# Patient Record
Sex: MALE | Race: WHITE | NOT HISPANIC OR LATINO | URBAN - METROPOLITAN AREA
[De-identification: names, ages, dates, MRNs, and addresses within clinical notes are randomized per-mention and may not be internally consistent; named-entity substitution may affect disease eponyms.]

---

## 2016-02-12 RX ORDER — DOCUSATE SODIUM 100 MG
1 CAPSULE ORAL
Qty: 0 | Refills: 0 | COMMUNITY
Start: 2016-02-12

## 2017-01-12 ENCOUNTER — INPATIENT (INPATIENT)
Facility: HOSPITAL | Age: 62
LOS: 6 days | Discharge: NURSING, REHAB OR LONG-TERM CARE | DRG: 493 | End: 2017-01-19
Attending: ORTHOPAEDIC SURGERY | Admitting: ORTHOPAEDIC SURGERY
Payer: COMMERCIAL

## 2017-01-12 ENCOUNTER — RESULT REVIEW (OUTPATIENT)
Age: 62
End: 2017-01-12

## 2017-01-12 VITALS
TEMPERATURE: 100 F | OXYGEN SATURATION: 100 % | SYSTOLIC BLOOD PRESSURE: 131 MMHG | RESPIRATION RATE: 18 BRPM | HEART RATE: 96 BPM | DIASTOLIC BLOOD PRESSURE: 76 MMHG

## 2017-01-12 DIAGNOSIS — Z98.89 OTHER SPECIFIED POSTPROCEDURAL STATES: Chronic | ICD-10-CM

## 2017-01-12 DIAGNOSIS — Z96.649 PRESENCE OF UNSPECIFIED ARTIFICIAL HIP JOINT: Chronic | ICD-10-CM

## 2017-01-12 DIAGNOSIS — M25.571 PAIN IN RIGHT ANKLE AND JOINTS OF RIGHT FOOT: ICD-10-CM

## 2017-01-12 LAB
ANION GAP SERPL CALC-SCNC: 11 MMOL/L — SIGNIFICANT CHANGE UP (ref 5–17)
APPEARANCE UR: CLEAR — SIGNIFICANT CHANGE UP
APTT BLD: 32.2 SEC — SIGNIFICANT CHANGE UP (ref 27.5–37.4)
BACTERIA # UR AUTO: NEGATIVE /HPF — SIGNIFICANT CHANGE UP
BILIRUB UR-MCNC: NEGATIVE — SIGNIFICANT CHANGE UP
BLD GP AB SCN SERPL QL: NEGATIVE — SIGNIFICANT CHANGE UP
BUN SERPL-MCNC: 16 MG/DL — SIGNIFICANT CHANGE UP (ref 7–23)
CALCIUM SERPL-MCNC: 9.5 MG/DL — SIGNIFICANT CHANGE UP (ref 8.4–10.5)
CHLORIDE SERPL-SCNC: 100 MMOL/L — SIGNIFICANT CHANGE UP (ref 96–108)
CO2 SERPL-SCNC: 27 MMOL/L — SIGNIFICANT CHANGE UP (ref 22–31)
COLOR SPEC: YELLOW — SIGNIFICANT CHANGE UP
CREAT SERPL-MCNC: 0.89 MG/DL — SIGNIFICANT CHANGE UP (ref 0.5–1.3)
DIFF PNL FLD: NEGATIVE — SIGNIFICANT CHANGE UP
GLUCOSE SERPL-MCNC: 103 MG/DL — HIGH (ref 70–99)
GLUCOSE UR QL: NEGATIVE — SIGNIFICANT CHANGE UP
HCT VFR BLD CALC: 35.9 % — LOW (ref 39–50)
HGB BLD-MCNC: 12.5 G/DL — LOW (ref 13–17)
INR BLD: 1.28 RATIO — HIGH (ref 0.88–1.16)
KETONES UR-MCNC: NEGATIVE — SIGNIFICANT CHANGE UP
LEUKOCYTE ESTERASE UR-ACNC: NEGATIVE — SIGNIFICANT CHANGE UP
MCHC RBC-ENTMCNC: 33.8 PG — SIGNIFICANT CHANGE UP (ref 27–34)
MCHC RBC-ENTMCNC: 34.8 GM/DL — SIGNIFICANT CHANGE UP (ref 32–36)
MCV RBC AUTO: 97.1 FL — SIGNIFICANT CHANGE UP (ref 80–100)
NITRITE UR-MCNC: NEGATIVE — SIGNIFICANT CHANGE UP
PH UR: 5.5 — SIGNIFICANT CHANGE UP (ref 4.8–8)
PLATELET # BLD AUTO: 201 K/UL — SIGNIFICANT CHANGE UP (ref 150–400)
POTASSIUM SERPL-MCNC: 4 MMOL/L — SIGNIFICANT CHANGE UP (ref 3.5–5.3)
POTASSIUM SERPL-SCNC: 4 MMOL/L — SIGNIFICANT CHANGE UP (ref 3.5–5.3)
PROT UR-MCNC: NEGATIVE — SIGNIFICANT CHANGE UP
PROTHROM AB SERPL-ACNC: 14 SEC — HIGH (ref 10–13.1)
RBC # BLD: 3.7 M/UL — LOW (ref 4.2–5.8)
RBC # FLD: 12.3 % — SIGNIFICANT CHANGE UP (ref 10.3–14.5)
RBC CASTS # UR COMP ASSIST: SIGNIFICANT CHANGE UP /HPF (ref 0–2)
RH IG SCN BLD-IMP: POSITIVE — SIGNIFICANT CHANGE UP
SODIUM SERPL-SCNC: 138 MMOL/L — SIGNIFICANT CHANGE UP (ref 135–145)
SP GR SPEC: 1.02 — SIGNIFICANT CHANGE UP (ref 1.01–1.02)
UROBILINOGEN FLD QL: NEGATIVE — SIGNIFICANT CHANGE UP
WBC # BLD: 11.8 K/UL — HIGH (ref 3.8–10.5)
WBC # FLD AUTO: 11.8 K/UL — HIGH (ref 3.8–10.5)
WBC UR QL: SIGNIFICANT CHANGE UP /HPF (ref 0–5)

## 2017-01-12 PROCEDURE — 73610 X-RAY EXAM OF ANKLE: CPT | Mod: 26,RT

## 2017-01-12 PROCEDURE — 73502 X-RAY EXAM HIP UNI 2-3 VIEWS: CPT | Mod: 26,RT

## 2017-01-12 PROCEDURE — 73700 CT LOWER EXTREMITY W/O DYE: CPT | Mod: 26,RT

## 2017-01-12 PROCEDURE — 76377 3D RENDER W/INTRP POSTPROCES: CPT | Mod: 26

## 2017-01-12 PROCEDURE — 73590 X-RAY EXAM OF LOWER LEG: CPT | Mod: 26,RT

## 2017-01-12 PROCEDURE — 71010: CPT | Mod: 26

## 2017-01-12 PROCEDURE — 93010 ELECTROCARDIOGRAM REPORT: CPT | Mod: 59

## 2017-01-12 PROCEDURE — 99285 EMERGENCY DEPT VISIT HI MDM: CPT | Mod: 25

## 2017-01-12 RX ORDER — SODIUM CHLORIDE 9 MG/ML
1000 INJECTION, SOLUTION INTRAVENOUS
Qty: 0 | Refills: 0 | Status: DISCONTINUED | OUTPATIENT
Start: 2017-01-12 | End: 2017-01-13

## 2017-01-12 RX ORDER — OXYCODONE HYDROCHLORIDE 5 MG/1
10 TABLET ORAL ONCE
Qty: 0 | Refills: 0 | Status: DISCONTINUED | OUTPATIENT
Start: 2017-01-12 | End: 2017-01-12

## 2017-01-12 RX ORDER — MORPHINE SULFATE 50 MG/1
4 CAPSULE, EXTENDED RELEASE ORAL EVERY 4 HOURS
Qty: 0 | Refills: 0 | Status: DISCONTINUED | OUTPATIENT
Start: 2017-01-12 | End: 2017-01-13

## 2017-01-12 RX ORDER — GEMFIBROZIL 600 MG
600 TABLET ORAL
Qty: 0 | Refills: 0 | Status: DISCONTINUED | OUTPATIENT
Start: 2017-01-12 | End: 2017-01-13

## 2017-01-12 RX ORDER — METOPROLOL TARTRATE 50 MG
25 TABLET ORAL
Qty: 0 | Refills: 0 | Status: DISCONTINUED | OUTPATIENT
Start: 2017-01-12 | End: 2017-01-13

## 2017-01-12 RX ORDER — ACETAMINOPHEN 500 MG
650 TABLET ORAL EVERY 6 HOURS
Qty: 0 | Refills: 0 | Status: DISCONTINUED | OUTPATIENT
Start: 2017-01-12 | End: 2017-01-13

## 2017-01-12 RX ORDER — HEPARIN SODIUM 5000 [USP'U]/ML
5000 INJECTION INTRAVENOUS; SUBCUTANEOUS EVERY 8 HOURS
Qty: 0 | Refills: 0 | Status: COMPLETED | OUTPATIENT
Start: 2017-01-12 | End: 2017-01-12

## 2017-01-12 RX ORDER — PANTOPRAZOLE SODIUM 20 MG/1
40 TABLET, DELAYED RELEASE ORAL
Qty: 0 | Refills: 0 | Status: DISCONTINUED | OUTPATIENT
Start: 2017-01-12 | End: 2017-01-13

## 2017-01-12 RX ORDER — OXYCODONE HYDROCHLORIDE 5 MG/1
5 TABLET ORAL ONCE
Qty: 0 | Refills: 0 | Status: DISCONTINUED | OUTPATIENT
Start: 2017-01-12 | End: 2017-01-12

## 2017-01-12 RX ORDER — ASPIRIN/CALCIUM CARB/MAGNESIUM 324 MG
81 TABLET ORAL DAILY
Qty: 0 | Refills: 0 | Status: DISCONTINUED | OUTPATIENT
Start: 2017-01-12 | End: 2017-01-13

## 2017-01-12 RX ORDER — HYDROMORPHONE HYDROCHLORIDE 2 MG/ML
1 INJECTION INTRAMUSCULAR; INTRAVENOUS; SUBCUTANEOUS ONCE
Qty: 0 | Refills: 0 | Status: DISCONTINUED | OUTPATIENT
Start: 2017-01-12 | End: 2017-01-12

## 2017-01-12 RX ORDER — ATORVASTATIN CALCIUM 80 MG/1
20 TABLET, FILM COATED ORAL AT BEDTIME
Qty: 0 | Refills: 0 | Status: DISCONTINUED | OUTPATIENT
Start: 2017-01-12 | End: 2017-01-13

## 2017-01-12 RX ORDER — FENTANYL CITRATE 50 UG/ML
2 INJECTION INTRAVENOUS
Qty: 0 | Refills: 0 | Status: DISCONTINUED | OUTPATIENT
Start: 2017-01-12 | End: 2017-01-13

## 2017-01-12 RX ORDER — IBUPROFEN 200 MG
600 TABLET ORAL ONCE
Qty: 0 | Refills: 0 | Status: COMPLETED | OUTPATIENT
Start: 2017-01-12 | End: 2017-01-12

## 2017-01-12 RX ORDER — HYDROMORPHONE HYDROCHLORIDE 2 MG/ML
1 INJECTION INTRAMUSCULAR; INTRAVENOUS; SUBCUTANEOUS EVERY 6 HOURS
Qty: 0 | Refills: 0 | Status: DISCONTINUED | OUTPATIENT
Start: 2017-01-12 | End: 2017-01-13

## 2017-01-12 RX ORDER — DIPHENHYDRAMINE HCL 50 MG
25 CAPSULE ORAL AT BEDTIME
Qty: 0 | Refills: 0 | Status: DISCONTINUED | OUTPATIENT
Start: 2017-01-12 | End: 2017-01-13

## 2017-01-12 RX ORDER — HYDROMORPHONE HYDROCHLORIDE 2 MG/ML
8 INJECTION INTRAMUSCULAR; INTRAVENOUS; SUBCUTANEOUS EVERY 4 HOURS
Qty: 0 | Refills: 0 | Status: DISCONTINUED | OUTPATIENT
Start: 2017-01-12 | End: 2017-01-13

## 2017-01-12 RX ORDER — MORPHINE SULFATE 50 MG/1
8 CAPSULE, EXTENDED RELEASE ORAL EVERY 4 HOURS
Qty: 0 | Refills: 0 | Status: DISCONTINUED | OUTPATIENT
Start: 2017-01-12 | End: 2017-01-13

## 2017-01-12 RX ORDER — CARISOPRODOL 250 MG
350 TABLET ORAL THREE TIMES A DAY
Qty: 0 | Refills: 0 | Status: DISCONTINUED | OUTPATIENT
Start: 2017-01-12 | End: 2017-01-13

## 2017-01-12 RX ADMIN — HEPARIN SODIUM 5000 UNIT(S): 5000 INJECTION INTRAVENOUS; SUBCUTANEOUS at 22:00

## 2017-01-12 RX ADMIN — Medication 350 MILLIGRAM(S): at 22:00

## 2017-01-12 RX ADMIN — ATORVASTATIN CALCIUM 20 MILLIGRAM(S): 80 TABLET, FILM COATED ORAL at 22:00

## 2017-01-12 RX ADMIN — HYDROMORPHONE HYDROCHLORIDE 1 MILLIGRAM(S): 2 INJECTION INTRAMUSCULAR; INTRAVENOUS; SUBCUTANEOUS at 11:14

## 2017-01-12 RX ADMIN — OXYCODONE HYDROCHLORIDE 10 MILLIGRAM(S): 5 TABLET ORAL at 08:52

## 2017-01-12 RX ADMIN — Medication 25 MILLIGRAM(S): at 19:02

## 2017-01-12 RX ADMIN — Medication 600 MILLIGRAM(S): at 22:00

## 2017-01-12 RX ADMIN — Medication 600 MILLIGRAM(S): at 07:51

## 2017-01-12 RX ADMIN — HYDROMORPHONE HYDROCHLORIDE 8 MILLIGRAM(S): 2 INJECTION INTRAMUSCULAR; INTRAVENOUS; SUBCUTANEOUS at 02:58

## 2017-01-12 RX ADMIN — Medication 600 MILLIGRAM(S): at 08:52

## 2017-01-12 RX ADMIN — HYDROMORPHONE HYDROCHLORIDE 8 MILLIGRAM(S): 2 INJECTION INTRAMUSCULAR; INTRAVENOUS; SUBCUTANEOUS at 22:00

## 2017-01-12 RX ADMIN — MORPHINE SULFATE 4 MILLIGRAM(S): 50 CAPSULE, EXTENDED RELEASE ORAL at 23:57

## 2017-01-12 RX ADMIN — OXYCODONE HYDROCHLORIDE 10 MILLIGRAM(S): 5 TABLET ORAL at 07:51

## 2017-01-12 RX ADMIN — HYDROMORPHONE HYDROCHLORIDE 1 MILLIGRAM(S): 2 INJECTION INTRAMUSCULAR; INTRAVENOUS; SUBCUTANEOUS at 15:18

## 2017-01-12 RX ADMIN — MORPHINE SULFATE 4 MILLIGRAM(S): 50 CAPSULE, EXTENDED RELEASE ORAL at 23:01

## 2017-01-12 RX ADMIN — HYDROMORPHONE HYDROCHLORIDE 8 MILLIGRAM(S): 2 INJECTION INTRAMUSCULAR; INTRAVENOUS; SUBCUTANEOUS at 22:56

## 2017-01-12 RX ADMIN — HYDROMORPHONE HYDROCHLORIDE 1 MILLIGRAM(S): 2 INJECTION INTRAMUSCULAR; INTRAVENOUS; SUBCUTANEOUS at 10:44

## 2017-01-12 RX ADMIN — HYDROMORPHONE HYDROCHLORIDE 8 MILLIGRAM(S): 2 INJECTION INTRAMUSCULAR; INTRAVENOUS; SUBCUTANEOUS at 19:02

## 2017-01-12 NOTE — ED PROVIDER NOTE - PMH
Back pain  Chronic back pain  Cerebral aneurysm rupture  1997 clipped, no residual  Cholesterol serum elevated    Femur fracture, right  2/16, surgical revision of right hip  GERD (gastroesophageal reflux disease)    Hypertension    Lumbar herniated disc    Myocardial infarction  MI 2005, stent RCA  Narcotic dependence  recently hospitalized 5/28/16 for withdrawal (had no fentanyl patch X 3 days)  Osteoarthritis    Spinal stenosis

## 2017-01-12 NOTE — ED PROVIDER NOTE - PROGRESS NOTE DETAILS
xray- rt ankle trimall fx -Slowey DO ATTD: Dr. Natalia frgaa fx. ortho consulted. admitted to hospital for further care. okay to D/C with outpatient Follow up has plenty of dilaudid at home, does not need refill. -Caleb DO unable to ambulate with crutches, will admit for rehab palcement -Caleb DO

## 2017-01-12 NOTE — ED PROVIDER NOTE - OBJECTIVE STATEMENT
62yo M with chronic back pain/LE neuropathy w/slip and eversion ankle injury 2 days ago, mild swelling/ecchymosis yesterday, able to walk with limp, since last night sever pain rt medial ankle, worsening swelling/bruising today unable to bare weight, took dilaudid 8mg last night w/o relief. has fentanyl patch, medical marijuana, dilaudid for chronic pain and neuropathy from back/LE. no fever/chills. no h/o DM. no lacerations/wounds

## 2017-01-12 NOTE — ED PROVIDER NOTE - PHYSICAL EXAMINATION
Gen: NAD, AOx3  Head: NCAT  Lung: no respiratory distress  CV: Normal perfusion  MSK: Rt LE: +swelling rt lateral ankle with ecchymosis, +ttp b/l distal malleoli, no ttp proximal tib/fib, no proximal 5th metatarsal ttp, no foot pain, limited ROM due to pain, Lt LE: no injury  Neuro: decreased sensation b/l LE  Skin: No rash   Psych: normal affect

## 2017-01-12 NOTE — ED PROVIDER NOTE - CARE PLAN
Principal Discharge DX:	Acute right ankle pain Principal Discharge DX:	Acute right ankle pain  Instructions for follow-up, activity and diet:	1. Return to ED for worsening, progressive or any other concerning symptoms   2. Follow up with your primary care doctor in 2-3days   3. Follow up with Dr. Stoddard's group for surgical repair in 1 week [phone]  4. Rest, apply ice over covered skin for no more than 15 minutes at a time, keep affected extremity elevated, use compressive dressing or splint as provided and instructed. Non weight bearing in the right lower extremity Principal Discharge DX:	Acute right ankle pain  Instructions for follow-up, activity and diet:	1. Return to ED for worsening, progressive or any other concerning symptoms   2. Follow up with your primary care doctor in 2-3days   3. Follow up with Dr. Stoddard's group for surgical repair in 1 week [phone]  4. Rest, apply ice over covered skin for no more than 15 minutes at a time, keep affected extremity elevated, use compressive dressing or splint as provided and instructed. Non weight bearing in the right lower extremity Principal Discharge DX:	Acute right ankle pain  Instructions for follow-up, activity and diet:	1. Return to ED for worsening, progressive or any other concerning symptoms   2. Follow up with your primary care doctor in 2-3days   3. Follow up with Dr. Stoddard's group for surgical repair in 1 week [phone]  4. Rest, apply ice over covered skin for no more than 15 minutes at a time, keep affected extremity elevated, use compressive dressing or splint as provided and instructed. Non weight bearing in the right lower extremity Principal Discharge DX:	Acute right ankle pain  Instructions for follow-up, activity and diet:	1. Return to ED for worsening, progressive or any other concerning symptoms   2. Follow up with your primary care doctor in 2-3days   3. Follow up with Dr. Stoddard's group for surgical repair in 1 week [phone]  4. Rest, apply ice over covered skin for no more than 15 minutes at a time, keep affected extremity elevated, use compressive dressing or splint as provided and instructed. Non weight bearing in the right lower extremity Principal Discharge DX:	Acute right ankle pain  Instructions for follow-up, activity and diet:	1. Return to ED for worsening, progressive or any other concerning symptoms   2. Follow up with your primary care doctor in 2-3days   3. Follow up with Dr. Stoddard's group for surgical repair in 1 week [phone]  4. Rest, apply ice over covered skin for no more than 15 minutes at a time, keep affected extremity elevated, use compressive dressing or splint as provided and instructed. Non weight bearing in the right lower extremity Principal Discharge DX:	Acute right ankle pain  Instructions for follow-up, activity and diet:	1. Return to ED for worsening, progressive or any other concerning symptoms   2. Follow up with your primary care doctor in 2-3days   3. Follow up with Dr. Stoddard's group for surgical repair in 1 week [phone]  4. Rest, apply ice over covered skin for no more than 15 minutes at a time, keep affected extremity elevated, use compressive dressing or splint as provided and instructed. Non weight bearing in the right lower extremity Principal Discharge DX:	Acute right ankle pain  Instructions for follow-up, activity and diet:	1. Return to ED for worsening, progressive or any other concerning symptoms   2. Follow up with your primary care doctor in 2-3days   3. Follow up with Dr. Stoddard's group for surgical repair in 1 week [phone]  4. Rest, apply ice over covered skin for no more than 15 minutes at a time, keep affected extremity elevated, use compressive dressing or splint as provided and instructed. Non weight bearing in the right lower extremity Principal Discharge DX:	Acute right ankle pain  Instructions for follow-up, activity and diet:	1. Return to ED for worsening, progressive or any other concerning symptoms   2. Follow up with your primary care doctor in 2-3days   3. Follow up with Dr. Stoddard's group for surgical repair in 1 week [phone]  4. Rest, apply ice over covered skin for no more than 15 minutes at a time, keep affected extremity elevated, use compressive dressing or splint as provided and instructed. Non weight bearing in the right lower extremity

## 2017-01-12 NOTE — ED PROVIDER NOTE - MEDICAL DECISION MAKING DETAILS
r/o ankle fx, motrin, will likely have issues controlling pain due to chronic dependence- will give 10oxy, splint/crutches, outpatient Follow up

## 2017-01-12 NOTE — ED PROVIDER NOTE - PLAN OF CARE
1. Return to ED for worsening, progressive or any other concerning symptoms   2. Follow up with your primary care doctor in 2-3days   3. Follow up with Dr. Stoddard's group for surgical repair in 1 week [phone]  4. Rest, apply ice over covered skin for no more than 15 minutes at a time, keep affected extremity elevated, use compressive dressing or splint as provided and instructed. Non weight bearing in the right lower extremity

## 2017-01-12 NOTE — ED PROVIDER NOTE - ATTENDING CONTRIBUTION TO CARE
I performed a face to face evaluation of this patient and performed a history and physical examination on the patient.  I agree with the resident's history, physical examination, and plan of the patient. patient complaining of right ankle pain after fall a couple days ago, pain becoming worse. ankle red, swollen, tender. also complaining of right hip pain.  xrays to r/o fx.  analgesia prn.

## 2017-01-12 NOTE — ED PROVIDER NOTE - PSH
Cardiac chest pain  Patient has a stent 2005  Cerebral aneurysm  I997 with clips  Chronic pain    Chronic pain  Patient has an Intrathecal pump for chronic pain  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  H/O arthroscopy of shoulder  right X 2, left X 1  History of hip replacement  8/15, revision 2/16 after falling and fracturing right femur  History of right inguinal hernia repair  X2  History of throat surgery  surgical exicision cyst 1986  Hx of appendectomy  6/1969  Hx of laminectomy  lumbar-2002  Insertion of pain pump  Dilaudid-2009, non functioning  S/P left knee arthroscopy    S/P lumbar fusion  L1-S1:2002  S/P lumbar spinal fusion revision  2002  Stented coronary artery  2005:was on Plavix for 6 months until 1/2006

## 2017-01-12 NOTE — H&P ADULT. - HISTORY OF PRESENT ILLNESS
61M s/p mechanical fall at home 2 days ago with R ankle pain. Walked on it yesterday, but noticed increasing pain overnight. Denies head trauma. Denies other injury

## 2017-01-12 NOTE — ED ADULT NURSE NOTE - OBJECTIVE STATEMENT
pt 62 y/o male A&Ox3 BIBA with c/o throbbing pain to the right medial side of the ankle rating 6-7/10. pt reports mechanical fall on the way to the bathroom 2 days ago where he slipped and fell onto the carpet. pt reports rolling ankle and falling directly onto the ankle. pt reports being able to walk yesterday but noticed increased ecchymosis to lateral side of right ankle. right ankle and foot are visibly swollen and ecchymosis is noted to lateral side of ankle. pt reports the most pain to the medial side of ankle.  pt states that he woke up this morning unable to walk or bear weight onto his right ankle. pt has history of neuropathy but reports mild diminished sensation to right foot. + pedal pulses present b/l. pt denies LOC, SOB, CP. pt took his chronic pain medications this morning with no relief. pt also has 2 fentanyl patches to the right leg. pt resting with wife at bedside. VSS.

## 2017-01-12 NOTE — ED ADULT NURSE REASSESSMENT NOTE - NS ED NURSE REASSESS COMMENT FT1
Pt resting, in no acute distress. fracture to tibia/ankle seen on xray as per md Ellis. IV placed, meds given as per md order and labs sent. awaiting orthopedists. Will continue to monitor.
pt resting comfortably in no acute distress. awaiting bed assignment. will continue to monitor.
MD merino.

## 2017-01-13 LAB
ANION GAP SERPL CALC-SCNC: 14 MMOL/L — SIGNIFICANT CHANGE UP (ref 5–17)
APTT BLD: 31 SEC — SIGNIFICANT CHANGE UP (ref 27.5–37.4)
BASOPHILS # BLD AUTO: 0.03 K/UL — SIGNIFICANT CHANGE UP (ref 0–0.2)
BASOPHILS NFR BLD AUTO: 0.5 % — SIGNIFICANT CHANGE UP (ref 0–2)
BLD GP AB SCN SERPL QL: NEGATIVE — SIGNIFICANT CHANGE UP
BUN SERPL-MCNC: 14 MG/DL — SIGNIFICANT CHANGE UP (ref 7–23)
CALCIUM SERPL-MCNC: 9.2 MG/DL — SIGNIFICANT CHANGE UP (ref 8.4–10.5)
CHLORIDE SERPL-SCNC: 101 MMOL/L — SIGNIFICANT CHANGE UP (ref 96–108)
CO2 SERPL-SCNC: 25 MMOL/L — SIGNIFICANT CHANGE UP (ref 22–31)
CREAT SERPL-MCNC: 0.82 MG/DL — SIGNIFICANT CHANGE UP (ref 0.5–1.3)
EOSINOPHIL # BLD AUTO: 0.4 K/UL — SIGNIFICANT CHANGE UP (ref 0–0.5)
EOSINOPHIL NFR BLD AUTO: 6.3 % — HIGH (ref 0–6)
GLUCOSE SERPL-MCNC: 118 MG/DL — HIGH (ref 70–99)
HCT VFR BLD CALC: 34.4 % — LOW (ref 39–50)
HGB BLD-MCNC: 11.2 G/DL — LOW (ref 13–17)
IMM GRANULOCYTES NFR BLD AUTO: 0.3 % — SIGNIFICANT CHANGE UP (ref 0–1.5)
INR BLD: 1.16 RATIO — SIGNIFICANT CHANGE UP (ref 0.88–1.16)
LYMPHOCYTES # BLD AUTO: 1.69 K/UL — SIGNIFICANT CHANGE UP (ref 1–3.3)
LYMPHOCYTES # BLD AUTO: 26.6 % — SIGNIFICANT CHANGE UP (ref 13–44)
MCHC RBC-ENTMCNC: 31.4 PG — SIGNIFICANT CHANGE UP (ref 27–34)
MCHC RBC-ENTMCNC: 32.6 GM/DL — SIGNIFICANT CHANGE UP (ref 32–36)
MCV RBC AUTO: 96.4 FL — SIGNIFICANT CHANGE UP (ref 80–100)
MONOCYTES # BLD AUTO: 0.71 K/UL — SIGNIFICANT CHANGE UP (ref 0–0.9)
MONOCYTES NFR BLD AUTO: 11.2 % — SIGNIFICANT CHANGE UP (ref 2–14)
NEUTROPHILS # BLD AUTO: 3.5 K/UL — SIGNIFICANT CHANGE UP (ref 1.8–7.4)
NEUTROPHILS NFR BLD AUTO: 55.1 % — SIGNIFICANT CHANGE UP (ref 43–77)
PLATELET # BLD AUTO: 192 K/UL — SIGNIFICANT CHANGE UP (ref 150–400)
POTASSIUM SERPL-MCNC: 4.3 MMOL/L — SIGNIFICANT CHANGE UP (ref 3.5–5.3)
POTASSIUM SERPL-SCNC: 4.3 MMOL/L — SIGNIFICANT CHANGE UP (ref 3.5–5.3)
PROTHROM AB SERPL-ACNC: 13.1 SEC — SIGNIFICANT CHANGE UP (ref 10–13.1)
RBC # BLD: 3.57 M/UL — LOW (ref 4.2–5.8)
RBC # FLD: 12.9 % — SIGNIFICANT CHANGE UP (ref 10.3–14.5)
RH IG SCN BLD-IMP: POSITIVE — SIGNIFICANT CHANGE UP
SODIUM SERPL-SCNC: 140 MMOL/L — SIGNIFICANT CHANGE UP (ref 135–145)
WBC # BLD: 6.35 K/UL — SIGNIFICANT CHANGE UP (ref 3.8–10.5)
WBC # FLD AUTO: 6.35 K/UL — SIGNIFICANT CHANGE UP (ref 3.8–10.5)

## 2017-01-13 PROCEDURE — 88311 DECALCIFY TISSUE: CPT | Mod: 26

## 2017-01-13 PROCEDURE — 88304 TISSUE EXAM BY PATHOLOGIST: CPT | Mod: 26

## 2017-01-13 PROCEDURE — 73610 X-RAY EXAM OF ANKLE: CPT | Mod: 26,RT

## 2017-01-13 RX ORDER — HYDROMORPHONE HYDROCHLORIDE 2 MG/ML
4 INJECTION INTRAMUSCULAR; INTRAVENOUS; SUBCUTANEOUS EVERY 4 HOURS
Qty: 0 | Refills: 0 | Status: DISCONTINUED | OUTPATIENT
Start: 2017-01-13 | End: 2017-01-14

## 2017-01-13 RX ORDER — ACETAMINOPHEN 500 MG
650 TABLET ORAL EVERY 6 HOURS
Qty: 0 | Refills: 0 | Status: DISCONTINUED | OUTPATIENT
Start: 2017-01-13 | End: 2017-01-19

## 2017-01-13 RX ORDER — CEFAZOLIN SODIUM 1 G
2000 VIAL (EA) INJECTION EVERY 8 HOURS
Qty: 0 | Refills: 0 | Status: COMPLETED | OUTPATIENT
Start: 2017-01-13 | End: 2017-01-14

## 2017-01-13 RX ORDER — FENTANYL CITRATE 50 UG/ML
2 INJECTION INTRAVENOUS
Qty: 0 | Refills: 0 | Status: DISCONTINUED | OUTPATIENT
Start: 2017-01-13 | End: 2017-01-19

## 2017-01-13 RX ORDER — TIZANIDINE 4 MG/1
6 TABLET ORAL THREE TIMES A DAY
Qty: 0 | Refills: 0 | Status: DISCONTINUED | OUTPATIENT
Start: 2017-01-13 | End: 2017-01-19

## 2017-01-13 RX ORDER — FERROUS SULFATE 325(65) MG
325 TABLET ORAL
Qty: 0 | Refills: 0 | Status: DISCONTINUED | OUTPATIENT
Start: 2017-01-13 | End: 2017-01-19

## 2017-01-13 RX ORDER — METOPROLOL TARTRATE 50 MG
25 TABLET ORAL
Qty: 0 | Refills: 0 | Status: DISCONTINUED | OUTPATIENT
Start: 2017-01-13 | End: 2017-01-19

## 2017-01-13 RX ORDER — HYDROMORPHONE HYDROCHLORIDE 2 MG/ML
2 INJECTION INTRAMUSCULAR; INTRAVENOUS; SUBCUTANEOUS EVERY 4 HOURS
Qty: 0 | Refills: 0 | Status: DISCONTINUED | OUTPATIENT
Start: 2017-01-13 | End: 2017-01-14

## 2017-01-13 RX ORDER — DIPHENHYDRAMINE HCL 50 MG
25 CAPSULE ORAL AT BEDTIME
Qty: 0 | Refills: 0 | Status: DISCONTINUED | OUTPATIENT
Start: 2017-01-13 | End: 2017-01-19

## 2017-01-13 RX ORDER — SODIUM CHLORIDE 9 MG/ML
1000 INJECTION, SOLUTION INTRAVENOUS
Qty: 0 | Refills: 0 | Status: DISCONTINUED | OUTPATIENT
Start: 2017-01-13 | End: 2017-01-18

## 2017-01-13 RX ORDER — MAGNESIUM HYDROXIDE 400 MG/1
30 TABLET, CHEWABLE ORAL DAILY
Qty: 0 | Refills: 0 | Status: DISCONTINUED | OUTPATIENT
Start: 2017-01-13 | End: 2017-01-18

## 2017-01-13 RX ORDER — DOCUSATE SODIUM 100 MG
100 CAPSULE ORAL THREE TIMES A DAY
Qty: 0 | Refills: 0 | Status: DISCONTINUED | OUTPATIENT
Start: 2017-01-13 | End: 2017-01-19

## 2017-01-13 RX ORDER — FOLIC ACID 0.8 MG
1 TABLET ORAL DAILY
Qty: 0 | Refills: 0 | Status: DISCONTINUED | OUTPATIENT
Start: 2017-01-13 | End: 2017-01-19

## 2017-01-13 RX ORDER — ASPIRIN/CALCIUM CARB/MAGNESIUM 324 MG
324 TABLET ORAL ONCE
Qty: 0 | Refills: 0 | Status: COMPLETED | OUTPATIENT
Start: 2017-01-13 | End: 2017-01-13

## 2017-01-13 RX ORDER — HYDROMORPHONE HYDROCHLORIDE 2 MG/ML
1 INJECTION INTRAMUSCULAR; INTRAVENOUS; SUBCUTANEOUS
Qty: 0 | Refills: 0 | Status: DISCONTINUED | OUTPATIENT
Start: 2017-01-13 | End: 2017-01-14

## 2017-01-13 RX ORDER — HYDROMORPHONE HYDROCHLORIDE 2 MG/ML
0.5 INJECTION INTRAMUSCULAR; INTRAVENOUS; SUBCUTANEOUS
Qty: 0 | Refills: 0 | Status: DISCONTINUED | OUTPATIENT
Start: 2017-01-13 | End: 2017-01-13

## 2017-01-13 RX ORDER — GEMFIBROZIL 600 MG
600 TABLET ORAL
Qty: 0 | Refills: 0 | Status: DISCONTINUED | OUTPATIENT
Start: 2017-01-13 | End: 2017-01-19

## 2017-01-13 RX ORDER — PANTOPRAZOLE SODIUM 20 MG/1
40 TABLET, DELAYED RELEASE ORAL
Qty: 0 | Refills: 0 | Status: DISCONTINUED | OUTPATIENT
Start: 2017-01-13 | End: 2017-01-19

## 2017-01-13 RX ORDER — ASCORBIC ACID 60 MG
500 TABLET,CHEWABLE ORAL
Qty: 0 | Refills: 0 | Status: DISCONTINUED | OUTPATIENT
Start: 2017-01-13 | End: 2017-01-19

## 2017-01-13 RX ORDER — ASPIRIN/CALCIUM CARB/MAGNESIUM 324 MG
324 TABLET ORAL DAILY
Qty: 0 | Refills: 0 | Status: DISCONTINUED | OUTPATIENT
Start: 2017-01-13 | End: 2017-01-13

## 2017-01-13 RX ORDER — ATORVASTATIN CALCIUM 80 MG/1
20 TABLET, FILM COATED ORAL AT BEDTIME
Qty: 0 | Refills: 0 | Status: DISCONTINUED | OUTPATIENT
Start: 2017-01-13 | End: 2017-01-19

## 2017-01-13 RX ORDER — ONDANSETRON 8 MG/1
4 TABLET, FILM COATED ORAL EVERY 6 HOURS
Qty: 0 | Refills: 0 | Status: DISCONTINUED | OUTPATIENT
Start: 2017-01-13 | End: 2017-01-19

## 2017-01-13 RX ORDER — ASPIRIN/CALCIUM CARB/MAGNESIUM 324 MG
325 TABLET ORAL DAILY
Qty: 0 | Refills: 0 | Status: DISCONTINUED | OUTPATIENT
Start: 2017-01-14 | End: 2017-01-19

## 2017-01-13 RX ADMIN — ATORVASTATIN CALCIUM 20 MILLIGRAM(S): 80 TABLET, FILM COATED ORAL at 22:00

## 2017-01-13 RX ADMIN — Medication 25 MILLIGRAM(S): at 05:07

## 2017-01-13 RX ADMIN — HYDROMORPHONE HYDROCHLORIDE 8 MILLIGRAM(S): 2 INJECTION INTRAMUSCULAR; INTRAVENOUS; SUBCUTANEOUS at 01:49

## 2017-01-13 RX ADMIN — Medication 25 MILLIGRAM(S): at 16:57

## 2017-01-13 RX ADMIN — Medication 100 MILLIGRAM(S): at 22:01

## 2017-01-13 RX ADMIN — FENTANYL CITRATE 2 PATCH: 50 INJECTION INTRAVENOUS at 15:55

## 2017-01-13 RX ADMIN — Medication 500 MILLIGRAM(S): at 17:28

## 2017-01-13 RX ADMIN — Medication 600 MILLIGRAM(S): at 05:08

## 2017-01-13 RX ADMIN — MORPHINE SULFATE 4 MILLIGRAM(S): 50 CAPSULE, EXTENDED RELEASE ORAL at 03:19

## 2017-01-13 RX ADMIN — MORPHINE SULFATE 8 MILLIGRAM(S): 50 CAPSULE, EXTENDED RELEASE ORAL at 07:57

## 2017-01-13 RX ADMIN — HYDROMORPHONE HYDROCHLORIDE 4 MILLIGRAM(S): 2 INJECTION INTRAMUSCULAR; INTRAVENOUS; SUBCUTANEOUS at 22:30

## 2017-01-13 RX ADMIN — Medication 324 MILLIGRAM(S): at 10:20

## 2017-01-13 RX ADMIN — HYDROMORPHONE HYDROCHLORIDE 8 MILLIGRAM(S): 2 INJECTION INTRAMUSCULAR; INTRAVENOUS; SUBCUTANEOUS at 05:07

## 2017-01-13 RX ADMIN — PANTOPRAZOLE SODIUM 40 MILLIGRAM(S): 20 TABLET, DELAYED RELEASE ORAL at 05:08

## 2017-01-13 RX ADMIN — TIZANIDINE 6 MILLIGRAM(S): 4 TABLET ORAL at 23:10

## 2017-01-13 RX ADMIN — MORPHINE SULFATE 8 MILLIGRAM(S): 50 CAPSULE, EXTENDED RELEASE ORAL at 08:12

## 2017-01-13 RX ADMIN — HYDROMORPHONE HYDROCHLORIDE 8 MILLIGRAM(S): 2 INJECTION INTRAMUSCULAR; INTRAVENOUS; SUBCUTANEOUS at 06:01

## 2017-01-13 RX ADMIN — SODIUM CHLORIDE 75 MILLILITER(S): 9 INJECTION, SOLUTION INTRAVENOUS at 15:38

## 2017-01-13 RX ADMIN — Medication 100 MILLIGRAM(S): at 22:03

## 2017-01-13 RX ADMIN — MORPHINE SULFATE 4 MILLIGRAM(S): 50 CAPSULE, EXTENDED RELEASE ORAL at 04:00

## 2017-01-13 RX ADMIN — Medication 325 MILLIGRAM(S): at 16:56

## 2017-01-13 RX ADMIN — HYDROMORPHONE HYDROCHLORIDE 4 MILLIGRAM(S): 2 INJECTION INTRAMUSCULAR; INTRAVENOUS; SUBCUTANEOUS at 22:01

## 2017-01-14 ENCOUNTER — TRANSCRIPTION ENCOUNTER (OUTPATIENT)
Age: 62
End: 2017-01-14

## 2017-01-14 LAB
ANION GAP SERPL CALC-SCNC: 12 MMOL/L — SIGNIFICANT CHANGE UP (ref 5–17)
BASOPHILS # BLD AUTO: 0 K/UL — SIGNIFICANT CHANGE UP (ref 0–0.2)
BASOPHILS NFR BLD AUTO: 0 % — SIGNIFICANT CHANGE UP (ref 0–2)
BUN SERPL-MCNC: 9 MG/DL — SIGNIFICANT CHANGE UP (ref 7–23)
CALCIUM SERPL-MCNC: 9.1 MG/DL — SIGNIFICANT CHANGE UP (ref 8.4–10.5)
CHLORIDE SERPL-SCNC: 106 MMOL/L — SIGNIFICANT CHANGE UP (ref 96–108)
CO2 SERPL-SCNC: 26 MMOL/L — SIGNIFICANT CHANGE UP (ref 22–31)
CREAT SERPL-MCNC: 0.7 MG/DL — SIGNIFICANT CHANGE UP (ref 0.5–1.3)
EOSINOPHIL # BLD AUTO: 0.01 K/UL — SIGNIFICANT CHANGE UP (ref 0–0.5)
EOSINOPHIL NFR BLD AUTO: 0.1 % — SIGNIFICANT CHANGE UP (ref 0–6)
GLUCOSE SERPL-MCNC: 191 MG/DL — HIGH (ref 70–99)
HCT VFR BLD CALC: 33.4 % — LOW (ref 39–50)
HGB BLD-MCNC: 10.8 G/DL — LOW (ref 13–17)
IMM GRANULOCYTES NFR BLD AUTO: 0.1 % — SIGNIFICANT CHANGE UP (ref 0–1.5)
LYMPHOCYTES # BLD AUTO: 0.76 K/UL — LOW (ref 1–3.3)
LYMPHOCYTES # BLD AUTO: 10.9 % — LOW (ref 13–44)
MCHC RBC-ENTMCNC: 31.5 PG — SIGNIFICANT CHANGE UP (ref 27–34)
MCHC RBC-ENTMCNC: 32.3 GM/DL — SIGNIFICANT CHANGE UP (ref 32–36)
MCV RBC AUTO: 97.4 FL — SIGNIFICANT CHANGE UP (ref 80–100)
MONOCYTES # BLD AUTO: 0.74 K/UL — SIGNIFICANT CHANGE UP (ref 0–0.9)
MONOCYTES NFR BLD AUTO: 10.6 % — SIGNIFICANT CHANGE UP (ref 2–14)
NEUTROPHILS # BLD AUTO: 5.46 K/UL — SIGNIFICANT CHANGE UP (ref 1.8–7.4)
NEUTROPHILS NFR BLD AUTO: 78.3 % — HIGH (ref 43–77)
PLATELET # BLD AUTO: 211 K/UL — SIGNIFICANT CHANGE UP (ref 150–400)
POTASSIUM SERPL-MCNC: 5.1 MMOL/L — SIGNIFICANT CHANGE UP (ref 3.5–5.3)
POTASSIUM SERPL-SCNC: 5.1 MMOL/L — SIGNIFICANT CHANGE UP (ref 3.5–5.3)
RBC # BLD: 3.43 M/UL — LOW (ref 4.2–5.8)
RBC # FLD: 13.1 % — SIGNIFICANT CHANGE UP (ref 10.3–14.5)
SODIUM SERPL-SCNC: 144 MMOL/L — SIGNIFICANT CHANGE UP (ref 135–145)
WBC # BLD: 6.98 K/UL — SIGNIFICANT CHANGE UP (ref 3.8–10.5)
WBC # FLD AUTO: 6.98 K/UL — SIGNIFICANT CHANGE UP (ref 3.8–10.5)

## 2017-01-14 RX ORDER — GABAPENTIN 400 MG/1
600 CAPSULE ORAL THREE TIMES A DAY
Qty: 0 | Refills: 0 | Status: DISCONTINUED | OUTPATIENT
Start: 2017-01-14 | End: 2017-01-19

## 2017-01-14 RX ORDER — ASPIRIN/CALCIUM CARB/MAGNESIUM 324 MG
1 TABLET ORAL
Qty: 0 | Refills: 0 | COMMUNITY
Start: 2017-01-14

## 2017-01-14 RX ORDER — HYDROMORPHONE HYDROCHLORIDE 2 MG/ML
4 INJECTION INTRAMUSCULAR; INTRAVENOUS; SUBCUTANEOUS EVERY 4 HOURS
Qty: 0 | Refills: 0 | Status: DISCONTINUED | OUTPATIENT
Start: 2017-01-14 | End: 2017-01-15

## 2017-01-14 RX ORDER — HYDROMORPHONE HYDROCHLORIDE 2 MG/ML
2 INJECTION INTRAMUSCULAR; INTRAVENOUS; SUBCUTANEOUS EVERY 6 HOURS
Qty: 0 | Refills: 0 | Status: DISCONTINUED | OUTPATIENT
Start: 2017-01-14 | End: 2017-01-15

## 2017-01-14 RX ORDER — HYDROMORPHONE HYDROCHLORIDE 2 MG/ML
1.5 INJECTION INTRAMUSCULAR; INTRAVENOUS; SUBCUTANEOUS
Qty: 0 | Refills: 0 | Status: DISCONTINUED | OUTPATIENT
Start: 2017-01-14 | End: 2017-01-19

## 2017-01-14 RX ORDER — HYDROMORPHONE HYDROCHLORIDE 2 MG/ML
1 INJECTION INTRAMUSCULAR; INTRAVENOUS; SUBCUTANEOUS
Qty: 0 | Refills: 0 | COMMUNITY
Start: 2017-01-14

## 2017-01-14 RX ORDER — DIAZEPAM 5 MG
5 TABLET ORAL EVERY 8 HOURS
Qty: 0 | Refills: 0 | Status: DISCONTINUED | OUTPATIENT
Start: 2017-01-14 | End: 2017-01-19

## 2017-01-14 RX ORDER — ACETAMINOPHEN 500 MG
1000 TABLET ORAL ONCE
Qty: 0 | Refills: 0 | Status: COMPLETED | OUTPATIENT
Start: 2017-01-14 | End: 2017-01-14

## 2017-01-14 RX ORDER — HYDROMORPHONE HYDROCHLORIDE 2 MG/ML
1 INJECTION INTRAMUSCULAR; INTRAVENOUS; SUBCUTANEOUS
Qty: 0 | Refills: 0 | COMMUNITY

## 2017-01-14 RX ORDER — ACETAMINOPHEN 500 MG
2 TABLET ORAL
Qty: 0 | Refills: 0 | COMMUNITY
Start: 2017-01-14

## 2017-01-14 RX ADMIN — Medication 325 MILLIGRAM(S): at 17:25

## 2017-01-14 RX ADMIN — HYDROMORPHONE HYDROCHLORIDE 4 MILLIGRAM(S): 2 INJECTION INTRAMUSCULAR; INTRAVENOUS; SUBCUTANEOUS at 18:30

## 2017-01-14 RX ADMIN — Medication 500 MILLIGRAM(S): at 06:06

## 2017-01-14 RX ADMIN — Medication 100 MILLIGRAM(S): at 14:54

## 2017-01-14 RX ADMIN — Medication 100 MILLIGRAM(S): at 06:05

## 2017-01-14 RX ADMIN — HYDROMORPHONE HYDROCHLORIDE 4 MILLIGRAM(S): 2 INJECTION INTRAMUSCULAR; INTRAVENOUS; SUBCUTANEOUS at 22:45

## 2017-01-14 RX ADMIN — Medication 1000 MILLIGRAM(S): at 23:45

## 2017-01-14 RX ADMIN — Medication 100 MILLIGRAM(S): at 06:08

## 2017-01-14 RX ADMIN — Medication 25 MILLIGRAM(S): at 17:30

## 2017-01-14 RX ADMIN — GABAPENTIN 600 MILLIGRAM(S): 400 CAPSULE ORAL at 17:29

## 2017-01-14 RX ADMIN — HYDROMORPHONE HYDROCHLORIDE 1 MILLIGRAM(S): 2 INJECTION INTRAMUSCULAR; INTRAVENOUS; SUBCUTANEOUS at 14:55

## 2017-01-14 RX ADMIN — HYDROMORPHONE HYDROCHLORIDE 4 MILLIGRAM(S): 2 INJECTION INTRAMUSCULAR; INTRAVENOUS; SUBCUTANEOUS at 22:15

## 2017-01-14 RX ADMIN — HYDROMORPHONE HYDROCHLORIDE 1 MILLIGRAM(S): 2 INJECTION INTRAMUSCULAR; INTRAVENOUS; SUBCUTANEOUS at 15:30

## 2017-01-14 RX ADMIN — Medication 325 MILLIGRAM(S): at 10:25

## 2017-01-14 RX ADMIN — ATORVASTATIN CALCIUM 20 MILLIGRAM(S): 80 TABLET, FILM COATED ORAL at 22:15

## 2017-01-14 RX ADMIN — Medication 100 MILLIGRAM(S): at 22:15

## 2017-01-14 RX ADMIN — Medication 1 TABLET(S): at 12:15

## 2017-01-14 RX ADMIN — TIZANIDINE 6 MILLIGRAM(S): 4 TABLET ORAL at 10:26

## 2017-01-14 RX ADMIN — Medication 25 MILLIGRAM(S): at 06:06

## 2017-01-14 RX ADMIN — Medication 325 MILLIGRAM(S): at 12:14

## 2017-01-14 RX ADMIN — HYDROMORPHONE HYDROCHLORIDE 4 MILLIGRAM(S): 2 INJECTION INTRAMUSCULAR; INTRAVENOUS; SUBCUTANEOUS at 06:36

## 2017-01-14 RX ADMIN — HYDROMORPHONE HYDROCHLORIDE 4 MILLIGRAM(S): 2 INJECTION INTRAMUSCULAR; INTRAVENOUS; SUBCUTANEOUS at 06:06

## 2017-01-14 RX ADMIN — HYDROMORPHONE HYDROCHLORIDE 4 MILLIGRAM(S): 2 INJECTION INTRAMUSCULAR; INTRAVENOUS; SUBCUTANEOUS at 12:14

## 2017-01-14 RX ADMIN — HYDROMORPHONE HYDROCHLORIDE 4 MILLIGRAM(S): 2 INJECTION INTRAMUSCULAR; INTRAVENOUS; SUBCUTANEOUS at 13:00

## 2017-01-14 RX ADMIN — GABAPENTIN 600 MILLIGRAM(S): 400 CAPSULE ORAL at 22:15

## 2017-01-14 RX ADMIN — Medication 600 MILLIGRAM(S): at 06:06

## 2017-01-14 RX ADMIN — Medication 500 MILLIGRAM(S): at 17:26

## 2017-01-14 RX ADMIN — Medication 400 MILLIGRAM(S): at 23:06

## 2017-01-14 RX ADMIN — PANTOPRAZOLE SODIUM 40 MILLIGRAM(S): 20 TABLET, DELAYED RELEASE ORAL at 06:06

## 2017-01-14 RX ADMIN — HYDROMORPHONE HYDROCHLORIDE 4 MILLIGRAM(S): 2 INJECTION INTRAMUSCULAR; INTRAVENOUS; SUBCUTANEOUS at 17:25

## 2017-01-14 RX ADMIN — Medication 1 MILLIGRAM(S): at 12:15

## 2017-01-14 RX ADMIN — HYDROMORPHONE HYDROCHLORIDE 1 MILLIGRAM(S): 2 INJECTION INTRAMUSCULAR; INTRAVENOUS; SUBCUTANEOUS at 20:07

## 2017-01-14 RX ADMIN — Medication 325 MILLIGRAM(S): at 12:16

## 2017-01-14 RX ADMIN — HYDROMORPHONE HYDROCHLORIDE 1 MILLIGRAM(S): 2 INJECTION INTRAMUSCULAR; INTRAVENOUS; SUBCUTANEOUS at 19:47

## 2017-01-14 RX ADMIN — Medication 600 MILLIGRAM(S): at 17:26

## 2017-01-14 NOTE — PHYSICAL THERAPY INITIAL EVALUATION ADULT - PERTINENT HX OF CURRENT PROBLEM, REHAB EVAL
61M s/p mechanical fall at home 2 days ago with R ankle pain. Walked on it yesterday, but noticed increasing pain overnight

## 2017-01-14 NOTE — DISCHARGE NOTE ADULT - CARE PLAN
Principal Discharge DX:	Ankle fracture, bimalleolar, closed, right, initial encounter  Goal:	pain free ambulation  Instructions for follow-up, activity and diet:	DIET: resume regular diet regimen   DVT PROPHYLAXIS: ecotrin 325mg once a day x 6 weeks  WEIGHT-BEARING STATUS: non-weight bearing right leg; out of bed to chair ONLY, pivot on left leg to chair, NO ambulation until cleared by surgeon  BATHING: keep dressing clean and dry   DRESSING CHANGES: do not change dressing Principal Discharge DX:	Ankle fracture, bimalleolar, closed, right, initial encounter  Goal:	Fracture fixation  Instructions for follow-up, activity and diet:	DIET: resume regular diet regimen   DVT PROPHYLAXIS: ecotrin 325mg once a day x 6 weeks  WEIGHT-BEARING STATUS: non-weight bearing right leg; out of bed to chair ONLY, pivot on left leg to chair, NO ambulation until cleared by surgeon  BATHING: keep dressing clean and dry   DRESSING CHANGES: do not change dressing

## 2017-01-14 NOTE — DISCHARGE NOTE ADULT - MEDICATION SUMMARY - MEDICATIONS TO CHANGE
I will SWITCH the dose or number of times a day I take the medications listed below when I get home from the hospital:    aspirin 81 mg oral tablet  -- 1 tab(s) by mouth once a day    Dilaudid 8 mg oral tablet  -- 1 tab(s) by mouth every 4 hours MDD:4  -- Caution federal law prohibits the transfer of this drug to any person other  than the person for whom it was prescribed.  May cause drowsiness.  Alcohol may intensify this effect.  Use care when operating dangerous machinery.  This prescription cannot be refilled.  Using more of this medication than prescribed may cause serious breathing problems. I will SWITCH the dose or number of times a day I take the medications listed below when I get home from the hospital:  None

## 2017-01-14 NOTE — DISCHARGE NOTE ADULT - MEDICATION SUMMARY - MEDICATIONS TO TAKE
I will START or STAY ON the medications listed below when I get home from the hospital:    fentaNYL 100 mcg/hr transdermal film, extended release  -- 2 patch by transdermal patch every 72 hours  -- Indication: For pain mgt    acetaminophen 325 mg oral tablet  -- 2 tab(s) by mouth every 6 hours, As needed, For Temp greater than 38 C (100.4 F)  -- Indication: For temps    HYDROmorphone 2 mg oral tablet  -- 1 tab(s) by mouth every 4 hours, As needed, Mild Pain (1 - 3)  -- Indication: For pain mgt    HYDROmorphone 4 mg oral tablet  -- 1 tab(s) by mouth every 4 hours, As needed, Moderate Pain (4 - 6)  -- Indication: For pain mgt    aspirin 325 mg oral delayed release tablet  -- 1 tab(s) by mouth once a day x 6 weeks then resume home aspirin regimen  -- Indication: For DVT prophylaxis    gabapentin 600 mg oral tablet  -- 1 tab(s) by mouth 3 times a day  -- Indication: For pain mgt    Lipitor 20 mg oral tablet  -- 1 tab(s) by mouth once a day (at bedtime)  -- Indication: For HLD    Lopid 600 mg oral tablet  -- 1 tab(s) by mouth 2 times a day  -- Indication: For HLD    metoprolol tartrate 25 mg oral tablet  -- 1 tab(s) by mouth 2 times a day  -- Indication: For HTN    lidocaine 5% topical film  -- Apply on skin to affected area once a day, 12 hours on and 12 hours off  -- Indication: For pain mgt    Voltaren 1% topical gel  -- Apply on skin to affected area 4 times a day  -- Indication: For pain mgt    docusate sodium 100 mg oral capsule  -- 1 cap(s) by mouth 2 times a day  -- Indication: For LAXATIVE    tiZANidine 6 mg oral capsule  -- 1 to 2 tab(s) by mouth , As Needed  -- Indication: For muscle relaxant    carisoprodol 350 mg oral tablet  -- 1 tab(s) by mouth 3 times a day, As Needed  -- Indication: For muscle relaxant    pantoprazole 40 mg oral delayed release tablet  -- 1 tab(s) by mouth once a day  -- Indication: For GI    Multiple Vitamins oral tablet  -- 1 tab(s) by mouth once a day  -- Indication: For supplement

## 2017-01-14 NOTE — DISCHARGE NOTE ADULT - PATIENT PORTAL LINK FT
“You can access the [facility] Patient Portal, offered by [facility], by registering with the following website: [email or web address]”

## 2017-01-14 NOTE — PHYSICAL THERAPY INITIAL EVALUATION ADULT - RANGE OF MOTION EXAMINATION, REHAB EVAL
bilateral upper extremity ROM was WFL (within functional limits)/Left LE ROM was WFL (within functional limits)

## 2017-01-14 NOTE — DISCHARGE NOTE ADULT - CARE PROVIDER_API CALL
Nicho Bradford (MD), Orthopaedic Surgery  [address] [place] [id number]  Phone: [phone]  Fax: [phone]

## 2017-01-14 NOTE — DISCHARGE NOTE ADULT - HOSPITAL COURSE
Chief Complaint/Reason for Admission	R ankle fracture    History of Present Illness:  HPI	  61M s/p mechanical fall at home 2 days ago with R ankle pain. Walked on it yesterday, but noticed increasing pain overnight. Denies head trauma. Denies other injury    Allergies/Medications:   Allergies:        Allergies:  	No Known Allergies:     Home Medications:   * Patient Currently Takes Medications as of 25-Jun-2016 22:33 documented in Prescription Writer  · 	Dilaudid 8 mg oral tablet: 1 tab(s) orally every 4 hours MDD:4  · 	carisoprodol 350 mg oral tablet: 1 tab(s) orally 3 times a day, As Needed  · 	fentaNYL 100 mcg/hr transdermal film, extended release: 2 patch transdermal every 72 hours  · 	docusate sodium 100 mg oral capsule: 1 cap(s) orally 3 times a day  · 	Multiple Vitamins oral tablet: 1 tab(s) orally once a day  · 	metoprolol tartrate 25 mg oral tablet: 1 tab(s) orally 2 times a day  · 	aspirin 81 mg oral tablet: 1 tab(s) orally once a day  · 	lidocaine 5% topical film: Apply topically to affected area once a day, 12 hours on and 12 hours off  · 	Aleve 220 mg oral capsule: 1 cap(s) orally every 12 hours, As Needed  	Instructed to stop 6/15/16  · 	Lipitor 20 mg oral tablet: 1 tab(s) orally once a day (at bedtime)  · 	Lopid 600 mg oral tablet: 1 tab(s) orally 2 times a day  · 	pantoprazole 40 mg oral delayed release tablet: 1 tab(s) orally once a day    . .    PMH/PSH/FH/SH:   Past Medical History:  Back pain  Chronic back pain  Cerebral aneurysm rupture  1997 clipped, no residual  Cholesterol serum elevated    Femur fracture, right  2/16, surgical revision of right hip  GERD (gastroesophageal reflux disease)    Hypertension    Lumbar herniated disc    Myocardial infarction  MI 2005, stent RCA  Narcotic dependence  recently hospitalized 5/28/16 for withdrawal (had no fentanyl patch X 3 days)  Osteoarthritis    Spinal stenosis.    Past Surgical History:  Cardiac chest pain  Patient has a stent 2005  Cerebral aneurysm  I997 with clips  Chronic pain    Chronic pain  Patient has an Intrathecal pump for chronic pain  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  H/O arthroscopy of shoulder  right X 2, left X 1  History of hip replacement  8/15, revision 2/16 after falling and fracturing right femur  History of right inguinal hernia repair  X2  History of throat surgery  surgical exicision cyst 1986  Hx of appendectomy  6/1969  Hx of laminectomy  lumbar-2002  Insertion of pain pump  Dilaudid-2009, non functioning  S/P left knee arthroscopy    S/P lumbar fusion  L1-S1:2002  S/P lumbar spinal fusion revision  2002  Stented coronary artery  2005:was on Plavix for 6 months until 1/2006.    Hospital Course:  1/12: Admitted to [facility];   1/13: underwent right ankle open reduction internal fixation; tolerated procedure well   1/14: evaluated by physical therapy/occupational therapy who recommended:   Pt stable for discharge on:  Discharge H/H: Chief Complaint/Reason for Admission	R ankle fracture    History of Present Illness:  HPI	  61M s/p mechanical fall at home 2 days ago with R ankle pain. Walked on it yesterday, but noticed increasing pain overnight. Denies head trauma. Denies other injury    Allergies/Medications:   Allergies:        Allergies:  	No Known Allergies:     Home Medications:   * Patient Currently Takes Medications as of 25-Jun-2016 22:33 documented in Prescription Writer  · 	Dilaudid 8 mg oral tablet: 1 tab(s) orally every 4 hours MDD:4  · 	carisoprodol 350 mg oral tablet: 1 tab(s) orally 3 times a day, As Needed  · 	fentaNYL 100 mcg/hr transdermal film, extended release: 2 patch transdermal every 72 hours  · 	docusate sodium 100 mg oral capsule: 1 cap(s) orally 3 times a day  · 	Multiple Vitamins oral tablet: 1 tab(s) orally once a day  · 	metoprolol tartrate 25 mg oral tablet: 1 tab(s) orally 2 times a day  · 	aspirin 81 mg oral tablet: 1 tab(s) orally once a day  · 	lidocaine 5% topical film: Apply topically to affected area once a day, 12 hours on and 12 hours off  · 	Aleve 220 mg oral capsule: 1 cap(s) orally every 12 hours, As Needed  	Instructed to stop 6/15/16  · 	Lipitor 20 mg oral tablet: 1 tab(s) orally once a day (at bedtime)  · 	Lopid 600 mg oral tablet: 1 tab(s) orally 2 times a day  · 	pantoprazole 40 mg oral delayed release tablet: 1 tab(s) orally once a day    . .    PMH/PSH/FH/SH:   Past Medical History:  Back pain  Chronic back pain  Cerebral aneurysm rupture  1997 clipped, no residual  Cholesterol serum elevated    Femur fracture, right  2/16, surgical revision of right hip  GERD (gastroesophageal reflux disease)    Hypertension    Lumbar herniated disc    Myocardial infarction  MI 2005, stent RCA  Narcotic dependence  recently hospitalized 5/28/16 for withdrawal (had no fentanyl patch X 3 days)  Osteoarthritis    Spinal stenosis.    Past Surgical History:  Cardiac chest pain  Patient has a stent 2005  Cerebral aneurysm  I997 with clips  Chronic pain    Chronic pain  Patient has an Intrathecal pump for chronic pain  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 mos.-14 yo  H/O arthroscopy of shoulder  right X 2, left X 1  History of hip replacement  8/15, revision 2/16 after falling and fracturing right femur  History of right inguinal hernia repair  X2  History of throat surgery  surgical exicision cyst 1986  Hx of appendectomy  6/1969  Hx of laminectomy  lumbar-2002  Insertion of pain pump  Dilaudid-2009, non functioning  S/P left knee arthroscopy    S/P lumbar fusion  L1-S1:2002  S/P lumbar spinal fusion revision  2002  Stented coronary artery  2005:was on Plavix for 6 months until 1/2006.    Hospital Course:  1/12: Admitted to [facility];   1/13: underwent right ankle open reduction internal fixation; tolerated procedure well   1/14: evaluated by physical therapy/occupational therapy who recommended: subacute rehab.  PT is only OOBTC pivot on left leg and NO ambulation  Pt stable for discharge on: 1/17/17  Discharge H/H:   12.1/37.0

## 2017-01-14 NOTE — DISCHARGE NOTE ADULT - NS AS ACTIVITY OBS
WEIGHT-BEARING STATUS: non-weight bearing right leg; out of bed to chair ONLY, pivot on left leg to chair, NO ambulation until cleared by surgeon/Walking-Indoors allowed/No Heavy lifting/straining/Walking-Outdoors allowed/Stairs allowed WEIGHT-BEARING STATUS: non-weight bearing right leg; out of bed to chair ONLY, pivot on left leg to chair, NO ambulation until cleared by surgeon/No Heavy lifting/straining/Do not make important decisions

## 2017-01-14 NOTE — DISCHARGE NOTE ADULT - PLAN OF CARE
pain free ambulation DIET: resume regular diet regimen   DVT PROPHYLAXIS: ecotrin 325mg once a day x 6 weeks  WEIGHT-BEARING STATUS: non-weight bearing right leg; out of bed to chair ONLY, pivot on left leg to chair, NO ambulation until cleared by surgeon  BATHING: keep dressing clean and dry   DRESSING CHANGES: do not change dressing Fracture fixation

## 2017-01-14 NOTE — DISCHARGE NOTE ADULT - ADDITIONAL INSTRUCTIONS
- Follow up with Dr. Bradford in 10-14 days after surgery/3-4 weeks after surgery; call office for appointment upon discharge from rehab  - - Please have staples/sutures removed by physician 10-14 days after surgery if applicable  - - Please follow up with PMD upon discharge from hospital; call office for appointment Must f/u with Dr Bradford with 10 - 12 days in his office for dressing change and to have sutures removed   Keep dressing clean and dry  - - Please follow up with PMD upon discharge from hospital; call office for appointment Call Dr. Bradford' s office for a wound ck 14 post surgery.

## 2017-01-15 LAB
ANION GAP SERPL CALC-SCNC: 15 MMOL/L — SIGNIFICANT CHANGE UP (ref 5–17)
BASOPHILS # BLD AUTO: 0.02 K/UL — SIGNIFICANT CHANGE UP (ref 0–0.2)
BASOPHILS NFR BLD AUTO: 0.2 % — SIGNIFICANT CHANGE UP (ref 0–2)
BUN SERPL-MCNC: 10 MG/DL — SIGNIFICANT CHANGE UP (ref 7–23)
CALCIUM SERPL-MCNC: 9.3 MG/DL — SIGNIFICANT CHANGE UP (ref 8.4–10.5)
CHLORIDE SERPL-SCNC: 104 MMOL/L — SIGNIFICANT CHANGE UP (ref 96–108)
CO2 SERPL-SCNC: 27 MMOL/L — SIGNIFICANT CHANGE UP (ref 22–31)
CREAT SERPL-MCNC: 0.69 MG/DL — SIGNIFICANT CHANGE UP (ref 0.5–1.3)
EOSINOPHIL # BLD AUTO: 0.28 K/UL — SIGNIFICANT CHANGE UP (ref 0–0.5)
EOSINOPHIL NFR BLD AUTO: 3.3 % — SIGNIFICANT CHANGE UP (ref 0–6)
GLUCOSE SERPL-MCNC: 91 MG/DL — SIGNIFICANT CHANGE UP (ref 70–99)
HCT VFR BLD CALC: 37 % — LOW (ref 39–50)
HGB BLD-MCNC: 12.1 G/DL — LOW (ref 13–17)
IMM GRANULOCYTES NFR BLD AUTO: 0.2 % — SIGNIFICANT CHANGE UP (ref 0–1.5)
LYMPHOCYTES # BLD AUTO: 2.1 K/UL — SIGNIFICANT CHANGE UP (ref 1–3.3)
LYMPHOCYTES # BLD AUTO: 25.1 % — SIGNIFICANT CHANGE UP (ref 13–44)
MCHC RBC-ENTMCNC: 31.7 PG — SIGNIFICANT CHANGE UP (ref 27–34)
MCHC RBC-ENTMCNC: 32.7 GM/DL — SIGNIFICANT CHANGE UP (ref 32–36)
MCV RBC AUTO: 96.9 FL — SIGNIFICANT CHANGE UP (ref 80–100)
MONOCYTES # BLD AUTO: 0.72 K/UL — SIGNIFICANT CHANGE UP (ref 0–0.9)
MONOCYTES NFR BLD AUTO: 8.6 % — SIGNIFICANT CHANGE UP (ref 2–14)
NEUTROPHILS # BLD AUTO: 5.24 K/UL — SIGNIFICANT CHANGE UP (ref 1.8–7.4)
NEUTROPHILS NFR BLD AUTO: 62.6 % — SIGNIFICANT CHANGE UP (ref 43–77)
PLATELET # BLD AUTO: 249 K/UL — SIGNIFICANT CHANGE UP (ref 150–400)
POTASSIUM SERPL-MCNC: 4.7 MMOL/L — SIGNIFICANT CHANGE UP (ref 3.5–5.3)
POTASSIUM SERPL-SCNC: 4.7 MMOL/L — SIGNIFICANT CHANGE UP (ref 3.5–5.3)
RBC # BLD: 3.82 M/UL — LOW (ref 4.2–5.8)
RBC # FLD: 13.1 % — SIGNIFICANT CHANGE UP (ref 10.3–14.5)
SODIUM SERPL-SCNC: 146 MMOL/L — HIGH (ref 135–145)
WBC # BLD: 8.38 K/UL — SIGNIFICANT CHANGE UP (ref 3.8–10.5)
WBC # FLD AUTO: 8.38 K/UL — SIGNIFICANT CHANGE UP (ref 3.8–10.5)

## 2017-01-15 RX ORDER — ACETAMINOPHEN 500 MG
1000 TABLET ORAL ONCE
Qty: 0 | Refills: 0 | Status: COMPLETED | OUTPATIENT
Start: 2017-01-15 | End: 2017-12-14

## 2017-01-15 RX ORDER — ACETAMINOPHEN 500 MG
1000 TABLET ORAL ONCE
Qty: 0 | Refills: 0 | Status: COMPLETED | OUTPATIENT
Start: 2017-01-15 | End: 2017-01-15

## 2017-01-15 RX ORDER — HYDROMORPHONE HYDROCHLORIDE 2 MG/ML
4 INJECTION INTRAMUSCULAR; INTRAVENOUS; SUBCUTANEOUS
Qty: 0 | Refills: 0 | Status: DISCONTINUED | OUTPATIENT
Start: 2017-01-15 | End: 2017-01-19

## 2017-01-15 RX ORDER — HYDROMORPHONE HYDROCHLORIDE 2 MG/ML
2 INJECTION INTRAMUSCULAR; INTRAVENOUS; SUBCUTANEOUS
Qty: 0 | Refills: 0 | Status: DISCONTINUED | OUTPATIENT
Start: 2017-01-15 | End: 2017-01-19

## 2017-01-15 RX ORDER — ACETAMINOPHEN 500 MG
1000 TABLET ORAL ONCE
Qty: 0 | Refills: 0 | Status: COMPLETED | OUTPATIENT
Start: 2017-01-15 | End: 2017-01-16

## 2017-01-15 RX ADMIN — Medication 325 MILLIGRAM(S): at 18:46

## 2017-01-15 RX ADMIN — HYDROMORPHONE HYDROCHLORIDE 4 MILLIGRAM(S): 2 INJECTION INTRAMUSCULAR; INTRAVENOUS; SUBCUTANEOUS at 22:51

## 2017-01-15 RX ADMIN — Medication 400 MILLIGRAM(S): at 13:26

## 2017-01-15 RX ADMIN — ATORVASTATIN CALCIUM 20 MILLIGRAM(S): 80 TABLET, FILM COATED ORAL at 21:22

## 2017-01-15 RX ADMIN — HYDROMORPHONE HYDROCHLORIDE 4 MILLIGRAM(S): 2 INJECTION INTRAMUSCULAR; INTRAVENOUS; SUBCUTANEOUS at 22:21

## 2017-01-15 RX ADMIN — Medication 500 MILLIGRAM(S): at 05:17

## 2017-01-15 RX ADMIN — GABAPENTIN 600 MILLIGRAM(S): 400 CAPSULE ORAL at 21:22

## 2017-01-15 RX ADMIN — GABAPENTIN 600 MILLIGRAM(S): 400 CAPSULE ORAL at 13:26

## 2017-01-15 RX ADMIN — HYDROMORPHONE HYDROCHLORIDE 4 MILLIGRAM(S): 2 INJECTION INTRAMUSCULAR; INTRAVENOUS; SUBCUTANEOUS at 02:30

## 2017-01-15 RX ADMIN — Medication 500 MILLIGRAM(S): at 18:46

## 2017-01-15 RX ADMIN — HYDROMORPHONE HYDROCHLORIDE 4 MILLIGRAM(S): 2 INJECTION INTRAMUSCULAR; INTRAVENOUS; SUBCUTANEOUS at 18:56

## 2017-01-15 RX ADMIN — Medication 1 MILLIGRAM(S): at 12:43

## 2017-01-15 RX ADMIN — HYDROMORPHONE HYDROCHLORIDE 1.5 MILLIGRAM(S): 2 INJECTION INTRAMUSCULAR; INTRAVENOUS; SUBCUTANEOUS at 23:57

## 2017-01-15 RX ADMIN — Medication 1000 MILLIGRAM(S): at 14:30

## 2017-01-15 RX ADMIN — HYDROMORPHONE HYDROCHLORIDE 1.5 MILLIGRAM(S): 2 INJECTION INTRAMUSCULAR; INTRAVENOUS; SUBCUTANEOUS at 05:30

## 2017-01-15 RX ADMIN — HYDROMORPHONE HYDROCHLORIDE 4 MILLIGRAM(S): 2 INJECTION INTRAMUSCULAR; INTRAVENOUS; SUBCUTANEOUS at 19:33

## 2017-01-15 RX ADMIN — Medication 25 MILLIGRAM(S): at 06:21

## 2017-01-15 RX ADMIN — PANTOPRAZOLE SODIUM 40 MILLIGRAM(S): 20 TABLET, DELAYED RELEASE ORAL at 06:21

## 2017-01-15 RX ADMIN — Medication 600 MILLIGRAM(S): at 06:21

## 2017-01-15 RX ADMIN — GABAPENTIN 600 MILLIGRAM(S): 400 CAPSULE ORAL at 05:17

## 2017-01-15 RX ADMIN — Medication 100 MILLIGRAM(S): at 05:17

## 2017-01-15 RX ADMIN — Medication 325 MILLIGRAM(S): at 09:21

## 2017-01-15 RX ADMIN — Medication 5 MILLIGRAM(S): at 14:40

## 2017-01-15 RX ADMIN — Medication 600 MILLIGRAM(S): at 18:45

## 2017-01-15 RX ADMIN — Medication 100 MILLIGRAM(S): at 13:26

## 2017-01-15 RX ADMIN — Medication 325 MILLIGRAM(S): at 12:44

## 2017-01-15 RX ADMIN — Medication 5 MILLIGRAM(S): at 00:05

## 2017-01-15 RX ADMIN — Medication 1 TABLET(S): at 12:44

## 2017-01-15 RX ADMIN — Medication 25 MILLIGRAM(S): at 18:45

## 2017-01-15 RX ADMIN — HYDROMORPHONE HYDROCHLORIDE 4 MILLIGRAM(S): 2 INJECTION INTRAMUSCULAR; INTRAVENOUS; SUBCUTANEOUS at 14:10

## 2017-01-15 RX ADMIN — HYDROMORPHONE HYDROCHLORIDE 1.5 MILLIGRAM(S): 2 INJECTION INTRAMUSCULAR; INTRAVENOUS; SUBCUTANEOUS at 05:15

## 2017-01-15 RX ADMIN — HYDROMORPHONE HYDROCHLORIDE 4 MILLIGRAM(S): 2 INJECTION INTRAMUSCULAR; INTRAVENOUS; SUBCUTANEOUS at 15:00

## 2017-01-15 RX ADMIN — HYDROMORPHONE HYDROCHLORIDE 4 MILLIGRAM(S): 2 INJECTION INTRAMUSCULAR; INTRAVENOUS; SUBCUTANEOUS at 03:00

## 2017-01-15 NOTE — OCCUPATIONAL THERAPY INITIAL EVALUATION ADULT - PERTINENT HX OF CURRENT PROBLEM, REHAB EVAL
60 y/o M s/p mechanical fall at home, + R ankle pain. Increased pain with ambulation. CT ankle: acute trimalleolar fracture of the R ankle, mild widening of the medial gutter of the ankle mortise. Xray R ankle: fracture subluxation of the R ankle with oblique fracture through the R distal fibula and transverse fracture through the R tibial malleolus with intra-articular extension. Xray R pelvis: (-), new well-defined and corticated ossific density adjacent to the R greater trochanter. Cxray (-)

## 2017-01-15 NOTE — OCCUPATIONAL THERAPY INITIAL EVALUATION ADULT - ANTICIPATED DISCHARGE DISPOSITION, OT EVAL
home with home OT to improve ADL/IADL performance, if pivot only order is lifted and pt is able to hop, pt may benefit from subacute rehab

## 2017-01-16 LAB
ANION GAP SERPL CALC-SCNC: 10 MMOL/L — SIGNIFICANT CHANGE UP (ref 5–17)
BUN SERPL-MCNC: 12 MG/DL — SIGNIFICANT CHANGE UP (ref 7–23)
CALCIUM SERPL-MCNC: 9.7 MG/DL — SIGNIFICANT CHANGE UP (ref 8.4–10.5)
CHLORIDE SERPL-SCNC: 105 MMOL/L — SIGNIFICANT CHANGE UP (ref 96–108)
CO2 SERPL-SCNC: 28 MMOL/L — SIGNIFICANT CHANGE UP (ref 22–31)
CREAT SERPL-MCNC: 0.79 MG/DL — SIGNIFICANT CHANGE UP (ref 0.5–1.3)
GLUCOSE SERPL-MCNC: 100 MG/DL — HIGH (ref 70–99)
POTASSIUM SERPL-MCNC: 5.8 MMOL/L — HIGH (ref 3.5–5.3)
POTASSIUM SERPL-SCNC: 5.8 MMOL/L — HIGH (ref 3.5–5.3)
SODIUM SERPL-SCNC: 143 MMOL/L — SIGNIFICANT CHANGE UP (ref 135–145)

## 2017-01-16 RX ORDER — ACETAMINOPHEN 500 MG
1000 TABLET ORAL ONCE
Qty: 0 | Refills: 0 | Status: COMPLETED | OUTPATIENT
Start: 2017-01-16 | End: 2017-01-16

## 2017-01-16 RX ADMIN — HYDROMORPHONE HYDROCHLORIDE 1.5 MILLIGRAM(S): 2 INJECTION INTRAMUSCULAR; INTRAVENOUS; SUBCUTANEOUS at 00:15

## 2017-01-16 RX ADMIN — Medication 1 MILLIGRAM(S): at 11:31

## 2017-01-16 RX ADMIN — GABAPENTIN 600 MILLIGRAM(S): 400 CAPSULE ORAL at 13:11

## 2017-01-16 RX ADMIN — HYDROMORPHONE HYDROCHLORIDE 4 MILLIGRAM(S): 2 INJECTION INTRAMUSCULAR; INTRAVENOUS; SUBCUTANEOUS at 06:29

## 2017-01-16 RX ADMIN — Medication 1 TABLET(S): at 11:31

## 2017-01-16 RX ADMIN — Medication 25 MILLIGRAM(S): at 06:28

## 2017-01-16 RX ADMIN — Medication 500 MILLIGRAM(S): at 17:14

## 2017-01-16 RX ADMIN — Medication 500 MILLIGRAM(S): at 06:26

## 2017-01-16 RX ADMIN — PANTOPRAZOLE SODIUM 40 MILLIGRAM(S): 20 TABLET, DELAYED RELEASE ORAL at 06:28

## 2017-01-16 RX ADMIN — HYDROMORPHONE HYDROCHLORIDE 4 MILLIGRAM(S): 2 INJECTION INTRAMUSCULAR; INTRAVENOUS; SUBCUTANEOUS at 13:59

## 2017-01-16 RX ADMIN — Medication 1000 MILLIGRAM(S): at 01:33

## 2017-01-16 RX ADMIN — HYDROMORPHONE HYDROCHLORIDE 4 MILLIGRAM(S): 2 INJECTION INTRAMUSCULAR; INTRAVENOUS; SUBCUTANEOUS at 18:21

## 2017-01-16 RX ADMIN — Medication 325 MILLIGRAM(S): at 17:14

## 2017-01-16 RX ADMIN — HYDROMORPHONE HYDROCHLORIDE 4 MILLIGRAM(S): 2 INJECTION INTRAMUSCULAR; INTRAVENOUS; SUBCUTANEOUS at 17:54

## 2017-01-16 RX ADMIN — Medication 325 MILLIGRAM(S): at 11:30

## 2017-01-16 RX ADMIN — Medication 600 MILLIGRAM(S): at 17:14

## 2017-01-16 RX ADMIN — GABAPENTIN 600 MILLIGRAM(S): 400 CAPSULE ORAL at 21:43

## 2017-01-16 RX ADMIN — Medication 5 MILLIGRAM(S): at 17:56

## 2017-01-16 RX ADMIN — ATORVASTATIN CALCIUM 20 MILLIGRAM(S): 80 TABLET, FILM COATED ORAL at 21:43

## 2017-01-16 RX ADMIN — Medication 400 MILLIGRAM(S): at 01:28

## 2017-01-16 RX ADMIN — Medication 1000 MILLIGRAM(S): at 22:54

## 2017-01-16 RX ADMIN — Medication 325 MILLIGRAM(S): at 11:31

## 2017-01-16 RX ADMIN — HYDROMORPHONE HYDROCHLORIDE 4 MILLIGRAM(S): 2 INJECTION INTRAMUSCULAR; INTRAVENOUS; SUBCUTANEOUS at 07:00

## 2017-01-16 RX ADMIN — FENTANYL CITRATE 2 PATCH: 50 INJECTION INTRAVENOUS at 15:30

## 2017-01-16 RX ADMIN — HYDROMORPHONE HYDROCHLORIDE 4 MILLIGRAM(S): 2 INJECTION INTRAMUSCULAR; INTRAVENOUS; SUBCUTANEOUS at 10:53

## 2017-01-16 RX ADMIN — Medication 325 MILLIGRAM(S): at 09:32

## 2017-01-16 RX ADMIN — HYDROMORPHONE HYDROCHLORIDE 4 MILLIGRAM(S): 2 INJECTION INTRAMUSCULAR; INTRAVENOUS; SUBCUTANEOUS at 09:31

## 2017-01-16 RX ADMIN — FENTANYL CITRATE 2 PATCH: 50 INJECTION INTRAVENOUS at 15:33

## 2017-01-16 RX ADMIN — Medication 400 MILLIGRAM(S): at 22:24

## 2017-01-16 RX ADMIN — GABAPENTIN 600 MILLIGRAM(S): 400 CAPSULE ORAL at 06:26

## 2017-01-16 RX ADMIN — Medication 25 MILLIGRAM(S): at 17:14

## 2017-01-16 RX ADMIN — HYDROMORPHONE HYDROCHLORIDE 4 MILLIGRAM(S): 2 INJECTION INTRAMUSCULAR; INTRAVENOUS; SUBCUTANEOUS at 12:52

## 2017-01-16 RX ADMIN — Medication 600 MILLIGRAM(S): at 06:28

## 2017-01-16 RX ADMIN — Medication 5 MILLIGRAM(S): at 01:27

## 2017-01-16 RX ADMIN — Medication 100 MILLIGRAM(S): at 21:42

## 2017-01-17 LAB
ANION GAP SERPL CALC-SCNC: 13 MMOL/L — SIGNIFICANT CHANGE UP (ref 5–17)
BUN SERPL-MCNC: 15 MG/DL — SIGNIFICANT CHANGE UP (ref 7–23)
CALCIUM SERPL-MCNC: 9.6 MG/DL — SIGNIFICANT CHANGE UP (ref 8.4–10.5)
CHLORIDE SERPL-SCNC: 100 MMOL/L — SIGNIFICANT CHANGE UP (ref 96–108)
CO2 SERPL-SCNC: 30 MMOL/L — SIGNIFICANT CHANGE UP (ref 22–31)
CREAT SERPL-MCNC: 0.83 MG/DL — SIGNIFICANT CHANGE UP (ref 0.5–1.3)
GLUCOSE SERPL-MCNC: 115 MG/DL — HIGH (ref 70–99)
POTASSIUM SERPL-MCNC: 5.7 MMOL/L — HIGH (ref 3.5–5.3)
POTASSIUM SERPL-SCNC: 5.7 MMOL/L — HIGH (ref 3.5–5.3)
SODIUM SERPL-SCNC: 143 MMOL/L — SIGNIFICANT CHANGE UP (ref 135–145)

## 2017-01-17 RX ORDER — SODIUM POLYSTYRENE SULFONATE 4.1 MEQ/G
15 POWDER, FOR SUSPENSION ORAL ONCE
Qty: 0 | Refills: 0 | Status: COMPLETED | OUTPATIENT
Start: 2017-01-17 | End: 2017-01-17

## 2017-01-17 RX ADMIN — Medication 600 MILLIGRAM(S): at 16:29

## 2017-01-17 RX ADMIN — Medication 325 MILLIGRAM(S): at 07:55

## 2017-01-17 RX ADMIN — Medication 325 MILLIGRAM(S): at 16:48

## 2017-01-17 RX ADMIN — Medication 325 MILLIGRAM(S): at 12:10

## 2017-01-17 RX ADMIN — Medication 600 MILLIGRAM(S): at 06:44

## 2017-01-17 RX ADMIN — Medication 1 MILLIGRAM(S): at 12:11

## 2017-01-17 RX ADMIN — HYDROMORPHONE HYDROCHLORIDE 4 MILLIGRAM(S): 2 INJECTION INTRAMUSCULAR; INTRAVENOUS; SUBCUTANEOUS at 22:31

## 2017-01-17 RX ADMIN — GABAPENTIN 600 MILLIGRAM(S): 400 CAPSULE ORAL at 22:01

## 2017-01-17 RX ADMIN — Medication 500 MILLIGRAM(S): at 05:47

## 2017-01-17 RX ADMIN — ATORVASTATIN CALCIUM 20 MILLIGRAM(S): 80 TABLET, FILM COATED ORAL at 22:01

## 2017-01-17 RX ADMIN — Medication 5 MILLIGRAM(S): at 18:37

## 2017-01-17 RX ADMIN — PANTOPRAZOLE SODIUM 40 MILLIGRAM(S): 20 TABLET, DELAYED RELEASE ORAL at 06:44

## 2017-01-17 RX ADMIN — HYDROMORPHONE HYDROCHLORIDE 4 MILLIGRAM(S): 2 INJECTION INTRAMUSCULAR; INTRAVENOUS; SUBCUTANEOUS at 19:11

## 2017-01-17 RX ADMIN — Medication 325 MILLIGRAM(S): at 12:11

## 2017-01-17 RX ADMIN — HYDROMORPHONE HYDROCHLORIDE 4 MILLIGRAM(S): 2 INJECTION INTRAMUSCULAR; INTRAVENOUS; SUBCUTANEOUS at 02:46

## 2017-01-17 RX ADMIN — Medication 100 MILLIGRAM(S): at 22:01

## 2017-01-17 RX ADMIN — HYDROMORPHONE HYDROCHLORIDE 4 MILLIGRAM(S): 2 INJECTION INTRAMUSCULAR; INTRAVENOUS; SUBCUTANEOUS at 11:27

## 2017-01-17 RX ADMIN — HYDROMORPHONE HYDROCHLORIDE 4 MILLIGRAM(S): 2 INJECTION INTRAMUSCULAR; INTRAVENOUS; SUBCUTANEOUS at 11:57

## 2017-01-17 RX ADMIN — GABAPENTIN 600 MILLIGRAM(S): 400 CAPSULE ORAL at 13:50

## 2017-01-17 RX ADMIN — HYDROMORPHONE HYDROCHLORIDE 4 MILLIGRAM(S): 2 INJECTION INTRAMUSCULAR; INTRAVENOUS; SUBCUTANEOUS at 18:41

## 2017-01-17 RX ADMIN — Medication 25 MILLIGRAM(S): at 05:47

## 2017-01-17 RX ADMIN — Medication 100 MILLIGRAM(S): at 05:47

## 2017-01-17 RX ADMIN — HYDROMORPHONE HYDROCHLORIDE 4 MILLIGRAM(S): 2 INJECTION INTRAMUSCULAR; INTRAVENOUS; SUBCUTANEOUS at 03:16

## 2017-01-17 RX ADMIN — Medication 25 MILLIGRAM(S): at 17:46

## 2017-01-17 RX ADMIN — Medication 500 MILLIGRAM(S): at 17:46

## 2017-01-17 RX ADMIN — SODIUM POLYSTYRENE SULFONATE 15 GRAM(S): 4.1 POWDER, FOR SUSPENSION ORAL at 11:28

## 2017-01-17 RX ADMIN — Medication 1 TABLET(S): at 12:10

## 2017-01-17 RX ADMIN — HYDROMORPHONE HYDROCHLORIDE 4 MILLIGRAM(S): 2 INJECTION INTRAMUSCULAR; INTRAVENOUS; SUBCUTANEOUS at 22:01

## 2017-01-17 RX ADMIN — GABAPENTIN 600 MILLIGRAM(S): 400 CAPSULE ORAL at 05:47

## 2017-01-18 LAB
ANION GAP SERPL CALC-SCNC: 13 MMOL/L — SIGNIFICANT CHANGE UP (ref 5–17)
ANION GAP SERPL CALC-SCNC: 9 MMOL/L — SIGNIFICANT CHANGE UP (ref 5–17)
BUN SERPL-MCNC: 14 MG/DL — SIGNIFICANT CHANGE UP (ref 7–23)
BUN SERPL-MCNC: 14 MG/DL — SIGNIFICANT CHANGE UP (ref 7–23)
CALCIUM SERPL-MCNC: 10.1 MG/DL — SIGNIFICANT CHANGE UP (ref 8.4–10.5)
CALCIUM SERPL-MCNC: 9.9 MG/DL — SIGNIFICANT CHANGE UP (ref 8.4–10.5)
CHLORIDE SERPL-SCNC: 101 MMOL/L — SIGNIFICANT CHANGE UP (ref 96–108)
CHLORIDE SERPL-SCNC: 101 MMOL/L — SIGNIFICANT CHANGE UP (ref 96–108)
CO2 SERPL-SCNC: 32 MMOL/L — HIGH (ref 22–31)
CO2 SERPL-SCNC: 34 MMOL/L — HIGH (ref 22–31)
CREAT SERPL-MCNC: 0.82 MG/DL — SIGNIFICANT CHANGE UP (ref 0.5–1.3)
CREAT SERPL-MCNC: 0.85 MG/DL — SIGNIFICANT CHANGE UP (ref 0.5–1.3)
GLUCOSE SERPL-MCNC: 116 MG/DL — HIGH (ref 70–99)
GLUCOSE SERPL-MCNC: 119 MG/DL — HIGH (ref 70–99)
OSMOLALITY UR: 757 MOS/KG — SIGNIFICANT CHANGE UP (ref 50–1200)
POTASSIUM SERPL-MCNC: 4.8 MMOL/L — SIGNIFICANT CHANGE UP (ref 3.5–5.3)
POTASSIUM SERPL-MCNC: 5.8 MMOL/L — HIGH (ref 3.5–5.3)
POTASSIUM SERPL-SCNC: 4.8 MMOL/L — SIGNIFICANT CHANGE UP (ref 3.5–5.3)
POTASSIUM SERPL-SCNC: 5.8 MMOL/L — HIGH (ref 3.5–5.3)
POTASSIUM UR-SCNC: 88 MMOL/L — SIGNIFICANT CHANGE UP
SODIUM SERPL-SCNC: 144 MMOL/L — SIGNIFICANT CHANGE UP (ref 135–145)
SODIUM SERPL-SCNC: 146 MMOL/L — HIGH (ref 135–145)
SODIUM UR-SCNC: 127 MMOL/L — SIGNIFICANT CHANGE UP

## 2017-01-18 RX ORDER — SODIUM CHLORIDE 9 MG/ML
1000 INJECTION INTRAMUSCULAR; INTRAVENOUS; SUBCUTANEOUS
Qty: 0 | Refills: 0 | Status: DISCONTINUED | OUTPATIENT
Start: 2017-01-18 | End: 2017-01-18

## 2017-01-18 RX ORDER — SODIUM POLYSTYRENE SULFONATE 4.1 MEQ/G
30 POWDER, FOR SUSPENSION ORAL ONCE
Qty: 0 | Refills: 0 | Status: COMPLETED | OUTPATIENT
Start: 2017-01-18 | End: 2017-01-18

## 2017-01-18 RX ORDER — SODIUM CHLORIDE 9 MG/ML
1000 INJECTION, SOLUTION INTRAVENOUS
Qty: 0 | Refills: 0 | Status: DISCONTINUED | OUTPATIENT
Start: 2017-01-18 | End: 2017-01-19

## 2017-01-18 RX ADMIN — HYDROMORPHONE HYDROCHLORIDE 4 MILLIGRAM(S): 2 INJECTION INTRAMUSCULAR; INTRAVENOUS; SUBCUTANEOUS at 18:04

## 2017-01-18 RX ADMIN — HYDROMORPHONE HYDROCHLORIDE 4 MILLIGRAM(S): 2 INJECTION INTRAMUSCULAR; INTRAVENOUS; SUBCUTANEOUS at 14:00

## 2017-01-18 RX ADMIN — Medication 325 MILLIGRAM(S): at 17:32

## 2017-01-18 RX ADMIN — ONDANSETRON 4 MILLIGRAM(S): 8 TABLET, FILM COATED ORAL at 09:28

## 2017-01-18 RX ADMIN — HYDROMORPHONE HYDROCHLORIDE 4 MILLIGRAM(S): 2 INJECTION INTRAMUSCULAR; INTRAVENOUS; SUBCUTANEOUS at 06:46

## 2017-01-18 RX ADMIN — GABAPENTIN 600 MILLIGRAM(S): 400 CAPSULE ORAL at 06:45

## 2017-01-18 RX ADMIN — HYDROMORPHONE HYDROCHLORIDE 4 MILLIGRAM(S): 2 INJECTION INTRAMUSCULAR; INTRAVENOUS; SUBCUTANEOUS at 07:15

## 2017-01-18 RX ADMIN — Medication 1 TABLET(S): at 11:26

## 2017-01-18 RX ADMIN — Medication 500 MILLIGRAM(S): at 06:46

## 2017-01-18 RX ADMIN — Medication 600 MILLIGRAM(S): at 17:32

## 2017-01-18 RX ADMIN — Medication 325 MILLIGRAM(S): at 11:26

## 2017-01-18 RX ADMIN — Medication 325 MILLIGRAM(S): at 07:44

## 2017-01-18 RX ADMIN — Medication 500 MILLIGRAM(S): at 17:32

## 2017-01-18 RX ADMIN — Medication 25 MILLIGRAM(S): at 06:45

## 2017-01-18 RX ADMIN — Medication 1 MILLIGRAM(S): at 11:26

## 2017-01-18 RX ADMIN — GABAPENTIN 600 MILLIGRAM(S): 400 CAPSULE ORAL at 13:26

## 2017-01-18 RX ADMIN — Medication 325 MILLIGRAM(S): at 11:27

## 2017-01-18 RX ADMIN — PANTOPRAZOLE SODIUM 40 MILLIGRAM(S): 20 TABLET, DELAYED RELEASE ORAL at 07:44

## 2017-01-18 RX ADMIN — SODIUM POLYSTYRENE SULFONATE 30 GRAM(S): 4.1 POWDER, FOR SUSPENSION ORAL at 07:44

## 2017-01-18 RX ADMIN — Medication 100 MILLIGRAM(S): at 13:26

## 2017-01-18 RX ADMIN — Medication 600 MILLIGRAM(S): at 06:46

## 2017-01-18 RX ADMIN — Medication 100 MILLIGRAM(S): at 06:45

## 2017-01-18 RX ADMIN — HYDROMORPHONE HYDROCHLORIDE 4 MILLIGRAM(S): 2 INJECTION INTRAMUSCULAR; INTRAVENOUS; SUBCUTANEOUS at 17:32

## 2017-01-18 RX ADMIN — SODIUM CHLORIDE 85 MILLILITER(S): 9 INJECTION, SOLUTION INTRAVENOUS at 18:03

## 2017-01-18 RX ADMIN — GABAPENTIN 600 MILLIGRAM(S): 400 CAPSULE ORAL at 21:50

## 2017-01-18 RX ADMIN — Medication 100 MILLIGRAM(S): at 21:50

## 2017-01-18 RX ADMIN — HYDROMORPHONE HYDROCHLORIDE 4 MILLIGRAM(S): 2 INJECTION INTRAMUSCULAR; INTRAVENOUS; SUBCUTANEOUS at 13:28

## 2017-01-18 RX ADMIN — SODIUM CHLORIDE 85 MILLILITER(S): 9 INJECTION, SOLUTION INTRAVENOUS at 21:50

## 2017-01-18 RX ADMIN — Medication 25 MILLIGRAM(S): at 19:42

## 2017-01-18 RX ADMIN — ATORVASTATIN CALCIUM 20 MILLIGRAM(S): 80 TABLET, FILM COATED ORAL at 21:50

## 2017-01-19 VITALS
DIASTOLIC BLOOD PRESSURE: 80 MMHG | HEART RATE: 72 BPM | SYSTOLIC BLOOD PRESSURE: 128 MMHG | OXYGEN SATURATION: 98 % | RESPIRATION RATE: 18 BRPM | TEMPERATURE: 98 F

## 2017-01-19 LAB
ANION GAP SERPL CALC-SCNC: 9 MMOL/L — SIGNIFICANT CHANGE UP (ref 5–17)
BUN SERPL-MCNC: 12 MG/DL — SIGNIFICANT CHANGE UP (ref 7–23)
CALCIUM SERPL-MCNC: 9.2 MG/DL — SIGNIFICANT CHANGE UP (ref 8.4–10.5)
CHLORIDE SERPL-SCNC: 101 MMOL/L — SIGNIFICANT CHANGE UP (ref 96–108)
CO2 SERPL-SCNC: 33 MMOL/L — HIGH (ref 22–31)
CREAT SERPL-MCNC: 0.79 MG/DL — SIGNIFICANT CHANGE UP (ref 0.5–1.3)
GLUCOSE SERPL-MCNC: 119 MG/DL — HIGH (ref 70–99)
POTASSIUM SERPL-MCNC: 4.9 MMOL/L — SIGNIFICANT CHANGE UP (ref 3.5–5.3)
POTASSIUM SERPL-SCNC: 4.9 MMOL/L — SIGNIFICANT CHANGE UP (ref 3.5–5.3)
SODIUM SERPL-SCNC: 143 MMOL/L — SIGNIFICANT CHANGE UP (ref 135–145)

## 2017-01-19 PROCEDURE — 97530 THERAPEUTIC ACTIVITIES: CPT

## 2017-01-19 PROCEDURE — 86901 BLOOD TYPING SEROLOGIC RH(D): CPT

## 2017-01-19 PROCEDURE — 99285 EMERGENCY DEPT VISIT HI MDM: CPT | Mod: 25

## 2017-01-19 PROCEDURE — 83935 ASSAY OF URINE OSMOLALITY: CPT

## 2017-01-19 PROCEDURE — 81001 URINALYSIS AUTO W/SCOPE: CPT

## 2017-01-19 PROCEDURE — 96374 THER/PROPH/DIAG INJ IV PUSH: CPT

## 2017-01-19 PROCEDURE — 93005 ELECTROCARDIOGRAM TRACING: CPT

## 2017-01-19 PROCEDURE — 73590 X-RAY EXAM OF LOWER LEG: CPT

## 2017-01-19 PROCEDURE — 76377 3D RENDER W/INTRP POSTPROCES: CPT

## 2017-01-19 PROCEDURE — 88304 TISSUE EXAM BY PATHOLOGIST: CPT

## 2017-01-19 PROCEDURE — 97110 THERAPEUTIC EXERCISES: CPT

## 2017-01-19 PROCEDURE — 85730 THROMBOPLASTIN TIME PARTIAL: CPT

## 2017-01-19 PROCEDURE — 85610 PROTHROMBIN TIME: CPT

## 2017-01-19 PROCEDURE — 88311 DECALCIFY TISSUE: CPT

## 2017-01-19 PROCEDURE — 71045 X-RAY EXAM CHEST 1 VIEW: CPT

## 2017-01-19 PROCEDURE — 84133 ASSAY OF URINE POTASSIUM: CPT

## 2017-01-19 PROCEDURE — 97162 PT EVAL MOD COMPLEX 30 MIN: CPT

## 2017-01-19 PROCEDURE — 73700 CT LOWER EXTREMITY W/O DYE: CPT

## 2017-01-19 PROCEDURE — 73610 X-RAY EXAM OF ANKLE: CPT

## 2017-01-19 PROCEDURE — 73502 X-RAY EXAM HIP UNI 2-3 VIEWS: CPT

## 2017-01-19 PROCEDURE — C1713: CPT

## 2017-01-19 PROCEDURE — 85027 COMPLETE CBC AUTOMATED: CPT

## 2017-01-19 PROCEDURE — 84300 ASSAY OF URINE SODIUM: CPT

## 2017-01-19 PROCEDURE — C1769: CPT

## 2017-01-19 PROCEDURE — 96376 TX/PRO/DX INJ SAME DRUG ADON: CPT

## 2017-01-19 PROCEDURE — 86900 BLOOD TYPING SEROLOGIC ABO: CPT

## 2017-01-19 PROCEDURE — 80048 BASIC METABOLIC PNL TOTAL CA: CPT

## 2017-01-19 PROCEDURE — 76000 FLUOROSCOPY <1 HR PHYS/QHP: CPT

## 2017-01-19 PROCEDURE — 97166 OT EVAL MOD COMPLEX 45 MIN: CPT

## 2017-01-19 PROCEDURE — 86850 RBC ANTIBODY SCREEN: CPT

## 2017-01-19 PROCEDURE — 73600 X-RAY EXAM OF ANKLE: CPT

## 2017-01-19 RX ADMIN — Medication 500 MILLIGRAM(S): at 06:02

## 2017-01-19 RX ADMIN — Medication 25 MILLIGRAM(S): at 06:02

## 2017-01-19 RX ADMIN — FENTANYL CITRATE 2 PATCH: 50 INJECTION INTRAVENOUS at 15:48

## 2017-01-19 RX ADMIN — HYDROMORPHONE HYDROCHLORIDE 4 MILLIGRAM(S): 2 INJECTION INTRAMUSCULAR; INTRAVENOUS; SUBCUTANEOUS at 06:40

## 2017-01-19 RX ADMIN — GABAPENTIN 600 MILLIGRAM(S): 400 CAPSULE ORAL at 06:01

## 2017-01-19 RX ADMIN — ONDANSETRON 4 MILLIGRAM(S): 8 TABLET, FILM COATED ORAL at 12:26

## 2017-01-19 RX ADMIN — Medication 1 TABLET(S): at 12:23

## 2017-01-19 RX ADMIN — Medication 600 MILLIGRAM(S): at 06:02

## 2017-01-19 RX ADMIN — FENTANYL CITRATE 2 PATCH: 50 INJECTION INTRAVENOUS at 15:00

## 2017-01-19 RX ADMIN — Medication 600 MILLIGRAM(S): at 15:48

## 2017-01-19 RX ADMIN — Medication 100 MILLIGRAM(S): at 13:18

## 2017-01-19 RX ADMIN — Medication 100 MILLIGRAM(S): at 06:01

## 2017-01-19 RX ADMIN — Medication 1 MILLIGRAM(S): at 12:23

## 2017-01-19 RX ADMIN — Medication 325 MILLIGRAM(S): at 12:23

## 2017-01-19 RX ADMIN — HYDROMORPHONE HYDROCHLORIDE 4 MILLIGRAM(S): 2 INJECTION INTRAMUSCULAR; INTRAVENOUS; SUBCUTANEOUS at 13:17

## 2017-01-19 RX ADMIN — PANTOPRAZOLE SODIUM 40 MILLIGRAM(S): 20 TABLET, DELAYED RELEASE ORAL at 06:02

## 2017-01-19 RX ADMIN — HYDROMORPHONE HYDROCHLORIDE 4 MILLIGRAM(S): 2 INJECTION INTRAMUSCULAR; INTRAVENOUS; SUBCUTANEOUS at 06:09

## 2017-01-19 RX ADMIN — GABAPENTIN 600 MILLIGRAM(S): 400 CAPSULE ORAL at 13:17

## 2017-01-19 RX ADMIN — HYDROMORPHONE HYDROCHLORIDE 4 MILLIGRAM(S): 2 INJECTION INTRAMUSCULAR; INTRAVENOUS; SUBCUTANEOUS at 14:00

## 2017-01-20 LAB — SURGICAL PATHOLOGY STUDY: SIGNIFICANT CHANGE UP

## 2017-04-19 ENCOUNTER — APPOINTMENT (OUTPATIENT)
Dept: ORTHOPEDIC SURGERY | Facility: CLINIC | Age: 62
End: 2017-04-19

## 2017-04-19 VITALS — WEIGHT: 131 LBS | BODY MASS INDEX: 19.85 KG/M2 | HEIGHT: 68 IN

## 2017-04-19 DIAGNOSIS — Z86.79 PERSONAL HISTORY OF OTHER DISEASES OF THE CIRCULATORY SYSTEM: ICD-10-CM

## 2017-05-05 ENCOUNTER — OUTPATIENT (OUTPATIENT)
Dept: OUTPATIENT SERVICES | Facility: HOSPITAL | Age: 62
LOS: 1 days | End: 2017-05-05
Payer: COMMERCIAL

## 2017-05-05 VITALS
HEIGHT: 68 IN | OXYGEN SATURATION: 98 % | TEMPERATURE: 98 F | DIASTOLIC BLOOD PRESSURE: 80 MMHG | HEART RATE: 60 BPM | RESPIRATION RATE: 18 BRPM | WEIGHT: 125 LBS | SYSTOLIC BLOOD PRESSURE: 170 MMHG

## 2017-05-05 DIAGNOSIS — M46.1 SACROILIITIS, NOT ELSEWHERE CLASSIFIED: ICD-10-CM

## 2017-05-05 DIAGNOSIS — M46.98 UNSPECIFIED INFLAMMATORY SPONDYLOPATHY, SACRAL AND SACROCOCCYGEAL REGION: ICD-10-CM

## 2017-05-05 DIAGNOSIS — Z98.89 OTHER SPECIFIED POSTPROCEDURAL STATES: Chronic | ICD-10-CM

## 2017-05-05 DIAGNOSIS — F11.20 OPIOID DEPENDENCE, UNCOMPLICATED: ICD-10-CM

## 2017-05-05 DIAGNOSIS — Z95.5 PRESENCE OF CORONARY ANGIOPLASTY IMPLANT AND GRAFT: ICD-10-CM

## 2017-05-05 DIAGNOSIS — I10 ESSENTIAL (PRIMARY) HYPERTENSION: ICD-10-CM

## 2017-05-05 DIAGNOSIS — Z96.649 PRESENCE OF UNSPECIFIED ARTIFICIAL HIP JOINT: Chronic | ICD-10-CM

## 2017-05-05 DIAGNOSIS — Z01.818 ENCOUNTER FOR OTHER PREPROCEDURAL EXAMINATION: ICD-10-CM

## 2017-05-05 LAB
ANION GAP SERPL CALC-SCNC: 13 MMOL/L — SIGNIFICANT CHANGE UP (ref 5–17)
BLD GP AB SCN SERPL QL: NEGATIVE — SIGNIFICANT CHANGE UP
BUN SERPL-MCNC: 18 MG/DL — SIGNIFICANT CHANGE UP (ref 7–23)
CALCIUM SERPL-MCNC: 10.7 MG/DL — HIGH (ref 8.4–10.5)
CHLORIDE SERPL-SCNC: 101 MMOL/L — SIGNIFICANT CHANGE UP (ref 96–108)
CO2 SERPL-SCNC: 29 MMOL/L — SIGNIFICANT CHANGE UP (ref 22–31)
CREAT SERPL-MCNC: 0.96 MG/DL — SIGNIFICANT CHANGE UP (ref 0.5–1.3)
GLUCOSE SERPL-MCNC: 99 MG/DL — SIGNIFICANT CHANGE UP (ref 70–99)
HCT VFR BLD CALC: 44.2 % — SIGNIFICANT CHANGE UP (ref 39–50)
HGB BLD-MCNC: 15.3 G/DL — SIGNIFICANT CHANGE UP (ref 13–17)
MCHC RBC-ENTMCNC: 31.9 PG — SIGNIFICANT CHANGE UP (ref 27–34)
MCHC RBC-ENTMCNC: 34.6 GM/DL — SIGNIFICANT CHANGE UP (ref 32–36)
MCV RBC AUTO: 92.3 FL — SIGNIFICANT CHANGE UP (ref 80–100)
PLATELET # BLD AUTO: 253 K/UL — SIGNIFICANT CHANGE UP (ref 150–400)
POTASSIUM SERPL-MCNC: 5.3 MMOL/L — SIGNIFICANT CHANGE UP (ref 3.5–5.3)
POTASSIUM SERPL-SCNC: 5.3 MMOL/L — SIGNIFICANT CHANGE UP (ref 3.5–5.3)
RBC # BLD: 4.79 M/UL — SIGNIFICANT CHANGE UP (ref 4.2–5.8)
RBC # FLD: 14.2 % — SIGNIFICANT CHANGE UP (ref 10.3–14.5)
RH IG SCN BLD-IMP: POSITIVE — SIGNIFICANT CHANGE UP
SODIUM SERPL-SCNC: 143 MMOL/L — SIGNIFICANT CHANGE UP (ref 135–145)
WBC # BLD: 8.18 K/UL — SIGNIFICANT CHANGE UP (ref 3.8–10.5)
WBC # FLD AUTO: 8.18 K/UL — SIGNIFICANT CHANGE UP (ref 3.8–10.5)

## 2017-05-05 PROCEDURE — 86900 BLOOD TYPING SEROLOGIC ABO: CPT

## 2017-05-05 PROCEDURE — 80048 BASIC METABOLIC PNL TOTAL CA: CPT

## 2017-05-05 PROCEDURE — 85027 COMPLETE CBC AUTOMATED: CPT

## 2017-05-05 PROCEDURE — 86901 BLOOD TYPING SEROLOGIC RH(D): CPT

## 2017-05-05 PROCEDURE — 86850 RBC ANTIBODY SCREEN: CPT

## 2017-05-05 RX ORDER — GABAPENTIN 400 MG/1
1 CAPSULE ORAL
Qty: 0 | Refills: 0 | COMMUNITY

## 2017-05-05 RX ORDER — CEFAZOLIN SODIUM 1 G
2000 VIAL (EA) INJECTION ONCE
Qty: 0 | Refills: 0 | Status: DISCONTINUED | OUTPATIENT
Start: 2017-05-08 | End: 2017-05-12

## 2017-05-05 NOTE — H&P PST ADULT - PROBLEM SELECTOR PLAN 3
pt currently on hydromorphone for pain   Medical marijuana   pt was discussed about pain management, pt stated I am coming down my medication dosage   advised to remove pain patches before coming for the surgery pt currently on hydromorphone for pain     pt was discussed about pain management, pt stated I am coming down my medication dosage   advised to remove pain patches before coming for the surgery

## 2017-05-05 NOTE — H&P PST ADULT - PROBLEM SELECTOR PLAN 4
pt will stop aspirin 7 days prior to the surgery as per cardiologist last dose was 51/2017  cardiac eval and EKG attached

## 2017-05-05 NOTE — H&P PST ADULT - ACTIVITY
pt walk with walker, lives 2 story hose able to take 1 flight of stairs without any SOB pt walk with walker, lives 2 story house able to take 1 flight of stairs without any SOB

## 2017-05-05 NOTE — H&P PST ADULT - LYMPHATIC
supraclavicular R/supraclavicular L/anterior cervical L/posterior cervical L/anterior cervical R/posterior cervical R

## 2017-05-05 NOTE — H&P PST ADULT - NSANTHOSAYNRD_GEN_A_CORE
No. SREEKANTH screening performed.  STOP BANG Legend: 0-2 = LOW Risk; 3-4 = INTERMEDIATE Risk; 5-8 = HIGH Risk

## 2017-05-05 NOTE — H&P PST ADULT - PRO PAIN LIFE ADAPT
inability to sleep/inability to enjoy life/decreased activity level/inability or reluctance to perform ADLs/inability to concentrate

## 2017-05-05 NOTE — H&P PST ADULT - MUSCULOSKELETAL
details… detailed exam decreased ROM due to pain/diminished strength/no calf tenderness/decreased ROM

## 2017-05-05 NOTE — H&P PST ADULT - PMH
Back pain  Chronic back pain  Cerebral aneurysm rupture  1997 clipped, no residual  Femur fracture, right  2/16, surgical revision of right hip  GERD (gastroesophageal reflux disease)    Hypertension    Lumbar herniated disc    Myocardial infarction  KAEL x1 MI 2005, stent RCA  Narcotic dependence  recently hospitalized 5/28/16 for withdrawal ,pt currently on Dialudid 8 mg every 4-6 hours /day  Osteoarthritis    Sacroiliitis, not elsewhere classified  Unspecified Inflammatory spondylopathy,Sacral and sacrococcygeal region  Spinal stenosis

## 2017-05-05 NOTE — H&P PST ADULT - PSH
Cerebral aneurysm  I997 with clips  Chronic pain  Patient has an Intrathecal pump for chronic pain  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  H/O arthroscopy of shoulder  right X 2, left X 1  History of hip replacement  8/15, revision 2/16 after falling and fracturing right femur  History of right inguinal hernia repair  X2  History of throat surgery  surgical exicision cyst 1986  Hx of appendectomy  6/1969  Hx of laminectomy  lumbar-2002  S/P left knee arthroscopy    S/P lumbar fusion  L1-S1:2002  Stented coronary artery  KAEL x1 to RCA Cerebral aneurysm  I997 with clips  Chronic pain  Patient has an Intrathecal pump s/p removal in 2016  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  H/O arthroscopy of shoulder  right X 2, left X 1  History of hip replacement  8/15, revision 2/16 after falling and fracturing right femur  History of right inguinal hernia repair  x2  History of throat surgery  surgical exicision cyst 1986  Hx of appendectomy  6/1969  Hx of laminectomy  lumbar-2002  S/P left knee arthroscopy    S/P lumbar fusion  L1-S1:2002  Stented coronary artery  KAEL x 1 to RCA

## 2017-05-05 NOTE — H&P PST ADULT - HISTORY OF PRESENT ILLNESS
61 year old male with PMH of HTN, HLD, MI  s/p PCI with 1 KAEL in 2005, Chronic back pain (pain management), Medical Marijuana use, multiple back surgeries.  Pt reports that he has been experiencing  right sided lower back pain buttock and  right lower extremity with weakness & paresthesias ,s/p SI joint injections which gave him slight improvement. Now coming in PST  for scheduled Sacroiliac Joint fusion on 5/8/2017.

## 2017-05-08 ENCOUNTER — TRANSCRIPTION ENCOUNTER (OUTPATIENT)
Age: 62
End: 2017-05-08

## 2017-05-08 ENCOUNTER — INPATIENT (INPATIENT)
Facility: HOSPITAL | Age: 62
LOS: 3 days | Discharge: NURSING, REHAB OR LONG-TERM CARE | DRG: 460 | End: 2017-05-12
Attending: ORTHOPAEDIC SURGERY | Admitting: ORTHOPAEDIC SURGERY
Payer: COMMERCIAL

## 2017-05-08 ENCOUNTER — APPOINTMENT (OUTPATIENT)
Dept: ORTHOPEDIC SURGERY | Facility: HOSPITAL | Age: 62
End: 2017-05-08

## 2017-05-08 VITALS
DIASTOLIC BLOOD PRESSURE: 100 MMHG | HEART RATE: 69 BPM | OXYGEN SATURATION: 99 % | HEIGHT: 68 IN | RESPIRATION RATE: 20 BRPM | TEMPERATURE: 99 F | WEIGHT: 132.06 LBS | SYSTOLIC BLOOD PRESSURE: 179 MMHG

## 2017-05-08 DIAGNOSIS — M46.98 UNSPECIFIED INFLAMMATORY SPONDYLOPATHY, SACRAL AND SACROCOCCYGEAL REGION: ICD-10-CM

## 2017-05-08 DIAGNOSIS — Z96.649 PRESENCE OF UNSPECIFIED ARTIFICIAL HIP JOINT: Chronic | ICD-10-CM

## 2017-05-08 DIAGNOSIS — Z98.89 OTHER SPECIFIED POSTPROCEDURAL STATES: Chronic | ICD-10-CM

## 2017-05-08 DIAGNOSIS — M46.1 SACROILIITIS, NOT ELSEWHERE CLASSIFIED: ICD-10-CM

## 2017-05-08 LAB
ANION GAP SERPL CALC-SCNC: 13 MMOL/L — SIGNIFICANT CHANGE UP (ref 5–17)
BUN SERPL-MCNC: 17 MG/DL — SIGNIFICANT CHANGE UP (ref 7–23)
CALCIUM SERPL-MCNC: 8.3 MG/DL — LOW (ref 8.4–10.5)
CHLORIDE SERPL-SCNC: 107 MMOL/L — SIGNIFICANT CHANGE UP (ref 96–108)
CO2 SERPL-SCNC: 26 MMOL/L — SIGNIFICANT CHANGE UP (ref 22–31)
CREAT SERPL-MCNC: 0.91 MG/DL — SIGNIFICANT CHANGE UP (ref 0.5–1.3)
GLUCOSE SERPL-MCNC: 101 MG/DL — HIGH (ref 70–99)
HCT VFR BLD CALC: 35.4 % — LOW (ref 39–50)
HGB BLD-MCNC: 12.6 G/DL — LOW (ref 13–17)
MCHC RBC-ENTMCNC: 33.4 PG — SIGNIFICANT CHANGE UP (ref 27–34)
MCHC RBC-ENTMCNC: 35.6 GM/DL — SIGNIFICANT CHANGE UP (ref 32–36)
MCV RBC AUTO: 93.7 FL — SIGNIFICANT CHANGE UP (ref 80–100)
PLATELET # BLD AUTO: 164 K/UL — SIGNIFICANT CHANGE UP (ref 150–400)
POTASSIUM SERPL-MCNC: 4.4 MMOL/L — SIGNIFICANT CHANGE UP (ref 3.5–5.3)
POTASSIUM SERPL-SCNC: 4.4 MMOL/L — SIGNIFICANT CHANGE UP (ref 3.5–5.3)
RBC # BLD: 3.78 M/UL — LOW (ref 4.2–5.8)
RBC # FLD: 12.7 % — SIGNIFICANT CHANGE UP (ref 10.3–14.5)
SODIUM SERPL-SCNC: 146 MMOL/L — HIGH (ref 135–145)
WBC # BLD: 8.4 K/UL — SIGNIFICANT CHANGE UP (ref 3.8–10.5)
WBC # FLD AUTO: 8.4 K/UL — SIGNIFICANT CHANGE UP (ref 3.8–10.5)

## 2017-05-08 PROCEDURE — 27279 ARTHRD SI JT PLMT TARTCLR DV: CPT | Mod: RT

## 2017-05-08 RX ORDER — GEMFIBROZIL 600 MG
600 TABLET ORAL
Qty: 0 | Refills: 0 | Status: DISCONTINUED | OUTPATIENT
Start: 2017-05-08 | End: 2017-05-12

## 2017-05-08 RX ORDER — ACETAMINOPHEN 500 MG
650 TABLET ORAL EVERY 6 HOURS
Qty: 0 | Refills: 0 | Status: DISCONTINUED | OUTPATIENT
Start: 2017-05-08 | End: 2017-05-12

## 2017-05-08 RX ORDER — PANTOPRAZOLE SODIUM 20 MG/1
40 TABLET, DELAYED RELEASE ORAL
Qty: 0 | Refills: 0 | Status: DISCONTINUED | OUTPATIENT
Start: 2017-05-08 | End: 2017-05-12

## 2017-05-08 RX ORDER — CEFAZOLIN SODIUM 1 G
2000 VIAL (EA) INJECTION EVERY 8 HOURS
Qty: 0 | Refills: 0 | Status: COMPLETED | OUTPATIENT
Start: 2017-05-08 | End: 2017-05-09

## 2017-05-08 RX ORDER — MAGNESIUM HYDROXIDE 400 MG/1
30 TABLET, CHEWABLE ORAL DAILY
Qty: 0 | Refills: 0 | Status: DISCONTINUED | OUTPATIENT
Start: 2017-05-08 | End: 2017-05-12

## 2017-05-08 RX ORDER — SODIUM CHLORIDE 9 MG/ML
1000 INJECTION INTRAMUSCULAR; INTRAVENOUS; SUBCUTANEOUS
Qty: 0 | Refills: 0 | Status: DISCONTINUED | OUTPATIENT
Start: 2017-05-08 | End: 2017-05-12

## 2017-05-08 RX ORDER — HYDROMORPHONE HYDROCHLORIDE 2 MG/ML
30 INJECTION INTRAMUSCULAR; INTRAVENOUS; SUBCUTANEOUS
Qty: 0 | Refills: 0 | Status: DISCONTINUED | OUTPATIENT
Start: 2017-05-08 | End: 2017-05-08

## 2017-05-08 RX ORDER — DOCUSATE SODIUM 100 MG
100 CAPSULE ORAL
Qty: 0 | Refills: 0 | Status: DISCONTINUED | OUTPATIENT
Start: 2017-05-08 | End: 2017-05-12

## 2017-05-08 RX ORDER — METOPROLOL TARTRATE 50 MG
25 TABLET ORAL
Qty: 0 | Refills: 0 | Status: DISCONTINUED | OUTPATIENT
Start: 2017-05-08 | End: 2017-05-12

## 2017-05-08 RX ORDER — ONDANSETRON 8 MG/1
4 TABLET, FILM COATED ORAL EVERY 6 HOURS
Qty: 0 | Refills: 0 | Status: DISCONTINUED | OUTPATIENT
Start: 2017-05-08 | End: 2017-05-12

## 2017-05-08 RX ORDER — HYDROMORPHONE HYDROCHLORIDE 2 MG/ML
0.5 INJECTION INTRAMUSCULAR; INTRAVENOUS; SUBCUTANEOUS
Qty: 0 | Refills: 0 | Status: DISCONTINUED | OUTPATIENT
Start: 2017-05-08 | End: 2017-05-08

## 2017-05-08 RX ORDER — FENTANYL CITRATE 50 UG/ML
2 INJECTION INTRAVENOUS
Qty: 0 | Refills: 0 | Status: DISCONTINUED | OUTPATIENT
Start: 2017-05-08 | End: 2017-05-12

## 2017-05-08 RX ORDER — CYCLOBENZAPRINE HYDROCHLORIDE 10 MG/1
10 TABLET, FILM COATED ORAL THREE TIMES A DAY
Qty: 0 | Refills: 0 | Status: DISCONTINUED | OUTPATIENT
Start: 2017-05-08 | End: 2017-05-12

## 2017-05-08 RX ORDER — INFLUENZA VIRUS VACCINE 15; 15; 15; 15 UG/.5ML; UG/.5ML; UG/.5ML; UG/.5ML
0.5 SUSPENSION INTRAMUSCULAR ONCE
Qty: 0 | Refills: 0 | Status: COMPLETED | OUTPATIENT
Start: 2017-05-08 | End: 2017-05-08

## 2017-05-08 RX ORDER — HYDROMORPHONE HYDROCHLORIDE 2 MG/ML
30 INJECTION INTRAMUSCULAR; INTRAVENOUS; SUBCUTANEOUS
Qty: 0 | Refills: 0 | Status: DISCONTINUED | OUTPATIENT
Start: 2017-05-08 | End: 2017-05-10

## 2017-05-08 RX ORDER — SODIUM CHLORIDE 9 MG/ML
3 INJECTION INTRAMUSCULAR; INTRAVENOUS; SUBCUTANEOUS EVERY 8 HOURS
Qty: 0 | Refills: 0 | Status: DISCONTINUED | OUTPATIENT
Start: 2017-05-08 | End: 2017-05-08

## 2017-05-08 RX ORDER — NALOXONE HYDROCHLORIDE 4 MG/.1ML
0.1 SPRAY NASAL
Qty: 0 | Refills: 0 | Status: DISCONTINUED | OUTPATIENT
Start: 2017-05-08 | End: 2017-05-12

## 2017-05-08 RX ORDER — HYDROMORPHONE HYDROCHLORIDE 2 MG/ML
8 INJECTION INTRAMUSCULAR; INTRAVENOUS; SUBCUTANEOUS EVERY 6 HOURS
Qty: 0 | Refills: 0 | Status: DISCONTINUED | OUTPATIENT
Start: 2017-05-08 | End: 2017-05-10

## 2017-05-08 RX ORDER — ASPIRIN/CALCIUM CARB/MAGNESIUM 324 MG
325 TABLET ORAL
Qty: 0 | Refills: 0 | Status: DISCONTINUED | OUTPATIENT
Start: 2017-05-08 | End: 2017-05-12

## 2017-05-08 RX ORDER — ATORVASTATIN CALCIUM 80 MG/1
20 TABLET, FILM COATED ORAL AT BEDTIME
Qty: 0 | Refills: 0 | Status: DISCONTINUED | OUTPATIENT
Start: 2017-05-08 | End: 2017-05-12

## 2017-05-08 RX ADMIN — FENTANYL CITRATE 2 PATCH: 50 INJECTION INTRAVENOUS at 13:36

## 2017-05-08 RX ADMIN — HYDROMORPHONE HYDROCHLORIDE 8 MILLIGRAM(S): 2 INJECTION INTRAMUSCULAR; INTRAVENOUS; SUBCUTANEOUS at 22:00

## 2017-05-08 RX ADMIN — HYDROMORPHONE HYDROCHLORIDE 8 MILLIGRAM(S): 2 INJECTION INTRAMUSCULAR; INTRAVENOUS; SUBCUTANEOUS at 15:00

## 2017-05-08 RX ADMIN — HYDROMORPHONE HYDROCHLORIDE 30 MILLILITER(S): 2 INJECTION INTRAMUSCULAR; INTRAVENOUS; SUBCUTANEOUS at 18:05

## 2017-05-08 RX ADMIN — CYCLOBENZAPRINE HYDROCHLORIDE 10 MILLIGRAM(S): 10 TABLET, FILM COATED ORAL at 20:18

## 2017-05-08 RX ADMIN — HYDROMORPHONE HYDROCHLORIDE 8 MILLIGRAM(S): 2 INJECTION INTRAMUSCULAR; INTRAVENOUS; SUBCUTANEOUS at 21:26

## 2017-05-08 RX ADMIN — HYDROMORPHONE HYDROCHLORIDE 30 MILLILITER(S): 2 INJECTION INTRAMUSCULAR; INTRAVENOUS; SUBCUTANEOUS at 10:30

## 2017-05-08 RX ADMIN — HYDROMORPHONE HYDROCHLORIDE 30 MILLILITER(S): 2 INJECTION INTRAMUSCULAR; INTRAVENOUS; SUBCUTANEOUS at 13:15

## 2017-05-08 RX ADMIN — HYDROMORPHONE HYDROCHLORIDE 30 MILLILITER(S): 2 INJECTION INTRAMUSCULAR; INTRAVENOUS; SUBCUTANEOUS at 19:43

## 2017-05-08 RX ADMIN — Medication 100 MILLIGRAM(S): at 18:12

## 2017-05-08 RX ADMIN — Medication 600 MILLIGRAM(S): at 20:24

## 2017-05-08 RX ADMIN — Medication 325 MILLIGRAM(S): at 18:11

## 2017-05-08 RX ADMIN — Medication 25 MILLIGRAM(S): at 18:11

## 2017-05-08 RX ADMIN — Medication 100 MILLIGRAM(S): at 16:15

## 2017-05-08 RX ADMIN — HYDROMORPHONE HYDROCHLORIDE 30 MILLILITER(S): 2 INJECTION INTRAMUSCULAR; INTRAVENOUS; SUBCUTANEOUS at 17:03

## 2017-05-08 RX ADMIN — ATORVASTATIN CALCIUM 20 MILLIGRAM(S): 80 TABLET, FILM COATED ORAL at 21:26

## 2017-05-08 RX ADMIN — HYDROMORPHONE HYDROCHLORIDE 8 MILLIGRAM(S): 2 INJECTION INTRAMUSCULAR; INTRAVENOUS; SUBCUTANEOUS at 14:33

## 2017-05-08 RX ADMIN — SODIUM CHLORIDE 3 MILLILITER(S): 9 INJECTION INTRAMUSCULAR; INTRAVENOUS; SUBCUTANEOUS at 05:53

## 2017-05-08 RX ADMIN — SODIUM CHLORIDE 75 MILLILITER(S): 9 INJECTION INTRAMUSCULAR; INTRAVENOUS; SUBCUTANEOUS at 12:00

## 2017-05-08 NOTE — PHYSICAL THERAPY INITIAL EVALUATION ADULT - ADDITIONAL COMMENTS
Pt lives in private house 2+8 steps to enter rail (L) initial 2 steps, rail (R) 8 steps; Additional 14 steps inside house with rail (R). Pt lives with wife, wife is in good shape and available to assist as needed per pt. Pt had broken ankle in past year and was NWB using RW and was in rehab for approx 28 days.

## 2017-05-08 NOTE — PHYSICAL THERAPY INITIAL EVALUATION ADULT - GENERAL OBSERVATIONS, REHAB EVAL
Pt received supine in bed, A&Ox4, +IV, tolerated 40 min PT eval well, without adverse effects to incr. activity.

## 2017-05-08 NOTE — PATIENT PROFILE ADULT. - PRO PAIN LIFE ADAPT
decreased activity level/inability or reluctance to perform ADLs/inability to enjoy life/inability to sleep/inability to concentrate

## 2017-05-08 NOTE — PATIENT PROFILE ADULT. - PSH
Cerebral aneurysm  I997 with clips  Chronic pain  Patient has an Intrathecal pump s/p removal in 2016  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 mos.-14 yo  H/O arthroscopy of shoulder  right X 2, left X 1  History of hip replacement  8/15, revision 2/16 after falling and fracturing right femur  History of right inguinal hernia repair  x2  History of throat surgery  surgical exicision cyst 1986  Hx of appendectomy  6/1969  Hx of laminectomy  lumbar-2002  S/P left knee arthroscopy    S/P lumbar fusion  L1-S1:2002  Stented coronary artery  KAEL x 1 to RCA

## 2017-05-08 NOTE — PHYSICAL THERAPY INITIAL EVALUATION ADULT - PERTINENT HX OF CURRENT PROBLEM, REHAB EVAL
61 year old male w/ PMH of HTN, HLD, MI  s/p PCI with 1 KAEL in 2005, Chronic back pain (pain management), Medical Marijuana use, multiple back surgeries. Pt reports right sided lower back, buttock LE pain w/ weakness & paresthesias, s/p SI joint injections which gave him slight improvement. Pt presents for scheduled Sacroiliac Joint fusion and underwent as scheduled on 5/8/2017.

## 2017-05-09 LAB
ANION GAP SERPL CALC-SCNC: 13 MMOL/L — SIGNIFICANT CHANGE UP (ref 5–17)
ANION GAP SERPL CALC-SCNC: 7 MMOL/L — SIGNIFICANT CHANGE UP (ref 5–17)
BUN SERPL-MCNC: 10 MG/DL — SIGNIFICANT CHANGE UP (ref 7–23)
BUN SERPL-MCNC: 11 MG/DL — SIGNIFICANT CHANGE UP (ref 7–23)
CALCIUM SERPL-MCNC: 8.5 MG/DL — SIGNIFICANT CHANGE UP (ref 8.4–10.5)
CALCIUM SERPL-MCNC: 9 MG/DL — SIGNIFICANT CHANGE UP (ref 8.4–10.5)
CHLORIDE SERPL-SCNC: 105 MMOL/L — SIGNIFICANT CHANGE UP (ref 96–108)
CHLORIDE SERPL-SCNC: 106 MMOL/L — SIGNIFICANT CHANGE UP (ref 96–108)
CO2 SERPL-SCNC: 25 MMOL/L — SIGNIFICANT CHANGE UP (ref 22–31)
CO2 SERPL-SCNC: 28 MMOL/L — SIGNIFICANT CHANGE UP (ref 22–31)
CREAT SERPL-MCNC: 0.72 MG/DL — SIGNIFICANT CHANGE UP (ref 0.5–1.3)
CREAT SERPL-MCNC: 0.83 MG/DL — SIGNIFICANT CHANGE UP (ref 0.5–1.3)
GLUCOSE SERPL-MCNC: 110 MG/DL — HIGH (ref 70–99)
GLUCOSE SERPL-MCNC: 94 MG/DL — SIGNIFICANT CHANGE UP (ref 70–99)
HCT VFR BLD CALC: 28.7 % — LOW (ref 39–50)
HCT VFR BLD CALC: 29.5 % — LOW (ref 39–50)
HGB BLD-MCNC: 10.3 G/DL — LOW (ref 13–17)
HGB BLD-MCNC: 9.9 G/DL — LOW (ref 13–17)
MCHC RBC-ENTMCNC: 31.1 PG — SIGNIFICANT CHANGE UP (ref 27–34)
MCHC RBC-ENTMCNC: 33.6 GM/DL — SIGNIFICANT CHANGE UP (ref 32–36)
MCHC RBC-ENTMCNC: 33.8 PG — SIGNIFICANT CHANGE UP (ref 27–34)
MCHC RBC-ENTMCNC: 36 GM/DL — SIGNIFICANT CHANGE UP (ref 32–36)
MCV RBC AUTO: 92.8 FL — SIGNIFICANT CHANGE UP (ref 80–100)
MCV RBC AUTO: 93.8 FL — SIGNIFICANT CHANGE UP (ref 80–100)
PLATELET # BLD AUTO: 147 K/UL — LOW (ref 150–400)
PLATELET # BLD AUTO: 168 K/UL — SIGNIFICANT CHANGE UP (ref 150–400)
POTASSIUM SERPL-MCNC: 4.4 MMOL/L — SIGNIFICANT CHANGE UP (ref 3.5–5.3)
POTASSIUM SERPL-MCNC: 5.5 MMOL/L — HIGH (ref 3.5–5.3)
POTASSIUM SERPL-SCNC: 4.4 MMOL/L — SIGNIFICANT CHANGE UP (ref 3.5–5.3)
POTASSIUM SERPL-SCNC: 5.5 MMOL/L — HIGH (ref 3.5–5.3)
RBC # BLD: 3.06 M/UL — LOW (ref 4.2–5.8)
RBC # BLD: 3.18 M/UL — LOW (ref 4.2–5.8)
RBC # FLD: 12.5 % — SIGNIFICANT CHANGE UP (ref 10.3–14.5)
RBC # FLD: 14 % — SIGNIFICANT CHANGE UP (ref 10.3–14.5)
SODIUM SERPL-SCNC: 141 MMOL/L — SIGNIFICANT CHANGE UP (ref 135–145)
SODIUM SERPL-SCNC: 143 MMOL/L — SIGNIFICANT CHANGE UP (ref 135–145)
WBC # BLD: 7.26 K/UL — SIGNIFICANT CHANGE UP (ref 3.8–10.5)
WBC # BLD: 8 K/UL — SIGNIFICANT CHANGE UP (ref 3.8–10.5)
WBC # FLD AUTO: 7.26 K/UL — SIGNIFICANT CHANGE UP (ref 3.8–10.5)
WBC # FLD AUTO: 8 K/UL — SIGNIFICANT CHANGE UP (ref 3.8–10.5)

## 2017-05-09 RX ORDER — DIPHENHYDRAMINE HCL 50 MG
50 CAPSULE ORAL AT BEDTIME
Qty: 0 | Refills: 0 | Status: DISCONTINUED | OUTPATIENT
Start: 2017-05-09 | End: 2017-05-12

## 2017-05-09 RX ORDER — POLYETHYLENE GLYCOL 3350 17 G/17G
17 POWDER, FOR SOLUTION ORAL AT BEDTIME
Qty: 0 | Refills: 0 | Status: DISCONTINUED | OUTPATIENT
Start: 2017-05-09 | End: 2017-05-12

## 2017-05-09 RX ADMIN — HYDROMORPHONE HYDROCHLORIDE 8 MILLIGRAM(S): 2 INJECTION INTRAMUSCULAR; INTRAVENOUS; SUBCUTANEOUS at 09:39

## 2017-05-09 RX ADMIN — Medication 100 MILLIGRAM(S): at 18:10

## 2017-05-09 RX ADMIN — HYDROMORPHONE HYDROCHLORIDE 8 MILLIGRAM(S): 2 INJECTION INTRAMUSCULAR; INTRAVENOUS; SUBCUTANEOUS at 22:30

## 2017-05-09 RX ADMIN — PANTOPRAZOLE SODIUM 40 MILLIGRAM(S): 20 TABLET, DELAYED RELEASE ORAL at 06:33

## 2017-05-09 RX ADMIN — HYDROMORPHONE HYDROCHLORIDE 30 MILLILITER(S): 2 INJECTION INTRAMUSCULAR; INTRAVENOUS; SUBCUTANEOUS at 10:04

## 2017-05-09 RX ADMIN — HYDROMORPHONE HYDROCHLORIDE 30 MILLILITER(S): 2 INJECTION INTRAMUSCULAR; INTRAVENOUS; SUBCUTANEOUS at 18:01

## 2017-05-09 RX ADMIN — HYDROMORPHONE HYDROCHLORIDE 30 MILLILITER(S): 2 INJECTION INTRAMUSCULAR; INTRAVENOUS; SUBCUTANEOUS at 19:31

## 2017-05-09 RX ADMIN — HYDROMORPHONE HYDROCHLORIDE 30 MILLILITER(S): 2 INJECTION INTRAMUSCULAR; INTRAVENOUS; SUBCUTANEOUS at 07:55

## 2017-05-09 RX ADMIN — HYDROMORPHONE HYDROCHLORIDE 30 MILLILITER(S): 2 INJECTION INTRAMUSCULAR; INTRAVENOUS; SUBCUTANEOUS at 06:22

## 2017-05-09 RX ADMIN — CYCLOBENZAPRINE HYDROCHLORIDE 10 MILLIGRAM(S): 10 TABLET, FILM COATED ORAL at 04:05

## 2017-05-09 RX ADMIN — Medication 1 TABLET(S): at 12:45

## 2017-05-09 RX ADMIN — HYDROMORPHONE HYDROCHLORIDE 8 MILLIGRAM(S): 2 INJECTION INTRAMUSCULAR; INTRAVENOUS; SUBCUTANEOUS at 15:20

## 2017-05-09 RX ADMIN — Medication 600 MILLIGRAM(S): at 18:07

## 2017-05-09 RX ADMIN — Medication 100 MILLIGRAM(S): at 06:33

## 2017-05-09 RX ADMIN — HYDROMORPHONE HYDROCHLORIDE 8 MILLIGRAM(S): 2 INJECTION INTRAMUSCULAR; INTRAVENOUS; SUBCUTANEOUS at 03:37

## 2017-05-09 RX ADMIN — Medication 325 MILLIGRAM(S): at 18:10

## 2017-05-09 RX ADMIN — Medication 25 MILLIGRAM(S): at 06:33

## 2017-05-09 RX ADMIN — HYDROMORPHONE HYDROCHLORIDE 30 MILLILITER(S): 2 INJECTION INTRAMUSCULAR; INTRAVENOUS; SUBCUTANEOUS at 14:25

## 2017-05-09 RX ADMIN — HYDROMORPHONE HYDROCHLORIDE 8 MILLIGRAM(S): 2 INJECTION INTRAMUSCULAR; INTRAVENOUS; SUBCUTANEOUS at 15:50

## 2017-05-09 RX ADMIN — HYDROMORPHONE HYDROCHLORIDE 8 MILLIGRAM(S): 2 INJECTION INTRAMUSCULAR; INTRAVENOUS; SUBCUTANEOUS at 22:04

## 2017-05-09 RX ADMIN — Medication 25 MILLIGRAM(S): at 18:09

## 2017-05-09 RX ADMIN — Medication 325 MILLIGRAM(S): at 06:35

## 2017-05-09 RX ADMIN — Medication 100 MILLIGRAM(S): at 00:01

## 2017-05-09 RX ADMIN — CYCLOBENZAPRINE HYDROCHLORIDE 10 MILLIGRAM(S): 10 TABLET, FILM COATED ORAL at 12:45

## 2017-05-09 RX ADMIN — HYDROMORPHONE HYDROCHLORIDE 8 MILLIGRAM(S): 2 INJECTION INTRAMUSCULAR; INTRAVENOUS; SUBCUTANEOUS at 09:09

## 2017-05-09 RX ADMIN — HYDROMORPHONE HYDROCHLORIDE 30 MILLILITER(S): 2 INJECTION INTRAMUSCULAR; INTRAVENOUS; SUBCUTANEOUS at 22:09

## 2017-05-09 RX ADMIN — ATORVASTATIN CALCIUM 20 MILLIGRAM(S): 80 TABLET, FILM COATED ORAL at 22:06

## 2017-05-09 RX ADMIN — Medication 50 MILLIGRAM(S): at 01:58

## 2017-05-09 RX ADMIN — Medication 600 MILLIGRAM(S): at 06:33

## 2017-05-09 RX ADMIN — SODIUM CHLORIDE 75 MILLILITER(S): 9 INJECTION INTRAMUSCULAR; INTRAVENOUS; SUBCUTANEOUS at 00:03

## 2017-05-09 RX ADMIN — HYDROMORPHONE HYDROCHLORIDE 30 MILLILITER(S): 2 INJECTION INTRAMUSCULAR; INTRAVENOUS; SUBCUTANEOUS at 20:11

## 2017-05-09 RX ADMIN — HYDROMORPHONE HYDROCHLORIDE 8 MILLIGRAM(S): 2 INJECTION INTRAMUSCULAR; INTRAVENOUS; SUBCUTANEOUS at 03:07

## 2017-05-10 LAB
ANION GAP SERPL CALC-SCNC: 12 MMOL/L — SIGNIFICANT CHANGE UP (ref 5–17)
BLD GP AB SCN SERPL QL: NEGATIVE — SIGNIFICANT CHANGE UP
BUN SERPL-MCNC: 13 MG/DL — SIGNIFICANT CHANGE UP (ref 7–23)
CALCIUM SERPL-MCNC: 8.4 MG/DL — SIGNIFICANT CHANGE UP (ref 8.4–10.5)
CHLORIDE SERPL-SCNC: 105 MMOL/L — SIGNIFICANT CHANGE UP (ref 96–108)
CO2 SERPL-SCNC: 24 MMOL/L — SIGNIFICANT CHANGE UP (ref 22–31)
CREAT SERPL-MCNC: 0.77 MG/DL — SIGNIFICANT CHANGE UP (ref 0.5–1.3)
GLUCOSE SERPL-MCNC: 126 MG/DL — HIGH (ref 70–99)
HCT VFR BLD CALC: 24.1 % — LOW (ref 39–50)
HGB BLD-MCNC: 8 G/DL — LOW (ref 13–17)
MCHC RBC-ENTMCNC: 30.7 PG — SIGNIFICANT CHANGE UP (ref 27–34)
MCHC RBC-ENTMCNC: 33.2 GM/DL — SIGNIFICANT CHANGE UP (ref 32–36)
MCV RBC AUTO: 92.3 FL — SIGNIFICANT CHANGE UP (ref 80–100)
PLATELET # BLD AUTO: 142 K/UL — LOW (ref 150–400)
POTASSIUM SERPL-MCNC: 4.6 MMOL/L — SIGNIFICANT CHANGE UP (ref 3.5–5.3)
POTASSIUM SERPL-SCNC: 4.6 MMOL/L — SIGNIFICANT CHANGE UP (ref 3.5–5.3)
RBC # BLD: 2.61 M/UL — LOW (ref 4.2–5.8)
RBC # FLD: 14.1 % — SIGNIFICANT CHANGE UP (ref 10.3–14.5)
RH IG SCN BLD-IMP: POSITIVE — SIGNIFICANT CHANGE UP
SODIUM SERPL-SCNC: 141 MMOL/L — SIGNIFICANT CHANGE UP (ref 135–145)
WBC # BLD: 6.73 K/UL — SIGNIFICANT CHANGE UP (ref 3.8–10.5)
WBC # FLD AUTO: 6.73 K/UL — SIGNIFICANT CHANGE UP (ref 3.8–10.5)

## 2017-05-10 RX ORDER — METOCLOPRAMIDE HCL 10 MG
10 TABLET ORAL ONCE
Qty: 0 | Refills: 0 | Status: DISCONTINUED | OUTPATIENT
Start: 2017-05-10 | End: 2017-05-10

## 2017-05-10 RX ORDER — HYDROMORPHONE HYDROCHLORIDE 2 MG/ML
10 INJECTION INTRAMUSCULAR; INTRAVENOUS; SUBCUTANEOUS EVERY 4 HOURS
Qty: 0 | Refills: 0 | Status: DISCONTINUED | OUTPATIENT
Start: 2017-05-10 | End: 2017-05-12

## 2017-05-10 RX ORDER — TAMSULOSIN HYDROCHLORIDE 0.4 MG/1
0.4 CAPSULE ORAL AT BEDTIME
Qty: 0 | Refills: 0 | Status: DISCONTINUED | OUTPATIENT
Start: 2017-05-10 | End: 2017-05-12

## 2017-05-10 RX ORDER — METOCLOPRAMIDE HCL 10 MG
10 TABLET ORAL ONCE
Qty: 0 | Refills: 0 | Status: COMPLETED | OUTPATIENT
Start: 2017-05-10 | End: 2017-05-10

## 2017-05-10 RX ORDER — HYDROMORPHONE HYDROCHLORIDE 2 MG/ML
8 INJECTION INTRAMUSCULAR; INTRAVENOUS; SUBCUTANEOUS EVERY 4 HOURS
Qty: 0 | Refills: 0 | Status: DISCONTINUED | OUTPATIENT
Start: 2017-05-10 | End: 2017-05-12

## 2017-05-10 RX ADMIN — Medication 1 TABLET(S): at 12:06

## 2017-05-10 RX ADMIN — Medication 600 MILLIGRAM(S): at 06:12

## 2017-05-10 RX ADMIN — Medication 325 MILLIGRAM(S): at 06:12

## 2017-05-10 RX ADMIN — Medication 100 MILLIGRAM(S): at 18:03

## 2017-05-10 RX ADMIN — Medication 10 MILLIGRAM(S): at 08:35

## 2017-05-10 RX ADMIN — HYDROMORPHONE HYDROCHLORIDE 8 MILLIGRAM(S): 2 INJECTION INTRAMUSCULAR; INTRAVENOUS; SUBCUTANEOUS at 12:06

## 2017-05-10 RX ADMIN — Medication 25 MILLIGRAM(S): at 18:03

## 2017-05-10 RX ADMIN — HYDROMORPHONE HYDROCHLORIDE 30 MILLILITER(S): 2 INJECTION INTRAMUSCULAR; INTRAVENOUS; SUBCUTANEOUS at 02:15

## 2017-05-10 RX ADMIN — Medication 600 MILLIGRAM(S): at 18:03

## 2017-05-10 RX ADMIN — TAMSULOSIN HYDROCHLORIDE 0.4 MILLIGRAM(S): 0.4 CAPSULE ORAL at 21:25

## 2017-05-10 RX ADMIN — HYDROMORPHONE HYDROCHLORIDE 10 MILLIGRAM(S): 2 INJECTION INTRAMUSCULAR; INTRAVENOUS; SUBCUTANEOUS at 19:42

## 2017-05-10 RX ADMIN — PANTOPRAZOLE SODIUM 40 MILLIGRAM(S): 20 TABLET, DELAYED RELEASE ORAL at 06:12

## 2017-05-10 RX ADMIN — Medication 100 MILLIGRAM(S): at 06:12

## 2017-05-10 RX ADMIN — HYDROMORPHONE HYDROCHLORIDE 10 MILLIGRAM(S): 2 INJECTION INTRAMUSCULAR; INTRAVENOUS; SUBCUTANEOUS at 20:38

## 2017-05-10 RX ADMIN — ONDANSETRON 4 MILLIGRAM(S): 8 TABLET, FILM COATED ORAL at 06:34

## 2017-05-10 RX ADMIN — HYDROMORPHONE HYDROCHLORIDE 30 MILLILITER(S): 2 INJECTION INTRAMUSCULAR; INTRAVENOUS; SUBCUTANEOUS at 06:30

## 2017-05-10 RX ADMIN — HYDROMORPHONE HYDROCHLORIDE 30 MILLILITER(S): 2 INJECTION INTRAMUSCULAR; INTRAVENOUS; SUBCUTANEOUS at 07:49

## 2017-05-10 RX ADMIN — HYDROMORPHONE HYDROCHLORIDE 8 MILLIGRAM(S): 2 INJECTION INTRAMUSCULAR; INTRAVENOUS; SUBCUTANEOUS at 12:35

## 2017-05-10 RX ADMIN — ATORVASTATIN CALCIUM 20 MILLIGRAM(S): 80 TABLET, FILM COATED ORAL at 21:25

## 2017-05-10 RX ADMIN — Medication 325 MILLIGRAM(S): at 18:02

## 2017-05-10 RX ADMIN — POLYETHYLENE GLYCOL 3350 17 GRAM(S): 17 POWDER, FOR SOLUTION ORAL at 21:25

## 2017-05-11 ENCOUNTER — TRANSCRIPTION ENCOUNTER (OUTPATIENT)
Age: 62
End: 2017-05-11

## 2017-05-11 LAB
HCT VFR BLD CALC: 27.2 % — LOW (ref 39–50)
HCT VFR BLD CALC: 31.8 % — LOW (ref 39–50)
HGB BLD-MCNC: 10.4 G/DL — LOW (ref 13–17)
HGB BLD-MCNC: 9.7 G/DL — LOW (ref 13–17)
MCHC RBC-ENTMCNC: 31.2 PG — SIGNIFICANT CHANGE UP (ref 27–34)
MCHC RBC-ENTMCNC: 32.7 GM/DL — SIGNIFICANT CHANGE UP (ref 32–36)
MCHC RBC-ENTMCNC: 33.3 PG — SIGNIFICANT CHANGE UP (ref 27–34)
MCHC RBC-ENTMCNC: 35.6 GM/DL — SIGNIFICANT CHANGE UP (ref 32–36)
MCV RBC AUTO: 93.4 FL — SIGNIFICANT CHANGE UP (ref 80–100)
MCV RBC AUTO: 95.6 FL — SIGNIFICANT CHANGE UP (ref 80–100)
PLATELET # BLD AUTO: 131 K/UL — LOW (ref 150–400)
PLATELET # BLD AUTO: 158 K/UL — SIGNIFICANT CHANGE UP (ref 150–400)
RBC # BLD: 2.91 M/UL — LOW (ref 4.2–5.8)
RBC # BLD: 3.32 M/UL — LOW (ref 4.2–5.8)
RBC # FLD: 12.9 % — SIGNIFICANT CHANGE UP (ref 10.3–14.5)
RBC # FLD: 13.1 % — SIGNIFICANT CHANGE UP (ref 10.3–14.5)
WBC # BLD: 5.6 K/UL — SIGNIFICANT CHANGE UP (ref 3.8–10.5)
WBC # BLD: 6.5 K/UL — SIGNIFICANT CHANGE UP (ref 3.8–10.5)
WBC # FLD AUTO: 5.6 K/UL — SIGNIFICANT CHANGE UP (ref 3.8–10.5)
WBC # FLD AUTO: 6.5 K/UL — SIGNIFICANT CHANGE UP (ref 3.8–10.5)

## 2017-05-11 RX ORDER — SODIUM CHLORIDE 9 MG/ML
500 INJECTION INTRAMUSCULAR; INTRAVENOUS; SUBCUTANEOUS ONCE
Qty: 0 | Refills: 0 | Status: COMPLETED | OUTPATIENT
Start: 2017-05-11 | End: 2017-05-11

## 2017-05-11 RX ADMIN — HYDROMORPHONE HYDROCHLORIDE 8 MILLIGRAM(S): 2 INJECTION INTRAMUSCULAR; INTRAVENOUS; SUBCUTANEOUS at 22:40

## 2017-05-11 RX ADMIN — HYDROMORPHONE HYDROCHLORIDE 10 MILLIGRAM(S): 2 INJECTION INTRAMUSCULAR; INTRAVENOUS; SUBCUTANEOUS at 06:59

## 2017-05-11 RX ADMIN — HYDROMORPHONE HYDROCHLORIDE 8 MILLIGRAM(S): 2 INJECTION INTRAMUSCULAR; INTRAVENOUS; SUBCUTANEOUS at 21:40

## 2017-05-11 RX ADMIN — Medication 600 MILLIGRAM(S): at 06:20

## 2017-05-11 RX ADMIN — SODIUM CHLORIDE 1000 MILLILITER(S): 9 INJECTION INTRAMUSCULAR; INTRAVENOUS; SUBCUTANEOUS at 21:36

## 2017-05-11 RX ADMIN — HYDROMORPHONE HYDROCHLORIDE 10 MILLIGRAM(S): 2 INJECTION INTRAMUSCULAR; INTRAVENOUS; SUBCUTANEOUS at 16:43

## 2017-05-11 RX ADMIN — Medication 325 MILLIGRAM(S): at 18:27

## 2017-05-11 RX ADMIN — PANTOPRAZOLE SODIUM 40 MILLIGRAM(S): 20 TABLET, DELAYED RELEASE ORAL at 06:20

## 2017-05-11 RX ADMIN — Medication 25 MILLIGRAM(S): at 18:27

## 2017-05-11 RX ADMIN — TAMSULOSIN HYDROCHLORIDE 0.4 MILLIGRAM(S): 0.4 CAPSULE ORAL at 21:39

## 2017-05-11 RX ADMIN — HYDROMORPHONE HYDROCHLORIDE 10 MILLIGRAM(S): 2 INJECTION INTRAMUSCULAR; INTRAVENOUS; SUBCUTANEOUS at 03:50

## 2017-05-11 RX ADMIN — FENTANYL CITRATE 2 PATCH: 50 INJECTION INTRAVENOUS at 13:29

## 2017-05-11 RX ADMIN — ATORVASTATIN CALCIUM 20 MILLIGRAM(S): 80 TABLET, FILM COATED ORAL at 21:39

## 2017-05-11 RX ADMIN — HYDROMORPHONE HYDROCHLORIDE 10 MILLIGRAM(S): 2 INJECTION INTRAMUSCULAR; INTRAVENOUS; SUBCUTANEOUS at 07:29

## 2017-05-11 RX ADMIN — Medication 100 MILLIGRAM(S): at 06:20

## 2017-05-11 RX ADMIN — HYDROMORPHONE HYDROCHLORIDE 10 MILLIGRAM(S): 2 INJECTION INTRAMUSCULAR; INTRAVENOUS; SUBCUTANEOUS at 17:13

## 2017-05-11 RX ADMIN — Medication 25 MILLIGRAM(S): at 06:20

## 2017-05-11 RX ADMIN — HYDROMORPHONE HYDROCHLORIDE 10 MILLIGRAM(S): 2 INJECTION INTRAMUSCULAR; INTRAVENOUS; SUBCUTANEOUS at 11:45

## 2017-05-11 RX ADMIN — HYDROMORPHONE HYDROCHLORIDE 10 MILLIGRAM(S): 2 INJECTION INTRAMUSCULAR; INTRAVENOUS; SUBCUTANEOUS at 12:15

## 2017-05-11 RX ADMIN — Medication 1 TABLET(S): at 11:46

## 2017-05-11 RX ADMIN — Medication 325 MILLIGRAM(S): at 06:20

## 2017-05-11 RX ADMIN — HYDROMORPHONE HYDROCHLORIDE 10 MILLIGRAM(S): 2 INJECTION INTRAMUSCULAR; INTRAVENOUS; SUBCUTANEOUS at 02:54

## 2017-05-11 RX ADMIN — Medication 600 MILLIGRAM(S): at 17:02

## 2017-05-11 RX ADMIN — Medication 100 MILLIGRAM(S): at 18:27

## 2017-05-11 NOTE — DISCHARGE NOTE ADULT - ADDITIONAL INSTRUCTIONS
Keeps surgical incision/dressing clan and dry, have surgical sutures/staples removed and steri-strips applied post op day #14 (5/22/17). Continue toe-touch weight bearing physical therapy on right lower extremity. Follow up with Dr Coyle 3-4 weeks post op.

## 2017-05-11 NOTE — DISCHARGE NOTE ADULT - PLAN OF CARE
surgical intervention should result in improved function continue toe-touch weight bearing on right lower extremity, regular diet, and follow up with Dr Coyle 3-4 weeks post op

## 2017-05-11 NOTE — DISCHARGE NOTE ADULT - MEDICATION SUMMARY - MEDICATIONS TO TAKE
I will START or STAY ON the medications listed below when I get home from the hospital:    fentaNYL 100 mcg/hr transdermal film, extended release  -- 2 patch by transdermal patch every 72 hours  -- Indication: For pain management    acetaminophen 325 mg oral tablet  -- 2 tab(s) by mouth every 6 hours, As needed, For Temp greater than 38 C (100.4 F)  -- Indication: For fever, H/A    HYDROmorphone 8 mg oral tablet  -- 1 tab(s) by mouth every 4 hours, As needed, Moderate Pain (4 - 6)  -- Indication: For moderate pain    HYDROmorphone 2 mg oral tablet  -- 5 tab(s) by mouth every 4 hours, As needed, Severe Pain (7 - 10)  -- Indication: For Severe pain    aspirin 325 mg oral delayed release tablet  -- 1 tab(s) by mouth 2 times a day  -- Indication: For antiplatelet therapy, stay on this dosage for 6 weeks post op    tamsulosin 0.4 mg oral capsule  -- 1 cap(s) by mouth once a day (at bedtime)  -- Indication: For urinary retention    Lipitor 20 mg oral tablet  -- 1 tab(s) by mouth once a day (at bedtime)  -- Indication: For antihyperlipidemic agent    Lopid 600 mg oral tablet  -- 1 tab(s) by mouth 2 times a day  -- Indication: For antihyperlipidemic agent    metoprolol tartrate 25 mg oral tablet  -- 1 tab(s) by mouth 2 times a day  -- Indication: For antihypertensive agent    lidocaine 5% topical film  -- Apply on skin to affected area once a day, 12 hours on and 12 hours off  -- Indication: For topical pain agent    Voltaren 1% topical gel  -- Apply on skin to affected area 4 times a day  -- Indication: For topical pain agent    docusate sodium 100 mg oral capsule  -- 1 cap(s) by mouth 2 times a day  -- Indication: For Stool softener    polyethylene glycol 3350 oral powder for reconstitution  -- 17 gram(s) by mouth once a day (at bedtime)  -- Indication: For laxative    tiZANidine 6 mg oral capsule  -- 1 to 2 tab(s) by mouth , As Needed  -- Indication: For muscle relaxant    pantoprazole 40 mg oral delayed release tablet  -- 1 tab(s) by mouth once a day (before a meal)  -- Indication: For gastrointestinal agent    Multiple Vitamins oral tablet  -- 1 tab(s) by mouth once a day  -- Indication: For Supplement

## 2017-05-11 NOTE — DISCHARGE NOTE ADULT - DISCHARGE TO
[facility] [address] [facility] [id number] [phone]/Rehab

## 2017-05-11 NOTE — DISCHARGE NOTE ADULT - NS AS ACTIVITY OBS
Walking-Outdoors allowed/Do not make important decisions/Walking-Indoors allowed/No Heavy lifting/straining/Do not drive or operate machinery Walking-Indoors allowed/Walking-Outdoors allowed/maintain toe-touch weight bearing on right lower extremity/Do not make important decisions/Do not drive or operate machinery/No Heavy lifting/straining

## 2017-05-11 NOTE — DISCHARGE NOTE ADULT - PATIENT PORTAL LINK FT
“You can access the Follow[facility] Patient Portal, offered by [facility], by registering with the following website: [email or web address]”

## 2017-05-11 NOTE — DISCHARGE NOTE ADULT - CARE PROVIDER_API CALL
Reggie Coyle (MD), Orthopaedic Surgery  [address] [place] [address]  Phone: [phone]  Fax: [phone]

## 2017-05-11 NOTE — DISCHARGE NOTE ADULT - CARE PLAN
Principal Discharge DX:	Sacroiliitis, not elsewhere classified Principal Discharge DX:	Sacroiliitis, not elsewhere classified  Goal:	surgical intervention should result in improved function  Instructions for follow-up, activity and diet:	continue toe-touch weight bearing on right lower extremity, regular diet, and follow up with Dr Coyle 3-4 weeks post op

## 2017-05-11 NOTE — DISCHARGE NOTE ADULT - HOSPITAL COURSE
Chief Complaint/Reason for Admission	"they going to do my sacroiliac fusion for my lower back pain "	    History of Present Illness:  History of Present Illness		  61 year old male with PMH of HTN, HLD, MI  s/p PCI with 1 KAEL in 2005, Chronic back pain (pain management), Medical Marijuana use, multiple back surgeries.  Pt reports that he has been experiencing  right sided lower back pain buttock and  right lower extremity with weakness & paresthesias ,s/p SI joint injections which gave him slight improvement. Now coming in PST  for scheduled Sacroiliac Joint fusion on 5/8/2017.    Allergies/Medications:   Allergies:        Allergies:  	No Known Allergies:     Home Medications:   * Patient Currently Takes Medications as of 05-May-2017 09:58 documented in Prescription Writer  · 	aspirin 81 mg oral tablet: Last Dose Taken:  , 1 tab(s) orally once a day  · 	docusate sodium 100 mg oral capsule: Last Dose Taken:  , 1 cap(s) orally 2 times a day  · 	fentaNYL 100 mcg/hr transdermal film, extended release: Last Dose Taken:  , 2 patch transdermal every 72 hours  · 	Multiple Vitamins oral tablet: Last Dose Taken:  , 1 tab(s) orally once a day  · 	HYDROmorphone 8 mg oral tablet: Last Dose Taken:  , 1 tab(s) orally every 4 to 6 hours, As Needed maximum dose is 48 mg /day  · 	marijuana: Last Dose Taken:  ,   Pillform 2.5 THC and 2.5 CBD  · 	medical Marijuana oil for vapor pen: Last Dose Taken:  ,   214 mg THC  and 35 mg CBD   · 	tiZANidine 6 mg oral capsule: 1 to 2 tab(s) orally , As Needed  · 	Voltaren 1% topical gel: Apply topically to affected area 4 times a day  · 	metoprolol tartrate 25 mg oral tablet: Last Dose Taken:  , 1 tab(s) orally 2 times a day  · 	lidocaine 5% topical film: Last Dose Taken:  , Apply topically to affected area once a day, 12 hours on and 12 hours off  · 	Lipitor 20 mg oral tablet: Last Dose Taken:  , 1 tab(s) orally once a day (at bedtime)  · 	Lopid 600 mg oral tablet: Last Dose Taken:  , 1 tab(s) orally 2 times a day  · 	pantoprazole 40 mg oral delayed release tablet: 1 tab(s) orally once a day    PMH/PSH/FH/SH:   Past Medical History:  Back pain  Chronic back pain  Cerebral aneurysm rupture  1997 clipped, no residual  Femur fracture, right  2/16, surgical revision of right hip  GERD (gastroesophageal reflux disease)    Hypertension    Lumbar herniated disc    Myocardial infarction  KAEL x1 MI 2005, stent RCA  Narcotic dependence  recently hospitalized 5/28/16 for withdrawal ,pt currently on Dialudid 8 mg every 4-6 hours /day  Osteoarthritis    Sacroiliitis, not elsewhere classified  Unspecified Inflammatory spondylopathy,Sacral and sacrococcygeal region  Spinal stenosis.    Past Surgical History:  Cerebral aneurysm  I997 with clips  Chronic pain  Patient has an Intrathecal pump s/p removal in 2016  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  H/O arthroscopy of shoulder  right X 2, left X 1  History of hip replacement  8/15, revision 2/16 after falling and fracturing right femur  History of right inguinal hernia repair  x2  History of throat surgery  surgical exicision cyst 1986  Hx of appendectomy  6/1969  Hx of laminectomy  lumbar-2002  S/P left knee arthroscopy    S/P lumbar fusion  L1-S1:2002  Stented coronary artery  KAEL x 1 to RCA. Chief Complaint/Reason for Admission	"they going to do my sacroiliac fusion for my lower back pain "	    History of Present Illness:  History of Present Illness		  61 year old male with PMH of HTN, HLD, MI  s/p PCI with 1 KAEL in 2005, Chronic back pain (pain management), Medical Marijuana use, multiple back surgeries.  Pt reports that he has been experiencing  right sided lower back pain buttock and  right lower extremity with weakness & paresthesias ,s/p SI joint injections which gave him slight improvement. Now coming in PST  for scheduled Sacroiliac Joint fusion on 5/8/2017.    Allergies/Medications:   Allergies:        Allergies:  	No Known Allergies:     Home Medications:   * Patient Currently Takes Medications as of 05-May-2017 09:58 documented in Prescription Writer  · 	aspirin 81 mg oral tablet: Last Dose Taken:  , 1 tab(s) orally once a day  · 	docusate sodium 100 mg oral capsule: Last Dose Taken:  , 1 cap(s) orally 2 times a day  · 	fentaNYL 100 mcg/hr transdermal film, extended release: Last Dose Taken:  , 2 patch transdermal every 72 hours  · 	Multiple Vitamins oral tablet: Last Dose Taken:  , 1 tab(s) orally once a day  · 	HYDROmorphone 8 mg oral tablet: Last Dose Taken:  , 1 tab(s) orally every 4 to 6 hours, As Needed maximum dose is 48 mg /day  · 	marijuana: Last Dose Taken:  ,   Pillform 2.5 THC and 2.5 CBD  · 	medical Marijuana oil for vapor pen: Last Dose Taken:  ,   214 mg THC  and 35 mg CBD   · 	tiZANidine 6 mg oral capsule: 1 to 2 tab(s) orally , As Needed  · 	Voltaren 1% topical gel: Apply topically to affected area 4 times a day  · 	metoprolol tartrate 25 mg oral tablet: Last Dose Taken:  , 1 tab(s) orally 2 times a day  · 	lidocaine 5% topical film: Last Dose Taken:  , Apply topically to affected area once a day, 12 hours on and 12 hours off  · 	Lipitor 20 mg oral tablet: Last Dose Taken:  , 1 tab(s) orally once a day (at bedtime)  · 	Lopid 600 mg oral tablet: Last Dose Taken:  , 1 tab(s) orally 2 times a day  · 	pantoprazole 40 mg oral delayed release tablet: 1 tab(s) orally once a day    PMH/PSH/FH/SH:   Past Medical History:  Back pain  Chronic back pain  Cerebral aneurysm rupture  1997 clipped, no residual  Femur fracture, right  2/16, surgical revision of right hip  GERD (gastroesophageal reflux disease)    Hypertension    Lumbar herniated disc    Myocardial infarction  KAEL x1 MI 2005, stent RCA  Narcotic dependence  recently hospitalized 5/28/16 for withdrawal ,pt currently on Dialudid 8 mg every 4-6 hours /day  Osteoarthritis    Sacroiliitis, not elsewhere classified  Unspecified Inflammatory spondylopathy,Sacral and sacrococcygeal region  Spinal stenosis.    Past Surgical History:  Cerebral aneurysm  I997 with clips  Chronic pain  Patient has an Intrathecal pump s/p removal in 2016  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  H/O arthroscopy of shoulder  right X 2, left X 1  History of hip replacement  8/15, revision 2/16 after falling and fracturing right femur  History of right inguinal hernia repair  x2  History of throat surgery  surgical exicision cyst 1986  Hx of appendectomy  6/1969  Hx of laminectomy  lumbar-2002  S/P left knee arthroscopy    S/P lumbar fusion  L1-S1:2002  Stented coronary artery  KAEL x 1 to RCA.    Hospital Course:  60 y/o M underwent right sacroiliac fusion on 5/8/17 with Dr. ROSANGELA Coyle. Patient tolerated procedure well. Patient was evaluated postoperatively by physical and occupational therapists for toe-touch weight bearing on right lower extremity, and advised that patient would benefit from admission to rehab facility. Patient should have surgical sutures/staples removed and steri-strips applied post op day #14 (5/22/17). Patient should follow up with Dr. Coyle 3-4 weeks post op

## 2017-05-12 VITALS
DIASTOLIC BLOOD PRESSURE: 65 MMHG | OXYGEN SATURATION: 98 % | SYSTOLIC BLOOD PRESSURE: 111 MMHG | HEART RATE: 72 BPM | TEMPERATURE: 98 F | RESPIRATION RATE: 18 BRPM

## 2017-05-12 LAB
ANION GAP SERPL CALC-SCNC: 14 MMOL/L — SIGNIFICANT CHANGE UP (ref 5–17)
BUN SERPL-MCNC: 10 MG/DL — SIGNIFICANT CHANGE UP (ref 7–23)
CALCIUM SERPL-MCNC: 8.8 MG/DL — SIGNIFICANT CHANGE UP (ref 8.4–10.5)
CHLORIDE SERPL-SCNC: 103 MMOL/L — SIGNIFICANT CHANGE UP (ref 96–108)
CO2 SERPL-SCNC: 25 MMOL/L — SIGNIFICANT CHANGE UP (ref 22–31)
CREAT SERPL-MCNC: 0.89 MG/DL — SIGNIFICANT CHANGE UP (ref 0.5–1.3)
GLUCOSE SERPL-MCNC: 101 MG/DL — HIGH (ref 70–99)
HCT VFR BLD CALC: 31.2 % — LOW (ref 39–50)
HGB BLD-MCNC: 10.3 G/DL — LOW (ref 13–17)
MCHC RBC-ENTMCNC: 30.5 PG — SIGNIFICANT CHANGE UP (ref 27–34)
MCHC RBC-ENTMCNC: 33 GM/DL — SIGNIFICANT CHANGE UP (ref 32–36)
MCV RBC AUTO: 92.3 FL — SIGNIFICANT CHANGE UP (ref 80–100)
PLATELET # BLD AUTO: 193 K/UL — SIGNIFICANT CHANGE UP (ref 150–400)
POTASSIUM SERPL-MCNC: 4.7 MMOL/L — SIGNIFICANT CHANGE UP (ref 3.5–5.3)
POTASSIUM SERPL-SCNC: 4.7 MMOL/L — SIGNIFICANT CHANGE UP (ref 3.5–5.3)
RBC # BLD: 3.38 M/UL — LOW (ref 4.2–5.8)
RBC # FLD: 14.4 % — SIGNIFICANT CHANGE UP (ref 10.3–14.5)
SODIUM SERPL-SCNC: 142 MMOL/L — SIGNIFICANT CHANGE UP (ref 135–145)
WBC # BLD: 6.43 K/UL — SIGNIFICANT CHANGE UP (ref 3.8–10.5)
WBC # FLD AUTO: 6.43 K/UL — SIGNIFICANT CHANGE UP (ref 3.8–10.5)

## 2017-05-12 PROCEDURE — 86900 BLOOD TYPING SEROLOGIC ABO: CPT

## 2017-05-12 PROCEDURE — 86901 BLOOD TYPING SEROLOGIC RH(D): CPT

## 2017-05-12 PROCEDURE — P9016: CPT

## 2017-05-12 PROCEDURE — 97530 THERAPEUTIC ACTIVITIES: CPT

## 2017-05-12 PROCEDURE — 86923 COMPATIBILITY TEST ELECTRIC: CPT

## 2017-05-12 PROCEDURE — C1889: CPT

## 2017-05-12 PROCEDURE — 36430 TRANSFUSION BLD/BLD COMPNT: CPT

## 2017-05-12 PROCEDURE — 86850 RBC ANTIBODY SCREEN: CPT

## 2017-05-12 PROCEDURE — 85027 COMPLETE CBC AUTOMATED: CPT

## 2017-05-12 PROCEDURE — 97110 THERAPEUTIC EXERCISES: CPT

## 2017-05-12 PROCEDURE — 80048 BASIC METABOLIC PNL TOTAL CA: CPT

## 2017-05-12 PROCEDURE — C1776: CPT

## 2017-05-12 PROCEDURE — 76000 FLUOROSCOPY <1 HR PHYS/QHP: CPT

## 2017-05-12 PROCEDURE — 97161 PT EVAL LOW COMPLEX 20 MIN: CPT

## 2017-05-12 PROCEDURE — C1713: CPT

## 2017-05-12 RX ORDER — PANTOPRAZOLE SODIUM 20 MG/1
1 TABLET, DELAYED RELEASE ORAL
Qty: 0 | Refills: 0 | COMMUNITY
Start: 2017-05-12

## 2017-05-12 RX ORDER — HYDROMORPHONE HYDROCHLORIDE 2 MG/ML
5 INJECTION INTRAMUSCULAR; INTRAVENOUS; SUBCUTANEOUS
Qty: 0 | Refills: 0 | COMMUNITY
Start: 2017-05-12

## 2017-05-12 RX ORDER — POLYETHYLENE GLYCOL 3350 17 G/17G
17 POWDER, FOR SOLUTION ORAL
Qty: 0 | Refills: 0 | COMMUNITY
Start: 2017-05-12

## 2017-05-12 RX ORDER — TAMSULOSIN HYDROCHLORIDE 0.4 MG/1
1 CAPSULE ORAL
Qty: 0 | Refills: 0 | COMMUNITY
Start: 2017-05-12

## 2017-05-12 RX ORDER — ACETAMINOPHEN 500 MG
2 TABLET ORAL
Qty: 0 | Refills: 0 | COMMUNITY
Start: 2017-05-12

## 2017-05-12 RX ORDER — HYDROMORPHONE HYDROCHLORIDE 2 MG/ML
1 INJECTION INTRAMUSCULAR; INTRAVENOUS; SUBCUTANEOUS
Qty: 0 | Refills: 0 | COMMUNITY
Start: 2017-05-12

## 2017-05-12 RX ORDER — DOCUSATE SODIUM 100 MG
1 CAPSULE ORAL
Qty: 0 | Refills: 0 | COMMUNITY
Start: 2017-05-12

## 2017-05-12 RX ORDER — HYDROMORPHONE HYDROCHLORIDE 2 MG/ML
2 INJECTION INTRAMUSCULAR; INTRAVENOUS; SUBCUTANEOUS
Qty: 0 | Refills: 0 | COMMUNITY
Start: 2017-05-12

## 2017-05-12 RX ORDER — HYDROMORPHONE HYDROCHLORIDE 2 MG/ML
1 INJECTION INTRAMUSCULAR; INTRAVENOUS; SUBCUTANEOUS
Qty: 0 | Refills: 0 | COMMUNITY

## 2017-05-12 RX ORDER — ASPIRIN/CALCIUM CARB/MAGNESIUM 324 MG
1 TABLET ORAL
Qty: 0 | Refills: 0 | COMMUNITY
Start: 2017-05-12

## 2017-05-12 RX ADMIN — HYDROMORPHONE HYDROCHLORIDE 8 MILLIGRAM(S): 2 INJECTION INTRAMUSCULAR; INTRAVENOUS; SUBCUTANEOUS at 04:57

## 2017-05-12 RX ADMIN — HYDROMORPHONE HYDROCHLORIDE 10 MILLIGRAM(S): 2 INJECTION INTRAMUSCULAR; INTRAVENOUS; SUBCUTANEOUS at 12:22

## 2017-05-12 RX ADMIN — Medication 325 MILLIGRAM(S): at 05:21

## 2017-05-12 RX ADMIN — Medication 25 MILLIGRAM(S): at 05:21

## 2017-05-12 RX ADMIN — PANTOPRAZOLE SODIUM 40 MILLIGRAM(S): 20 TABLET, DELAYED RELEASE ORAL at 06:08

## 2017-05-12 RX ADMIN — HYDROMORPHONE HYDROCHLORIDE 8 MILLIGRAM(S): 2 INJECTION INTRAMUSCULAR; INTRAVENOUS; SUBCUTANEOUS at 03:57

## 2017-05-12 RX ADMIN — Medication 100 MILLIGRAM(S): at 05:21

## 2017-05-12 RX ADMIN — HYDROMORPHONE HYDROCHLORIDE 10 MILLIGRAM(S): 2 INJECTION INTRAMUSCULAR; INTRAVENOUS; SUBCUTANEOUS at 08:45

## 2017-05-12 RX ADMIN — Medication 600 MILLIGRAM(S): at 06:08

## 2017-05-12 RX ADMIN — HYDROMORPHONE HYDROCHLORIDE 10 MILLIGRAM(S): 2 INJECTION INTRAMUSCULAR; INTRAVENOUS; SUBCUTANEOUS at 12:52

## 2017-05-12 RX ADMIN — Medication 1 TABLET(S): at 12:22

## 2017-05-12 RX ADMIN — HYDROMORPHONE HYDROCHLORIDE 10 MILLIGRAM(S): 2 INJECTION INTRAMUSCULAR; INTRAVENOUS; SUBCUTANEOUS at 07:43

## 2017-05-31 ENCOUNTER — APPOINTMENT (OUTPATIENT)
Dept: ORTHOPEDIC SURGERY | Facility: CLINIC | Age: 62
End: 2017-05-31

## 2017-06-05 RX ORDER — GABAPENTIN 600 MG/1
600 TABLET, COATED ORAL
Qty: 90 | Refills: 0 | Status: ACTIVE | COMMUNITY
Start: 2017-04-10

## 2017-06-21 ENCOUNTER — APPOINTMENT (OUTPATIENT)
Dept: ORTHOPEDIC SURGERY | Facility: CLINIC | Age: 62
End: 2017-06-21

## 2017-08-22 ENCOUNTER — APPOINTMENT (OUTPATIENT)
Dept: ORTHOPEDIC SURGERY | Facility: CLINIC | Age: 62
End: 2017-08-22

## 2017-09-27 ENCOUNTER — APPOINTMENT (OUTPATIENT)
Dept: ORTHOPEDIC SURGERY | Facility: CLINIC | Age: 62
End: 2017-09-27
Payer: COMMERCIAL

## 2017-09-27 DIAGNOSIS — M46.1 SACROILIITIS, NOT ELSEWHERE CLASSIFIED: ICD-10-CM

## 2017-09-27 DIAGNOSIS — M46.98 UNSPECIFIED INFLAMMATORY SPONDYLOPATHY, SACRAL AND SACROCOCCYGEAL REGION: ICD-10-CM

## 2017-09-27 PROCEDURE — 99213 OFFICE O/P EST LOW 20 MIN: CPT

## 2017-09-27 PROCEDURE — 72190 X-RAY EXAM OF PELVIS: CPT

## 2017-10-17 ENCOUNTER — APPOINTMENT (OUTPATIENT)
Dept: SPINE | Facility: CLINIC | Age: 62
End: 2017-10-17
Payer: COMMERCIAL

## 2017-10-17 VITALS
HEIGHT: 68 IN | HEART RATE: 77 BPM | BODY MASS INDEX: 20.92 KG/M2 | WEIGHT: 138 LBS | SYSTOLIC BLOOD PRESSURE: 158 MMHG | DIASTOLIC BLOOD PRESSURE: 106 MMHG

## 2017-10-17 DIAGNOSIS — Z80.9 FAMILY HISTORY OF MALIGNANT NEOPLASM, UNSPECIFIED: ICD-10-CM

## 2017-10-17 DIAGNOSIS — Z86.69 PERSONAL HISTORY OF OTHER DISEASES OF THE NERVOUS SYSTEM AND SENSE ORGANS: ICD-10-CM

## 2017-10-17 DIAGNOSIS — Z87.39 PERSONAL HISTORY OF OTHER DISEASES OF THE MUSCULOSKELETAL SYSTEM AND CONNECTIVE TISSUE: ICD-10-CM

## 2017-10-17 DIAGNOSIS — M54.16 RADICULOPATHY, LUMBAR REGION: ICD-10-CM

## 2017-10-17 DIAGNOSIS — Z86.39 PERSONAL HISTORY OF OTHER ENDOCRINE, NUTRITIONAL AND METABOLIC DISEASE: ICD-10-CM

## 2017-10-17 DIAGNOSIS — Z78.9 OTHER SPECIFIED HEALTH STATUS: ICD-10-CM

## 2017-10-17 PROCEDURE — 99214 OFFICE O/P EST MOD 30 MIN: CPT

## 2017-11-01 ENCOUNTER — APPOINTMENT (OUTPATIENT)
Dept: RADIOLOGY | Facility: HOSPITAL | Age: 62
End: 2017-11-01
Payer: COMMERCIAL

## 2017-11-01 ENCOUNTER — OUTPATIENT (OUTPATIENT)
Dept: OUTPATIENT SERVICES | Facility: HOSPITAL | Age: 62
LOS: 1 days | End: 2017-11-01
Payer: COMMERCIAL

## 2017-11-01 DIAGNOSIS — Z96.649 PRESENCE OF UNSPECIFIED ARTIFICIAL HIP JOINT: Chronic | ICD-10-CM

## 2017-11-01 DIAGNOSIS — Z98.89 OTHER SPECIFIED POSTPROCEDURAL STATES: Chronic | ICD-10-CM

## 2017-11-01 DIAGNOSIS — M54.16 RADICULOPATHY, LUMBAR REGION: ICD-10-CM

## 2017-11-01 PROCEDURE — 62305 MYELOGRAPHY LUMBAR INJECTION: CPT

## 2017-11-01 PROCEDURE — 72132 CT LUMBAR SPINE W/DYE: CPT

## 2017-11-01 PROCEDURE — 72129 CT CHEST SPINE W/DYE: CPT | Mod: 26

## 2017-11-01 PROCEDURE — 72129 CT CHEST SPINE W/DYE: CPT

## 2017-11-01 PROCEDURE — 72132 CT LUMBAR SPINE W/DYE: CPT | Mod: 26

## 2017-11-07 ENCOUNTER — APPOINTMENT (OUTPATIENT)
Dept: SPINE | Facility: CLINIC | Age: 62
End: 2017-11-07
Payer: COMMERCIAL

## 2017-11-07 VITALS
DIASTOLIC BLOOD PRESSURE: 88 MMHG | HEIGHT: 68 IN | SYSTOLIC BLOOD PRESSURE: 169 MMHG | HEART RATE: 65 BPM | WEIGHT: 138 LBS | BODY MASS INDEX: 20.92 KG/M2

## 2017-11-07 DIAGNOSIS — M62.838 OTHER MUSCLE SPASM: ICD-10-CM

## 2017-11-07 PROCEDURE — 99214 OFFICE O/P EST MOD 30 MIN: CPT

## 2017-11-14 ENCOUNTER — APPOINTMENT (OUTPATIENT)
Dept: SPINE | Facility: CLINIC | Age: 62
End: 2017-11-14
Payer: COMMERCIAL

## 2017-11-14 VITALS
SYSTOLIC BLOOD PRESSURE: 177 MMHG | BODY MASS INDEX: 20.92 KG/M2 | WEIGHT: 138 LBS | HEART RATE: 60 BPM | DIASTOLIC BLOOD PRESSURE: 93 MMHG | HEIGHT: 68 IN

## 2017-11-14 PROCEDURE — 99215 OFFICE O/P EST HI 40 MIN: CPT

## 2017-11-14 RX ORDER — FLUOCINONIDE 0.5 MG/G
0.05 CREAM TOPICAL
Qty: 60 | Refills: 0 | Status: COMPLETED | COMMUNITY
Start: 2017-10-03

## 2017-11-14 RX ORDER — SELENIUM SULFIDE 25 MG/ML
2.5 LOTION TOPICAL
Qty: 118 | Refills: 0 | Status: DISCONTINUED | COMMUNITY
Start: 2017-10-03

## 2017-11-14 RX ORDER — MOMETASONE FUROATE 1 MG/ML
0.1 SOLUTION TOPICAL
Qty: 60 | Refills: 0 | Status: DISCONTINUED | COMMUNITY
Start: 2017-10-03

## 2017-11-30 ENCOUNTER — OUTPATIENT (OUTPATIENT)
Dept: OUTPATIENT SERVICES | Facility: HOSPITAL | Age: 62
LOS: 1 days | End: 2017-11-30
Payer: COMMERCIAL

## 2017-11-30 VITALS
TEMPERATURE: 99 F | SYSTOLIC BLOOD PRESSURE: 160 MMHG | OXYGEN SATURATION: 97 % | HEART RATE: 60 BPM | RESPIRATION RATE: 16 BRPM | DIASTOLIC BLOOD PRESSURE: 90 MMHG | HEIGHT: 68 IN | WEIGHT: 125.66 LBS

## 2017-11-30 DIAGNOSIS — Z98.89 OTHER SPECIFIED POSTPROCEDURAL STATES: Chronic | ICD-10-CM

## 2017-11-30 DIAGNOSIS — I25.10 ATHEROSCLEROTIC HEART DISEASE OF NATIVE CORONARY ARTERY WITHOUT ANGINA PECTORIS: ICD-10-CM

## 2017-11-30 DIAGNOSIS — M41.9 SCOLIOSIS, UNSPECIFIED: ICD-10-CM

## 2017-11-30 DIAGNOSIS — Z96.649 PRESENCE OF UNSPECIFIED ARTIFICIAL HIP JOINT: Chronic | ICD-10-CM

## 2017-11-30 DIAGNOSIS — M54.16 RADICULOPATHY, LUMBAR REGION: ICD-10-CM

## 2017-11-30 DIAGNOSIS — M43.28 FUSION OF SPINE, SACRAL AND SACROCOCCYGEAL REGION: Chronic | ICD-10-CM

## 2017-11-30 DIAGNOSIS — Z01.818 ENCOUNTER FOR OTHER PREPROCEDURAL EXAMINATION: ICD-10-CM

## 2017-11-30 DIAGNOSIS — F11.20 OPIOID DEPENDENCE, UNCOMPLICATED: ICD-10-CM

## 2017-11-30 LAB
ANION GAP SERPL CALC-SCNC: 11 MMOL/L — SIGNIFICANT CHANGE UP (ref 5–17)
BUN SERPL-MCNC: 12 MG/DL — SIGNIFICANT CHANGE UP (ref 7–23)
CALCIUM SERPL-MCNC: 10.2 MG/DL — SIGNIFICANT CHANGE UP (ref 8.4–10.5)
CHLORIDE SERPL-SCNC: 100 MMOL/L — SIGNIFICANT CHANGE UP (ref 96–108)
CO2 SERPL-SCNC: 31 MMOL/L — SIGNIFICANT CHANGE UP (ref 22–31)
CREAT SERPL-MCNC: 1.05 MG/DL — SIGNIFICANT CHANGE UP (ref 0.5–1.3)
GLUCOSE SERPL-MCNC: 80 MG/DL — SIGNIFICANT CHANGE UP (ref 70–99)
HCT VFR BLD CALC: 40.8 % — SIGNIFICANT CHANGE UP (ref 39–50)
HGB BLD-MCNC: 13.4 G/DL — SIGNIFICANT CHANGE UP (ref 13–17)
MCHC RBC-ENTMCNC: 31 PG — SIGNIFICANT CHANGE UP (ref 27–34)
MCHC RBC-ENTMCNC: 32.8 GM/DL — SIGNIFICANT CHANGE UP (ref 32–36)
MCV RBC AUTO: 94.4 FL — SIGNIFICANT CHANGE UP (ref 80–100)
PLATELET # BLD AUTO: 309 K/UL — SIGNIFICANT CHANGE UP (ref 150–400)
POTASSIUM SERPL-MCNC: 4.8 MMOL/L — SIGNIFICANT CHANGE UP (ref 3.5–5.3)
POTASSIUM SERPL-SCNC: 4.8 MMOL/L — SIGNIFICANT CHANGE UP (ref 3.5–5.3)
RBC # BLD: 4.32 M/UL — SIGNIFICANT CHANGE UP (ref 4.2–5.8)
RBC # FLD: 13.5 % — SIGNIFICANT CHANGE UP (ref 10.3–14.5)
SODIUM SERPL-SCNC: 142 MMOL/L — SIGNIFICANT CHANGE UP (ref 135–145)
WBC # BLD: 8.06 K/UL — SIGNIFICANT CHANGE UP (ref 3.8–10.5)
WBC # FLD AUTO: 8.06 K/UL — SIGNIFICANT CHANGE UP (ref 3.8–10.5)

## 2017-11-30 PROCEDURE — 93010 ELECTROCARDIOGRAM REPORT: CPT

## 2017-11-30 RX ORDER — DICLOFENAC SODIUM 30 MG/G
0 GEL TOPICAL
Qty: 0 | Refills: 0 | COMMUNITY

## 2017-11-30 RX ORDER — CEFAZOLIN SODIUM 1 G
2000 VIAL (EA) INJECTION ONCE
Qty: 0 | Refills: 0 | Status: DISCONTINUED | OUTPATIENT
Start: 2017-12-07 | End: 2017-12-09

## 2017-11-30 NOTE — H&P PST ADULT - HISTORY OF PRESENT ILLNESS
62 year old male with PMH of HTN, HLD, MI  s/p PCI with 1 KAEL in 2005, Chronic back pain (pain management), Medical Marijuana use, multiple back surgeries.  Pt reports that he has been experiencing  right sided lower back pain buttock and  right lower extremity with weakness & paresthesias ,s/p SI joint injections which gave him slight improvement. Now coming in PST  for scheduled Sacroiliac Joint fusion on 5/8/2017. 62 year old male with PMH of HTN, MI /CAD s/p1 KAEL in 2005, Chronic back pain (pain management), Medical Marijuana use, multiple back surgeries s/p sacroiliac Joint fusion on 5/8/2017 with worsening lumbar lower back pain planned for right L3-5 lumbar laminectomy.       ***** Patient already stopped ASA as per Dr. Cohn ( last dose last week).

## 2017-11-30 NOTE — H&P PST ADULT - PSH
Cerebral aneurysm  I997 with clips  Chronic pain  Patient has an Intrathecal pump s/p removal in 2016  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  Fusion of sacral region of spine  5/2017  H/O arthroscopy of shoulder  right X 2, left X 1  History of hip replacement  8/15, revision 2/16 after falling and fracturing right femur  History of right inguinal hernia repair  x2  History of throat surgery  surgical exicision cyst 1986  Hx of appendectomy  6/1969  Hx of laminectomy  lumbar-2002  S/P left knee arthroscopy    S/P lumbar fusion  L1-S1:2002  Stented coronary artery  KAEL x 1 to RCA

## 2017-11-30 NOTE — H&P PST ADULT - PROBLEM SELECTOR PLAN 2
Continue current medication management Patient stated he stopped his ASA last week as per Dr. castaneda and his PCP.  EKG on file

## 2017-11-30 NOTE — H&P PST ADULT - ACTIVITY
pt walk with walker, lives 2 story house able to take 1 flight of stairs without any SOB walk with walker, lives 2 story house able to take 1 flight of stairs without any SOB

## 2017-11-30 NOTE — H&P PST ADULT - LYMPHATIC
supraclavicular L/posterior cervical R/anterior cervical R/supraclavicular R/posterior cervical L/anterior cervical L supraclavicular R/supraclavicular L

## 2017-11-30 NOTE — H&P PST ADULT - PMH
Back pain  Chronic back pain  Cerebral aneurysm rupture  1997 clipped, no residual  Femur fracture, right  2/16, surgical revision of right hip  GERD (gastroesophageal reflux disease)    Hypertension    Lumbar herniated disc    Myocardial infarction  KAEL x1 MI 2005, stent RCA  Narcotic dependence  5/28/16 for withdrawal ,pt currently on Dialudid 8 mg every 4-6 hours /day  Osteoarthritis    Sacroiliitis, not elsewhere classified  Unspecified Inflammatory spondylopathy,Sacral and sacrococcygeal region  Spinal stenosis

## 2017-11-30 NOTE — H&P PST ADULT - LAST ECHOCARDIOGRAM
11/2016 2015 EF 65%, Min MR, Min TR, Boderline Pulm HTN, Normal LV systolic function. ( on sunrise).

## 2017-11-30 NOTE — H&P PST ADULT - PROBLEM SELECTOR PLAN 3
pt currently on hydromorphone for pain     pt was discussed about pain management, pt stated I am coming down my medication dosage   advised to remove pain patches before coming for the surgery on Dilaudid PRN   Instructed patient to hold fentanyl patch the day of procedure.

## 2017-11-30 NOTE — H&P PST ADULT - NEGATIVE CARDIOVASCULAR SYMPTOMS
no chest pain/no palpitations/no dyspnea on exertion/no orthopnea/no paroxysmal nocturnal dyspnea no dyspnea on exertion/no chest pain/no palpitations/no peripheral edema/no paroxysmal nocturnal dyspnea

## 2017-11-30 NOTE — H&P PST ADULT - MALLAMPATI CLASS
13.5 inch/Class II - visualization of the soft palate, fauces, and uvula Class I (easy) - visualization of the soft palate, fauces, uvula, and both anterior and posterior pillars

## 2017-11-30 NOTE — H&P PST ADULT - NEUROLOGICAL DETAILS
responds to verbal commands/responds to pain/alert and oriented x 3/strength decreased alert and oriented x 3/no spontaneous movement

## 2017-11-30 NOTE — H&P PST ADULT - PROBLEM SELECTOR PLAN 1
Sacroiliac Joint fusion on 5/8/2017.  Pre- Op instructions discussed  CBC,BMP,Type and screen sent right L3-5 lumbar laminectomy.   PST labs send  preprocedure surgical scrub instructions given

## 2017-11-30 NOTE — H&P PST ADULT - MUSCULOSKELETAL
details… detailed exam diminished strength/decreased ROM due to pain/no calf tenderness/decreased ROM no calf tenderness/decreased ROM due to pain/diminished strength/no joint warmth/no joint erythema

## 2017-12-07 ENCOUNTER — INPATIENT (INPATIENT)
Facility: HOSPITAL | Age: 62
LOS: 4 days | Discharge: NURSING, REHAB OR LONG-TERM CARE | DRG: 517 | End: 2017-12-12
Attending: NEUROLOGICAL SURGERY | Admitting: NEUROLOGICAL SURGERY
Payer: COMMERCIAL

## 2017-12-07 ENCOUNTER — TRANSCRIPTION ENCOUNTER (OUTPATIENT)
Age: 62
End: 2017-12-07

## 2017-12-07 ENCOUNTER — APPOINTMENT (OUTPATIENT)
Dept: SPINE | Facility: HOSPITAL | Age: 62
End: 2017-12-07

## 2017-12-07 VITALS
HEART RATE: 85 BPM | WEIGHT: 125.66 LBS | SYSTOLIC BLOOD PRESSURE: 175 MMHG | TEMPERATURE: 98 F | OXYGEN SATURATION: 99 % | DIASTOLIC BLOOD PRESSURE: 106 MMHG | RESPIRATION RATE: 16 BRPM | HEIGHT: 68 IN

## 2017-12-07 DIAGNOSIS — Z98.89 OTHER SPECIFIED POSTPROCEDURAL STATES: Chronic | ICD-10-CM

## 2017-12-07 DIAGNOSIS — Z96.649 PRESENCE OF UNSPECIFIED ARTIFICIAL HIP JOINT: Chronic | ICD-10-CM

## 2017-12-07 DIAGNOSIS — Z01.818 ENCOUNTER FOR OTHER PREPROCEDURAL EXAMINATION: ICD-10-CM

## 2017-12-07 DIAGNOSIS — M43.28 FUSION OF SPINE, SACRAL AND SACROCOCCYGEAL REGION: Chronic | ICD-10-CM

## 2017-12-07 DIAGNOSIS — M41.9 SCOLIOSIS, UNSPECIFIED: ICD-10-CM

## 2017-12-07 LAB
ANION GAP SERPL CALC-SCNC: 13 MMOL/L — SIGNIFICANT CHANGE UP (ref 5–17)
BASOPHILS # BLD AUTO: 0.1 K/UL — SIGNIFICANT CHANGE UP (ref 0–0.2)
BASOPHILS NFR BLD AUTO: 0.6 % — SIGNIFICANT CHANGE UP (ref 0–2)
BUN SERPL-MCNC: 13 MG/DL — SIGNIFICANT CHANGE UP (ref 7–23)
CALCIUM SERPL-MCNC: 8.9 MG/DL — SIGNIFICANT CHANGE UP (ref 8.4–10.5)
CHLORIDE SERPL-SCNC: 101 MMOL/L — SIGNIFICANT CHANGE UP (ref 96–108)
CO2 SERPL-SCNC: 28 MMOL/L — SIGNIFICANT CHANGE UP (ref 22–31)
CREAT SERPL-MCNC: 0.99 MG/DL — SIGNIFICANT CHANGE UP (ref 0.5–1.3)
EOSINOPHIL # BLD AUTO: 0 K/UL — SIGNIFICANT CHANGE UP (ref 0–0.5)
EOSINOPHIL NFR BLD AUTO: 0.1 % — SIGNIFICANT CHANGE UP (ref 0–6)
GLUCOSE SERPL-MCNC: 125 MG/DL — HIGH (ref 70–99)
HCT VFR BLD CALC: 35.7 % — LOW (ref 39–50)
HGB BLD-MCNC: 12.6 G/DL — LOW (ref 13–17)
LYMPHOCYTES # BLD AUTO: 0.8 K/UL — LOW (ref 1–3.3)
LYMPHOCYTES # BLD AUTO: 9.5 % — LOW (ref 13–44)
MCHC RBC-ENTMCNC: 34 PG — SIGNIFICANT CHANGE UP (ref 27–34)
MCHC RBC-ENTMCNC: 35.3 GM/DL — SIGNIFICANT CHANGE UP (ref 32–36)
MCV RBC AUTO: 96.5 FL — SIGNIFICANT CHANGE UP (ref 80–100)
MONOCYTES # BLD AUTO: 0.1 K/UL — SIGNIFICANT CHANGE UP (ref 0–0.9)
MONOCYTES NFR BLD AUTO: 1.3 % — LOW (ref 2–14)
NEUTROPHILS # BLD AUTO: 7.6 K/UL — HIGH (ref 1.8–7.4)
NEUTROPHILS NFR BLD AUTO: 88.6 % — HIGH (ref 43–77)
PLATELET # BLD AUTO: 251 K/UL — SIGNIFICANT CHANGE UP (ref 150–400)
POTASSIUM SERPL-MCNC: 3.6 MMOL/L — SIGNIFICANT CHANGE UP (ref 3.5–5.3)
POTASSIUM SERPL-SCNC: 3.6 MMOL/L — SIGNIFICANT CHANGE UP (ref 3.5–5.3)
RBC # BLD: 3.7 M/UL — LOW (ref 4.2–5.8)
RBC # FLD: 11.6 % — SIGNIFICANT CHANGE UP (ref 10.3–14.5)
SODIUM SERPL-SCNC: 142 MMOL/L — SIGNIFICANT CHANGE UP (ref 135–145)
WBC # BLD: 8.6 K/UL — SIGNIFICANT CHANGE UP (ref 3.8–10.5)
WBC # FLD AUTO: 8.6 K/UL — SIGNIFICANT CHANGE UP (ref 3.8–10.5)

## 2017-12-07 PROCEDURE — 76000 FLUOROSCOPY <1 HR PHYS/QHP: CPT

## 2017-12-07 PROCEDURE — 63047 LAM FACETEC & FORAMOT LUMBAR: CPT

## 2017-12-07 PROCEDURE — 85027 COMPLETE CBC AUTOMATED: CPT

## 2017-12-07 PROCEDURE — G0463: CPT

## 2017-12-07 PROCEDURE — 80048 BASIC METABOLIC PNL TOTAL CA: CPT

## 2017-12-07 PROCEDURE — 93005 ELECTROCARDIOGRAM TRACING: CPT

## 2017-12-07 RX ORDER — HYDROMORPHONE HYDROCHLORIDE 2 MG/ML
1 INJECTION INTRAMUSCULAR; INTRAVENOUS; SUBCUTANEOUS
Qty: 0 | Refills: 0 | Status: DISCONTINUED | OUTPATIENT
Start: 2017-12-07 | End: 2017-12-07

## 2017-12-07 RX ORDER — ACETAMINOPHEN 500 MG
650 TABLET ORAL EVERY 6 HOURS
Qty: 0 | Refills: 0 | Status: DISCONTINUED | OUTPATIENT
Start: 2017-12-07 | End: 2017-12-12

## 2017-12-07 RX ORDER — OXYCODONE AND ACETAMINOPHEN 5; 325 MG/1; MG/1
1 TABLET ORAL EVERY 4 HOURS
Qty: 0 | Refills: 0 | Status: DISCONTINUED | OUTPATIENT
Start: 2017-12-07 | End: 2017-12-08

## 2017-12-07 RX ORDER — METOPROLOL TARTRATE 50 MG
25 TABLET ORAL
Qty: 0 | Refills: 0 | Status: DISCONTINUED | OUTPATIENT
Start: 2017-12-07 | End: 2017-12-12

## 2017-12-07 RX ORDER — OXYCODONE AND ACETAMINOPHEN 5; 325 MG/1; MG/1
2 TABLET ORAL EVERY 6 HOURS
Qty: 0 | Refills: 0 | Status: DISCONTINUED | OUTPATIENT
Start: 2017-12-07 | End: 2017-12-08

## 2017-12-07 RX ORDER — NALOXONE HYDROCHLORIDE 4 MG/.1ML
0.1 SPRAY NASAL
Qty: 0 | Refills: 0 | Status: DISCONTINUED | OUTPATIENT
Start: 2017-12-07 | End: 2017-12-12

## 2017-12-07 RX ORDER — CEFAZOLIN SODIUM 1 G
2000 VIAL (EA) INJECTION EVERY 8 HOURS
Qty: 0 | Refills: 0 | Status: COMPLETED | OUTPATIENT
Start: 2017-12-07 | End: 2017-12-08

## 2017-12-07 RX ORDER — ATORVASTATIN CALCIUM 80 MG/1
20 TABLET, FILM COATED ORAL AT BEDTIME
Qty: 0 | Refills: 0 | Status: DISCONTINUED | OUTPATIENT
Start: 2017-12-07 | End: 2017-12-12

## 2017-12-07 RX ORDER — ONDANSETRON 8 MG/1
4 TABLET, FILM COATED ORAL EVERY 6 HOURS
Qty: 0 | Refills: 0 | Status: DISCONTINUED | OUTPATIENT
Start: 2017-12-07 | End: 2017-12-12

## 2017-12-07 RX ORDER — SODIUM CHLORIDE 9 MG/ML
3 INJECTION INTRAMUSCULAR; INTRAVENOUS; SUBCUTANEOUS EVERY 8 HOURS
Qty: 0 | Refills: 0 | Status: DISCONTINUED | OUTPATIENT
Start: 2017-12-07 | End: 2017-12-07

## 2017-12-07 RX ORDER — HYDROMORPHONE HYDROCHLORIDE 2 MG/ML
30 INJECTION INTRAMUSCULAR; INTRAVENOUS; SUBCUTANEOUS
Qty: 0 | Refills: 0 | Status: DISCONTINUED | OUTPATIENT
Start: 2017-12-07 | End: 2017-12-08

## 2017-12-07 RX ORDER — GABAPENTIN 400 MG/1
600 CAPSULE ORAL THREE TIMES A DAY
Qty: 0 | Refills: 0 | Status: DISCONTINUED | OUTPATIENT
Start: 2017-12-07 | End: 2017-12-12

## 2017-12-07 RX ORDER — ONDANSETRON 8 MG/1
4 TABLET, FILM COATED ORAL ONCE
Qty: 0 | Refills: 0 | Status: DISCONTINUED | OUTPATIENT
Start: 2017-12-07 | End: 2017-12-07

## 2017-12-07 RX ORDER — PANTOPRAZOLE SODIUM 20 MG/1
40 TABLET, DELAYED RELEASE ORAL
Qty: 0 | Refills: 0 | Status: DISCONTINUED | OUTPATIENT
Start: 2017-12-07 | End: 2017-12-12

## 2017-12-07 RX ORDER — HYDROMORPHONE HYDROCHLORIDE 2 MG/ML
0.5 INJECTION INTRAMUSCULAR; INTRAVENOUS; SUBCUTANEOUS
Qty: 0 | Refills: 0 | Status: DISCONTINUED | OUTPATIENT
Start: 2017-12-07 | End: 2017-12-08

## 2017-12-07 RX ORDER — SENNA PLUS 8.6 MG/1
2 TABLET ORAL AT BEDTIME
Qty: 0 | Refills: 0 | Status: DISCONTINUED | OUTPATIENT
Start: 2017-12-07 | End: 2017-12-12

## 2017-12-07 RX ORDER — DOCUSATE SODIUM 100 MG
100 CAPSULE ORAL THREE TIMES A DAY
Qty: 0 | Refills: 0 | Status: DISCONTINUED | OUTPATIENT
Start: 2017-12-07 | End: 2017-12-12

## 2017-12-07 RX ORDER — GEMFIBROZIL 600 MG
600 TABLET ORAL
Qty: 0 | Refills: 0 | Status: DISCONTINUED | OUTPATIENT
Start: 2017-12-07 | End: 2017-12-12

## 2017-12-07 RX ORDER — HYDROMORPHONE HYDROCHLORIDE 2 MG/ML
30 INJECTION INTRAMUSCULAR; INTRAVENOUS; SUBCUTANEOUS
Qty: 0 | Refills: 0 | Status: DISCONTINUED | OUTPATIENT
Start: 2017-12-07 | End: 2017-12-07

## 2017-12-07 RX ADMIN — HYDROMORPHONE HYDROCHLORIDE 1 MILLIGRAM(S): 2 INJECTION INTRAMUSCULAR; INTRAVENOUS; SUBCUTANEOUS at 14:11

## 2017-12-07 RX ADMIN — Medication 100 MILLIGRAM(S): at 20:06

## 2017-12-07 RX ADMIN — HYDROMORPHONE HYDROCHLORIDE 1 MILLIGRAM(S): 2 INJECTION INTRAMUSCULAR; INTRAVENOUS; SUBCUTANEOUS at 14:28

## 2017-12-07 RX ADMIN — ATORVASTATIN CALCIUM 20 MILLIGRAM(S): 80 TABLET, FILM COATED ORAL at 23:25

## 2017-12-07 RX ADMIN — HYDROMORPHONE HYDROCHLORIDE 30 MILLILITER(S): 2 INJECTION INTRAMUSCULAR; INTRAVENOUS; SUBCUTANEOUS at 18:39

## 2017-12-07 RX ADMIN — HYDROMORPHONE HYDROCHLORIDE 0.5 MILLIGRAM(S): 2 INJECTION INTRAMUSCULAR; INTRAVENOUS; SUBCUTANEOUS at 21:33

## 2017-12-07 RX ADMIN — HYDROMORPHONE HYDROCHLORIDE 1 MILLIGRAM(S): 2 INJECTION INTRAMUSCULAR; INTRAVENOUS; SUBCUTANEOUS at 14:56

## 2017-12-07 RX ADMIN — SENNA PLUS 2 TABLET(S): 8.6 TABLET ORAL at 23:25

## 2017-12-07 RX ADMIN — Medication 25 MILLIGRAM(S): at 23:25

## 2017-12-07 RX ADMIN — SODIUM CHLORIDE 3 MILLILITER(S): 9 INJECTION INTRAMUSCULAR; INTRAVENOUS; SUBCUTANEOUS at 09:45

## 2017-12-07 RX ADMIN — HYDROMORPHONE HYDROCHLORIDE 30 MILLILITER(S): 2 INJECTION INTRAMUSCULAR; INTRAVENOUS; SUBCUTANEOUS at 15:06

## 2017-12-07 RX ADMIN — GABAPENTIN 600 MILLIGRAM(S): 400 CAPSULE ORAL at 23:25

## 2017-12-07 RX ADMIN — Medication 100 MILLIGRAM(S): at 23:25

## 2017-12-07 NOTE — PROGRESS NOTE ADULT - ASSESSMENT
62 year old male s/p L3-4 foraminotomy  - Okay to transfer to floor when stable and pain controlled  - Pain control  - PT eval  - Neurochecks q4hrs

## 2017-12-08 LAB
ANION GAP SERPL CALC-SCNC: 13 MMOL/L — SIGNIFICANT CHANGE UP (ref 5–17)
BASOPHILS # BLD AUTO: 0.03 K/UL — SIGNIFICANT CHANGE UP (ref 0–0.2)
BASOPHILS NFR BLD AUTO: 0.3 % — SIGNIFICANT CHANGE UP (ref 0–2)
BUN SERPL-MCNC: 12 MG/DL — SIGNIFICANT CHANGE UP (ref 7–23)
CALCIUM SERPL-MCNC: 9.4 MG/DL — SIGNIFICANT CHANGE UP (ref 8.4–10.5)
CHLORIDE SERPL-SCNC: 104 MMOL/L — SIGNIFICANT CHANGE UP (ref 96–108)
CO2 SERPL-SCNC: 28 MMOL/L — SIGNIFICANT CHANGE UP (ref 22–31)
CREAT SERPL-MCNC: 1.1 MG/DL — SIGNIFICANT CHANGE UP (ref 0.5–1.3)
EOSINOPHIL # BLD AUTO: 0.02 K/UL — SIGNIFICANT CHANGE UP (ref 0–0.5)
EOSINOPHIL NFR BLD AUTO: 0.2 % — SIGNIFICANT CHANGE UP (ref 0–6)
GLUCOSE SERPL-MCNC: 126 MG/DL — HIGH (ref 70–99)
HCT VFR BLD CALC: 35.4 % — LOW (ref 39–50)
HGB BLD-MCNC: 12.1 G/DL — LOW (ref 13–17)
IMM GRANULOCYTES NFR BLD AUTO: 0.1 % — SIGNIFICANT CHANGE UP (ref 0–1.5)
LYMPHOCYTES # BLD AUTO: 1.61 K/UL — SIGNIFICANT CHANGE UP (ref 1–3.3)
LYMPHOCYTES # BLD AUTO: 14.6 % — SIGNIFICANT CHANGE UP (ref 13–44)
MCHC RBC-ENTMCNC: 32.4 PG — SIGNIFICANT CHANGE UP (ref 27–34)
MCHC RBC-ENTMCNC: 34.2 GM/DL — SIGNIFICANT CHANGE UP (ref 32–36)
MCV RBC AUTO: 94.7 FL — SIGNIFICANT CHANGE UP (ref 80–100)
MONOCYTES # BLD AUTO: 1.37 K/UL — HIGH (ref 0–0.9)
MONOCYTES NFR BLD AUTO: 12.4 % — SIGNIFICANT CHANGE UP (ref 2–14)
NEUTROPHILS # BLD AUTO: 7.97 K/UL — HIGH (ref 1.8–7.4)
NEUTROPHILS NFR BLD AUTO: 72.4 % — SIGNIFICANT CHANGE UP (ref 43–77)
PLATELET # BLD AUTO: 273 K/UL — SIGNIFICANT CHANGE UP (ref 150–400)
POTASSIUM SERPL-MCNC: 5.1 MMOL/L — SIGNIFICANT CHANGE UP (ref 3.5–5.3)
POTASSIUM SERPL-SCNC: 5.1 MMOL/L — SIGNIFICANT CHANGE UP (ref 3.5–5.3)
RBC # BLD: 3.74 M/UL — LOW (ref 4.2–5.8)
RBC # FLD: 13.3 % — SIGNIFICANT CHANGE UP (ref 10.3–14.5)
SODIUM SERPL-SCNC: 145 MMOL/L — SIGNIFICANT CHANGE UP (ref 135–145)
WBC # BLD: 11.01 K/UL — HIGH (ref 3.8–10.5)
WBC # FLD AUTO: 11.01 K/UL — HIGH (ref 3.8–10.5)

## 2017-12-08 RX ORDER — ENOXAPARIN SODIUM 100 MG/ML
40 INJECTION SUBCUTANEOUS
Qty: 0 | Refills: 0 | Status: DISCONTINUED | OUTPATIENT
Start: 2017-12-08 | End: 2017-12-12

## 2017-12-08 RX ORDER — HYDRALAZINE HCL 50 MG
10 TABLET ORAL ONCE
Qty: 0 | Refills: 0 | Status: COMPLETED | OUTPATIENT
Start: 2017-12-08 | End: 2017-12-08

## 2017-12-08 RX ORDER — HYDROMORPHONE HYDROCHLORIDE 2 MG/ML
6 INJECTION INTRAMUSCULAR; INTRAVENOUS; SUBCUTANEOUS EVERY 6 HOURS
Qty: 0 | Refills: 0 | Status: DISCONTINUED | OUTPATIENT
Start: 2017-12-08 | End: 2017-12-12

## 2017-12-08 RX ORDER — HYDRALAZINE HCL 50 MG
10 TABLET ORAL ONCE
Qty: 0 | Refills: 0 | Status: DISCONTINUED | OUTPATIENT
Start: 2017-12-08 | End: 2017-12-08

## 2017-12-08 RX ORDER — FENTANYL CITRATE 50 UG/ML
2 INJECTION INTRAVENOUS
Qty: 0 | Refills: 0 | Status: DISCONTINUED | OUTPATIENT
Start: 2017-12-08 | End: 2017-12-12

## 2017-12-08 RX ORDER — HYDROMORPHONE HYDROCHLORIDE 2 MG/ML
4 INJECTION INTRAMUSCULAR; INTRAVENOUS; SUBCUTANEOUS EVERY 4 HOURS
Qty: 0 | Refills: 0 | Status: DISCONTINUED | OUTPATIENT
Start: 2017-12-08 | End: 2017-12-12

## 2017-12-08 RX ORDER — DIAZEPAM 5 MG
5 TABLET ORAL ONCE
Qty: 0 | Refills: 0 | Status: DISCONTINUED | OUTPATIENT
Start: 2017-12-08 | End: 2017-12-08

## 2017-12-08 RX ORDER — FENTANYL CITRATE 50 UG/ML
1 INJECTION INTRAVENOUS
Qty: 0 | Refills: 0 | Status: DISCONTINUED | OUTPATIENT
Start: 2017-12-08 | End: 2017-12-08

## 2017-12-08 RX ORDER — CYCLOBENZAPRINE HYDROCHLORIDE 10 MG/1
10 TABLET, FILM COATED ORAL THREE TIMES A DAY
Qty: 0 | Refills: 0 | Status: DISCONTINUED | OUTPATIENT
Start: 2017-12-08 | End: 2017-12-12

## 2017-12-08 RX ORDER — HYDROMORPHONE HYDROCHLORIDE 2 MG/ML
4 INJECTION INTRAMUSCULAR; INTRAVENOUS; SUBCUTANEOUS EVERY 4 HOURS
Qty: 0 | Refills: 0 | Status: DISCONTINUED | OUTPATIENT
Start: 2017-12-08 | End: 2017-12-08

## 2017-12-08 RX ORDER — FAMOTIDINE 10 MG/ML
20 INJECTION INTRAVENOUS DAILY
Qty: 0 | Refills: 0 | Status: DISCONTINUED | OUTPATIENT
Start: 2017-12-08 | End: 2017-12-12

## 2017-12-08 RX ORDER — HYDROMORPHONE HYDROCHLORIDE 2 MG/ML
2 INJECTION INTRAMUSCULAR; INTRAVENOUS; SUBCUTANEOUS EVERY 4 HOURS
Qty: 0 | Refills: 0 | Status: DISCONTINUED | OUTPATIENT
Start: 2017-12-08 | End: 2017-12-08

## 2017-12-08 RX ADMIN — HYDROMORPHONE HYDROCHLORIDE 4 MILLIGRAM(S): 2 INJECTION INTRAMUSCULAR; INTRAVENOUS; SUBCUTANEOUS at 13:00

## 2017-12-08 RX ADMIN — ENOXAPARIN SODIUM 40 MILLIGRAM(S): 100 INJECTION SUBCUTANEOUS at 17:25

## 2017-12-08 RX ADMIN — FAMOTIDINE 20 MILLIGRAM(S): 10 INJECTION INTRAVENOUS at 01:01

## 2017-12-08 RX ADMIN — GABAPENTIN 600 MILLIGRAM(S): 400 CAPSULE ORAL at 21:12

## 2017-12-08 RX ADMIN — Medication 10 MILLIGRAM(S): at 09:01

## 2017-12-08 RX ADMIN — Medication 600 MILLIGRAM(S): at 17:25

## 2017-12-08 RX ADMIN — Medication 100 MILLIGRAM(S): at 05:36

## 2017-12-08 RX ADMIN — HYDROMORPHONE HYDROCHLORIDE 6 MILLIGRAM(S): 2 INJECTION INTRAMUSCULAR; INTRAVENOUS; SUBCUTANEOUS at 09:26

## 2017-12-08 RX ADMIN — GABAPENTIN 600 MILLIGRAM(S): 400 CAPSULE ORAL at 05:36

## 2017-12-08 RX ADMIN — Medication 5 MILLIGRAM(S): at 02:34

## 2017-12-08 RX ADMIN — HYDROMORPHONE HYDROCHLORIDE 4 MILLIGRAM(S): 2 INJECTION INTRAMUSCULAR; INTRAVENOUS; SUBCUTANEOUS at 19:56

## 2017-12-08 RX ADMIN — ATORVASTATIN CALCIUM 20 MILLIGRAM(S): 80 TABLET, FILM COATED ORAL at 21:12

## 2017-12-08 RX ADMIN — Medication 600 MILLIGRAM(S): at 09:01

## 2017-12-08 RX ADMIN — HYDROMORPHONE HYDROCHLORIDE 4 MILLIGRAM(S): 2 INJECTION INTRAMUSCULAR; INTRAVENOUS; SUBCUTANEOUS at 20:26

## 2017-12-08 RX ADMIN — HYDROMORPHONE HYDROCHLORIDE 4 MILLIGRAM(S): 2 INJECTION INTRAMUSCULAR; INTRAVENOUS; SUBCUTANEOUS at 12:07

## 2017-12-08 RX ADMIN — Medication 1 TABLET(S): at 12:07

## 2017-12-08 RX ADMIN — PANTOPRAZOLE SODIUM 40 MILLIGRAM(S): 20 TABLET, DELAYED RELEASE ORAL at 05:30

## 2017-12-08 RX ADMIN — GABAPENTIN 600 MILLIGRAM(S): 400 CAPSULE ORAL at 13:59

## 2017-12-08 RX ADMIN — Medication 100 MILLIGRAM(S): at 21:12

## 2017-12-08 RX ADMIN — SENNA PLUS 2 TABLET(S): 8.6 TABLET ORAL at 21:11

## 2017-12-08 RX ADMIN — CYCLOBENZAPRINE HYDROCHLORIDE 10 MILLIGRAM(S): 10 TABLET, FILM COATED ORAL at 01:39

## 2017-12-08 RX ADMIN — Medication 10 MILLIGRAM(S): at 01:01

## 2017-12-08 RX ADMIN — HYDROMORPHONE HYDROCHLORIDE 30 MILLILITER(S): 2 INJECTION INTRAMUSCULAR; INTRAVENOUS; SUBCUTANEOUS at 03:42

## 2017-12-08 RX ADMIN — Medication 650 MILLIGRAM(S): at 13:59

## 2017-12-08 RX ADMIN — Medication 25 MILLIGRAM(S): at 17:25

## 2017-12-08 RX ADMIN — FENTANYL CITRATE 2 PATCH: 50 INJECTION INTRAVENOUS at 10:24

## 2017-12-08 RX ADMIN — Medication 100 MILLIGRAM(S): at 14:00

## 2017-12-08 RX ADMIN — Medication 100 MILLIGRAM(S): at 05:31

## 2017-12-08 RX ADMIN — Medication 650 MILLIGRAM(S): at 01:39

## 2017-12-08 RX ADMIN — HYDROMORPHONE HYDROCHLORIDE 6 MILLIGRAM(S): 2 INJECTION INTRAMUSCULAR; INTRAVENOUS; SUBCUTANEOUS at 10:15

## 2017-12-08 RX ADMIN — Medication 25 MILLIGRAM(S): at 05:30

## 2017-12-08 RX ADMIN — ONDANSETRON 4 MILLIGRAM(S): 8 TABLET, FILM COATED ORAL at 01:39

## 2017-12-08 RX ADMIN — Medication 650 MILLIGRAM(S): at 14:45

## 2017-12-08 NOTE — PHYSICAL THERAPY INITIAL EVALUATION ADULT - GAIT DEVIATIONS NOTED, PT EVAL
decreased step length RLE ; dragging with fatigue/increased time in double stance/decreased wes/decreased velocity of limb motion/decreased step length/decreased stride length/decreased weight-shifting ability

## 2017-12-08 NOTE — PHYSICAL THERAPY INITIAL EVALUATION ADULT - PRECAUTIONS/LIMITATIONS, REHAB EVAL
CT spine: No thoracolumbar myelographic block.No thoracolumbar cord or cauda equina compression.Stable posterior spinal fusion hardware at the levels of T4-S1 and right SI joint screws. Bilateral laminectomies at T10-T11 and L3-L5.Redemonstration of soft tissue in the posterior intrathecal space at the levels of T11-T12 adjacent to an intrathecal catheter tip, decreased in size since myelogram study of 12/29/14, and likely represents residual granulation tissue./spinal precautions CT spine: No thoracolumbar myelographic block.No thoracolumbar cord or cauda equina compression.Stable posterior spinal fusion hardware at the levels of T4-S1 and right SI joint screws. Bilateral laminectomies at T10-T11 and L3-L5.Redemonstration of soft tissue in the posterior intrathecal space at the levels of T11-T12 adjacent to an intrathecal catheter tip, decreased in size since myelogram study of 12/29/14, and likely represents residual granulation tissue./no known precautions/limitations/spinal precautions

## 2017-12-08 NOTE — PROGRESS NOTE ADULT - ASSESSMENT
63 y/o male with h/o chronic pain, Post-op day # 1 s/p Lumbar laminectomy L3-5  Neurologically stable  Will d/c PCA  Will resume fentanyl patches  Will reintroduce po dilaudid slowly  OOB, Increase activity as tolerated  Pt consult  Will monitor B/P, pain is better controlled now  DVT prophylaxis: SQL

## 2017-12-08 NOTE — PHYSICAL THERAPY INITIAL EVALUATION ADULT - GENERAL OBSERVATIONS, REHAB EVAL
Pt received semisupine in bed, A&Ox4, following all commands, eager to participate, spouse at bedside

## 2017-12-08 NOTE — PHYSICAL THERAPY INITIAL EVALUATION ADULT - ADDITIONAL COMMENTS
Pt resides in private home with spouse, about 24 steps total to negotiate with handrail. PTA independent in mobility and ADL's (except, pt states he requires assist with showering), amb with rolling walker, retired.

## 2017-12-08 NOTE — CHART NOTE - NSCHARTNOTEFT_GEN_A_CORE
Pain Management Attending Addendum    SUBJECTIVE: Patient doing well, pain controlled. will dc IV PCA today and transition to Oral analgesics.    Therapy:    [X] IV PCA         [ ] PRN Analgesics    OBJECTIVE:   [X] Pain appropriately controlled    [ ] Other:    Side Effects:  [X] None	             [ ] Nausea              [ ] Pruritis                	[ ] Other:    ASSESSMENT/PLAN:  Therapy changed to PRN analgesics

## 2017-12-09 LAB
ANION GAP SERPL CALC-SCNC: 15 MMOL/L — SIGNIFICANT CHANGE UP (ref 5–17)
APPEARANCE UR: CLEAR — SIGNIFICANT CHANGE UP
BASOPHILS # BLD AUTO: 0.03 K/UL — SIGNIFICANT CHANGE UP (ref 0–0.2)
BASOPHILS NFR BLD AUTO: 0.3 % — SIGNIFICANT CHANGE UP (ref 0–2)
BILIRUB UR-MCNC: NEGATIVE — SIGNIFICANT CHANGE UP
BUN SERPL-MCNC: 12 MG/DL — SIGNIFICANT CHANGE UP (ref 7–23)
CALCIUM SERPL-MCNC: 9.9 MG/DL — SIGNIFICANT CHANGE UP (ref 8.4–10.5)
CHLORIDE SERPL-SCNC: 104 MMOL/L — SIGNIFICANT CHANGE UP (ref 96–108)
CO2 SERPL-SCNC: 22 MMOL/L — SIGNIFICANT CHANGE UP (ref 22–31)
COLOR SPEC: YELLOW — SIGNIFICANT CHANGE UP
CREAT SERPL-MCNC: 0.96 MG/DL — SIGNIFICANT CHANGE UP (ref 0.5–1.3)
DIFF PNL FLD: NEGATIVE — SIGNIFICANT CHANGE UP
EOSINOPHIL # BLD AUTO: 0.11 K/UL — SIGNIFICANT CHANGE UP (ref 0–0.5)
EOSINOPHIL NFR BLD AUTO: 1.1 — SIGNIFICANT CHANGE UP
GLUCOSE SERPL-MCNC: 116 MG/DL — HIGH (ref 70–99)
GLUCOSE UR QL: NEGATIVE — SIGNIFICANT CHANGE UP
HCT VFR BLD CALC: 36.7 % — LOW (ref 39–50)
HGB BLD-MCNC: 12.4 G/DL — LOW (ref 13–17)
IMM GRANULOCYTES NFR BLD AUTO: 0.4 % — SIGNIFICANT CHANGE UP (ref 0–1.5)
KETONES UR-MCNC: NEGATIVE — SIGNIFICANT CHANGE UP
LEUKOCYTE ESTERASE UR-ACNC: NEGATIVE — SIGNIFICANT CHANGE UP
LYMPHOCYTES # BLD AUTO: 1.93 K/UL — SIGNIFICANT CHANGE UP (ref 1–3.3)
LYMPHOCYTES # BLD AUTO: 18.6 % — SIGNIFICANT CHANGE UP (ref 13–44)
MCHC RBC-ENTMCNC: 31.6 PG — SIGNIFICANT CHANGE UP (ref 27–34)
MCHC RBC-ENTMCNC: 33.8 GM/DL — SIGNIFICANT CHANGE UP (ref 32–36)
MCV RBC AUTO: 93.6 FL — SIGNIFICANT CHANGE UP (ref 80–100)
MONOCYTES # BLD AUTO: 1.31 K/UL — HIGH (ref 0–0.9)
MONOCYTES NFR BLD AUTO: 12.6 % — SIGNIFICANT CHANGE UP (ref 2–14)
NEUTROPHILS # BLD AUTO: 6.96 K/UL — SIGNIFICANT CHANGE UP (ref 1.8–7.4)
NEUTROPHILS NFR BLD AUTO: 67 % — SIGNIFICANT CHANGE UP (ref 43–77)
NITRITE UR-MCNC: NEGATIVE — SIGNIFICANT CHANGE UP
PH UR: 8 — SIGNIFICANT CHANGE UP (ref 5–8)
PLATELET # BLD AUTO: 275 K/UL — SIGNIFICANT CHANGE UP (ref 150–400)
POTASSIUM SERPL-MCNC: 4.1 MMOL/L — SIGNIFICANT CHANGE UP (ref 3.5–5.3)
POTASSIUM SERPL-SCNC: 4.1 MMOL/L — SIGNIFICANT CHANGE UP (ref 3.5–5.3)
PROT UR-MCNC: NEGATIVE — SIGNIFICANT CHANGE UP
RBC # BLD: 3.92 M/UL — LOW (ref 4.2–5.8)
RBC # FLD: 13.1 % — SIGNIFICANT CHANGE UP (ref 10.3–14.5)
SODIUM SERPL-SCNC: 141 MMOL/L — SIGNIFICANT CHANGE UP (ref 135–145)
SP GR SPEC: 1.01 — SIGNIFICANT CHANGE UP (ref 1.01–1.02)
UROBILINOGEN FLD QL: NEGATIVE — SIGNIFICANT CHANGE UP
WBC # BLD: 10.38 K/UL — SIGNIFICANT CHANGE UP (ref 3.8–10.5)
WBC # FLD AUTO: 10.38 K/UL — SIGNIFICANT CHANGE UP (ref 3.8–10.5)

## 2017-12-09 RX ORDER — DIPHENHYDRAMINE HCL 50 MG
50 CAPSULE ORAL AT BEDTIME
Qty: 0 | Refills: 0 | Status: DISCONTINUED | OUTPATIENT
Start: 2017-12-09 | End: 2017-12-10

## 2017-12-09 RX ORDER — PHENAZOPYRIDINE HCL 100 MG
100 TABLET ORAL EVERY 8 HOURS
Qty: 0 | Refills: 0 | Status: COMPLETED | OUTPATIENT
Start: 2017-12-09 | End: 2017-12-12

## 2017-12-09 RX ADMIN — HYDROMORPHONE HYDROCHLORIDE 6 MILLIGRAM(S): 2 INJECTION INTRAMUSCULAR; INTRAVENOUS; SUBCUTANEOUS at 21:44

## 2017-12-09 RX ADMIN — FAMOTIDINE 20 MILLIGRAM(S): 10 INJECTION INTRAVENOUS at 12:12

## 2017-12-09 RX ADMIN — HYDROMORPHONE HYDROCHLORIDE 6 MILLIGRAM(S): 2 INJECTION INTRAMUSCULAR; INTRAVENOUS; SUBCUTANEOUS at 05:33

## 2017-12-09 RX ADMIN — ATORVASTATIN CALCIUM 20 MILLIGRAM(S): 80 TABLET, FILM COATED ORAL at 21:14

## 2017-12-09 RX ADMIN — HYDROMORPHONE HYDROCHLORIDE 6 MILLIGRAM(S): 2 INJECTION INTRAMUSCULAR; INTRAVENOUS; SUBCUTANEOUS at 05:03

## 2017-12-09 RX ADMIN — GABAPENTIN 600 MILLIGRAM(S): 400 CAPSULE ORAL at 21:14

## 2017-12-09 RX ADMIN — Medication 25 MILLIGRAM(S): at 05:03

## 2017-12-09 RX ADMIN — Medication 100 MILLIGRAM(S): at 21:14

## 2017-12-09 RX ADMIN — SENNA PLUS 2 TABLET(S): 8.6 TABLET ORAL at 21:15

## 2017-12-09 RX ADMIN — HYDROMORPHONE HYDROCHLORIDE 6 MILLIGRAM(S): 2 INJECTION INTRAMUSCULAR; INTRAVENOUS; SUBCUTANEOUS at 21:14

## 2017-12-09 RX ADMIN — GABAPENTIN 600 MILLIGRAM(S): 400 CAPSULE ORAL at 05:03

## 2017-12-09 RX ADMIN — Medication 100 MILLIGRAM(S): at 14:28

## 2017-12-09 RX ADMIN — Medication 1 TABLET(S): at 12:12

## 2017-12-09 RX ADMIN — HYDROMORPHONE HYDROCHLORIDE 6 MILLIGRAM(S): 2 INJECTION INTRAMUSCULAR; INTRAVENOUS; SUBCUTANEOUS at 13:16

## 2017-12-09 RX ADMIN — GABAPENTIN 600 MILLIGRAM(S): 400 CAPSULE ORAL at 14:29

## 2017-12-09 RX ADMIN — Medication 100 MILLIGRAM(S): at 05:03

## 2017-12-09 RX ADMIN — Medication 600 MILLIGRAM(S): at 08:55

## 2017-12-09 RX ADMIN — PANTOPRAZOLE SODIUM 40 MILLIGRAM(S): 20 TABLET, DELAYED RELEASE ORAL at 05:03

## 2017-12-09 NOTE — PROGRESS NOTE ADULT - ASSESSMENT
62 year old male with PMH of HTN, MI /CAD s/p1 KAEL in 2005, Chronic back pain (pain management), Medical Marijuana use, multiple back surgeries s/p sacroiliac Joint fusion on 5/8/2017 with worsening lumbar lower back pain planned for right L3-5 lumbar laminectomy.  s/p L3_l5rexploration lumbar lami/ R L3-L4 foraminotomy pod #2    Plan:  Neuro stable  Vitals stable  Pain well controlled. On fentanyl 200mcg. Dilaudid 6mg prn  Dysuria- Send UA stat.   DVT ppx

## 2017-12-10 LAB
ANION GAP SERPL CALC-SCNC: 16 MMOL/L — SIGNIFICANT CHANGE UP (ref 5–17)
BASOPHILS # BLD AUTO: 0.03 K/UL — SIGNIFICANT CHANGE UP (ref 0–0.2)
BASOPHILS NFR BLD AUTO: 0.4 % — SIGNIFICANT CHANGE UP (ref 0–2)
BUN SERPL-MCNC: 13 MG/DL — SIGNIFICANT CHANGE UP (ref 7–23)
CALCIUM SERPL-MCNC: 10.1 MG/DL — SIGNIFICANT CHANGE UP (ref 8.4–10.5)
CHLORIDE SERPL-SCNC: 102 MMOL/L — SIGNIFICANT CHANGE UP (ref 96–108)
CO2 SERPL-SCNC: 24 MMOL/L — SIGNIFICANT CHANGE UP (ref 22–31)
CREAT SERPL-MCNC: 0.98 MG/DL — SIGNIFICANT CHANGE UP (ref 0.5–1.3)
EOSINOPHIL # BLD AUTO: 0.55 K/UL — HIGH (ref 0–0.5)
EOSINOPHIL NFR BLD AUTO: 6.8 — SIGNIFICANT CHANGE UP
GLUCOSE SERPL-MCNC: 94 MG/DL — SIGNIFICANT CHANGE UP (ref 70–99)
HCT VFR BLD CALC: 37.4 % — LOW (ref 39–50)
HGB BLD-MCNC: 12.4 G/DL — LOW (ref 13–17)
IMM GRANULOCYTES NFR BLD AUTO: 0.2 % — SIGNIFICANT CHANGE UP (ref 0–1.5)
LYMPHOCYTES # BLD AUTO: 2.49 K/UL — SIGNIFICANT CHANGE UP (ref 1–3.3)
LYMPHOCYTES # BLD AUTO: 30.7 % — SIGNIFICANT CHANGE UP (ref 13–44)
MCHC RBC-ENTMCNC: 31.4 PG — SIGNIFICANT CHANGE UP (ref 27–34)
MCHC RBC-ENTMCNC: 33.2 GM/DL — SIGNIFICANT CHANGE UP (ref 32–36)
MCV RBC AUTO: 94.7 FL — SIGNIFICANT CHANGE UP (ref 80–100)
MONOCYTES # BLD AUTO: 0.93 K/UL — HIGH (ref 0–0.9)
MONOCYTES NFR BLD AUTO: 11.5 % — SIGNIFICANT CHANGE UP (ref 2–14)
NEUTROPHILS # BLD AUTO: 4.1 K/UL — SIGNIFICANT CHANGE UP (ref 1.8–7.4)
NEUTROPHILS NFR BLD AUTO: 50.4 % — SIGNIFICANT CHANGE UP (ref 43–77)
PLATELET # BLD AUTO: 247 K/UL — SIGNIFICANT CHANGE UP (ref 150–400)
POTASSIUM SERPL-MCNC: 4.4 MMOL/L — SIGNIFICANT CHANGE UP (ref 3.5–5.3)
POTASSIUM SERPL-SCNC: 4.4 MMOL/L — SIGNIFICANT CHANGE UP (ref 3.5–5.3)
RBC # BLD: 3.95 M/UL — LOW (ref 4.2–5.8)
RBC # FLD: 13.2 % — SIGNIFICANT CHANGE UP (ref 10.3–14.5)
SODIUM SERPL-SCNC: 142 MMOL/L — SIGNIFICANT CHANGE UP (ref 135–145)
WBC # BLD: 8.12 K/UL — SIGNIFICANT CHANGE UP (ref 3.8–10.5)
WBC # FLD AUTO: 8.12 K/UL — SIGNIFICANT CHANGE UP (ref 3.8–10.5)

## 2017-12-10 RX ORDER — HYDROXYZINE HCL 10 MG
10 TABLET ORAL AT BEDTIME
Qty: 0 | Refills: 0 | Status: DISCONTINUED | OUTPATIENT
Start: 2017-12-10 | End: 2017-12-12

## 2017-12-10 RX ORDER — POLYETHYLENE GLYCOL 3350 17 G/17G
17 POWDER, FOR SOLUTION ORAL
Qty: 0 | Refills: 0 | Status: DISCONTINUED | OUTPATIENT
Start: 2017-12-10 | End: 2017-12-12

## 2017-12-10 RX ADMIN — ENOXAPARIN SODIUM 40 MILLIGRAM(S): 100 INJECTION SUBCUTANEOUS at 17:18

## 2017-12-10 RX ADMIN — Medication 25 MILLIGRAM(S): at 05:13

## 2017-12-10 RX ADMIN — Medication 100 MILLIGRAM(S): at 05:13

## 2017-12-10 RX ADMIN — GABAPENTIN 600 MILLIGRAM(S): 400 CAPSULE ORAL at 05:13

## 2017-12-10 RX ADMIN — Medication 600 MILLIGRAM(S): at 17:18

## 2017-12-10 RX ADMIN — GABAPENTIN 600 MILLIGRAM(S): 400 CAPSULE ORAL at 14:31

## 2017-12-10 RX ADMIN — Medication 600 MILLIGRAM(S): at 08:37

## 2017-12-10 RX ADMIN — FAMOTIDINE 20 MILLIGRAM(S): 10 INJECTION INTRAVENOUS at 11:12

## 2017-12-10 RX ADMIN — Medication 100 MILLIGRAM(S): at 14:32

## 2017-12-10 RX ADMIN — ENOXAPARIN SODIUM 40 MILLIGRAM(S): 100 INJECTION SUBCUTANEOUS at 06:51

## 2017-12-10 RX ADMIN — HYDROMORPHONE HYDROCHLORIDE 4 MILLIGRAM(S): 2 INJECTION INTRAMUSCULAR; INTRAVENOUS; SUBCUTANEOUS at 14:35

## 2017-12-10 RX ADMIN — ATORVASTATIN CALCIUM 20 MILLIGRAM(S): 80 TABLET, FILM COATED ORAL at 22:13

## 2017-12-10 RX ADMIN — SENNA PLUS 2 TABLET(S): 8.6 TABLET ORAL at 22:13

## 2017-12-10 RX ADMIN — Medication 25 MILLIGRAM(S): at 06:51

## 2017-12-10 RX ADMIN — Medication 100 MILLIGRAM(S): at 14:31

## 2017-12-10 RX ADMIN — Medication 600 MILLIGRAM(S): at 06:50

## 2017-12-10 RX ADMIN — Medication 1 TABLET(S): at 11:12

## 2017-12-10 RX ADMIN — GABAPENTIN 600 MILLIGRAM(S): 400 CAPSULE ORAL at 22:13

## 2017-12-10 RX ADMIN — Medication 100 MILLIGRAM(S): at 22:13

## 2017-12-10 RX ADMIN — HYDROMORPHONE HYDROCHLORIDE 6 MILLIGRAM(S): 2 INJECTION INTRAMUSCULAR; INTRAVENOUS; SUBCUTANEOUS at 11:15

## 2017-12-10 RX ADMIN — HYDROMORPHONE HYDROCHLORIDE 6 MILLIGRAM(S): 2 INJECTION INTRAMUSCULAR; INTRAVENOUS; SUBCUTANEOUS at 05:13

## 2017-12-10 RX ADMIN — HYDROMORPHONE HYDROCHLORIDE 6 MILLIGRAM(S): 2 INJECTION INTRAMUSCULAR; INTRAVENOUS; SUBCUTANEOUS at 05:43

## 2017-12-10 RX ADMIN — PANTOPRAZOLE SODIUM 40 MILLIGRAM(S): 20 TABLET, DELAYED RELEASE ORAL at 05:13

## 2017-12-10 NOTE — PROGRESS NOTE ADULT - ASSESSMENT
62 year old male with PMH of HTN, MI /CAD s/p1 KAEL in 2005, Chronic back pain (pain management), Medical Marijuana use, multiple back surgeries s/p sacroiliac Joint fusion on 5/8/2017 with worsening lumbar lower back pain planned for right L3-5 lumbar laminectomy.  s/p L3- L5 rexploration lumbar lami/ R L3-L4 foraminotomy pod #3    Plan:  Neuro stable  Vitals stable  Pain well controlled. On fentanyl 200mcg. Dilaudid 6mg prn  Dysuria resolved.   Bowel regimen  DVT ppx  Awaiting rehab placement.

## 2017-12-11 RX ORDER — MULTIVIT WITH MIN/MFOLATE/K2 340-15/3 G
300 POWDER (GRAM) ORAL ONCE
Qty: 0 | Refills: 0 | Status: DISCONTINUED | OUTPATIENT
Start: 2017-12-11 | End: 2017-12-12

## 2017-12-11 RX ORDER — INFLUENZA VIRUS VACCINE 15; 15; 15; 15 UG/.5ML; UG/.5ML; UG/.5ML; UG/.5ML
0.5 SUSPENSION INTRAMUSCULAR ONCE
Qty: 0 | Refills: 0 | Status: COMPLETED | OUTPATIENT
Start: 2017-12-11 | End: 2017-12-12

## 2017-12-11 RX ADMIN — Medication 100 MILLIGRAM(S): at 21:52

## 2017-12-11 RX ADMIN — Medication 1 TABLET(S): at 12:16

## 2017-12-11 RX ADMIN — POLYETHYLENE GLYCOL 3350 17 GRAM(S): 17 POWDER, FOR SOLUTION ORAL at 05:25

## 2017-12-11 RX ADMIN — GABAPENTIN 600 MILLIGRAM(S): 400 CAPSULE ORAL at 05:25

## 2017-12-11 RX ADMIN — Medication 10 MILLIGRAM(S): at 21:52

## 2017-12-11 RX ADMIN — Medication 25 MILLIGRAM(S): at 18:00

## 2017-12-11 RX ADMIN — ATORVASTATIN CALCIUM 20 MILLIGRAM(S): 80 TABLET, FILM COATED ORAL at 21:52

## 2017-12-11 RX ADMIN — Medication 600 MILLIGRAM(S): at 08:54

## 2017-12-11 RX ADMIN — Medication 100 MILLIGRAM(S): at 05:25

## 2017-12-11 RX ADMIN — HYDROMORPHONE HYDROCHLORIDE 6 MILLIGRAM(S): 2 INJECTION INTRAMUSCULAR; INTRAVENOUS; SUBCUTANEOUS at 10:09

## 2017-12-11 RX ADMIN — FAMOTIDINE 20 MILLIGRAM(S): 10 INJECTION INTRAVENOUS at 12:16

## 2017-12-11 RX ADMIN — ENOXAPARIN SODIUM 40 MILLIGRAM(S): 100 INJECTION SUBCUTANEOUS at 18:00

## 2017-12-11 RX ADMIN — POLYETHYLENE GLYCOL 3350 17 GRAM(S): 17 POWDER, FOR SOLUTION ORAL at 18:00

## 2017-12-11 RX ADMIN — FENTANYL CITRATE 2 PATCH: 50 INJECTION INTRAVENOUS at 10:32

## 2017-12-11 RX ADMIN — HYDROMORPHONE HYDROCHLORIDE 6 MILLIGRAM(S): 2 INJECTION INTRAMUSCULAR; INTRAVENOUS; SUBCUTANEOUS at 17:26

## 2017-12-11 RX ADMIN — FENTANYL CITRATE 2 PATCH: 50 INJECTION INTRAVENOUS at 10:10

## 2017-12-11 RX ADMIN — PANTOPRAZOLE SODIUM 40 MILLIGRAM(S): 20 TABLET, DELAYED RELEASE ORAL at 05:25

## 2017-12-11 RX ADMIN — Medication 100 MILLIGRAM(S): at 15:04

## 2017-12-11 RX ADMIN — Medication 25 MILLIGRAM(S): at 05:25

## 2017-12-11 RX ADMIN — HYDROMORPHONE HYDROCHLORIDE 6 MILLIGRAM(S): 2 INJECTION INTRAMUSCULAR; INTRAVENOUS; SUBCUTANEOUS at 10:30

## 2017-12-11 RX ADMIN — Medication 600 MILLIGRAM(S): at 16:50

## 2017-12-11 RX ADMIN — GABAPENTIN 600 MILLIGRAM(S): 400 CAPSULE ORAL at 15:05

## 2017-12-11 RX ADMIN — Medication 100 MILLIGRAM(S): at 15:05

## 2017-12-11 RX ADMIN — SENNA PLUS 2 TABLET(S): 8.6 TABLET ORAL at 21:52

## 2017-12-11 RX ADMIN — GABAPENTIN 600 MILLIGRAM(S): 400 CAPSULE ORAL at 21:52

## 2017-12-11 RX ADMIN — HYDROMORPHONE HYDROCHLORIDE 6 MILLIGRAM(S): 2 INJECTION INTRAMUSCULAR; INTRAVENOUS; SUBCUTANEOUS at 16:46

## 2017-12-11 NOTE — PROGRESS NOTE ADULT - ASSESSMENT
62 year old male with PMH of HTN, MI /CAD s/p1 KAEL in 2005, Chronic back pain (pain management), Medical Marijuana use, multiple back surgeries s/p sacroiliac Joint fusion on 5/8/2017 with worsening lumbar lower back pain planned for right L3-5 lumbar laminectomy.       PROCEDURE:  12/7 s/p L3-5 laminectomy re-exploration R L3-4 foraminotomy  POD#4    PLAN:  Neuro:   -pain control- continue fentanyl patch, dilaudid 6 q6 po prn, gabapentin, flexeril  Respiratory: encoura  CV: HTN- continue metoprolol   DVT ppx: venodynes, SQ lovenox  GI:  tolerating regular diet, added Ensure supplement 3x day  -constipation- ordered magnesium citrate  PT/OT: [facility], awaiting rehab        [employer] # [id number] 62 year old male with PMH of HTN, MI /CAD s/p1 KAEL in 2005, Chronic back pain (pain management), Medical Marijuana use, multiple back surgeries s/p sacroiliac Joint fusion on 5/8/2017 with worsening lumbar lower back pain planned for right L3-5 lumbar laminectomy.       PROCEDURE:  12/7 s/p L3-5 laminectomy re-exploration R L3-4 foraminotomy  POD#4    PLAN:  Neuro:   -pain control- continue fentanyl patch, dilaudid 6 q6 po prn, gabapentin, flexeril  Respiratory: encoura  CV: HTN- continue metoprolol   DVT ppx: venodynes, SQ lovenox  GI:  tolerating regular diet, added Ensure supplement 3x day  -constipation- ordered magnesium citrate  SAcral decubitus- stage 2  -pt reports he applies zinc to sacral decub, awaiting wife to check strength of zinc at home  PT/OT: [facility], awaiting rehab    [employer] # [id number]

## 2017-12-12 ENCOUNTER — TRANSCRIPTION ENCOUNTER (OUTPATIENT)
Age: 62
End: 2017-12-12

## 2017-12-12 VITALS
DIASTOLIC BLOOD PRESSURE: 92 MMHG | SYSTOLIC BLOOD PRESSURE: 165 MMHG | RESPIRATION RATE: 18 BRPM | HEART RATE: 56 BPM | OXYGEN SATURATION: 99 % | TEMPERATURE: 98 F

## 2017-12-12 PROCEDURE — C1889: CPT

## 2017-12-12 PROCEDURE — 85027 COMPLETE CBC AUTOMATED: CPT

## 2017-12-12 PROCEDURE — 80048 BASIC METABOLIC PNL TOTAL CA: CPT

## 2017-12-12 PROCEDURE — 81003 URINALYSIS AUTO W/O SCOPE: CPT

## 2017-12-12 PROCEDURE — 90686 IIV4 VACC NO PRSV 0.5 ML IM: CPT

## 2017-12-12 PROCEDURE — 97162 PT EVAL MOD COMPLEX 30 MIN: CPT

## 2017-12-12 PROCEDURE — 97116 GAIT TRAINING THERAPY: CPT

## 2017-12-12 PROCEDURE — 97530 THERAPEUTIC ACTIVITIES: CPT

## 2017-12-12 RX ORDER — ACETAMINOPHEN 500 MG
2 TABLET ORAL
Qty: 0 | Refills: 0 | COMMUNITY
Start: 2017-12-12

## 2017-12-12 RX ORDER — ENOXAPARIN SODIUM 100 MG/ML
40 INJECTION SUBCUTANEOUS
Qty: 0 | Refills: 0 | COMMUNITY
Start: 2017-12-12

## 2017-12-12 RX ORDER — HYDROMORPHONE HYDROCHLORIDE 2 MG/ML
1 INJECTION INTRAMUSCULAR; INTRAVENOUS; SUBCUTANEOUS
Qty: 0 | Refills: 0 | COMMUNITY
Start: 2017-12-12

## 2017-12-12 RX ORDER — FAMOTIDINE 10 MG/ML
1 INJECTION INTRAVENOUS
Qty: 0 | Refills: 0 | COMMUNITY
Start: 2017-12-12

## 2017-12-12 RX ORDER — POLYETHYLENE GLYCOL 3350 17 G/17G
17 POWDER, FOR SOLUTION ORAL
Qty: 0 | Refills: 0 | COMMUNITY
Start: 2017-12-12

## 2017-12-12 RX ORDER — PANTOPRAZOLE SODIUM 20 MG/1
1 TABLET, DELAYED RELEASE ORAL
Qty: 0 | Refills: 0 | DISCHARGE
Start: 2017-12-12

## 2017-12-12 RX ORDER — GEMFIBROZIL 600 MG
1 TABLET ORAL
Qty: 0 | Refills: 0 | DISCHARGE
Start: 2017-12-12

## 2017-12-12 RX ORDER — GABAPENTIN 400 MG/1
2 CAPSULE ORAL
Qty: 0 | Refills: 0 | DISCHARGE
Start: 2017-12-12

## 2017-12-12 RX ORDER — CYCLOBENZAPRINE HYDROCHLORIDE 10 MG/1
1 TABLET, FILM COATED ORAL
Qty: 0 | Refills: 0 | DISCHARGE
Start: 2017-12-12

## 2017-12-12 RX ORDER — TIZANIDINE 4 MG/1
1 TABLET ORAL
Qty: 0 | Refills: 0 | COMMUNITY

## 2017-12-12 RX ORDER — INFLUENZA VIRUS VACCINE 15; 15; 15; 15 UG/.5ML; UG/.5ML; UG/.5ML; UG/.5ML
0 SUSPENSION INTRAMUSCULAR
Qty: 0 | Refills: 0 | COMMUNITY
Start: 2017-12-12

## 2017-12-12 RX ORDER — ASPIRIN/CALCIUM CARB/MAGNESIUM 324 MG
1 TABLET ORAL
Qty: 0 | Refills: 0 | COMMUNITY

## 2017-12-12 RX ORDER — DOCUSATE SODIUM 100 MG
1 CAPSULE ORAL
Qty: 0 | Refills: 0 | DISCHARGE
Start: 2017-12-12

## 2017-12-12 RX ORDER — GABAPENTIN 400 MG/1
1 CAPSULE ORAL
Qty: 0 | Refills: 0 | COMMUNITY

## 2017-12-12 RX ORDER — ATORVASTATIN CALCIUM 80 MG/1
1 TABLET, FILM COATED ORAL
Qty: 0 | Refills: 0 | DISCHARGE
Start: 2017-12-12

## 2017-12-12 RX ORDER — HYDROMORPHONE HYDROCHLORIDE 2 MG/ML
3 INJECTION INTRAMUSCULAR; INTRAVENOUS; SUBCUTANEOUS
Qty: 0 | Refills: 0 | COMMUNITY
Start: 2017-12-12

## 2017-12-12 RX ORDER — FENTANYL CITRATE 50 UG/ML
2 INJECTION INTRAVENOUS
Qty: 0 | Refills: 0 | DISCHARGE
Start: 2017-12-12

## 2017-12-12 RX ORDER — SENNA PLUS 8.6 MG/1
2 TABLET ORAL
Qty: 0 | Refills: 0 | DISCHARGE
Start: 2017-12-12

## 2017-12-12 RX ORDER — METOPROLOL TARTRATE 50 MG
1 TABLET ORAL
Qty: 0 | Refills: 0 | DISCHARGE
Start: 2017-12-12

## 2017-12-12 RX ORDER — HYDROMORPHONE HYDROCHLORIDE 2 MG/ML
2 INJECTION INTRAMUSCULAR; INTRAVENOUS; SUBCUTANEOUS
Qty: 0 | Refills: 0 | DISCHARGE
Start: 2017-12-12

## 2017-12-12 RX ADMIN — HYDROMORPHONE HYDROCHLORIDE 6 MILLIGRAM(S): 2 INJECTION INTRAMUSCULAR; INTRAVENOUS; SUBCUTANEOUS at 11:53

## 2017-12-12 RX ADMIN — GABAPENTIN 600 MILLIGRAM(S): 400 CAPSULE ORAL at 13:19

## 2017-12-12 RX ADMIN — Medication 25 MILLIGRAM(S): at 05:27

## 2017-12-12 RX ADMIN — PANTOPRAZOLE SODIUM 40 MILLIGRAM(S): 20 TABLET, DELAYED RELEASE ORAL at 05:21

## 2017-12-12 RX ADMIN — Medication 100 MILLIGRAM(S): at 13:19

## 2017-12-12 RX ADMIN — FAMOTIDINE 20 MILLIGRAM(S): 10 INJECTION INTRAVENOUS at 11:22

## 2017-12-12 RX ADMIN — Medication 1 DROP(S): at 13:53

## 2017-12-12 RX ADMIN — HYDROMORPHONE HYDROCHLORIDE 6 MILLIGRAM(S): 2 INJECTION INTRAMUSCULAR; INTRAVENOUS; SUBCUTANEOUS at 05:23

## 2017-12-12 RX ADMIN — HYDROMORPHONE HYDROCHLORIDE 6 MILLIGRAM(S): 2 INJECTION INTRAMUSCULAR; INTRAVENOUS; SUBCUTANEOUS at 11:23

## 2017-12-12 RX ADMIN — Medication 1 TABLET(S): at 11:23

## 2017-12-12 RX ADMIN — GABAPENTIN 600 MILLIGRAM(S): 400 CAPSULE ORAL at 05:20

## 2017-12-12 RX ADMIN — Medication 100 MILLIGRAM(S): at 05:20

## 2017-12-12 RX ADMIN — Medication 600 MILLIGRAM(S): at 08:47

## 2017-12-12 RX ADMIN — INFLUENZA VIRUS VACCINE 0.5 MILLILITER(S): 15; 15; 15; 15 SUSPENSION INTRAMUSCULAR at 13:19

## 2017-12-12 NOTE — DISCHARGE NOTE ADULT - HOSPITAL COURSE
62 year old male with Past medical history of hypertension, myocardial infarction CAD s/p1 KAEL in 2005, Chronic back pain (pain management), Medical Marijuana use, multiple back surgeries s/p sacroiliac Joint fusion on 5/8/2017 with worsening lumbar lower back pain planned for right L3-5 lumbar laminectomy. Patient admitted 12/7/17.  Patient had a L3-5 lumbar lamiectomy and reexploration with right l3-4 foraminotomy.   Patient did well after surgery and symptoms went away.  Patient was found to have stage 2 sacral decub on admission.  Patient has Cavilon placed on it and wound saw it.  Patient complained of dysuria and ua was negative.  He was treated with 3 days of pyridium.  Patient was seen by physical therapy and recommended for Subacute rehab.  On the day of discharge patient is neurologically and medically stable for discharge.

## 2017-12-12 NOTE — DISCHARGE NOTE ADULT - MEDICATION SUMMARY - MEDICATIONS TO STOP TAKING
I will STOP taking the medications listed below when I get home from the hospital:    tiZANidine 6 mg oral capsule  -- 1 to 2 tab(s) by mouth , As Needed    pantoprazole 40 mg oral delayed release tablet  -- 1 tab(s) by mouth once a day (before a meal)    Aspir 81 oral delayed release tablet  -- 1 tab(s) by mouth once a day    medical marijuana

## 2017-12-12 NOTE — PROGRESS NOTE ADULT - ASSESSMENT
HPI: 63 yo male with history of prior spinal fusion with narcotic dependance from pain was admitted for elective L3-L5 laminectomy and Right L3-L4 Foraminotomy for increased LBP.    PROCEDURE: L3-L5 Lumabr Lami , Right L3-L4 Foraminotomy   POD# 5    PLAN:  Neuro:   -Continue current pain management regiment as patient is currently well controlled.  -Continue OOB/PT/OT  -Awaiting authorization for [facility]NICA    Respiratory:   -No active issues  -Continue IS    CV:  -No significant cardiac history.  A few spikes in SBP likely related to pain- patient generally runs with low SBP.  -Continue Gemfibrozil for HLD    Endocrine:   -No active issues    Heme/Onc:               -DVT ppx with SQL, SCD'S and frequent ambulation     Renal:   -IVL  -Voiding spontaneously  -Continue Pyridium for bladder spasms/pain    ID:   -Afebrile    GI:   -Continue regular diet  -+BM yesterday      Discharge planning:   Awaiting authorization for [facility]NICA

## 2017-12-12 NOTE — PROGRESS NOTE ADULT - SUBJECTIVE AND OBJECTIVE BOX
Post-op day # 1 s/p L3-5 lumbar laminectomy    Overnight event: B/p was elevated overnight secondary to pain  Right thigh pain much improved    Vital Signs Last 24 Hrs  T(C): 36.7 (08 Dec 2017 12:05), Max: 37.2 (07 Dec 2017 21:36)  T(F): 98.1 (08 Dec 2017 12:05), Max: 98.9 (07 Dec 2017 21:36)  HR: 84 (08 Dec 2017 12:05) (62 - 89)  BP: 172/84 (08 Dec 2017 12:05) (141/78 - 180/84)  BP(mean): 110 (07 Dec 2017 19:30) (102 - 122)  RR: 18 (08 Dec 2017 12:05) (15 - 18)  SpO2: 100% (08 Dec 2017 12:05) (95% - 100%)                          12.1   11.01 )-----------( 273      ( 08 Dec 2017 07:28 )             35.4    12-08    145  |  104  |  12  ----------------------------<  126<H>  5.1   |  28  |  1.10    Ca    9.4      08 Dec 2017 07:33       Stroke Core Measures      DRAIN OUTPUT:     NEUROIMAGING:     PHYSICAL EXAM:    General: No Acute Distress     Neurological: Awake, alert oriented x 3, Moving all extremities, Muscle Strength normal in all four extremities, No Drift, Sensation to Light Touch Intact    Pulmonary: Clear to Auscultation, No Rales, No Rhonchi, No Wheezes     Cardiovascular: S1, S2, Regular Rate and Rhythm     Gastrointestinal: Soft, Nontender, Nondistended     Extremities: No calf tenderness     Incision: dressing with minimal bloody stain, left in place    MEDICATIONS:   Antibiotics:  ceFAZolin   IVPB 2000 milliGRAM(s) IV Intermittent once    Neuro:  acetaminophen   Tablet 650 milliGRAM(s) Oral every 6 hours PRN For Temp greater than 38 C (100.4 F)  acetaminophen   Tablet. 650 milliGRAM(s) Oral every 6 hours PRN Mild Pain (1 - 3)  cyclobenzaprine 10 milliGRAM(s) Oral three times a day PRN Muscle Spasm  fentaNYL   Patch 100 MICROgram(s)/Hr 2 Patch Transdermal every 72 hours  gabapentin 600 milliGRAM(s) Oral three times a day  HYDROmorphone   Tablet 4 milliGRAM(s) Oral every 4 hours PRN Moderate Pain (4 - 6)  HYDROmorphone   Tablet 6 milliGRAM(s) Oral every 6 hours PRN Severe Pain (7 - 10)  ondansetron Injectable 4 milliGRAM(s) IV Push every 6 hours PRN Nausea    Anticoagulation:    Cardiology:  metoprolol     tartrate 25 milliGRAM(s) Oral two times a day    Endo:   atorvastatin 20 milliGRAM(s) Oral at bedtime  gemfibrozil 600 milliGRAM(s) Oral two times a day before meals    Pulm:    GI/:  docusate sodium 100 milliGRAM(s) Oral three times a day  famotidine    Tablet 20 milliGRAM(s) Oral daily  pantoprazole    Tablet 40 milliGRAM(s) Oral before breakfast  senna 2 Tablet(s) Oral at bedtime    Other:  multivitamin 1 Tablet(s) Oral daily  naloxone Injectable 0.1 milliGRAM(s) IV Push every 3 minutes PRN For ANY of the following changes in patient status:  A. RR LESS THAN 10 breaths per minute, B. Oxygen saturation LESS THAN 90%, C. Sedation score of 6
Day 1 of Anesthesia Pain Management Service    SUBJECTIVE: Patient is doing well with IV PCA    Pain Scale Score:	[X] Refer to charted pain scores    THERAPY:    [ ] IV PCA Morphine		[ ] 5 mg/mL	[ ] 1 mg/mL  [X] IV PCA Hydromorphone	[ ] 5 mg/mL	[X] 1 mg/mL  [ ] IV PCA Fentanyl		[ ] 50 micrograms/mL    Demand dose: 0.1 mg     Lockout: 6 minutes   Continuous Rate: 0 mg/hr  4 Hour Limit: 4 mg    MEDICATIONS  (STANDING):  atorvastatin 20 milliGRAM(s) Oral at bedtime  ceFAZolin   IVPB 2000 milliGRAM(s) IV Intermittent once  docusate sodium 100 milliGRAM(s) Oral three times a day  famotidine    Tablet 20 milliGRAM(s) Oral daily  fentaNYL   Patch 100 MICROgram(s)/Hr 2 Patch Transdermal every 72 hours  gabapentin 600 milliGRAM(s) Oral three times a day  gemfibrozil 600 milliGRAM(s) Oral two times a day before meals  metoprolol     tartrate 25 milliGRAM(s) Oral two times a day  multivitamin 1 Tablet(s) Oral daily  pantoprazole    Tablet 40 milliGRAM(s) Oral before breakfast  senna 2 Tablet(s) Oral at bedtime    MEDICATIONS  (PRN):  acetaminophen   Tablet 650 milliGRAM(s) Oral every 6 hours PRN For Temp greater than 38 C (100.4 F)  acetaminophen   Tablet. 650 milliGRAM(s) Oral every 6 hours PRN Mild Pain (1 - 3)  cyclobenzaprine 10 milliGRAM(s) Oral three times a day PRN Muscle Spasm  HYDROmorphone   Tablet 4 milliGRAM(s) Oral every 4 hours PRN Moderate Pain (4 - 6)  HYDROmorphone   Tablet 6 milliGRAM(s) Oral every 6 hours PRN Severe Pain (7 - 10)  naloxone Injectable 0.1 milliGRAM(s) IV Push every 3 minutes PRN For ANY of the following changes in patient status:  A. RR LESS THAN 10 breaths per minute, B. Oxygen saturation LESS THAN 90%, C. Sedation score of 6  ondansetron Injectable 4 milliGRAM(s) IV Push every 6 hours PRN Nausea      OBJECTIVE:    Sedation Score:	[ X] Alert	[ ] Drowsy 	[ ] Arousable	[ ] Asleep	[ ] Unresponsive    Side Effects:	[X ] None	[ ] Nausea	[ ] Vomiting	[ ] Pruritus  		[ ] Other:    Vital Signs Last 24 Hrs  T(C): 36.7 (08 Dec 2017 08:11), Max: 37.2 (07 Dec 2017 21:36)  T(F): 98 (08 Dec 2017 08:11), Max: 98.9 (07 Dec 2017 21:36)  HR: 89 (08 Dec 2017 10:25) (62 - 89)  BP: 156/76 (08 Dec 2017 10:25) (141/78 - 180/84)  BP(mean): 110 (07 Dec 2017 19:30) (102 - 122)  RR: 16 (08 Dec 2017 08:11) (15 - 18)  SpO2: 98% (08 Dec 2017 08:11) (95% - 100%)    ASSESSMENT/ PLAN    Therapy to  be:               [ ] Continued   [X ] Discontinued   [X ] Changed to PRN Analgesics    Documentation and Verification of current medications:   [X] Done	[ ] Not done, not eligible    Comments: PCA D\C'd by primary service
HPI: Patient with history of multiple spinal surgeries presents with continued LBP and right left numbness.    OVERNIGHT EVENTS:  No acute events overnight.  Awaiting rehab.    Vital Signs Last 24 Hrs  T(C): 36.7 (12 Dec 2017 07:49), Max: 37.1 (11 Dec 2017 16:02)  T(F): 98 (12 Dec 2017 07:49), Max: 98.7 (11 Dec 2017 16:02)  HR: 56 (12 Dec 2017 07:49) (53 - 85)  BP: 98/71 (12 Dec 2017 07:49) (95/64 - 179/96)  BP(mean): --  RR: 18 (12 Dec 2017 07:49) (18 - 18)  SpO2: 97% (12 Dec 2017 07:49) (96% - 100%)    I&O's Detail    11 Dec 2017 07:01  -  12 Dec 2017 07:00  --------------------------------------------------------  IN:    Oral Fluid: 1480 mL  Total IN: 1480 mL    OUT:  Total OUT: 0 mL    Total NET: 1480 mL      12 Dec 2017 07:01  -  12 Dec 2017 09:12  --------------------------------------------------------  IN:  Total IN: 0 mL    OUT:    Voided: 200 mL  Total OUT: 200 mL    Total NET: -200 mL        I&O's Summary    11 Dec 2017 07:01  -  12 Dec 2017 07:00  --------------------------------------------------------  IN: 1480 mL / OUT: 0 mL / NET: 1480 mL    12 Dec 2017 07:01  -  12 Dec 2017 09:12  --------------------------------------------------------  IN: 0 mL / OUT: 200 mL / NET: -200 mL        PHYSICAL EXAM:  Neurological:  Awake and Alert, Oriented X3.  FC briskly, speech fluent.  SAAVEDRA with good strength.  Right leg pain subjectively improved.    Still with decreased sensation to right leg, although endorses improvement.    Cardiovascular: +s1, s2  Respiratory: clear to auscultation b/l  Gastrointestinal: soft, non-distended, non-tender  Incision/Wound: Incision clean and dry, sutures in place.  No signs of infection.    DIET:  Regular with ensure supplements    Allergies:  No Known Allergies    Intolerances      MEDICATIONS:  Antibiotics:    Neuro:  acetaminophen   Tablet 650 milliGRAM(s) Oral every 6 hours PRN  acetaminophen   Tablet. 650 milliGRAM(s) Oral every 6 hours PRN  cyclobenzaprine 10 milliGRAM(s) Oral three times a day PRN  fentaNYL   Patch 100 MICROgram(s)/Hr 2 Patch Transdermal every 72 hours  gabapentin 600 milliGRAM(s) Oral three times a day  HYDROmorphone   Tablet 4 milliGRAM(s) Oral every 4 hours PRN  HYDROmorphone   Tablet 6 milliGRAM(s) Oral every 6 hours PRN  hydrOXYzine hydrochloride 10 milliGRAM(s) Oral at bedtime PRN  ondansetron Injectable 4 milliGRAM(s) IV Push every 6 hours PRN    Anticoagulation:  enoxaparin Injectable 40 milliGRAM(s) SubCutaneous <User Schedule>    OTHER:  atorvastatin 20 milliGRAM(s) Oral at bedtime  docusate sodium 100 milliGRAM(s) Oral three times a day  famotidine    Tablet 20 milliGRAM(s) Oral daily  gemfibrozil 600 milliGRAM(s) Oral two times a day before meals  influenza   Vaccine 0.5 milliLiter(s) IntraMuscular once  magnesium citrate Solution 300 milliLiter(s) Oral once  metoprolol     tartrate 25 milliGRAM(s) Oral two times a day  naloxone Injectable 0.1 milliGRAM(s) IV Push every 3 minutes PRN  pantoprazole    Tablet 40 milliGRAM(s) Oral before breakfast  phenazopyridine 100 milliGRAM(s) Oral every 8 hours  polyethylene glycol 3350 17 Gram(s) Oral two times a day  senna 2 Tablet(s) Oral at bedtime    IVF:  multivitamin 1 Tablet(s) Oral daily
SUBJECTIVE:   Burning on urination. Right leg pain resolved. minimal incisional pain  OVERNIGHT EVENTS: none    Vital Signs Last 24 Hrs  T(C): 36.8 (09 Dec 2017 08:23), Max: 37.1 (09 Dec 2017 04:23)  T(F): 98.2 (09 Dec 2017 08:23), Max: 98.8 (09 Dec 2017 04:23)  HR: 67 (09 Dec 2017 08:23) (67 - 84)  BP: 157/77 (09 Dec 2017 08:23) (155/81 - 172/84)  RR: 18 (09 Dec 2017 08:23) (18 - 18)  SpO2: 97% (09 Dec 2017 08:23) (97% - 100%)    PHYSICAL EXAM:    Constitutional: No Acute Distress     Neurological: AOx3, Following Commands, Moving all Extremities with good strength. Sensation intact  Incision- lumbar sutures C/D/I      LABS:                        12.4   10.38 )-----------( 275      ( 09 Dec 2017 07:34 )             36.7    12-09    141  |  104  |  12  ----------------------------<  116<H>  4.1   |  22  |  0.96    Ca    9.9      09 Dec 2017 07:34          IMAGING:         MEDICATIONS:    acetaminophen   Tablet 650 milliGRAM(s) Oral every 6 hours PRN For Temp greater than 38 C (100.4 F)  acetaminophen   Tablet. 650 milliGRAM(s) Oral every 6 hours PRN Mild Pain (1 - 3)  cyclobenzaprine 10 milliGRAM(s) Oral three times a day PRN Muscle Spasm  diphenhydrAMINE   Capsule 50 milliGRAM(s) Oral at bedtime PRN Insomnia  fentaNYL   Patch 100 MICROgram(s)/Hr 2 Patch Transdermal every 72 hours  gabapentin 600 milliGRAM(s) Oral three times a day  HYDROmorphone   Tablet 4 milliGRAM(s) Oral every 4 hours PRN Moderate Pain (4 - 6)  HYDROmorphone   Tablet 6 milliGRAM(s) Oral every 6 hours PRN Severe Pain (7 - 10)  ondansetron Injectable 4 milliGRAM(s) IV Push every 6 hours PRN Nausea  metoprolol     tartrate 25 milliGRAM(s) Oral two times a day  docusate sodium 100 milliGRAM(s) Oral three times a day  famotidine    Tablet 20 milliGRAM(s) Oral daily  pantoprazole    Tablet 40 milliGRAM(s) Oral before breakfast  senna 2 Tablet(s) Oral at bedtime  atorvastatin 20 milliGRAM(s) Oral at bedtime  enoxaparin Injectable 40 milliGRAM(s) SubCutaneous <User Schedule>  gemfibrozil 600 milliGRAM(s) Oral two times a day before meals  multivitamin 1 Tablet(s) Oral daily      DIET:
SUBJECTIVE:   No complaints. Dysuria resolved.   OVERNIGHT EVENTS: none    Vital Signs Last 24 Hrs  T(C): 36.7 (10 Dec 2017 08:07), Max: 37 (09 Dec 2017 15:58)  T(F): 98.1 (10 Dec 2017 08:07), Max: 98.6 (09 Dec 2017 15:58)  HR: 69 (10 Dec 2017 08:07) (68 - 87)  BP: 168/91 (10 Dec 2017 08:07) (147/78 - 177/93)  RR: 18 (10 Dec 2017 08:07) (18 - 18)  SpO2: 98% (10 Dec 2017 08:07) (96% - 99%)    PHYSICAL EXAM:    Constitutional: No Acute Distress     Neurological: AOx3, Following Commands, Moving all Extremities with good strength. Sensation intact  Incision- lumbar sutures C/D/I    Lungs CTA    CV S1S2+     Abdomen soft nontender + BS    LABS:                        12.4   8.12  )-----------( 247      ( 10 Dec 2017 08:40 )             37.4    12-10    142  |  102  |  13  ----------------------------<  94  4.4   |  24  |  0.98    Ca    10.1      10 Dec 2017 08:45    UA neg leukocyte esterase, neg nitrite      IMAGING:         MEDICATIONS:    acetaminophen   Tablet. 650 milliGRAM(s) Oral every 6 hours PRN Mild Pain (1 - 3)  cyclobenzaprine 10 milliGRAM(s) Oral three times a day PRN Muscle Spasm  fentaNYL   Patch 100 MICROgram(s)/Hr 2 Patch Transdermal every 72 hours  gabapentin 600 milliGRAM(s) Oral three times a day  HYDROmorphone   Tablet 4 milliGRAM(s) Oral every 4 hours PRN Moderate Pain (4 - 6)  HYDROmorphone   Tablet 6 milliGRAM(s) Oral every 6 hours PRN Severe Pain (7 - 10)  hydrOXYzine hydrochloride 10 milliGRAM(s) Oral at bedtime PRN Insomnia  metoprolol     tartrate 25 milliGRAM(s) Oral two times a day  docusate sodium 100 milliGRAM(s) Oral three times a day  famotidine    Tablet 20 milliGRAM(s) Oral daily  pantoprazole    Tablet 40 milliGRAM(s) Oral before breakfast  phenazopyridine 100 milliGRAM(s) Oral every 8 hours  polyethylene glycol 3350 17 Gram(s) Oral two times a day  senna 2 Tablet(s) Oral at bedtime  atorvastatin 20 milliGRAM(s) Oral at bedtime  enoxaparin Injectable 40 milliGRAM(s) SubCutaneous <User Schedule>  gemfibrozil 600 milliGRAM(s) Oral two times a day before meals  multivitamin 1 Tablet(s) Oral daily    DIET:
SUBJECTIVE: Pt seen and examined, reports no BM in 5 days    OVERNIGHT EVENTS: none    Vital Signs Last 24 Hrs  T(C): 36.5 (11 Dec 2017 05:06), Max: 36.7 (10 Dec 2017 20:01)  T(F): 97.7 (11 Dec 2017 05:06), Max: 98 (10 Dec 2017 20:01)  HR: 65 (11 Dec 2017 05:06) (60 - 78)  BP: 99/63 (11 Dec 2017 05:06) (77/56 - 101/67)  BP(mean): --  RR: 18 (11 Dec 2017 05:06) (18 - 18)  SpO2: 97% (11 Dec 2017 05:06) (96% - 98%)    PHYSICAL EXAM:    General: No Acute Distress     Neurological: Awake, alert oriented to person, place and time, Following Commands, Speech Fluent, Moving all extremities, Muscle Strength normal in all four extremities, No Drift, Sensation to Light Touch Intact    Pulmonary: Clear to Auscultation, No Rales, No Rhonchi, No Wheezes     Cardiovascular: S1, S2, Regular Rate and Rhythm     Gastrointestinal: Soft, Nontender, Nondistended     Extremities: No calf tenderness     Incision: sutures c/d/i    LABS:                        12.4   8.12  )-----------( 247      ( 10 Dec 2017 08:40 )             37.4    12-10    142  |  102  |  13  ----------------------------<  94  4.4   |  24  |  0.98    Ca    10.1      10 Dec 2017 08:45          12-10 @ 07:01  -  12-11 @ 07:00  --------------------------------------------------------  IN: 240 mL / OUT: 200 mL / NET: 40 mL      DRAINS: none    MEDICATIONS:  Antibiotics:    Neuro:  acetaminophen   Tablet 650 milliGRAM(s) Oral every 6 hours PRN For Temp greater than 38 C (100.4 F)  acetaminophen   Tablet. 650 milliGRAM(s) Oral every 6 hours PRN Mild Pain (1 - 3)  cyclobenzaprine 10 milliGRAM(s) Oral three times a day PRN Muscle Spasm  fentaNYL   Patch 100 MICROgram(s)/Hr 2 Patch Transdermal every 72 hours  gabapentin 600 milliGRAM(s) Oral three times a day  HYDROmorphone   Tablet 4 milliGRAM(s) Oral every 4 hours PRN Moderate Pain (4 - 6)  HYDROmorphone   Tablet 6 milliGRAM(s) Oral every 6 hours PRN Severe Pain (7 - 10)  hydrOXYzine hydrochloride 10 milliGRAM(s) Oral at bedtime PRN Insomnia  ondansetron Injectable 4 milliGRAM(s) IV Push every 6 hours PRN Nausea    Cardiac:  metoprolol     tartrate 25 milliGRAM(s) Oral two times a day    Pulm:    GI/:  docusate sodium 100 milliGRAM(s) Oral three times a day  famotidine    Tablet 20 milliGRAM(s) Oral daily  magnesium citrate Solution 300 milliLiter(s) Oral once  pantoprazole    Tablet 40 milliGRAM(s) Oral before breakfast  phenazopyridine 100 milliGRAM(s) Oral every 8 hours  polyethylene glycol 3350 17 Gram(s) Oral two times a day  senna 2 Tablet(s) Oral at bedtime    Other:   atorvastatin 20 milliGRAM(s) Oral at bedtime  enoxaparin Injectable 40 milliGRAM(s) SubCutaneous <User Schedule>  gemfibrozil 600 milliGRAM(s) Oral two times a day before meals  influenza   Vaccine 0.5 milliLiter(s) IntraMuscular once  multivitamin 1 Tablet(s) Oral daily  naloxone Injectable 0.1 milliGRAM(s) IV Push every 3 minutes PRN For ANY of the following changes in patient status:  A. RR LESS THAN 10 breaths per minute, B. Oxygen saturation LESS THAN 90%, C. Sedation score of 6    DIET: [x] Regular [] CCD [] Renal [] Puree [] Dysphagia [] Tube Feeds:     IMAGING:
Visit Summary: Patient seen and evaluated at bedside.        Exam:  T(C): 36.2 (12-07-17 @ 13:15), Max: 36.8 (12-07-17 @ 09:39)  HR: 74 (12-07-17 @ 16:00) (62 - 85)  BP: 155/79 (12-07-17 @ 16:00) (149/67 - 175/106)  RR: 16 (12-07-17 @ 16:00) (15 - 18)  SpO2: 100% (12-07-17 @ 16:00) (99% - 100%)  Wt(kg): --    PHYSICAL EXAM:    Constitutional: No Acute Distress     Neurological: AOx3, Following Commands    Motor exam:          Upper extremity                         Delt     Bicep     Tricep    HG                                                 R         5/5        5/5        5/5       5/5                                               L          5/5        5/5        5/5       5/5          Lower extremity                        HF         KF        KE       DF         PF                                                  R        5/5        5/5        5/5       5/5         5/5                                               L         5/5        5/5       5/5       5/5          5/5                                                 Sensation: [x] intact to light touch  [] decreased:     No clonus                            12.6   8.6   )-----------( 251      ( 07 Dec 2017 15:42 )             35.7     12-07    142  |  101  |  13  ----------------------------<  125<H>  3.6   |  28  |  0.99    Ca    8.9      07 Dec 2017 15:42

## 2017-12-12 NOTE — DISCHARGE NOTE ADULT - PATIENT PORTAL LINK FT
“You can access the [facility] Patient Portal, offered by [facility], by registering with the following website: [email or web address]”

## 2017-12-12 NOTE — DISCHARGE NOTE ADULT - MEDICATION SUMMARY - MEDICATIONS TO TAKE
I will START or STAY ON the medications listed below when I get home from the hospital:    acetaminophen 325 mg oral tablet  -- 2 tab(s) by mouth every 6 hours, As needed, For Temp greater than 38 C (100.4 F)  -- Indication: For prn fever    acetaminophen 325 mg oral tablet  -- 2 tab(s) by mouth every 6 hours, As needed, Mild Pain (1 - 3)  -- Indication: For prn pain    fentaNYL 100 mcg/hr transdermal film, extended release  -- 2 patch by transdermal patch every 72 hours  -- Indication: For pain control    HYDROmorphone 4 mg oral tablet  -- 1 tab(s) by mouth every 4 hours, As needed, Moderate Pain (4 - 6)  -- Indication: For pain control    HYDROmorphone 2 mg oral tablet  -- 3 tab(s) by mouth every 6 hours, As needed, Severe Pain (7 - 10)  -- Indication: For pain control    enoxaparin  -- 40 milligram(s) subcutaneous once a day (at bedtime)  -- Indication: For dvt prophylaxis    gabapentin 300 mg oral capsule  -- 2 cap(s) by mouth 3 times a day  -- Indication: For neuropathy     atorvastatin 20 mg oral tablet  -- 1 tab(s) by mouth once a day (at bedtime)  -- Indication: For hyperlipidemia    gemfibrozil 600 mg oral tablet  -- 1 tab(s) by mouth 2 times a day (before meals)  -- Indication: For hyperlipidemia    metoprolol tartrate 25 mg oral tablet  -- 1 tab(s) by mouth 2 times a day  -- Indication: For history of mi    famotidine 20 mg oral tablet  -- 1 tab(s) by mouth once a day  -- Indication: For gi prophylaxis    influenza virus vaccine, inactivated  -- Indication: For flu season     docusate sodium 100 mg oral capsule  -- 1 cap(s) by mouth 3 times a day  -- Indication: For Stool softner    polyethylene glycol 3350 oral powder for reconstitution  -- 17 gram(s) by mouth 2 times a day  -- Indication: For constipation    senna oral tablet  -- 2 tab(s) by mouth once a day (at bedtime)  -- Indication: For Stool softner    cyclobenzaprine 10 mg oral tablet  -- 1 tab(s) by mouth 3 times a day, As needed, Muscle Spasm  -- Indication: For muscle spasm    ocular lubricant ophthalmic solution  -- 1 drop(s) to each affected eye every 4 hours, As needed, Dry Eyes  -- Indication: For dry eyes    pantoprazole 40 mg oral delayed release tablet  -- 1 tab(s) by mouth once a day (before a meal)  -- Indication: For gi prophylaxis     Multiple Vitamins oral tablet  -- 1 tab(s) by mouth once a day  -- Indication: For undefinied

## 2017-12-12 NOTE — DISCHARGE NOTE ADULT - MEDICATION SUMMARY - MEDICATIONS TO CHANGE
I will SWITCH the dose or number of times a day I take the medications listed below when I get home from the hospital:    HYDROmorphone 8 mg oral tablet  -- 2 tab(s) by mouth every 4 hours, As Needed - 6)

## 2017-12-12 NOTE — DISCHARGE NOTE ADULT - ADDITIONAL INSTRUCTIONS
Follow up with Dr. Cohn in 1 week after discharge from the hospital.  call office for appointment [phone].  aspirin to be restarted when Dr. Cohn clears   follow up with primary care physician  follow up with cardiologist   let attending know if any drainage from incision, nausea vomiting, pain not controlled by medication

## 2017-12-12 NOTE — DISCHARGE NOTE ADULT - PLAN OF CARE
l3-5 lumbar lamiectomy and re exploration with right l3 to 4 foraminotomy follow up with Dr. Cohn in 1 week.  call office for appointment [phone] continue protonix continue lopressor follow up with primary care physician

## 2017-12-12 NOTE — DISCHARGE NOTE ADULT - CARE PLAN
Principal Discharge DX:	Back pain  Goal:	l3-5 lumbar lamiectomy and re exploration with right l3 to 4 foraminotomy  Instructions for follow-up, activity and diet:	follow up with Dr. Cohn in 1 week.  call office for appointment [phone]  Secondary Diagnosis:	GERD (gastroesophageal reflux disease)  Goal:	continue protonix  Secondary Diagnosis:	Hypertension  Goal:	continue lopressor  Secondary Diagnosis:	Osteoarthritis  Goal:	follow up with primary care physician

## 2017-12-12 NOTE — DISCHARGE NOTE ADULT - CARE PROVIDER_API CALL
Shannen Cohn (DO), Neurological Surgery  [address]  [place] [address]  Phone: [phone]  Fax: [phone]

## 2017-12-12 NOTE — DISCHARGE NOTE ADULT - NS AS DC STROKE ED MATERIALS
Call 911 for Stroke/Stroke Education Booklet/Stroke Warning Signs and Symptoms/Risk Factors for Stroke/Prescribed Medications/Need for Followup After Discharge

## 2017-12-26 ENCOUNTER — APPOINTMENT (OUTPATIENT)
Dept: SPINE | Facility: CLINIC | Age: 62
End: 2017-12-26

## 2017-12-26 ENCOUNTER — APPOINTMENT (OUTPATIENT)
Dept: SPINE | Facility: CLINIC | Age: 62
End: 2017-12-26
Payer: COMMERCIAL

## 2017-12-26 VITALS
HEIGHT: 68 IN | DIASTOLIC BLOOD PRESSURE: 87 MMHG | SYSTOLIC BLOOD PRESSURE: 148 MMHG | HEART RATE: 61 BPM | WEIGHT: 138 LBS | BODY MASS INDEX: 20.92 KG/M2

## 2017-12-26 PROCEDURE — 99024 POSTOP FOLLOW-UP VISIT: CPT

## 2017-12-26 RX ORDER — LIDOCAINE 5% 700 MG/1
5 PATCH TOPICAL
Qty: 30 | Refills: 0 | Status: COMPLETED | COMMUNITY
Start: 2017-06-02 | End: 2017-12-26

## 2017-12-27 ENCOUNTER — APPOINTMENT (OUTPATIENT)
Dept: ORTHOPEDIC SURGERY | Facility: CLINIC | Age: 62
End: 2017-12-27

## 2018-01-12 ENCOUNTER — APPOINTMENT (OUTPATIENT)
Dept: PLASTIC SURGERY | Facility: CLINIC | Age: 63
End: 2018-01-12
Payer: MEDICARE

## 2018-01-12 ENCOUNTER — LABORATORY RESULT (OUTPATIENT)
Age: 63
End: 2018-01-12

## 2018-01-12 VITALS
WEIGHT: 138 LBS | DIASTOLIC BLOOD PRESSURE: 100 MMHG | TEMPERATURE: 98.3 F | HEART RATE: 73 BPM | SYSTOLIC BLOOD PRESSURE: 186 MMHG | HEIGHT: 68 IN | BODY MASS INDEX: 20.92 KG/M2

## 2018-01-12 DIAGNOSIS — Z78.9 OTHER SPECIFIED HEALTH STATUS: ICD-10-CM

## 2018-01-12 PROCEDURE — 99203 OFFICE O/P NEW LOW 30 MIN: CPT

## 2018-01-12 RX ORDER — SULFAMETHOXAZOLE AND TRIMETHOPRIM 800; 160 MG/1; MG/1
800-160 TABLET ORAL
Qty: 14 | Refills: 0 | Status: ACTIVE | COMMUNITY
Start: 2018-01-12 | End: 1900-01-01

## 2018-01-16 ENCOUNTER — APPOINTMENT (OUTPATIENT)
Dept: PLASTIC SURGERY | Facility: HOSPITAL | Age: 63
End: 2018-01-16

## 2018-01-16 ENCOUNTER — TRANSCRIPTION ENCOUNTER (OUTPATIENT)
Age: 63
End: 2018-01-16

## 2018-01-16 ENCOUNTER — RESULT REVIEW (OUTPATIENT)
Age: 63
End: 2018-01-16

## 2018-01-16 ENCOUNTER — INPATIENT (INPATIENT)
Facility: HOSPITAL | Age: 63
LOS: 0 days | Discharge: HOME | DRG: 502 | End: 2018-01-17
Attending: PLASTIC SURGERY | Admitting: PLASTIC SURGERY
Payer: COMMERCIAL

## 2018-01-16 VITALS
HEIGHT: 68 IN | HEART RATE: 82 BPM | TEMPERATURE: 99 F | SYSTOLIC BLOOD PRESSURE: 166 MMHG | WEIGHT: 125.66 LBS | RESPIRATION RATE: 18 BRPM | OXYGEN SATURATION: 95 % | DIASTOLIC BLOOD PRESSURE: 92 MMHG

## 2018-01-16 DIAGNOSIS — S21.209A UNSPECIFIED OPEN WOUND OF UNSPECIFIED BACK WALL OF THORAX WITHOUT PENETRATION INTO THORACIC CAVITY, INITIAL ENCOUNTER: ICD-10-CM

## 2018-01-16 DIAGNOSIS — Z98.89 OTHER SPECIFIED POSTPROCEDURAL STATES: Chronic | ICD-10-CM

## 2018-01-16 DIAGNOSIS — M43.28 FUSION OF SPINE, SACRAL AND SACROCOCCYGEAL REGION: Chronic | ICD-10-CM

## 2018-01-16 DIAGNOSIS — Z96.649 PRESENCE OF UNSPECIFIED ARTIFICIAL HIP JOINT: Chronic | ICD-10-CM

## 2018-01-16 LAB
BLD GP AB SCN SERPL QL: NEGATIVE — SIGNIFICANT CHANGE UP
RH IG SCN BLD-IMP: POSITIVE — SIGNIFICANT CHANGE UP

## 2018-01-16 PROCEDURE — 88305 TISSUE EXAM BY PATHOLOGIST: CPT | Mod: 26

## 2018-01-16 PROCEDURE — 15734 MUSCLE-SKIN GRAFT TRUNK: CPT

## 2018-01-16 PROCEDURE — 11042 DBRDMT SUBQ TIS 1ST 20SQCM/<: CPT | Mod: 59

## 2018-01-16 PROCEDURE — 11045 DBRDMT SUBQ TISS EACH ADDL: CPT | Mod: 59

## 2018-01-16 RX ORDER — ACETAMINOPHEN 500 MG
1000 TABLET ORAL ONCE
Qty: 0 | Refills: 0 | Status: COMPLETED | OUTPATIENT
Start: 2018-01-17 | End: 2018-01-17

## 2018-01-16 RX ORDER — ATORVASTATIN CALCIUM 80 MG/1
20 TABLET, FILM COATED ORAL AT BEDTIME
Qty: 0 | Refills: 0 | Status: DISCONTINUED | OUTPATIENT
Start: 2018-01-16 | End: 2018-01-17

## 2018-01-16 RX ORDER — FENTANYL CITRATE 50 UG/ML
2 INJECTION INTRAVENOUS
Qty: 0 | Refills: 0 | Status: DISCONTINUED | OUTPATIENT
Start: 2018-01-16 | End: 2018-01-17

## 2018-01-16 RX ORDER — DOCUSATE SODIUM 100 MG
100 CAPSULE ORAL THREE TIMES A DAY
Qty: 0 | Refills: 0 | Status: DISCONTINUED | OUTPATIENT
Start: 2018-01-16 | End: 2018-01-17

## 2018-01-16 RX ORDER — SODIUM CHLORIDE 9 MG/ML
3 INJECTION INTRAMUSCULAR; INTRAVENOUS; SUBCUTANEOUS EVERY 8 HOURS
Qty: 0 | Refills: 0 | Status: DISCONTINUED | OUTPATIENT
Start: 2018-01-16 | End: 2018-01-16

## 2018-01-16 RX ORDER — SODIUM CHLORIDE 9 MG/ML
1000 INJECTION, SOLUTION INTRAVENOUS
Qty: 0 | Refills: 0 | Status: DISCONTINUED | OUTPATIENT
Start: 2018-01-16 | End: 2018-01-16

## 2018-01-16 RX ORDER — HYDROMORPHONE HYDROCHLORIDE 2 MG/ML
8 INJECTION INTRAMUSCULAR; INTRAVENOUS; SUBCUTANEOUS EVERY 4 HOURS
Qty: 0 | Refills: 0 | Status: DISCONTINUED | OUTPATIENT
Start: 2018-01-16 | End: 2018-01-17

## 2018-01-16 RX ORDER — HYDROMORPHONE HYDROCHLORIDE 2 MG/ML
1 INJECTION INTRAMUSCULAR; INTRAVENOUS; SUBCUTANEOUS
Qty: 0 | Refills: 0 | Status: DISCONTINUED | OUTPATIENT
Start: 2018-01-16 | End: 2018-01-17

## 2018-01-16 RX ORDER — CEFAZOLIN SODIUM 1 G
2000 VIAL (EA) INJECTION ONCE
Qty: 0 | Refills: 0 | Status: DISCONTINUED | OUTPATIENT
Start: 2018-01-16 | End: 2018-01-16

## 2018-01-16 RX ORDER — METOPROLOL TARTRATE 50 MG
25 TABLET ORAL
Qty: 0 | Refills: 0 | Status: DISCONTINUED | OUTPATIENT
Start: 2018-01-16 | End: 2018-01-17

## 2018-01-16 RX ORDER — HYDROMORPHONE HYDROCHLORIDE 2 MG/ML
4 INJECTION INTRAMUSCULAR; INTRAVENOUS; SUBCUTANEOUS EVERY 4 HOURS
Qty: 0 | Refills: 0 | Status: DISCONTINUED | OUTPATIENT
Start: 2018-01-16 | End: 2018-01-17

## 2018-01-16 RX ORDER — SENNA PLUS 8.6 MG/1
2 TABLET ORAL AT BEDTIME
Qty: 0 | Refills: 0 | Status: DISCONTINUED | OUTPATIENT
Start: 2018-01-16 | End: 2018-01-17

## 2018-01-16 RX ORDER — GEMFIBROZIL 600 MG
600 TABLET ORAL
Qty: 0 | Refills: 0 | Status: DISCONTINUED | OUTPATIENT
Start: 2018-01-16 | End: 2018-01-17

## 2018-01-16 RX ORDER — ENOXAPARIN SODIUM 100 MG/ML
40 INJECTION SUBCUTANEOUS DAILY
Qty: 0 | Refills: 0 | Status: DISCONTINUED | OUTPATIENT
Start: 2018-01-16 | End: 2018-01-17

## 2018-01-16 RX ORDER — HYDROMORPHONE HYDROCHLORIDE 2 MG/ML
8 INJECTION INTRAMUSCULAR; INTRAVENOUS; SUBCUTANEOUS EVERY 4 HOURS
Qty: 0 | Refills: 0 | Status: DISCONTINUED | OUTPATIENT
Start: 2018-01-16 | End: 2018-01-16

## 2018-01-16 RX ORDER — ONDANSETRON 8 MG/1
4 TABLET, FILM COATED ORAL ONCE
Qty: 0 | Refills: 0 | Status: DISCONTINUED | OUTPATIENT
Start: 2018-01-16 | End: 2018-01-17

## 2018-01-16 RX ORDER — CYCLOBENZAPRINE HYDROCHLORIDE 10 MG/1
10 TABLET, FILM COATED ORAL THREE TIMES A DAY
Qty: 0 | Refills: 0 | Status: DISCONTINUED | OUTPATIENT
Start: 2018-01-16 | End: 2018-01-17

## 2018-01-16 RX ORDER — HYDROMORPHONE HYDROCHLORIDE 2 MG/ML
16 INJECTION INTRAMUSCULAR; INTRAVENOUS; SUBCUTANEOUS EVERY 4 HOURS
Qty: 0 | Refills: 0 | Status: DISCONTINUED | OUTPATIENT
Start: 2018-01-16 | End: 2018-01-16

## 2018-01-16 RX ORDER — ACETAMINOPHEN 500 MG
1000 TABLET ORAL ONCE
Qty: 0 | Refills: 0 | Status: COMPLETED | OUTPATIENT
Start: 2018-01-16 | End: 2018-01-16

## 2018-01-16 RX ORDER — GABAPENTIN 400 MG/1
600 CAPSULE ORAL EVERY 8 HOURS
Qty: 0 | Refills: 0 | Status: DISCONTINUED | OUTPATIENT
Start: 2018-01-16 | End: 2018-01-17

## 2018-01-16 RX ORDER — HYDROMORPHONE HYDROCHLORIDE 2 MG/ML
2 INJECTION INTRAMUSCULAR; INTRAVENOUS; SUBCUTANEOUS
Qty: 0 | Refills: 0 | Status: DISCONTINUED | OUTPATIENT
Start: 2018-01-16 | End: 2018-01-17

## 2018-01-16 RX ORDER — PANTOPRAZOLE SODIUM 20 MG/1
40 TABLET, DELAYED RELEASE ORAL
Qty: 0 | Refills: 0 | Status: DISCONTINUED | OUTPATIENT
Start: 2018-01-16 | End: 2018-01-17

## 2018-01-16 RX ADMIN — Medication 400 MILLIGRAM(S): at 19:31

## 2018-01-16 RX ADMIN — HYDROMORPHONE HYDROCHLORIDE 8 MILLIGRAM(S): 2 INJECTION INTRAMUSCULAR; INTRAVENOUS; SUBCUTANEOUS at 20:35

## 2018-01-16 RX ADMIN — HYDROMORPHONE HYDROCHLORIDE 2 MILLIGRAM(S): 2 INJECTION INTRAMUSCULAR; INTRAVENOUS; SUBCUTANEOUS at 19:47

## 2018-01-16 RX ADMIN — HYDROMORPHONE HYDROCHLORIDE 2 MILLIGRAM(S): 2 INJECTION INTRAMUSCULAR; INTRAVENOUS; SUBCUTANEOUS at 19:40

## 2018-01-16 RX ADMIN — CYCLOBENZAPRINE HYDROCHLORIDE 10 MILLIGRAM(S): 10 TABLET, FILM COATED ORAL at 19:36

## 2018-01-16 RX ADMIN — HYDROMORPHONE HYDROCHLORIDE 8 MILLIGRAM(S): 2 INJECTION INTRAMUSCULAR; INTRAVENOUS; SUBCUTANEOUS at 20:08

## 2018-01-16 RX ADMIN — ATORVASTATIN CALCIUM 20 MILLIGRAM(S): 80 TABLET, FILM COATED ORAL at 21:21

## 2018-01-16 RX ADMIN — GABAPENTIN 600 MILLIGRAM(S): 400 CAPSULE ORAL at 21:22

## 2018-01-16 RX ADMIN — HYDROMORPHONE HYDROCHLORIDE 2 MILLIGRAM(S): 2 INJECTION INTRAMUSCULAR; INTRAVENOUS; SUBCUTANEOUS at 19:22

## 2018-01-16 RX ADMIN — SENNA PLUS 2 TABLET(S): 8.6 TABLET ORAL at 21:21

## 2018-01-16 RX ADMIN — HYDROMORPHONE HYDROCHLORIDE 2 MILLIGRAM(S): 2 INJECTION INTRAMUSCULAR; INTRAVENOUS; SUBCUTANEOUS at 20:07

## 2018-01-16 RX ADMIN — Medication 100 MILLIGRAM(S): at 21:21

## 2018-01-16 RX ADMIN — Medication 1000 MILLIGRAM(S): at 19:49

## 2018-01-16 NOTE — DISCHARGE NOTE ADULT - ADDITIONAL INSTRUCTIONS
Please follow up with Dr. Rushing within x1 week after discharge from the hospital. You may call [phone] to schedule an appointment.

## 2018-01-16 NOTE — DISCHARGE NOTE ADULT - PATIENT PORTAL LINK FT
“You can access the FollowHealth Patient Portal, offered by [facility], by registering with the following website: [email or web address]”

## 2018-01-16 NOTE — DISCHARGE NOTE ADULT - MEDICATION SUMMARY - MEDICATIONS TO TAKE
I will START or STAY ON the medications listed below when I get home from the hospital:    fentaNYL 100 mcg/hr transdermal film, extended release  -- 2 patch by transdermal patch every 72 hours  -- Indication: For Pain    HYDROmorphone 4 mg oral tablet  -- 2 tab(s) by mouth every 4 hours, As Needed - 6)  -- Indication: For Pain    gabapentin 300 mg oral capsule  -- 2 cap(s) by mouth 3 times a day  -- Indication: For Pain    atorvastatin 20 mg oral tablet  -- 1 tab(s) by mouth once a day (at bedtime)  -- Indication: For Hyperlipidemia    gemfibrozil 600 mg oral tablet  -- 1 tab(s) by mouth 2 times a day (before meals)  -- Indication: For Hyperlipidemia    metoprolol tartrate 25 mg oral tablet  -- 1 tab(s) by mouth 2 times a day  -- Indication: For Hypertension    docusate sodium 100 mg oral capsule  -- 1 cap(s) by mouth 3 times a day  -- Indication: For GI function    senna oral tablet  -- 2 tab(s) by mouth once a day (at bedtime)  -- Indication: For GI function    clindamycin 300 mg oral capsule  -- 1 cap(s) by mouth every 4 hours  -- Finish all this medication unless otherwise directed by prescriber.  Medication should be taken with plenty of water.    -- Indication: For Wound    cyclobenzaprine 10 mg oral tablet  -- 1 tab(s) by mouth 3 times a day, As needed, Muscle Spasm  -- Indication: For Muscle relaxant    pantoprazole 40 mg oral delayed release tablet  -- 1 tab(s) by mouth once a day (before a meal)  -- Indication: For GERD    Multiple Vitamins oral tablet  -- 1 tab(s) by mouth once a day  -- Indication: For Supplement

## 2018-01-16 NOTE — PACU DISCHARGE NOTE - COMMENTS
PACU discharge criteria has been met. Patient to go to floor where care is resumed as per primary team

## 2018-01-16 NOTE — DISCHARGE NOTE ADULT - PLAN OF CARE
Postoperative Recovery Resume normal diet. Avoid straining, exercise, or heavy lifting.    Take medications as instructed by prescriptions.    Please sponge bathe only until your first follow-up appointment.    Keep dressings clean, dry, and intact. Do not remove them until you're seen by Dr. Rushing.    Follow-up with primary care doctor as well.     Call 911 and return to the ED for chest pain, shortness of breath, significant increase in pain, or significant change in color of surgical sites.

## 2018-01-16 NOTE — BRIEF OPERATIVE NOTE - OPERATION/FINDINGS
1cm lumbar cyst of midline; an elliptical excision was performed revealing no tracking or purulence; permanent sutures were identified at the base of the wound; copious irrigation with pulse lavage (w/ baci); complex primary closure w/o tension.

## 2018-01-16 NOTE — DISCHARGE NOTE ADULT - CARE PLAN
Principal Discharge DX:	Back wound, unspecified laterality, initial encounter  Goal:	Postoperative Recovery  Assessment and plan of treatment:	Resume normal diet. Avoid straining, exercise, or heavy lifting.    Take medications as instructed by prescriptions.    Please sponge bathe only until your first follow-up appointment.    Keep dressings clean, dry, and intact. Do not remove them until you're seen by Dr. Rushing.    Follow-up with primary care doctor as well.     Call 911 and return to the ED for chest pain, shortness of breath, significant increase in pain, or significant change in color of surgical sites.

## 2018-01-16 NOTE — PATIENT PROFILE ADULT. - PSH
Cerebral aneurysm  I997 with clips  Chronic pain  Patient has an Intrathecal pump s/p removal in 2016  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 mos.-14 yo  Fusion of sacral region of spine  5/2017  H/O arthroscopy of shoulder  right X 2, left X 1  History of hip replacement  8/15, revision 2/16 after falling and fracturing right femur  History of right inguinal hernia repair  x2  History of throat surgery  surgical exicision cyst 1986  Hx of appendectomy  6/1969  Hx of laminectomy  lumbar-2002  S/P left knee arthroscopy    S/P lumbar fusion  L1-S1:2002  Stented coronary artery  KAEL x 1 to RCA

## 2018-01-16 NOTE — DISCHARGE NOTE ADULT - HOSPITAL COURSE
62M was admitted to [facility] on 1/16/17. The patient had a lumbar wound and required a washout and debridement to be performed in the OR. Post-operatively the patient was sent to the PACU, the patient was hemodynamically stable and sent to a surgical floor. The patient was pain was controlled by IV pain medications transitioned to po narcotics. The patient was advanced to regular diet and tolerated it well. The patient was hemodynamically stable and placed on home medications. The patient was told to follow up with Dr. Rushing in 1 week and had no other issues.

## 2018-01-17 VITALS
RESPIRATION RATE: 18 BRPM | HEART RATE: 58 BPM | TEMPERATURE: 98 F | OXYGEN SATURATION: 98 % | DIASTOLIC BLOOD PRESSURE: 73 MMHG | SYSTOLIC BLOOD PRESSURE: 138 MMHG

## 2018-01-17 PROCEDURE — 87102 FUNGUS ISOLATION CULTURE: CPT

## 2018-01-17 PROCEDURE — 86901 BLOOD TYPING SEROLOGIC RH(D): CPT

## 2018-01-17 PROCEDURE — 86900 BLOOD TYPING SEROLOGIC ABO: CPT

## 2018-01-17 PROCEDURE — 87186 SC STD MICRODIL/AGAR DIL: CPT

## 2018-01-17 PROCEDURE — 88305 TISSUE EXAM BY PATHOLOGIST: CPT

## 2018-01-17 PROCEDURE — 86850 RBC ANTIBODY SCREEN: CPT

## 2018-01-17 PROCEDURE — 87070 CULTURE OTHR SPECIMN AEROBIC: CPT

## 2018-01-17 RX ORDER — TAMSULOSIN HYDROCHLORIDE 0.4 MG/1
0.4 CAPSULE ORAL ONCE
Qty: 0 | Refills: 0 | Status: COMPLETED | OUTPATIENT
Start: 2018-01-17 | End: 2018-01-17

## 2018-01-17 RX ADMIN — Medication 100 MILLIGRAM(S): at 10:45

## 2018-01-17 RX ADMIN — Medication 100 MILLIGRAM(S): at 00:46

## 2018-01-17 RX ADMIN — Medication 25 MILLIGRAM(S): at 06:39

## 2018-01-17 RX ADMIN — GABAPENTIN 600 MILLIGRAM(S): 400 CAPSULE ORAL at 06:39

## 2018-01-17 RX ADMIN — ENOXAPARIN SODIUM 40 MILLIGRAM(S): 100 INJECTION SUBCUTANEOUS at 11:42

## 2018-01-17 RX ADMIN — Medication 1000 MILLIGRAM(S): at 04:30

## 2018-01-17 RX ADMIN — PANTOPRAZOLE SODIUM 40 MILLIGRAM(S): 20 TABLET, DELAYED RELEASE ORAL at 06:39

## 2018-01-17 RX ADMIN — HYDROMORPHONE HYDROCHLORIDE 8 MILLIGRAM(S): 2 INJECTION INTRAMUSCULAR; INTRAVENOUS; SUBCUTANEOUS at 13:30

## 2018-01-17 RX ADMIN — Medication 400 MILLIGRAM(S): at 04:00

## 2018-01-17 RX ADMIN — TAMSULOSIN HYDROCHLORIDE 0.4 MILLIGRAM(S): 0.4 CAPSULE ORAL at 06:58

## 2018-01-17 RX ADMIN — Medication 100 MILLIGRAM(S): at 06:39

## 2018-01-17 RX ADMIN — Medication 600 MILLIGRAM(S): at 06:39

## 2018-01-17 RX ADMIN — HYDROMORPHONE HYDROCHLORIDE 8 MILLIGRAM(S): 2 INJECTION INTRAMUSCULAR; INTRAVENOUS; SUBCUTANEOUS at 07:15

## 2018-01-17 RX ADMIN — HYDROMORPHONE HYDROCHLORIDE 8 MILLIGRAM(S): 2 INJECTION INTRAMUSCULAR; INTRAVENOUS; SUBCUTANEOUS at 00:30

## 2018-01-17 RX ADMIN — HYDROMORPHONE HYDROCHLORIDE 8 MILLIGRAM(S): 2 INJECTION INTRAMUSCULAR; INTRAVENOUS; SUBCUTANEOUS at 01:00

## 2018-01-17 RX ADMIN — Medication 1 TABLET(S): at 11:42

## 2018-01-17 RX ADMIN — HYDROMORPHONE HYDROCHLORIDE 8 MILLIGRAM(S): 2 INJECTION INTRAMUSCULAR; INTRAVENOUS; SUBCUTANEOUS at 13:00

## 2018-01-17 RX ADMIN — HYDROMORPHONE HYDROCHLORIDE 8 MILLIGRAM(S): 2 INJECTION INTRAMUSCULAR; INTRAVENOUS; SUBCUTANEOUS at 06:45

## 2018-01-19 LAB
-  AMPICILLIN/SULBACTAM: SIGNIFICANT CHANGE UP
-  CEFAZOLIN: SIGNIFICANT CHANGE UP
-  CIPROFLOXACIN: SIGNIFICANT CHANGE UP
-  CLINDAMYCIN: SIGNIFICANT CHANGE UP
-  DAPTOMYCIN: SIGNIFICANT CHANGE UP
-  ERYTHROMYCIN: SIGNIFICANT CHANGE UP
-  GENTAMICIN: SIGNIFICANT CHANGE UP
-  LEVOFLOXACIN: SIGNIFICANT CHANGE UP
-  LINEZOLID: SIGNIFICANT CHANGE UP
-  MOXIFLOXACIN(AEROBIC): SIGNIFICANT CHANGE UP
-  OXACILLIN: SIGNIFICANT CHANGE UP
-  PENICILLIN: SIGNIFICANT CHANGE UP
-  RIFAMPIN: SIGNIFICANT CHANGE UP
-  TETRACYCLINE: SIGNIFICANT CHANGE UP
-  TRIMETHOPRIM/SULFAMETHOXAZOLE: SIGNIFICANT CHANGE UP
-  VANCOMYCIN: SIGNIFICANT CHANGE UP
METHOD TYPE: SIGNIFICANT CHANGE UP

## 2018-01-22 ENCOUNTER — RESULT REVIEW (OUTPATIENT)
Age: 63
End: 2018-01-22

## 2018-01-22 LAB
CULTURE RESULTS: SIGNIFICANT CHANGE UP
ORGANISM # SPEC MICROSCOPIC CNT: SIGNIFICANT CHANGE UP
ORGANISM # SPEC MICROSCOPIC CNT: SIGNIFICANT CHANGE UP
SPECIMEN SOURCE: SIGNIFICANT CHANGE UP

## 2018-01-26 ENCOUNTER — APPOINTMENT (OUTPATIENT)
Dept: PLASTIC SURGERY | Facility: CLINIC | Age: 63
End: 2018-01-26
Payer: MEDICARE

## 2018-01-26 PROCEDURE — 99024 POSTOP FOLLOW-UP VISIT: CPT

## 2018-01-29 ENCOUNTER — TRANSCRIPTION ENCOUNTER (OUTPATIENT)
Age: 63
End: 2018-01-29

## 2018-01-30 ENCOUNTER — APPOINTMENT (OUTPATIENT)
Dept: INFECTIOUS DISEASE | Facility: CLINIC | Age: 63
End: 2018-01-30
Payer: MEDICARE

## 2018-01-30 VITALS
HEIGHT: 68 IN | HEART RATE: 58 BPM | TEMPERATURE: 98.2 F | BODY MASS INDEX: 18.64 KG/M2 | OXYGEN SATURATION: 100 % | DIASTOLIC BLOOD PRESSURE: 81 MMHG | SYSTOLIC BLOOD PRESSURE: 135 MMHG | WEIGHT: 123 LBS

## 2018-01-30 DIAGNOSIS — Z96.641 PRESENCE OF RIGHT ARTIFICIAL HIP JOINT: ICD-10-CM

## 2018-01-30 DIAGNOSIS — T14.8XXA OTHER INJURY OF UNSPECIFIED BODY REGION, INITIAL ENCOUNTER: ICD-10-CM

## 2018-01-30 DIAGNOSIS — T81.4XXA INFECTION FOLLOWING A PROCEDURE, INITIAL ENCOUNTER: ICD-10-CM

## 2018-01-30 DIAGNOSIS — L08.9 OTHER INJURY OF UNSPECIFIED BODY REGION, INITIAL ENCOUNTER: ICD-10-CM

## 2018-01-30 PROCEDURE — 99203 OFFICE O/P NEW LOW 30 MIN: CPT

## 2018-02-02 ENCOUNTER — APPOINTMENT (OUTPATIENT)
Dept: PLASTIC SURGERY | Facility: CLINIC | Age: 63
End: 2018-02-02
Payer: MEDICARE

## 2018-02-02 DIAGNOSIS — S21.209A UNSPECIFIED OPEN WOUND OF UNSPECIFIED BACK WALL OF THORAX W/OUT PENETRATION INTO THORACIC CAVITY, INITIAL ENCOUNTER: ICD-10-CM

## 2018-02-02 PROCEDURE — 99024 POSTOP FOLLOW-UP VISIT: CPT

## 2018-02-05 PROBLEM — S21.209A OPEN BACK WOUND: Status: ACTIVE | Noted: 2018-01-12

## 2018-02-14 LAB
CULTURE RESULTS: SIGNIFICANT CHANGE UP
SPECIMEN SOURCE: SIGNIFICANT CHANGE UP

## 2018-03-01 PROBLEM — Z96.641 STATUS POST RIGHT HIP REPLACEMENT: Status: RESOLVED | Noted: 2018-03-01 | Resolved: 2018-03-01

## 2018-03-01 PROBLEM — T14.8XXA WOUND INFECTION: Noted: 2018-03-01

## 2018-03-01 PROBLEM — T81.4XXA POSTOPERATIVE WOUND INFECTION, INITIAL ENCOUNTER: Status: ACTIVE | Noted: 2018-03-01

## 2019-06-09 NOTE — H&P PST ADULT - PAIN, FACTORS THAT RELIEVE, PROFILE
Const: Awake, alert and oriented. In no acute distress. Well appearing.  HEENT: NC/AT. Moist mucous membranes.  Eyes: No scleral icterus. EOMI.  Neck:. Soft and supple. Full ROM without pain.  Cardiac: +S1/S2. No murmurs. Peripheral pulses 2+ and symmetric. No LE edema.  Resp: Speaking in full sentences. No evidence of respiratory distress. No wheezes, rales or rhonchi.  Abd: Soft, RUQ tenderness on palpation, non-distended. Normal bowel sounds in all 4 quadrants. No guarding or rebound.  Back: Spine midline and non-tender. No CVAT.  Skin: No rashes, abrasions or lacerations.  Lymph: No cervical lymphadenopathy.  Neuro: Awake, alert & oriented x 3. Moves all extremities symmetrically.
medications/relaxation/rest

## 2019-12-30 ENCOUNTER — EMERGENCY (EMERGENCY)
Facility: HOSPITAL | Age: 64
LOS: 1 days | Discharge: HOME | End: 2019-12-30
Attending: EMERGENCY MEDICINE
Payer: COMMERCIAL

## 2019-12-30 VITALS
OXYGEN SATURATION: 98 % | RESPIRATION RATE: 16 BRPM | WEIGHT: 147.93 LBS | SYSTOLIC BLOOD PRESSURE: 156 MMHG | DIASTOLIC BLOOD PRESSURE: 108 MMHG | TEMPERATURE: 98 F | HEART RATE: 110 BPM

## 2019-12-30 VITALS
TEMPERATURE: 98 F | OXYGEN SATURATION: 99 % | SYSTOLIC BLOOD PRESSURE: 168 MMHG | RESPIRATION RATE: 17 BRPM | HEART RATE: 88 BPM | DIASTOLIC BLOOD PRESSURE: 96 MMHG

## 2019-12-30 DIAGNOSIS — Z98.89 OTHER SPECIFIED POSTPROCEDURAL STATES: Chronic | ICD-10-CM

## 2019-12-30 DIAGNOSIS — Z96.649 PRESENCE OF UNSPECIFIED ARTIFICIAL HIP JOINT: Chronic | ICD-10-CM

## 2019-12-30 DIAGNOSIS — M43.28 FUSION OF SPINE, SACRAL AND SACROCOCCYGEAL REGION: Chronic | ICD-10-CM

## 2019-12-30 PROBLEM — M46.1 SACROILIITIS, NOT ELSEWHERE CLASSIFIED: Chronic | Status: ACTIVE | Noted: 2017-05-05

## 2019-12-30 LAB
APPEARANCE UR: CLEAR — SIGNIFICANT CHANGE UP
BACTERIA # UR AUTO: NEGATIVE — SIGNIFICANT CHANGE UP
BILIRUB UR-MCNC: NEGATIVE — SIGNIFICANT CHANGE UP
COLOR SPEC: YELLOW — SIGNIFICANT CHANGE UP
DIFF PNL FLD: NEGATIVE — SIGNIFICANT CHANGE UP
EPI CELLS # UR: 1 /HPF — SIGNIFICANT CHANGE UP
GLUCOSE UR QL: NEGATIVE — SIGNIFICANT CHANGE UP
HYALINE CASTS # UR AUTO: 1 /LPF — SIGNIFICANT CHANGE UP (ref 0–2)
KETONES UR-MCNC: ABNORMAL
LEUKOCYTE ESTERASE UR-ACNC: NEGATIVE — SIGNIFICANT CHANGE UP
NITRITE UR-MCNC: NEGATIVE — SIGNIFICANT CHANGE UP
PH UR: 6.5 — SIGNIFICANT CHANGE UP (ref 5–8)
PROT UR-MCNC: ABNORMAL
RBC CASTS # UR COMP ASSIST: 5 /HPF — HIGH (ref 0–4)
SP GR SPEC: 1.02 — SIGNIFICANT CHANGE UP (ref 1.01–1.02)
UROBILINOGEN FLD QL: NEGATIVE — SIGNIFICANT CHANGE UP
WBC UR QL: 3 /HPF — SIGNIFICANT CHANGE UP (ref 0–5)

## 2019-12-30 PROCEDURE — 87086 URINE CULTURE/COLONY COUNT: CPT

## 2019-12-30 PROCEDURE — 99283 EMERGENCY DEPT VISIT LOW MDM: CPT

## 2019-12-30 PROCEDURE — 99284 EMERGENCY DEPT VISIT MOD MDM: CPT

## 2019-12-30 PROCEDURE — 81001 URINALYSIS AUTO W/SCOPE: CPT

## 2019-12-30 RX ORDER — IBUPROFEN 200 MG
400 TABLET ORAL ONCE
Refills: 0 | Status: COMPLETED | OUTPATIENT
Start: 2019-12-30 | End: 2019-12-30

## 2019-12-30 RX ORDER — METOPROLOL TARTRATE 50 MG
25 TABLET ORAL ONCE
Refills: 0 | Status: COMPLETED | OUTPATIENT
Start: 2019-12-30 | End: 2019-12-30

## 2019-12-30 RX ORDER — OXYCODONE HYDROCHLORIDE 5 MG/1
30 TABLET ORAL ONCE
Refills: 0 | Status: DISCONTINUED | OUTPATIENT
Start: 2019-12-30 | End: 2019-12-30

## 2019-12-30 RX ORDER — ACETAMINOPHEN 500 MG
650 TABLET ORAL ONCE
Refills: 0 | Status: COMPLETED | OUTPATIENT
Start: 2019-12-30 | End: 2019-12-30

## 2019-12-30 RX ORDER — OXYCODONE HYDROCHLORIDE 5 MG/1
1 TABLET ORAL
Qty: 12 | Refills: 0
Start: 2019-12-30 | End: 2020-01-01

## 2019-12-30 RX ADMIN — Medication 650 MILLIGRAM(S): at 16:46

## 2019-12-30 RX ADMIN — OXYCODONE HYDROCHLORIDE 30 MILLIGRAM(S): 5 TABLET ORAL at 16:55

## 2019-12-30 RX ADMIN — OXYCODONE HYDROCHLORIDE 30 MILLIGRAM(S): 5 TABLET ORAL at 16:25

## 2019-12-30 RX ADMIN — Medication 650 MILLIGRAM(S): at 17:15

## 2019-12-30 RX ADMIN — Medication 0.1 MILLIGRAM(S): at 16:47

## 2019-12-30 RX ADMIN — Medication 25 MILLIGRAM(S): at 16:45

## 2019-12-30 RX ADMIN — Medication 400 MILLIGRAM(S): at 17:15

## 2019-12-30 RX ADMIN — Medication 400 MILLIGRAM(S): at 16:47

## 2019-12-30 NOTE — ED ADULT NURSE NOTE - NSIMPLEMENTINTERV_GEN_ALL_ED
Implemented All Fall Risk Interventions:  [place] to call system. Call bell, personal items and telephone within reach. Instruct patient to call for assistance. Room bathroom lighting operational. Non-slip footwear when patient is off stretcher. Physically safe environment: no spills, clutter or unnecessary equipment. Stretcher in lowest position, wheels locked, appropriate side rails in place. Provide visual cue, wrist band, yellow gown, etc. Monitor gait and stability. Monitor for mental status changes and reorient to person, place, and time. Review medications for side effects contributing to fall risk. Reinforce activity limits and safety measures with patient and family.

## 2019-12-30 NOTE — ED PROVIDER NOTE - OBJECTIVE STATEMENT
63 y/o male hx spinal fusion T4 to S1 presents to the ED for back pain and withdrawl. states he ran out of Dilaudid because it was written q4h and he required it q3h. states he also ran out of the oxycodone 30 mg as he was taking this q4h rather and q6h. does state he slipped on a wet leaf a few weeks ago and his right foot slid forward and he has had worsening pain since then. no fall to the ground. states he was unable to reach his pain management doctor and is scheduled to see them on jan 7. last dose of any pain meds was dec 28. today started feeling very on edge and anxious. no saddle anesthesia or bowel bladder issues

## 2019-12-30 NOTE — ED PROVIDER NOTE - NSFOLLOWUPINSTRUCTIONS_ED_ALL_ED_FT
Please follow up with your primary physician in 24-48 hr.  Seek immediate medical care for any new/worsening signs or symptoms such as fevers, urinating or having a bowel movement by accident, rashes.   Please take 400 mg of ibuprofen (aka Motrin, Advil) and/or 650mg acetaminophen (aka Tylenol) every 6 hours, as needed, for mild-moderate pain IN ADDITION TO your oxycodone.  Do NOT exceed 3500mg acetaminophen in 24 hours.  Please do not take these medications if you do not have pain or if you have any history of bleeding disorders, kidney or liver disease.   Do not use ibuprofen if you are on blood thinners (anti-coagulation).    Follow upo with Dr. Mckenzie for further prescriptions. Please follow up with your primary physician in 24-48 hr.  Seek immediate medical care for any new/worsening signs or symptoms such as fevers, urinating or having a bowel movement by accident, rashes.   Please take 400 mg of ibuprofen (aka Motrin, Advil) and/or 650mg acetaminophen (aka Tylenol) every 6 hours, as needed, for mild-moderate pain IN ADDITION TO your oxycodone.  Do NOT exceed 3500mg acetaminophen in 24 hours.  Please do not take these medications if you do not have pain or if you have any history of bleeding disorders, kidney or liver disease.   Do not use ibuprofen if you are on blood thinners (anti-coagulation).    Follow up with Dr. Mckenzie for further prescriptions.

## 2019-12-30 NOTE — ED PROVIDER NOTE - PROGRESS NOTE DETAILS
istop reference [id number] oxy 30 written 12/9 for TID, dialudid 8 mg 210 tabs for 30 days written on 11/8/2019 soledad Mckenzie. - Kelly Sheriff PA-C spoke with patients pain management physician, states she did stop writing him for dilaudid, switched to oxycodone 30 mg. states she has had conversations with patient regarding taking medicaitons as prescribed. states she will not write patient for any additional narcotics until scheduled appointment. physician also notes she will be out of the office until next monday - Kelly Sheriff PA-C

## 2019-12-30 NOTE — ED PROVIDER NOTE - ATTENDING CONTRIBUTION TO CARE
65yo male pmh spinal surgeries on chronic opioids, HTN, HLD p/w "withdrawal," c/o whole body pain, feeling generally unwell. Ran out of opioids 2d ago. As per ISTOP # [id number], pt dispensed dilaudid monthly until last month when it was changed to oxycodone 30mg q6h, 30d supply dispensed 12/11. [employer] Sheriff discussed with pts pain management specialist Dr Mckenzie who states pt is taking more than he is supposed to be taking. Pt denies taking any other pain medications. Pt denies falls, fevers, n/v/d, abdominal pain, dysuria, hematuria, IVDU, saddle anesthesia or urinary or fecal incontinence. Discussed multimodal pain control at length, pt agreed with plan for oxycodone 30mg with 3 day prescription, ibuprofen, tylenol. Will give clonidine x1 in ED and home metoprolol as pt did not take dose this morning and is hypertensive. Not tachycardic, no piloerection, no vomiting. Ambulating with walker as his baseline.

## 2019-12-30 NOTE — ED PROVIDER NOTE - RAPID ASSESSMENT
65 yo male with PMHx of brain aneurysm that was clipped in 1996, hernia, MI in 2005, s/p appendectomy, HTN, narcotic dependence, osteoarthritis, spinal stenosis, presents with c/o feeling anxious, chills, nausea, and patient states he cannot sleep/stand/ eat, that all began yesterday mid afternoon. Patient states he is dependent on narcotics and he regularly takes Dilaudid 8 mg Q3 hours for his chronic back pain. Patient states his last dose of Dilaudid was on 12/28. Patient states his Dilaudid is prescribed by Dr. Jimena Mckenzie (pain management) and patient states he did call provider however Dr. Mckenzie has not returned his call. Patient states he has withdrawn once before and his symptoms were resolved after being given Dilaudid x2 in the ED. Patient denies taking any anticoagulants. Of note, patient's PMD is Dr. Marie 65 yo male with PMHx of brain aneurysm that was clipped in 1996, hernia, MI in 2005, s/p appendectomy, HTN, narcotic dependence, osteoarthritis, spinal stenosis, presents with c/o feeling anxious, chills, nausea, and patient states he cannot sleep/stand/ eat, that all began yesterday mid afternoon. Patient states he is dependent on narcotics and he regularly takes Dilaudid 8 mg Q3 hours for his chronic back pain. Patient states his last dose of Dilaudid was on 12/28. Patient states his Dilaudid is prescribed by Dr. Jimena Mckenzie (pain management) and patient states he did call provider however Dr. Mckenzie has not returned his call. Patient states he has withdrawn once before and his symptoms were resolved after being given Dilaudid x2 in the ED. Patient denies taking any anticoagulants. Denies cp, sob, vomiting, abd pain, diarrhea, speech/visual changes.  PMD is Dr. Marie

## 2019-12-30 NOTE — ED ADULT NURSE NOTE - OBJECTIVE STATEMENT
64y male arrived to ED complaining of chronic back pain. Patient PMHx back surgery (melyssa from S1-T4), chronic R leg pain, HTN, HLD. Patient takes Dilaudid 8mg q 3 hours and ran out on 12/28/19. Patient reports that he slipped about 3 weeks ago on a leaf (no fall) causing worsened back pain that patient tried to treat with more frequent dose of medication. Patient given Oxycodone q 6 to replace the Dilaudid but reports pain is not resolving and ran out of oxycodone as well. Patient hypertensive in ED, VS otherwise stable, wife at the bedside.

## 2019-12-30 NOTE — ED ADULT NURSE REASSESSMENT NOTE - NS ED NURSE REASSESS COMMENT FT1
Pt given oxycodone at 16:25, motrin and tylenol at 16:47. Pt was reassessed for pain level at 16:55 and 17:15 respectively, states no change in pain level 8/10.

## 2019-12-30 NOTE — ED PROVIDER NOTE - PATIENT PORTAL LINK FT
You can access the FollowMyHealth Patient Portal offered by [facility] by registering at the following website: [email or web address]. By joining [email or web address]’s FollowMyHealth portal, you will also be able to view your health information using other applications (apps) compatible with our system.

## 2019-12-30 NOTE — ED PROVIDER NOTE - PMH
Back pain  Chronic back pain  Cerebral aneurysm rupture  1997 clipped, no residual  Femur fracture, right  2/16, surgical revision of right hip  GERD (gastroesophageal reflux disease)    Hypertension    Lumbar herniated disc    Myocardial infarction  AKEL x1 MI 2005, stent RCA  Narcotic dependence  5/28/16 for withdrawal ,pt currently on Dialudid 8 mg every 4-6 hours /day  Osteoarthritis    Sacroiliitis, not elsewhere classified  Unspecified Inflammatory spondylopathy,Sacral and sacrococcygeal region  Spinal stenosis Back pain  Chronic back pain  Cerebral aneurysm rupture  1997 clipped, no residual  Femur fracture, right  2/16, surgical revision of right hip  GERD (gastroesophageal reflux disease)    Hypertension    Lumbar herniated disc    Myocardial infarction  KAEL x1 MI 2005, stent RCA  Narcotic dependence  5/28/16 for withdrawal ,pt currently on Dialudid 8 mg every 4-6 hours /day  Osteoarthritis    Sacroiliitis, not elsewhere classified  Unspecified Inflammatory spondylopathy,Sacral and sacrococcygeal region  Spinal stenosis

## 2019-12-30 NOTE — ED ADULT TRIAGE NOTE - CHIEF COMPLAINT QUOTE
dizzy, lightheaded, pain to right leg. Opiod dependent (chronic back pain) and has not taken pain medications since 12/28/19.

## 2019-12-31 LAB
CULTURE RESULTS: SIGNIFICANT CHANGE UP
SPECIMEN SOURCE: SIGNIFICANT CHANGE UP

## 2020-05-01 ENCOUNTER — TRANSCRIPTION ENCOUNTER (OUTPATIENT)
Age: 65
End: 2020-05-01

## 2020-11-12 ENCOUNTER — APPOINTMENT (OUTPATIENT)
Dept: SPINE | Facility: CLINIC | Age: 65
End: 2020-11-12

## 2020-12-31 ENCOUNTER — TRANSCRIPTION ENCOUNTER (OUTPATIENT)
Age: 65
End: 2020-12-31

## 2021-01-05 ENCOUNTER — APPOINTMENT (OUTPATIENT)
Dept: SPINE | Facility: CLINIC | Age: 66
End: 2021-01-05
Payer: COMMERCIAL

## 2021-01-05 VITALS
BODY MASS INDEX: 20.92 KG/M2 | TEMPERATURE: 97.8 F | DIASTOLIC BLOOD PRESSURE: 72 MMHG | SYSTOLIC BLOOD PRESSURE: 108 MMHG | WEIGHT: 138 LBS | OXYGEN SATURATION: 93 % | HEART RATE: 100 BPM | HEIGHT: 68 IN

## 2021-01-05 PROCEDURE — 99212 OFFICE O/P EST SF 10 MIN: CPT

## 2021-01-05 RX ORDER — ALCLOMETASONE DIPROPIONATE 0.5 MG/G
0.05 CREAM TOPICAL
Qty: 45 | Refills: 0 | Status: DISCONTINUED | COMMUNITY
Start: 2017-10-03 | End: 2021-01-05

## 2021-01-05 RX ORDER — FENTANYL 100 UG/H
100 PATCH, EXTENDED RELEASE TRANSDERMAL
Qty: 20 | Refills: 0 | Status: DISCONTINUED | COMMUNITY
Start: 2017-06-16 | End: 2021-01-05

## 2021-01-05 RX ORDER — SULFAMETHOXAZOLE AND TRIMETHOPRIM 800; 160 MG/1; MG/1
800-160 TABLET ORAL TWICE DAILY
Qty: 28 | Refills: 0 | Status: DISCONTINUED | COMMUNITY
Start: 2018-01-22 | End: 2021-01-05

## 2021-01-05 RX ORDER — HYDROMORPHONE HYDROCHLORIDE 8 MG/1
8 TABLET ORAL
Qty: 180 | Refills: 0 | Status: DISCONTINUED | COMMUNITY
Start: 2017-06-16 | End: 2021-01-05

## 2021-01-05 RX ORDER — DICLOFENAC SODIUM 10 MG/G
1 GEL TOPICAL
Qty: 300 | Refills: 0 | Status: DISCONTINUED | COMMUNITY
Start: 2017-06-16 | End: 2021-01-05

## 2021-01-05 RX ORDER — TAMSULOSIN HYDROCHLORIDE 0.4 MG/1
0.4 CAPSULE ORAL
Qty: 7 | Refills: 0 | Status: DISCONTINUED | COMMUNITY
Start: 2017-06-02 | End: 2021-01-05

## 2021-01-05 RX ORDER — TIZANIDINE HYDROCHLORIDE 6 MG/1
6 CAPSULE ORAL
Qty: 240 | Refills: 0 | Status: DISCONTINUED | COMMUNITY
Start: 2017-03-03 | End: 2021-01-05

## 2021-01-07 NOTE — REASON FOR VISIT
[Follow-Up: _____] : a [unfilled] follow-up visit [FreeTextEntry1] : Today he is here for evaluation\par \par New onset pain in lowerback  that impact his ability to ambulate

## 2021-01-07 NOTE — ASSESSMENT
[FreeTextEntry1] : 65 year old man S/P Prior Surgery\par He reports that his overall functional status remains unchanged but his pain syndrome has increased over the past several months.

## 2021-01-07 NOTE — PHYSICAL EXAM
[General Appearance - Alert] : alert [Oriented To Time, Place, And Person] : oriented to person, place, and time [] : no respiratory distress [Abdomen Soft] : soft [FreeTextEntry1] : Ambulate with walker

## 2021-06-25 ENCOUNTER — EMERGENCY (EMERGENCY)
Facility: HOSPITAL | Age: 66
LOS: 1 days | Discharge: HOME | End: 2021-06-25
Attending: STUDENT IN AN ORGANIZED HEALTH CARE EDUCATION/TRAINING PROGRAM
Payer: MEDICARE

## 2021-06-25 VITALS
DIASTOLIC BLOOD PRESSURE: 76 MMHG | WEIGHT: 179.9 LBS | HEART RATE: 65 BPM | TEMPERATURE: 98 F | SYSTOLIC BLOOD PRESSURE: 176 MMHG | RESPIRATION RATE: 16 BRPM | HEIGHT: 68 IN | OXYGEN SATURATION: 100 %

## 2021-06-25 VITALS — DIASTOLIC BLOOD PRESSURE: 85 MMHG | SYSTOLIC BLOOD PRESSURE: 160 MMHG | HEART RATE: 60 BPM

## 2021-06-25 DIAGNOSIS — Z98.89 OTHER SPECIFIED POSTPROCEDURAL STATES: Chronic | ICD-10-CM

## 2021-06-25 DIAGNOSIS — Z96.649 PRESENCE OF UNSPECIFIED ARTIFICIAL HIP JOINT: Chronic | ICD-10-CM

## 2021-06-25 DIAGNOSIS — M43.28 FUSION OF SPINE, SACRAL AND SACROCOCCYGEAL REGION: Chronic | ICD-10-CM

## 2021-06-25 PROCEDURE — 93005 ELECTROCARDIOGRAM TRACING: CPT

## 2021-06-25 PROCEDURE — 93010 ELECTROCARDIOGRAM REPORT: CPT

## 2021-06-25 PROCEDURE — 99283 EMERGENCY DEPT VISIT LOW MDM: CPT

## 2021-06-25 PROCEDURE — 99284 EMERGENCY DEPT VISIT MOD MDM: CPT

## 2021-06-25 NOTE — ED PROVIDER NOTE - PROGRESS NOTE DETAILS
hx confirmed w/ wife, reports R leg weakness, wife has walker which pt uses at baseline, no new neuro deficit today, will follow up with PCP tomorrow

## 2021-06-25 NOTE — ED PROVIDER NOTE - ATTENDING CONTRIBUTION TO CARE
65 M w/ hx of CAD s/p stent, HTN, HLD, presents to the ER w/ 203/114, 197/111, 214/114, [facility]" BP improved while in the ER. Pt here w/ no complaints no features to suggest hypertensive emergency. Dispo pending results. 65 M w/ hx of CAD s/p stent, HTN, HLD, presents to the ER [facility]/ 203[facility]114, 197[facility]111, 214[facility]114[facility]" BP improved while in the ER. Pt here w/ no complaints no features to suggest hypertensive emergency. old R lower leg weakness and parethesias per pt and wife hx of multiple back surgeries. Dispo pending results.

## 2021-06-25 NOTE — ED PROVIDER NOTE - PATIENT PORTAL LINK FT
You can access the FollowMyHealth Patient Portal offered by [facility] by registering at the following website: [email or web address]. By joining [employer]’s FollowMyHealth portal, you will also be able to view your health information using other applications (apps) compatible with our system.

## 2021-06-25 NOTE — ED PROVIDER NOTE - CLINICAL SUMMARY MEDICAL DECISION MAKING FREE TEXT BOX
64yo M w/ history of CAD s/p stent placement, HTN, HLD and chronic back pain coming in for asymptomatic hypertension. PE notable for dilated pupil secondary to dilation at ophtho clinic.  Plan: ekg 64yo M w/ history of CAD s/p stent placement, HTN, HLD and chronic back pain coming in for asymptomatic hypertension. PE notable for dilated pupil secondary to dilation at ophtho clinic. Patient has outpatient follow up with PMD tomorrow morning.   Plan: ekg

## 2021-06-25 NOTE — ED PROVIDER NOTE - OBJECTIVE STATEMENT
64yo M w/ history of CAD s/p stent placement, HTN, HLD and chronic back pain coming in from preop eval for glaucoma surgery and was found to hypertensive in the 200s. At the time, patient denied any headaches, changes in vision, n/v, chest pain, SOB, weakness or paresthesias. En route w/ EMS, patient's BP was slowly decreasing. Currently 167/97. Took home dose of metoprolol 25 in the morning. Currently not complaining of any headaches, changes in vision, extremity weakness, chest pain or SOB.

## 2021-06-25 NOTE — ED PROVIDER NOTE - NSFOLLOWUPINSTRUCTIONS_ED_ALL_ED_FT
- Please follow up with your PMD within the next 48 hours.  - Take all home medications as prescribed.     SEEK IMMEDIATE MEDICAL CARE IF YOU HAVE ANY OF THE FOLLOWING SYMPTOMS: severe headache, confusion, chest pain, abdominal pain, vomiting, or shortness of breath.

## 2021-06-25 NOTE — ED PROVIDER NOTE - PHYSICAL EXAMINATION
GENERAL: well appearing in no acute distress, non-toxic appearing  HEAD: normocephalic, atraumatic  HENT: airway intact, neck supple  EYES: normal conjunctiva,  PERRLA, EOMI; left eye 6mm dilated  CARDIAC: regular rate and rhythm, normal S1S2, no appreciable murmurs, 2+ pulses in UE/LE b/l  PULM: normal breath sounds, clear to ascultation bilaterally, no rales, rhonchi, wheezing  GI: abdomen nondistended, soft, nontender, no guarding, rebound tenderness  NEURO: no focal motor or sensory deficits, CN2-12 intact, normal speech, normal gait, AAOx3  MSK: no peripheral edema, no calf tenderness b/l  SKIN: well-perfused, extremities warm, no visible rashes

## 2022-02-21 NOTE — PRE-OP CHECKLIST -  HIBICLENS SHOWER 2 DATE
Show Applicator Variable?: Yes Render Note In Bullet Format When Appropriate: No Medical Necessity Clause: This procedure was medically necessary because the lesions that were treated were: Medical Necessity Information: It is in your best interest to select a reason for this procedure from the list below. All of these items fulfill various CMS LCD requirements except the new and changing color options. Post-Care Instructions: I reviewed with the patient in detail post-care instructions. Patient is to wear sunprotection, and avoid picking at any of the treated lesions. Pt may apply Vaseline to crusted or scabbing areas. Detail Level: Detailed Consent: The patient's consent was obtained including but not limited to risks of crusting, scabbing, blistering, scarring, darker or lighter pigmentary change, recurrence, incomplete removal and infection. Duration Of Freeze Thaw-Cycle (Seconds): 5 Spray Paint Text: The liquid nitrogen was applied to the skin utilizing a spray paint frosting technique. Number Of Freeze-Thaw Cycles: 2 freeze-thaw cycles 06-May-2017

## 2022-03-16 NOTE — PATIENT PROFILE ADULT. - PRO PAIN EXPRESSION
Per physician orders, patient was given home sleep testing device and instructed on how to apply the device before going to bed tonight. I sized the device and showed the patient using a mirror how the device fits and what it should look like so they can use a mirror when putting it on themselves at home. We reviewed the instruction booklet and the number to call if they have any questions at night. Patient understood and was instructed to return the device the next day back to the sleep lab.   verbalization

## 2022-03-28 ENCOUNTER — INPATIENT (INPATIENT)
Facility: HOSPITAL | Age: 67
LOS: 6 days | Discharge: NURSING, REHAB OR LONG-TERM CARE | DRG: 481 | End: 2022-04-04
Attending: ORTHOPAEDIC SURGERY | Admitting: ORTHOPAEDIC SURGERY
Payer: MEDICARE

## 2022-03-28 VITALS
HEIGHT: 68 IN | SYSTOLIC BLOOD PRESSURE: 174 MMHG | RESPIRATION RATE: 17 BRPM | OXYGEN SATURATION: 98 % | DIASTOLIC BLOOD PRESSURE: 76 MMHG | WEIGHT: 210.1 LBS | HEART RATE: 73 BPM | TEMPERATURE: 98 F

## 2022-03-28 DIAGNOSIS — Z96.649 PRESENCE OF UNSPECIFIED ARTIFICIAL HIP JOINT: Chronic | ICD-10-CM

## 2022-03-28 DIAGNOSIS — M43.28 FUSION OF SPINE, SACRAL AND SACROCOCCYGEAL REGION: Chronic | ICD-10-CM

## 2022-03-28 DIAGNOSIS — S72.409A UNSPECIFIED FRACTURE OF LOWER END OF UNSPECIFIED FEMUR, INITIAL ENCOUNTER FOR CLOSED FRACTURE: ICD-10-CM

## 2022-03-28 DIAGNOSIS — I25.10 ATHEROSCLEROTIC HEART DISEASE OF NATIVE CORONARY ARTERY WITHOUT ANGINA PECTORIS: ICD-10-CM

## 2022-03-28 DIAGNOSIS — S72.001A FRACTURE OF UNSPECIFIED PART OF NECK OF RIGHT FEMUR, INITIAL ENCOUNTER FOR CLOSED FRACTURE: ICD-10-CM

## 2022-03-28 DIAGNOSIS — Z98.89 OTHER SPECIFIED POSTPROCEDURAL STATES: Chronic | ICD-10-CM

## 2022-03-28 LAB
ALBUMIN SERPL ELPH-MCNC: 4.3 G/DL — SIGNIFICANT CHANGE UP (ref 3.3–5)
ALP SERPL-CCNC: 127 U/L — HIGH (ref 40–120)
ALT FLD-CCNC: 19 U/L — SIGNIFICANT CHANGE UP (ref 10–45)
ANION GAP SERPL CALC-SCNC: 13 MMOL/L — SIGNIFICANT CHANGE UP (ref 5–17)
APTT BLD: 31.6 SEC — SIGNIFICANT CHANGE UP (ref 27.5–35.5)
AST SERPL-CCNC: 15 U/L — SIGNIFICANT CHANGE UP (ref 10–40)
BASOPHILS # BLD AUTO: 0.04 K/UL — SIGNIFICANT CHANGE UP (ref 0–0.2)
BASOPHILS NFR BLD AUTO: 0.4 % — SIGNIFICANT CHANGE UP (ref 0–2)
BILIRUB SERPL-MCNC: 0.6 MG/DL — SIGNIFICANT CHANGE UP (ref 0.2–1.2)
BLD GP AB SCN SERPL QL: NEGATIVE — SIGNIFICANT CHANGE UP
BUN SERPL-MCNC: 16 MG/DL — SIGNIFICANT CHANGE UP (ref 7–23)
CALCIUM SERPL-MCNC: 9.8 MG/DL — SIGNIFICANT CHANGE UP (ref 8.4–10.5)
CHLORIDE SERPL-SCNC: 107 MMOL/L — SIGNIFICANT CHANGE UP (ref 96–108)
CK SERPL-CCNC: 125 U/L — SIGNIFICANT CHANGE UP (ref 30–200)
CO2 SERPL-SCNC: 23 MMOL/L — SIGNIFICANT CHANGE UP (ref 22–31)
CREAT SERPL-MCNC: 1.15 MG/DL — SIGNIFICANT CHANGE UP (ref 0.5–1.3)
EGFR: 70 ML/MIN/1.73M2 — SIGNIFICANT CHANGE UP
EOSINOPHIL # BLD AUTO: 0 K/UL — SIGNIFICANT CHANGE UP (ref 0–0.5)
EOSINOPHIL NFR BLD AUTO: 0 % — SIGNIFICANT CHANGE UP (ref 0–6)
GLUCOSE SERPL-MCNC: 100 MG/DL — HIGH (ref 70–99)
HCT VFR BLD CALC: 48.8 % — SIGNIFICANT CHANGE UP (ref 39–50)
HGB BLD-MCNC: 16.3 G/DL — SIGNIFICANT CHANGE UP (ref 13–17)
IMM GRANULOCYTES NFR BLD AUTO: 0.2 % — SIGNIFICANT CHANGE UP (ref 0–1.5)
INR BLD: 1.03 RATIO — SIGNIFICANT CHANGE UP (ref 0.88–1.16)
LACTATE SERPL-SCNC: 2 MMOL/L — SIGNIFICANT CHANGE UP (ref 0.7–2)
LYMPHOCYTES # BLD AUTO: 1.49 K/UL — SIGNIFICANT CHANGE UP (ref 1–3.3)
LYMPHOCYTES # BLD AUTO: 16.1 % — SIGNIFICANT CHANGE UP (ref 13–44)
MCHC RBC-ENTMCNC: 31 PG — SIGNIFICANT CHANGE UP (ref 27–34)
MCHC RBC-ENTMCNC: 33.4 GM/DL — SIGNIFICANT CHANGE UP (ref 32–36)
MCV RBC AUTO: 92.8 FL — SIGNIFICANT CHANGE UP (ref 80–100)
MONOCYTES # BLD AUTO: 0.73 K/UL — SIGNIFICANT CHANGE UP (ref 0–0.9)
MONOCYTES NFR BLD AUTO: 7.9 % — SIGNIFICANT CHANGE UP (ref 2–14)
NEUTROPHILS # BLD AUTO: 6.97 K/UL — SIGNIFICANT CHANGE UP (ref 1.8–7.4)
NEUTROPHILS NFR BLD AUTO: 75.4 % — SIGNIFICANT CHANGE UP (ref 43–77)
NRBC # BLD: 0 /100 WBCS — SIGNIFICANT CHANGE UP (ref 0–0)
PLATELET # BLD AUTO: 197 K/UL — SIGNIFICANT CHANGE UP (ref 150–400)
POTASSIUM SERPL-MCNC: 4 MMOL/L — SIGNIFICANT CHANGE UP (ref 3.5–5.3)
POTASSIUM SERPL-SCNC: 4 MMOL/L — SIGNIFICANT CHANGE UP (ref 3.5–5.3)
PROT SERPL-MCNC: 7 G/DL — SIGNIFICANT CHANGE UP (ref 6–8.3)
PROTHROM AB SERPL-ACNC: 11.9 SEC — SIGNIFICANT CHANGE UP (ref 10.5–13.4)
RBC # BLD: 5.26 M/UL — SIGNIFICANT CHANGE UP (ref 4.2–5.8)
RBC # FLD: 14 % — SIGNIFICANT CHANGE UP (ref 10.3–14.5)
RH IG SCN BLD-IMP: POSITIVE — SIGNIFICANT CHANGE UP
SODIUM SERPL-SCNC: 143 MMOL/L — SIGNIFICANT CHANGE UP (ref 135–145)
WBC # BLD: 9.25 K/UL — SIGNIFICANT CHANGE UP (ref 3.8–10.5)
WBC # FLD AUTO: 9.25 K/UL — SIGNIFICANT CHANGE UP (ref 3.8–10.5)

## 2022-03-28 PROCEDURE — 73552 X-RAY EXAM OF FEMUR 2/>: CPT | Mod: 26,RT

## 2022-03-28 PROCEDURE — 73501 X-RAY EXAM HIP UNI 1 VIEW: CPT | Mod: 26,RT

## 2022-03-28 PROCEDURE — 71045 X-RAY EXAM CHEST 1 VIEW: CPT | Mod: 26

## 2022-03-28 PROCEDURE — 99285 EMERGENCY DEPT VISIT HI MDM: CPT

## 2022-03-28 RX ORDER — HYDROMORPHONE HYDROCHLORIDE 2 MG/ML
0.5 INJECTION INTRAMUSCULAR; INTRAVENOUS; SUBCUTANEOUS EVERY 6 HOURS
Refills: 0 | Status: DISCONTINUED | OUTPATIENT
Start: 2022-03-28 | End: 2022-03-29

## 2022-03-28 RX ORDER — OXYCODONE HYDROCHLORIDE 5 MG/1
5 TABLET ORAL EVERY 4 HOURS
Refills: 0 | Status: DISCONTINUED | OUTPATIENT
Start: 2022-03-28 | End: 2022-03-29

## 2022-03-28 RX ORDER — MORPHINE SULFATE 50 MG/1
2 CAPSULE, EXTENDED RELEASE ORAL ONCE
Refills: 0 | Status: DISCONTINUED | OUTPATIENT
Start: 2022-03-28 | End: 2022-03-28

## 2022-03-28 RX ORDER — ASPIRIN/CALCIUM CARB/MAGNESIUM 324 MG
81 TABLET ORAL DAILY
Refills: 0 | Status: DISCONTINUED | OUTPATIENT
Start: 2022-03-28 | End: 2022-03-30

## 2022-03-28 RX ORDER — METOPROLOL TARTRATE 50 MG
25 TABLET ORAL
Refills: 0 | Status: DISCONTINUED | OUTPATIENT
Start: 2022-03-28 | End: 2022-03-30

## 2022-03-28 RX ORDER — ACETAMINOPHEN 500 MG
975 TABLET ORAL EVERY 8 HOURS
Refills: 0 | Status: DISCONTINUED | OUTPATIENT
Start: 2022-03-28 | End: 2022-03-30

## 2022-03-28 RX ORDER — MAGNESIUM HYDROXIDE 400 MG/1
30 TABLET, CHEWABLE ORAL DAILY
Refills: 0 | Status: DISCONTINUED | OUTPATIENT
Start: 2022-03-28 | End: 2022-03-30

## 2022-03-28 RX ORDER — OXYCODONE HYDROCHLORIDE 5 MG/1
2.5 TABLET ORAL EVERY 4 HOURS
Refills: 0 | Status: DISCONTINUED | OUTPATIENT
Start: 2022-03-28 | End: 2022-03-29

## 2022-03-28 RX ORDER — HYDROMORPHONE HYDROCHLORIDE 2 MG/ML
1 INJECTION INTRAMUSCULAR; INTRAVENOUS; SUBCUTANEOUS ONCE
Refills: 0 | Status: DISCONTINUED | OUTPATIENT
Start: 2022-03-28 | End: 2022-03-28

## 2022-03-28 RX ORDER — BACLOFEN 100 %
20 POWDER (GRAM) MISCELLANEOUS ONCE
Refills: 0 | Status: DISCONTINUED | OUTPATIENT
Start: 2022-03-28 | End: 2022-03-30

## 2022-03-28 RX ORDER — SODIUM CHLORIDE 9 MG/ML
1000 INJECTION INTRAMUSCULAR; INTRAVENOUS; SUBCUTANEOUS
Refills: 0 | Status: DISCONTINUED | OUTPATIENT
Start: 2022-03-28 | End: 2022-03-29

## 2022-03-28 RX ORDER — ENOXAPARIN SODIUM 100 MG/ML
40 INJECTION SUBCUTANEOUS ONCE
Refills: 0 | Status: DISCONTINUED | OUTPATIENT
Start: 2022-03-28 | End: 2022-03-29

## 2022-03-28 RX ORDER — SENNA PLUS 8.6 MG/1
2 TABLET ORAL AT BEDTIME
Refills: 0 | Status: DISCONTINUED | OUTPATIENT
Start: 2022-03-28 | End: 2022-03-30

## 2022-03-28 RX ORDER — MORPHINE SULFATE 50 MG/1
4 CAPSULE, EXTENDED RELEASE ORAL ONCE
Refills: 0 | Status: DISCONTINUED | OUTPATIENT
Start: 2022-03-28 | End: 2022-03-28

## 2022-03-28 RX ADMIN — Medication 81 MILLIGRAM(S): at 18:27

## 2022-03-28 RX ADMIN — HYDROMORPHONE HYDROCHLORIDE 0.5 MILLIGRAM(S): 2 INJECTION INTRAMUSCULAR; INTRAVENOUS; SUBCUTANEOUS at 21:38

## 2022-03-28 RX ADMIN — Medication 25 MILLIGRAM(S): at 19:33

## 2022-03-28 RX ADMIN — MORPHINE SULFATE 2 MILLIGRAM(S): 50 CAPSULE, EXTENDED RELEASE ORAL at 18:26

## 2022-03-28 RX ADMIN — SODIUM CHLORIDE 125 MILLILITER(S): 9 INJECTION INTRAMUSCULAR; INTRAVENOUS; SUBCUTANEOUS at 19:34

## 2022-03-28 RX ADMIN — SENNA PLUS 2 TABLET(S): 8.6 TABLET ORAL at 23:46

## 2022-03-28 RX ADMIN — HYDROMORPHONE HYDROCHLORIDE 1 MILLIGRAM(S): 2 INJECTION INTRAMUSCULAR; INTRAVENOUS; SUBCUTANEOUS at 16:57

## 2022-03-28 RX ADMIN — Medication 975 MILLIGRAM(S): at 23:46

## 2022-03-28 RX ADMIN — MORPHINE SULFATE 4 MILLIGRAM(S): 50 CAPSULE, EXTENDED RELEASE ORAL at 15:54

## 2022-03-28 NOTE — ED PROVIDER NOTE - CARE PLAN
Principal Discharge DX:	Fracture, femur, distal   1 Principal Discharge DX:	Fracture, femur, distal  Goal:	right

## 2022-03-28 NOTE — ED PROVIDER NOTE - PHYSICAL EXAMINATION
GENERAL: non-toxic appearing, alert, in NAD  HEENT: atraumatic, normocephalic, Vision grossly intact, no conjunctivitis or discharge, hearing grossly intact,  no nasal discharge, epistaxis   CARDIAC: RRR, normal S1S2,  no appreciable murmurs, no cyanosis, cap refill < 2 seconds  PULM: normal work of breathing, oxygen saturation on RA wnl, CTAB, no crackles, rales, rhonchi, or wheezing  GI: abdomen nondistended, soft, nontender, no guarding or rebound tenderness, no palpable masses  NEURO: awake and alert, follows commands, normal speech, PERRLA, EOMI, no focal motor or sensory deficits  MSK: Gross deformity of medial aspect of distal thigh, ROM limited at hip and knee 2/2 pain, pulses 2+ distal, cap refil <3s, no joint swelling or erythema, ranging all extremities with no appreciable loss of ROM  EXT: no peripheral edema, calf tenderness, redness or swelling  SKIN: warm, dry, and intact, no rashes  PSYCH: appropriate mood and affect

## 2022-03-28 NOTE — CONSULT NOTE ADULT - ASSESSMENT
A/P  67yo Male c/o RLE pain s/p mechanical fall while getting up from the toilet. Patient denies hitting his head or dizziness/CP/Palps/ LOC. Patient denies numbness or tingling in the RLE. Patient denies any other injuries. ptn's wife states he had an Echo less than a year ago and "everything was OK" . ptn denies CP at baseline at rest or w exertion, denies CRESPO/PND  work up c/w Right distal femur displaced Fx, awaiting repair in OR 3/29. Medically cleared for surgery, cont outptn BB: Lopressor 25 mg bid, cont ASA. Card consulted. DVT ppx w sc lovenox. GI ppx w po PPI. pain control
65yo Male c/o RLE pain s/p mechanical fall while getting up from the toilet. Patient denies hitting his head or dizziness/CP/Palps/ LOC. Patient denies numbness or tingling in the RLE. Patient denies any other injuries. ptn's wife states he had an EKG less than a year ago and "everything was OK" . ptn denies CP at baseline at rest or w exertion, denies CRESPO/PND  NO chest pain or shortness of breath   His last cardiac workup was in 2007.

## 2022-03-28 NOTE — H&P ADULT - NSICDXPASTSURGICALHX_GEN_ALL_CORE_FT
PAST SURGICAL HISTORY:  Cerebral aneurysm I997 with clips    Chronic pain Patient has an Intrathecal pump s/p removal in 2016    Cleft palate repair multiple:age 2 mos.-14 yo    Fusion of sacral region of spine 5/2017    H/O arthroscopy of shoulder right X 2, left X 1    History of hip replacement 8/15, revision 2/16 after falling and fracturing right femur    History of right inguinal hernia repair x2    History of throat surgery surgical exicision cyst 1986    Hx of appendectomy 6/1969    Hx of laminectomy lumbar-2002    S/P left knee arthroscopy     S/P lumbar fusion L1-S1:2002    Stented coronary artery KAEL x 1 to RCA

## 2022-03-28 NOTE — ED ADULT NURSE NOTE - CCCP TRG CHIEF CMPLNT
diltiazem (CARTIA XT) 120 MG 24 hr capsule 90 capsule 3 3/29/2016       Sig - Route: Take 1 capsule by mouth daily.      Last OV 3/2/2016 - f/u 1 year. NO FUTURE APPTS.  Refilled x 1 with appt reminder.   fall

## 2022-03-28 NOTE — ED PROVIDER NOTE - NSICDXPASTMEDICALHX_GEN_ALL_CORE_FT
PAST MEDICAL HISTORY:  Back pain Chronic back pain    Cerebral aneurysm rupture 1997 clipped, no residual    Femur fracture, right 2/16, surgical revision of right hip    GERD (gastroesophageal reflux disease)     Hypertension     Lumbar herniated disc     Myocardial infarction KAEL x1 MI 2005, stent RCA    Narcotic dependence 5/28/16 for withdrawal ,pt currently on Dialudid 8 mg every 4-6 hours /day    Osteoarthritis     Sacroiliitis, not elsewhere classified Unspecified Inflammatory spondylopathy,Sacral and sacrococcygeal region    Spinal stenosis

## 2022-03-28 NOTE — ED PROVIDER NOTE - OBJECTIVE STATEMENT
67yo M h/o HTN/HLD, GERD, cerebral aneurym rupture, CAD (KAEL x1 MI 2005, stent RCA), Multiple orthopedic surgery including R hip replacement BIBEMS in R leg traction after mechanical slip and fall at home with R leg pain. No head strike or LOC, no other injuries reported. Pain severe in R Leg with no associated numbness. Noted gross deformity of R Thigh

## 2022-03-28 NOTE — ED PROVIDER NOTE - NSICDXPASTSURGICALHX_GEN_ALL_CORE_FT
PAST SURGICAL HISTORY:  Cerebral aneurysm I997 with clips    Chronic pain Patient has an Intrathecal pump s/p removal in 2016    Cleft palate repair multiple:age 2 mos.-12 yo    Fusion of sacral region of spine 5/2017    H/O arthroscopy of shoulder right X 2, left X 1    History of hip replacement 8/15, revision 2/16 after falling and fracturing right femur    History of right inguinal hernia repair x2    History of throat surgery surgical exicision cyst 1986    Hx of appendectomy 6/1969    Hx of laminectomy lumbar-2002    S/P left knee arthroscopy     S/P lumbar fusion L1-S1:2002    Stented coronary artery KAEL x 1 to RCA

## 2022-03-28 NOTE — ED ADULT TRIAGE NOTE - DATE OF FIRST COVID-19 BOOSTER
Physical Therapy  Visit Type: treatment  Precautions:  Medical precautions:  fall risk; standard precautions.  Pt admitted for B RADHA DAA  Lines:     Basic Lines: hemovacB.  Lower Extremity:    Left:  weight bearing: as tolerated.  hip - direct anterior precautions      Right:  weight bearing: as tolerated.  hip - direct anterior precautions  Safety Measures: bed alarm, chair alarm and bed rails      SUBJECTIVE                                                                                                            Patient agreed to participate in therapy this date.  \" I'm leaving.\"    Prior Living Situation  Information Provided By:: pt  Type of Home: House  Home Layout: Two level  # Steps to Enter: 0  # Steps in the Home: 5  Number of Rails: 1  Lives With: Alone  Bathroom Shower/Tub: Walk-in shower  Bathroom Toilet: Standard  Home Equipment: Cane(rollator)    Prior Communication/Cognition  Prior Cognition: Intact    Prior Function  Prior ADL: Independent  Ambulation in the Home: Independent  Ambulation in the Community: Independent  Steps into the Home: Independent  Steps within the Home: Independent  Car Transfers: Independent  Vocational: Retired RN    Patient / Family Goal: return to previous functional status, maximize function and return home      OBJECTIVE                                                                                                              Level of consciousness: alert    Oriented to person, place, time and situation     Affect/Behavior: alert, calm, cooperative and pleasant  Functional Communication/Cognition    Overall status:  Within functional limits    Transfers:    Assistive devices: 2-wheeled walker, 1 person and gait belt    Sit to stand: modified independent    Stand to sit: modified independent  Training completed:    Tasks: sit to stand and stand to sit    Education details: patient safety  Gait/Ambulation:     Assistance: modified independent   Assistive device: 2-wheeled  walker, 1 person and gait belt    Distance (ft): 150; 150    Pattern: step to and antalgic  Stair Mobility:    Number of steps: 5;     Assistance: modified independent    Rails: right rail only    Pattern: step to    Devices used: cane  Training completed:    Tasks: stair training    Education details: patient safety            Interventions                                                                                                       Supine    Lower Extremity: Bilateral: heel slides, quad sets, gluteus sets, ankle pumps, knee extension with bolster under heel and hip abduction/adduction, AROM and AAROM, 10 reps, 1 sets  Seated    Lower Extremity: Bilateral: knee flexion and knee extensions, AROM, 10 reps,   Modalities: CP applied B hips at end of session  Training provided: bed mobility training, body mechanics, gait training, HEP training, safety training, transfer training and use of adaptive equipment    Skilled input: Verbal instruction/cues, tactile instruction/cues and posture correction  Verbal Consent: Writer verbally educated and received verbal consent for hand placement, positioning of patient, and techniques to be performed today from patient for clothing adjustments for techniques, hand placement and palpation for techniques and therapist position for techniques as described above and how they are pertinent to the patient's plan of care.        ASSESSMENT                                                                                                                Impairments: range of motion, strength, pain, activity tolerance and endurance  Functional Limitations: all functional mobility   Pt needed min assist with bed mobility and transfers to EOB. She stood mod ind and ambulated 150' x 2 with RW mod ind. She climbed up/down 5 stairs with rail and cane mod ind.  WE reviewed THR exs in supine and sitting. Pt owns a cane, but needs a RW for home use. Anticipate d/c to home with family assist  and [facility].  Discharge Recommendations   Recommendation for Discharge: PT IL: 24 hour nonskilled assist and nodaily skilled therapy.        PT/OT Mobility Equipment for Discharge: needs  RW; has cane  PT/OT ADL Equipment for Discharge: shower grab bars; shower chair prn        Skilled therapy is required to address these limitations in attempt to maximize the patient's independence.  Progress: progressing toward goals    Pain at end of session:  4/10    End of Session:   Location: in chair  Safety measures: alarm system in place/re-engaged, equipment intact, lines intact and call light within reach  Handoff to: nurse            PLAN                                                                                                                            Suggestions for next session as indicated: PT Frequency: Twice a day, 7 days/week  Frequency Comments: seen  10/2ALEXIS WBAT P1-4     Interventions: bed mobility, gait training, ROM, strengthening, functional transfer training, stairs retraining and HEP train/position  Agreement to plan and goals: patient agrees with goals and treatment plan        Goals Reviewed: 10/8/2020  GOALS:  Long Term Goals: (to be met by time of discharge from hospital)  Sit to supine: Patient will complete sit to supine independent.  Status: progressing/ongoing  Supine to sit: Patient will complete supine to sit independent.  Status: progressing/ongoing  Sit to stand: Patient will complete sit to stand transfer with 2-wheeled walker, modified independent.   Status: progressing/ongoing  Stand to sit: Patient will complete stand to sit transfer with 2-wheeled walker, modified independent.   Status: progressing/ongoing  Ambulation (even): Patient will ambulate on even surface for 150 feet with 2-wheeled walker, modified independent.   Status: progressing/ongoing  3-4 steps: Patient will ambulate 3-4 steps with single point cane using one rail, contact guard or touching/steadying.   Status:  progressing/ongoing      Documented in the chart in the following areas: Assessment.           28-Nov-2021

## 2022-03-28 NOTE — ED PROVIDER NOTE - ATTENDING CONTRIBUTION TO CARE
r hip pain after fall, hx of hip replacement / ellen-prosthetic fx  r hip shortened / rotated  nv intact  xr / Symptomatic treatment. pre-op

## 2022-03-28 NOTE — ED PROVIDER NOTE - CLINICAL SUMMARY MEDICAL DECISION MAKING FREE TEXT BOX
65yo M with fall and injury of RLE, gross deformity of R hip and thigh, no other injury well appearing in mild distress 2/2 pain. Neurovascularly intact, HD stable. Suspect hip fracture/dislocation vs femur fracture, will get imaging, preoperative labs, provide analgesia and seek ortho consult.

## 2022-03-28 NOTE — ED ADULT NURSE NOTE - OBJECTIVE STATEMENT
Pt is an ambulatory 66 yr old male a/o X 3 BIBA after mechanical fall injuring his right leg and hip.  No visible deformity, PT came in with splinted traction from [facility] that Dr. Regulo snell like to remain in place.  No head trauma or LOC.  PERRL wnl, moves all other extremities equally.  Denies chest pain or sob.  Patient may have felt dizzy prior to fall from sitting to standing.  Abdomen NT ND.  Denies urinary symptoms.  peripheral pulses +2bl no edema

## 2022-03-28 NOTE — CONSULT NOTE ADULT - SUBJECTIVE AND OBJECTIVE BOX
CC: s/p fall, has a RLE fx, awaiting surgery, needs medical clearance  HPI: 65yo Male c/o RLE pain s/p mechanical fall while getting up from the toilet. Patient denies hitting his head or dizziness/CP/Palps/ LOC. Patient denies numbness or tingling in the RLE. Patient denies any other injuries. ptn's wife states he had an Echo less than a year ago and "everything was OK" . ptn denies CP at baseline at rest or w exertion, denies CRESPO/PND    PMH:  CAD, s/p MI, h/o PTCA/PCI  HTN  Obesity  HLD  Chronic Back pain  Spinal stenosis  Lumbar herniated disc  Narcotic dependence  Osteoarthritis  GERD (gastroesophageal reflux disease)  Cerebral aneurysm rupture  Femur fracture, right, s/p PABLO  Sacroiliitis    PSH:  Cerebral aneurysm  S/P lumbar fusion  History of right inguinal hernia repair  Hx of appendectomy  Hx of laminectomy  S/P lumbar spinal fusion revision  Insertion of pain pump  Cleft palate repair  CAD, s/p PTCA/PCI   History of hip replacement  S/P left knee arthroscopy  H/O arthroscopy of shoulder  History of throat surgery  Fusion of sacral region of spine  Allergies: NKDA  Home meds: baclofen 20 mg tid, Toprol XL 50 mg daily, Lyrica 150 mg daily, ASA 81 mg daily    MEDICATIONS  (STANDING):  acetaminophen     Tablet .. 975 milliGRAM(s) Oral every 8 hours  aspirin enteric coated 81 milliGRAM(s) Oral daily  baclofen 20 milliGRAM(s) Oral Once  enoxaparin Injectable 40 milliGRAM(s) SubCutaneous once  metoprolol tartrate 25 milliGRAM(s) Oral two times a day  pregabalin 150 milliGRAM(s) Oral Once  senna 2 Tablet(s) Oral at bedtime  sodium chloride 0.9%. 1000 milliLiter(s) (125 mL/Hr) IV Continuous <Continuous>    MEDICATIONS  (PRN):  HYDROmorphone  Injectable 0.5 milliGRAM(s) IV Push every 6 hours PRN Severe Pain (7 - 10)  magnesium hydroxide Suspension 30 milliLiter(s) Oral daily PRN Constipation  oxyCODONE    IR 2.5 milliGRAM(s) Oral every 4 hours PRN Moderate Pain (4 - 6)  oxyCODONE    IR 5 milliGRAM(s) Oral every 4 hours PRN Severe Pain (7 - 10)      Vital Signs Last 24 Hrs  T(F): 98 (03-28-22 @ 19:38), Max: 98.4 (03-28-22 @ 17:54)  HR: 98 (03-28-22 @ 19:38) (73 - 98)  BP: 138/73 (03-28-22 @ 19:38) (136/74 - 174/76)  RR: 17 (03-28-22 @ 19:38) (17 - 18)  SpO2: 98% (03-28-22 @ 19:38) (98% - 98%)    PHYSICAL EXAM:  GENERAL: Laying in bed, alert and oriented, NAD,   HEAD:  Atraumatic, Normocephalic  EYES: EOMI, PERRLA, conjunctiva and sclera clear  NECK: Supple, No JVD  CHEST/LUNG: Clear to auscultation bilaterally; No wheeze  HEART: Regular rate and rhythm; No murmurs, rubs, or gallops  ABDOMEN: Soft, Nontender, Nondistended; Bowel sounds present  EXTREMITIES:  2+ Peripheral Pulses, No clubbing, cyanosis, or edema. RLE partially flexed in position 2/2   Right distal ellen-prosthetic femur Fracture  PSYCH: AAOx3  NEUROLOGY: non-focal  SKIN: No rashes or lesions    LABS: reviewed  Imaging: reviewed: Imaging: XRAYs:  Right distal ellen-prosthetic femur Fx  EKG: NSR, Q in III, F, PVCs

## 2022-03-28 NOTE — ED ADULT NURSE NOTE - NSIMPLEMENTINTERV_GEN_ALL_ED
Implemented All Universal Safety Interventions:  [place] to call system. Call bell, personal items and telephone within reach. Instruct patient to call for assistance. Room bathroom lighting operational. Non-slip footwear when patient is off stretcher. Physically safe environment: no spills, clutter or unnecessary equipment. Stretcher in lowest position, wheels locked, appropriate side rails in place.

## 2022-03-28 NOTE — H&P ADULT - HISTORY OF PRESENT ILLNESS
66yMale c/o R femur pain s/p mechanical fall while getting up from the toilet. Patient denies head hit or LOC. Patient denies numbness or tingling in the RLLE. Patient denies any other injuries.    PMH:  Myocardial infarction    Blood pressure elevated    Cholesterol serum elevated    Back pain    Spinal stenosis    Lumbar herniated disc    Obese    Hypertension    Narcotic dependence    Osteoarthritis    GERD (gastroesophageal reflux disease)    Cerebral aneurysm rupture    Femur fracture, right    Sacroiliitis, not elsewhere classified      PSH:  Cardiac chest pain    Chronic pain    Chronic pain    Chronic pain    Chronic pain    Cerebral aneurysm    S/P lumbar fusion    History of right inguinal hernia repair    Hx of appendectomy    Hx of laminectomy    S/P lumbar spinal fusion revision    Insertion of pain pump    Cleft palate repair    Stented coronary artery    History of hip replacement    S/P left knee arthroscopy    H/O arthroscopy of shoulder    History of throat surgery    Fusion of sacral region of spine      AH:    Meds: See med rec    T(C): 36.9 (03-28-22 @ 17:54)  HR: 74 (03-28-22 @ 17:54)  BP: 136/74 (03-28-22 @ 17:54)  RR: 17 (03-28-22 @ 17:54)  SpO2: 98% (03-28-22 @ 17:54)  Wt(kg): --                        16.3   9.25  )-----------( 197      ( 28 Mar 2022 16:21 )             48.8     03-28    143  |  107  |  16  ----------------------------<  100<H>  4.0   |  23  |  1.15    Ca    9.8      28 Mar 2022 16:21    TPro  7.0  /  Alb  4.3  /  TBili  0.6  /  DBili  x   /  AST  15  /  ALT  19  /  AlkPhos  127<H>  03-28    PT/INR - ( 28 Mar 2022 16:21 )   PT: 11.9 sec;   INR: 1.03 ratio         PTT - ( 28 Mar 2022 16:21 )  PTT:31.6 sec      PE  Gen: Laying in bed, alert and oriented, NAD  Resp: Unlabored breathing  RLE: Skin intact, no ecchymosis,   SILT DP/SP/ [place]/Saph/Post Tib  +EHL/FHL/TA/Gastroc,   Knee/ankle painless ROM,   hip ROM limited 2/2 pain,   DP+,   soft compartments, no calf ttp,     Secondary:  No TTP over bony landmarks, SILT BL, ROM intact BL, distal pulses palpable.    Imaging:  XR demonstrating with Right distal ellen-prosthetic Fx      66yMale with Right Vanc C periprosthetic Fx    - Pain control  - IS  - Continue home medications  - Regular Diet, NPOpMN/IVF  - CBC/BMP/Coags/UA/T+S x2  - EKG/CXR  - Medical clearance prior to planned procedure  - Plan for OR w/ Dr. Stoddard 3/29

## 2022-03-28 NOTE — CONSULT NOTE ADULT - SUBJECTIVE AND OBJECTIVE BOX
CHIEF COMPLAINT:    HISTORY OF PRESENT ILLNESS:  s/p fall, has a RLE fx, awaiting surgery, needs cardiac clearance  HPI: 67yo Male c/o RLE pain s/p mechanical fall while getting up from the toilet. Patient denies hitting his head or dizziness/CP/Palps/ LOC. Patient denies numbness or tingling in the RLE. Patient denies any other injuries. ptn's wife states he had an Echo less than a year ago and "everything was OK" . ptn denies CP at baseline at rest or w exertion, denies CRESPO/PND        PAST MEDICAL & SURGICAL HISTORY:  Myocardial infarction  KAEL x1 MI 2005, stent RCA    Back pain  Chronic back pain    Spinal stenosis    Lumbar herniated disc    Hypertension    Narcotic dependence  5/28/16 for withdrawal ,pt currently on Dialudid 8 mg every 4-6 hours /day    Osteoarthritis    GERD (gastroesophageal reflux disease)    Cerebral aneurysm rupture  1997 clipped, no residual    Femur fracture, right  2/16, surgical revision of right hip    Sacroiliitis, not elsewhere classified  Unspecified Inflammatory spondylopathy,Sacral and sacrococcygeal region    Chronic pain  Patient has an Intrathecal pump s/p removal in 2016    Cerebral aneurysm  I997 with clips    S/P lumbar fusion  L1-S1:2002    History of right inguinal hernia repair  x2    Hx of appendectomy  6/1969    Hx of laminectomy  lumbar-2002    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 mos.-14 yo    Stented coronary artery  KAEL x 1 to RCA    History of hip replacement  8/15, revision 2/16 after falling and fracturing right femur    S/P left knee arthroscopy    H/O arthroscopy of shoulder  right X 2, left X 1    History of throat surgery  surgical exicision cyst 1986    Fusion of sacral region of spine  5/2017            MEDICATIONS:  aspirin enteric coated 81 milliGRAM(s) Oral daily  enoxaparin Injectable 40 milliGRAM(s) SubCutaneous once  metoprolol tartrate 25 milliGRAM(s) Oral two times a day        acetaminophen     Tablet .. 975 milliGRAM(s) Oral every 8 hours  baclofen 20 milliGRAM(s) Oral Once  HYDROmorphone  Injectable 0.5 milliGRAM(s) IV Push every 6 hours PRN  oxyCODONE    IR 2.5 milliGRAM(s) Oral every 4 hours PRN  oxyCODONE    IR 5 milliGRAM(s) Oral every 4 hours PRN  pregabalin 150 milliGRAM(s) Oral Once    magnesium hydroxide Suspension 30 milliLiter(s) Oral daily PRN  senna 2 Tablet(s) Oral at bedtime      sodium chloride 0.9%. 1000 milliLiter(s) IV Continuous <Continuous>      FAMILY HISTORY:      SOCIAL HISTORY:    [ ] Non-smoker  [ ] Smoker  [ ] Alcohol    Allergies    No Known Allergies    Intolerances    	    REVIEW OF SYSTEMS:  CONSTITUTIONAL: No fever, weight loss, or fatigue  EYES: No eye pain, visual disturbances, or discharge  ENMT:  No difficulty hearing, tinnitus, vertigo; No sinus or throat pain  NECK: No pain or stiffness  RESPIRATORY: No cough, wheezing, chills or hemoptysis; No Shortness of Breath  CARDIOVASCULAR: No chest pain, palpitations, passing out, dizziness, or leg swelling  GASTROINTESTINAL: No abdominal or epigastric pain. No nausea, vomiting, or hematemesis; No diarrhea or constipation. No melena or hematochezia.  GENITOURINARY: No dysuria, frequency, hematuria, or incontinence  NEUROLOGICAL: No headaches, memory loss, loss of strength, numbness, or tremors  SKIN: No itching, burning, rashes, or lesions   LYMPH Nodes: No enlarged glands  ENDOCRINE: No heat or cold intolerance; No hair loss  MUSCULOSKELETAL: No joint pain or swelling; No muscle, back, or extremity pain  PSYCHIATRIC: No depression, anxiety, mood swings, or difficulty sleeping  HEME/LYMPH: No easy bruising, or bleeding gums  ALLERY AND IMMUNOLOGIC: No hives or eczema	    [ ] All others negative	  [ ] Unable to obtain    PHYSICAL EXAM:  T(C): 36.7 (03-28-22 @ 19:38), Max: 36.9 (03-28-22 @ 17:54)  HR: 98 (03-28-22 @ 19:38) (73 - 98)  BP: 138/73 (03-28-22 @ 19:38) (136/74 - 174/76)  RR: 17 (03-28-22 @ 19:38) (17 - 18)  SpO2: 98% (03-28-22 @ 19:38) (98% - 98%)  Wt(kg): --  I&O's Summary      Appearance: Normal	  HEENT:   Normal oral mucosa, PERRL, EOMI	  Lymphatic: No lymphadenopathy  Cardiovascular: Normal S1 S2, No JVD, No murmurs, No edema  Respiratory: Lungs clear to auscultation	  Psychiatry: A & O x 3, Mood & affect appropriate  Gastrointestinal:  Soft, Non-tender, + BS	  Skin: No rashes, No ecchymoses, No cyanosis	  Neurologic: Non-focal  Extremities: Normal range of motion, No clubbing, cyanosis or edema  Vascular: Peripheral pulses palpable 2+ bilaterally    TELEMETRY: 	    ECG:  	NSR inferior infarct PVCs non specific stt changes   RADIOLOGY:        < from: Xray Femur 2 Views, Right (03.28.22 @ 16:40) >    ******PRELIMINARY REPORT******      ******PRELIMINARY REPORT******       ACC: [id number] EXAM:  XR FEMUR 2 VIEWS RT                        ACC: [id number] EXAM:  XR HIP WITH PELV 1V RT                          PROCEDURE DATE:  03/28/2022    ******PRELIMINARY REPORT******      ******PRELIMINARY REPORT******           INTERPRETATION:  CLINICAL INDICATION: Fall with right-sided pain.    TECHNIQUE: Frontal radiograph of the pelvis with 2 views of the right hip.    COMPARISON: X-ray pelvis and right hip1/12/2017.    FINDINGS:    There is no evidence of acute fracture or dislocation. Chronic appearing   deformity of the inferior right pubic ramus. Status post right total hip   arthroplasty without evidence of periprosthetic lucency to suggest   hardware loosening or fracture. Generalized osteopenia. Left hip joint   space is  preserved. Stool limits evaluation of the sacrum and coccyx.   Partially imaged lumbar spinal fusion hardware. Short segment of tube   projects over the left hemipelvis.    IMPRESSION:    No evidence of acute fracture or dislocation.        ******PRELIMINARY REPORT******      ******PRELIMINARY REPORT******       < end of copied text >  < from: Xray Chest 1 View- PORTABLE-Urgent (03.28.22 @ 16:38) >    ******PRELIMINARY REPORT******      ******PRELIMINARY REPORT******       ACC: [id number] EXAM:  XR CHEST PORTABLE URGENT 1V                          PROCEDURE DATE:  03/28/2022    ******PRELIMINARY REPORT******      ******PRELIMINARY REPORT******           INTERPRETATION:  CLINICAL INDICATION: Trauma, fall with chest pain.    EXAM: Frontal radiographs of the chest.    COMPARISON: Chest radiograph from 1/12/2017.    FINDINGS:  The lungs are clear.  There is no pleural effusion or pneumothorax.  The heart size is normal.  The visualized osseous structures demonstrate no acute pathology.  Status post thoracolumbar spinal fusion.    IMPRESSION:  Clear lungs.        ******PRELIMINARY REPORT******      ******PRELIMINARY REPORT******       JUAN LR MD; Resident Radiology    < end of copied text >    OTHER: 	  	  LABS:	 	    CARDIAC MARKERS:                                  16.3   9.25  )-----------( 197      ( 28 Mar 2022 16:21 )             48.8     03-28    143  |  107  |  16  ----------------------------<  100<H>  4.0   |  23  |  1.15    Ca    9.8      28 Mar 2022 16:21    TPro  7.0  /  Alb  4.3  /  TBili  0.6  /  DBili  x   /  AST  15  /  ALT  19  /  AlkPhos  127<H>  03-28    proBNP:   Lipid Profile:   HgA1c:   TSH:            CHIEF COMPLAINT:    HISTORY OF PRESENT ILLNESS:  s/p fall, has a RLE fx, awaiting surgery, needs cardiac clearance  HPI: 67yo Male c/o RLE pain s/p mechanical fall while getting up from the toilet. Patient denies hitting his head or dizziness/CP/Palps/ LOC. Patient denies numbness or tingling in the RLE. Patient denies any other injuries. ptn's wife states he had an EKG less than a year ago and "everything was OK" . ptn denies CP at baseline at rest or w exertion, denies CRESPO/PND  NO chest pain or shortness of breath   His last cardiac workup was in 2007.       PAST MEDICAL & SURGICAL HISTORY:  Myocardial infarction  KAEL x1 MI 2005, stent RCA    Back pain  Chronic back pain    Spinal stenosis    Lumbar herniated disc    Hypertension    Narcotic dependence  5/28/16 for withdrawal ,pt currently on Dialudid 8 mg every 4-6 hours /day    Osteoarthritis    GERD (gastroesophageal reflux disease)    Cerebral aneurysm rupture  1997 clipped, no residual    Femur fracture, right  2/16, surgical revision of right hip    Sacroiliitis, not elsewhere classified  Unspecified Inflammatory spondylopathy,Sacral and sacrococcygeal region    Chronic pain  Patient has an Intrathecal pump s/p removal in 2016    Cerebral aneurysm  I997 with clips    S/P lumbar fusion  L1-S1:2002    History of right inguinal hernia repair  x2    Hx of appendectomy  6/1969    Hx of laminectomy  lumbar-2002    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    Stented coronary artery  KAEL x 1 to RCA    History of hip replacement  8/15, revision 2/16 after falling and fracturing right femur    S/P left knee arthroscopy    H/O arthroscopy of shoulder  right X 2, left X 1    History of throat surgery  surgical exicision cyst 1986    Fusion of sacral region of spine  5/2017            MEDICATIONS:  aspirin enteric coated 81 milliGRAM(s) Oral daily  enoxaparin Injectable 40 milliGRAM(s) SubCutaneous once  metoprolol tartrate 25 milliGRAM(s) Oral two times a day        acetaminophen     Tablet .. 975 milliGRAM(s) Oral every 8 hours  baclofen 20 milliGRAM(s) Oral Once  HYDROmorphone  Injectable 0.5 milliGRAM(s) IV Push every 6 hours PRN  oxyCODONE    IR 2.5 milliGRAM(s) Oral every 4 hours PRN  oxyCODONE    IR 5 milliGRAM(s) Oral every 4 hours PRN  pregabalin 150 milliGRAM(s) Oral Once    magnesium hydroxide Suspension 30 milliLiter(s) Oral daily PRN  senna 2 Tablet(s) Oral at bedtime      sodium chloride 0.9%. 1000 milliLiter(s) IV Continuous <Continuous>      FAMILY HISTORY:      SOCIAL HISTORY:    [ ] Non-smoker  [ ] Smoker  [ ] Alcohol    Allergies    No Known Allergies    Intolerances    	    REVIEW OF SYSTEMS:  CONSTITUTIONAL: No fever, weight loss, + fatigue  EYES: No eye pain, visual disturbances, or discharge  ENMT:  No difficulty hearing, tinnitus, vertigo; No sinus or throat pain  NECK: No pain or stiffness  RESPIRATORY: No cough, wheezing, chills or hemoptysis; No Shortness of Breath  CARDIOVASCULAR: No chest pain, palpitations, passing out, dizziness, or leg swelling  GASTROINTESTINAL: No abdominal or epigastric pain. No nausea, vomiting, or hematemesis; No diarrhea or constipation. No melena or hematochezia.  GENITOURINARY: No dysuria, frequency, hematuria, or incontinence  NEUROLOGICAL: No headaches, memory loss, loss of strength, numbness, or tremors  SKIN: No itching, burning, rashes, or lesions   LYMPH Nodes: No enlarged glands  ENDOCRINE: No heat or cold intolerance; No hair loss  MUSCULOSKELETAL: + joint pain or swelling; No muscle, back, or extremity pain  PSYCHIATRIC: No depression, anxiety, mood swings, or difficulty sleeping  HEME/LYMPH: No easy bruising, or bleeding gums  ALLERY AND IMMUNOLOGIC: No hives or eczema	    [ ] All others negative	  [ ] Unable to obtain    PHYSICAL EXAM:  T(C): 36.7 (03-28-22 @ 19:38), Max: 36.9 (03-28-22 @ 17:54)  HR: 98 (03-28-22 @ 19:38) (73 - 98)  BP: 138/73 (03-28-22 @ 19:38) (136/74 - 174/76)  RR: 17 (03-28-22 @ 19:38) (17 - 18)  SpO2: 98% (03-28-22 @ 19:38) (98% - 98%)  Wt(kg): --  I&O's Summary      Appearance: Normal	  HEENT:   Normal oral mucosa, PERRL, EOMI	  Lymphatic: No lymphadenopathy  Cardiovascular: Normal S1 S2, No JVD, No murmurs, No edema  Respiratory: Lungs clear to auscultation	  Psychiatry: A & O x 3, Mood & affect appropriate  Gastrointestinal:  Soft, Non-tender, + BS	  Skin: No rashes, No ecchymoses, No cyanosis	  Neurologic: Non-focal  Extremities: Normal range of motion, No clubbing, cyanosis or edema  Vascular: Peripheral pulses palpable 2+ bilaterally    TELEMETRY: 	    ECG:  	NSR inferior infarct PVCs non specific stt changes   RADIOLOGY:        < from: Xray Femur 2 Views, Right (03.28.22 @ 16:40) >    ******PRELIMINARY REPORT******      ******PRELIMINARY REPORT******       ACC: [id number] EXAM:  XR FEMUR 2 VIEWS RT                        ACC: [id number] EXAM:  XR HIP WITH PELV 1V RT                          PROCEDURE DATE:  03/28/2022    ******PRELIMINARY REPORT******      ******PRELIMINARY REPORT******           INTERPRETATION:  CLINICAL INDICATION: Fall with right-sided pain.    TECHNIQUE: Frontal radiograph of the pelvis with 2 views of the right hip.    COMPARISON: X-ray pelvis and right hip1/12/2017.    FINDINGS:    There is no evidence of acute fracture or dislocation. Chronic appearing   deformity of the inferior right pubic ramus. Status post right total hip   arthroplasty without evidence of periprosthetic lucency to suggest   hardware loosening or fracture. Generalized osteopenia. Left hip joint   space is  preserved. Stool limits evaluation of the sacrum and coccyx.   Partially imaged lumbar spinal fusion hardware. Short segment of tube   projects over the left hemipelvis.    IMPRESSION:    No evidence of acute fracture or dislocation.        ******PRELIMINARY REPORT******      ******PRELIMINARY REPORT******       < end of copied text >  < from: Xray Chest 1 View- PORTABLE-Urgent (03.28.22 @ 16:38) >    ******PRELIMINARY REPORT******      ******PRELIMINARY REPORT******       ACC: [id number] EXAM:  XR CHEST PORTABLE URGENT 1V                          PROCEDURE DATE:  03/28/2022    ******PRELIMINARY REPORT******      ******PRELIMINARY REPORT******           INTERPRETATION:  CLINICAL INDICATION: Trauma, fall with chest pain.    EXAM: Frontal radiographs of the chest.    COMPARISON: Chest radiograph from 1/12/2017.    FINDINGS:  The lungs are clear.  There is no pleural effusion or pneumothorax.  The heart size is normal.  The visualized osseous structures demonstrate no acute pathology.  Status post thoracolumbar spinal fusion.    IMPRESSION:  Clear lungs.        ******PRELIMINARY REPORT******      ******PRELIMINARY REPORT******       JUAN LR MD; Resident Radiology    < end of copied text >    OTHER: 	  	  LABS:	 	    CARDIAC MARKERS:                                  16.3   9.25  )-----------( 197      ( 28 Mar 2022 16:21 )             48.8     03-28    143  |  107  |  16  ----------------------------<  100<H>  4.0   |  23  |  1.15    Ca    9.8      28 Mar 2022 16:21    TPro  7.0  /  Alb  4.3  /  TBili  0.6  /  DBili  x   /  AST  15  /  ALT  19  /  AlkPhos  127<H>  03-28    proBNP:   Lipid Profile:   HgA1c:   TSH:

## 2022-03-29 ENCOUNTER — TRANSCRIPTION ENCOUNTER (OUTPATIENT)
Age: 67
End: 2022-03-29

## 2022-03-29 LAB
ANION GAP SERPL CALC-SCNC: 11 MMOL/L — SIGNIFICANT CHANGE UP (ref 5–17)
APTT BLD: 33 SEC — SIGNIFICANT CHANGE UP (ref 27.5–35.5)
BUN SERPL-MCNC: 18 MG/DL — SIGNIFICANT CHANGE UP (ref 7–23)
CALCIUM SERPL-MCNC: 9.2 MG/DL — SIGNIFICANT CHANGE UP (ref 8.4–10.5)
CHLORIDE SERPL-SCNC: 108 MMOL/L — SIGNIFICANT CHANGE UP (ref 96–108)
CO2 SERPL-SCNC: 24 MMOL/L — SIGNIFICANT CHANGE UP (ref 22–31)
CREAT SERPL-MCNC: 1.36 MG/DL — HIGH (ref 0.5–1.3)
EGFR: 57 ML/MIN/1.73M2 — LOW
GLUCOSE SERPL-MCNC: 115 MG/DL — HIGH (ref 70–99)
HCT VFR BLD CALC: 44.8 % — SIGNIFICANT CHANGE UP (ref 39–50)
HCV AB S/CO SERPL IA: 0.09 S/CO — SIGNIFICANT CHANGE UP (ref 0–0.99)
HCV AB SERPL-IMP: SIGNIFICANT CHANGE UP
HGB BLD-MCNC: 14.8 G/DL — SIGNIFICANT CHANGE UP (ref 13–17)
INR BLD: 1.13 RATIO — SIGNIFICANT CHANGE UP (ref 0.88–1.16)
MCHC RBC-ENTMCNC: 30.9 PG — SIGNIFICANT CHANGE UP (ref 27–34)
MCHC RBC-ENTMCNC: 33 GM/DL — SIGNIFICANT CHANGE UP (ref 32–36)
MCV RBC AUTO: 93.5 FL — SIGNIFICANT CHANGE UP (ref 80–100)
NRBC # BLD: 0 /100 WBCS — SIGNIFICANT CHANGE UP (ref 0–0)
PLATELET # BLD AUTO: 213 K/UL — SIGNIFICANT CHANGE UP (ref 150–400)
POTASSIUM SERPL-MCNC: 4.5 MMOL/L — SIGNIFICANT CHANGE UP (ref 3.5–5.3)
POTASSIUM SERPL-SCNC: 4.5 MMOL/L — SIGNIFICANT CHANGE UP (ref 3.5–5.3)
PROTHROM AB SERPL-ACNC: 13 SEC — SIGNIFICANT CHANGE UP (ref 10.5–13.4)
RBC # BLD: 4.79 M/UL — SIGNIFICANT CHANGE UP (ref 4.2–5.8)
RBC # FLD: 14.4 % — SIGNIFICANT CHANGE UP (ref 10.3–14.5)
SODIUM SERPL-SCNC: 143 MMOL/L — SIGNIFICANT CHANGE UP (ref 135–145)
WBC # BLD: 9.81 K/UL — SIGNIFICANT CHANGE UP (ref 3.8–10.5)
WBC # FLD AUTO: 9.81 K/UL — SIGNIFICANT CHANGE UP (ref 3.8–10.5)

## 2022-03-29 RX ORDER — HEPARIN SODIUM 5000 [USP'U]/ML
5000 INJECTION INTRAVENOUS; SUBCUTANEOUS ONCE
Refills: 0 | Status: COMPLETED | OUTPATIENT
Start: 2022-03-29 | End: 2022-03-29

## 2022-03-29 RX ORDER — HYDROMORPHONE HYDROCHLORIDE 2 MG/ML
0.5 INJECTION INTRAMUSCULAR; INTRAVENOUS; SUBCUTANEOUS EVERY 6 HOURS
Refills: 0 | Status: DISCONTINUED | OUTPATIENT
Start: 2022-03-29 | End: 2022-03-30

## 2022-03-29 RX ORDER — OXYCODONE HYDROCHLORIDE 5 MG/1
10 TABLET ORAL EVERY 4 HOURS
Refills: 0 | Status: DISCONTINUED | OUTPATIENT
Start: 2022-03-29 | End: 2022-03-30

## 2022-03-29 RX ORDER — OXYCODONE HYDROCHLORIDE 5 MG/1
5 TABLET ORAL EVERY 4 HOURS
Refills: 0 | Status: DISCONTINUED | OUTPATIENT
Start: 2022-03-29 | End: 2022-03-30

## 2022-03-29 RX ADMIN — HEPARIN SODIUM 5000 UNIT(S): 5000 INJECTION INTRAVENOUS; SUBCUTANEOUS at 22:34

## 2022-03-29 RX ADMIN — Medication 975 MILLIGRAM(S): at 22:32

## 2022-03-29 RX ADMIN — Medication 975 MILLIGRAM(S): at 14:15

## 2022-03-29 RX ADMIN — Medication 25 MILLIGRAM(S): at 06:11

## 2022-03-29 RX ADMIN — Medication 975 MILLIGRAM(S): at 13:40

## 2022-03-29 RX ADMIN — Medication 975 MILLIGRAM(S): at 23:32

## 2022-03-29 RX ADMIN — Medication 25 MILLIGRAM(S): at 17:50

## 2022-03-29 RX ADMIN — OXYCODONE HYDROCHLORIDE 5 MILLIGRAM(S): 5 TABLET ORAL at 00:57

## 2022-03-29 RX ADMIN — Medication 975 MILLIGRAM(S): at 07:11

## 2022-03-29 RX ADMIN — OXYCODONE HYDROCHLORIDE 5 MILLIGRAM(S): 5 TABLET ORAL at 06:17

## 2022-03-29 RX ADMIN — OXYCODONE HYDROCHLORIDE 5 MILLIGRAM(S): 5 TABLET ORAL at 07:17

## 2022-03-29 RX ADMIN — Medication 81 MILLIGRAM(S): at 11:48

## 2022-03-29 RX ADMIN — HYDROMORPHONE HYDROCHLORIDE 0.5 MILLIGRAM(S): 2 INJECTION INTRAMUSCULAR; INTRAVENOUS; SUBCUTANEOUS at 20:19

## 2022-03-29 RX ADMIN — OXYCODONE HYDROCHLORIDE 5 MILLIGRAM(S): 5 TABLET ORAL at 00:27

## 2022-03-29 RX ADMIN — SENNA PLUS 2 TABLET(S): 8.6 TABLET ORAL at 22:32

## 2022-03-29 RX ADMIN — HYDROMORPHONE HYDROCHLORIDE 0.5 MILLIGRAM(S): 2 INJECTION INTRAMUSCULAR; INTRAVENOUS; SUBCUTANEOUS at 20:49

## 2022-03-29 RX ADMIN — Medication 975 MILLIGRAM(S): at 06:11

## 2022-03-29 NOTE — ED ADULT NURSE REASSESSMENT NOTE - NS ED NURSE REASSESS COMMENT FT1
Patient transferred to ED holding orange in stable conditions endorsed to TREE Woodward
Received patient in bed A&Ox3 with c/o pain to right leg s/p fall, swelling noted to right leg otherwise NAD will continue to monitor pending bed assignment.

## 2022-03-29 NOTE — PATIENT PROFILE ADULT - FALL HARM RISK - HARM RISK INTERVENTIONS

## 2022-03-30 LAB
ANION GAP SERPL CALC-SCNC: 12 MMOL/L — SIGNIFICANT CHANGE UP (ref 5–17)
ANION GAP SERPL CALC-SCNC: 12 MMOL/L — SIGNIFICANT CHANGE UP (ref 5–17)
APTT BLD: 29.3 SEC — SIGNIFICANT CHANGE UP (ref 27.5–35.5)
BUN SERPL-MCNC: 19 MG/DL — SIGNIFICANT CHANGE UP (ref 7–23)
BUN SERPL-MCNC: 20 MG/DL — SIGNIFICANT CHANGE UP (ref 7–23)
CALCIUM SERPL-MCNC: 8.1 MG/DL — LOW (ref 8.4–10.5)
CALCIUM SERPL-MCNC: 9.1 MG/DL — SIGNIFICANT CHANGE UP (ref 8.4–10.5)
CHLORIDE SERPL-SCNC: 106 MMOL/L — SIGNIFICANT CHANGE UP (ref 96–108)
CHLORIDE SERPL-SCNC: 108 MMOL/L — SIGNIFICANT CHANGE UP (ref 96–108)
CO2 SERPL-SCNC: 21 MMOL/L — LOW (ref 22–31)
CO2 SERPL-SCNC: 22 MMOL/L — SIGNIFICANT CHANGE UP (ref 22–31)
CREAT SERPL-MCNC: 1.22 MG/DL — SIGNIFICANT CHANGE UP (ref 0.5–1.3)
CREAT SERPL-MCNC: 1.34 MG/DL — HIGH (ref 0.5–1.3)
EGFR: 58 ML/MIN/1.73M2 — LOW
EGFR: 65 ML/MIN/1.73M2 — SIGNIFICANT CHANGE UP
GLUCOSE SERPL-MCNC: 101 MG/DL — HIGH (ref 70–99)
GLUCOSE SERPL-MCNC: 130 MG/DL — HIGH (ref 70–99)
HCT VFR BLD CALC: 39.9 % — SIGNIFICANT CHANGE UP (ref 39–50)
HCT VFR BLD CALC: 41.6 % — SIGNIFICANT CHANGE UP (ref 39–50)
HGB BLD-MCNC: 13 G/DL — SIGNIFICANT CHANGE UP (ref 13–17)
HGB BLD-MCNC: 14 G/DL — SIGNIFICANT CHANGE UP (ref 13–17)
INR BLD: 1.13 RATIO — SIGNIFICANT CHANGE UP (ref 0.88–1.16)
MCHC RBC-ENTMCNC: 31.1 PG — SIGNIFICANT CHANGE UP (ref 27–34)
MCHC RBC-ENTMCNC: 31.2 PG — SIGNIFICANT CHANGE UP (ref 27–34)
MCHC RBC-ENTMCNC: 32.6 GM/DL — SIGNIFICANT CHANGE UP (ref 32–36)
MCHC RBC-ENTMCNC: 33.7 GM/DL — SIGNIFICANT CHANGE UP (ref 32–36)
MCV RBC AUTO: 92.4 FL — SIGNIFICANT CHANGE UP (ref 80–100)
MCV RBC AUTO: 95.7 FL — SIGNIFICANT CHANGE UP (ref 80–100)
NRBC # BLD: 0 /100 WBCS — SIGNIFICANT CHANGE UP (ref 0–0)
NRBC # BLD: 0 /100 WBCS — SIGNIFICANT CHANGE UP (ref 0–0)
PLATELET # BLD AUTO: 202 K/UL — SIGNIFICANT CHANGE UP (ref 150–400)
PLATELET # BLD AUTO: 211 K/UL — SIGNIFICANT CHANGE UP (ref 150–400)
POTASSIUM SERPL-MCNC: 4 MMOL/L — SIGNIFICANT CHANGE UP (ref 3.5–5.3)
POTASSIUM SERPL-MCNC: 4.6 MMOL/L — SIGNIFICANT CHANGE UP (ref 3.5–5.3)
POTASSIUM SERPL-SCNC: 4 MMOL/L — SIGNIFICANT CHANGE UP (ref 3.5–5.3)
POTASSIUM SERPL-SCNC: 4.6 MMOL/L — SIGNIFICANT CHANGE UP (ref 3.5–5.3)
PROTHROM AB SERPL-ACNC: 13.1 SEC — SIGNIFICANT CHANGE UP (ref 10.5–13.4)
RBC # BLD: 4.17 M/UL — LOW (ref 4.2–5.8)
RBC # BLD: 4.5 M/UL — SIGNIFICANT CHANGE UP (ref 4.2–5.8)
RBC # FLD: 14.2 % — SIGNIFICANT CHANGE UP (ref 10.3–14.5)
RBC # FLD: 14.3 % — SIGNIFICANT CHANGE UP (ref 10.3–14.5)
SODIUM SERPL-SCNC: 139 MMOL/L — SIGNIFICANT CHANGE UP (ref 135–145)
SODIUM SERPL-SCNC: 142 MMOL/L — SIGNIFICANT CHANGE UP (ref 135–145)
WBC # BLD: 10.77 K/UL — HIGH (ref 3.8–10.5)
WBC # BLD: 14.75 K/UL — HIGH (ref 3.8–10.5)
WBC # FLD AUTO: 10.77 K/UL — HIGH (ref 3.8–10.5)
WBC # FLD AUTO: 14.75 K/UL — HIGH (ref 3.8–10.5)

## 2022-03-30 PROCEDURE — 73552 X-RAY EXAM OF FEMUR 2/>: CPT | Mod: 26,RT

## 2022-03-30 PROCEDURE — 72170 X-RAY EXAM OF PELVIS: CPT | Mod: 26

## 2022-03-30 DEVICE — IMPLANTABLE DEVICE: Type: IMPLANTABLE DEVICE | Site: RIGHT | Status: FUNCTIONAL

## 2022-03-30 DEVICE — K-WIRE STRYKER 2MM X 150MM: Type: IMPLANTABLE DEVICE | Site: RIGHT | Status: FUNCTIONAL

## 2022-03-30 DEVICE — SCREW NON LOKG 4.5X36MM: Type: IMPLANTABLE DEVICE | Site: RIGHT | Status: FUNCTIONAL

## 2022-03-30 DEVICE — SCREW CORT 4.5X34MM: Type: IMPLANTABLE DEVICE | Site: RIGHT | Status: FUNCTIONAL

## 2022-03-30 DEVICE — SCREW CORT 4.5X40MM: Type: IMPLANTABLE DEVICE | Site: RIGHT | Status: FUNCTIONAL

## 2022-03-30 DEVICE — SCREW CORT 4.5X38MM: Type: IMPLANTABLE DEVICE | Site: RIGHT | Status: FUNCTIONAL

## 2022-03-30 DEVICE — SCREW LOKG 5X80MM: Type: IMPLANTABLE DEVICE | Site: RIGHT | Status: FUNCTIONAL

## 2022-03-30 DEVICE — SCREW LOKG 5X75MM: Type: IMPLANTABLE DEVICE | Site: RIGHT | Status: FUNCTIONAL

## 2022-03-30 DEVICE — SCREW CORT 4.5X32MM: Type: IMPLANTABLE DEVICE | Site: RIGHT | Status: FUNCTIONAL

## 2022-03-30 RX ORDER — FOLIC ACID 0.8 MG
1 TABLET ORAL DAILY
Refills: 0 | Status: DISCONTINUED | OUTPATIENT
Start: 2022-03-30 | End: 2022-04-04

## 2022-03-30 RX ORDER — SODIUM CHLORIDE 9 MG/ML
1000 INJECTION, SOLUTION INTRAVENOUS
Refills: 0 | Status: DISCONTINUED | OUTPATIENT
Start: 2022-03-30 | End: 2022-03-30

## 2022-03-30 RX ORDER — KETOROLAC TROMETHAMINE 30 MG/ML
15 SYRINGE (ML) INJECTION ONCE
Refills: 0 | Status: DISCONTINUED | OUTPATIENT
Start: 2022-03-30 | End: 2022-03-30

## 2022-03-30 RX ORDER — HYDROMORPHONE HYDROCHLORIDE 2 MG/ML
0.5 INJECTION INTRAMUSCULAR; INTRAVENOUS; SUBCUTANEOUS
Refills: 0 | Status: DISCONTINUED | OUTPATIENT
Start: 2022-03-30 | End: 2022-03-30

## 2022-03-30 RX ORDER — SODIUM CHLORIDE 9 MG/ML
1000 INJECTION, SOLUTION INTRAVENOUS
Refills: 0 | Status: DISCONTINUED | OUTPATIENT
Start: 2022-03-30 | End: 2022-04-04

## 2022-03-30 RX ORDER — CEFAZOLIN SODIUM 1 G
2000 VIAL (EA) INJECTION EVERY 8 HOURS
Refills: 0 | Status: COMPLETED | OUTPATIENT
Start: 2022-03-30 | End: 2022-03-31

## 2022-03-30 RX ORDER — HYDROMORPHONE HYDROCHLORIDE 2 MG/ML
0.5 INJECTION INTRAMUSCULAR; INTRAVENOUS; SUBCUTANEOUS ONCE
Refills: 0 | Status: DISCONTINUED | OUTPATIENT
Start: 2022-03-30 | End: 2022-03-30

## 2022-03-30 RX ORDER — ONDANSETRON 8 MG/1
4 TABLET, FILM COATED ORAL ONCE
Refills: 0 | Status: DISCONTINUED | OUTPATIENT
Start: 2022-03-30 | End: 2022-03-30

## 2022-03-30 RX ORDER — METOPROLOL TARTRATE 50 MG
25 TABLET ORAL
Refills: 0 | Status: DISCONTINUED | OUTPATIENT
Start: 2022-03-30 | End: 2022-04-04

## 2022-03-30 RX ORDER — PANTOPRAZOLE SODIUM 20 MG/1
40 TABLET, DELAYED RELEASE ORAL
Refills: 0 | Status: DISCONTINUED | OUTPATIENT
Start: 2022-03-30 | End: 2022-04-04

## 2022-03-30 RX ORDER — ASCORBIC ACID 60 MG
500 TABLET,CHEWABLE ORAL
Refills: 0 | Status: DISCONTINUED | OUTPATIENT
Start: 2022-03-30 | End: 2022-04-04

## 2022-03-30 RX ORDER — MAGNESIUM HYDROXIDE 400 MG/1
30 TABLET, CHEWABLE ORAL DAILY
Refills: 0 | Status: DISCONTINUED | OUTPATIENT
Start: 2022-03-30 | End: 2022-04-04

## 2022-03-30 RX ORDER — FERROUS SULFATE 325(65) MG
325 TABLET ORAL DAILY
Refills: 0 | Status: DISCONTINUED | OUTPATIENT
Start: 2022-03-30 | End: 2022-04-04

## 2022-03-30 RX ORDER — ACETAMINOPHEN 500 MG
975 TABLET ORAL EVERY 6 HOURS
Refills: 0 | Status: DISCONTINUED | OUTPATIENT
Start: 2022-03-30 | End: 2022-04-04

## 2022-03-30 RX ORDER — SENNA PLUS 8.6 MG/1
2 TABLET ORAL AT BEDTIME
Refills: 0 | Status: DISCONTINUED | OUTPATIENT
Start: 2022-03-30 | End: 2022-04-04

## 2022-03-30 RX ORDER — OXYCODONE HYDROCHLORIDE 5 MG/1
5 TABLET ORAL EVERY 4 HOURS
Refills: 0 | Status: DISCONTINUED | OUTPATIENT
Start: 2022-03-30 | End: 2022-03-31

## 2022-03-30 RX ORDER — RIVAROXABAN 15 MG-20MG
10 KIT ORAL DAILY
Refills: 0 | Status: DISCONTINUED | OUTPATIENT
Start: 2022-03-31 | End: 2022-04-04

## 2022-03-30 RX ORDER — ATORVASTATIN CALCIUM 80 MG/1
20 TABLET, FILM COATED ORAL AT BEDTIME
Refills: 0 | Status: DISCONTINUED | OUTPATIENT
Start: 2022-03-30 | End: 2022-04-04

## 2022-03-30 RX ORDER — GEMFIBROZIL 600 MG
600 TABLET ORAL
Refills: 0 | Status: DISCONTINUED | OUTPATIENT
Start: 2022-03-30 | End: 2022-04-04

## 2022-03-30 RX ORDER — OXYCODONE HYDROCHLORIDE 5 MG/1
10 TABLET ORAL EVERY 4 HOURS
Refills: 0 | Status: DISCONTINUED | OUTPATIENT
Start: 2022-03-30 | End: 2022-03-31

## 2022-03-30 RX ORDER — ASPIRIN/CALCIUM CARB/MAGNESIUM 324 MG
81 TABLET ORAL DAILY
Refills: 0 | Status: DISCONTINUED | OUTPATIENT
Start: 2022-03-30 | End: 2022-04-04

## 2022-03-30 RX ADMIN — OXYCODONE HYDROCHLORIDE 10 MILLIGRAM(S): 5 TABLET ORAL at 15:30

## 2022-03-30 RX ADMIN — Medication 1 MILLIGRAM(S): at 12:38

## 2022-03-30 RX ADMIN — Medication 1 TABLET(S): at 12:38

## 2022-03-30 RX ADMIN — Medication 975 MILLIGRAM(S): at 18:15

## 2022-03-30 RX ADMIN — OXYCODONE HYDROCHLORIDE 10 MILLIGRAM(S): 5 TABLET ORAL at 02:11

## 2022-03-30 RX ADMIN — OXYCODONE HYDROCHLORIDE 10 MILLIGRAM(S): 5 TABLET ORAL at 06:30

## 2022-03-30 RX ADMIN — SODIUM CHLORIDE 75 MILLILITER(S): 9 INJECTION, SOLUTION INTRAVENOUS at 12:38

## 2022-03-30 RX ADMIN — OXYCODONE HYDROCHLORIDE 10 MILLIGRAM(S): 5 TABLET ORAL at 05:51

## 2022-03-30 RX ADMIN — Medication 15 MILLIGRAM(S): at 12:15

## 2022-03-30 RX ADMIN — Medication 100 MILLIGRAM(S): at 18:35

## 2022-03-30 RX ADMIN — OXYCODONE HYDROCHLORIDE 10 MILLIGRAM(S): 5 TABLET ORAL at 23:51

## 2022-03-30 RX ADMIN — Medication 975 MILLIGRAM(S): at 17:48

## 2022-03-30 RX ADMIN — Medication 500 MILLIGRAM(S): at 17:45

## 2022-03-30 RX ADMIN — Medication 975 MILLIGRAM(S): at 23:00

## 2022-03-30 RX ADMIN — SODIUM CHLORIDE 75 MILLILITER(S): 9 INJECTION, SOLUTION INTRAVENOUS at 02:10

## 2022-03-30 RX ADMIN — Medication 25 MILLIGRAM(S): at 17:42

## 2022-03-30 RX ADMIN — Medication 600 MILLIGRAM(S): at 18:34

## 2022-03-30 RX ADMIN — ATORVASTATIN CALCIUM 20 MILLIGRAM(S): 80 TABLET, FILM COATED ORAL at 21:23

## 2022-03-30 RX ADMIN — OXYCODONE HYDROCHLORIDE 10 MILLIGRAM(S): 5 TABLET ORAL at 03:11

## 2022-03-30 RX ADMIN — SENNA PLUS 2 TABLET(S): 8.6 TABLET ORAL at 21:24

## 2022-03-30 RX ADMIN — OXYCODONE HYDROCHLORIDE 10 MILLIGRAM(S): 5 TABLET ORAL at 18:34

## 2022-03-30 RX ADMIN — Medication 81 MILLIGRAM(S): at 12:38

## 2022-03-30 RX ADMIN — OXYCODONE HYDROCHLORIDE 10 MILLIGRAM(S): 5 TABLET ORAL at 14:57

## 2022-03-30 RX ADMIN — OXYCODONE HYDROCHLORIDE 10 MILLIGRAM(S): 5 TABLET ORAL at 22:51

## 2022-03-30 RX ADMIN — HYDROMORPHONE HYDROCHLORIDE 0.5 MILLIGRAM(S): 2 INJECTION INTRAMUSCULAR; INTRAVENOUS; SUBCUTANEOUS at 17:18

## 2022-03-30 RX ADMIN — HYDROMORPHONE HYDROCHLORIDE 0.5 MILLIGRAM(S): 2 INJECTION INTRAMUSCULAR; INTRAVENOUS; SUBCUTANEOUS at 16:28

## 2022-03-30 RX ADMIN — Medication 25 MILLIGRAM(S): at 05:51

## 2022-03-30 RX ADMIN — Medication 975 MILLIGRAM(S): at 23:30

## 2022-03-30 RX ADMIN — Medication 15 MILLIGRAM(S): at 11:55

## 2022-03-30 NOTE — BRIEF OPERATIVE NOTE - NSICDXBRIEFPREOP_GEN_ALL_CORE_FT
PRE-OP DIAGNOSIS:  Periprosthetic fracture of shaft of femur 30-Mar-2022 11:03:29 Right Min, Ramandeep

## 2022-03-30 NOTE — PHYSICAL THERAPY INITIAL EVALUATION ADULT - ADDITIONAL COMMENTS
PTA pt states he was ambulating independently with rollator, performed ADLs independently. Pt owns rollator, shower bench, 1 grab bar, and commode over toilet.

## 2022-03-30 NOTE — CHART NOTE - NSCHARTNOTEFT_GEN_A_CORE
Patient seen on floor resting with complaint of RLE pain.  No Chest Pain, SOB, N/V.    T(C): 36.8 (03-30-22 @ 16:45), Max: 36.8 (03-30-22 @ 16:45)  HR: 73 (03-30-22 @ 16:45) (59 - 74)  BP: 144/77 (03-30-22 @ 16:45) (103/58 - 165/85)  RR: 18 (03-30-22 @ 16:45) (14 - 18)  SpO2: 100% (03-30-22 @ 16:45) (94% - 100%)  Wt(kg): --    Exam:  Alert and Oriented, No Acute Distress  Laterality: RLE wrapped in ace, C/D/I  Calves soft, non-tender bilaterally  +PF/DF/EHL/FHL  SILT  + DP pulse    Xray:----                          13.0   14.75 )-----------( 202      ( 30 Mar 2022 11:36 )             39.9    03-30    139  |  106  |  19  ----------------------------<  130<H>  4.6   |  21<L>  |  1.22    Ca    8.1<L>      30 Mar 2022 11:36        A/P: 66yMale S/p R ORIF periprosthetic femur fracture . VSS. NAD  -PT/OT-NWB RLE, OOB in AM  -IS encouraged  -DVT PPx with xarelto/A81  -Followup AM labs  -Pain Control    Kalee Martin PA-C  Team Pager #[phone]

## 2022-03-30 NOTE — BRIEF OPERATIVE NOTE - NSICDXBRIEFPOSTOP_GEN_ALL_CORE_FT
POST-OP DIAGNOSIS:  Periprosthetic fracture of shaft of femur 30-Mar-2022 11:03:37 Right Min, Rmaandeep

## 2022-03-30 NOTE — PHYSICAL THERAPY INITIAL EVALUATION ADULT - RANGE OF MOTION EXAMINATION, REHAB EVAL
except RLE limited 2/2 pain from surgery, LLE hip & knee also limited/bilateral upper extremity ROM was WFL (within functional limits)/bilateral lower extremity ROM was WFL (within functional limits)

## 2022-03-30 NOTE — PHYSICAL THERAPY INITIAL EVALUATION ADULT - MANUAL MUSCLE TESTING RESULTS, REHAB EVAL
UE and LE all grossly assessed to be at least 3/5, RLE not assessed 2/2 pain, LLE 2+/5 at hip and knee, ankle 3/5/grossly assessed due to

## 2022-03-30 NOTE — PHYSICAL THERAPY INITIAL EVALUATION ADULT - PERTINENT HX OF CURRENT PROBLEM, REHAB EVAL
67yo male PMH: CAD, s/p MI, h/o PTCA/PCI, HTN, Obesity, HLD, Chronic Back pain, Spinal stenosis, Lumbar herniated disc, Narcotic dependence,Osteoarthritis, GERD (gastroesophageal reflux disease), Cerebral aneurysm rupture, Femur fracture right s/p GORGE, Sacroiliitis now presents c/o RLE pain s/p mechanical fall while getting up from the toilet. Patient denies hitting his head or dizziness/CP/Palps/ LOC. Found to have right distal ellen-prosthetic Fx now s/p R femur ORIF on 3/30

## 2022-03-30 NOTE — BRIEF OPERATIVE NOTE - NSICDXBRIEFPROCEDURE_GEN_ALL_CORE_FT
PROCEDURES:  ORIF, fracture, femur, periprosthetic 30-Mar-2022 11:03:09 Right femur Min, Ramandeep

## 2022-03-31 LAB
ANION GAP SERPL CALC-SCNC: 13 MMOL/L — SIGNIFICANT CHANGE UP (ref 5–17)
BUN SERPL-MCNC: 24 MG/DL — HIGH (ref 7–23)
CALCIUM SERPL-MCNC: 8.5 MG/DL — SIGNIFICANT CHANGE UP (ref 8.4–10.5)
CHLORIDE SERPL-SCNC: 105 MMOL/L — SIGNIFICANT CHANGE UP (ref 96–108)
CO2 SERPL-SCNC: 21 MMOL/L — LOW (ref 22–31)
CREAT SERPL-MCNC: 1.46 MG/DL — HIGH (ref 0.5–1.3)
EGFR: 52 ML/MIN/1.73M2 — LOW
GLUCOSE SERPL-MCNC: 131 MG/DL — HIGH (ref 70–99)
HCT VFR BLD CALC: 32.5 % — LOW (ref 39–50)
HGB BLD-MCNC: 10.7 G/DL — LOW (ref 13–17)
MCHC RBC-ENTMCNC: 31.3 PG — SIGNIFICANT CHANGE UP (ref 27–34)
MCHC RBC-ENTMCNC: 32.9 GM/DL — SIGNIFICANT CHANGE UP (ref 32–36)
MCV RBC AUTO: 95 FL — SIGNIFICANT CHANGE UP (ref 80–100)
NRBC # BLD: 0 /100 WBCS — SIGNIFICANT CHANGE UP (ref 0–0)
PLATELET # BLD AUTO: 203 K/UL — SIGNIFICANT CHANGE UP (ref 150–400)
POTASSIUM SERPL-MCNC: 4.9 MMOL/L — SIGNIFICANT CHANGE UP (ref 3.5–5.3)
POTASSIUM SERPL-SCNC: 4.9 MMOL/L — SIGNIFICANT CHANGE UP (ref 3.5–5.3)
RBC # BLD: 3.42 M/UL — LOW (ref 4.2–5.8)
RBC # FLD: 14.6 % — HIGH (ref 10.3–14.5)
SODIUM SERPL-SCNC: 139 MMOL/L — SIGNIFICANT CHANGE UP (ref 135–145)
WBC # BLD: 10.61 K/UL — HIGH (ref 3.8–10.5)
WBC # FLD AUTO: 10.61 K/UL — HIGH (ref 3.8–10.5)

## 2022-03-31 RX ORDER — MORPHINE SULFATE 50 MG/1
15 CAPSULE, EXTENDED RELEASE ORAL EVERY 8 HOURS
Refills: 0 | Status: DISCONTINUED | OUTPATIENT
Start: 2022-03-31 | End: 2022-03-31

## 2022-03-31 RX ORDER — MORPHINE SULFATE 50 MG/1
15 CAPSULE, EXTENDED RELEASE ORAL EVERY 12 HOURS
Refills: 0 | Status: DISCONTINUED | OUTPATIENT
Start: 2022-03-31 | End: 2022-04-04

## 2022-03-31 RX ORDER — BACLOFEN 100 %
20 POWDER (GRAM) MISCELLANEOUS
Refills: 0 | Status: DISCONTINUED | OUTPATIENT
Start: 2022-03-31 | End: 2022-04-04

## 2022-03-31 RX ORDER — OXYCODONE HYDROCHLORIDE 5 MG/1
10 TABLET ORAL
Refills: 0 | Status: DISCONTINUED | OUTPATIENT
Start: 2022-03-31 | End: 2022-04-04

## 2022-03-31 RX ORDER — OXYCODONE HYDROCHLORIDE 5 MG/1
5 TABLET ORAL
Refills: 0 | Status: DISCONTINUED | OUTPATIENT
Start: 2022-03-31 | End: 2022-04-04

## 2022-03-31 RX ADMIN — Medication 100 MILLIGRAM(S): at 01:24

## 2022-03-31 RX ADMIN — Medication 81 MILLIGRAM(S): at 11:48

## 2022-03-31 RX ADMIN — Medication 25 MILLIGRAM(S): at 17:50

## 2022-03-31 RX ADMIN — OXYCODONE HYDROCHLORIDE 10 MILLIGRAM(S): 5 TABLET ORAL at 21:54

## 2022-03-31 RX ADMIN — Medication 20 MILLIGRAM(S): at 15:00

## 2022-03-31 RX ADMIN — Medication 975 MILLIGRAM(S): at 12:05

## 2022-03-31 RX ADMIN — Medication 500 MILLIGRAM(S): at 05:16

## 2022-03-31 RX ADMIN — OXYCODONE HYDROCHLORIDE 10 MILLIGRAM(S): 5 TABLET ORAL at 09:00

## 2022-03-31 RX ADMIN — Medication 600 MILLIGRAM(S): at 17:51

## 2022-03-31 RX ADMIN — OXYCODONE HYDROCHLORIDE 10 MILLIGRAM(S): 5 TABLET ORAL at 08:19

## 2022-03-31 RX ADMIN — Medication 500 MILLIGRAM(S): at 17:50

## 2022-03-31 RX ADMIN — OXYCODONE HYDROCHLORIDE 10 MILLIGRAM(S): 5 TABLET ORAL at 15:31

## 2022-03-31 RX ADMIN — Medication 975 MILLIGRAM(S): at 05:46

## 2022-03-31 RX ADMIN — OXYCODONE HYDROCHLORIDE 10 MILLIGRAM(S): 5 TABLET ORAL at 04:15

## 2022-03-31 RX ADMIN — Medication 1 TABLET(S): at 11:47

## 2022-03-31 RX ADMIN — MORPHINE SULFATE 15 MILLIGRAM(S): 50 CAPSULE, EXTENDED RELEASE ORAL at 17:51

## 2022-03-31 RX ADMIN — PANTOPRAZOLE SODIUM 40 MILLIGRAM(S): 20 TABLET, DELAYED RELEASE ORAL at 05:16

## 2022-03-31 RX ADMIN — MORPHINE SULFATE 15 MILLIGRAM(S): 50 CAPSULE, EXTENDED RELEASE ORAL at 19:25

## 2022-03-31 RX ADMIN — Medication 325 MILLIGRAM(S): at 11:48

## 2022-03-31 RX ADMIN — OXYCODONE HYDROCHLORIDE 10 MILLIGRAM(S): 5 TABLET ORAL at 12:18

## 2022-03-31 RX ADMIN — OXYCODONE HYDROCHLORIDE 10 MILLIGRAM(S): 5 TABLET ORAL at 14:57

## 2022-03-31 RX ADMIN — Medication 975 MILLIGRAM(S): at 05:16

## 2022-03-31 RX ADMIN — OXYCODONE HYDROCHLORIDE 10 MILLIGRAM(S): 5 TABLET ORAL at 20:54

## 2022-03-31 RX ADMIN — Medication 600 MILLIGRAM(S): at 05:15

## 2022-03-31 RX ADMIN — Medication 20 MILLIGRAM(S): at 02:45

## 2022-03-31 RX ADMIN — Medication 25 MILLIGRAM(S): at 05:17

## 2022-03-31 RX ADMIN — SENNA PLUS 2 TABLET(S): 8.6 TABLET ORAL at 21:00

## 2022-03-31 RX ADMIN — ATORVASTATIN CALCIUM 20 MILLIGRAM(S): 80 TABLET, FILM COATED ORAL at 21:02

## 2022-03-31 RX ADMIN — Medication 1 MILLIGRAM(S): at 11:47

## 2022-03-31 RX ADMIN — OXYCODONE HYDROCHLORIDE 10 MILLIGRAM(S): 5 TABLET ORAL at 05:15

## 2022-03-31 RX ADMIN — RIVAROXABAN 10 MILLIGRAM(S): KIT at 11:48

## 2022-03-31 RX ADMIN — Medication 975 MILLIGRAM(S): at 23:30

## 2022-03-31 RX ADMIN — Medication 975 MILLIGRAM(S): at 11:47

## 2022-03-31 RX ADMIN — OXYCODONE HYDROCHLORIDE 10 MILLIGRAM(S): 5 TABLET ORAL at 11:47

## 2022-03-31 RX ADMIN — Medication 975 MILLIGRAM(S): at 17:50

## 2022-03-31 NOTE — OCCUPATIONAL THERAPY INITIAL EVALUATION ADULT - STRENGTHENING, PT EVAL
GOAL: Pt will improve gross strength by half a muscle grade in order to improve ADL skills in 4 weeks.

## 2022-03-31 NOTE — OCCUPATIONAL THERAPY INITIAL EVALUATION ADULT - LIVES WITH, PROFILE
Pt lives In pvt home w/ wife 0 RAFAEL, has tub shower with tub bench, commode, rollator. States he required assist from his wife w/ ADLs PTA.

## 2022-03-31 NOTE — OCCUPATIONAL THERAPY INITIAL EVALUATION ADULT - BALANCE TRAINING, PT EVAL
GOAL: Pt will improve dynamic standing balance to good- in order to improve functional mobility in 4 weeks.

## 2022-03-31 NOTE — OCCUPATIONAL THERAPY INITIAL EVALUATION ADULT - PRECAUTIONS/LIMITATIONS, REHAB EVAL
cont: PROCEDURES: ORIF, fracture, femur, periprosthetic 30-Mar-2022 11:03:09 Right femur Min, Ramandeep./fall precautions

## 2022-03-31 NOTE — OCCUPATIONAL THERAPY INITIAL EVALUATION ADULT - PERTINENT HX OF CURRENT PROBLEM, REHAB EVAL
65yo male PMH: CAD, s/p MI, h/o PTCA/PCI, HTN, Obesity, HLD, Chronic Back pain, Spinal stenosis, Lumbar herniated disc, Narcotic dependence,Osteoarthritis, GERD (gastroesophageal reflux disease), Cerebral aneurysm rupture, Femur fracture right s/p GORGE, Sacroiliitis now presents c/o RLE pain s/p mechanical fall while getting up from the toilet. Patient denies hitting his head or dizziness/CP/Palps/ LOC. Found to have right distal ellen-prosthetic Fx now s/p R femur ORIF on 3/30

## 2022-04-01 LAB
ANION GAP SERPL CALC-SCNC: 12 MMOL/L — SIGNIFICANT CHANGE UP (ref 5–17)
BUN SERPL-MCNC: 22 MG/DL — SIGNIFICANT CHANGE UP (ref 7–23)
CALCIUM SERPL-MCNC: 9 MG/DL — SIGNIFICANT CHANGE UP (ref 8.4–10.5)
CHLORIDE SERPL-SCNC: 108 MMOL/L — SIGNIFICANT CHANGE UP (ref 96–108)
CO2 SERPL-SCNC: 22 MMOL/L — SIGNIFICANT CHANGE UP (ref 22–31)
CREAT SERPL-MCNC: 1.28 MG/DL — SIGNIFICANT CHANGE UP (ref 0.5–1.3)
EGFR: 61 ML/MIN/1.73M2 — SIGNIFICANT CHANGE UP
GLUCOSE SERPL-MCNC: 125 MG/DL — HIGH (ref 70–99)
HCT VFR BLD CALC: 33.4 % — LOW (ref 39–50)
HGB BLD-MCNC: 11 G/DL — LOW (ref 13–17)
MCHC RBC-ENTMCNC: 31.7 PG — SIGNIFICANT CHANGE UP (ref 27–34)
MCHC RBC-ENTMCNC: 32.9 GM/DL — SIGNIFICANT CHANGE UP (ref 32–36)
MCV RBC AUTO: 96.3 FL — SIGNIFICANT CHANGE UP (ref 80–100)
NRBC # BLD: 0 /100 WBCS — SIGNIFICANT CHANGE UP (ref 0–0)
PLATELET # BLD AUTO: 254 K/UL — SIGNIFICANT CHANGE UP (ref 150–400)
POTASSIUM SERPL-MCNC: 5 MMOL/L — SIGNIFICANT CHANGE UP (ref 3.5–5.3)
POTASSIUM SERPL-SCNC: 5 MMOL/L — SIGNIFICANT CHANGE UP (ref 3.5–5.3)
RBC # BLD: 3.47 M/UL — LOW (ref 4.2–5.8)
RBC # FLD: 14.7 % — HIGH (ref 10.3–14.5)
SARS-COV-2 RNA SPEC QL NAA+PROBE: SIGNIFICANT CHANGE UP
SODIUM SERPL-SCNC: 142 MMOL/L — SIGNIFICANT CHANGE UP (ref 135–145)
WBC # BLD: 12.39 K/UL — HIGH (ref 3.8–10.5)
WBC # FLD AUTO: 12.39 K/UL — HIGH (ref 3.8–10.5)

## 2022-04-01 RX ADMIN — OXYCODONE HYDROCHLORIDE 10 MILLIGRAM(S): 5 TABLET ORAL at 07:47

## 2022-04-01 RX ADMIN — Medication 1 MILLIGRAM(S): at 12:11

## 2022-04-01 RX ADMIN — Medication 600 MILLIGRAM(S): at 05:13

## 2022-04-01 RX ADMIN — MORPHINE SULFATE 15 MILLIGRAM(S): 50 CAPSULE, EXTENDED RELEASE ORAL at 05:45

## 2022-04-01 RX ADMIN — Medication 20 MILLIGRAM(S): at 01:56

## 2022-04-01 RX ADMIN — RIVAROXABAN 10 MILLIGRAM(S): KIT at 12:10

## 2022-04-01 RX ADMIN — Medication 81 MILLIGRAM(S): at 12:11

## 2022-04-01 RX ADMIN — Medication 600 MILLIGRAM(S): at 17:41

## 2022-04-01 RX ADMIN — OXYCODONE HYDROCHLORIDE 10 MILLIGRAM(S): 5 TABLET ORAL at 14:30

## 2022-04-01 RX ADMIN — PANTOPRAZOLE SODIUM 40 MILLIGRAM(S): 20 TABLET, DELAYED RELEASE ORAL at 05:13

## 2022-04-01 RX ADMIN — Medication 500 MILLIGRAM(S): at 05:13

## 2022-04-01 RX ADMIN — MORPHINE SULFATE 15 MILLIGRAM(S): 50 CAPSULE, EXTENDED RELEASE ORAL at 18:16

## 2022-04-01 RX ADMIN — Medication 975 MILLIGRAM(S): at 00:00

## 2022-04-01 RX ADMIN — Medication 25 MILLIGRAM(S): at 17:42

## 2022-04-01 RX ADMIN — Medication 325 MILLIGRAM(S): at 12:11

## 2022-04-01 RX ADMIN — Medication 500 MILLIGRAM(S): at 17:42

## 2022-04-01 RX ADMIN — OXYCODONE HYDROCHLORIDE 10 MILLIGRAM(S): 5 TABLET ORAL at 12:10

## 2022-04-01 RX ADMIN — ATORVASTATIN CALCIUM 20 MILLIGRAM(S): 80 TABLET, FILM COATED ORAL at 21:23

## 2022-04-01 RX ADMIN — MORPHINE SULFATE 15 MILLIGRAM(S): 50 CAPSULE, EXTENDED RELEASE ORAL at 17:42

## 2022-04-01 RX ADMIN — Medication 975 MILLIGRAM(S): at 18:10

## 2022-04-01 RX ADMIN — Medication 1 TABLET(S): at 12:11

## 2022-04-01 RX ADMIN — Medication 975 MILLIGRAM(S): at 12:10

## 2022-04-01 RX ADMIN — OXYCODONE HYDROCHLORIDE 10 MILLIGRAM(S): 5 TABLET ORAL at 14:58

## 2022-04-01 RX ADMIN — Medication 975 MILLIGRAM(S): at 17:41

## 2022-04-01 RX ADMIN — Medication 975 MILLIGRAM(S): at 05:43

## 2022-04-01 RX ADMIN — OXYCODONE HYDROCHLORIDE 10 MILLIGRAM(S): 5 TABLET ORAL at 15:43

## 2022-04-01 RX ADMIN — Medication 975 MILLIGRAM(S): at 12:40

## 2022-04-01 RX ADMIN — MORPHINE SULFATE 15 MILLIGRAM(S): 50 CAPSULE, EXTENDED RELEASE ORAL at 05:15

## 2022-04-01 RX ADMIN — Medication 975 MILLIGRAM(S): at 05:13

## 2022-04-01 RX ADMIN — OXYCODONE HYDROCHLORIDE 10 MILLIGRAM(S): 5 TABLET ORAL at 21:23

## 2022-04-01 RX ADMIN — OXYCODONE HYDROCHLORIDE 10 MILLIGRAM(S): 5 TABLET ORAL at 12:35

## 2022-04-01 RX ADMIN — Medication 20 MILLIGRAM(S): at 14:59

## 2022-04-01 RX ADMIN — OXYCODONE HYDROCHLORIDE 10 MILLIGRAM(S): 5 TABLET ORAL at 21:53

## 2022-04-01 RX ADMIN — Medication 25 MILLIGRAM(S): at 05:14

## 2022-04-02 LAB
ANION GAP SERPL CALC-SCNC: 14 MMOL/L — SIGNIFICANT CHANGE UP (ref 5–17)
BUN SERPL-MCNC: 21 MG/DL — SIGNIFICANT CHANGE UP (ref 7–23)
CALCIUM SERPL-MCNC: 8.9 MG/DL — SIGNIFICANT CHANGE UP (ref 8.4–10.5)
CHLORIDE SERPL-SCNC: 106 MMOL/L — SIGNIFICANT CHANGE UP (ref 96–108)
CO2 SERPL-SCNC: 21 MMOL/L — LOW (ref 22–31)
CREAT SERPL-MCNC: 1.2 MG/DL — SIGNIFICANT CHANGE UP (ref 0.5–1.3)
EGFR: 66 ML/MIN/1.73M2 — SIGNIFICANT CHANGE UP
GLUCOSE SERPL-MCNC: 112 MG/DL — HIGH (ref 70–99)
HCT VFR BLD CALC: 32.8 % — LOW (ref 39–50)
HGB BLD-MCNC: 10.5 G/DL — LOW (ref 13–17)
MCHC RBC-ENTMCNC: 31.6 PG — SIGNIFICANT CHANGE UP (ref 27–34)
MCHC RBC-ENTMCNC: 32 GM/DL — SIGNIFICANT CHANGE UP (ref 32–36)
MCV RBC AUTO: 98.8 FL — SIGNIFICANT CHANGE UP (ref 80–100)
NRBC # BLD: 0 /100 WBCS — SIGNIFICANT CHANGE UP (ref 0–0)
PLATELET # BLD AUTO: 289 K/UL — SIGNIFICANT CHANGE UP (ref 150–400)
POTASSIUM SERPL-MCNC: 4.5 MMOL/L — SIGNIFICANT CHANGE UP (ref 3.5–5.3)
POTASSIUM SERPL-SCNC: 4.5 MMOL/L — SIGNIFICANT CHANGE UP (ref 3.5–5.3)
RBC # BLD: 3.32 M/UL — LOW (ref 4.2–5.8)
RBC # FLD: 14.8 % — HIGH (ref 10.3–14.5)
SODIUM SERPL-SCNC: 141 MMOL/L — SIGNIFICANT CHANGE UP (ref 135–145)
WBC # BLD: 11.97 K/UL — HIGH (ref 3.8–10.5)
WBC # FLD AUTO: 11.97 K/UL — HIGH (ref 3.8–10.5)

## 2022-04-02 RX ADMIN — ATORVASTATIN CALCIUM 20 MILLIGRAM(S): 80 TABLET, FILM COATED ORAL at 22:00

## 2022-04-02 RX ADMIN — SENNA PLUS 2 TABLET(S): 8.6 TABLET ORAL at 22:00

## 2022-04-02 RX ADMIN — Medication 600 MILLIGRAM(S): at 18:01

## 2022-04-02 RX ADMIN — Medication 500 MILLIGRAM(S): at 18:00

## 2022-04-02 RX ADMIN — Medication 975 MILLIGRAM(S): at 12:15

## 2022-04-02 RX ADMIN — MORPHINE SULFATE 15 MILLIGRAM(S): 50 CAPSULE, EXTENDED RELEASE ORAL at 06:10

## 2022-04-02 RX ADMIN — Medication 1 TABLET(S): at 11:34

## 2022-04-02 RX ADMIN — Medication 975 MILLIGRAM(S): at 18:08

## 2022-04-02 RX ADMIN — OXYCODONE HYDROCHLORIDE 5 MILLIGRAM(S): 5 TABLET ORAL at 22:30

## 2022-04-02 RX ADMIN — Medication 325 MILLIGRAM(S): at 11:33

## 2022-04-02 RX ADMIN — Medication 25 MILLIGRAM(S): at 18:01

## 2022-04-02 RX ADMIN — OXYCODONE HYDROCHLORIDE 10 MILLIGRAM(S): 5 TABLET ORAL at 12:15

## 2022-04-02 RX ADMIN — MORPHINE SULFATE 15 MILLIGRAM(S): 50 CAPSULE, EXTENDED RELEASE ORAL at 06:08

## 2022-04-02 RX ADMIN — Medication 975 MILLIGRAM(S): at 01:09

## 2022-04-02 RX ADMIN — MORPHINE SULFATE 15 MILLIGRAM(S): 50 CAPSULE, EXTENDED RELEASE ORAL at 18:00

## 2022-04-02 RX ADMIN — OXYCODONE HYDROCHLORIDE 5 MILLIGRAM(S): 5 TABLET ORAL at 21:59

## 2022-04-02 RX ADMIN — Medication 600 MILLIGRAM(S): at 06:08

## 2022-04-02 RX ADMIN — Medication 975 MILLIGRAM(S): at 06:10

## 2022-04-02 RX ADMIN — OXYCODONE HYDROCHLORIDE 10 MILLIGRAM(S): 5 TABLET ORAL at 11:32

## 2022-04-02 RX ADMIN — Medication 975 MILLIGRAM(S): at 18:45

## 2022-04-02 RX ADMIN — RIVAROXABAN 10 MILLIGRAM(S): KIT at 11:42

## 2022-04-02 RX ADMIN — Medication 81 MILLIGRAM(S): at 11:33

## 2022-04-02 RX ADMIN — Medication 25 MILLIGRAM(S): at 06:08

## 2022-04-02 RX ADMIN — OXYCODONE HYDROCHLORIDE 10 MILLIGRAM(S): 5 TABLET ORAL at 00:09

## 2022-04-02 RX ADMIN — PANTOPRAZOLE SODIUM 40 MILLIGRAM(S): 20 TABLET, DELAYED RELEASE ORAL at 06:08

## 2022-04-02 RX ADMIN — Medication 975 MILLIGRAM(S): at 06:08

## 2022-04-02 RX ADMIN — Medication 20 MILLIGRAM(S): at 16:33

## 2022-04-02 RX ADMIN — Medication 975 MILLIGRAM(S): at 00:09

## 2022-04-02 RX ADMIN — Medication 500 MILLIGRAM(S): at 06:08

## 2022-04-02 RX ADMIN — Medication 975 MILLIGRAM(S): at 23:08

## 2022-04-02 RX ADMIN — OXYCODONE HYDROCHLORIDE 10 MILLIGRAM(S): 5 TABLET ORAL at 01:09

## 2022-04-02 RX ADMIN — Medication 1 MILLIGRAM(S): at 11:33

## 2022-04-02 RX ADMIN — Medication 975 MILLIGRAM(S): at 11:32

## 2022-04-02 RX ADMIN — Medication 975 MILLIGRAM(S): at 23:25

## 2022-04-03 RX ORDER — LISINOPRIL 2.5 MG/1
5 TABLET ORAL DAILY
Refills: 0 | Status: DISCONTINUED | OUTPATIENT
Start: 2022-04-03 | End: 2022-04-04

## 2022-04-03 RX ADMIN — Medication 20 MILLIGRAM(S): at 13:39

## 2022-04-03 RX ADMIN — Medication 20 MILLIGRAM(S): at 02:38

## 2022-04-03 RX ADMIN — PANTOPRAZOLE SODIUM 40 MILLIGRAM(S): 20 TABLET, DELAYED RELEASE ORAL at 05:18

## 2022-04-03 RX ADMIN — Medication 325 MILLIGRAM(S): at 11:42

## 2022-04-03 RX ADMIN — Medication 500 MILLIGRAM(S): at 05:19

## 2022-04-03 RX ADMIN — Medication 975 MILLIGRAM(S): at 17:39

## 2022-04-03 RX ADMIN — OXYCODONE HYDROCHLORIDE 10 MILLIGRAM(S): 5 TABLET ORAL at 15:45

## 2022-04-03 RX ADMIN — Medication 25 MILLIGRAM(S): at 17:40

## 2022-04-03 RX ADMIN — OXYCODONE HYDROCHLORIDE 10 MILLIGRAM(S): 5 TABLET ORAL at 21:39

## 2022-04-03 RX ADMIN — OXYCODONE HYDROCHLORIDE 10 MILLIGRAM(S): 5 TABLET ORAL at 09:32

## 2022-04-03 RX ADMIN — Medication 975 MILLIGRAM(S): at 11:41

## 2022-04-03 RX ADMIN — OXYCODONE HYDROCHLORIDE 10 MILLIGRAM(S): 5 TABLET ORAL at 10:30

## 2022-04-03 RX ADMIN — OXYCODONE HYDROCHLORIDE 10 MILLIGRAM(S): 5 TABLET ORAL at 21:09

## 2022-04-03 RX ADMIN — ATORVASTATIN CALCIUM 20 MILLIGRAM(S): 80 TABLET, FILM COATED ORAL at 21:08

## 2022-04-03 RX ADMIN — MORPHINE SULFATE 15 MILLIGRAM(S): 50 CAPSULE, EXTENDED RELEASE ORAL at 17:38

## 2022-04-03 RX ADMIN — MORPHINE SULFATE 15 MILLIGRAM(S): 50 CAPSULE, EXTENDED RELEASE ORAL at 18:27

## 2022-04-03 RX ADMIN — Medication 975 MILLIGRAM(S): at 18:28

## 2022-04-03 RX ADMIN — OXYCODONE HYDROCHLORIDE 10 MILLIGRAM(S): 5 TABLET ORAL at 15:00

## 2022-04-03 RX ADMIN — Medication 975 MILLIGRAM(S): at 05:18

## 2022-04-03 RX ADMIN — Medication 25 MILLIGRAM(S): at 05:18

## 2022-04-03 RX ADMIN — MORPHINE SULFATE 15 MILLIGRAM(S): 50 CAPSULE, EXTENDED RELEASE ORAL at 05:18

## 2022-04-03 RX ADMIN — Medication 975 MILLIGRAM(S): at 12:30

## 2022-04-03 RX ADMIN — Medication 600 MILLIGRAM(S): at 17:39

## 2022-04-03 RX ADMIN — Medication 500 MILLIGRAM(S): at 17:44

## 2022-04-03 RX ADMIN — Medication 81 MILLIGRAM(S): at 11:42

## 2022-04-03 RX ADMIN — Medication 975 MILLIGRAM(S): at 06:18

## 2022-04-03 RX ADMIN — SENNA PLUS 2 TABLET(S): 8.6 TABLET ORAL at 21:09

## 2022-04-03 RX ADMIN — LISINOPRIL 5 MILLIGRAM(S): 2.5 TABLET ORAL at 16:38

## 2022-04-03 RX ADMIN — Medication 1 MILLIGRAM(S): at 11:42

## 2022-04-03 RX ADMIN — MORPHINE SULFATE 15 MILLIGRAM(S): 50 CAPSULE, EXTENDED RELEASE ORAL at 06:18

## 2022-04-03 RX ADMIN — Medication 600 MILLIGRAM(S): at 05:18

## 2022-04-03 RX ADMIN — Medication 1 TABLET(S): at 11:44

## 2022-04-03 RX ADMIN — RIVAROXABAN 10 MILLIGRAM(S): KIT at 11:43

## 2022-04-04 ENCOUNTER — TRANSCRIPTION ENCOUNTER (OUTPATIENT)
Age: 67
End: 2022-04-04

## 2022-04-04 VITALS — SYSTOLIC BLOOD PRESSURE: 109 MMHG | DIASTOLIC BLOOD PRESSURE: 69 MMHG | HEART RATE: 86 BPM

## 2022-04-04 DIAGNOSIS — M97.8XXA PERIPROSTHETIC FRACTURE AROUND OTHER INTERNAL PROSTHETIC JOINT, INITIAL ENCOUNTER: ICD-10-CM

## 2022-04-04 LAB — SARS-COV-2 RNA SPEC QL NAA+PROBE: SIGNIFICANT CHANGE UP

## 2022-04-04 PROCEDURE — C9399: CPT

## 2022-04-04 PROCEDURE — 85730 THROMBOPLASTIN TIME PARTIAL: CPT

## 2022-04-04 PROCEDURE — U0003: CPT

## 2022-04-04 PROCEDURE — 97530 THERAPEUTIC ACTIVITIES: CPT

## 2022-04-04 PROCEDURE — 86803 HEPATITIS C AB TEST: CPT

## 2022-04-04 PROCEDURE — 85027 COMPLETE CBC AUTOMATED: CPT

## 2022-04-04 PROCEDURE — 93005 ELECTROCARDIOGRAM TRACING: CPT

## 2022-04-04 PROCEDURE — 83605 ASSAY OF LACTIC ACID: CPT

## 2022-04-04 PROCEDURE — 97110 THERAPEUTIC EXERCISES: CPT

## 2022-04-04 PROCEDURE — 80053 COMPREHEN METABOLIC PANEL: CPT

## 2022-04-04 PROCEDURE — U0005: CPT

## 2022-04-04 PROCEDURE — 80048 BASIC METABOLIC PNL TOTAL CA: CPT

## 2022-04-04 PROCEDURE — 99285 EMERGENCY DEPT VISIT HI MDM: CPT | Mod: 25

## 2022-04-04 PROCEDURE — 97162 PT EVAL MOD COMPLEX 30 MIN: CPT

## 2022-04-04 PROCEDURE — 96374 THER/PROPH/DIAG INJ IV PUSH: CPT

## 2022-04-04 PROCEDURE — 97165 OT EVAL LOW COMPLEX 30 MIN: CPT

## 2022-04-04 PROCEDURE — 96375 TX/PRO/DX INJ NEW DRUG ADDON: CPT

## 2022-04-04 PROCEDURE — 86900 BLOOD TYPING SEROLOGIC ABO: CPT

## 2022-04-04 PROCEDURE — 73501 X-RAY EXAM HIP UNI 1 VIEW: CPT

## 2022-04-04 PROCEDURE — 85025 COMPLETE CBC W/AUTO DIFF WBC: CPT

## 2022-04-04 PROCEDURE — 86901 BLOOD TYPING SEROLOGIC RH(D): CPT

## 2022-04-04 PROCEDURE — 82550 ASSAY OF CK (CPK): CPT

## 2022-04-04 PROCEDURE — 86850 RBC ANTIBODY SCREEN: CPT

## 2022-04-04 PROCEDURE — 36415 COLL VENOUS BLD VENIPUNCTURE: CPT

## 2022-04-04 PROCEDURE — 76000 FLUOROSCOPY <1 HR PHYS/QHP: CPT

## 2022-04-04 PROCEDURE — C1769: CPT

## 2022-04-04 PROCEDURE — 72170 X-RAY EXAM OF PELVIS: CPT

## 2022-04-04 PROCEDURE — 73552 X-RAY EXAM OF FEMUR 2/>: CPT

## 2022-04-04 PROCEDURE — 97116 GAIT TRAINING THERAPY: CPT

## 2022-04-04 PROCEDURE — C1713: CPT

## 2022-04-04 PROCEDURE — 87635 SARS-COV-2 COVID-19 AMP PRB: CPT

## 2022-04-04 PROCEDURE — 97112 NEUROMUSCULAR REEDUCATION: CPT

## 2022-04-04 PROCEDURE — 85610 PROTHROMBIN TIME: CPT

## 2022-04-04 PROCEDURE — 71045 X-RAY EXAM CHEST 1 VIEW: CPT

## 2022-04-04 RX ORDER — OXYCODONE HYDROCHLORIDE 5 MG/1
1 TABLET ORAL
Qty: 0 | Refills: 0 | DISCHARGE
Start: 2022-04-04

## 2022-04-04 RX ORDER — ACETAMINOPHEN 500 MG
3 TABLET ORAL
Qty: 0 | Refills: 0 | DISCHARGE
Start: 2022-04-04

## 2022-04-04 RX ORDER — RIVAROXABAN 15 MG-20MG
1 KIT ORAL
Qty: 0 | Refills: 0 | DISCHARGE
Start: 2022-04-04

## 2022-04-04 RX ORDER — ASPIRIN/CALCIUM CARB/MAGNESIUM 324 MG
1 TABLET ORAL
Qty: 0 | Refills: 0 | DISCHARGE

## 2022-04-04 RX ORDER — ONDANSETRON 8 MG/1
4 TABLET, FILM COATED ORAL ONCE
Refills: 0 | Status: COMPLETED | OUTPATIENT
Start: 2022-04-04 | End: 2022-04-04

## 2022-04-04 RX ORDER — METOPROLOL TARTRATE 50 MG
1 TABLET ORAL
Qty: 0 | Refills: 0 | DISCHARGE

## 2022-04-04 RX ORDER — ASPIRIN/CALCIUM CARB/MAGNESIUM 324 MG
1 TABLET ORAL
Qty: 0 | Refills: 0 | DISCHARGE
Start: 2022-04-04

## 2022-04-04 RX ORDER — MORPHINE SULFATE 50 MG/1
1 CAPSULE, EXTENDED RELEASE ORAL
Qty: 0 | Refills: 0 | DISCHARGE
Start: 2022-04-04

## 2022-04-04 RX ADMIN — Medication 600 MILLIGRAM(S): at 17:27

## 2022-04-04 RX ADMIN — Medication 975 MILLIGRAM(S): at 18:11

## 2022-04-04 RX ADMIN — MORPHINE SULFATE 15 MILLIGRAM(S): 50 CAPSULE, EXTENDED RELEASE ORAL at 06:18

## 2022-04-04 RX ADMIN — Medication 1 MILLIGRAM(S): at 12:30

## 2022-04-04 RX ADMIN — OXYCODONE HYDROCHLORIDE 10 MILLIGRAM(S): 5 TABLET ORAL at 06:08

## 2022-04-04 RX ADMIN — MORPHINE SULFATE 15 MILLIGRAM(S): 50 CAPSULE, EXTENDED RELEASE ORAL at 05:18

## 2022-04-04 RX ADMIN — Medication 25 MILLIGRAM(S): at 05:17

## 2022-04-04 RX ADMIN — PANTOPRAZOLE SODIUM 40 MILLIGRAM(S): 20 TABLET, DELAYED RELEASE ORAL at 05:17

## 2022-04-04 RX ADMIN — Medication 600 MILLIGRAM(S): at 05:18

## 2022-04-04 RX ADMIN — Medication 325 MILLIGRAM(S): at 12:30

## 2022-04-04 RX ADMIN — Medication 975 MILLIGRAM(S): at 13:25

## 2022-04-04 RX ADMIN — Medication 975 MILLIGRAM(S): at 19:10

## 2022-04-04 RX ADMIN — Medication 20 MILLIGRAM(S): at 02:55

## 2022-04-04 RX ADMIN — Medication 975 MILLIGRAM(S): at 00:54

## 2022-04-04 RX ADMIN — Medication 20 MILLIGRAM(S): at 14:41

## 2022-04-04 RX ADMIN — Medication 975 MILLIGRAM(S): at 05:17

## 2022-04-04 RX ADMIN — MORPHINE SULFATE 15 MILLIGRAM(S): 50 CAPSULE, EXTENDED RELEASE ORAL at 17:27

## 2022-04-04 RX ADMIN — Medication 500 MILLIGRAM(S): at 05:18

## 2022-04-04 RX ADMIN — OXYCODONE HYDROCHLORIDE 10 MILLIGRAM(S): 5 TABLET ORAL at 05:09

## 2022-04-04 RX ADMIN — Medication 81 MILLIGRAM(S): at 12:31

## 2022-04-04 RX ADMIN — Medication 975 MILLIGRAM(S): at 00:23

## 2022-04-04 RX ADMIN — Medication 500 MILLIGRAM(S): at 17:26

## 2022-04-04 RX ADMIN — Medication 975 MILLIGRAM(S): at 12:28

## 2022-04-04 RX ADMIN — OXYCODONE HYDROCHLORIDE 10 MILLIGRAM(S): 5 TABLET ORAL at 09:12

## 2022-04-04 RX ADMIN — RIVAROXABAN 10 MILLIGRAM(S): KIT at 12:30

## 2022-04-04 RX ADMIN — OXYCODONE HYDROCHLORIDE 10 MILLIGRAM(S): 5 TABLET ORAL at 10:10

## 2022-04-04 RX ADMIN — LISINOPRIL 5 MILLIGRAM(S): 2.5 TABLET ORAL at 05:17

## 2022-04-04 RX ADMIN — Medication 1 TABLET(S): at 12:29

## 2022-04-04 RX ADMIN — MORPHINE SULFATE 15 MILLIGRAM(S): 50 CAPSULE, EXTENDED RELEASE ORAL at 19:10

## 2022-04-04 RX ADMIN — Medication 975 MILLIGRAM(S): at 06:24

## 2022-04-04 RX ADMIN — ONDANSETRON 4 MILLIGRAM(S): 8 TABLET, FILM COATED ORAL at 12:28

## 2022-04-04 NOTE — DISCHARGE NOTE PROVIDER - CARE PROVIDER_API CALL
Eric Stoddard)  Orthopaedic Surgery  [address] [place] [address], [address]  [place] [address]  Phone: [phone]  Fax: [phone]  Follow Up Time:

## 2022-04-04 NOTE — DISCHARGE NOTE PROVIDER - NSDCFUADDAPPT_GEN_ALL_CORE_FT
Please call Dr. Stoddard' s office within next few days to schedule a follow up appointment about 14 days after surgery.

## 2022-04-04 NOTE — PROGRESS NOTE ADULT - PROBLEM SELECTOR PROBLEM 1
Fracture of neck of right femur
Fracture of neck of right femur
Periprosthetic hip fracture
Fracture of neck of right femur

## 2022-04-04 NOTE — PROGRESS NOTE ADULT - REASON FOR ADMISSION
R periprosthetic femur Fx

## 2022-04-04 NOTE — DISCHARGE NOTE PROVIDER - HOSPITAL COURSE
History of Present Illness:   66yMale c/o R femur pain s/p mechanical fall while getting up from the toilet. Patient denies head hit or LOC. Patient denies numbness or tingling in the RLLE. Patient denies any other injuries.    PMH:  Myocardial infarction  Blood pressure elevated  Cholesterol serum elevated  Back pain  Spinal stenosis  Lumbar herniated disc  Obese  Hypertension  Narcotic dependence  Osteoarthritis  GERD (gastroesophageal reflux disease)  Cerebral aneurysm rupture  Femur fracture, right  Sacroiliitis, not elsewhere classified      PSH:  Cerebral aneurysm  S/P lumbar fusion  History of right inguinal hernia repair  Hx of appendectomy  Hx of laminectomy  S/P lumbar spinal fusion revision  Insertion of pain pump  Cleft palate repair  Stented coronary artery  History of hip replacement  S/P left knee arthroscopy  H/O arthroscopy of shoulder  History of throat surgery  Fusion of sacral region of spine     Patient presents to the hospital after a mechanical fall with right lower extremity pain, x-ray reveals right periprosthetic fracture.  Patient was admitted and medically cleared for surgery.  Patient was taken to surgery and underwent a right femur ORIF,   -tolerated procedure without complications.  Patient was evaluated by Physical therapy whom recommended rehab for discharge plan.  Patient is stable for discharge when bed is available.

## 2022-04-04 NOTE — PROGRESS NOTE ADULT - ASSESSMENT
A/P: 66y Male s/p R femur ORIF for Vanc C periprosthetic fx     PT/OT- NWB RLE  DVT PPx with xarelto/A81  Followup AM labs  Pain Control  Dispo planning    Kalee Martin PA-C  Team Pager: #[phone]
67yo Male c/o RLE pain s/p mechanical fall while getting up from the toilet. Patient denies hitting his head or dizziness/CP/Palps/ LOC. Patient denies numbness or tingling in the RLE. Patient denies any other injuries. ptn's wife states he had an EKG less than a year ago and "everything was OK" . ptn denies CP at baseline at rest or w exertion, denies CRESPO/PND  NO chest pain or shortness of breath   His last cardiac workup was in 2007.   
A/P  65yo Male c/o RLE pain s/p mechanical fall while getting up from the toilet. Patient denies hitting his head or dizziness/CP/Palps/ LOC. Patient denies numbness or tingling in the RLE. Patient denies any other injuries. ptn's wife states he had an Echo less than a year ago and "everything was OK" . ptn denies CP at baseline at rest or w exertion, denies CRESPO/PND  work up c/w Right distal femur displaced Fx, awaiting repair in OR 3/29. Medically cleared for surgery, cont outptn BB: Lopressor 25 mg bid, cont ASA. Card consult reviewed  POD2 R distal femur ORIF  post op orders as per ortho  DVT ppx w sc lovenox.   GI ppx w po PPI.   pain control  dispo to [facility]
A/P  67yo Male c/o RLE pain s/p mechanical fall while getting up from the toilet. Patient denies hitting his head or dizziness/CP/Palps/ LOC. Patient denies numbness or tingling in the RLE. Patient denies any other injuries. ptn's wife states he had an Echo less than a year ago and "everything was OK" . ptn denies CP at baseline at rest or w exertion, denies CRESPO/PND  work up c/w Right distal femur displaced Fx, awaiting repair in OR 3/29. Medically cleared for surgery, cont outptn BB: Lopressor 25 mg bid, cont ASA. Card consult reviewed  POD1 R distal femur ORIF  post op orderes as per ortho  DVT ppx w sc lovenox.   GI ppx w po PPI.   pain control
A/P  67yo Male c/o RLE pain s/p mechanical fall while getting up from the toilet. Patient denies hitting his head or dizziness/CP/Palps/ LOC. Patient denies numbness or tingling in the RLE. Patient denies any other injuries. ptn's wife states he had an Echo less than a year ago and "everything was OK" . ptn denies CP at baseline at rest or w exertion, denies CRESPO/PND  work up c/w Right distal femur displaced Fx, awaiting repair in OR 3/29. Medically cleared for surgery, cont outptn BB: Lopressor 25 mg bid, cont ASA. Card consult reviewed  POD4 posr ORIF 2/2 R distal femur periprosthetic Fx  post op orders as per ortho  BP has improved , cont Lopressor 25 bid and  Lisinopril 5 mg daily  DVT ppx w sc lovenox.   GI ppx w po PPI.   pain control  dispo to [facility]
A/P  67yo Male c/o RLE pain s/p mechanical fall while getting up from the toilet. Patient denies hitting his head or dizziness/CP/Palps/ LOC. Patient denies numbness or tingling in the RLE. Patient denies any other injuries. ptn's wife states he had an Echo less than a year ago and "everything was OK" . ptn denies CP at baseline at rest or w exertion, denies CRESPO/PND  work up c/w Right distal femur displaced Fx, awaiting repair in OR 3/29. Medically cleared for surgery, cont outptn BB: Lopressor 25 mg bid, cont ASA. Card consult reviewed  s/p OR today: R distal femur ORIF  DVT ppx w sc lovenox.   GI ppx w po PPI.   pain control
A/P: 66y Male s/p R femur ORIF for Vanc C periprosthetic fx     PT/OT- NWB RLE  DVT PPx with xarelto/A81  Followup AM labs  Pain Control  Dispo planning    Kalee Martin PA-C  Team Pager: #[phone]
65yo Male c/o RLE pain s/p mechanical fall while getting up from the toilet. Patient denies hitting his head or dizziness/CP/Palps/ LOC. Patient denies numbness or tingling in the RLE. Patient denies any other injuries. ptn's wife states he had an EKG less than a year ago and "everything was OK" . ptn denies CP at baseline at rest or w exertion, denies CRESPO/PND  NO chest pain or shortness of breath   His last cardiac workup was in 2007.   
A/P  65yo Male c/o RLE pain s/p mechanical fall while getting up from the toilet. Patient denies hitting his head or dizziness/CP/Palps/ LOC. Patient denies numbness or tingling in the RLE. Patient denies any other injuries. ptn's wife states he had an Echo less than a year ago and "everything was OK" . ptn denies CP at baseline at rest or w exertion, denies CRESPO/PND  work up c/w Right distal femur displaced Fx, awaiting repair in OR 3/29. Medically cleared for surgery, cont outptn BB: Lopressor 25 mg bid, cont ASA. Card consult reviewed  POD4 posr ORIF 2/2 R distal femur periprosthetic Fx  post op orders as per ortho  BP is elevated , cont Lopressor, will add Lisinopril 5 mg daily  DVT ppx w sc lovenox.   GI ppx w po PPI.   pain control  dispo to [facility]
A/P  67yo Male c/o RLE pain s/p mechanical fall while getting up from the toilet. Patient denies hitting his head or dizziness/CP/Palps/ LOC. Patient denies numbness or tingling in the RLE. Patient denies any other injuries. ptn's wife states he had an Echo less than a year ago and "everything was OK" . ptn denies CP at baseline at rest or w exertion, denies CRESPO/PND  work up c/w Right distal femur displaced Fx, awaiting repair in OR 3/29. Medically cleared for surgery, cont outptn BB: Lopressor 25 mg bid, cont ASA. Card consult reviewed  POD3 posr ORIF 2/2 R distal femur periprosthetic Fx  post op orders as per ortho  DVT ppx w sc lovenox.   GI ppx w po PPI.   pain control  dispo to [facility]
A/P  67yo Male c/o RLE pain s/p mechanical fall while getting up from the toilet. Patient denies hitting his head or dizziness/CP/Palps/ LOC. Patient denies numbness or tingling in the RLE. Patient denies any other injuries. ptn's wife states he had an Echo less than a year ago and "everything was OK" . ptn denies CP at baseline at rest or w exertion, denies CRESPO/PND  work up c/w Right distal femur displaced Fx, awaiting repair in OR 3/29. Medically cleared for surgery, cont outptn BB: Lopressor 25 mg bid, cont ASA. Card consult reviewed  awaiting OR today  DVT ppx w sc lovenox.   GI ppx w po PPI.   pain control
67yo Male c/o RLE pain s/p mechanical fall while getting up from the toilet. Patient denies hitting his head or dizziness/CP/Palps/ LOC. Patient denies numbness or tingling in the RLE. Patient denies any other injuries. ptn's wife states he had an EKG less than a year ago and "everything was OK" . ptn denies CP at baseline at rest or w exertion, denies CRESPO/PND  NO chest pain or shortness of breath   His last cardiac workup was in 2007.   
A/p: 66yMale s/p R [place] C periprosthetic fx s/p ORIF VSS. NAD.    
65yo Male c/o RLE pain s/p mechanical fall while getting up from the toilet. Patient denies hitting his head or dizziness/CP/Palps/ LOC. Patient denies numbness or tingling in the RLE. Patient denies any other injuries. ptn's wife states he had an EKG less than a year ago and "everything was OK" . ptn denies CP at baseline at rest or w exertion, denies CRESPO/PND  NO chest pain or shortness of breath   His last cardiac workup was in 2007.   

## 2022-04-04 NOTE — DISCHARGE NOTE PROVIDER - NSDCMRMEDTOKEN_GEN_ALL_CORE_FT
acetaminophen 325 mg oral tablet: 3 tab(s) orally every 6 hours  aspirin 81 mg oral delayed release tablet: 1 tab(s) orally once a day  atorvastatin 20 mg oral tablet: 1 tab(s) orally once a day (at bedtime)  baclofen 20 mg oral tablet: 1 tab(s) orally 2 times a day  cyclobenzaprine 10 mg oral tablet: 1 tab(s) orally 3 times a day, As needed, Muscle Spasm  docusate sodium 100 mg oral capsule: 1 cap(s) orally 3 times a day  gabapentin 300 mg oral capsule: 2 cap(s) orally 3 times a day  gemfibrozil 600 mg oral tablet: 1 tab(s) orally 2 times a day (before meals)  Lyrica 150 mg oral capsule: 1 cap(s) orally once a day  metoprolol tartrate 25 mg oral tablet: 1 tab(s) orally 2 times a day  morphine 15 mg/8 to 12 hr oral tablet, extended release: 1 tab(s) orally every 12 hours  Multiple Vitamins oral tablet: 1 tab(s) orally once a day  oxyCODONE 10 mg oral tablet: 1 tab(s) orally every 3 hours, As needed, Severe Pain (7 - 10)  oxyCODONE 5 mg oral tablet: 1 tab(s) orally every 3 hours, As needed, Moderate Pain (4 - 6)  pantoprazole 40 mg oral delayed release tablet: 1 tab(s) orally once a day (before a meal)  rivaroxaban 10 mg oral tablet: 1 tab(s) orally once a day  senna oral tablet: 2 tab(s) orally once a day (at bedtime)  Slow Fe (as elemental iron) 45 mg oral tablet, extended release: 1 tab(s) orally once a day

## 2022-04-04 NOTE — PROGRESS NOTE ADULT - PROVIDER SPECIALTY LIST ADULT
Cardiology
Orthopedics
Internal Medicine
Orthopedics
Internal Medicine
Cardiology

## 2022-04-04 NOTE — PROGRESS NOTE ADULT - TIME BILLING
Advanced care planning was discussed with patient and family.  Advanced care planning forms were reviewed and discussed as appropriate.  Differential diagnosis and plan of care discussed with patient after the evaluation.   Pain assessed and judicious use of narcotics when appropriate was discussed.  Importance of Fall prevention discussed.  Counseling on Smoking and Alcohol cessation was offered when appropriate.  Counseling on Diet, exercise, and medication compliance was done.

## 2022-04-04 NOTE — DISCHARGE NOTE PROVIDER - NSDCCPTREATMENT_GEN_ALL_CORE_FT
PRINCIPAL PROCEDURE  Procedure: ORIF, fracture, femur, periprosthetic  Findings and Treatment: Right femur

## 2022-04-04 NOTE — DISCHARGE NOTE PROVIDER - NSDCCPCAREPLAN_GEN_ALL_CORE_FT
PRINCIPAL DISCHARGE DIAGNOSIS  Diagnosis: Periprosthetic hip fracture  Assessment and Plan of Treatment:

## 2022-04-04 NOTE — PROGRESS NOTE ADULT - SUBJECTIVE AND OBJECTIVE BOX
Orthopedics    Pt seen and examined at the bedside. Pain is well controlled at this time, no concerns at this time.     Vital Signs Last 24 Hrs  T(C): 36.7 (01 Apr 2022 04:00), Max: 36.8 (31 Mar 2022 20:50)  T(F): 98.1 (01 Apr 2022 04:00), Max: 98.2 (31 Mar 2022 20:50)  HR: 87 (01 Apr 2022 04:00) (69 - 87)  BP: 157/68 (01 Apr 2022 04:00) (100/62 - 157/68)  BP(mean): --  RR: 18 (01 Apr 2022 04:00) (18 - 18)  SpO2: 97% (01 Apr 2022 04:00) (91% - 97%)    Exam:  GEN: NAD, awake and alert.  RLE  Dressing clean and dry  +EHL FHL TA GS  SILT deep per/sup per/saph/sural  +DP  Calf soft and nontender, compartments soft and compressible.      A/P:  66y M s/p R femur ORIF for Vac C periprosthetic fx POD #2  -Pain control prn  - DVT ppx, Xar, aspirin 81, SCDs  - NWB RLE  - PT/OOB as tolerated   - FU am labs  - Med mgmt, continue home meds.  - Dispo; likely rehab
Patient is a 66y old  Male who presents with a chief complaint of R periprosthetic femur Fx (02 Apr 2022 07:12)      SUBJECTIVE / OVERNIGHT EVENTS: POD3    MEDICATIONS  (STANDING):  acetaminophen     Tablet .. 975 milliGRAM(s) Oral every 6 hours  ascorbic acid 500 milliGRAM(s) Oral two times a day  aspirin enteric coated 81 milliGRAM(s) Oral daily  atorvastatin 20 milliGRAM(s) Oral at bedtime  baclofen 20 milliGRAM(s) Oral two times a day  ferrous    sulfate 325 milliGRAM(s) Oral daily  folic acid 1 milliGRAM(s) Oral daily  gemfibrozil 600 milliGRAM(s) Oral two times a day  lactated ringers. 1000 milliLiter(s) (75 mL/Hr) IV Continuous <Continuous>  metoprolol tartrate 25 milliGRAM(s) Oral two times a day  morphine ER Tablet 15 milliGRAM(s) Oral every 12 hours  multivitamin 1 Tablet(s) Oral daily  pantoprazole    Tablet 40 milliGRAM(s) Oral before breakfast  rivaroxaban 10 milliGRAM(s) Oral daily  senna 2 Tablet(s) Oral at bedtime    MEDICATIONS  (PRN):  bisacodyl Suppository 10 milliGRAM(s) Rectal daily PRN If no bowel movement  magnesium hydroxide Suspension 30 milliLiter(s) Oral daily PRN Constipation  magnesium hydroxide Suspension 30 milliLiter(s) Oral daily PRN Constipation  oxyCODONE    IR 5 milliGRAM(s) Oral every 3 hours PRN Moderate Pain (4 - 6)  oxyCODONE    IR 10 milliGRAM(s) Oral every 3 hours PRN Severe Pain (7 - 10)      Vital Signs Last 24 Hrs  T(F): 98.2 (04-02-22 @ 12:10), Max: 98.6 (04-01-22 @ 21:00)  HR: 77 (04-02-22 @ 12:10) (66 - 80)  BP: 165/89 (04-02-22 @ 12:10) (154/73 - 168/78)  RR: 18 (04-02-22 @ 12:10) (18 - 18)  SpO2: 95% (04-02-22 @ 12:10) (95% - 100%)  Telemetry:   CAPILLARY BLOOD GLUCOSE        I&O's Summary    01 Apr 2022 07:01  -  02 Apr 2022 07:00  --------------------------------------------------------  IN: 1240 mL / OUT: 0 mL / NET: 1240 mL    02 Apr 2022 07:01  -  02 Apr 2022 17:37  --------------------------------------------------------  IN: 600 mL / OUT: 0 mL / NET: 600 mL        PHYSICAL EXAM:  GENERAL: NAD, well-developed  HEAD:  Atraumatic, Normocephalic  EYES: EOMI, PERRLA, conjunctiva and sclera clear  NECK: Supple, No JVD  CHEST/LUNG: Clear to auscultation bilaterally; No wheeze  HEART: Regular rate and rhythm; No murmurs, rubs, or gallops  ABDOMEN: Soft, Nontender, Nondistended; Bowel sounds present  EXTREMITIES:  2+ Peripheral Pulses, No clubbing, cyanosis, or edema  PSYCH: AAOx3  NEUROLOGY: non-focal  SKIN: No rashes or lesions    LABS:                        10.5   11.97 )-----------( 289      ( 02 Apr 2022 12:24 )             32.8     04-02    141  |  106  |  21  ----------------------------<  112<H>  4.5   |  21<L>  |  1.20    Ca    8.9      02 Apr 2022 12:25                RADIOLOGY & ADDITIONAL TESTS:    Imaging Personally Reviewed:    Consultant(s) Notes Reviewed:      Care Discussed with Consultants/Other Providers:  
Patient is a 66y old  Male who presents with a chief complaint of R periprosthetic femur Fx (03 Apr 2022 10:44)      SUBJECTIVE / OVERNIGHT EVENTS: no new events    MEDICATIONS  (STANDING):  acetaminophen     Tablet .. 975 milliGRAM(s) Oral every 6 hours  ascorbic acid 500 milliGRAM(s) Oral two times a day  aspirin enteric coated 81 milliGRAM(s) Oral daily  atorvastatin 20 milliGRAM(s) Oral at bedtime  baclofen 20 milliGRAM(s) Oral two times a day  ferrous    sulfate 325 milliGRAM(s) Oral daily  folic acid 1 milliGRAM(s) Oral daily  gemfibrozil 600 milliGRAM(s) Oral two times a day  lactated ringers. 1000 milliLiter(s) (75 mL/Hr) IV Continuous <Continuous>  metoprolol tartrate 25 milliGRAM(s) Oral two times a day  morphine ER Tablet 15 milliGRAM(s) Oral every 12 hours  multivitamin 1 Tablet(s) Oral daily  pantoprazole    Tablet 40 milliGRAM(s) Oral before breakfast  rivaroxaban 10 milliGRAM(s) Oral daily  senna 2 Tablet(s) Oral at bedtime    MEDICATIONS  (PRN):  bisacodyl Suppository 10 milliGRAM(s) Rectal daily PRN If no bowel movement  magnesium hydroxide Suspension 30 milliLiter(s) Oral daily PRN Constipation  magnesium hydroxide Suspension 30 milliLiter(s) Oral daily PRN Constipation  oxyCODONE    IR 5 milliGRAM(s) Oral every 3 hours PRN Moderate Pain (4 - 6)  oxyCODONE    IR 10 milliGRAM(s) Oral every 3 hours PRN Severe Pain (7 - 10)      Vital Signs Last 24 Hrs  T(F): 98.1 (04-03-22 @ 09:56), Max: 98.6 (04-02-22 @ 16:34)  HR: 71 (04-03-22 @ 09:56) (68 - 80)  BP: 164/94 (04-03-22 @ 09:56) (143/74 - 174/91)  RR: 18 (04-03-22 @ 09:56) (18 - 18)  SpO2: 97% (04-03-22 @ 09:56) (96% - 97%)  Telemetry:   CAPILLARY BLOOD GLUCOSE        I&O's Summary    02 Apr 2022 07:01  -  03 Apr 2022 07:00  --------------------------------------------------------  IN: 1000 mL / OUT: 0 mL / NET: 1000 mL    03 Apr 2022 07:01  -  03 Apr 2022 14:20  --------------------------------------------------------  IN: 120 mL / OUT: 0 mL / NET: 120 mL        PHYSICAL EXAM:  GENERAL: NAD, well-developed  HEAD:  Atraumatic, Normocephalic  EYES: EOMI, PERRLA, conjunctiva and sclera clear  NECK: Supple, No JVD  CHEST/LUNG: Clear to auscultation bilaterally; No wheeze  HEART: Regular rate and rhythm; No murmurs, rubs, or gallops  ABDOMEN: Soft, Nontender, Nondistended; Bowel sounds present  EXTREMITIES:  2+ Peripheral Pulses, No clubbing, cyanosis, or edema  PSYCH: AAOx3  NEUROLOGY: non-focal  SKIN: No rashes or lesions    LABS:                        10.5   11.97 )-----------( 289      ( 02 Apr 2022 12:24 )             32.8     04-02    141  |  106  |  21  ----------------------------<  112<H>  4.5   |  21<L>  |  1.20    Ca    8.9      02 Apr 2022 12:25                RADIOLOGY & ADDITIONAL TESTS:    Imaging Personally Reviewed:    Consultant(s) Notes Reviewed:      Care Discussed with Consultants/Other Providers:  
Patient is a 66y old  Male who presents with a chief complaint of R periprosthetic femur Fx (29 Mar 2022 10:54)      SUBJECTIVE / OVERNIGHT EVENTS:    MEDICATIONS  (STANDING):  acetaminophen     Tablet .. 975 milliGRAM(s) Oral every 8 hours  aspirin enteric coated 81 milliGRAM(s) Oral daily  baclofen 20 milliGRAM(s) Oral Once  metoprolol tartrate 25 milliGRAM(s) Oral two times a day  pregabalin 150 milliGRAM(s) Oral Once  senna 2 Tablet(s) Oral at bedtime  sodium chloride 0.9%. 1000 milliLiter(s) (125 mL/Hr) IV Continuous <Continuous>    MEDICATIONS  (PRN):  HYDROmorphone  Injectable 0.5 milliGRAM(s) IV Push every 6 hours PRN Breakthrough  magnesium hydroxide Suspension 30 milliLiter(s) Oral daily PRN Constipation  oxyCODONE    IR 2.5 milliGRAM(s) Oral every 4 hours PRN Moderate Pain (4 - 6)  oxyCODONE    IR 5 milliGRAM(s) Oral every 4 hours PRN Severe Pain (7 - 10)      Vital Signs Last 24 Hrs  T(F): 98.1 (03-29-22 @ 17:36), Max: 98.2 (03-28-22 @ 23:00)  HR: 64 (03-29-22 @ 17:36) (64 - 72)  BP: 161/91 (03-29-22 @ 17:36) (118/64 - 168/75)  RR: 18 (03-29-22 @ 17:36) (16 - 18)  SpO2: 97% (03-29-22 @ 17:36) (96% - 98%)  Telemetry:   CAPILLARY BLOOD GLUCOSE        I&O's Summary    29 Mar 2022 07:01  -  29 Mar 2022 20:08  --------------------------------------------------------  IN: 120 mL / OUT: 750 mL / NET: -630 mL        PHYSICAL EXAM:  GENERAL: NAD, well-developed  HEAD:  Atraumatic, Normocephalic  EYES: EOMI, PERRLA, conjunctiva and sclera clear  NECK: Supple, No JVD  CHEST/LUNG: Clear to auscultation bilaterally; No wheeze  HEART: Regular rate and rhythm; No murmurs, rubs, or gallops  ABDOMEN: Soft, Nontender, Nondistended; Bowel sounds present  EXTREMITIES:  2+ Peripheral Pulses, No clubbing, cyanosis, or edema  PSYCH: AAOx3  NEUROLOGY: non-focal  SKIN: No rashes or lesions    LABS:                        14.8   9.81  )-----------( 213      ( 29 Mar 2022 04:48 )             44.8     03-29    143  |  108  |  18  ----------------------------<  115<H>  4.5   |  24  |  1.36<H>    Ca    9.2      29 Mar 2022 04:48    TPro  7.0  /  Alb  4.3  /  TBili  0.6  /  DBili  x   /  AST  15  /  ALT  19  /  AlkPhos  127<H>  03-28    PT/INR - ( 29 Mar 2022 04:48 )   PT: 13.0 sec;   INR: 1.13 ratio         PTT - ( 29 Mar 2022 04:48 )  PTT:33.0 sec  CARDIAC MARKERS ( 28 Mar 2022 16:21 )  x     / x     / 125 U/L / x     / x              RADIOLOGY & ADDITIONAL TESTS:    Imaging Personally Reviewed:    Consultant(s) Notes Reviewed:      Care Discussed with Consultants/Other Providers:  
Subjective: Patient seen and examined. No new events except as noted.     REVIEW OF SYSTEMS:    CONSTITUTIONAL: + weakness, fevers or chills  EYES/ENT: No visual changes;  No vertigo or throat pain   NECK: No pain or stiffness  RESPIRATORY: No cough, wheezing, hemoptysis; No shortness of breath  CARDIOVASCULAR: No chest pain or palpitations  GASTROINTESTINAL: No abdominal or epigastric pain. No nausea, vomiting, or hematemesis; No diarrhea or constipation. No melena or hematochezia.  GENITOURINARY: No dysuria, frequency or hematuria  NEUROLOGICAL: No numbness or weakness  SKIN: No itching, burning, rashes, or lesions   All other review of systems is negative unless indicated above.    MEDICATIONS:  MEDICATIONS  (STANDING):  acetaminophen     Tablet .. 975 milliGRAM(s) Oral every 6 hours  ascorbic acid 500 milliGRAM(s) Oral two times a day  aspirin enteric coated 81 milliGRAM(s) Oral daily  atorvastatin 20 milliGRAM(s) Oral at bedtime  baclofen 20 milliGRAM(s) Oral two times a day  ferrous    sulfate 325 milliGRAM(s) Oral daily  folic acid 1 milliGRAM(s) Oral daily  gemfibrozil 600 milliGRAM(s) Oral two times a day  lactated ringers. 1000 milliLiter(s) (75 mL/Hr) IV Continuous <Continuous>  metoprolol tartrate 25 milliGRAM(s) Oral two times a day  morphine ER Tablet 15 milliGRAM(s) Oral every 12 hours  multivitamin 1 Tablet(s) Oral daily  pantoprazole    Tablet 40 milliGRAM(s) Oral before breakfast  rivaroxaban 10 milliGRAM(s) Oral daily  senna 2 Tablet(s) Oral at bedtime    PHYSICAL EXAM:  T(C): 36.8 (04-03-22 @ 04:57), Max: 37 (04-02-22 @ 16:34)  HR: 80 (04-03-22 @ 04:57) (68 - 80)  BP: 174/91 (04-03-22 @ 04:57) (143/74 - 174/91)  RR: 18 (04-03-22 @ 04:57) (18 - 18)  SpO2: 96% (04-03-22 @ 04:57) (95% - 97%)  Wt(kg): --  I&O's Summary    02 Apr 2022 07:01  -  03 Apr 2022 07:00  --------------------------------------------------------  IN: 1000 mL / OUT: 0 mL / NET: 1000 mL    Appearance: NAD	  HEENT: Normal oral mucosa, PERRL, EOMI	  Lymphatic: No lymphadenopathy , no edema  Cardiovascular: Normal S1 S2, No JVD, No murmurs , Peripheral pulses palpable 2+ bilaterally  Respiratory: Lungs clear to auscultation, normal effort 	  Gastrointestinal:  Soft, Non-tender, + BS	  Skin: No rashes, No ecchymoses, No cyanosis, warm to touch  Musculoskeletal: Normal range of motion, normal strength  Psychiatry:  Mood & affect appropriate  Ext: R leg ace wrapped    LABS:    CARDIAC MARKERS:                       10.5   11.97 )-----------( 289      ( 02 Apr 2022 12:24 )             32.8     04-02    141  |  106  |  21  ----------------------------<  112<H>  4.5   |  21<L>  |  1.20    Ca    8.9      02 Apr 2022 12:25      proBNP:   Lipid Profile:   HgA1c:   TSH:     TELEMETRY: 	    ECG:  	  RADIOLOGY:   DIAGNOSTIC TESTING:  [ ] Echocardiogram:  [ ]  Catheterization:  [ ] Stress Test:    OTHER: 	  
Subjective: Patient seen and examined. No new events except as noted.     REVIEW OF SYSTEMS:    CONSTITUTIONAL:+ weakness, fevers or chills  EYES/ENT: No visual changes;  No vertigo or throat pain   NECK: No pain or stiffness  RESPIRATORY: No cough, wheezing, hemoptysis; No shortness of breath  CARDIOVASCULAR: No chest pain or palpitations  GASTROINTESTINAL: No abdominal or epigastric pain. No nausea, vomiting, or hematemesis; No diarrhea or constipation. No melena or hematochezia.  GENITOURINARY: No dysuria, frequency or hematuria  NEUROLOGICAL: No numbness or weakness  SKIN: No itching, burning, rashes, or lesions   All other review of systems is negative unless indicated above.    MEDICATIONS:  MEDICATIONS  (STANDING):  acetaminophen     Tablet .. 975 milliGRAM(s) Oral every 8 hours  aspirin enteric coated 81 milliGRAM(s) Oral daily  baclofen 20 milliGRAM(s) Oral Once  metoprolol tartrate 25 milliGRAM(s) Oral two times a day  pregabalin 150 milliGRAM(s) Oral Once  senna 2 Tablet(s) Oral at bedtime  sodium chloride 0.9%. 1000 milliLiter(s) (125 mL/Hr) IV Continuous <Continuous>      PHYSICAL EXAM:  T(C): 36.8 (03-29-22 @ 04:27), Max: 36.9 (03-28-22 @ 17:54)  HR: 72 (03-29-22 @ 04:27) (67 - 98)  BP: 168/75 (03-29-22 @ 04:27) (118/64 - 174/76)  RR: 18 (03-29-22 @ 04:27) (16 - 18)  SpO2: 97% (03-29-22 @ 04:27) (96% - 98%)  Wt(kg): --  I&O's Summary    29 Mar 2022 07:01  -  29 Mar 2022 10:54  --------------------------------------------------------  IN: 0 mL / OUT: 750 mL / NET: -750 mL      Height (cm): 172.7 (03-28 @ 15:05)  Weight (kg): 95.3 (03-28 @ 15:05)  BMI (kg/m2): 32 (03-28 @ 15:05)  BSA (m2): 2.09 (03-28 @ 15:05)    Appearance: Normal	  HEENT:   Normal oral mucosa, PERRL, EOMI	  Lymphatic: No lymphadenopathy  Cardiovascular: Normal S1 S2, No JVD, No murmurs, No edema  Respiratory: Decreased bs   Psychiatry: A & O x 3, Mood & affect appropriate  Gastrointestinal:  Soft, Non-tender, + BS	  Skin: No rashes, No ecchymoses, No cyanosis	  Neurologic: Non-focal  RLE: Skin intact, no ecchymosis,   SILT DP/SP/ [place]/Saph/Post Tib  +EHL/FHL/TA/Gastroc,   Knee/ankle painless ROM,   hip ROM limited 2/2 pain,   DP+,   soft compartments, no calf ttp,   Vascular: Peripheral pulses palpable 2+ bilaterally          LABS:    CARDIAC MARKERS:  CARDIAC MARKERS ( 28 Mar 2022 16:21 )  x     / x     / 125 U/L / x     / x                                    14.8   9.81  )-----------( 213      ( 29 Mar 2022 04:48 )             44.8     03-29    143  |  108  |  18  ----------------------------<  115<H>  4.5   |  24  |  1.36<H>    Ca    9.2      29 Mar 2022 04:48    TPro  7.0  /  Alb  4.3  /  TBili  0.6  /  DBili  x   /  AST  15  /  ALT  19  /  AlkPhos  127<H>  03-28    proBNP:   Lipid Profile:   HgA1c:   TSH:             TELEMETRY: 	    ECG:  	  RADIOLOGY:   DIAGNOSTIC TESTING:  [ ] Echocardiogram:  [ ]  Catheterization:  [ ] Stress Test:    OTHER: 	          
Orthopedics    Pt seen and examined at the bedside. Pain is well controlled at this time, no concerns at this time.     Vital Signs Last 24 Hrs  T(C): 36.6 (03-31-22 @ 05:20), Max: 37 (03-30-22 @ 20:00)  T(F): 97.9 (03-31-22 @ 05:20), Max: 98.6 (03-30-22 @ 20:00)  HR: 81 (03-31-22 @ 05:20) (60 - 81)  BP: 145/77 (03-31-22 @ 05:20) (103/58 - 154/88)  BP(mean): 80 (03-30-22 @ 13:00) (70 - 103)  RR: 18 (03-31-22 @ 05:20) (14 - 18)  SpO2: 95% (03-31-22 @ 05:20) (93% - 100%)                        13.0   14.75 )-----------( 202      ( 30 Mar 2022 11:36 )             39.9     30 Mar 2022 11:36    139    |  106    |  19     ----------------------------<  130    4.6     |  21     |  1.22     Ca    8.1        30 Mar 2022 11:36      PT/INR - ( 30 Mar 2022 05:02 )   PT: 13.1 sec;   INR: 1.13 ratio         PTT - ( 30 Mar 2022 05:02 )  PTT:29.3 sec    Exam:  GEN: NAD, awake and alert.  RLE  Dressing clean and dry  +EHL FHL TA GS  SILT L2-S1  +DP  Calf soft and nontender, compartments soft and compressible.      A/P:  66y M s/p R femur ORIF for Vac C periprosthetic fx POD #1  -Pain control prn  - DVT ppx  - NWB RLE  - PT/OOB as tolerated   - FU am labs  - Med mgmt, continue home meds.  - FU PT recs
Subjective: Patient seen and examined. No new events except as noted.     REVIEW OF SYSTEMS:    CONSTITUTIONAL:+ weakness, fevers or chills  EYES/ENT: No visual changes;  No vertigo or throat pain   NECK: No pain or stiffness  RESPIRATORY: No cough, wheezing, hemoptysis; No shortness of breath  CARDIOVASCULAR: No chest pain or palpitations  GASTROINTESTINAL: No abdominal or epigastric pain. No nausea, vomiting, or hematemesis; No diarrhea or constipation. No melena or hematochezia.  GENITOURINARY: No dysuria, frequency or hematuria  NEUROLOGICAL: No numbness or weakness  SKIN: No itching, burning, rashes, or lesions   All other review of systems is negative unless indicated above.    MEDICATIONS:  MEDICATIONS  (STANDING):  acetaminophen     Tablet .. 975 milliGRAM(s) Oral every 6 hours  ascorbic acid 500 milliGRAM(s) Oral two times a day  aspirin enteric coated 81 milliGRAM(s) Oral daily  atorvastatin 20 milliGRAM(s) Oral at bedtime  baclofen 20 milliGRAM(s) Oral two times a day  ferrous    sulfate 325 milliGRAM(s) Oral daily  folic acid 1 milliGRAM(s) Oral daily  gemfibrozil 600 milliGRAM(s) Oral two times a day  lactated ringers. 1000 milliLiter(s) (75 mL/Hr) IV Continuous <Continuous>  metoprolol tartrate 25 milliGRAM(s) Oral two times a day  morphine ER Tablet 15 milliGRAM(s) Oral every 12 hours  multivitamin 1 Tablet(s) Oral daily  pantoprazole    Tablet 40 milliGRAM(s) Oral before breakfast  rivaroxaban 10 milliGRAM(s) Oral daily  senna 2 Tablet(s) Oral at bedtime      PHYSICAL EXAM:  T(C): 36.8 (04-01-22 @ 08:45), Max: 36.8 (03-31-22 @ 20:50)  HR: 69 (04-01-22 @ 08:45) (69 - 87)  BP: 145/67 (04-01-22 @ 08:45) (115/69 - 157/68)  RR: 18 (04-01-22 @ 08:45) (18 - 18)  SpO2: 96% (04-01-22 @ 08:45) (92% - 97%)  Wt(kg): --  I&O's Summary    31 Mar 2022 07:01  -  01 Apr 2022 07:00  --------------------------------------------------------  IN: 1317 mL / OUT: 150 mL / NET: 1167 mL        Appearance: NAD	  HEENT: Normal oral mucosa, PERRL, EOMI	  Lymphatic: No lymphadenopathy , no edema  Cardiovascular: Normal S1 S2, No JVD, No murmurs , Peripheral pulses palpable 2+ bilaterally  Respiratory: Lungs clear to auscultation, normal effort 	  Gastrointestinal:  Soft, Non-tender, + BS	  Skin: No rashes, No ecchymoses, No cyanosis, warm to touch  Musculoskeletal: Normal range of motion, normal strength  Psychiatry:  Mood & affect appropriate  Ext: R left ace wrapped    LABS:    CARDIAC MARKERS:                                10.7   10.61 )-----------( 203      ( 31 Mar 2022 09:56 )             32.5     03-31    139  |  105  |  24<H>  ----------------------------<  131<H>  4.9   |  21<L>  |  1.46<H>    Ca    8.5      31 Mar 2022 09:56      proBNP:   Lipid Profile:   HgA1c:   TSH:             TELEMETRY: 	    ECG:  	  RADIOLOGY:   DIAGNOSTIC TESTING:  [ ] Echocardiogram:  [ ]  Catheterization:  [ ] Stress Test:    OTHER: 	          
Orthopedics     Pt seen and examined at the bedside. Pain is well controlled at this time, no concerns at this time.     Vital Signs Last 24 Hrs  T(C): 36.5 (03-30-22 @ 04:13), Max: 36.7 (03-29-22 @ 12:18)  T(F): 97.7 (03-30-22 @ 04:13), Max: 98.1 (03-29-22 @ 17:36)  HR: 62 (03-30-22 @ 06:00) (59 - 71)  BP: 154/88 (03-30-22 @ 04:13) (151/86 - 165/85)  BP(mean): --  RR: 18 (03-30-22 @ 04:13) (18 - 18)  SpO2: 97% (03-30-22 @ 04:13) (97% - 98%)                        14.0   10.77 )-----------( 211      ( 30 Mar 2022 05:02 )             41.6     30 Mar 2022 05:02    142    |  108    |  20     ----------------------------<  101    4.0     |  22     |  1.34     Ca    9.1        30 Mar 2022 05:02    TPro  7.0    /  Alb  4.3    /  TBili  0.6    /  DBili  x      /  AST  15     /  ALT  19     /  AlkPhos  127    28 Mar 2022 16:21    PT/INR - ( 30 Mar 2022 05:02 )   PT: 13.1 sec;   INR: 1.13 ratio         PTT - ( 30 Mar 2022 05:02 )  PTT:29.3 sec    Exam:  GEN: NAD, awake and alert.  RLE  Skin intact, thigh swollen  +EHL FHL TA GS  SILT L2-S1  +DP  Calf soft and nontender, compartments soft and compressible.    A/P:  66y M w/ R vanc C femoral shaft fx    Plan:    -Plan for surgical intervention 3/30 for ORIF Femoral shaft.  -Preop labs/imaging: CBC/BM/P/PT/PTT/INR/T&S/Covid/CXR/EKG.  -NPO except meds/IVFs while NPO.  -Bedrest  -Hold chem DVT ppx  -Pain control prn  -Medical management, continue home meds.  -Case discussed with attending, will advise if plan changes.  
Patient is a 66y old  Male who presents with a chief complaint of R periprosthetic femur Fx (04 Apr 2022 09:38)      SUBJECTIVE / OVERNIGHT EVENTS: pain controlled, awaiting transfer to [facility]    MEDICATIONS  (STANDING):  acetaminophen     Tablet .. 975 milliGRAM(s) Oral every 6 hours  ascorbic acid 500 milliGRAM(s) Oral two times a day  aspirin enteric coated 81 milliGRAM(s) Oral daily  atorvastatin 20 milliGRAM(s) Oral at bedtime  baclofen 20 milliGRAM(s) Oral two times a day  ferrous    sulfate 325 milliGRAM(s) Oral daily  folic acid 1 milliGRAM(s) Oral daily  gemfibrozil 600 milliGRAM(s) Oral two times a day  lactated ringers. 1000 milliLiter(s) (75 mL/Hr) IV Continuous <Continuous>  lisinopril 5 milliGRAM(s) Oral daily  metoprolol tartrate 25 milliGRAM(s) Oral two times a day  morphine ER Tablet 15 milliGRAM(s) Oral every 12 hours  multivitamin 1 Tablet(s) Oral daily  pantoprazole    Tablet 40 milliGRAM(s) Oral before breakfast  rivaroxaban 10 milliGRAM(s) Oral daily  senna 2 Tablet(s) Oral at bedtime    MEDICATIONS  (PRN):  bisacodyl Suppository 10 milliGRAM(s) Rectal daily PRN If no bowel movement  magnesium hydroxide Suspension 30 milliLiter(s) Oral daily PRN Constipation  magnesium hydroxide Suspension 30 milliLiter(s) Oral daily PRN Constipation  oxyCODONE    IR 10 milliGRAM(s) Oral every 3 hours PRN Severe Pain (7 - 10)  oxyCODONE    IR 5 milliGRAM(s) Oral every 3 hours PRN Moderate Pain (4 - 6)      Vital Signs Last 24 Hrs  T(F): 98.7 (04-04-22 @ 12:18), Max: 98.7 (04-04-22 @ 12:18)  HR: 79 (04-04-22 @ 12:18) (64 - 82)  BP: 129/68 (04-04-22 @ 12:18) (102/64 - 163/78)  RR: 18 (04-04-22 @ 12:18) (18 - 18)  SpO2: 98% (04-04-22 @ 12:18) (95% - 99%)  Telemetry:   CAPILLARY BLOOD GLUCOSE        I&O's Summary    03 Apr 2022 07:01  -  04 Apr 2022 07:00  --------------------------------------------------------  IN: 640 mL / OUT: 0 mL / NET: 640 mL    04 Apr 2022 07:01  -  04 Apr 2022 16:18  --------------------------------------------------------  IN: 360 mL / OUT: 0 mL / NET: 360 mL        PHYSICAL EXAM:  GENERAL: NAD, well-developed  HEAD:  Atraumatic, Normocephalic  EYES: EOMI, PERRLA, conjunctiva and sclera clear  NECK: Supple, No JVD  CHEST/LUNG: Clear to auscultation bilaterally; No wheeze  HEART: Regular rate and rhythm; No murmurs, rubs, or gallops  ABDOMEN: Soft, Nontender, Nondistended; Bowel sounds present  EXTREMITIES:  2+ Peripheral Pulses, No clubbing, cyanosis, or edema  PSYCH: AAOx3  NEUROLOGY: non-focal  SKIN: No rashes or lesions    LABS:                    RADIOLOGY & ADDITIONAL TESTS:    Imaging Personally Reviewed:    Consultant(s) Notes Reviewed:      Care Discussed with Consultants/Other Providers:  
Patient is a 66y old  Male who presents with a chief complaint of R periprosthetic femur Fx (30 Mar 2022 14:34)      SUBJECTIVE / OVERNIGHT EVENTS: s/p ORIF RIght distal femur Fx    MEDICATIONS  (STANDING):  acetaminophen     Tablet .. 975 milliGRAM(s) Oral every 6 hours  ascorbic acid 500 milliGRAM(s) Oral two times a day  aspirin enteric coated 81 milliGRAM(s) Oral daily  atorvastatin 20 milliGRAM(s) Oral at bedtime  ceFAZolin   IVPB 2000 milliGRAM(s) IV Intermittent every 8 hours  ferrous    sulfate 325 milliGRAM(s) Oral daily  folic acid 1 milliGRAM(s) Oral daily  gemfibrozil 600 milliGRAM(s) Oral two times a day  HYDROmorphone  Injectable 0.5 milliGRAM(s) IV Push once  lactated ringers. 1000 milliLiter(s) (75 mL/Hr) IV Continuous <Continuous>  metoprolol tartrate 25 milliGRAM(s) Oral two times a day  multivitamin 1 Tablet(s) Oral daily  pantoprazole    Tablet 40 milliGRAM(s) Oral before breakfast  senna 2 Tablet(s) Oral at bedtime    MEDICATIONS  (PRN):  magnesium hydroxide Suspension 30 milliLiter(s) Oral daily PRN Constipation  magnesium hydroxide Suspension 30 milliLiter(s) Oral daily PRN Constipation  oxyCODONE    IR 10 milliGRAM(s) Oral every 4 hours PRN Severe Pain (7 - 10)  oxyCODONE    IR 5 milliGRAM(s) Oral every 4 hours PRN Moderate Pain (4 - 6)      Vital Signs Last 24 Hrs  T(F): 98 (03-30-22 @ 14:46), Max: 98.1 (03-29-22 @ 17:36)  HR: 70 (03-30-22 @ 15:40) (59 - 74)  BP: 138/77 (03-30-22 @ 15:40) (103/58 - 165/85)  RR: 18 (03-30-22 @ 14:46) (14 - 18)  SpO2: 95% (03-30-22 @ 15:40) (94% - 99%)  Telemetry:   CAPILLARY BLOOD GLUCOSE        I&O's Summary    29 Mar 2022 07:01  -  30 Mar 2022 07:00  --------------------------------------------------------  IN: 720 mL / OUT: 750 mL / NET: -30 mL        PHYSICAL EXAM:  GENERAL: NAD, well-developed  HEAD:  Atraumatic, Normocephalic  EYES: EOMI, PERRLA, conjunctiva and sclera clear  NECK: Supple, No JVD  CHEST/LUNG: Clear to auscultation bilaterally; No wheeze  HEART: Regular rate and rhythm; No murmurs, rubs, or gallops  ABDOMEN: Soft, Nontender, Nondistended; Bowel sounds present  EXTREMITIES:  2+ Peripheral Pulses, No clubbing, cyanosis, or edema  PSYCH: AAOx3  NEUROLOGY: non-focal  SKIN: No rashes or lesions    LABS:                        13.0   14.75 )-----------( 202      ( 30 Mar 2022 11:36 )             39.9     03-30    139  |  106  |  19  ----------------------------<  130<H>  4.6   |  21<L>  |  1.22    Ca    8.1<L>      30 Mar 2022 11:36    TPro  7.0  /  Alb  4.3  /  TBili  0.6  /  DBili  x   /  AST  15  /  ALT  19  /  AlkPhos  127<H>  03-28    PT/INR - ( 30 Mar 2022 05:02 )   PT: 13.1 sec;   INR: 1.13 ratio         PTT - ( 30 Mar 2022 05:02 )  PTT:29.3 sec  CARDIAC MARKERS ( 28 Mar 2022 16:21 )  x     / x     / 125 U/L / x     / x              RADIOLOGY & ADDITIONAL TESTS:    Imaging Personally Reviewed:    Consultant(s) Notes Reviewed:      Care Discussed with Consultants/Other Providers:  
Patient seen and evaluated this AM.  Pain relatively under control.  Has been NPO since MN.  Ready for OR this AM.    ICU Vital Signs Last 24 Hrs  T(C): 36.8 (29 Mar 2022 04:27), Max: 36.9 (28 Mar 2022 17:54)  T(F): 98.2 (29 Mar 2022 04:27), Max: 98.4 (28 Mar 2022 17:54)  HR: 72 (29 Mar 2022 04:27) (67 - 98)  BP: 168/75 (29 Mar 2022 04:27) (118/64 - 174/76)  BP(mean): --  ABP: --  ABP(mean): --  RR: 18 (29 Mar 2022 04:27) (16 - 18)  SpO2: 97% (29 Mar 2022 04:27) (96% - 98%)      PE  Gen: Laying in bed, alert and oriented, NAD  Resp: Unlabored breathing  RLE: Skin intact, no ecchymosis,   SILT DP/SP/ Dawood/Saph/Post Tib  +EHL/FHL/TA/Gastroc,   Knee/ankle painless ROM,   hip ROM limited 2/2 pain,   DP+,   soft compartments, no calf ttp,       66yMale with Right Vanc C periprosthetic Fx    - Pain control  - IS  - Continue home medications  - Regular Diet, NPOpMN/IVF  - CBC/BMP/Coags/UA/T+S x2  - EKG/CXR  - Medical clearance prior to planned procedure  - Plan for OR w/ Dr. Stoddard 3/29  
Patient is a 66y old  Male who presents with a chief complaint of R periprosthetic femur Fx (01 Apr 2022 10:28)      SUBJECTIVE / OVERNIGHT EVENTS: POD2    MEDICATIONS  (STANDING):  acetaminophen     Tablet .. 975 milliGRAM(s) Oral every 6 hours  ascorbic acid 500 milliGRAM(s) Oral two times a day  aspirin enteric coated 81 milliGRAM(s) Oral daily  atorvastatin 20 milliGRAM(s) Oral at bedtime  baclofen 20 milliGRAM(s) Oral two times a day  ferrous    sulfate 325 milliGRAM(s) Oral daily  folic acid 1 milliGRAM(s) Oral daily  gemfibrozil 600 milliGRAM(s) Oral two times a day  lactated ringers. 1000 milliLiter(s) (75 mL/Hr) IV Continuous <Continuous>  metoprolol tartrate 25 milliGRAM(s) Oral two times a day  morphine ER Tablet 15 milliGRAM(s) Oral every 12 hours  multivitamin 1 Tablet(s) Oral daily  pantoprazole    Tablet 40 milliGRAM(s) Oral before breakfast  rivaroxaban 10 milliGRAM(s) Oral daily  senna 2 Tablet(s) Oral at bedtime    MEDICATIONS  (PRN):  bisacodyl Suppository 10 milliGRAM(s) Rectal daily PRN If no bowel movement  magnesium hydroxide Suspension 30 milliLiter(s) Oral daily PRN Constipation  magnesium hydroxide Suspension 30 milliLiter(s) Oral daily PRN Constipation  oxyCODONE    IR 5 milliGRAM(s) Oral every 3 hours PRN Moderate Pain (4 - 6)  oxyCODONE    IR 10 milliGRAM(s) Oral every 3 hours PRN Severe Pain (7 - 10)      Vital Signs Last 24 Hrs  T(F): 98.1 (04-01-22 @ 12:00), Max: 98.2 (03-31-22 @ 20:50)  HR: 70 (04-01-22 @ 12:00) (69 - 87)  BP: 134/77 (04-01-22 @ 12:00) (115/69 - 160/81)  RR: 18 (04-01-22 @ 12:00) (18 - 18)  SpO2: 97% (04-01-22 @ 12:00) (91% - 97%)  Telemetry:   CAPILLARY BLOOD GLUCOSE        I&O's Summary    31 Mar 2022 07:01  -  01 Apr 2022 07:00  --------------------------------------------------------  IN: 1317 mL / OUT: 150 mL / NET: 1167 mL        PHYSICAL EXAM:  GENERAL: NAD, well-developed  HEAD:  Atraumatic, Normocephalic  EYES: EOMI, PERRLA, conjunctiva and sclera clear  NECK: Supple, No JVD  CHEST/LUNG: Clear to auscultation bilaterally; No wheeze  HEART: Regular rate and rhythm; No murmurs, rubs, or gallops  ABDOMEN: Soft, Nontender, Nondistended; Bowel sounds present  EXTREMITIES:  2+ Peripheral Pulses, No clubbing, cyanosis, or edema  PSYCH: AAOx3  NEUROLOGY: non-focal  SKIN: No rashes or lesions    LABS:                        11.0   12.39 )-----------( 254      ( 01 Apr 2022 11:29 )             33.4     04-01    142  |  108  |  22  ----------------------------<  125<H>  5.0   |  22  |  1.28    Ca    9.0      01 Apr 2022 11:29                RADIOLOGY & ADDITIONAL TESTS:    Imaging Personally Reviewed:    Consultant(s) Notes Reviewed:      Care Discussed with Consultants/Other Providers:  
Patient is a 66y old  Male who presents with a chief complaint of R periprosthetic femur Fx (31 Mar 2022 12:06)      SUBJECTIVE / OVERNIGHT EVENTS: POD1    MEDICATIONS  (STANDING):  acetaminophen     Tablet .. 975 milliGRAM(s) Oral every 6 hours  ascorbic acid 500 milliGRAM(s) Oral two times a day  aspirin enteric coated 81 milliGRAM(s) Oral daily  atorvastatin 20 milliGRAM(s) Oral at bedtime  baclofen 20 milliGRAM(s) Oral two times a day  ferrous    sulfate 325 milliGRAM(s) Oral daily  folic acid 1 milliGRAM(s) Oral daily  gemfibrozil 600 milliGRAM(s) Oral two times a day  lactated ringers. 1000 milliLiter(s) (75 mL/Hr) IV Continuous <Continuous>  metoprolol tartrate 25 milliGRAM(s) Oral two times a day  morphine ER Tablet 15 milliGRAM(s) Oral every 12 hours  multivitamin 1 Tablet(s) Oral daily  pantoprazole    Tablet 40 milliGRAM(s) Oral before breakfast  rivaroxaban 10 milliGRAM(s) Oral daily  senna 2 Tablet(s) Oral at bedtime    MEDICATIONS  (PRN):  magnesium hydroxide Suspension 30 milliLiter(s) Oral daily PRN Constipation  magnesium hydroxide Suspension 30 milliLiter(s) Oral daily PRN Constipation  oxyCODONE    IR 5 milliGRAM(s) Oral every 3 hours PRN Moderate Pain (4 - 6)  oxyCODONE    IR 10 milliGRAM(s) Oral every 3 hours PRN Severe Pain (7 - 10)      Vital Signs Last 24 Hrs  T(F): 97.6 (03-31-22 @ 17:08), Max: 98.6 (03-30-22 @ 20:00)  HR: 76 (03-31-22 @ 17:08) (69 - 83)  BP: 115/69 (03-31-22 @ 17:08) (100/62 - 145/77)  RR: 18 (03-31-22 @ 17:08) (18 - 18)  SpO2: 96% (03-31-22 @ 17:08) (91% - 96%)  Telemetry:   CAPILLARY BLOOD GLUCOSE        I&O's Summary    30 Mar 2022 07:01  -  31 Mar 2022 07:00  --------------------------------------------------------  IN: 625 mL / OUT: 0 mL / NET: 625 mL    31 Mar 2022 07:01  -  31 Mar 2022 17:40  --------------------------------------------------------  IN: 360 mL / OUT: 150 mL / NET: 210 mL        PHYSICAL EXAM:  GENERAL: NAD, well-developed  HEAD:  Atraumatic, Normocephalic  EYES: EOMI, PERRLA, conjunctiva and sclera clear  NECK: Supple, No JVD  CHEST/LUNG: Clear to auscultation bilaterally; No wheeze  HEART: Regular rate and rhythm; No murmurs, rubs, or gallops  ABDOMEN: Soft, Nontender, Nondistended; Bowel sounds present  EXTREMITIES:  2+ Peripheral Pulses, No clubbing, cyanosis, or edema  PSYCH: AAOx3  NEUROLOGY: non-focal  SKIN: No rashes or lesions    LABS:                        10.7   10.61 )-----------( 203      ( 31 Mar 2022 09:56 )             32.5     03-31    139  |  105  |  24<H>  ----------------------------<  131<H>  4.9   |  21<L>  |  1.46<H>    Ca    8.5      31 Mar 2022 09:56      PT/INR - ( 30 Mar 2022 05:02 )   PT: 13.1 sec;   INR: 1.13 ratio         PTT - ( 30 Mar 2022 05:02 )  PTT:29.3 sec          RADIOLOGY & ADDITIONAL TESTS:    Imaging Personally Reviewed:    Consultant(s) Notes Reviewed:      Care Discussed with Consultants/Other Providers:  
Post op Day [3]    Patient resting without complaints.  No chest pain, SOB, N/V.    T(C): 36.7 (04-02-22 @ 05:05), Max: 37 (04-01-22 @ 21:00)  HR: 80 (04-02-22 @ 05:05) (69 - 80)  BP: 166/82 (04-02-22 @ 05:05) (126/72 - 166/82)  RR: 18 (04-02-22 @ 05:05) (18 - 18)  SpO2: 100% (04-02-22 @ 05:05) (91% - 100%)  Wt(kg): --    Exam:  Alert and Oriented, No Acute Distress  Lower Ext: RLE aquacel in place with moderate serosanguinous drainage  Calves Soft, Non-tender bilaterally  +PF/DF/EHL/FHL  +DP Pulse  SILT                            11.0   12.39 )-----------( 254      ( 01 Apr 2022 11:29 )             33.4    04-01    142  |  108  |  22  ----------------------------<  125<H>  5.0   |  22  |  1.28    Ca    9.0      01 Apr 2022 11:29    
Post op Day [4]    Patient resting without complaints.  No chest pain, SOB, N/V.    T(C): 36.8 (04-03-22 @ 04:57), Max: 37 (04-02-22 @ 16:34)  HR: 80 (04-03-22 @ 04:57) (66 - 80)  BP: 174/91 (04-03-22 @ 04:57) (143/74 - 174/91)  RR: 18 (04-03-22 @ 04:57) (18 - 18)  SpO2: 96% (04-03-22 @ 04:57) (95% - 97%)  Wt(kg): --    Exam:  Alert and Oriented, No Acute Distress  Lower Ext: RLE aquacel in place with moderate serosanguinous drainage  Calves Soft, Non-tender bilaterally  +PF/DF/EHL/FHL  +DP Pulse  SILT                            10.5   11.97 )-----------( 289      ( 02 Apr 2022 12:24 )             32.8    04-02    141  |  106  |  21  ----------------------------<  112<H>  4.5   |  21<L>  |  1.20    Ca    8.9      02 Apr 2022 12:25      
Post op Day [5 ]    Patient resting without complaints.  No chest pain, SOB, N/V.    T(C): 36.7 (04-04-22 @ 04:42), Max: 37.1 (04-03-22 @ 13:34)  HR: 78 (04-04-22 @ 04:42) (67 - 82)  BP: 163/78 (04-04-22 @ 04:42) (134/79 - 164/94)  RR: 18 (04-04-22 @ 04:42) (18 - 18)  SpO2: 99% (04-04-22 @ 04:42) (97% - 99%)  Wt(kg): --    Exam:  Alert and Oriented, No Acute Distress  R Hip aquacel mild serosang drainage, ace c/d/  soft/compressible compartments  Calves Soft, Non-tender bilaterally  +PF/DF/EHL/FHL  SILT  +DP Pulse                        10.5   11.97 )-----------( 289      ( 02 Apr 2022 12:24 )             32.8    04-02    141  |  106  |  21  ----------------------------<  112<H>  4.5   |  21<L>  |  1.20    Ca    8.9      02 Apr 2022 12:25        
Subjective: Patient seen and examined. No new events except as noted.   S/p ORIF right distal femur.     REVIEW OF SYSTEMS:    CONSTITUTIONAL: + weakness, fevers or chills  EYES/ENT: No visual changes;  No vertigo or throat pain   NECK: No pain or stiffness  RESPIRATORY: No cough, wheezing, hemoptysis; No shortness of breath  CARDIOVASCULAR: No chest pain or palpitations  GASTROINTESTINAL: No abdominal or epigastric pain. No nausea, vomiting, or hematemesis; No diarrhea or constipation. No melena or hematochezia.  GENITOURINARY: No dysuria, frequency or hematuria  NEUROLOGICAL: No numbness or weakness  SKIN: No itching, burning, rashes, or lesions   All other review of systems is negative unless indicated above.    MEDICATIONS:  MEDICATIONS  (STANDING):  acetaminophen     Tablet .. 975 milliGRAM(s) Oral every 6 hours  ascorbic acid 500 milliGRAM(s) Oral two times a day  aspirin enteric coated 81 milliGRAM(s) Oral daily  atorvastatin 20 milliGRAM(s) Oral at bedtime  baclofen 20 milliGRAM(s) Oral two times a day  ferrous    sulfate 325 milliGRAM(s) Oral daily  folic acid 1 milliGRAM(s) Oral daily  gemfibrozil 600 milliGRAM(s) Oral two times a day  lactated ringers. 1000 milliLiter(s) (75 mL/Hr) IV Continuous <Continuous>  metoprolol tartrate 25 milliGRAM(s) Oral two times a day  morphine ER Tablet 15 milliGRAM(s) Oral every 12 hours  multivitamin 1 Tablet(s) Oral daily  pantoprazole    Tablet 40 milliGRAM(s) Oral before breakfast  rivaroxaban 10 milliGRAM(s) Oral daily  senna 2 Tablet(s) Oral at bedtime    PHYSICAL EXAM:  T(C): 36.3 (03-31-22 @ 08:30), Max: 37 (03-30-22 @ 20:00)  HR: 83 (03-31-22 @ 11:33) (60 - 83)  BP: 125/72 (03-31-22 @ 11:33) (100/62 - 145/77)  RR: 18 (03-31-22 @ 11:24) (14 - 18)  SpO2: 93% (03-31-22 @ 11:33) (91% - 100%)  Wt(kg): --  I&O's Summary    30 Mar 2022 07:01  -  31 Mar 2022 07:00  --------------------------------------------------------  IN: 625 mL / OUT: 0 mL / NET: 625 mL    31 Mar 2022 07:01  -  31 Mar 2022 12:06  --------------------------------------------------------  IN: 240 mL / OUT: 0 mL / NET: 240 mL    Appearance: NAD	  HEENT: Normal oral mucosa, PERRL, EOMI	  Lymphatic: No lymphadenopathy , no edema  Cardiovascular: Normal S1 S2, No JVD, No murmurs , Peripheral pulses palpable 2+ bilaterally  Respiratory: Lungs clear to auscultation, normal effort 	  Gastrointestinal:  Soft, Non-tender, + BS	  Skin: No rashes, No ecchymoses, No cyanosis, warm to touch  Musculoskeletal: Normal range of motion, normal strength  Psychiatry:  Mood & affect appropriate  Ext: R left ace wrapped    LABS:    CARDIAC MARKERS:  CARDIAC MARKERS ( 28 Mar 2022 16:21 )  x     / x     / 125 U/L / x     / x                            10.7   10.61 )-----------( 203      ( 31 Mar 2022 09:56 )             32.5     03-31    139  |  105  |  24<H>  ----------------------------<  131<H>  4.9   |  21<L>  |  1.46<H>    Ca    8.5      31 Mar 2022 09:56    proBNP:   Lipid Profile:   HgA1c:   TSH:     TELEMETRY: 	    ECG:  	  RADIOLOGY:   DIAGNOSTIC TESTING:  [ ] Echocardiogram:  [ ]  Catheterization:  [ ] Stress Test:    OTHER: 	
Subjective: Patient seen and examined. No new events except as noted.     REVIEW OF SYSTEMS:    CONSTITUTIONAL: + weakness, fevers or chills  EYES/ENT: No visual changes;  No vertigo or throat pain   NECK: No pain or stiffness  RESPIRATORY: No cough, wheezing, hemoptysis; No shortness of breath  CARDIOVASCULAR: No chest pain or palpitations  GASTROINTESTINAL: No abdominal or epigastric pain. No nausea, vomiting, or hematemesis; No diarrhea or constipation. No melena or hematochezia.  GENITOURINARY: No dysuria, frequency or hematuria  NEUROLOGICAL: No numbness or weakness  SKIN: No itching, burning, rashes, or lesions   All other review of systems is negative unless indicated above.    MEDICATIONS:  MEDICATIONS  (STANDING):  acetaminophen     Tablet .. 975 milliGRAM(s) Oral every 6 hours  ascorbic acid 500 milliGRAM(s) Oral two times a day  aspirin enteric coated 81 milliGRAM(s) Oral daily  atorvastatin 20 milliGRAM(s) Oral at bedtime  baclofen 20 milliGRAM(s) Oral two times a day  ferrous    sulfate 325 milliGRAM(s) Oral daily  folic acid 1 milliGRAM(s) Oral daily  gemfibrozil 600 milliGRAM(s) Oral two times a day  lactated ringers. 1000 milliLiter(s) (75 mL/Hr) IV Continuous <Continuous>  metoprolol tartrate 25 milliGRAM(s) Oral two times a day  morphine ER Tablet 15 milliGRAM(s) Oral every 12 hours  multivitamin 1 Tablet(s) Oral daily  pantoprazole    Tablet 40 milliGRAM(s) Oral before breakfast  rivaroxaban 10 milliGRAM(s) Oral daily  senna 2 Tablet(s) Oral at bedtime    PHYSICAL EXAM:  T(C): 36.7 (04-02-22 @ 05:05), Max: 37 (04-01-22 @ 21:00)  HR: 80 (04-02-22 @ 05:05) (69 - 80)  BP: 166/82 (04-02-22 @ 05:05) (126/72 - 166/82)  RR: 18 (04-02-22 @ 05:05) (18 - 18)  SpO2: 100% (04-02-22 @ 05:05) (91% - 100%)  Wt(kg): --  I&O's Summary    31 Mar 2022 07:01  -  01 Apr 2022 07:00  --------------------------------------------------------  IN: 1317 mL / OUT: 150 mL / NET: 1167 mL    01 Apr 2022 07:01  -  02 Apr 2022 05:12  --------------------------------------------------------  IN: 1240 mL / OUT: 0 mL / NET: 1240 mL    Appearance: NAD	  HEENT: Normal oral mucosa, PERRL, EOMI	  Lymphatic: No lymphadenopathy , no edema  Cardiovascular: Normal S1 S2, No JVD, No murmurs , Peripheral pulses palpable 2+ bilaterally  Respiratory: Lungs clear to auscultation, normal effort 	  Gastrointestinal:  Soft, Non-tender, + BS	  Skin: No rashes, No ecchymoses, No cyanosis, warm to touch  Musculoskeletal: Normal range of motion, normal strength  Psychiatry:  Mood & affect appropriate  Ext: R leg ace wrapped    LABS:    CARDIAC MARKERS:                        11.0   12.39 )-----------( 254      ( 01 Apr 2022 11:29 )             33.4     04-01    142  |  108  |  22  ----------------------------<  125<H>  5.0   |  22  |  1.28    Ca    9.0      01 Apr 2022 11:29      proBNP:   Lipid Profile:   HgA1c:   TSH:     TELEMETRY: 	    ECG:  	  RADIOLOGY:   DIAGNOSTIC TESTING:  [ ] Echocardiogram:  [ ]  Catheterization:  [ ] Stress Test:    OTHER: 	
Subjective: Patient seen and examined. No new events except as noted.   OR today.    REVIEW OF SYSTEMS:    CONSTITUTIONAL: + weakness, fevers or chills  EYES/ENT: No visual changes;  No vertigo or throat pain   NECK: No pain or stiffness  RESPIRATORY: No cough, wheezing, hemoptysis; No shortness of breath  CARDIOVASCULAR: No chest pain or palpitations  GASTROINTESTINAL: No abdominal or epigastric pain. No nausea, vomiting, or hematemesis; No diarrhea or constipation. No melena or hematochezia.  GENITOURINARY: No dysuria, frequency or hematuria  NEUROLOGICAL: No numbness or weakness  SKIN: No itching, burning, rashes, or lesions   All other review of systems is negative unless indicated above.    MEDICATIONS:  MEDICATIONS  (STANDING):  acetaminophen     Tablet .. 975 milliGRAM(s) Oral every 6 hours  ascorbic acid 500 milliGRAM(s) Oral two times a day  aspirin enteric coated 81 milliGRAM(s) Oral daily  atorvastatin 20 milliGRAM(s) Oral at bedtime  ceFAZolin   IVPB 2000 milliGRAM(s) IV Intermittent every 8 hours  ferrous    sulfate 325 milliGRAM(s) Oral daily  folic acid 1 milliGRAM(s) Oral daily  gemfibrozil 600 milliGRAM(s) Oral two times a day  lactated ringers. 1000 milliLiter(s) (75 mL/Hr) IV Continuous <Continuous>  metoprolol tartrate 25 milliGRAM(s) Oral two times a day  multivitamin 1 Tablet(s) Oral daily  pantoprazole    Tablet 40 milliGRAM(s) Oral before breakfast  senna 2 Tablet(s) Oral at bedtime    PHYSICAL EXAM:  T(C): 36.6 (03-30-22 @ 13:42), Max: 36.7 (03-29-22 @ 17:36)  HR: 69 (03-30-22 @ 13:42) (59 - 74)  BP: 110/71 (03-30-22 @ 13:42) (103/58 - 165/85)  RR: 17 (03-30-22 @ 13:42) (14 - 18)  SpO2: 96% (03-30-22 @ 13:42) (94% - 97%)  Wt(kg): --  I&O's Summary    29 Mar 2022 07:01  -  30 Mar 2022 07:00  --------------------------------------------------------  IN: 720 mL / OUT: 750 mL / NET: -30 mL    Height (cm): 172.7 (03-30 @ 07:13)  Weight (kg): 95.3 (03-30 @ 07:13)  BMI (kg/m2): 32 (03-30 @ 07:13)  BSA (m2): 2.09 (03-30 @ 07:13)    Appearance: NAD	  HEENT: Normal oral mucosa, PERRL, EOMI	  Lymphatic: No lymphadenopathy  Cardiovascular: Normal S1 S2, No JVD, No murmurs, No edema  Respiratory: Decreased BS  Psychiatry: A & O x 3, Mood & affect appropriate  Gastrointestinal:  Soft, Non-tender, + BS	  Skin: No rashes, No ecchymoses, No cyanosis	  Neurologic: Non-focal  RLE: Skin intact, no ecchymosis,   SILT DP/SP/ Dawood/Saph/Post Tib  +EHL/FHL/TA/Gastroc,   Knee/ankle painless ROM,   hip ROM limited 2/2 pain,  DP+, soft compartments, no calf ttp,   Vascular: Peripheral pulses palpable 2+ bilaterally    LABS:    CARDIAC MARKERS:  CARDIAC MARKERS ( 28 Mar 2022 16:21 )  x     / x     / 125 U/L / x     / x                            13.0   14.75 )-----------( 202      ( 30 Mar 2022 11:36 )             39.9     03-30    139  |  106  |  19  ----------------------------<  130<H>  4.6   |  21<L>  |  1.22    Ca    8.1<L>      30 Mar 2022 11:36    TPro  7.0  /  Alb  4.3  /  TBili  0.6  /  DBili  x   /  AST  15  /  ALT  19  /  AlkPhos  127<H>  03-28    proBNP:   Lipid Profile:   HgA1c:   TSH:     TELEMETRY: 	    ECG:  	  RADIOLOGY:   DIAGNOSTIC TESTING:  [ ] Echocardiogram:  [ ]  Catheterization:  [ ] Stress Test:    OTHER: 	
Subjective: Patient seen and examined. No new events except as noted.   Feels good, awaiting discharge to [facility].    REVIEW OF SYSTEMS:    CONSTITUTIONAL: + weakness, fevers or chills  EYES/ENT: No visual changes;  No vertigo or throat pain   NECK: No pain or stiffness  RESPIRATORY: No cough, wheezing, hemoptysis; No shortness of breath  CARDIOVASCULAR: No chest pain or palpitations  GASTROINTESTINAL: No abdominal or epigastric pain. No nausea, vomiting, or hematemesis; No diarrhea or constipation. No melena or hematochezia.  GENITOURINARY: No dysuria, frequency or hematuria  NEUROLOGICAL: No numbness or weakness  SKIN: No itching, burning, rashes, or lesions   All other review of systems is negative unless indicated above.    MEDICATIONS:  MEDICATIONS  (STANDING):  acetaminophen     Tablet .. 975 milliGRAM(s) Oral every 6 hours  ascorbic acid 500 milliGRAM(s) Oral two times a day  aspirin enteric coated 81 milliGRAM(s) Oral daily  atorvastatin 20 milliGRAM(s) Oral at bedtime  baclofen 20 milliGRAM(s) Oral two times a day  ferrous    sulfate 325 milliGRAM(s) Oral daily  folic acid 1 milliGRAM(s) Oral daily  gemfibrozil 600 milliGRAM(s) Oral two times a day  lactated ringers. 1000 milliLiter(s) (75 mL/Hr) IV Continuous <Continuous>  lisinopril 5 milliGRAM(s) Oral daily  metoprolol tartrate 25 milliGRAM(s) Oral two times a day  morphine ER Tablet 15 milliGRAM(s) Oral every 12 hours  multivitamin 1 Tablet(s) Oral daily  pantoprazole    Tablet 40 milliGRAM(s) Oral before breakfast  rivaroxaban 10 milliGRAM(s) Oral daily  senna 2 Tablet(s) Oral at bedtime    PHYSICAL EXAM:  T(C): 36.6 (04-04-22 @ 08:30), Max: 37.1 (04-03-22 @ 13:34)  HR: 69 (04-04-22 @ 09:09) (64 - 82)  BP: 102/64 (04-04-22 @ 09:09) (102/64 - 164/94)  RR: 18 (04-04-22 @ 08:30) (18 - 18)  SpO2: 95% (04-04-22 @ 08:30) (95% - 99%)  Wt(kg): --  I&O's Summary    03 Apr 2022 07:01  -  04 Apr 2022 07:00  --------------------------------------------------------  IN: 640 mL / OUT: 0 mL / NET: 640 mL    04 Apr 2022 07:01  -  04 Apr 2022 09:39  --------------------------------------------------------  IN: 360 mL / OUT: 0 mL / NET: 360 mL    Appearance: NAD	  HEENT: Normal oral mucosa, PERRL, EOMI	  Lymphatic: No lymphadenopathy , no edema  Cardiovascular: Normal S1 S2, No JVD, No murmurs , Peripheral pulses palpable 2+ bilaterally  Respiratory: Lungs clear to auscultation, normal effort 	  Gastrointestinal:  Soft, Non-tender, + BS	  Skin: No rashes, No ecchymoses, No cyanosis, warm to touch  Musculoskeletal: Normal range of motion, normal strength  Psychiatry:  Mood & affect appropriate  Ext: R leg ace wrapped    LABS:    CARDIAC MARKERS:                        10.5   11.97 )-----------( 289      ( 02 Apr 2022 12:24 )             32.8     04-02    141  |  106  |  21  ----------------------------<  112<H>  4.5   |  21<L>  |  1.20    Ca    8.9      02 Apr 2022 12:25    proBNP:   Lipid Profile:   HgA1c:   TSH:     TELEMETRY: 	    ECG:  	  RADIOLOGY:   DIAGNOSTIC TESTING:  [ ] Echocardiogram:  [ ]  Catheterization:  [ ] Stress Test:    OTHER:

## 2022-04-04 NOTE — DISCHARGE NOTE NURSING/CASE MANAGEMENT/SOCIAL WORK - NSDCPEFALRISK_GEN_ALL_CORE
For information on Fall & Injury Prevention, visit: [email or web address] OR  [email or web address] OR  https://www.cdc.gov/steadi/patient.html

## 2022-04-04 NOTE — PROGRESS NOTE ADULT - PROBLEM SELECTOR PLAN 2
s/p remote RCA PCI in 2005   remains asymptomatic   ASA
s/p remote RCA PCI in 2005   remains asymptomatic   ASA.
s/p remote RCA PCI in 2005   remains asymptomatic   ASA.
s/p remote RCA PCI in 2005   remains asymptomatic   ASA

## 2022-04-04 NOTE — DISCHARGE NOTE NURSING/CASE MANAGEMENT/SOCIAL WORK - NSDCVIVACCINE_GEN_ALL_CORE_FT
influenza, injectable, quadrivalent, preservative free; 12-Dec-2017 13:19; Rachel Marti (RN); [employer]; [id number]; IntraMuscular; Deltoid Left.; 0.5 milliLiter(s); VIS (VIS Published: 07-Aug-2015, VIS Presented: 12-Dec-2017);

## 2022-04-04 NOTE — DISCHARGE NOTE PROVIDER - NSDCFUADDINST_GEN_ALL_CORE_FT
Dressing will be changed during office visit. Out of bed, ambulate, non-weight bearing right lower extremity- Physical therapy to assist with exercise and help increase endurance. Please contact Doctors office regarding arrangements for out patient Physical therapy.

## 2022-04-04 NOTE — PROGRESS NOTE ADULT - PROBLEM SELECTOR PLAN 1
3/30 s/p ORIF right femur distal fx  pain control   PT/OOB  DVT ppx - Xarelto  ortho following
3/30 s/p ORIF right femur distal fx  pain control   PT/OOB  DVT ppx - Xarelto  ortho following
PT/OT-NWB RLE   IS  DVT PPx  Pain Control  Continue Current Tx.  Discharge planning MÓNICA Gibbons PA-C  Team Pager: [phone]
3/30 s/p ORIF right femur distal fx  pain control   PT/OOB  DVT ppx - Xarelto  ortho following
Plan for OR today  METS > 4 and no anginal symptoms.  Acceptable cardiac risk to proceed
3/30 s/p ORIF right femur distal fx  pain control   PT/OOB  DVT ppx - Xarelto  ortho following
Plan for OR today  METS > 4 and no anginal symptoms.  Acceptable cardiac risk to proceed
3/30 s/p ORIF right femur distal fx  pain control   PT/OOB  DVT ppx - Xarelto  ortho following

## 2022-04-04 NOTE — DISCHARGE NOTE NURSING/CASE MANAGEMENT/SOCIAL WORK - PATIENT PORTAL LINK FT
You can access the FollowMyHealth Patient Portal offered by [facility] by registering at the following website: [email or web address]. By joining [facility]’s FollowMyHealth portal, you will also be able to view your health information using other applications (apps) compatible with our system.

## 2022-04-17 NOTE — CHART NOTE - NSCHARTNOTESELECT_GEN_ALL_CORE
Pain Management/Event Note
As per pt, lives with family in a private house, stairs with 6 steps & bilateral HR, reports to be previously independent in all mobility with occasional assistance of his straight cane, denies any recent fall.

## 2022-04-22 ENCOUNTER — INPATIENT (INPATIENT)
Facility: HOSPITAL | Age: 67
LOS: 6 days | Discharge: NURSING, REHAB OR LONG-TERM CARE | DRG: 56 | End: 2022-04-29
Attending: PSYCHIATRY & NEUROLOGY | Admitting: PSYCHIATRY & NEUROLOGY
Payer: MEDICARE

## 2022-04-22 VITALS
HEIGHT: 68 IN | SYSTOLIC BLOOD PRESSURE: 141 MMHG | TEMPERATURE: 97 F | WEIGHT: 229.94 LBS | HEART RATE: 69 BPM | OXYGEN SATURATION: 96 % | RESPIRATION RATE: 18 BRPM | DIASTOLIC BLOOD PRESSURE: 83 MMHG

## 2022-04-22 DIAGNOSIS — Z96.649 PRESENCE OF UNSPECIFIED ARTIFICIAL HIP JOINT: Chronic | ICD-10-CM

## 2022-04-22 DIAGNOSIS — Z98.89 OTHER SPECIFIED POSTPROCEDURAL STATES: Chronic | ICD-10-CM

## 2022-04-22 DIAGNOSIS — M43.28 FUSION OF SPINE, SACRAL AND SACROCOCCYGEAL REGION: Chronic | ICD-10-CM

## 2022-04-22 DIAGNOSIS — R56.9 UNSPECIFIED CONVULSIONS: ICD-10-CM

## 2022-04-22 LAB
ALBUMIN SERPL ELPH-MCNC: 3.2 G/DL — LOW (ref 3.3–5)
ALP SERPL-CCNC: 187 U/L — HIGH (ref 40–120)
ALT FLD-CCNC: 18 U/L — SIGNIFICANT CHANGE UP (ref 10–45)
AMPHET UR-MCNC: NEGATIVE — SIGNIFICANT CHANGE UP
ANION GAP SERPL CALC-SCNC: 14 MMOL/L — SIGNIFICANT CHANGE UP (ref 5–17)
APPEARANCE UR: CLEAR — SIGNIFICANT CHANGE UP
APTT BLD: 36.2 SEC — HIGH (ref 27.5–35.5)
AST SERPL-CCNC: 30 U/L — SIGNIFICANT CHANGE UP (ref 10–40)
BARBITURATES UR SCN-MCNC: NEGATIVE — SIGNIFICANT CHANGE UP
BASOPHILS # BLD AUTO: 0.05 K/UL — SIGNIFICANT CHANGE UP (ref 0–0.2)
BASOPHILS NFR BLD AUTO: 0.5 % — SIGNIFICANT CHANGE UP (ref 0–2)
BENZODIAZ UR-MCNC: NEGATIVE — SIGNIFICANT CHANGE UP
BILIRUB SERPL-MCNC: 0.6 MG/DL — SIGNIFICANT CHANGE UP (ref 0.2–1.2)
BILIRUB UR-MCNC: NEGATIVE — SIGNIFICANT CHANGE UP
BUN SERPL-MCNC: 11 MG/DL — SIGNIFICANT CHANGE UP (ref 7–23)
CALCIUM SERPL-MCNC: 9.6 MG/DL — SIGNIFICANT CHANGE UP (ref 8.4–10.5)
CHLORIDE SERPL-SCNC: 108 MMOL/L — SIGNIFICANT CHANGE UP (ref 96–108)
CO2 SERPL-SCNC: 21 MMOL/L — LOW (ref 22–31)
COCAINE METAB.OTHER UR-MCNC: NEGATIVE — SIGNIFICANT CHANGE UP
COLOR SPEC: SIGNIFICANT CHANGE UP
CREAT SERPL-MCNC: 0.81 MG/DL — SIGNIFICANT CHANGE UP (ref 0.5–1.3)
DIFF PNL FLD: NEGATIVE — SIGNIFICANT CHANGE UP
EGFR: 97 ML/MIN/1.73M2 — SIGNIFICANT CHANGE UP
EOSINOPHIL # BLD AUTO: 0 K/UL — SIGNIFICANT CHANGE UP (ref 0–0.5)
EOSINOPHIL NFR BLD AUTO: 0 % — SIGNIFICANT CHANGE UP (ref 0–6)
GLUCOSE SERPL-MCNC: 124 MG/DL — HIGH (ref 70–99)
GLUCOSE UR QL: NEGATIVE — SIGNIFICANT CHANGE UP
HCT VFR BLD CALC: 33.6 % — LOW (ref 39–50)
HGB BLD-MCNC: 10.6 G/DL — LOW (ref 13–17)
IMM GRANULOCYTES NFR BLD AUTO: 0.5 % — SIGNIFICANT CHANGE UP (ref 0–1.5)
INR BLD: 1.48 RATIO — HIGH (ref 0.88–1.16)
KETONES UR-MCNC: NEGATIVE — SIGNIFICANT CHANGE UP
LEUKOCYTE ESTERASE UR-ACNC: NEGATIVE — SIGNIFICANT CHANGE UP
LYMPHOCYTES # BLD AUTO: 1.72 K/UL — SIGNIFICANT CHANGE UP (ref 1–3.3)
LYMPHOCYTES # BLD AUTO: 15.5 % — SIGNIFICANT CHANGE UP (ref 13–44)
MCHC RBC-ENTMCNC: 30 PG — SIGNIFICANT CHANGE UP (ref 27–34)
MCHC RBC-ENTMCNC: 31.5 GM/DL — LOW (ref 32–36)
MCV RBC AUTO: 95.2 FL — SIGNIFICANT CHANGE UP (ref 80–100)
METHADONE UR-MCNC: NEGATIVE — SIGNIFICANT CHANGE UP
MONOCYTES # BLD AUTO: 0.66 K/UL — SIGNIFICANT CHANGE UP (ref 0–0.9)
MONOCYTES NFR BLD AUTO: 5.9 % — SIGNIFICANT CHANGE UP (ref 2–14)
NEUTROPHILS # BLD AUTO: 8.62 K/UL — HIGH (ref 1.8–7.4)
NEUTROPHILS NFR BLD AUTO: 77.6 % — HIGH (ref 43–77)
NITRITE UR-MCNC: NEGATIVE — SIGNIFICANT CHANGE UP
NRBC # BLD: 0 /100 WBCS — SIGNIFICANT CHANGE UP (ref 0–0)
OPIATES UR-MCNC: POSITIVE
OXYCODONE UR-MCNC: NEGATIVE — SIGNIFICANT CHANGE UP
PCP SPEC-MCNC: SIGNIFICANT CHANGE UP
PCP UR-MCNC: NEGATIVE — SIGNIFICANT CHANGE UP
PH UR: 7 — SIGNIFICANT CHANGE UP (ref 5–8)
PLATELET # BLD AUTO: 622 K/UL — HIGH (ref 150–400)
POTASSIUM SERPL-MCNC: 5 MMOL/L — SIGNIFICANT CHANGE UP (ref 3.5–5.3)
POTASSIUM SERPL-SCNC: 5 MMOL/L — SIGNIFICANT CHANGE UP (ref 3.5–5.3)
PROLACTIN SERPL-MCNC: 11.5 NG/ML — SIGNIFICANT CHANGE UP (ref 4.1–18.4)
PROT SERPL-MCNC: 7.5 G/DL — SIGNIFICANT CHANGE UP (ref 6–8.3)
PROT UR-MCNC: NEGATIVE — SIGNIFICANT CHANGE UP
PROTHROM AB SERPL-ACNC: 17.1 SEC — HIGH (ref 10.5–13.4)
RBC # BLD: 3.53 M/UL — LOW (ref 4.2–5.8)
RBC # FLD: 14.6 % — HIGH (ref 10.3–14.5)
SARS-COV-2 RNA SPEC QL NAA+PROBE: SIGNIFICANT CHANGE UP
SODIUM SERPL-SCNC: 143 MMOL/L — SIGNIFICANT CHANGE UP (ref 135–145)
SP GR SPEC: 1.01 — SIGNIFICANT CHANGE UP (ref 1.01–1.02)
THC UR QL: NEGATIVE — SIGNIFICANT CHANGE UP
TROPONIN T, HIGH SENSITIVITY RESULT: 39 NG/L — SIGNIFICANT CHANGE UP (ref 0–51)
UROBILINOGEN FLD QL: NEGATIVE — SIGNIFICANT CHANGE UP
WBC # BLD: 11.11 K/UL — HIGH (ref 3.8–10.5)
WBC # FLD AUTO: 11.11 K/UL — HIGH (ref 3.8–10.5)

## 2022-04-22 PROCEDURE — 93010 ELECTROCARDIOGRAM REPORT: CPT

## 2022-04-22 PROCEDURE — 70496 CT ANGIOGRAPHY HEAD: CPT | Mod: 26,MA

## 2022-04-22 PROCEDURE — 70498 CT ANGIOGRAPHY NECK: CPT | Mod: 26,MA

## 2022-04-22 PROCEDURE — 99291 CRITICAL CARE FIRST HOUR: CPT | Mod: 25

## 2022-04-22 PROCEDURE — 0042T: CPT

## 2022-04-22 RX ORDER — ACETAMINOPHEN 500 MG
975 TABLET ORAL EVERY 8 HOURS
Refills: 0 | Status: DISCONTINUED | OUTPATIENT
Start: 2022-04-22 | End: 2022-04-29

## 2022-04-22 RX ORDER — ATORVASTATIN CALCIUM 80 MG/1
20 TABLET, FILM COATED ORAL AT BEDTIME
Refills: 0 | Status: DISCONTINUED | OUTPATIENT
Start: 2022-04-22 | End: 2022-04-29

## 2022-04-22 RX ORDER — FERROUS SULFATE 325(65) MG
1 TABLET ORAL
Qty: 0 | Refills: 0 | DISCHARGE

## 2022-04-22 RX ORDER — FERROUS SULFATE 325(65) MG
325 TABLET ORAL DAILY
Refills: 0 | Status: DISCONTINUED | OUTPATIENT
Start: 2022-04-22 | End: 2022-04-29

## 2022-04-22 RX ORDER — GABAPENTIN 400 MG/1
600 CAPSULE ORAL THREE TIMES A DAY
Refills: 0 | Status: DISCONTINUED | OUTPATIENT
Start: 2022-04-22 | End: 2022-04-23

## 2022-04-22 RX ORDER — RIVAROXABAN 15 MG-20MG
10 KIT ORAL DAILY
Refills: 0 | Status: DISCONTINUED | OUTPATIENT
Start: 2022-04-22 | End: 2022-04-29

## 2022-04-22 RX ORDER — GEMFIBROZIL 600 MG
600 TABLET ORAL
Refills: 0 | Status: DISCONTINUED | OUTPATIENT
Start: 2022-04-22 | End: 2022-04-29

## 2022-04-22 RX ORDER — NYSTATIN CREAM 100000 [USP'U]/G
1 CREAM TOPICAL
Refills: 0 | Status: DISCONTINUED | OUTPATIENT
Start: 2022-04-22 | End: 2022-04-29

## 2022-04-22 RX ORDER — BACLOFEN 100 %
20 POWDER (GRAM) MISCELLANEOUS
Refills: 0 | Status: DISCONTINUED | OUTPATIENT
Start: 2022-04-22 | End: 2022-04-29

## 2022-04-22 RX ORDER — SENNA PLUS 8.6 MG/1
2 TABLET ORAL AT BEDTIME
Refills: 0 | Status: DISCONTINUED | OUTPATIENT
Start: 2022-04-22 | End: 2022-04-29

## 2022-04-22 RX ORDER — ASPIRIN/CALCIUM CARB/MAGNESIUM 324 MG
81 TABLET ORAL DAILY
Refills: 0 | Status: DISCONTINUED | OUTPATIENT
Start: 2022-04-22 | End: 2022-04-29

## 2022-04-22 RX ORDER — MORPHINE SULFATE 50 MG/1
15 CAPSULE, EXTENDED RELEASE ORAL EVERY 8 HOURS
Refills: 0 | Status: DISCONTINUED | OUTPATIENT
Start: 2022-04-22 | End: 2022-04-22

## 2022-04-22 RX ORDER — TAMSULOSIN HYDROCHLORIDE 0.4 MG/1
0.8 CAPSULE ORAL AT BEDTIME
Refills: 0 | Status: DISCONTINUED | OUTPATIENT
Start: 2022-04-22 | End: 2022-04-29

## 2022-04-22 RX ORDER — MORPHINE SULFATE 50 MG/1
15 CAPSULE, EXTENDED RELEASE ORAL EVERY 8 HOURS
Refills: 0 | Status: COMPLETED | OUTPATIENT
Start: 2022-04-22 | End: 2022-04-29

## 2022-04-22 RX ORDER — METOPROLOL TARTRATE 50 MG
25 TABLET ORAL
Refills: 0 | Status: DISCONTINUED | OUTPATIENT
Start: 2022-04-22 | End: 2022-04-27

## 2022-04-22 RX ORDER — LACOSAMIDE 50 MG/1
100 TABLET ORAL
Refills: 0 | Status: DISCONTINUED | OUTPATIENT
Start: 2022-04-22 | End: 2022-04-24

## 2022-04-22 RX ORDER — LACOSAMIDE 50 MG/1
200 TABLET ORAL ONCE
Refills: 0 | Status: DISCONTINUED | OUTPATIENT
Start: 2022-04-22 | End: 2022-04-22

## 2022-04-22 RX ORDER — RIVAROXABAN 15 MG-20MG
1 KIT ORAL
Qty: 0 | Refills: 0 | DISCHARGE

## 2022-04-22 RX ADMIN — LACOSAMIDE 200 MILLIGRAM(S): 50 TABLET ORAL at 12:59

## 2022-04-22 RX ADMIN — Medication 25 MILLIGRAM(S): at 17:29

## 2022-04-22 RX ADMIN — Medication 20 MILLIGRAM(S): at 17:30

## 2022-04-22 RX ADMIN — MORPHINE SULFATE 15 MILLIGRAM(S): 50 CAPSULE, EXTENDED RELEASE ORAL at 15:15

## 2022-04-22 RX ADMIN — MORPHINE SULFATE 15 MILLIGRAM(S): 50 CAPSULE, EXTENDED RELEASE ORAL at 14:45

## 2022-04-22 RX ADMIN — LACOSAMIDE 100 MILLIGRAM(S): 50 TABLET ORAL at 17:30

## 2022-04-22 RX ADMIN — Medication 600 MILLIGRAM(S): at 17:30

## 2022-04-22 RX ADMIN — GABAPENTIN 600 MILLIGRAM(S): 400 CAPSULE ORAL at 14:45

## 2022-04-22 NOTE — ED PROVIDER NOTE - CLINICAL SUMMARY MEDICAL DECISION MAKING FREE TEXT BOX
66M BIBEMS for right sided weakness and dysarthria at 8:30AM.  No FNDs here.  Code stroke called in triage.  Unknown LKN.  Will eval for acute intracranial process vs infection vs metabolic derangements.  Will obtain labs, CT, UA, EKG, reassess, and dispo pending results.  Neuro at bedside. 66M BIBEMS for right sided weakness and dysarthria at 8:30AM.  No FNDs here.  Code stroke called in triage.  Unknown LKN.  Will eval for acute intracranial process vs infection vs metabolic derangements.  Will obtain labs, CT, UA, EKG, reassess, and dispo pending results.  Neuro at bedside.    12:00 Patient to be admitted to EMU for 24 hr eeg as per Neuro suggestion.  Patient stable and WBC is stable compared to prior, u/a wnl, unlikely infectious etiology.  Patient stable in ED and feels improved.

## 2022-04-22 NOTE — H&P ADULT - ATTENDING COMMENTS
Patient admitted for L HP and AMS - stroke ruled out, now thought to have had seizure, possibly related to remote h/o Acomm aneurysm bleed and repair with resulting encephalomalacia in R Fr region. On exam today, oriented to year and month, but markedly perseverative and appeared to have negative myoclonus bilaterally. Started on lacosamide 100 q12 for presumptive seizure etiology.   Plan - monitor for return to baseline MS and resolution of negative myoclonus and perseveration.

## 2022-04-22 NOTE — ED PROVIDER NOTE - ATTENDING CONTRIBUTION TO CARE
I, Geovanny Martinez, performed a history and physical exam of the patient and discussed their management with the resident and /or advanced care provider. I reviewed the resident and /or ACP's note and agree with the documented findings and plan of care. I was present and available for all procedures.  Patient as stroke code upon arrival due to EMS history. Patient without focal deficit upon arrival but with general weakness. Neuro team evaluated promptly and will f/u. CT without hemorrhage or signs suggestive of acute CVA. Will evaluate labs, u/a, and reassess. Patient stable in ED.

## 2022-04-22 NOTE — H&P ADULT - ASSESSMENT
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  Pt was seen at 8am by RN to be moving all extremities equally, alert, no slurred speech when she was cleaning his RLE surgical site cleaning. At 8:30am, pt was noted to have slurred speech, lethargic and couldn't LUE at all. No shaking movements noted. Pt reports that he doesn't remember exactly what happened this morning, but does remember people calling his name. He reports a spontaneous head bleed in 1997 and with subsequent clipping. NIHSS: 7, preMRS: 3. No tpa due to no rapidly improving symptoms, hx of spontaneous ICH; no MT. CTH w/ R frontal encephalomalacia w/ prior R frontal temproal craniotomy and Acomm aneurysm clip. CTA neg, no VST. CTP w/ findings correlating to R frontal encephalomalacia. Neuro exam w/ equal strength in b/l UE, occasional b/l UE neg myoclonus.    Impression: L hemiplegia with lethargy and confusion, rapidly improving over 1-2 hrs, concerning for first-time seizure w/ post ictal Keny's paralysis (given prior R frontal encephalomalacia) vs. TIA/minor stroke    Plan:  [] NJ interval 178 (wnl), give vimpat 200IV STAT now, maintenance vimpat 100mg BID  [] Pt unable to get MRI due to aneurysm clip  [] c/w home xarelto 10mg daily and aspirin 81mg daily  [] c/w home atorvastatin 20mg PO daily   [] stroke risk factor modification and counseling   [] check HA1c, lipid panel, Utox  [] NPO until bedside dysphagia screen  [] 24 hr vEEG  [] Seizure, fall and aspiration precautions.  Avoid sleep deprivation.  [] If any seizure activity noted, please note: time, if upper/lower or focal onset symptoms (e.g. head turn, eye deviation, upper/lower tonic/clonic arm initially), vital sign derangements, airway protection, urinary or bowel incontinence, duration of seizure, tongue bite, and/or post-ictal time to return of baseline.    [] Evaluate for provoking factors including UA neg, Blood Cx x2, CMP, Mg, Utox, CBC w/ diff, EtOH level, CXR, COVID-19 test  [x] Check , lactate 1.5  [] management of abdominal pain per primary team, abdominal xray 4/21/22 w/ evidence of diffuse ileus  [] Patient can follow up with outpatient neurology at [address] [place] [address] 1-2 weeks after discharge. Please instruct the patient to call [phone] to schedule this appointment.     Other:  [] c/w home pain medications PRN and constipation medications  [] DVT ppx: on home xarelto  [] Diet: DASH    Case discussed with stroke fellow Dr. Rita Harris, under supervision of attending Dr. Richard Libman  Case discussed with epilepsy fellow Dr. Dario Flores  Case to be seen and discussed with Dr. Quinn

## 2022-04-22 NOTE — H&P ADULT - NSHPLABSRESULTS_GEN_ALL_CORE
LABS:                           10.6   11.11 )-----------( 622      ( 2022 10:07 )             33.6           143  |  108  |  11  ----------------------------<  124<H>  5.0   |  21<L>  |  0.81    Ca    9.6      2022 10:07    TPro  7.5  /  Alb  3.2<L>  /  TBili  0.6  /  DBili  x   /  AST  30  /  ALT  18  /  AlkPhos  187<H>         LIVER FUNCTIONS - ( 2022 10:07 )  Alb: 3.2 g/dL / Pro: 7.5 g/dL / ALK PHOS: 187 U/L / ALT: 18 U/L / AST: 30 U/L / GGT: x                Urinalysis Basic - ( 2022 10:54 )    Color: Light Yellow / Appearance: Clear / S.014 / pH: x  Gluc: x / Ketone: Negative  / Bili: Negative / Urobili: Negative   Blood: x / Protein: Negative / Nitrite: Negative   Leuk Esterase: Negative / RBC: x / WBC x   Sq Epi: x / Non Sq Epi: x / Bacteria: x        PT/INR - ( 2022 10:07 )   PT: 17.1 sec;   INR: 1.48 ratio         PTT - ( 2022 10:07 )  PTT:36.2 sec    Lactate Trend      CARDIAC MARKERS ( 2022 10:07 )  x     / x     / 106 U/L / x     / x          CAPILLARY BLOOD GLUCOSE  128 (2022 10:44)      POCT Blood Glucose.: 128 mg/dL (2022 09:57)      RADIOLOGY & ADDITIONAL TESTS:  < from: CT Brain Stroke Protocol (22 @ 10:19) >    IMPRESSION: Remote right frontal temporal craniotomy and anterior   communicatingartery aneurysm clipping. Right frontal encephalomalacia   and gliosis likely related to prior aneurysm rupture or postoperative   changes.    Thin demonstrates delayed mean transit time and core infarct in the   region of the right frontal encephalomalacia.    CTA of the head and neck demonstrates no significant stenosis or   occlusion. Anterior communicating artery aneurysm clipping.    < end of copied text >

## 2022-04-22 NOTE — CONSULT NOTE ADULT - SUBJECTIVE AND OBJECTIVE BOX
HPI:  Hx obtained from Pt was seen at 8am, TREE Woods to be moving all extremities equally when surgical site cleaning, alert, no slurred. Per TREE Wolf, at 8:30am, slurred speech, couldn't LUE at all, lethargic. Pt had R prosthetic femor ORIF 3/30/21. Last dose of xarelto at 5pm. On xaretlo for DVT. At baseline, pt AAOx3,     (Stroke only)  LKN:  NIHSS:   preMRS:   Pt is not a candidate for tpa due to [outside tpa window / mild, non-disabling deficit]  Pt is not a candidate for mechanical thrombectomy due to no large vessel occlusion on CTA    REVIEW OF SYSTEMS  General:	  Skin/Breast:	  Ophthalmologic:  ENMT:	  Respiratory and Thorax:	  Cardiovascular:	  Gastrointestinal:	  Genitourinary:	  Musculoskeletal:	  Neurological:	  Psychiatric:	  Hematology/Lymphatics:	  Endocrine:	  Allergic/Immunologic:	    A 10-system ROS was performed and is negative except for those items noted above and/or in the HPI.    PAST MEDICAL & SURGICAL HISTORY:  Myocardial infarction  KAEL x1 MI 2005, stent RCA    Back pain  Chronic back pain    Spinal stenosis    Lumbar herniated disc    Hypertension    Narcotic dependence  5/28/16 for withdrawal ,pt currently on Dialudid 8 mg every 4-6 hours /day    Osteoarthritis    GERD (gastroesophageal reflux disease)    Cerebral aneurysm rupture  1997 clipped, no residual    Femur fracture, right  2/16, surgical revision of right hip    Sacroiliitis, not elsewhere classified  Unspecified Inflammatory spondylopathy,Sacral and sacrococcygeal region    Chronic pain  Patient has an Intrathecal pump s/p removal in 2016    Cerebral aneurysm  I997 with clips    S/P lumbar fusion  L1-S1:2002    History of right inguinal hernia repair  x2    Hx of appendectomy  6/1969    Hx of laminectomy  lumbar-2002    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    Stented coronary artery  KAEL x 1 to RCA    History of hip replacement  8/15, revision 2/16 after falling and fracturing right femur    S/P left knee arthroscopy    H/O arthroscopy of shoulder  right X 2, left X 1    History of throat surgery  surgical exicision cyst 1986    Fusion of sacral region of spine  5/2017      FAMILY HISTORY:    SOCIAL HISTORY:   T/E/D:   Occupation:   Lives with:     MEDICATIONS (HOME):  Home Medications:  acetaminophen 325 mg oral tablet: 3 tab(s) orally every 6 hours (04 Apr 2022 07:06)  aspirin 81 mg oral delayed release tablet: 1 tab(s) orally once a day (04 Apr 2022 07:06)  atorvastatin 20 mg oral tablet: 1 tab(s) orally once a day (at bedtime) (16 Jan 2018 15:12)  baclofen 20 mg oral tablet: 1 tab(s) orally 2 times a day (28 Mar 2022 18:15)  cyclobenzaprine 10 mg oral tablet: 1 tab(s) orally 3 times a day, As needed, Muscle Spasm (16 Jan 2018 15:12)  docusate sodium 100 mg oral capsule: 1 cap(s) orally 3 times a day (16 Jan 2018 15:12)  gabapentin 300 mg oral capsule: 2 cap(s) orally 3 times a day (16 Jan 2018 15:12)  gemfibrozil 600 mg oral tablet: 1 tab(s) orally 2 times a day (before meals) (16 Jan 2018 15:12)  Lyrica 150 mg oral capsule: 1 cap(s) orally once a day (28 Mar 2022 18:15)  metoprolol tartrate 25 mg oral tablet: 1 tab(s) orally 2 times a day (16 Jan 2018 15:12)  morphine 15 mg/8 to 12 hr oral tablet, extended release: 1 tab(s) orally every 12 hours (04 Apr 2022 07:06)  Multiple Vitamins oral tablet: 1 tab(s) orally once a day (16 Jan 2018 15:12)  oxyCODONE 10 mg oral tablet: 1 tab(s) orally every 3 hours, As needed, Severe Pain (7 - 10) (04 Apr 2022 07:06)  oxyCODONE 5 mg oral tablet: 1 tab(s) orally every 3 hours, As needed, Moderate Pain (4 - 6) (04 Apr 2022 07:06)  pantoprazole 40 mg oral delayed release tablet: 1 tab(s) orally once a day (before a meal) (16 Jan 2018 15:12)  rivaroxaban 10 mg oral tablet: 1 tab(s) orally once a day (04 Apr 2022 07:06)  senna oral tablet: 2 tab(s) orally once a day (at bedtime) (16 Jan 2018 15:12)  Slow Fe (as elemental iron) 45 mg oral tablet, extended release: 1 tab(s) orally once a day (04 Apr 2022 07:06)    MEDICATIONS  (STANDING):    MEDICATIONS  (PRN):    ALLERGIES/INTOLERANCES:  Allergies  No Known Allergies    Intolerances    VITALS & EXAMINATION:  Vital Signs Last 24 Hrs  T(C): 36.1 (22 Apr 2022 09:51), Max: 36.1 (22 Apr 2022 09:51)  T(F): 97 (22 Apr 2022 09:51), Max: 97 (22 Apr 2022 09:51)  HR: 69 (22 Apr 2022 09:51) (69 - 69)  BP: 141/83 (22 Apr 2022 09:51) (141/83 - 141/83)  BP(mean): --  RR: 18 (22 Apr 2022 09:51) (18 - 18)  SpO2: 96% (22 Apr 2022 09:51) (96% - 96%)  General:  Constitutional: Obese Male, appears stated age, in no apparent distress including pain  Head: Normocephalic & atraumatic.  ENT: Patent ear canals, intact TM, mucus membranes moist & pink, neck supple, no lymphadenopathy.   Respiratory: Patent airway. All lung fields are clear to auscultation bilaterally.  Extremities: No cyanosis, clubbing, or edema.  Skin: No rashes, bruising, or discoloration.    Cardiovascular (>2): RRR no murmurs. Carotid pulsations symmetric, no bruits. Normal capillary beds refill, 1-2 seconds or less.     Neurological (>12):  MS: Awake, alert, oriented to person, place, situation, time. Normal affect. Follows all commands.    Language: Speech is clear, fluent with good repetition & comprehension (able to name objects___)    CNs: PERRLA (R = 3mm, L = 3mm). VFF. EOMI no nystagmus, no diplopia. V1-3 intact to LT/pinprick, well developed masseter muscles b/l. No facial asymmetry b/l, full eye closure strength b/l. Hearing grossly normal (rubbing fingers) b/l. Symmetric palate elevation in midline. Gag reflex deferred. Head turning & shoulder shrug intact b/l. Tongue midline, normal movements, no atrophy.    Fundoscopic: pale w/ sharp discs margins No vascular changes.      Motor: Normal muscle bulk & tone. No noticeable tremor or seizure. No pronator drift.              Deltoid	Biceps	Triceps	Wrist	Finger ABd	   R	5	5	5	5	5		5 	  L	5	5	5	5	5		5    	H-Flex	H-Ext	H-ABd	H-ADd	K-Flex	K-Ext	D-Flex	P-Flex  R	5	5	5	5	5	5	5	5 	   L	5	5	5	5	5	5	5	5	     Sensation: Intact to LT/PP/Temp/Vibration/Position b/l throughout.     Cortical: Extinction on DSS (neglect): none    Reflexes:              Biceps(C5)       BR(C6)     Triceps(C7)               Patellar(L4)    Achilles(S1)    Plantar Resp  R	2	          2	             2		        2		    2		Down   L	2	          2	             2		        2		    2		Down     Coordination: intact rapid-alt movements. No dysmetria to FTN/HTS    Gait: Normal Romberg. No postural instability. Normal stance and tandem gait.     LABORATORY:  CBC   Chem       LFTs   Coagulopathy   Lipid Panel   A1c   Cardiac enzymes     U/A   CSF  Immunological  Other    STUDIES & IMAGING:  Studies (EKG, EEG, EMG, etc):     Radiology (XR, CT, MR, U/S, TTE/LILIANE): HPI:  66M RH w/ CAD s/p MI and stent, HTN, HLD, s/p 7 lumbar spinal surgeries, spontaneous R Acomm aneurysm rupture s/p R frontal craniotomy and clipping (1997), s/p R prosthetic femor ORIF 3/30/21, possible DVT on xarelto presents as a stroke code for LUE weakness. Hx obtained from [facility]. Pt was seen at 8am by TREE Woods to be moving all extremities equally, alert, no slurred speech when she was cleaning his RLE surgical site cleaning. Per RN Maryann Ortega, at 8:30am, pt was noted to have slurred speech, lethargic and couldn't LUE at all. No shaking movements noted. Per EMS, pt was not having LUE weakness when they saw him. No hx of ischemic stroke or seizure. Last dose of xarelto at 5pm. Pt reports that he doesn't remember exactly what happened this morning, but does remember people calling his name. He reports a spontaneous head bleed in 1997 and with subsequent clipping. At baseline, pt AAOx3, ambulates with walker, needs help with ADLs including getting into the shower but feed self.     (Stroke only)  LKN: 4/22/22 at 8am  NIHSS: 7  preMRS: 3  Pt is not a candidate for tpa due to no rapidly improving symptoms, hx of spontaneous ICH  Pt is not a candidate for mechanical thrombectomy due to no large vessel occlusion on CTA    REVIEW OF SYSTEMS    A 10-system ROS was performed and is negative except for those items noted above and/or in the HPI.    PAST MEDICAL & SURGICAL HISTORY:  Myocardial infarction  KAEL x1 MI 2005, stent RCA    Back pain  Chronic back pain    Spinal stenosis    Lumbar herniated disc    Hypertension    Narcotic dependence  5/28/16 for withdrawal ,pt currently on Dialudid 8 mg every 4-6 hours /day    Osteoarthritis    GERD (gastroesophageal reflux disease)    Cerebral aneurysm rupture  1997 clipped, no residual    Femur fracture, right  2/16, surgical revision of right hip    Sacroiliitis, not elsewhere classified  Unspecified Inflammatory spondylopathy,Sacral and sacrococcygeal region    Chronic pain  Patient has an Intrathecal pump s/p removal in 2016    Cerebral aneurysm  I997 with clips    S/P lumbar fusion  L1-S1:2002    History of right inguinal hernia repair  x2    Hx of appendectomy  6/1969    Hx of laminectomy  lumbar-2002    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    Stented coronary artery  KAEL x 1 to RCA    History of hip replacement  8/15, revision 2/16 after falling and fracturing right femur    S/P left knee arthroscopy    H/O arthroscopy of shoulder  right X 2, left X 1    History of throat surgery  surgical exicision cyst 1986    Fusion of sacral region of spine  5/2017      FAMILY HISTORY:    SOCIAL HISTORY:   T/E/D:   Occupation:   Lives with:     MEDICATIONS (HOME):  Home Medications:  acetaminophen 325 mg oral tablet: 3 tab(s) orally every 6 hours (04 Apr 2022 07:06)  aspirin 81 mg oral delayed release tablet: 1 tab(s) orally once a day (04 Apr 2022 07:06)  atorvastatin 20 mg oral tablet: 1 tab(s) orally once a day (at bedtime) (16 Jan 2018 15:12)  baclofen 20 mg oral tablet: 1 tab(s) orally 2 times a day (28 Mar 2022 18:15)  cyclobenzaprine 10 mg oral tablet: 1 tab(s) orally 3 times a day, As needed, Muscle Spasm (16 Jan 2018 15:12)  docusate sodium 100 mg oral capsule: 1 cap(s) orally 3 times a day (16 Jan 2018 15:12)  gabapentin 300 mg oral capsule: 2 cap(s) orally 3 times a day (16 Jan 2018 15:12)  gemfibrozil 600 mg oral tablet: 1 tab(s) orally 2 times a day (before meals) (16 Jan 2018 15:12)  Lyrica 150 mg oral capsule: 1 cap(s) orally once a day (28 Mar 2022 18:15)  metoprolol tartrate 25 mg oral tablet: 1 tab(s) orally 2 times a day (16 Jan 2018 15:12)  morphine 15 mg/8 to 12 hr oral tablet, extended release: 1 tab(s) orally every 12 hours (04 Apr 2022 07:06)  Multiple Vitamins oral tablet: 1 tab(s) orally once a day (16 Jan 2018 15:12)  oxyCODONE 10 mg oral tablet: 1 tab(s) orally every 3 hours, As needed, Severe Pain (7 - 10) (04 Apr 2022 07:06)  oxyCODONE 5 mg oral tablet: 1 tab(s) orally every 3 hours, As needed, Moderate Pain (4 - 6) (04 Apr 2022 07:06)  pantoprazole 40 mg oral delayed release tablet: 1 tab(s) orally once a day (before a meal) (16 Jan 2018 15:12)  rivaroxaban 10 mg oral tablet: 1 tab(s) orally once a day (04 Apr 2022 07:06)  senna oral tablet: 2 tab(s) orally once a day (at bedtime) (16 Jan 2018 15:12)  Slow Fe (as elemental iron) 45 mg oral tablet, extended release: 1 tab(s) orally once a day (04 Apr 2022 07:06)    MEDICATIONS  (STANDING):    MEDICATIONS  (PRN):    ALLERGIES/INTOLERANCES:  Allergies  No Known Allergies    Intolerances    VITALS & EXAMINATION:  Vital Signs Last 24 Hrs  T(C): 36.1 (22 Apr 2022 09:51), Max: 36.1 (22 Apr 2022 09:51)  T(F): 97 (22 Apr 2022 09:51), Max: 97 (22 Apr 2022 09:51)  HR: 69 (22 Apr 2022 09:51) (69 - 69)  BP: 141/83 (22 Apr 2022 09:51) (141/83 - 141/83)  BP(mean): --  RR: 18 (22 Apr 2022 09:51) (18 - 18)  SpO2: 96% (22 Apr 2022 09:51) (96% - 96%)    General:  Constitutional: Obese Male, appears stated age, in no apparent distress including pain  Head: R frontal craniotomy site  Respiratory: No increased work of breathing  Extremities: RLE scar  Skin: No rashes, bruising, or discoloration.    Neurological (>12):  MS: Awake, alert, needs occasionally prompting to keep eyes open, oriented to person, place, situation, time. Normal affect. Follows all commands.    Language: Speech initially mildly dysarthric but improved on subsequent exam, some perseveration, fluent with good repetition & comprehension (able to name objects thumb, mask)    CNs: PERRLA (R = 3mm, L = 3mm). VFF to blink to threat, unable to test counting fingers. EOMI w/ fatigable R beating nystagmus on R gaze and L beating nystagmus on L gaze. V1-3 intact to LT, well developed masseter muscles b/l. No facial asymmetry b/l, full eye closure strength b/l. Hearing grossly normal (rubbing fingers) b/l.  Gag reflex deferred. Head turning & shoulder shrug intact b/l. Tongue midline, normal movements, no atrophy.    Motor: Normal muscle bulk. Occasional negative myoclonus in b/l UE.               Deltoid	Biceps	Triceps	Wrist	Finger ABd	   R	5	5	5	5			5 	  L	5	5	5	5			5    	H-Flex	K-Flex	K-Ext	D-Flex	P-Flex  R	4+				5	 Limited due to recent R femur surgery  L	5	5	5		5	     Sensation: Intact to LT b/l throughout.     Cortical: Initially extinction to DSS on intial exam but not on subsequent exam.    Reflexes: RLE sustained ankle clonus, no suprapatellars              Biceps(C5)       BR(C6)     Triceps(C7)               Patellar(L4)    Achilles(S1)    Plantar Resp  R	1	          1	             		        0		    0		withdraw  L	1	          1	             		        0		    0		withdraw    Coordination: No dysmetria to FTN    Gait: deferred    LABORATORY:    STUDIES & IMAGING:  Studies (EKG, EEG, EMG, etc):     Radiology (XR, CT, MR, U/S, TTE/LILIANE):    < from: CT Brain Stroke Protocol (04.22.22 @ 10:19) >  INTERPRETATION:  Clinical Indication: Code stroke, left arm weakness and   lethargy previous subarachnoid hemorrhage and aneurysm clipping 1997    5mm axial sections of the brain were obtained from base to vertex,   without the intravenous administration of contrast material. Coronal and   sagittal computer generated reconstructed views are available.    No prior brain imaging is available for comparison.    There has been a right frontal temporal craniotomy. There is an aneurysm   clip in the suprasellar cistern consistent with an anterior indicating   artery aneurysm. There is encephalomalacia and gliosis in the right   frontal lobe and in the region of the January the corpus callosum and the   cingulate gyrus which is likely due to either prior surgery or related to   hemorrhage related to prior ruptured aneurysm. Mild atrophy is identified.    There is mild ex vacuo dilatation of the frontal horns of lateral   ventricles. Mild atrophy is noted. There has been previous bilateral lens   replacement surgery.        CT perfusion:    After the intravenous administration of 50 cc of Omnipaque 300 serial   thin sections were obtained through the brain for the purposes of   evaluating CT perfusion. Raw data was sent to the rapid ischemia view   software for postprocessing.      A small area of delayed mean transit time and core infarct is identified   in the region of the old infarct. The volume of tissue and CBF less than   30% is larger than the delayed mean transit time volume.    CBF<30% volume: 14 ml  Tmax> 6.0s volume: 10 ml  Mismatch volume: -4 ml  Mismatch ratio: 0.7    CTA head and neck:      After the intravenous power injection of 70 cc of Omnipaque 300 using a   bolus gabbie timing run  serial thin sections were obtained through the   neck from the thoracic inlet through the intracranial circulation   centered at the [id number] on a  multislice CT scanner reformatted   with coronal and sagittal 2 D-MIP projections, including 3 D   reconstructions using a separate 3D HD [employer] software workstation. A total   of  120 cc of Omnipaque were intravenously injected.  30 cc were   discarded.      There is calcification at the aortic arch. The origins of thecarotid and   vertebral arteries are normal. Vertebral arteries are codominant. The   carotid bifurcations demonstrate calcification without stenosis.    The distal vertebral arteries are well identified as are the   posterior-inferior cerebellar arteries bilaterally. The region of the   vertebral basilar junction is normal. The basilar artery is normal. The   posterior cerebral and superior cerebellar arteries are normal.    Evaluation of the carotid arteries demonstrate normal appearance to the  distal cervical, petrous cavernous and supraclinoid internal carotid   arteries. The A1 segment of the left anterior cerebral artery is   hypoplastic. There is an anterior communicating artery aneurysm clip   identified. Right anterior cerebral arteries appear normal. There is no   evidence of residual or recurrent aneurysm. The middle cerebral arteries   are normal.    The normal intracranial venous circulation is identified. The right   transverse sinus is dominant. The superior sagittal sinus, internal   cerebral veins, vein of Frantz, straight sinus, transverse sinuses,   sigmoid sinuses and internal jugular veins are normal. Cortical veins are   normal.    IMPRESSION: Remote right frontal temporal craniotomy and anterior   communicatingartery aneurysm clipping. Right frontal encephalomalacia   and gliosis likely related to prior aneurysm rupture or postoperative   changes.    Thin demonstrates delayed mean transit time and core infarct in the   region of the right frontal encephalomalacia.    CTA of the head and neck demonstrates no significant stenosis or   occlusion. Anterior communicating artery aneurysm clipping.    < end of copied text >

## 2022-04-22 NOTE — H&P ADULT - HISTORY OF PRESENT ILLNESS
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 Hx obtained from [facility]. Pt was seen at 8am by TREE Woods to be moving all extremities equally, alert, no slurred speech when she was cleaning his RLE surgical site cleaning. Per RN Maryann Ortega, at 8:30am, pt was noted to have slurred speech, lethargic and couldn't LUE at all. No shaking movements noted. Per EMS, pt was not having LUE weakness when they saw him. No hx of ischemic stroke or seizure. Last dose of xarelto at 5pm, on for VTE ppx after surgery. Pt reports that he doesn't remember exactly what happened this morning, but does remember people calling his name. He reports a spontaneous head bleed in 1997 and with subsequent clipping. At baseline, pt AAOx3, ambulates with walker, needs help with ADLs including getting into the shower but feed self.     (Stroke only)  LKN: 4/22/22 at 8am  NIHSS: 7  preMRS: 3  Pt is not a candidate for tpa due to no rapidly improving symptoms, hx of spontaneous ICH  Pt is not a candidate for mechanical thrombectomy due to no large vessel occlusion on CTA

## 2022-04-22 NOTE — ED ADULT NURSE NOTE - NSIMPLEMENTINTERV_GEN_ALL_ED
Implemented All Fall with Harm Risk Interventions:  [place] to call system. Call bell, personal items and telephone within reach. Instruct patient to call for assistance. Room bathroom lighting operational. Non-slip footwear when patient is off stretcher. Physically safe environment: no spills, clutter or unnecessary equipment. Stretcher in lowest position, wheels locked, appropriate side rails in place. Provide visual cue, wrist band, yellow gown, etc. Monitor gait and stability. Monitor for mental status changes and reorient to person, place, and time. Review medications for side effects contributing to fall risk. Reinforce activity limits and safety measures with patient and family. Provide visual clues: red socks.

## 2022-04-22 NOTE — ED PROVIDER NOTE - PHYSICAL EXAMINATION
GENERAL: Patient awake alert NAD.  HEENT: NC/AT, dry mucous membranes, PERRL, EOMI, no nystagmus noted.  LUNGS: CTAB, no wheezes or crackles.  CARDIAC: RRR, no m/r/g.  ABDOMEN: Soft, NT, ND, No rebound, guarding.  EXT: No edema. No calf tenderness.  MSK: No spinal tenderness, no pain with movement, no deformities.  NEURO: A&Ox2. Moving all extremities. Motor strength 4/5 of RLE likely due to pain from recent surgery, 5/5 in all other extremities. Sensation intact. No dysarthria noted. Slight delay in processing (takes a few seconds before answering questions).  SKIN: Warm and dry. No rash.

## 2022-04-22 NOTE — CONSULT NOTE ADULT - SUBJECTIVE AND OBJECTIVE BOX
66M  w PMH  CAD s/p MI/PCI , HTN, anemia,CKD, HLD, s/p 7 lumbar spinal surgeries, spontaneous R Acomm aneurysm rupture s/p R frontal craniotomy and clipping (), s/p R ellen- prosthetic femur Fx, with ORIF 3/30/21, DCed to [facility] on on xarelto for DVT ppx, presents as a stroke code for LUE weakness.   Pt was seen at 8am by RN at [facility]  moving all extremities equally, alert, no slurred speech when she was cleaning his RLE surgical site.   at 8:30am, pt was noted to have slurred speech, lethargic and couldn't move LUE at all.   No shaking movements noted. no LOC  Per EMS, pt was not having LUE weakness when they saw him.   No hx of ischemic stroke or seizure.   Last dose of xarelto at 5pm on ,   Pt reports that he doesn't remember exactly what happened this morning, but does remember people calling his name. He reports a spontaneous head bleed in  and with subsequent clipping.   At baseline, pt AAOx3, ambulates with walker, needs help with ADLs including getting into the shower but feed self.   Ptn admitted to neuro service for possible Sz, r/o TIA    ROS: abd pain , no cp, no palps, no SOB, no HA, no fevers, no chills    MEDICATIONS  (STANDING):  aspirin enteric coated 81 milliGRAM(s) Oral daily  atorvastatin 20 milliGRAM(s) Oral at bedtime  baclofen 20 milliGRAM(s) Oral two times a day  bisacodyl Suppository 10 milliGRAM(s) Rectal daily  ferrous    sulfate 325 milliGRAM(s) Oral daily  gabapentin 600 milliGRAM(s) Oral three times a day  gemfibrozil 600 milliGRAM(s) Oral two times a day  lacosamide 100 milliGRAM(s) Oral two times a day  metoprolol tartrate 25 milliGRAM(s) Oral two times a day  morphine ER Tablet 15 milliGRAM(s) Oral every 8 hours  multivitamin 1 Tablet(s) Oral daily  nystatin Powder 1 Application(s) Topical two times a day  pregabalin 150 milliGRAM(s) Oral daily  rivaroxaban 10 milliGRAM(s) Oral daily  senna 2 Tablet(s) Oral at bedtime  tamsulosin 0.8 milliGRAM(s) Oral at bedtime    MEDICATIONS  (PRN):  acetaminophen     Tablet .. 975 milliGRAM(s) Oral every 8 hours PRN Mild Pain (1 - 3), Moderate Pain (4 - 6)      P. E.  T(F): 97.8 (22 @ 13:40), Max: 98.1 (22 @ 11:30)  HR: 65 (22 @ 13:40) (64 - 69)  BP: 144/84 (22 @ 13:40) (141/79 - 167/83)  RR: 20 (22 @ 13:40) (17 - 20)  SpO2: 97% (22 @ 13:40) (96% - 100%)      GENERAL: NAD, well-developed  HEAD:  Atraumatic, Normocephalic  EYES: EOMI, PERRLA, conjunctiva and sclera clear  NECK: Supple, No JVD  CHEST/LUNG: Clear to auscultation bilaterally; No wheeze  HEART: Regular rate and rhythm; No murmurs, rubs, or gallops  ABDOMEN: Soft, Nontender, Nondistended; Bowel sounds present  EXTREMITIES:  2+ Peripheral Pulses, No clubbing, cyanosis, or edema  PSYCH: AAOx3  NEUROLOGY: non-focal  SKIN: No rashes or lesions    LABS:                        10.6   11.11 )-----------( 622      ( 2022 10:07 )             33.6         143  |  108  |  11  ----------------------------<  124<H>  5.0   |  21<L>  |  0.81    Ca    9.6      2022 10:07    TPro  7.5  /  Alb  3.2<L>  /  TBili  0.6  /  DBili  x   /  AST  30  /  ALT  18  /  AlkPhos  187<H>      PT/INR - ( 2022 10:07 )   PT: 17.1 sec;   INR: 1.48 ratio         PTT - ( 2022 10:07 )  PTT:36.2 sec  CARDIAC MARKERS ( 2022 10:07 )  x     / x     / 106 U/L / x     / x          Urinalysis Basic - ( 2022 10:54 )    Color: Light Yellow / Appearance: Clear / S.014 / pH: x  Gluc: x / Ketone: Negative  / Bili: Negative / Urobili: Negative   Blood: x / Protein: Negative / Nitrite: Negative   Leuk Esterase: Negative / RBC: x / WBC x   Sq Epi: x / Non Sq Epi: x / Bacteria: x    Imaging: CTA H/N, CT head reviewed  EKG: reviewed

## 2022-04-22 NOTE — ED PROVIDER NOTE - PROGRESS NOTE DETAILS
Russ PGY3 - Neuro recs appreciated, recommends starting Vimpat 200mg.  No abd tenderness on my exam.  Infectious w/u negative thus far.  Will admit to EMU for 24-hr VEEG.

## 2022-04-22 NOTE — CONSULT NOTE ADULT - ASSESSMENT
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   Pt was seen at 8am by RN at [facility]  moving all extremities equally, alert, no slurred speech when she was cleaning his RLE surgical site.   at 8:30am, pt was noted to have slurred speech, lethargic and couldn't move LUE at all.   No shaking movements noted.   L hemiplegia with lethargy and confusion, rapidly improving over 1-2 hrs, concerning for first-time seizure w/ post ictal Keny's paralysis (given prior R frontal encephalomalacia) vs. TIA/minor stroke  neuro exam is nonfocal.   ptn admitted to Neuro for possible acute new onset Sz, R/O TIA  ptn placed on Vimpat, unable to get MRI 2/2 aneurismal clipping  c/w dvt ppx w po Xarelto  cont ASA, statin  as per neuro: Seizure, fall and aspiration precautions.  avoid sleep deprivation,  dysphagia screen,  EEG   xrays on abd xray on 4/21/22 w ileus, get GI consult: Dr. Lazo. presently abd is soft, pain free. place on PPI

## 2022-04-22 NOTE — ED ADULT NURSE REASSESSMENT NOTE - NS ED NURSE REASSESS COMMENT FT1
Patient straight catheterized for sterile urine sample. Aseptic technique maintained. Two RNs at bedside during procedure. 500 mL of clear yellow urine drained.

## 2022-04-22 NOTE — ED ADULT NURSE NOTE - OBJECTIVE STATEMENT
66y male presents to the ED brought in by EMS from [facility]. On arrival EMS states Pt has slurred speech and unable to move his left arm as of 0830 today, Pts last seen normal was 0800. Code stroke was initiated at 0954. Pt is A&Ox2. On inspection Pt has scar on lateral side of the right thigh, from the knee to the hip from a right femur surgery 3 weeks ago. Pts scar on inspection is clean, dry, and intact. Patient safety maintained, bed is in lowest position, wheels locked, and side rails raised. Patient oriented to call bell, and call bell is within reach. 66y male presents to the ED brought in by EMS from [facility]. On arrival EMS states Pt has slurred speech and unable to move his left arm as of 0830 today, as per EMS pts last seen normal was 0800. Code stroke was initiated at 0954. Pt is A&Ox2. On inspection Pt has healing incision on lateral side of the right thigh, from the knee to the hip from a right femur surgery 3 weeks ago. Pts incision on inspection is clean, dry, and intact- no drainage noted. Patient safety maintained, bed is in lowest position, wheels locked, and side rails raised. Patient oriented to call bell, and call bell is within reach. 66y male presents to the ED brought in by EMS from [facility]. On arrival EMS states Pt has slurred speech and unable to move his left arm as of 0830 today, as per EMS pts last seen normal was 0800. Code stroke was initiated at 0954. Pt is A&Ox2, intermittent confusion with a delay following commands. On inspection Pt has healing incision on lateral side of the right thigh, from the knee to the hip from a right femur surgery 3 weeks ago. Pts incision on inspection is clean, dry, and intact- no drainage noted. Patient safety maintained, bed is in lowest position, wheels locked, and side rails raised. Patient oriented to call bell, and call bell is within reach.

## 2022-04-22 NOTE — PATIENT PROFILE ADULT - FALL HARM RISK - HARM RISK INTERVENTIONS

## 2022-04-22 NOTE — H&P ADULT - NSHPPHYSICALEXAM_GEN_ALL_CORE
VITALS & EXAMINATION:  T(C): 36.1 (22 Apr 2022 09:51), Max: 36.1 (22 Apr 2022 09:51)  T(F): 97 (22 Apr 2022 09:51), Max: 97 (22 Apr 2022 09:51)  HR: 69 (22 Apr 2022 09:51) (69 - 69)  BP: 141/83 (22 Apr 2022 09:51) (141/83 - 141/83)  RR: 18 (22 Apr 2022 09:51) (18 - 18)  SpO2: 96% (22 Apr 2022 09:51) (96% - 96%)    General:  Constitutional: Obese Male, appears stated age, in no apparent distress including pain  Head: R frontal craniotomy site  Respiratory: No increased work of breathing  Extremities: RLE scar  Skin: No rashes, bruising, or discoloration.    Neurological (>12):  MS: Awake, alert, needs occasionally prompting to keep eyes open, oriented to person, place, situation, time. Normal affect. Follows all commands.    Language: Speech initially mildly dysarthric but improved on subsequent exam, some perseveration, fluent with good repetition & comprehension (able to name objects thumb, mask)    CNs: PERRLA (R = 3mm, L = 3mm). VFF to blink to threat, unable to test counting fingers. EOMI w/ fatigable R beating nystagmus on R gaze and L beating nystagmus on L gaze. V1-3 intact to LT, well developed masseter muscles b/l. No facial asymmetry b/l, full eye closure strength b/l. Hearing grossly normal (rubbing fingers) b/l.  Gag reflex deferred. Head turning & shoulder shrug intact b/l. Tongue midline, normal movements, no atrophy.    Motor: Normal muscle bulk. Occasional negative myoclonus in b/l UE.               Deltoid	Biceps	Triceps	Wrist	Finger ABd	   R	5	5	5	5			5 	  L	5	5	5	5			5    	H-Flex	K-Flex	K-Ext	D-Flex	P-Flex  R	4+				5	 Limited due to recent R femur surgery  L	5	5	5		5	     Sensation: Intact to LT b/l throughout.   Cortical: Initially extinction to DSS on initial exam but not on subsequent exam.  Reflexes: RLE sustained ankle clonus, no suprapatellars              Biceps(C5)       BR(C6)     Triceps(C7)               Patellar(L4)    Achilles(S1)    Plantar Resp  R	1	          1	             		        0		    0		withdraw  L	1	          1	             		        0		    0		withdraw    Coordination: No dysmetria to FTN  Gait: deferred

## 2022-04-22 NOTE — CONSULT NOTE ADULT - ASSESSMENT
66M RH w/ CAD s/p MI and stent, HTN, HLD, s/p 7 lumbar spinal surgeries, spontaneous R Acomm aneurysm rupture s/p R frontal craniotomy and clipping (1997), s/p R prosthetic femor ORIF 3/30/21, possible DVT on xarelto presents as a stroke code for LUE weakness.  Pt was seen at 8am by RN to be moving all extremities equally, alert, no slurred speech when she was cleaning his RLE surgical site cleaning. At 8:30am, pt was noted to have slurred speech, lethargic and couldn't LUE at all. No shaking movements noted. Pt reports that he doesn't remember exactly what happened this morning, but does remember people calling his name. He reports a spontaneous head bleed in 1997 and with subsequent clipping. NIHSS: 7, preMRS: 3. No tpa due to no rapidly improving symptoms, hx of spontaneous ICH; no MT. CTH w/ R frontal encephalomalacia w/ prior R frontal temproal craniotomy and Acomm aneurysm clip. CTA neg, no VST. CTP w/ findings correlating to R frontal encephalomalacia.    Impression: L hemiplegia with lethargy and confusion, rapidly improving over 1-2 hrs, concerning for first-time seizure w/ post ictal Keny's paralysis (given prior R frontal encephalomalacia) vs. TIA/minor stroke    Recommendations:  [] Pt unable to get MRI due to aneurysm clip  [] c/w home xarelto 10mg daily  [] c/w home atorvastatin 20mg PO daily   [] stroke risk factor modification and counseling   [] check HA1c, lipid panel, Utox  [] NPO until bedside dysphagia screen  [] 24 hr vEEG  [] Seizure, fall and aspiration precautions.  Avoid sleep deprivation.  [] If any seizure activity noted, please note: time, if upper/lower or focal onset symptoms (e.g. head turn, eye deviation, upper/lower tonic/clonic arm initially), vital sign derangements, airway protection, urinary or bowel incontinence, duration of seizure, tongue bite, and/or post-ictal time to return of baseline.    [] Evaluate for provoking factors including UA, Urine Cx, Blood Cx x2, CMP, Mg, Utox, CBC w/ diff, EtOH level, CXR, COVID-19 test  [] Check prolactin, CK, lactate 1.5  [] management of abdominal pain per primary team, abdominal xray 4/21/22 w/ evidence of diffuse ileus  [] Patient can follow up with outpatient neurology at [address] [place] [address] 1-2 weeks after discharge. Please instruct the patient to call [phone] to schedule this appointment.     Case discussed with stroke fellow Dr. Rita Harris, under supervision of attending Dr. Richard Libman  Case to be seen and discussed with Dr. 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  Pt was seen at 8am by RN to be moving all extremities equally, alert, no slurred speech when she was cleaning his RLE surgical site cleaning. At 8:30am, pt was noted to have slurred speech, lethargic and couldn't LUE at all. No shaking movements noted. Pt reports that he doesn't remember exactly what happened this morning, but does remember people calling his name. He reports a spontaneous head bleed in 1997 and with subsequent clipping. NIHSS: 7, preMRS: 3. No tpa due to no rapidly improving symptoms, hx of spontaneous ICH; no MT. CTH w/ R frontal encephalomalacia w/ prior R frontal temproal craniotomy and Acomm aneurysm clip. CTA neg, no VST. CTP w/ findings correlating to R frontal encephalomalacia. Neuro exam w/ equal strength in b/l UE, occasional b/l UE neg myoclonus.    Impression: L hemiplegia with lethargy and confusion, rapidly improving over 1-2 hrs, concerning for first-time seizure w/ post ictal Keny's paralysis (given prior R frontal encephalomalacia) vs. TIA/minor stroke    Recommendations:  [] Pt unable to get MRI due to aneurysm clip  [] c/w home xarelto 10mg daily  [] c/w home atorvastatin 20mg PO daily   [] stroke risk factor modification and counseling   [] check HA1c, lipid panel, Utox  [] NPO until bedside dysphagia screen  [] 24 hr vEEG  [] Seizure, fall and aspiration precautions.  Avoid sleep deprivation.  [] If any seizure activity noted, please note: time, if upper/lower or focal onset symptoms (e.g. head turn, eye deviation, upper/lower tonic/clonic arm initially), vital sign derangements, airway protection, urinary or bowel incontinence, duration of seizure, tongue bite, and/or post-ictal time to return of baseline.    [] Evaluate for provoking factors including UA, Urine Cx, Blood Cx x2, CMP, Mg, Utox, CBC w/ diff, EtOH level, CXR, COVID-19 test  [] Check prolactin, CK, lactate 1.5  [] management of abdominal pain per primary team, abdominal xray 4/21/22 w/ evidence of diffuse ileus  [] Patient can follow up with outpatient neurology at [address] [place] [address] 1-2 weeks after discharge. Please instruct the patient to call [phone] to schedule this appointment.     Case discussed with stroke fellow Dr. Rita Harris, under supervision of attending Dr. Richard Libman  Case to be seen and discussed with Dr. 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  Pt was seen at 8am by RN to be moving all extremities equally, alert, no slurred speech when she was cleaning his RLE surgical site cleaning. At 8:30am, pt was noted to have slurred speech, lethargic and couldn't LUE at all. No shaking movements noted. Pt reports that he doesn't remember exactly what happened this morning, but does remember people calling his name. He reports a spontaneous head bleed in 1997 and with subsequent clipping. NIHSS: 7, preMRS: 3. No tpa due to no rapidly improving symptoms, hx of spontaneous ICH; no MT. CTH w/ R frontal encephalomalacia w/ prior R frontal temproal craniotomy and Acomm aneurysm clip. CTA neg, no VST. CTP w/ findings correlating to R frontal encephalomalacia. Neuro exam w/ equal strength in b/l UE, occasional b/l UE neg myoclonus.    Impression: L hemiplegia with lethargy and confusion, rapidly improving over 1-2 hrs, concerning for first-time seizure w/ post ictal Keny's paralysis (given prior R frontal encephalomalacia) vs. TIA/minor stroke    Recommendations:  [] check EKG   [] If OH interval wnl, give vimpat 200IV STAT now, maintenance vimpat 100mg BID  [] Pt unable to get MRI due to aneurysm clip  [] c/w home xarelto 10mg daily  [] c/w home atorvastatin 20mg PO daily   [] stroke risk factor modification and counseling   [] check HA1c, lipid panel, Utox  [] NPO until bedside dysphagia screen  [] 24 hr vEEG  [] Seizure, fall and aspiration precautions.  Avoid sleep deprivation.  [] If any seizure activity noted, please note: time, if upper/lower or focal onset symptoms (e.g. head turn, eye deviation, upper/lower tonic/clonic arm initially), vital sign derangements, airway protection, urinary or bowel incontinence, duration of seizure, tongue bite, and/or post-ictal time to return of baseline.    [] Evaluate for provoking factors including UA neg, Blood Cx x2, CMP, Mg, Utox, CBC w/ diff, EtOH level, CXR, COVID-19 test  [x] Check , lactate 1.5  [] management of abdominal pain per primary team, abdominal xray 4/21/22 w/ evidence of diffuse ileus  [] Patient can follow up with outpatient neurology at [address] [place] [address] 1-2 weeks after discharge. Please instruct the patient to call [phone] to schedule this appointment.     Case discussed with stroke fellow Dr. Rita Harris, under supervision of attending Dr. Richard Libman  Case discussed with epilepsy fellow Dr. Dario Flores  Case to be seen and discussed with Dr. Sánchez

## 2022-04-22 NOTE — ED PROVIDER NOTE - HIV OFFER
Encounter addended by: Renetta Graham OT on: 5/30/2018 11:20 AM<BR>     Actions taken: Sign clinical note Previously Declined (within the last year)

## 2022-04-22 NOTE — ED PROVIDER NOTE - OBJECTIVE STATEMENT
66M w/ PMHx of right femur fx, cerebral aneurysm, lumbar spinal fusion, CAD s/p stents, HTN, OA, GERD BIBEMS from NH for left sided weakness and AMS at 8:30am.  When EMS arrived, pt. did not endorse any weakness and was talking normally.  Pt states he feels fine.  Denies any f/c, sick contacts, CP, SOB, abd pain, urinary sxs, rectal bleeding.

## 2022-04-23 LAB
A1C WITH ESTIMATED AVERAGE GLUCOSE RESULT: 5.3 % — SIGNIFICANT CHANGE UP (ref 4–5.6)
ALBUMIN SERPL ELPH-MCNC: 3.6 G/DL — SIGNIFICANT CHANGE UP (ref 3.3–5)
ALP SERPL-CCNC: 190 U/L — HIGH (ref 40–120)
ALT FLD-CCNC: 16 U/L — SIGNIFICANT CHANGE UP (ref 10–45)
AMMONIA BLD-MCNC: 30 UMOL/L — SIGNIFICANT CHANGE UP (ref 11–55)
ANION GAP SERPL CALC-SCNC: 16 MMOL/L — SIGNIFICANT CHANGE UP (ref 5–17)
AST SERPL-CCNC: 17 U/L — SIGNIFICANT CHANGE UP (ref 10–40)
BASOPHILS # BLD AUTO: 0.07 K/UL — SIGNIFICANT CHANGE UP (ref 0–0.2)
BASOPHILS NFR BLD AUTO: 0.8 % — SIGNIFICANT CHANGE UP (ref 0–2)
BILIRUB SERPL-MCNC: 0.5 MG/DL — SIGNIFICANT CHANGE UP (ref 0.2–1.2)
BUN SERPL-MCNC: 13 MG/DL — SIGNIFICANT CHANGE UP (ref 7–23)
CALCIUM SERPL-MCNC: 9.6 MG/DL — SIGNIFICANT CHANGE UP (ref 8.4–10.5)
CHLORIDE SERPL-SCNC: 106 MMOL/L — SIGNIFICANT CHANGE UP (ref 96–108)
CHOLEST SERPL-MCNC: 138 MG/DL — SIGNIFICANT CHANGE UP
CO2 SERPL-SCNC: 21 MMOL/L — LOW (ref 22–31)
CREAT SERPL-MCNC: 0.97 MG/DL — SIGNIFICANT CHANGE UP (ref 0.5–1.3)
EGFR: 86 ML/MIN/1.73M2 — SIGNIFICANT CHANGE UP
EOSINOPHIL # BLD AUTO: 0 K/UL — SIGNIFICANT CHANGE UP (ref 0–0.5)
EOSINOPHIL NFR BLD AUTO: 0 % — SIGNIFICANT CHANGE UP (ref 0–6)
ESTIMATED AVERAGE GLUCOSE: 105 MG/DL — SIGNIFICANT CHANGE UP (ref 68–114)
GLUCOSE SERPL-MCNC: 105 MG/DL — HIGH (ref 70–99)
HCT VFR BLD CALC: 36.2 % — LOW (ref 39–50)
HDLC SERPL-MCNC: 34 MG/DL — LOW
HGB BLD-MCNC: 11 G/DL — LOW (ref 13–17)
IMM GRANULOCYTES NFR BLD AUTO: 0.3 % — SIGNIFICANT CHANGE UP (ref 0–1.5)
LIPID PNL WITH DIRECT LDL SERPL: 87 MG/DL — SIGNIFICANT CHANGE UP
LYMPHOCYTES # BLD AUTO: 1.98 K/UL — SIGNIFICANT CHANGE UP (ref 1–3.3)
LYMPHOCYTES # BLD AUTO: 21.7 % — SIGNIFICANT CHANGE UP (ref 13–44)
MAGNESIUM SERPL-MCNC: 2.3 MG/DL — SIGNIFICANT CHANGE UP (ref 1.6–2.6)
MCHC RBC-ENTMCNC: 29.8 PG — SIGNIFICANT CHANGE UP (ref 27–34)
MCHC RBC-ENTMCNC: 30.4 GM/DL — LOW (ref 32–36)
MCV RBC AUTO: 98.1 FL — SIGNIFICANT CHANGE UP (ref 80–100)
MONOCYTES # BLD AUTO: 0.67 K/UL — SIGNIFICANT CHANGE UP (ref 0–0.9)
MONOCYTES NFR BLD AUTO: 7.4 % — SIGNIFICANT CHANGE UP (ref 2–14)
NEUTROPHILS # BLD AUTO: 6.36 K/UL — SIGNIFICANT CHANGE UP (ref 1.8–7.4)
NEUTROPHILS NFR BLD AUTO: 69.8 % — SIGNIFICANT CHANGE UP (ref 43–77)
NON HDL CHOLESTEROL: 105 MG/DL — SIGNIFICANT CHANGE UP
NRBC # BLD: 0 /100 WBCS — SIGNIFICANT CHANGE UP (ref 0–0)
PHOSPHATE SERPL-MCNC: 3.1 MG/DL — SIGNIFICANT CHANGE UP (ref 2.5–4.5)
PLATELET # BLD AUTO: 601 K/UL — HIGH (ref 150–400)
POTASSIUM SERPL-MCNC: 4.6 MMOL/L — SIGNIFICANT CHANGE UP (ref 3.5–5.3)
POTASSIUM SERPL-SCNC: 4.6 MMOL/L — SIGNIFICANT CHANGE UP (ref 3.5–5.3)
PROT SERPL-MCNC: 7.5 G/DL — SIGNIFICANT CHANGE UP (ref 6–8.3)
RBC # BLD: 3.69 M/UL — LOW (ref 4.2–5.8)
RBC # FLD: 14.5 % — SIGNIFICANT CHANGE UP (ref 10.3–14.5)
SODIUM SERPL-SCNC: 143 MMOL/L — SIGNIFICANT CHANGE UP (ref 135–145)
TRIGL SERPL-MCNC: 91 MG/DL — SIGNIFICANT CHANGE UP
WBC # BLD: 9.11 K/UL — SIGNIFICANT CHANGE UP (ref 3.8–10.5)
WBC # FLD AUTO: 9.11 K/UL — SIGNIFICANT CHANGE UP (ref 3.8–10.5)

## 2022-04-23 PROCEDURE — 95720 EEG PHY/QHP EA INCR W/VEEG: CPT

## 2022-04-23 PROCEDURE — 99233 SBSQ HOSP IP/OBS HIGH 50: CPT | Mod: GC

## 2022-04-23 RX ORDER — LACOSAMIDE 50 MG/1
200 TABLET ORAL ONCE
Refills: 0 | Status: DISCONTINUED | OUTPATIENT
Start: 2022-04-23 | End: 2022-04-29

## 2022-04-23 RX ORDER — GABAPENTIN 400 MG/1
300 CAPSULE ORAL
Refills: 0 | Status: DISCONTINUED | OUTPATIENT
Start: 2022-04-24 | End: 2022-04-25

## 2022-04-23 RX ADMIN — Medication 975 MILLIGRAM(S): at 21:41

## 2022-04-23 RX ADMIN — RIVAROXABAN 10 MILLIGRAM(S): KIT at 11:56

## 2022-04-23 RX ADMIN — LACOSAMIDE 100 MILLIGRAM(S): 50 TABLET ORAL at 05:21

## 2022-04-23 RX ADMIN — Medication 25 MILLIGRAM(S): at 17:57

## 2022-04-23 RX ADMIN — Medication 1 TABLET(S): at 11:55

## 2022-04-23 RX ADMIN — Medication 600 MILLIGRAM(S): at 17:58

## 2022-04-23 RX ADMIN — Medication 600 MILLIGRAM(S): at 05:21

## 2022-04-23 RX ADMIN — SENNA PLUS 2 TABLET(S): 8.6 TABLET ORAL at 21:42

## 2022-04-23 RX ADMIN — NYSTATIN CREAM 1 APPLICATION(S): 100000 CREAM TOPICAL at 05:21

## 2022-04-23 RX ADMIN — MORPHINE SULFATE 15 MILLIGRAM(S): 50 CAPSULE, EXTENDED RELEASE ORAL at 00:14

## 2022-04-23 RX ADMIN — Medication 10 MILLIGRAM(S): at 12:20

## 2022-04-23 RX ADMIN — MORPHINE SULFATE 15 MILLIGRAM(S): 50 CAPSULE, EXTENDED RELEASE ORAL at 06:00

## 2022-04-23 RX ADMIN — Medication 150 MILLIGRAM(S): at 12:01

## 2022-04-23 RX ADMIN — Medication 325 MILLIGRAM(S): at 11:55

## 2022-04-23 RX ADMIN — LACOSAMIDE 100 MILLIGRAM(S): 50 TABLET ORAL at 18:04

## 2022-04-23 RX ADMIN — Medication 20 MILLIGRAM(S): at 05:21

## 2022-04-23 RX ADMIN — TAMSULOSIN HYDROCHLORIDE 0.8 MILLIGRAM(S): 0.4 CAPSULE ORAL at 21:42

## 2022-04-23 RX ADMIN — GABAPENTIN 600 MILLIGRAM(S): 400 CAPSULE ORAL at 05:21

## 2022-04-23 RX ADMIN — Medication 81 MILLIGRAM(S): at 11:55

## 2022-04-23 RX ADMIN — NYSTATIN CREAM 1 APPLICATION(S): 100000 CREAM TOPICAL at 18:04

## 2022-04-23 RX ADMIN — ATORVASTATIN CALCIUM 20 MILLIGRAM(S): 80 TABLET, FILM COATED ORAL at 21:42

## 2022-04-23 RX ADMIN — Medication 975 MILLIGRAM(S): at 22:18

## 2022-04-23 RX ADMIN — Medication 20 MILLIGRAM(S): at 17:58

## 2022-04-23 RX ADMIN — Medication 25 MILLIGRAM(S): at 05:21

## 2022-04-23 RX ADMIN — MORPHINE SULFATE 15 MILLIGRAM(S): 50 CAPSULE, EXTENDED RELEASE ORAL at 05:21

## 2022-04-23 NOTE — CONSULT NOTE ADULT - SUBJECTIVE AND OBJECTIVE BOX
04-23-22 @ 10:08    Patient is a 66y old  Male who presents with a chief complaint of seizure (2022 14:21)      HPI:  66M RH w/ CAD s/p MI and stent, HTN, HLD, s/p 7 lumbar spinal surgeries, spontaneous R Acomm aneurysm rupture s/p R frontal craniotomy and clipping (), s/p R prosthetic femor ORIF 3/30/21, on xarelto presents as a stroke code for LUE weakness. Hx obtained from [facility] [place]. Pt was seen at 8am by [place] to be moving all extremities equally, alert, no slurred speech when she was cleaning his RLE surgical site cleaning. Per RN Maryann Ortega, at 8:30am, pt was noted to have slurred speech, lethargic and couldn't LUE at all. No shaking movements noted. Per EMS, pt was not having LUE weakness when they saw him. No hx of ischemic stroke or seizure. Last dose of xarelto at 5pm, on for VTE ppx after surgery. Pt reports that he doesn't remember exactly what happened this morning, but does remember people calling his name. He reports a spontaneous head bleed in  and with subsequent clipping. At baseline, pt AAOx3, ambulates with walker, needs help with ADLs including getting into the shower but feed self.     called his wife: she is not aware of him having sleep apnea:  he had trouble sleeping because of his back pain: he was sleeping while the wife w talking to him yesterday and they suspected he might be having  seizures:   he snores and he breaths though is mouth more: he gained lot of weight in last one year:  there are no witnessed apneas:   recently he had surgery on           ?FOLLOWING PRESENT  [ x] Hx of PE/DVT, [x ] Hx COPD, [ x] Hx of Asthma, [ y] Hx of Hospitalization, [x ]  Hx of BiPAP/CPAP use, [x ] Hx of SREEKANTH    Allergies    No Known Allergies    Intolerances        PAST MEDICAL & SURGICAL HISTORY:  Myocardial infarction  KAEL x1 MI , stent RCA    Back pain  Chronic back pain    Spinal stenosis    Lumbar herniated disc    Hypertension    Narcotic dependence  15 for withdrawal ,pt currently on Dialudid 8 mg every 4-6 hours /day    Osteoarthritis    GERD (gastroesophageal reflux disease)    Cerebral aneurysm rupture   clipped, no residual    Femur fracture, right  , surgical revision of right hip    Sacroiliitis, not elsewhere classified  Unspecified Inflammatory spondylopathy,Sacral and sacrococcygeal region    Chronic pain  Patient has an Intrathecal pump s/p removal in     Cerebral aneurysm  [id number] with clips    S/P lumbar fusion  L1-S1:    History of right inguinal hernia repair  x2    Hx of appendectomy  1969    Hx of laminectomy  lumbar-    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 mos.-14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    Stented coronary artery  KAEL x 1 to RCA    History of hip replacement  8/15, revision  after falling and fracturing right femur    S/P left knee arthroscopy    H/O arthroscopy of shoulder  right X 2, left X 1    History of throat surgery  surgical exicision cyst 1986    Fusion of sacral region of spine  2017        FAMILY HISTORY:      Social History: [ quit : smoked for many years: 40 yeas: ] TOBACCO                  [  x] ETOH                                 [  x] IVDA/DRUGS    REVIEW OF SYSTEMS      General:	x    Skin/Breast:x  	  Ophthalmologic:x  	  ENMT:	x    Respiratory and Thorax: no sob, no cough, no phlegm    	  Cardiovascular:	x    Gastrointestinal:	x    Genitourinary:	x    Musculoskeletal:	x    Neurological:	stops talking intermittently     Psychiatric:	x    Hematology/Lymphatics:	x    Endocrine:x	    Allergic/Immunologic:	x    MEDICATIONS  (STANDING):  aspirin enteric coated 81 milliGRAM(s) Oral daily  atorvastatin 20 milliGRAM(s) Oral at bedtime  baclofen 20 milliGRAM(s) Oral two times a day  bisacodyl Suppository 10 milliGRAM(s) Rectal daily  ferrous    sulfate 325 milliGRAM(s) Oral daily  gabapentin 600 milliGRAM(s) Oral three times a day  gemfibrozil 600 milliGRAM(s) Oral two times a day  lacosamide 100 milliGRAM(s) Oral two times a day  metoprolol tartrate 25 milliGRAM(s) Oral two times a day  morphine ER Tablet 15 milliGRAM(s) Oral every 8 hours  multivitamin 1 Tablet(s) Oral daily  nystatin Powder 1 Application(s) Topical two times a day  pregabalin 150 milliGRAM(s) Oral daily  rivaroxaban 10 milliGRAM(s) Oral daily  senna 2 Tablet(s) Oral at bedtime  tamsulosin 0.8 milliGRAM(s) Oral at bedtime    MEDICATIONS  (PRN):  acetaminophen     Tablet .. 975 milliGRAM(s) Oral every 8 hours PRN Mild Pain (1 - 3), Moderate Pain (4 - 6)  lacosamide Injectable 200 milliGRAM(s) IV Push once PRN Seizure  LORazepam   Injectable 1 milliGRAM(s) IV Push once PRN Seizure       Vital Signs Last 24 Hrs  T(C): 36.3 (2022 05:48), Max: 36.8 (2022 21:36)  T(F): 97.3 (2022 05:48), Max: 98.2 (2022 21:36)  HR: 63 (2022 05:48) (63 - 77)  BP: 135/76 (2022 05:48) (125/72 - 167/83)  BP(mean): 101 (2022 13:40) (88 - 104)  RR: 18 (2022 05:48) (17 - 20)  SpO2: 95% (2022 05:48) (95% - 100%)Orthostatic VS          I&O's Summary      Physical Exam:   GENERAL: NAD, well-groomed, well-developed  HEENT: [place]/   Atraumatic, Normocephalic  ENMT: No tonsillar erythema, exudates, or enlargement; Moist mucous membranes, Good dentition, No lesions  NECK: Supple, No JVD, Normal thyroid  CHEST/LUNG: Clear to auscultation bilaterally  CVS: Regular rate and rhythm; No murmurs, rubs, or gallops  GI: : Soft, Nontender, Nondistended; Bowel sounds present  NERVOUS SYSTEM:  Alert & awake : but stops talking intermittently for few seconds:   EXTREMITIES:  mild  edema  LYMPH: No lymphadenopathy noted  SKIN: No rashes or lesions  ENDOCRINOLOGY: No Thyromegaly  PSYCH: Appropriate    Labs:  0.9<46<4>>28<<7.375>>0.9<<3><<4><<5<<289>>COVID-19 PCR: NotDetec (2022 10:11)  COVID-19 PCR: NotDetec (2022 19:36)  COVID-19 PCR: NotDetec (2022 12:39)  COVID-19 PCR: NotDetec (29 Mar 2022 02:03)    CARDIAC MARKERS ( 2022 10:07 )  x     / x     / 106 U/L / x     / x                                11.0   9.11  )-----------( 601      ( 2022 09:29 )             36.2                         10.6   11.11 )-----------( 622      ( 2022 10:07 )             33.6         143  |  108  |  11  ----------------------------<  124<H>  5.0   |  21<L>  |  0.81    Ca    9.6      2022 10:07    TPro  7.5  /  Alb  3.2<L>  /  TBili  0.6  /  DBili  x   /  AST  30  /  ALT  18  /  AlkPhos  187<H>      CAPILLARY BLOOD GLUCOSE  128 (2022 10:44)        LIVER FUNCTIONS - ( 2022 10:07 )  Alb: 3.2 g/dL / Pro: 7.5 g/dL / ALK PHOS: 187 U/L / ALT: 18 U/L / AST: 30 U/L / GGT: x           PT/INR - ( 2022 10:07 )   PT: 17.1 sec;   INR: 1.48 ratio         PTT - ( 2022 10:07 )  PTT:36.2 sec  Urinalysis Basic - ( 2022 10:54 )    Color: Light Yellow / Appearance: Clear / S.014 / pH: x  Gluc: x / Ketone: Negative  / Bili: Negative / Urobili: Negative   Blood: x / Protein: Negative / Nitrite: Negative   Leuk Esterase: Negative / RBC: x / WBC x   Sq Epi: x / Non Sq Epi: x / Bacteria: x      D DImer      Studies  Chest X-RAY  CT SCAN Chest   CT Abdomen  Venous Dopplers: LE:   Others        radrad< from: CT Angio Neck w/ IV Cont (22 @ 10:30) >  The normal intracranial venous circulation is identified. The right   transverse sinus is dominant. The superior sagittal sinus, internal   cerebral veins, vein of Frantz, straight sinus, transverse sinuses,   sigmoid sinuses and internal jugular veins are normal. Cortical veins are   normal.    IMPRESSION: Remote right frontal temporal craniotomy and anterior   communicatingartery aneurysm clipping. Right frontal encephalomalacia   and gliosis likely related to prior aneurysm rupture or postoperative   changes.    Thin demonstrates delayed mean transit time and core infarct in the   region of the right frontal encephalomalacia.    CTA of the head and neck demonstrates no significant stenosis or   occlusion. Anterior communicating artery aneurysm clipping.    --- End of Report ---      < end of copied text >  < from: CT Angio Neck w/ IV Cont (22 @ 10:30) >  The normal intracranial venous circulation is identified. The right   transverse sinus is dominant. The superior sagittal sinus, internal   cerebral veins, vein of Frantz, straight sinus, transverse sinuses,   sigmoid sinuses and internal jugular veins are normal. Cortical veins are   normal.    IMPRESSION: Remote right frontal temporal craniotomy and anterior   communicatingartery aneurysm clipping. Right frontal encephalomalacia   and gliosis likely related to prior aneurysm rupture or postoperative   changes.    Thin demonstrates delayed mean transit time and core infarct in the   region of the right frontal encephalomalacia.    CTA of the head and neck demonstrates no significant stenosis or   occlusion. Anterior communicating artery aneurysm clipping.    --- End of Report ---      < end of copied text >  < from: Xray Chest 1 View- PORTABLE-Urgent (22 @ 16:38) >    ACC: [id number] EXAM:  XR CHEST PORTABLE URGENT 1V                          PROCEDURE DATE:  2022          INTERPRETATION:  CLINICAL INDICATION: Trauma, fall with chest pain.    EXAM: Frontal radiographs of the chest.    COMPARISON: Chest radiograph from 2017.    FINDINGS:  The lungs are clear.  There is no pleural effusion or pneumothorax.  The heart size is normal.  The visualized osseous structures demonstrate no acute pathology.  Status post thoracolumbar spinal fusion.    IMPRESSION:  Clear lungs.    --- End of Report ---          JUAN LR MD; Resident Radiology  This document has been electronically signed.  NICOLE AMOS MD; Attending Radiologist  This document has been electronically signed. Mar 28 2022  9:15PM    < end of copied text >

## 2022-04-23 NOTE — EEG REPORT - NS EEG TEXT BOX
Study Information:  EEG Recording Technique: The patient underwent continuous Video-EEG monitoring, using Telemetry System hardware on the XLTek Digital System. EEG and video data were stored on a computer hard drive with important events saved in digital archive files. The material was reviewed by a physician (electroencephalographer / epileptologist) on a daily basis. Logan and seizure detection algorithms were utilized and reviewed. An EEG Technician attended to the patient, and was available throughout daytime work hours.  The epilepsy center neurologist was available in person or on call 24-hours per day.  EEG Placement and Labeling of Electrodes: The EEG was performed utilizing 20 channel referential EEG connections (coronal over temporal over parasagittal montage) using all standard 10-20 electrode placements with EKG, with additional electrodes placed in the inferior temporal region using the modified 10-10 montage electrode placements for elective admissions, or if deemed necessary. Recording was at a sampling rate of 256 samples per second per channel. Time synchronized digital video recording was done simultaneously with EEG recording. A low light infrared camera was used for low light recording.   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 Hx obtained from [facility]. Pt was seen at 8am by [place] to be moving all extremities equally, alert, no slurred speech when she was cleaning his RLE surgical site cleaning. Per RN Maryann Ortega, at 8:30am, pt was noted to have slurred speech, lethargic and couldn't LUE at all. No shaking movements noted. Per EMS, pt was not having LUE weakness when they saw him. No hx of ischemic stroke or seizure. Last dose of xarelto at 5pm, on for VTE ppx after surgery. Pt reports that he doesn't remember exactly what happened this morning, but does remember people calling his name. He reports a spontaneous head bleed in 1997 and with subsequent clipping. At baseline, pt AAOx3, ambulates with walker, needs help with ADLs including getting into the shower but feed self.  Home Antiepileptic Medication and Device  none  Interpretation:  Day 1 – 	Start: 4/23/2022  12:43  (leads pulled off at 03:40, reconnect 06:58) 	End: 04/23/2022  08:00 	Duration: 06 hr  31 min  Daily EEG Visual Analysis  FINDINGS:  The background was continuous, spontaneously variable and reactive. During wakefulness, a clear posterior dominant rhythm is not seen.  Low amplitude frontal beta was noted in wakefulness.  Background Slowing: Diffuse theta slowing.  Focal Slowing:  There is intermittent R temporal delta.  Sleep Background: Drowsiness and sleep were not recorded  Other Non-Epileptiform Findings: Diffuse excess beta activity.  Activation Procedures:  Hyperventilation was not performed.   Photic stimulation was not performed.  Interictal Epileptiform Activity:  None were present.  Events: No events or seizures recorded.  Artifacts: Intermittent myogenic and movement artifacts were noted.  ECG: The heart rate on single channel ECG was predominantly between 60-70 BPM.  AEDs:  lacosamide 100 q12  EEG Summary:  Abnormal EEG in the awake, drowsy and asleep states. Intermittent polymorphic delta, focal, right temporal region  Impression/Clinical Correlate:  This is an abnormal EEG record. There is mild diffuse cerebral dysfunction that is focally worse in the right temporal region. No epileptiform pattern or seizures were seen.     Faisal Batres MD, PhD Director, Epilepsy Division, [facility]  ------------------------------------ EEG Reading Room: [phone] On Call Service After Hours: [phone]

## 2022-04-23 NOTE — PROGRESS NOTE ADULT - ASSESSMENT
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   Pt was seen at 8am by RN at [facility]  moving all extremities equally, alert, no slurred speech when she was cleaning his RLE surgical site.   at 8:30am, pt was noted to have slurred speech, lethargic and couldn't move LUE at all.   No shaking movements noted.   L hemiplegia with lethargy and confusion, rapidly improving over 1-2 hrs, concerning for first-time seizure w/ post ictal Keny's paralysis (given prior R frontal encephalomalacia) vs. TIA/minor stroke  neuro exam is nonfocal.   4/22: ptn admitted to Neuro for possible acute new onset Sz, R/O TIA  ptn placed on Vimpat x 1, unable to get MRI 2/2 aneurismal clipping  c/w dvt ppx w po Xarelto  cont ASA, statin  as per neuro: Seizure, fall and aspiration precautions.  avoid sleep deprivation,  dysphagia screen,  EEG   4/23: ptn was observed by me yesterday to have apnea while sleeping. seen by pulm, will need outptn sleep study. would monitor puse ox overnight. today ptn has myoclonus , jerky movements and interruption of speech. he is awake, alert, recognized me. could this be from HD Gabapentin? will drop the dose from 600 tid to 300 bid. spoke w neurology. they agree. EEG is negative

## 2022-04-23 NOTE — PROGRESS NOTE ADULT - SUBJECTIVE AND OBJECTIVE BOX
Patient is a 66y old  Male who presents with a chief complaint of seizure (2022 10:08)      SUBJECTIVE / OVERNIGHT EVENTS: ptn has myoclonus , jerky movements and interruption of speech. he is awake, alert, recognized me. could this be from HD Gabapentin? will drop the dose from 600 tid to 300 bid. spoke w neurology. they agree. eeg is negative    MEDICATIONS  (STANDING):  aspirin enteric coated 81 milliGRAM(s) Oral daily  atorvastatin 20 milliGRAM(s) Oral at bedtime  baclofen 20 milliGRAM(s) Oral two times a day  bisacodyl Suppository 10 milliGRAM(s) Rectal daily  ferrous    sulfate 325 milliGRAM(s) Oral daily  gemfibrozil 600 milliGRAM(s) Oral two times a day  lacosamide 100 milliGRAM(s) Oral two times a day  metoprolol tartrate 25 milliGRAM(s) Oral two times a day  morphine ER Tablet 15 milliGRAM(s) Oral every 8 hours  multivitamin 1 Tablet(s) Oral daily  nystatin Powder 1 Application(s) Topical two times a day  pregabalin 150 milliGRAM(s) Oral daily  rivaroxaban 10 milliGRAM(s) Oral daily  senna 2 Tablet(s) Oral at bedtime  tamsulosin 0.8 milliGRAM(s) Oral at bedtime    MEDICATIONS  (PRN):  acetaminophen     Tablet .. 975 milliGRAM(s) Oral every 8 hours PRN Mild Pain (1 - 3), Moderate Pain (4 - 6)  lacosamide Injectable 200 milliGRAM(s) IV Push once PRN Seizure  LORazepam   Injectable 1 milliGRAM(s) IV Push once PRN Seizure      Vital Signs Last 24 Hrs  T(F): 97.5 (22 @ 14:00), Max: 98.2 (22 @ 21:36)  HR: 75 (22 @ 14:00) (63 - 75)  BP: 131/75 (22 @ 14:00) (110/67 - 153/74)  RR: 18 (22 @ 14:00) (18 - 18)  SpO2: 97% (22 @ 14:00) (95% - 100%)  Telemetry:   CAPILLARY BLOOD GLUCOSE        I&O's Summary    2022 07:01  -  2022 15:44  --------------------------------------------------------  IN: 120 mL / OUT: 1 mL / NET: 119 mL        PHYSICAL EXAM:  GENERAL: NAD, well-developed  HEAD:  Atraumatic, Normocephalic  EYES: EOMI, PERRLA, conjunctiva and sclera clear  NECK: Supple, No JVD  CHEST/LUNG: Clear to auscultation bilaterally; No wheeze  HEART: Regular rate and rhythm; No murmurs, rubs, or gallops  ABDOMEN: Soft, Nontender, Nondistended; Bowel sounds present  EXTREMITIES:  2+ Peripheral Pulses, No clubbing, cyanosis, or edema  PSYCH: AAOx3  NEUROLOGY: non-focal  SKIN: No rashes or lesions    LABS:                        11.0   9.11  )-----------( 601      ( 2022 09:29 )             36.2     04-23    143  |  106  |  13  ----------------------------<  105<H>  4.6   |  21<L>  |  0.97    Ca    9.6      2022 09:29  Phos  3.1       Mg     2.3         TPro  7.5  /  Alb  3.6  /  TBili  0.5  /  DBili  x   /  AST  17  /  ALT  16  /  AlkPhos  190<H>  23    PT/INR - ( 2022 10:07 )   PT: 17.1 sec;   INR: 1.48 ratio         PTT - ( 2022 10:07 )  PTT:36.2 sec  CARDIAC MARKERS ( 2022 10:07 )  x     / x     / 106 U/L / x     / x          Urinalysis Basic - ( 2022 10:54 )    Color: Light Yellow / Appearance: Clear / S.014 / pH: x  Gluc: x / Ketone: Negative  / Bili: Negative / Urobili: Negative   Blood: x / Protein: Negative / Nitrite: Negative   Leuk Esterase: Negative / RBC: x / WBC x   Sq Epi: x / Non Sq Epi: x / Bacteria: x        RADIOLOGY & ADDITIONAL TESTS:    Imaging Personally Reviewed:    Consultant(s) Notes Reviewed:      Care Discussed with Consultants/Other Providers:

## 2022-04-23 NOTE — CONSULT NOTE ADULT - ASSESSMENT
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  Pt was seen at 8am by RN to be moving all extremities equally, alert, no slurred speech when she was cleaning his RLE surgical site cleaning. At 8:30am, pt was noted to have slurred speech, lethargic and couldn't LUE at all. No shaking movements noted. Pt reports that he doesn't remember exactly what happened this morning, but does remember people calling his name. He reports a spontaneous head bleed in 1997 and with subsequent clipping. NIHSS: 7, preMRS: 3. No tpa due to no rapidly improving symptoms, hx of spontaneous ICH; no MT. CTH w/ R frontal encephalomalacia w/ prior R frontal temproal craniotomy and Acomm aneurysm clip. CTA neg, no VST. CTP w/ findings correlating to R frontal encephalomalacia. Neuro exam w/ equal strength in b/l UE, occasional b/l UE neg myoclonus.    Impression: L hemiplegia with lethargy and confusion, rapidly improving over 1-2 hrs, concerning for first-time seizure w/ post ictal Keny's paralysis (given prior R frontal encephalomalacia) vs. TIA/minor stroke    Plan:  1: suspected seizures: getting cont eeg: r/p stroke per neurology   2: he is suspected to have sleep apnea: he has gained weight significant amount i last one year and recently had leg surgery : per his wife he sores t home  but has not had any witnessed apneas: he would need psg as an outpt: also can monitor his o2 sao2 while sleeping:     meredith franco

## 2022-04-24 ENCOUNTER — TRANSCRIPTION ENCOUNTER (OUTPATIENT)
Age: 67
End: 2022-04-24

## 2022-04-24 DIAGNOSIS — I10 ESSENTIAL (PRIMARY) HYPERTENSION: ICD-10-CM

## 2022-04-24 DIAGNOSIS — E78.5 HYPERLIPIDEMIA, UNSPECIFIED: ICD-10-CM

## 2022-04-24 DIAGNOSIS — I25.10 ATHEROSCLEROTIC HEART DISEASE OF NATIVE CORONARY ARTERY WITHOUT ANGINA PECTORIS: ICD-10-CM

## 2022-04-24 DIAGNOSIS — R56.9 UNSPECIFIED CONVULSIONS: ICD-10-CM

## 2022-04-24 LAB
ALBUMIN SERPL ELPH-MCNC: 3.5 G/DL — SIGNIFICANT CHANGE UP (ref 3.3–5)
ALP SERPL-CCNC: 180 U/L — HIGH (ref 40–120)
ALT FLD-CCNC: 14 U/L — SIGNIFICANT CHANGE UP (ref 10–45)
ANION GAP SERPL CALC-SCNC: 12 MMOL/L — SIGNIFICANT CHANGE UP (ref 5–17)
AST SERPL-CCNC: 13 U/L — SIGNIFICANT CHANGE UP (ref 10–40)
BILIRUB SERPL-MCNC: 0.4 MG/DL — SIGNIFICANT CHANGE UP (ref 0.2–1.2)
BUN SERPL-MCNC: 17 MG/DL — SIGNIFICANT CHANGE UP (ref 7–23)
CALCIUM SERPL-MCNC: 9.3 MG/DL — SIGNIFICANT CHANGE UP (ref 8.4–10.5)
CHLORIDE SERPL-SCNC: 107 MMOL/L — SIGNIFICANT CHANGE UP (ref 96–108)
CO2 SERPL-SCNC: 23 MMOL/L — SIGNIFICANT CHANGE UP (ref 22–31)
CREAT SERPL-MCNC: 1.08 MG/DL — SIGNIFICANT CHANGE UP (ref 0.5–1.3)
CULTURE RESULTS: NO GROWTH — SIGNIFICANT CHANGE UP
EGFR: 75 ML/MIN/1.73M2 — SIGNIFICANT CHANGE UP
GLUCOSE SERPL-MCNC: 115 MG/DL — HIGH (ref 70–99)
HCT VFR BLD CALC: 33 % — LOW (ref 39–50)
HGB BLD-MCNC: 10.2 G/DL — LOW (ref 13–17)
MAGNESIUM SERPL-MCNC: 2.2 MG/DL — SIGNIFICANT CHANGE UP (ref 1.6–2.6)
MCHC RBC-ENTMCNC: 29.2 PG — SIGNIFICANT CHANGE UP (ref 27–34)
MCHC RBC-ENTMCNC: 30.9 GM/DL — LOW (ref 32–36)
MCV RBC AUTO: 94.6 FL — SIGNIFICANT CHANGE UP (ref 80–100)
NRBC # BLD: 0 /100 WBCS — SIGNIFICANT CHANGE UP (ref 0–0)
PHOSPHATE SERPL-MCNC: 2.4 MG/DL — LOW (ref 2.5–4.5)
PLATELET # BLD AUTO: 567 K/UL — HIGH (ref 150–400)
POTASSIUM SERPL-MCNC: 4.1 MMOL/L — SIGNIFICANT CHANGE UP (ref 3.5–5.3)
POTASSIUM SERPL-SCNC: 4.1 MMOL/L — SIGNIFICANT CHANGE UP (ref 3.5–5.3)
PROT SERPL-MCNC: 6.8 G/DL — SIGNIFICANT CHANGE UP (ref 6–8.3)
RBC # BLD: 3.49 M/UL — LOW (ref 4.2–5.8)
RBC # FLD: 14.8 % — HIGH (ref 10.3–14.5)
SODIUM SERPL-SCNC: 142 MMOL/L — SIGNIFICANT CHANGE UP (ref 135–145)
SPECIMEN SOURCE: SIGNIFICANT CHANGE UP
WBC # BLD: 8.61 K/UL — SIGNIFICANT CHANGE UP (ref 3.8–10.5)
WBC # FLD AUTO: 8.61 K/UL — SIGNIFICANT CHANGE UP (ref 3.8–10.5)

## 2022-04-24 PROCEDURE — 99233 SBSQ HOSP IP/OBS HIGH 50: CPT | Mod: GC

## 2022-04-24 PROCEDURE — 95720 EEG PHY/QHP EA INCR W/VEEG: CPT

## 2022-04-24 PROCEDURE — 93010 ELECTROCARDIOGRAM REPORT: CPT

## 2022-04-24 RX ORDER — LACOSAMIDE 50 MG/1
150 TABLET ORAL
Refills: 0 | Status: DISCONTINUED | OUTPATIENT
Start: 2022-04-24 | End: 2022-04-29

## 2022-04-24 RX ORDER — KETOROLAC TROMETHAMINE 30 MG/ML
15 SYRINGE (ML) INJECTION EVERY 4 HOURS
Refills: 0 | Status: DISCONTINUED | OUTPATIENT
Start: 2022-04-24 | End: 2022-04-26

## 2022-04-24 RX ADMIN — Medication 600 MILLIGRAM(S): at 05:11

## 2022-04-24 RX ADMIN — TAMSULOSIN HYDROCHLORIDE 0.8 MILLIGRAM(S): 0.4 CAPSULE ORAL at 21:18

## 2022-04-24 RX ADMIN — Medication 1 TABLET(S): at 12:06

## 2022-04-24 RX ADMIN — Medication 150 MILLIGRAM(S): at 12:06

## 2022-04-24 RX ADMIN — NYSTATIN CREAM 1 APPLICATION(S): 100000 CREAM TOPICAL at 17:30

## 2022-04-24 RX ADMIN — Medication 25 MILLIGRAM(S): at 05:11

## 2022-04-24 RX ADMIN — Medication 25 MILLIGRAM(S): at 17:23

## 2022-04-24 RX ADMIN — GABAPENTIN 300 MILLIGRAM(S): 400 CAPSULE ORAL at 05:11

## 2022-04-24 RX ADMIN — Medication 15 MILLIGRAM(S): at 17:49

## 2022-04-24 RX ADMIN — Medication 600 MILLIGRAM(S): at 17:22

## 2022-04-24 RX ADMIN — GABAPENTIN 300 MILLIGRAM(S): 400 CAPSULE ORAL at 17:22

## 2022-04-24 RX ADMIN — Medication 975 MILLIGRAM(S): at 15:50

## 2022-04-24 RX ADMIN — Medication 20 MILLIGRAM(S): at 17:22

## 2022-04-24 RX ADMIN — Medication 81 MILLIGRAM(S): at 12:06

## 2022-04-24 RX ADMIN — LACOSAMIDE 100 MILLIGRAM(S): 50 TABLET ORAL at 05:10

## 2022-04-24 RX ADMIN — Medication 15 MILLIGRAM(S): at 18:11

## 2022-04-24 RX ADMIN — Medication 20 MILLIGRAM(S): at 05:11

## 2022-04-24 RX ADMIN — Medication 325 MILLIGRAM(S): at 12:06

## 2022-04-24 RX ADMIN — RIVAROXABAN 10 MILLIGRAM(S): KIT at 12:06

## 2022-04-24 RX ADMIN — ATORVASTATIN CALCIUM 20 MILLIGRAM(S): 80 TABLET, FILM COATED ORAL at 21:18

## 2022-04-24 RX ADMIN — NYSTATIN CREAM 1 APPLICATION(S): 100000 CREAM TOPICAL at 05:11

## 2022-04-24 RX ADMIN — LACOSAMIDE 150 MILLIGRAM(S): 50 TABLET ORAL at 17:23

## 2022-04-24 RX ADMIN — Medication 975 MILLIGRAM(S): at 15:18

## 2022-04-24 NOTE — PROGRESS NOTE ADULT - ASSESSMENT
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   Pt was seen at 8am by RN at [facility]  moving all extremities equally, alert, no slurred speech when she was cleaning his RLE surgical site.   at 8:30am, pt was noted to have slurred speech, lethargic and couldn't move LUE at all.   No shaking movements noted.   L hemiplegia with lethargy and confusion, rapidly improving over 1-2 hrs, concerning for first-time seizure w/ post ictal Keny's paralysis (given prior R frontal encephalomalacia) vs. TIA/minor stroke  neuro exam is nonfocal.   4/22: ptn admitted to Neuro for possible acute new onset Sz, R/O TIA  ptn placed on Vimpat x 1, unable to get MRI 2/2 aneurismal clipping  c/w dvt ppx w po Xarelto  cont ASA, statin  as per neuro: Seizure, fall and aspiration precautions.  avoid sleep deprivation,  dysphagia screen,  EEG   4/23: ptn was observed by me yesterday to have apnea while sleeping. seen by pulm, will need outptn sleep study. would monitor puse ox overnight. today ptn has myoclonus , jerky movements and interruption of speech. he is awake, alert, recognized me. could this be from HD Gabapentin? will drop the dose from 600 tid to 300 bid. spoke w neurology. they agree. EEG is negative  4/24: myoclonus and metabolic encephalopathy resolved post lowering the dose of Gabapentin. pain free. asking for a regular diet. dc planning as per neuro team

## 2022-04-24 NOTE — PROGRESS NOTE ADULT - SUBJECTIVE AND OBJECTIVE BOX
Patient is a 66y old  Male who presents with a chief complaint of seizure (24 Apr 2022 13:47)      SUBJECTIVE / OVERNIGHT EVENTS: myoclonus and metabolic encephalopathy resolved post lowering the dose of Gabapentin. pain free. asking for a regular diet. dc planning as per neuro team    MEDICATIONS  (STANDING):  aspirin enteric coated 81 milliGRAM(s) Oral daily  atorvastatin 20 milliGRAM(s) Oral at bedtime  baclofen 20 milliGRAM(s) Oral two times a day  bisacodyl Suppository 10 milliGRAM(s) Rectal daily  ferrous    sulfate 325 milliGRAM(s) Oral daily  gabapentin 300 milliGRAM(s) Oral two times a day  gemfibrozil 600 milliGRAM(s) Oral two times a day  lacosamide 150 milliGRAM(s) Oral two times a day  metoprolol tartrate 25 milliGRAM(s) Oral two times a day  morphine ER Tablet 15 milliGRAM(s) Oral every 8 hours  multivitamin 1 Tablet(s) Oral daily  nystatin Powder 1 Application(s) Topical two times a day  pregabalin 150 milliGRAM(s) Oral daily  rivaroxaban 10 milliGRAM(s) Oral daily  senna 2 Tablet(s) Oral at bedtime  tamsulosin 0.8 milliGRAM(s) Oral at bedtime    MEDICATIONS  (PRN):  acetaminophen     Tablet .. 975 milliGRAM(s) Oral every 8 hours PRN Mild Pain (1 - 3), Moderate Pain (4 - 6)  lacosamide Injectable 200 milliGRAM(s) IV Push once PRN Seizure  LORazepam   Injectable 1 milliGRAM(s) IV Push once PRN Seizure      Vital Signs Last 24 Hrs  T(F): 98 (04-24-22 @ 14:17), Max: 98.3 (04-24-22 @ 10:31)  HR: 76 (04-24-22 @ 14:17) (61 - 78)  BP: 132/82 (04-24-22 @ 14:17) (122/71 - 162/94)  RR: 20 (04-24-22 @ 14:17) (18 - 20)  SpO2: 98% (04-24-22 @ 14:17) (94% - 98%)  Telemetry:   CAPILLARY BLOOD GLUCOSE        I&O's Summary    23 Apr 2022 07:01  -  24 Apr 2022 07:00  --------------------------------------------------------  IN: 120 mL / OUT: 1 mL / NET: 119 mL        PHYSICAL EXAM:  GENERAL: NAD, well-developed  HEAD:  Atraumatic, Normocephalic  EYES: EOMI, PERRLA, conjunctiva and sclera clear  NECK: Supple, No JVD  CHEST/LUNG: Clear to auscultation bilaterally; No wheeze  HEART: Regular rate and rhythm; No murmurs, rubs, or gallops  ABDOMEN: Soft, Nontender, Nondistended; Bowel sounds present  EXTREMITIES:  2+ Peripheral Pulses, No clubbing, cyanosis, or edema  PSYCH: AAOx3  NEUROLOGY: non-focal  SKIN: No rashes or lesions    LABS:                        10.2   8.61  )-----------( 567      ( 24 Apr 2022 07:09 )             33.0     04-24    142  |  107  |  17  ----------------------------<  115<H>  4.1   |  23  |  1.08    Ca    9.3      24 Apr 2022 07:04  Phos  2.4     04-24  Mg     2.2     04-24    TPro  6.8  /  Alb  3.5  /  TBili  0.4  /  DBili  x   /  AST  13  /  ALT  14  /  AlkPhos  180<H>  04-24              RADIOLOGY & ADDITIONAL TESTS:    Imaging Personally Reviewed:    Consultant(s) Notes Reviewed:      Care Discussed with Consultants/Other Providers:

## 2022-04-24 NOTE — PROGRESS NOTE ADULT - ASSESSMENT
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  Pt was seen at 8am by RN to be moving all extremities equally, alert, no slurred speech when she was cleaning his RLE surgical site cleaning. At 8:30am, pt was noted to have slurred speech, lethargic and couldn't LUE at all. No shaking movements noted. Pt reports that he doesn't remember exactly what happened this morning, but does remember people calling his name. He reports a spontaneous head bleed in 1997 and with subsequent clipping. NIHSS: 7, preMRS: 3. No tpa due to no rapidly improving symptoms, hx of spontaneous ICH; no MT. CTH w/ R frontal encephalomalacia w/ prior R frontal temporal craniotomy and Acomm aneurysm clip. CTA neg, no VST. CTP w/ findings correlating to R frontal encephalomalacia. Neuro exam w/ equal strength in b/l UE, occasional b/l UE neg myoclonus.    Impression: L hemiplegia with lethargy and confusion, rapidly improving over 1-2 hrs, concerning for first-time seizure w/ post ictal Keny's paralysis (given prior R frontal encephalomalacia)   Plan:  Continue with VEEG   Increase Vimpat 150mg BID   baseline EKG ordered  c/w home xarelto 10mg daily and aspirin 81mg daily  c/w home atorvastatin 20mg PO daily   Continue Gabapentin 300mg BID   Seizure, fall and aspiration precautions.  Avoid sleep deprivation.  If any seizure activity noted, please note: time, if upper/lower or focal onset symptoms (e.g. head turn, eye deviation, upper/lower tonic/clonic arm initially), vital sign derangements, airway protection, urinary or bowel incontinence, duration of seizure, tongue bite, and/or post-ictal time to return of baseline.    [] Evaluate for provoking factors including UA neg, Blood Cx x2, CMP, Mg, Utox, CBC w/ diff, EtOH level, CXR, COVID-19 test  [x] Check , lactate 1.5  management of abdominal pain per primary team, abdominal xray 4/21/22 w/ evidence of diffuse ileus  Appreciate Internal Medicine recommendations     Patient can follow up with outpatient neurology at [address] [place] [address] 1-2 weeks after discharge. Please instruct the patient to call [phone] to schedule this appointment.     Other:  [x] c/w home pain medications PRN and constipation medications  [x] DVT ppx: on home xarelto    Case was discussed with Epilepsy Attending, Dr. Batres.   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  Pt was seen at 8am by RN to be moving all extremities equally, alert, no slurred speech when she was cleaning his RLE surgical site cleaning. At 8:30am, pt was noted to have slurred speech, lethargic and couldn't LUE at all. No shaking movements noted. Pt reports that he doesn't remember exactly what happened this morning, but does remember people calling his name. He reports a spontaneous head bleed in 1997 and with subsequent clipping. NIHSS: 7, preMRS: 3. No tpa due to no rapidly improving symptoms, hx of spontaneous ICH; no MT. CTH w/ R frontal encephalomalacia w/ prior R frontal temporal craniotomy and Acomm aneurysm clip. CTA neg, no VST. CTP w/ findings correlating to R frontal encephalomalacia. Neuro exam w/ equal strength in b/l UE, occasional b/l UE neg myoclonus.    Impression: L hemiplegia with lethargy and confusion, rapidly improving over 1-2 hrs, concerning for first-time seizure w/ post ictal Keny's paralysis (given prior R frontal encephalomalacia)   Plan:  Continue with VEEG   Increase Vimpat 150mg BID   baseline EKG ordered  c/w home xarelto 10mg daily and aspirin 81mg daily  c/w home atorvastatin 20mg PO daily   Continue Gabapentin 300mg BID   Seizure, fall and aspiration precautions.  Avoid sleep deprivation.  If any seizure activity noted, please note: time, if upper/lower or focal onset symptoms (e.g. head turn, eye deviation, upper/lower tonic/clonic arm initially), vital sign derangements, airway protection, urinary or bowel incontinence, duration of seizure, tongue bite, and/or post-ictal time to return of baseline.    [] Evaluate for provoking factors including UA neg, Blood Cx x2, CMP, Mg, Utox, CBC w/ diff, EtOH level, CXR, COVID-19 test  [x] Check , lactate 1.5  management of abdominal pain per primary team, abdominal xray 4/21/22 w/ evidence of diffuse ileus  Appreciate Internal Medicine recommendations   Appreciate Pulm recommendations.     Patient can follow up with outpatient neurology at [address] [place] [address] 1-2 weeks after discharge. Please instruct the patient to call [phone] to schedule this appointment.     Other:  [x] c/w home pain medications PRN and constipation medications  [x] DVT ppx: on home xarelto    Case was discussed with Epilepsy Attending, Dr. Batres.   [x] Diet: DASH

## 2022-04-24 NOTE — PROGRESS NOTE ADULT - SUBJECTIVE AND OBJECTIVE BOX
Date of Service: 04-24-22 @ 11:02    Patient is a 66y old  Male who presents with a chief complaint of seizure (23 Apr 2022 15:43)      Any change in ROS: hE LOOKS MUCH O9RE ALERT AND AWKE:       MEDICATIONS  (STANDING):  aspirin enteric coated 81 milliGRAM(s) Oral daily  atorvastatin 20 milliGRAM(s) Oral at bedtime  baclofen 20 milliGRAM(s) Oral two times a day  bisacodyl Suppository 10 milliGRAM(s) Rectal daily  ferrous    sulfate 325 milliGRAM(s) Oral daily  gabapentin 300 milliGRAM(s) Oral two times a day  gemfibrozil 600 milliGRAM(s) Oral two times a day  lacosamide 100 milliGRAM(s) Oral two times a day  metoprolol tartrate 25 milliGRAM(s) Oral two times a day  morphine ER Tablet 15 milliGRAM(s) Oral every 8 hours  multivitamin 1 Tablet(s) Oral daily  nystatin Powder 1 Application(s) Topical two times a day  pregabalin 150 milliGRAM(s) Oral daily  rivaroxaban 10 milliGRAM(s) Oral daily  senna 2 Tablet(s) Oral at bedtime  tamsulosin 0.8 milliGRAM(s) Oral at bedtime    MEDICATIONS  (PRN):  acetaminophen     Tablet .. 975 milliGRAM(s) Oral every 8 hours PRN Mild Pain (1 - 3), Moderate Pain (4 - 6)  lacosamide Injectable 200 milliGRAM(s) IV Push once PRN Seizure  LORazepam   Injectable 1 milliGRAM(s) IV Push once PRN Seizure    Vital Signs Last 24 Hrs  T(C): 36.8 (24 Apr 2022 10:31), Max: 36.8 (24 Apr 2022 05:57)  T(F): 98.3 (24 Apr 2022 10:31), Max: 98.3 (24 Apr 2022 10:31)  HR: 62 (24 Apr 2022 10:31) (61 - 78)  BP: 126/71 (24 Apr 2022 10:31) (122/71 - 162/94)  BP(mean): --  RR: 18 (24 Apr 2022 10:31) (18 - 18)  SpO2: 96% (24 Apr 2022 10:31) (94% - 97%)    I&O's Summary    23 Apr 2022 07:01  -  24 Apr 2022 07:00  --------------------------------------------------------  IN: 120 mL / OUT: 1 mL / NET: 119 mL          Physical Exam:   GENERAL: NAD, well-groomed, well-developed  HEENT: [place]/   Atraumatic, Normocephalic  ENMT: No tonsillar erythema, exudates, or enlargement; Moist mucous membranes, Good dentition, No lesions  NECK: Supple, No JVD, Normal thyroid  CHEST/LUNG: Clear to auscultaion  CVS: Regular rate and rhythm; No murmurs, rubs, or gallops  GI: : Soft, Nontender, Nondistended; Bowel sounds present  NERVOUS SYSTEM:  Alert & Oriented X3  EXTREMITIES:  mild  edema  LYMPH: No lymphadenopathy noted  SKIN: No rashes or lesions  ENDOCRINOLOGY: No Thyromegaly  PSYCH: Appropriate    Labs:  27                            10.2   8.61  )-----------( 567      ( 24 Apr 2022 07:09 )             33.0                         11.0   9.11  )-----------( 601      ( 23 Apr 2022 09:29 )             36.2                         10.6   11.11 )-----------( 622      ( 22 Apr 2022 10:07 )             33.6     04-24    142  |  107  |  17  ----------------------------<  115<H>  4.1   |  23  |  1.08  04-23    143  |  106  |  13  ----------------------------<  105<H>  4.6   |  21<L>  |  0.97  04-22    143  |  108  |  11  ----------------------------<  124<H>  5.0   |  21<L>  |  0.81    Ca    9.3      24 Apr 2022 07:04  Ca    9.6      23 Apr 2022 09:29  Phos  2.4     04-24  Phos  3.1     04-23  Mg     2.2     04-24  Mg     2.3     04-23    TPro  6.8  /  Alb  3.5  /  TBili  0.4  /  DBili  x   /  AST  13  /  ALT  14  /  AlkPhos  180<H>  04-24  TPro  7.5  /  Alb  3.6  /  TBili  0.5  /  DBili  x   /  AST  17  /  ALT  16  /  AlkPhos  190<H>  04-23  TPro  7.5  /  Alb  3.2<L>  /  TBili  0.6  /  DBili  x   /  AST  30  /  ALT  18  /  AlkPhos  187<H>  04-22    CAPILLARY BLOOD GLUCOSE          LIVER FUNCTIONS - ( 24 Apr 2022 07:04 )  Alb: 3.5 g/dL / Pro: 6.8 g/dL / ALK PHOS: 180 U/L / ALT: 14 U/L / AST: 13 U/L / GGT: x                     RECENT CULTURES:  04-22 @ 16:37 Catheterized Catheterized         r< from: Xray Chest 1 View- PORTABLE-Urgent (03.28.22 @ 16:38) >    INTERPRETATION:  CLINICAL INDICATION: Trauma, fall with chest pain.    EXAM: Frontal radiographs of the chest.    COMPARISON: Chest radiograph from 1/12/2017.    FINDINGS:  The lungs are clear.  There is no pleural effusion or pneumothorax.  The heart size is normal.  The visualized osseous structures demonstrate no acute pathology.  Status post thoracolumbar spinal fusion.    IMPRESSION:  Clear lungs.    --- End of Report ---          JUAN LR MD; Resident Radiology  This document has been electronically signed.  NICOLE AMOS MD; Attending Radiologist  This document has been electronically signed. Mar 28 2022  9:15PM    < end of copied text >         No growth          RESPIRATORY CULTURES:          Studies  Chest X-RAY  CT SCAN Chest   Venous Dopplers: LE:   CT Abdomen  Others

## 2022-04-24 NOTE — DISCHARGE NOTE PROVIDER - PROVIDER TOKENS
PROVIDER:[TOKEN:[[id number]],FOLLOWUP:[2 weeks]],PROVIDER:[TOKEN:[[id number]],FOLLOWUP:[2 weeks]]

## 2022-04-24 NOTE — DISCHARGE NOTE PROVIDER - HOSPITAL COURSE
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 Hx obtained from [facility]. Pt was seen at 8am by TREE Woods to be moving all extremities equally, alert, no slurred speech when she was cleaning his RLE surgical site cleaning. Per RN Maryann Ortega, at 8:30am, pt was noted to have slurred speech, lethargic and couldn't LUE at all. No shaking movements noted. Per EMS, pt was not having LUE weakness when they saw him. No hx of ischemic stroke or seizure. Last dose of xarelto at 5pm, on for VTE ppx after surgery. Pt reports that he doesn't remember exactly what happened this morning, but does remember people calling his name. He reports a spontaneous head bleed in 1997 and with subsequent clipping. At baseline, pt AAOx3, ambulates with walker, needs help with ADLs including getting into the shower but feed self.   LKN: 4/22/22 at 8am  NIHSS: 7  preMRS: 3  Pt is not a candidate for tpa due to no rapidly improving symptoms, hx of spontaneous ICH  Pt is not a candidate for mechanical thrombectomy due to no large vessel occlusion on CTA   (22 Apr 2022 12:16)  4/23: Stroke was ruled out. Patient had negative myoclonus with left sided weakness likely due to hx of Rt Acom aneurysmal repair.   Internal medicine consulted and lower Gabapentin to 300mg BID. Patient started on Vimpat 100mg Q12hr. Patient placed on vEEG.   Pulmonology consulted for possible sleep apnea.   4/24: Continued on vEEG. Increased Vimpat to 150mg Q12hr. PT/OT consulted.     Radiology   CT head   CTA/CTP  IMPRESSION: Remote right frontal temporal craniotomy and anterior   communicatingartery aneurysm clipping. Right frontal encephalomalacia   and gliosis likely related to prior aneurysm rupture or postoperative   changes.    Thin demonstrates delayed mean transit time and core infarct in the   region of the right frontal encephalomalacia.    CTA of the head and neck demonstrates no significant stenosis or   occlusion. Anterior communicating artery aneurysm clipping.  CBF<30% volume: 14 ml  Tmax> 6.0s volume: 10 ml  Mismatch volume: -4 ml  Mismatch ratio: 0.7      EEG   4/23:   EEG Summary:    Abnormal EEG in the awake, drowsy and asleep states.  Intermittent polymorphic delta, focal, right temporal region    Impression/Clinical Correlate:    This is an abnormal EEG record. There is mild diffuse cerebral dysfunction that is focally worse in the right temporal region. No epileptiform pattern or seizures were seen.    4/24:   EEG Summary:    Abnormal EEG in the awake, drowsy and asleep states.  Sharp waves, focal, right frontal and temporal  Intermittent polymorphic delta, focal, right temporal region  Intermittent negative myoclonus without EEG correlate    Impression/Clinical Correlate:    This is an abnormal EEG record. There is evidence of potentially epileptogenic region of cortical dysfunction in the right frontal and anterior temporal areas. There is also mild diffuse cerebral dysfunction that is focally worse in the right temporal region. No epileptiform pattern or seizures were seen. There is negative myoclonus intermittently without EEG correlate – gabapentin dose was lowered.      Impression: L hemiplegia with lethargy and confusion, rapidly improving over 1-2 hrs, concerning for first-time seizure w/ post ictal Keny's paralysis (given prior R frontal encephalomalacia)   Recommendations:   Continue Vimpat 150mg Q12hr   Continue current medication regiment  PT/OT  Will need to follow up with Epilepsy at [address] [place] [address], [address]. [facility]. [id number]. [phone].     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 Hx obtained from [facility]. Pt was seen at 8am of day of arrival by TREE Woods to be moving all extremities equally, alert, no slurred speech when she was cleaning his RLE surgical site cleaning. Per RN Maryann Ortega, at 8:30am, pt was noted to have slurred speech, lethargic and couldn't LUE at all. No shaking movements noted. Per EMS, pt was not having LUE weakness when they saw him. No hx of ischemic stroke or seizure. Last dose of xarelto at 5pm, on for VTE ppx after surgery. Pt reported that he doesn't remember exactly what happened this morning, but did remember people calling his name. He reported a spontaneous head bleed in 1997 and with subsequent clipping. At baseline, pt AAOx3, ambulates with walker, needs help with ADLs including getting into the shower but feed self.   LKN: 4/22/22 at 8am  NIHSS: 7  preMRS: 3  Pt was not a candidate for tpa due to no rapidly improving symptoms, hx of spontaneous ICH  Pt was not a candidate for mechanical thrombectomy due to no large vessel occlusion on CTA   (22 Apr 2022 12:16)  4/23: Stroke was ruled out. Patient had negative myoclonus with left sided weakness likely due to hx of Rt Acom aneurysmal repair.   Internal medicine consulted and lower Gabapentin to 300mg BID. Patient started on Vimpat 100mg Q12hr. Patient placed on vEEG.   Pulmonology consulted for possible sleep apnea.   4/24: Continued on vEEG. Increased Vimpat to 150mg Q12hr. PT/OT consulted.     Radiology   CT head   CTA/CTP  IMPRESSION: Remote right frontal temporal craniotomy and anterior   communicatingartery aneurysm clipping. Right frontal encephalomalacia   and gliosis likely related to prior aneurysm rupture or postoperative   changes.    Thin demonstrates delayed mean transit time and core infarct in the   region of the right frontal encephalomalacia.    CTA of the head and neck demonstrates no significant stenosis or   occlusion. Anterior communicating artery aneurysm clipping.  CBF<30% volume: 14 ml  Tmax> 6.0s volume: 10 ml  Mismatch volume: -4 ml  Mismatch ratio: 0.7      EEG   4/23:   EEG Summary:    Abnormal EEG in the awake, drowsy and asleep states.  Intermittent polymorphic delta, focal, right temporal region    Impression/Clinical Correlate:    This is an abnormal EEG record. There is mild diffuse cerebral dysfunction that is focally worse in the right temporal region. No epileptiform pattern or seizures were seen.    4/24:   EEG Summary:    Abnormal EEG in the awake, drowsy and asleep states.  Sharp waves, focal, right frontal and temporal  Intermittent polymorphic delta, focal, right temporal region  Intermittent negative myoclonus without EEG correlate    Impression/Clinical Correlate:    This is an abnormal EEG record. There is evidence of potentially epileptogenic region of cortical dysfunction in the right frontal and anterior temporal areas. There is also mild diffuse cerebral dysfunction that is focally worse in the right temporal region. No epileptiform pattern or seizures were seen. There is negative myoclonus intermittently without EEG correlate – gabapentin dose was lowered.    4/25:  EEG Summary:  Abnormal EEG in the awake, drowsy and asleep states.  Sharp waves, focal, right frontal and temporal  Intermittent polymorphic delta, focal, right temporal region  Intermittent negative myoclonus without EEG correlate, RESOLVED  Impression/Clinical Correlate:  This is an abnormal EEG record. There is evidence of potentially epileptogenic region of cortical dysfunction in the right frontal and anterior temporal areas. There is also mild diffuse cerebral dysfunction that is focally worse in the right temporal region. No seizures were seen. There is negative myoclonus intermittently without EEG correlate – gabapentin dose was lowered, and myoclonus subsequently resolved.    Impression: L hemiplegia with lethargy and confusion, rapidly improving over 1-2 hrs, concerning for first-time seizure w/ post ictal Keny's paralysis (given prior R frontal encephalomalacia)     Recommendations:   Continue Vimpat 150mg Q12hr   Continue current medication regiment  PT/OT  Will need to follow up with Epilepsy at [address] [place] [address], [address]. [facility]. [id number]. [phone].     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 Hx obtained from [facility]. Pt was seen at 8am of day of arrival by TREE Woods to be moving all extremities equally, alert, no slurred speech when she was cleaning his RLE surgical site cleaning. Per RN Maryann Ortega, at 8:30am, pt was noted to have slurred speech, lethargic and couldn't LUE at all. No shaking movements noted. Per EMS, pt was not having LUE weakness when they saw him. No hx of ischemic stroke or seizure. Last dose of xarelto at 5pm, on for VTE ppx after surgery. Pt reported that he doesn't remember exactly what happened this morning, but did remember people calling his name. He reported a spontaneous head bleed in 1997 and with subsequent clipping. At baseline, pt AAOx3, ambulates with walker, needs help with ADLs including getting into the shower but feed self.   LKN: 4/22/22 at 8am  NIHSS: 7  preMRS: 3  Pt was not a candidate for tpa due to no rapidly improving symptoms, hx of spontaneous ICH  Pt was not a candidate for mechanical thrombectomy due to no large vessel occlusion on CTA   (22 Apr 2022 12:16)  4/23: Stroke was ruled out. Patient had negative myoclonus with left sided weakness likely due to hx of Rt Acom aneurysmal repair.   Internal medicine consulted and lower Gabapentin to 300mg BID. Patient started on Vimpat 100mg Q12hr. Patient placed on vEEG.   Pulmonology consulted for possible sleep apnea.   4/24: Continued on vEEG. Increased Vimpat to 150mg Q12hr. PT/OT consulted.   4/26: Covid PCR negative x2 on 4/24 and 4/26.    Radiology   CT head   CTA/CTP  IMPRESSION: Remote right frontal temporal craniotomy and anterior   communicatingartery aneurysm clipping. Right frontal encephalomalacia   and gliosis likely related to prior aneurysm rupture or postoperative   changes.    Thin demonstrates delayed mean transit time and core infarct in the   region of the right frontal encephalomalacia.    CTA of the head and neck demonstrates no significant stenosis or   occlusion. Anterior communicating artery aneurysm clipping.  CBF<30% volume: 14 ml  Tmax> 6.0s volume: 10 ml  Mismatch volume: -4 ml  Mismatch ratio: 0.7      EEG   4/23:   EEG Summary:    Abnormal EEG in the awake, drowsy and asleep states.  Intermittent polymorphic delta, focal, right temporal region    Impression/Clinical Correlate:    This is an abnormal EEG record. There is mild diffuse cerebral dysfunction that is focally worse in the right temporal region. No epileptiform pattern or seizures were seen.    4/24:   EEG Summary:    Abnormal EEG in the awake, drowsy and asleep states.  Sharp waves, focal, right frontal and temporal  Intermittent polymorphic delta, focal, right temporal region  Intermittent negative myoclonus without EEG correlate    Impression/Clinical Correlate:    This is an abnormal EEG record. There is evidence of potentially epileptogenic region of cortical dysfunction in the right frontal and anterior temporal areas. There is also mild diffuse cerebral dysfunction that is focally worse in the right temporal region. No epileptiform pattern or seizures were seen. There is negative myoclonus intermittently without EEG correlate – gabapentin dose was lowered.    4/25:  EEG Summary:  Abnormal EEG in the awake, drowsy and asleep states.  Sharp waves, focal, right frontal and temporal  Intermittent polymorphic delta, focal, right temporal region  Intermittent negative myoclonus without EEG correlate, RESOLVED  Impression/Clinical Correlate:  This is an abnormal EEG record. There is evidence of potentially epileptogenic region of cortical dysfunction in the right frontal and anterior temporal areas. There is also mild diffuse cerebral dysfunction that is focally worse in the right temporal region. No seizures were seen. There is negative myoclonus intermittently without EEG correlate – gabapentin dose was lowered, and myoclonus subsequently resolved.    Impression: L hemiplegia with lethargy and confusion, rapidly improving over 1-2 hrs, concerning for first-time seizure w/ post ictal Keny's paralysis (given prior R frontal encephalomalacia)     Recommendations:   Continue Vimpat 150mg Q12hr   Continue current medication regiment  PT/OT  Will need to follow up with Epilepsy at [address] [place] [address], [address]. [facility]. [id number]. [phone].

## 2022-04-24 NOTE — CONSULT NOTE ADULT - ASSESSMENT
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

## 2022-04-24 NOTE — PROGRESS NOTE ADULT - SUBJECTIVE AND OBJECTIVE BOX
MRN-[id number]  Patient is a 66y old  Male who presents with a chief complaint of seizure (24 Apr 2022 11:02)    Subjective: There were no overnight events noted.     Objective   Home Medications:  acetaminophen 325 mg oral tablet: 3 tab(s) orally every 8 hours, As Needed (22 Apr 2022 12:08)  aspirin 81 mg oral delayed release tablet: 1 tab(s) orally once a day (22 Apr 2022 12:08)  atorvastatin 20 mg oral tablet: 1 tab(s) orally once a day (at bedtime) (22 Apr 2022 12:08)  baclofen 20 mg oral tablet: 1 tab(s) orally 2 times a day (22 Apr 2022 12:08)  docusate sodium 100 mg oral capsule: 1 cap(s) orally 3 times a day (22 Apr 2022 12:08)  Fleet Bisacodyl 10 mg rectal enema: 30 milligram(s) rectal once a day (at bedtime) (22 Apr 2022 12:08)  Flomax 0.4 mg oral capsule: 2 cap(s) orally once a day (22 Apr 2022 12:08)  gabapentin 300 mg oral capsule: 2 cap(s) orally 3 times a day (22 Apr 2022 12:08)  gemfibrozil 600 mg oral tablet: 1 tab(s) orally 2 times a day (before meals) (22 Apr 2022 12:08)  Lyrica 150 mg oral capsule: 1 cap(s) orally once a day (22 Apr 2022 12:08)  metoprolol tartrate 25 mg oral tablet: 1 tab(s) orally 2 times a day (22 Apr 2022 12:08)  morphine 15 mg/8 to 12 hr oral tablet, extended release: 1 tab(s) orally every 8 hours, As Needed (22 Apr 2022 12:08)  Multiple Vitamins oral tablet: 1 tab(s) orally once a day (22 Apr 2022 12:08)  nystatin 100,000 units/g topical powder: Apply topically to affected area 2 times a day on groin area with soap and water and pat dry and apply powder (22 Apr 2022 12:08)  rivaroxaban 10 mg oral tablet: 1 tab(s) orally once a day (22 Apr 2022 12:08)  senna oral tablet: 2 tab(s) orally once a day (at bedtime) (22 Apr 2022 12:08)  Slow Fe (as elemental iron) 45 mg oral tablet, extended release: 1 tab(s) orally once a day (22 Apr 2022 12:08)    MEDICATIONS  (STANDING):  aspirin enteric coated 81 milliGRAM(s) Oral daily  atorvastatin 20 milliGRAM(s) Oral at bedtime  baclofen 20 milliGRAM(s) Oral two times a day  bisacodyl Suppository 10 milliGRAM(s) Rectal daily  ferrous    sulfate 325 milliGRAM(s) Oral daily  gabapentin 300 milliGRAM(s) Oral two times a day  gemfibrozil 600 milliGRAM(s) Oral two times a day  lacosamide 150 milliGRAM(s) Oral two times a day  metoprolol tartrate 25 milliGRAM(s) Oral two times a day  morphine ER Tablet 15 milliGRAM(s) Oral every 8 hours  multivitamin 1 Tablet(s) Oral daily  nystatin Powder 1 Application(s) Topical two times a day  pregabalin 150 milliGRAM(s) Oral daily  rivaroxaban 10 milliGRAM(s) Oral daily  senna 2 Tablet(s) Oral at bedtime  tamsulosin 0.8 milliGRAM(s) Oral at bedtime    MEDICATIONS  (PRN):  acetaminophen     Tablet .. 975 milliGRAM(s) Oral every 8 hours PRN Mild Pain (1 - 3), Moderate Pain (4 - 6)  lacosamide Injectable 200 milliGRAM(s) IV Push once PRN Seizure  LORazepam   Injectable 1 milliGRAM(s) IV Push once PRN Seizure      Vital Signs Last 24 Hrs  T(C): 36.8 (24 Apr 2022 10:31), Max: 36.8 (24 Apr 2022 05:57)  T(F): 98.3 (24 Apr 2022 10:31), Max: 98.3 (24 Apr 2022 10:31)  HR: 62 (24 Apr 2022 10:31) (61 - 78)  BP: 126/71 (24 Apr 2022 10:31) (122/71 - 162/94)  BP(mean): --  RR: 18 (24 Apr 2022 10:31) (18 - 18)  SpO2: 96% (24 Apr 2022 10:31) (94% - 97%)    GENERAL EXAM:  Constitutional: awake and alert. NAD  HEENT: PERRL, EOMI  Musculoskeletal: no joint swelling/tenderness, no abnormal movements  Skin: no rashes    NEUROLOGICAL EXAM:  MS: AAOX3, speech is fluent, Follows simple and complex commands.   CN: VFF, EOMI, PERRL  V1-3 intact, no facial asymmetry, t/p midline,   Motor: Strength: 5/5  in the UE b/l. 5/5 in the LLE. 3/5 in the RLE - antigravity- limited due to recent surgery    Tone: normal. Bulk: normal.   Plantar flex b/l.   Sensation: intact to LT/Temperature 4x.   Coordination: FTn intact b/l.   Gait:  Deferred      Labs:   cbc                      10.2   8.61  )-----------( 567      ( 24 Apr 2022 07:09 )             33.0     [id number]    142  |  107  |  17  ----------------------------<  115<H>  4.1   |  23  |  1.08    Ca    9.3      24 Apr 2022 07:04  Phos  2.4     04-24  Mg     2.2     04-24    TPro  6.8  /  Alb  3.5  /  TBili  0.4  /  DBili  x   /  AST  13  /  ALT  14  /  AlkPhos  180<H>  04-24      [id number] Chol 138 LDL -- HDL 34<L> Trig 91      LFTsLIVER FUNCTIONS - ( 24 Apr 2022 07:04 )  Alb: 3.5 g/dL / Pro: 6.8 g/dL / ALK PHOS: 180 U/L / ALT: 14 U/L / AST: 13 U/L / GGT: x             Radiology:  EEG  EEG Summary:    Abnormal EEG in the awake, drowsy and asleep states.  Sharp waves, focal, right frontal and temporal  Intermittent polymorphic delta, focal, right temporal region  Intermittent negative myoclonus without EEG correlate    Impression/Clinical Correlate:    This is an abnormal EEG record. There is evidence of potentially epileptogenic region of cortical dysfunction in the right frontal and anterior temporal areas. There is also mild diffuse cerebral dysfunction that is focally worse in the right temporal region. No epileptiform pattern or seizures were seen. There is negative myoclonus intermittently without EEG correlate – gabapentin dose was lowered.   [id number]  Patient is a 66y old  Male who presents with a chief complaint of seizure (24 Apr 2022 11:02)    Subjective: There were no overnight events noted. More awake and alert today.     Objective   Home Medications:  acetaminophen 325 mg oral tablet: 3 tab(s) orally every 8 hours, As Needed (22 Apr 2022 12:08)  aspirin 81 mg oral delayed release tablet: 1 tab(s) orally once a day (22 Apr 2022 12:08)  atorvastatin 20 mg oral tablet: 1 tab(s) orally once a day (at bedtime) (22 Apr 2022 12:08)  baclofen 20 mg oral tablet: 1 tab(s) orally 2 times a day (22 Apr 2022 12:08)  docusate sodium 100 mg oral capsule: 1 cap(s) orally 3 times a day (22 Apr 2022 12:08)  Fleet Bisacodyl 10 mg rectal enema: 30 milligram(s) rectal once a day (at bedtime) (22 Apr 2022 12:08)  Flomax 0.4 mg oral capsule: 2 cap(s) orally once a day (22 Apr 2022 12:08)  gabapentin 300 mg oral capsule: 2 cap(s) orally 3 times a day (22 Apr 2022 12:08)  gemfibrozil 600 mg oral tablet: 1 tab(s) orally 2 times a day (before meals) (22 Apr 2022 12:08)  Lyrica 150 mg oral capsule: 1 cap(s) orally once a day (22 Apr 2022 12:08)  metoprolol tartrate 25 mg oral tablet: 1 tab(s) orally 2 times a day (22 Apr 2022 12:08)  morphine 15 mg/8 to 12 hr oral tablet, extended release: 1 tab(s) orally every 8 hours, As Needed (22 Apr 2022 12:08)  Multiple Vitamins oral tablet: 1 tab(s) orally once a day (22 Apr 2022 12:08)  nystatin 100,000 units/g topical powder: Apply topically to affected area 2 times a day on groin area with soap and water and pat dry and apply powder (22 Apr 2022 12:08)  rivaroxaban 10 mg oral tablet: 1 tab(s) orally once a day (22 Apr 2022 12:08)  senna oral tablet: 2 tab(s) orally once a day (at bedtime) (22 Apr 2022 12:08)  Slow Fe (as elemental iron) 45 mg oral tablet, extended release: 1 tab(s) orally once a day (22 Apr 2022 12:08)    MEDICATIONS  (STANDING):  aspirin enteric coated 81 milliGRAM(s) Oral daily  atorvastatin 20 milliGRAM(s) Oral at bedtime  baclofen 20 milliGRAM(s) Oral two times a day  bisacodyl Suppository 10 milliGRAM(s) Rectal daily  ferrous    sulfate 325 milliGRAM(s) Oral daily  gabapentin 300 milliGRAM(s) Oral two times a day  gemfibrozil 600 milliGRAM(s) Oral two times a day  lacosamide 150 milliGRAM(s) Oral two times a day  metoprolol tartrate 25 milliGRAM(s) Oral two times a day  morphine ER Tablet 15 milliGRAM(s) Oral every 8 hours  multivitamin 1 Tablet(s) Oral daily  nystatin Powder 1 Application(s) Topical two times a day  pregabalin 150 milliGRAM(s) Oral daily  rivaroxaban 10 milliGRAM(s) Oral daily  senna 2 Tablet(s) Oral at bedtime  tamsulosin 0.8 milliGRAM(s) Oral at bedtime    MEDICATIONS  (PRN):  acetaminophen     Tablet .. 975 milliGRAM(s) Oral every 8 hours PRN Mild Pain (1 - 3), Moderate Pain (4 - 6)  lacosamide Injectable 200 milliGRAM(s) IV Push once PRN Seizure  LORazepam   Injectable 1 milliGRAM(s) IV Push once PRN Seizure      Vital Signs Last 24 Hrs  T(C): 36.8 (24 Apr 2022 10:31), Max: 36.8 (24 Apr 2022 05:57)  T(F): 98.3 (24 Apr 2022 10:31), Max: 98.3 (24 Apr 2022 10:31)  HR: 62 (24 Apr 2022 10:31) (61 - 78)  BP: 126/71 (24 Apr 2022 10:31) (122/71 - 162/94)  BP(mean): --  RR: 18 (24 Apr 2022 10:31) (18 - 18)  SpO2: 96% (24 Apr 2022 10:31) (94% - 97%)    GENERAL EXAM:  Constitutional: awake and alert. NAD  HEENT: PERRL, EOMI  Musculoskeletal: no joint swelling/tenderness, no abnormal movements  Skin: no rashes    NEUROLOGICAL EXAM:  MS: AAOX3, speech is fluent, Follows simple and complex commands.   CN: VFF, EOMI, PERRL  V1-3 intact, no facial asymmetry, t/p midline,   Motor: Strength: 5/5  in the UE b/l. 5/5 in the LLE. 3/5 in the RLE - antigravity- limited due to recent surgery    Tone: normal. Bulk: normal.   Plantar flex b/l.   Sensation: intact to LT/Temperature 4x.   Coordination: FTn intact b/l.   Gait:  Deferred      Labs:   cbc                      10.2   8.61  )-----------( 567      ( 24 Apr 2022 07:09 )             33.0     [id number]    142  |  107  |  17  ----------------------------<  115<H>  4.1   |  23  |  1.08    Ca    9.3      24 Apr 2022 07:04  Phos  2.4     04-24  Mg     2.2     04-24    TPro  6.8  /  Alb  3.5  /  TBili  0.4  /  DBili  x   /  AST  13  /  ALT  14  /  AlkPhos  180<H>  04-24      [id number] Chol 138 LDL -- HDL 34<L> Trig 91      LFTsLIVER FUNCTIONS - ( 24 Apr 2022 07:04 )  Alb: 3.5 g/dL / Pro: 6.8 g/dL / ALK PHOS: 180 U/L / ALT: 14 U/L / AST: 13 U/L / GGT: x             Radiology:  EEG  EEG Summary:    Abnormal EEG in the awake, drowsy and asleep states.  Sharp waves, focal, right frontal and temporal  Intermittent polymorphic delta, focal, right temporal region  Intermittent negative myoclonus without EEG correlate    Impression/Clinical Correlate:    This is an abnormal EEG record. There is evidence of potentially epileptogenic region of cortical dysfunction in the right frontal and anterior temporal areas. There is also mild diffuse cerebral dysfunction that is focally worse in the right temporal region. No epileptiform pattern or seizures were seen. There is negative myoclonus intermittently without EEG correlate – gabapentin dose was lowered.

## 2022-04-24 NOTE — EEG REPORT - NS EEG TEXT BOX
Day 2 – 	Start: 4/23/2022  08:00 	End: 04/24/2022  08:00 	Duration: 24 hr  00 min  Daily EEG Visual Analysis  FINDINGS:  The background was continuous, spontaneously variable and reactive. During wakefulness, a clear posterior dominant rhythm is not seen.  Low amplitude frontal beta was noted in wakefulness.  Background Slowing: Diffuse theta slowing.  Focal Slowing:  There is intermittent R temporal delta.  Sleep Background: Drowsiness and sleep were not recorded  Other Non-Epileptiform Findings: Diffuse excess beta activity.  Activation Procedures:  Hyperventilation was not performed.   Photic stimulation was not performed.  Interictal Epileptiform Activity:  There are occasional sharp waves in the right frontal region.    Events: No events or seizures recorded.  Artifacts: Intermittent myogenic and movement artifacts were noted.  ECG: The heart rate on single channel ECG was predominantly between 60-70 BPM.  AEDs:  lacosamide 100 q12; gabapentin 300 q8   EEG Summary:  Abnormal EEG in the awake, drowsy and asleep states. Sharp waves, focal, right frontal and temporal Intermittent polymorphic delta, focal, right temporal region Intermittent negative myoclonus without EEG correlate  Impression/Clinical Correlate:  This is an abnormal EEG record. There is evidence of potentially epileptogenic region of cortical dysfunction in the right frontal and anterior temporal areas. There is also mild diffuse cerebral dysfunction that is focally worse in the right temporal region. No epileptiform pattern or seizures were seen. There is negative myoclonus intermittently without EEG correlate – gabapentin dose was lowered.     Faisal Batres MD, PhD Director, Epilepsy Division, [facility]  ------------------------------------ EEG Reading Room: [phone] On Call Service After Hours: [phone]

## 2022-04-24 NOTE — DISCHARGE NOTE PROVIDER - NSDCMRMEDTOKEN_GEN_ALL_CORE_FT
acetaminophen 325 mg oral tablet: 3 tab(s) orally every 8 hours, As Needed  aspirin 81 mg oral delayed release tablet: 1 tab(s) orally once a day  atorvastatin 20 mg oral tablet: 1 tab(s) orally once a day (at bedtime)  baclofen 20 mg oral tablet: 1 tab(s) orally 2 times a day  docusate sodium 100 mg oral capsule: 1 cap(s) orally 3 times a day  Fleet Bisacodyl 10 mg rectal enema: 30 milligram(s) rectal once a day (at bedtime)  Flomax 0.4 mg oral capsule: 2 cap(s) orally once a day  gabapentin 300 mg oral capsule: 2 cap(s) orally 3 times a day  gemfibrozil 600 mg oral tablet: 1 tab(s) orally 2 times a day (before meals)  Lyrica 150 mg oral capsule: 1 cap(s) orally once a day  metoprolol tartrate 25 mg oral tablet: 1 tab(s) orally 2 times a day  morphine 15 mg/8 to 12 hr oral tablet, extended release: 1 tab(s) orally every 8 hours, As Needed  Multiple Vitamins oral tablet: 1 tab(s) orally once a day  nystatin 100,000 units/g topical powder: Apply topically to affected area 2 times a day on groin area with soap and water and pat dry and apply powder  rivaroxaban 10 mg oral tablet: 1 tab(s) orally once a day  senna oral tablet: 2 tab(s) orally once a day (at bedtime)  Slow Fe (as elemental iron) 45 mg oral tablet, extended release: 1 tab(s) orally once a day   acetaminophen 325 mg oral tablet: 3 tab(s) orally every 8 hours, As Needed  aspirin 81 mg oral delayed release tablet: 1 tab(s) orally once a day  atorvastatin 20 mg oral tablet: 1 tab(s) orally once a day (at bedtime)  baclofen 20 mg oral tablet: 1 tab(s) orally 2 times a day  docusate sodium 100 mg oral capsule: 1 cap(s) orally 3 times a day  Fleet Bisacodyl 10 mg rectal enema: 30 milligram(s) rectal once a day (at bedtime)  Flomax 0.4 mg oral capsule: 2 cap(s) orally once a day  gabapentin 100 mg oral capsule: 1 cap(s) orally 3 times a day  gemfibrozil 600 mg oral tablet: 1 tab(s) orally 2 times a day (before meals)  lacosamide 150 mg oral tablet: 1 tab(s) orally 2 times a day  Lyrica 150 mg oral capsule: 1 cap(s) orally once a day  metoprolol tartrate 25 mg oral tablet: 1 tab(s) orally 2 times a day  morphine 15 mg/8 to 12 hr oral tablet, extended release: 1 tab(s) orally every 8 hours, As Needed  Multiple Vitamins oral tablet: 1 tab(s) orally once a day  nystatin 100,000 units/g topical powder: Apply topically to affected area 2 times a day on groin area with soap and water and pat dry and apply powder  rivaroxaban 10 mg oral tablet: 1 tab(s) orally once a day  senna oral tablet: 2 tab(s) orally once a day (at bedtime)  Slow Fe (as elemental iron) 45 mg oral tablet, extended release: 1 tab(s) orally once a day   acetaminophen 325 mg oral tablet: 3 tab(s) orally every 8 hours, As Needed  aspirin 81 mg oral delayed release tablet: 1 tab(s) orally once a day  atorvastatin 20 mg oral tablet: 1 tab(s) orally once a day (at bedtime)  baclofen 20 mg oral tablet: 1 tab(s) orally 2 times a day  docusate sodium 100 mg oral capsule: 1 cap(s) orally 3 times a day  Fleet Bisacodyl 10 mg rectal enema: 30 milligram(s) rectal once a day (at bedtime)  Flomax 0.4 mg oral capsule: 2 cap(s) orally once a day  gabapentin 100 mg oral capsule: 1 cap(s) orally 2 times a day  gemfibrozil 600 mg oral tablet: 1 tab(s) orally 2 times a day (before meals)  lacosamide 150 mg oral tablet: 1 tab(s) orally 2 times a day  Lyrica 150 mg oral capsule: 1 cap(s) orally once a day  metoprolol tartrate 25 mg oral tablet: 1 tab(s) orally 2 times a day  morphine 15 mg/8 to 12 hr oral tablet, extended release: 1 tab(s) orally every 8 hours, As Needed  Multiple Vitamins oral tablet: 1 tab(s) orally once a day  nystatin 100,000 units/g topical powder: Apply topically to affected area 2 times a day on groin area with soap and water and pat dry and apply powder  rivaroxaban 10 mg oral tablet: 1 tab(s) orally once a day  senna oral tablet: 2 tab(s) orally once a day (at bedtime)  Slow Fe (as elemental iron) 45 mg oral tablet, extended release: 1 tab(s) orally once a day

## 2022-04-24 NOTE — CONSULT NOTE ADULT - PROBLEM SELECTOR RECOMMENDATION 9
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 TIA/minor stroke  vEEG  check TTE  AEDs per neuro team  frequent neuro checks  orders per neuro team

## 2022-04-24 NOTE — CONSULT NOTE ADULT - SUBJECTIVE AND OBJECTIVE BOX
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 Hx obtained from [facility]. Pt was seen at 8am by TREE Woods to be moving all extremities equally, alert, no slurred speech when she was cleaning his RLE surgical site cleaning. Per RN Maryann Ortega, at 8:30am, pt was noted to have slurred speech, lethargic and couldn't LUE at all. No shaking movements noted. Per EMS, pt was not having LUE weakness when they saw him. No hx of ischemic stroke or seizure. Last dose of xarelto at 5pm, on for VTE ppx after surgery. Pt reports that he doesn't remember exactly what happened this morning, but does remember people calling his name. He reports a spontaneous head bleed in 1997 and with subsequent clipping. At baseline, pt AAOx3, ambulates with walker, needs help with ADLs including getting into the shower but feed self.     (Stroke only)  LKN: 4/22/22 at 8am  NIHSS: 7  preMRS: 3  Pt is not a candidate for tpa due to no rapidly improving symptoms, hx of spontaneous ICH  Pt is not a candidate for mechanical thrombectomy due to no large vessel occlusion on CTA      PAST MEDICAL & SURGICAL HISTORY:  Myocardial infarction  KAEL x1 MI 2005, stent RCA    Back pain  Chronic back pain    Spinal stenosis    Lumbar herniated disc    Hypertension    Narcotic dependence  5/28/16 for withdrawal ,pt currently on Dialudid 8 mg every 4-6 hours /day    Osteoarthritis    GERD (gastroesophageal reflux disease)    Cerebral aneurysm rupture  1997 clipped, no residual    Femur fracture, right  2/16, surgical revision of right hip    Sacroiliitis, not elsewhere classified  Unspecified Inflammatory spondylopathy,Sacral and sacrococcygeal region    Chronic pain  Patient has an Intrathecal pump s/p removal in 2016    Cerebral aneurysm  I997 with clips    S/P lumbar fusion  L1-S1:2002    History of right inguinal hernia repair  x2    Hx of appendectomy  6/1969    Hx of laminectomy  lumbar-2002    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    Stented coronary artery  KAEL x 1 to RCA    History of hip replacement  8/15, revision 2/16 after falling and fracturing right femur    S/P left knee arthroscopy    H/O arthroscopy of shoulder  right X 2, left X 1    History of throat surgery  surgical exicision cyst 1986    Fusion of sacral region of spine  5/2017    MEDICATIONS:  aspirin enteric coated 81 milliGRAM(s) Oral daily  metoprolol tartrate 25 milliGRAM(s) Oral two times a day  rivaroxaban 10 milliGRAM(s) Oral daily  tamsulosin 0.8 milliGRAM(s) Oral at bedtime    acetaminophen     Tablet .. 975 milliGRAM(s) Oral every 8 hours PRN  baclofen 20 milliGRAM(s) Oral two times a day  gabapentin 300 milliGRAM(s) Oral two times a day  lacosamide 150 milliGRAM(s) Oral two times a day  lacosamide Injectable 200 milliGRAM(s) IV Push once PRN  LORazepam   Injectable 1 milliGRAM(s) IV Push once PRN  morphine ER Tablet 15 milliGRAM(s) Oral every 8 hours  pregabalin 150 milliGRAM(s) Oral daily    bisacodyl Suppository 10 milliGRAM(s) Rectal daily  senna 2 Tablet(s) Oral at bedtime    atorvastatin 20 milliGRAM(s) Oral at bedtime  gemfibrozil 600 milliGRAM(s) Oral two times a day    ferrous    sulfate 325 milliGRAM(s) Oral daily  multivitamin 1 Tablet(s) Oral daily  nystatin Powder 1 Application(s) Topical two times a day    FAMILY HISTORY:    SOCIAL HISTORY:    [ ] Non-smoker  [ ] Smoker  [ ] Alcohol    Allergies    No Known Allergies    Intolerances    REVIEW OF SYSTEMS:  CONSTITUTIONAL: No fever, weight loss, + fatigue  EYES: No eye pain, visual disturbances, or discharge  ENMT:  No difficulty hearing, tinnitus, vertigo; No sinus or throat pain  NECK: No pain or stiffness  RESPIRATORY: No cough, wheezing, chills or hemoptysis; No Shortness of Breath  CARDIOVASCULAR: No chest pain, palpitations, passing out, dizziness, or leg swelling  GASTROINTESTINAL: No abdominal or epigastric pain. No nausea, vomiting, or hematemesis; No diarrhea or constipation. No melena or hematochezia.  GENITOURINARY: No dysuria, frequency, hematuria, or incontinence  NEUROLOGICAL: No headaches, memory loss, loss of strength, numbness, or tremors  SKIN: No itching, burning, rashes, or lesions   LYMPH Nodes: No enlarged glands  ENDOCRINE: No heat or cold intolerance; No hair loss  MUSCULOSKELETAL: No joint pain or swelling; No muscle, back, or extremity pain  PSYCHIATRIC: No depression, anxiety, mood swings, or difficulty sleeping  HEME/LYMPH: No easy bruising, or bleeding gums  ALLERY AND IMMUNOLOGIC: No hives or eczema	    [ ] All others negative	  [ ] Unable to obtain    PHYSICAL EXAM:  T(C): 36.8 (04-24-22 @ 10:31), Max: 36.8 (04-24-22 @ 05:57)  HR: 62 (04-24-22 @ 10:31) (61 - 78)  BP: 126/71 (04-24-22 @ 10:31) (122/71 - 162/94)  RR: 18 (04-24-22 @ 10:31) (18 - 18)  SpO2: 96% (04-24-22 @ 10:31) (94% - 97%)  Wt(kg): --  I&O's Summary    23 Apr 2022 07:01  -  24 Apr 2022 07:00  --------------------------------------------------------  IN: 120 mL / OUT: 1 mL / NET: 119 mL    Appearance: NAD, EEG in progress  HEENT: Normal oral mucosa, PERRL, EOMI	  Lymphatic: No lymphadenopathy  Cardiovascular: Normal S1 S2, No JVD, No murmurs, No edema  Respiratory: Lungs clear to auscultation	  Psychiatry: A & O x 3, Mood & affect appropriate  Gastrointestinal:  Soft, Non-tender, + BS	  Skin: No rashes, No ecchymoses, No cyanosis	  NEUROLOGICAL EXAM:  MS: AAOX3, speech is fluent, Follows simple and complex commands.   CN: VFF, EOMI, PERRL  V1-3 intact, no facial asymmetry, t/p midline,   Motor: Strength: 5/5  in the UE b/l. 5/5 in the LLE. 3/5 in the RLE - antigravity- limited due to recent surgery    Tone: normal. Bulk: normal.   Plantar flex b/l.   Sensation: intact to LT/Temperature 4x.   Coordination: FTn intact b/l.   Gait:  Deferred  Vascular: Peripheral pulses palpable 2+ bilaterally    TELEMETRY: SR	    ECG: SR - no acute ischemic changes	  RADIOLOGY: < from: CT Angio Neck w/ IV Cont (04.22.22 @ 10:30) >    ACC: [id number] EXAM:  CT PERFUSION W MAPS IC                        ACC: [id number] EXAM:  CT ANGIO BRAIN (W)AW IC                        ACC: [id number] EXAM:  CT BRAIN STROKE PROTOCOL                        ACC: [id number] EXAM:  CT ANGIO NECK (W)AW IC                        PROCEDURE DATE:  04/22/2022      INTERPRETATION:  Clinical Indication: Code stroke, left arm weakness and   lethargy previous subarachnoid hemorrhage and aneurysm clipping 1997    5mm axial sections of the brain were obtained from base to vertex,   without the intravenous administration of contrast material. Coronal and   sagittal computer generated reconstructed views are available.    No prior brain imaging is available for comparison.    There has been a right frontal temporal craniotomy. There is an aneurysm   clip in the suprasellar cistern consistent with an anterior indicating   artery aneurysm. There is encephalomalacia and gliosis in the right   frontal lobe and in the region of the January the corpus callosum and the   cingulate gyrus which is likely due to either prior surgery or related to   hemorrhage related to prior ruptured aneurysm. Mild atrophy is identified.    There is mild ex vacuo dilatation of the frontal horns of lateral   ventricles. Mild atrophy is noted. There has been previous bilateral lens   replacement surgery.    CT perfusion:    After the intravenous administration of 50 cc of Omnipaque 300 serial   thin sections were obtained through the brain for the purposes of   evaluating CT perfusion. Raw data was sent to the rapid ischemia view   software for postprocessing.      A small area of delayed mean transit time and core infarct is identified   in the region of the old infarct. The volume of tissue and CBF less than   30% is larger than the delayed mean transit time volume.    CBF<30% volume: 14 ml  Tmax> 6.0s volume: 10 ml  Mismatch volume: -4 ml  Mismatch ratio: 0.7    CTA head and neck:    After the intravenous power injection of 70 cc of Omnipaque 300 using a   bolus gabbie timing run  serial thin sections were obtained through the   neck from the thoracic inlet through the intracranial circulation   centered at the [id number] on a  multislice CT scanner reformatted   with coronal and sagittal 2 D-MIP projections, including 3 D   reconstructions using a separate 3D Vit[employer] software workstation. A total   of  120 cc of Omnipaque were intravenously injected.  30 cc were   discarded.      There is calcification at the aortic arch. The origins of thecarotid and   vertebral arteries are normal. Vertebral arteries are codominant. The   carotid bifurcations demonstrate calcification without stenosis.    The distal vertebral arteries are well identified as are the   posterior-inferior cerebellar arteries bilaterally. The region of the   vertebral basilar junction is normal. The basilar artery is normal. The   posterior cerebral and superior cerebellar arteries are normal.    Evaluation of the carotid arteries demonstrate normal appearance to the  distal cervical, petrous cavernous and supraclinoid internal carotid   arteries. The A1 segment of the left anterior cerebral artery is   hypoplastic. There is an anterior communicating artery aneurysm clip   identified. Right anterior cerebral arteries appear normal. There is no   evidence of residual or recurrent aneurysm. The middle cerebral arteries   are normal.    The normal intracranial venous circulation is identified. The right   transverse sinus is dominant. The superior sagittal sinus, internal   cerebral veins, vein of Frantz, straight sinus, transverse sinuses,   sigmoid sinuses and internal jugular veins are normal. Cortical veins are   normal.    IMPRESSION: Remote right frontal temporal craniotomy and anterior   communicatingartery aneurysm clipping. Right frontal encephalomalacia   and gliosis likely related to prior aneurysm rupture or postoperative   changes.    Thin demonstrates delayed mean transit time and core infarct in the   region of the right frontal encephalomalacia.    CTA of the head and neck demonstrates no significant stenosis or   occlusion. Anterior communicating artery aneurysm clipping.    --- End of Report ---    < end of copied text >  < from: CT Angio Head w/ IV Cont (04.22.22 @ 10:30) >    ACC: [id number] EXAM:  CT PERFUSION W MAPS IC                        ACC: [id number] EXAM:  CT ANGIO BRAIN (W)AW IC                        ACC: [id number] EXAM:  CT BRAIN STROKE PROTOCOL                        ACC: [id number] EXAM:  CT ANGIO NECK (W)AW IC                        PROCEDURE DATE:  04/22/2022      INTERPRETATION:  Clinical Indication: Code stroke, left arm weakness and   lethargy previous subarachnoid hemorrhage and aneurysm clipping 1997    5mm axial sections of the brain were obtained from base to vertex,   without the intravenous administration of contrast material. Coronal and   sagittal computer generated reconstructed views are available.    No prior brain imaging is available for comparison.    There has been a right frontal temporal craniotomy. There is an aneurysm   clip in the suprasellar cistern consistent with an anterior indicating   artery aneurysm. There is encephalomalacia and gliosis in the right   frontal lobe and in the region of the January the corpus callosum and the   cingulate gyrus which is likely due to either prior surgery or related to   hemorrhage related to prior ruptured aneurysm. Mild atrophy is identified.    There is mild ex vacuo dilatation of the frontal horns of lateral   ventricles. Mild atrophy is noted. There has been previous bilateral lens   replacement surgery.    CT perfusion:    After the intravenous administration of 50 cc of Omnipaque 300 serial   thin sections were obtained through the brain for the purposes of   evaluating CT perfusion. Raw data was sent to the rapid ischemia view   software for postprocessing.      A small area of delayed mean transit time and core infarct is identified   in the region of the old infarct. The volume of tissue and CBF less than   30% is larger than the delayed mean transit time volume.    CBF<30% volume: 14 ml  Tmax> 6.0s volume: 10 ml  Mismatch volume: -4 ml  Mismatch ratio: 0.7    CTA head and neck:      After the intravenous power injection of 70 cc of Omnipaque 300 using a   bolus gabbie timing run  serial thin sections were obtained through the   neck from the thoracic inlet through the intracranial circulation   centered at the [id number] on a  multislice CT scanner reformatted   with coronal and sagittal 2 D-MIP projections, including 3 D   reconstructions using a separate 3D Vitrea software workstation. A total   of  120 cc of Omnipaque were intravenously injected.  30 cc were   discarded.      There is calcification at the aortic arch. The origins of thecarotid and   vertebral arteries are normal. Vertebral arteries are codominant. The   carotid bifurcations demonstrate calcification without stenosis.    The distal vertebral arteries are well identified as are the   posterior-inferior cerebellar arteries bilaterally. The region of the   vertebral basilar junction is normal. The basilar artery is normal. The   posterior cerebral and superior cerebellar arteries are normal.    Evaluation of the carotid arteries demonstrate normal appearance to the  distal cervical, petrous cavernous and supraclinoid internal carotid   arteries. The A1 segment of the left anterior cerebral artery is   hypoplastic. There is an anterior communicating artery aneurysm clip   identified. Right anterior cerebral arteries appear normal. There is no   evidence of residual or recurrent aneurysm. The middle cerebral arteries   are normal.    The normal intracranial venous circulation is identified. The right   transverse sinus is dominant. The superior sagittal sinus, internal   cerebral veins, vein of Frantz, straight sinus, transverse sinuses,   sigmoid sinuses and internal jugular veins are normal. Cortical veins are   normal.    IMPRESSION: Remote right frontal temporal craniotomy and anterior   communicatingartery aneurysm clipping. Right frontal encephalomalacia   and gliosis likely related to prior aneurysm rupture or postoperative   changes.    Thin demonstrates delayed mean transit time and core infarct in the   region of the right frontal encephalomalacia.    CTA of the head and neck demonstrates no significant stenosis or   occlusion. Anterior communicating artery aneurysm clipping.    --- End of Report ---    < end of copied text >    OTHER: 	  	  LABS:	 	    CARDIAC MARKERS: Troponin T, High Sensitivity Result: 39                        10.2   8.61  )-----------( 567      ( 24 Apr 2022 07:09 )             33.0     04-24    142  |  107  |  17  ----------------------------<  115<H>  4.1   |  23  |  1.08    Ca    9.3      24 Apr 2022 07:04  Phos  2.4     04-24  Mg     2.2     04-24    TPro  6.8  /  Alb  3.5  /  TBili  0.4  /  DBili  x   /  AST  13  /  ALT  14  /  AlkPhos  180<H>  04-24    proBNP:   Lipid Profile: Lipid Profile in AM (04.23.22 @ 15:09)   Cholesterol, Serum: 138 mg/dL   Triglycerides, Serum: 91 mg/dL   HDL Cholesterol, Serum: 34 mg/dL   Non HDL Cholesterol: 105 LDL Cholesterol Calculated: 87 mg/dL   HgA1c:   TSH:

## 2022-04-25 LAB — SARS-COV-2 RNA SPEC QL NAA+PROBE: SIGNIFICANT CHANGE UP

## 2022-04-25 PROCEDURE — 99233 SBSQ HOSP IP/OBS HIGH 50: CPT

## 2022-04-25 PROCEDURE — 95718 EEG PHYS/QHP 2-12 HR W/VEEG: CPT

## 2022-04-25 RX ORDER — GABAPENTIN 400 MG/1
100 CAPSULE ORAL THREE TIMES A DAY
Refills: 0 | Status: DISCONTINUED | OUTPATIENT
Start: 2022-04-25 | End: 2022-04-26

## 2022-04-25 RX ORDER — GABAPENTIN 400 MG/1
150 CAPSULE ORAL
Refills: 0 | Status: DISCONTINUED | OUTPATIENT
Start: 2022-04-25 | End: 2022-04-25

## 2022-04-25 RX ADMIN — Medication 20 MILLIGRAM(S): at 17:37

## 2022-04-25 RX ADMIN — GABAPENTIN 100 MILLIGRAM(S): 400 CAPSULE ORAL at 15:12

## 2022-04-25 RX ADMIN — Medication 20 MILLIGRAM(S): at 05:12

## 2022-04-25 RX ADMIN — Medication 25 MILLIGRAM(S): at 17:36

## 2022-04-25 RX ADMIN — Medication 1 TABLET(S): at 12:48

## 2022-04-25 RX ADMIN — LACOSAMIDE 150 MILLIGRAM(S): 50 TABLET ORAL at 05:12

## 2022-04-25 RX ADMIN — Medication 150 MILLIGRAM(S): at 12:47

## 2022-04-25 RX ADMIN — Medication 975 MILLIGRAM(S): at 21:42

## 2022-04-25 RX ADMIN — ATORVASTATIN CALCIUM 20 MILLIGRAM(S): 80 TABLET, FILM COATED ORAL at 21:12

## 2022-04-25 RX ADMIN — Medication 325 MILLIGRAM(S): at 12:48

## 2022-04-25 RX ADMIN — NYSTATIN CREAM 1 APPLICATION(S): 100000 CREAM TOPICAL at 05:55

## 2022-04-25 RX ADMIN — Medication 975 MILLIGRAM(S): at 21:12

## 2022-04-25 RX ADMIN — Medication 975 MILLIGRAM(S): at 09:09

## 2022-04-25 RX ADMIN — MORPHINE SULFATE 15 MILLIGRAM(S): 50 CAPSULE, EXTENDED RELEASE ORAL at 01:05

## 2022-04-25 RX ADMIN — NYSTATIN CREAM 1 APPLICATION(S): 100000 CREAM TOPICAL at 17:37

## 2022-04-25 RX ADMIN — RIVAROXABAN 10 MILLIGRAM(S): KIT at 12:48

## 2022-04-25 RX ADMIN — Medication 25 MILLIGRAM(S): at 05:13

## 2022-04-25 RX ADMIN — MORPHINE SULFATE 15 MILLIGRAM(S): 50 CAPSULE, EXTENDED RELEASE ORAL at 01:40

## 2022-04-25 RX ADMIN — Medication 600 MILLIGRAM(S): at 17:36

## 2022-04-25 RX ADMIN — Medication 81 MILLIGRAM(S): at 12:48

## 2022-04-25 RX ADMIN — Medication 600 MILLIGRAM(S): at 05:12

## 2022-04-25 RX ADMIN — LACOSAMIDE 150 MILLIGRAM(S): 50 TABLET ORAL at 17:36

## 2022-04-25 RX ADMIN — Medication 975 MILLIGRAM(S): at 08:39

## 2022-04-25 RX ADMIN — TAMSULOSIN HYDROCHLORIDE 0.8 MILLIGRAM(S): 0.4 CAPSULE ORAL at 21:12

## 2022-04-25 RX ADMIN — GABAPENTIN 300 MILLIGRAM(S): 400 CAPSULE ORAL at 05:12

## 2022-04-25 RX ADMIN — GABAPENTIN 100 MILLIGRAM(S): 400 CAPSULE ORAL at 21:15

## 2022-04-25 NOTE — PHYSICAL THERAPY INITIAL EVALUATION ADULT - PRECAUTIONS/LIMITATIONS, REHAB EVAL
CTH w/ R frontal encephalomalacia w/ prior R frontal temproal craniotomy and Acomm aneurysm clip. CTA neg, no VST. CTP w/ findings correlating to R frontal encephalomalacia. Neuro exam w/ equal strength in b/l UE, occasional b/l UE neg myoclonus. Impression: L hemiplegia with lethargy and confusion, rapidly improving over 1-2 hrs, concerning for first-time seizure w/ post ictal Keny's paralysis (given prior R frontal encephalomalacia) vs. TIA/minor stroke CTH w/ R frontal encephalomalacia w/ prior R frontal temproal craniotomy and Acomm aneurysm clip. CTA neg, no VST. CTP w/ findings correlating to R frontal encephalomalacia. Neuro exam w/ equal strength in b/l UE, occasional b/l UE neg myoclonus. Impression: L hemiplegia with lethargy and confusion, rapidly improving over 1-2 hrs, concerning for first-time seizure w/ post ictal Keny's paralysis (given prior R frontal encephalomalacia) vs. TIA/minor stroke/fall precautions

## 2022-04-25 NOTE — OCCUPATIONAL THERAPY INITIAL EVALUATION ADULT - PERTINENT HX OF CURRENT PROBLEM, REHAB EVAL
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 Hx obtained from [facility].

## 2022-04-25 NOTE — OCCUPATIONAL THERAPY INITIAL EVALUATION ADULT - RANGE OF MOTION EXAMINATION, LOWER EXTREMITY
R hip flexion AAROM WFL 2* hip fx, R knee flexion AROM WFL, L ankle plantar/dorsiflexion AROM WFL/Left LE Active ROM was WFL (within functional limits)

## 2022-04-25 NOTE — EEG REPORT - NS EEG TEXT BOX
Day 3 – 	Start: 4/24/2022  08:00  	End: 04/25/2022  08:00  	Duration: 24 hr  00 min    Daily EEG Visual Analysis    FINDINGS:  The background was continuous, spontaneously variable and reactive. During wakefulness, a clear posterior dominant rhythm is not seen.  Low amplitude frontal beta was noted in wakefulness.    Background Slowing:  Diffuse theta slowing.    Focal Slowing:   There is intermittent right temporal delta.    Sleep Background:  Drowsiness and sleep were not recorded    Other Non-Epileptiform Findings:  Diffuse excess beta activity.    Activation Procedures:   Hyperventilation was not performed.    Photic stimulation was not performed.    Interictal Epileptiform Activity:   There are occasional sharp waves in the right frontal region.    Events:  No events or seizures recorded.    Artifacts:  Intermittent myogenic and movement artifacts were noted.    ECG:  The heart rate on single channel ECG was predominantly between 60-70 BPM.    AEDs:  lacosamide 100 q12; gabapentin 300 q8      EEG Summary:    Abnormal EEG in the awake, drowsy and asleep states.  Sharp waves, focal, right frontal and temporal  Intermittent polymorphic delta, focal, right temporal region  Intermittent negative myoclonus without EEG correlate, resolved    Impression/Clinical Correlate:    This is an abnormal EEG record. There is evidence of potentially epileptogenic region of cortical dysfunction in the right frontal and anterior temporal areas. There is also mild diffuse cerebral dysfunction that is focally worse in the right temporal region. No seizures were seen. There is negative myoclonus intermittently without EEG correlate – gabapentin dose was lowered, and myoclonus subsequently resolved.    Preliminary Fellow Report, final report pending attending review    Dario Flores MD  Epilepsy Fellow    Reading Room: [phone]  On Call Service After Hours: [phone]    Day 3 – 	Start: 4/24/2022  08:00  	End: 04/25/2022  08:00  	Duration: 24 hr  00 min    Daily EEG Visual Analysis    FINDINGS:  The background was continuous, spontaneously variable and reactive. During wakefulness, a clear posterior dominant rhythm is not seen.  Low amplitude frontal beta was noted in wakefulness.    Background Slowing:  Diffuse theta slowing.    Focal Slowing:   There is intermittent right temporal delta.    Sleep Background:  Drowsiness and sleep were not recorded    Other Non-Epileptiform Findings:  Diffuse excess beta activity.    Activation Procedures:   Hyperventilation was not performed.    Photic stimulation was not performed.    Interictal Epileptiform Activity:   There are occasional sharp waves in the right frontal region.    Events:  No events or seizures recorded.    Artifacts:  Intermittent myogenic and movement artifacts were noted.    ECG:  The heart rate on single channel ECG was predominantly between 60-70 BPM.    AEDs:  lacosamide 100 q12; gabapentin 300 q8      EEG Summary:    Abnormal EEG in the awake, drowsy and asleep states.  Sharp waves, focal, right frontal and temporal  Intermittent polymorphic delta, focal, right temporal region  Intermittent negative myoclonus without EEG correlate, RESOLVED    Impression/Clinical Correlate:    This is an abnormal EEG record. There is evidence of potentially epileptogenic region of cortical dysfunction in the right frontal and anterior temporal areas. There is also mild diffuse cerebral dysfunction that is focally worse in the right temporal region. No seizures were seen. There is negative myoclonus intermittently without EEG correlate – gabapentin dose was lowered, and myoclonus subsequently resolved.      Dario Flores MD  Epilepsy Fellow    Faisal Batres MD PhD  Director, Epilepsy Division, [facility] EEG Reading Room Ph#: [phone]  Epilepsy Answering Service after 5PM and before 8:30AM: Ph#: [phone]    Day 3 – 	Start: 4/24/2022  08:00  	End: 04/25/2022  13:58  	Duration: 29 hr  58min    Daily EEG Visual Analysis    FINDINGS:  The background was continuous, spontaneously variable and reactive. During wakefulness, a clear posterior dominant rhythm is not seen.  Low amplitude frontal beta was noted in wakefulness.    Background Slowing:  Diffuse theta slowing.    Focal Slowing:   There is intermittent right temporal delta.    Sleep Background:  Drowsiness and sleep were not recorded    Other Non-Epileptiform Findings:  Diffuse excess beta activity.    Activation Procedures:   Hyperventilation was not performed.    Photic stimulation was not performed.    Interictal Epileptiform Activity:   There are occasional sharp waves in the right frontal region.    Events:  No events or seizures recorded.    Artifacts:  Intermittent myogenic and movement artifacts were noted.    ECG:  The heart rate on single channel ECG was predominantly between 60-70 BPM.    AEDs:  lacosamide 100 q12; gabapentin 300 q8      EEG Summary:    Abnormal EEG in the awake, drowsy and asleep states.  Sharp waves, focal, right frontal and temporal  Intermittent polymorphic delta, focal, right temporal region  Intermittent negative myoclonus without EEG correlate, RESOLVED    Impression/Clinical Correlate:    This is an abnormal EEG record. There is evidence of potentially epileptogenic region of cortical dysfunction in the right frontal and anterior temporal areas. There is also mild diffuse cerebral dysfunction that is focally worse in the right temporal region. No seizures were seen. There is negative myoclonus intermittently without EEG correlate – gabapentin dose was lowered, and myoclonus subsequently resolved.      Dario Flores MD  Epilepsy Fellow    Faisal Batres MD PhD  Director, Epilepsy Division, [facility] EEG Reading Room Ph#: [phone]  Epilepsy Answering Service after 5PM and before 8:30AM: Ph#: [phone]

## 2022-04-25 NOTE — OCCUPATIONAL THERAPY INITIAL EVALUATION ADULT - ADDITIONAL COMMENTS
Pt was seen at 8am by TREE Woods to be moving all extremities equally, alert, no slurred speech when she was cleaning his RLE surgical site cleaning. Per RN Maryann Ortega, at 8:30am, pt was noted to have slurred speech, lethargic and couldn't LUE at all. No shaking movements noted. Per EMS, pt was not having LUE weakness when they saw him. No hx of ischemic stroke or seizure. Last dose of xarelto at 5pm, on for VTE ppx after surgery. He reports a spontaneous head bleed in 1997 and with subsequent clipping.  Head CT:Remote right frontal temporal craniotomy and anterior   communicating artery aneurysm clipping. Right frontal encephalomalacia and gliosis likely related to prior aneurysm rupture or postoperative changes.Thin demonstrates delayed mean transit time and core infarct in the region of the right frontal encephalomalacia.

## 2022-04-25 NOTE — PHYSICAL THERAPY INITIAL EVALUATION ADULT - ACTIVE RANGE OF MOTION EXAMINATION, REHAB EVAL
Patient ROM is WFL for all extremities except R LE. Ankle ROM WNL, Knee PROM WFL, Hip ROM no AROM in supine/deficits as listed below

## 2022-04-25 NOTE — PROGRESS NOTE ADULT - SUBJECTIVE AND OBJECTIVE BOX
Subjective: Patient seen and examined. No new events except as noted.   No complaints, remains on vEEG.      REVIEW OF SYSTEMS:    CONSTITUTIONAL: + weakness, fevers or chills  EYES/ENT: No visual changes;  No vertigo or throat pain   NECK: No pain or stiffness  RESPIRATORY: No cough, wheezing, hemoptysis; No shortness of breath  CARDIOVASCULAR: No chest pain or palpitations  GASTROINTESTINAL: No abdominal or epigastric pain. No nausea, vomiting, or hematemesis; No diarrhea or constipation. No melena or hematochezia.  GENITOURINARY: No dysuria, frequency or hematuria  NEUROLOGICAL: No numbness or weakness  SKIN: No itching, burning, rashes, or lesions   All other review of systems is negative unless indicated above.    MEDICATIONS:  MEDICATIONS  (STANDING):  aspirin enteric coated 81 milliGRAM(s) Oral daily  atorvastatin 20 milliGRAM(s) Oral at bedtime  baclofen 20 milliGRAM(s) Oral two times a day  bisacodyl Suppository 10 milliGRAM(s) Rectal daily  ferrous    sulfate 325 milliGRAM(s) Oral daily  gabapentin 300 milliGRAM(s) Oral two times a day  gemfibrozil 600 milliGRAM(s) Oral two times a day  lacosamide 150 milliGRAM(s) Oral two times a day  metoprolol tartrate 25 milliGRAM(s) Oral two times a day  morphine ER Tablet 15 milliGRAM(s) Oral every 8 hours  multivitamin 1 Tablet(s) Oral daily  nystatin Powder 1 Application(s) Topical two times a day  pregabalin 150 milliGRAM(s) Oral daily  rivaroxaban 10 milliGRAM(s) Oral daily  senna 2 Tablet(s) Oral at bedtime  tamsulosin 0.8 milliGRAM(s) Oral at bedtime    PHYSICAL EXAM:  T(C): 36.4 (04-25-22 @ 05:07), Max: 36.8 (04-24-22 @ 10:31)  HR: 62 (04-25-22 @ 10:01) (53 - 76)  BP: 114/67 (04-25-22 @ 10:01) (114/67 - 151/86)  RR: 18 (04-25-22 @ 05:07) (18 - 20)  SpO2: 97% (04-25-22 @ 10:01) (95% - 98%)  Wt(kg): --  I&O's Summary    Appearance: NAD  HEENT: Normal oral mucosa, PERRL, EOMI	  Lymphatic: No lymphadenopathy , no edema  Cardiovascular: Normal S1 S2, No JVD, No murmurs , Peripheral pulses palpable 2+ bilaterally  Respiratory: Lungs clear to auscultation, normal effort 	  Gastrointestinal:  Soft, Non-tender, + BS	  Skin: No rashes, No ecchymoses, No cyanosis, warm to touch  NEUROLOGICAL EXAM:  MS: AAOX3, speech is fluent, Follows simple and complex commands.   CN: VFF, EOMI, PERRL  V1-3 intact, no facial asymmetry, t/p midline,   Motor: Strength: 5/5  in the UE b/l. 5/5 in the LLE. 3/5 in the RLE - antigravity- limited due to recent surgery    Tone: normal. Bulk: normal.   Plantar flex b/l.   Sensation: intact to LT/Temperature 4x.   Coordination: FTn intact b/l.   Gait:  Deferred  Ext: No edema    LABS:    CARDIAC MARKERS:                        10.2   8.61  )-----------( 567      ( 24 Apr 2022 07:09 )             33.0     04-24    142  |  107  |  17  ----------------------------<  115<H>  4.1   |  23  |  1.08    Ca    9.3      24 Apr 2022 07:04  Phos  2.4     04-24  Mg     2.2     04-24    TPro  6.8  /  Alb  3.5  /  TBili  0.4  /  DBili  x   /  AST  13  /  ALT  14  /  AlkPhos  180<H>  04-24    proBNP:   Lipid Profile:   HgA1c:   TSH:     TELEMETRY: 	    ECG:  	  RADIOLOGY:   DIAGNOSTIC TESTING:  [ ] Echocardiogram:  [ ]  Catheterization:  [ ] Stress Test:    OTHER:

## 2022-04-25 NOTE — PHYSICAL THERAPY INITIAL EVALUATION ADULT - LEVEL OF INDEPENDENCE, REHAB EVAL
Anesthesia Evaluation     . Pt has had prior anesthetic. Type: General    History of anesthetic complications   - PONV        ROS/MED HX    ENT/Pulmonary:  - neg pulmonary ROS     Neurologic:  - neg neurologic ROS     Cardiovascular:     (+) ----. : . CHF . . :. dysrhythmias a-fib, valvular problems/murmurs s/p VSD :.       METS/Exercise Tolerance:     Hematologic:     (+) Anemia, -      Musculoskeletal:  - neg musculoskeletal ROS       GI/Hepatic:  - neg GI/hepatic ROS   (+) hepatitis type Other,       Renal/Genitourinary:         Endo:  - neg endo ROS   (+) Chronic steroid usage for Date most recently used: chemo,.      Psychiatric:  - neg psychiatric ROS       Infectious Disease:  - neg infectious disease ROS       Malignancy:      - no malignancy   Other:                     Physical Exam  Normal systems: pulmonary and dental    Airway   Mallampati: II  TM distance: >3 FB  Neck ROM: full    Dental     Cardiovascular   Rhythm and rate: irregular and abnormal      Pulmonary                     Anesthesia Plan      History & Physical Review  History and physical reviewed and following examination; no interval change.    ASA Status:  3 .    NPO Status:  > 8 hours    Plan for General with Other induction. Maintenance will be TIVA.    PONV prophylaxis:  Ondansetron (or other 5HT-3)       Postoperative Care      Consents           minimum assist (75% patients effort)

## 2022-04-25 NOTE — PROGRESS NOTE ADULT - SUBJECTIVE AND OBJECTIVE BOX
Patient is a 66y old  Male who presents with a chief complaint of seizure (24 Apr 2022 11:02)    Subjective:      MEDICATIONS  (STANDING):  aspirin enteric coated 81 milliGRAM(s) Oral daily  atorvastatin 20 milliGRAM(s) Oral at bedtime  baclofen 20 milliGRAM(s) Oral two times a day  bisacodyl Suppository 10 milliGRAM(s) Rectal daily  ferrous    sulfate 325 milliGRAM(s) Oral daily  gabapentin 300 milliGRAM(s) Oral two times a day  gemfibrozil 600 milliGRAM(s) Oral two times a day  lacosamide 150 milliGRAM(s) Oral two times a day  metoprolol tartrate 25 milliGRAM(s) Oral two times a day  morphine ER Tablet 15 milliGRAM(s) Oral every 8 hours  multivitamin 1 Tablet(s) Oral daily  nystatin Powder 1 Application(s) Topical two times a day  pregabalin 150 milliGRAM(s) Oral daily  rivaroxaban 10 milliGRAM(s) Oral daily  senna 2 Tablet(s) Oral at bedtime  tamsulosin 0.8 milliGRAM(s) Oral at bedtime    MEDICATIONS  (PRN):  acetaminophen     Tablet .. 975 milliGRAM(s) Oral every 8 hours PRN Mild Pain (1 - 3), Moderate Pain (4 - 6)  ketorolac   Injectable 15 milliGRAM(s) IV Push every 4 hours PRN Moderate Pain (4 - 6)  lacosamide Injectable 200 milliGRAM(s) IV Push once PRN Seizure  LORazepam   Injectable 1 milliGRAM(s) IV Push once PRN Seizure    Vital Signs Last 24 Hrs  T(C): 36.4 (25 Apr 2022 05:07), Max: 36.8 (24 Apr 2022 10:31)  T(F): 97.6 (25 Apr 2022 05:07), Max: 98.3 (24 Apr 2022 10:31)  HR: 53 (25 Apr 2022 05:07) (53 - 76)  BP: 142/80 (25 Apr 2022 05:07) (126/71 - 151/86)  RR: 18 (25 Apr 2022 05:07) (18 - 20)  SpO2: 96% (25 Apr 2022 05:07) (95% - 98%)    GENERAL EXAM:  Constitutional: awake and alert. NAD  HEENT: PERRL, EOMI  Musculoskeletal: no joint swelling/tenderness, no abnormal movements  Skin: no rashes    NEUROLOGICAL EXAM:  MS: AAOX3, speech is fluent, Follows simple and complex commands.   CN: VFF, EOMI, PERRL  V1-3 intact, no facial asymmetry, t/p midline,   Motor: Strength: 5/5  in the UE b/l. 5/5 in the LLE. 3/5 in the RLE - antigravity- limited due to recent surgery    Tone: normal. Bulk: normal.   Plantar flex b/l.   Sensation: intact to LT/Temperature 4x.   Coordination: FTn intact b/l.   Gait:  Deferred      Labs:                         10.2   8.61  )-----------( 567      ( 24 Apr 2022 07:09 )             33.0     04-24    142  |  107  |  17  ----------------------------<  115<H>  4.1   |  23  |  1.08    Ca    9.3      24 Apr 2022 07:04  Phos  2.4     04-24  Mg     2.2     04-24  TPro  6.8  /  Alb  3.5  /  TBili  0.4  /  DBili  x   /  AST  13  /  ALT  14  /  AlkPhos  180<H>  04-24      Radiology:  EEG report 4/24/22:  EEG Summary:  Abnormal EEG in the awake, drowsy and asleep states.  Sharp waves, focal, right frontal and temporal  Intermittent polymorphic delta, focal, right temporal region  Intermittent negative myoclonus without EEG correlate  Impression/Clinical Correlate:  This is an abnormal EEG record. There is evidence of potentially epileptogenic region of cortical dysfunction in the right frontal and anterior temporal areas. There is also mild diffuse cerebral dysfunction that is focally worse in the right temporal region. No epileptiform pattern or seizures were seen. There is negative myoclonus intermittently without EEG correlate – gabapentin dose was lowered. Patient is a 66y old  Male who presents with a chief complaint of seizure (24 Apr 2022 11:02)    Subjective:  No complaints. No overnight events    MEDICATIONS  (STANDING):  aspirin enteric coated 81 milliGRAM(s) Oral daily  atorvastatin 20 milliGRAM(s) Oral at bedtime  baclofen 20 milliGRAM(s) Oral two times a day  bisacodyl Suppository 10 milliGRAM(s) Rectal daily  ferrous    sulfate 325 milliGRAM(s) Oral daily  gabapentin 300 milliGRAM(s) Oral two times a day  gemfibrozil 600 milliGRAM(s) Oral two times a day  lacosamide 150 milliGRAM(s) Oral two times a day  metoprolol tartrate 25 milliGRAM(s) Oral two times a day  morphine ER Tablet 15 milliGRAM(s) Oral every 8 hours  multivitamin 1 Tablet(s) Oral daily  nystatin Powder 1 Application(s) Topical two times a day  pregabalin 150 milliGRAM(s) Oral daily  rivaroxaban 10 milliGRAM(s) Oral daily  senna 2 Tablet(s) Oral at bedtime  tamsulosin 0.8 milliGRAM(s) Oral at bedtime    MEDICATIONS  (PRN):  acetaminophen     Tablet .. 975 milliGRAM(s) Oral every 8 hours PRN Mild Pain (1 - 3), Moderate Pain (4 - 6)  ketorolac   Injectable 15 milliGRAM(s) IV Push every 4 hours PRN Moderate Pain (4 - 6)  lacosamide Injectable 200 milliGRAM(s) IV Push once PRN Seizure  LORazepam   Injectable 1 milliGRAM(s) IV Push once PRN Seizure    Vital Signs Last 24 Hrs  T(C): 36.4 (25 Apr 2022 05:07), Max: 36.8 (24 Apr 2022 10:31)  T(F): 97.6 (25 Apr 2022 05:07), Max: 98.3 (24 Apr 2022 10:31)  HR: 53 (25 Apr 2022 05:07) (53 - 76)  BP: 142/80 (25 Apr 2022 05:07) (126/71 - 151/86)  RR: 18 (25 Apr 2022 05:07) (18 - 20)  SpO2: 96% (25 Apr 2022 05:07) (95% - 98%)    GENERAL EXAM:  Constitutional: awake and alert. NAD  HEENT: PERRL, EOMI  Musculoskeletal: no joint swelling/tenderness, no abnormal movements  Skin: no rashes    NEUROLOGICAL EXAM:  MS: AAOX3, speech is fluent, Follows simple and complex commands.   CN: VFF, EOMI, PERRL  V1-3 intact, no facial asymmetry, t/p midline,   Motor: Strength: 5/5  in the UE b/l. 5/5 in the LLE. 3/5 in the RLE - antigravity- limited due to recent surgery    Tone: normal. Bulk: normal.   Plantar flex b/l.   Sensation: intact to LT/Temperature 4x.   Coordination: FTn intact b/l.   Gait:  Deferred      Labs:                         10.2   8.61  )-----------( 567      ( 24 Apr 2022 07:09 )             33.0     04-24    142  |  107  |  17  ----------------------------<  115<H>  4.1   |  23  |  1.08    Ca    9.3      24 Apr 2022 07:04  Phos  2.4     04-24  Mg     2.2     04-24  TPro  6.8  /  Alb  3.5  /  TBili  0.4  /  DBili  x   /  AST  13  /  ALT  14  /  AlkPhos  180<H>  04-24      Radiology:  EEG report 4/24/22:  EEG Summary:  Abnormal EEG in the awake, drowsy and asleep states.  Sharp waves, focal, right frontal and temporal  Intermittent polymorphic delta, focal, right temporal region  Intermittent negative myoclonus without EEG correlate  Impression/Clinical Correlate:  This is an abnormal EEG record. There is evidence of potentially epileptogenic region of cortical dysfunction in the right frontal and anterior temporal areas. There is also mild diffuse cerebral dysfunction that is focally worse in the right temporal region. No epileptiform pattern or seizures were seen. There is negative myoclonus intermittently without EEG correlate – gabapentin dose was lowered.

## 2022-04-25 NOTE — OCCUPATIONAL THERAPY INITIAL EVALUATION ADULT - DIAGNOSIS, OT EVAL
Pt displayed decreased strength in RLE, decreased ROM in RLE, impaired balance and impaired cognition impairing ability to perform functional mobility and ADLs.

## 2022-04-25 NOTE — PHYSICAL THERAPY INITIAL EVALUATION ADULT - ADDITIONAL COMMENTS
Pt was residing at [facility] ([place]  ([facility])prior to admission 2/2 to R femur fx and ORIF (non-weight bearing) on 3/30/22. Pt to return to [facility].

## 2022-04-25 NOTE — PROGRESS NOTE ADULT - ASSESSMENT
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  Pt was seen at 8am by RN to be moving all extremities equally, alert, no slurred speech when she was cleaning his RLE surgical site cleaning. At 8:30am, pt was noted to have slurred speech, lethargic and couldn't LUE at all. No shaking movements noted. Pt reports that he doesn't remember exactly what happened this morning, but does remember people calling his name. He reports a spontaneous head bleed in 1997 and with subsequent clipping. NIHSS: 7, preMRS: 3. No tpa due to no rapidly improving symptoms, hx of spontaneous ICH; no MT. CTH w/ R frontal encephalomalacia w/ prior R frontal temporal craniotomy and Acomm aneurysm clip. CTA neg, no VST. CTP w/ findings correlating to R frontal encephalomalacia. Neuro exam w/ equal strength in b/l UE, occasional b/l UE neg myoclonus.    Impression: L hemiplegia with lethargy and confusion, rapidly improving over 1-2 hrs, concerning for first-time seizure w/ post ictal Keny's paralysis (given prior R frontal encephalomalacia)     Plan:  Exposed to COVID on 4/24- not detected on COVID PCR 4/24  [] Repeat COVID PCR today 4/25  [] PT/OT eval for dispo planning  Continue with VEEG   Continue Vimpat 150mg BID   baseline EKG ordered  c/w home xarelto 10mg daily and aspirin 81mg daily  c/w home atorvastatin 20mg PO daily   Continue Gabapentin 300mg BID   Seizure, fall and aspiration precautions.  Avoid sleep deprivation.  If any seizure activity noted, please note: time, if upper/lower or focal onset symptoms (e.g. head turn, eye deviation, upper/lower tonic/clonic arm initially), vital sign derangements, airway protection, urinary or bowel incontinence, duration of seizure, tongue bite, and/or post-ictal time to return of baseline.    [] Evaluate for provoking factors including UA neg, Blood Cx x2, CMP, Mg, Utox, CBC w/ diff, EtOH level, CXR, COVID-19 test  [x] Check , lactate 1.5  management of abdominal pain per primary team, abdominal xray 4/21/22 w/ evidence of diffuse ileus  Appreciate Internal Medicine recommendations   Appreciate Pulm recommendations.     Patient can follow up with outpatient neurology at [address] [place] [address] 1-2 weeks after discharge. Please instruct the patient to call [phone] to schedule this appointment.     CORE MEASURES:        AED levels [] Sent [] Pending [] Resulted     LFTs [] normal [] elevated      Plan and education provided to [] patient []family at bedside [] awaiting for family     Seizure Semiology  [] Tonic clonic  [] Clonic  [] Tonic  [] Unresponsive  [] Focal with impared awareness  [] Focal without impaed awareness    Obtain screening lower extremity venous ultrasound in patients who meet 1 or more of the following criteria as patient is high risk for DVT/PE on admission:   [] History of DVT/PE  []Hypercoagulable states (Factor V Leiden, Cancer, OCP, etc. 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  Pt was seen at 8am by RN to be moving all extremities equally, alert, no slurred speech when she was cleaning his RLE surgical site cleaning. At 8:30am, pt was noted to have slurred speech, lethargic and couldn't LUE at all. No shaking movements noted. Pt reports that he doesn't remember exactly what happened this morning, but does remember people calling his name. He reports a spontaneous head bleed in 1997 and with subsequent clipping. NIHSS: 7, preMRS: 3. No tpa due to no rapidly improving symptoms, hx of spontaneous ICH; no MT. CTH w/ R frontal encephalomalacia w/ prior R frontal temporal craniotomy and Acomm aneurysm clip. CTA neg, no VST. CTP w/ findings correlating to R frontal encephalomalacia. Neuro exam w/ equal strength in b/l UE, occasional b/l UE neg myoclonus.    Impression: L hemiplegia with lethargy and confusion, rapidly improving over 1-2 hrs, concerning for first-time seizure w/ post ictal Keny's paralysis (given prior R frontal encephalomalacia)     Plan:  Exposed to COVID on 4/24- not detected on COVID PCR 4/24  [] Decrease Gabapentin to 150mg PO BID  [] Repeat COVID PCR today 4/25  [] PT/OT eval for dispo planning  Continue with VEEG   Continue Vimpat 150mg BID   baseline EKG ordered  c/w home xarelto 10mg daily and aspirin 81mg daily  c/w home atorvastatin 20mg PO daily   Continue Gabapentin 300mg BID   Seizure, fall and aspiration precautions.  Avoid sleep deprivation.  If any seizure activity noted, please note: time, if upper/lower or focal onset symptoms (e.g. head turn, eye deviation, upper/lower tonic/clonic arm initially), vital sign derangements, airway protection, urinary or bowel incontinence, duration of seizure, tongue bite, and/or post-ictal time to return of baseline.    [] Evaluate for provoking factors including UA neg, Blood Cx x2, CMP, Mg, Utox, CBC w/ diff, EtOH level, CXR, COVID-19 test  [x] Check , lactate 1.5  management of abdominal pain per primary team, abdominal xray 4/21/22 w/ evidence of diffuse ileus  Appreciate Internal Medicine recommendations   Appreciate Pulm recommendations.     Patient can follow up with outpatient neurology at [address] [place] [address] 1-2 weeks after discharge. Please instruct the patient to call [phone] to schedule this appointment.     CORE MEASURES:        AED levels [] Sent [] Pending [] Resulted     LFTs [] normal [] elevated      Plan and education provided to [] patient []family at bedside [] awaiting for family     Seizure Semiology  [] Tonic clonic  [] Clonic  [] Tonic  [] Unresponsive  [] Focal with impared awareness  [] Focal without impaed awareness    Obtain screening lower extremity venous ultrasound in patients who meet 1 or more of the following criteria as patient is high risk for DVT/PE on admission:   [] History of DVT/PE  []Hypercoagulable states (Factor V Leiden, Cancer, OCP, etc. 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  Pt was seen at 8am by RN to be moving all extremities equally, alert, no slurred speech when she was cleaning his RLE surgical site cleaning. At 8:30am, pt was noted to have slurred speech, lethargic and couldn't LUE at all. No shaking movements noted. Pt reports that he doesn't remember exactly what happened this morning, but does remember people calling his name. He reports a spontaneous head bleed in 1997 and with subsequent clipping. NIHSS: 7, preMRS: 3. No tpa due to no rapidly improving symptoms, hx of spontaneous ICH; no MT. CTH w/ R frontal encephalomalacia w/ prior R frontal temporal craniotomy and Acomm aneurysm clip. CTA neg, no VST. CTP w/ findings correlating to R frontal encephalomalacia. Neuro exam w/ equal strength in b/l UE, occasional b/l UE neg myoclonus.    Impression: L hemiplegia with lethargy and confusion, rapidly improving over 1-2 hrs, concerning for first-time seizure w/ post ictal Keny's paralysis (given prior R frontal encephalomalacia)     Plan:  Exposed to COVID on 4/24- not detected on COVID PCR 4/24  [] Decrease Gabapentin to 100mg PO TID  [] Repeat COVID PCR today 4/25  [] PT/OT eval for dispo planning  Continue with VEEG   Continue Vimpat 150mg BID   baseline EKG ordered  c/w home xarelto 10mg daily and aspirin 81mg daily  c/w home atorvastatin 20mg PO daily   Continue Gabapentin 300mg BID   Seizure, fall and aspiration precautions.  Avoid sleep deprivation.  If any seizure activity noted, please note: time, if upper/lower or focal onset symptoms (e.g. head turn, eye deviation, upper/lower tonic/clonic arm initially), vital sign derangements, airway protection, urinary or bowel incontinence, duration of seizure, tongue bite, and/or post-ictal time to return of baseline.    [] Evaluate for provoking factors including UA neg, Blood Cx x2, CMP, Mg, Utox, CBC w/ diff, EtOH level, CXR, COVID-19 test  [x] Check , lactate 1.5  management of abdominal pain per primary team, abdominal xray 4/21/22 w/ evidence of diffuse ileus  Appreciate Internal Medicine recommendations   Appreciate Pulm recommendations.     Patient can follow up with outpatient neurology at [address] [place] [address] 1-2 weeks after discharge. Please instruct the patient to call [phone] to schedule this appointment.     CORE MEASURES:        AED levels [] Sent [] Pending [] Resulted     LFTs [] normal [] elevated      Plan and education provided to [] patient []family at bedside [] awaiting for family     Seizure Semiology  [] Tonic clonic  [] Clonic  [] Tonic  [] Unresponsive  [] Focal with impared awareness  [] Focal without impaed awareness    Obtain screening lower extremity venous ultrasound in patients who meet 1 or more of the following criteria as patient is high risk for DVT/PE on admission:   [] History of DVT/PE  []Hypercoagulable states (Factor V Leiden, Cancer, OCP, etc. )  []Prolonged immobility (hemiplegia/hemiparesis/post operative or any other extended immobilization)  [] Transferred from outside facility (Rehab or Long term care)

## 2022-04-25 NOTE — OCCUPATIONAL THERAPY INITIAL EVALUATION ADULT - SOCIAL CONCERNS
None Prior to readmission Pt was in rehab for R humeral fx/None Prior to readmission Pt was in rehab for R femur FX/None

## 2022-04-25 NOTE — PROGRESS NOTE ADULT - ASSESSMENT
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   Pt was seen at 8am by RN at [facility]  moving all extremities equally, alert, no slurred speech when she was cleaning his RLE surgical site.   at 8:30am, pt was noted to have slurred speech, lethargic and couldn't move LUE at all.   No shaking movements noted.   L hemiplegia with lethargy and confusion, rapidly improving over 1-2 hrs, concerning for first-time seizure w/ post ictal Keny's paralysis (given prior R frontal encephalomalacia) vs. TIA/minor stroke  neuro exam is nonfocal.   4/22: ptn admitted to Neuro for possible acute new onset Sz, R/O TIA  ptn placed on Vimpat x 1, unable to get MRI 2/2 aneurismal clipping  c/w dvt ppx w po Xarelto  cont ASA, statin  as per neuro: Seizure, fall and aspiration precautions.  avoid sleep deprivation,  dysphagia screen,  EEG   4/23: ptn was observed by me yesterday to have apnea while sleeping. seen by pulm, will need outptn sleep study. would monitor puse ox overnight. today ptn has myoclonus , jerky movements and interruption of speech. he is awake, alert, recognized me. could this be from HD Gabapentin? will drop the dose from 600 tid to 300 bid. spoke w neurology. they agree. EEG is negative  4/24: myoclonus and metabolic encephalopathy resolved post lowering the dose of Gabapentin. pain free. asking for a regular diet. dc planning as per neuro team

## 2022-04-25 NOTE — PROGRESS NOTE ADULT - SUBJECTIVE AND OBJECTIVE BOX
Patient is a 66y old  Male who presents with a chief complaint of seizure (24 Apr 2022 18:07)      SUBJECTIVE / OVERNIGHT EVENTS:    MEDICATIONS  (STANDING):  aspirin enteric coated 81 milliGRAM(s) Oral daily  atorvastatin 20 milliGRAM(s) Oral at bedtime  baclofen 20 milliGRAM(s) Oral two times a day  bisacodyl Suppository 10 milliGRAM(s) Rectal daily  ferrous    sulfate 325 milliGRAM(s) Oral daily  gabapentin 300 milliGRAM(s) Oral two times a day  gemfibrozil 600 milliGRAM(s) Oral two times a day  lacosamide 150 milliGRAM(s) Oral two times a day  metoprolol tartrate 25 milliGRAM(s) Oral two times a day  morphine ER Tablet 15 milliGRAM(s) Oral every 8 hours  multivitamin 1 Tablet(s) Oral daily  nystatin Powder 1 Application(s) Topical two times a day  pregabalin 150 milliGRAM(s) Oral daily  rivaroxaban 10 milliGRAM(s) Oral daily  senna 2 Tablet(s) Oral at bedtime  tamsulosin 0.8 milliGRAM(s) Oral at bedtime    MEDICATIONS  (PRN):  acetaminophen     Tablet .. 975 milliGRAM(s) Oral every 8 hours PRN Mild Pain (1 - 3), Moderate Pain (4 - 6)  ketorolac   Injectable 15 milliGRAM(s) IV Push every 4 hours PRN Moderate Pain (4 - 6)  lacosamide Injectable 200 milliGRAM(s) IV Push once PRN Seizure  LORazepam   Injectable 1 milliGRAM(s) IV Push once PRN Seizure      Vital Signs Last 24 Hrs  T(F): 98 (04-25-22 @ 01:07), Max: 98.3 (04-24-22 @ 10:31)  HR: 71 (04-25-22 @ 01:07) (62 - 78)  BP: 135/82 (04-25-22 @ 01:07) (126/71 - 162/94)  RR: 18 (04-25-22 @ 01:07) (18 - 20)  SpO2: 97% (04-25-22 @ 01:07) (94% - 98%)  Telemetry:   CAPILLARY BLOOD GLUCOSE        I&O's Summary    23 Apr 2022 07:01  -  24 Apr 2022 07:00  --------------------------------------------------------  IN: 120 mL / OUT: 1 mL / NET: 119 mL        PHYSICAL EXAM:  GENERAL: NAD, well-developed  HEAD:  Atraumatic, Normocephalic  EYES: EOMI, PERRLA, conjunctiva and sclera clear  NECK: Supple, No JVD  CHEST/LUNG: Clear to auscultation bilaterally; No wheeze  HEART: Regular rate and rhythm; No murmurs, rubs, or gallops  ABDOMEN: Soft, Nontender, Nondistended; Bowel sounds present  EXTREMITIES:  2+ Peripheral Pulses, No clubbing, cyanosis, or edema  PSYCH: AAOx3  NEUROLOGY: non-focal  SKIN: No rashes or lesions    LABS:                        10.2   8.61  )-----------( 567      ( 24 Apr 2022 07:09 )             33.0     04-24    142  |  107  |  17  ----------------------------<  115<H>  4.1   |  23  |  1.08    Ca    9.3      24 Apr 2022 07:04  Phos  2.4     04-24  Mg     2.2     04-24    TPro  6.8  /  Alb  3.5  /  TBili  0.4  /  DBili  x   /  AST  13  /  ALT  14  /  AlkPhos  180<H>  04-24              RADIOLOGY & ADDITIONAL TESTS:    Imaging Personally Reviewed:    Consultant(s) Notes Reviewed:      Care Discussed with Consultants/Other Providers:

## 2022-04-25 NOTE — PHYSICAL THERAPY INITIAL EVALUATION ADULT - PERTINENT HX OF CURRENT PROBLEM, REHAB EVAL
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 NIHSS: 7, preMRS: 3. No tpa due to no rapidly improving symptoms, hx of spontaneous ICH; no MT.

## 2022-04-26 LAB
ANION GAP SERPL CALC-SCNC: 14 MMOL/L — SIGNIFICANT CHANGE UP (ref 5–17)
ANION GAP SERPL CALC-SCNC: 15 MMOL/L — SIGNIFICANT CHANGE UP (ref 5–17)
BUN SERPL-MCNC: 20 MG/DL — SIGNIFICANT CHANGE UP (ref 7–23)
BUN SERPL-MCNC: 20 MG/DL — SIGNIFICANT CHANGE UP (ref 7–23)
CALCIUM SERPL-MCNC: 9.4 MG/DL — SIGNIFICANT CHANGE UP (ref 8.4–10.5)
CALCIUM SERPL-MCNC: 9.6 MG/DL — SIGNIFICANT CHANGE UP (ref 8.4–10.5)
CHLORIDE SERPL-SCNC: 107 MMOL/L — SIGNIFICANT CHANGE UP (ref 96–108)
CHLORIDE SERPL-SCNC: 110 MMOL/L — HIGH (ref 96–108)
CO2 SERPL-SCNC: 19 MMOL/L — LOW (ref 22–31)
CO2 SERPL-SCNC: 22 MMOL/L — SIGNIFICANT CHANGE UP (ref 22–31)
CREAT SERPL-MCNC: 1.17 MG/DL — SIGNIFICANT CHANGE UP (ref 0.5–1.3)
CREAT SERPL-MCNC: 1.35 MG/DL — HIGH (ref 0.5–1.3)
EGFR: 58 ML/MIN/1.73M2 — LOW
EGFR: 68 ML/MIN/1.73M2 — SIGNIFICANT CHANGE UP
GLUCOSE SERPL-MCNC: 100 MG/DL — HIGH (ref 70–99)
GLUCOSE SERPL-MCNC: 138 MG/DL — HIGH (ref 70–99)
HCT VFR BLD CALC: 39.4 % — SIGNIFICANT CHANGE UP (ref 39–50)
HGB BLD-MCNC: 11.9 G/DL — LOW (ref 13–17)
MAGNESIUM SERPL-MCNC: 2.3 MG/DL — SIGNIFICANT CHANGE UP (ref 1.6–2.6)
MCHC RBC-ENTMCNC: 29.2 PG — SIGNIFICANT CHANGE UP (ref 27–34)
MCHC RBC-ENTMCNC: 30.2 GM/DL — LOW (ref 32–36)
MCV RBC AUTO: 96.8 FL — SIGNIFICANT CHANGE UP (ref 80–100)
NRBC # BLD: 0 /100 WBCS — SIGNIFICANT CHANGE UP (ref 0–0)
PHOSPHATE SERPL-MCNC: 3.7 MG/DL — SIGNIFICANT CHANGE UP (ref 2.5–4.5)
PLATELET # BLD AUTO: 498 K/UL — HIGH (ref 150–400)
POTASSIUM SERPL-MCNC: 5.2 MMOL/L — SIGNIFICANT CHANGE UP (ref 3.5–5.3)
POTASSIUM SERPL-MCNC: 6 MMOL/L — HIGH (ref 3.5–5.3)
POTASSIUM SERPL-SCNC: 5.2 MMOL/L — SIGNIFICANT CHANGE UP (ref 3.5–5.3)
POTASSIUM SERPL-SCNC: 6 MMOL/L — HIGH (ref 3.5–5.3)
RBC # BLD: 4.07 M/UL — LOW (ref 4.2–5.8)
RBC # FLD: 15.1 % — HIGH (ref 10.3–14.5)
SARS-COV-2 RNA SPEC QL NAA+PROBE: SIGNIFICANT CHANGE UP
SODIUM SERPL-SCNC: 143 MMOL/L — SIGNIFICANT CHANGE UP (ref 135–145)
SODIUM SERPL-SCNC: 144 MMOL/L — SIGNIFICANT CHANGE UP (ref 135–145)
WBC # BLD: 10.02 K/UL — SIGNIFICANT CHANGE UP (ref 3.8–10.5)
WBC # FLD AUTO: 10.02 K/UL — SIGNIFICANT CHANGE UP (ref 3.8–10.5)

## 2022-04-26 PROCEDURE — 99233 SBSQ HOSP IP/OBS HIGH 50: CPT

## 2022-04-26 RX ORDER — GABAPENTIN 400 MG/1
1 CAPSULE ORAL
Qty: 0 | Refills: 0 | DISCHARGE
Start: 2022-04-26

## 2022-04-26 RX ORDER — SODIUM CHLORIDE 9 MG/ML
1000 INJECTION INTRAMUSCULAR; INTRAVENOUS; SUBCUTANEOUS
Refills: 0 | Status: DISCONTINUED | OUTPATIENT
Start: 2022-04-26 | End: 2022-04-28

## 2022-04-26 RX ORDER — LACOSAMIDE 50 MG/1
1 TABLET ORAL
Qty: 0 | Refills: 0 | DISCHARGE
Start: 2022-04-26

## 2022-04-26 RX ORDER — GABAPENTIN 400 MG/1
100 CAPSULE ORAL
Refills: 0 | Status: DISCONTINUED | OUTPATIENT
Start: 2022-04-26 | End: 2022-04-29

## 2022-04-26 RX ADMIN — MORPHINE SULFATE 15 MILLIGRAM(S): 50 CAPSULE, EXTENDED RELEASE ORAL at 14:03

## 2022-04-26 RX ADMIN — LACOSAMIDE 150 MILLIGRAM(S): 50 TABLET ORAL at 17:25

## 2022-04-26 RX ADMIN — Medication 25 MILLIGRAM(S): at 17:30

## 2022-04-26 RX ADMIN — RIVAROXABAN 10 MILLIGRAM(S): KIT at 14:00

## 2022-04-26 RX ADMIN — Medication 81 MILLIGRAM(S): at 13:59

## 2022-04-26 RX ADMIN — Medication 600 MILLIGRAM(S): at 05:12

## 2022-04-26 RX ADMIN — Medication 325 MILLIGRAM(S): at 14:00

## 2022-04-26 RX ADMIN — Medication 20 MILLIGRAM(S): at 17:25

## 2022-04-26 RX ADMIN — Medication 975 MILLIGRAM(S): at 11:00

## 2022-04-26 RX ADMIN — LACOSAMIDE 150 MILLIGRAM(S): 50 TABLET ORAL at 05:12

## 2022-04-26 RX ADMIN — ATORVASTATIN CALCIUM 20 MILLIGRAM(S): 80 TABLET, FILM COATED ORAL at 21:07

## 2022-04-26 RX ADMIN — Medication 600 MILLIGRAM(S): at 17:26

## 2022-04-26 RX ADMIN — MORPHINE SULFATE 15 MILLIGRAM(S): 50 CAPSULE, EXTENDED RELEASE ORAL at 21:37

## 2022-04-26 RX ADMIN — TAMSULOSIN HYDROCHLORIDE 0.8 MILLIGRAM(S): 0.4 CAPSULE ORAL at 21:07

## 2022-04-26 RX ADMIN — MORPHINE SULFATE 15 MILLIGRAM(S): 50 CAPSULE, EXTENDED RELEASE ORAL at 21:07

## 2022-04-26 RX ADMIN — Medication 25 MILLIGRAM(S): at 05:12

## 2022-04-26 RX ADMIN — GABAPENTIN 100 MILLIGRAM(S): 400 CAPSULE ORAL at 05:12

## 2022-04-26 RX ADMIN — Medication 150 MILLIGRAM(S): at 13:59

## 2022-04-26 RX ADMIN — Medication 20 MILLIGRAM(S): at 05:12

## 2022-04-26 RX ADMIN — GABAPENTIN 100 MILLIGRAM(S): 400 CAPSULE ORAL at 17:30

## 2022-04-26 RX ADMIN — MORPHINE SULFATE 15 MILLIGRAM(S): 50 CAPSULE, EXTENDED RELEASE ORAL at 14:30

## 2022-04-26 RX ADMIN — NYSTATIN CREAM 1 APPLICATION(S): 100000 CREAM TOPICAL at 05:26

## 2022-04-26 RX ADMIN — NYSTATIN CREAM 1 APPLICATION(S): 100000 CREAM TOPICAL at 17:26

## 2022-04-26 RX ADMIN — Medication 1 TABLET(S): at 14:01

## 2022-04-26 RX ADMIN — Medication 975 MILLIGRAM(S): at 10:31

## 2022-04-26 NOTE — PROGRESS NOTE ADULT - SUBJECTIVE AND OBJECTIVE BOX
Patient is a 66y old  Male who presents with a chief complaint of seizure (24 Apr 2022 11:02)    Subjective:  No complaints. No overnight events    MEDICATIONS  (STANDING):  aspirin enteric coated 81 milliGRAM(s) Oral daily  atorvastatin 20 milliGRAM(s) Oral at bedtime  baclofen 20 milliGRAM(s) Oral two times a day  bisacodyl Suppository 10 milliGRAM(s) Rectal daily  ferrous    sulfate 325 milliGRAM(s) Oral daily  gabapentin 300 milliGRAM(s) Oral two times a day  gemfibrozil 600 milliGRAM(s) Oral two times a day  lacosamide 150 milliGRAM(s) Oral two times a day  metoprolol tartrate 25 milliGRAM(s) Oral two times a day  morphine ER Tablet 15 milliGRAM(s) Oral every 8 hours  multivitamin 1 Tablet(s) Oral daily  nystatin Powder 1 Application(s) Topical two times a day  pregabalin 150 milliGRAM(s) Oral daily  rivaroxaban 10 milliGRAM(s) Oral daily  senna 2 Tablet(s) Oral at bedtime  tamsulosin 0.8 milliGRAM(s) Oral at bedtime    MEDICATIONS  (PRN):  acetaminophen     Tablet .. 975 milliGRAM(s) Oral every 8 hours PRN Mild Pain (1 - 3), Moderate Pain (4 - 6)  ketorolac   Injectable 15 milliGRAM(s) IV Push every 4 hours PRN Moderate Pain (4 - 6)  lacosamide Injectable 200 milliGRAM(s) IV Push once PRN Seizure  LORazepam   Injectable 1 milliGRAM(s) IV Push once PRN Seizure    Vital Signs Last 24 Hrs  T(C): 36.4 (26 Apr 2022 05:55), Max: 36.6 (25 Apr 2022 14:15)  T(F): 97.5 (26 Apr 2022 05:55), Max: 97.9 (25 Apr 2022 14:15)  HR: 61 (26 Apr 2022 05:55) (53 - 64)  BP: 147/80 (26 Apr 2022 07:18) (99/62 - 177/72)  BP(mean): --  RR: 18 (26 Apr 2022 05:55) (18 - 18)  SpO2: 98% (26 Apr 2022 05:55) (93% - 98%)    GENERAL EXAM:  Constitutional: awake and alert. NAD  HEENT: PERRL, EOMI    NEUROLOGICAL EXAM:  MS: AAOX3, speech is fluent, Follows simple and complex commands.   CN:  EOMI, PERRL  V1-3 intact, no facial asymmetry, t/p midline,   Motor: Strength: 5/5  in the UE b/l. 5/5 in the LLE. 3/5 in the RLE - antigravity- limited due to recent surgery    Tone: normal. Bulk: normal.  Sensation: intact to LT 4x.   Coordination: FTn intact b/l.   Gait:  Deferred      Labs:                                               11.9   10.02 )-----------( 498      ( 26 Apr 2022 08:12 )             39.4       04-26    144  |  110<H>  |  20  ----------------------------<  100<H>  6.0<H>   |  19<L>  |  1.17    Ca    9.6      26 Apr 2022 08:12  Phos  3.7     04-26  Mg     2.3     04-26          Radiology:  EEG report 4/25/22  EEG Summary:  Abnormal EEG in the awake, drowsy and asleep states.  Sharp waves, focal, right frontal and temporal  Intermittent polymorphic delta, focal, right temporal region  Intermittent negative myoclonus without EEG correlate, RESOLVED  Impression/Clinical Correlate:  This is an abnormal EEG record. There is evidence of potentially epileptogenic region of cortical dysfunction in the right frontal and anterior temporal areas. There is also mild diffuse cerebral dysfunction that is focally worse in the right temporal region. No seizures were seen. There is negative myoclonus intermittently without EEG correlate – gabapentin dose was lowered, and myoclonus subsequently resolved.    EEG report 4/24/22:  EEG Summary:  Abnormal EEG in the awake, drowsy and asleep states.  Sharp waves, focal, right frontal and temporal  Intermittent polymorphic delta, focal, right temporal region  Intermittent negative myoclonus without EEG correlate  Impression/Clinical Correlate:  This is an abnormal EEG record. There is evidence of potentially epileptogenic region of cortical dysfunction in the right frontal and anterior temporal areas. There is also mild diffuse cerebral dysfunction that is focally worse in the right temporal region. No epileptiform pattern or seizures were seen. There is negative myoclonus intermittently without EEG correlate – gabapentin dose was lowered.

## 2022-04-26 NOTE — PROGRESS NOTE ADULT - SUBJECTIVE AND OBJECTIVE BOX
Patient is a 66y old  Male who presents with a chief complaint of seizure (26 Apr 2022 12:21)      SUBJECTIVE / OVERNIGHT EVENTS: ptn is isolated now bc was exposed to covid 19, has no symptoms    MEDICATIONS  (STANDING):  aspirin enteric coated 81 milliGRAM(s) Oral daily  atorvastatin 20 milliGRAM(s) Oral at bedtime  baclofen 20 milliGRAM(s) Oral two times a day  ferrous    sulfate 325 milliGRAM(s) Oral daily  gabapentin 100 milliGRAM(s) Oral two times a day  gemfibrozil 600 milliGRAM(s) Oral two times a day  lacosamide 150 milliGRAM(s) Oral two times a day  metoprolol tartrate 25 milliGRAM(s) Oral two times a day  morphine ER Tablet 15 milliGRAM(s) Oral every 8 hours  multivitamin 1 Tablet(s) Oral daily  nystatin Powder 1 Application(s) Topical two times a day  pregabalin 150 milliGRAM(s) Oral daily  rivaroxaban 10 milliGRAM(s) Oral daily  senna 2 Tablet(s) Oral at bedtime  sodium chloride 0.9%. 1000 milliLiter(s) (50 mL/Hr) IV Continuous <Continuous>  tamsulosin 0.8 milliGRAM(s) Oral at bedtime    MEDICATIONS  (PRN):  acetaminophen     Tablet .. 975 milliGRAM(s) Oral every 8 hours PRN Mild Pain (1 - 3), Moderate Pain (4 - 6)  lacosamide Injectable 200 milliGRAM(s) IV Push once PRN Seizure  LORazepam   Injectable 1 milliGRAM(s) IV Push once PRN Seizure      Vital Signs Last 24 Hrs  T(F): 97.9 (04-26-22 @ 17:33), Max: 98.3 (04-26-22 @ 13:54)  HR: 59 (04-26-22 @ 17:33) (53 - 64)  BP: 159/80 (04-26-22 @ 17:33) (120/76 - 177/72)  RR: 18 (04-26-22 @ 17:33) (18 - 18)  SpO2: 94% (04-26-22 @ 17:33) (94% - 98%)  Telemetry:   CAPILLARY BLOOD GLUCOSE        I&O's Summary    26 Apr 2022 07:01  -  26 Apr 2022 18:23  --------------------------------------------------------  IN: 830 mL / OUT: 0 mL / NET: 830 mL        PHYSICAL EXAM:  GENERAL: NAD, well-developed  HEAD:  Atraumatic, Normocephalic  EYES: EOMI, PERRLA, conjunctiva and sclera clear  NECK: Supple, No JVD  CHEST/LUNG: Clear to auscultation bilaterally; No wheeze  HEART: Regular rate and rhythm; No murmurs, rubs, or gallops  ABDOMEN: Soft, Nontender, Nondistended; Bowel sounds present  EXTREMITIES:  2+ Peripheral Pulses, No clubbing, cyanosis, or edema  PSYCH: AAOx3  NEUROLOGY: non-focal  SKIN: No rashes or lesions    LABS:                        11.9   10.02 )-----------( 498      ( 26 Apr 2022 08:12 )             39.4     04-26    143  |  107  |  20  ----------------------------<  138<H>  5.2   |  22  |  1.35<H>    Ca    9.4      26 Apr 2022 14:05  Phos  3.7     04-26  Mg     2.3     04-26                RADIOLOGY & ADDITIONAL TESTS:    Imaging Personally Reviewed:    Consultant(s) Notes Reviewed:      Care Discussed with Consultants/Other Providers:

## 2022-04-26 NOTE — PROGRESS NOTE ADULT - ASSESSMENT
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   Pt was seen at 8am by RN at [facility]  moving all extremities equally, alert, no slurred speech when she was cleaning his RLE surgical site.   at 8:30am, pt was noted to have slurred speech, lethargic and couldn't move LUE at all.   No shaking movements noted.   L hemiplegia with lethargy and confusion, rapidly improving over 1-2 hrs, concerning for first-time seizure w/ post ictal Keny's paralysis (given prior R frontal encephalomalacia) vs. TIA/minor stroke  neuro exam is nonfocal.   4/22: ptn admitted to Neuro for possible acute new onset Sz, R/O TIA  ptn placed on Vimpat x 1, unable to get MRI 2/2 aneurismal clipping  c/w dvt ppx w po Xarelto  cont ASA, statin  as per neuro: Seizure, fall and aspiration precautions.  avoid sleep deprivation,  dysphagia screen,  EEG   4/23: ptn was observed by me yesterday to have apnea while sleeping. seen by pulm, will need outptn sleep study. would monitor puse ox overnight. today ptn has myoclonus , jerky movements and interruption of speech. he is awake, alert, recognized me. could this be from HD Gabapentin? will drop the dose from 600 tid to 300 bid. spoke w neurology. they agree. EEG is negative  4/24-25: myoclonus and metabolic encephalopathy resolved post lowering the dose of Gabapentin. pain free. asking for a regular diet. dc planning as per neuro team  4/26: ptn is isolated, was exposed to covid 19, has no symptoms

## 2022-04-26 NOTE — PROGRESS NOTE ADULT - ASSESSMENT
66M RH w/ CAD s/p MI and stent, HTN, HLD, s/p 7 lumbar spinal surgeries, spontaneous R Acomm aneurysm rupture s/p R frontal craniotomy and clipping (), s/p R prosthetic femor ORIF 3/30/21,  on xarelto presents as a stroke code for LUE weakness.  Pt was seen at 8am by RN to be moving all extremities equally, alert, no slurred speech when she was cleaning his RLE surgical site cleaning. At 8:30am, pt was noted to have slurred speech, lethargic and couldn't LUE at all. No shaking movements noted. Pt reports that he doesn't remember exactly what happened this morning, but does remember people calling his name. He reports a spontaneous head bleed in  and with subsequent clipping. NIHSS: 7, preMRS: 3. No tpa due to no rapidly improving symptoms, hx of spontaneous ICH; no MT. CTH w/ R frontal encephalomalacia w/ prior R frontal temproal craniotomy and Acomm aneurysm clip. CTA neg, no VST. CTP w/ findings correlating to R frontal encephalomalacia. Neuro exam w/ equal strength in b/l UE, occasional b/l UE neg myoclonus.    Impression: L hemiplegia with lethargy and confusion, rapidly improving over 1-2 hrs, concerning for first-time seizure w/ post ictal Keny's paralysis (given prior R frontal encephalomalacia) vs. TIA/minor stroke    Plan:  1: suspected seizures: getting cont eeg: r/p stroke per neurology   2: he is suspected to have sleep apnea: he has gained weight significant amount i last one year and recently had leg surgery : per his wife he sores t home  but has not had any witnessed apneas: he would need psg as an outpt: also can monitor his o2 sao2 while sleepin/24:    1: suspected seizures: getting cont eeg: r/p stroke per neurology   2: he is suspected to have sleep apnea: he has gained weight significant amount i last one year and recently had leg surgery : per his wife he sores t home  but has not had any witnessed apneas: he would need psg as an outpt: no desaturation reported or documented: for now pt is alert and awake:     dw pt and son     :    1: suspected seizures: no sz:pt is alert and awake: anti sx meds per primary team   2: he is suspected to have sleep apnea: dw pt: outpt follow up for PSG    meredith pt and son

## 2022-04-26 NOTE — PROGRESS NOTE ADULT - SUBJECTIVE AND OBJECTIVE BOX
Subjective: Patient seen and examined. No new events except as noted.     REVIEW OF SYSTEMS:    CONSTITUTIONAL: + weakness, fevers or chills  EYES/ENT: No visual changes;  No vertigo or throat pain   NECK: No pain or stiffness  RESPIRATORY: No cough, wheezing, hemoptysis; No shortness of breath  CARDIOVASCULAR: No chest pain or palpitations  GASTROINTESTINAL: No abdominal or epigastric pain. No nausea, vomiting, or hematemesis; No diarrhea or constipation. No melena or hematochezia.  GENITOURINARY: No dysuria, frequency or hematuria  NEUROLOGICAL: No numbness or weakness  SKIN: No itching, burning, rashes, or lesions   All other review of systems is negative unless indicated above.    MEDICATIONS:  MEDICATIONS  (STANDING):  aspirin enteric coated 81 milliGRAM(s) Oral daily  atorvastatin 20 milliGRAM(s) Oral at bedtime  baclofen 20 milliGRAM(s) Oral two times a day  ferrous    sulfate 325 milliGRAM(s) Oral daily  gabapentin 100 milliGRAM(s) Oral two times a day  gemfibrozil 600 milliGRAM(s) Oral two times a day  lacosamide 150 milliGRAM(s) Oral two times a day  metoprolol tartrate 25 milliGRAM(s) Oral two times a day  morphine ER Tablet 15 milliGRAM(s) Oral every 8 hours  multivitamin 1 Tablet(s) Oral daily  nystatin Powder 1 Application(s) Topical two times a day  pregabalin 150 milliGRAM(s) Oral daily  rivaroxaban 10 milliGRAM(s) Oral daily  senna 2 Tablet(s) Oral at bedtime  tamsulosin 0.8 milliGRAM(s) Oral at bedtime    PHYSICAL EXAM:  T(C): 36.3 (04-26-22 @ 09:39), Max: 36.6 (04-25-22 @ 14:15)  HR: 60 (04-26-22 @ 09:39) (53 - 64)  BP: 131/74 (04-26-22 @ 09:39) (99/62 - 177/72)  RR: 18 (04-26-22 @ 09:39) (18 - 18)  SpO2: 98% (04-26-22 @ 09:39) (93% - 98%)  Wt(kg): --  I&O's Summary    Appearance: Normal	  HEENT:   Normal oral mucosa, PERRL, EOMI	  Lymphatic: No lymphadenopathy , no edema  Cardiovascular: Normal S1 S2, No JVD, No murmurs , Peripheral pulses palpable 2+ bilaterally  Respiratory: Lungs clear to auscultation, normal effort 	  Gastrointestinal:  Soft, Non-tender, + BS	  Skin: No rashes, No ecchymoses, No cyanosis, warm to touch  NEUROLOGICAL EXAM:  MS: AAOX3, speech is fluent, Follows simple and complex commands.   CN:  EOMI, PERRL  V1-3 intact, no facial asymmetry, t/p midline,   Motor: Strength: 5/5  in the UE b/l. 5/5 in the LLE. 3/5 in the RLE - antigravity- limited due to recent surgery    Tone: normal. Bulk: normal.  Sensation: intact to LT 4x.   Coordination: FTn intact b/l.   Gait:  Deferred    Ext: No edema    LABS:    CARDIAC MARKERS:                       11.9   10.02 )-----------( 498      ( 26 Apr 2022 08:12 )             39.4     04-26    144  |  110<H>  |  20  ----------------------------<  100<H>  6.0<H>   |  19<L>  |  1.17    Ca    9.6      26 Apr 2022 08:12  Phos  3.7     04-26  Mg     2.3     04-26    proBNP:   Lipid Profile:   HgA1c:   TSH:     TELEMETRY: SR 	    ECG:  	  RADIOLOGY:   DIAGNOSTIC TESTING:  [ ] Echocardiogram:  [ ]  Catheterization:  [ ] Stress Test:    OTHER:

## 2022-04-26 NOTE — PROGRESS NOTE ADULT - ASSESSMENT
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  Pt was seen at 8am by RN to be moving all extremities equally, alert, no slurred speech when she was cleaning his RLE surgical site cleaning. At 8:30am, pt was noted to have slurred speech, lethargic and couldn't LUE at all. No shaking movements noted. Pt reports that he doesn't remember exactly what happened this morning, but does remember people calling his name. He reports a spontaneous head bleed in 1997 and with subsequent clipping. NIHSS: 7, preMRS: 3. No tpa due to no rapidly improving symptoms, hx of spontaneous ICH; no MT. CTH w/ R frontal encephalomalacia w/ prior R frontal temporal craniotomy and Acomm aneurysm clip. CTA neg, no VST. CTP w/ findings correlating to R frontal encephalomalacia. Neuro exam w/ equal strength in b/l UE, occasional b/l UE neg myoclonus.    Impression: L hemiplegia with lethargy and confusion, rapidly improving over 1-2 hrs, concerning for first-time seizure w/ post ictal Keny's paralysis (given prior R frontal encephalomalacia)     Plan:  Exposed to COVID on 4/24- not detected on COVID PCR 4/24  [] C/w Gabapentin to 100mg PO TID  [] Repeat COVID PCR today 4/26  [] PT/OT-okay to return to [facility]  []dc vEEG   []Continue Vimpat 150mg BID   []c/w home xarelto 10mg daily and aspirin 81mg daily  []c/w home atorvastatin 20mg PO daily   []Continue Gabapentin 300mg BID   []Seizure, fall and aspiration precautions.  Avoid sleep deprivation.  []If any seizure activity noted, please note: time, if upper/lower or focal onset symptoms (e.g. head turn, eye deviation, upper/lower tonic/clonic arm initially), vital sign derangements, airway protection, urinary or bowel incontinence, duration of seizure, tongue bite, and/or post-ictal time to return of baseline.    [x] Check , lactate 1.5  []Appreciate Internal Medicine recommendations   []Appreciate Pulm recommendations.   []Dispo: okay to discharge to [facility] today if Covid PCR neg    Patient can follow up with outpatient neurology at [address] [place] [address] 1-2 weeks after discharge. Please instruct the patient to call [phone] to schedule this appointment.     CORE MEASURES:        AED levels [] Sent [] Pending [] Resulted     LFTs [] normal [] elevated      Plan and education provided to [] patient []family at bedside [] awaiting for family     Seizure Semiology  [] Tonic clonic  [] Clonic  [] Tonic  [] Unresponsive  [] Focal with impared awareness  [] Focal without impaed awareness    Obtain screening lower extremity venous ultrasound in patients who meet 1 or more of the following criteria as patient is high risk for DVT/PE on admission:   [] History of DVT/PE  []Hypercoagulable states (Factor V Leiden, Cancer, OCP, etc. 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  Pt was seen at 8am by RN to be moving all extremities equally, alert, no slurred speech when she was cleaning his RLE surgical site cleaning. At 8:30am, pt was noted to have slurred speech, lethargic and couldn't LUE at all. No shaking movements noted. Pt reports that he doesn't remember exactly what happened this morning, but does remember people calling his name. He reports a spontaneous head bleed in 1997 and with subsequent clipping. NIHSS: 7, preMRS: 3. No tpa due to no rapidly improving symptoms, hx of spontaneous ICH; no MT. CTH w/ R frontal encephalomalacia w/ prior R frontal temporal craniotomy and Acomm aneurysm clip. CTA neg, no VST. CTP w/ findings correlating to R frontal encephalomalacia. Neuro exam w/ equal strength in b/l UE, occasional b/l UE neg myoclonus.    Impression: L hemiplegia with lethargy and confusion, rapidly improving over 1-2 hrs, concerning for first-time seizure w/ post ictal Keny's paralysis (given prior R frontal encephalomalacia)     Plan:  [] Covid PCR today 4/26  [] C/w Gabapentin 100mg PO BID  [] Repeat COVID PCR today 4/26  [] PT/OT-okay to return to [facility]  []dc vEEG   []Continue Vimpat 150mg BID   []c/w home xarelto 10mg daily and aspirin 81mg daily  []c/w home atorvastatin 20mg PO daily   []Continue Gabapentin 300mg BID   []Seizure, fall and aspiration precautions.  Avoid sleep deprivation.  []If any seizure activity noted, please note: time, if upper/lower or focal onset symptoms (e.g. head turn, eye deviation, upper/lower tonic/clonic arm initially), vital sign derangements, airway protection, urinary or bowel incontinence, duration of seizure, tongue bite, and/or post-ictal time to return of baseline.    [x] Check , lactate 1.5  []Appreciate Internal Medicine recommendations   []Appreciate Pulm recommendations.   []Dispo: okay to discharge to [facility] today if Covid PCR neg    Patient can follow up with outpatient neurology at [address] [place] [address] 1-2 weeks after discharge. Please instruct the patient to call [phone] to schedule this appointment.     CORE MEASURES:        AED levels [] Sent [] Pending [] Resulted     LFTs [] normal [] elevated      Plan and education provided to [] patient []family at bedside [] awaiting for family     Seizure Semiology  [] Tonic clonic  [] Clonic  [] Tonic  [] Unresponsive  [] Focal with impared awareness  [] Focal without impaed awareness    Obtain screening lower extremity venous ultrasound in patients who meet 1 or more of the following criteria as patient is high risk for DVT/PE on admission:   [] History of DVT/PE  []Hypercoagulable states (Factor V Leiden, Cancer, OCP, etc. )  []Prolonged immobility (hemiplegia/hemiparesis/post operative or any other extended immobilization)  [] Transferred from outside facility (Rehab or Long term care)

## 2022-04-26 NOTE — PROGRESS NOTE ADULT - SUBJECTIVE AND OBJECTIVE BOX
Date of Service: 04-26-22 @ 12:21    Patient is a 66y old  Male who presents with a chief complaint of seizure (26 Apr 2022 10:33)      Any change in ROS: Ptis isolation:  alert and awke:  fully oreinted and responsive to questions:     MEDICATIONS  (STANDING):  aspirin enteric coated 81 milliGRAM(s) Oral daily  atorvastatin 20 milliGRAM(s) Oral at bedtime  baclofen 20 milliGRAM(s) Oral two times a day  ferrous    sulfate 325 milliGRAM(s) Oral daily  gabapentin 100 milliGRAM(s) Oral two times a day  gemfibrozil 600 milliGRAM(s) Oral two times a day  lacosamide 150 milliGRAM(s) Oral two times a day  metoprolol tartrate 25 milliGRAM(s) Oral two times a day  morphine ER Tablet 15 milliGRAM(s) Oral every 8 hours  multivitamin 1 Tablet(s) Oral daily  nystatin Powder 1 Application(s) Topical two times a day  pregabalin 150 milliGRAM(s) Oral daily  rivaroxaban 10 milliGRAM(s) Oral daily  senna 2 Tablet(s) Oral at bedtime  tamsulosin 0.8 milliGRAM(s) Oral at bedtime    MEDICATIONS  (PRN):  acetaminophen     Tablet .. 975 milliGRAM(s) Oral every 8 hours PRN Mild Pain (1 - 3), Moderate Pain (4 - 6)  ketorolac   Injectable 15 milliGRAM(s) IV Push every 4 hours PRN Moderate Pain (4 - 6)  lacosamide Injectable 200 milliGRAM(s) IV Push once PRN Seizure  LORazepam   Injectable 1 milliGRAM(s) IV Push once PRN Seizure    Vital Signs Last 24 Hrs  T(C): 36.3 (26 Apr 2022 09:39), Max: 36.6 (25 Apr 2022 14:15)  T(F): 97.3 (26 Apr 2022 09:39), Max: 97.9 (25 Apr 2022 14:15)  HR: 60 (26 Apr 2022 09:39) (53 - 64)  BP: 131/74 (26 Apr 2022 09:39) (119/72 - 177/72)  BP(mean): --  RR: 18 (26 Apr 2022 09:39) (18 - 18)  SpO2: 98% (26 Apr 2022 09:39) (93% - 98%)    I&O's Summary        Physical Exam:   GENERAL: NAD, well-groomed, well-developed  HEENT: [place]/   Atraumatic, Normocephalic  ENMT: No tonsillar erythema, exudates, or enlargement; Moist mucous membranes, Good dentition, No lesions  NECK: Supple, No JVD, Normal thyroid  CHEST/LUNG: Clear to auscultaion  CVS: Regular rate and rhythm; No murmurs, rubs, or gallops  GI: : Soft, Nontender, Nondistended; Bowel sounds present  NERVOUS SYSTEM:  Alert & Oriented X3  EXTREMITIES:  -edema  LYMPH: No lymphadenopathy noted  SKIN: No rashes or lesions  ENDOCRINOLOGY: No Thyromegaly  PSYCH: Appropriate    Labs:  27                            11.9   10.02 )-----------( 498      ( 26 Apr 2022 08:12 )             39.4                         10.2   8.61  )-----------( 567      ( 24 Apr 2022 07:09 )             33.0                         11.0   9.11  )-----------( 601      ( 23 Apr 2022 09:29 )             36.2     04-26    144  |  110<H>  |  20  ----------------------------<  100<H>  6.0<H>   |  19<L>  |  1.17  04-24    142  |  107  |  17  ----------------------------<  115<H>  4.1   |  23  |  1.08  04-23    143  |  106  |  13  ----------------------------<  105<H>  4.6   |  21<L>  |  0.97    Ca    9.6      26 Apr 2022 08:12  Phos  3.7     04-26  Mg     2.3     04-26    TPro  6.8  /  Alb  3.5  /  TBili  0.4  /  DBili  x   /  AST  13  /  ALT  14  /  AlkPhos  180<H>  04-24  TPro  7.5  /  Alb  3.6  /  TBili  0.5  /  DBili  x   /  AST  17  /  ALT  16  /  AlkPhos  190<H>  04-23    CAPILLARY BLOOD GLUCOSE          rad< from: CT Angio Neck w/ IV Cont (04.22.22 @ 10:30) >      There is calcification at the aortic arch. The origins of thecarotid and   vertebral arteries are normal. Vertebral arteries are codominant. The   carotid bifurcations demonstrate calcification without stenosis.    The distal vertebral arteries are well identified as are the   posterior-inferior cerebellar arteries bilaterally. The region of the   vertebral basilar junction is normal. The basilar artery is normal. The   posterior cerebral and superior cerebellar arteries are normal.    Evaluation of the carotid arteries demonstrate normal appearance to the  distal cervical, petrous cavernous and supraclinoid internal carotid   arteries. The A1 segment of the left anterior cerebral artery is   hypoplastic. There is an anterior communicating artery aneurysm clip   identified. Right anterior cerebral arteries appear normal. There is no   evidence of residual or recurrent aneurysm. The middle cerebral arteries   are normal.    The normal intracranial venous circulation is identified. The right   transverse sinus is dominant. The superior sagittal sinus, internal   cerebral veins, vein of Frantz, straight sinus, transverse sinuses,   sigmoid sinuses and internal jugular veins are normal. Cortical veins are   normal.    IMPRESSION: Remote right frontal temporal craniotomy and anterior   communicatingartery aneurysm clipping. Right frontal encephalomalacia   and gliosis likely related to prior aneurysm rupture or postoperative   changes.    Thin demonstrates delayed mean transit time and core infarct in the   region of the right frontal encephalomalacia.    CTA of the head and neck demonstrates no significant stenosis or   occlusion. Anterior communicating artery aneurysm clipping.    --- End of Report ---            MONICA SOSA MD; Attending Radiologist  This document has been electronicallysigned. Apr 22 2022 10:31AM    < end of copied text >  < from: CT Angio Head w/ IV Cont (04.22.22 @ 10:30) >      There is calcification at the aortic arch. The origins of thecarotid and   vertebral arteries are normal. Vertebral arteries are codominant. The   carotid bifurcations demonstrate calcification without stenosis.    The distal vertebral arteries are well identified as are the   posterior-inferior cerebellar arteries bilaterally. The region of the   vertebral basilar junction is normal. The basilar artery is normal. The   posterior cerebral and superior cerebellar arteries are normal.    Evaluation of the carotid arteries demonstrate normal appearance to the  distal cervical, petrous cavernous and supraclinoid internal carotid   arteries. The A1 segment of the left anterior cerebral artery is   hypoplastic. There is an anterior communicating artery aneurysm clip   identified. Right anterior cerebral arteries appear normal. There is no   evidence of residual or recurrent aneurysm. The middle cerebral arteries   are normal.    The normal intracranial venous circulation is identified. The right   transverse sinus is dominant. The superior sagittal sinus, internal   cerebral veins, vein of Frantz, straight sinus, transverse sinuses,   sigmoid sinuses and internal jugular veins are normal. Cortical veins are   normal.    IMPRESSION: Remote right frontal temporal craniotomy and anterior   communicatingartery aneurysm clipping. Right frontal encephalomalacia   and gliosis likely related to prior aneurysm rupture or postoperative   changes.    Thin demonstrates delayed mean transit time and core infarct in the   region of the right frontal encephalomalacia.    CTA of the head and neck demonstrates no significant stenosis or   occlusion. Anterior communicating artery aneurysm clipping.    --- End of Report ---            MONICA SOSA MD; Attending Radiologist  This document has been electronicallysigned. Apr 22 2022 10:31AM    < end of copied text >              RECENT CULTURES:  04-22 @ 16:37 Catheterized Catheterized                No growth          RESPIRATORY CULTURES:          Studies  Chest X-RAY  CT SCAN Chest   Venous Dopplers: LE:   CT Abdomen  Others

## 2022-04-27 LAB
ANION GAP SERPL CALC-SCNC: 12 MMOL/L — SIGNIFICANT CHANGE UP (ref 5–17)
BUN SERPL-MCNC: 21 MG/DL — SIGNIFICANT CHANGE UP (ref 7–23)
CALCIUM SERPL-MCNC: 9 MG/DL — SIGNIFICANT CHANGE UP (ref 8.4–10.5)
CHLORIDE SERPL-SCNC: 105 MMOL/L — SIGNIFICANT CHANGE UP (ref 96–108)
CO2 SERPL-SCNC: 22 MMOL/L — SIGNIFICANT CHANGE UP (ref 22–31)
CREAT SERPL-MCNC: 1.28 MG/DL — SIGNIFICANT CHANGE UP (ref 0.5–1.3)
EGFR: 61 ML/MIN/1.73M2 — SIGNIFICANT CHANGE UP
GLUCOSE SERPL-MCNC: 117 MG/DL — HIGH (ref 70–99)
HCT VFR BLD CALC: 38.3 % — LOW (ref 39–50)
HGB BLD-MCNC: 11.6 G/DL — LOW (ref 13–17)
MAGNESIUM SERPL-MCNC: 2.1 MG/DL — SIGNIFICANT CHANGE UP (ref 1.6–2.6)
MCHC RBC-ENTMCNC: 29.1 PG — SIGNIFICANT CHANGE UP (ref 27–34)
MCHC RBC-ENTMCNC: 30.3 GM/DL — LOW (ref 32–36)
MCV RBC AUTO: 96.2 FL — SIGNIFICANT CHANGE UP (ref 80–100)
NRBC # BLD: 0 /100 WBCS — SIGNIFICANT CHANGE UP (ref 0–0)
PHOSPHATE SERPL-MCNC: 2.8 MG/DL — SIGNIFICANT CHANGE UP (ref 2.5–4.5)
PLATELET # BLD AUTO: 442 K/UL — HIGH (ref 150–400)
POTASSIUM SERPL-MCNC: 4.1 MMOL/L — SIGNIFICANT CHANGE UP (ref 3.5–5.3)
POTASSIUM SERPL-SCNC: 4.1 MMOL/L — SIGNIFICANT CHANGE UP (ref 3.5–5.3)
RBC # BLD: 3.98 M/UL — LOW (ref 4.2–5.8)
RBC # FLD: 15.4 % — HIGH (ref 10.3–14.5)
SODIUM SERPL-SCNC: 139 MMOL/L — SIGNIFICANT CHANGE UP (ref 135–145)
WBC # BLD: 9.33 K/UL — SIGNIFICANT CHANGE UP (ref 3.8–10.5)
WBC # FLD AUTO: 9.33 K/UL — SIGNIFICANT CHANGE UP (ref 3.8–10.5)

## 2022-04-27 PROCEDURE — 99233 SBSQ HOSP IP/OBS HIGH 50: CPT

## 2022-04-27 RX ORDER — OXYCODONE HYDROCHLORIDE 5 MG/1
10 TABLET ORAL ONCE
Refills: 0 | Status: DISCONTINUED | OUTPATIENT
Start: 2022-04-27 | End: 2022-04-27

## 2022-04-27 RX ORDER — METOPROLOL TARTRATE 50 MG
25 TABLET ORAL
Refills: 0 | Status: DISCONTINUED | OUTPATIENT
Start: 2022-04-27 | End: 2022-04-29

## 2022-04-27 RX ADMIN — RIVAROXABAN 10 MILLIGRAM(S): KIT at 11:53

## 2022-04-27 RX ADMIN — Medication 81 MILLIGRAM(S): at 11:53

## 2022-04-27 RX ADMIN — NYSTATIN CREAM 1 APPLICATION(S): 100000 CREAM TOPICAL at 17:30

## 2022-04-27 RX ADMIN — TAMSULOSIN HYDROCHLORIDE 0.8 MILLIGRAM(S): 0.4 CAPSULE ORAL at 21:33

## 2022-04-27 RX ADMIN — Medication 20 MILLIGRAM(S): at 17:28

## 2022-04-27 RX ADMIN — Medication 600 MILLIGRAM(S): at 05:07

## 2022-04-27 RX ADMIN — GABAPENTIN 100 MILLIGRAM(S): 400 CAPSULE ORAL at 05:06

## 2022-04-27 RX ADMIN — Medication 25 MILLIGRAM(S): at 05:07

## 2022-04-27 RX ADMIN — Medication 20 MILLIGRAM(S): at 05:07

## 2022-04-27 RX ADMIN — Medication 975 MILLIGRAM(S): at 16:30

## 2022-04-27 RX ADMIN — OXYCODONE HYDROCHLORIDE 10 MILLIGRAM(S): 5 TABLET ORAL at 08:47

## 2022-04-27 RX ADMIN — Medication 975 MILLIGRAM(S): at 15:54

## 2022-04-27 RX ADMIN — LACOSAMIDE 150 MILLIGRAM(S): 50 TABLET ORAL at 05:06

## 2022-04-27 RX ADMIN — MORPHINE SULFATE 15 MILLIGRAM(S): 50 CAPSULE, EXTENDED RELEASE ORAL at 05:36

## 2022-04-27 RX ADMIN — LACOSAMIDE 150 MILLIGRAM(S): 50 TABLET ORAL at 17:29

## 2022-04-27 RX ADMIN — ATORVASTATIN CALCIUM 20 MILLIGRAM(S): 80 TABLET, FILM COATED ORAL at 21:33

## 2022-04-27 RX ADMIN — SENNA PLUS 2 TABLET(S): 8.6 TABLET ORAL at 23:40

## 2022-04-27 RX ADMIN — MORPHINE SULFATE 15 MILLIGRAM(S): 50 CAPSULE, EXTENDED RELEASE ORAL at 13:40

## 2022-04-27 RX ADMIN — Medication 150 MILLIGRAM(S): at 11:54

## 2022-04-27 RX ADMIN — MORPHINE SULFATE 15 MILLIGRAM(S): 50 CAPSULE, EXTENDED RELEASE ORAL at 05:06

## 2022-04-27 RX ADMIN — OXYCODONE HYDROCHLORIDE 10 MILLIGRAM(S): 5 TABLET ORAL at 09:20

## 2022-04-27 RX ADMIN — NYSTATIN CREAM 1 APPLICATION(S): 100000 CREAM TOPICAL at 05:26

## 2022-04-27 RX ADMIN — Medication 325 MILLIGRAM(S): at 11:53

## 2022-04-27 RX ADMIN — GABAPENTIN 100 MILLIGRAM(S): 400 CAPSULE ORAL at 17:28

## 2022-04-27 RX ADMIN — SODIUM CHLORIDE 50 MILLILITER(S): 9 INJECTION INTRAMUSCULAR; INTRAVENOUS; SUBCUTANEOUS at 00:50

## 2022-04-27 RX ADMIN — Medication 600 MILLIGRAM(S): at 17:28

## 2022-04-27 RX ADMIN — MORPHINE SULFATE 15 MILLIGRAM(S): 50 CAPSULE, EXTENDED RELEASE ORAL at 13:05

## 2022-04-27 RX ADMIN — Medication 1 TABLET(S): at 11:53

## 2022-04-27 NOTE — PROGRESS NOTE ADULT - ASSESSMENT
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  Pt was seen at 8am by RN to be moving all extremities equally, alert, no slurred speech when she was cleaning his RLE surgical site cleaning. At 8:30am, pt was noted to have slurred speech, lethargic and couldn't LUE at all. No shaking movements noted. Pt reports that he doesn't remember exactly what happened this morning, but does remember people calling his name. He reports a spontaneous head bleed in 1997 and with subsequent clipping. NIHSS: 7, preMRS: 3. No tpa due to no rapidly improving symptoms, hx of spontaneous ICH; no MT. CTH w/ R frontal encephalomalacia w/ prior R frontal temporal craniotomy and Acomm aneurysm clip. CTA neg, no VST. CTP w/ findings correlating to R frontal encephalomalacia. Neuro exam w/ equal strength in b/l UE, occasional b/l UE neg myoclonus.    Impression: L hemiplegia with lethargy and confusion, rapidly improving over 1-2 hrs, concerning for first-time seizure w/ post ictal Keny's paralysis (given prior R frontal encephalomalacia)     Plan:  [x] C/w Gabapentin 100mg PO BID  [] PT/OT-okay to return to [facility] facility  [x]dc vEEG   [x]Continue Vimpat 150mg BID   []c/w home xarelto 10mg daily and aspirin 81mg daily  []c/w home atorvastatin 20mg PO daily   []Seizure, fall and aspiration precautions.  Avoid sleep deprivation.  []If any seizure activity noted, please note: time, if upper/lower or focal onset symptoms (e.g. head turn, eye deviation, upper/lower tonic/clonic arm initially), vital sign derangements, airway protection, urinary or bowel incontinence, duration of seizure, tongue bite, and/or post-ictal time to return of baseline.    [x] Check , lactate 1.5  []Appreciate Internal Medicine recommendations   []Appreciate Pulm recommendations.   []Dispo: okay to discharge to [facility] today if Covid PCR neg    Patient can follow up with outpatient neurology at [address] [place] [address] 1-2 weeks after discharge. Please instruct the patient to call [phone] to schedule this appointment.     CORE MEASURES:        AED levels [] Sent [] Pending [] Resulted     LFTs [] normal [] elevated      Plan and education provided to [] patient []family at bedside [] awaiting for family     Seizure Semiology  [] Tonic clonic  [] Clonic  [] Tonic  [] Unresponsive  [] Focal with impaired awareness  [] Focal without impaired awareness    Obtain screening lower extremity venous ultrasound in patients who meet 1 or more of the following criteria as patient is high risk for DVT/PE on admission:   [] History of DVT/PE  []Hypercoagulable states (Factor V Leiden, Cancer, OCP, etc. )  []Prolonged immobility (hemiplegia/hemiparesis/post operative or any other extended immobilization)  [] Transferred from outside facility (Rehab or Long term care)

## 2022-04-27 NOTE — PROGRESS NOTE ADULT - SUBJECTIVE AND OBJECTIVE BOX
Patient is a 66y old  Male who presents with a chief complaint of seizure (24 Apr 2022 11:02)    Subjective:  No complaints. No overnight events    MEDICATIONS  (STANDING):  aspirin enteric coated 81 milliGRAM(s) Oral daily  atorvastatin 20 milliGRAM(s) Oral at bedtime  baclofen 20 milliGRAM(s) Oral two times a day  ferrous    sulfate 325 milliGRAM(s) Oral daily  gabapentin 100 milliGRAM(s) Oral two times a day  gemfibrozil 600 milliGRAM(s) Oral two times a day  lacosamide 150 milliGRAM(s) Oral two times a day  metoprolol tartrate 25 milliGRAM(s) Oral two times a day  morphine ER Tablet 15 milliGRAM(s) Oral every 8 hours  multivitamin 1 Tablet(s) Oral daily  nystatin Powder 1 Application(s) Topical two times a day  pregabalin 150 milliGRAM(s) Oral daily  rivaroxaban 10 milliGRAM(s) Oral daily  senna 2 Tablet(s) Oral at bedtime  sodium chloride 0.9%. 1000 milliLiter(s) (50 mL/Hr) IV Continuous <Continuous>  tamsulosin 0.8 milliGRAM(s) Oral at bedtime    MEDICATIONS  (PRN):  acetaminophen     Tablet .. 975 milliGRAM(s) Oral every 8 hours PRN Mild Pain (1 - 3), Moderate Pain (4 - 6)  lacosamide Injectable 200 milliGRAM(s) IV Push once PRN Seizure  LORazepam   Injectable 1 milliGRAM(s) IV Push once PRN Seizure    Vital Signs Last 24 Hrs  T(C): 37 (27 Apr 2022 09:58), Max: 37 (27 Apr 2022 09:58)  T(F): 98.6 (27 Apr 2022 09:58), Max: 98.6 (27 Apr 2022 09:58)  HR: 56 (27 Apr 2022 09:58) (55 - 61)  BP: 100/59 (27 Apr 2022 09:58) (100/59 - 159/80)  RR: 18 (27 Apr 2022 09:58) (18 - 18)  SpO2: 98% (27 Apr 2022 09:58) (94% - 98%)    GENERAL EXAM:  Constitutional: awake and alert. NAD  HEENT: PERRL, EOMI    NEUROLOGICAL EXAM:  MS: AAOX3, speech is fluent, Follows simple and complex commands.   CN:  EOMI, PERRL  V1-3 intact, no facial asymmetry, t/p midline,   Motor: Strength: 5/5  in the UE b/l. 5/5 in the LLE. 3/5 in the RLE - antigravity- limited due to recent surgery    Tone: normal. Bulk: normal.  Sensation: intact to LT 4x.   Coordination: FTn intact b/l.   Gait:  Deferred      Labs:                                  11.6   9.33  )-----------( 442      ( 27 Apr 2022 06:47 )             38.3     04-27    139  |  105  |  21  ----------------------------<  117<H>  4.1   |  22  |  1.28    Ca    9.0      27 Apr 2022 06:44  Phos  2.8     04-27  Mg     2.1     04-27    Radiology:  EEG report 4/25/22:  Abnormal EEG in the awake, drowsy and asleep states.  Sharp waves, focal, right frontal and temporal  Intermittent polymorphic delta, focal, right temporal region  Intermittent negative myoclonus without EEG correlate, RESOLVED  Impression/Clinical Correlate:  This is an abnormal EEG record. There is evidence of potentially epileptogenic region of cortical dysfunction in the right frontal and anterior temporal areas. There is also mild diffuse cerebral dysfunction that is focally worse in the right temporal region. No seizures were seen. There is negative myoclonus intermittently without EEG correlate – gabapentin dose was lowered, and myoclonus subsequently resolved.    EEG report 4/24/22:  Abnormal EEG in the awake, drowsy and asleep states.  Sharp waves, focal, right frontal and temporal  Intermittent polymorphic delta, focal, right temporal region  Intermittent negative myoclonus without EEG correlate  Impression/Clinical Correlate:  This is an abnormal EEG record. There is evidence of potentially epileptogenic region of cortical dysfunction in the right frontal and anterior temporal areas. There is also mild diffuse cerebral dysfunction that is focally worse in the right temporal region. No epileptiform pattern or seizures were seen. There is negative myoclonus intermittently without EEG correlate – gabapentin dose was lowered.

## 2022-04-27 NOTE — PROGRESS NOTE ADULT - SUBJECTIVE AND OBJECTIVE BOX
Patient is a 66y old  Male who presents with a chief complaint of seizure (27 Apr 2022 10:00)      SUBJECTIVE / OVERNIGHT EVENTS: no new events, awaiting dc to [facility] once covid from today is neg.     MEDICATIONS  (STANDING):  aspirin enteric coated 81 milliGRAM(s) Oral daily  atorvastatin 20 milliGRAM(s) Oral at bedtime  baclofen 20 milliGRAM(s) Oral two times a day  ferrous    sulfate 325 milliGRAM(s) Oral daily  gabapentin 100 milliGRAM(s) Oral two times a day  gemfibrozil 600 milliGRAM(s) Oral two times a day  lacosamide 150 milliGRAM(s) Oral two times a day  metoprolol tartrate 25 milliGRAM(s) Oral two times a day  morphine ER Tablet 15 milliGRAM(s) Oral every 8 hours  multivitamin 1 Tablet(s) Oral daily  nystatin Powder 1 Application(s) Topical two times a day  pregabalin 150 milliGRAM(s) Oral daily  rivaroxaban 10 milliGRAM(s) Oral daily  senna 2 Tablet(s) Oral at bedtime  sodium chloride 0.9%. 1000 milliLiter(s) (50 mL/Hr) IV Continuous <Continuous>  tamsulosin 0.8 milliGRAM(s) Oral at bedtime    MEDICATIONS  (PRN):  acetaminophen     Tablet .. 975 milliGRAM(s) Oral every 8 hours PRN Mild Pain (1 - 3), Moderate Pain (4 - 6)  lacosamide Injectable 200 milliGRAM(s) IV Push once PRN Seizure  LORazepam   Injectable 1 milliGRAM(s) IV Push once PRN Seizure      Vital Signs Last 24 Hrs  T(F): 98.6 (04-27-22 @ 13:05), Max: 98.6 (04-27-22 @ 09:58)  HR: 60 (04-27-22 @ 13:05) (55 - 61)  BP: 112/71 (04-27-22 @ 13:05) (100/59 - 159/80)  RR: 18 (04-27-22 @ 13:05) (18 - 18)  SpO2: 98% (04-27-22 @ 13:05) (94% - 98%)  Telemetry:   CAPILLARY BLOOD GLUCOSE        I&O's Summary    26 Apr 2022 07:01  -  27 Apr 2022 07:00  --------------------------------------------------------  IN: 1230 mL / OUT: 0 mL / NET: 1230 mL    27 Apr 2022 07:01  -  27 Apr 2022 13:26  --------------------------------------------------------  IN: 1260 mL / OUT: 3 mL / NET: 1257 mL        PHYSICAL EXAM:  GENERAL: NAD, well-developed  HEAD:  Atraumatic, Normocephalic  EYES: EOMI, PERRLA, conjunctiva and sclera clear  NECK: Supple, No JVD  CHEST/LUNG: Clear to auscultation bilaterally; No wheeze  HEART: Regular rate and rhythm; No murmurs, rubs, or gallops  ABDOMEN: Soft, Nontender, Nondistended; Bowel sounds present  EXTREMITIES:  2+ Peripheral Pulses, No clubbing, cyanosis, or edema  PSYCH: AAOx3  NEUROLOGY: non-focal  SKIN: No rashes or lesions    LABS:                        11.6   9.33  )-----------( 442      ( 27 Apr 2022 06:47 )             38.3     04-27    139  |  105  |  21  ----------------------------<  117<H>  4.1   |  22  |  1.28    Ca    9.0      27 Apr 2022 06:44  Phos  2.8     04-27  Mg     2.1     04-27                RADIOLOGY & ADDITIONAL TESTS:    Imaging Personally Reviewed:    Consultant(s) Notes Reviewed:      Care Discussed with Consultants/Other Providers:

## 2022-04-27 NOTE — PROGRESS NOTE ADULT - SUBJECTIVE AND OBJECTIVE BOX
Subjective: Patient seen and examined. No new events except as noted.     REVIEW OF SYSTEMS:    CONSTITUTIONAL: + weakness, fevers or chills  EYES/ENT: No visual changes;  No vertigo or throat pain   NECK: No pain or stiffness  RESPIRATORY: No cough, wheezing, hemoptysis; No shortness of breath  CARDIOVASCULAR: No chest pain or palpitations  GASTROINTESTINAL: No abdominal or epigastric pain. No nausea, vomiting, or hematemesis; No diarrhea or constipation. No melena or hematochezia.  GENITOURINARY: No dysuria, frequency or hematuria  NEUROLOGICAL: No numbness or weakness  SKIN: No itching, burning, rashes, or lesions   All other review of systems is negative unless indicated above.    MEDICATIONS:  MEDICATIONS  (STANDING):  aspirin enteric coated 81 milliGRAM(s) Oral daily  atorvastatin 20 milliGRAM(s) Oral at bedtime  baclofen 20 milliGRAM(s) Oral two times a day  ferrous    sulfate 325 milliGRAM(s) Oral daily  gabapentin 100 milliGRAM(s) Oral two times a day  gemfibrozil 600 milliGRAM(s) Oral two times a day  lacosamide 150 milliGRAM(s) Oral two times a day  metoprolol tartrate 25 milliGRAM(s) Oral two times a day  morphine ER Tablet 15 milliGRAM(s) Oral every 8 hours  multivitamin 1 Tablet(s) Oral daily  nystatin Powder 1 Application(s) Topical two times a day  pregabalin 150 milliGRAM(s) Oral daily  rivaroxaban 10 milliGRAM(s) Oral daily  senna 2 Tablet(s) Oral at bedtime  sodium chloride 0.9%. 1000 milliLiter(s) (50 mL/Hr) IV Continuous <Continuous>  tamsulosin 0.8 milliGRAM(s) Oral at bedtime    PHYSICAL EXAM:  T(C): 36.6 (04-27-22 @ 05:02), Max: 36.8 (04-26-22 @ 13:54)  HR: 61 (04-27-22 @ 05:07) (55 - 61)  BP: 121/68 (04-27-22 @ 05:07) (120/70 - 159/80)  RR: 18 (04-27-22 @ 05:02) (18 - 18)  SpO2: 95% (04-27-22 @ 05:02) (94% - 98%)  Wt(kg): --  I&O's Summary    26 Apr 2022 07:01  -  27 Apr 2022 07:00  --------------------------------------------------------  IN: 1230 mL / OUT: 0 mL / NET: 1230 mL    Appearance: NAD  HEENT: Normal oral mucosa, PERRL, EOMI	  Lymphatic: No lymphadenopathy , no edema  Cardiovascular: Normal S1 S2, No JVD, No murmurs , Peripheral pulses palpable 2+ bilaterally  Respiratory: Lungs clear to auscultation, normal effort 	  Gastrointestinal:  Soft, Non-tender, + BS	  Skin: No rashes, No ecchymoses, No cyanosis, warm to touch  NEUROLOGICAL EXAM:  MS: AAOX3, speech is fluent, Follows simple and complex commands.   CN:  EOMI, PERRL  V1-3 intact, no facial asymmetry, t/p midline,   Motor: Strength: 5/5  in the UE b/l. 5/5 in the LLE. 3/5 in the RLE - antigravity- limited due to recent surgery    Tone: normal. Bulk: normal.  Sensation: intact to LT 4x.   Coordination: FTn intact b/l.   Gait:  Deferred    LABS:    CARDIAC MARKERS:                       11.6   9.33  )-----------( 442      ( 27 Apr 2022 06:47 )             38.3     04-27    139  |  105  |  21  ----------------------------<  117<H>  4.1   |  22  |  1.28    Ca    9.0      27 Apr 2022 06:44  Phos  2.8     04-27  Mg     2.1     04-27    proBNP:   Lipid Profile:   HgA1c:   TSH:     TELEMETRY: 	    ECG:  	  RADIOLOGY:   DIAGNOSTIC TESTING:  [ ] Echocardiogram:  [ ]  Catheterization:  [ ] Stress Test:    OTHER:

## 2022-04-27 NOTE — PROGRESS NOTE ADULT - ASSESSMENT
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   Pt was seen at 8am by RN at [facility]  moving all extremities equally, alert, no slurred speech when she was cleaning his RLE surgical site.   at 8:30am, pt was noted to have slurred speech, lethargic and couldn't move LUE at all.   No shaking movements noted.   L hemiplegia with lethargy and confusion, rapidly improving over 1-2 hrs, concerning for first-time seizure w/ post ictal Keny's paralysis (given prior R frontal encephalomalacia) vs. TIA/minor stroke  neuro exam is nonfocal.   4/22: ptn admitted to Neuro for possible acute new onset Sz, R/O TIA  ptn placed on Vimpat x 1, unable to get MRI 2/2 aneurismal clipping  c/w dvt ppx w po Xarelto  cont ASA, statin  as per neuro: Seizure, fall and aspiration precautions.  avoid sleep deprivation,  dysphagia screen,  EEG   4/23: ptn was observed by me yesterday to have apnea while sleeping. seen by pulm, will need outptn sleep study. would monitor puse ox overnight. today ptn has myoclonus , jerky movements and interruption of speech. he is awake, alert, recognized me. could this be from HD Gabapentin? will drop the dose from 600 tid to 300 bid. spoke w neurology. they agree. EEG is negative  4/24-25: myoclonus and metabolic encephalopathy resolved post lowering the dose of Gabapentin. pain free. asking for a regular diet. dc planning as per neuro team  4/26: ptn is isolated, was exposed to covid 19, has no symptoms  4/27: no new events, awaiting dc to [facility] once covid from today is neg.

## 2022-04-28 PROCEDURE — 99233 SBSQ HOSP IP/OBS HIGH 50: CPT

## 2022-04-28 PROCEDURE — 71045 X-RAY EXAM CHEST 1 VIEW: CPT | Mod: 26

## 2022-04-28 RX ADMIN — ATORVASTATIN CALCIUM 20 MILLIGRAM(S): 80 TABLET, FILM COATED ORAL at 21:23

## 2022-04-28 RX ADMIN — GABAPENTIN 100 MILLIGRAM(S): 400 CAPSULE ORAL at 05:06

## 2022-04-28 RX ADMIN — NYSTATIN CREAM 1 APPLICATION(S): 100000 CREAM TOPICAL at 17:12

## 2022-04-28 RX ADMIN — MORPHINE SULFATE 15 MILLIGRAM(S): 50 CAPSULE, EXTENDED RELEASE ORAL at 22:27

## 2022-04-28 RX ADMIN — SENNA PLUS 2 TABLET(S): 8.6 TABLET ORAL at 21:23

## 2022-04-28 RX ADMIN — MORPHINE SULFATE 15 MILLIGRAM(S): 50 CAPSULE, EXTENDED RELEASE ORAL at 11:49

## 2022-04-28 RX ADMIN — NYSTATIN CREAM 1 APPLICATION(S): 100000 CREAM TOPICAL at 05:06

## 2022-04-28 RX ADMIN — MORPHINE SULFATE 15 MILLIGRAM(S): 50 CAPSULE, EXTENDED RELEASE ORAL at 21:23

## 2022-04-28 RX ADMIN — TAMSULOSIN HYDROCHLORIDE 0.8 MILLIGRAM(S): 0.4 CAPSULE ORAL at 21:23

## 2022-04-28 RX ADMIN — RIVAROXABAN 10 MILLIGRAM(S): KIT at 11:49

## 2022-04-28 RX ADMIN — Medication 25 MILLIGRAM(S): at 17:12

## 2022-04-28 RX ADMIN — MORPHINE SULFATE 15 MILLIGRAM(S): 50 CAPSULE, EXTENDED RELEASE ORAL at 12:20

## 2022-04-28 RX ADMIN — Medication 20 MILLIGRAM(S): at 17:11

## 2022-04-28 RX ADMIN — Medication 25 MILLIGRAM(S): at 05:06

## 2022-04-28 RX ADMIN — GABAPENTIN 100 MILLIGRAM(S): 400 CAPSULE ORAL at 17:12

## 2022-04-28 RX ADMIN — Medication 600 MILLIGRAM(S): at 17:12

## 2022-04-28 RX ADMIN — Medication 325 MILLIGRAM(S): at 11:50

## 2022-04-28 RX ADMIN — LACOSAMIDE 150 MILLIGRAM(S): 50 TABLET ORAL at 05:17

## 2022-04-28 RX ADMIN — Medication 600 MILLIGRAM(S): at 05:06

## 2022-04-28 RX ADMIN — Medication 150 MILLIGRAM(S): at 12:47

## 2022-04-28 RX ADMIN — Medication 81 MILLIGRAM(S): at 11:50

## 2022-04-28 RX ADMIN — LACOSAMIDE 150 MILLIGRAM(S): 50 TABLET ORAL at 17:12

## 2022-04-28 RX ADMIN — Medication 20 MILLIGRAM(S): at 05:06

## 2022-04-28 RX ADMIN — Medication 1 TABLET(S): at 11:50

## 2022-04-28 NOTE — PROGRESS NOTE ADULT - SUBJECTIVE AND OBJECTIVE BOX
Patient is a 66y old  Male who presents with a chief complaint of seizure (28 Apr 2022 09:24)      SUBJECTIVE / OVERNIGHT EVENTS: ptn remains covid neg post exposure, awaiting DC planning to [facility]    MEDICATIONS  (STANDING):  aspirin enteric coated 81 milliGRAM(s) Oral daily  atorvastatin 20 milliGRAM(s) Oral at bedtime  baclofen 20 milliGRAM(s) Oral two times a day  ferrous    sulfate 325 milliGRAM(s) Oral daily  gabapentin 100 milliGRAM(s) Oral two times a day  gemfibrozil 600 milliGRAM(s) Oral two times a day  lacosamide 150 milliGRAM(s) Oral two times a day  metoprolol tartrate 25 milliGRAM(s) Oral two times a day  morphine ER Tablet 15 milliGRAM(s) Oral every 8 hours  multivitamin 1 Tablet(s) Oral daily  nystatin Powder 1 Application(s) Topical two times a day  pregabalin 150 milliGRAM(s) Oral daily  rivaroxaban 10 milliGRAM(s) Oral daily  senna 2 Tablet(s) Oral at bedtime  tamsulosin 0.8 milliGRAM(s) Oral at bedtime    MEDICATIONS  (PRN):  acetaminophen     Tablet .. 975 milliGRAM(s) Oral every 8 hours PRN Mild Pain (1 - 3), Moderate Pain (4 - 6)  lacosamide Injectable 200 milliGRAM(s) IV Push once PRN Seizure  LORazepam   Injectable 1 milliGRAM(s) IV Push once PRN Seizure      Vital Signs Last 24 Hrs  T(F): 98.4 (04-28-22 @ 13:44), Max: 98.6 (04-28-22 @ 09:51)  HR: 74 (04-28-22 @ 13:44) (56 - 74)  BP: 136/74 (04-28-22 @ 13:44) (96/57 - 157/82)  RR: 18 (04-28-22 @ 13:44) (18 - 18)  SpO2: 100% (04-28-22 @ 13:44) (92% - 100%)  Telemetry:   CAPILLARY BLOOD GLUCOSE        I&O's Summary    27 Apr 2022 07:01  -  28 Apr 2022 07:00  --------------------------------------------------------  IN: 1260 mL / OUT: 3 mL / NET: 1257 mL    28 Apr 2022 07:01  -  28 Apr 2022 16:43  --------------------------------------------------------  IN: 630 mL / OUT: 1 mL / NET: 629 mL        PHYSICAL EXAM:  GENERAL: NAD, well-developed  HEAD:  Atraumatic, Normocephalic  EYES: EOMI, PERRLA, conjunctiva and sclera clear  NECK: Supple, No JVD  CHEST/LUNG: Clear to auscultation bilaterally; No wheeze  HEART: Regular rate and rhythm; No murmurs, rubs, or gallops  ABDOMEN: Soft, Nontender, Nondistended; Bowel sounds present  EXTREMITIES:  2+ Peripheral Pulses, No clubbing, cyanosis, or edema  PSYCH: AAOx3  NEUROLOGY: non-focal  SKIN: No rashes or lesions    LABS:                        11.6   9.33  )-----------( 442      ( 27 Apr 2022 06:47 )             38.3     04-27    139  |  105  |  21  ----------------------------<  117<H>  4.1   |  22  |  1.28    Ca    9.0      27 Apr 2022 06:44  Phos  2.8     04-27  Mg     2.1     04-27                RADIOLOGY & ADDITIONAL TESTS:    Imaging Personally Reviewed:    Consultant(s) Notes Reviewed:      Care Discussed with Consultants/Other Providers:

## 2022-04-28 NOTE — PROGRESS NOTE ADULT - ASSESSMENT
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   Pt was seen at 8am by RN at [facility]  moving all extremities equally, alert, no slurred speech when she was cleaning his RLE surgical site.   at 8:30am, pt was noted to have slurred speech, lethargic and couldn't move LUE at all.   No shaking movements noted.   L hemiplegia with lethargy and confusion, rapidly improving over 1-2 hrs, concerning for first-time seizure w/ post ictal Keny's paralysis (given prior R frontal encephalomalacia) vs. TIA/minor stroke  neuro exam is nonfocal.   4/22: ptn admitted to Neuro for possible acute new onset Sz, R/O TIA  ptn placed on Vimpat x 1, unable to get MRI 2/2 aneurismal clipping  c/w dvt ppx w po Xarelto  cont ASA, statin  as per neuro: Seizure, fall and aspiration precautions.  avoid sleep deprivation,  dysphagia screen,  EEG   4/23: ptn was observed by me yesterday to have apnea while sleeping. seen by pulm, will need outptn sleep study. would monitor puse ox overnight. today ptn has myoclonus , jerky movements and interruption of speech. he is awake, alert, recognized me. could this be from HD Gabapentin? will drop the dose from 600 tid to 300 bid. spoke w neurology. they agree. EEG is negative  4/24-25: myoclonus and metabolic encephalopathy resolved post lowering the dose of Gabapentin. pain free. asking for a regular diet. dc planning as per neuro team  4/26: ptn is isolated, was exposed to covid 19, has no symptoms  4/27-28: no new events, awaiting dc to [facility] once covid from today is neg.

## 2022-04-28 NOTE — PROGRESS NOTE ADULT - SUBJECTIVE AND OBJECTIVE BOX
Subjective: Patient seen and examined. No new events except as noted.     REVIEW OF SYSTEMS:    CONSTITUTIONAL: + weakness, fevers or chills  EYES/ENT: No visual changes;  No vertigo or throat pain   NECK: No pain or stiffness  RESPIRATORY: No cough, wheezing, hemoptysis; No shortness of breath  CARDIOVASCULAR: No chest pain or palpitations  GASTROINTESTINAL: No abdominal or epigastric pain. No nausea, vomiting, or hematemesis; No diarrhea or constipation. No melena or hematochezia.  GENITOURINARY: No dysuria, frequency or hematuria  NEUROLOGICAL: No numbness or weakness  SKIN: No itching, burning, rashes, or lesions   All other review of systems is negative unless indicated above.    MEDICATIONS:  MEDICATIONS  (STANDING):  aspirin enteric coated 81 milliGRAM(s) Oral daily  atorvastatin 20 milliGRAM(s) Oral at bedtime  baclofen 20 milliGRAM(s) Oral two times a day  ferrous    sulfate 325 milliGRAM(s) Oral daily  gabapentin 100 milliGRAM(s) Oral two times a day  gemfibrozil 600 milliGRAM(s) Oral two times a day  lacosamide 150 milliGRAM(s) Oral two times a day  metoprolol tartrate 25 milliGRAM(s) Oral two times a day  morphine ER Tablet 15 milliGRAM(s) Oral every 8 hours  multivitamin 1 Tablet(s) Oral daily  nystatin Powder 1 Application(s) Topical two times a day  pregabalin 150 milliGRAM(s) Oral daily  rivaroxaban 10 milliGRAM(s) Oral daily  senna 2 Tablet(s) Oral at bedtime  tamsulosin 0.8 milliGRAM(s) Oral at bedtime    PHYSICAL EXAM:  T(C): 36.7 (04-28-22 @ 05:11), Max: 37 (04-27-22 @ 09:58)  HR: 72 (04-28-22 @ 05:11) (56 - 72)  BP: 140/69 (04-28-22 @ 05:11) (96/57 - 157/82)  RR: 18 (04-28-22 @ 05:11) (18 - 18)  SpO2: 100% (04-28-22 @ 05:11) (92% - 100%)  Wt(kg): --  I&O's Summary    27 Apr 2022 07:01  -  28 Apr 2022 07:00  --------------------------------------------------------  IN: 1260 mL / OUT: 3 mL / NET: 1257 mL    Appearance: NAD	  HEENT: Normal oral mucosa, PERRL, EOMI	  Lymphatic: No lymphadenopathy , no edema  Cardiovascular: Normal S1 S2, No JVD, No murmurs , Peripheral pulses palpable 2+ bilaterally  Respiratory: Lungs clear to auscultation, normal effort 	  Gastrointestinal:  Soft, Non-tender, + BS	  Skin: No rashes, No ecchymoses, No cyanosis, warm to touch  NEUROLOGICAL EXAM:  MS: AAOX3, speech is fluent, Follows simple and complex commands.   CN:  EOMI, PERRL  V1-3 intact, no facial asymmetry, t/p midline,   Motor: Strength: 5/5  in the UE b/l. 5/5 in the LLE. 3/5 in the RLE - antigravity- limited due to recent surgery    Tone: normal. Bulk: normal.  Sensation: intact to LT 4x.   Coordination: FTn intact b/l.   Gait:  Deferred  Ext: No edema    LABS:    CARDIAC MARKERS:                        11.6   9.33  )-----------( 442      ( 27 Apr 2022 06:47 )             38.3     04-27    139  |  105  |  21  ----------------------------<  117<H>  4.1   |  22  |  1.28    Ca    9.0      27 Apr 2022 06:44  Phos  2.8     04-27  Mg     2.1     04-27    proBNP:   Lipid Profile:   HgA1c:   TSH:     TELEMETRY: SR 60-70s	    ECG:  	  RADIOLOGY:   DIAGNOSTIC TESTING:  [ ] Echocardiogram:  [ ]  Catheterization:  [ ] Stress Test:    OTHER:

## 2022-04-28 NOTE — PROGRESS NOTE ADULT - SUBJECTIVE AND OBJECTIVE BOX
Patient is a 66y old  Male who presents with a chief complaint of seizure    Subjective:  No complaints. No overnight events    MEDICATIONS  (STANDING):  aspirin enteric coated 81 milliGRAM(s) Oral daily  atorvastatin 20 milliGRAM(s) Oral at bedtime  baclofen 20 milliGRAM(s) Oral two times a day  ferrous    sulfate 325 milliGRAM(s) Oral daily  gabapentin 100 milliGRAM(s) Oral two times a day  gemfibrozil 600 milliGRAM(s) Oral two times a day  lacosamide 150 milliGRAM(s) Oral two times a day  metoprolol tartrate 25 milliGRAM(s) Oral two times a day  morphine ER Tablet 15 milliGRAM(s) Oral every 8 hours  multivitamin 1 Tablet(s) Oral daily  nystatin Powder 1 Application(s) Topical two times a day  pregabalin 150 milliGRAM(s) Oral daily  rivaroxaban 10 milliGRAM(s) Oral daily  senna 2 Tablet(s) Oral at bedtime  sodium chloride 0.9%. 1000 milliLiter(s) (50 mL/Hr) IV Continuous <Continuous>  tamsulosin 0.8 milliGRAM(s) Oral at bedtime    MEDICATIONS  (PRN):  acetaminophen     Tablet .. 975 milliGRAM(s) Oral every 8 hours PRN Mild Pain (1 - 3), Moderate Pain (4 - 6)  lacosamide Injectable 200 milliGRAM(s) IV Push once PRN Seizure  LORazepam   Injectable 1 milliGRAM(s) IV Push once PRN Seizure    Vital Signs Last 24 Hrs  T(C): 36.7 (28 Apr 2022 05:11), Max: 37 (27 Apr 2022 09:58)  T(F): 98 (28 Apr 2022 05:11), Max: 98.6 (27 Apr 2022 09:58)  HR: 72 (28 Apr 2022 05:11) (56 - 72)  BP: 140/69 (28 Apr 2022 05:11) (96/57 - 157/82)  BP(mean): --  RR: 18 (28 Apr 2022 05:11) (18 - 18)  SpO2: 100% (28 Apr 2022 05:11) (92% - 100%)    GENERAL EXAM:  Constitutional: awake and alert. NAD  HEENT: PERRL, EOMI    NEUROLOGICAL EXAM:  MS: AAOX3, speech is fluent, Follows simple and complex commands.   CN:  EOMI, PERRL  V1-3 intact, no facial asymmetry, t/p midline,   Motor: Strength: 5/5  in the UE b/l. 5/5 in the LLE. 3/5 in the RLE - antigravity- limited due to recent surgery    Tone: normal. Bulk: normal.  Sensation: intact to LT 4x.   Coordination: FTn intact b/l.   Gait:  Deferred      Labs:                                                        11.6   9.33  )-----------( 442      ( 27 Apr 2022 06:47 )             38.3       04-27    139  |  105  |  21  ----------------------------<  117<H>  4.1   |  22  |  1.28    Ca    9.0      27 Apr 2022 06:44  Phos  2.8     04-27  Mg     2.1     04-27    Radiology:  EEG report 4/25/22:  Abnormal EEG in the awake, drowsy and asleep states.  Sharp waves, focal, right frontal and temporal  Intermittent polymorphic delta, focal, right temporal region  Intermittent negative myoclonus without EEG correlate, RESOLVED  Impression/Clinical Correlate:  This is an abnormal EEG record. There is evidence of potentially epileptogenic region of cortical dysfunction in the right frontal and anterior temporal areas. There is also mild diffuse cerebral dysfunction that is focally worse in the right temporal region. No seizures were seen. There is negative myoclonus intermittently without EEG correlate – gabapentin dose was lowered, and myoclonus subsequently resolved.    EEG report 4/24/22:  Abnormal EEG in the awake, drowsy and asleep states.  Sharp waves, focal, right frontal and temporal  Intermittent polymorphic delta, focal, right temporal region  Intermittent negative myoclonus without EEG correlate  Impression/Clinical Correlate:  This is an abnormal EEG record. There is evidence of potentially epileptogenic region of cortical dysfunction in the right frontal and anterior temporal areas. There is also mild diffuse cerebral dysfunction that is focally worse in the right temporal region. No epileptiform pattern or seizures were seen. There is negative myoclonus intermittently without EEG correlate – gabapentin dose was lowered.

## 2022-04-28 NOTE — PROGRESS NOTE ADULT - ASSESSMENT
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  Pt was seen at 8am by RN to be moving all extremities equally, alert, no slurred speech when she was cleaning his RLE surgical site cleaning. At 8:30am, pt was noted to have slurred speech, lethargic and couldn't LUE at all. No shaking movements noted. Pt reports that he doesn't remember exactly what happened this morning, but does remember people calling his name. He reports a spontaneous head bleed in 1997 and with subsequent clipping. NIHSS: 7, preMRS: 3. No tpa due to no rapidly improving symptoms, hx of spontaneous ICH; no MT. CTH w/ R frontal encephalomalacia w/ prior R frontal temporal craniotomy and Acomm aneurysm clip. CTA neg, no VST. CTP w/ findings correlating to R frontal encephalomalacia. Neuro exam w/ equal strength in b/l UE, occasional b/l UE neg myoclonus.    Impression: L hemiplegia with lethargy and confusion, rapidly improving over 1-2 hrs, concerning for first-time seizure w/ post ictal Keny's paralysis (given prior R frontal encephalomalacia)     Plan:  [x] C/w Gabapentin 100mg PO BID  [] PT/OT-okay to return to [facility] facility  [x]dc vEEG   [x]Continue Vimpat 150mg BID   []c/w home xarelto 10mg daily and aspirin 81mg daily  []c/w home atorvastatin 20mg PO daily   []Seizure, fall and aspiration precautions.  Avoid sleep deprivation.  []If any seizure activity noted, please note: time, if upper/lower or focal onset symptoms (e.g. head turn, eye deviation, upper/lower tonic/clonic arm initially), vital sign derangements, airway protection, urinary or bowel incontinence, duration of seizure, tongue bite, and/or post-ictal time to return of baseline.    [x] Check , lactate 1.5  []Appreciate Internal Medicine recommendations   []Appreciate Pulm recommendations.   []Dispo: okay to discharge to [facility] today if Covid PCR neg    Patient can follow up with outpatient neurology at [address] [place] [address] 1-2 weeks after discharge. Please instruct the patient to call [phone] to schedule this appointment.     CORE MEASURES:        AED levels [] Sent [] Pending [] Resulted     LFTs [] normal [] elevated      Plan and education provided to [] patient []family at bedside [] awaiting for family     Seizure Semiology  [] Tonic clonic  [] Clonic  [] Tonic  [] Unresponsive  [] Focal with impaired awareness  [] Focal without impaired awareness    Obtain screening lower extremity venous ultrasound in patients who meet 1 or more of the following criteria as patient is high risk for DVT/PE on admission:   [] History of DVT/PE  []Hypercoagulable states (Factor V Leiden, Cancer, OCP, etc. )  []Prolonged immobility (hemiplegia/hemiparesis/post operative or any other extended immobilization)  [] Transferred from outside facility (Rehab or Long term care)    Case discussed w/ neuro attending Dr. Quinn

## 2022-04-29 ENCOUNTER — TRANSCRIPTION ENCOUNTER (OUTPATIENT)
Age: 67
End: 2022-04-29

## 2022-04-29 VITALS
SYSTOLIC BLOOD PRESSURE: 145 MMHG | TEMPERATURE: 99 F | RESPIRATION RATE: 18 BRPM | DIASTOLIC BLOOD PRESSURE: 80 MMHG | OXYGEN SATURATION: 99 % | HEART RATE: 56 BPM

## 2022-04-29 LAB
HCT VFR BLD CALC: 39.8 % — SIGNIFICANT CHANGE UP (ref 39–50)
HGB BLD-MCNC: 12.1 G/DL — LOW (ref 13–17)
MAGNESIUM SERPL-MCNC: 2.2 MG/DL — SIGNIFICANT CHANGE UP (ref 1.6–2.6)
MCHC RBC-ENTMCNC: 29.7 PG — SIGNIFICANT CHANGE UP (ref 27–34)
MCHC RBC-ENTMCNC: 30.4 GM/DL — LOW (ref 32–36)
MCV RBC AUTO: 97.5 FL — SIGNIFICANT CHANGE UP (ref 80–100)
NRBC # BLD: 0 /100 WBCS — SIGNIFICANT CHANGE UP (ref 0–0)
PHOSPHATE SERPL-MCNC: 3 MG/DL — SIGNIFICANT CHANGE UP (ref 2.5–4.5)
PLATELET # BLD AUTO: 366 K/UL — SIGNIFICANT CHANGE UP (ref 150–400)
RBC # BLD: 4.08 M/UL — LOW (ref 4.2–5.8)
RBC # FLD: 15.8 % — HIGH (ref 10.3–14.5)
WBC # BLD: 8.61 K/UL — SIGNIFICANT CHANGE UP (ref 3.8–10.5)
WBC # FLD AUTO: 8.61 K/UL — SIGNIFICANT CHANGE UP (ref 3.8–10.5)

## 2022-04-29 PROCEDURE — 80048 BASIC METABOLIC PNL TOTAL CA: CPT

## 2022-04-29 PROCEDURE — 84484 ASSAY OF TROPONIN QUANT: CPT

## 2022-04-29 PROCEDURE — 85027 COMPLETE CBC AUTOMATED: CPT

## 2022-04-29 PROCEDURE — 83605 ASSAY OF LACTIC ACID: CPT

## 2022-04-29 PROCEDURE — 82330 ASSAY OF CALCIUM: CPT

## 2022-04-29 PROCEDURE — 84295 ASSAY OF SERUM SODIUM: CPT

## 2022-04-29 PROCEDURE — 84132 ASSAY OF SERUM POTASSIUM: CPT

## 2022-04-29 PROCEDURE — 83036 HEMOGLOBIN GLYCOSYLATED A1C: CPT

## 2022-04-29 PROCEDURE — 82962 GLUCOSE BLOOD TEST: CPT

## 2022-04-29 PROCEDURE — 0042T: CPT

## 2022-04-29 PROCEDURE — 99238 HOSP IP/OBS DSCHRG MGMT 30/<: CPT

## 2022-04-29 PROCEDURE — 82803 BLOOD GASES ANY COMBINATION: CPT

## 2022-04-29 PROCEDURE — C9254: CPT

## 2022-04-29 PROCEDURE — 85610 PROTHROMBIN TIME: CPT

## 2022-04-29 PROCEDURE — 97166 OT EVAL MOD COMPLEX 45 MIN: CPT

## 2022-04-29 PROCEDURE — 95700 EEG CONT REC W/VID EEG TECH: CPT

## 2022-04-29 PROCEDURE — 80053 COMPREHEN METABOLIC PANEL: CPT

## 2022-04-29 PROCEDURE — 83735 ASSAY OF MAGNESIUM: CPT

## 2022-04-29 PROCEDURE — 71045 X-RAY EXAM CHEST 1 VIEW: CPT

## 2022-04-29 PROCEDURE — 82550 ASSAY OF CK (CPK): CPT

## 2022-04-29 PROCEDURE — 84100 ASSAY OF PHOSPHORUS: CPT

## 2022-04-29 PROCEDURE — 85014 HEMATOCRIT: CPT

## 2022-04-29 PROCEDURE — 95715 VEEG EA 12-26HR INTMT MNTR: CPT

## 2022-04-29 PROCEDURE — 99291 CRITICAL CARE FIRST HOUR: CPT | Mod: 25

## 2022-04-29 PROCEDURE — 80307 DRUG TEST PRSMV CHEM ANLYZR: CPT

## 2022-04-29 PROCEDURE — 82947 ASSAY GLUCOSE BLOOD QUANT: CPT

## 2022-04-29 PROCEDURE — U0003: CPT

## 2022-04-29 PROCEDURE — 84146 ASSAY OF PROLACTIN: CPT

## 2022-04-29 PROCEDURE — 36415 COLL VENOUS BLD VENIPUNCTURE: CPT

## 2022-04-29 PROCEDURE — 82565 ASSAY OF CREATININE: CPT

## 2022-04-29 PROCEDURE — 85018 HEMOGLOBIN: CPT

## 2022-04-29 PROCEDURE — 95712 VEEG 2-12 HR INTMT MNTR: CPT

## 2022-04-29 PROCEDURE — 82435 ASSAY OF BLOOD CHLORIDE: CPT

## 2022-04-29 PROCEDURE — 87086 URINE CULTURE/COLONY COUNT: CPT

## 2022-04-29 PROCEDURE — 70496 CT ANGIOGRAPHY HEAD: CPT | Mod: MA

## 2022-04-29 PROCEDURE — 85025 COMPLETE CBC W/AUTO DIFF WBC: CPT

## 2022-04-29 PROCEDURE — 70450 CT HEAD/BRAIN W/O DYE: CPT | Mod: MA

## 2022-04-29 PROCEDURE — 85730 THROMBOPLASTIN TIME PARTIAL: CPT

## 2022-04-29 PROCEDURE — 93005 ELECTROCARDIOGRAM TRACING: CPT

## 2022-04-29 PROCEDURE — 81003 URINALYSIS AUTO W/O SCOPE: CPT

## 2022-04-29 PROCEDURE — 82140 ASSAY OF AMMONIA: CPT

## 2022-04-29 PROCEDURE — 97530 THERAPEUTIC ACTIVITIES: CPT

## 2022-04-29 PROCEDURE — 70498 CT ANGIOGRAPHY NECK: CPT | Mod: MA

## 2022-04-29 PROCEDURE — U0005: CPT

## 2022-04-29 PROCEDURE — 97163 PT EVAL HIGH COMPLEX 45 MIN: CPT

## 2022-04-29 PROCEDURE — 80061 LIPID PANEL: CPT

## 2022-04-29 PROCEDURE — 87635 SARS-COV-2 COVID-19 AMP PRB: CPT

## 2022-04-29 RX ORDER — LACOSAMIDE 50 MG/1
1 TABLET ORAL
Qty: 60 | Refills: 0
Start: 2022-04-29 | End: 2022-05-28

## 2022-04-29 RX ORDER — METOPROLOL TARTRATE 50 MG
1 TABLET ORAL
Qty: 0 | Refills: 0 | DISCHARGE
Start: 2022-04-29

## 2022-04-29 RX ADMIN — MORPHINE SULFATE 15 MILLIGRAM(S): 50 CAPSULE, EXTENDED RELEASE ORAL at 06:20

## 2022-04-29 RX ADMIN — Medication 25 MILLIGRAM(S): at 05:24

## 2022-04-29 RX ADMIN — MORPHINE SULFATE 15 MILLIGRAM(S): 50 CAPSULE, EXTENDED RELEASE ORAL at 13:38

## 2022-04-29 RX ADMIN — RIVAROXABAN 10 MILLIGRAM(S): KIT at 12:12

## 2022-04-29 RX ADMIN — Medication 81 MILLIGRAM(S): at 12:11

## 2022-04-29 RX ADMIN — NYSTATIN CREAM 1 APPLICATION(S): 100000 CREAM TOPICAL at 05:55

## 2022-04-29 RX ADMIN — Medication 600 MILLIGRAM(S): at 05:24

## 2022-04-29 RX ADMIN — GABAPENTIN 100 MILLIGRAM(S): 400 CAPSULE ORAL at 05:24

## 2022-04-29 RX ADMIN — Medication 20 MILLIGRAM(S): at 05:24

## 2022-04-29 RX ADMIN — LACOSAMIDE 150 MILLIGRAM(S): 50 TABLET ORAL at 05:24

## 2022-04-29 RX ADMIN — Medication 1 TABLET(S): at 12:11

## 2022-04-29 RX ADMIN — Medication 150 MILLIGRAM(S): at 12:12

## 2022-04-29 RX ADMIN — Medication 325 MILLIGRAM(S): at 12:11

## 2022-04-29 RX ADMIN — MORPHINE SULFATE 15 MILLIGRAM(S): 50 CAPSULE, EXTENDED RELEASE ORAL at 05:24

## 2022-04-29 NOTE — PROGRESS NOTE ADULT - SUBJECTIVE AND OBJECTIVE BOX
Patient is a 66y old  Male who presents with a chief complaint of seizure    Subjective:  No complaints. No overnight events    MEDICATIONS  (STANDING):  aspirin enteric coated 81 milliGRAM(s) Oral daily  atorvastatin 20 milliGRAM(s) Oral at bedtime  baclofen 20 milliGRAM(s) Oral two times a day  ferrous    sulfate 325 milliGRAM(s) Oral daily  gabapentin 100 milliGRAM(s) Oral two times a day  gemfibrozil 600 milliGRAM(s) Oral two times a day  lacosamide 150 milliGRAM(s) Oral two times a day  metoprolol tartrate 25 milliGRAM(s) Oral two times a day  morphine ER Tablet 15 milliGRAM(s) Oral every 8 hours  multivitamin 1 Tablet(s) Oral daily  nystatin Powder 1 Application(s) Topical two times a day  pregabalin 150 milliGRAM(s) Oral daily  rivaroxaban 10 milliGRAM(s) Oral daily  senna 2 Tablet(s) Oral at bedtime  sodium chloride 0.9%. 1000 milliLiter(s) (50 mL/Hr) IV Continuous <Continuous>  tamsulosin 0.8 milliGRAM(s) Oral at bedtime    MEDICATIONS  (PRN):  acetaminophen     Tablet .. 975 milliGRAM(s) Oral every 8 hours PRN Mild Pain (1 - 3), Moderate Pain (4 - 6)  lacosamide Injectable 200 milliGRAM(s) IV Push once PRN Seizure  LORazepam   Injectable 1 milliGRAM(s) IV Push once PRN Seizure    Vital Signs Last 24 Hrs  T(C): 36.7 (29 Apr 2022 06:02), Max: 37 (28 Apr 2022 09:51)  T(F): 98.1 (29 Apr 2022 06:02), Max: 98.6 (28 Apr 2022 09:51)  HR: 56 (29 Apr 2022 06:02) (56 - 83)  BP: 153/70 (29 Apr 2022 06:02) (116/71 - 153/70)  BP(mean): --  RR: 18 (29 Apr 2022 06:02) (18 - 18)  SpO2: 99% (29 Apr 2022 06:02) (93% - 100%)    GENERAL EXAM:  Constitutional: awake and alert. NAD  HEENT: PERRL, EOMI    NEUROLOGICAL EXAM:  MS: AAOX3, speech is fluent, Follows simple and complex commands.   CN:  EOMI, PERRL  V1-3 intact, no facial asymmetry, t/p midline,   Motor: Strength: 5/5  in the UE b/l. 5/5 in the LLE. 3/5 in the RLE - antigravity- limited due to recent surgery    Tone: normal. Bulk: normal.  Sensation: intact to LT 4x.   Coordination: FTn intact b/l.   Gait:  Deferred      Labs:                                                                              12.1   8.61  )-----------( 366      ( 29 Apr 2022 07:42 )             39.8             Radiology:  EEG report 4/25/22:  Abnormal EEG in the awake, drowsy and asleep states.  Sharp waves, focal, right frontal and temporal  Intermittent polymorphic delta, focal, right temporal region  Intermittent negative myoclonus without EEG correlate, RESOLVED  Impression/Clinical Correlate:  This is an abnormal EEG record. There is evidence of potentially epileptogenic region of cortical dysfunction in the right frontal and anterior temporal areas. There is also mild diffuse cerebral dysfunction that is focally worse in the right temporal region. No seizures were seen. There is negative myoclonus intermittently without EEG correlate – gabapentin dose was lowered, and myoclonus subsequently resolved.    EEG report 4/24/22:  Abnormal EEG in the awake, drowsy and asleep states.  Sharp waves, focal, right frontal and temporal  Intermittent polymorphic delta, focal, right temporal region  Intermittent negative myoclonus without EEG correlate  Impression/Clinical Correlate:  This is an abnormal EEG record. There is evidence of potentially epileptogenic region of cortical dysfunction in the right frontal and anterior temporal areas. There is also mild diffuse cerebral dysfunction that is focally worse in the right temporal region. No epileptiform pattern or seizures were seen. There is negative myoclonus intermittently without EEG correlate – gabapentin dose was lowered.

## 2022-04-29 NOTE — PROGRESS NOTE ADULT - PROBLEM SELECTOR PLAN 2
stable  continue with meds  continue to monitor

## 2022-04-29 NOTE — PROGRESS NOTE ADULT - PROBLEM SELECTOR PLAN 3
s/p MI and stent  ASA/Lipitor

## 2022-04-29 NOTE — PROGRESS NOTE ADULT - PROBLEM SELECTOR PLAN 1
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 TIA/minor stroke  d/c'd vEEG - no seizures  AEDs per neuro team  frequent neuro checks  orders per neuro team
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 TIA/minor stroke  d/c'd vEEG - no seizures  AEDs per neuro team  frequent neuro checks  orders per neuro team
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 TIA/minor stroke  vEEG  check TTE  AEDs per neuro team  frequent neuro checks  orders per neuro team
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 TIA/minor stroke  d/c'd vEEG - no seizures  AEDs per neuro team  frequent neuro checks  orders per neuro team
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 TIA/minor stroke  d/c'd vEEG - no seizures  AEDs per neuro team  frequent neuro checks  orders per neuro team

## 2022-04-29 NOTE — PROGRESS NOTE ADULT - PROVIDER SPECIALTY LIST ADULT
Internal Medicine
Neurology
Internal Medicine
Internal Medicine
Neurology
Pulmonology
Internal Medicine
Cardiology
Internal Medicine
Neurology
Neurology
Pulmonology
Cardiology

## 2022-04-29 NOTE — PROGRESS NOTE ADULT - ASSESSMENT
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  Pt was seen at 8am by RN to be moving all extremities equally, alert, no slurred speech when she was cleaning his RLE surgical site cleaning. At 8:30am, pt was noted to have slurred speech, lethargic and couldn't LUE at all. No shaking movements noted. Pt reports that he doesn't remember exactly what happened this morning, but does remember people calling his name. He reports a spontaneous head bleed in 1997 and with subsequent clipping. NIHSS: 7, preMRS: 3. No tpa due to no rapidly improving symptoms, hx of spontaneous ICH; no MT. CTH w/ R frontal encephalomalacia w/ prior R frontal temporal craniotomy and Acomm aneurysm clip. CTA neg, no VST. CTP w/ findings correlating to R frontal encephalomalacia. Neuro exam w/ equal strength in b/l UE, occasional b/l UE neg myoclonus.    Impression: L hemiplegia with lethargy and confusion, rapidly improving over 1-2 hrs, concerning for first-time seizure w/ post ictal Keny's paralysis (given prior R frontal encephalomalacia)     Plan:  [x] C/w Gabapentin 100mg PO BID  [] PT/OT-okay to return to [facility] facility  [x]dc vEEG   [x]Continue Vimpat 150mg BID   []c/w home xarelto 10mg daily and aspirin 81mg daily  []c/w home atorvastatin 20mg PO daily   []Seizure, fall and aspiration precautions.  Avoid sleep deprivation.  []If any seizure activity noted, please note: time, if upper/lower or focal onset symptoms (e.g. head turn, eye deviation, upper/lower tonic/clonic arm initially), vital sign derangements, airway protection, urinary or bowel incontinence, duration of seizure, tongue bite, and/or post-ictal time to return of baseline.    [x] Check , lactate 1.5  []Appreciate Internal Medicine recommendations   []Appreciate Pulm recommendations.   []Dispo: okay to discharge to [facility] today if Covid PCR neg    Patient can follow up with outpatient neurology at [address] [place] [address] 1-2 weeks after discharge. Please instruct the patient to call [phone] to schedule this appointment.     CORE MEASURES:        AED levels [] Sent [] Pending [] Resulted     LFTs [] normal [] elevated      Plan and education provided to [] patient []family at bedside [] awaiting for family     Seizure Semiology  [] Tonic clonic  [] Clonic  [] Tonic  [] Unresponsive  [] Focal with impaired awareness  [] Focal without impaired awareness    Obtain screening lower extremity venous ultrasound in patients who meet 1 or more of the following criteria as patient is high risk for DVT/PE on admission:   [] History of DVT/PE  []Hypercoagulable states (Factor V Leiden, Cancer, OCP, etc. )  []Prolonged immobility (hemiplegia/hemiparesis/post operative or any other extended immobilization)  [] Transferred from outside facility (Rehab or Long term care)    Case discussed w/ neuro attending Dr. Quinn

## 2022-04-29 NOTE — PROGRESS NOTE ADULT - SUBJECTIVE AND OBJECTIVE BOX
Subjective: Patient seen and examined. No new events except as noted.     REVIEW OF SYSTEMS:    CONSTITUTIONAL:+weakness, fevers or chills  EYES/ENT: No visual changes;  No vertigo or throat pain   NECK: No pain or stiffness  RESPIRATORY: No cough, wheezing, hemoptysis; No shortness of breath  CARDIOVASCULAR: No chest pain or palpitations  GASTROINTESTINAL: No abdominal or epigastric pain. No nausea, vomiting, or hematemesis; No diarrhea or constipation. No melena or hematochezia.  GENITOURINARY: No dysuria, frequency or hematuria  NEUROLOGICAL: No numbness or weakness  SKIN: No itching, burning, rashes, or lesions   All other review of systems is negative unless indicated above.    MEDICATIONS:  MEDICATIONS  (STANDING):  aspirin enteric coated 81 milliGRAM(s) Oral daily  atorvastatin 20 milliGRAM(s) Oral at bedtime  baclofen 20 milliGRAM(s) Oral two times a day  ferrous    sulfate 325 milliGRAM(s) Oral daily  gabapentin 100 milliGRAM(s) Oral two times a day  gemfibrozil 600 milliGRAM(s) Oral two times a day  lacosamide 150 milliGRAM(s) Oral two times a day  metoprolol tartrate 25 milliGRAM(s) Oral two times a day  morphine ER Tablet 15 milliGRAM(s) Oral every 8 hours  multivitamin 1 Tablet(s) Oral daily  nystatin Powder 1 Application(s) Topical two times a day  pregabalin 150 milliGRAM(s) Oral daily  rivaroxaban 10 milliGRAM(s) Oral daily  senna 2 Tablet(s) Oral at bedtime  tamsulosin 0.8 milliGRAM(s) Oral at bedtime      PHYSICAL EXAM:  T(C): 36.7 (04-29-22 @ 06:02), Max: 36.9 (04-28-22 @ 13:44)  HR: 56 (04-29-22 @ 06:02) (56 - 83)  BP: 153/70 (04-29-22 @ 06:02) (117/69 - 153/70)  RR: 18 (04-29-22 @ 06:02) (18 - 18)  SpO2: 99% (04-29-22 @ 06:02) (99% - 100%)  Wt(kg): --  I&O's Summary    28 Apr 2022 07:01  -  29 Apr 2022 07:00  --------------------------------------------------------  IN: 630 mL / OUT: 1 mL / NET: 629 mL            Appearance: NAD	  HEENT: Normal oral mucosa, PERRL, EOMI	  Lymphatic: No lymphadenopathy , no edema  Cardiovascular: Normal S1 S2, No JVD, No murmurs , Peripheral pulses palpable 2+ bilaterally  Respiratory: Lungs clear to auscultation, normal effort 	  Gastrointestinal:  Soft, Non-tender, + BS	  Skin: No rashes, No ecchymoses, No cyanosis, warm to touch  NEUROLOGICAL EXAM:  MS: AAOX3, speech is fluent, Follows simple and complex commands.   CN:  EOMI, PERRL  V1-3 intact, no facial asymmetry, t/p midline,   Motor: Strength: 5/5  in the UE b/l. 5/5 in the LLE. 3/5 in the RLE - antigravity- limited due to recent surgery    Tone: normal. Bulk: normal.  Sensation: intact to LT 4x.   Coordination: FTn intact b/l.   Gait:  Deferred  Ext: No edema          LABS:    CARDIAC MARKERS:                                12.1   8.61  )-----------( 366      ( 29 Apr 2022 07:42 )             39.8       Phos  3.0     04-29  Mg     2.2     04-29      proBNP:   Lipid Profile:   HgA1c:   TSH:             TELEMETRY: 	    ECG:  	  RADIOLOGY:   DIAGNOSTIC TESTING:  [ ] Echocardiogram:  [ ]  Catheterization:  [ ] Stress Test:    OTHER:

## 2022-04-29 NOTE — PROGRESS NOTE ADULT - SUBJECTIVE AND OBJECTIVE BOX
Patient is a 66y old  Male who presents with a chief complaint of seizure (29 Apr 2022 10:11)      SUBJECTIVE / OVERNIGHT EVENTS:    MEDICATIONS  (STANDING):  aspirin enteric coated 81 milliGRAM(s) Oral daily  atorvastatin 20 milliGRAM(s) Oral at bedtime  baclofen 20 milliGRAM(s) Oral two times a day  ferrous    sulfate 325 milliGRAM(s) Oral daily  gabapentin 100 milliGRAM(s) Oral two times a day  gemfibrozil 600 milliGRAM(s) Oral two times a day  lacosamide 150 milliGRAM(s) Oral two times a day  metoprolol tartrate 25 milliGRAM(s) Oral two times a day  morphine ER Tablet 15 milliGRAM(s) Oral every 8 hours  multivitamin 1 Tablet(s) Oral daily  nystatin Powder 1 Application(s) Topical two times a day  pregabalin 150 milliGRAM(s) Oral daily  rivaroxaban 10 milliGRAM(s) Oral daily  senna 2 Tablet(s) Oral at bedtime  tamsulosin 0.8 milliGRAM(s) Oral at bedtime    MEDICATIONS  (PRN):  acetaminophen     Tablet .. 975 milliGRAM(s) Oral every 8 hours PRN Mild Pain (1 - 3), Moderate Pain (4 - 6)  lacosamide Injectable 200 milliGRAM(s) IV Push once PRN Seizure  LORazepam   Injectable 1 milliGRAM(s) IV Push once PRN Seizure      Vital Signs Last 24 Hrs  T(F): 98.7 (04-29-22 @ 14:00), Max: 98.9 (04-29-22 @ 09:21)  HR: 56 (04-29-22 @ 14:00) (56 - 83)  BP: 145/80 (04-29-22 @ 14:00) (108/64 - 153/70)  RR: 18 (04-29-22 @ 14:00) (18 - 18)  SpO2: 99% (04-29-22 @ 14:00) (99% - 100%)  Telemetry:   CAPILLARY BLOOD GLUCOSE        I&O's Summary    28 Apr 2022 07:01  -  29 Apr 2022 07:00  --------------------------------------------------------  IN: 630 mL / OUT: 1 mL / NET: 629 mL    29 Apr 2022 07:01  -  29 Apr 2022 15:56  --------------------------------------------------------  IN: 400 mL / OUT: 0 mL / NET: 400 mL        PHYSICAL EXAM:  GENERAL: NAD, well-developed  HEAD:  Atraumatic, Normocephalic  EYES: EOMI, PERRLA, conjunctiva and sclera clear  NECK: Supple, No JVD  CHEST/LUNG: Clear to auscultation bilaterally; No wheeze  HEART: Regular rate and rhythm; No murmurs, rubs, or gallops  ABDOMEN: Soft, Nontender, Nondistended; Bowel sounds present  EXTREMITIES:  2+ Peripheral Pulses, No clubbing, cyanosis, or edema  PSYCH: AAOx3  NEUROLOGY: non-focal  SKIN: No rashes or lesions    LABS:                        12.1   8.61  )-----------( 366      ( 29 Apr 2022 07:42 )             39.8       Phos  3.0     04-29  Mg     2.2     04-29                RADIOLOGY & ADDITIONAL TESTS:    Imaging Personally Reviewed:    Consultant(s) Notes Reviewed:      Care Discussed with Consultants/Other Providers:

## 2022-04-29 NOTE — PROGRESS NOTE ADULT - ASSESSMENT
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   Pt was seen at 8am by RN at [facility]  moving all extremities equally, alert, no slurred speech when she was cleaning his RLE surgical site.   at 8:30am, pt was noted to have slurred speech, lethargic and couldn't move LUE at all.   No shaking movements noted.   L hemiplegia with lethargy and confusion, rapidly improving over 1-2 hrs, concerning for first-time seizure w/ post ictal Keny's paralysis (given prior R frontal encephalomalacia) vs. TIA/minor stroke  neuro exam is nonfocal.   4/22: ptn admitted to Neuro for possible acute new onset Sz, R/O TIA  ptn placed on Vimpat x 1, unable to get MRI 2/2 aneurismal clipping  c/w dvt ppx w po Xarelto  cont ASA, statin  as per neuro: Seizure, fall and aspiration precautions.  avoid sleep deprivation,  dysphagia screen,  EEG   4/23: ptn was observed by me yesterday to have apnea while sleeping. seen by pulm, will need outptn sleep study. would monitor puse ox overnight. today ptn has myoclonus , jerky movements and interruption of speech. he is awake, alert, recognized me. could this be from HD Gabapentin? will drop the dose from 600 tid to 300 bid. spoke w neurology. they agree. EEG is negative  4/24-25: myoclonus and metabolic encephalopathy resolved post lowering the dose of Gabapentin. pain free. asking for a regular diet. dc planning as per neuro team  4/26: ptn is isolated, was exposed to covid 19, has no symptoms  4/27-29: no new events, awaiting dc to [facility] once covid from today is neg. cxr on 4/28 is neg

## 2022-04-29 NOTE — PROGRESS NOTE ADULT - REASON FOR ADMISSION
seizure

## 2022-04-29 NOTE — PROGRESS NOTE ADULT - PROBLEM SELECTOR PLAN 4
on Lipitor
REVIEW OF SYSTEMS/HISTORY OF PRESENT ILLNESS/VITAL SIGNS( Pullset)/PAST MEDICAL/SURGICAL/SOCIAL HISTORY/DISPOSITION/PHYSICAL EXAM/HIV

## 2022-04-29 NOTE — PROGRESS NOTE ADULT - ATTENDING COMMENTS
clear for dc
plan as above
Patient admitted for L HP and AMS - stroke ruled out, now thought to have had seizure, possibly related to remote h/o Acomm aneurysm bleed and repair with resulting encephalomalacia in R Fr region. On exam today, oriented to year and month, but yesterday appeared markedly perseverative and appeared to have negative myoclonus bilaterally. Started on lacosamide 100 q12 for presumptive seizure etiology.   Today looks entirely improved. Myoclonus absent, no perseveration. Conversing appropriately with son at bedside. Myoclonus appears to have stopped after reduction of gabapentin. Since patient presumably had been stable on gabapentin in past, onset of myoclonus may have occurred as a result of combination with lacosamide started this admission.  Need to assess physical therapy needs for dispo  May be ready for DC Mon or Tues.
stable, continue meds the same  clear for dc home

## 2022-05-06 ENCOUNTER — INPATIENT (INPATIENT)
Facility: HOSPITAL | Age: 67
LOS: 6 days | Discharge: NURSING, REHAB OR LONG-TERM CARE | DRG: 871 | End: 2022-05-13
Attending: INTERNAL MEDICINE | Admitting: STUDENT IN AN ORGANIZED HEALTH CARE EDUCATION/TRAINING PROGRAM
Payer: MEDICARE

## 2022-05-06 VITALS — HEIGHT: 68 IN

## 2022-05-06 DIAGNOSIS — Z98.89 OTHER SPECIFIED POSTPROCEDURAL STATES: Chronic | ICD-10-CM

## 2022-05-06 DIAGNOSIS — R41.82 ALTERED MENTAL STATUS, UNSPECIFIED: ICD-10-CM

## 2022-05-06 DIAGNOSIS — M43.28 FUSION OF SPINE, SACRAL AND SACROCOCCYGEAL REGION: Chronic | ICD-10-CM

## 2022-05-06 DIAGNOSIS — Z96.649 PRESENCE OF UNSPECIFIED ARTIFICIAL HIP JOINT: Chronic | ICD-10-CM

## 2022-05-06 LAB
ALBUMIN SERPL ELPH-MCNC: 3.5 G/DL — SIGNIFICANT CHANGE UP (ref 3.3–5)
ALBUMIN SERPL ELPH-MCNC: 4 G/DL — SIGNIFICANT CHANGE UP (ref 3.3–5)
ALP SERPL-CCNC: 177 U/L — HIGH (ref 40–120)
ALP SERPL-CCNC: 187 U/L — HIGH (ref 40–120)
ALT FLD-CCNC: 15 U/L — SIGNIFICANT CHANGE UP (ref 10–45)
ALT FLD-CCNC: 15 U/L — SIGNIFICANT CHANGE UP (ref 10–45)
ANION GAP SERPL CALC-SCNC: 15 MMOL/L — SIGNIFICANT CHANGE UP (ref 5–17)
ANION GAP SERPL CALC-SCNC: 17 MMOL/L — SIGNIFICANT CHANGE UP (ref 5–17)
APPEARANCE UR: CLEAR — SIGNIFICANT CHANGE UP
APTT BLD: 39.1 SEC — HIGH (ref 27.5–35.5)
AST SERPL-CCNC: 18 U/L — SIGNIFICANT CHANGE UP (ref 10–40)
AST SERPL-CCNC: 29 U/L — SIGNIFICANT CHANGE UP (ref 10–40)
B-OH-BUTYR SERPL-SCNC: 0.1 MMOL/L — SIGNIFICANT CHANGE UP
BACTERIA # UR AUTO: NEGATIVE — SIGNIFICANT CHANGE UP
BASE EXCESS BLDV CALC-SCNC: -0.6 MMOL/L — SIGNIFICANT CHANGE UP (ref -2–2)
BASE EXCESS BLDV CALC-SCNC: -1.6 MMOL/L — SIGNIFICANT CHANGE UP (ref -2–2)
BASE EXCESS BLDV CALC-SCNC: -7 MMOL/L — LOW (ref -2–2)
BASOPHILS # BLD AUTO: 0.04 K/UL — SIGNIFICANT CHANGE UP (ref 0–0.2)
BASOPHILS # BLD AUTO: 0.05 K/UL — SIGNIFICANT CHANGE UP (ref 0–0.2)
BASOPHILS NFR BLD AUTO: 0.3 % — SIGNIFICANT CHANGE UP (ref 0–2)
BASOPHILS NFR BLD AUTO: 0.4 % — SIGNIFICANT CHANGE UP (ref 0–2)
BILIRUB SERPL-MCNC: 0.4 MG/DL — SIGNIFICANT CHANGE UP (ref 0.2–1.2)
BILIRUB SERPL-MCNC: 0.4 MG/DL — SIGNIFICANT CHANGE UP (ref 0.2–1.2)
BILIRUB UR-MCNC: NEGATIVE — SIGNIFICANT CHANGE UP
BUN SERPL-MCNC: 12 MG/DL — SIGNIFICANT CHANGE UP (ref 7–23)
BUN SERPL-MCNC: 12 MG/DL — SIGNIFICANT CHANGE UP (ref 7–23)
CA-I SERPL-SCNC: 1.02 MMOL/L — LOW (ref 1.15–1.33)
CA-I SERPL-SCNC: 1.19 MMOL/L — SIGNIFICANT CHANGE UP (ref 1.15–1.33)
CA-I SERPL-SCNC: 1.24 MMOL/L — SIGNIFICANT CHANGE UP (ref 1.15–1.33)
CALCIUM SERPL-MCNC: 10 MG/DL — SIGNIFICANT CHANGE UP (ref 8.4–10.5)
CALCIUM SERPL-MCNC: 9.7 MG/DL — SIGNIFICANT CHANGE UP (ref 8.4–10.5)
CHLORIDE BLDV-SCNC: 106 MMOL/L — SIGNIFICANT CHANGE UP (ref 96–108)
CHLORIDE BLDV-SCNC: 107 MMOL/L — SIGNIFICANT CHANGE UP (ref 96–108)
CHLORIDE BLDV-SCNC: 86 MMOL/L — LOW (ref 96–108)
CHLORIDE SERPL-SCNC: 106 MMOL/L — SIGNIFICANT CHANGE UP (ref 96–108)
CHLORIDE SERPL-SCNC: 109 MMOL/L — HIGH (ref 96–108)
CK SERPL-CCNC: 59 U/L — SIGNIFICANT CHANGE UP (ref 30–200)
CO2 BLDV-SCNC: 21 MMOL/L — LOW (ref 22–26)
CO2 BLDV-SCNC: 27 MMOL/L — HIGH (ref 22–26)
CO2 BLDV-SCNC: 27 MMOL/L — HIGH (ref 22–26)
CO2 SERPL-SCNC: 19 MMOL/L — LOW (ref 22–31)
CO2 SERPL-SCNC: 22 MMOL/L — SIGNIFICANT CHANGE UP (ref 22–31)
COLOR SPEC: SIGNIFICANT CHANGE UP
CREAT SERPL-MCNC: 0.92 MG/DL — SIGNIFICANT CHANGE UP (ref 0.5–1.3)
CREAT SERPL-MCNC: 0.94 MG/DL — SIGNIFICANT CHANGE UP (ref 0.5–1.3)
DIFF PNL FLD: NEGATIVE — SIGNIFICANT CHANGE UP
EGFR: 89 ML/MIN/1.73M2 — SIGNIFICANT CHANGE UP
EGFR: 91 ML/MIN/1.73M2 — SIGNIFICANT CHANGE UP
EOSINOPHIL # BLD AUTO: 0.1 K/UL — SIGNIFICANT CHANGE UP (ref 0–0.5)
EOSINOPHIL # BLD AUTO: 0.13 K/UL — SIGNIFICANT CHANGE UP (ref 0–0.5)
EOSINOPHIL NFR BLD AUTO: 0.8 % — SIGNIFICANT CHANGE UP (ref 0–6)
EOSINOPHIL NFR BLD AUTO: 1.1 % — SIGNIFICANT CHANGE UP (ref 0–6)
EPI CELLS # UR: 0 /HPF — SIGNIFICANT CHANGE UP
ETHANOL SERPL-MCNC: SIGNIFICANT CHANGE UP MG/DL (ref 0–10)
FLUAV AG NPH QL: SIGNIFICANT CHANGE UP
FLUBV AG NPH QL: SIGNIFICANT CHANGE UP
GAS PNL BLDA: SIGNIFICANT CHANGE UP
GAS PNL BLDV: 114 MMOL/L — CRITICAL LOW (ref 136–145)
GAS PNL BLDV: 139 MMOL/L — SIGNIFICANT CHANGE UP (ref 136–145)
GAS PNL BLDV: 140 MMOL/L — SIGNIFICANT CHANGE UP (ref 136–145)
GAS PNL BLDV: SIGNIFICANT CHANGE UP
GLUCOSE BLDV-MCNC: 134 MG/DL — HIGH (ref 70–99)
GLUCOSE BLDV-MCNC: 148 MG/DL — HIGH (ref 70–99)
GLUCOSE BLDV-MCNC: >660 MG/DL — CRITICAL HIGH (ref 70–99)
GLUCOSE SERPL-MCNC: 121 MG/DL — HIGH (ref 70–99)
GLUCOSE SERPL-MCNC: 134 MG/DL — HIGH (ref 70–99)
GLUCOSE UR QL: NEGATIVE — SIGNIFICANT CHANGE UP
HCO3 BLDV-SCNC: 19 MMOL/L — LOW (ref 22–29)
HCO3 BLDV-SCNC: 25 MMOL/L — SIGNIFICANT CHANGE UP (ref 22–29)
HCO3 BLDV-SCNC: 26 MMOL/L — SIGNIFICANT CHANGE UP (ref 22–29)
HCT VFR BLD CALC: 39.5 % — SIGNIFICANT CHANGE UP (ref 39–50)
HCT VFR BLD CALC: 42.5 % — SIGNIFICANT CHANGE UP (ref 39–50)
HCT VFR BLDA CALC: 27 % — LOW (ref 39–51)
HCT VFR BLDA CALC: 40 % — SIGNIFICANT CHANGE UP (ref 39–51)
HCT VFR BLDA CALC: 41 % — SIGNIFICANT CHANGE UP (ref 39–51)
HGB BLD CALC-MCNC: 13.2 G/DL — SIGNIFICANT CHANGE UP (ref 12.6–17.4)
HGB BLD CALC-MCNC: 13.6 G/DL — SIGNIFICANT CHANGE UP (ref 12.6–17.4)
HGB BLD CALC-MCNC: 9 G/DL — LOW (ref 12.6–17.4)
HGB BLD-MCNC: 11.9 G/DL — LOW (ref 13–17)
HGB BLD-MCNC: 13.2 G/DL — SIGNIFICANT CHANGE UP (ref 13–17)
IMM GRANULOCYTES NFR BLD AUTO: 0.7 % — SIGNIFICANT CHANGE UP (ref 0–1.5)
IMM GRANULOCYTES NFR BLD AUTO: 0.8 % — SIGNIFICANT CHANGE UP (ref 0–1.5)
INR BLD: 1.57 RATIO — HIGH (ref 0.88–1.16)
KETONES UR-MCNC: NEGATIVE — SIGNIFICANT CHANGE UP
LACTATE BLDV-MCNC: 1.2 MMOL/L — SIGNIFICANT CHANGE UP (ref 0.7–2)
LACTATE BLDV-MCNC: 1.9 MMOL/L — SIGNIFICANT CHANGE UP (ref 0.7–2)
LACTATE BLDV-MCNC: 2.6 MMOL/L — HIGH (ref 0.7–2)
LEUKOCYTE ESTERASE UR-ACNC: NEGATIVE — SIGNIFICANT CHANGE UP
LIDOCAIN IGE QN: 56 U/L — SIGNIFICANT CHANGE UP (ref 7–60)
LYMPHOCYTES # BLD AUTO: 1.84 K/UL — SIGNIFICANT CHANGE UP (ref 1–3.3)
LYMPHOCYTES # BLD AUTO: 1.95 K/UL — SIGNIFICANT CHANGE UP (ref 1–3.3)
LYMPHOCYTES # BLD AUTO: 14 % — SIGNIFICANT CHANGE UP (ref 13–44)
LYMPHOCYTES # BLD AUTO: 16 % — SIGNIFICANT CHANGE UP (ref 13–44)
MAGNESIUM SERPL-MCNC: 1.9 MG/DL — SIGNIFICANT CHANGE UP (ref 1.6–2.6)
MCHC RBC-ENTMCNC: 29 PG — SIGNIFICANT CHANGE UP (ref 27–34)
MCHC RBC-ENTMCNC: 29.5 PG — SIGNIFICANT CHANGE UP (ref 27–34)
MCHC RBC-ENTMCNC: 30.1 GM/DL — LOW (ref 32–36)
MCHC RBC-ENTMCNC: 31.1 GM/DL — LOW (ref 32–36)
MCV RBC AUTO: 94.9 FL — SIGNIFICANT CHANGE UP (ref 80–100)
MCV RBC AUTO: 96.1 FL — SIGNIFICANT CHANGE UP (ref 80–100)
MONOCYTES # BLD AUTO: 0.68 K/UL — SIGNIFICANT CHANGE UP (ref 0–0.9)
MONOCYTES # BLD AUTO: 0.84 K/UL — SIGNIFICANT CHANGE UP (ref 0–0.9)
MONOCYTES NFR BLD AUTO: 5.2 % — SIGNIFICANT CHANGE UP (ref 2–14)
MONOCYTES NFR BLD AUTO: 6.9 % — SIGNIFICANT CHANGE UP (ref 2–14)
NEUTROPHILS # BLD AUTO: 10.41 K/UL — HIGH (ref 1.8–7.4)
NEUTROPHILS # BLD AUTO: 9.13 K/UL — HIGH (ref 1.8–7.4)
NEUTROPHILS NFR BLD AUTO: 74.9 % — SIGNIFICANT CHANGE UP (ref 43–77)
NEUTROPHILS NFR BLD AUTO: 78.9 % — HIGH (ref 43–77)
NITRITE UR-MCNC: NEGATIVE — SIGNIFICANT CHANGE UP
NRBC # BLD: 0 /100 WBCS — SIGNIFICANT CHANGE UP (ref 0–0)
NRBC # BLD: 0 /100 WBCS — SIGNIFICANT CHANGE UP (ref 0–0)
PCO2 BLDV: 41 MMHG — LOW (ref 42–55)
PCO2 BLDV: 46 MMHG — SIGNIFICANT CHANGE UP (ref 42–55)
PCO2 BLDV: 52 MMHG — SIGNIFICANT CHANGE UP (ref 42–55)
PH BLDV: 7.28 — LOW (ref 7.32–7.43)
PH BLDV: 7.3 — LOW (ref 7.32–7.43)
PH BLDV: 7.35 — SIGNIFICANT CHANGE UP (ref 7.32–7.43)
PH UR: 6 — SIGNIFICANT CHANGE UP (ref 5–8)
PHOSPHATE SERPL-MCNC: 3.7 MG/DL — SIGNIFICANT CHANGE UP (ref 2.5–4.5)
PLATELET # BLD AUTO: 351 K/UL — SIGNIFICANT CHANGE UP (ref 150–400)
PLATELET # BLD AUTO: 390 K/UL — SIGNIFICANT CHANGE UP (ref 150–400)
PO2 BLDV: 25 MMHG — SIGNIFICANT CHANGE UP (ref 25–45)
PO2 BLDV: 27 MMHG — SIGNIFICANT CHANGE UP (ref 25–45)
PO2 BLDV: 31 MMHG — SIGNIFICANT CHANGE UP (ref 25–45)
POTASSIUM BLDV-SCNC: 3 MMOL/L — LOW (ref 3.5–5.1)
POTASSIUM BLDV-SCNC: 4.1 MMOL/L — SIGNIFICANT CHANGE UP (ref 3.5–5.1)
POTASSIUM BLDV-SCNC: 4.7 MMOL/L — SIGNIFICANT CHANGE UP (ref 3.5–5.1)
POTASSIUM SERPL-MCNC: 4.1 MMOL/L — SIGNIFICANT CHANGE UP (ref 3.5–5.3)
POTASSIUM SERPL-MCNC: 4.4 MMOL/L — SIGNIFICANT CHANGE UP (ref 3.5–5.3)
POTASSIUM SERPL-SCNC: 4.1 MMOL/L — SIGNIFICANT CHANGE UP (ref 3.5–5.3)
POTASSIUM SERPL-SCNC: 4.4 MMOL/L — SIGNIFICANT CHANGE UP (ref 3.5–5.3)
PROLACTIN SERPL-MCNC: 13.9 NG/ML — SIGNIFICANT CHANGE UP (ref 4.1–18.4)
PROT SERPL-MCNC: 7 G/DL — SIGNIFICANT CHANGE UP (ref 6–8.3)
PROT SERPL-MCNC: 7.8 G/DL — SIGNIFICANT CHANGE UP (ref 6–8.3)
PROT UR-MCNC: ABNORMAL
PROTHROM AB SERPL-ACNC: 18.3 SEC — HIGH (ref 10.5–13.4)
RBC # BLD: 4.11 M/UL — LOW (ref 4.2–5.8)
RBC # BLD: 4.48 M/UL — SIGNIFICANT CHANGE UP (ref 4.2–5.8)
RBC # FLD: 15.6 % — HIGH (ref 10.3–14.5)
RBC # FLD: 15.6 % — HIGH (ref 10.3–14.5)
RBC CASTS # UR COMP ASSIST: 0 /HPF — SIGNIFICANT CHANGE UP (ref 0–4)
RSV RNA NPH QL NAA+NON-PROBE: SIGNIFICANT CHANGE UP
SAO2 % BLDV: 31.8 % — LOW (ref 67–88)
SAO2 % BLDV: 38.6 % — LOW (ref 67–88)
SAO2 % BLDV: 50.4 % — LOW (ref 67–88)
SARS-COV-2 RNA SPEC QL NAA+PROBE: SIGNIFICANT CHANGE UP
SODIUM SERPL-SCNC: 143 MMOL/L — SIGNIFICANT CHANGE UP (ref 135–145)
SODIUM SERPL-SCNC: 145 MMOL/L — SIGNIFICANT CHANGE UP (ref 135–145)
SP GR SPEC: 1.01 — LOW (ref 1.01–1.02)
UROBILINOGEN FLD QL: NEGATIVE — SIGNIFICANT CHANGE UP
WBC # BLD: 12.19 K/UL — HIGH (ref 3.8–10.5)
WBC # BLD: 13.17 K/UL — HIGH (ref 3.8–10.5)
WBC # FLD AUTO: 12.19 K/UL — HIGH (ref 3.8–10.5)
WBC # FLD AUTO: 13.17 K/UL — HIGH (ref 3.8–10.5)
WBC UR QL: 1 /HPF — SIGNIFICANT CHANGE UP (ref 0–5)

## 2022-05-06 PROCEDURE — 99222 1ST HOSP IP/OBS MODERATE 55: CPT

## 2022-05-06 PROCEDURE — 71045 X-RAY EXAM CHEST 1 VIEW: CPT | Mod: 26

## 2022-05-06 PROCEDURE — 99291 CRITICAL CARE FIRST HOUR: CPT

## 2022-05-06 PROCEDURE — 71045 X-RAY EXAM CHEST 1 VIEW: CPT | Mod: 26,77

## 2022-05-06 PROCEDURE — 95720 EEG PHY/QHP EA INCR W/VEEG: CPT

## 2022-05-06 PROCEDURE — 31500 INSERT EMERGENCY AIRWAY: CPT

## 2022-05-06 PROCEDURE — 93010 ELECTROCARDIOGRAM REPORT: CPT | Mod: 59

## 2022-05-06 PROCEDURE — 70450 CT HEAD/BRAIN W/O DYE: CPT | Mod: 26,MA

## 2022-05-06 PROCEDURE — 99285 EMERGENCY DEPT VISIT HI MDM: CPT | Mod: 25

## 2022-05-06 PROCEDURE — 43753 TX GASTRO INTUB W/ASP: CPT | Mod: GC

## 2022-05-06 RX ORDER — NOREPINEPHRINE BITARTRATE/D5W 8 MG/250ML
0.05 PLASTIC BAG, INJECTION (ML) INTRAVENOUS
Qty: 8 | Refills: 0 | Status: DISCONTINUED | OUTPATIENT
Start: 2022-05-06 | End: 2022-05-07

## 2022-05-06 RX ORDER — LACOSAMIDE 50 MG/1
150 TABLET ORAL EVERY 12 HOURS
Refills: 0 | Status: DISCONTINUED | OUTPATIENT
Start: 2022-05-06 | End: 2022-05-12

## 2022-05-06 RX ORDER — PROPOFOL 10 MG/ML
40 INJECTION, EMULSION INTRAVENOUS
Qty: 1000 | Refills: 0 | Status: DISCONTINUED | OUTPATIENT
Start: 2022-05-06 | End: 2022-05-08

## 2022-05-06 RX ORDER — LEVETIRACETAM 250 MG/1
1000 TABLET, FILM COATED ORAL ONCE
Refills: 0 | Status: COMPLETED | OUTPATIENT
Start: 2022-05-06 | End: 2022-05-06

## 2022-05-06 RX ORDER — ENOXAPARIN SODIUM 100 MG/ML
40 INJECTION SUBCUTANEOUS EVERY 24 HOURS
Refills: 0 | Status: DISCONTINUED | OUTPATIENT
Start: 2022-05-06 | End: 2022-05-12

## 2022-05-06 RX ORDER — CHLORHEXIDINE GLUCONATE 213 G/1000ML
1 SOLUTION TOPICAL
Refills: 0 | Status: DISCONTINUED | OUTPATIENT
Start: 2022-05-06 | End: 2022-05-13

## 2022-05-06 RX ORDER — LEVETIRACETAM 250 MG/1
1000 TABLET, FILM COATED ORAL ONCE
Refills: 0 | Status: DISCONTINUED | OUTPATIENT
Start: 2022-05-06 | End: 2022-05-06

## 2022-05-06 RX ORDER — ROCURONIUM BROMIDE 10 MG/ML
50 VIAL (ML) INTRAVENOUS ONCE
Refills: 0 | Status: COMPLETED | OUTPATIENT
Start: 2022-05-06 | End: 2022-05-06

## 2022-05-06 RX ORDER — LEVETIRACETAM 250 MG/1
500 TABLET, FILM COATED ORAL EVERY 12 HOURS
Refills: 0 | Status: DISCONTINUED | OUTPATIENT
Start: 2022-05-06 | End: 2022-05-08

## 2022-05-06 RX ORDER — ETOMIDATE 2 MG/ML
20 INJECTION INTRAVENOUS ONCE
Refills: 0 | Status: COMPLETED | OUTPATIENT
Start: 2022-05-06 | End: 2022-05-06

## 2022-05-06 RX ORDER — ROCURONIUM BROMIDE 10 MG/ML
100 VIAL (ML) INTRAVENOUS ONCE
Refills: 0 | Status: COMPLETED | OUTPATIENT
Start: 2022-05-06 | End: 2022-05-06

## 2022-05-06 RX ORDER — CHLORHEXIDINE GLUCONATE 213 G/1000ML
15 SOLUTION TOPICAL EVERY 12 HOURS
Refills: 0 | Status: DISCONTINUED | OUTPATIENT
Start: 2022-05-06 | End: 2022-05-08

## 2022-05-06 RX ADMIN — ENOXAPARIN SODIUM 40 MILLIGRAM(S): 100 INJECTION SUBCUTANEOUS at 14:35

## 2022-05-06 RX ADMIN — Medication 100 MILLIGRAM(S): at 06:49

## 2022-05-06 RX ADMIN — LACOSAMIDE 130 MILLIGRAM(S): 50 TABLET ORAL at 17:52

## 2022-05-06 RX ADMIN — LEVETIRACETAM 400 MILLIGRAM(S): 250 TABLET, FILM COATED ORAL at 06:24

## 2022-05-06 RX ADMIN — PROPOFOL 27.4 MICROGRAM(S)/KG/MIN: 10 INJECTION, EMULSION INTRAVENOUS at 11:23

## 2022-05-06 RX ADMIN — CHLORHEXIDINE GLUCONATE 15 MILLILITER(S): 213 SOLUTION TOPICAL at 17:55

## 2022-05-06 RX ADMIN — Medication 50 MILLIGRAM(S): at 06:51

## 2022-05-06 RX ADMIN — CHLORHEXIDINE GLUCONATE 1 APPLICATION(S): 213 SOLUTION TOPICAL at 11:24

## 2022-05-06 RX ADMIN — Medication 1 MILLIGRAM(S): at 06:15

## 2022-05-06 RX ADMIN — LEVETIRACETAM 400 MILLIGRAM(S): 250 TABLET, FILM COATED ORAL at 17:55

## 2022-05-06 RX ADMIN — LACOSAMIDE 130 MILLIGRAM(S): 50 TABLET ORAL at 11:21

## 2022-05-06 RX ADMIN — ETOMIDATE 20 MILLIGRAM(S): 2 INJECTION INTRAVENOUS at 06:48

## 2022-05-06 RX ADMIN — Medication 10.7 MICROGRAM(S)/KG/MIN: at 11:23

## 2022-05-06 RX ADMIN — PROPOFOL 27.4 MICROGRAM(S)/KG/MIN: 10 INJECTION, EMULSION INTRAVENOUS at 14:38

## 2022-05-06 RX ADMIN — PROPOFOL 27.4 MICROGRAM(S)/KG/MIN: 10 INJECTION, EMULSION INTRAVENOUS at 07:18

## 2022-05-06 NOTE — CONSULT NOTE ADULT - TIME BILLING
I saw and evaluated patient and I agree with residents note  found with altered mental status on the ground at [facility]  trauma activation called for altered mental status  upon my evaluation was skeptical that altered mental status was related to trauma  completed primary and secondary survey then facilitated CT of the head  I had CT read by radiology - no traumatic process identified  patient then brought back to E.D.  I conferred with E.D. attending and agreed that patient should be managed as non traumatic

## 2022-05-06 NOTE — H&P ADULT - ATTENDING COMMENTS
Patient examined and care reviewed in detail on bedside rounds  Critically ill and unstable on vent/pressors with seizures For EEG monitoring/neural consult/possible extubation attempt if indicated  Frequent bedside visits with therapy change today.   I have personally provided 35+ minutes of critical care time concurrently with the resident/fellow; this excludes time spent on separate procedures.  Pt had goal directed echo within 24 hours of MICU admission  Goals of care discussion had with family within 24 hours of MICU admission  Patient on DVT prophylaxis within 24 hours of MICU admission

## 2022-05-06 NOTE — CONSULT NOTE ADULT - SUBJECTIVE AND OBJECTIVE BOX
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 Patient was last seen in bed at 3a, then found supine on the floor at 4a bed check, presumed to have fallen. Patient was unresponsive, then became combative when staff tried putting him on a noreen lift. Patient was again unresponsive when EMS arrive, started with Kussmaul respirations, then transitioned to Debby-Jeol respirations. Patient had decompensating mental status enroute to hospital, called as trauma alert.    No fevers/chills, nausea/vomiting, chest pain/shortness of breath, or dizziness/lightheadedness.    PRIMARY SURVEY:   A - airway intact  B - bilateral breath sounds and good chest rise  C - initial BP  BP: -- *** , HR HR: -- *** , palpable pulses in all extremities  D - GCS 15 on arrival. E 4, V 5, M 6.   Exposure obtained    SECONDARY SURVEY:  General: NAD  HEENT: Normocephalic, atraumatic, EOMI, PEERLA  Neck: Soft, midline trachea  Chest: No chest wall tenderness  Cardiac: S1, S2, RRR  Respiratory: Bilateral breath sounds clear and equal   Abdomen: Soft, non-distended, non-tender; no rebound or guarding; no palpable masses   Groin: Normal appearing  Extremities: Palpable radial & DP pulses bilaterally. Motor and sensory grossly intact in all 4 extremities.  Back: No TTP; no palpable runoff/stepoff/deformity    ROS: 10-system review all negative except as noted in HPI.      PAST MEDICAL & SURGICAL HISTORY:  Myocardial infarction  KAEL x1 MI 2005, stent RCA    Back pain  Chronic back pain    Spinal stenosis    Lumbar herniated disc    Hypertension    Narcotic dependence  5/28/16 for withdrawal ,pt currently on Dialudid 8 mg every 4-6 hours /day    Osteoarthritis    GERD (gastroesophageal reflux disease)    Cerebral aneurysm rupture  1997 clipped, no residual    Femur fracture, right  2/16, surgical revision of right hip    Sacroiliitis, not elsewhere classified  Unspecified Inflammatory spondylopathy,Sacral and sacrococcygeal region    Chronic pain  Patient has an Intrathecal pump s/p removal in 2016    Cerebral aneurysm  I997 with clips    S/P lumbar fusion  L1-S1:2002    History of right inguinal hernia repair  x2    Hx of appendectomy  6/1969    Hx of laminectomy  lumbar-2002    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    Stented coronary artery  KAEL x 1 to RCA    History of hip replacement  8/15, revision 2/16 after falling and fracturing right femur    S/P left knee arthroscopy    H/O arthroscopy of shoulder  right X 2, left X 1    History of throat surgery  surgical exicision cyst 1986    Fusion of sacral region of spine  5/2017      FAMILY HISTORY:  : Non-contributory, as reviewed with the patient and family.    SOCIAL HISTORY:  ***    ALLERGIES: Allergy Status Unknown      HOME MEDICATIONS:  Home Medications:  acetaminophen 325 mg oral tablet: 3 tab(s) orally every 8 hours, As Needed (22 Apr 2022 12:08)  aspirin 81 mg oral delayed release tablet: 1 tab(s) orally once a day (22 Apr 2022 12:08)  atorvastatin 20 mg oral tablet: 1 tab(s) orally once a day (at bedtime) (22 Apr 2022 12:08)  baclofen 20 mg oral tablet: 1 tab(s) orally 2 times a day (22 Apr 2022 12:08)  docusate sodium 100 mg oral capsule: 1 cap(s) orally 3 times a day (22 Apr 2022 12:08)  Fleet Bisacodyl 10 mg rectal enema: 30 milligram(s) rectal once a day (at bedtime) (22 Apr 2022 12:08)  Flomax 0.4 mg oral capsule: 2 cap(s) orally once a day (22 Apr 2022 12:08)  gabapentin 100 mg oral capsule: 1 cap(s) orally 2 times a day (26 Apr 2022 12:09)  gemfibrozil 600 mg oral tablet: 1 tab(s) orally 2 times a day (before meals) (22 Apr 2022 12:08)  metoprolol tartrate 25 mg oral tablet: 1 tab(s) orally 2 times a day (29 Apr 2022 11:08)  morphine 15 mg/8 to 12 hr oral tablet, extended release: 1 tab(s) orally every 8 hours, As Needed (22 Apr 2022 12:08)  Multiple Vitamins oral tablet: 1 tab(s) orally once a day (22 Apr 2022 12:08)  nystatin 100,000 units/g topical powder: Apply topically to affected area 2 times a day on groin area with soap and water and pat dry and apply powder (22 Apr 2022 12:08)  pregabalin 150 mg oral capsule: 1 cap(s) orally once a day (29 Apr 2022 11:08)  rivaroxaban 10 mg oral tablet: 1 tab(s) orally once a day (22 Apr 2022 12:08)  senna oral tablet: 2 tab(s) orally once a day (at bedtime) (22 Apr 2022 12:08)  Slow Fe (as elemental iron) 45 mg oral tablet, extended release: 1 tab(s) orally once a day (22 Apr 2022 12:08)      CURRENT MEDICATIONS  MEDICATIONS:  ---NEURO-  ---CV-  ---PULM-  ---GI/FEN-  ----  ---ID-   ---ENDO-  ---HEME-  ---OTHER-        --------------------------------------------------------------------------------------------    VITALS:   T(C): --  HR: --  BP: --  RR: --  SpO2: --  CAPILLARY BLOOD GLUCOSE      POCT Blood Glucose.: 124 mg/dL (06 May 2022 05:49)    CAPILLARY BLOOD GLUCOSE      POCT Blood Glucose.: 124 mg/dL (06 May 2022 05:49)      Height (cm): 172.7 (05-06 @ 05:46)    PHYSICAL EXAM:  General: NAD  HEENT: Normocephalic, atraumatic, EOMI, PEERLA.  Neck: Soft, midline trachea.  Chest: No chest wall tenderness.   Cardiac: S1, S2, RRR  Respiratory: Bilateral breath sounds clear and equal  Abdomen: Soft, non-distended, non-tender; no rebound or guarding  Groin: Normal appearing  Ext: Palpable radial & DP pulses bilaterally   Back: No TTP; no palpable runoff/stepoff/deformity    --------------------------------------------------------------------------------------------    LABS                --------------------------------------------------------------------------------------------    MICROBIOLOGY      --------------------------------------------------------------------------------------------    IMAGING  --------------------------------------------------------------------------------------------    ASSESSMENT:    PLAN:

## 2022-05-06 NOTE — ED ADULT NURSE NOTE - ED STAT RN HANDOFF DETAILS
Report endorsed to oncfareed Sparrow RN. Safety checks completed this shift. Safety rounds completed hourly.  IV sites checked Q2+remains WDL. Medications administered as ordered with no signs/symptoms of adverse reactions. Fall & skin precautions in place. Any issues endorsed to oncoming RN for follow up.

## 2022-05-06 NOTE — H&P ADULT - ASSESSMENT
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  Per ED providers, pt had been found down on the floor of his room for unknown duration.  Alternately somnolent and alert on arrival.  Breathing was c/f Kaussmal vs. Cheynes busch.  Went to CT.  Back in ED room, pt would have periods of apnea w/ hypoxia and bradycardia.  Right arm shaking also observed.  Left arm never moved.  Intubated in the ED for airway protection under suspicion of status epilepticus.  Accepted to MICU s/p intubation.  ==============PLAN=============    #NEURO  #c/f seizure   -keppra 500 q12h   -c/w vimpat 150 q12h   -24h continuous EEG ongoing   -dc/lower prop if seizure activity absent   -appreciate neuro recs    #unwitnessed fall   - CTH wnl; pending CT c-spine after EEG   - cervical collar in place    #CARDIOVASCULAR  Hemodynamics - hypotensive    - levo 0.03mcg/min    - likely propofol side effect    - sepsis w/u cultures to r/o septic shock; unlikely as pt initially hypertensive    - LR @ 75cc/h    Rhythm    - intermittent bradycardia    - ICH/cushing reflex r/o on CT          #RESPIRATORY  #hypoxic episodes - concern for seizure activity    - intubated for aw protection  #irregular respirations    - jerel-busch v kussmaul    - beta hydroxybutyrate    #RENAL  - no issues     #GI  - AW protection; NGT in place  - hold feeds for possible extubation    #ENDO  - no issues    #HEME/ONC  - A/C: DVT ppx lovenox; ASA after extubation  - hold xarelto    #ID  - leukocytosis - possibly post-seizure  - afebrile; hold abx pending bcx/ucx      #ETHICS  - GOC:   ==============ASSESSMENT=============  RH with CAD s/p MI and stent, HTN, HLD, s/p 7 lumbar spinal surgeries. S/P R frontal craniotomy and clipping of R acomm aneurysm (1997). 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 On arrival, pt mental status was fluctuant between alert and somnolent. Breathing pattern concerning for possible jerel-busch v kussmaul pattern respirations. CT head performed in ED, followed by episodes of bradycardia with hypoxia and apnea. ED team also noted R arm shaking with complete absence of L UE movement. Due to suspicion for status epilepticus, pt was intubated for AW protection. Transferred to MICU for definitive management. Boarding in SICU.   ==============PLAN=============    #NEURO  #c/f seizure   -keppra 500 q12h   -c/w vimpat 150 q12h   -24h continuous EEG ongoing   -dc/lower prop if seizure activity absent   -appreciate neuro recs    #unwitnessed fall   - CTH wnl; pending CT c-spine after EEG   - cervical collar in place    #CARDIOVASCULAR  Hemodynamics - hypotensive    - levo 0.03mcg/min    - likely propofol side effect    - sepsis w/u cultures to r/o septic shock; unlikely as pt initially hypertensive    - LR @ 75cc/h    Rhythm    - intermittent bradycardia    - ICH/cushing reflex r/o on CT    - ecg without heart block      #RESPIRATORY  #hypoxic episodes - concern for seizure activity    - intubated for aw protection  #irregular respirations    - jerel-busch v kussmaul    - beta hydroxybutyrate wnl    #RENAL  - no issues     #GI  - AW protection; NGT in place  - hold feeds for possible extubation    #ENDO  - no issues    #HEME/ONC  - A/C: DVT ppx lovenox; ASA after extubation  - hold xarelto    #ID  - leukocytosis - possibly post-seizure; CTM  - afebrile; hold abx pending bcx/ucx   ==============ASSESSMENT=============  RH with CAD s/p MI and stent, HTN, HLD, s/p 7 lumbar spinal surgeries. S/P R frontal craniotomy and clipping of R acomm aneurysm (1997). 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 On arrival, pt mental status was fluctuant between alert and somnolent. Breathing pattern concerning for possible jerel-busch v kussmaul pattern respirations. CT head performed in ED, followed by episodes of bradycardia with hypoxia and apnea. ED team also noted R arm shaking with complete absence of L UE movement. Due to suspicion for status epilepticus, pt was intubated for AW protection. Transferred to MICU for definitive management. Boarding in SICU.   ==============PLAN=============    #NEURO  #c/f seizure   -keppra 500 q12h   -c/w vimpat 150 q12h   -24h continuous EEG ongoing   -dc/lower prop if seizure activity absent   -appreciate neuro recs    #unwitnessed fall   - CTH wnl; pending CT c-spine after EEG   - cervical collar in place   - CK wnl     #CARDIOVASCULAR  Hemodynamics - hypotensive    - levo 0.03mcg/min    - likely propofol side effect    - sepsis w/u cultures to r/o septic shock; unlikely as pt initially hypertensive    - LR @ 75cc/h    Rhythm    - intermittent bradycardia    - ICH/cushing reflex r/o on CT    - ecg without heart block      #RESPIRATORY  #hypoxic episodes - concern for seizure activity    - intubated for aw protection  #irregular respirations    - jerel-busch v kussmaul    - beta hydroxybutyrate wnl    #RENAL  - no issues     #GI  - AW protection; NGT in place  - hold feeds for possible extubation    #ENDO  - no issues    #HEME/ONC  - A/C: DVT ppx lovenox; ASA after extubation  - hold xarelto    #ID  - leukocytosis - possibly post-seizure; CTM  - afebrile; hold abx pending bcx/ucx

## 2022-05-06 NOTE — ED PROCEDURE NOTE - PROCEDURE ADDITIONAL DETAILS
Patient poorly sedated on first attempt, started having gagging motions. Patient given additional 50mg Rocuronium and crash intubation performed by Dr. Beard, tube secured at 21cm lip line w/ good colorimetry and connected to vent.

## 2022-05-06 NOTE — ED PROVIDER NOTE - ATTENDING CONTRIBUTION TO CARE
Primary  Airway intact  Breath sounds equal bilaterally  Normotensive    Secondary  GCS 10 (E2, V4, M4), pupils +3 and reactive, Spontaneous nose scratching with right hand and RLE movement, LLE movement with stimulation, no LUE movement     r/o ICH  r/o Seizure

## 2022-05-06 NOTE — ED PROVIDER NOTE - PHYSICAL EXAMINATION
Primary Survey  A - airway intact  B - bilateral breath sounds and good chest rise  C - initially BP: 150 systolic, palpable pulses in all extremities  D - GCS 13 (3-4-6) on arrival  Exposure obtained    Secondary survey  Gen: NAD  HEENT: NC, C-spine no ttp, c-collar in place, pupils 4mm equal & reactive  CV: pulse regularly present  Pulm: CTA B/L  Chest: intact without ttp  Abd: Soft, ND, NT, no rebound, no guarding  Groin: Normal appearing  Ext: Palp radial b/l, palp DP b/l  Back: no TTP, no palpable runoff, stepoff, or deformity

## 2022-05-06 NOTE — CONSULT NOTE ADULT - ASSESSMENT
66 y.o. 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 Neurology consulted for status epilepticus. Neuro exam limited due to sedation. CTH showing no acute findings, chronic post surgical changes    Impression: 1) Unwitnessed fall and decreased level of consciousness of unclear etiology. Concerned for status epilepticus in the setting of known R fronto temporal epileptogenic focus, vital sign derangement, and report of RUE shaking, could have initially been focal to generalized with impaired awareness. R/o infectious, toxic metabolic 2) L hemiplegia - has known jennifer's paralysis and post-ictal confusion due to R frontal encephalomalacia     Recommendations:  [] F/u CT cervical spine w/o contrast  [] vEEG 24 hours  [] C/w Vimpat 150 mg q12  [] Start Keppra 500 mg q12  [] Seizure precaution  [] Wean down sedation as tolerated in coordination with respiratory care per MICU team  [] Neurocheck and vital per MICU protocol    Case discussed with epilepsy attending Dr. Quinn  Case to be seen with general neurology attending

## 2022-05-06 NOTE — CONSULT NOTE ADULT - ASSESSMENT
Impression:    Sacral/bilateral Buttocks deep tissue injury present on admission  Incontinence of bowel and bladder  Incontinence Dermatitis    Recommend:  1.) topical therapy: sacral/buttock injury - cleanse with incontinence cleanser, pat dry, apply Triad ointment BID and PRN for incontinent episodes  2.) Incontinence Management - incontinence cleanser, pads, pericare BID  3.) Maintain on an alternating air with low air loss surface  4.) Turn and reposition Q 2 hours  5.) Nutrition optimization  6.) Offload heels/feet with complete cair air fluidized boots; ensure that the soles of the feet are not resting on the foot board of the bed.    Care as per medicine. Will not actively follow but will remain available. Please recall for new issues or deterioration.  Upon discharge [facility] [address] [place] [address] [phone]  Thank you for this consult  Sarah Garibay, NP-C, CWOCN [id number] Impression:    Sacral/bilateral Buttocks deep tissue injury present on admission  Right heel deep tissue injury present on admission  Left heel deep tissue injury present on admission  Incontinence of bowel and bladder  Incontinence Dermatitis    Recommend:  1.) topical therapy: sacral/buttock injury - cleanse with incontinence cleanser, pat dry, apply Triad ointment BID and PRN for incontinent episodes  Right and Left heels - keep clean and dry, apply cavilon daily, offload  2.) Incontinence Management - incontinence cleanser, pads, pericare BID  3.) Maintain on an alternating air with low air loss surface  4.) Turn and reposition Q 2 hours  5.) Nutrition optimization  6.) Offload heels/feet with complete cair air fluidized boots; ensure that the soles of the feet are not resting on the foot board of the bed.    Care as per medicine. Will not actively follow but will remain available. Please recall for new issues or deterioration.  Upon discharge f/u as outpatient at [facility] [address] [place] [address] [phone]  Thank you for this consult  Sarah Garibay, OTILIA-C, CWOCN [id number]

## 2022-05-06 NOTE — PATIENT PROFILE ADULT - FALL HARM RISK - FACTORS
Impaired gait/IV and/or equipment tethered to patient/Medication side effects/Other medical problems

## 2022-05-06 NOTE — ED PROVIDER NOTE - PROGRESS NOTE DETAILS
Pt noted to suddenly have shallow respirations, bradycardia and flexor posturing of RUE. Episosde lasted 5 seconds. When examiner Pt noted to suddenly have shallow respirations, bradycardia and flexor posturing of RUE. Episode lasted 5 seconds. When examiner tells patient he is in the hospital about 1 minute later, patient gives a thumbs up with right hand and says "yes". Ativan 1 mg and Keppra 1000 mg ordered. GERRY. ED sign out, admitted to MICU, intubated for airway protection/expected clinical course, status epilepticus -- Siddharth Arguelles MD Jack oPtts, PGY3: Patient w/ period of apnea, possibly additional seizure w/ post-ictal period. Now becoming more hypoxic. Will intubate for airway protection given mental status has not been improving.

## 2022-05-06 NOTE — CONSULT NOTE ADULT - ASSESSMENT
66M PMH CAD s/p MI and stent, HTN, HLD, s/p 7 lumbar spinal surgeries, spontaneous R Acomm aneurysm rupture s/p R frontal craniotomy and clipping (1997), s/p R prosthetic femor ORIF 3/30/21, on Xarelto, presents to ED for AMS. Patient was last seen in bed at 3a, then found supine on the floor at 4a bed check, presumed to have fallen. Patient was unresponsive, then became combative when staff tried putting him on a noreen lift. Patient was again unresponsive when EMS arrive, started with Kussmaul respirations, then transitioned to Debby-Joel respirations. Patient had decompensating mental status enroute to hospital, called as trauma alert.    PLAN:  - No evidence of acute traumatic injuries.  - No further trauma surgery interventions.  - care per primary team  - Please recontact trauma surgery with further questions    RAND Jacobson, PGY-2   [facility]   Acute Care Surgery   [phone]

## 2022-05-06 NOTE — PATIENT PROFILE ADULT - FALL HARM RISK - HARM RISK INTERVENTIONS
Assistance with ambulation/Assistance OOB with selected safe patient handling equipment/Communicate Risk of Fall with Harm to all staff/Discuss with provider need for PT consult/Monitor gait and stability/Provide patient with walking aids - walker, cane, crutches/Reinforce activity limits and safety measures with patient and family/Review medications for side effects contributing to fall risk/Sit up slowly, dangle for a short time, stand at bedside before walking/Tailored Fall Risk Interventions/Toileting schedule using arm’s reach rule for commode and bathroom/Visual Cue: Yellow wristband and red socks/Bed in lowest position, wheels locked, appropriate side rails in place/Call bell, personal items and telephone in reach/Instruct patient to call for assistance before getting out of bed or chair/Non-slip footwear when patient is out of bed/[place] to call system/Physically safe environment - no spills, clutter or unnecessary equipment/Purposeful Proactive Rounding/Room/bathroom lighting operational, light cord in reach

## 2022-05-06 NOTE — ED ADULT NURSE REASSESSMENT NOTE - NS ED NURSE REASSESS COMMENT FT1
Pt decreased down to 70% RA on O2 sat while sleeping in bed, exhibiting kussmauls breathing while on 2L NC for O2 support. Pt bradying down to 40s HR, and becoming less arousable. Dr. Voss made aware. Made decision to intubate patient for airway protection. (Airway documented on flow sheet.) Pt decreased down to 70% RA on O2 sat while sleeping in bed, exhibiting kussmauls breathing while on 2L NC for O2 support. Pt bradying down to 40s HR, and becoming less arousable/responsive. Dr. Voss made aware. Made decision to intubate patient for airway protection. (Airway documented on flow sheet.) Pt decreased down to 70% RA on O2 sat while sleeping in bed, exhibiting kussmauls breathing while on 2L NC for O2 support. Pt bradying down to 40s HR, and becoming less arousable/responsive upon vigorous physical stimulation. Dr. Voss made aware. Made decision to intubate patient for airway protection. (Airway documented on flow sheet.)

## 2022-05-06 NOTE — H&P ADULT - HISTORY OF PRESENT ILLNESS
... presented from Nursing Home s/p unwitnessed fall.  Per ED providers, pt had been found down on the floor of his room for unknown duration.  Alternately somnolent and alert on arrival.  Breathing was c/f Kaussmal vs. Cheynes busch.  Went to CT.  Back in ED room, pt would have periods of apnea w/ hypoxia and bradycardia.  Right arm shaking also observed.  Left arm never moved.  Intubated in the ED for airway protection under suspicion of status epilepticus.  Accepted to MICU s/p intubation. RH w/ CAD s/p MI and stent, HTN, HLD, s/p 7 lumbar spinal surgeries, spontaneous R Acomm aneurysm rupture s/p R frontal craniotomy and clipping (1997), s/p R prosthetic femor ORIF 3/30/21, on xarelto presented from Nursing Home s/p unwitnessed fall.  Per ED providers, pt had been found down on the floor of his room for unknown duration.  Alternately somnolent and alert on arrival.  Breathing was c/f Kaussmal vs. Cheynes busch.  Went to CT.  Back in ED room, pt would have periods of apnea w/ hypoxia and bradycardia.  Right arm shaking also observed.  Left arm never moved.  Intubated in the ED for airway protection under suspicion of status epilepticus.  Accepted to MICU s/p intubation. RH with CAD s/p MI and stent, HTN, HLD, s/p 7 lumbar spinal surgeries. S/P R frontal craniotomy and clipping of R acomm aneurysm (1997). 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 On arrival, pt mental status was fluctuant between alert and somnolent. Breathing pattern concerning for possible jerel-busch v kussmaul pattern respirations. CT head performed in ED, followed by episodes of bradycardia with hypoxia and apnea. ED team also noted R arm shaking with complete absence of L UE movement. Due to suspicion for status epilepticus, pt was intubated for AW protection. Transferred to MICU for definitive management. Boarding in SICU.

## 2022-05-06 NOTE — CONSULT NOTE ADULT - SUBJECTIVE AND OBJECTIVE BOX
Wound Surgery Consult Note:    HPI:  ... presented from Nursing Home s/p unwitnessed fall.  Per ED providers, pt had been found down on the floor of his room for unknown duration.  Alternately somnolent and alert on arrival.  Breathing was c/f [place] vs. [place] busch.  Went to CT.  Back in ED room, pt would have periods of apnea w/ hypoxia and bradycardia.  Right arm shaking also observed.  Left arm never moved.  Intubated in the ED for airway protection under suspicion of status epilepticus.  Accepted to MICU s/p intubation. (06 May 2022 07:07)    Request for wound care consult for multiple pressure injuries present on admission received from nursing. Mr. Alanis was encountered on an alternating air with low air loss surface. He is incontinent of stool and urine. His extreme immobility, inactivity, incontinence of urine and stool as well as poor nutritional status all contribute to his high risk for pressure injury development and hinder healing. Identification of the initial signs of deep tissue damage at the level of the skin within 72 hours of admission make this an injury that occurred prior to admission.    PAST MEDICAL & SURGICAL HISTORY:  Myocardial infarction, KAEL x1 MI 2005, stent RCA  Back pain  Chronic back pain  Spinal stenosis  Lumbar herniated disc  Hypertension  Narcotic dependence, 5/28/16 for withdrawal ,pt currently on Dialudid 8 mg every 4-6 hours /day  Osteoarthritis  GERD (gastroesophageal reflux disease)  Cerebral aneurysm rupture, 1997 clipped, no residual  Femur fracture, right  2/16, surgical revision of right hip, Sacroiliitis, not elsewhere classified, Unspecified Inflammatory spondylopathy,Sacral and sacrococcygeal region  Chronic pain, Patient has an Intrathecal pump s/p removal in 2016  Cerebral aneurysm, I997 with clips  S/P lumbar fusion, L1-S1:2002  History of right inguinal hernia repair, x2  Hx of appendectomy, 6/1969  Hx of laminectomy, lumbar-2002  Cleft palate repair, multiple:age 2 mos.-14 yo  Stented coronary artery, KAEL x 1 to RCA  History of hip replacement, 8/15, revision 2/16 after falling and fracturing right femur  S/P left knee arthroscopy  H/O arthroscopy of shoulder, right X 2, left X 1  History of throat surgery, surgical exicision cyst 1986  Fusion of sacral region of spine, 5/2017  	  MEDICATIONS  (STANDING):  chlorhexidine 0.12% Liquid 15 milliLiter(s) Oral Mucosa every 12 hours  chlorhexidine 4% Liquid 1 Application(s) Topical <User Schedule>  lacosamide IVPB 150 milliGRAM(s) IV Intermittent every 12 hours  levETIRAcetam  IVPB 500 milliGRAM(s) IV Intermittent every 12 hours  norepinephrine Infusion 0.05 MICROgram(s)/kG/Min (10.7 mL/Hr) IV Continuous <Continuous>  propofol Infusion 40 MICROgram(s)/kG/Min (27.4 mL/Hr) IV Continuous <Continuous>    MEDICATIONS  (PRN):    Allergies    Allergy Status Unknown    Intolerances    Vital Signs Last 24 Hrs  T(C): 36.2 (06 May 2022 09:00), Max: 36.4 (06 May 2022 06:21)  T(F): 97.2 (06 May 2022 09:00), Max: 97.5 (06 May 2022 06:21)  HR: 87 (06 May 2022 13:15) (52 - 117)  BP: 128/67 (06 May 2022 13:15) (53/33 - 206/116)  BP(mean): 92 (06 May 2022 13:15) (49 - 115)  RR: 20 (06 May 2022 13:15) (11 - 23)  SpO2: 100% (06 May 2022 13:15) (96% - 100%)    Physical Exam:  General: Alert, obese  Respiratory:   Gastrointestinal: soft NT/ND  Neurology:   Musculoskeletal: no contractures  Vascular: BLE edema equal  Skin:  Sacral/bilateral buttocks with central area of superficially denuded skin L 3cm X W 1cm xD 0.1cm with pink wound bed, no necrotic tissue, and scant serosanguinous drainage  No odor, erythema, increased warmth, tenderness, induration, fluctuance    LABS:  05-06    143  |  106  |  12  ----------------------------<  134<H>  4.1   |  22  |  0.94    Ca    10.0      06 May 2022 06:02    TPro  7.8  /  Alb  4.0  /  TBili  0.4  /  DBili  x   /  AST  18  /  ALT  15  /  AlkPhos  187<H>  05-06                          13.2   13.17 )-----------( 390      ( 06 May 2022 06:02 )             42.5     PT/INR - ( 06 May 2022 06:02 )   PT: 18.3 sec;   INR: 1.57 ratio         PTT - ( 06 May 2022 06:02 )  PTT:39.1 sec       Wound Surgery Consult Note:    HPI:  ... presented from Nursing Home s/p unwitnessed fall.  Per ED providers, pt had been found down on the floor of his room for unknown duration.  Alternately somnolent and alert on arrival.  Breathing was c/f [place] vs. [place] busch.  Went to CT.  Back in ED room, pt would have periods of apnea w/ hypoxia and bradycardia.  Right arm shaking also observed.  Left arm never moved.  Intubated in the ED for airway protection under suspicion of status epilepticus.  Accepted to MICU s/p intubation. (06 May 2022 07:07)    Request for wound care consult for multiple pressure injuries present on admission received from nursing. Mr. Alanis was encountered on an alternating air with low air loss surface. He is incontinent of stool and urine. His extreme immobility, inactivity, incontinence of urine and stool as well as poor nutritional status all contribute to his high risk for pressure injury development and hinder healing. Identification of the initial signs of deep tissue damage at the level of the skin within 72 hours of admission make this an injury that occurred prior to admission. In the setting of the fall and being found down for an unknown duration, it is likely that his wounds will continue to evolve.    PAST MEDICAL & SURGICAL HISTORY:  Myocardial infarction, KAEL x1 MI 2005, stent RCA  Back pain  Chronic back pain  Spinal stenosis  Lumbar herniated disc  Hypertension  Narcotic dependence, 5/28/16 for withdrawal ,pt currently on Dialudid 8 mg every 4-6 hours /day  Osteoarthritis  GERD (gastroesophageal reflux disease)  Cerebral aneurysm rupture, 1997 clipped, no residual  Femur fracture, right  2/16, surgical revision of right hip, Sacroiliitis, not elsewhere classified, Unspecified Inflammatory spondylopathy,Sacral and sacrococcygeal region  Chronic pain, Patient has an Intrathecal pump s/p removal in 2016  Cerebral aneurysm, I997 with clips  S/P lumbar fusion, L1-S1:2002  History of right inguinal hernia repair, x2  Hx of appendectomy, 6/1969  Hx of laminectomy, lumbar-2002  Cleft palate repair, multiple:age 2 mos.-12 yo  Stented coronary artery, KAEL x 1 to RCA  History of hip replacement, 8/15, revision 2/16 after falling and fracturing right femur  S/P left knee arthroscopy  H/O arthroscopy of shoulder, right X 2, left X 1  History of throat surgery, surgical exicision cyst 1986  Fusion of sacral region of spine, 5/2017  	  MEDICATIONS  (STANDING):  chlorhexidine 0.12% Liquid 15 milliLiter(s) Oral Mucosa every 12 hours  chlorhexidine 4% Liquid 1 Application(s) Topical <User Schedule>  lacosamide IVPB 150 milliGRAM(s) IV Intermittent every 12 hours  levETIRAcetam  IVPB 500 milliGRAM(s) IV Intermittent every 12 hours  norepinephrine Infusion 0.05 MICROgram(s)/kG/Min (10.7 mL/Hr) IV Continuous <Continuous>  propofol Infusion 40 MICROgram(s)/kG/Min (27.4 mL/Hr) IV Continuous <Continuous>    MEDICATIONS  (PRN):    Allergies    Allergy Status Unknown    Intolerances    Vital Signs Last 24 Hrs  T(C): 36.2 (06 May 2022 09:00), Max: 36.4 (06 May 2022 06:21)  T(F): 97.2 (06 May 2022 09:00), Max: 97.5 (06 May 2022 06:21)  HR: 87 (06 May 2022 13:15) (52 - 117)  BP: 128/67 (06 May 2022 13:15) (53/33 - 206/116)  BP(mean): 92 (06 May 2022 13:15) (49 - 115)  RR: 20 (06 May 2022 13:15) (11 - 23)  SpO2: 100% (06 May 2022 13:15) (96% - 100%)    Physical Exam:  General: Alert, obese  Respiratory: intubated, vented  Gastrointestinal: soft NT/ND  Neurology: not alert, sedated  Musculoskeletal: no contractures  Vascular: BLE edema equal  Skin:  Sacral/bilateral buttocks with deep maroon discolored intact skin L 5cm X W 3cm xD none, no drainage  Right heel with deep maroon discolored intact skin L 3cm X W 3cm x 0 none, no drainage  Left heel with deep maroon discolored intact skin L 3cm X W 3cm xD none, no drainage  No odor, erythema, increased warmth, tenderness, induration, fluctuance    LABS:  05-06    143  |  106  |  12  ----------------------------<  134<H>  4.1   |  22  |  0.94    Ca    10.0      06 May 2022 06:02    TPro  7.8  /  Alb  4.0  /  TBili  0.4  /  DBili  x   /  AST  18  /  ALT  15  /  AlkPhos  187<H>  05-06                          13.2   13.17 )-----------( 390      ( 06 May 2022 06:02 )             42.5     PT/INR - ( 06 May 2022 06:02 )   PT: 18.3 sec;   INR: 1.57 ratio         PTT - ( 06 May 2022 06:02 )  PTT:39.1 sec

## 2022-05-06 NOTE — PATIENT PROFILE ADULT - NSPROPOAURINARYCATHETER_GEN_A_NUR
Toshia Burnham Patient Age: 49 year old  MESSAGE:   Received call from patient regarding CT results. Requesting a call back.      WEIGHT AND HEIGHT:   Wt Readings from Last 1 Encounters:   11/01/19 60.8 kg (134 lb)     Ht Readings from Last 1 Encounters:   11/01/19 5' 5\" (1.651 m)     BMI Readings from Last 1 Encounters:   11/01/19 22.30 kg/m²       ALLERGIES:  Scopolamine and Amoxicillin-pot clavulanate  Current Outpatient Medications   Medication   • predniSONE 5 MG (48) Tablet Therapy Pack   • HYDROcodone-acetaminophen (NORCO) 5-325 MG per tablet   • fluoxetine (PROZAC) 20 MG tablet   • TURMERIC PO   • MAGNESIUM PO     No current facility-administered medications for this visit.      PHARMACY to use:           Pharmacy preference(s) on file:   [id number] [address] AT [facility] [address]  [address] [place] [id number]  Phone: [phone] Fax: [phone]      CALL BACK INFO: Ok to leave response (including medical information) with family member or on answering machine  ROUTING: Patient's physician/staff        PCP: Cathy Marie MD         INS: Payor: [employer] / Plan: [employer] / Product Type: [place] Risk   PATIENT ADDRESS:  [id number] [address]     no

## 2022-05-06 NOTE — ED PROVIDER NOTE - CLINICAL SUMMARY MEDICAL DECISION MAKING FREE TEXT BOX
Jack Potts, PGY3: 66 year old male p/w AMS after possible fall on Xarelto. C/f ICH, seizure, DKA, metabolic vs infectious process, other. Called as level 2 trauma on arrival. Plan for labs, CTs, XRs, ekg, UA, likely admit.

## 2022-05-06 NOTE — H&P ADULT - NSHPPHYSICALEXAM_GEN_ALL_CORE
ICU Vital Signs Last 24 Hrs  T(C): 36.4 (06 May 2022 06:21), Max: 36.4 (06 May 2022 06:21)  T(F): 97.5 (06 May 2022 06:21), Max: 97.5 (06 May 2022 06:21)  HR: 108 (06 May 2022 07:08) (54 - 108)  BP: 136/97 (06 May 2022 05:50) (136/97 - 136/97)  BP(mean): --  ABP: --  ABP(mean): --  RR: 19 (06 May 2022 05:50) (19 - 19)  SpO2: 100% (06 May 2022 07:08) (98% - 100%)    GENERAL: NAD, lying in bed comfortably  HEAD:  Atraumatic, Normocephalic  EYES: EOMI, PERRLA, conjunctiva and sclera clear  ENT: Moist mucous membranes  NECK: Supple, No JVD  CHEST/LUNG: Clear to auscultation bilaterally; No rales, rhonchi, wheezing, or rubs. Unlabored respirations  HEART: Regular rate and rhythm; No murmurs, rubs, or gallops  ABDOMEN: Bowel sounds present; Soft, Nontender, Nondistended. No hepatomegaly  EXTREMITIES:  2+ Peripheral Pulses, brisk capillary refill. No clubbing, cyanosis, or edema  NERVOUS SYSTEM:  Alert & Oriented X3, speech clear. No deficits   MSK: FROM all 4 extremities, full and equal strength  PSYCH: normal behavior, affect, speech  SKIN: No rashes or lesions ICU Vital Signs Last 24 Hrs  T(C): 36.4 (06 May 2022 06:21), Max: 36.4 (06 May 2022 06:21)  T(F): 97.5 (06 May 2022 06:21), Max: 97.5 (06 May 2022 06:21)  HR: 108 (06 May 2022 07:08) (54 - 108)  BP: 136/97 (06 May 2022 05:50) (136/97 - 136/97)  BP(mean): --  ABP: --  ABP(mean): --  RR: 19 (06 May 2022 05:50) (19 - 19)  SpO2: 100% (06 May 2022 07:08) (98% - 100%)    GENERAL: intubated, restless  HEAD:  Atraumatic, Normocephalic  EYES: EOMI, PERRLA, conjunctiva and sclera clear  ENT: intubated  NECK: Supple, No tacheal deviation  CHEST/LUNG: Clear to auscultation bilaterally; No rales, rhonchi, wheezing, or rubs. Unlabored respirations  HEART: Regular rate and rhythm; No murmurs, rubs, or gallops  ABDOMEN: Bowel sounds present; Soft, Nontender, Nondistended. No hepatomegaly  EXTREMITIES:  2+ Peripheral Pulses, brisk capillary refill. No clubbing, cyanosis, or edema  NERVOUS SYSTEM:  sedated. moving L UE and B LE no R UE movement  MSK: FROM 3/4 extremities, full and equal strength  PSYCH: restless  SKIN: No rashes or lesions

## 2022-05-06 NOTE — ED PROVIDER NOTE - OBJECTIVE STATEMENT
66 year old male PMH CAD s/p MI and stent, HTN, HLD, s/p 7 lumbar spinal surgeries, spontaneous R Acomm aneurysm rupture s/p R frontal craniotomy and clipping (1997), s/p R prosthetic femor ORIF 3/30/21, on Xarelto, presents to ED for AMS. Patient was last seen in bed at 3a, then found supine on the floor at 4a bed check, presumed to have fallen. Patient was unresponsive, then became combative when staff tried putting him on a noreen lift. Patient was again unresponsive when EMS arrive, started with Kussmaul respirations, then transitioned to Debby-Joel respirations. Patient had decompensating mental status enroute to hospital, called as trauma alert.

## 2022-05-06 NOTE — CHART NOTE - NSCHARTNOTEFT_GEN_A_CORE
[facility]    ** PRELIMINARY EEG reviewed until  13:43    - Diffuse background slowing.  - No seizures recorded so far.      Final report to be produced after completion of the study tomorrow morning.    -----------------------------  Aditya Shaver MD, SOMMER  Epilepsy Fellow    --------------------------------  EEG Reading Room: [phone]  (weekdays)  On Call Service After Hours: [phone]

## 2022-05-06 NOTE — CONSULT NOTE ADULT - SUBJECTIVE AND OBJECTIVE BOX
HPI:  66 y.o. RH M with PMH of CAD s/p MI and stent, HTN, HLD, s/p 7 lumbar spinal surgeries, spontaneous R Acomm aneurysm rupture s/p R frontal craniotomy and clipping (1997), s/p R prosthetic femor ORIF 3/30/21, on xarelto presented to the ED after an unwitnessed fall from then nursing home facility ([facility]). Neurology consulted for status epilepticus. In the ED, initially GCS 10-13 but became apneic and bradycardic, suspicious for Kaussman vs Che[place] busch breathing. Also report of witnessed RUE shaking with less movement on L. In the setting of vital sign changes and need for airway protection, there was concern for status. Received 1g Keppra x1 and 1mg Ativan x1. Currently admitted to MICU for further respiratory care. Pt under sedation with propofol. Breathing RR20 and  with setting RR 20 and .     Of note, has recent EMU admission on 4/22-26 for seizure management. At that time, EEG on 4/25 showed R frontotemporal epileptogenic focus. Intermittent polymorphic delta, focal, R temporal region. Intermittent neg myoclonus w/o EEG correlate. Discharged on Vimpat 150 mg q12.     REVIEW OF SYSTEMS Unable to assess    PAST MEDICAL & SURGICAL HISTORY:  Myocardial infarction  KAEL x1 MI 2005, stent RCA    Back pain  Chronic back pain    Spinal stenosis    Lumbar herniated disc    Hypertension    Narcotic dependence  5/28/16 for withdrawal ,pt currently on Dialudid 8 mg every 4-6 hours /day    Osteoarthritis    GERD (gastroesophageal reflux disease)    Cerebral aneurysm rupture  1997 clipped, no residual    Femur fracture, right  2/16, surgical revision of right hip    Sacroiliitis, not elsewhere classified  Unspecified Inflammatory spondylopathy,Sacral and sacrococcygeal region    Chronic pain  Patient has an Intrathecal pump s/p removal in 2016    Cerebral aneurysm  I997 with clips    S/P lumbar fusion  L1-S1:2002    History of right inguinal hernia repair  x2    Hx of appendectomy  6/1969    Hx of laminectomy  lumbar-2002    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 mos.-14 yo    Stented coronary artery  KAEL x 1 to RCA    History of hip replacement  8/15, revision 2/16 after falling and fracturing right femur    S/P left knee arthroscopy    H/O arthroscopy of shoulder  right X 2, left X 1    History of throat surgery  surgical exicision cyst 1986    Fusion of sacral region of spine  5/2017      FAMILY HISTORY:    SOCIAL HISTORY: as noted in HPI    MEDICATIONS (HOME):  Home Medications:  acetaminophen 325 mg oral tablet: 3 tab(s) orally every 8 hours, As Needed (22 Apr 2022 12:08)  aspirin 81 mg oral delayed release tablet: 1 tab(s) orally once a day (22 Apr 2022 12:08)  atorvastatin 20 mg oral tablet: 1 tab(s) orally once a day (at bedtime) (22 Apr 2022 12:08)  baclofen 20 mg oral tablet: 1 tab(s) orally 2 times a day (22 Apr 2022 12:08)  docusate sodium 100 mg oral capsule: 1 cap(s) orally 3 times a day (22 Apr 2022 12:08)  Fleet Bisacodyl 10 mg rectal enema: 30 milligram(s) rectal once a day (at bedtime) (22 Apr 2022 12:08)  Flomax 0.4 mg oral capsule: 2 cap(s) orally once a day (22 Apr 2022 12:08)  gabapentin 100 mg oral capsule: 1 cap(s) orally 2 times a day (26 Apr 2022 12:09)  gemfibrozil 600 mg oral tablet: 1 tab(s) orally 2 times a day (before meals) (22 Apr 2022 12:08)  metoprolol tartrate 25 mg oral tablet: 1 tab(s) orally 2 times a day (29 Apr 2022 11:08)  morphine 15 mg/8 to 12 hr oral tablet, extended release: 1 tab(s) orally every 8 hours, As Needed (22 Apr 2022 12:08)  Multiple Vitamins oral tablet: 1 tab(s) orally once a day (22 Apr 2022 12:08)  nystatin 100,000 units/g topical powder: Apply topically to affected area 2 times a day on groin area with soap and water and pat dry and apply powder (22 Apr 2022 12:08)  pregabalin 150 mg oral capsule: 1 cap(s) orally once a day (29 Apr 2022 11:08)  rivaroxaban 10 mg oral tablet: 1 tab(s) orally once a day (22 Apr 2022 12:08)  senna oral tablet: 2 tab(s) orally once a day (at bedtime) (22 Apr 2022 12:08)  Slow Fe (as elemental iron) 45 mg oral tablet, extended release: 1 tab(s) orally once a day (22 Apr 2022 12:08)    MEDICATIONS  (STANDING):  chlorhexidine 4% Liquid 1 Application(s) Topical <User Schedule>  lacosamide IVPB 150 milliGRAM(s) IV Intermittent every 12 hours  propofol Infusion 40 MICROgram(s)/kG/Min (27.4 mL/Hr) IV Continuous <Continuous>    MEDICATIONS  (PRN):    ALLERGIES/INTOLERANCES:  Allergies  Allergy Status Unknown    Intolerances    VITALS & EXAMINATION:  Vital Signs Last 24 Hrs  T(C): 36.4 (06 May 2022 06:21), Max: 36.4 (06 May 2022 06:21)  T(F): 97.5 (06 May 2022 06:21), Max: 97.5 (06 May 2022 06:21)  HR: 100 (06 May 2022 07:38) (52 - 108)  BP: 181/75 (06 May 2022 07:38) (53/33 - 206/116)  BP(mean): --  RR: 12 (06 May 2022 07:20) (12 - 19)  SpO2: 100% (06 May 2022 07:20) (96% - 100%)    General:  Constitutional: Obese Male, appears stated age, in no apparent distress including pain  Neck: Cervical neck collar  Respiratory: Intubated    Neurological (>12): EXAM DONE UNDER SEDATION WITH PROPOFOL  MS: Eyes closed. Does not open upon noxious stimuli. Does not follow commands    Language: Mute    CNs: PERRL (R = 2mm, L = 2mm). No BTT. Well developed masseter muscles b/l. No facial asymmetry b/l. Cough/gag reflex not intact. Corneal absent.     Motor: Normal muscle bulk & tone. No noticeable tremor or seizure. All extremities fall to bed. Does not withdraw to noxious stimuli       Sensation: Does not grimace to noxious stimuli     Reflexes: Mute throughout      LABORATORY:  CBC                       13.2   13.17 )-----------( 390      ( 06 May 2022 06:02 )             42.5     Chem 05-06    143  |  106  |  12  ----------------------------<  134<H>  4.1   |  22  |  0.94    Ca    10.0      06 May 2022 06:02    TPro  7.8  /  Alb  4.0  /  TBili  0.4  /  DBili  x   /  AST  18  /  ALT  15  /  AlkPhos  187<H>  05-06    LFTs LIVER FUNCTIONS - ( 06 May 2022 06:02 )  Alb: 4.0 g/dL / Pro: 7.8 g/dL / ALK PHOS: 187 U/L / ALT: 15 U/L / AST: 18 U/L / GGT: x           Coagulopathy   Lipid Panel 04-23 Chol 138 LDL -- HDL 34<L> Trig 91  A1c   Cardiac enzymes CARDIAC MARKERS ( 06 May 2022 06:02 )  x     / x     / 59 U/L / x     / x          U/A   CSF  Immunological  Other    STUDIES & IMAGING:  Studies (EKG, EEG, EMG, etc):     EEG Summary:    Abnormal EEG in the awake, drowsy and asleep states.  Sharp waves, focal, right frontal and temporal  Intermittent polymorphic delta, focal, right temporal region  Intermittent negative myoclonus without EEG correlate, RESOLVED    Impression/Clinical Correlate:    This is an abnormal EEG record. There is evidence of potentially epileptogenic region of cortical dysfunction in the right frontal and anterior temporal areas. There is also mild diffuse cerebral dysfunction that is focally worse in the right temporal region. No seizures were seen. There is negative myoclonus intermittently without EEG correlate – gabapentin dose was lowered, and myoclonus subsequently resolved.      Radiology (XR, CT, MR, U/S, TTE/LILIANE):  < from: CT Head No Cont (05.06.22 @ 06:11) >    IMPRESSION:    No evidence of acute intracranial hemorrhage, midline shift or CT   evidence of acute territorial infarct.    Postsurgical changes as described.    If the patient's symptoms persist, consider short interval follow-up head   CT or brain MRI if there are no MRI contraindications.    --- End of Report ---        < end of copied text >   HPI:  66 y.o. RH M with PMH of CAD s/p MI and stent, HTN, HLD, s/p 7 lumbar spinal surgeries, spontaneous R Acomm aneurysm rupture s/p R frontal craniotomy and clipping (1997), s/p R prosthetic femor ORIF 3/30/21, on xarelto presented to the ED after an unwitnessed fall from then nursing home facility ([facility]). Neurology consulted for status epilepticus. In the ED, initially GCS 10-13 but became apneic and bradycardic, suspicious for Kaussman vs [place] busch breathing. Also report of witnessed RUE shaking with less movement on L. In the setting of vital sign changes and need for airway protection, there was concern for status. Received 1g Keppra x1 and 1mg Ativan x1. Currently admitted to MICU for further respiratory care. Pt under sedation with propofol. Breathing RR20 and  with setting RR 20 and .     Of note, has recent EMU admission on 4/22-26 for seizure management. At that time, EEG on 4/25 showed R frontotemporal epileptogenic focus. Intermittent polymorphic delta, focal, R temporal region. Intermittent neg myoclonus w/o EEG correlate. Discharged on Vimpat 150 mg q12.     REVIEW OF SYSTEMS Unable to assess    PAST MEDICAL & SURGICAL HISTORY:  Myocardial infarction  KAEL x1 MI 2005, stent RCA    Back pain  Chronic back pain    Spinal stenosis    Lumbar herniated disc    Hypertension    Narcotic dependence  5/28/16 for withdrawal ,pt currently on Dialudid 8 mg every 4-6 hours /day    Osteoarthritis    GERD (gastroesophageal reflux disease)    Cerebral aneurysm rupture  1997 clipped, no residual    Femur fracture, right  2/16, surgical revision of right hip    Sacroiliitis, not elsewhere classified  Unspecified Inflammatory spondylopathy,Sacral and sacrococcygeal region    Chronic pain  Patient has an Intrathecal pump s/p removal in 2016    Cerebral aneurysm  I997 with clips    S/P lumbar fusion  L1-S1:2002    History of right inguinal hernia repair  x2    Hx of appendectomy  6/1969    Hx of laminectomy  lumbar-2002    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 mos.-14 yo    Stented coronary artery  KAEL x 1 to RCA    History of hip replacement  8/15, revision 2/16 after falling and fracturing right femur    S/P left knee arthroscopy    H/O arthroscopy of shoulder  right X 2, left X 1    History of throat surgery  surgical exicision cyst 1986    Fusion of sacral region of spine  5/2017      FAMILY HISTORY:    SOCIAL HISTORY: as noted in HPI    MEDICATIONS (HOME):  Home Medications:  acetaminophen 325 mg oral tablet: 3 tab(s) orally every 8 hours, As Needed (22 Apr 2022 12:08)  aspirin 81 mg oral delayed release tablet: 1 tab(s) orally once a day (22 Apr 2022 12:08)  atorvastatin 20 mg oral tablet: 1 tab(s) orally once a day (at bedtime) (22 Apr 2022 12:08)  baclofen 20 mg oral tablet: 1 tab(s) orally 2 times a day (22 Apr 2022 12:08)  docusate sodium 100 mg oral capsule: 1 cap(s) orally 3 times a day (22 Apr 2022 12:08)  Fleet Bisacodyl 10 mg rectal enema: 30 milligram(s) rectal once a day (at bedtime) (22 Apr 2022 12:08)  Flomax 0.4 mg oral capsule: 2 cap(s) orally once a day (22 Apr 2022 12:08)  gabapentin 100 mg oral capsule: 1 cap(s) orally 2 times a day (26 Apr 2022 12:09)  gemfibrozil 600 mg oral tablet: 1 tab(s) orally 2 times a day (before meals) (22 Apr 2022 12:08)  metoprolol tartrate 25 mg oral tablet: 1 tab(s) orally 2 times a day (29 Apr 2022 11:08)  morphine 15 mg/8 to 12 hr oral tablet, extended release: 1 tab(s) orally every 8 hours, As Needed (22 Apr 2022 12:08)  Multiple Vitamins oral tablet: 1 tab(s) orally once a day (22 Apr 2022 12:08)  nystatin 100,000 units/g topical powder: Apply topically to affected area 2 times a day on groin area with soap and water and pat dry and apply powder (22 Apr 2022 12:08)  pregabalin 150 mg oral capsule: 1 cap(s) orally once a day (29 Apr 2022 11:08)  rivaroxaban 10 mg oral tablet: 1 tab(s) orally once a day (22 Apr 2022 12:08)  senna oral tablet: 2 tab(s) orally once a day (at bedtime) (22 Apr 2022 12:08)  Slow Fe (as elemental iron) 45 mg oral tablet, extended release: 1 tab(s) orally once a day (22 Apr 2022 12:08)    MEDICATIONS  (STANDING):  chlorhexidine 4% Liquid 1 Application(s) Topical <User Schedule>  lacosamide IVPB 150 milliGRAM(s) IV Intermittent every 12 hours  propofol Infusion 40 MICROgram(s)/kG/Min (27.4 mL/Hr) IV Continuous <Continuous>    MEDICATIONS  (PRN):    ALLERGIES/INTOLERANCES:  Allergies  Allergy Status Unknown    Intolerances    VITALS & EXAMINATION:  Vital Signs Last 24 Hrs  T(C): 36.4 (06 May 2022 06:21), Max: 36.4 (06 May 2022 06:21)  T(F): 97.5 (06 May 2022 06:21), Max: 97.5 (06 May 2022 06:21)  HR: 100 (06 May 2022 07:38) (52 - 108)  BP: 181/75 (06 May 2022 07:38) (53/33 - 206/116)  BP(mean): --  RR: 12 (06 May 2022 07:20) (12 - 19)  SpO2: 100% (06 May 2022 07:20) (96% - 100%)    General:  Constitutional: Obese Male, appears stated age, in no apparent distress including pain  Neck: Cervical neck collar  Respiratory: Intubated    Neurological (>12): EXAM DONE UNDER SEDATION WITH PROPOFOL  MS: Eyes closed. Does not open upon noxious stimuli. Does not follow commands    Language: Mute    CNs: PERRL (R = 2mm, L = 2mm). No BTT. Well developed masseter muscles b/l. No facial asymmetry b/l. Cough/gag reflex not intact. Corneal absent. Oculocephalic absent    Motor: Normal muscle bulk & tone. No noticeable tremor or seizure. All extremities fall to bed. Does not withdraw to noxious stimuli    Sensation: Does not grimace to noxious stimuli     Reflexes: Mute throughout      LABORATORY:  CBC                       13.2   13.17 )-----------( 390      ( 06 May 2022 06:02 )             42.5     Chem 05-06    143  |  106  |  12  ----------------------------<  134<H>  4.1   |  22  |  0.94    Ca    10.0      06 May 2022 06:02    TPro  7.8  /  Alb  4.0  /  TBili  0.4  /  DBili  x   /  AST  18  /  ALT  15  /  AlkPhos  187<H>  05-06    LFTs LIVER FUNCTIONS - ( 06 May 2022 06:02 )  Alb: 4.0 g/dL / Pro: 7.8 g/dL / ALK PHOS: 187 U/L / ALT: 15 U/L / AST: 18 U/L / GGT: x           Coagulopathy   Lipid Panel 04-23 Chol 138 LDL -- HDL 34<L> Trig 91  A1c   Cardiac enzymes CARDIAC MARKERS ( 06 May 2022 06:02 )  x     / x     / 59 U/L / x     / x          U/A   CSF  Immunological  Other    STUDIES & IMAGING:  Studies (EKG, EEG, EMG, etc):     EEG Summary:    Abnormal EEG in the awake, drowsy and asleep states.  Sharp waves, focal, right frontal and temporal  Intermittent polymorphic delta, focal, right temporal region  Intermittent negative myoclonus without EEG correlate, RESOLVED    Impression/Clinical Correlate:    This is an abnormal EEG record. There is evidence of potentially epileptogenic region of cortical dysfunction in the right frontal and anterior temporal areas. There is also mild diffuse cerebral dysfunction that is focally worse in the right temporal region. No seizures were seen. There is negative myoclonus intermittently without EEG correlate – gabapentin dose was lowered, and myoclonus subsequently resolved.      Radiology (XR, CT, MR, U/S, TTE/LILIANE):  < from: CT Head No Cont (05.06.22 @ 06:11) >    IMPRESSION:    No evidence of acute intracranial hemorrhage, midline shift or CT   evidence of acute territorial infarct.    Postsurgical changes as described.    If the patient's symptoms persist, consider short interval follow-up head   CT or brain MRI if there are no MRI contraindications.    --- End of Report ---        < end of copied text >   HPI:  66 y.o. RH M with PMH of CAD s/p MI and stent, HTN, HLD, s/p 7 lumbar spinal surgeries, spontaneous R Acomm aneurysm rupture s/p R frontal craniotomy and clipping (1997), s/p R prosthetic femor ORIF 3/30/21, on xarelto presented to the ED after an unwitnessed fall from then nursing home facility ([facility]). Neurology consulted for status epilepticus. In the ED, initially GCS 10-13 but became apneic and bradycardic, suspicious for Kaussman vs [place] busch breathing. Also report of witnessed RUE shaking with less movement on L. In the setting of vital sign changes and need for airway protection, there was concern for status. Received 1g Keppra x1 and 1mg Ativan x1. Currently admitted to MICU for further respiratory care. Pt under sedation with propofol. Breathing RR20 and  with setting RR 20 and .     Of note, has recent EMU admission on 4/22-26 for seizure management. At that time, EEG on 4/25 showed R frontotemporal epileptogenic focus. Intermittent polymorphic delta, focal, R temporal region. Intermittent neg myoclonus w/o EEG correlate. Discharged on Vimpat 150 mg q12.     REVIEW OF SYSTEMS Unable to assess due to clinical condition    PAST MEDICAL & SURGICAL HISTORY:  Myocardial infarction  KAEL x1 MI 2005, stent RCA    Back pain  Chronic back pain    Spinal stenosis    Lumbar herniated disc    Hypertension    Narcotic dependence  5/28/16 for withdrawal ,pt currently on Dialudid 8 mg every 4-6 hours /day    Osteoarthritis    GERD (gastroesophageal reflux disease)    Cerebral aneurysm rupture  1997 clipped, no residual    Femur fracture, right  2/16, surgical revision of right hip    Sacroiliitis, not elsewhere classified  Unspecified Inflammatory spondylopathy,Sacral and sacrococcygeal region    Chronic pain  Patient has an Intrathecal pump s/p removal in 2016    Cerebral aneurysm  I997 with clips    S/P lumbar fusion  L1-S1:2002    History of right inguinal hernia repair  x2    Hx of appendectomy  6/1969    Hx of laminectomy  lumbar-2002    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 mos.-14 yo    Stented coronary artery  KAEL x 1 to RCA    History of hip replacement  8/15, revision 2/16 after falling and fracturing right femur    S/P left knee arthroscopy    H/O arthroscopy of shoulder  right X 2, left X 1    History of throat surgery  surgical exicision cyst 1986    Fusion of sacral region of spine  5/2017      FAMILY HISTORY: Non-contributory    SOCIAL HISTORY: as noted in HPI. No current alcohol, tobacco, or illicit drug use    MEDICATIONS (HOME):  Home Medications:  acetaminophen 325 mg oral tablet: 3 tab(s) orally every 8 hours, As Needed (22 Apr 2022 12:08)  aspirin 81 mg oral delayed release tablet: 1 tab(s) orally once a day (22 Apr 2022 12:08)  atorvastatin 20 mg oral tablet: 1 tab(s) orally once a day (at bedtime) (22 Apr 2022 12:08)  baclofen 20 mg oral tablet: 1 tab(s) orally 2 times a day (22 Apr 2022 12:08)  docusate sodium 100 mg oral capsule: 1 cap(s) orally 3 times a day (22 Apr 2022 12:08)  Fleet Bisacodyl 10 mg rectal enema: 30 milligram(s) rectal once a day (at bedtime) (22 Apr 2022 12:08)  Flomax 0.4 mg oral capsule: 2 cap(s) orally once a day (22 Apr 2022 12:08)  gabapentin 100 mg oral capsule: 1 cap(s) orally 2 times a day (26 Apr 2022 12:09)  gemfibrozil 600 mg oral tablet: 1 tab(s) orally 2 times a day (before meals) (22 Apr 2022 12:08)  metoprolol tartrate 25 mg oral tablet: 1 tab(s) orally 2 times a day (29 Apr 2022 11:08)  morphine 15 mg/8 to 12 hr oral tablet, extended release: 1 tab(s) orally every 8 hours, As Needed (22 Apr 2022 12:08)  Multiple Vitamins oral tablet: 1 tab(s) orally once a day (22 Apr 2022 12:08)  nystatin 100,000 units/g topical powder: Apply topically to affected area 2 times a day on groin area with soap and water and pat dry and apply powder (22 Apr 2022 12:08)  pregabalin 150 mg oral capsule: 1 cap(s) orally once a day (29 Apr 2022 11:08)  rivaroxaban 10 mg oral tablet: 1 tab(s) orally once a day (22 Apr 2022 12:08)  senna oral tablet: 2 tab(s) orally once a day (at bedtime) (22 Apr 2022 12:08)  Slow Fe (as elemental iron) 45 mg oral tablet, extended release: 1 tab(s) orally once a day (22 Apr 2022 12:08)    MEDICATIONS  (STANDING):  chlorhexidine 4% Liquid 1 Application(s) Topical <User Schedule>  lacosamide IVPB 150 milliGRAM(s) IV Intermittent every 12 hours  propofol Infusion 40 MICROgram(s)/kG/Min (27.4 mL/Hr) IV Continuous <Continuous>    MEDICATIONS  (PRN):    ALLERGIES/INTOLERANCES:  Allergies  Allergy Status Unknown    Intolerances    VITALS & EXAMINATION:  Vital Signs Last 24 Hrs  T(C): 36.4 (06 May 2022 06:21), Max: 36.4 (06 May 2022 06:21)  T(F): 97.5 (06 May 2022 06:21), Max: 97.5 (06 May 2022 06:21)  HR: 100 (06 May 2022 07:38) (52 - 108)  BP: 181/75 (06 May 2022 07:38) (53/33 - 206/116)  BP(mean): --  RR: 12 (06 May 2022 07:20) (12 - 19)  SpO2: 100% (06 May 2022 07:20) (96% - 100%)    General:  Constitutional: Obese Male, appears stated age, in no apparent distress including pain  Neck: Cervical neck collar  Respiratory: Intubated    Neurological (>12): EXAM DONE UNDER SEDATION WITH PROPOFOL  MS: Eyes closed. Does not open upon noxious stimuli. Does not follow commands    Language: Mute    CNs: PERRL (R = 2mm, L = 2mm). No BTT. Well developed masseter muscles b/l. No facial asymmetry b/l. Cough/gag reflex not intact. Corneal absent. Oculocephalic absent    Motor: Normal muscle bulk & tone. No noticeable tremor or seizure. All extremities fall to bed. Does not withdraw to noxious stimuli    Sensation: Does not grimace to noxious stimuli     Reflexes: Mute throughout      LABORATORY:  CBC                       13.2   13.17 )-----------( 390      ( 06 May 2022 06:02 )             42.5     Chem 05-06    143  |  106  |  12  ----------------------------<  134<H>  4.1   |  22  |  0.94    Ca    10.0      06 May 2022 06:02    TPro  7.8  /  Alb  4.0  /  TBili  0.4  /  DBili  x   /  AST  18  /  ALT  15  /  AlkPhos  187<H>  05-06    LFTs LIVER FUNCTIONS - ( 06 May 2022 06:02 )  Alb: 4.0 g/dL / Pro: 7.8 g/dL / ALK PHOS: 187 U/L / ALT: 15 U/L / AST: 18 U/L / GGT: x           Coagulopathy   Lipid Panel 04-23 Chol 138 LDL -- HDL 34<L> Trig 91  A1c   Cardiac enzymes CARDIAC MARKERS ( 06 May 2022 06:02 )  x     / x     / 59 U/L / x     / x          U/A   CSF  Immunological  Other    STUDIES & IMAGING:  Studies (EKG, EEG, EMG, etc):     EEG Summary:    Abnormal EEG in the awake, drowsy and asleep states.  Sharp waves, focal, right frontal and temporal  Intermittent polymorphic delta, focal, right temporal region  Intermittent negative myoclonus without EEG correlate, RESOLVED    Impression/Clinical Correlate:    This is an abnormal EEG record. There is evidence of potentially epileptogenic region of cortical dysfunction in the right frontal and anterior temporal areas. There is also mild diffuse cerebral dysfunction that is focally worse in the right temporal region. No seizures were seen. There is negative myoclonus intermittently without EEG correlate – gabapentin dose was lowered, and myoclonus subsequently resolved.      Radiology (XR, CT, MR, U/S, TTE/LILIANE):  < from: CT Head No Cont (05.06.22 @ 06:11) >    IMPRESSION:    No evidence of acute intracranial hemorrhage, midline shift or CT   evidence of acute territorial infarct.    Postsurgical changes as described.    If the patient's symptoms persist, consider short interval follow-up head   CT or brain MRI if there are no MRI contraindications.    --- End of Report ---        < end of copied text >

## 2022-05-06 NOTE — CONSULT NOTE ADULT - CRITICAL CARE ATTENDING COMMENT
HPI as per resident note, personally verified by me. Patient with seizures and later RUE jerking movements. Has known epilepsy from prior R acomm ruptured aneurysm and encephalomalacia. He has had BUE negative myoclonus during recent EMU admission without EEG correlate. Now intubated and sedated for presumed status epilepticus and without current clinical seizures.    GTT's:  Norepinephrine 0.03 mcg/kg/min  Propofol 20 mcg/kg/min    Gen - Intubated, sedated  CV - Peripheral pulses palpable. No edema  Eyes - Fundoscopy not well visualized    Neurologic exam  MS - Sedated, intubated, comatose, no speech output, does not follow commands. Due to clinical condition unable to assess (REBEKAH) orientation, rep/naming, attn/conc, recent and remote memory, fund of knowledge  CN - B/l surgical and non reactive pupils, (+) EOM's with minimal excursions by OCR, (-) face sens/str by corneals b/l, (+) spontaneous respirations (24 bpm), (-) cough. REBEKAH VF, hearing, tongue/palate, trap/SCM  Motor - Normal bulk. Dec tone all except for possible inc extensor tone in RLE versus fixation from hardware. No spontaneous movement. There is occasional decorticate movements of BLE's with sternal rub. No grimace or withdraw to strong noxious stimuli in any extremity  Sens - As per motor above  DTR's - 1+ R BR, 2+ L BR, 0+ rest, and triple flexion b/l plantar response  Coord - REBEKAH  Gait and station - REBEKAH    A/P:  Epilepsy, not intractable, with presumed prior status epilepticus  Encephalopathy  Prior acomm aneurysm rupture with resultant R frontal encephalomalacia  HTN  CAD/MI    - Patient intubated and sedated for presumed status epilepticus. No current seizures at this time and he is on a small amount of sedation  - vEEG to evaluate for seizure activity. If no seizures or periodic pattern then no neurologic contraindications to stopping sedation and attempting extubation if he is otherwise medically stable  - AED's with Vimpat 150mg IV/PO [id number] and Keppra at 500mg IV/PO [id number]  - Continue to address above medical problems, as you are doing  - Will continue to follow patient with you

## 2022-05-07 LAB
-  STAPHYLOCOCCUS EPIDERMIDIS, METHICILLIN RESISTANT: SIGNIFICANT CHANGE UP
ALBUMIN SERPL ELPH-MCNC: 3.1 G/DL — LOW (ref 3.3–5)
ALBUMIN SERPL ELPH-MCNC: 3.5 G/DL — SIGNIFICANT CHANGE UP (ref 3.3–5)
ALP SERPL-CCNC: 158 U/L — HIGH (ref 40–120)
ALP SERPL-CCNC: 160 U/L — HIGH (ref 40–120)
ALT FLD-CCNC: 13 U/L — SIGNIFICANT CHANGE UP (ref 10–45)
ALT FLD-CCNC: 13 U/L — SIGNIFICANT CHANGE UP (ref 10–45)
ANION GAP SERPL CALC-SCNC: 14 MMOL/L — SIGNIFICANT CHANGE UP (ref 5–17)
ANION GAP SERPL CALC-SCNC: 17 MMOL/L — SIGNIFICANT CHANGE UP (ref 5–17)
APPEARANCE UR: CLEAR — SIGNIFICANT CHANGE UP
AST SERPL-CCNC: 18 U/L — SIGNIFICANT CHANGE UP (ref 10–40)
AST SERPL-CCNC: 22 U/L — SIGNIFICANT CHANGE UP (ref 10–40)
BACTERIA # UR AUTO: NEGATIVE — SIGNIFICANT CHANGE UP
BASOPHILS # BLD AUTO: 0.04 K/UL — SIGNIFICANT CHANGE UP (ref 0–0.2)
BASOPHILS NFR BLD AUTO: 0.4 % — SIGNIFICANT CHANGE UP (ref 0–2)
BILIRUB SERPL-MCNC: 0.4 MG/DL — SIGNIFICANT CHANGE UP (ref 0.2–1.2)
BILIRUB SERPL-MCNC: 0.5 MG/DL — SIGNIFICANT CHANGE UP (ref 0.2–1.2)
BILIRUB UR-MCNC: NEGATIVE — SIGNIFICANT CHANGE UP
BUN SERPL-MCNC: 12 MG/DL — SIGNIFICANT CHANGE UP (ref 7–23)
BUN SERPL-MCNC: 12 MG/DL — SIGNIFICANT CHANGE UP (ref 7–23)
CALCIUM SERPL-MCNC: 9.3 MG/DL — SIGNIFICANT CHANGE UP (ref 8.4–10.5)
CALCIUM SERPL-MCNC: 9.3 MG/DL — SIGNIFICANT CHANGE UP (ref 8.4–10.5)
CHLORIDE SERPL-SCNC: 107 MMOL/L — SIGNIFICANT CHANGE UP (ref 96–108)
CHLORIDE SERPL-SCNC: 109 MMOL/L — HIGH (ref 96–108)
CO2 SERPL-SCNC: 18 MMOL/L — LOW (ref 22–31)
CO2 SERPL-SCNC: 23 MMOL/L — SIGNIFICANT CHANGE UP (ref 22–31)
COLOR SPEC: YELLOW — SIGNIFICANT CHANGE UP
CREAT SERPL-MCNC: 0.86 MG/DL — SIGNIFICANT CHANGE UP (ref 0.5–1.3)
CREAT SERPL-MCNC: 0.95 MG/DL — SIGNIFICANT CHANGE UP (ref 0.5–1.3)
DIFF PNL FLD: NEGATIVE — SIGNIFICANT CHANGE UP
EGFR: 88 ML/MIN/1.73M2 — SIGNIFICANT CHANGE UP
EGFR: 95 ML/MIN/1.73M2 — SIGNIFICANT CHANGE UP
EOSINOPHIL # BLD AUTO: 0.34 K/UL — SIGNIFICANT CHANGE UP (ref 0–0.5)
EOSINOPHIL NFR BLD AUTO: 3.1 % — SIGNIFICANT CHANGE UP (ref 0–6)
EPI CELLS # UR: 2 /HPF — SIGNIFICANT CHANGE UP
GAS PNL BLDA: SIGNIFICANT CHANGE UP
GAS PNL BLDA: SIGNIFICANT CHANGE UP
GLUCOSE SERPL-MCNC: 114 MG/DL — HIGH (ref 70–99)
GLUCOSE SERPL-MCNC: 123 MG/DL — HIGH (ref 70–99)
GLUCOSE UR QL: NEGATIVE — SIGNIFICANT CHANGE UP
GRAM STN FLD: SIGNIFICANT CHANGE UP
HCT VFR BLD CALC: 37.6 % — LOW (ref 39–50)
HGB BLD-MCNC: 11.5 G/DL — LOW (ref 13–17)
HYALINE CASTS # UR AUTO: 1 /LPF — SIGNIFICANT CHANGE UP (ref 0–2)
IMM GRANULOCYTES NFR BLD AUTO: 0.8 % — SIGNIFICANT CHANGE UP (ref 0–1.5)
KETONES UR-MCNC: NEGATIVE — SIGNIFICANT CHANGE UP
LEUKOCYTE ESTERASE UR-ACNC: ABNORMAL
LYMPHOCYTES # BLD AUTO: 1.92 K/UL — SIGNIFICANT CHANGE UP (ref 1–3.3)
LYMPHOCYTES # BLD AUTO: 17.7 % — SIGNIFICANT CHANGE UP (ref 13–44)
MAGNESIUM SERPL-MCNC: 1.7 MG/DL — SIGNIFICANT CHANGE UP (ref 1.6–2.6)
MAGNESIUM SERPL-MCNC: 1.9 MG/DL — SIGNIFICANT CHANGE UP (ref 1.6–2.6)
MCHC RBC-ENTMCNC: 28.8 PG — SIGNIFICANT CHANGE UP (ref 27–34)
MCHC RBC-ENTMCNC: 30.6 GM/DL — LOW (ref 32–36)
MCV RBC AUTO: 94.2 FL — SIGNIFICANT CHANGE UP (ref 80–100)
METHOD TYPE: SIGNIFICANT CHANGE UP
MONOCYTES # BLD AUTO: 0.9 K/UL — SIGNIFICANT CHANGE UP (ref 0–0.9)
MONOCYTES NFR BLD AUTO: 8.3 % — SIGNIFICANT CHANGE UP (ref 2–14)
NEUTROPHILS # BLD AUTO: 7.58 K/UL — HIGH (ref 1.8–7.4)
NEUTROPHILS NFR BLD AUTO: 69.7 % — SIGNIFICANT CHANGE UP (ref 43–77)
NITRITE UR-MCNC: NEGATIVE — SIGNIFICANT CHANGE UP
NRBC # BLD: 0 /100 WBCS — SIGNIFICANT CHANGE UP (ref 0–0)
PH UR: 6 — SIGNIFICANT CHANGE UP (ref 5–8)
PHOSPHATE SERPL-MCNC: 2.2 MG/DL — LOW (ref 2.5–4.5)
PHOSPHATE SERPL-MCNC: 3.3 MG/DL — SIGNIFICANT CHANGE UP (ref 2.5–4.5)
PLATELET # BLD AUTO: 333 K/UL — SIGNIFICANT CHANGE UP (ref 150–400)
POTASSIUM SERPL-MCNC: 4.1 MMOL/L — SIGNIFICANT CHANGE UP (ref 3.5–5.3)
POTASSIUM SERPL-MCNC: 4.1 MMOL/L — SIGNIFICANT CHANGE UP (ref 3.5–5.3)
POTASSIUM SERPL-SCNC: 4.1 MMOL/L — SIGNIFICANT CHANGE UP (ref 3.5–5.3)
POTASSIUM SERPL-SCNC: 4.1 MMOL/L — SIGNIFICANT CHANGE UP (ref 3.5–5.3)
PROCALCITONIN SERPL-MCNC: 0.19 NG/ML — HIGH (ref 0.02–0.1)
PROT SERPL-MCNC: 6.4 G/DL — SIGNIFICANT CHANGE UP (ref 6–8.3)
PROT SERPL-MCNC: 6.5 G/DL — SIGNIFICANT CHANGE UP (ref 6–8.3)
PROT UR-MCNC: ABNORMAL
RBC # BLD: 3.99 M/UL — LOW (ref 4.2–5.8)
RBC # FLD: 15.5 % — HIGH (ref 10.3–14.5)
RBC CASTS # UR COMP ASSIST: 1 /HPF — SIGNIFICANT CHANGE UP (ref 0–4)
SODIUM SERPL-SCNC: 144 MMOL/L — SIGNIFICANT CHANGE UP (ref 135–145)
SODIUM SERPL-SCNC: 144 MMOL/L — SIGNIFICANT CHANGE UP (ref 135–145)
SP GR SPEC: 1.03 — HIGH (ref 1.01–1.02)
SPECIMEN SOURCE: SIGNIFICANT CHANGE UP
UROBILINOGEN FLD QL: NEGATIVE — SIGNIFICANT CHANGE UP
WBC # BLD: 10.87 K/UL — HIGH (ref 3.8–10.5)
WBC # FLD AUTO: 10.87 K/UL — HIGH (ref 3.8–10.5)
WBC UR QL: 9 /HPF — HIGH (ref 0–5)

## 2022-05-07 PROCEDURE — 99291 CRITICAL CARE FIRST HOUR: CPT | Mod: GC

## 2022-05-07 PROCEDURE — 95720 EEG PHY/QHP EA INCR W/VEEG: CPT

## 2022-05-07 RX ORDER — VANCOMYCIN HCL 1 G
1750 VIAL (EA) INTRAVENOUS EVERY 12 HOURS
Refills: 0 | Status: DISCONTINUED | OUTPATIENT
Start: 2022-05-07 | End: 2022-05-09

## 2022-05-07 RX ORDER — PIPERACILLIN AND TAZOBACTAM 4; .5 G/20ML; G/20ML
3.38 INJECTION, POWDER, LYOPHILIZED, FOR SOLUTION INTRAVENOUS EVERY 8 HOURS
Refills: 0 | Status: DISCONTINUED | OUTPATIENT
Start: 2022-05-07 | End: 2022-05-09

## 2022-05-07 RX ORDER — SODIUM CHLORIDE 9 MG/ML
1000 INJECTION, SOLUTION INTRAVENOUS
Refills: 0 | Status: DISCONTINUED | OUTPATIENT
Start: 2022-05-07 | End: 2022-05-09

## 2022-05-07 RX ORDER — IPRATROPIUM/ALBUTEROL SULFATE 18-103MCG
3 AEROSOL WITH ADAPTER (GRAM) INHALATION EVERY 6 HOURS
Refills: 0 | Status: DISCONTINUED | OUTPATIENT
Start: 2022-05-07 | End: 2022-05-11

## 2022-05-07 RX ORDER — DEXMEDETOMIDINE HYDROCHLORIDE IN 0.9% SODIUM CHLORIDE 4 UG/ML
0.3 INJECTION INTRAVENOUS
Qty: 200 | Refills: 0 | Status: DISCONTINUED | OUTPATIENT
Start: 2022-05-07 | End: 2022-05-08

## 2022-05-07 RX ORDER — PIPERACILLIN AND TAZOBACTAM 4; .5 G/20ML; G/20ML
3.38 INJECTION, POWDER, LYOPHILIZED, FOR SOLUTION INTRAVENOUS ONCE
Refills: 0 | Status: COMPLETED | OUTPATIENT
Start: 2022-05-07 | End: 2022-05-07

## 2022-05-07 RX ADMIN — Medication 3 MILLILITER(S): at 17:07

## 2022-05-07 RX ADMIN — PIPERACILLIN AND TAZOBACTAM 25 GRAM(S): 4; .5 INJECTION, POWDER, LYOPHILIZED, FOR SOLUTION INTRAVENOUS at 23:20

## 2022-05-07 RX ADMIN — SODIUM CHLORIDE 50 MILLILITER(S): 9 INJECTION, SOLUTION INTRAVENOUS at 05:21

## 2022-05-07 RX ADMIN — PROPOFOL 27.4 MICROGRAM(S)/KG/MIN: 10 INJECTION, EMULSION INTRAVENOUS at 05:21

## 2022-05-07 RX ADMIN — LEVETIRACETAM 400 MILLIGRAM(S): 250 TABLET, FILM COATED ORAL at 05:21

## 2022-05-07 RX ADMIN — CHLORHEXIDINE GLUCONATE 1 APPLICATION(S): 213 SOLUTION TOPICAL at 05:22

## 2022-05-07 RX ADMIN — PROPOFOL 27.4 MICROGRAM(S)/KG/MIN: 10 INJECTION, EMULSION INTRAVENOUS at 07:28

## 2022-05-07 RX ADMIN — LEVETIRACETAM 400 MILLIGRAM(S): 250 TABLET, FILM COATED ORAL at 17:02

## 2022-05-07 RX ADMIN — PIPERACILLIN AND TAZOBACTAM 200 GRAM(S): 4; .5 INJECTION, POWDER, LYOPHILIZED, FOR SOLUTION INTRAVENOUS at 19:40

## 2022-05-07 RX ADMIN — CHLORHEXIDINE GLUCONATE 15 MILLILITER(S): 213 SOLUTION TOPICAL at 17:01

## 2022-05-07 RX ADMIN — DEXMEDETOMIDINE HYDROCHLORIDE IN 0.9% SODIUM CHLORIDE 8.55 MICROGRAM(S)/KG/HR: 4 INJECTION INTRAVENOUS at 09:52

## 2022-05-07 RX ADMIN — LACOSAMIDE 130 MILLIGRAM(S): 50 TABLET ORAL at 17:02

## 2022-05-07 RX ADMIN — ENOXAPARIN SODIUM 40 MILLIGRAM(S): 100 INJECTION SUBCUTANEOUS at 13:03

## 2022-05-07 RX ADMIN — SODIUM CHLORIDE 50 MILLILITER(S): 9 INJECTION, SOLUTION INTRAVENOUS at 07:27

## 2022-05-07 RX ADMIN — CHLORHEXIDINE GLUCONATE 15 MILLILITER(S): 213 SOLUTION TOPICAL at 05:20

## 2022-05-07 RX ADMIN — LACOSAMIDE 130 MILLIGRAM(S): 50 TABLET ORAL at 06:04

## 2022-05-07 NOTE — EEG REPORT - NS EEG TEXT BOX
REPORT OF CONTINUOUS VIDEO EEG   [facility]: [address], [place] [address], Ph#: [phone] [facility]: [address], [place] [address], Ph#: [phone] Office: [address] [place] [address] Ph#: [phone]  Patient Name: Dilan Alanis   Age: 66 year, : 1955 MRN #: -, Combs: -8 ICU BED #7 Referring Physician: - EEG #: [id number]  Study Date: 2022   Start Time: 11:01:12 AM    End Date:  22        End Time: 08:00:04 AM    Study Duration: ~21H  Study Information:  EEG Recording Technique: The patient underwent continuous Video-EEG monitoring, using Telemetry System hardware on the XLTek Digital System. EEG and video data were stored on a computer hard drive with important events saved in digital archive files. The material was reviewed by a physician (electroencephalographer / epileptologist) on a daily basis. Logan and seizure detection algorithms were utilized and reviewed. An EEG Technician attended to the patient, and was available throughout daytime work hours.  The epilepsy center neurologist was available in person or on call 24-hours per day.  EEG Placement and Labeling of Electrodes: The EEG was performed utilizing 20 channel referential EEG connections (coronal over temporal over parasagittal montage) using all standard 10-20 electrode placements with EKG, with additional electrodes placed in the inferior temporal region using the modified 10-10 montage electrode placements for elective admissions, or if deemed necessary. Recording was at a sampling rate of 256 samples per second per channel. Time synchronized digital video recording was done simultaneously with EEG recording. A low light infrared camera was used for low light recording.   History:  VEEG performed at [facility] : PT in semi coma / sedated No HV due to covid protcol No photic performed 65 Y/O Male PMH of lumbar herniated disc, spinal stenosis, back pain, myocardial infarction, HPT, GERD, sacroilitis, femur fracture on the right side, cerebral aneurysm rupture, osteoarthritis, narcotic dependence Presented for seizure evaluation   Medication VIMPAT IVPB KEPPRA IVPB LEVOPHED DIPRIVAN  Interpretation:  [[[Abbreviation Key:  PDR=alpha rhythm/posterior dominant rhythm. A-P=anterior posterior gradient.  Amplitude: ‘very low’:<20; ‘low’:20-50; ‘medium’:; ‘high’:>200uV.  Persistence for periodic/rhythmic patterns (% of epoch) ‘rare’:<1%; ‘occasional’:1-10%; ‘frequent’:10-50%; ‘abundant’:50-90%; ‘continuous’:>90%.  Persistence for sporadic discharges: ‘rare’:<1/hr; ‘occasional’:1/min-1/hr; ‘frequent’:>1/min; ‘abundant’:>1/10 sec.  GRDA=generalized rhythmic delta activity; FIRDA=frontal intermittent GRDA; LRDA=lateralized rhythmic delta activity; TIRDA=temporal intermittent rhythmic delta activity;  LPD=PLED=lateralized periodic discharges; GPD=generalized periodic discharges; BiPDs=BiPLEDs=bilateral independent periodic epileptiform discharges; SIRPID=stimulus induced rhythmic, periodic, or ictal appearing discharges; BIRDs=brief potentially ictal rhythmic discharges >4 Hz, lasting .5-10s; PFA (paroxysmal bursts >13 Hz or =8 Hz).  Modifiers: +F=with fast component; +S=with spike component; +R=with rhythmic component.  S-B=burst suppression pattern.  Max=maximal. N1-drowsy; N2-stage II sleep; N3-slow wave sleep. SSS/BETS=small sharp spikes/benign epileptiform transients of sleep. HV=hyperventilation; PS=photic stimulation]]]  Daily EEG Visual Analysis  FINDINGS: The background was continuous, spontaneously variable and reactive. During wakefulness, the posterior dominant rhythm was not recorded.  Background Slowing: Generalized slowing: diffuse theta/delta slowing Focal Slowing: continuous polymorphic theta/delta slowing over the right frontotemporal region  Sleep Background: Drowsiness was characterized by fragmentation, attenuation, and slowing of the background activity.   Stage II sleep transients were not recorded.  Other Non-Epileptiform Findings: Breach effect over the right frontotemporal region characterized by higher amplitudes.  Interictal Epileptiform Activity:  Frequent sharp wave discharges over the right frontotemporal region (F4/F8), at times occurring as lateralized periodic discharges occurring up to 0.5 hz  Events: Clinical events: None recorded. Seizures: None recorded.  Activation Procedures:  Hyperventilation was not performed.   Photic stimulation was not performed.  Artifacts: Intermittent myogenic and movement artifacts were noted.  ECG: The heart rate on single channel ECG was predominantly between  BPM.  EEG Summary / Classification:  Abnormal EEG in the sedated patient. - Frequent sharp wave discharges over the right frontotemporal region (F4/F8), at times occurring as lateralized periodic discharges occurring up to 0.5 hz -Continuous polymorphic theta/delta slowing over the right frontotemporal region -Moderate generalized slowing - Breach effect over the right frontotemporal region  EEG Impression / Clinical Correlate:  Increased risk for seizures from the right frontotemporal region. Structural abnormality in the right frontotemporal region Moderate multifocal/diffuse nonspecific cerebral dysfunction. 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 No seizure seen.  Preliminary Fellow Report, final report pending attending review  Dario Flores MD Epilepsy Fellow  Reading Room: [phone] On Call Service After Hours: [phone]    REPORT OF CONTINUOUS VIDEO EEG   [facility]: [address], [place] [address], Ph#: [phone] [facility]: [id number], [place] [address], Ph#: [phone] Office: [address] [place] [address] Ph#: [phone]  Patient Name: Dilan Alanis   Age: 66 year, : 1955 MRN #: -, Combs: -8 ICU BED #7 Referring Physician: - EEG #: [id number]  Study Date: 2022   Start Time: 11:01:12 AM    End Date:  22        End Time: 08:00:04 AM    Study Duration: ~21H  Study Information:  EEG Recording Technique: The patient underwent continuous Video-EEG monitoring, using Telemetry System hardware on the XLTek Digital System. EEG and video data were stored on a computer hard drive with important events saved in digital archive files. The material was reviewed by a physician (electroencephalographer / epileptologist) on a daily basis. Logan and seizure detection algorithms were utilized and reviewed. An EEG Technician attended to the patient, and was available throughout daytime work hours.  The epilepsy center neurologist was available in person or on call 24-hours per day.  EEG Placement and Labeling of Electrodes: The EEG was performed utilizing 20 channel referential EEG connections (coronal over temporal over parasagittal montage) using all standard 10-20 electrode placements with EKG, with additional electrodes placed in the inferior temporal region using the modified 10-10 montage electrode placements for elective admissions, or if deemed necessary. Recording was at a sampling rate of 256 samples per second per channel. Time synchronized digital video recording was done simultaneously with EEG recording. A low light infrared camera was used for low light recording.   History:  VEEG performed at [facility] : PT in semi coma / sedated No HV due to covid protcol No photic performed 65 Y/O Male PMH of lumbar herniated disc, spinal stenosis, back pain, myocardial infarction, HPT, GERD, sacroilitis, femur fracture on the right side, cerebral aneurysm rupture, osteoarthritis, narcotic dependence Presented for seizure evaluation   Medication VIMPAT IVPB KEPPRA IVPB LEVOPHED DIPRIVAN  Interpretation:  [[[Abbreviation Key:  PDR=alpha rhythm/posterior dominant rhythm. A-P=anterior posterior gradient.  Amplitude: ‘very low’:<20; ‘low’:20-50; ‘medium’:; ‘high’:>200uV.  Persistence for periodic/rhythmic patterns (% of epoch) ‘rare’:<1%; ‘occasional’:1-10%; ‘frequent’:10-50%; ‘abundant’:50-90%; ‘continuous’:>90%.  Persistence for sporadic discharges: ‘rare’:<1/hr; ‘occasional’:1/min-1/hr; ‘frequent’:>1/min; ‘abundant’:>1/10 sec.  GRDA=generalized rhythmic delta activity; FIRDA=frontal intermittent GRDA; LRDA=lateralized rhythmic delta activity; TIRDA=temporal intermittent rhythmic delta activity;  LPD=PLED=lateralized periodic discharges; GPD=generalized periodic discharges; BiPDs=BiPLEDs=bilateral independent periodic epileptiform discharges; SIRPID=stimulus induced rhythmic, periodic, or ictal appearing discharges; BIRDs=brief potentially ictal rhythmic discharges >4 Hz, lasting .5-10s; PFA (paroxysmal bursts >13 Hz or =8 Hz).  Modifiers: +F=with fast component; +S=with spike component; +R=with rhythmic component.  S-B=burst suppression pattern.  Max=maximal. N1-drowsy; N2-stage II sleep; N3-slow wave sleep. SSS/BETS=small sharp spikes/benign epileptiform transients of sleep. HV=hyperventilation; PS=photic stimulation]]]  Daily EEG Visual Analysis  FINDINGS: The background was continuous, spontaneously variable and reactive. During wakefulness, the posterior dominant rhythm was not recorded.  Background Slowing: Generalized slowing: diffuse theta/delta slowing Focal Slowing: none were present  Sleep Background: Drowsiness was characterized by fragmentation, attenuation, and slowing of the background activity.   Stage II sleep transients were not recorded.  Other Non-Epileptiform Findings: Breach effect over the right frontotemporal region characterized by higher amplitudes.  Interictal Epileptiform Activity:  Occasional to frequent sharp wave discharges over the right frontotemporal region (F4/F8), rarely occurred as brief runs of static <0.5 lateralized periodic discharge with rhythmic activity (LPD+R).  Events: Clinical events: None recorded. Seizures: None recorded.  Activation Procedures:  Hyperventilation was not performed.   Photic stimulation was not performed.  Artifacts: Intermittent myogenic and movement artifacts were noted.  ECG: The heart rate on single channel ECG was predominantly between  BPM.  EEG Summary / Classification:  Abnormal EEG in the sedated patient. -Occasional to frequent sharp wave discharges over the right frontotemporal region (F4/F8), rarely occurred as brief runs of static <0.5 lateralized periodic discharge with rhythmic activity (LPD+R). -Moderate generalized slowing -Breach effect over the right frontotemporal region  EEG Impression / Clinical Correlate:  Increased risk for seizures from the right frontotemporal region. Moderate multifocal/diffuse nonspecific cerebral dysfunction. 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 No seizure seen.   Dario Flores MD Epilepsy Fellow  Reading Room: [phone] On Call Service After Hours: [phone]

## 2022-05-07 NOTE — PROGRESS NOTE ADULT - ATTENDING COMMENTS
Patient seen and examined in 8ICU. Patient critically ill requiring ICU level care. Patient is a 66M with extensive history including prior R. ACOMM aneurysm rupture and crani (1997) with seizure disorder who presents after an unwitnessed fall and possible seizure. He was intubated and admitted to MICU for further monitoring and care.    1. Neuro - followup CTH. Followup EEG. Continue current AEDs. If no seizures will attempt to wean sedatives. Will start Precedex and lower Propofol  - Neuro followup  2. Trauma - unwitnessed fall. Continue C-collar for now. Await CT c-spine. Trauma followup as needed  3. Acute Hypoxemic Respiratory Failure - likely in setting of encephalopathy. Wean as able with goals for PSV trial. Bronchodilators. Maintain O2 sat > 90%.   - outpatient followup for [facility]. Seen by Dr. Eaton on prior visit.  4. Oropharyngeal dysphagia - will start feeds if unable to extubate today.   5. Cardiovascular - continue vasopressors to maintain MAP > 65. Shock likely due to vasoplegia.   6. Infectious Disease - monitor off antibiotics for now.   7. DVT proph - Lovenox

## 2022-05-07 NOTE — PROGRESS NOTE ADULT - ASSESSMENT
==============ASSESSMENT=============  RH with CAD s/p MI and stent, HTN, HLD, s/p 7 lumbar spinal surgeries. S/P R frontal craniotomy and clipping of R acomm aneurysm (1997). 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 On arrival, pt mental status was fluctuant between alert and somnolent. Breathing pattern concerning for possible jerel-busch v kussmaul pattern respirations. CT head performed in ED, followed by episodes of bradycardia with hypoxia and apnea. ED team also noted R arm shaking with complete absence of L UE movement. Due to suspicion for status epilepticus, pt was intubated for AW protection. Transferred to MICU for definitive management. Boarding in SICU.   ==============PLAN=============    #NEURO  #c/f seizure   -keppra 500 q12h   -c/w vimpat 150 q12h   -24h continuous EEG ongoing   -dc/lower prop if seizure activity absent   -appreciate neuro recs    #unwitnessed fall   - CTH wnl; pending CT c-spine after EEG   - cervical collar in place   - CK wnl     #CARDIOVASCULAR  Hemodynamics - hypotensive    - levo 0.03mcg/min    - likely propofol side effect    - sepsis w/u cultures to r/o septic shock; unlikely as pt initially hypertensive    - LR @ 75cc/h    Rhythm    - intermittent bradycardia    - ICH/cushing reflex r/o on CT    - ecg without heart block      #RESPIRATORY  #hypoxic episodes - concern for seizure activity    - intubated for aw protection  #irregular respirations    - jerel-busch v kussmaul    - beta hydroxybutyrate wnl    #RENAL  - no issues     #GI  - AW protection; NGT in place  - hold feeds for possible extubation    #ENDO  - no issues    #HEME/ONC  - A/C: DVT ppx lovenox; ASA after extubation  - hold xarelto    #ID  - leukocytosis - possibly post-seizure; CTM  - afebrile; hold abx pending bcx/ucx   ==============ASSESSMENT=============  RH with CAD s/p MI and stent, HTN, HLD, s/p 7 lumbar spinal surgeries. S/P R frontal craniotomy and clipping of R acomm aneurysm (1997). 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 On arrival, pt mental status was fluctuant between alert and somnolent. Breathing pattern concerning for possible jerel-busch v kussmaul pattern respirations. CT head performed in ED, followed by episodes of bradycardia with hypoxia and apnea. ED team also noted R arm shaking with complete absence of L UE movement. Due to suspicion for status epilepticus, pt was intubated for AW protection. Transferred to MICU for further management  ==============PLAN=============    #NEURO  #c/f seizure   -keppra 500 q12h   -c/w vimpat 150 q12h   -24h continuous EEG ongoing, no seizures noted   -change propofol to precedex to keep pt comfortable and breathing with vent   -appreciate neuro recs    #unwitnessed fall   - CTH wnl; pending CT c-spine after EEG, likely 5/8   - cervical collar in place   - CK wnl     #CARDIOVASCULAR  Hemodynamics - hypotensive    - has been off levo since 5/6 at 7:15pm, was likely initially needed because of prop side effect    - sepsis w/u cultures to r/o septic shock; see ID    Rhythm    - intermittent bradycardia-naun 2/2 propofol; now resolved    - ICH/cushing reflex r/o on CT    - ecg without heart block      #RESPIRATORY  #hypoxic episodes - concern for seizure activity    - intubated for aw protection on 5/6, on FiO2 30% currently     - resolved  #Secretions   - will hold off extubation d/t thick secretions that were noted today   - standing q6h duonebs   - re-evaluate in AM, C-pap trial around 4am with possible extubation if well-tolerated in AM   #irregular respirations- resolved    - jerel-busch v kussmaul    - beta hydroxybutyrate wnl    #RENAL  - no issues     #GI  - AW protection; NGT in place  - not starting feeds d/t possible extubation    #ENDO  - no issues    #HEME/ONC  - A/C: DVT ppx lovenox; ASA after extubation  - hold half dose xarelto    #ID  # Sepsis   - leukocytosis on admission however afebrile so thought to be reactive 2/2 seizure  - UA negative  -bcx from 5/6 showed 1 bottle growing GPC in clusters; given hx of spinal hardware and new fever of 100.4 on 5/7 will repeat blood cultures and empirically start vanc (5/7- ) and zosyn (5/7- ), sputum cx ordered

## 2022-05-07 NOTE — PROGRESS NOTE ADULT - SUBJECTIVE AND OBJECTIVE BOX
INTERVAL HPI/OVERNIGHT EVENTS:    SUBJECTIVE: Patient seen and examined at bedside.     OBJECTIVE:    VITAL SIGNS:  ICU Vital Signs Last 24 Hrs  T(C): 36.4 (07 May 2022 03:00), Max: 36.5 (06 May 2022 11:00)  T(F): 97.6 (07 May 2022 03:00), Max: 97.7 (06 May 2022 11:00)  HR: 83 (07 May 2022 07:00) (64 - 117)  BP: 116/86 (07 May 2022 07:00) (69/39 - 206/116)  BP(mean): 97 (07 May 2022 07:00) (49 - 120)  ABP: --  ABP(mean): --  RR: 26 (07 May 2022 07:00) (11 - 34)  SpO2: 100% (07 May 2022 07:00) (96% - 100%)    Mode: AC/ CMV (Assist Control/ Continuous Mandatory Ventilation), RR (machine): 20, TV (machine): 450, FiO2: 50, PEEP: 5, ITime: 0.9, MAP: 9, PIP: 20    05- @ 07:01  -   @ 07:00  --------------------------------------------------------  IN: 936.2 mL / OUT: 800 mL / NET: 136.2 mL      CAPILLARY BLOOD GLUCOSE      POCT Blood Glucose.: 124 mg/dL (06 May 2022 05:49)      PHYSICAL EXAM:    General: NAD  HEENT: NC/AT; PERRL, clear conjunctiva  Neck: supple  Respiratory: CTA b/l  Cardiovascular: +S1/S2; RRR  Abdomen: soft, NT/ND; +BS x4  Extremities: WWP, 2+ peripheral pulses b/l; no LE edema  Skin: normal color and turgor; no rash  Neurological:    MEDICATIONS:  MEDICATIONS  (STANDING):  chlorhexidine 0.12% Liquid 15 milliLiter(s) Oral Mucosa every 12 hours  chlorhexidine 4% Liquid 1 Application(s) Topical <User Schedule>  dextrose 5% + lactated ringers. 1000 milliLiter(s) (50 mL/Hr) IV Continuous <Continuous>  enoxaparin Injectable 40 milliGRAM(s) SubCutaneous every 24 hours  lacosamide IVPB 150 milliGRAM(s) IV Intermittent every 12 hours  levETIRAcetam  IVPB 500 milliGRAM(s) IV Intermittent every 12 hours  norepinephrine Infusion 0.05 MICROgram(s)/kG/Min (10.7 mL/Hr) IV Continuous <Continuous>  propofol Infusion 40 MICROgram(s)/kG/Min (27.4 mL/Hr) IV Continuous <Continuous>    MEDICATIONS  (PRN):      ALLERGIES:  Allergies    Allergy Status Unknown    Intolerances        LABS:                        11.5   10.87 )-----------( 333      ( 07 May 2022 02:38 )             37.6     Hemoglobin: 11.5 g/dL ( @ 02:38)  Hemoglobin: 11.9 g/dL ( @ 19:23)  Hemoglobin: 13.2 g/dL ( @ 06:02)    CBC Full  -  ( 07 May 2022 02:38 )  WBC Count : 10.87 K/uL  RBC Count : 3.99 M/uL  Hemoglobin : 11.5 g/dL  Hematocrit : 37.6 %  Platelet Count - Automated : 333 K/uL  Mean Cell Volume : 94.2 fl  Mean Cell Hemoglobin : 28.8 pg  Mean Cell Hemoglobin Concentration : 30.6 gm/dL  Auto Neutrophil # : 7.58 K/uL  Auto Lymphocyte # : 1.92 K/uL  Auto Monocyte # : 0.90 K/uL  Auto Eosinophil # : 0.34 K/uL  Auto Basophil # : 0.04 K/uL  Auto Neutrophil % : 69.7 %  Auto Lymphocyte % : 17.7 %  Auto Monocyte % : 8.3 %  Auto Eosinophil % : 3.1 %  Auto Basophil % : 0.4 %        144  |  109<H>  |  12  ----------------------------<  123<H>  4.1   |  18<L>  |  0.86    Ca    9.3      07 May 2022 02:38  Phos  3.3     05-07  Mg     1.9         TPro  6.4  /  Alb  3.1<L>  /  TBili  0.4  /  DBili  x   /  AST  22  /  ALT  13  /  AlkPhos  158<H>  0507    Creatinine Trend: 0.86<--, 0.92<--, 0.94<--, 1.28<--, 1.35<--, 1.17<--  LIVER FUNCTIONS - ( 07 May 2022 02:38 )  Alb: 3.1 g/dL / Pro: 6.4 g/dL / ALK PHOS: 158 U/L / ALT: 13 U/L / AST: 22 U/L / GGT: x           PT/INR - ( 06 May 2022 06:02 )   PT: 18.3 sec;   INR: 1.57 ratio         PTT - ( 06 May 2022 06:02 )  PTT:39.1 sec    hs Troponin:    ABG - ( 07 May 2022 02:33 )  pH, Arterial: 7.45  pH, Blood: x     /  pCO2: 36    /  pO2: 182   / HCO3: 25    / Base Excess: 1.2   /  SaO2: 99.6                02:33 - ABG - pH: 7.45  |  pCO2: 36    |  pO2: 182   | Lactate:       | BE: 1.2    18:47 - ABG - pH: 7.40  |  pCO2: 37    |  pO2: 177   | Lactate:       | BE: -1.6   19:11 - VBG - pH: 7.30  | pCO2: 52    | pO2: 25    | Lactate: 2.6    19:08 - VBG - pH: 7.28  | pCO2: 41    | pO2: 31    | Lactate: 1.2        Urinalysis Basic - ( 06 May 2022 18:10 )    Color: Light Yellow / Appearance: Clear / S.006 / pH: x  Gluc: x / Ketone: Negative  / Bili: Negative / Urobili: Negative   Blood: x / Protein: Trace / Nitrite: Negative   Leuk Esterase: Negative / RBC: 0 /hpf / WBC 1 /HPF   Sq Epi: x / Non Sq Epi: 0 /hpf / Bacteria: Negative      CSF:                      EKG:   MICROBIOLOGY:    IMAGING:      Labs, imaging, EKG personally reviewed    RADIOLOGY & ADDITIONAL TESTS: Reviewed.     INTERVAL HPI/OVERNIGHT EVENTS: No events overnight    SUBJECTIVE: Patient seen and examined at bedside. Intubated, sedated, off pressors. Thick secretions noted in suction     OBJECTIVE:    VITAL SIGNS:  ICU Vital Signs Last 24 Hrs  T(C): 36.4 (07 May 2022 03:00), Max: 36.5 (06 May 2022 11:00)  T(F): 97.6 (07 May 2022 03:00), Max: 97.7 (06 May 2022 11:00)  HR: 83 (07 May 2022 07:00) (64 - 117)  BP: 116/86 (07 May 2022 07:00) (69/39 - 206/116)  BP(mean): 97 (07 May 2022 07:00) (49 - 120)  ABP: --  ABP(mean): --  RR: 26 (07 May 2022 07:00) (11 - 34)  SpO2: 100% (07 May 2022 07:00) (96% - 100%)    Mode: AC/ CMV (Assist Control/ Continuous Mandatory Ventilation), RR (machine): 20, TV (machine): 450, FiO2: 50, PEEP: 5, ITime: 0.9, MAP: 9, PIP: 20    05-06 @ 07:01  -  -07 @ 07:00  --------------------------------------------------------  IN: 936.2 mL / OUT: 800 mL / NET: 136.2 mL      CAPILLARY BLOOD GLUCOSE      POCT Blood Glucose.: 124 mg/dL (06 May 2022 05:49)      PHYSICAL EXAM:    General: NAD, sedated, resting comfortably  HEENT: NC/AT; PERRL, clear conjunctiva, c-collar in place   Neck: supple  Respiratory: CTA b/l  Cardiovascular: +S1/S2; RRR  Abdomen: soft, NT/ND; +BS x4  Extremities: WWP, 2+ peripheral pulses b/l; no LE edema  Skin: normal color and turgor; no rash  Neurological: unable to evaluate    MEDICATIONS:  MEDICATIONS  (STANDING):  chlorhexidine 0.12% Liquid 15 milliLiter(s) Oral Mucosa every 12 hours  chlorhexidine 4% Liquid 1 Application(s) Topical <User Schedule>  dextrose 5% + lactated ringers. 1000 milliLiter(s) (50 mL/Hr) IV Continuous <Continuous>  enoxaparin Injectable 40 milliGRAM(s) SubCutaneous every 24 hours  lacosamide IVPB 150 milliGRAM(s) IV Intermittent every 12 hours  levETIRAcetam  IVPB 500 milliGRAM(s) IV Intermittent every 12 hours  norepinephrine Infusion 0.05 MICROgram(s)/kG/Min (10.7 mL/Hr) IV Continuous <Continuous>  propofol Infusion 40 MICROgram(s)/kG/Min (27.4 mL/Hr) IV Continuous <Continuous>    MEDICATIONS  (PRN):      ALLERGIES:  Allergies    Allergy Status Unknown    Intolerances        LABS:                        11.5   10.87 )-----------( 333      ( 07 May 2022 02:38 )             37.6     Hemoglobin: 11.5 g/dL ( @ 02:38)  Hemoglobin: 11.9 g/dL ( @ 19:23)  Hemoglobin: 13.2 g/dL ( @ 06:02)    CBC Full  -  ( 07 May 2022 02:38 )  WBC Count : 10.87 K/uL  RBC Count : 3.99 M/uL  Hemoglobin : 11.5 g/dL  Hematocrit : 37.6 %  Platelet Count - Automated : 333 K/uL  Mean Cell Volume : 94.2 fl  Mean Cell Hemoglobin : 28.8 pg  Mean Cell Hemoglobin Concentration : 30.6 gm/dL  Auto Neutrophil # : 7.58 K/uL  Auto Lymphocyte # : 1.92 K/uL  Auto Monocyte # : 0.90 K/uL  Auto Eosinophil # : 0.34 K/uL  Auto Basophil # : 0.04 K/uL  Auto Neutrophil % : 69.7 %  Auto Lymphocyte % : 17.7 %  Auto Monocyte % : 8.3 %  Auto Eosinophil % : 3.1 %  Auto Basophil % : 0.4 %        144  |  109<H>  |  12  ----------------------------<  123<H>  4.1   |  18<L>  |  0.86    Ca    9.3      07 May 2022 02:38  Phos  3.3     05-07  Mg     1.9     05-07    TPro  6.4  /  Alb  3.1<L>  /  TBili  0.4  /  DBili  x   /  AST  22  /  ALT  13  /  AlkPhos  158<H>  05-07    Creatinine Trend: 0.86<--, 0.92<--, 0.94<--, 1.28<--, 1.35<--, 1.17<--  LIVER FUNCTIONS - ( 07 May 2022 02:38 )  Alb: 3.1 g/dL / Pro: 6.4 g/dL / ALK PHOS: 158 U/L / ALT: 13 U/L / AST: 22 U/L / GGT: x           PT/INR - ( 06 May 2022 06:02 )   PT: 18.3 sec;   INR: 1.57 ratio         PTT - ( 06 May 2022 06:02 )  PTT:39.1 sec    hs Troponin:    ABG - ( 07 May 2022 02:33 )  pH, Arterial: 7.45  pH, Blood: x     /  pCO2: 36    /  pO2: 182   / HCO3: 25    / Base Excess: 1.2   /  SaO2: 99.6                02:33 - ABG - pH: 7.45  |  pCO2: 36    |  pO2: 182   | Lactate:       | BE: 1.2    18:47 - ABG - pH: 7.40  |  pCO2: 37    |  pO2: 177   | Lactate:       | BE: -1.6   19:11 - VBG - pH: 7.30  | pCO2: 52    | pO2: 25    | Lactate: 2.6    19:08 - VBG - pH: 7.28  | pCO2: 41    | pO2: 31    | Lactate: 1.2        Urinalysis Basic - ( 06 May 2022 18:10 )    Color: Light Yellow / Appearance: Clear / S.006 / pH: x  Gluc: x / Ketone: Negative  / Bili: Negative / Urobili: Negative   Blood: x / Protein: Trace / Nitrite: Negative   Leuk Esterase: Negative / RBC: 0 /hpf / WBC 1 /HPF   Sq Epi: x / Non Sq Epi: 0 /hpf / Bacteria: Negative      CSF:                      EKG:   MICROBIOLOGY:    IMAGING:      Labs, imaging, EKG personally reviewed    RADIOLOGY & ADDITIONAL TESTS: Reviewed.

## 2022-05-08 LAB
ALBUMIN SERPL ELPH-MCNC: 3 G/DL — LOW (ref 3.3–5)
ALBUMIN SERPL ELPH-MCNC: 3.2 G/DL — LOW (ref 3.3–5)
ALP SERPL-CCNC: 138 U/L — HIGH (ref 40–120)
ALP SERPL-CCNC: 139 U/L — HIGH (ref 40–120)
ALT FLD-CCNC: 12 U/L — SIGNIFICANT CHANGE UP (ref 10–45)
ALT FLD-CCNC: 15 U/L — SIGNIFICANT CHANGE UP (ref 10–45)
ANION GAP SERPL CALC-SCNC: 15 MMOL/L — SIGNIFICANT CHANGE UP (ref 5–17)
ANION GAP SERPL CALC-SCNC: 15 MMOL/L — SIGNIFICANT CHANGE UP (ref 5–17)
APTT BLD: 30.9 SEC — SIGNIFICANT CHANGE UP (ref 27.5–35.5)
AST SERPL-CCNC: 17 U/L — SIGNIFICANT CHANGE UP (ref 10–40)
AST SERPL-CCNC: 17 U/L — SIGNIFICANT CHANGE UP (ref 10–40)
BASOPHILS # BLD AUTO: 0.05 K/UL — SIGNIFICANT CHANGE UP (ref 0–0.2)
BASOPHILS NFR BLD AUTO: 0.4 % — SIGNIFICANT CHANGE UP (ref 0–2)
BILIRUB SERPL-MCNC: 0.6 MG/DL — SIGNIFICANT CHANGE UP (ref 0.2–1.2)
BILIRUB SERPL-MCNC: 0.6 MG/DL — SIGNIFICANT CHANGE UP (ref 0.2–1.2)
BUN SERPL-MCNC: 10 MG/DL — SIGNIFICANT CHANGE UP (ref 7–23)
BUN SERPL-MCNC: 12 MG/DL — SIGNIFICANT CHANGE UP (ref 7–23)
CALCIUM SERPL-MCNC: 8.7 MG/DL — SIGNIFICANT CHANGE UP (ref 8.4–10.5)
CALCIUM SERPL-MCNC: 9 MG/DL — SIGNIFICANT CHANGE UP (ref 8.4–10.5)
CHLORIDE SERPL-SCNC: 106 MMOL/L — SIGNIFICANT CHANGE UP (ref 96–108)
CHLORIDE SERPL-SCNC: 107 MMOL/L — SIGNIFICANT CHANGE UP (ref 96–108)
CO2 SERPL-SCNC: 19 MMOL/L — LOW (ref 22–31)
CO2 SERPL-SCNC: 21 MMOL/L — LOW (ref 22–31)
CREAT SERPL-MCNC: 0.99 MG/DL — SIGNIFICANT CHANGE UP (ref 0.5–1.3)
CREAT SERPL-MCNC: 1.02 MG/DL — SIGNIFICANT CHANGE UP (ref 0.5–1.3)
CULTURE RESULTS: SIGNIFICANT CHANGE UP
EGFR: 81 ML/MIN/1.73M2 — SIGNIFICANT CHANGE UP
EGFR: 83 ML/MIN/1.73M2 — SIGNIFICANT CHANGE UP
EOSINOPHIL # BLD AUTO: 0.03 K/UL — SIGNIFICANT CHANGE UP (ref 0–0.5)
EOSINOPHIL NFR BLD AUTO: 0.2 % — SIGNIFICANT CHANGE UP (ref 0–6)
GAS PNL BLDA: SIGNIFICANT CHANGE UP
GLUCOSE SERPL-MCNC: 104 MG/DL — HIGH (ref 70–99)
GLUCOSE SERPL-MCNC: 162 MG/DL — HIGH (ref 70–99)
GRAM STN FLD: SIGNIFICANT CHANGE UP
HCT VFR BLD CALC: 33.1 % — LOW (ref 39–50)
HGB BLD-MCNC: 10.3 G/DL — LOW (ref 13–17)
IMM GRANULOCYTES NFR BLD AUTO: 0.6 % — SIGNIFICANT CHANGE UP (ref 0–1.5)
INR BLD: 1.26 RATIO — HIGH (ref 0.88–1.16)
LYMPHOCYTES # BLD AUTO: 17.4 % — SIGNIFICANT CHANGE UP (ref 13–44)
LYMPHOCYTES # BLD AUTO: 2.16 K/UL — SIGNIFICANT CHANGE UP (ref 1–3.3)
MAGNESIUM SERPL-MCNC: 1.6 MG/DL — SIGNIFICANT CHANGE UP (ref 1.6–2.6)
MAGNESIUM SERPL-MCNC: 1.7 MG/DL — SIGNIFICANT CHANGE UP (ref 1.6–2.6)
MCHC RBC-ENTMCNC: 28.8 PG — SIGNIFICANT CHANGE UP (ref 27–34)
MCHC RBC-ENTMCNC: 31.1 GM/DL — LOW (ref 32–36)
MCV RBC AUTO: 92.5 FL — SIGNIFICANT CHANGE UP (ref 80–100)
MONOCYTES # BLD AUTO: 0.82 K/UL — SIGNIFICANT CHANGE UP (ref 0–0.9)
MONOCYTES NFR BLD AUTO: 6.6 % — SIGNIFICANT CHANGE UP (ref 2–14)
NEUTROPHILS # BLD AUTO: 9.28 K/UL — HIGH (ref 1.8–7.4)
NEUTROPHILS NFR BLD AUTO: 74.8 % — SIGNIFICANT CHANGE UP (ref 43–77)
NRBC # BLD: 0 /100 WBCS — SIGNIFICANT CHANGE UP (ref 0–0)
ORGANISM # SPEC MICROSCOPIC CNT: SIGNIFICANT CHANGE UP
ORGANISM # SPEC MICROSCOPIC CNT: SIGNIFICANT CHANGE UP
PHOSPHATE SERPL-MCNC: 2.4 MG/DL — LOW (ref 2.5–4.5)
PHOSPHATE SERPL-MCNC: 2.5 MG/DL — SIGNIFICANT CHANGE UP (ref 2.5–4.5)
PLATELET # BLD AUTO: 288 K/UL — SIGNIFICANT CHANGE UP (ref 150–400)
POTASSIUM SERPL-MCNC: 3.3 MMOL/L — LOW (ref 3.5–5.3)
POTASSIUM SERPL-MCNC: 3.5 MMOL/L — SIGNIFICANT CHANGE UP (ref 3.5–5.3)
POTASSIUM SERPL-SCNC: 3.3 MMOL/L — LOW (ref 3.5–5.3)
POTASSIUM SERPL-SCNC: 3.5 MMOL/L — SIGNIFICANT CHANGE UP (ref 3.5–5.3)
PROT SERPL-MCNC: 6.1 G/DL — SIGNIFICANT CHANGE UP (ref 6–8.3)
PROT SERPL-MCNC: 6.7 G/DL — SIGNIFICANT CHANGE UP (ref 6–8.3)
PROTHROM AB SERPL-ACNC: 14.6 SEC — HIGH (ref 10.5–13.4)
RBC # BLD: 3.58 M/UL — LOW (ref 4.2–5.8)
RBC # FLD: 15.9 % — HIGH (ref 10.3–14.5)
SODIUM SERPL-SCNC: 140 MMOL/L — SIGNIFICANT CHANGE UP (ref 135–145)
SODIUM SERPL-SCNC: 143 MMOL/L — SIGNIFICANT CHANGE UP (ref 135–145)
SPECIMEN SOURCE: SIGNIFICANT CHANGE UP
SPECIMEN SOURCE: SIGNIFICANT CHANGE UP
WBC # BLD: 12.42 K/UL — HIGH (ref 3.8–10.5)
WBC # FLD AUTO: 12.42 K/UL — HIGH (ref 3.8–10.5)

## 2022-05-08 PROCEDURE — 95718 EEG PHYS/QHP 2-12 HR W/VEEG: CPT

## 2022-05-08 PROCEDURE — 72125 CT NECK SPINE W/O DYE: CPT | Mod: 26

## 2022-05-08 PROCEDURE — 99291 CRITICAL CARE FIRST HOUR: CPT | Mod: GC

## 2022-05-08 PROCEDURE — 93306 TTE W/DOPPLER COMPLETE: CPT | Mod: 26

## 2022-05-08 RX ORDER — LEVETIRACETAM 250 MG/1
1500 TABLET, FILM COATED ORAL ONCE
Refills: 0 | Status: COMPLETED | OUTPATIENT
Start: 2022-05-08 | End: 2022-05-08

## 2022-05-08 RX ORDER — OXYCODONE HYDROCHLORIDE 5 MG/1
2.5 TABLET ORAL ONCE
Refills: 0 | Status: DISCONTINUED | OUTPATIENT
Start: 2022-05-08 | End: 2022-05-08

## 2022-05-08 RX ORDER — BACLOFEN 100 %
5 POWDER (GRAM) MISCELLANEOUS EVERY 8 HOURS
Refills: 0 | Status: DISCONTINUED | OUTPATIENT
Start: 2022-05-08 | End: 2022-05-11

## 2022-05-08 RX ORDER — ACETAMINOPHEN 500 MG
650 TABLET ORAL EVERY 6 HOURS
Refills: 0 | Status: DISCONTINUED | OUTPATIENT
Start: 2022-05-08 | End: 2022-05-09

## 2022-05-08 RX ORDER — POTASSIUM CHLORIDE 20 MEQ
40 PACKET (EA) ORAL ONCE
Refills: 0 | Status: COMPLETED | OUTPATIENT
Start: 2022-05-08 | End: 2022-05-08

## 2022-05-08 RX ORDER — METOPROLOL TARTRATE 50 MG
25 TABLET ORAL
Refills: 0 | Status: DISCONTINUED | OUTPATIENT
Start: 2022-05-08 | End: 2022-05-13

## 2022-05-08 RX ORDER — LEVETIRACETAM 250 MG/1
1000 TABLET, FILM COATED ORAL EVERY 12 HOURS
Refills: 0 | Status: DISCONTINUED | OUTPATIENT
Start: 2022-05-08 | End: 2022-05-12

## 2022-05-08 RX ORDER — BACLOFEN 100 %
5 POWDER (GRAM) MISCELLANEOUS EVERY 8 HOURS
Refills: 0 | Status: COMPLETED | OUTPATIENT
Start: 2022-05-08 | End: 2022-05-08

## 2022-05-08 RX ORDER — ASCORBIC ACID 60 MG
500 TABLET,CHEWABLE ORAL DAILY
Refills: 0 | Status: DISCONTINUED | OUTPATIENT
Start: 2022-05-08 | End: 2022-05-13

## 2022-05-08 RX ADMIN — Medication 25 MILLIGRAM(S): at 21:03

## 2022-05-08 RX ADMIN — Medication 650 MILLIGRAM(S): at 08:07

## 2022-05-08 RX ADMIN — Medication 650 MILLIGRAM(S): at 08:37

## 2022-05-08 RX ADMIN — Medication 3 MILLILITER(S): at 06:45

## 2022-05-08 RX ADMIN — LEVETIRACETAM 400 MILLIGRAM(S): 250 TABLET, FILM COATED ORAL at 05:17

## 2022-05-08 RX ADMIN — Medication 5 MILLIGRAM(S): at 18:04

## 2022-05-08 RX ADMIN — DEXMEDETOMIDINE HYDROCHLORIDE IN 0.9% SODIUM CHLORIDE 8.55 MICROGRAM(S)/KG/HR: 4 INJECTION INTRAVENOUS at 07:49

## 2022-05-08 RX ADMIN — OXYCODONE HYDROCHLORIDE 2.5 MILLIGRAM(S): 5 TABLET ORAL at 18:21

## 2022-05-08 RX ADMIN — LACOSAMIDE 130 MILLIGRAM(S): 50 TABLET ORAL at 05:17

## 2022-05-08 RX ADMIN — LEVETIRACETAM 400 MILLIGRAM(S): 250 TABLET, FILM COATED ORAL at 17:18

## 2022-05-08 RX ADMIN — LEVETIRACETAM 400 MILLIGRAM(S): 250 TABLET, FILM COATED ORAL at 16:53

## 2022-05-08 RX ADMIN — LACOSAMIDE 130 MILLIGRAM(S): 50 TABLET ORAL at 17:19

## 2022-05-08 RX ADMIN — Medication 3 MILLILITER(S): at 23:33

## 2022-05-08 RX ADMIN — Medication 3 MILLILITER(S): at 11:15

## 2022-05-08 RX ADMIN — PIPERACILLIN AND TAZOBACTAM 25 GRAM(S): 4; .5 INJECTION, POWDER, LYOPHILIZED, FOR SOLUTION INTRAVENOUS at 07:49

## 2022-05-08 RX ADMIN — Medication 62.5 MILLIMOLE(S): at 16:11

## 2022-05-08 RX ADMIN — OXYCODONE HYDROCHLORIDE 2.5 MILLIGRAM(S): 5 TABLET ORAL at 17:51

## 2022-05-08 RX ADMIN — Medication 250 MILLIGRAM(S): at 05:17

## 2022-05-08 RX ADMIN — PIPERACILLIN AND TAZOBACTAM 25 GRAM(S): 4; .5 INJECTION, POWDER, LYOPHILIZED, FOR SOLUTION INTRAVENOUS at 23:05

## 2022-05-08 RX ADMIN — Medication 250 MILLIGRAM(S): at 17:19

## 2022-05-08 RX ADMIN — CHLORHEXIDINE GLUCONATE 15 MILLILITER(S): 213 SOLUTION TOPICAL at 05:17

## 2022-05-08 RX ADMIN — PIPERACILLIN AND TAZOBACTAM 25 GRAM(S): 4; .5 INJECTION, POWDER, LYOPHILIZED, FOR SOLUTION INTRAVENOUS at 15:44

## 2022-05-08 RX ADMIN — Medication 3 MILLILITER(S): at 17:02

## 2022-05-08 RX ADMIN — ENOXAPARIN SODIUM 40 MILLIGRAM(S): 100 INJECTION SUBCUTANEOUS at 14:10

## 2022-05-08 RX ADMIN — Medication 5 MILLIGRAM(S): at 23:06

## 2022-05-08 RX ADMIN — Medication 3 MILLILITER(S): at 01:09

## 2022-05-08 RX ADMIN — Medication 40 MILLIEQUIVALENT(S): at 07:49

## 2022-05-08 RX ADMIN — DEXMEDETOMIDINE HYDROCHLORIDE IN 0.9% SODIUM CHLORIDE 8.55 MICROGRAM(S)/KG/HR: 4 INJECTION INTRAVENOUS at 05:17

## 2022-05-08 RX ADMIN — CHLORHEXIDINE GLUCONATE 1 APPLICATION(S): 213 SOLUTION TOPICAL at 04:42

## 2022-05-08 NOTE — DIETITIAN INITIAL EVALUATION ADULT - ENERGY INTAKE
No diet order x 3 days, s/p extubation 05/08. Plan for swallow evaluation prior to diet advancement.     IVF: D5 + LR @ 50mL/hr x 6 hr.     <50% EER No diet order x 3 days, s/p extubation 05/08. Plan for swallow evaluation to assess feasibility of diet advancement.     IVF: D5 + LR @ 50mL/hr x 6 hr.

## 2022-05-08 NOTE — DIETITIAN INITIAL EVALUATION ADULT - PERTINENT MEDS FT
MEDICATIONS  (STANDING):  albuterol/ipratropium for Nebulization 3 milliLiter(s) Nebulizer every 6 hours  chlorhexidine 4% Liquid 1 Application(s) Topical <User Schedule>  dexMEDEtomidine Infusion 0.3 MICROgram(s)/kG/Hr (8.55 mL/Hr) IV Continuous <Continuous>  dextrose 5% + lactated ringers. 1000 milliLiter(s) (50 mL/Hr) IV Continuous <Continuous>  enoxaparin Injectable 40 milliGRAM(s) SubCutaneous every 24 hours  lacosamide IVPB 150 milliGRAM(s) IV Intermittent every 12 hours  levETIRAcetam  IVPB 500 milliGRAM(s) IV Intermittent every 12 hours  piperacillin/tazobactam IVPB.. 3.375 Gram(s) IV Intermittent every 8 hours  propofol Infusion 40 MICROgram(s)/kG/Min (27.4 mL/Hr) IV Continuous <Continuous>  vancomycin  IVPB 1750 milliGRAM(s) IV Intermittent every 12 hours    MEDICATIONS  (PRN):  acetaminophen    Suspension .. 650 milliGRAM(s) Oral every 6 hours PRN Temp greater or equal to 38C (100.4F), Mild Pain (1 - 3)

## 2022-05-08 NOTE — DIETITIAN INITIAL EVALUATION ADULT - NSFNSNUTRCHEWSWALLOWFT_GEN_A_CORE
Wife denies Pt with Hx of difficulty chewing/swallowing. Reports he consumed regular texture foods/thin liquids at [facility]. Plan for swallow evaluation for appropriate texture/consistency recommendations per team.

## 2022-05-08 NOTE — EEG REPORT - NS EEG TEXT BOX
REPORT OF CONTINUOUS VIDEO EEG   [facility]: [address], [place] [address], Ph#: [phone] [facility]: [id number] [place], [place] [address], [place] [address], Ph#: [phone] Office: [address] [place] [address] Ph#: [phone]  Patient Name: Dilan Alanis   Age: 66 year, : 1955 MRN #: -, Combs: -8 ICU BED #7 Referring Physician: - EEG #: [id number]  Study Date: 2022   Start Time: 0800    End Date:  22        End Time: 08:00:04 AM    Study Duration: ~24H  Study Information:  EEG Recording Technique: The patient underwent continuous Video-EEG monitoring, using Telemetry System hardware on the XLTek Digital System. EEG and video data were stored on a computer hard drive with important events saved in digital archive files. The material was reviewed by a physician (electroencephalographer / epileptologist) on a daily basis. Logan and seizure detection algorithms were utilized and reviewed. An EEG Technician attended to the patient, and was available throughout daytime work hours.  The epilepsy center neurologist was available in person or on call 24-hours per day.  EEG Placement and Labeling of Electrodes: The EEG was performed utilizing 20 channel referential EEG connections (coronal over temporal over parasagittal montage) using all standard 10-20 electrode placements with EKG, with additional electrodes placed in the inferior temporal region using the modified 10-10 montage electrode placements for elective admissions, or if deemed necessary. Recording was at a sampling rate of 256 samples per second per channel. Time synchronized digital video recording was done simultaneously with EEG recording. A low light infrared camera was used for low light recording.   History:  VEEG performed at Bedside [facility] : PT in semi coma / sedated No HV due to covid protcol No photic performed 67 Y/O Male PMH of lumbar herniated disc, spinal stenosis, back pain, myocardial infarction, HPT, GERD, sacroilitis, femur fracture on the right side, cerebral aneurysm rupture, osteoarthritis, narcotic dependence Presented for seizure evaluation   Medication VIMPAT IVPB KEPPRA IVPB LEVOPHED DIPRIVAN  Interpretation:  [[[Abbreviation Key:  PDR=alpha rhythm/posterior dominant rhythm. A-P=anterior posterior gradient.  Amplitude: ‘very low’:<20; ‘low’:20-50; ‘medium’:; ‘high’:>200uV.  Persistence for periodic/rhythmic patterns (% of epoch) ‘rare’:<1%; ‘occasional’:1-10%; ‘frequent’:10-50%; ‘abundant’:50-90%; ‘continuous’:>90%.  Persistence for sporadic discharges: ‘rare’:<1/hr; ‘occasional’:1/min-1/hr; ‘frequent’:>1/min; ‘abundant’:>1/10 sec.  GRDA=generalized rhythmic delta activity; FIRDA=frontal intermittent GRDA; LRDA=lateralized rhythmic delta activity; TIRDA=temporal intermittent rhythmic delta activity;  LPD=PLED=lateralized periodic discharges; GPD=generalized periodic discharges; BiPDs=BiPLEDs=bilateral independent periodic epileptiform discharges; SIRPID=stimulus induced rhythmic, periodic, or ictal appearing discharges; BIRDs=brief potentially ictal rhythmic discharges >4 Hz, lasting .5-10s; PFA (paroxysmal bursts >13 Hz or =8 Hz).  Modifiers: +F=with fast component; +S=with spike component; +R=with rhythmic component.  S-B=burst suppression pattern.  Max=maximal. N1-drowsy; N2-stage II sleep; N3-slow wave sleep. SSS/BETS=small sharp spikes/benign epileptiform transients of sleep. HV=hyperventilation; PS=photic stimulation]]]  Daily EEG Visual Analysis  FINDINGS: The background was continuous, spontaneously variable and reactive. During wakefulness, the posterior dominant rhythm was not recorded.  Background Slowing: Generalized slowing: diffuse theta/delta slowing Focal Slowing: none were present  Sleep Background: Drowsiness was characterized by fragmentation, attenuation, and slowing of the background activity.   Stage II sleep transients were not recorded.  Other Non-Epileptiform Findings: Breach effect over the right frontotemporal region characterized by higher amplitudes.  Interictal Epileptiform Activity:  Abundant sharp wave discharges over the right frontotemporal region (F4/F8), frequently occurring as brief to long runs of fluctuating 0.5-1.5 hz lateralized periodic discharge with rhythmic activity (LPD+R). There was increased power seen in the CSA over the right hemisphere, which may be due to breach effect. Runs of LPDs were more frequent after 00:00.  Events: Clinical events: None recorded. Seizures: None recorded.  Activation Procedures:  Hyperventilation was not performed.   Photic stimulation was not performed.  Artifacts: Intermittent myogenic and movement artifacts were noted.  ECG: The heart rate on single channel ECG was predominantly between  BPM.  EEG Summary / Classification:  Abnormal EEG in the encephalopathic patient. -Abundant sharp wave discharges over the right frontotemporal region (F4/F8), frequently occurring as brief to long runs of fluctuating 0.5-1.5 hz lateralized periodic discharge with rhythmic activity (LPD+R). Runs of LPDs were more frequent after 00:00. -Moderate generalized slowing -Breach effect over the right frontotemporal region  EEG Impression / Clinical Correlate:  High risk for seizures from the right frontotemporal region. Moderate multifocal/diffuse nonspecific cerebral dysfunction. 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 No seizure seen.  Findings d/w MICU team  Preliminary Fellow Report, final report pending attending review  Dario Flores MD Epilepsy Fellow  Reading Room: [phone] On Call Service After Hours: [phone]    REPORT OF CONTINUOUS VIDEO EEG   [facility]: [address], [place] [address], Ph#: [phone] [facility]: [phone] [place], [place] [address], [place] [address], Ph#: [phone] Office: [address] [place] [address] Ph#: [phone]  Patient Name: Dilan Alanis   Age: 66 year, : 1955 MRN #: -, Combs: -8 ICU BED #7 Referring Physician: - EEG #: [id number]  Study Date: 2022   Start Time: 0800    End Date:  22        End Time: 08:00:04 AM    Study Duration: ~24H  Study Information:  EEG Recording Technique: The patient underwent continuous Video-EEG monitoring, using Telemetry System hardware on the XLTek Digital System. EEG and video data were stored on a computer hard drive with important events saved in digital archive files. The material was reviewed by a physician (electroencephalographer / epileptologist) on a daily basis. Logan and seizure detection algorithms were utilized and reviewed. An EEG Technician attended to the patient, and was available throughout daytime work hours.  The epilepsy center neurologist was available in person or on call 24-hours per day.  EEG Placement and Labeling of Electrodes: The EEG was performed utilizing 20 channel referential EEG connections (coronal over temporal over parasagittal montage) using all standard 10-20 electrode placements with EKG, with additional electrodes placed in the inferior temporal region using the modified 10-10 montage electrode placements for elective admissions, or if deemed necessary. Recording was at a sampling rate of 256 samples per second per channel. Time synchronized digital video recording was done simultaneously with EEG recording. A low light infrared camera was used for low light recording.   History:  VEEG performed at [facility]side [facility] : PT in semi coma / sedated No HV due to covid protcol No photic performed 67 Y/O Male PMH of lumbar herniated disc, spinal stenosis, back pain, myocardial infarction, HPT, GERD, sacroilitis, femur fracture on the right side, cerebral aneurysm rupture, osteoarthritis, narcotic dependence Presented for seizure evaluation   Medication VIMPAT IVPB KEPPRA IVPB LEVOPHED DIPRIVAN  Interpretation:  [[[Abbreviation Key:  PDR=alpha rhythm/posterior dominant rhythm. A-P=anterior posterior gradient.  Amplitude: ‘very low’:<20; ‘low’:20-50; ‘medium’:; ‘high’:>200uV.  Persistence for periodic/rhythmic patterns (% of epoch) ‘rare’:<1%; ‘occasional’:1-10%; ‘frequent’:10-50%; ‘abundant’:50-90%; ‘continuous’:>90%.  Persistence for sporadic discharges: ‘rare’:<1/hr; ‘occasional’:1/min-1/hr; ‘frequent’:>1/min; ‘abundant’:>1/10 sec.  GRDA=generalized rhythmic delta activity; FIRDA=frontal intermittent GRDA; LRDA=lateralized rhythmic delta activity; TIRDA=temporal intermittent rhythmic delta activity;  LPD=PLED=lateralized periodic discharges; GPD=generalized periodic discharges; BiPDs=BiPLEDs=bilateral independent periodic epileptiform discharges; SIRPID=stimulus induced rhythmic, periodic, or ictal appearing discharges; BIRDs=brief potentially ictal rhythmic discharges >4 Hz, lasting .5-10s; PFA (paroxysmal bursts >13 Hz or =8 Hz).  Modifiers: +F=with fast component; +S=with spike component; +R=with rhythmic component.  S-B=burst suppression pattern.  Max=maximal. N1-drowsy; N2-stage II sleep; N3-slow wave sleep. SSS/BETS=small sharp spikes/benign epileptiform transients of sleep. HV=hyperventilation; PS=photic stimulation]]]  Daily EEG Visual Analysis  FINDINGS: The background was continuous, spontaneously variable and reactive. During wakefulness, the posterior dominant rhythm was not recorded.  Background Slowing: Generalized slowing: diffuse theta/delta slowing Focal Slowing: none were present  Sleep Background: Drowsiness was characterized by fragmentation, attenuation, and slowing of the background activity.   Stage II sleep transients were not recorded.  Other Non-Epileptiform Findings: Breach effect over the right frontotemporal region characterized by higher amplitudes.  Interictal Epileptiform Activity:  Abundant sharp wave discharges over the right frontotemporal region (F4/F8), frequently occurring as brief to long runs of fluctuating 0.5-2 hz lateralized periodic discharge with rhythmic activity (LPD+R). There was increased power seen in the CSA over the right hemisphere, which may be due to breach effect. Runs of LPDs were more frequent after 00:00.  Events: Clinical events: None recorded. Seizures: None recorded.  Activation Procedures:  Hyperventilation was not performed.   Photic stimulation was not performed.  Artifacts: Intermittent myogenic and movement artifacts were noted.  ECG: The heart rate on single channel ECG was predominantly between  BPM.  EEG Summary / Classification:  Abnormal EEG in the encephalopathic patient. -Abundant sharp wave discharges over the right frontotemporal region (F4/F8), frequently occurring as brief to long runs of fluctuating 0.5-2 hz lateralized periodic discharge with rhythmic activity (LPD+R). Runs of LPDs were more frequent after 00:00. -Moderate generalized slowing -Breach effect over the right frontotemporal region  EEG Impression / Clinical Correlate:  High risk for seizures from the right frontotemporal region. Moderate multifocal/diffuse nonspecific cerebral dysfunction. 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 No seizure seen.  Findings d/w MICU team   Dario Flores MD Epilepsy Fellow  Reading Room: [phone] On Call Service After Hours: [phone]

## 2022-05-08 NOTE — AIRWAY REMOVAL NOTE  ADULT & PEDS - ARTIFICAL AIRWAY REMOVAL COMMENTS
Written order for extubation verified. The patient was identified by full name and birth date compared to the identification band. Present during the procedure was TREE Vu.

## 2022-05-08 NOTE — DIETITIAN INITIAL EVALUATION ADULT - ADD RECOMMEND
2) If PO diet indicated recommend: DASH/TLC. Defer texture/consistency needs to team.   3) Add vitamin c and multivitamin to promote wound healing (unless contraindicated)   4) Consider DASH/TLC diet education prior to discharge.   5) Monitor nutritional intake/tolerance, weights, labs, hydration status, skin integrity, BM, GI symptoms.  2) Consider swallow evaluation for appropriate texture/consistency recommendations.   3) If PO diet indicated recommend: DASH/TLC. Defer texture/consistency needs to team.   4) Add vitamin c and multivitamin to promote wound healing (unless contraindicated)   5) Consider DASH/TLC diet education prior to discharge as appropriate.   6) Monitor nutritional intake/tolerance, weights, labs, hydration status, skin integrity, BM, GI symptoms.

## 2022-05-08 NOTE — DIETITIAN INITIAL EVALUATION ADULT - ETIOLOGY
Increased physiological demand for nutrients  decreased ability to consume adequate protein-energy secondary to inadequate diet order

## 2022-05-08 NOTE — PROGRESS NOTE ADULT - ATTENDING COMMENTS
Patient seen and examined in 8ICU. Patient critically ill requiring ICU level care. Patient is a 66M with extensive history including prior R. ACOMM aneurysm rupture and crani (1997) with seizure disorder who presents after an unwitnessed fall and possible seizure. He was intubated and admitted to MICU for further monitoring and care.    1. Neuro - CTH negative. Followup EEG thus far negative. Continue current AEDs. Patient purposeful and following commands  - Neuro followup and D/C EEG today  2. Trauma - unwitnessed fall. Continue C-collar for now. Await CT c-spine scheduled for this afternoon. Trauma followup as needed  3. Acute Hypoxemic Respiratory Failure - likely in setting of encephalopathy. Continue PSV with goal for extubation. Bronchodilators. Maintain O2 sat > 90%.   - outpatient followup for SREEKANTH. Seen by Dr. Eaton on prior visit.  4. Oropharyngeal dysphagia - swallow evaluation after extubation  5. Cardiovascular - shock state resolved and maintaining BP without vasopressors.  - 2D echo noted - diastolic dysfunction, EF 75%  6. Infectious Disease - continue antibiotics which were started in setting of fevers and hardware and followup repeat cultures  - BCx with MRSE  7. DVT proph - Lovenox Patient seen and examined in 8ICU. Patient critically ill requiring ICU level care. Patient is a 66M with extensive history including prior R. ACOMM aneurysm rupture and crani (1997) with seizure disorder who presents after an unwitnessed fall and possible seizure. He was intubated and admitted to MICU for further monitoring and care.    1. Neuro - CTH negative. Followup EEG thus far negative. Continue current AEDs. Patient purposeful and following commands  - Neuro followup and D/C EEG today  2. Trauma - unwitnessed fall. Continue C-collar for now. Await CT c-spine scheduled for this afternoon. Trauma followup as needed  3. Acute Hypoxemic Respiratory Failure - likely in setting of encephalopathy. Continue PSV with goal for extubation. Bronchodilators. Maintain O2 sat > 90%.   - outpatient followup for SREEKANTH. Seen by Dr. Eaton on prior visit.  4. Oropharyngeal dysphagia - swallow evaluation after extubation  5. Cardiovascular - shock state resolved and maintaining BP without vasopressors.  - 2D echo noted - diastolic dysfunction, EF 75%  6. Infectious Disease - continue antibiotics which were started in setting of fevers and hardware and followup repeat cultures  - BCx with MRSE  - D/C serrano - TOV  7. DVT proph - Lovenox

## 2022-05-08 NOTE — DIETITIAN INITIAL EVALUATION ADULT - ORAL INTAKE PTA/DIET HISTORY
Pt alert at time of RD visit, able to participate in nutrition interview (yes/no questions). Wife/Son at bedside provided full nutrition Hx. Pt with good appetite/PO intake PTA.

## 2022-05-08 NOTE — PROGRESS NOTE ADULT - ASSESSMENT
==============ASSESSMENT=============  RH with CAD s/p MI and stent, HTN, HLD, s/p 7 lumbar spinal surgeries. S/P R frontal craniotomy and clipping of R acomm aneurysm (1997). 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 On arrival, pt mental status was fluctuant between alert and somnolent. Breathing pattern concerning for possible jerel-busch v kussmaul pattern respirations. CT head performed in ED, followed by episodes of bradycardia with hypoxia and apnea. ED team also noted R arm shaking with complete absence of L UE movement. Due to suspicion for status epilepticus, pt was intubated for AW protection. Transferred to MICU for further management  ==============PLAN=============    #NEURO  #c/f seizure   -keppra 500 q12h   -c/w vimpat 150 q12h   -24h continuous EEG ongoing, no seizures noted   -change propofol to precedex to keep pt comfortable and breathing with vent   -appreciate neuro recs    #unwitnessed fall   - CTH wnl; pending CT c-spine after EEG, likely 5/8   - cervical collar in place   - CK wnl     #CARDIOVASCULAR  Hemodynamics - hypotensive    - has been off levo since 5/6 at 7:15pm, was likely initially needed because of prop side effect    - sepsis w/u cultures to r/o septic shock; see ID    Rhythm    - intermittent bradycardia-naun 2/2 propofol; now resolved    - ICH/cushing reflex r/o on CT    - ecg without heart block      #RESPIRATORY  #hypoxic episodes - concern for seizure activity    - intubated for aw protection on 5/6, on FiO2 30% currently     - resolved  #Secretions   - will hold off extubation d/t thick secretions that were noted today   - standing q6h duonebs   - re-evaluate in AM, C-pap trial around 4am with possible extubation if well-tolerated in AM   #irregular respirations- resolved    - jerel-busch v kussmaul    - beta hydroxybutyrate wnl    #RENAL  - no issues     #GI  - AW protection; NGT in place  - not starting feeds d/t possible extubation    #ENDO  - no issues    #HEME/ONC  - A/C: DVT ppx lovenox; ASA after extubation  - hold half dose xarelto    #ID  # Sepsis   - leukocytosis on admission however afebrile so thought to be reactive 2/2 seizure  - UA negative  -bcx from 5/6 showed 1 bottle growing GPC in clusters; given hx of spinal hardware and new fever of 100.4 on 5/7 will repeat blood cultures and empirically start vanc (5/7- ) and zosyn (5/7- ), sputum cx ordered   ==============ASSESSMENT=============  RH with CAD s/p MI and stent, HTN, HLD, s/p 7 lumbar spinal surgeries. S/P R frontal craniotomy and clipping of R acomm aneurysm (1997). 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 On arrival, pt mental status was fluctuant between alert and somnolent. Breathing pattern concerning for possible jerel-busch v kussmaul pattern respirations. CT head performed in ED, followed by episodes of bradycardia with hypoxia and apnea. ED team also noted R arm shaking with complete absence of L UE movement. Due to suspicion for status epilepticus, pt was intubated for AW protection. Transferred to MICU for further management  ==============PLAN=============    #NEURO  #c/f seizure   -keppra 500 q12h   -c/w vimpat 150 q12h   -ongoing activity concerning for possible seizure discussed with EEG fellow 5/8 would keep EEG on   -appreciate neurology recommendations     #unwitnessed fall   - CTH wnl; pending CT c-spine after EEG, likely 5/8   - cervical collar in place   - CK wnl     #CARDIOVASCULAR  Hemodynamics - hypotensive    - has been off levo since 5/6 at 7:15pm, was likely initially needed because of prop side effect    - sepsis w/u cultures to r/o septic shock; see ID    Rhythm    - intermittent bradycardia-nanu 2/2 propofol; now resolved    - ICH/cushing reflex r/o on CT    - ecg without heart block      #RESPIRATORY  #hypoxic episodes - concern for seizure activity    - intubated for aw protection on 5/6, on FiO2 30% currently     - resolved    #Secretions   - will hold off extubation d/t thick secretions that were noted today   - standing q6h duonebs   - tolerated CPAP trial and extubated 5/8 AM     #irregular respirations- resolved    - jerel-busch v kussmaul    - beta hydroxybutyrate wnl    #RENAL  - no issues     #GI  - AW protection; NGT in place  - not starting feeds d/t possible extubation    #ENDO  - no issues    #HEME/ONC  - A/C: DVT ppx lovenox; ASA after extubation  - hold half dose xarelto    #ID  # Sepsis   - leukocytosis on admission however afebrile so thought to be reactive 2/2 seizure  - UA negative  -bcx from 5/6 showed 1 bottle growing GPC in clusters; given hx of spinal hardware and new fever of 100.4 on 5/7 will repeat blood cultures and empirically start vanc (5/7- ) and zosyn (5/7- ), sputum cx ordered

## 2022-05-08 NOTE — DIETITIAN INITIAL EVALUATION ADULT - OTHER INFO
NKFA/intolerances reported.     Current admission weight of 251.3 pounds (dosing 05/06). Wife reports 50 pounds weight gain x 1 year in setting of medication regimen alterations. RD to continue to monitor weight trends as available/able.

## 2022-05-08 NOTE — CHART NOTE - NSCHARTNOTEFT_GEN_A_CORE
66 y.o. RH M with PMH of CAD s/p MI and stent, HTN, HLD, s/p 7 lumbar spinal surgeries, spontaneous R Acomm aneurysm rupture s/p R frontal craniotomy and clipping (1997), s/p R prosthetic femor ORIF 3/30/21, on xarelto presented to the ED after an unwitnessed fall from then nursing home facility ([facility]). Neurology consulted for status epilepticus.    Continuous EEG 5/8/22  Interictal Epileptiform Activity:   Abundant sharp wave discharges over the right frontotemporal region (F4/F8), frequently occurring as brief to long runs of fluctuating 0.5-2 hz lateralized periodic discharge with rhythmic activity (LPD+R). There was increased power seen in the CSA over the right hemisphere, which may be due to breach effect. Runs of LPDs were more frequent after 00:00.    Events:  Clinical events: None recorded.  Seizures: None recorded.  Artifacts:  Intermittent myogenic and movement artifacts were noted.  ECG:  The heart rate on single channel ECG was predominantly between  BPM.    EEG Summary / Classification:    Abnormal EEG in the encephalopathic patient.  -Abundant sharp wave discharges over the right frontotemporal region (F4/F8), frequently occurring as brief to long runs of fluctuating 0.5-2 hz lateralized periodic discharge with rhythmic activity (LPD+R). Runs of LPDs were more frequent after 00:00.  -Moderate generalized slowing  -Breach effect over the right frontotemporal region    EEG Impression / Clinical Correlate:    High risk for seizures from the right frontotemporal region.  Moderate multifocal/diffuse nonspecific cerebral dysfunction.  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  No seizure seen.    Findings d/w MICU team    Recommendation:  Load Keppra 1500mg now, and increase standing dose to 1000mg twice daily.

## 2022-05-08 NOTE — DIETITIAN INITIAL EVALUATION ADULT - ENTERAL
1) If EN indicated would recommend: Jevity 1.2 started @ 10mL/hr increased 10mL q3h to goal rate. Goal rate = 70mL/hr x 24 hr to provide: 1680mL total volume, 2016 sunshine (29 sunshine/kg), 93 Gm protein (1.3 sunshine/kg), 1356 mL free H2O (Based on IBW 69.9 kg). Defer free water flushes to team.

## 2022-05-08 NOTE — DIETITIAN INITIAL EVALUATION ADULT - REASON FOR ADMISSION
Per chart "RH with CAD s/p MI and stent, HTN, HLD, s/p 7 lumbar spinal surgeries. S/P R frontal craniotomy and clipping of R acomm aneurysm (1997). 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 On arrival, pt mental status was fluctuant between alert and somnolent. Breathing pattern concerning for possible jerel-busch v kussmaul pattern respirations. CT head performed in ED, followed by episodes of bradycardia with hypoxia and apnea. ED team also noted R arm shaking with complete absence of L UE movement. Due to suspicion for status epilepticus, pt was intubated for AW protection. Transferred to MICU for further management"

## 2022-05-08 NOTE — DIETITIAN INITIAL EVALUATION ADULT - PERTINENT LABORATORY DATA
05-08    140  |  106  |  12  ----------------------------<  162<H>  3.3<L>   |  19<L>  |  0.99    Ca    8.7      08 May 2022 02:10  Phos  2.5     05-08  Mg     1.6     05-08    TPro  6.1  /  Alb  3.0<L>  /  TBili  0.6  /  DBili  x   /  AST  17  /  ALT  12  /  AlkPhos  138<H>  05-08  A1C with Estimated Average Glucose Result: 5.3 % (04-23-22 @ 15:09)

## 2022-05-08 NOTE — DIETITIAN INITIAL EVALUATION ADULT - REASON INDICATOR FOR ASSESSMENT
Consult ordered for: pressure injury > stage 2  Source: EMR, Pt interview with family at bedside (Wife, Son - Pt s/p extubation able to answer yes/no questions), communication with team

## 2022-05-08 NOTE — DIETITIAN INITIAL EVALUATION ADULT - NSFNSPHYEXAMSKINFT_GEN_A_CORE
Skin as per nursing flowsheets: pressure injury #1 (sacrum/phuc. buttocks - DTI), pressure injury #2 (right heel, DTI), pressure injury #3 (Left heel, DTI)

## 2022-05-08 NOTE — PROGRESS NOTE ADULT - SUBJECTIVE AND OBJECTIVE BOX
Alexi Zayas MD  Internal Medicine  Pager #[phone]    CHIEF COMPLAINT:Patient is a 66y old  Male who presents with a chief complaint of status epilepticus/AMS (07 May 2022 07:24)        Interval Events:    REVIEW OF SYSTEMS:      OBJECTIVE:  ICU Vital Signs Last 24 Hrs  T(C): 37 (08 May 2022 03:00), Max: 38 (07 May 2022 19:00)  T(F): 98.6 (08 May 2022 03:00), Max: 100.4 (07 May 2022 19:00)  HR: 83 (08 May 2022 07:00) (64 - 105)  BP: 146/67 (08 May 2022 07:00) (85/60 - 167/91)  BP(mean): 97 (08 May 2022 07:00) (65 - 119)  ABP: --  ABP(mean): --  RR: 20 (08 May 2022 07:00) (13 - 32)  SpO2: 100% (08 May 2022 07:00) (97% - 100%)    Mode: AC/ CMV (Assist Control/ Continuous Mandatory Ventilation), RR (machine): 20, TV (machine): 450, FiO2: 30, PEEP: 5, ITime: 1, MAP: 8, PIP: 18    05-07 @ 07:01  -  -08 @ 07:00  --------------------------------------------------------  IN: 1683.5 mL / OUT: 350 mL / NET: 1333.5 mL      CAPILLARY BLOOD GLUCOSE          PHYSICAL EXAM:      LINES:    HOSPITAL MEDICATIONS:  Standing Meds:  albuterol/ipratropium for Nebulization 3 milliLiter(s) Nebulizer every 6 hours  chlorhexidine 0.12% Liquid 15 milliLiter(s) Oral Mucosa every 12 hours  chlorhexidine 4% Liquid 1 Application(s) Topical <User Schedule>  dexMEDEtomidine Infusion 0.3 MICROgram(s)/kG/Hr IV Continuous <Continuous>  dextrose 5% + lactated ringers. 1000 milliLiter(s) IV Continuous <Continuous>  enoxaparin Injectable 40 milliGRAM(s) SubCutaneous every 24 hours  lacosamide IVPB 150 milliGRAM(s) IV Intermittent every 12 hours  levETIRAcetam  IVPB 500 milliGRAM(s) IV Intermittent every 12 hours  piperacillin/tazobactam IVPB.. 3.375 Gram(s) IV Intermittent every 8 hours  potassium chloride   Powder 40 milliEquivalent(s) Oral once  propofol Infusion 40 MICROgram(s)/kG/Min IV Continuous <Continuous>  vancomycin  IVPB 1750 milliGRAM(s) IV Intermittent every 12 hours      PRN Meds:      LABS:                        10.3   12.42 )-----------( 288      ( 08 May 2022 02:10 )             33.1     Hgb Trend: 10.3<--, 11.5<--, 11.9<--, 13.2<--      140  |  106  |  12  ----------------------------<  162<H>  3.3<L>   |  19<L>  |  0.99    Ca    8.7      08 May 2022 02:10  Phos  2.5       Mg     1.6         TPro  6.1  /  Alb  3.0<L>  /  TBili  0.6  /  DBili  x   /  AST  17  /  ALT  12  /  AlkPhos  138<H>      Creatinine Trend: 0.99<--, 0.95<--, 0.86<--, 0.92<--, 0.94<--, 1.28<--  PT/INR - ( 08 May 2022 02:11 )   PT: 14.6 sec;   INR: 1.26 ratio         PTT - ( 08 May 2022 02:11 )  PTT:30.9 sec  Urinalysis Basic - ( 07 May 2022 14:40 )    Color: Yellow / Appearance: Clear / S.035 / pH: x  Gluc: x / Ketone: Negative  / Bili: Negative / Urobili: Negative   Blood: x / Protein: 30 mg/dL / Nitrite: Negative   Leuk Esterase: Small / RBC: 1 /hpf / WBC 9 /HPF   Sq Epi: x / Non Sq Epi: 2 /hpf / Bacteria: Negative      Arterial Blood Gas:   @ 02:03  7.48/31/97/23/98.4/0.1  ABG lactate: --  Arterial Blood Gas:   @ 15:28  7.51/30/102/24/98.7/1.5  ABG lactate: --  Arterial Blood Gas:   @ 02:33  7.45/36/182/25/99.6/1.2  ABG lactate: --  Arterial Blood Gas:   @ 18:47  7.40/37/177/23/98.9/-1.6  ABG lactate: --    Venous Blood Gas:   @ 19:11  7.30/52/25/26/31.8  VBG Lactate: 2.6  Venous Blood Gas:   @ 19:08  7.28/41/31/19/50.4  VBG Lactate: 1.2      MICROBIOLOGY:     Culture - Sputum (collected 07 May 2022 21:38)  Source: ET Tube ET Tube  Gram Stain (08 May 2022 06:44):    Moderate polymorphonuclear leukocytes per low power field    Rare Squamous epithelial cells per low power field    Moderate Gram positive cocci in pairs seen per oil power field    Culture - Blood (collected 06 May 2022 18:55)  Source: .Blood Blood-Venous  Preliminary Report (07 May 2022 23:01):    No growth to date.    Culture - Blood (collected 06 May 2022 18:40)  Source: .Blood Blood-Peripheral  Gram Stain (07 May 2022 18:47):    Growth in aerobic and anaerobic bottles: Gram Positive Cocci in Clusters  Preliminary Report (07 May 2022 18:47):    Growth in aerobic and anaerobic bottles: Gram Positive Cocci in Clusters    ***Blood Panel PCR results on this specimen are available    approximately 3 hours after the Gram stain result.***    Gram stain, PCR, and/or culture results may not always    correspond due to difference in methodologies.    ************************************************************    This PCR assay was performed by multiplex PCR. This    Assay tests for 66 bacterial and resistance gene targets.    Please refer to the [facility] Labs test directory    at [email or web address] for details.  Organism: Blood Culture PCR (07 May 2022 20:30)  Organism: Blood Culture PCR (07 May 2022 20:30)      RADIOLOGY:  [ ] Reviewed and interpreted by me    EKG:     Alexi Zayas MD  Internal Medicine  Pager #[phone]    CHIEF COMPLAINT:Patient is a 66y old  Male who presents with a chief complaint of status epilepticus/AMS (07 May 2022 07:24)        Interval Events:  CPAP/PSV comfortable.     REVIEW OF SYSTEMS:  Const no fevers chills sweats  CV no chest pain or palp  Pulm no SOB on PSV      OBJECTIVE:  ICU Vital Signs Last 24 Hrs  T(C): 37 (08 May 2022 03:00), Max: 38 (07 May 2022 19:00)  T(F): 98.6 (08 May 2022 03:00), Max: 100.4 (07 May 2022 19:00)  HR: 83 (08 May 2022 07:00) (64 - 105)  BP: 146/67 (08 May 2022 07:00) (85/60 - 167/91)  BP(mean): 97 (08 May 2022 07:00) (65 - 119)  ABP: --  ABP(mean): --  RR: 20 (08 May 2022 07:00) (13 - 32)  SpO2: 100% (08 May 2022 07:00) (97% - 100%)    Mode: AC/ CMV (Assist Control/ Continuous Mandatory Ventilation), RR (machine): 20, TV (machine): 450, FiO2: 30, PEEP: 5, ITime: 1, MAP: 8, PIP: 18    05-07 @ 07:01  -  05-08 @ 07:00  --------------------------------------------------------  IN: 1683.5 mL / OUT: 350 mL / NET: 1333.5 mL      CAPILLARY BLOOD GLUCOSE          PHYSICAL EXAM:  General: NAD, sedated, resting comfortably  HEENT: NC/AT; PERRL, clear conjunctiva, c-collar in place   Neck: supple  Respiratory: CTA b/l  Cardiovascular: +S1/S2; RRR  Abdomen: soft, NT/ND; +BS x4  Extremities: WWP, 2+ peripheral pulses b/l; no LE edema  Skin: normal color and turgor; no rash  Neurological: follows commands       LINES:    HOSPITAL MEDICATIONS:  Standing Meds:  albuterol/ipratropium for Nebulization 3 milliLiter(s) Nebulizer every 6 hours  chlorhexidine 0.12% Liquid 15 milliLiter(s) Oral Mucosa every 12 hours  chlorhexidine 4% Liquid 1 Application(s) Topical <User Schedule>  dexMEDEtomidine Infusion 0.3 MICROgram(s)/kG/Hr IV Continuous <Continuous>  dextrose 5% + lactated ringers. 1000 milliLiter(s) IV Continuous <Continuous>  enoxaparin Injectable 40 milliGRAM(s) SubCutaneous every 24 hours  lacosamide IVPB 150 milliGRAM(s) IV Intermittent every 12 hours  levETIRAcetam  IVPB 500 milliGRAM(s) IV Intermittent every 12 hours  piperacillin/tazobactam IVPB.. 3.375 Gram(s) IV Intermittent every 8 hours  potassium chloride   Powder 40 milliEquivalent(s) Oral once  propofol Infusion 40 MICROgram(s)/kG/Min IV Continuous <Continuous>  vancomycin  IVPB 1750 milliGRAM(s) IV Intermittent every 12 hours      PRN Meds:      LABS:                        10.3   12.42 )-----------( 288      ( 08 May 2022 02:10 )             33.1     Hgb Trend: 10.3<--, 11.5<--, 11.9<--, 13.2<--  05-08    140  |  106  |  12  ----------------------------<  162<H>  3.3<L>   |  19<L>  |  0.99    Ca    8.7      08 May 2022 02:10  Phos  2.5     08  Mg     1.6         TPro  6.1  /  Alb  3.0<L>  /  TBili  0.6  /  DBili  x   /  AST  17  /  ALT  12  /  AlkPhos  138<H>  05-08    Creatinine Trend: 0.99<--, 0.95<--, 0.86<--, 0.92<--, 0.94<--, 1.28<--  PT/INR - ( 08 May 2022 02:11 )   PT: 14.6 sec;   INR: 1.26 ratio         PTT - ( 08 May 2022 02:11 )  PTT:30.9 sec  Urinalysis Basic - ( 07 May 2022 14:40 )    Color: Yellow / Appearance: Clear / S.035 / pH: x  Gluc: x / Ketone: Negative  / Bili: Negative / Urobili: Negative   Blood: x / Protein: 30 mg/dL / Nitrite: Negative   Leuk Esterase: Small / RBC: 1 /hpf / WBC 9 /HPF   Sq Epi: x / Non Sq Epi: 2 /hpf / Bacteria: Negative      Arterial Blood Gas:   @ 02:03  7.48/31/97/23/98.4/0.1  ABG lactate: --  Arterial Blood Gas:   @ 15:28  7.51/30/102/24/98.7/1.5  ABG lactate: --  Arterial Blood Gas:   @ 02:33  7.45/36/182/25/99.6/1.2  ABG lactate: --  Arterial Blood Gas:   @ 18:47  7.40/37/177/23/98.9/-1.6  ABG lactate: --    Venous Blood Gas:   @ 19:11  7.30/52/25/26/31.8  VBG Lactate: 2.6  Venous Blood Gas:   @ 19:08  7.28/41/31/19/50.4  VBG Lactate: 1.2      MICROBIOLOGY:     Culture - Sputum (collected 07 May 2022 21:38)  Source: ET Tube ET Tube  Gram Stain (08 May 2022 06:44):    Moderate polymorphonuclear leukocytes per low power field    Rare Squamous epithelial cells per low power field    Moderate Gram positive cocci in pairs seen per oil power field    Culture - Blood (collected 06 May 2022 18:55)  Source: .Blood Blood-Venous  Preliminary Report (07 May 2022 23:01):    No growth to date.    Culture - Blood (collected 06 May 2022 18:40)  Source: .Blood Blood-Peripheral  Gram Stain (07 May 2022 18:47):    Growth in aerobic and anaerobic bottles: Gram Positive Cocci in Clusters  Preliminary Report (07 May 2022 18:47):    Growth in aerobic and anaerobic bottles: Gram Positive Cocci in Clusters    ***Blood Panel PCR results on this specimen are available    approximately 3 hours after the Gram stain result.***    Gram stain, PCR, and/or culture results may not always    correspond due to difference in methodologies.    ************************************************************    This PCR assay was performed by multiplex PCR. This    Assay tests for 66 bacterial and resistance gene targets.    Please refer to the [facility] Labs test directory    at [email or web address] for details.  Organism: Blood Culture PCR (07 May 2022 20:30)  Organism: Blood Culture PCR (07 May 2022 20:30)      RADIOLOGY:  [ ] Reviewed and interpreted by me    EKG:

## 2022-05-09 LAB
-  AMPICILLIN/SULBACTAM: SIGNIFICANT CHANGE UP
-  CEFAZOLIN: SIGNIFICANT CHANGE UP
-  CLINDAMYCIN: SIGNIFICANT CHANGE UP
-  ERYTHROMYCIN: SIGNIFICANT CHANGE UP
-  GENTAMICIN: SIGNIFICANT CHANGE UP
-  OXACILLIN: SIGNIFICANT CHANGE UP
-  RIFAMPIN: SIGNIFICANT CHANGE UP
-  TETRACYCLINE: SIGNIFICANT CHANGE UP
-  TRIMETHOPRIM/SULFAMETHOXAZOLE: SIGNIFICANT CHANGE UP
-  VANCOMYCIN: SIGNIFICANT CHANGE UP
ALBUMIN SERPL ELPH-MCNC: 3 G/DL — LOW (ref 3.3–5)
ALBUMIN SERPL ELPH-MCNC: 3.3 G/DL — SIGNIFICANT CHANGE UP (ref 3.3–5)
ALP SERPL-CCNC: 129 U/L — HIGH (ref 40–120)
ALP SERPL-CCNC: 131 U/L — HIGH (ref 40–120)
ALT FLD-CCNC: 15 U/L — SIGNIFICANT CHANGE UP (ref 10–45)
ALT FLD-CCNC: 24 U/L — SIGNIFICANT CHANGE UP (ref 10–45)
ANION GAP SERPL CALC-SCNC: 13 MMOL/L — SIGNIFICANT CHANGE UP (ref 5–17)
ANION GAP SERPL CALC-SCNC: 15 MMOL/L — SIGNIFICANT CHANGE UP (ref 5–17)
AST SERPL-CCNC: 20 U/L — SIGNIFICANT CHANGE UP (ref 10–40)
AST SERPL-CCNC: 29 U/L — SIGNIFICANT CHANGE UP (ref 10–40)
BASOPHILS # BLD AUTO: 0.05 K/UL — SIGNIFICANT CHANGE UP (ref 0–0.2)
BASOPHILS NFR BLD AUTO: 0.4 % — SIGNIFICANT CHANGE UP (ref 0–2)
BILIRUB SERPL-MCNC: 0.4 MG/DL — SIGNIFICANT CHANGE UP (ref 0.2–1.2)
BILIRUB SERPL-MCNC: 0.6 MG/DL — SIGNIFICANT CHANGE UP (ref 0.2–1.2)
BUN SERPL-MCNC: 6 MG/DL — LOW (ref 7–23)
BUN SERPL-MCNC: 8 MG/DL — SIGNIFICANT CHANGE UP (ref 7–23)
CALCIUM SERPL-MCNC: 8.5 MG/DL — SIGNIFICANT CHANGE UP (ref 8.4–10.5)
CALCIUM SERPL-MCNC: 8.8 MG/DL — SIGNIFICANT CHANGE UP (ref 8.4–10.5)
CHLORIDE SERPL-SCNC: 105 MMOL/L — SIGNIFICANT CHANGE UP (ref 96–108)
CHLORIDE SERPL-SCNC: 107 MMOL/L — SIGNIFICANT CHANGE UP (ref 96–108)
CO2 SERPL-SCNC: 20 MMOL/L — LOW (ref 22–31)
CO2 SERPL-SCNC: 22 MMOL/L — SIGNIFICANT CHANGE UP (ref 22–31)
CREAT SERPL-MCNC: 1 MG/DL — SIGNIFICANT CHANGE UP (ref 0.5–1.3)
CREAT SERPL-MCNC: 1.16 MG/DL — SIGNIFICANT CHANGE UP (ref 0.5–1.3)
CULTURE RESULTS: SIGNIFICANT CHANGE UP
EGFR: 69 ML/MIN/1.73M2 — SIGNIFICANT CHANGE UP
EGFR: 82 ML/MIN/1.73M2 — SIGNIFICANT CHANGE UP
EOSINOPHIL # BLD AUTO: 0.01 K/UL — SIGNIFICANT CHANGE UP (ref 0–0.5)
EOSINOPHIL NFR BLD AUTO: 0.1 % — SIGNIFICANT CHANGE UP (ref 0–6)
GLUCOSE SERPL-MCNC: 116 MG/DL — HIGH (ref 70–99)
GLUCOSE SERPL-MCNC: 125 MG/DL — HIGH (ref 70–99)
HCT VFR BLD CALC: 33.1 % — LOW (ref 39–50)
HGB BLD-MCNC: 10.5 G/DL — LOW (ref 13–17)
IMM GRANULOCYTES NFR BLD AUTO: 0.8 % — SIGNIFICANT CHANGE UP (ref 0–1.5)
LYMPHOCYTES # BLD AUTO: 1.52 K/UL — SIGNIFICANT CHANGE UP (ref 1–3.3)
LYMPHOCYTES # BLD AUTO: 11 % — LOW (ref 13–44)
MAGNESIUM SERPL-MCNC: 1.6 MG/DL — SIGNIFICANT CHANGE UP (ref 1.6–2.6)
MAGNESIUM SERPL-MCNC: 2.4 MG/DL — SIGNIFICANT CHANGE UP (ref 1.6–2.6)
MCHC RBC-ENTMCNC: 29.2 PG — SIGNIFICANT CHANGE UP (ref 27–34)
MCHC RBC-ENTMCNC: 31.7 GM/DL — LOW (ref 32–36)
MCV RBC AUTO: 92.2 FL — SIGNIFICANT CHANGE UP (ref 80–100)
METHOD TYPE: SIGNIFICANT CHANGE UP
MONOCYTES # BLD AUTO: 1.15 K/UL — HIGH (ref 0–0.9)
MONOCYTES NFR BLD AUTO: 8.4 % — SIGNIFICANT CHANGE UP (ref 2–14)
MRSA PCR RESULT.: SIGNIFICANT CHANGE UP
MRSA PCR RESULT.: SIGNIFICANT CHANGE UP
NEUTROPHILS # BLD AUTO: 10.92 K/UL — HIGH (ref 1.8–7.4)
NEUTROPHILS NFR BLD AUTO: 79.3 % — HIGH (ref 43–77)
NRBC # BLD: 0 /100 WBCS — SIGNIFICANT CHANGE UP (ref 0–0)
ORGANISM # SPEC MICROSCOPIC CNT: SIGNIFICANT CHANGE UP
ORGANISM # SPEC MICROSCOPIC CNT: SIGNIFICANT CHANGE UP
PHOSPHATE SERPL-MCNC: 2.4 MG/DL — LOW (ref 2.5–4.5)
PHOSPHATE SERPL-MCNC: 2.6 MG/DL — SIGNIFICANT CHANGE UP (ref 2.5–4.5)
PLATELET # BLD AUTO: 317 K/UL — SIGNIFICANT CHANGE UP (ref 150–400)
POTASSIUM SERPL-MCNC: 3.1 MMOL/L — LOW (ref 3.5–5.3)
POTASSIUM SERPL-MCNC: 3.5 MMOL/L — SIGNIFICANT CHANGE UP (ref 3.5–5.3)
POTASSIUM SERPL-SCNC: 3.1 MMOL/L — LOW (ref 3.5–5.3)
POTASSIUM SERPL-SCNC: 3.5 MMOL/L — SIGNIFICANT CHANGE UP (ref 3.5–5.3)
PROT SERPL-MCNC: 6.3 G/DL — SIGNIFICANT CHANGE UP (ref 6–8.3)
PROT SERPL-MCNC: 6.6 G/DL — SIGNIFICANT CHANGE UP (ref 6–8.3)
RBC # BLD: 3.59 M/UL — LOW (ref 4.2–5.8)
RBC # FLD: 15.9 % — HIGH (ref 10.3–14.5)
S AUREUS DNA NOSE QL NAA+PROBE: DETECTED
S AUREUS DNA NOSE QL NAA+PROBE: DETECTED
SODIUM SERPL-SCNC: 140 MMOL/L — SIGNIFICANT CHANGE UP (ref 135–145)
SODIUM SERPL-SCNC: 142 MMOL/L — SIGNIFICANT CHANGE UP (ref 135–145)
SPECIMEN SOURCE: SIGNIFICANT CHANGE UP
VANCOMYCIN TROUGH SERPL-MCNC: 28.1 UG/ML — CRITICAL HIGH (ref 10–20)
WBC # BLD: 13.76 K/UL — HIGH (ref 3.8–10.5)
WBC # FLD AUTO: 13.76 K/UL — HIGH (ref 3.8–10.5)

## 2022-05-09 PROCEDURE — 99233 SBSQ HOSP IP/OBS HIGH 50: CPT | Mod: GC

## 2022-05-09 PROCEDURE — 73502 X-RAY EXAM HIP UNI 2-3 VIEWS: CPT | Mod: 26,RT

## 2022-05-09 PROCEDURE — 73552 X-RAY EXAM OF FEMUR 2/>: CPT | Mod: 26,RT

## 2022-05-09 PROCEDURE — 99291 CRITICAL CARE FIRST HOUR: CPT | Mod: GC

## 2022-05-09 RX ORDER — ACETAMINOPHEN 500 MG
650 TABLET ORAL ONCE
Refills: 0 | Status: COMPLETED | OUTPATIENT
Start: 2022-05-09 | End: 2022-05-09

## 2022-05-09 RX ORDER — POTASSIUM CHLORIDE 20 MEQ
40 PACKET (EA) ORAL ONCE
Refills: 0 | Status: COMPLETED | OUTPATIENT
Start: 2022-05-09 | End: 2022-05-09

## 2022-05-09 RX ORDER — POTASSIUM PHOSPHATE, MONOBASIC POTASSIUM PHOSPHATE, DIBASIC 236; 224 MG/ML; MG/ML
15 INJECTION, SOLUTION INTRAVENOUS ONCE
Refills: 0 | Status: COMPLETED | OUTPATIENT
Start: 2022-05-09 | End: 2022-05-09

## 2022-05-09 RX ORDER — POTASSIUM CHLORIDE 20 MEQ
40 PACKET (EA) ORAL
Refills: 0 | Status: COMPLETED | OUTPATIENT
Start: 2022-05-09 | End: 2022-05-09

## 2022-05-09 RX ORDER — ASPIRIN/CALCIUM CARB/MAGNESIUM 324 MG
81 TABLET ORAL DAILY
Refills: 0 | Status: DISCONTINUED | OUTPATIENT
Start: 2022-05-09 | End: 2022-05-13

## 2022-05-09 RX ORDER — MAGNESIUM SULFATE 500 MG/ML
2 VIAL (ML) INJECTION ONCE
Refills: 0 | Status: COMPLETED | OUTPATIENT
Start: 2022-05-09 | End: 2022-05-09

## 2022-05-09 RX ORDER — AMLODIPINE BESYLATE 2.5 MG/1
5 TABLET ORAL DAILY
Refills: 0 | Status: DISCONTINUED | OUTPATIENT
Start: 2022-05-09 | End: 2022-05-10

## 2022-05-09 RX ADMIN — Medication 5 MILLIGRAM(S): at 12:27

## 2022-05-09 RX ADMIN — LEVETIRACETAM 400 MILLIGRAM(S): 250 TABLET, FILM COATED ORAL at 17:21

## 2022-05-09 RX ADMIN — LEVETIRACETAM 400 MILLIGRAM(S): 250 TABLET, FILM COATED ORAL at 05:18

## 2022-05-09 RX ADMIN — Medication 250 MILLIGRAM(S): at 05:19

## 2022-05-09 RX ADMIN — Medication 250 MILLIGRAM(S): at 17:22

## 2022-05-09 RX ADMIN — AMLODIPINE BESYLATE 5 MILLIGRAM(S): 2.5 TABLET ORAL at 14:43

## 2022-05-09 RX ADMIN — LACOSAMIDE 130 MILLIGRAM(S): 50 TABLET ORAL at 17:22

## 2022-05-09 RX ADMIN — Medication 500 MILLIGRAM(S): at 11:56

## 2022-05-09 RX ADMIN — Medication 25 GRAM(S): at 10:28

## 2022-05-09 RX ADMIN — Medication 25 MILLIGRAM(S): at 22:00

## 2022-05-09 RX ADMIN — POTASSIUM PHOSPHATE, MONOBASIC POTASSIUM PHOSPHATE, DIBASIC 62.5 MILLIMOLE(S): 236; 224 INJECTION, SOLUTION INTRAVENOUS at 22:00

## 2022-05-09 RX ADMIN — Medication 3 MILLILITER(S): at 13:07

## 2022-05-09 RX ADMIN — Medication 1 TABLET(S): at 11:57

## 2022-05-09 RX ADMIN — Medication 25 MILLIGRAM(S): at 10:28

## 2022-05-09 RX ADMIN — Medication 40 MILLIEQUIVALENT(S): at 09:45

## 2022-05-09 RX ADMIN — Medication 650 MILLIGRAM(S): at 23:49

## 2022-05-09 RX ADMIN — Medication 40 MILLIEQUIVALENT(S): at 04:24

## 2022-05-09 RX ADMIN — CHLORHEXIDINE GLUCONATE 1 APPLICATION(S): 213 SOLUTION TOPICAL at 05:19

## 2022-05-09 RX ADMIN — Medication 40 MILLIEQUIVALENT(S): at 05:19

## 2022-05-09 RX ADMIN — ENOXAPARIN SODIUM 40 MILLIGRAM(S): 100 INJECTION SUBCUTANEOUS at 11:57

## 2022-05-09 RX ADMIN — LACOSAMIDE 130 MILLIGRAM(S): 50 TABLET ORAL at 05:18

## 2022-05-09 NOTE — PROGRESS NOTE ADULT - ASSESSMENT
66 y.o. 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 Neurology consulted for status epilepticus. Neuro exam limited due to sedation. CTH showing no acute findings, chronic post surgical changes    Impression: 1) Unwitnessed fall and decreased level of consciousness of unclear etiology. Concerned for status epilepticus in the setting of known R fronto temporal epileptogenic focus, vital sign derangement, and report of RUE shaking, could have initially been focal to generalized with impaired awareness. R/o infectious, toxic metabolic 2) L hemiplegia - has known jennifer's paralysis and post-ictal confusion due to R frontal encephalomalacia     Recommendations:  [] dc'd vEEG  [] C/w Vimpat 150 mg q12  [] C/w Keppra 1000 mg q12  [] S/p Keppra 1500mg load x1 5/8  [] Seizure precaution  [] Neurocheck and vital per MICU protocol  [] Will continue to follow    Case discussed and seen with neuro attending Dr. Teixeira   66 y.o. RH M with PMH of CAD s/p MI and stent, HTN, HLD, s/p 7 lumbar spinal surgeries, spontaneous R Acomm aneurysm rupture s/p R frontal craniotomy and clipping (1997), s/p R prosthetic femor ORIF 3/30/21, on xarelto presented to the ED after an unwitnessed fall from then nursing home facility ([facility]). Neurology consulted for status epilepticus. Neuro exam limited due to sedation. CTH showing no acute findings, chronic post surgical changes. EEG showing high risk for seizures in R frontotemporal region.     Impression: 1) Unwitnessed fall and decreased level of consciousness of unclear etiology. Concerned for status epilepticus in the setting of known R fronto temporal epileptogenic focus, vital sign derangement, and report of RUE shaking, could have initially been focal to generalized with impaired awareness. R/o infectious, toxic metabolic 2) L hemiplegia - has known jennifer's paralysis and post-ictal confusion due to R frontal encephalomalacia     Recommendations:  [] dc'd vEEG  [] C/w Vimpat 150 mg q12  [] C/w Keppra 1000 mg q12  [] S/p Keppra 1500mg load x1 5/8  [] Seizure precaution  [] Neurocheck and vital per MICU protocol  [] Will continue to follow    Case discussed and seen with neuro attending Dr. Teixeira

## 2022-05-09 NOTE — PROGRESS NOTE ADULT - ATTENDING COMMENTS
Interval History as per resident note, personally verified by me. Patient without further seizures and off all sedation. He has been extubated and vEEG connected.    MS - AAO x2.5 (knew it was 5/2022 but not rest of date), speech fluent, rep/naming intact, follows commands. Attn/conc/recent and remote memory/fund of knowledge dec  CN - Pupils surgical but reactive b/l, EOMI, VFF, face sens/str/hearing WNL b/l, tongue/palate midline, trap 5/5 b/l. C-collar in place  Motor - Normal bulk/tone. BUE's at least 4/5 and BLE's at least 3+/5 and symmetric  Sens - LT/temp intact all  DTR's - 1+ RUE, 2+ LUE, 1+ KJ b/l, 0+ AJ b/l, and downgoing b/l plantar response  Coord - Grossly appropriate for level of strength  Gait and station - Due to fall risk/safety concerns did not assess    A/P:  Epilepsy, not intractable, with presumed prior status epilepticus  Encephalopathy  Prior acomm aneurysm rupture with resultant R frontal encephalomalacia  HTN  CAD/MI    - Patient previously intubated and sedated for presumed status epilepticus. Now off sedation and extubated, mental status improved. No further electrographic or clinical seizures seen  - vEEG without seizure activity but 0.5-2 Hz R frontotemporal LPD's. STOPPED  - AED's with Vimpat 150mg IV/PO [id number] and Keppra at 1g IV/PO [id number]  - Continue to address above medical problems, as you are doing  - Will continue to follow patient with you

## 2022-05-09 NOTE — CONSULT NOTE ADULT - ASSESSMENT
Assessment/Plan:  66y Male 5.5 weeks s/p ORIF of right femur periprosthetic fracture     -No concern for RLE surgical site infection at this time  -FU XR R hip/femur  -NWB RLE   -Medical management per primary team  -Will discuss with attending and advise if plan changes

## 2022-05-09 NOTE — CONSULT NOTE ADULT - SUBJECTIVE AND OBJECTIVE BOX
66yMale admitted after unwitnessed fall with concern for seizure activity and sepsis workup. Ortho consulted for surgical site evaluation of the RIGHT hip/femur surgical site. Patient is s/p Right femur periprosthetic ORIF 3/30/22 by Dr. Stoddard. Patient denies pain at this time. Patient denies numbness/tingling.     PAST MEDICAL & SURGICAL HISTORY:  Myocardial infarction  KAEL x1 MI , stent RCA    Back pain  Chronic back pain    Spinal stenosis    Lumbar herniated disc    Hypertension    Narcotic dependence  15 for withdrawal ,pt currently on Dialudid 8 mg every 4-6 hours /day    Osteoarthritis    GERD (gastroesophageal reflux disease)    Cerebral aneurysm rupture   clipped, no residual    Femur fracture, right  , surgical revision of right hip    Sacroiliitis, not elsewhere classified  Unspecified Inflammatory spondylopathy,Sacral and sacrococcygeal region    Chronic pain  Patient has an Intrathecal pump s/p removal in     Cerebral aneurysm  I997 with clips    S/P lumbar fusion  L1-S1:    History of right inguinal hernia repair  x2    Hx of appendectomy  1969    Hx of laminectomy  lumbar-    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    Stented coronary artery  KAEL x 1 to RCA    History of hip replacement  8/15, revision  after falling and fracturing right femur    S/P left knee arthroscopy    H/O arthroscopy of shoulder  right X 2, left X 1    History of throat surgery  surgical exicision cyst 1986    Fusion of sacral region of spine  2017      Home Medications:  acetaminophen 325 mg oral tablet: 3 tab(s) orally every 8 hours, As Needed (2022 12:08)  aspirin 81 mg oral delayed release tablet: 1 tab(s) orally once a day (2022 12:08)  atorvastatin 20 mg oral tablet: 1 tab(s) orally once a day (at bedtime) (2022 12:08)  baclofen 20 mg oral tablet: 1 tab(s) orally 2 times a day (2022 12:08)  docusate sodium 100 mg oral capsule: 1 cap(s) orally 3 times a day (2022 12:08)  Fleet Bisacodyl 10 mg rectal enema: 30 milligram(s) rectal once a day (at bedtime) (2022 12:08)  Flomax 0.4 mg oral capsule: 2 cap(s) orally once a day (2022 12:08)  gabapentin 100 mg oral capsule: 1 cap(s) orally 2 times a day (2022 12:09)  gemfibrozil 600 mg oral tablet: 1 tab(s) orally 2 times a day (before meals) (2022 12:08)  metoprolol tartrate 25 mg oral tablet: 1 tab(s) orally 2 times a day (2022 11:08)  morphine 15 mg/8 to 12 hr oral tablet, extended release: 1 tab(s) orally every 8 hours, As Needed (2022 12:08)  Multiple Vitamins oral tablet: 1 tab(s) orally once a day (2022 12:08)  nystatin 100,000 units/g topical powder: Apply topically to affected area 2 times a day on groin area with soap and water and pat dry and apply powder (2022 12:08)  pregabalin 150 mg oral capsule: 1 cap(s) orally once a day (2022 11:08)  rivaroxaban 10 mg oral tablet: 1 tab(s) orally once a day (2022 12:08)  senna oral tablet: 2 tab(s) orally once a day (at bedtime) (2022 12:08)  Slow Fe (as elemental iron) 45 mg oral tablet, extended release: 1 tab(s) orally once a day (2022 12:08)    Allergies    Allergy Status Unknown    Intolerances                              10.5   13.76 )-----------( 317      ( 09 May 2022 02:23 )             33.1     05-    140  |  105  |  8   ----------------------------<  125<H>  3.1<L>   |  20<L>  |  1.16    Ca    8.5      09 May 2022 02:23  Phos  2.6     05-  Mg     1.6         TPro  6.3  /  Alb  3.0<L>  /  TBili  0.6  /  DBili  x   /  AST  20  /  ALT  15  /  AlkPhos  129<H>  05-09    PT/INR - ( 08 May 2022 02:11 )   PT: 14.6 sec;   INR: 1.26 ratio         PTT - ( 08 May 2022 02:11 )  PTT:30.9 sec  Urinalysis Basic - ( 07 May 2022 14:40 )    Color: Yellow / Appearance: Clear / S.035 / pH: x  Gluc: x / Ketone: Negative  / Bili: Negative / Urobili: Negative   Blood: x / Protein: 30 mg/dL / Nitrite: Negative   Leuk Esterase: Small / RBC: 1 /hpf / WBC 9 /HPF   Sq Epi: x / Non Sq Epi: 2 /hpf / Bacteria: Negative        Vital Signs Last 24 Hrs  T(C): 36.7 (09 May 2022 11:00), Max: 37.7 (08 May 2022 23:00)  T(F): 98.1 (09 May 2022 11:00), Max: 99.9 (08 May 2022 23:00)  HR: 76 (09 May 2022 13:11) (76 - 123)  BP: 151/67 (09 May 2022 13:00) (144/65 - 176/78)  BP(mean): 96 (09 May 2022 13:00) (94 - 117)  RR: 34 (09 May 2022 13:00) (14 - 37)  SpO2: 100% (09 May 2022 13:11) (95% - 100%)    PHYSICAL EXAM  Gen: NAD    RIGHT Lower Extremity:  Skin intact, well healed surgical wounds  No erythema   Temperature comparable to contralateral side  No palpable areas of fluctuance or fluid collections  No soft tissue tenderness to palpation  NTTP over the bony prominences of the hip/knee/ankle/foot/toes  No pain with ROM of the hip/knee/ankle/foot/toes  SILT L2-S1   Motor grossly intact throughout Quads/HL/FHL/TA/GSC   + DP pulses  Compartments soft and compressible  Calf nontender    Secondary Exam: Benign, Skin intact, No other areas of bony ttp, SILT throughout, motor grossly intact throughout, no other orthopedic injuries at this time, compartments soft and compressible

## 2022-05-09 NOTE — PROGRESS NOTE ADULT - SUBJECTIVE AND OBJECTIVE BOX
MANUEL PETERSON  Male  MRN-[id number]    Subjective: No acute events overnight. Off EEG. Extubated.       VITAL SIGNS:  T(F): 98.4  HR: 90  BP: 171/79  RR: 29  SpO2: 96%    Gen: No acute distress. Extubated. Off EEG  MSK: SAAVEDRA spontaneously full strength. Normal tone.   MS: Oriented x2, not to time. Fluent. Follows simple commands of gripping hands.    CN: PERRLA. EOMI. Face symmetric. T/u midline.   Motor: Normal tone. Able to  hand b/l 5/5. Able to spontaneously move LE's.   Sensory: Intact to LT throughout   Reflexes: Babinski downgoing bilaterally.   Gait: Deferred    LABS:                          10.5   13.76 )-----------( 317      ( 09 May 2022 02:23 )             33.1     05-09    140  |  105  |  8   ----------------------------<  125<H>  3.1<L>   |  20<L>  |  1.16    Ca    8.5      09 May 2022 02:23  Phos  2.6     05-09  Mg     1.6     05-09    TPro  6.3  /  Alb  3.0<L>  /  TBili  0.6  /  DBili  x   /  AST  20  /  ALT  15  /  AlkPhos  129<H>  05-09    PT/INR - ( 08 May 2022 02:11 )   PT: 14.6 sec;   INR: 1.26 ratio         PTT - ( 08 May 2022 02:11 )  PTT:30.9 sec    RADIOLOGY & ADDITIONAL STUDIES:      CT head w/o: No evidence of acute intracranial hemorrhage, midline shift or CT   evidence of acute territorial infarct.  Postsurgical changes as described.  If the patient's symptoms persist, consider short interval follow-up head   CT or brain MRI if there are no MRI contraindications.         MANUEL PETERSON  Male  MRN-[id number]    Subjective: No acute events overnight. Off EEG. Extubated.       VITAL SIGNS:  T(F): 98.4  HR: 90  BP: 171/79  RR: 29  SpO2: 96%    Gen: No acute distress. Extubated. Off EEG  MS: Oriented x2, not to time. Fluent. Follows simple commands of gripping hands.    CN: PERRLA. EOMI. Face symmetric. T/u midline.   Motor: Normal tone. Able to  hand b/l 5/5. Able to spontaneously move LE's.   Sensory: Intact to LT throughout   Reflexes: Babinski downgoing bilaterally.   Gait: Deferred    LABS:                          10.5   13.76 )-----------( 317      ( 09 May 2022 02:23 )             33.1     05-09    140  |  105  |  8   ----------------------------<  125<H>  3.1<L>   |  20<L>  |  1.16    Ca    8.5      09 May 2022 02:23  Phos  2.6     05-09  Mg     1.6     05-09    TPro  6.3  /  Alb  3.0<L>  /  TBili  0.6  /  DBili  x   /  AST  20  /  ALT  15  /  AlkPhos  129<H>  05-09    PT/INR - ( 08 May 2022 02:11 )   PT: 14.6 sec;   INR: 1.26 ratio         PTT - ( 08 May 2022 02:11 )  PTT:30.9 sec    RADIOLOGY & ADDITIONAL STUDIES:      CT head w/o: No evidence of acute intracranial hemorrhage, midline shift or CT   evidence of acute territorial infarct.  Postsurgical changes as described.  If the patient's symptoms persist, consider short interval follow-up head   CT or brain MRI if there are no MRI contraindications.    vEEG 5/7: Increased risk for seizures from the right frontotemporal region.  Moderate multifocal/diffuse nonspecific cerebral dysfunction.  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  No seizure seen.    vEEG 5/8: High risk for seizures from the right frontotemporal region.  Moderate multifocal/diffuse nonspecific cerebral dysfunction.  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  No seizure seen.    vEEG 5/9: High risk for seizures from the right frontotemporal region.  Moderate multifocal/diffuse nonspecific cerebral dysfunction.  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  No seizure seen.     MANUEL PETERSON  Male  [id number]    Subjective: No acute events overnight. Off EEG. Extubated.    FHx: Non-contributory    ROS: All systems negative except as documented in Subjective/Interval History    Meds:  MEDICATIONS  (STANDING):  albuterol/ipratropium for Nebulization 3 milliLiter(s) Nebulizer every 6 hours  ascorbic acid 500 milliGRAM(s) Oral daily  chlorhexidine 4% Liquid 1 Application(s) Topical <User Schedule>  dextrose 5% + lactated ringers. 1000 milliLiter(s) (50 mL/Hr) IV Continuous <Continuous>  enoxaparin Injectable 40 milliGRAM(s) SubCutaneous every 24 hours  lacosamide IVPB 150 milliGRAM(s) IV Intermittent every 12 hours  levETIRAcetam  IVPB 1000 milliGRAM(s) IV Intermittent every 12 hours  metoprolol tartrate 25 milliGRAM(s) Oral two times a day  multivitamin 1 Tablet(s) Oral daily  vancomycin  IVPB 1750 milliGRAM(s) IV Intermittent every 12 hours    MEDICATIONS  (PRN):  acetaminophen    Suspension .. 650 milliGRAM(s) Oral every 6 hours PRN Temp greater or equal to 38C (100.4F), Mild Pain (1 - 3)  baclofen 5 milliGRAM(s) Oral every 8 hours PRN Muscle Spasm    Allergies:  Allergy Status Unknown      VITAL SIGNS:  T(F): 98.4  HR: 90  BP: 171/79  RR: 29  SpO2: 96%    Gen: No acute distress. Extubated. Off EEG  MS: Oriented x2, not to time. Fluent. Follows simple commands of gripping hands.    CN: PERRLA. EOMI. Face symmetric. T/u midline.   Motor: Normal tone. Able to  hand b/l 5/5. Able to spontaneously move LE's.   Sensory: Intact to LT throughout   Reflexes: Babinski downgoing bilaterally.   Gait: Deferred    LABS:                          10.5   13.76 )-----------( 317      ( 09 May 2022 02:23 )             33.1     05-09    140  |  105  |  8   ----------------------------<  125<H>  3.1<L>   |  20<L>  |  1.16    Ca    8.5      09 May 2022 02:23  Phos  2.6     05-09  Mg     1.6     05-09    TPro  6.3  /  Alb  3.0<L>  /  TBili  0.6  /  DBili  x   /  AST  20  /  ALT  15  /  AlkPhos  129<H>  05-09    PT/INR - ( 08 May 2022 02:11 )   PT: 14.6 sec;   INR: 1.26 ratio         PTT - ( 08 May 2022 02:11 )  PTT:30.9 sec    RADIOLOGY & ADDITIONAL STUDIES:      CT head w/o: No evidence of acute intracranial hemorrhage, midline shift or CT   evidence of acute territorial infarct.  Postsurgical changes as described.  If the patient's symptoms persist, consider short interval follow-up head   CT or brain MRI if there are no MRI contraindications.    vEEG 5/7: Increased risk for seizures from the right frontotemporal region.  Moderate multifocal/diffuse nonspecific cerebral dysfunction.  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  No seizure seen.    vEEG 5/8: High risk for seizures from the right frontotemporal region.  Moderate multifocal/diffuse nonspecific cerebral dysfunction.  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  No seizure seen.    vEEG 5/9: High risk for seizures from the right frontotemporal region.  Moderate multifocal/diffuse nonspecific cerebral dysfunction.  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  No seizure seen.

## 2022-05-09 NOTE — CHART NOTE - NSCHARTNOTEFT_GEN_A_CORE
Received call from primary team due to report of RUE shaking, starting around 20:00PM lasting until about 23:30PM. Patient reportedly remained alert and oriented throughout. The shaking was described as a somewhat high frequency, low amplitude; however it is somewhat difficult to describe, other than it seemed to affect his right shoulder down to his right distal hand; and that it was constant and without break.     I arrived to bedside around 23:40PM, apparently 10 minutes after cessation of RUE movements. Patient states that he felt fine, other than this RUE movements. He had no other neurological complaints, including no weakness or numbness to the affected limb or other parts of his body. On my examination, patient w/ no abnormal movements, including convulsions, postural or resting tremor. Patient answers all questions appropriately. He followed all commands. He was full strength in distal UE and distal LE. Sensation intact to light touch in all limbs. No nystagmus.    Discussed case with RN and primary team.     No change in plans for now.   Continue with lacosamide and levetiracetam as recommended.  Please do not hesitate to call Neurology [facility] #[phone] if symptoms recur.     -  Checo Yepez MD  PGY-2 Neurology  Spectra #[id number] Received call from primary team due to report of RUE shaking, starting around 20:00PM lasting until about 23:30PM. Patient reportedly remained alert and oriented throughout. The shaking was described as a somewhat high frequency, low amplitude; however it is somewhat difficult to describe, other than it seemed to affect his right shoulder down to his right distal hand; and that it was constant and without break.     I arrived to bedside around 23:35PM, apparently 10 minutes after cessation of RUE movements. Patient states that he felt fine, other than this RUE movements. He had no other neurological complaints, including no weakness or numbness to the affected limb or other parts of his body. On my examination, patient w/ no abnormal movements, including convulsions, postural or resting tremor. Patient answers all questions appropriately. He followed all commands. He was full strength in distal UE and distal LE. Sensation intact to light touch in all limbs. No nystagmus.    Discussed case with RN and primary team.     No change in plans for now.   Continue with lacosamide and levetiracetam as recommended.  Please do not hesitate to call Neurology [facility] #[phone] if symptoms recur.     -  Checo Yepez MD  PGY-2 Neurology  Spectra #[phone]

## 2022-05-09 NOTE — PROGRESS NOTE ADULT - ATTENDING COMMENTS
66 M with prior AOMM aneurysm rupture and craniotomy and seizure d/o (1997), recent R ORIF discharged on dvt ppx here with fall and acute hypoxemic respiratory failure due to staph pna requiring intubation, now extubated    - acute hypoxemic respiratory failure due to sepsis from staph pna, improving, wean supplemental O2 as tolerated  - airway clearance as tolerated  - c/w vanco for staph pna, check vanco level today, f/u sensitivities from sputum  - can d/c c spine precautions given no neck pain with flexion and ct scan negative for fx  - able to move both legs without any pain, no neurological deficits in LE, no hip pain, do not suspect active infected joint space, will ask ortho if they would like any imaging given patient states this was a mechanical fall  - AED as per neuro (though patient states he remembers he had a mechanical fall and slipped)    Critically ill patient requiring frequent bedside visits with therapy changes.

## 2022-05-09 NOTE — PROGRESS NOTE ADULT - SUBJECTIVE AND OBJECTIVE BOX
INTERVAL HPI/OVERNIGHT EVENTS:    SUBJECTIVE: Patient seen and examined at bedside.     OBJECTIVE:    VITAL SIGNS:  ICU Vital Signs Last 24 Hrs  T(C): 37.3 (09 May 2022 03:00), Max: 37.7 (08 May 2022 23:00)  T(F): 99.2 (09 May 2022 03:00), Max: 99.9 (08 May 2022 23:00)  HR: 84 (09 May 2022 07:00) (68 - 123)  BP: 164/74 (09 May 2022 07:00) (92/51 - 171/77)  BP(mean): 106 (09 May 2022 07:00) (65 - 111)  ABP: --  ABP(mean): --  RR: 35 (09 May 2022 07:00) (13 - 37)  SpO2: 97% (09 May 2022 07:00) (95% - 100%)    Mode: CPAP with PS, FiO2: 30, PEEP: 5, PS: 5, MAP: 8    05-08 @ 07:01  -  - @ 07:00  --------------------------------------------------------  IN: 2290 mL / OUT: 300 mL / NET: 1990 mL      CAPILLARY BLOOD GLUCOSE          PHYSICAL EXAM:    General: NAD  HEENT: NC/AT; PERRL, clear conjunctiva  Neck: supple  Respiratory: CTA b/l  Cardiovascular: +S1/S2; RRR  Abdomen: soft, NT/ND; +BS x4  Extremities: WWP, 2+ peripheral pulses b/l; no LE edema  Skin: normal color and turgor; no rash  Neurological:    MEDICATIONS:  MEDICATIONS  (STANDING):  albuterol/ipratropium for Nebulization 3 milliLiter(s) Nebulizer every 6 hours  ascorbic acid 500 milliGRAM(s) Oral daily  chlorhexidine 4% Liquid 1 Application(s) Topical <User Schedule>  dextrose 5% + lactated ringers. 1000 milliLiter(s) (50 mL/Hr) IV Continuous <Continuous>  enoxaparin Injectable 40 milliGRAM(s) SubCutaneous every 24 hours  lacosamide IVPB 150 milliGRAM(s) IV Intermittent every 12 hours  levETIRAcetam  IVPB 1000 milliGRAM(s) IV Intermittent every 12 hours  metoprolol tartrate 25 milliGRAM(s) Oral two times a day  multivitamin 1 Tablet(s) Oral daily  potassium chloride   Solution 40 milliEquivalent(s) Oral every 2 hours  vancomycin  IVPB 1750 milliGRAM(s) IV Intermittent every 12 hours    MEDICATIONS  (PRN):  acetaminophen    Suspension .. 650 milliGRAM(s) Oral every 6 hours PRN Temp greater or equal to 38C (100.4F), Mild Pain (1 - 3)  baclofen 5 milliGRAM(s) Oral every 8 hours PRN Muscle Spasm      ALLERGIES:  Allergies    Allergy Status Unknown    Intolerances        LABS:                        10.5   13.76 )-----------( 317      ( 09 May 2022 02:23 )             33.1     Hemoglobin: 10.5 g/dL ( @ 02:23)  Hemoglobin: 10.3 g/dL ( @ 02:10)  Hemoglobin: 11.5 g/dL ( @ 02:38)  Hemoglobin: 11.9 g/dL ( @ 19:23)  Hemoglobin: 13.2 g/dL ( @ 06:02)    CBC Full  -  ( 09 May 2022 02:23 )  WBC Count : 13.76 K/uL  RBC Count : 3.59 M/uL  Hemoglobin : 10.5 g/dL  Hematocrit : 33.1 %  Platelet Count - Automated : 317 K/uL  Mean Cell Volume : 92.2 fl  Mean Cell Hemoglobin : 29.2 pg  Mean Cell Hemoglobin Concentration : 31.7 gm/dL  Auto Neutrophil # : 10.92 K/uL  Auto Lymphocyte # : 1.52 K/uL  Auto Monocyte # : 1.15 K/uL  Auto Eosinophil # : 0.01 K/uL  Auto Basophil # : 0.05 K/uL  Auto Neutrophil % : 79.3 %  Auto Lymphocyte % : 11.0 %  Auto Monocyte % : 8.4 %  Auto Eosinophil % : 0.1 %  Auto Basophil % : 0.4 %        140  |  105  |  8   ----------------------------<  125<H>  3.1<L>   |  20<L>  |  1.16    Ca    8.5      09 May 2022 02:23  Phos  2.6     05-  Mg     1.6     05-    TPro  6.3  /  Alb  3.0<L>  /  TBili  0.6  /  DBili  x   /  AST  20  /  ALT  15  /  AlkPhos  129<H>      Creatinine Trend: 1.16<--, 1.02<--, 0.99<--, 0.95<--, 0.86<--, 0.92<--  LIVER FUNCTIONS - ( 09 May 2022 02:23 )  Alb: 3.0 g/dL / Pro: 6.3 g/dL / ALK PHOS: 129 U/L / ALT: 15 U/L / AST: 20 U/L / GGT: x           PT/INR - ( 08 May 2022 02:11 )   PT: 14.6 sec;   INR: 1.26 ratio         PTT - ( 08 May 2022 02:11 )  PTT:30.9 sec    hs Troponin:    ABG - ( 08 May 2022 02:03 )  pH, Arterial: 7.48  pH, Blood: x     /  pCO2: 31    /  pO2: 97    / HCO3: 23    / Base Excess: 0.1   /  SaO2: 98.4                    Urinalysis Basic - ( 07 May 2022 14:40 )    Color: Yellow / Appearance: Clear / S.035 / pH: x  Gluc: x / Ketone: Negative  / Bili: Negative / Urobili: Negative   Blood: x / Protein: 30 mg/dL / Nitrite: Negative   Leuk Esterase: Small / RBC: 1 /hpf / WBC 9 /HPF   Sq Epi: x / Non Sq Epi: 2 /hpf / Bacteria: Negative      CSF:                      EKG:   MICROBIOLOGY:    Culture - Sputum (collected 07 May 2022 21:38)  Source: ET Tube ET Tube  Gram Stain (08 May 2022 06:44):    Moderate polymorphonuclear leukocytes per low power field    Rare Squamous epithelial cells per low power field    Moderate Gram positive cocci in pairs seen per oil power field  Preliminary Report (08 May 2022 19:27):    Numerous Staphylococcus aureus    Culture - Blood (collected 07 May 2022 20:00)  Source: .Blood Blood-Venous  Preliminary Report (09 May 2022 01:02):    No growth to date.    Culture - Blood (collected 07 May 2022 19:55)  Source: .Blood Blood-Peripheral  Preliminary Report (09 May 2022 01:02):    No growth to date.    Culture - Blood (collected 06 May 2022 18:55)  Source: .Blood Blood-Venous  Preliminary Report (07 May 2022 23:01):    No growth to date.    Culture - Blood (collected 06 May 2022 18:40)  Source: .Blood Blood-Peripheral  Gram Stain (07 May 2022 18:47):    Growth in aerobic and anaerobic bottles: Gram Positive Cocci in Clusters  Final Report (08 May 2022 20:43):    Growth in aerobic and anaerobic bottles: Staphylococcus epidermidis    Coag Negative Staphylococcus    Single set isolate, possible contaminant. Contact    Microbiology if susceptibility testing clinically    indicated.    ***Blood Panel PCR results on this specimen are available    approximately 3 hours after the Gram stain result.***    Gram stain, PCR, and/or culture results may not always    correspond due to difference in methodologies.    ************************************************************    This PCR assay was performed by multiplex PCR. This    Assay tests for 66 bacterial and resistance gene targets.    Please refer to the [facility] test directory    at [email or web address] for details.  Organism: Blood Culture PCR (08 May 2022 20:43)  Organism: Blood Culture PCR (08 May 2022 20:43)      IMAGING:      Labs, imaging, EKG personally reviewed    RADIOLOGY & ADDITIONAL TESTS: Reviewed.     INTERVAL HPI/OVERNIGHT EVENTS: no acute events overnight    SUBJECTIVE: Patient seen and examined at bedside. Extubated, A&Ox3, able to move all extremities, states he still has a cough but otherwise feels well. C-collar still in place.     OBJECTIVE:    VITAL SIGNS:  ICU Vital Signs Last 24 Hrs  T(C): 37.3 (09 May 2022 03:00), Max: 37.7 (08 May 2022 23:00)  T(F): 99.2 (09 May 2022 03:00), Max: 99.9 (08 May 2022 23:00)  HR: 84 (09 May 2022 07:00) (68 - 123)  BP: 164/74 (09 May 2022 07:00) (92/51 - 171/77)  BP(mean): 106 (09 May 2022 07:00) (65 - 111)  ABP: --  ABP(mean): --  RR: 35 (09 May 2022 07:00) (13 - 37)  SpO2: 97% (09 May 2022 07:00) (95% - 100%)    Mode: CPAP with PS, FiO2: 30, PEEP: 5, PS: 5, MAP: 8    05-08 @ 07:01  -  05-09 @ 07:00  --------------------------------------------------------  IN: 2290 mL / OUT: 300 mL / NET: 1990 mL      CAPILLARY BLOOD GLUCOSE          PHYSICAL EXAM:    General: NAD  HEENT: NC/AT; PERRL, clear conjunctiva  Neck: supple  Respiratory: CTA b/l, breathing comfortably  Cardiovascular: +S1/S2; RRR  Abdomen: soft, NT/ND; +BS x4  Extremities: WWP, 2+ peripheral pulses b/l; no LE edema  Skin: normal color and turgor; no rash,   Neurological: nonfocal    MEDICATIONS:  MEDICATIONS  (STANDING):  albuterol/ipratropium for Nebulization 3 milliLiter(s) Nebulizer every 6 hours  ascorbic acid 500 milliGRAM(s) Oral daily  chlorhexidine 4% Liquid 1 Application(s) Topical <User Schedule>  dextrose 5% + lactated ringers. 1000 milliLiter(s) (50 mL/Hr) IV Continuous <Continuous>  enoxaparin Injectable 40 milliGRAM(s) SubCutaneous every 24 hours  lacosamide IVPB 150 milliGRAM(s) IV Intermittent every 12 hours  levETIRAcetam  IVPB 1000 milliGRAM(s) IV Intermittent every 12 hours  metoprolol tartrate 25 milliGRAM(s) Oral two times a day  multivitamin 1 Tablet(s) Oral daily  potassium chloride   Solution 40 milliEquivalent(s) Oral every 2 hours  vancomycin  IVPB 1750 milliGRAM(s) IV Intermittent every 12 hours    MEDICATIONS  (PRN):  acetaminophen    Suspension .. 650 milliGRAM(s) Oral every 6 hours PRN Temp greater or equal to 38C (100.4F), Mild Pain (1 - 3)  baclofen 5 milliGRAM(s) Oral every 8 hours PRN Muscle Spasm      ALLERGIES:  Allergies    Allergy Status Unknown    Intolerances        LABS:                        10.5   13.76 )-----------( 317      ( 09 May 2022 02:23 )             33.1     Hemoglobin: 10.5 g/dL ( @ 02:23)  Hemoglobin: 10.3 g/dL ( @ 02:10)  Hemoglobin: 11.5 g/dL ( @ 02:38)  Hemoglobin: 11.9 g/dL ( @ 19:23)  Hemoglobin: 13.2 g/dL ( @ 06:02)    CBC Full  -  ( 09 May 2022 02:23 )  WBC Count : 13.76 K/uL  RBC Count : 3.59 M/uL  Hemoglobin : 10.5 g/dL  Hematocrit : 33.1 %  Platelet Count - Automated : 317 K/uL  Mean Cell Volume : 92.2 fl  Mean Cell Hemoglobin : 29.2 pg  Mean Cell Hemoglobin Concentration : 31.7 gm/dL  Auto Neutrophil # : 10.92 K/uL  Auto Lymphocyte # : 1.52 K/uL  Auto Monocyte # : 1.15 K/uL  Auto Eosinophil # : 0.01 K/uL  Auto Basophil # : 0.05 K/uL  Auto Neutrophil % : 79.3 %  Auto Lymphocyte % : 11.0 %  Auto Monocyte % : 8.4 %  Auto Eosinophil % : 0.1 %  Auto Basophil % : 0.4 %        140  |  105  |  8   ----------------------------<  125<H>  3.1<L>   |  20<L>  |  1.16    Ca    8.5      09 May 2022 02:23  Phos  2.6     05-  Mg     1.6     05-    TPro  6.3  /  Alb  3.0<L>  /  TBili  0.6  /  DBili  x   /  AST  20  /  ALT  15  /  AlkPhos  129<H>  05-    Creatinine Trend: 1.16<--, 1.02<--, 0.99<--, 0.95<--, 0.86<--, 0.92<--  LIVER FUNCTIONS - ( 09 May 2022 02:23 )  Alb: 3.0 g/dL / Pro: 6.3 g/dL / ALK PHOS: 129 U/L / ALT: 15 U/L / AST: 20 U/L / GGT: x           PT/INR - ( 08 May 2022 02:11 )   PT: 14.6 sec;   INR: 1.26 ratio         PTT - ( 08 May 2022 02:11 )  PTT:30.9 sec    hs Troponin:    ABG - ( 08 May 2022 02:03 )  pH, Arterial: 7.48  pH, Blood: x     /  pCO2: 31    /  pO2: 97    / HCO3: 23    / Base Excess: 0.1   /  SaO2: 98.4                    Urinalysis Basic - ( 07 May 2022 14:40 )    Color: Yellow / Appearance: Clear / S.035 / pH: x  Gluc: x / Ketone: Negative  / Bili: Negative / Urobili: Negative   Blood: x / Protein: 30 mg/dL / Nitrite: Negative   Leuk Esterase: Small / RBC: 1 /hpf / WBC 9 /HPF   Sq Epi: x / Non Sq Epi: 2 /hpf / Bacteria: Negative      CSF:                      EKG:   MICROBIOLOGY:    Culture - Sputum (collected 07 May 2022 21:38)  Source: ET Tube ET Tube  Gram Stain (08 May 2022 06:44):    Moderate polymorphonuclear leukocytes per low power field    Rare Squamous epithelial cells per low power field    Moderate Gram positive cocci in pairs seen per oil power field  Preliminary Report (08 May 2022 19:27):    Numerous Staphylococcus aureus    Culture - Blood (collected 07 May 2022 20:00)  Source: .Blood Blood-Venous  Preliminary Report (09 May 2022 01:02):    No growth to date.    Culture - Blood (collected 07 May 2022 19:55)  Source: .Blood Blood-Peripheral  Preliminary Report (09 May 2022 01:02):    No growth to date.    Culture - Blood (collected 06 May 2022 18:55)  Source: .Blood Blood-Venous  Preliminary Report (07 May 2022 23:01):    No growth to date.    Culture - Blood (collected 06 May 2022 18:40)  Source: .Blood Blood-Peripheral  Gram Stain (07 May 2022 18:47):    Growth in aerobic and anaerobic bottles: Gram Positive Cocci in Clusters  Final Report (08 May 2022 20:43):    Growth in aerobic and anaerobic bottles: Staphylococcus epidermidis    Coag Negative Staphylococcus    Single set isolate, possible contaminant. Contact    Microbiology if susceptibility testing clinically    indicated.    ***Blood Panel PCR results on this specimen are available    approximately 3 hours after the Gram stain result.***    Gram stain, PCR, and/or culture results may not always    correspond due to difference in methodologies.    ************************************************************    This PCR assay was performed by multiplex PCR. This    Assay tests for 66 bacterial and resistance gene targets.    Please refer to the [facility] test directory    at [email or web address] for details.  Organism: Blood Culture PCR (08 May 2022 20:43)  Organism: Blood Culture PCR (08 May 2022 20:43)      IMAGING:      Labs, imaging, EKG personally reviewed    RADIOLOGY & ADDITIONAL TESTS: Reviewed.

## 2022-05-10 DIAGNOSIS — R56.9 UNSPECIFIED CONVULSIONS: ICD-10-CM

## 2022-05-10 DIAGNOSIS — I10 ESSENTIAL (PRIMARY) HYPERTENSION: ICD-10-CM
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
## 2022-05-10 DIAGNOSIS — S72.91XA UNSPECIFIED FRACTURE OF RIGHT FEMUR, INITIAL ENCOUNTER FOR CLOSED FRACTURE: ICD-10-CM

## 2022-05-10 DIAGNOSIS — J18.9 PNEUMONIA, UNSPECIFIED ORGANISM: ICD-10-CM

## 2022-05-10 LAB
ALBUMIN SERPL ELPH-MCNC: 3.1 G/DL — LOW (ref 3.3–5)
ALBUMIN SERPL ELPH-MCNC: 3.6 G/DL — SIGNIFICANT CHANGE UP (ref 3.3–5)
ALP SERPL-CCNC: 132 U/L — HIGH (ref 40–120)
ALP SERPL-CCNC: 146 U/L — HIGH (ref 40–120)
ALT FLD-CCNC: 39 U/L — SIGNIFICANT CHANGE UP (ref 10–45)
ALT FLD-CCNC: 45 U/L — SIGNIFICANT CHANGE UP (ref 10–45)
ANION GAP SERPL CALC-SCNC: 13 MMOL/L — SIGNIFICANT CHANGE UP (ref 5–17)
ANION GAP SERPL CALC-SCNC: 14 MMOL/L — SIGNIFICANT CHANGE UP (ref 5–17)
APPEARANCE UR: ABNORMAL
AST SERPL-CCNC: 45 U/L — HIGH (ref 10–40)
AST SERPL-CCNC: 58 U/L — HIGH (ref 10–40)
BACTERIA # UR AUTO: 0 — SIGNIFICANT CHANGE UP
BASOPHILS # BLD AUTO: 0.06 K/UL — SIGNIFICANT CHANGE UP (ref 0–0.2)
BASOPHILS # BLD AUTO: 0.06 K/UL — SIGNIFICANT CHANGE UP (ref 0–0.2)
BASOPHILS NFR BLD AUTO: 0.4 % — SIGNIFICANT CHANGE UP (ref 0–2)
BASOPHILS NFR BLD AUTO: 0.5 % — SIGNIFICANT CHANGE UP (ref 0–2)
BILIRUB SERPL-MCNC: 0.4 MG/DL — SIGNIFICANT CHANGE UP (ref 0.2–1.2)
BILIRUB SERPL-MCNC: 0.5 MG/DL — SIGNIFICANT CHANGE UP (ref 0.2–1.2)
BILIRUB UR-MCNC: NEGATIVE — SIGNIFICANT CHANGE UP
BUN SERPL-MCNC: 6 MG/DL — LOW (ref 7–23)
BUN SERPL-MCNC: 6 MG/DL — LOW (ref 7–23)
CALCIUM SERPL-MCNC: 8.9 MG/DL — SIGNIFICANT CHANGE UP (ref 8.4–10.5)
CALCIUM SERPL-MCNC: 9 MG/DL — SIGNIFICANT CHANGE UP (ref 8.4–10.5)
CHLORIDE SERPL-SCNC: 106 MMOL/L — SIGNIFICANT CHANGE UP (ref 96–108)
CHLORIDE SERPL-SCNC: 107 MMOL/L — SIGNIFICANT CHANGE UP (ref 96–108)
CO2 SERPL-SCNC: 20 MMOL/L — LOW (ref 22–31)
CO2 SERPL-SCNC: 21 MMOL/L — LOW (ref 22–31)
COD CRY URNS QL: ABNORMAL
COLOR SPEC: YELLOW — SIGNIFICANT CHANGE UP
CREAT SERPL-MCNC: 1.06 MG/DL — SIGNIFICANT CHANGE UP (ref 0.5–1.3)
CREAT SERPL-MCNC: 1.11 MG/DL — SIGNIFICANT CHANGE UP (ref 0.5–1.3)
DIFF PNL FLD: NEGATIVE — SIGNIFICANT CHANGE UP
EGFR: 73 ML/MIN/1.73M2 — SIGNIFICANT CHANGE UP
EGFR: 77 ML/MIN/1.73M2 — SIGNIFICANT CHANGE UP
EOSINOPHIL # BLD AUTO: 0.13 K/UL — SIGNIFICANT CHANGE UP (ref 0–0.5)
EOSINOPHIL # BLD AUTO: 0.17 K/UL — SIGNIFICANT CHANGE UP (ref 0–0.5)
EOSINOPHIL NFR BLD AUTO: 0.8 % — SIGNIFICANT CHANGE UP (ref 0–6)
EOSINOPHIL NFR BLD AUTO: 1.4 % — SIGNIFICANT CHANGE UP (ref 0–6)
EPI CELLS # UR: 4 /HPF — SIGNIFICANT CHANGE UP
GLUCOSE SERPL-MCNC: 107 MG/DL — HIGH (ref 70–99)
GLUCOSE SERPL-MCNC: 108 MG/DL — HIGH (ref 70–99)
GLUCOSE UR QL: NEGATIVE — SIGNIFICANT CHANGE UP
HCT VFR BLD CALC: 35.6 % — LOW (ref 39–50)
HCT VFR BLD CALC: 37.2 % — LOW (ref 39–50)
HGB BLD-MCNC: 11.2 G/DL — LOW (ref 13–17)
HGB BLD-MCNC: 11.6 G/DL — LOW (ref 13–17)
HYALINE CASTS # UR AUTO: 7 /LPF — HIGH (ref 0–2)
IMM GRANULOCYTES NFR BLD AUTO: 0.4 % — SIGNIFICANT CHANGE UP (ref 0–1.5)
IMM GRANULOCYTES NFR BLD AUTO: 0.4 % — SIGNIFICANT CHANGE UP (ref 0–1.5)
KETONES UR-MCNC: NEGATIVE — SIGNIFICANT CHANGE UP
LEGIONELLA AG UR QL: NEGATIVE — SIGNIFICANT CHANGE UP
LEUKOCYTE ESTERASE UR-ACNC: NEGATIVE — SIGNIFICANT CHANGE UP
LYMPHOCYTES # BLD AUTO: 1.48 K/UL — SIGNIFICANT CHANGE UP (ref 1–3.3)
LYMPHOCYTES # BLD AUTO: 1.55 K/UL — SIGNIFICANT CHANGE UP (ref 1–3.3)
LYMPHOCYTES # BLD AUTO: 12.4 % — LOW (ref 13–44)
LYMPHOCYTES # BLD AUTO: 8.8 % — LOW (ref 13–44)
MAGNESIUM SERPL-MCNC: 2.1 MG/DL — SIGNIFICANT CHANGE UP (ref 1.6–2.6)
MAGNESIUM SERPL-MCNC: 2.2 MG/DL — SIGNIFICANT CHANGE UP (ref 1.6–2.6)
MCHC RBC-ENTMCNC: 29.3 PG — SIGNIFICANT CHANGE UP (ref 27–34)
MCHC RBC-ENTMCNC: 29.4 PG — SIGNIFICANT CHANGE UP (ref 27–34)
MCHC RBC-ENTMCNC: 31.2 GM/DL — LOW (ref 32–36)
MCHC RBC-ENTMCNC: 31.5 GM/DL — LOW (ref 32–36)
MCV RBC AUTO: 93.2 FL — SIGNIFICANT CHANGE UP (ref 80–100)
MCV RBC AUTO: 94.4 FL — SIGNIFICANT CHANGE UP (ref 80–100)
MONOCYTES # BLD AUTO: 0.74 K/UL — SIGNIFICANT CHANGE UP (ref 0–0.9)
MONOCYTES # BLD AUTO: 1.06 K/UL — HIGH (ref 0–0.9)
MONOCYTES NFR BLD AUTO: 5.9 % — SIGNIFICANT CHANGE UP (ref 2–14)
MONOCYTES NFR BLD AUTO: 6.3 % — SIGNIFICANT CHANGE UP (ref 2–14)
NEUTROPHILS # BLD AUTO: 14 K/UL — HIGH (ref 1.8–7.4)
NEUTROPHILS # BLD AUTO: 9.96 K/UL — HIGH (ref 1.8–7.4)
NEUTROPHILS NFR BLD AUTO: 79.4 % — HIGH (ref 43–77)
NEUTROPHILS NFR BLD AUTO: 83.3 % — HIGH (ref 43–77)
NITRITE UR-MCNC: NEGATIVE — SIGNIFICANT CHANGE UP
NRBC # BLD: 0 /100 WBCS — SIGNIFICANT CHANGE UP (ref 0–0)
NRBC # BLD: 0 /100 WBCS — SIGNIFICANT CHANGE UP (ref 0–0)
PH UR: 5.5 — SIGNIFICANT CHANGE UP (ref 5–8)
PHOSPHATE SERPL-MCNC: 3 MG/DL — SIGNIFICANT CHANGE UP (ref 2.5–4.5)
PHOSPHATE SERPL-MCNC: 3 MG/DL — SIGNIFICANT CHANGE UP (ref 2.5–4.5)
PLATELET # BLD AUTO: 307 K/UL — SIGNIFICANT CHANGE UP (ref 150–400)
PLATELET # BLD AUTO: 341 K/UL — SIGNIFICANT CHANGE UP (ref 150–400)
POTASSIUM SERPL-MCNC: 4 MMOL/L — SIGNIFICANT CHANGE UP (ref 3.5–5.3)
POTASSIUM SERPL-MCNC: 4.5 MMOL/L — SIGNIFICANT CHANGE UP (ref 3.5–5.3)
POTASSIUM SERPL-SCNC: 4 MMOL/L — SIGNIFICANT CHANGE UP (ref 3.5–5.3)
POTASSIUM SERPL-SCNC: 4.5 MMOL/L — SIGNIFICANT CHANGE UP (ref 3.5–5.3)
PROT SERPL-MCNC: 6.8 G/DL — SIGNIFICANT CHANGE UP (ref 6–8.3)
PROT SERPL-MCNC: 7.1 G/DL — SIGNIFICANT CHANGE UP (ref 6–8.3)
PROT UR-MCNC: ABNORMAL
RBC # BLD: 3.82 M/UL — LOW (ref 4.2–5.8)
RBC # BLD: 3.94 M/UL — LOW (ref 4.2–5.8)
RBC # FLD: 15.5 % — HIGH (ref 10.3–14.5)
RBC # FLD: 15.8 % — HIGH (ref 10.3–14.5)
RBC CASTS # UR COMP ASSIST: 4 /HPF — SIGNIFICANT CHANGE UP (ref 0–4)
SODIUM SERPL-SCNC: 140 MMOL/L — SIGNIFICANT CHANGE UP (ref 135–145)
SODIUM SERPL-SCNC: 141 MMOL/L — SIGNIFICANT CHANGE UP (ref 135–145)
SP GR SPEC: 1.02 — SIGNIFICANT CHANGE UP (ref 1.01–1.02)
UROBILINOGEN FLD QL: NEGATIVE — SIGNIFICANT CHANGE UP
VANCOMYCIN TROUGH SERPL-MCNC: 28.1 UG/ML — CRITICAL HIGH (ref 10–20)
WBC # BLD: 12.53 K/UL — HIGH (ref 3.8–10.5)
WBC # BLD: 16.8 K/UL — HIGH (ref 3.8–10.5)
WBC # FLD AUTO: 12.53 K/UL — HIGH (ref 3.8–10.5)
WBC # FLD AUTO: 16.8 K/UL — HIGH (ref 3.8–10.5)
WBC UR QL: 2 /HPF — SIGNIFICANT CHANGE UP (ref 0–5)

## 2022-05-10 PROCEDURE — 99291 CRITICAL CARE FIRST HOUR: CPT | Mod: GC

## 2022-05-10 PROCEDURE — 99233 SBSQ HOSP IP/OBS HIGH 50: CPT | Mod: GC

## 2022-05-10 RX ORDER — PIPERACILLIN AND TAZOBACTAM 4; .5 G/20ML; G/20ML
3.38 INJECTION, POWDER, LYOPHILIZED, FOR SOLUTION INTRAVENOUS EVERY 8 HOURS
Refills: 0 | Status: DISCONTINUED | OUTPATIENT
Start: 2022-05-10 | End: 2022-05-10

## 2022-05-10 RX ORDER — CEFAZOLIN SODIUM 1 G
VIAL (EA) INJECTION
Refills: 0 | Status: DISCONTINUED | OUTPATIENT
Start: 2022-05-10 | End: 2022-05-10

## 2022-05-10 RX ORDER — ACETAMINOPHEN 500 MG
650 TABLET ORAL ONCE
Refills: 0 | Status: COMPLETED | OUTPATIENT
Start: 2022-05-10 | End: 2022-05-10

## 2022-05-10 RX ORDER — SIMETHICONE 80 MG/1
80 TABLET, CHEWABLE ORAL EVERY 6 HOURS
Refills: 0 | Status: DISCONTINUED | OUTPATIENT
Start: 2022-05-10 | End: 2022-05-13

## 2022-05-10 RX ORDER — CEFAZOLIN SODIUM 1 G
2000 VIAL (EA) INJECTION EVERY 8 HOURS
Refills: 0 | Status: DISCONTINUED | OUTPATIENT
Start: 2022-05-10 | End: 2022-05-12

## 2022-05-10 RX ORDER — LABETALOL HCL 100 MG
10 TABLET ORAL ONCE
Refills: 0 | Status: COMPLETED | OUTPATIENT
Start: 2022-05-10 | End: 2022-05-10

## 2022-05-10 RX ORDER — HYDRALAZINE HCL 50 MG
10 TABLET ORAL ONCE
Refills: 0 | Status: COMPLETED | OUTPATIENT
Start: 2022-05-10 | End: 2022-05-10

## 2022-05-10 RX ORDER — AMLODIPINE BESYLATE 2.5 MG/1
10 TABLET ORAL DAILY
Refills: 0 | Status: DISCONTINUED | OUTPATIENT
Start: 2022-05-10 | End: 2022-05-13

## 2022-05-10 RX ORDER — PIPERACILLIN AND TAZOBACTAM 4; .5 G/20ML; G/20ML
3.38 INJECTION, POWDER, LYOPHILIZED, FOR SOLUTION INTRAVENOUS ONCE
Refills: 0 | Status: COMPLETED | OUTPATIENT
Start: 2022-05-10 | End: 2022-05-10

## 2022-05-10 RX ADMIN — Medication 1 TABLET(S): at 11:29

## 2022-05-10 RX ADMIN — Medication 25 MILLIGRAM(S): at 21:43

## 2022-05-10 RX ADMIN — Medication 3 MILLILITER(S): at 23:48

## 2022-05-10 RX ADMIN — Medication 5 MILLIGRAM(S): at 04:42

## 2022-05-10 RX ADMIN — Medication 500 MILLIGRAM(S): at 11:29

## 2022-05-10 RX ADMIN — Medication 5 MILLIGRAM(S): at 14:14

## 2022-05-10 RX ADMIN — LACOSAMIDE 130 MILLIGRAM(S): 50 TABLET ORAL at 05:13

## 2022-05-10 RX ADMIN — Medication 5 MILLIGRAM(S): at 21:43

## 2022-05-10 RX ADMIN — CHLORHEXIDINE GLUCONATE 1 APPLICATION(S): 213 SOLUTION TOPICAL at 05:14

## 2022-05-10 RX ADMIN — ENOXAPARIN SODIUM 40 MILLIGRAM(S): 100 INJECTION SUBCUTANEOUS at 14:14

## 2022-05-10 RX ADMIN — Medication 81 MILLIGRAM(S): at 11:29

## 2022-05-10 RX ADMIN — LEVETIRACETAM 400 MILLIGRAM(S): 250 TABLET, FILM COATED ORAL at 18:30

## 2022-05-10 RX ADMIN — SIMETHICONE 80 MILLIGRAM(S): 80 TABLET, CHEWABLE ORAL at 02:47

## 2022-05-10 RX ADMIN — Medication 3 MILLILITER(S): at 12:19

## 2022-05-10 RX ADMIN — Medication 100 MILLIGRAM(S): at 23:00

## 2022-05-10 RX ADMIN — AMLODIPINE BESYLATE 10 MILLIGRAM(S): 2.5 TABLET ORAL at 05:39

## 2022-05-10 RX ADMIN — Medication 100 MILLIGRAM(S): at 15:12

## 2022-05-10 RX ADMIN — LACOSAMIDE 130 MILLIGRAM(S): 50 TABLET ORAL at 18:30

## 2022-05-10 RX ADMIN — Medication 10 MILLIGRAM(S): at 03:54

## 2022-05-10 RX ADMIN — Medication 10 MILLIGRAM(S): at 11:29

## 2022-05-10 RX ADMIN — LEVETIRACETAM 400 MILLIGRAM(S): 250 TABLET, FILM COATED ORAL at 05:41

## 2022-05-10 RX ADMIN — PIPERACILLIN AND TAZOBACTAM 200 GRAM(S): 4; .5 INJECTION, POWDER, LYOPHILIZED, FOR SOLUTION INTRAVENOUS at 02:47

## 2022-05-10 RX ADMIN — Medication 25 MILLIGRAM(S): at 09:27

## 2022-05-10 RX ADMIN — PIPERACILLIN AND TAZOBACTAM 25 GRAM(S): 4; .5 INJECTION, POWDER, LYOPHILIZED, FOR SOLUTION INTRAVENOUS at 06:45

## 2022-05-10 NOTE — CONSULT NOTE ADULT - PROBLEM SELECTOR RECOMMENDATION 5
5.5 weeks s/p ORIF of right femur periprosthetic fx  B/L LE dopplers as per ortho  NWB LLE   pain management  ortho following

## 2022-05-10 NOTE — CONSULT NOTE ADULT - ASSESSMENT
65 yo RH male with CAD s/p MI and stent, HTN, HLD, s/p 7 lumbar spinal surgeries. S/P R frontal craniotomy and clipping of R acomm aneurysm (1997). 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

## 2022-05-10 NOTE — PROGRESS NOTE ADULT - SUBJECTIVE AND OBJECTIVE BOX
Ortho Progress Note    S: Patient seen and examined. No acute events overnight. Pain well controlled with current regimen. Denies lightheadedness/dizziness, CP/SOB. Started on diet.      O:  Physical Exam:  Gen: Laying in bed, NAD, alert and oriented.   Resp: Unlabored breathing  Ext: RLE- incision c/d/i          EHL/FHL/TA/Sol intact          + SILT DP/SP/SAMUEL/Sa/T          +DP, extremity WWP    Vital Signs Last 24 Hrs  T(C): 36.9 (10 May 2022 07:00), Max: 38.5 (09 May 2022 23:00)  T(F): 98.4 (10 May 2022 07:00), Max: 101.3 (09 May 2022 23:00)  HR: 75 (10 May 2022 07:00) (70 - 95)  BP: 152/67 (10 May 2022 07:00) (128/61 - 176/78)  BP(mean): 96 (10 May 2022 07:00) (88 - 118)  RR: 26 (10 May 2022 07:00) (9 - 36)  SpO2: 95% (10 May 2022 07:00) (95% - 100%)                          11.2   16.80 )-----------( 341      ( 10 May 2022 00:35 )             35.6                         10.5   13.76 )-----------( 317      ( 09 May 2022 02:23 )             33.1       05-10    140  |  106  |  6<L>  ----------------------------<  108<H>  4.5   |  20<L>  |  1.06

## 2022-05-10 NOTE — CONSULT NOTE ADULT - PROBLEM SELECTOR RECOMMENDATION 4
required levo initially on admission   c/w amlodipine 10mg PO daily   continue to monitor   TTE - EF 75%, minimal MR, mild diastolic dysfx

## 2022-05-10 NOTE — CHART NOTE - NSCHARTNOTEFT_GEN_A_CORE
X-rays reviewed with attending Dr. Eric Stoddard. He is recommending 50% partial weightbearing on the right lower extremity for the next two weeks, until 5/24.    - 50% PWB until 5/24  - PT/OT/OOB  - FU dopplers  - Appreciate care per primary team

## 2022-05-10 NOTE — PROGRESS NOTE ADULT - ASSESSMENT
ASSESSMENT/PLAN:   MANUEL EVELYNJEREMIAS is a 66yMale 5.5 weeks s/p ORIF of right femur periprosthetic fracture, with low concern for surgical site infection     - Pain control  - NWB LLE for now  - XR performed, sent to attending  - Appreciate care per primary team  - Orthopedics recommending doppler's of b/l LE given minimal weightbearing status since surgery  - Ortho to advise on any changes in recs    Please page Orthopedics at [phone] with any questions

## 2022-05-10 NOTE — PROGRESS NOTE ADULT - SUBJECTIVE AND OBJECTIVE BOX
INTERVAL HPI/OVERNIGHT EVENTS:Pt was febrile overnight, recultured, was hypertensive and given labetalol 10 and amlodipine was increased to 10. He also had an episode of RUE twitching, unclear if seizure activity.     SUBJECTIVE: Patient seen and examined at bedside.     OBJECTIVE:    VITAL SIGNS:  ICU Vital Signs Last 24 Hrs  T(C): 36.9 (10 May 2022 07:00), Max: 38.5 (09 May 2022 23:00)  T(F): 98.4 (10 May 2022 07:00), Max: 101.3 (09 May 2022 23:00)  HR: 75 (10 May 2022 07:00) (70 - 95)  BP: 152/67 (10 May 2022 07:00) (128/61 - 176/78)  BP(mean): 96 (10 May 2022 07:00) (88 - 118)  ABP: --  ABP(mean): --  RR: 26 (10 May 2022 07:00) (9 - 36)  SpO2: 95% (10 May 2022 07:00) (95% - 100%)        05-09 @ 07:01  -  -10 @ 07:00  --------------------------------------------------------  IN: 1250 mL / OUT: 1365 mL / NET: -115 mL      CAPILLARY BLOOD GLUCOSE          PHYSICAL EXAM:    General: NAD  HEENT: NC/AT; PERRL, clear conjunctiva  Neck: supple  Respiratory: CTA b/l  Cardiovascular: +S1/S2; RRR  Abdomen: soft, NT/ND; +BS x4  Extremities: WWP, 2+ peripheral pulses b/l; no LE edema  Skin: normal color and turgor; no rash  Neurological:    MEDICATIONS:  MEDICATIONS  (STANDING):  albuterol/ipratropium for Nebulization 3 milliLiter(s) Nebulizer every 6 hours  amLODIPine   Tablet 10 milliGRAM(s) Oral daily  ascorbic acid 500 milliGRAM(s) Oral daily  aspirin  chewable 81 milliGRAM(s) Oral daily  chlorhexidine 4% Liquid 1 Application(s) Topical <User Schedule>  enoxaparin Injectable 40 milliGRAM(s) SubCutaneous every 24 hours  lacosamide IVPB 150 milliGRAM(s) IV Intermittent every 12 hours  levETIRAcetam  IVPB 1000 milliGRAM(s) IV Intermittent every 12 hours  metoprolol tartrate 25 milliGRAM(s) Oral two times a day  multivitamin 1 Tablet(s) Oral daily  piperacillin/tazobactam IVPB.. 3.375 Gram(s) IV Intermittent every 8 hours    MEDICATIONS  (PRN):  baclofen 5 milliGRAM(s) Oral every 8 hours PRN Muscle Spasm  simethicone 80 milliGRAM(s) Chew every 6 hours PRN Indigestion      ALLERGIES:  Allergies    Allergy Status Unknown    Intolerances        LABS:                        11.2   16.80 )-----------( 341      ( 10 May 2022 00:35 )             35.6     Hemoglobin: 11.2 g/dL (05-10 @ 00:35)  Hemoglobin: 10.5 g/dL ( @ 02:23)  Hemoglobin: 10.3 g/dL ( @ 02:10)  Hemoglobin: 11.5 g/dL ( @ 02:38)  Hemoglobin: 11.9 g/dL ( @ 19:23)    CBC Full  -  ( 10 May 2022 00:35 )  WBC Count : 16.80 K/uL  RBC Count : 3.82 M/uL  Hemoglobin : 11.2 g/dL  Hematocrit : 35.6 %  Platelet Count - Automated : 341 K/uL  Mean Cell Volume : 93.2 fl  Mean Cell Hemoglobin : 29.3 pg  Mean Cell Hemoglobin Concentration : 31.5 gm/dL  Auto Neutrophil # : 14.00 K/uL  Auto Lymphocyte # : 1.48 K/uL  Auto Monocyte # : 1.06 K/uL  Auto Eosinophil # : 0.13 K/uL  Auto Basophil # : 0.06 K/uL  Auto Neutrophil % : 83.3 %  Auto Lymphocyte % : 8.8 %  Auto Monocyte % : 6.3 %  Auto Eosinophil % : 0.8 %  Auto Basophil % : 0.4 %    05-10    140  |  106  |  6<L>  ----------------------------<  108<H>  4.5   |  20<L>  |  1.06    Ca    9.0      10 May 2022 00:35  Phos  3.0     05-10  Mg     2.2     05-10    TPro  7.1  /  Alb  3.6  /  TBili  0.4  /  DBili  x   /  AST  58<H>  /  ALT  39  /  AlkPhos  146<H>  05-10    Creatinine Trend: 1.06<--, 1.00<--, 1.16<--, 1.02<--, 0.99<--, 0.95<--  LIVER FUNCTIONS - ( 10 May 2022 00:35 )  Alb: 3.6 g/dL / Pro: 7.1 g/dL / ALK PHOS: 146 U/L / ALT: 39 U/L / AST: 58 U/L / GGT: x               hs Troponin:            Urinalysis Basic - ( 10 May 2022 03:38 )    Color: Yellow / Appearance: Slightly Turbid / S.019 / pH: x  Gluc: x / Ketone: Negative  / Bili: Negative / Urobili: Negative   Blood: x / Protein: 30 mg/dL / Nitrite: Negative   Leuk Esterase: Negative / RBC: 4 /hpf / WBC 2 /HPF   Sq Epi: x / Non Sq Epi: 4 /hpf / Bacteria: 0.0      CSF:                      EKG:   MICROBIOLOGY:    Culture - Sputum (collected 07 May 2022 21:38)  Source: ET Tube ET Tube  Gram Stain (08 May 2022 06:44):    Moderate polymorphonuclear leukocytes per low power field    Rare Squamous epithelial cells per low power field    Moderate Gram positive cocci in pairs seen per oil power field  Final Report (09 May 2022 18:04):    Numerous Staphylococcus aureus    Normal Respiratory Lauren absent  Organism: Staphylococcus aureus (09 May 2022 18:04)  Organism: Staphylococcus aureus (09 May 2022 18:04)    Culture - Blood (collected 07 May 2022 20:00)  Source: .Blood Blood-Venous  Preliminary Report (09 May 2022 01:02):    No growth to date.    Culture - Blood (collected 07 May 2022 19:55)  Source: .Blood Blood-Peripheral  Preliminary Report (09 May 2022 01:02):    No growth to date.      IMAGING:      Labs, imaging, EKG personally reviewed    RADIOLOGY & ADDITIONAL TESTS: Reviewed.     INTERVAL HPI/OVERNIGHT EVENTS:Pt was febrile overnight, recultured, was hypertensive and given labetalol 10 and amlodipine was increased to 10. He also had an episode of RUE twitching, unclear if seizure activity    SUBJECTIVE: Patient seen and examined at bedside. Patient states that he was awake and conscious throughout the twitching episode, states that he was cold and he was shivering. He states that he's still cold. Otherwise states his cough is improved. Requesting advancement of diet    OBJECTIVE:    VITAL SIGNS:  ICU Vital Signs Last 24 Hrs  T(C): 36.9 (10 May 2022 07:00), Max: 38.5 (09 May 2022 23:00)  T(F): 98.4 (10 May 2022 07:00), Max: 101.3 (09 May 2022 23:00)  HR: 75 (10 May 2022 07:00) (70 - 95)  BP: 152/67 (10 May 2022 07:00) (128/61 - 176/78)  BP(mean): 96 (10 May 2022 07:00) (88 - 118)  ABP: --  ABP(mean): --  RR: 26 (10 May 2022 07:00) (9 - 36)  SpO2: 95% (10 May 2022 07:00) (95% - 100%)        05-09 @ 07:01  -  -10 @ 07:00  --------------------------------------------------------  IN: 1250 mL / OUT: 1365 mL / NET: -115 mL      CAPILLARY BLOOD GLUCOSE      PHYSICAL EXAM:    General: NAD, shivering  HEENT: NC/AT; PERRL, clear conjunctiva  Neck: supple  Respiratory: CTA b/l  Cardiovascular: +S1/S2; RRR  Abdomen: soft, NT/ND; +BS x4  Extremities: WWP, 2+ peripheral pulses b/l; no LE edema  Skin: normal color and turgor; no rash  Neurological: nonfocal, 4/5 strength b/l UE    MEDICATIONS:  MEDICATIONS  (STANDING):  albuterol/ipratropium for Nebulization 3 milliLiter(s) Nebulizer every 6 hours  amLODIPine   Tablet 10 milliGRAM(s) Oral daily  ascorbic acid 500 milliGRAM(s) Oral daily  aspirin  chewable 81 milliGRAM(s) Oral daily  chlorhexidine 4% Liquid 1 Application(s) Topical <User Schedule>  enoxaparin Injectable 40 milliGRAM(s) SubCutaneous every 24 hours  lacosamide IVPB 150 milliGRAM(s) IV Intermittent every 12 hours  levETIRAcetam  IVPB 1000 milliGRAM(s) IV Intermittent every 12 hours  metoprolol tartrate 25 milliGRAM(s) Oral two times a day  multivitamin 1 Tablet(s) Oral daily  piperacillin/tazobactam IVPB.. 3.375 Gram(s) IV Intermittent every 8 hours    MEDICATIONS  (PRN):  baclofen 5 milliGRAM(s) Oral every 8 hours PRN Muscle Spasm  simethicone 80 milliGRAM(s) Chew every 6 hours PRN Indigestion      ALLERGIES:  Allergies    Allergy Status Unknown    Intolerances        LABS:                        11.2   16.80 )-----------( 341      ( 10 May 2022 00:35 )             35.6     Hemoglobin: 11.2 g/dL (05-10 @ 00:35)  Hemoglobin: 10.5 g/dL ( @ 02:23)  Hemoglobin: 10.3 g/dL ( @ 02:10)  Hemoglobin: 11.5 g/dL ( @ 02:38)  Hemoglobin: 11.9 g/dL ( @ 19:23)    CBC Full  -  ( 10 May 2022 00:35 )  WBC Count : 16.80 K/uL  RBC Count : 3.82 M/uL  Hemoglobin : 11.2 g/dL  Hematocrit : 35.6 %  Platelet Count - Automated : 341 K/uL  Mean Cell Volume : 93.2 fl  Mean Cell Hemoglobin : 29.3 pg  Mean Cell Hemoglobin Concentration : 31.5 gm/dL  Auto Neutrophil # : 14.00 K/uL  Auto Lymphocyte # : 1.48 K/uL  Auto Monocyte # : 1.06 K/uL  Auto Eosinophil # : 0.13 K/uL  Auto Basophil # : 0.06 K/uL  Auto Neutrophil % : 83.3 %  Auto Lymphocyte % : 8.8 %  Auto Monocyte % : 6.3 %  Auto Eosinophil % : 0.8 %  Auto Basophil % : 0.4 %    05-10    140  |  106  |  6<L>  ----------------------------<  108<H>  4.5   |  20<L>  |  1.06    Ca    9.0      10 May 2022 00:35  Phos  3.0     05-10  Mg     2.2     05-10    TPro  7.1  /  Alb  3.6  /  TBili  0.4  /  DBili  x   /  AST  58<H>  /  ALT  39  /  AlkPhos  146<H>  05-10    Creatinine Trend: 1.06<--, 1.00<--, 1.16<--, 1.02<--, 0.99<--, 0.95<--  LIVER FUNCTIONS - ( 10 May 2022 00:35 )  Alb: 3.6 g/dL / Pro: 7.1 g/dL / ALK PHOS: 146 U/L / ALT: 39 U/L / AST: 58 U/L / GGT: x               hs Troponin:            Urinalysis Basic - ( 10 May 2022 03:38 )    Color: Yellow / Appearance: Slightly Turbid / S.019 / pH: x  Gluc: x / Ketone: Negative  / Bili: Negative / Urobili: Negative   Blood: x / Protein: 30 mg/dL / Nitrite: Negative   Leuk Esterase: Negative / RBC: 4 /hpf / WBC 2 /HPF   Sq Epi: x / Non Sq Epi: 4 /hpf / Bacteria: 0.0      CSF:                      EKG:   MICROBIOLOGY:    Culture - Sputum (collected 07 May 2022 21:38)  Source: ET Tube ET Tube  Gram Stain (08 May 2022 06:44):    Moderate polymorphonuclear leukocytes per low power field    Rare Squamous epithelial cells per low power field    Moderate Gram positive cocci in pairs seen per oil power field  Final Report (09 May 2022 18:04):    Numerous Staphylococcus aureus    Normal Respiratory Lauren absent  Organism: Staphylococcus aureus (09 May 2022 18:04)  Organism: Staphylococcus aureus (09 May 2022 18:04)    Culture - Blood (collected 07 May 2022 20:00)  Source: .Blood Blood-Venous  Preliminary Report (09 May 2022 01:02):    No growth to date.    Culture - Blood (collected 07 May 2022 19:55)  Source: .Blood Blood-Peripheral  Preliminary Report (09 May 2022 01:02):    No growth to date.      IMAGING:      Labs, imaging, EKG personally reviewed    RADIOLOGY & ADDITIONAL TESTS: Reviewed.

## 2022-05-10 NOTE — CONSULT NOTE ADULT - CRITICAL CARE SERVICES
12/29/17        [address] [facility]      Dear Chase Black records indicate that you have outstanding lab work and or testing that was ordered for you and has not yet been completed:          CBC W Differential W Platel
45
60

## 2022-05-10 NOTE — CONSULT NOTE ADULT - SUBJECTIVE AND OBJECTIVE BOX
CHIEF COMPLAINT: Fall    HISTORY OF PRESENT ILLNESS:  65 yo RH male with CAD s/p MI and stent, HTN, HLD, s/p 7 lumbar spinal surgeries. S/P R frontal craniotomy and clipping of R acomm aneurysm (1997). 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 On arrival, pt mental status was fluctuant between alert and somnolent. Breathing pattern concerning for possible jerel-busch v kussmaul pattern respirations. CT head performed in ED, followed by episodes of bradycardia with hypoxia and apnea. ED team also noted R arm shaking with complete absence of L UE movement. Due to suspicion for status epilepticus, pt was intubated for AW protection. Transferred to MICU for definitive management. Boarding in SICU.     PAST MEDICAL & SURGICAL HISTORY:    Myocardial infarction    KAEL x1 MI 2005, stent RCA    Back pain  Chronic back pain    Spinal stenosis    Lumbar herniated disc    Hypertension    Narcotic dependence  5/28/16 for withdrawal ,pt currently on Dialudid 8 mg every 4-6 hours /day    Osteoarthritis    GERD (gastroesophageal reflux disease)    Cerebral aneurysm rupture  1997 clipped, no residual    Femur fracture, right  2/16, surgical revision of right hip    Sacroiliitis, not elsewhere classified  Unspecified Inflammatory spondylopathy,Sacral and sacrococcygeal region    Chronic pain  Patient has an Intrathecal pump s/p removal in 2016    Cerebral aneurysm  I997 with clips    S/P lumbar fusion  L1-S1:2002    History of right inguinal hernia repair  x2    Hx of appendectomy  6/1969    Hx of laminectomy  lumbar-2002    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 mos.-14 yo    Stented coronary artery  KAEL x 1 to RCA    History of hip replacement  8/15, revision 2/16 after falling and fracturing right femur    S/P left knee arthroscopy    H/O arthroscopy of shoulder  right X 2, left X 1    History of throat surgery  surgical exicision cyst 1986    Fusion of sacral region of spine  5/2017    MEDICATIONS:  amLODIPine   Tablet 10 milliGRAM(s) Oral daily  aspirin  chewable 81 milliGRAM(s) Oral daily  enoxaparin Injectable 40 milliGRAM(s) SubCutaneous every 24 hours  metoprolol tartrate 25 milliGRAM(s) Oral two times a day    ceFAZolin   IVPB 2000 milliGRAM(s) IV Intermittent every 8 hours    albuterol/ipratropium for Nebulization 3 milliLiter(s) Nebulizer every 6 hours    baclofen 5 milliGRAM(s) Oral every 8 hours PRN  lacosamide IVPB 150 milliGRAM(s) IV Intermittent every 12 hours  levETIRAcetam  IVPB 1000 milliGRAM(s) IV Intermittent every 12 hours    simethicone 80 milliGRAM(s) Chew every 6 hours PRN      ascorbic acid 500 milliGRAM(s) Oral daily  chlorhexidine 4% Liquid 1 Application(s) Topical <User Schedule>  multivitamin 1 Tablet(s) Oral daily      FAMILY HISTORY:      SOCIAL HISTORY:    [ ] Non-smoker  [ ] Smoker  [ ] Alcohol    Allergies    No Known Allergies    Intolerances    	    REVIEW OF SYSTEMS:  CONSTITUTIONAL: No fever, weight loss, or fatigue  EYES: No eye pain, visual disturbances, or discharge  ENMT:  No difficulty hearing, tinnitus, vertigo; No sinus or throat pain  NECK: No pain or stiffness  RESPIRATORY: No cough, wheezing, chills or hemoptysis; No Shortness of Breath  CARDIOVASCULAR: No chest pain, palpitations, passing out, dizziness, or leg swelling  GASTROINTESTINAL: No abdominal or epigastric pain. No nausea, vomiting, or hematemesis; No diarrhea or constipation. No melena or hematochezia.  GENITOURINARY: No dysuria, frequency, hematuria, or incontinence  NEUROLOGICAL: No headaches, memory loss, loss of strength, numbness, or tremors  SKIN: No itching, burning, rashes, or lesions   LYMPH Nodes: No enlarged glands  ENDOCRINE: No heat or cold intolerance; No hair loss  MUSCULOSKELETAL: No joint pain or swelling; No muscle, back, or extremity pain  PSYCHIATRIC: No depression, anxiety, mood swings, or difficulty sleeping  HEME/LYMPH: No easy bruising, or bleeding gums  ALLERY AND IMMUNOLOGIC: No hives or eczema	    [ ] All others negative	  [ ] Unable to obtain    PHYSICAL EXAM:  T(C): 36.8 (05-10-22 @ 19:00), Max: 38.5 (05-09-22 @ 23:00)  HR: 80 (05-10-22 @ 19:00) (70 - 85)  BP: 145/63 (05-10-22 @ 19:00) (128/61 - 175/72)  RR: 22 (05-10-22 @ 19:00) (9 - 35)  SpO2: 97% (05-10-22 @ 19:00) (93% - 99%)  Wt(kg): --  I&O's Summary    09 May 2022 07:01  -  10 May 2022 07:00  --------------------------------------------------------  IN: 1250 mL / OUT: 1365 mL / NET: -115 mL    10 May 2022 07:01  -  10 May 2022 21:01  --------------------------------------------------------  IN: 700 mL / OUT: 300 mL / NET: 400 mL    Appearance: NAD	  HEENT: dry oral mucosa, PERRL, EOMI	  Lymphatic: No lymphadenopathy  Cardiovascular: Normal S1 S2, No JVD, No murmurs, No edema  Respiratory: Lungs clear to auscultation	  Psychiatry: A & O x 3, Mood & affect appropriate  Gastrointestinal:  Soft, Non-tender, + BS	  Skin: No rashes, No ecchymoses, No cyanosis - healed abrasion on right shin, right femur scar	  Neurologic: Non-focal  Extremities: Normal range of motion, No clubbing, cyanosis or edema  Vascular: Peripheral pulses palpable 2+ bilaterally    TELEMETRY: SR 80s	    ECG:  SR no acute ischemic changes  RADIOLOGY: < from: Xray Hip 2-3 Views, Right (05.09.22 @ 21:12) >  ACC: [id number] EXAM:  XR HIP 2-3V RT                        ACC: [id number] EXAM:  XR FEMUR 2 VIEWS RT                          PROCEDURE DATE:  05/09/2022      INTERPRETATION:  CLINICAL INDICATION: follow-up status post post right   femur periprosthetic fracture repair    EXAM:  AP and lateral right hip and femur from 5/9/2022 at 2112. Compared to   prior study from 3/30/2022.    IMPRESSION:  Stable intact previously seen long stem cementless right total hip   prosthesis with screw fixated acetabular cup and 2 proximal femoral   subtrochanteric cerclage wires. Stable intact more recently applied long   lateral femoral fracture fixation plate with fixation, transcondylar, and   interfragmentary screws.    Unchanged configuration of the repaired slightly comminuted distal   femoral diaphyseal fracture below prosthetic femoral stem tip including   residual minimal medial fracture margin cortical offset otherwise overall   anatomic alignment maintained.    Removed previously seen surgicalskin staples. Cleared previously seen   postsurgical soft tissue changes.    No dislocations or acute appearing fractures.    Remainder of exam unchanged.    --- End of Report ---    < end of copied text >  < from: Xray Femur 2 Views, Right (05.09.22 @ 21:11) >  ACC: [id number] EXAM:  XR HIP 2-3V RT                        ACC: [id number] EXAM:  XR FEMUR 2 VIEWS RT                          PROCEDURE DATE:  05/09/2022      INTERPRETATION:  CLINICAL INDICATION: follow-up status post post right   femur periprosthetic fracture repair    EXAM:  AP and lateral right hip and femur from 5/9/2022 at 2112. Compared to   prior study from 3/30/2022.    IMPRESSION:  Stable intact previously seen long stem cementless right total hip   prosthesis with screw fixated acetabular cup and 2 proximal femoral   subtrochanteric cerclage wires. Stable intact more recently applied long   lateral femoral fracture fixation plate with fixation, transcondylar, and   interfragmentary screws.    Unchanged configuration of the repaired slightly comminuted distal   femoral diaphyseal fracture below prosthetic femoral stem tip including   residual minimal medial fracture margin cortical offset otherwise overall   anatomic alignment maintained.    Removed previously seen surgicalskin staples. Cleared previously seen   postsurgical soft tissue changes.    No dislocations or acute appearing fractures.    Remainder of exam unchanged.    --- End of Report ---    < end of copied text >  < from: CT Cervical Spine No Cont (05.08.22 @ 16:36) >  ACC: [id number] EXAM:  CT CERVICAL SPINE                          PROCEDURE DATE:  05/08/2022      INTERPRETATION:  CLINICAL INDICATION:  Trauma code.    TECHNIQUE : Axial CT scanning of the cervical spine was obtained without   the administration of intravenous contrast using multislice helical   technique. Reformatted coronal and sagittal images were subsequently   obtained and reviewed.    COMPARISON: CTA neck 4/22/2022    FINDINGS:  Redemonstration of posterior spinal fusion with bilateral pedicle screws   and rods extending from T4 to below the field of view.    There is maintenance of usual cervical lordosis.  There is grade 1 retrolisthesis of C4 on C5. Disc osteophyte complexes   are present at C5-6 and C6-7 with mild spinal stenosis. Multilevel   degenerative changes are identified with uncovertebral joint and facet   degenerative changes.  Vertebral body height is preserved throughout the cervical spine. There   is mild multilevel loss of intervertebral disc height throughout the   cervical spine.    Limited evaluation of the central canal within the thoracic spine   secondary to metallic streak artifact.    There are multilevel diffuse disc osteophyte complexes and ligamentum   flavum thickening which contribute to multilevel central canal narrowing.    There is multilevel facet hypertrophy and uncovertebral spurring which   contribute to multilevel neural foraminal narrowing. Notably, C4-C5 and   C5-C6.    The paravertebral soft tissues are unremarkable. The lung apices are   clear.    IMPRESSION:    No acute fracture or traumatic subluxation.  There are multi-level degenerative changes of the cervical spine.    --- End of Report ---    < end of copied text >  < from: CT Head No Cont (05.06.22 @ 06:11) >  ACC: [id number] EXAM:  CT BRAIN                          PROCEDURE DATE:  05/06/2022      INTERPRETATION:  CLINICAL INDICATION: Unwitnessed fall and altered mental   status    TECHNIQUE: Noncontrast CT examination of the head was performed using   contiguous 5 mm CT images.    COMPARISON: CT head 4/22/2022    FINDINGS:    There is no evidence of mass or acute intracranial hemorrhage. Right   frontal lobe encephalomalacia and gliosis. 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 Ventricles and sulci are normal in size and   configuration for the patient's stated age. No midline shift or other   significant mass effect is noted. There is no CT evidence of acute   territorial infarct.    The visualized paranasal sinuses and tympanomastoid spaces are clear.   Orbits and orbital contents are unremarkable.    Right frontal temporal craniotomy.    IMPRESSION:    No evidence of acute intracranial hemorrhage, midline shift or CT   evidence of acute territorial infarct.    Postsurgical changes as described.    If the patient's symptoms persist, consider short interval follow-up head   CT or brain MRI if there are no MRI contraindications.    --- End of Report ---    < end of copied text >      OTHER: 	  	  LABS:	 	    CARDIAC MARKERS:                         11.6   12.53 )-----------( 307      ( 10 May 2022 11:53 )             37.2     05-10    141  |  107  |  6<L>  ----------------------------<  107<H>  4.0   |  21<L>  |  1.11    Ca    8.9      10 May 2022 11:53  Phos  3.0     05-10  Mg     2.1     05-10    TPro  6.8  /  Alb  3.1<L>  /  TBili  0.5  /  DBili  x   /  AST  45<H>  /  ALT  45  /  AlkPhos  132<H>  05-10    proBNP:   Lipid Profile:   HgA1c:   TSH:

## 2022-05-10 NOTE — PROGRESS NOTE ADULT - ATTENDING COMMENTS
Interval History as per resident note, personally verified by me. Patient had shaking of RUE overnight for ~3.5 hours that stopped on its own and with full and intact awareness. Patient said it was because he was "cold". No reports of further seizures and remains off all sedation. He has been cleared from C-collar and vEEG disconnected.    MS - AAO x2.5 (knew it was 5/2022 but not rest of date), speech fluent, rep/naming intact, follows commands. Attn/conc/recent and remote memory/fund of knowledge dec  CN - Pupils surgical but reactive b/l, EOMI, VFF, face sens/str/hearing WNL b/l, tongue/palate midline, trap 5/5 b/l  Motor - Normal bulk/tone. BUE's at least 4/5 and BLE's at least 3+/5 and symmetric  Sens - LT/temp intact all  DTR's - 1+ RUE, 2+ LUE, 1+ KJ b/l, 0+ AJ b/l, and downgoing b/l plantar response  Coord - Grossly appropriate for level of strength  Gait and station - Due to fall risk/safety concerns did not assess    A/P:  Epilepsy, not intractable, with presumed prior status epilepticus (now resolved)  Encephalopathy  Prior acomm aneurysm rupture with resultant R frontal encephalomalacia  HTN  CAD/MI    - Patient previously intubated and sedated for presumed status epilepticus. Now off sedation and extubated, mental status improved. No further electrographic or clinical seizures seen. His episode of RUE shaking is much less likely to represent focal motor status epilepticus due to no secondary generalization and stopping on its own after 3.5 hours, both of which would be highly unlikely if they were in fact epileptic seizures  - vEEG without seizure activity but 0.5-2 Hz R frontotemporal LPD's. STOPPED. If RUE shaking event recurs could then reconnect vEEG to capture and characterize it  - Continue current AED's with Vimpat 150mg IV/PO [id number] and Keppra at 1g IV/PO [id number]  - Continue to address above medical problems, as you are doing  - Will continue to follow patient with you

## 2022-05-10 NOTE — PROGRESS NOTE ADULT - ASSESSMENT
==============ASSESSMENT=============  66M with CAD s/p MI and stent, HTN, HLD, s/p 7 lumbar spinal surgeries. S/P R frontal craniotomy and clipping of R acomm aneurysm (1997). 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 On arrival, pt mental status was fluctuant between alert and somnolent. Breathing pattern concerning for possible jerel-busch v kussmaul pattern respirations. CT head performed in ED, followed by episodes of bradycardia with hypoxia and apnea. ED team also noted R arm shaking with complete absence of L UE movement. Due to suspicion for status epilepticus, pt was intubated for AW protection. Transferred to MICU for further management  ==============PLAN=============    #NEURO  #c/f seizure, high risk but no seizures on EEG   -keppra increased to 1000 q12h   -c/w vimpat 150 q12h   -no seizures seen on EEG  -appreciate neurology recommendations     #unwitnessed fall   - CTH wnl; CT-spine showed no pathology and C-collar was discontinued   - CK wnl   - Pelvic/femur XR pending  - seen by ortho, appreciate recs    #CARDIOVASCULAR  Hemodynamics - hypotensive    - has been off levo since 5/6 at 7:15pm, was likely initially needed because of prop side effect    - sepsis w/u cultures to r/o septic shock; see ID    HTN  -on metop (home dose equivalent)  -started amlodpine 5mg today for better BP control      #RESPIRATORY  - intubated for airway protection on 5/6, extubated 5/8    Secretions   - standing q6h duonebs   - tolerated CPAP trial and extubated 5/8 AM   - likely 2/2 MRSE PNA    #RENAL  - no issues     #GI  - passed dysphagia screen, diet started    #ENDO  - no issues    #HEME/ONC  - DVT ppx lovenox  - hold half dose xarelto, c/w ASA     #ID  # Sepsis  - UA negative, BCx from 5/6 showed MRSE, sputum also grew MRSE  -was on zosyn (5/7-5/8), continuing with vanc. Level due prior to next dose  -MRSA swab pending  -known hardware however low suspicion for hardware infection, will continue to monitor   ==============ASSESSMENT=============  66M with CAD s/p MI and stent, HTN, HLD, s/p 7 lumbar spinal surgeries. S/P R frontal craniotomy and clipping of R acomm aneurysm (1997). 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 On arrival, pt mental status was fluctuant between alert and somnolent. Breathing pattern concerning for possible jerel-busch v kussmaul pattern respirations. CT head performed in ED, followed by episodes of bradycardia with hypoxia and apnea. ED team also noted R arm shaking with complete absence of L UE movement. Due to suspicion for status epilepticus, pt was intubated for AW protection. Transferred to MICU for further management  ==============PLAN=============    #NEURO  #c/f seizure, high risk but no seizures on EEG   -keppra increased to 1000 q12h   -c/w vimpat 150 q12h   -no seizures seen on EEG, now d/c'd  -appreciate neurology recommendations     #unwitnessed fall   - CTH wnl; CT-spine showed no pathology and C-collar was discontinued   - CK wnl   - Pelvic/femur XR performed, no fracture   - seen by ortho, appreciate recs    #CARDIOVASCULAR  Hemodynamics - hypotensive    - has been off levo since 5/6 at 7:15pm, was likely initially needed because of prop side effect    - sepsis w/u cultures to r/o septic shock; see ID    HTN  -on metop (home dose equivalent)  -started amlodpine 5mg today for better BP control      #RESPIRATORY  - intubated for airway protection on 5/6, extubated 5/8    Secretions   - standing q6h duonebs   - tolerated CPAP trial and extubated 5/8 AM   - likely 2/2 MRSE PNA    #RENAL  - no issues     #GI  - passed dysphagia screen, diet started    #ENDO  - no issues    #HEME/ONC  - DVT ppx lovenox  - hold half dose xarelto, c/w ASA     #ID  # Sepsis  - UA negative, BCx from 5/6 showed MRSE, sputum also grew MRSE  -was on zosyn (5/7-5/8), continuing with vanc. Level due prior to next dose  -MRSA swab pending  -known hardware however low suspicion for hardware infection, will continue to monitor   ==============ASSESSMENT=============  66M with CAD s/p MI and stent, HTN, HLD, s/p 7 lumbar spinal surgeries. S/P R frontal craniotomy and clipping of R acomm aneurysm (1997). 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 On arrival, pt mental status was fluctuant between alert and somnolent. Breathing pattern concerning for possible jerel-busch v kussmaul pattern respirations. CT head performed in ED, followed by episodes of bradycardia with hypoxia and apnea. ED team also noted R arm shaking with complete absence of L UE movement. Due to suspicion for status epilepticus, pt was intubated for AW protection. Transferred to MICU for further management  ==============PLAN=============    #NEURO  #c/f seizure, high risk but no seizures on EEG   -keppra increased to 1000 q12h   -c/w vimpat 150 q12h   -no seizures seen on EEG, now d/c'd  -appreciate neurology recommendations   - overnight twitching unlikely to be seizure given patient was alert and noted that he was shivering    #unwitnessed fall   - CTH wnl; CT-spine showed no pathology and C-collar was discontinued   - CK wnl   - Pelvic/femur XR performed, no fracture   - seen by ortho, appreciate recs  - PT ordered w/ weight bearing status chart note noted  -f/u dopplers per ortho    #CARDIOVASCULAR  Hemodynamics - hypotensive    - has been off levo since 5/6 at 7:15pm, was likely initially needed because of prop side effect    - sepsis w/u cultures to r/o septic shock; see ID    HTN  -on metop (home dose equivalent)  -increased amlodipine to 10mg daily  -10 IV hydral x 1 given this AM for BP control, can continue to give 10 hydral IVP for SBP>150      #RESPIRATORY  - intubated for airway protection on 5/6, extubated 5/8    Secretions   - standing q6h duonebs   - tolerated CPAP trial and extubated 5/8 AM   - likely 2/2 staph PNA  -cough improved, no secretions overnight/in AM    #RENAL  - no issues     #GI  - advanced diet  -having BMs    #ENDO  - no issues    #HEME/ONC  - DVT ppx lovenox  - hold half dose xarelto, c/w ASA     #ID  # Sepsis  - UA negative, BCx from 5/6 showed MRSE(likely contaminant), sputum also grew staph aureus (MSSA)  -was on zosyn (5/7-5/8, 5/10) and vanc which was d/c'd on 5/10; started on Ancef 2gq8h for additional 4 days to treat MSSA in sputum  -MRSA swab neg   -known hardware however low suspicion for hardware infection, will continue to monitor

## 2022-05-10 NOTE — CHART NOTE - NSCHARTNOTEFT_GEN_A_CORE
MICU Transfer Note    Transfer from: MICU  Transfer to:  ( x ) Medicine    (  ) Telemetry    (  ) RCU    (  ) Palliative    (  ) Stroke Unit    (  ) _______________  Accepting physician: Dr. Engel      66M with CAD s/p MI and stent, HTN, HLD, s/p 7 lumbar spinal surgeries. S/P R frontal craniotomy and clipping of R acomm aneurysm (1997). 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 CT head was negative. Intubated in ED for concern for seizure given RUE arm movement and transferred to MICU for further management.     MICU COURSE:  Patient was sedated with propofol and required some pressor support with levo which was weaned off on 5/6. Patient was seen by neurology and was recommended to continue with home vimpat and was started on keppra after a load. EEG was placed with no seizure events noted on read. He was gradually weaned off propofol and then extubated on 5/8 with no seizure noted. CT c-spine done which showed no fracture so c-collar was removed, swallow was passed and diet was started. Blood culture showed MRSE in one bottle which was thought to be contaminant, but sputum was growing MSSA which was treated with zosyn and then narrowed to Ancef. Patient was seen by ortho given recent surgery and admission for fall, imaging did not show fracture, PT was consulted w/ weight bearing status noted. Patient was hypertensive and started on amlodipine 10mg, and PRN hydral. At this time, patient is hemodynamically stable for transfer to the floors.         ASSESSMENT & PLAN:   66M with CAD s/p MI and stent, HTN, HLD, s/p 7 lumbar spinal surgeries. S/P R frontal craniotomy and clipping of R acomm aneurysm (1997). S/P R prosthetic femur ORIF 3/30/21 on xarelto presented to the ER for unwitnessed fall in his nursing home with unknown down time w/ concern for seizure, intubated on 5/6 and extubated on 5/8 for airway protection. VEEG showed no seizures, now extubated and being treated for MSSA PNA.       For Follow-Up:  [ ] f/u repeat blood cultures  [ ] gradually add back home meds as needed  [ ] if SBP>150 can give hydral 10 IVP   [ ] f/u dopplers per ortho  [ ] PT eval (ordered)  [ ] complete 4 more days of Ancef for MSSA PNA  [ ] c/w AEDs, titrate per neuro      Vital Signs Last 24 Hrs  T(C): 36.8 (10 May 2022 19:00), Max: 38.5 (09 May 2022 23:00)  T(F): 98.2 (10 May 2022 19:00), Max: 101.3 (09 May 2022 23:00)  HR: 80 (10 May 2022 19:00) (70 - 85)  BP: 145/63 (10 May 2022 19:00) (128/61 - 175/72)  BP(mean): 91 (10 May 2022 19:00) (85 - 117)  RR: 22 (10 May 2022 19:00) (9 - 35)  SpO2: 97% (10 May 2022 19:00) (93% - 99%)  I&O's Summary    09 May 2022 07:01  -  10 May 2022 07:00  --------------------------------------------------------  IN: 1250 mL / OUT: 1365 mL / NET: -115 mL    10 May 2022 07:01  -  10 May 2022 20:07  --------------------------------------------------------  IN: 700 mL / OUT: 300 mL / NET: 400 mL          MEDICATIONS  (STANDING):  albuterol/ipratropium for Nebulization 3 milliLiter(s) Nebulizer every 6 hours  amLODIPine   Tablet 10 milliGRAM(s) Oral daily  ascorbic acid 500 milliGRAM(s) Oral daily  aspirin  chewable 81 milliGRAM(s) Oral daily  ceFAZolin   IVPB 2000 milliGRAM(s) IV Intermittent every 8 hours  chlorhexidine 4% Liquid 1 Application(s) Topical <User Schedule>      #NEURO  #c/f seizure, high risk but no seizures on EEG   -keppra increased to 1000 q12h   -c/w vimpat 150 q12h   -no seizures seen on EEG, now d/c'd  -appreciate neurology recommendations   - overnight twitching unlikely to be seizure given patient was alert and noted that he was shivering    #unwitnessed fall   - CTH wnl; CT-spine showed no pathology and C-collar was discontinued   - CK wnl   - Pelvic/femur XR performed, no fracture   - seen by ortho, appreciate recs  - PT ordered w/ weight bearing status chart note noted  -f/u dopplers per ortho    #CARDIOVASCULAR  Hemodynamics - hypotensive    - has been off levo since 5/6 at 7:15pm, was likely initially needed because of prop side effect    - sepsis w/u cultures to r/o septic shock; see ID    HTN  -on metop (home dose equivalent)  -increased amlodipine to 10mg daily  -10 IV hydral x 1 given this AM for BP control, can continue to give 10 hydral IVP for SBP>150      #RESPIRATORY  - intubated for airway protection on 5/6, extubated 5/8    Secretions   - standing q6h duonebs   - tolerated CPAP trial and extubated 5/8 AM   - likely 2/2 staph PNA  -cough improved, no secretions overnight/in AM    #RENAL  - no issues     #GI  - advanced diet  -having BMs    #ENDO  - no issues    #HEME/ONC  - DVT ppx lovenox  - hold half dose xarelto, c/w ASA     #ID  # Sepsis  - UA negative, BCx from 5/6 showed MRSE(likely contaminant), sputum also grew staph aureus (MSSA)  -was on zosyn (5/7-5/8, 5/10) and vanc which was d/c'd on 5/10; started on Ancef 2gq8h for additional 4 days to treat MSSA in sputum  -MRSA swab neg   -known hardware however low suspicion for hardware infection, will continue to monitor    enoxaparin Injectable 40 milliGRAM(s) SubCutaneous every 24 hours  lacosamide IVPB 150 milliGRAM(s) IV Intermittent every 12 hours  levETIRAcetam  IVPB 1000 milliGRAM(s) IV Intermittent every 12 hours  metoprolol tartrate 25 milliGRAM(s) Oral two times a day  multivitamin 1 Tablet(s) Oral daily    MEDICATIONS  (PRN):  baclofen 5 milliGRAM(s) Oral every 8 hours PRN Muscle Spasm  simethicone 80 milliGRAM(s) Chew every 6 hours PRN Indigestion        LABS                                            11.6                  Neurophils% (auto):   79.4   (05-10 @ 11:53):    12.53)-----------(307          Lymphocytes% (auto):  12.4                                          37.2                   Eosinphils% (auto):   1.4      Manual%: Neutrophils x    ; Lymphocytes x    ; Eosinophils x    ; Bands%: x    ; Blasts x                                    141    |  107    |  6                   Calcium: 8.9   / iCa: x      (05-10 @ 11:53)    ----------------------------<  107       Magnesium: 2.1                              4.0     |  21     |  1.11             Phosphorous: 3.0      TPro  6.8    /  Alb  3.1    /  TBili  0.5    /  DBili  x      /  AST  45     /  ALT  45     /  AlkPhos  132    10 May 2022 11:53

## 2022-05-10 NOTE — PROGRESS NOTE ADULT - ATTENDING COMMENTS
66 M with prior ACOM aneurysm rupture and craniotomy and seizure d/o (1997), recent R ORIF discharged on dvt ppx here with fall and acute hypoxemic respiratory failure due to staph pna requiring intubation, now extubated    - acute hypoxemic respiratory failure due to sepsis from staph pna, improving, wean supplemental O2 as tolerated  - airway clearance as tolerated  - c/w keep zosyn on for sepsis due to staph aureus pneumonia  - can d/c c spine precautions given no neck pain with flexion and ct scan negative for fx  - able to move both legs without any pain, f/u ortho reccs  - AED as per neuro (though patient states he remembers he had a mechanical fall and slipped); do not suspect the hand jerking yesterday night was seizure as patient states he remembers it and he just felt cold  - increase norvasc for essential hypertension

## 2022-05-10 NOTE — CONSULT NOTE ADULT - PROBLEM SELECTOR RECOMMENDATION 9
found down in nursing home    - recent admission for seizure  thought to be in status on arrival in ED    - required intubated and ICU admission  vEEG - no seizures  AEDs as per ICU  neuro checks  orders per ICU team

## 2022-05-10 NOTE — PROGRESS NOTE ADULT - ASSESSMENT
66 y.o. RH M with PMH of CAD s/p MI and stent, HTN, HLD, s/p 7 lumbar spinal surgeries, spontaneous R Acomm aneurysm rupture s/p R frontal craniotomy and clipping (1997), s/p R prosthetic femor ORIF 3/30/21, on xarelto presented to the ED after an unwitnessed fall from then nursing home facility ([facility]). Neurology consulted for status epilepticus. Neuro exam limited due to sedation. CTH showing no acute findings, chronic post surgical changes. EEG showing high risk for seizures in R frontotemporal region. Had episode of RUE shaking overnight, which patient states was due to him being cold. Low suspicion that this was epileptogenic given the length of episode without generalization and retained awareness and no change in VS.    Impression: 1) Unwitnessed fall and decreased level of consciousness of unclear etiology. Concerned for status epilepticus in the setting of known R fronto temporal epileptogenic focus, vital sign derangement, and report of RUE shaking, could have initially been focal to generalized with impaired awareness. R/o infectious, toxic metabolic 2) L hemiplegia - has known jennifer's paralysis and post-ictal confusion due to R frontal encephalomalacia     Recommendations:  [] C/w Vimpat 150 mg q12  [] C/w Keppra 1000 mg q12  [] vEEG without seizure activity but 0.5-2 Hz R frontotemporal LPD's. no off EEG  [] Seizure precaution  [] Neurocheck and vital per MICU protocol  [] Will continue to follow    Case discussed and seen with neuro attending Dr. Teixeira

## 2022-05-10 NOTE — CHART NOTE - NSCHARTNOTEFT_GEN_A_CORE
MAR Accept Note  Transfer to: Medicine   Accepting Attending Physician: Dr. Engel  Assigned Room: 9MON 905D    Patient seen and examined.   Labs and data reviewed.   No findings precluding transfer of service.     HPI/MICU COURSE:   Please refer to MICU transfer note for full details. Briefly, this is a 66M with CAD s/p MI and stent, HTN, HLD, s/p 7 lumbar spinal surgeries. S/P R frontal craniotomy and clipping of R acomm aneurysm (1997). 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 CT head was negative. Intubated in ED for concern for seizure given RUE arm movement and transferred to MICU for further management.     Patient extubated on 5/8 after EEG performed showing no seizure activity. CT c-spine done which showed no fracture so c-collar was removed, swallow was passed and diet was started. Blood culture showed MRSE in one bottle which was thought to be contaminant, but sputum was growing MSSA which was treated with zosyn and then narrowed to Ancef. Patient was seen by ortho given recent surgery and admission for fall, imaging did not show fracture, PT was consulted w/ weight bearing status noted. Patient was hypertensive and started on amlodipine 10mg, and PRN hydral.     At this time, patient is hemodynamically stable for transfer to the floors.     FOR FOLLOW-UP:  [ ] f/u repeat blood cultures  [ ] gradually add back home meds as needed  [ ] if SBP>150 can give hydral 10 IVP   [ ] f/u dopplers per ortho  [ ] PT eval (ordered)  [ ] complete 4 more days of Ancef for MSSA PNA  [ ] c/w AEDs, titrate per neuro    Girish Parker MD  PGY-3, Internal Medicine  MAR Spectra: [id number]

## 2022-05-10 NOTE — PROGRESS NOTE ADULT - SUBJECTIVE AND OBJECTIVE BOX
Interval History:  Patient seen and examined at the bedside. Episode of RUE shaking overnight, see chart note for more detail. Patient today at bedside states he was aware throughout episode and that he was shaking because he was cold. Otherwise no complaints or concerns     ROS: Pertinent positives in HPI, all other ROS were reviewed and are negative.      MEDICATIONS  (STANDING):  albuterol/ipratropium for Nebulization 3 milliLiter(s) Nebulizer every 6 hours  amLODIPine   Tablet 10 milliGRAM(s) Oral daily  ascorbic acid 500 milliGRAM(s) Oral daily  aspirin  chewable 81 milliGRAM(s) Oral daily  chlorhexidine 4% Liquid 1 Application(s) Topical <User Schedule>  enoxaparin Injectable 40 milliGRAM(s) SubCutaneous every 24 hours  lacosamide IVPB 150 milliGRAM(s) IV Intermittent every 12 hours  levETIRAcetam  IVPB 1000 milliGRAM(s) IV Intermittent every 12 hours  metoprolol tartrate 25 milliGRAM(s) Oral two times a day  multivitamin 1 Tablet(s) Oral daily  piperacillin/tazobactam IVPB.. 3.375 Gram(s) IV Intermittent every 8 hours    MEDICATIONS  (PRN):  baclofen 5 milliGRAM(s) Oral every 8 hours PRN Muscle Spasm  simethicone 80 milliGRAM(s) Chew every 6 hours PRN Indigestion    Allergies  Allergy Status Unknown    Intolerances      Vital Signs Last 24 Hrs  T(C): 37.2 (10 May 2022 08:31), Max: 38.5 (09 May 2022 23:00)  T(F): 98.9 (10 May 2022 08:31), Max: 101.3 (09 May 2022 23:00)  HR: 80 (10 May 2022 10:00) (70 - 93)  BP: 150/68 (10 May 2022 10:00) (128/61 - 176/78)  BP(mean): 98 (10 May 2022 10:00) (88 - 118)  RR: 14 (10 May 2022 10:00) (9 - 36)  SpO2: 93% (10 May 2022 10:00) (93% - 100%)    NEUROLOGICAL EXAM:  Gen: No acute distress. Extubated. Off EEG, off C-collar   MS: Oriented x3. Fluent. Follows simple commands of gripping hands.    CN: PERRLA. EOMI. Face symmetric. T/u midline.   Motor: Normal tone. Able to  hand b/l 5/5. Able to spontaneously move LE's.   Sensory: Intact to LT throughout   Reflexes: Babinski downgoing bilaterally.   Gait: Deferred        Labs:   cbc                      11.2   16.80 )-----------( 341      ( 10 May 2022 00:35 )             35.6     [id number]    140  |  106  |  6<L>  ----------------------------<  108<H>  4.5   |  20<L>  |  1.06    Ca    9.0      10 May 2022 00:35  Phos  3.0     05-10  Mg     2.2     05-10    TPro  7.1  /  Alb  3.6  /  TBili  0.4  /  DBili  x   /  AST  58<H>  /  ALT  39  /  AlkPhos  146<H>  05-10    Coags  [id number] Chol 138 LDL -- HDL 34<L> Trig 91  A1C  CardiacMarkers    LFTsLIVER FUNCTIONS - ( 10 May 2022 00:35 )  Alb: 3.6 g/dL / Pro: 7.1 g/dL / ALK PHOS: 146 U/L / ALT: 39 U/L / AST: 58 U/L / GGT: x           UAUrinalysis Basic - ( 10 May 2022 03:38 )    Color: Yellow / Appearance: Slightly Turbid / S.019 / pH: x  Gluc: x / Ketone: Negative  / Bili: Negative / Urobili: Negative   Blood: x / Protein: 30 mg/dL / Nitrite: Negative   Leuk Esterase: Negative / RBC: 4 /hpf / WBC 2 /HPF   Sq Epi: x / Non Sq Epi: 4 /hpf / Bacteria: 0.0     Interval History:  Patient seen and examined at the bedside. Episode of RUE shaking overnight, see chart note for more detail. Patient today at bedside states he was aware throughout episode and that he was shaking because he was cold. Otherwise no complaints or concerns     ROS: Pertinent positives in Interval History, all other ROS were reviewed and are negative.    FHx: Non-contributory    MEDICATIONS  (STANDING):  albuterol/ipratropium for Nebulization 3 milliLiter(s) Nebulizer every 6 hours  amLODIPine   Tablet 10 milliGRAM(s) Oral daily  ascorbic acid 500 milliGRAM(s) Oral daily  aspirin  chewable 81 milliGRAM(s) Oral daily  chlorhexidine 4% Liquid 1 Application(s) Topical <User Schedule>  enoxaparin Injectable 40 milliGRAM(s) SubCutaneous every 24 hours  lacosamide IVPB 150 milliGRAM(s) IV Intermittent every 12 hours  levETIRAcetam  IVPB 1000 milliGRAM(s) IV Intermittent every 12 hours  metoprolol tartrate 25 milliGRAM(s) Oral two times a day  multivitamin 1 Tablet(s) Oral daily  piperacillin/tazobactam IVPB.. 3.375 Gram(s) IV Intermittent every 8 hours    MEDICATIONS  (PRN):  baclofen 5 milliGRAM(s) Oral every 8 hours PRN Muscle Spasm  simethicone 80 milliGRAM(s) Chew every 6 hours PRN Indigestion    Allergies  Allergy Status Unknown    Intolerances      Vital Signs Last 24 Hrs  T(C): 37.2 (10 May 2022 08:31), Max: 38.5 (09 May 2022 23:00)  T(F): 98.9 (10 May 2022 08:31), Max: 101.3 (09 May 2022 23:00)  HR: 80 (10 May 2022 10:00) (70 - 93)  BP: 150/68 (10 May 2022 10:00) (128/61 - 176/78)  BP(mean): 98 (10 May 2022 10:00) (88 - 118)  RR: 14 (10 May 2022 10:00) (9 - 36)  SpO2: 93% (10 May 2022 10:00) (93% - 100%)    NEUROLOGICAL EXAM:  Gen: No acute distress. Extubated. Off EEG, off C-collar   MS: Oriented x3. Fluent. Follows simple commands of gripping hands.    CN: PERRLA. EOMI. Face symmetric. T/u midline.   Motor: Normal tone. Able to  hand b/l 5/5. Able to spontaneously move LE's.   Sensory: Intact to LT throughout   Reflexes: Babinski downgoing bilaterally.   Gait: Deferred        Labs:   cbc                      11.2   16.80 )-----------( 341      ( 10 May 2022 00:35 )             35.6     [id number]    140  |  106  |  6<L>  ----------------------------<  108<H>  4.5   |  20<L>  |  1.06    Ca    9.0      10 May 2022 00:35  Phos  3.0     05-10  Mg     2.2     05-10    TPro  7.1  /  Alb  3.6  /  TBili  0.4  /  DBili  x   /  AST  58<H>  /  ALT  39  /  AlkPhos  146<H>  05-10    Coags  [id number] Chol 138 LDL -- HDL 34<L> Trig 91  A1C  CardiacMarkers    LFTsLIVER FUNCTIONS - ( 10 May 2022 00:35 )  Alb: 3.6 g/dL / Pro: 7.1 g/dL / ALK PHOS: 146 U/L / ALT: 39 U/L / AST: 58 U/L / GGT: x           UAUrinalysis Basic - ( 10 May 2022 03:38 )    Color: Yellow / Appearance: Slightly Turbid / S.019 / pH: x  Gluc: x / Ketone: Negative  / Bili: Negative / Urobili: Negative   Blood: x / Protein: 30 mg/dL / Nitrite: Negative   Leuk Esterase: Negative / RBC: 4 /hpf / WBC 2 /HPF   Sq Epi: x / Non Sq Epi: 4 /hpf / Bacteria: 0.0

## 2022-05-11 LAB
CULTURE RESULTS: SIGNIFICANT CHANGE UP
SPECIMEN SOURCE: SIGNIFICANT CHANGE UP

## 2022-05-11 RX ORDER — BACLOFEN 100 %
20 POWDER (GRAM) MISCELLANEOUS
Refills: 0 | Status: DISCONTINUED | OUTPATIENT
Start: 2022-05-11 | End: 2022-05-12

## 2022-05-11 RX ORDER — NEOMYCIN/POLYMYXIN B/DEXAMETHA 0.1 %
1 SUSPENSION, DROPS(FINAL DOSAGE FORM)(ML) OPHTHALMIC (EYE)
Refills: 0 | Status: DISCONTINUED | OUTPATIENT
Start: 2022-05-11 | End: 2022-05-13

## 2022-05-11 RX ORDER — OXYCODONE HYDROCHLORIDE 5 MG/1
2.5 TABLET ORAL ONCE
Refills: 0 | Status: DISCONTINUED | OUTPATIENT
Start: 2022-05-11 | End: 2022-05-11

## 2022-05-11 RX ORDER — BACLOFEN 100 %
5 POWDER (GRAM) MISCELLANEOUS EVERY 12 HOURS
Refills: 0 | Status: DISCONTINUED | OUTPATIENT
Start: 2022-05-11 | End: 2022-05-13

## 2022-05-11 RX ORDER — ACETAMINOPHEN 500 MG
650 TABLET ORAL EVERY 8 HOURS
Refills: 0 | Status: DISCONTINUED | OUTPATIENT
Start: 2022-05-11 | End: 2022-05-13

## 2022-05-11 RX ADMIN — Medication 1 DROP(S): at 18:03

## 2022-05-11 RX ADMIN — Medication 1 DROP(S): at 12:56

## 2022-05-11 RX ADMIN — OXYCODONE HYDROCHLORIDE 2.5 MILLIGRAM(S): 5 TABLET ORAL at 01:36

## 2022-05-11 RX ADMIN — Medication 650 MILLIGRAM(S): at 09:38

## 2022-05-11 RX ADMIN — Medication 100 MILLIGRAM(S): at 05:43

## 2022-05-11 RX ADMIN — Medication 650 MILLIGRAM(S): at 22:22

## 2022-05-11 RX ADMIN — OXYCODONE HYDROCHLORIDE 2.5 MILLIGRAM(S): 5 TABLET ORAL at 00:36

## 2022-05-11 RX ADMIN — Medication 5 MILLIGRAM(S): at 07:35

## 2022-05-11 RX ADMIN — Medication 25 MILLIGRAM(S): at 21:23

## 2022-05-11 RX ADMIN — Medication 100 MILLIGRAM(S): at 21:23

## 2022-05-11 RX ADMIN — Medication 375 MILLIGRAM(S): at 10:51

## 2022-05-11 RX ADMIN — Medication 20 MILLIGRAM(S): at 09:38

## 2022-05-11 RX ADMIN — LACOSAMIDE 130 MILLIGRAM(S): 50 TABLET ORAL at 07:34

## 2022-05-11 RX ADMIN — ENOXAPARIN SODIUM 40 MILLIGRAM(S): 100 INJECTION SUBCUTANEOUS at 12:59

## 2022-05-11 RX ADMIN — Medication 1 TABLET(S): at 12:58

## 2022-05-11 RX ADMIN — Medication 5 MILLIGRAM(S): at 20:31

## 2022-05-11 RX ADMIN — Medication 500 MILLIGRAM(S): at 12:58

## 2022-05-11 RX ADMIN — AMLODIPINE BESYLATE 10 MILLIGRAM(S): 2.5 TABLET ORAL at 05:43

## 2022-05-11 RX ADMIN — Medication 375 MILLIGRAM(S): at 18:02

## 2022-05-11 RX ADMIN — LEVETIRACETAM 400 MILLIGRAM(S): 250 TABLET, FILM COATED ORAL at 18:04

## 2022-05-11 RX ADMIN — Medication 3 MILLILITER(S): at 05:43

## 2022-05-11 RX ADMIN — Medication 650 MILLIGRAM(S): at 14:16

## 2022-05-11 RX ADMIN — OXYCODONE HYDROCHLORIDE 2.5 MILLIGRAM(S): 5 TABLET ORAL at 23:09

## 2022-05-11 RX ADMIN — Medication 25 MILLIGRAM(S): at 09:38

## 2022-05-11 RX ADMIN — Medication 100 MILLIGRAM(S): at 14:14

## 2022-05-11 RX ADMIN — LEVETIRACETAM 400 MILLIGRAM(S): 250 TABLET, FILM COATED ORAL at 08:00

## 2022-05-11 RX ADMIN — LACOSAMIDE 130 MILLIGRAM(S): 50 TABLET ORAL at 18:01

## 2022-05-11 RX ADMIN — Medication 650 MILLIGRAM(S): at 21:22

## 2022-05-11 RX ADMIN — Medication 81 MILLIGRAM(S): at 12:58

## 2022-05-11 RX ADMIN — OXYCODONE HYDROCHLORIDE 2.5 MILLIGRAM(S): 5 TABLET ORAL at 22:09

## 2022-05-11 NOTE — PHYSICAL THERAPY INITIAL EVALUATION ADULT - PERTINENT HX OF CURRENT PROBLEM, REHAB EVAL
65 yo M with CAD s/p MI and stent, HTN, HLD, s/p 7 lumbar spinal surgeries. S/P R frontal craniotomy and clipping of R acomm aneurysm (1997). 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 On arrival, mental status was fluctuant between alert and somnolent. CT head performed in ED, followed by episodes of bradycardia with hypoxia and apnea. ED team also noted R arm shaking with complete absence of L UE movement.

## 2022-05-11 NOTE — PROGRESS NOTE ADULT - ASSESSMENT
67 yo RH male with CAD s/p MI and stent, HTN, HLD, s/p 7 lumbar spinal surgeries. S/P R frontal craniotomy and clipping of R acomm aneurysm (1997). 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

## 2022-05-11 NOTE — PROGRESS NOTE ADULT - SUBJECTIVE AND OBJECTIVE BOX
Ortho Progress Note    S: Patient seen and examined. No acute events overnight. Pain well controlled with current regimen. Denies lightheadedness/dizziness, CP/SOB. Started on diet.      Vital Signs Last 24 Hrs  T(C): 36.7 (11 May 2022 01:10), Max: 37.2 (10 May 2022 08:31)  T(F): 98 (11 May 2022 01:10), Max: 98.9 (10 May 2022 08:31)  HR: 75 (11 May 2022 01:10) (74 - 87)  BP: 151/74 (11 May 2022 01:10) (128/59 - 175/72)  BP(mean): 109 (10 May 2022 22:30) (85 - 115)  RR: 18 (11 May 2022 01:10) (14 - 31)  SpO2: 95% (11 May 2022 01:10) (93% - 99%)      LABS:                        11.6   12.53 )-----------( 307      ( 10 May 2022 11:53 )             37.2     05-10    141  |  107  |  6<L>  ----------------------------<  107<H>  4.0   |  21<L>  |  1.11    Ca    8.9      10 May 2022 11:53  Phos  3.0     05-10  Mg     2.1     05-10    TPro  6.8  /  Alb  3.1<L>  /  TBili  0.5  /  DBili  x   /  AST  45<H>  /  ALT  45  /  AlkPhos  132<H>  05-10        O:  Physical Exam:  Gen: Laying in bed, NAD, alert and oriented.   Resp: Unlabored breathing  Ext: RLE- incisions (x2: Hip, Femur) c/d/i          EHL/FHL/TA/Sol intact          + SILT DP/SP/SAMUEL/Sa/T          +DP, extremity WWP

## 2022-05-11 NOTE — CONSULT NOTE ADULT - REASON FOR ADMISSION
status epilepticus/AMS
fall
status epilepticus/AMS
status epilepticus/AMS

## 2022-05-11 NOTE — CHART NOTE - NSCHARTNOTESELECT_GEN_ALL_CORE
MAR Accept Note/Event Note
Neurology
Neurology/Event Note
Ortho Weightbearing Status/Event Note
EEG/Epilepsy
MICU Transfer Note/Transfer Note
Neurology

## 2022-05-11 NOTE — CONSULT NOTE ADULT - ASSESSMENT
ASSESSMENT:    66 year old gentleman, former smoker; without history of intrinsic lung disease. 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 He has a known right frontotemporal epileptogenic focus maintained on Vimpat. The patient was admitted on May 6th following a fall in rehab with fluctuating mental status between somnolent and alert. His breathing was concerning for possible Cheyne-Joel respirations versus Kussmaul's pattern breathing. In the ER, the patient had episodes of bradycardia, apnea and hypoxemia with evidence of right arm shaking and absence of movement of the left upper extremity. He was intubated for airway protection due to concerns of status epilepticus and admitted to the ICU. ICU course notable for hypertension, fever and right upper extremity twitching while the patient was conscious (felt not to represent seizure activity). Deep sputum cultures -> Staph aureus (MSSA). EEG without evidence of seizures. CT head and cervical spine without acute pathology following the fall -> cervical collar removed. Orthopedic evaluation -> no evidence of surgical site infection -> WBAT. The patient was extubated on 5/8 and now is transferred to the medical [facility] for further care. He is awake and alert. He has no shortness of breath or hypoxemia on room air. His cough with sputum production following extubation has resolved. He has no chest congestion or wheeze. He has no fevers, chills or sweats. No chest pain/pressure or palpitations. His appetite is good and he is tolerating a regular diet. His back pain is well controlled on baclofen (now off narcotics). His eyes are irritated.     the patient is doing well following intubation for airway protection in the setting of status epilepticus - he has had no further seizures on Vimpat and Keppra - respiratory status is stable on room air -> he has no evidence of bronchospasm, pulmonary edema or pleural effusions - MSSA in sputum culture is likely due to "bronchitis" rather than pneumonia with clear lung fields on CXR - he is without dysphagia and tolerating a regular diet - his recent right femur periprosthetic fracture repair in March 2022 is healing well and without evidence of infection    PLAN/RECOMMENDATIONS:    stable oxygenation on room air  no indication for systemic steroids or pulmonary medications  complete a 5 day course of antibiotics for MSSA bronchitis -> can switch cefazolin to po  cardiac meds: ASA/norvasc/metoprolol  diligent DVT prophylaxis following orthopedic surgery - SQ lovenox 40mg daily  neurology follow-up noted - Keppra/Vimpat  regular diet  physical therapy - WBAT    Thank you for the courtesy of this referral. Plan of care discussed with the patient at bedside. No pulmonary objection to discharge back to rehab on oral antibiotics.    Siddharth Hansen MD, [facility]  [phone]  Pulmonary Medicine     ASSESSMENT:    66 year old gentleman, former smoker; without history of intrinsic lung disease. 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 He has a known right frontotemporal epileptogenic focus maintained on Vimpat. The patient was admitted on May 6th following a fall in rehab with fluctuating mental status between somnolent and alert. His breathing was concerning for possible Cheyne-Joel respirations versus Kussmaul's pattern breathing. In the ER, the patient had episodes of bradycardia, apnea and hypoxemia with evidence of right arm shaking and absence of movement of the left upper extremity. He was intubated for airway protection due to concerns of status epilepticus and admitted to the ICU. ICU course notable for hypertension, fever and right upper extremity twitching while the patient was conscious (felt not to represent seizure activity). Deep sputum cultures -> Staph aureus (MSSA). EEG without evidence of seizures. CT head and cervical spine without acute pathology following the fall -> cervical collar removed. Orthopedic evaluation -> no evidence of surgical site infection -> WBAT. The patient was extubated on 5/8 and now is transferred to the medical [facility] for further care. He is awake and alert. He has no shortness of breath or hypoxemia on room air. His cough with sputum production following extubation has resolved. He has no chest congestion or wheeze. He has no fevers, chills or sweats. No chest pain/pressure or palpitations. His appetite is good and he is tolerating a regular diet. His back pain is well controlled on baclofen (now off narcotics). His eyes are irritated.     the patient is doing well following intubation for airway protection in the setting of status epilepticus - he has had no further seizures on Vimpat and Keppra - respiratory status is stable on room air -> he has no evidence of bronchospasm, pulmonary edema or pleural effusions - MSSA in sputum culture is likely due to "bronchitis" rather than pneumonia with clear lung fields on CXR - he is without dysphagia and tolerating a regular diet - his recent right femur periprosthetic fracture repair in March 2022 is healing well and without evidence of infection    PLAN/RECOMMENDATIONS:    stable oxygenation on room air  no indication for systemic steroids or pulmonary medications  complete a 5 day course of antibiotics for MSSA bronchitis -> can switch cefazolin to po  cardiac meds: ASA/norvasc/metoprolol  diligent DVT prophylaxis following orthopedic surgery - SQ lovenox 40mg daily  neurology follow-up noted - Keppra/Vimpat  analgesics: baclofen 20mg qAM/tylenol 3 times daily/naprosyn 2 times daily  steroid/antibiotics eye drops  regular diet  physical therapy - WBAT    Thank you for the courtesy of this referral. Plan of care discussed with the patient at bedside. No pulmonary objection to discharge back to rehab on oral antibiotics.    Siddharth Hansen MD, [facility]  [phone]  Pulmonary Medicine

## 2022-05-11 NOTE — CHART NOTE - NSCHARTNOTEFT_GEN_A_CORE
Case reviewed amongst neurology service. Okay to keep on Keppra 1000mg BID and Vimpat 150mg BID at discharge, and to titrate with outpatient neurology f/u. Can f/u as outpatient w/ epilepsy specialist: Dr. Yohannes Quinn. Please call [facility] [phone] with questions.

## 2022-05-11 NOTE — PROGRESS NOTE ADULT - ASSESSMENT
ASSESSMENT/PLAN:   MANUEL WILSONSWATIJUANANDERSJEREMIAS is a 66yMale 6 weeks s/p ORIF of right femur periprosthetic fracture, with low concern for surgical site infection     - Pain control  - LLE 50% PWB until 5/24 for now  - XR performed, sent to attending  - Appreciate care per primary team  - Orthopedics recommending doppler's of b/l LE given minimal weightbearing status since surgery  - Orthopaedically stable for discharge.   - Ortho to advise on any changes in recs    Please page Orthopedics at [phone] with any questions

## 2022-05-11 NOTE — CONSULT NOTE ADULT - CONSULT REQUESTED DATE/TIME
06-May-2022 06:03
06-May-2022 07:53
09-May-2022 14:26
11-May-2022 14:30
11-May-2022 06:14
06-May-2022 13:40
10-May-2022 21:00

## 2022-05-11 NOTE — PROGRESS NOTE ADULT - ASSESSMENT
66M with CAD s/p MI and stent, HTN, HLD, s/p 7 lumbar spinal surgeries. S/P R frontal craniotomy and clipping of R acomm aneurysm (1997). 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 On arrival, pt mental status was fluctuant between alert and somnolent. Breathing pattern concerning for possible jerel-busch v kussmaul pattern respirations. CT head performed in ED, followed by episodes of bradycardia with hypoxia and apnea. ED team also noted R arm shaking with complete absence of L UE movement. Due to suspicion for status epilepticus, intubated-extubated. Transferred from MICU.    MSSA PNA:    IV Abx  ID/Pul eval appreciated    Seizure:    Neuro f/up  Cont with Keppra/Vimpat

## 2022-05-11 NOTE — CONSULT NOTE ADULT - ASSESSMENT
67 y/o M PMhx CAD s/p stent, HTN, HLD, s/p multiple lumbar spinal surgeries, R frontal craniotomy, clipping of R acomm aneurysm, s/p R prosthetic femur ORIF 3/30/21 who presented after unwitnessed fall in his nursing home w/ concern for possible status epilepticus s/p intubation , sedation now extubated and transferred to the floor    MSSA PNA, leukocytosis, fever  febrile to 100.4 on 5/7 and 101.3 on 5/9  s/p zosyn 5/8-5/10  currently pt w/o respiratory symptoms  leukocytosis improving  can transition cefazolin to cefadroxil 500mg bid w/ EOT 5/14    blood culture contamination  blood cx 5/6 w/ MRSE in 1 set only  repeat blood cx NGTD  s/p vanc 5/8-5/9  contaminant, no need for further antibiotics    Fortino Fairchild M.D.  [facility], Division of Infectious Diseases  [phone]  After 5pm on weekdays and all day on weekends - please call [phone]

## 2022-05-11 NOTE — CONSULT NOTE ADULT - CONSULT REASON
Cardiac Management
sacral/butt skin injury
acute hypoxic respiratory failure; sepsis syndrome; staph aureus pneumonia
Lvl 2 Trauma - Found down
seizure
R/o surgical site infection
PNA

## 2022-05-11 NOTE — CONSULT NOTE ADULT - SUBJECTIVE AND OBJECTIVE BOX
[facility], Division of Infectious Diseases  ROSANGELA Campos, RUBEN Kaplan G. Gurinder  [phone]  ([phone] - weekdays after 5pm and weekends)    MANUEL PETERSON  66y, Male  [id number]    HPI--  HPI:  67 y/o M PMhx CAD s/p MI and stent, HTN, HLD, s/p 7 lumbar spinal surgeries. S/P R frontal craniotomy and clipping of R acomm aneurysm (). 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 On arrival, pt mental status was fluctuant between alert and somnolent. CT head performed in ED, followed by episodes of bradycardia with hypoxia and apnea. ED team also noted R arm shaking with complete absence of L UE movement. Due to suspicion for status epilepticus, pt was intubated for AW protection. Transferred to MICU. Blood cultures w/ MRSE in 1 set only. Respiratory culture positive for MSSA. Initially on zosyn, transitioned to cefazolin. ID consulted for antibiotic recommendations.    ROS: 10 point review of systems completed, pertinent positives and negatives as per HPI.    Allergies: No Known Allergies    PMH -- Myocardial infarction    Blood pressure elevated    Cholesterol serum elevated    Back pain    Spinal stenosis    Lumbar herniated disc    Obese    Hypertension    Narcotic dependence    Osteoarthritis    GERD (gastroesophageal reflux disease)    Cerebral aneurysm rupture    Femur fracture, right    Sacroiliitis, not elsewhere classified      PSH -- Cardiac chest pain    Chronic pain    Chronic pain    Chronic pain    Chronic pain    Cerebral aneurysm    S/P lumbar fusion    History of right inguinal hernia repair    Hx of appendectomy    Hx of laminectomy    S/P lumbar spinal fusion revision    Insertion of pain pump    Cleft palate repair    Stented coronary artery    History of hip replacement    S/P left knee arthroscopy    H/O arthroscopy of shoulder    History of throat surgery    Fusion of sacral region of spine      FH -- No pertinent family history in first degree relatives      Social History -- denies tobacco, alcohol or illicit drug use  Travel/Environmental/Occupational History:     Physical Exam--  Vital Signs Last 24 Hrs  T(F): 97.8 (11 May 2022 13:11), Max: 98.2 (10 May 2022 19:00)  HR: 81 (11 May 2022 13:11) (75 - 92)  BP: 122/72 (11 May 2022 13:11) (122/72 - 194/82)  RR: 18 (11 May 2022 13:11) (16 - 28)  SpO2: 94% (11 May 2022 13:11) (94% - 99%)  General: no acute distress  HEENT: NC/AT, anicteric  Neck: Not rigid. No LAD  Lungs: Clear bilaterally without rales, wheezing or rhonchi  Heart: S1, S2 normal rate. No murmur, rub or gallop.  Abdomen: Soft. Nondistended. Nontender.   Neuro: no obvious focal deficits   Back: No costovertebral angle tenderness.  Extremities: R thigh surgical incision well healed  Skin: Warm. Dry. Good turgor. No rash.   Psychiatric: Appropriate affect and mood for situation.   Lines:    Laboratory & Imaging Data--  CBC:                       11.6    )-----------( 307      ( 10 May 2022 11:53 )             37.2     WBC Count: 12.53 K/uL (05-10-22 @ 11:53)  WBC Count: 16.80 K/uL (05-10-22 @ 00:35)  WBC Count: 13.76 K/uL (22 @ 02:23)  WBC Count: 12.42 K/uL (22 @ 02:10)  WBC Count: 10.87 K/uL (22 @ 02:38)  WBC Count: 12.19 K/uL (22 @ 19:23)  WBC Count: 13.17 K/uL (22 @ 06:02)    CMP: 05-10    141  |  107  |  6<L>  ----------------------------<  107<H>  4.0   |  21<L>  |  1.11    Ca    8.9      10 May 2022 11:53  Phos  3.0     05-10  Mg     2.1     05-10    TPro  6.8  /  Alb  3.1<L>  /  TBili  0.5  /  DBili  x   /  AST  45<H>  /  ALT  45  /  AlkPhos  132<H>  05-10    LIVER FUNCTIONS - ( 10 May 2022 11:53 )  Alb: 3.1 g/dL / Pro: 6.8 g/dL / ALK PHOS: 132 U/L / ALT: 45 U/L / AST: 45 U/L / GGT: x           Urinalysis Basic - ( 10 May 2022 03:38 )    Color: Yellow / Appearance: Slightly Turbid / S.019 / pH: x  Gluc: x / Ketone: Negative  / Bili: Negative / Urobili: Negative   Blood: x / Protein: 30 mg/dL / Nitrite: Negative   Leuk Esterase: Negative / RBC: 4 /hpf / WBC 2 /HPF   Sq Epi: x / Non Sq Epi: 4 /hpf / Bacteria: 0.0      Microbiology:     Culture - Blood (collected 05-10-22 @ 00:15)  Source: .Blood Blood-Peripheral  Preliminary Report (22 @ 03:01):    No growth to date.    Culture - Blood (collected 05-10-22 @ 00:00)  Source: .Blood Blood-Peripheral  Preliminary Report (22 @ 03:01):    No growth to date.    Culture - Sputum (collected 22 @ 21:38)  Source: ET Tube ET Tube  Gram Stain (22 @ 06:44):    Moderate polymorphonuclear leukocytes per low power field    Rare Squamous epithelial cells per low power field    Moderate Gram positive cocci in pairs seen per oil power field  Final Report (22 @ 18:04):    Numerous Staphylococcus aureus    Normal Respiratory Lauren absent  Organism: Staphylococcus aureus (22 @ 18:04)  Organism: Staphylococcus aureus (22 @ 18:04)      -  Ampicillin/Sulbactam: S <=8/4      -  Cefazolin: S <=4      -  Clindamycin: S <=0.25      -  Erythromycin: S <=0.25      -  Gentamicin: S <=1 Should not be used as monotherapy      -  Oxacillin: S <=0.25      -  Rifampin: S <=1 Should not be used as monotherapy      -  Tetra/Doxy: S <=1      -  Trimethoprim/Sulfamethoxazole: S <=0.5/9.5      -  Vancomycin: S 2      Method Type: ROD    Culture - Blood (collected 22 @ 20:00)  Source: .Blood Blood-Venous  Preliminary Report (22 @ 01:02):    No growth to date.    Culture - Blood (collected 22 @ 19:55)  Source: .Blood Blood-Peripheral  Preliminary Report (22 @ 01:02):    No growth to date.    Culture - Blood (collected 22 @ 18:55)  Source: .Blood Blood-Venous  Preliminary Report (22 @ 23:01):    No growth to date.    Culture - Blood (collected 22 @ 18:40)  Source: .Blood Blood-Peripheral  Gram Stain (22 @ 18:47):    Growth in aerobic and anaerobic bottles: Gram Positive Cocci in Clusters  Final Report (22 @ 20:43):    Growth in aerobic and anaerobic bottles: Staphylococcus epidermidis    Coag Negative Staphylococcus    Single set isolate, possible contaminant. Contact    Microbiology if susceptibility testing clinically    indicated.    ***Blood Panel PCR results on this specimen are available    approximately 3 hours after the Gram stain result.***    Gram stain, PCR, and/or culture results may not always    correspond due to difference in methodologies.    ************************************************************    This PCR assay was performed by multiplex PCR. This    Assay tests for 66 bacterial and resistance gene targets.    Please refer to the [facility] test directory    at [email or web address] for details.  Organism: Blood Culture PCR (22 @ 20:43)  Organism: Blood Culture PCR (22 @ 20:43)      -  Staphylococcus epidermidis, Methicillin resistant: Detec      Method Type: PCR      Radiology--  ***  Active Medications--  acetaminophen     Tablet .. 650 milliGRAM(s) Oral every 8 hours  amLODIPine   Tablet 10 milliGRAM(s) Oral daily  ascorbic acid 500 milliGRAM(s) Oral daily  aspirin  chewable 81 milliGRAM(s) Oral daily  baclofen 20 milliGRAM(s) Oral <User Schedule>  ceFAZolin   IVPB 2000 milliGRAM(s) IV Intermittent every 8 hours  chlorhexidine 4% Liquid 1 Application(s) Topical <User Schedule>  dexamethasone/neomycin/polymyxin Suspension 1 Drop(s) Both EYES two times a day  enoxaparin Injectable 40 milliGRAM(s) SubCutaneous every 24 hours  lacosamide IVPB 150 milliGRAM(s) IV Intermittent every 12 hours  levETIRAcetam  IVPB 1000 milliGRAM(s) IV Intermittent every 12 hours  metoprolol tartrate 25 milliGRAM(s) Oral two times a day  multivitamin 1 Tablet(s) Oral daily  naproxen 375 milliGRAM(s) Oral two times a day  simethicone 80 milliGRAM(s) Chew every 6 hours PRN    Antimicrobials:   ceFAZolin   IVPB 2000 milliGRAM(s) IV Intermittent every 8 hours    Immunologic:

## 2022-05-11 NOTE — PHYSICAL THERAPY INITIAL EVALUATION ADULT - ADDITIONAL COMMENTS
Prior to R femur surgery pt was independent with ADLs and ambulation - been performing sliding board transfers with assistance from bed to chair while NWB at rehab.

## 2022-05-11 NOTE — CONSULT NOTE ADULT - SUBJECTIVE AND OBJECTIVE BOX
[facility] - [facility] - CONSULT NOTE    HPI: 66 year old gentleman, former smoker; without history of intrinsic lung disease. He has a history of HTN, HLD, CAD/PCI with preserved LVEF and no significant valvular heart disease, multiple lumbar spinal surgeries for spinal stenosis, right frontal craniotomy for clipping of an anterior communication aneurysm and ORIF of a right femur periprosthetic fracture in March 2022 on Xarelto. He has a known right frontotemporal epileptogenic focus maintained on Vimpat and Keppra. The patient was admitted on May 6th following a fall in his nursing home with fluctuating mental status between somnolent and alert. His breathing was concerning for possible Cheyne-Joel respirations versus Kussmaul's pattern breathing. In the ER, the patient had episodes of bradycardia, apnea and hypoxemia with evidence of right arm shaking and absence of movement of the left upper extremity. The patient was intubated due to concerns of status epilepticus and admitted to the ICU. Course notable for hypertension, fever, right upper extremity twitching while the patient was conscious (felt not to represent seizure activity). Deep sputum cultures -> Staph aureus (MSSA). EEG without evidence of seizures. CT head and cervical spine without acute pathology following the fall -> cervical collar removed. Orthopedic evaluation -> no evidence of surgical site infection. The patient was extubated on 5/8    PMHX:  Obesity  Hypertension  Hyperlipidemia  Coronary artery disease  Myocardial infarction  Sacroiliitis  Spinal stenosis  Lumbar herniated disc  Chronic back pain  Narcotic dependence  Osteoarthritis  GERD (gastroesophageal reflux disease)  Cerebral aneurysm rupture  Femur fracture, right    PSHX:  Cerebral aneurysm clipping  Multiple spinal surgeries - sacral spine fusion - 2017 - lumbar laminectomy 2002  Inguinal hernia repair - right - x 2  Appendectomy - 1969  Insertion of pain pump  Cleft palate repair - age 1  Stented coronary artery - RCA  Hip replacement - right - 2015 and 2016  Knee arthroscopy - right  Shoulder arthroscopy - right x 2 - left x 1  Throat surgery - cyst resection - 1986      FAMILY HISTORY:      SOCIAL HISTORY:    Pulmonary Medications:   albuterol/ipratropium for Nebulization 3 milliLiter(s) Nebulizer every 6 hours      Antimicrobials:  ceFAZolin   IVPB 2000 milliGRAM(s) IV Intermittent every 8 hours      Cardiology:  amLODIPine   Tablet 10 milliGRAM(s) Oral daily  metoprolol tartrate 25 milliGRAM(s) Oral two times a day      Other:  ascorbic acid 500 milliGRAM(s) Oral daily  aspirin  chewable 81 milliGRAM(s) Oral daily  chlorhexidine 4% Liquid 1 Application(s) Topical <User Schedule>  enoxaparin Injectable 40 milliGRAM(s) SubCutaneous every 24 hours  lacosamide IVPB 150 milliGRAM(s) IV Intermittent every 12 hours  levETIRAcetam  IVPB 1000 milliGRAM(s) IV Intermittent every 12 hours  multivitamin 1 Tablet(s) Oral daily      Prn:  MEDICATIONS  (PRN):  baclofen 5 milliGRAM(s) Oral every 8 hours PRN Muscle Spasm  simethicone 80 milliGRAM(s) Chew every 6 hours PRN Indigestion      Allergies    No Known Allergies    HOME MEDICATIONS: see  H & P    REVIEW OF SYSTEMS:  Constitutional: As per HPI  HEENT: Within normal limits  CV: As per HPI  Resp: As per HPI  GI: Within normal limits   : Within normal limits  Musculoskeletal: Within normal limits  Skin: Within normal limits  Neurological: Within normal limits  Psychiatric: Within normal limits  Endocrine: Within normal limits  Hematologic/Lymphatic: Within normal limits  Allergic/Immunologic: Within normal limits    [x] All other systems negative    OBJECTIVE:    PHYSICAL EXAM:  ICU Vital Signs Last 24 Hrs  T(C): 36.7 (11 May 2022 01:10), Max: 37.2 (10 May 2022 08:31)  T(F): 98 (11 May 2022 01:10), Max: 98.9 (10 May 2022 08:31)  HR: 75 (11 May 2022 01:10) (74 - 87)  BP: 151/74 (11 May 2022 01:10) (128/59 - 175/72)  BP(mean): 109 (10 May 2022 22:30) (85 - 115)  ABP: --  ABP(mean): --  RR: 18 (11 May 2022 01:10) (14 - 31)  SpO2: 95% (11 May 2022 01:10) (93% - 99%)    General: Awake. Alert. Cooperative. No distress. Appears stated age 	  HEENT:   Atraumatic. Normocephalic. Anicteric. Normal oral mucosa. PERRL. EOMI.  Neck: Supple. Trachea midline. Thyroid without enlargement/tenderness/nodules. No carotid bruit. No JVD.	  Cardiovascular: Regular rate and rhythm. S1 S2 normal. No murmurs, rubs or gallops.  Respiratory: Respirations unlabored. Clear to auscultation and percussion bilaterally. No curvature.  Abdomen: Soft. Non-tender. Non-distended. No organomegaly. No masses. Normal bowel sounds.  Extremities: Warm to touch. No clubbing or cyanosis. No pedal edema.  Pulses: 2+ peripheral pulses all extremities.	  Skin: Normal skin color. No rashes or lesions. No ecchymoses. No cyanosis. Warm to touch.  Lymph Nodes: Cervical, supraclavicular and axillary nodes normal  Neurological: Motor and sensory examination equal and normal. A and O x 3  Psychiatry: Appropriate mood and affect.      LABS:                          11.6   12.53 )-----------( 307      ( 10 May 2022 11:53 )             37.2     CBC    WBC  12.53 <==, 16.80 <==, 13.76 <==, 12.42 <==, 10.87 <==, 12.19 <==, 13.17 <==    Hemoglobin  11.6 <<==, 11.2 <<==, 10.5 <<==, 10.3 <<==, 11.5 <<==, 11.9 <<==, 13.2 <<==    Hematocrit  37.2 <==, 35.6 <==, 33.1 <==, 33.1 <==, 37.6 <==, 39.5 <==, 42.5 <==    Platelets  307 <==, 341 <==, 317 <==, 288 <==, 333 <==, 351 <==, 390 <==      141  |  107  |  6<L>  ----------------------------<  107<H>    05-10  4.0   |  21<L>  |  1.11    LYTES    sodium  141 <==, 140 <==, 142 <==, 140 <==, 143 <==, 140 <==, 144 <==    potassium   4.0 <==, 4.5 <==, 3.5 <==, 3.1 <==, 3.5 <==, 3.3 <==, 4.1 <==    chloride  107 <==, 106 <==, 107 <==, 105 <==, 107 <==, 106 <==, 107 <==    carbon dioxide  21 <==, 20 <==, 22 <==, 20 <==, 21 <==, 19 <==, 23 <==    =============================================================================================  RENAL FUNCTION:    Creatinine:   1.11  <<==, 1.06  <<==, 1.00  <<==, 1.16  <<==, 1.02  <<==, 0.99  <<==, 0.95  <<==    BUN:   6 <==, 6 <==, 6 <==, 8 <==, 10 <==, 12 <==, 12 <==    ============================================================================================    calcium   8.9 <==, 9.0 <==, 8.8 <==, 8.5 <==, 9.0 <==, 8.7 <==, 9.3 <==    phos   3.0 <==, 3.0 <==, 2.4 <==, 2.6 <==, 2.4 <==, 2.5 <==, 2.2 <==    mag   2.1 <==, 2.2 <==, 2.4 <==, 1.6 <==, 1.7 <==, 1.6 <==, 1.7 <==    ============================================================================================  LFTs    AST:   45 <== , 58 <== , 29 <== , 20 <== , 17 <== , 17 <== , 18 <==     ALT:  45  <== , 39  <== , 24  <== , 15  <== , 15  <== , 12  <== , 13  <==     AP:  132  <=, 146  <=, 131  <=, 129  <=, 139  <=, 138  <=, 160  <=    Bili:  0.5  <=, 0.4  <=, 0.4  <=, 0.6  <=, 0.6  <=, 0.6  <=, 0.5  <=    PT/INR - ( 08 May 2022 02:11 )   PT: 14.6 sec;   INR: 1.26 ratio      Procalcitonin, Serum: 0.19 ng/mL (05-07 @ 02:38)    < from: TTE with Doppler (w/Cont) (05.08.22 @ 07:28) >    Patient name: MANUEL PETERSON  YOB: 1955   Age: 66 (M)   MR#: [id number]  Study Date: 5/8/2022  Location: [address] [place], [place] [id number]: Jessica Garcia EZE  Study quality: Technically difficult  Referring Physician: Kodi Crawford MD  Blood Pressure: 146/67 mmHg  Height: 173 cm  Weight: 114 kg  BSA: 2.3 m2  ------------------------------------------------------------------------  PROCEDURE: Transthoracic echocardiogram with 2-D, M-Mode  and complete spectral and color flow Doppler. Verbal  consent was obtained for injection of  Ultrasonic Enhancing  Agent following a discussion of risks and benefits.  Following intravenous injection of Ultrasonic Enhancing  Agent, harmonic imaging was performed.  INDICATION: Endocarditis, valve unspecified (I38)  ------------------------------------------------------------------------  Dimensions:    Normal Values:  LA:     3.0    2.0 - 4.0 cm  Ao:     3.2    2.0 - 3.8 cm  SEPTUM:        0.6 - 1.2 cm  PWT:           0.6 - 1.1 cm  LVIDd:     3.0 - 5.6 cm  LVIDs:         1.8 - 4.0 cm  EF (Pacheco Rule): 75 %  ------------------------------------------------------------------------  Observations:  Mitral Valve: Mitral valve not well visualized, probably  normal. Minimal mitral regurgitation.  Aortic Valve/Aorta: Calcified trileaflet aortic valve with  normal opening. No aortic valve regurgitation seen.  Aortic Root: 3.2 cm.  Left Atrium: Normal left atrium.  Left Ventricle:   Endocardial visualization enhanced with  intravenous injection of Ultrasonic Enhancing Agent  (Definity). Normal left ventricular systolic function. No  segmental wall motion abnormalities. Septal motion  consistent with conduction defect. Normal left ventricular  internal dimensions and wall thicknesses.Mild diastolic  dysfunction (Stage I).  Right Heart: Normal right atrium. The right ventricle is  not well visualized; grossly normal right ventricular  systolic function. Tricuspid valve not well visualized,  probably normal. No tricuspid regurgitation. Pulmonic valve  not well visualized, probably normal. No pulmonic  regurgitation.  Pericardium/Pleura: Normal pericardium with no pericardial  effusion.  Hemodynamic: Estimated right atrial pressure is 8 mm Hg.  ------------------------------------------------------------------------  Conclusions:  1. Mitral valve not well visualized, probably normal.  Minimal mitral regurgitation.  2. Calcified trileaflet aortic valve with normal opening.  No aortic valve regurgitation seen.  3.   Endocardial visualization enhanced with intravenous  injection of Ultrasonic Enhancing Agent (Definity). Normal  left ventricular systolic function. No segmental wall  motion abnormalities. Septal motion consistent with  conduction defect.  4. Mild diastolic dysfunction (Stage I).  5. The right ventricle is not well visualized; grossly  normal right ventricular systolic function.  6. No evidence of valvular vegetation is seen.  *** Compared with echocardiogram of 1/2/2015, the focal  outpouching is not seen.  ------------------------------------------------------------------------  Confirmed on  5/8/2022 - 11:02:55 by Mukesh Madison M.D.  ------------------------------------------------------------------------  ---------------------------------------------------------------------------------------------------------------    MICROBIOLOGY:     Flu With COVID-19 By MICHAEL (05.06.22 @ 06:01)   SARS-CoV-2 Result: [facility]  This Respiratory Panel uses polymerase chain reaction (PCR) to detect for   influenza A; influenza B; respiratory syncytial virus; and SARS-CoV-2.   This test was validated by [facility] and is in use under the FDA   Emergency Use Authorization (EUA) for clinical labs CLIA-certified to   perform high complexity testing. Test results should be correlated with   clinical presentation, patient history, and epidemiology.   Influenza A Result: NotDetec   Influenza B Result: NotDetec   Resp Syn Virus Result: NotDetec     Culture - Blood (05.10.22 @ 00:15)   Specimen Source: .Blood Blood-Peripheral   Culture Results:   No growth to date.     Culture - Blood (05.10.22 @ 00:00)   Specimen Source: .Blood Blood-Peripheral   Culture Results:   No growth to date.     Culture - Blood (05.07.22 @ 20:00)   Specimen Source: .Blood Blood-Venous   Culture Results:   No growth to date.    Culture - Blood (05.07.22 @ 19:55)   Specimen Source: .Blood Blood-Peripheral   Culture Results:   No growth to date.     Culture - Blood (05.06.22 @ 18:55)   Specimen Source: .Blood Blood-Venous   Culture Results:   No growth to date.     Culture - Blood (05.06.22 @ 18:40)   - Staphylococcus epidermidis, Methicillin resistant: Detec   Culture - Sputum . (05.07.22 @ 21:38)   Culture Results:   Numerous Staphylococcus aureus   Normal Respiratory Lauren absent   Organism Identification: Staphylococcus aureus   Organism: Staphylococcus aureus   Specimen Source: ET Tube ET Tube     RADIOLOGY:  [x ] Chest radiographs reviewed and interpreted by me    EXAM:  XR CHEST PORTABLE URGENT 1V                          PROCEDURE DATE:  05/06/2022      FINDINGS:    Endotracheal tube terminates above the gera.  Enteric tube side-port is in the stomach.  Clear lungs.  No pneumothorax or large pleural effusion.  Heart size within normal limits.  No acute osseous abnormality. Posterior spinal fusion hardware.    IMPRESSION:    Enteric tube side-port is in the stomach.    STEPHIE ROBERTSON MD; Resident Radiologist  This document has been electronically signed.  ELAINA SCHROEDER MD; Attending InterventionalRadiologist  This document has been electronically signed. May  7 2022 11:06AM  ---------------------------------------------------------------------------------------------------------------  EXAM:  XR CHEST PORTABLE URGENT 1V                          PROCEDURE DATE:  05/06/2022      INTERPRETATION:  INDICATION: Status post intubation    COMPARISON: Chest radiograph 4/28/2022    FINDINGS:  Heart/Vascular: The heart size cannot be accurately assessed on this   projection.  Pulmonary: The lower lung fields are not well visualized. The upper lung   fields are clear. Endotracheal tube tip at mid trachea and above the   gera.  Bones: Thoracic posterior spinal fusion hardware noted.    Impression:  Endotracheal tube tip at mid trachea above the gera.    TAMIE BARRAZA DO; Resident Radiologist  This document has been electronically signed.  ASHLEY KONG MD; Attending Radiologist  This document has been electronically signed. May  6 2022  8:16AM  ---------------------------------------------------------------------------------------------------------------  EXAM:  CT BRAIN                          PROCEDURE DATE:  05/06/2022      FINDINGS:    There is no evidence of mass or acute intracranial hemorrhage. Right   frontal lobe encephalomalacia and gliosis. 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 Ventricles and sulci are normal in size and   configuration for the patient's stated age. No midline shift or other   significant mass effect is noted. There is no CT evidence of acute   territorial infarct.    The visualized paranasal sinuses and tympanomastoid spaces are clear.   Orbits and orbital contents are unremarkable.    Right frontal temporal craniotomy.    IMPRESSION:    No evidence of acute intracranial hemorrhage, midline shift or CT   evidence of acute territorial infarct.    Postsurgical changes as described.    If the patient's symptoms persist, consider short interval follow-up head   CT or brain MRI if there are no MRI contraindications.    TAMIE BARRAZA DO; Resident Radiologist  This document has been electronically signed.  JASS HANNA MD; Attending Radiologist  This document has been electronically signed. 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 Stable intact more recently applied long   lateral femoral fracture fixation plate with fixation, transcondylar, and   interfragmentary screws.    Unchanged configuration of the repaired slightly comminuted distal   femoral diaphyseal fracture below prosthetic femoral stem tip including   residual minimal medial fracture margin cortical offset otherwise overall   anatomic alignment maintained.    Removed previously seen surgicalskin staples. Cleared previously seen   postsurgical soft tissue changes.    No dislocations or acute appearing fractures.    BELA ALARCON MD; Attending Radiologist  This document has been electronically signed. May 10 2022  9:48AM  ---------------------------------------------------------------------------------------------------------------               [facility] - [facility] - CONSULT NOTE    HPI: 66 year old gentleman, former smoker; without history of intrinsic lung disease. 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 He has a known right frontotemporal epileptogenic focus maintained on Vimpat. The patient was admitted on May 6th following a fall in rehab with fluctuating mental status between somnolent and alert. His breathing was concerning for possible Cheyne-Joel respirations versus Kussmaul's pattern breathing. In the ER, the patient had episodes of bradycardia, apnea and hypoxemia with evidence of right arm shaking and absence of movement of the left upper extremity. He was intubated for airway protection due to concerns of status epilepticus and admitted to the ICU. ICU course notable for hypertension, fever and right upper extremity twitching while the patient was conscious (felt not to represent seizure activity). Deep sputum cultures -> Staph aureus (MSSA). EEG without evidence of seizures. CT head and cervical spine without acute pathology following the fall -> cervical collar removed. Orthopedic evaluation -> no evidence of surgical site infection -> WBAT. The patient was extubated on 5/8 and now is transferred to the medical [facility] for further care. He is awake and alert. He has no shortness of breath or hypoxemia on room air. His cough with sputum production following extubation has resolved. He has no chest congestion or wheeze. He has no fevers, chills or sweats. No chest pain/pressure or palpitations. His appetite is good and he is tolerating a regular diet. His back pain is well controlled on baclofen (now off narcotics). His eyes are irritated. Asked to evaluate    PMHX:  Obesity  Hypertension  Hyperlipidemia  Coronary artery disease  Myocardial infarction  Sacroiliitis  Spinal stenosis  Lumbar herniated disc  Chronic back pain  Narcotic dependence  Osteoarthritis  GERD (gastroesophageal reflux disease)  Cerebral aneurysm rupture  Femur fracture, right    PSHX:  Cerebral aneurysm clipping  Multiple spinal surgeries - sacral spine fusion - 2017 - lumbar laminectomy 2002  Inguinal hernia repair - right - x 2  Appendectomy - 1969  Insertion of pain pump  Cleft palate repair - age 1  Stented coronary artery - RCA  Hip replacement - right - 2015 and 2016  Knee arthroscopy - right  Shoulder arthroscopy - right x 2 - left x 1  Throat surgery - cyst resection - 1986    FAMILY HISTORY:  no pertinent past medical history in first degree relatives      SOCIAL HISTORY:   - lives at home with his wife; former smoker; disabled; two children      Pulmonary Medications:   albuterol/ipratropium for Nebulization 3 milliLiter(s) Nebulizer every 6 hours      Antimicrobials:  ceFAZolin   IVPB 2000 milliGRAM(s) IV Intermittent every 8 hours      Cardiology:  amLODIPine   Tablet 10 milliGRAM(s) Oral daily  metoprolol tartrate 25 milliGRAM(s) Oral two times a day      Other:  ascorbic acid 500 milliGRAM(s) Oral daily  aspirin  chewable 81 milliGRAM(s) Oral daily  chlorhexidine 4% Liquid 1 Application(s) Topical <User Schedule>  enoxaparin Injectable 40 milliGRAM(s) SubCutaneous every 24 hours  lacosamide IVPB 150 milliGRAM(s) IV Intermittent every 12 hours  levETIRAcetam  IVPB 1000 milliGRAM(s) IV Intermittent every 12 hours  multivitamin 1 Tablet(s) Oral daily      Prn:  MEDICATIONS  (PRN):  baclofen 5 milliGRAM(s) Oral every 8 hours PRN Muscle Spasm  simethicone 80 milliGRAM(s) Chew every 6 hours PRN Indigestion    Allergies    No Known Allergies    HOME MEDICATIONS: see  H & P    REVIEW OF SYSTEMS:  Constitutional: As per HPI  HEENT: Within normal limits  CV: As per HPI  Resp: As per HPI  GI: Within normal limits   : Within normal limits  Musculoskeletal: multiple orthopedic procedures - chronic back pain  Skin: Within normal limits  Neurological: seizure disorder  Psychiatric: Within normal limits  Endocrine: Within normal limits  Hematologic/Lymphatic: Within normal limits  Allergic/Immunologic: Within normal limits    [x] All other systems negative    OBJECTIVE:    PHYSICAL EXAM:  ICU Vital Signs Last 24 Hrs  T(C): 36.7 (11 May 2022 01:10), Max: 37.2 (10 May 2022 08:31)  T(F): 98 (11 May 2022 01:10), Max: 98.9 (10 May 2022 08:31)  HR: 75 (11 May 2022 01:10) (74 - 87)  BP: 151/74 (11 May 2022 01:10) (128/59 - 175/72)  BP(mean): 109 (10 May 2022 22:30) (85 - 115)  ABP: --  ABP(mean): --  RR: 18 (11 May 2022 01:10) (14 - 31)  SpO2: 95% (11 May 2022 01:10) (93% - 99%) on room air    General: Awake. Alert. Cooperative. No distress. Appears stated age 	  HEENT:  Atraumatic. Normocephalic. Anicteric. Normal oral mucosa. PERRL. EOMI.  Neck: Supple. Trachea midline. Thyroid without enlargement/tenderness/nodules. No carotid bruit. No JVD.	  Cardiovascular: Regular rate and rhythm. S1 S2 normal. No murmurs, rubs or gallops.  Respiratory: Respirations unlabored. Clear to auscultation and percussion bilaterally. No curvature.  Abdomen: Soft. Non-tender. Non-distended. No organomegaly. No masses. Normal bowel sounds.  Extremities: Warm to touch. No clubbing or cyanosis. No pedal edema.  Pulses: 2+ peripheral pulses all extremities.	  Skin: Normal skin color. No rashes or lesions. No ecchymoses. No cyanosis. Warm to touch.  Lymph Nodes: Cervical, supraclavicular and axillary nodes normal  Neurological: Motor and sensory examination equal and normal. A and O x 3  Psychiatry: Appropriate mood and affect.      LABS:                          11.6   12.53 )-----------( 307      ( 10 May 2022 11:53 )             37.2     CBC    WBC  12.53 <==, 16.80 <==, 13.76 <==, 12.42 <==, 10.87 <==, 12.19 <==, 13.17 <==    Hemoglobin  11.6 <<==, 11.2 <<==, 10.5 <<==, 10.3 <<==, 11.5 <<==, 11.9 <<==, 13.2 <<==    Hematocrit  37.2 <==, 35.6 <==, 33.1 <==, 33.1 <==, 37.6 <==, 39.5 <==, 42.5 <==    Platelets  307 <==, 341 <==, 317 <==, 288 <==, 333 <==, 351 <==, 390 <==      141  |  107  |  6<L>  ----------------------------<  107<H>    05-10  4.0   |  21<L>  |  1.11    LYTES    sodium  141 <==, 140 <==, 142 <==, 140 <==, 143 <==, 140 <==, 144 <==    potassium   4.0 <==, 4.5 <==, 3.5 <==, 3.1 <==, 3.5 <==, 3.3 <==, 4.1 <==    chloride  107 <==, 106 <==, 107 <==, 105 <==, 107 <==, 106 <==, 107 <==    carbon dioxide  21 <==, 20 <==, 22 <==, 20 <==, 21 <==, 19 <==, 23 <==    =============================================================================================  RENAL FUNCTION:    Creatinine:   1.11  <<==, 1.06  <<==, 1.00  <<==, 1.16  <<==, 1.02  <<==, 0.99  <<==, 0.95  <<==    BUN:   6 <==, 6 <==, 6 <==, 8 <==, 10 <==, 12 <==, 12 <==    ============================================================================================    calcium   8.9 <==, 9.0 <==, 8.8 <==, 8.5 <==, 9.0 <==, 8.7 <==, 9.3 <==    phos   3.0 <==, 3.0 <==, 2.4 <==, 2.6 <==, 2.4 <==, 2.5 <==, 2.2 <==    mag   2.1 <==, 2.2 <==, 2.4 <==, 1.6 <==, 1.7 <==, 1.6 <==, 1.7 <==    ============================================================================================  LFTs    AST:   45 <== , 58 <== , 29 <== , 20 <== , 17 <== , 17 <== , 18 <==     ALT:  45  <== , 39  <== , 24  <== , 15  <== , 15  <== , 12  <== , 13  <==     AP:  132  <=, 146  <=, 131  <=, 129  <=, 139  <=, 138  <=, 160  <=    Bili:  0.5  <=, 0.4  <=, 0.4  <=, 0.6  <=, 0.6  <=, 0.6  <=, 0.5  <=    PT/INR - ( 08 May 2022 02:11 )   PT: 14.6 sec;   INR: 1.26 ratio      Procalcitonin, Serum: 0.19 ng/mL (05-07 @ 02:38)    < from: TTE with Doppler (w/Cont) (05.08.22 @ 07:28) >    Patient name: MANUEL PETERSON  YOB: 1955   Age: 66 (M)   MR#: [id number]  Study Date: 5/8/2022  Location: [address] [place], [place] [id number]: Jessica Garcia RDCS  Study quality: Technically difficult  Referring Physician: Kodi Crawford MD  Blood Pressure: 146/67 mmHg  Height: 173 cm  Weight: 114 kg  BSA: 2.3 m2  ------------------------------------------------------------------------  PROCEDURE: Transthoracic echocardiogram with 2-D, M-Mode  and complete spectral and color flow Doppler. Verbal  consent was obtained for injection of  Ultrasonic Enhancing  Agent following a discussion of risks and benefits.  Following intravenous injection of Ultrasonic Enhancing  Agent, harmonic imaging was performed.  INDICATION: Endocarditis, valve unspecified (I38)  ------------------------------------------------------------------------  Dimensions:    Normal Values:  LA:     3.0    2.0 - 4.0 cm  Ao:     3.2    2.0 - 3.8 cm  SEPTUM:        0.6 - 1.2 cm  PWT:           0.6 - 1.1 cm  LVIDd:     3.0 - 5.6 cm  LVIDs:         1.8 - 4.0 cm  EF (Pacheco Rule): 75 %  ------------------------------------------------------------------------  Observations:  Mitral Valve: Mitral valve not well visualized, probably  normal. Minimal mitral regurgitation.  Aortic Valve/Aorta: Calcified trileaflet aortic valve with  normal opening. No aortic valve regurgitation seen.  Aortic Root: 3.2 cm.  Left Atrium: Normal left atrium.  Left Ventricle:   Endocardial visualization enhanced with  intravenous injection of Ultrasonic Enhancing Agent  (Definity). Normal left ventricular systolic function. No  segmental wall motion abnormalities. Septal motion  consistent with conduction defect. Normal left ventricular  internal dimensions and wall thicknesses.Mild diastolic  dysfunction (Stage I).  Right Heart: Normal right atrium. The right ventricle is  not well visualized; grossly normal right ventricular  systolic function. Tricuspid valve not well visualized,  probably normal. No tricuspid regurgitation. Pulmonic valve  not well visualized, probably normal. No pulmonic  regurgitation.  Pericardium/Pleura: Normal pericardium with no pericardial  effusion.  Hemodynamic: Estimated right atrial pressure is 8 mm Hg.  ------------------------------------------------------------------------  Conclusions:  1. Mitral valve not well visualized, probably normal.  Minimal mitral regurgitation.  2. Calcified trileaflet aortic valve with normal opening.  No aortic valve regurgitation seen.  3.   Endocardial visualization enhanced with intravenous  injection of Ultrasonic Enhancing Agent (Definity). Normal  left ventricular systolic function. No segmental wall  motion abnormalities. Septal motion consistent with  conduction defect.  4. Mild diastolic dysfunction (Stage I).  5. The right ventricle is not well visualized; grossly  normal right ventricular systolic function.  6. No evidence of valvular vegetation is seen.  *** Compared with echocardiogram of 1/2/2015, the focal  outpouching is not seen.  ------------------------------------------------------------------------  Confirmed on  5/8/2022 - 11:02:55 by Mukesh Madison M.D.  ------------------------------------------------------------------------  ---------------------------------------------------------------------------------------------------------------    MICROBIOLOGY:     Flu With COVID-19 By MICHAEL (05.06.22 @ 06:01)   SARS-CoV-2 Result: [facility]  This Respiratory Panel uses polymerase chain reaction (PCR) to detect for   influenza A; influenza B; respiratory syncytial virus; and SARS-CoV-2.   This test was validated by [facility] and is in use under the FDA   Emergency Use Authorization (EUA) for clinical labs CLIA-certified to   perform high complexity testing. Test results should be correlated with   clinical presentation, patient history, and epidemiology.   Influenza A Result: [facility]   Influenza B Result: [facility]   Resp Syn Virus Result: [facility]     Culture - Blood (05.10.22 @ 00:15)   Specimen Source: .Blood Blood-Peripheral   Culture Results:   No growth to date.     Culture - Blood (05.10.22 @ 00:00)   Specimen Source: .Blood Blood-Peripheral   Culture Results:   No growth to date.     Culture - Blood (05.07.22 @ 20:00)   Specimen Source: .Blood Blood-Venous   Culture Results:   No growth to date.    Culture - Blood (05.07.22 @ 19:55)   Specimen Source: .Blood Blood-Peripheral   Culture Results:   No growth to date.     Culture - Blood (05.06.22 @ 18:55)   Specimen Source: .Blood Blood-Venous   Culture Results:   No growth to date.     Culture - Blood (05.06.22 @ 18:40)   - Staphylococcus epidermidis, Methicillin resistant: Detec   Culture - Sputum . (05.07.22 @ 21:38)   Culture Results:   Numerous Staphylococcus aureus   Normal Respiratory Lauren absent   Organism Identification: Staphylococcus aureus   Organism: Staphylococcus aureus   Specimen Source: ET Tube ET Tube     RADIOLOGY:  [x ] Chest radiographs reviewed and interpreted by me    EXAM:  XR CHEST PORTABLE URGENT 1V                          PROCEDURE DATE:  05/06/2022      FINDINGS:    Endotracheal tube terminates above the gera.  Enteric tube side-port is in the stomach.  Clear lungs.  No pneumothorax or large pleural effusion.  Heart size within normal limits.  No acute osseous abnormality. Posterior spinal fusion hardware.    IMPRESSION:    Enteric tube side-port is in the stomach.    STEPHIE ROBERTSON MD; Resident Radiologist  This document has been electronically signed.  ELAINA SCHROEDER MD; Attending InterventionalRadiologist  This document has been electronically signed. May  7 2022 11:06AM  ---------------------------------------------------------------------------------------------------------------  EXAM:  XR CHEST PORTABLE URGENT 1V                          PROCEDURE DATE:  05/06/2022      INTERPRETATION:  INDICATION: Status post intubation    COMPARISON: Chest radiograph 4/28/2022    FINDINGS:  Heart/Vascular: The heart size cannot be accurately assessed on this   projection.  Pulmonary: The lower lung fields are not well visualized. The upper lung   fields are clear. Endotracheal tube tip at mid trachea and above the   gera.  Bones: Thoracic posterior spinal fusion hardware noted.    Impression:  Endotracheal tube tip at mid trachea above the gera.    TAMIE BARRAZA DO; Resident Radiologist  This document has been electronically signed.  ASHLEY KONG MD; Attending Radiologist  This document has been electronically signed. May  6 2022  8:16AM  ---------------------------------------------------------------------------------------------------------------  EXAM:  CT BRAIN                          PROCEDURE DATE:  05/06/2022      FINDINGS:    There is no evidence of mass or acute intracranial hemorrhage. Right   frontal lobe encephalomalacia and gliosis. 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 Ventricles and sulci are normal in size and   configuration for the patient's stated age. No midline shift or other   significant mass effect is noted. There is no CT evidence of acute   territorial infarct.    The visualized paranasal sinuses and tympanomastoid spaces are clear.   Orbits and orbital contents are unremarkable.    Right frontal temporal craniotomy.    IMPRESSION:    No evidence of acute intracranial hemorrhage, midline shift or CT   evidence of acute territorial infarct.    Postsurgical changes as described.    If the patient's symptoms persist, consider short interval follow-up head   CT or brain MRI if there are no MRI contraindications.    TAMIE BARRAZA DO; Resident Radiologist  This document has been electronically signed.  JASS HANNA MD; Attending Radiologist  This document has been electronically signed. 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 Stable intact more recently applied long   lateral femoral fracture fixation plate with fixation, transcondylar, and   interfragmentary screws.    Unchanged configuration of the repaired slightly comminuted distal   femoral diaphyseal fracture below prosthetic femoral stem tip including   residual minimal medial fracture margin cortical offset otherwise overall   anatomic alignment maintained.    Removed previously seen surgicalskin staples. Cleared previously seen   postsurgical soft tissue changes.    No dislocations or acute appearing fractures.    BELA ALARCON MD; Attending Radiologist  This document has been electronically signed. May 10 2022  9:48AM  ---------------------------------------------------------------------------------------------------------------

## 2022-05-11 NOTE — PROGRESS NOTE ADULT - SUBJECTIVE AND OBJECTIVE BOX
Patient is a 66y old  Male who presents with a chief complaint of status epilepticus/AMS (11 May 2022 14:29)      SUBJECTIVE / OVERNIGHT EVENTS:    Events noted.  CONSTITUTIONAL: No fever,  or fatigue  RESPIRATORY: No cough, wheezing,  No shortness of breath  CARDIOVASCULAR: No chest pain, palpitations  GASTROINTESTINAL: No abdominal or epigastric pain.      MEDICATIONS  (STANDING):  acetaminophen     Tablet .. 650 milliGRAM(s) Oral every 8 hours  amLODIPine   Tablet 10 milliGRAM(s) Oral daily  ascorbic acid 500 milliGRAM(s) Oral daily  aspirin  chewable 81 milliGRAM(s) Oral daily  baclofen 20 milliGRAM(s) Oral <User Schedule>  ceFAZolin   IVPB 2000 milliGRAM(s) IV Intermittent every 8 hours  chlorhexidine 4% Liquid 1 Application(s) Topical <User Schedule>  dexamethasone/neomycin/polymyxin Suspension 1 Drop(s) Both EYES two times a day  enoxaparin Injectable 40 milliGRAM(s) SubCutaneous every 24 hours  lacosamide IVPB 150 milliGRAM(s) IV Intermittent every 12 hours  levETIRAcetam  IVPB 1000 milliGRAM(s) IV Intermittent every 12 hours  metoprolol tartrate 25 milliGRAM(s) Oral two times a day  multivitamin 1 Tablet(s) Oral daily  naproxen 375 milliGRAM(s) Oral two times a day    MEDICATIONS  (PRN):  baclofen 5 milliGRAM(s) Oral every 12 hours PRN Muscle Spasm  simethicone 80 milliGRAM(s) Chew every 6 hours PRN Indigestion        CAPILLARY BLOOD GLUCOSE        I&O's Summary    10 May 2022 07:  -  11 May 2022 07:00  --------------------------------------------------------  IN: 700 mL / OUT: 300 mL / NET: 400 mL    11 May 2022 07:  -  11 May 2022 21:25  --------------------------------------------------------  IN: 680 mL / OUT: 0 mL / NET: 680 mL        T(C): 36.6 (22 @ 21:03), Max: 36.7 (22 @ 01:10)  HR: 75 (22 @ 21:03) (71 - 92)  BP: 160/78 (22 @ 21:03) (122/72 - 194/82)  RR: 18 (22 @ 21:03) (16 - 18)  SpO2: 94% (22 @ 21:03) (94% - 96%)    PHYSICAL EXAM:  GENERAL: NAD  NECK: Supple, No JVD  CHEST/LUNG: Clear to auscultation bilaterally; No wheezing.  HEART: Regular rate and rhythm; No murmurs, rubs, or gallops  ABDOMEN: Soft, Nontender, Nondistended; Bowel sounds present  EXTREMITIES:   No edema  NEUROLOGY: AAO       LABS:                        11.6   12.53 )-----------( 307      ( 10 May 2022 11:53 )             37.2     05-10    141  |  107  |  6<L>  ----------------------------<  107<H>  4.0   |  21<L>  |  1.11    Ca    8.9      10 May 2022 11:53  Phos  3.0     05-10  Mg     2.1     05-10    TPro  6.8  /  Alb  3.1<L>  /  TBili  0.5  /  DBili  x   /  AST  45<H>  /  ALT  45  /  AlkPhos  132<H>  10          Urinalysis Basic - ( 10 May 2022 03:38 )    Color: Yellow / Appearance: Slightly Turbid / S.019 / pH: x  Gluc: x / Ketone: Negative  / Bili: Negative / Urobili: Negative   Blood: x / Protein: 30 mg/dL / Nitrite: Negative   Leuk Esterase: Negative / RBC: 4 /hpf / WBC 2 /HPF   Sq Epi: x / Non Sq Epi: 4 /hpf / Bacteria: 0.0      CAPILLARY BLOOD GLUCOSE            RADIOLOGY & ADDITIONAL TESTS:    Imaging Personally Reviewed:    Consultant(s) Notes Reviewed:      Care Discussed with Consultants/Other Providers:    Jaciel Engel MD, CMD, FACP    [id number] [place] [address]  Office Tel: [phone]  Cell: [phone]

## 2022-05-11 NOTE — PROGRESS NOTE ADULT - SUBJECTIVE AND OBJECTIVE BOX
Subjective: Patient seen and examined. No new events except as noted.   resting comfortably   no cp or sob     REVIEW OF SYSTEMS:    CONSTITUTIONAL: + weakness, fevers or chills  EYES/ENT: No visual changes;  No vertigo or throat pain   NECK: No pain or stiffness  RESPIRATORY: No cough, wheezing, hemoptysis; No shortness of breath  CARDIOVASCULAR: No chest pain or palpitations  GASTROINTESTINAL: No abdominal or epigastric pain. No nausea, vomiting, or hematemesis; No diarrhea or constipation. No melena or hematochezia.  GENITOURINARY: No dysuria, frequency or hematuria  NEUROLOGICAL: No numbness or weakness  SKIN: No itching, burning, rashes, or lesions   All other review of systems is negative unless indicated above.    MEDICATIONS:  MEDICATIONS  (STANDING):  acetaminophen     Tablet .. 650 milliGRAM(s) Oral every 8 hours  amLODIPine   Tablet 10 milliGRAM(s) Oral daily  ascorbic acid 500 milliGRAM(s) Oral daily  aspirin  chewable 81 milliGRAM(s) Oral daily  baclofen 20 milliGRAM(s) Oral <User Schedule>  ceFAZolin   IVPB 2000 milliGRAM(s) IV Intermittent every 8 hours  chlorhexidine 4% Liquid 1 Application(s) Topical <User Schedule>  dexamethasone/neomycin/polymyxin Suspension 1 Drop(s) Both EYES two times a day  enoxaparin Injectable 40 milliGRAM(s) SubCutaneous every 24 hours  lacosamide IVPB 150 milliGRAM(s) IV Intermittent every 12 hours  levETIRAcetam  IVPB 1000 milliGRAM(s) IV Intermittent every 12 hours  metoprolol tartrate 25 milliGRAM(s) Oral two times a day  multivitamin 1 Tablet(s) Oral daily  naproxen 375 milliGRAM(s) Oral two times a day      PHYSICAL EXAM:  T(C): 36.6 (05-11-22 @ 05:25), Max: 36.8 (05-10-22 @ 19:00)  HR: 80 (05-11-22 @ 06:00) (74 - 89)  BP: 164/76 (05-11-22 @ 06:00) (128/59 - 194/82)  RR: 16 (05-11-22 @ 06:00) (14 - 31)  SpO2: 94% (05-11-22 @ 06:00) (93% - 99%)  Wt(kg): --  I&O's Summary    10 May 2022 07:01  -  11 May 2022 07:00  --------------------------------------------------------  IN: 700 mL / OUT: 300 mL / NET: 400 mL    11 May 2022 07:01  -  11 May 2022 08:48  --------------------------------------------------------  IN: 240 mL / OUT: 0 mL / NET: 240 mL          Appearance: NAD	  HEENT:   Normal oral mucosa, PERRL, EOMI	  Lymphatic: No lymphadenopathy , no edema  Cardiovascular: Normal S1 S2, No JVD, No murmurs , Peripheral pulses palpable 2+ bilaterally  Respiratory: Lungs clear to auscultation, normal effort 	  Gastrointestinal:  Soft, Non-tender, + BS	  Skin: No rashes, No ecchymoses, No cyanosis, warm to touch  Musculoskeletal: Normal range of motion, normal strength  Psychiatry:  Mood & affect appropriate  Ext: No edema      LABS:    CARDIAC MARKERS:                                11.6   12.53 )-----------( 307      ( 10 May 2022 11:53 )             37.2     05-10    141  |  107  |  6<L>  ----------------------------<  107<H>  4.0   |  21<L>  |  1.11    Ca    8.9      10 May 2022 11:53  Phos  3.0     05-10  Mg     2.1     05-10    TPro  6.8  /  Alb  3.1<L>  /  TBili  0.5  /  DBili  x   /  AST  45<H>  /  ALT  45  /  AlkPhos  132<H>  05-10    proBNP:   Lipid Profile:   HgA1c:   TSH:     7          TELEMETRY: 	    ECG:  	  RADIOLOGY: < from: CT Cervical Spine No Cont (05.08.22 @ 16:36) >    ACC: [id number] EXAM:  CT CERVICAL SPINE                          PROCEDURE DATE:  05/08/2022          INTERPRETATION:  CLINICAL INDICATION:  Trauma code.    TECHNIQUE : Axial CT scanning of the cervical spine was obtained without   the administration of intravenous contrast using multislice helical   technique. Reformatted coronal and sagittal images were subsequently   obtained and reviewed.    COMPARISON: CTA neck 4/22/2022    FINDINGS:  Redemonstration of posterior spinal fusion with bilateral pedicle screws   and rods extending from T4 to below the field of view.    There is maintenance of usual cervical lordosis.  There is grade 1 retrolisthesis of C4 on C5. Disc osteophyte complexes   are present at C5-6 and C6-7 with mild spinal stenosis. Multilevel   degenerative changes are identified with uncovertebral joint and facet   degenerative changes.  Vertebral body height is preserved throughout the cervical spine. There   is mild multilevel loss of intervertebral disc height throughout the   cervical spine.    Limited evaluation of the central canal within the thoracic spine   secondary to metallic streak artifact.    There are multilevel diffuse disc osteophyte complexes and ligamentum   flavum thickening which contribute to multilevel central canal narrowing.    There is multilevel facet hypertrophy and uncovertebral spurring which   contribute to multilevel neural foraminal narrowing. Notably, C4-C5 and   C5-C6.    The paravertebral soft tissues are unremarkable. The lung apices are   clear.    IMPRESSION:    No acute fracture or traumatic subluxation.  There are multi-level degenerative changes of the cervical spine.    --- End of Report ---          AFRICA CASTILLO MD; Resident Radiologist  This document has been electronically signed.  MONICA SOSA MD; Attending Radiologist  This document has been electronically signed. May  9 2022 11:35AM    < end of copied text >  < from: CT Angio Head w/ IV Cont (04.22.22 @ 10:30) >    ACC: [id number] EXAM:  CT PERFUSION W MAPS IC                        ACC: [id number] EXAM:  CT ANGIO BRAIN (W)AW IC                        ACC: [id number] EXAM:  CT BRAIN STROKE PROTOCOL                        ACC: [id number] EXAM:  CT ANGIO NECK (W)AW IC                        PROCEDURE DATE:  04/22/2022          INTERPRETATION:  Clinical Indication: Code stroke, left arm weakness and   lethargy previous subarachnoid hemorrhage and aneurysm clipping 1997    5mm axial sections of the brain were obtained from base to vertex,   without the intravenous administration of contrast material. Coronal and   sagittal computer generated reconstructed views are available.    No prior brain imaging is available for comparison.    There has been a right frontal temporal craniotomy. There is an aneurysm   clip in the suprasellar cistern consistent with an anterior indicating   artery aneurysm. There is encephalomalacia and gliosis in the right   frontal lobe and in the region of the January the corpus callosum and the   cingulate gyrus which is likely due to either prior surgery or related to   hemorrhage related to prior ruptured aneurysm. Mild atrophy is identified.    There is mild ex vacuo dilatation of the frontal horns of lateral   ventricles. Mild atrophy is noted. There has been previous bilateral lens   replacement surgery.        CT perfusion:    After the intravenous administration of 50 cc of Omnipaque 300 serial   thin sections were obtained through the brain for the purposes of   evaluating CT perfusion. Raw data was sent to the rapid ischemia view   software for postprocessing.      A small area of delayed mean transit time and core infarct is identified   in the region of the old infarct. The volume of tissue and CBF less than   30% is larger than the delayed mean transit time volume.    CBF<30% volume: 14 ml  Tmax> 6.0s volume: 10 ml  Mismatch volume: -4 ml  Mismatch ratio: 0.7    CTA head and neck:      After the intravenous power injection of 70 cc of Omnipaque 300 using a   bolus gabbie timing run  serial thin sections were obtained through the   neck from the thoracic inlet through the intracranial circulation   centered at the [id number] on a  multislice CT scanner reformatted   with coronal and sagittal 2 D-MIP projections, including 3 D   reconstructions using a separate 3D [employer] software workstation. A total   of  120 cc of Omnipaque were intravenously injected.  30 cc were   discarded.      There is calcification at the aortic arch. The origins of thecarotid and   vertebral arteries are normal. Vertebral arteries are codominant. The   carotid bifurcations demonstrate calcification without stenosis.    The distal vertebral arteries are well identified as are the   posterior-inferior cerebellar arteries bilaterally. The region of the   vertebral basilar junction is normal. The basilar artery is normal. The   posterior cerebral and superior cerebellar arteries are normal.    Evaluation of the carotid arteries demonstrate normal appearance to the  distal cervical, petrous cavernous and supraclinoid internal carotid   arteries. The A1 segment of the left anterior cerebral artery is   hypoplastic. There is an anterior communicating artery aneurysm clip   identified. Right anterior cerebral arteries appear normal. There is no   evidence of residual or recurrent aneurysm. The middle cerebral arteries   are normal.    The normal intracranial venous circulation is identified. The right   transverse sinus is dominant. The superior sagittal sinus, internal   cerebral veins, vein of Frantz, straight sinus, transverse sinuses,   sigmoid sinuses and internal jugular veins are normal. Cortical veins are   normal.    IMPRESSION: Remote right frontal temporal craniotomy and anterior   communicatingartery aneurysm clipping. Right frontal encephalomalacia   and gliosis likely related to prior aneurysm rupture or postoperative   changes.    Thin demonstrates delayed mean transit time and core infarct in the   region of the right frontal encephalomalacia.    CTA of the head and neck demonstrates no significant stenosis or   occlusion. Anterior communicating artery aneurysm clipping.    --- End of Report ---            MONICA SOSA MD; Attending Radiologist  This document has been electronicallysigned. Apr 22 2022 10:31AM    < end of copied text >    DIAGNOSTIC TESTING:  [ ] Echocardiogram:  [ ]  Catheterization:  [ ] Stress Test:    OTHER:

## 2022-05-12 LAB
ALBUMIN SERPL ELPH-MCNC: 3.5 G/DL — SIGNIFICANT CHANGE UP (ref 3.3–5)
ALP SERPL-CCNC: 121 U/L — HIGH (ref 40–120)
ALT FLD-CCNC: 33 U/L — SIGNIFICANT CHANGE UP (ref 10–45)
ANION GAP SERPL CALC-SCNC: 14 MMOL/L — SIGNIFICANT CHANGE UP (ref 5–17)
AST SERPL-CCNC: 37 U/L — SIGNIFICANT CHANGE UP (ref 10–40)
BASOPHILS # BLD AUTO: 0.05 K/UL — SIGNIFICANT CHANGE UP (ref 0–0.2)
BASOPHILS NFR BLD AUTO: 0.6 % — SIGNIFICANT CHANGE UP (ref 0–2)
BILIRUB SERPL-MCNC: 0.2 MG/DL — SIGNIFICANT CHANGE UP (ref 0.2–1.2)
BUN SERPL-MCNC: 8 MG/DL — SIGNIFICANT CHANGE UP (ref 7–23)
CALCIUM SERPL-MCNC: 9.3 MG/DL — SIGNIFICANT CHANGE UP (ref 8.4–10.5)
CHLORIDE SERPL-SCNC: 105 MMOL/L — SIGNIFICANT CHANGE UP (ref 96–108)
CO2 SERPL-SCNC: 24 MMOL/L — SIGNIFICANT CHANGE UP (ref 22–31)
CREAT SERPL-MCNC: 1.02 MG/DL — SIGNIFICANT CHANGE UP (ref 0.5–1.3)
EGFR: 81 ML/MIN/1.73M2 — SIGNIFICANT CHANGE UP
EOSINOPHIL # BLD AUTO: 0.26 K/UL — SIGNIFICANT CHANGE UP (ref 0–0.5)
EOSINOPHIL NFR BLD AUTO: 3.3 % — SIGNIFICANT CHANGE UP (ref 0–6)
GLUCOSE BLDC GLUCOMTR-MCNC: 111 MG/DL — HIGH (ref 70–99)
GLUCOSE SERPL-MCNC: 93 MG/DL — SIGNIFICANT CHANGE UP (ref 70–99)
HCT VFR BLD CALC: 36.4 % — LOW (ref 39–50)
HGB BLD-MCNC: 11.5 G/DL — LOW (ref 13–17)
IMM GRANULOCYTES NFR BLD AUTO: 0.6 % — SIGNIFICANT CHANGE UP (ref 0–1.5)
LYMPHOCYTES # BLD AUTO: 2.49 K/UL — SIGNIFICANT CHANGE UP (ref 1–3.3)
LYMPHOCYTES # BLD AUTO: 31.7 % — SIGNIFICANT CHANGE UP (ref 13–44)
MCHC RBC-ENTMCNC: 28.9 PG — SIGNIFICANT CHANGE UP (ref 27–34)
MCHC RBC-ENTMCNC: 31.6 GM/DL — LOW (ref 32–36)
MCV RBC AUTO: 91.5 FL — SIGNIFICANT CHANGE UP (ref 80–100)
MONOCYTES # BLD AUTO: 0.59 K/UL — SIGNIFICANT CHANGE UP (ref 0–0.9)
MONOCYTES NFR BLD AUTO: 7.5 % — SIGNIFICANT CHANGE UP (ref 2–14)
NEUTROPHILS # BLD AUTO: 4.42 K/UL — SIGNIFICANT CHANGE UP (ref 1.8–7.4)
NEUTROPHILS NFR BLD AUTO: 56.3 % — SIGNIFICANT CHANGE UP (ref 43–77)
NRBC # BLD: 0 /100 WBCS — SIGNIFICANT CHANGE UP (ref 0–0)
PLATELET # BLD AUTO: 409 K/UL — HIGH (ref 150–400)
POTASSIUM SERPL-MCNC: 4 MMOL/L — SIGNIFICANT CHANGE UP (ref 3.5–5.3)
POTASSIUM SERPL-SCNC: 4 MMOL/L — SIGNIFICANT CHANGE UP (ref 3.5–5.3)
PROT SERPL-MCNC: 6.7 G/DL — SIGNIFICANT CHANGE UP (ref 6–8.3)
RBC # BLD: 3.98 M/UL — LOW (ref 4.2–5.8)
RBC # FLD: 15.3 % — HIGH (ref 10.3–14.5)
SODIUM SERPL-SCNC: 143 MMOL/L — SIGNIFICANT CHANGE UP (ref 135–145)
WBC # BLD: 7.86 K/UL — SIGNIFICANT CHANGE UP (ref 3.8–10.5)
WBC # FLD AUTO: 7.86 K/UL — SIGNIFICANT CHANGE UP (ref 3.8–10.5)

## 2022-05-12 PROCEDURE — 93970 EXTREMITY STUDY: CPT | Mod: 26

## 2022-05-12 RX ORDER — APIXABAN 2.5 MG/1
2.5 TABLET, FILM COATED ORAL
Refills: 0 | Status: DISCONTINUED | OUTPATIENT
Start: 2022-05-12 | End: 2022-05-13

## 2022-05-12 RX ORDER — BACLOFEN 100 %
20 POWDER (GRAM) MISCELLANEOUS ONCE
Refills: 0 | Status: COMPLETED | OUTPATIENT
Start: 2022-05-12 | End: 2022-05-12

## 2022-05-12 RX ORDER — CEFPODOXIME PROXETIL 100 MG
200 TABLET ORAL
Refills: 0 | Status: DISCONTINUED | OUTPATIENT
Start: 2022-05-12 | End: 2022-05-13

## 2022-05-12 RX ORDER — BACLOFEN 100 %
20 POWDER (GRAM) MISCELLANEOUS DAILY
Refills: 0 | Status: DISCONTINUED | OUTPATIENT
Start: 2022-05-12 | End: 2022-05-13

## 2022-05-12 RX ORDER — LACOSAMIDE 50 MG/1
150 TABLET ORAL
Refills: 0 | Status: DISCONTINUED | OUTPATIENT
Start: 2022-05-12 | End: 2022-05-13

## 2022-05-12 RX ORDER — LEVETIRACETAM 250 MG/1
1000 TABLET, FILM COATED ORAL
Refills: 0 | Status: DISCONTINUED | OUTPATIENT
Start: 2022-05-12 | End: 2022-05-13

## 2022-05-12 RX ORDER — OXYCODONE HYDROCHLORIDE 5 MG/1
2.5 TABLET ORAL ONCE
Refills: 0 | Status: DISCONTINUED | OUTPATIENT
Start: 2022-05-12 | End: 2022-05-12

## 2022-05-12 RX ADMIN — Medication 650 MILLIGRAM(S): at 05:31

## 2022-05-12 RX ADMIN — LEVETIRACETAM 1000 MILLIGRAM(S): 250 TABLET, FILM COATED ORAL at 17:49

## 2022-05-12 RX ADMIN — Medication 1 DROP(S): at 05:26

## 2022-05-12 RX ADMIN — Medication 1 TABLET(S): at 11:58

## 2022-05-12 RX ADMIN — Medication 25 MILLIGRAM(S): at 21:41

## 2022-05-12 RX ADMIN — CHLORHEXIDINE GLUCONATE 1 APPLICATION(S): 213 SOLUTION TOPICAL at 06:15

## 2022-05-12 RX ADMIN — OXYCODONE HYDROCHLORIDE 2.5 MILLIGRAM(S): 5 TABLET ORAL at 22:11

## 2022-05-12 RX ADMIN — Medication 100 MILLIGRAM(S): at 05:31

## 2022-05-12 RX ADMIN — Medication 200 MILLIGRAM(S): at 17:50

## 2022-05-12 RX ADMIN — LACOSAMIDE 150 MILLIGRAM(S): 50 TABLET ORAL at 17:49

## 2022-05-12 RX ADMIN — Medication 375 MILLIGRAM(S): at 06:31

## 2022-05-12 RX ADMIN — Medication 650 MILLIGRAM(S): at 06:31

## 2022-05-12 RX ADMIN — Medication 1 DROP(S): at 17:50

## 2022-05-12 RX ADMIN — Medication 20 MILLIGRAM(S): at 08:23

## 2022-05-12 RX ADMIN — Medication 375 MILLIGRAM(S): at 05:26

## 2022-05-12 RX ADMIN — Medication 25 MILLIGRAM(S): at 09:24

## 2022-05-12 RX ADMIN — OXYCODONE HYDROCHLORIDE 2.5 MILLIGRAM(S): 5 TABLET ORAL at 21:41

## 2022-05-12 RX ADMIN — Medication 81 MILLIGRAM(S): at 11:58

## 2022-05-12 RX ADMIN — Medication 375 MILLIGRAM(S): at 17:49

## 2022-05-12 RX ADMIN — Medication 650 MILLIGRAM(S): at 20:57

## 2022-05-12 RX ADMIN — LEVETIRACETAM 400 MILLIGRAM(S): 250 TABLET, FILM COATED ORAL at 05:27

## 2022-05-12 RX ADMIN — LACOSAMIDE 130 MILLIGRAM(S): 50 TABLET ORAL at 06:31

## 2022-05-12 RX ADMIN — Medication 5 MILLIGRAM(S): at 16:24

## 2022-05-12 RX ADMIN — Medication 500 MILLIGRAM(S): at 11:58

## 2022-05-12 RX ADMIN — Medication 650 MILLIGRAM(S): at 20:27

## 2022-05-12 RX ADMIN — AMLODIPINE BESYLATE 10 MILLIGRAM(S): 2.5 TABLET ORAL at 05:31

## 2022-05-12 RX ADMIN — APIXABAN 2.5 MILLIGRAM(S): 2.5 TABLET, FILM COATED ORAL at 17:49

## 2022-05-12 RX ADMIN — Medication 200 MILLIGRAM(S): at 09:22

## 2022-05-12 NOTE — PROGRESS NOTE ADULT - ASSESSMENT
ASSESSMENT:    66 year old gentleman, former smoker; without history of intrinsic lung disease. 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 He has a known right frontotemporal epileptogenic focus maintained on Vimpat. The patient was admitted on May 6th following a fall in rehab with fluctuating mental status between somnolent and alert. His breathing was concerning for possible Cheyne-Joel respirations versus Kussmaul's pattern breathing. In the ER, the patient had episodes of bradycardia, apnea and hypoxemia with evidence of right arm shaking and absence of movement of the left upper extremity. He was intubated for airway protection due to concerns of status epilepticus and admitted to the ICU. ICU course notable for hypertension, fever and right upper extremity twitching while the patient was conscious (felt not to represent seizure activity). Deep sputum cultures -> Staph aureus (MSSA). EEG without evidence of seizures. CT head and cervical spine without acute pathology following the fall -> cervical collar removed. Orthopedic evaluation -> no evidence of surgical site infection -> WBAT. The patient was extubated on 5/8 and now is transferred to the medical [facility] for further care. He is awake and alert. He has no shortness of breath or hypoxemia on room air. His cough with sputum production following extubation has resolved. He has no chest congestion or wheeze. He has no fevers, chills or sweats. No chest pain/pressure or palpitations. His appetite is good and he is tolerating a regular diet. His back pain is well controlled on baclofen (now off narcotics). His eyes are irritated.     the patient is doing well following intubation for airway protection in the setting of status epilepticus - he has had no further seizures on Vimpat and Keppra - respiratory status is stable on room air -> he has no evidence of bronchospasm, pulmonary edema or pleural effusions - MSSA in sputum culture is likely due to "bronchitis" rather than pneumonia with clear lung fields on CXR - he is without dysphagia and tolerating a regular diet - his recent right femur periprosthetic fracture repair in March 2022 is healing well and without evidence of infection    PLAN/RECOMMENDATIONS:    stable oxygenation on room air  no indication for systemic steroids or pulmonary medications  complete a 5 day course of antibiotics for MSSA bronchitis -> can switch cefazolin to po cefadroxil 500mg 2 times daily until 5/14  cardiac meds: ASA/norvasc/metoprolol  diligent DVT prophylaxis following orthopedic surgery - SQ lovenox 40mg daily  neurology follow-up noted - Keppra/Vimpat  analgesics: baclofen 20mg qAM/tylenol 3 times daily/naprosyn 2 times daily  steroid/antibiotics eye drops x 5 days  regular diet  physical therapy - WBAT    Will follow with you. Plan of care discussed with the patient at bedside and with Dr. Engel. No pulmonary objection to discharge.    Siddharth Hansen MD, [facility]  [phone]  Pulmonary Medicine

## 2022-05-12 NOTE — PROGRESS NOTE ADULT - ASSESSMENT
67 y/o M PMhx CAD s/p stent, HTN, HLD, s/p multiple lumbar spinal surgeries, R frontal craniotomy, clipping of R acomm aneurysm, s/p R prosthetic femur ORIF 3/30/21 who presented after unwitnessed fall in his nursing home w/ concern for possible status epilepticus s/p intubation, sedation now extubated and transferred to the floor    MSSA PNA, leukocytosis, fever  febrile to 100.4 on 5/7 and 101.3 on 5/9  s/p zosyn 5/8-5/10  currently pt w/o respiratory symptoms  leukocytosis resolved  cefadroxil not on formulary  transitioned to cefpodoxime 200mg bid, ok to continue, EOT 5/14    blood culture contamination  blood cx 5/6 w/ MRSE in 1 set only  repeat blood cx NGTD  s/p vanc 5/8-5/9  contaminant, no need for further antibiotics    Fortino Fairchild M.D.  [facility], Division of Infectious Diseases  [phone]  After 5pm on weekdays and all day on weekends - please call [phone]

## 2022-05-12 NOTE — PROGRESS NOTE ADULT - SUBJECTIVE AND OBJECTIVE BOX
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    INTERVAL HISTORY: awake and alert; improved back pain on his usual dose of baclofen and ATC tylenol and naprosyn; stood up with physical therapy yesterday but was advised to bear only 50% of his weight (full weight bearing recommended by orthopedics); no shortness of breath or hypoxemia on room air; no cough, sputum production, hemoptysis, chest congestion or wheeze; no fevers, chills or sweats; no chest pain/pressure or palpitations; improved eye irritation on steroid/antibiotic drops;     REVIEW OF SYSTEMS:  Constitutional: As per interval history  HEENT: conjunctivitis  CV: As per interval history  Resp: As per interval history  GI: Within normal limits   : Within normal limits  Musculoskeletal: multiple orthopedic procedures - chronic back pain  Skin: Within normal limits  Neurological: seizure disorder  Psychiatric: Within normal limits  Endocrine: Within normal limits  Hematologic/Lymphatic: Within normal limits  Allergic/Immunologic: Within normal limits      MEDICATIONS:     Pulmonary "    Anti-microbials:  ceFAZolin   IVPB 2000 milliGRAM(s) IV Intermittent every 8 hours    Cardiovascular:  amLODIPine   Tablet 10 milliGRAM(s) Oral daily  metoprolol tartrate 25 milliGRAM(s) Oral two times a day    Other:  acetaminophen     Tablet .. 650 milliGRAM(s) Oral every 8 hours  ascorbic acid 500 milliGRAM(s) Oral daily  aspirin  chewable 81 milliGRAM(s) Oral daily  baclofen 20 milliGRAM(s) Oral once  baclofen 20 milliGRAM(s) Oral daily  chlorhexidine 4% Liquid 1 Application(s) Topical <User Schedule>  dexamethasone/neomycin/polymyxin Suspension 1 Drop(s) Both EYES two times a day  enoxaparin Injectable 40 milliGRAM(s) SubCutaneous every 24 hours  lacosamide IVPB 150 milliGRAM(s) IV Intermittent every 12 hours  levETIRAcetam  IVPB 1000 milliGRAM(s) IV Intermittent every 12 hours  multivitamin 1 Tablet(s) Oral daily  naproxen 375 milliGRAM(s) Oral two times a day    MEDICATIONS  (PRN):  baclofen 5 milliGRAM(s) Oral every 12 hours PRN Muscle Spasm  simethicone 80 milliGRAM(s) Chew every 6 hours PRN Indigestion      OBJECTIVE:    PHYSICAL EXAM:       ICU Vital Signs Last 24 Hrs  T(C): 36.4 (12 May 2022 05:21), Max: 36.7 (11 May 2022 17:48)  T(F): 97.6 (12 May 2022 05:21), Max: 98 (11 May 2022 17:48)  HR: 64 (12 May 2022 05:21) (64 - 92)  BP: 178/93 (12 May 2022 05:21) (122/72 - 178/93)  BP(mean): --  ABP: --  ABP(mean): --  RR: 18 (12 May 2022 05:21) (18 - 18)  SpO2: 96% (12 May 2022 05:21) (94% - 96%)     General: Awake. Alert. Cooperative. No distress. Appears stated age 	  HEENT:  Atraumatic. Normocephalic. Anicteric. Normal oral mucosa. PERRL. EOMI. Decreased  conjunctival erythema and injection  Neck: Supple. Trachea midline. Thyroid without enlargement/tenderness/nodules. No carotid bruit. No JVD.	  Cardiovascular: Regular rate and rhythm. S1 S2 normal. No murmurs, rubs or gallops.  Respiratory: Respirations unlabored. Clear to auscultation and percussion bilaterally. No curvature.  Abdomen: Soft. Non-tender. Non-distended. No organomegaly. No masses. Normal bowel sounds.  Extremities: Warm to touch. No clubbing or cyanosis. No pedal edema.  Pulses: 2+ peripheral pulses all extremities.	  Skin: Multiple back surgical scars  Lymph Nodes: Cervical, supraclavicular and axillary nodes normal  Neurological: Motor and sensory examination equal and normal. A and O x 3  Psychiatry: Appropriate mood and affect.    LABS:                          11.6   12.53 )-----------( 307      ( 10 May 2022 11:53 )             37.2     CBC    WBC  12.53 <==, 16.80 <==, 13.76 <==, 12.42 <==, 10.87 <==, 12.19 <==, 13.17 <==    Hemoglobin  11.6 <<==, 11.2 <<==, 10.5 <<==, 10.3 <<==, 11.5 <<==, 11.9 <<==, 13.2 <<==    Hematocrit  37.2 <==, 35.6 <==, 33.1 <==, 33.1 <==, 37.6 <==, 39.5 <==, 42.5 <==    Platelets  307 <==, 341 <==, 317 <==, 288 <==, 333 <==, 351 <==, 390 <==      141  |  107  |  6<L>  ----------------------------<  107<H>    05-10  4.0   |  21<L>  |  1.11      LYTES    sodium  141 <==, 140 <==, 142 <==, 140 <==, 143 <==, 140 <==, 144 <==    potassium   4.0 <==, 4.5 <==, 3.5 <==, 3.1 <==, 3.5 <==, 3.3 <==, 4.1 <==    chloride  107 <==, 106 <==, 107 <==, 105 <==, 107 <==, 106 <==, 107 <==    carbon dioxide  21 <==, 20 <==, 22 <==, 20 <==, 21 <==, 19 <==, 23 <==    =============================================================================================  RENAL FUNCTION:    Creatinine:   1.11  <<==, 1.06  <<==, 1.00  <<==, 1.16  <<==, 1.02  <<==, 0.99  <<==, 0.95  <<==    BUN:   6 <==, 6 <==, 6 <==, 8 <==, 10 <==, 12 <==, 12 <==    ============================================================================================    calcium   8.9 <==, 9.0 <==, 8.8 <==, 8.5 <==, 9.0 <==, 8.7 <==, 9.3 <==    phos   3.0 <==, 3.0 <==, 2.4 <==, 2.6 <==, 2.4 <==, 2.5 <==, 2.2 <==    mag   2.1 <==, 2.2 <==, 2.4 <==, 1.6 <==, 1.7 <==, 1.6 <==, 1.7 <==    ============================================================================================  LFTs    AST:   45 <== , 58 <== , 29 <== , 20 <== , 17 <== , 17 <== , 18 <==     ALT:  45  <== , 39  <== , 24  <== , 15  <== , 15  <== , 12  <== , 13  <==     AP:  132  <=, 146  <=, 131  <=, 129  <=, 139  <=, 138  <=, 160  <=    Bili:  0.5  <=, 0.4  <=, 0.4  <=, 0.6  <=, 0.6  <=, 0.6  <=, 0.5  <=    PT/INR - ( 08 May 2022 02:11 )   PT: 14.6 sec;   INR: 1.26 ratio      < from: TTE with Doppler (w/Cont) (05.08.22 @ 07:28) >    Patient name: MANUEL PETERSON  YOB: 1955   Age: 66 (M)   MR#: [id number]  Study Date: 5/8/2022  Location: [address] [place], [place] [id number]: Jessica Garcia [facility]  Study quality: Technically difficult  Referring Physician: Kodi Crawford MD  Blood Pressure: 146/67 mmHg  Height: 173 cm  Weight: 114 kg  BSA: 2.3 m2  ------------------------------------------------------------------------  PROCEDURE: Transthoracic echocardiogram with 2-D, M-Mode  and complete spectral and color flow Doppler. Verbal  consent was obtained for injection of  Ultrasonic Enhancing  Agent following a discussion of risks and benefits.  Following intravenous injection of Ultrasonic Enhancing  Agent, harmonic imaging was performed.  INDICATION: Endocarditis, valve unspecified (I38)  ------------------------------------------------------------------------  Dimensions:    Normal Values:  LA:     3.0    2.0 - 4.0 cm  Ao:     3.2    2.0 - 3.8 cm  SEPTUM:        0.6 - 1.2 cm  PWT:           0.6 - 1.1 cm  LVIDd:     3.0 - 5.6 cm  LVIDs:         1.8 - 4.0 cm  EF (Pacheco Rule): 75 %  ------------------------------------------------------------------------  Observations:  Mitral Valve: Mitral valve not well visualized, probably  normal. Minimal mitral regurgitation.  Aortic Valve/Aorta: Calcified trileaflet aortic valve with  normal opening. No aortic valve regurgitation seen.  Aortic Root: 3.2 cm.  Left Atrium: Normal left atrium.  Left Ventricle:   Endocardial visualization enhanced with  intravenous injection of Ultrasonic Enhancing Agent  (Definity). Normal left ventricular systolic function. No  segmental wall motion abnormalities. Septal motion  consistent with conduction defect. Normal left ventricular  internal dimensions and wall thicknesses.Mild diastolic  dysfunction (Stage I).  Right Heart: Normal right atrium. The right ventricle is  not well visualized; grossly normal right ventricular  systolic function. Tricuspid valve not well visualized,  probably normal. No tricuspid regurgitation. Pulmonic valve  not well visualized, probably normal. No pulmonic  regurgitation.  Pericardium/Pleura: Normal pericardium with no pericardial  effusion.  Hemodynamic: Estimated right atrial pressure is 8 mm Hg.  ------------------------------------------------------------------------  Conclusions:  1. Mitral valve not well visualized, probably normal.  Minimal mitral regurgitation.  2. Calcified trileaflet aortic valve with normal opening.  No aortic valve regurgitation seen.  3.   Endocardial visualization enhanced with intravenous  injection of Ultrasonic Enhancing Agent (Definity). Normal  left ventricular systolic function. No segmental wall  motion abnormalities. Septal motion consistent with  conduction defect.  4. Mild diastolic dysfunction (Stage I).  5. The right ventricle is not well visualized; grossly  normal right ventricular systolic function.  6. No evidence of valvular vegetation is seen.  *** Compared with echocardiogram of 1/2/2015, the focal  outpouching is not seen.  ------------------------------------------------------------------------  Confirmed on  5/8/2022 - 11:02:55 by Mukesh Madison M.D.  ------------------------------------------------------------------------  ---------------------------------------------------------------------------------------------------------------    MICROBIOLOGY:     Flu With COVID-19 By MICHAEL (05.06.22 @ 06:01)   SARS-CoV-2 Result: [facility]  This Respiratory Panel uses polymerase chain reaction (PCR) to detect for   influenza A; influenza B; respiratory syncytial virus; and SARS-CoV-2.   This test was validated by [employer] and is in use under the FDA   Emergency Use Authorization (EUA) for clinical labs CLIA-certified to   perform high complexity testing. Test results should be correlated with   clinical presentation, patient history, and epidemiology.   Influenza A Result: [facility]   Influenza B Result: [facility]   Resp Syn Virus Result: [facility]     Culture - Blood (05.10.22 @ 00:15)   Specimen Source: .Blood Blood-Peripheral   Culture Results:   No growth to date.     Culture - Blood (05.10.22 @ 00:00)   Specimen Source: .Blood Blood-Peripheral   Culture Results:   No growth to date.     Culture - Blood (05.07.22 @ 20:00)   Specimen Source: .Blood Blood-Venous   Culture Results:   No growth to date.    Culture - Blood (05.07.22 @ 19:55)   Specimen Source: .Blood Blood-Peripheral   Culture Results:   No growth to date.     Culture - Blood (05.06.22 @ 18:55)   Specimen Source: .Blood Blood-Venous   Culture Results:   No growth to date.     Culture - Blood (05.06.22 @ 18:40)   - Staphylococcus epidermidis, Methicillin resistant: Detec   Culture - Sputum . (05.07.22 @ 21:38)   Culture Results:   Numerous Staphylococcus aureus   Normal Respiratory Lauren absent   Organism Identification: Staphylococcus aureus   Organism: Staphylococcus aureus   Specimen Source: ET Tube ET Tube     RADIOLOGY:  [x ] Chest radiographs reviewed and interpreted by me    EXAM:  XR CHEST PORTABLE URGENT 1V                          PROCEDURE DATE:  05/06/2022      FINDINGS:    Endotracheal tube terminates above the gera.  Enteric tube side-port is in the stomach.  Clear lungs.  No pneumothorax or large pleural effusion.  Heart size within normal limits.  No acute osseous abnormality. Posterior spinal fusion hardware.    IMPRESSION:    Enteric tube side-port is in the stomach.    STEPHIE ROBERTSON MD; Resident Radiologist  This document has been electronically signed.  ELAINA SCHROEDER MD; Attending InterventionalRadiologist  This document has been electronically signed. May  7 2022 11:06AM  ---------------------------------------------------------------------------------------------------------------  EXAM:  XR CHEST PORTABLE URGENT 1V                          PROCEDURE DATE:  05/06/2022      INTERPRETATION:  INDICATION: Status post intubation    COMPARISON: Chest radiograph 4/28/2022    FINDINGS:  Heart/Vascular: The heart size cannot be accurately assessed on this   projection.  Pulmonary: The lower lung fields are not well visualized. The upper lung   fields are clear. Endotracheal tube tip at mid trachea and above the   gera.  Bones: Thoracic posterior spinal fusion hardware noted.    Impression:  Endotracheal tube tip at mid trachea above the gera.    TAMIE BARRAZA DO; Resident Radiologist  This document has been electronically signed.  ASHLEY KONG MD; Attending Radiologist  This document has been electronically signed. May  6 2022  8:16AM  ---------------------------------------------------------------------------------------------------------------  EXAM:  CT BRAIN                          PROCEDURE DATE:  05/06/2022      FINDINGS:    There is no evidence of mass or acute intracranial hemorrhage. Right   frontal lobe encephalomalacia and gliosis. 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 Ventricles and sulci are normal in size and   configuration for the patient's stated age. No midline shift or other   significant mass effect is noted. There is no CT evidence of acute   territorial infarct.    The visualized paranasal sinuses and tympanomastoid spaces are clear.   Orbits and orbital contents are unremarkable.    Right frontal temporal craniotomy.    IMPRESSION:    No evidence of acute intracranial hemorrhage, midline shift or CT   evidence of acute territorial infarct.    Postsurgical changes as described.    If the patient's symptoms persist, consider short interval follow-up head   CT or brain MRI if there are no MRI contraindications.    TAMIE BARRAZA DO; Resident Radiologist  This document has been electronically signed.  JASS HANNA MD; Attending Radiologist  This document has been electronically signed. 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 Stable intact more recently applied long   lateral femoral fracture fixation plate with fixation, transcondylar, and   interfragmentary screws.    Unchanged configuration of the repaired slightly comminuted distal   femoral diaphyseal fracture below prosthetic femoral stem tip including   residual minimal medial fracture margin cortical offset otherwise overall   anatomic alignment maintained.    Removed previously seen surgicalskin staples. Cleared previously seen   postsurgical soft tissue changes.    No dislocations or acute appearing fractures.    BELA ALARCON MD; Attending Radiologist  This document has been electronically signed. May 10 2022  9:48AM  ---------------------------------------------------------------------------------------------------------------       [facility] - [facility] - PROGRESS NOTE    CHIEF COMPLAINT: acute hypoxic respiratory failure; sepsis syndrome; staph aureus pneumonia; status epilepticus; alteration in mental status; T    INTERVAL HISTORY: awake and alert; improved back pain on his usual dose of baclofen and ATC tylenol and naprosyn; stood up with physical therapy yesterday but was advised to bear only 50% of his weight (full weight bearing recommended by orthopedics); no shortness of breath or hypoxemia on room air; no cough, sputum production, hemoptysis, chest congestion or wheeze; no fevers, chills or sweats; no chest pain/pressure or palpitations; improved eye irritation on steroid/antibiotic drops;     REVIEW OF SYSTEMS:  Constitutional: As per interval history  HEENT: conjunctivitis  CV: As per interval history  Resp: As per interval history  GI: Within normal limits   : Within normal limits  Musculoskeletal: multiple orthopedic procedures - chronic back pain  Skin: Within normal limits  Neurological: seizure disorder  Psychiatric: Within normal limits  Endocrine: Within normal limits  Hematologic/Lymphatic: Within normal limits  Allergic/Immunologic: Within normal limits      MEDICATIONS:     Pulmonary "    Anti-microbials:  ceFAZolin   IVPB 2000 milliGRAM(s) IV Intermittent every 8 hours    Cardiovascular:  amLODIPine   Tablet 10 milliGRAM(s) Oral daily  metoprolol tartrate 25 milliGRAM(s) Oral two times a day    Other:  acetaminophen     Tablet .. 650 milliGRAM(s) Oral every 8 hours  ascorbic acid 500 milliGRAM(s) Oral daily  aspirin  chewable 81 milliGRAM(s) Oral daily  baclofen 20 milliGRAM(s) Oral once  baclofen 20 milliGRAM(s) Oral daily  chlorhexidine 4% Liquid 1 Application(s) Topical <User Schedule>  dexamethasone/neomycin/polymyxin Suspension 1 Drop(s) Both EYES two times a day  enoxaparin Injectable 40 milliGRAM(s) SubCutaneous every 24 hours  lacosamide IVPB 150 milliGRAM(s) IV Intermittent every 12 hours  levETIRAcetam  IVPB 1000 milliGRAM(s) IV Intermittent every 12 hours  multivitamin 1 Tablet(s) Oral daily  naproxen 375 milliGRAM(s) Oral two times a day    MEDICATIONS  (PRN):  baclofen 5 milliGRAM(s) Oral every 12 hours PRN Muscle Spasm  simethicone 80 milliGRAM(s) Chew every 6 hours PRN Indigestion      OBJECTIVE:    PHYSICAL EXAM:       ICU Vital Signs Last 24 Hrs  T(C): 36.4 (12 May 2022 05:21), Max: 36.7 (11 May 2022 17:48)  T(F): 97.6 (12 May 2022 05:21), Max: 98 (11 May 2022 17:48)  HR: 64 (12 May 2022 05:21) (64 - 92)  BP: 178/93 (12 May 2022 05:21) (122/72 - 178/93)  BP(mean): --  ABP: --  ABP(mean): --  RR: 18 (12 May 2022 05:21) (18 - 18)  SpO2: 96% (12 May 2022 05:21) (94% - 96%)     General: Awake. Alert. Cooperative. No distress. Appears stated age 	  HEENT:  Atraumatic. Normocephalic. Anicteric. Normal oral mucosa. PERRL. EOMI. Decreased  conjunctival erythema and injection  Neck: Supple. Trachea midline. Thyroid without enlargement/tenderness/nodules. No carotid bruit. No JVD.	  Cardiovascular: Regular rate and rhythm. S1 S2 normal. No murmurs, rubs or gallops.  Respiratory: Respirations unlabored. Clear to auscultation and percussion bilaterally. No curvature.  Abdomen: Soft. Non-tender. Non-distended. No organomegaly. No masses. Normal bowel sounds.  Extremities: Warm to touch. No clubbing or cyanosis. No pedal edema.  Pulses: 2+ peripheral pulses all extremities.	  Skin: Multiple back surgical scars  Lymph Nodes: Cervical, supraclavicular and axillary nodes normal  Neurological: Motor and sensory examination equal and normal. A and O x 3  Psychiatry: Appropriate mood and affect.    LABS:                          11.6   12.53 )-----------( 307      ( 10 May 2022 11:53 )             37.2     CBC    WBC  12.53 <==, 16.80 <==, 13.76 <==, 12.42 <==, 10.87 <==, 12.19 <==, 13.17 <==    Hemoglobin  11.6 <<==, 11.2 <<==, 10.5 <<==, 10.3 <<==, 11.5 <<==, 11.9 <<==, 13.2 <<==    Hematocrit  37.2 <==, 35.6 <==, 33.1 <==, 33.1 <==, 37.6 <==, 39.5 <==, 42.5 <==    Platelets  307 <==, 341 <==, 317 <==, 288 <==, 333 <==, 351 <==, 390 <==      141  |  107  |  6<L>  ----------------------------<  107<H>    05-10  4.0   |  21<L>  |  1.11      LYTES    sodium  141 <==, 140 <==, 142 <==, 140 <==, 143 <==, 140 <==, 144 <==    potassium   4.0 <==, 4.5 <==, 3.5 <==, 3.1 <==, 3.5 <==, 3.3 <==, 4.1 <==    chloride  107 <==, 106 <==, 107 <==, 105 <==, 107 <==, 106 <==, 107 <==    carbon dioxide  21 <==, 20 <==, 22 <==, 20 <==, 21 <==, 19 <==, 23 <==    =============================================================================================  RENAL FUNCTION:    Creatinine:   1.11  <<==, 1.06  <<==, 1.00  <<==, 1.16  <<==, 1.02  <<==, 0.99  <<==, 0.95  <<==    BUN:   6 <==, 6 <==, 6 <==, 8 <==, 10 <==, 12 <==, 12 <==    ============================================================================================    calcium   8.9 <==, 9.0 <==, 8.8 <==, 8.5 <==, 9.0 <==, 8.7 <==, 9.3 <==    phos   3.0 <==, 3.0 <==, 2.4 <==, 2.6 <==, 2.4 <==, 2.5 <==, 2.2 <==    mag   2.1 <==, 2.2 <==, 2.4 <==, 1.6 <==, 1.7 <==, 1.6 <==, 1.7 <==    ============================================================================================  LFTs    AST:   45 <== , 58 <== , 29 <== , 20 <== , 17 <== , 17 <== , 18 <==     ALT:  45  <== , 39  <== , 24  <== , 15  <== , 15  <== , 12  <== , 13  <==     AP:  132  <=, 146  <=, 131  <=, 129  <=, 139  <=, 138  <=, 160  <=    Bili:  0.5  <=, 0.4  <=, 0.4  <=, 0.6  <=, 0.6  <=, 0.6  <=, 0.5  <=    PT/INR - ( 08 May 2022 02:11 )   PT: 14.6 sec;   INR: 1.26 ratio      < from: TTE with Doppler (w/Cont) (05.08.22 @ 07:28) >    Patient name: MANUEL PETERSON  YOB: 1955   Age: 66 (M)   MR#: [id number]  Study Date: 5/8/2022  Location: [address] [place], [place] [id number]: Jessica Garcia [facility]  Study quality: Technically difficult  Referring Physician: Kodi Crawford MD  Blood Pressure: 146/67 mmHg  Height: 173 cm  Weight: 114 kg  BSA: 2.3 m2  ------------------------------------------------------------------------  PROCEDURE: Transthoracic echocardiogram with 2-D, M-Mode  and complete spectral and color flow Doppler. Verbal  consent was obtained for injection of  Ultrasonic Enhancing  Agent following a discussion of risks and benefits.  Following intravenous injection of Ultrasonic Enhancing  Agent, harmonic imaging was performed.  INDICATION: Endocarditis, valve unspecified (I38)  ------------------------------------------------------------------------  Dimensions:    Normal Values:  LA:     3.0    2.0 - 4.0 cm  Ao:     3.2    2.0 - 3.8 cm  SEPTUM:        0.6 - 1.2 cm  PWT:           0.6 - 1.1 cm  LVIDd:     3.0 - 5.6 cm  LVIDs:         1.8 - 4.0 cm  EF (Pacheco Rule): 75 %  ------------------------------------------------------------------------  Observations:  Mitral Valve: Mitral valve not well visualized, probably  normal. Minimal mitral regurgitation.  Aortic Valve/Aorta: Calcified trileaflet aortic valve with  normal opening. No aortic valve regurgitation seen.  Aortic Root: 3.2 cm.  Left Atrium: Normal left atrium.  Left Ventricle:   Endocardial visualization enhanced with  intravenous injection of Ultrasonic Enhancing Agent  (Definity). Normal left ventricular systolic function. No  segmental wall motion abnormalities. Septal motion  consistent with conduction defect. Normal left ventricular  internal dimensions and wall thicknesses.Mild diastolic  dysfunction (Stage I).  Right Heart: Normal right atrium. The right ventricle is  not well visualized; grossly normal right ventricular  systolic function. Tricuspid valve not well visualized,  probably normal. No tricuspid regurgitation. Pulmonic valve  not well visualized, probably normal. No pulmonic  regurgitation.  Pericardium/Pleura: Normal pericardium with no pericardial  effusion.  Hemodynamic: Estimated right atrial pressure is 8 mm Hg.  ------------------------------------------------------------------------  Conclusions:  1. Mitral valve not well visualized, probably normal.  Minimal mitral regurgitation.  2. Calcified trileaflet aortic valve with normal opening.  No aortic valve regurgitation seen.  3.   Endocardial visualization enhanced with intravenous  injection of Ultrasonic Enhancing Agent (Definity). Normal  left ventricular systolic function. No segmental wall  motion abnormalities. Septal motion consistent with  conduction defect.  4. Mild diastolic dysfunction (Stage I).  5. The right ventricle is not well visualized; grossly  normal right ventricular systolic function.  6. No evidence of valvular vegetation is seen.  *** Compared with echocardiogram of 1/2/2015, the focal  outpouching is not seen.  ------------------------------------------------------------------------  Confirmed on  5/8/2022 - 11:02:55 by Mukesh Madison M.D.  ------------------------------------------------------------------------  ---------------------------------------------------------------------------------------------------------------    MICROBIOLOGY:     Flu With COVID-19 By MICHAEL (05.06.22 @ 06:01)   SARS-CoV-2 Result: [facility]  This Respiratory Panel uses polymerase chain reaction (PCR) to detect for   influenza A; influenza B; respiratory syncytial virus; and SARS-CoV-2.   This test was validated by [employer] and is in use under the FDA   Emergency Use Authorization (EUA) for clinical labs CLIA-certified to   perform high complexity testing. Test results should be correlated with   clinical presentation, patient history, and epidemiology.   Influenza A Result: [facility]   Influenza B Result: [facility]   Resp Syn Virus [facility]: [facility]     Culture - Blood (05.10.22 @ 00:15)   Specimen Source: .Blood Blood-Peripheral   Culture Results:   No growth to date.     Culture - Blood (05.10.22 @ 00:00)   Specimen Source: .Blood Blood-Peripheral   Culture Results:   No growth to date.     Culture - Blood (05.07.22 @ 20:00)   Specimen Source: .Blood Blood-Venous   Culture Results:   No growth to date.    Culture - Blood (05.07.22 @ 19:55)   Specimen Source: .Blood Blood-Peripheral   Culture Results:   No growth to date.     Culture - Blood (05.06.22 @ 18:55)   Specimen Source: .Blood Blood-Venous   Culture Results:   No growth to date.     Culture - Blood (05.06.22 @ 18:40)   - Staphylococcus epidermidis, Methicillin resistant: Detec   Culture - Sputum . (05.07.22 @ 21:38)   Culture Results:   Numerous Staphylococcus aureus   Normal Respiratory Lauren absent   Organism Identification: Staphylococcus aureus   Organism: Staphylococcus aureus   Specimen Source: ET Tube ET Tube     RADIOLOGY:  [x ] Chest radiographs reviewed and interpreted by me    EXAM:  DUPLEX SCAN EXT VEINS LOWER BI                          PROCEDURE DATE:  05/12/2022      FINDINGS:    RIGHT:  Normal compressibility of the RIGHT common femoral, femoral and popliteal   veins.  Doppler examination shows normal spontaneous and phasic flow.  No RIGHT calf vein thrombosis is detected.    LEFT:  Normal compressibility of the LEFT common femoral and popliteal veins.  Doppler examination shows normal spontaneous and phasic flow.  There is partially occlusive thrombuswithin the mid and distal segments   of the left femoral vein. Partial compression is appreciated.    No LEFT calf vein thrombosis is detected.    IMPRESSION:    Partially occlusive deep venous thrombosis above the left knee within the   left femoralvein. Results were given to KAUR Brooke with read   back.    No evidence of deep venous thrombosis in the right lower extremity.    STEVEN SHELL MD; Attending Radiologist  This document has been electronically signed. May 12 2022  2:37PM  ---------------------------------------------------------------------------------------------------------------  EXAM:  XR CHEST PORTABLE URGENT 1V                          PROCEDURE DATE:  05/06/2022      FINDINGS:    Endotracheal tube terminates above the gera.  Enteric tube side-port is in the stomach.  Clear lungs.  No pneumothorax or large pleural effusion.  Heart size within normal limits.  No acute osseous abnormality. Posterior spinal fusion hardware.    IMPRESSION:    Enteric tube side-port is in the stomach.    STEPHIE ROBERTSON MD; Resident Radiologist  This document has been electronically signed.  ELAINA SCHROEDER MD; Attending InterventionalRadiologist  This document has been electronically signed. May  7 2022 11:06AM  ---------------------------------------------------------------------------------------------------------------  EXAM:  XR CHEST PORTABLE URGENT 1V                          PROCEDURE DATE:  05/06/2022      INTERPRETATION:  INDICATION: Status post intubation    COMPARISON: Chest radiograph 4/28/2022    FINDINGS:  Heart/Vascular: The heart size cannot be accurately assessed on this   projection.  Pulmonary: The lower lung fields are not well visualized. The upper lung   fields are clear. Endotracheal tube tip at mid trachea and above the   gera.  Bones: Thoracic posterior spinal fusion hardware noted.    Impression:  Endotracheal tube tip at mid trachea above the gera.    TAMIE BARRAZA DO; Resident Radiologist  This document has been electronically signed.  ASHLEY KONG MD; Attending Radiologist  This document has been electronically signed. May  6 2022  8:16AM  ---------------------------------------------------------------------------------------------------------------  EXAM:  CT BRAIN                          PROCEDURE DATE:  05/06/2022      FINDINGS:    There is no evidence of mass or acute intracranial hemorrhage. Right   frontal lobe encephalomalacia and gliosis. 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 Ventricles and sulci are normal in size and   configuration for the patient's stated age. No midline shift or other   significant mass effect is noted. There is no CT evidence of acute   territorial infarct.    The visualized paranasal sinuses and tympanomastoid spaces are clear.   Orbits and orbital contents are unremarkable.    Right frontal temporal craniotomy.    IMPRESSION:    No evidence of acute intracranial hemorrhage, midline shift or CT   evidence of acute territorial infarct.    Postsurgical changes as described.    If the patient's symptoms persist, consider short interval follow-up head   CT or brain MRI if there are no MRI contraindications.    TAMIE BARRAZA DO; Resident Radiologist  This document has been electronically signed.  JASS HANNA MD; Attending Radiologist  This document has been electronically signed. 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 Stable intact more recently applied long   lateral femoral fracture fixation plate with fixation, transcondylar, and   interfragmentary screws.    Unchanged configuration of the repaired slightly comminuted distal   femoral diaphyseal fracture below prosthetic femoral stem tip including   residual minimal medial fracture margin cortical offset otherwise overall   anatomic alignment maintained.    Removed previously seen surgicalskin staples. Cleared previously seen   postsurgical soft tissue changes.    No dislocations or acute appearing fractures.    BELA ALARCON MD; Attending Radiologist  This document has been electronically signed. May 10 2022  9:48AM  ---------------------------------------------------------------------------------------------------------------

## 2022-05-12 NOTE — PROGRESS NOTE ADULT - SUBJECTIVE AND OBJECTIVE BOX
Patient is a 66y old  Male who presents with a chief complaint of status epilepticus/AMS (12 May 2022 11:13)      SUBJECTIVE / OVERNIGHT EVENTS:    Events noted. Lt leg DVT  CONSTITUTIONAL: No fever,  or fatigue  RESPIRATORY: No cough, wheezing,  No shortness of breath  CARDIOVASCULAR: No chest pain, palpitations, dizziness, or leg swelling  GASTROINTESTINAL: No abdominal or epigastric pain. No nausea, vomiting.  NEUROLOGICAL: No headache    MEDICATIONS  (STANDING):  acetaminophen     Tablet .. 650 milliGRAM(s) Oral every 8 hours  amLODIPine   Tablet 10 milliGRAM(s) Oral daily  ascorbic acid 500 milliGRAM(s) Oral daily  aspirin  chewable 81 milliGRAM(s) Oral daily  baclofen 20 milliGRAM(s) Oral daily  cefpodoxime 200 milliGRAM(s) Oral two times a day  chlorhexidine 4% Liquid 1 Application(s) Topical <User Schedule>  dexamethasone/neomycin/polymyxin Suspension 1 Drop(s) Both EYES two times a day  lacosamide IVPB 150 milliGRAM(s) IV Intermittent every 12 hours  levETIRAcetam  IVPB 1000 milliGRAM(s) IV Intermittent every 12 hours  metoprolol tartrate 25 milliGRAM(s) Oral two times a day  multivitamin 1 Tablet(s) Oral daily  naproxen 375 milliGRAM(s) Oral two times a day    MEDICATIONS  (PRN):  baclofen 5 milliGRAM(s) Oral every 12 hours PRN Muscle Spasm  simethicone 80 milliGRAM(s) Chew every 6 hours PRN Indigestion        CAPILLARY BLOOD GLUCOSE        I&O's Summary    11 May 2022 07:01  -  12 May 2022 07:00  --------------------------------------------------------  IN: 920 mL / OUT: 1 mL / NET: 919 mL    12 May 2022 07:01  -  12 May 2022 15:03  --------------------------------------------------------  IN: 480 mL / OUT: 0 mL / NET: 480 mL        T(C): 36.6 (05-12-22 @ 13:06), Max: 36.7 (05-11-22 @ 17:48)  HR: 65 (05-12-22 @ 13:06) (64 - 75)  BP: 158/84 (05-12-22 @ 13:06) (145/67 - 178/93)  RR: 18 (05-12-22 @ 13:06) (18 - 18)  SpO2: 95% (05-12-22 @ 13:06) (94% - 96%)    PHYSICAL EXAM:  GENERAL: NAD  NECK: Supple, No JVD  CHEST/LUNG: Clear to auscultation bilaterally; No wheezing.  HEART: Regular rate and rhythm; No murmurs, rubs, or gallops  ABDOMEN: Soft, Nontender, Nondistended; Bowel sounds present  EXTREMITIES:   Pedal edema  NEUROLOGY: AAO X 3      LABS:                        11.5   7.86  )-----------( 409      ( 12 May 2022 07:11 )             36.4     05-12    143  |  105  |  8   ----------------------------<  93  4.0   |  24  |  1.02    Ca    9.3      12 May 2022 07:11    TPro  6.7  /  Alb  3.5  /  TBili  0.2  /  DBili  x   /  AST  37  /  ALT  33  /  AlkPhos  121<H>  05-12            CAPILLARY BLOOD GLUCOSE            RADIOLOGY & ADDITIONAL TESTS:    Imaging Personally Reviewed:    Consultant(s) Notes Reviewed:      Care Discussed with Consultants/Other Providers:    Jaciel Engel MD, CMD, FACP    [address] [place] [address]  Office Tel: [phone]  Cell: [phone]

## 2022-05-12 NOTE — PROGRESS NOTE ADULT - SUBJECTIVE AND OBJECTIVE BOX
[facility], Division of Infectious Diseases  ROSANGELA Campos Y. Patel, S. Shah, G. Casimir  [phone]  ([phone] - weekdays after 5pm and weekends)    Name: MANUEL PETERSON  Age/Gender: 66y Male  MRN: [id number]    Interval History:  Patient seen this morning.   Notes reviewed.   No concerning overnight events.  Afebrile.   states he feels well  denies SOB    Allergies: No Known Allergies      Objective:  Vitals:   T(F): 97.5 (05-12-22 @ 09:03), Max: 98 (05-11-22 @ 17:48)  HR: 74 (05-12-22 @ 09:03) (64 - 81)  BP: 145/67 (05-12-22 @ 09:03) (122/72 - 178/93)  RR: 18 (05-12-22 @ 09:03) (18 - 18)  SpO2: 95% (05-12-22 @ 09:03) (94% - 96%)  Physical Examination:  General: no acute distress  HEENT: anicteric  Cardio: S1, S2, normal rate  Resp: clear to auscultation anteriorly  Abd: soft, ND, NT  Ext: no LE edema  Skin: warm, dry, surgical scar on R leg well healed  Lines:    Laboratory Studies:  CBC:                       11.5   7.86  )-----------( 409      ( 12 May 2022 07:11 )             36.4     WBC Trend:  7.86 05-12-22 @ 07:11  12.53 05-10-22 @ 11:53  16.80 05-10-22 @ 00:35  13.76 05-09-22 @ 02:23  12.42 05-08-22 @ 02:10  10.87 05-07-22 @ 02:38  12.19 05-06-22 @ 19:23  13.17 05-06-22 @ 06:02    CMP: 05-12    143  |  105  |  8   ----------------------------<  93  4.0   |  24  |  1.02    Ca    9.3      12 May 2022 07:11  Phos  3.0     05-10  Mg     2.1     05-10    TPro  6.7  /  Alb  3.5  /  TBili  0.2  /  DBili  x   /  AST  37  /  ALT  33  /  AlkPhos  121<H>  05-12      LIVER FUNCTIONS - ( 12 May 2022 07:11 )  Alb: 3.5 g/dL / Pro: 6.7 g/dL / ALK PHOS: 121 U/L / ALT: 33 U/L / AST: 37 U/L / GGT: x           Vancomycin Level, Trough: 28.1 ug/mL (05-10-22 @ 00:35)  Vancomycin Level, Trough: 28.1 ug/mL (05-09-22 @ 17:42)      Microbiology: reviewed     Culture - Blood (collected 05-10-22 @ 00:15)  Source: .Blood Blood-Peripheral  Preliminary Report (05-11-22 @ 03:01):    No growth to date.    Culture - Blood (collected 05-10-22 @ 00:00)  Source: .Blood Blood-Peripheral  Preliminary Report (05-11-22 @ 03:01):    No growth to date.    Culture - Sputum (collected 05-07-22 @ 21:38)  Source: ET Tube ET Tube  Gram Stain (05-08-22 @ 06:44):    Moderate polymorphonuclear leukocytes per low power field    Rare Squamous epithelial cells per low power field    Moderate Gram positive cocci in pairs seen per oil power field  Final Report (05-09-22 @ 18:04):    Numerous Staphylococcus aureus    Normal Respiratory Lauren absent  Organism: Staphylococcus aureus (05-09-22 @ 18:04)  Organism: Staphylococcus aureus (05-09-22 @ 18:04)      -  Ampicillin/Sulbactam: S <=8/4      -  Cefazolin: S <=4      -  Clindamycin: S <=0.25      -  Erythromycin: S <=0.25      -  Gentamicin: S <=1 Should not be used as monotherapy      -  Oxacillin: S <=0.25      -  Rifampin: S <=1 Should not be used as monotherapy      -  Tetra/Doxy: S <=1      -  Trimethoprim/Sulfamethoxazole: S <=0.5/9.5      -  Vancomycin: S 2      Method Type: ROD    Culture - Blood (collected 05-07-22 @ 20:00)  Source: .Blood Blood-Venous  Preliminary Report (05-09-22 @ 01:02):    No growth to date.    Culture - Blood (collected 05-07-22 @ 19:55)  Source: .Blood Blood-Peripheral  Preliminary Report (05-09-22 @ 01:02):    No growth to date.    Culture - Blood (collected 05-06-22 @ 18:55)  Source: .Blood Blood-Venous  Final Report (05-11-22 @ 23:01):    No Growth Final    Culture - Blood (collected 05-06-22 @ 18:40)  Source: .Blood Blood-Peripheral  Gram Stain (05-07-22 @ 18:47):    Growth in aerobic and anaerobic bottles: Gram Positive Cocci in Clusters  Final Report (05-08-22 @ 20:43):    Growth in aerobic and anaerobic bottles: Staphylococcus epidermidis    Coag Negative Staphylococcus    Single set isolate, possible contaminant. Contact    Microbiology if susceptibility testing clinically    indicated.    ***Blood Panel PCR results on this specimen are available    approximately 3 hours after the Gram stain result.***    Gram stain, PCR, and/or culture results may not always    correspond due to difference in methodologies.    ************************************************************    This PCR assay was performed by multiplex PCR. This    Assay tests for 66 bacterial and resistance gene targets.    Please refer to the [facility] test directory    at [email or web address] for details.  Organism: Blood Culture PCR (05-08-22 @ 20:43)  Organism: Blood Culture PCR (05-08-22 @ 20:43)      -  Staphylococcus epidermidis, Methicillin resistant: Detec      Method Type: PCR      Radiology: reviewed     Medications:  acetaminophen     Tablet .. 650 milliGRAM(s) Oral every 8 hours  amLODIPine   Tablet 10 milliGRAM(s) Oral daily  ascorbic acid 500 milliGRAM(s) Oral daily  aspirin  chewable 81 milliGRAM(s) Oral daily  baclofen 5 milliGRAM(s) Oral every 12 hours PRN  baclofen 20 milliGRAM(s) Oral daily  cefpodoxime 200 milliGRAM(s) Oral two times a day  chlorhexidine 4% Liquid 1 Application(s) Topical <User Schedule>  dexamethasone/neomycin/polymyxin Suspension 1 Drop(s) Both EYES two times a day  enoxaparin Injectable 40 milliGRAM(s) SubCutaneous every 24 hours  lacosamide IVPB 150 milliGRAM(s) IV Intermittent every 12 hours  levETIRAcetam  IVPB 1000 milliGRAM(s) IV Intermittent every 12 hours  metoprolol tartrate 25 milliGRAM(s) Oral two times a day  multivitamin 1 Tablet(s) Oral daily  naproxen 375 milliGRAM(s) Oral two times a day  simethicone 80 milliGRAM(s) Chew every 6 hours PRN    Antimicrobials:  cefpodoxime 200 milliGRAM(s) Oral two times a day

## 2022-05-12 NOTE — PROGRESS NOTE ADULT - SUBJECTIVE AND OBJECTIVE BOX
Subjective: Patient seen and examined. No new events except as noted.     REVIEW OF SYSTEMS:    CONSTITUTIONAL: + weakness, fevers or chills  EYES/ENT: No visual changes;  No vertigo or throat pain   NECK: No pain or stiffness  RESPIRATORY: No cough, wheezing, hemoptysis; No shortness of breath  CARDIOVASCULAR: No chest pain or palpitations  GASTROINTESTINAL: No abdominal or epigastric pain. No nausea, vomiting, or hematemesis; No diarrhea or constipation. No melena or hematochezia.  GENITOURINARY: No dysuria, frequency or hematuria  NEUROLOGICAL: No numbness or weakness  SKIN: No itching, burning, rashes, or lesions   All other review of systems is negative unless indicated above.    MEDICATIONS:  MEDICATIONS  (STANDING):  acetaminophen     Tablet .. 650 milliGRAM(s) Oral every 8 hours  amLODIPine   Tablet 10 milliGRAM(s) Oral daily  ascorbic acid 500 milliGRAM(s) Oral daily  aspirin  chewable 81 milliGRAM(s) Oral daily  baclofen 20 milliGRAM(s) Oral daily  cefpodoxime 200 milliGRAM(s) Oral two times a day  chlorhexidine 4% Liquid 1 Application(s) Topical <User Schedule>  dexamethasone/neomycin/polymyxin Suspension 1 Drop(s) Both EYES two times a day  enoxaparin Injectable 40 milliGRAM(s) SubCutaneous every 24 hours  lacosamide IVPB 150 milliGRAM(s) IV Intermittent every 12 hours  levETIRAcetam  IVPB 1000 milliGRAM(s) IV Intermittent every 12 hours  metoprolol tartrate 25 milliGRAM(s) Oral two times a day  multivitamin 1 Tablet(s) Oral daily  naproxen 375 milliGRAM(s) Oral two times a day    PHYSICAL EXAM:  T(C): 36.4 (05-12-22 @ 09:03), Max: 36.7 (05-11-22 @ 17:48)  HR: 74 (05-12-22 @ 09:03) (64 - 81)  BP: 145/67 (05-12-22 @ 09:03) (122/72 - 178/93)  RR: 18 (05-12-22 @ 09:03) (18 - 18)  SpO2: 95% (05-12-22 @ 09:03) (94% - 96%)  Wt(kg): --  I&O's Summary    11 May 2022 07:01  -  12 May 2022 07:00  --------------------------------------------------------  IN: 920 mL / OUT: 1 mL / NET: 919 mL    12 May 2022 07:01  -  12 May 2022 09:41  --------------------------------------------------------  IN: 240 mL / OUT: 0 mL / NET: 240 mL    Appearance: NAD	  HEENT: Normal oral mucosa, PERRL, EOMI	  Lymphatic: No lymphadenopathy , no edema  Cardiovascular: Normal S1 S2, No JVD, No murmurs , Peripheral pulses palpable 2+ bilaterally  Respiratory: Lungs clear to auscultation, normal effort 	  Gastrointestinal:  Soft, Non-tender, + BS	  Skin: No rashes, No ecchymoses, No cyanosis, warm to touch  Musculoskeletal: Normal range of motion, normal strength  Psychiatry:  Mood & affect appropriate  Ext: No edema    LABS:    CARDIAC MARKERS:                 11.5   7.86  )-----------( 409      ( 12 May 2022 07:11 )             36.4     05-12    143  |  105  |  8   ----------------------------<  93  4.0   |  24  |  1.02    Ca    9.3      12 May 2022 07:11  Phos  3.0     05-10  Mg     2.1     05-10    TPro  6.7  /  Alb  3.5  /  TBili  0.2  /  DBili  x   /  AST  37  /  ALT  33  /  AlkPhos  121<H>  05-12    proBNP:   Lipid Profile:   HgA1c:   TSH:     TELEMETRY:     ECG:  	  RADIOLOGY:   DIAGNOSTIC TESTING:  [ ] Echocardiogram:  [ ]  Catheterization:  [ ] Stress Test:    OTHER:

## 2022-05-12 NOTE — PROGRESS NOTE ADULT - ASSESSMENT
66M with CAD s/p MI and stent, HTN, HLD, s/p 7 lumbar spinal surgeries. S/P R frontal craniotomy and clipping of R acomm aneurysm (1997). 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 On arrival, pt mental status was fluctuant between alert and somnolent. Breathing pattern concerning for possible jerel-busch v kussmaul pattern respirations. CT head performed in ED, followed by episodes of bradycardia with hypoxia and apnea. ED team also noted R arm shaking with complete absence of L UE movement. Due to suspicion for status epilepticus, intubated-extubated. Transferred from MICU.    MSSA PNA:    IV Abx  ID/Pul eval appreciated    Seizure:    Neuro f/up  Cont with Keppra/Vimpat       66M with CAD s/p MI and stent, HTN, HLD, s/p 7 lumbar spinal surgeries. S/P R frontal craniotomy and clipping of R acomm aneurysm (1997). 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 On arrival, pt mental status was fluctuant between alert and somnolent. Breathing pattern concerning for possible jerel-busch v kussmaul pattern respirations. CT head performed in ED, followed by episodes of bradycardia with hypoxia and apnea. ED team also noted R arm shaking with complete absence of L UE movement. Due to suspicion for status epilepticus, intubated-extubated. Transferred from MICU.    MSSA PNA:    PO Abx  ID/Pul eval appreciated    Seizure:    Neuro f/up  Cont with Keppra/Vimpat    Lt leg DVT:    Eliquis

## 2022-05-13 ENCOUNTER — TRANSCRIPTION ENCOUNTER (OUTPATIENT)
Age: 67
End: 2022-05-13

## 2022-05-13 VITALS
TEMPERATURE: 98 F | SYSTOLIC BLOOD PRESSURE: 150 MMHG | HEART RATE: 73 BPM | RESPIRATION RATE: 18 BRPM | DIASTOLIC BLOOD PRESSURE: 89 MMHG | OXYGEN SATURATION: 97 %

## 2022-05-13 LAB
ANION GAP SERPL CALC-SCNC: 15 MMOL/L — SIGNIFICANT CHANGE UP (ref 5–17)
BUN SERPL-MCNC: 11 MG/DL — SIGNIFICANT CHANGE UP (ref 7–23)
CALCIUM SERPL-MCNC: 9.4 MG/DL — SIGNIFICANT CHANGE UP (ref 8.4–10.5)
CHLORIDE SERPL-SCNC: 107 MMOL/L — SIGNIFICANT CHANGE UP (ref 96–108)
CO2 SERPL-SCNC: 22 MMOL/L — SIGNIFICANT CHANGE UP (ref 22–31)
CREAT SERPL-MCNC: 0.97 MG/DL — SIGNIFICANT CHANGE UP (ref 0.5–1.3)
CULTURE RESULTS: SIGNIFICANT CHANGE UP
CULTURE RESULTS: SIGNIFICANT CHANGE UP
EGFR: 86 ML/MIN/1.73M2 — SIGNIFICANT CHANGE UP
GLUCOSE SERPL-MCNC: 102 MG/DL — HIGH (ref 70–99)
HCT VFR BLD CALC: 39.2 % — SIGNIFICANT CHANGE UP (ref 39–50)
HGB BLD-MCNC: 12.5 G/DL — LOW (ref 13–17)
MCHC RBC-ENTMCNC: 28.8 PG — SIGNIFICANT CHANGE UP (ref 27–34)
MCHC RBC-ENTMCNC: 31.9 GM/DL — LOW (ref 32–36)
MCV RBC AUTO: 90.3 FL — SIGNIFICANT CHANGE UP (ref 80–100)
NRBC # BLD: 0 /100 WBCS — SIGNIFICANT CHANGE UP (ref 0–0)
PLATELET # BLD AUTO: 461 K/UL — HIGH (ref 150–400)
POTASSIUM SERPL-MCNC: 3.6 MMOL/L — SIGNIFICANT CHANGE UP (ref 3.5–5.3)
POTASSIUM SERPL-SCNC: 3.6 MMOL/L — SIGNIFICANT CHANGE UP (ref 3.5–5.3)
RBC # BLD: 4.34 M/UL — SIGNIFICANT CHANGE UP (ref 4.2–5.8)
RBC # FLD: 15.2 % — HIGH (ref 10.3–14.5)
SARS-COV-2 RNA SPEC QL NAA+PROBE: SIGNIFICANT CHANGE UP
SODIUM SERPL-SCNC: 144 MMOL/L — SIGNIFICANT CHANGE UP (ref 135–145)
SPECIMEN SOURCE: SIGNIFICANT CHANGE UP
SPECIMEN SOURCE: SIGNIFICANT CHANGE UP
WBC # BLD: 9.62 K/UL — SIGNIFICANT CHANGE UP (ref 3.8–10.5)
WBC # FLD AUTO: 9.62 K/UL — SIGNIFICANT CHANGE UP (ref 3.8–10.5)

## 2022-05-13 PROCEDURE — 84145 PROCALCITONIN (PCT): CPT

## 2022-05-13 PROCEDURE — 95714 VEEG EA 12-26 HR UNMNTR: CPT

## 2022-05-13 PROCEDURE — 85730 THROMBOPLASTIN TIME PARTIAL: CPT

## 2022-05-13 PROCEDURE — 71045 X-RAY EXAM CHEST 1 VIEW: CPT

## 2022-05-13 PROCEDURE — 85610 PROTHROMBIN TIME: CPT

## 2022-05-13 PROCEDURE — 93970 EXTREMITY STUDY: CPT

## 2022-05-13 PROCEDURE — 87150 DNA/RNA AMPLIFIED PROBE: CPT

## 2022-05-13 PROCEDURE — 84295 ASSAY OF SERUM SODIUM: CPT

## 2022-05-13 PROCEDURE — U0003: CPT

## 2022-05-13 PROCEDURE — 73552 X-RAY EXAM OF FEMUR 2/>: CPT

## 2022-05-13 PROCEDURE — 84100 ASSAY OF PHOSPHORUS: CPT

## 2022-05-13 PROCEDURE — C8929: CPT

## 2022-05-13 PROCEDURE — 73502 X-RAY EXAM HIP UNI 2-3 VIEWS: CPT

## 2022-05-13 PROCEDURE — 82550 ASSAY OF CK (CPK): CPT

## 2022-05-13 PROCEDURE — 84132 ASSAY OF SERUM POTASSIUM: CPT

## 2022-05-13 PROCEDURE — 82803 BLOOD GASES ANY COMBINATION: CPT

## 2022-05-13 PROCEDURE — 85018 HEMOGLOBIN: CPT

## 2022-05-13 PROCEDURE — 87641 MR-STAPH DNA AMP PROBE: CPT

## 2022-05-13 PROCEDURE — 72125 CT NECK SPINE W/O DYE: CPT | Mod: MA

## 2022-05-13 PROCEDURE — 87640 STAPH A DNA AMP PROBE: CPT

## 2022-05-13 PROCEDURE — 95711 VEEG 2-12 HR UNMONITORED: CPT

## 2022-05-13 PROCEDURE — 82010 KETONE BODYS QUAN: CPT

## 2022-05-13 PROCEDURE — U0005: CPT

## 2022-05-13 PROCEDURE — 82565 ASSAY OF CREATININE: CPT

## 2022-05-13 PROCEDURE — 97161 PT EVAL LOW COMPLEX 20 MIN: CPT

## 2022-05-13 PROCEDURE — C9254: CPT

## 2022-05-13 PROCEDURE — 83690 ASSAY OF LIPASE: CPT

## 2022-05-13 PROCEDURE — 83605 ASSAY OF LACTIC ACID: CPT

## 2022-05-13 PROCEDURE — 96374 THER/PROPH/DIAG INJ IV PUSH: CPT

## 2022-05-13 PROCEDURE — 87070 CULTURE OTHR SPECIMN AEROBIC: CPT

## 2022-05-13 PROCEDURE — 87186 SC STD MICRODIL/AGAR DIL: CPT

## 2022-05-13 PROCEDURE — 82947 ASSAY GLUCOSE BLOOD QUANT: CPT

## 2022-05-13 PROCEDURE — 85025 COMPLETE CBC W/AUTO DIFF WBC: CPT

## 2022-05-13 PROCEDURE — 80307 DRUG TEST PRSMV CHEM ANLYZR: CPT

## 2022-05-13 PROCEDURE — 87040 BLOOD CULTURE FOR BACTERIA: CPT

## 2022-05-13 PROCEDURE — 93005 ELECTROCARDIOGRAM TRACING: CPT

## 2022-05-13 PROCEDURE — 87449 NOS EACH ORGANISM AG IA: CPT

## 2022-05-13 PROCEDURE — 85027 COMPLETE CBC AUTOMATED: CPT

## 2022-05-13 PROCEDURE — 94003 VENT MGMT INPAT SUBQ DAY: CPT

## 2022-05-13 PROCEDURE — 87077 CULTURE AEROBIC IDENTIFY: CPT

## 2022-05-13 PROCEDURE — 36415 COLL VENOUS BLD VENIPUNCTURE: CPT

## 2022-05-13 PROCEDURE — 99285 EMERGENCY DEPT VISIT HI MDM: CPT

## 2022-05-13 PROCEDURE — 80053 COMPREHEN METABOLIC PANEL: CPT

## 2022-05-13 PROCEDURE — 95700 EEG CONT REC W/VID EEG TECH: CPT

## 2022-05-13 PROCEDURE — 70450 CT HEAD/BRAIN W/O DYE: CPT | Mod: MA

## 2022-05-13 PROCEDURE — 84146 ASSAY OF PROLACTIN: CPT

## 2022-05-13 PROCEDURE — 82435 ASSAY OF BLOOD CHLORIDE: CPT

## 2022-05-13 PROCEDURE — 82330 ASSAY OF CALCIUM: CPT

## 2022-05-13 PROCEDURE — 82962 GLUCOSE BLOOD TEST: CPT

## 2022-05-13 PROCEDURE — 80048 BASIC METABOLIC PNL TOTAL CA: CPT

## 2022-05-13 PROCEDURE — 94002 VENT MGMT INPAT INIT DAY: CPT

## 2022-05-13 PROCEDURE — 81001 URINALYSIS AUTO W/SCOPE: CPT

## 2022-05-13 PROCEDURE — 87637 SARSCOV2&INF A&B&RSV AMP PRB: CPT

## 2022-05-13 PROCEDURE — 83735 ASSAY OF MAGNESIUM: CPT

## 2022-05-13 PROCEDURE — 96375 TX/PRO/DX INJ NEW DRUG ADDON: CPT

## 2022-05-13 PROCEDURE — 85014 HEMATOCRIT: CPT

## 2022-05-13 PROCEDURE — 94640 AIRWAY INHALATION TREATMENT: CPT

## 2022-05-13 PROCEDURE — 80202 ASSAY OF VANCOMYCIN: CPT

## 2022-05-13 RX ORDER — APIXABAN 2.5 MG/1
10 TABLET, FILM COATED ORAL EVERY 12 HOURS
Refills: 0 | Status: DISCONTINUED | OUTPATIENT
Start: 2022-05-13 | End: 2022-05-13

## 2022-05-13 RX ORDER — LEVETIRACETAM 250 MG/1
1 TABLET, FILM COATED ORAL
Qty: 60 | Refills: 0
Start: 2022-05-13 | End: 2022-06-11

## 2022-05-13 RX ORDER — APIXABAN 2.5 MG/1
2 TABLET, FILM COATED ORAL
Qty: 120 | Refills: 0
Start: 2022-05-13 | End: 2022-06-11

## 2022-05-13 RX ORDER — LACOSAMIDE 50 MG/1
1 TABLET ORAL
Qty: 60 | Refills: 0
Start: 2022-05-13 | End: 2022-06-11

## 2022-05-13 RX ORDER — CEFPODOXIME PROXETIL 100 MG
1 TABLET ORAL
Qty: 6 | Refills: 0
Start: 2022-05-13 | End: 2022-05-15

## 2022-05-13 RX ADMIN — Medication 1 DROP(S): at 06:21

## 2022-05-13 RX ADMIN — Medication 81 MILLIGRAM(S): at 11:18

## 2022-05-13 RX ADMIN — Medication 375 MILLIGRAM(S): at 06:21

## 2022-05-13 RX ADMIN — Medication 200 MILLIGRAM(S): at 06:24

## 2022-05-13 RX ADMIN — Medication 5 MILLIGRAM(S): at 06:21

## 2022-05-13 RX ADMIN — Medication 500 MILLIGRAM(S): at 11:18

## 2022-05-13 RX ADMIN — LEVETIRACETAM 1000 MILLIGRAM(S): 250 TABLET, FILM COATED ORAL at 06:21

## 2022-05-13 RX ADMIN — Medication 20 MILLIGRAM(S): at 11:18

## 2022-05-13 RX ADMIN — Medication 650 MILLIGRAM(S): at 06:21

## 2022-05-13 RX ADMIN — Medication 1 TABLET(S): at 11:18

## 2022-05-13 RX ADMIN — AMLODIPINE BESYLATE 10 MILLIGRAM(S): 2.5 TABLET ORAL at 06:21

## 2022-05-13 RX ADMIN — APIXABAN 2.5 MILLIGRAM(S): 2.5 TABLET, FILM COATED ORAL at 06:21

## 2022-05-13 RX ADMIN — Medication 25 MILLIGRAM(S): at 10:10

## 2022-05-13 RX ADMIN — LACOSAMIDE 150 MILLIGRAM(S): 50 TABLET ORAL at 06:24

## 2022-05-13 NOTE — DISCHARGE NOTE PROVIDER - NSDCCAREPROVSEEN_GEN_ALL_CORE_FT
Maren, Jaciel Scott, Karthikeyan Fitzpatrick, Karthikeyan Ocampo, Bart Scott, Nancy Valle, Haritha Luna, Donell Almaguer

## 2022-05-13 NOTE — DISCHARGE NOTE PROVIDER - CARE PROVIDER_API CALL
Jaciel Engel)  Internal Medicine  [facility], [address] [place] [address]  Phone: [phone]  Fax: [phone]  Follow Up Time: 2 weeks

## 2022-05-13 NOTE — PROGRESS NOTE ADULT - PROVIDER SPECIALTY LIST ADULT
MICU
Neurology
Orthopedics
Pulmonology
Internal Medicine
Internal Medicine
MICU
MICU
Pulmonology
Infectious Disease
Neurology
Cardiology
Infectious Disease
MICU
Orthopedics
Cardiology
Cardiology

## 2022-05-13 NOTE — DISCHARGE NOTE PROVIDER - NSDCMRMEDTOKEN_GEN_ALL_CORE_FT
acetaminophen 325 mg oral tablet: 3 tab(s) orally every 8 hours, As Needed  aspirin 81 mg oral delayed release tablet: 1 tab(s) orally once a day  atorvastatin 20 mg oral tablet: 1 tab(s) orally once a day (at bedtime)  baclofen 20 mg oral tablet: 1 tab(s) orally 2 times a day  docusate sodium 100 mg oral capsule: 1 cap(s) orally 3 times a day  Fleet Bisacodyl 10 mg rectal enema: 30 milligram(s) rectal once a day (at bedtime)  Flomax 0.4 mg oral capsule: 2 cap(s) orally once a day  gabapentin 100 mg oral capsule: 1 cap(s) orally 2 times a day  gemfibrozil 600 mg oral tablet: 1 tab(s) orally 2 times a day (before meals)  lacosamide 150 mg oral tablet: 1 tab(s) orally 2 times a day MDD:300 mg  metoprolol tartrate 25 mg oral tablet: 1 tab(s) orally 2 times a day  morphine 15 mg/8 to 12 hr oral tablet, extended release: 1 tab(s) orally every 8 hours, As Needed  Multiple Vitamins oral tablet: 1 tab(s) orally once a day  nystatin 100,000 units/g topical powder: Apply topically to affected area 2 times a day on groin area with soap and water and pat dry and apply powder  pregabalin 150 mg oral capsule: 1 cap(s) orally once a day  rivaroxaban 10 mg oral tablet: 1 tab(s) orally once a day  senna oral tablet: 2 tab(s) orally once a day (at bedtime)  Slow Fe (as elemental iron) 45 mg oral tablet, extended release: 1 tab(s) orally once a day   acetaminophen 325 mg oral tablet: 3 tab(s) orally every 8 hours, As Needed  aspirin 81 mg oral delayed release tablet: 1 tab(s) orally once a day  atorvastatin 20 mg oral tablet: 1 tab(s) orally once a day (at bedtime)  baclofen 20 mg oral tablet: 1 tab(s) orally 2 times a day  cefpodoxime 200 mg oral tablet: 1 tab(s) orally 2 times a day  docusate sodium 100 mg oral capsule: 1 cap(s) orally 3 times a day  Eliquis 5 mg oral tablet: 2 tab(s) orally every 12 hours x 7 days  then 1 tab every 12 hours daily  Fleet Bisacodyl 10 mg rectal enema: 30 milligram(s) rectal once a day (at bedtime)  Flomax 0.4 mg oral capsule: 2 cap(s) orally once a day  gabapentin 100 mg oral capsule: 1 cap(s) orally 2 times a day  gemfibrozil 600 mg oral tablet: 1 tab(s) orally 2 times a day (before meals)  lacosamide 150 mg oral tablet: 1 tab(s) orally 2 times a day MDD:300 mg  levETIRAcetam 1000 mg oral tablet: 1 tab(s) orally 2 times a day  metoprolol tartrate 25 mg oral tablet: 1 tab(s) orally 2 times a day  morphine 15 mg/8 to 12 hr oral tablet, extended release: 1 tab(s) orally every 8 hours, As Needed  Multiple Vitamins oral tablet: 1 tab(s) orally once a day  nystatin 100,000 units/g topical powder: Apply topically to affected area 2 times a day on groin area with soap and water and pat dry and apply powder  pregabalin 150 mg oral capsule: 1 cap(s) orally once a day  rivaroxaban 10 mg oral tablet: 1 tab(s) orally once a day  senna oral tablet: 2 tab(s) orally once a day (at bedtime)  Slow Fe (as elemental iron) 45 mg oral tablet, extended release: 1 tab(s) orally once a day   aspirin 81 mg oral delayed release tablet: 1 tab(s) orally once a day  atorvastatin 20 mg oral tablet: 1 tab(s) orally once a day (at bedtime)  baclofen 20 mg oral tablet: 1 tab(s) orally 2 times a day  cefpodoxime 200 mg oral tablet: 1 tab(s) orally 2 times a day  docusate sodium 100 mg oral capsule: 1 cap(s) orally 3 times a day  Eliquis 5 mg oral tablet: 2 tab(s) orally every 12 hours x 7 days  then 1 tab every 12 hours daily  Fleet Bisacodyl 10 mg rectal enema: 30 milligram(s) rectal once a day (at bedtime)  Flomax 0.4 mg oral capsule: 2 cap(s) orally once a day  gabapentin 100 mg oral capsule: 1 cap(s) orally 2 times a day  gemfibrozil 600 mg oral tablet: 1 tab(s) orally 2 times a day (before meals)  lacosamide 150 mg oral tablet: 1 tab(s) orally 2 times a day MDD:300 mg  levETIRAcetam 1000 mg oral tablet: 1 tab(s) orally 2 times a day  metoprolol tartrate 25 mg oral tablet: 1 tab(s) orally 2 times a day  Multiple Vitamins oral tablet: 1 tab(s) orally once a day  nystatin 100,000 units/g topical powder: Apply topically to affected area 2 times a day on groin area with soap and water and pat dry and apply powder  pregabalin 150 mg oral capsule: 1 cap(s) orally once a day  senna oral tablet: 2 tab(s) orally once a day (at bedtime)  Slow Fe (as elemental iron) 45 mg oral tablet, extended release: 1 tab(s) orally once a day

## 2022-05-13 NOTE — PROGRESS NOTE ADULT - SUBJECTIVE AND OBJECTIVE BOX
Subjective: Patient seen and examined. No new events except as noted.     REVIEW OF SYSTEMS:    CONSTITUTIONAL: No weakness, fevers or chills  EYES/ENT: No visual changes;  No vertigo or throat pain   NECK: No pain or stiffness  RESPIRATORY: No cough, wheezing, hemoptysis; No shortness of breath  CARDIOVASCULAR: No chest pain or palpitations  GASTROINTESTINAL: No abdominal or epigastric pain. No nausea, vomiting, or hematemesis; No diarrhea or constipation. No melena or hematochezia.  GENITOURINARY: No dysuria, frequency or hematuria  NEUROLOGICAL: No numbness or weakness  SKIN: No itching, burning, rashes, or lesions   All other review of systems is negative unless indicated above.    MEDICATIONS:  MEDICATIONS  (STANDING):  acetaminophen     Tablet .. 650 milliGRAM(s) Oral every 8 hours  amLODIPine   Tablet 10 milliGRAM(s) Oral daily  apixaban 10 milliGRAM(s) Oral every 12 hours  ascorbic acid 500 milliGRAM(s) Oral daily  aspirin  chewable 81 milliGRAM(s) Oral daily  baclofen 20 milliGRAM(s) Oral daily  cefpodoxime 200 milliGRAM(s) Oral two times a day  chlorhexidine 4% Liquid 1 Application(s) Topical <User Schedule>  dexamethasone/neomycin/polymyxin Suspension 1 Drop(s) Both EYES two times a day  lacosamide 150 milliGRAM(s) Oral two times a day  levETIRAcetam 1000 milliGRAM(s) Oral two times a day  metoprolol tartrate 25 milliGRAM(s) Oral two times a day  multivitamin 1 Tablet(s) Oral daily  naproxen 375 milliGRAM(s) Oral two times a day      PHYSICAL EXAM:  T(C): 36.4 (05-13-22 @ 04:50), Max: 36.7 (05-12-22 @ 21:28)  HR: 69 (05-13-22 @ 04:50) (64 - 74)  BP: 160/72 (05-13-22 @ 05:58) (145/67 - 173/99)  RR: 18 (05-13-22 @ 04:50) (18 - 18)  SpO2: 95% (05-13-22 @ 04:50) (95% - 97%)  Wt(kg): --  I&O's Summary    12 May 2022 07:01  -  13 May 2022 07:00  --------------------------------------------------------  IN: 1040 mL / OUT: 0 mL / NET: 1040 mL          Appearance: Normal	  HEENT:   Normal oral mucosa, PERRL, EOMI	  Lymphatic: No lymphadenopathy , no edema  Cardiovascular: Normal S1 S2, No JVD, No murmurs , Peripheral pulses palpable 2+ bilaterally  Respiratory: Lungs clear to auscultation, normal effort 	  Gastrointestinal:  Soft, Non-tender, + BS	  Skin: No rashes, No ecchymoses, No cyanosis, warm to touch  Musculoskeletal: Normal range of motion, normal strength  Psychiatry:  Mood & affect appropriate  Ext: No edema      LABS:    CARDIAC MARKERS:                                11.5   7.86  )-----------( 409      ( 12 May 2022 07:11 )             36.4     05-12    143  |  105  |  8   ----------------------------<  93  4.0   |  24  |  1.02    Ca    9.3      12 May 2022 07:11    TPro  6.7  /  Alb  3.5  /  TBili  0.2  /  DBili  x   /  AST  37  /  ALT  33  /  AlkPhos  121<H>  05-12    proBNP:   Lipid Profile:   HgA1c:   TSH:             TELEMETRY: 	    ECG:  	  RADIOLOGY: < from: [facility] Duplex Lower Ext Vein Scan, Krys (05.12.22 @ 14:15) >    ACC: [id number] EXAM:  DUPLEX SCAN EXT VEINS LOWER BI                          PROCEDURE DATE:  05/12/2022          INTERPRETATION:  CLINICAL INFORMATION: Fever. Status post fall with   fracture.    COMPARISON: 2/9/2015    TECHNIQUE: Duplex sonography of the BILATERAL LOWER extremity veins with   color and spectral Doppler, with and without compression.    FINDINGS:    RIGHT:  Normal compressibility of the RIGHT common femoral, femoral and popliteal   veins.  Doppler examination shows normal spontaneous and phasic flow.  No RIGHT calf vein thrombosis is detected.    LEFT:  Normal compressibility of the LEFT common femoral and popliteal veins.  Doppler examination shows normal spontaneous and phasic flow.  There is partially occlusive thrombuswithin the mid and distal segments   of the left femoral vein. Partial compression is appreciated.    No LEFT calf vein thrombosis is detected.    IMPRESSION:    Partially occlusive deep venous thrombosis above the left knee within the   left femoralvein. Results were given to KAUR Brooke with read   back.    No evidence of deep venous thrombosis in the right lower extremity.          --- End of Report ---            STEVEN SHELL MD; Attending Radiologist  This document has been electronically signed. May 12 2022  2:37PM    < end of copied text >    DIAGNOSTIC TESTING:  [ ] Echocardiogram:  [ ]  Catheterization:  [ ] Stress Test:    OTHER:

## 2022-05-13 NOTE — PROGRESS NOTE ADULT - ASSESSMENT
ASSESSMENT:    66 year old gentleman, former smoker; without history of intrinsic lung disease. 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 He has a known right frontotemporal epileptogenic focus maintained on Vimpat. The patient was admitted on May 6th following a fall in rehab with fluctuating mental status between somnolent and alert. His breathing was concerning for possible Cheyne-Joel respirations versus Kussmaul's pattern breathing. In the ER, the patient had episodes of bradycardia, apnea and hypoxemia with evidence of right arm shaking and absence of movement of the left upper extremity. He was intubated for airway protection due to concerns of status epilepticus and admitted to the ICU. ICU course notable for hypertension, fever and right upper extremity twitching while the patient was conscious (felt not to represent seizure activity). Deep sputum cultures -> Staph aureus (MSSA). EEG without evidence of seizures. CT head and cervical spine without acute pathology following the fall -> cervical collar removed. Orthopedic evaluation -> no evidence of surgical site infection -> WBAT. The patient was extubated on 5/8 and now is transferred to the medical [facility] for further care. He is awake and alert. He has no shortness of breath or hypoxemia on room air. His cough with sputum production following extubation has resolved. He has no chest congestion or wheeze. He has no fevers, chills or sweats. No chest pain/pressure or palpitations. His appetite is good and he is tolerating a regular diet. His back pain is well controlled on baclofen (now off narcotics). His eyes are irritated.     the patient is doing well following intubation for airway protection in the setting of status epilepticus - he has had no further seizures on Vimpat and Keppra - respiratory status is stable on room air -> he has no evidence of bronchospasm, pulmonary edema or pleural effusions - MSSA in sputum culture is likely due to "bronchitis" rather than pneumonia with clear lung fields on CXR - he is without dysphagia and tolerating a regular diet - his recent right femur periprosthetic fracture repair in March 2022 is healing well and without evidence of infection    PLAN/RECOMMENDATIONS:    stable oxygenation on room air  Duplex -> partially occlusive deep venous thrombosis above the left knee within the left femoral vein -> would treat with full dose Eliquis (10mg 2 times daily x 7 days then 5mg 2 times daily for 3 months and until fully ambulatory) - follow-up Duplex before stopping A/C  no indication for systemic steroids or pulmonary medications  complete a 5 day course of antibiotics for MSSA bronchitis -> on cefpodoxime 200mg 2 times daily until 5/14  cardiac meds: ASA/norvasc/metoprolol  neurology follow-up noted - Keppra/Vimpat  analgesics: baclofen 20mg qAM/tylenol 3 times daily/naprosyn 2 times daily  steroid/antibiotics eye drops x 5 days  regular diet  physical therapy - WBAT    Will follow with you. Plan of care discussed with the patient at bedside and with Dr. Engel. No pulmonary objection to discharge.    Siddharth Hansen MD, [facility]  [phone]  Pulmonary Medicine     ASSESSMENT:    66 year old gentleman, former smoker; without history of intrinsic lung disease. 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 He has a known right frontotemporal epileptogenic focus maintained on Vimpat. The patient was admitted on May 6th following a fall in rehab with fluctuating mental status between somnolent and alert. His breathing was concerning for possible Cheyne-Joel respirations versus Kussmaul's pattern breathing. In the ER, the patient had episodes of bradycardia, apnea and hypoxemia with evidence of right arm shaking and absence of movement of the left upper extremity. He was intubated for airway protection due to concerns of status epilepticus and admitted to the ICU. ICU course notable for hypertension, fever and right upper extremity twitching while the patient was conscious (felt not to represent seizure activity). Deep sputum cultures -> Staph aureus (MSSA). EEG without evidence of seizures. CT head and cervical spine without acute pathology following the fall -> cervical collar removed. Orthopedic evaluation -> no evidence of surgical site infection -> WBAT. The patient was extubated on 5/8 and now is transferred to the medical [facility] for further care. He is awake and alert. He has no shortness of breath or hypoxemia on room air. His cough with sputum production following extubation has resolved. He has no chest congestion or wheeze. He has no fevers, chills or sweats. No chest pain/pressure or palpitations. His appetite is good and he is tolerating a regular diet. His back pain is well controlled on baclofen (now off narcotics). His eyes are irritated.     the patient is doing well following intubation for airway protection in the setting of status epilepticus - he has had no further seizures on Vimpat and Keppra - respiratory status is stable on room air -> he has no evidence of bronchospasm, pulmonary edema or pleural effusions - MSSA in sputum culture is likely due to "bronchitis" rather than pneumonia with clear lung fields on CXR - he is without dysphagia and tolerating a regular diet - his recent right femur periprosthetic fracture repair in March 2022 is healing well and without evidence of infection    PLAN/RECOMMENDATIONS:    stable oxygenation on room air  Duplex -> partially occlusive deep venous thrombosis above the left knee within the left femoral vein -> would treat with full dose Eliquis (10mg 2 times daily x 7 days then 5mg 2 times daily for 3 months and until fully ambulatory) - follow-up Duplex before stopping A/C  no indication for systemic steroids or pulmonary medications  complete a 5 day course of antibiotics for MSSA bronchitis -> on cefpodoxime 200mg 2 times daily until 5/14  cardiac meds: ASA/norvasc/metoprolol  neurology follow-up noted - Keppra/Vimpat  analgesics: baclofen 20mg qAM/tylenol 3 times daily/naprosyn 2 times daily  steroid/antibiotics eye drops x 5 days  regular diet  physical therapy - WBAT    Will follow with you. Plan of care discussed with the patient at bedside and with Dr. Engel. No pulmonary objection to discharge.    The patient's respiratory status remains stable. I will be covering the service this weekend. Please call [phone] over the weekend with questions or clinical changes.      Siddharth Hansen MD, [facility]  [phone]  Pulmonary Medicine

## 2022-05-13 NOTE — PROGRESS NOTE ADULT - PROBLEM SELECTOR PLAN 1
found down in nursing home    - recent admission for seizure  thought to be in status on arrival in ED    - required intubated and ICU admission  vEEG - no seizures  AEDs as ordered  neuro checks  Put on tele while in hospital
found down in nursing home    - recent admission for seizure  thought to be in status on arrival in ED    - required intubated and ICU admission  vEEG - no seizures  AEDs as ordered  tele while in hospital  neuro checks
found down in nursing home    - recent admission for seizure  thought to be in status on arrival in ED    - required intubated and ICU admission  vEEG - no seizures  AEDs as ordered  tele while in hospital  neuro checks

## 2022-05-13 NOTE — PROGRESS NOTE ADULT - REASON FOR ADMISSION
status epilepticus/AMS

## 2022-05-13 NOTE — PROGRESS NOTE ADULT - SUBJECTIVE AND OBJECTIVE BOX
[facility] - PROGRESS NOTE    CHIEF COMPLAINT: acute hypoxic respiratory failure; sepsis syndrome; staph aureus pneumonia; status epilepticus; alteration in mental status; T    INTERVAL HISTORY: screening lower extremity Duplex -> above knee LLE DVT -> eliquis started; awake and alert; improved back pain on his usual dose of baclofen and ATC tylenol and naprosyn; stood up with physical therapy yesterday; no shortness of breath or hypoxemia on room air; no cough, sputum production, hemoptysis, chest congestion or wheeze; no fevers, chills or sweats; no chest pain/pressure or palpitations; improved eye irritation on steroid/antibiotic drops;     REVIEW OF SYSTEMS:  Constitutional: As per interval history  HEENT: conjunctivitis  CV: As per interval history  Resp: As per interval history  GI: Within normal limits   : Within normal limits  Musculoskeletal: multiple orthopedic procedures - chronic back pain  Skin: Within normal limits  Neurological: seizure disorder  Psychiatric: Within normal limits  Endocrine: Within normal limits  Hematologic/Lymphatic: Within normal limits  Allergic/Immunologic: Within normal limits    MEDICATIONS:     Pulmonary "    Anti-microbials:  cefpodoxime 200 milliGRAM(s) Oral two times a day    Cardiovascular:  amLODIPine   Tablet 10 milliGRAM(s) Oral daily  metoprolol tartrate 25 milliGRAM(s) Oral two times a day    Other:  acetaminophen     Tablet .. 650 milliGRAM(s) Oral every 8 hours  apixaban 10 milliGRAM(s) Oral every 12 hours  ascorbic acid 500 milliGRAM(s) Oral daily  aspirin  chewable 81 milliGRAM(s) Oral daily  baclofen 20 milliGRAM(s) Oral daily  chlorhexidine 4% Liquid 1 Application(s) Topical <User Schedule>  dexamethasone/neomycin/polymyxin Suspension 1 Drop(s) Both EYES two times a day  lacosamide 150 milliGRAM(s) Oral two times a day  levETIRAcetam 1000 milliGRAM(s) Oral two times a day  multivitamin 1 Tablet(s) Oral daily  naproxen 375 milliGRAM(s) Oral two times a day    MEDICATIONS  (PRN):  baclofen 5 milliGRAM(s) Oral every 12 hours PRN Muscle Spasm  simethicone 80 milliGRAM(s) Chew every 6 hours PRN Indigestion    OBJECTIVE:    POCT Blood Glucose.: 111 mg/dL (12 May 2022 22:08)    PHYSICAL EXAM:       ICU Vital Signs Last 24 Hrs  T(C): 36.4 (13 May 2022 04:50), Max: 36.7 (12 May 2022 21:28)  T(F): 97.6 (13 May 2022 04:50), Max: 98.1 (13 May 2022 02:38)  HR: 69 (13 May 2022 04:50) (64 - 74)  BP: 173/99 (13 May 2022 04:50) (145/67 - 173/99)  BP(mean): --  ABP: --  ABP(mean): --  RR: 18 (13 May 2022 04:50) (18 - 18)  SpO2: 95% (13 May 2022 04:50) (95% - 97%) on room air    General: Awake. Alert. Cooperative. No distress. Appears stated age 	  HEENT:  Atraumatic. Normocephalic. Anicteric. Normal oral mucosa. PERRL. EOMI. Decreased  conjunctival erythema and injection  Neck: Supple. Trachea midline. Thyroid without enlargement/tenderness/nodules. No carotid bruit. No JVD.	  Cardiovascular: Regular rate and rhythm. S1 S2 normal. No murmurs, rubs or gallops.  Respiratory: Respirations unlabored. Clear to auscultation and percussion bilaterally. No curvature.  Abdomen: Soft. Non-tender. Non-distended. No organomegaly. No masses. Normal bowel sounds.  Extremities: Warm to touch. No clubbing or cyanosis. No pedal edema.  Pulses: 2+ peripheral pulses all extremities.	  Skin: Multiple back surgical scars  Lymph Nodes: Cervical, supraclavicular and axillary nodes normal  Neurological: Motor and sensory examination equal and normal. A and O x 3  Psychiatry: Appropriate mood and affect.    LABS:                          11.5   7.86  )-----------( 409      ( 12 May 2022 07:11 )             36.4     CBC    WBC  7.86 <==, 12.53 <==, 16.80 <==, 13.76 <==, 12.42 <==, 10.87 <==, 12.19 <==    Hemoglobin  11.5 <<==, 11.6 <<==, 11.2 <<==, 10.5 <<==, 10.3 <<==, 11.5 <<==, 11.9 <<==    Hematocrit  36.4 <==, 37.2 <==, 35.6 <==, 33.1 <==, 33.1 <==, 37.6 <==, 39.5 <==    Platelets  409 <==, 307 <==, 341 <==, 317 <==, 288 <==, 333 <==, 351 <==      143  |  105  |  8   ----------------------------<  93    05-12  4.0   |  24  |  1.02      LYTES    sodium  143 <==, 141 <==, 140 <==, 142 <==, 140 <==, 143 <==, 140 <==    potassium   4.0 <==, 4.0 <==, 4.5 <==, 3.5 <==, 3.1 <==, 3.5 <==, 3.3 <==    chloride  105 <==, 107 <==, 106 <==, 107 <==, 105 <==, 107 <==, 106 <==    carbon dioxide  24 <==, 21 <==, 20 <==, 22 <==, 20 <==, 21 <==, 19 <==    =============================================================================================  RENAL FUNCTION:    Creatinine:   1.02  <<==, 1.11  <<==, 1.06  <<==, 1.00  <<==, 1.16  <<==, 1.02  <<==, 0.99  <<==    BUN:   8 <==, 6 <==, 6 <==, 6 <==, 8 <==, 10 <==, 12 <==    ============================================================================================    calcium   9.3 <==, 8.9 <==, 9.0 <==, 8.8 <==, 8.5 <==, 9.0 <==, 8.7 <==    phos   3.0 <==, 3.0 <==, 2.4 <==, 2.6 <==, 2.4 <==, 2.5 <==, 2.2 <==    mag   2.1 <==, 2.2 <==, 2.4 <==, 1.6 <==, 1.7 <==, 1.6 <==, 1.7 <==    ============================================================================================  LFTs    AST:   37 <== , 45 <== , 58 <== , 29 <== , 20 <== , 17 <== , 17 <==     ALT:  33  <== , 45  <== , 39  <== , 24  <== , 15  <== , 15  <== , 12  <==     AP:  121  <=, 132  <=, 146  <=, 131  <=, 129  <=, 139  <=, 138  <=    Bili:  0.2  <=, 0.5  <=, 0.4  <=, 0.4  <=, 0.6  <=, 0.6  <=, 0.6  <=    PT/INR - ( 08 May 2022 02:11 )   PT: 14.6 sec;   INR: 1.26 ratio      < from: TTE with Doppler (w/Cont) (05.08.22 @ 07:28) >    Patient name: MANUEL PETERSON  YOB: 1955   Age: 66 (M)   MR#: [id number]  Study Date: 5/8/2022  Location: [address] [place], [place] [id number]: Jessica Garcia [facility]  Study quality: Technically difficult  Referring Physician: Kodi Crawford MD  Blood Pressure: 146/67 mmHg  Height: 173 cm  Weight: 114 kg  BSA: 2.3 m2  ------------------------------------------------------------------------  PROCEDURE: Transthoracic echocardiogram with 2-D, M-Mode  and complete spectral and color flow Doppler. Verbal  consent was obtained for injection of  Ultrasonic Enhancing  Agent following a discussion of risks and benefits.  Following intravenous injection of Ultrasonic Enhancing  Agent, harmonic imaging was performed.  INDICATION: Endocarditis, valve unspecified (I38)  ------------------------------------------------------------------------  Dimensions:    Normal Values:  LA:     3.0    2.0 - 4.0 cm  Ao:     3.2    2.0 - 3.8 cm  SEPTUM:        0.6 - 1.2 cm  PWT:           0.6 - 1.1 cm  LVIDd:     3.0 - 5.6 cm  LVIDs:         1.8 - 4.0 cm  EF (Pacheco Rule): 75 %  ------------------------------------------------------------------------  Observations:  Mitral Valve: Mitral valve not well visualized, probably  normal. Minimal mitral regurgitation.  Aortic Valve/Aorta: Calcified trileaflet aortic valve with  normal opening. No aortic valve regurgitation seen.  Aortic Root: 3.2 cm.  Left Atrium: Normal left atrium.  Left Ventricle:   Endocardial visualization enhanced with  intravenous injection of Ultrasonic Enhancing Agent  (Definity). Normal left ventricular systolic function. No  segmental wall motion abnormalities. Septal motion  consistent with conduction defect. Normal left ventricular  internal dimensions and wall thicknesses.Mild diastolic  dysfunction (Stage I).  Right Heart: Normal right atrium. The right ventricle is  not well visualized; grossly normal right ventricular  systolic function. Tricuspid valve not well visualized,  probably normal. No tricuspid regurgitation. Pulmonic valve  not well visualized, probably normal. No pulmonic  regurgitation.  Pericardium/Pleura: Normal pericardium with no pericardial  effusion.  Hemodynamic: Estimated right atrial pressure is 8 mm Hg.  ------------------------------------------------------------------------  Conclusions:  1. Mitral valve not well visualized, probably normal.  Minimal mitral regurgitation.  2. Calcified trileaflet aortic valve with normal opening.  No aortic valve regurgitation seen.  3.   Endocardial visualization enhanced with intravenous  injection of Ultrasonic Enhancing Agent (Definity). Normal  left ventricular systolic function. No segmental wall  motion abnormalities. Septal motion consistent with  conduction defect.  4. Mild diastolic dysfunction (Stage I).  5. The right ventricle is not well visualized; grossly  normal right ventricular systolic function.  6. No evidence of valvular vegetation is seen.  *** Compared with echocardiogram of 1/2/2015, the focal  outpouching is not seen.  ------------------------------------------------------------------------  Confirmed on  5/8/2022 - 11:02:55 by Mukesh Madison M.D.  ------------------------------------------------------------------------  ---------------------------------------------------------------------------------------------------------------    MICROBIOLOGY:     Flu With COVID-19 By MICHAEL (05.06.22 @ 06:01)   SARS-CoV-2 Result: [facility]  This Respiratory Panel uses polymerase chain reaction (PCR) to detect for   influenza A; influenza B; respiratory syncytial virus; and SARS-CoV-2.   This test was validated by [facility] and is in use under the FDA   Emergency Use Authorization (EUA) for clinical labs CLIA-certified to   perform high complexity testing. Test results should be correlated with   clinical presentation, patient history, and epidemiology.   Influenza A Result: [facility]   [facility]   Resp Syn Virus Result: [facility]     Culture - Blood (05.10.22 @ 00:15)   Specimen Source: .Blood Blood-Peripheral   Culture Results:   No growth to date.     Culture - Blood (05.10.22 @ 00:00)   Specimen Source: .Blood Blood-Peripheral   Culture Results:   No growth to date.     Culture - Blood (05.07.22 @ 20:00)   Specimen Source: .Blood Blood-Venous   Culture Results:   No growth to date.    Culture - Blood (05.07.22 @ 19:55)   Specimen Source: .Blood Blood-Peripheral   Culture Results:   No growth to date.     Culture - Blood (05.06.22 @ 18:55)   Specimen Source: .Blood Blood-Venous   Culture Results:   No growth to date.     Culture - Blood (05.06.22 @ 18:40)   - Staphylococcus epidermidis, Methicillin resistant: Detec   Culture - Sputum . (05.07.22 @ 21:38)   Culture Results:   Numerous Staphylococcus aureus   Normal Respiratory Lauren absent   Organism Identification: Staphylococcus aureus   Organism: Staphylococcus aureus   Specimen Source: ET Tube ET Tube     RADIOLOGY:  [x ] Chest radiographs reviewed and interpreted by me    EXAM:  DUPLEX SCAN EXT VEINS LOWER BI                          PROCEDURE DATE:  05/12/2022      FINDINGS:    RIGHT:  Normal compressibility of the RIGHT common femoral, femoral and popliteal   veins.  Doppler examination shows normal spontaneous and phasic flow.  No RIGHT calf vein thrombosis is detected.    LEFT:  Normal compressibility of the LEFT common femoral and popliteal veins.  Doppler examination shows normal spontaneous and phasic flow.  There is partially occlusive thrombuswithin the mid and distal segments   of the left femoral vein. Partial compression is appreciated.    No LEFT calf vein thrombosis is detected.    IMPRESSION:    Partially occlusive deep venous thrombosis above the left knee within the   left femoralvein. Results were given to KAUR Brooke with read   back.    No evidence of deep venous thrombosis in the right lower extremity.    STEVEN SHELL MD; Attending Radiologist  This document has been electronically signed. May 12 2022  2:37PM  ---------------------------------------------------------------------------------------------------------------  EXAM:  XR CHEST PORTABLE URGENT 1V                          PROCEDURE DATE:  05/06/2022      FINDINGS:    Endotracheal tube terminates above the gera.  Enteric tube side-port is in the stomach.  Clear lungs.  No pneumothorax or large pleural effusion.  Heart size within normal limits.  No acute osseous abnormality. Posterior spinal fusion hardware.    IMPRESSION:    Enteric tube side-port is in the stomach.    STEPHIE ROBERTSON MD; Resident Radiologist  This document has been electronically signed.  ELAINA SCHROEDER MD; Attending InterventionalRadiologist  This document has been electronically signed. May  7 2022 11:06AM  ---------------------------------------------------------------------------------------------------------------  EXAM:  XR CHEST PORTABLE URGENT 1V                          PROCEDURE DATE:  05/06/2022      INTERPRETATION:  INDICATION: Status post intubation    COMPARISON: Chest radiograph 4/28/2022    FINDINGS:  Heart/Vascular: The heart size cannot be accurately assessed on this   projection.  Pulmonary: The lower lung fields are not well visualized. The upper lung   fields are clear. Endotracheal tube tip at mid trachea and above the   gera.  Bones: Thoracic posterior spinal fusion hardware noted.    Impression:  Endotracheal tube tip at mid trachea above the gera.    TAMIE BARRAZA DO; Resident Radiologist  This document has been electronically signed.  ASHLEY KONG MD; Attending Radiologist  This document has been electronically signed. May  6 2022  8:16AM  ---------------------------------------------------------------------------------------------------------------  EXAM:  CT BRAIN                          PROCEDURE DATE:  05/06/2022      FINDINGS:    There is no evidence of mass or acute intracranial hemorrhage. Right   frontal lobe encephalomalacia and gliosis. 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 Ventricles and sulci are normal in size and   configuration for the patient's stated age. No midline shift or other   significant mass effect is noted. There is no CT evidence of acute   territorial infarct.    The visualized paranasal sinuses and tympanomastoid spaces are clear.   Orbits and orbital contents are unremarkable.    Right frontal temporal craniotomy.    IMPRESSION:    No evidence of acute intracranial hemorrhage, midline shift or CT   evidence of acute territorial infarct.    Postsurgical changes as described.    If the patient's symptoms persist, consider short interval follow-up head   CT or brain MRI if there are no MRI contraindications.    TAMIE BARRAZA DO; Resident Radiologist  This document has been electronically signed.  JASS HANNA MD; Attending Radiologist  This document has been electronically signed. 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 Stable intact more recently applied long   lateral femoral fracture fixation plate with fixation, transcondylar, and   interfragmentary screws.    Unchanged configuration of the repaired slightly comminuted distal   femoral diaphyseal fracture below prosthetic femoral stem tip including   residual minimal medial fracture margin cortical offset otherwise overall   anatomic alignment maintained.    Removed previously seen surgicalskin staples. Cleared previously seen   postsurgical soft tissue changes.    No dislocations or acute appearing fractures.    BELA ALARCON MD; Attending Radiologist  This document has been electronically signed. May 10 2022  9:48AM  ---------------------------------------------------------------------------------------------------------------

## 2022-05-13 NOTE — PROGRESS NOTE ADULT - ASSESSMENT
65 y/o M PMhx CAD s/p stent, HTN, HLD, s/p multiple lumbar spinal surgeries, R frontal craniotomy, clipping of R acomm aneurysm, s/p R prosthetic femur ORIF 3/30/21 who presented after unwitnessed fall in his nursing home w/ concern for possible status epilepticus s/p intubation, sedation now extubated and transferred to the floor    MSSA PNA, leukocytosis, fever  febrile to 100.4 on 5/7 and 101.3 on 5/9  s/p zosyn 5/8-5/10  currently pt w/o respiratory symptoms  leukocytosis resolved  cefadroxil not on formulary  c/w cefpodoxime 200mg bid w/ last day tomorrow    DVT  US LE- Partially occlusive deep venous thrombosis above the left knee within the left femoral vein.   on eliquis    blood culture contamination  blood cx 5/6 w/ MRSE in 1 set only  repeat blood cx NGTD  s/p vanc 5/8-5/9  contaminant, no need for further antibiotics    Fortino Fairchild M.D.  [facility], Division of Infectious Diseases  [phone]  After 5pm on weekdays and all day on weekends - please call [phone]

## 2022-05-13 NOTE — PROGRESS NOTE ADULT - PROBLEM SELECTOR PLAN 3
sputum + MSSA     - PO Vantin  continue to monitor.
sputum + MSSA     - PO Vantin  continue to monitor.
sputum + MSSA     - was on zosyn and vanc    - now on cefepime  continue to monitor.

## 2022-05-13 NOTE — DISCHARGE NOTE NURSING/CASE MANAGEMENT/SOCIAL WORK - NSDCPEFALRISK_GEN_ALL_CORE
For information on Fall & Injury Prevention, visit: [email or web address] OR  [email or web address] OR  https://www.cdc.gov/steadi/patient.html

## 2022-05-13 NOTE — DISCHARGE NOTE PROVIDER - HOSPITAL COURSE
66M with CAD s/p MI and stent, HTN, HLD, s/p 7 lumbar spinal surgeries. S/P R frontal craniotomy and clipping of R acomm aneurysm (1997). 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 On arrival, pt mental status was fluctuant between alert and somnolent. Breathing pattern concerning for possible jerel-busch v kussmaul pattern respirations. CT head performed in ED, followed by episodes of bradycardia with hypoxia and apnea. ED team also noted R arm shaking with complete absence of L UE movement. Due to suspicion for status epilepticus, intubated-extubated. Transferred from MICU.    MSSA PNA:    PO Abx  ID/Pul eval appreciated    Seizure:    Neuro f/up  Cont with Keppra/Vimpat    Lt leg DVT:    Eliquis

## 2022-05-13 NOTE — DISCHARGE NOTE NURSING/CASE MANAGEMENT/SOCIAL WORK - NSDCPEELIQUISDIET_GEN_ALL_CORE
Patient has been on 1 L nasal cannula since 1400 yesterday with oximetry mostly at 91%.  Still with mild wheeze intermitently and loose cough.  I ambulated with him yesterday, patient was very steady.     01/29/18 0625   Interdisciplinary Plan of Care-Goals (Indications)   Obstructive Ventilatory Defect or Pulmonary Disease without Obvious Obstruction History / Diagnosis;Physical Exam / Hyperinflation / Wheezing (bronchospasm)   RT Assessment of Delivered Medications   Evaluation of Medication Delivery Daily Yes-- Pt /Family has been Instructed in use of Respiratory Medications and Adverse Reactions   SVN Group   #SVN Performed Yes   Given By: Mouthpiece   Incentive Spirometry Group   Breathing Exercises Yes   Incentive Spirometer Volume 1750 mL   Chest Exam   Respiration 18   Pulse 91   Breath Sounds   Pre/Post Intervention (occ'l faint wheeze on only a few breaths)   RUL Breath Sounds Diminished   RML Breath Sounds Diminished   RLL Breath Sounds Diminished   SAURABH Breath Sounds Diminished   LLL Breath Sounds Diminished   Secretions   Cough Congested;Moist;Non Productive   Oxygen   Pulse Oximetry 91 %   O2 (LPM) 1   O2 Daily Delivery Respiratory  Silicone Nasal Cannula      Eat healthy foods you enjoy. Apixaban/Eliquis DOES NOT have a special diet. Limit your alcohol intake.

## 2022-05-13 NOTE — PROGRESS NOTE ADULT - PROBLEM SELECTOR PLAN 4
required levo initially on admission   c/w amlodipine 10mg PO daily   continue to monitor   TTE - EF 75%, minimal MR, mild diastolic dysfx.

## 2022-05-13 NOTE — PROGRESS NOTE ADULT - SUBJECTIVE AND OBJECTIVE BOX
[facility] [address], Division of Infectious Diseases  ROSANGELA Campos Y. Patel, S. Shah, G. Casimir  [phone]  ([phone] - weekdays after 5pm and weekends)    Name: MANUEL PETERSON  Age/Gender: 66y Male  MRN: [id number]    Interval History:  Patient seen this morning.   Notes reviewed.   No concerning overnight events.  Afebrile.   denies SOB    Allergies: No Known Allergies      Objective:  Vitals:   T(F): 97.9 (05-13-22 @ 09:02), Max: 98.1 (05-13-22 @ 02:38)  HR: 74 (05-13-22 @ 09:02) (64 - 74)  BP: 174/83 (05-13-22 @ 09:02) (149/81 - 174/83)  RR: 18 (05-13-22 @ 09:02) (18 - 18)  SpO2: 98% (05-13-22 @ 09:02) (95% - 98%)  Physical Examination:  General: no acute distress  HEENT: anicteric  Cardio: normal rate  Resp: clear to auscultation anteriorly  Abd: soft, ND  Ext: no LE edema  Skin: warm, dry, surgical scar on R leg well healed  Lines:    Laboratory Studies:  CBC:                       12.5   9.62  )-----------( 461      ( 13 May 2022 08:12 )             39.2     WBC Trend:  9.62 05-13-22 @ 08:12  7.86 05-12-22 @ 07:11  12.53 05-10-22 @ 11:53  16.80 05-10-22 @ 00:35  13.76 05-09-22 @ 02:23  12.42 05-08-22 @ 02:10  10.87 05-07-22 @ 02:38  12.19 05-06-22 @ 19:23    CMP: 05-13    144  |  107  |  11  ----------------------------<  102<H>  3.6   |  22  |  0.97    Ca    9.4      13 May 2022 08:12    TPro  6.7  /  Alb  3.5  /  TBili  0.2  /  DBili  x   /  AST  37  /  ALT  33  /  AlkPhos  121<H>  05-12      LIVER FUNCTIONS - ( 12 May 2022 07:11 )  Alb: 3.5 g/dL / Pro: 6.7 g/dL / ALK PHOS: 121 U/L / ALT: 33 U/L / AST: 37 U/L / GGT: x               Microbiology: reviewed     Culture - Blood (collected 05-10-22 @ 00:15)  Source: .Blood Blood-Peripheral  Preliminary Report (05-11-22 @ 03:01):    No growth to date.    Culture - Blood (collected 05-10-22 @ 00:00)  Source: .Blood Blood-Peripheral  Preliminary Report (05-11-22 @ 03:01):    No growth to date.    Culture - Sputum (collected 05-07-22 @ 21:38)  Source: ET Tube ET Tube  Gram Stain (05-08-22 @ 06:44):    Moderate polymorphonuclear leukocytes per low power field    Rare Squamous epithelial cells per low power field    Moderate Gram positive cocci in pairs seen per oil power field  Final Report (05-09-22 @ 18:04):    Numerous Staphylococcus aureus    Normal Respiratory Lauren absent  Organism: Staphylococcus aureus (05-09-22 @ 18:04)  Organism: Staphylococcus aureus (05-09-22 @ 18:04)      -  Ampicillin/Sulbactam: S <=8/4      -  Cefazolin: S <=4      -  Clindamycin: S <=0.25      -  Erythromycin: S <=0.25      -  Gentamicin: S <=1 Should not be used as monotherapy      -  Oxacillin: S <=0.25      -  Rifampin: S <=1 Should not be used as monotherapy      -  Tetra/Doxy: S <=1      -  Trimethoprim/Sulfamethoxazole: S <=0.5/9.5      -  Vancomycin: S 2      Method Type: ROD    Culture - Blood (collected 05-07-22 @ 20:00)  Source: .Blood Blood-Venous  Final Report (05-13-22 @ 01:01):    No Growth Final    Culture - Blood (collected 05-07-22 @ 19:55)  Source: .Blood Blood-Peripheral  Final Report (05-13-22 @ 01:01):    No Growth Final    Culture - Blood (collected 05-06-22 @ 18:55)  Source: .Blood Blood-Venous  Final Report (05-11-22 @ 23:01):    No Growth Final    Culture - Blood (collected 05-06-22 @ 18:40)  Source: .Blood Blood-Peripheral  Gram Stain (05-07-22 @ 18:47):    Growth in aerobic and anaerobic bottles: Gram Positive Cocci in Clusters  Final Report (05-08-22 @ 20:43):    Growth in aerobic and anaerobic bottles: Staphylococcus epidermidis    Coag Negative Staphylococcus    Single set isolate, possible contaminant. Contact    Microbiology if susceptibility testing clinically    indicated.    ***Blood Panel PCR results on this specimen are available    approximately 3 hours after the Gram stain result.***    Gram stain, PCR, and/or culture results may not always    correspond due to difference in methodologies.    ************************************************************    This PCR assay was performed by multiplex PCR. This    Assay tests for 66 bacterial and resistance gene targets.    Please refer to the [facility] test directory    at [email or web address] for details.  Organism: Blood Culture PCR (05-08-22 @ 20:43)  Organism: Blood Culture PCR (05-08-22 @ 20:43)      -  Staphylococcus epidermidis, Methicillin resistant: Detec      Method Type: PCR      Radiology: reviewed     Medications:  acetaminophen     Tablet .. 650 milliGRAM(s) Oral every 8 hours  amLODIPine   Tablet 10 milliGRAM(s) Oral daily  apixaban 10 milliGRAM(s) Oral every 12 hours  ascorbic acid 500 milliGRAM(s) Oral daily  aspirin  chewable 81 milliGRAM(s) Oral daily  baclofen 20 milliGRAM(s) Oral daily  baclofen 5 milliGRAM(s) Oral every 12 hours PRN  cefpodoxime 200 milliGRAM(s) Oral two times a day  chlorhexidine 4% Liquid 1 Application(s) Topical <User Schedule>  dexamethasone/neomycin/polymyxin Suspension 1 Drop(s) Both EYES two times a day  lacosamide 150 milliGRAM(s) Oral two times a day  levETIRAcetam 1000 milliGRAM(s) Oral two times a day  metoprolol tartrate 25 milliGRAM(s) Oral two times a day  multivitamin 1 Tablet(s) Oral daily  naproxen 375 milliGRAM(s) Oral two times a day  simethicone 80 milliGRAM(s) Chew every 6 hours PRN    Antimicrobials:  cefpodoxime 200 milliGRAM(s) Oral two times a day

## 2022-05-13 NOTE — PROGRESS NOTE ADULT - PROBLEM SELECTOR PLAN 5
5.5 weeks s/p ORIF of right femur periprosthetic fx  B/L LE dopplers with DVT   Started Eliquis   Outpt follow up   NWB LLE   pain management  ortho following.
5.5 weeks s/p ORIF of right femur periprosthetic fx  B/L LE dopplers as per ortho  NWB LLE   pain management  ortho following.
5.5 weeks s/p ORIF of right femur periprosthetic fx  B/L LE dopplers as per ortho  NWB LLE   pain management  ortho following.

## 2022-05-13 NOTE — DISCHARGE NOTE PROVIDER - NSDCCPCAREPLAN_GEN_ALL_CORE_FT
PRINCIPAL DISCHARGE DIAGNOSIS  Diagnosis: AMS (altered mental status)  Assessment and Plan of Treatment:

## 2022-05-13 NOTE — DISCHARGE NOTE NURSING/CASE MANAGEMENT/SOCIAL WORK - PATIENT PORTAL LINK FT
You can access the FollowMyHealth Patient Portal offered by [facility] by registering at the following website: [email or web address]. By joining [employer]’s FollowMyHealth portal, you will also be able to view your health information using other applications (apps) compatible with our system.

## 2022-05-15 LAB
CULTURE RESULTS: SIGNIFICANT CHANGE UP
CULTURE RESULTS: SIGNIFICANT CHANGE UP
SPECIMEN SOURCE: SIGNIFICANT CHANGE UP
SPECIMEN SOURCE: SIGNIFICANT CHANGE UP

## 2022-05-17 NOTE — PRE-ANESTHESIA EVALUATION ADULT - BSA (M2)
2.09 Rinvoq Counseling: I discussed with the patient the risks of Rinvoq therapy including but not limited to upper respiratory tract infections, shingles, cold sores, bronchitis, nausea, cough, fever, acne, and headache. Live vaccines should be avoided.  This medication has been linked to serious infections; higher rate of mortality; malignancy and lymphoproliferative disorders; major adverse cardiovascular events; thrombosis; thrombocytopenia, anemia, and neutropenia; lipid elevations; liver enzyme elevations; and gastrointestinal perforations.

## 2022-09-22 ENCOUNTER — INPATIENT (INPATIENT)
Facility: HOSPITAL | Age: 67
LOS: 4 days | Discharge: HOME HEALTH | End: 2022-09-27
Attending: INTERNAL MEDICINE | Admitting: INTERNAL MEDICINE

## 2022-09-22 VITALS
TEMPERATURE: 98 F | OXYGEN SATURATION: 99 % | RESPIRATION RATE: 16 BRPM | SYSTOLIC BLOOD PRESSURE: 122 MMHG | HEART RATE: 85 BPM | DIASTOLIC BLOOD PRESSURE: 70 MMHG

## 2022-09-22 DIAGNOSIS — Z98.89 OTHER SPECIFIED POSTPROCEDURAL STATES: Chronic | ICD-10-CM

## 2022-09-22 DIAGNOSIS — Z96.649 PRESENCE OF UNSPECIFIED ARTIFICIAL HIP JOINT: Chronic | ICD-10-CM

## 2022-09-22 DIAGNOSIS — M43.28 FUSION OF SPINE, SACRAL AND SACROCOCCYGEAL REGION: Chronic | ICD-10-CM

## 2022-09-22 LAB
ALBUMIN SERPL ELPH-MCNC: 4.4 G/DL — SIGNIFICANT CHANGE UP (ref 3.3–5)
ALP SERPL-CCNC: 130 U/L — HIGH (ref 40–120)
ALT FLD-CCNC: 19 U/L — SIGNIFICANT CHANGE UP (ref 4–41)
ANION GAP SERPL CALC-SCNC: 14 MMOL/L — SIGNIFICANT CHANGE UP (ref 7–14)
APPEARANCE UR: ABNORMAL
APTT BLD: 36.1 SEC — SIGNIFICANT CHANGE UP (ref 27–36.3)
AST SERPL-CCNC: 13 U/L — SIGNIFICANT CHANGE UP (ref 4–40)
B PERT DNA SPEC QL NAA+PROBE: SIGNIFICANT CHANGE UP
B PERT+PARAPERT DNA PNL SPEC NAA+PROBE: SIGNIFICANT CHANGE UP
BACTERIA # UR AUTO: ABNORMAL
BASE EXCESS BLDV CALC-SCNC: -2.3 MMOL/L — LOW (ref -2–3)
BASOPHILS # BLD AUTO: 0.06 K/UL — SIGNIFICANT CHANGE UP (ref 0–0.2)
BASOPHILS NFR BLD AUTO: 0.3 % — SIGNIFICANT CHANGE UP (ref 0–2)
BILIRUB SERPL-MCNC: 0.7 MG/DL — SIGNIFICANT CHANGE UP (ref 0.2–1.2)
BILIRUB UR-MCNC: NEGATIVE — SIGNIFICANT CHANGE UP
BLOOD GAS VENOUS COMPREHENSIVE RESULT: SIGNIFICANT CHANGE UP
BORDETELLA PARAPERTUSSIS (RAPRVP): SIGNIFICANT CHANGE UP
BUN SERPL-MCNC: 21 MG/DL — SIGNIFICANT CHANGE UP (ref 7–23)
C PNEUM DNA SPEC QL NAA+PROBE: SIGNIFICANT CHANGE UP
CALCIUM SERPL-MCNC: 9.7 MG/DL — SIGNIFICANT CHANGE UP (ref 8.4–10.5)
CHLORIDE BLDV-SCNC: 106 MMOL/L — SIGNIFICANT CHANGE UP (ref 96–108)
CHLORIDE SERPL-SCNC: 107 MMOL/L — SIGNIFICANT CHANGE UP (ref 98–107)
CO2 BLDV-SCNC: 25.1 MMOL/L — SIGNIFICANT CHANGE UP (ref 22–26)
CO2 SERPL-SCNC: 22 MMOL/L — SIGNIFICANT CHANGE UP (ref 22–31)
COLOR SPEC: YELLOW — SIGNIFICANT CHANGE UP
CREAT SERPL-MCNC: 1.27 MG/DL — SIGNIFICANT CHANGE UP (ref 0.5–1.3)
D DIMER BLD IA.RAPID-MCNC: 416 NG/ML DDU — HIGH
DIFF PNL FLD: ABNORMAL
EGFR: 62 ML/MIN/1.73M2 — SIGNIFICANT CHANGE UP
EOSINOPHIL # BLD AUTO: 0.03 K/UL — SIGNIFICANT CHANGE UP (ref 0–0.5)
EOSINOPHIL NFR BLD AUTO: 0.2 % — SIGNIFICANT CHANGE UP (ref 0–6)
EPI CELLS # UR: 0 /HPF — SIGNIFICANT CHANGE UP (ref 0–5)
FLUAV SUBTYP SPEC NAA+PROBE: SIGNIFICANT CHANGE UP
FLUBV RNA SPEC QL NAA+PROBE: SIGNIFICANT CHANGE UP
GAS PNL BLDV: 137 MMOL/L — SIGNIFICANT CHANGE UP (ref 136–145)
GLUCOSE BLDV-MCNC: 126 MG/DL — HIGH (ref 70–99)
GLUCOSE SERPL-MCNC: 132 MG/DL — HIGH (ref 70–99)
GLUCOSE UR QL: NEGATIVE — SIGNIFICANT CHANGE UP
HADV DNA SPEC QL NAA+PROBE: SIGNIFICANT CHANGE UP
HCO3 BLDV-SCNC: 24 MMOL/L — SIGNIFICANT CHANGE UP (ref 22–29)
HCOV 229E RNA SPEC QL NAA+PROBE: SIGNIFICANT CHANGE UP
HCOV HKU1 RNA SPEC QL NAA+PROBE: SIGNIFICANT CHANGE UP
HCOV NL63 RNA SPEC QL NAA+PROBE: SIGNIFICANT CHANGE UP
HCOV OC43 RNA SPEC QL NAA+PROBE: SIGNIFICANT CHANGE UP
HCT VFR BLD CALC: 51.9 % — HIGH (ref 39–50)
HCT VFR BLDA CALC: 51 % — SIGNIFICANT CHANGE UP (ref 39–51)
HGB BLD CALC-MCNC: 17.1 G/DL — HIGH (ref 13–17)
HGB BLD-MCNC: 17.1 G/DL — HIGH (ref 13–17)
HMPV RNA SPEC QL NAA+PROBE: SIGNIFICANT CHANGE UP
HPIV1 RNA SPEC QL NAA+PROBE: SIGNIFICANT CHANGE UP
HPIV2 RNA SPEC QL NAA+PROBE: SIGNIFICANT CHANGE UP
HPIV3 RNA SPEC QL NAA+PROBE: SIGNIFICANT CHANGE UP
HPIV4 RNA SPEC QL NAA+PROBE: SIGNIFICANT CHANGE UP
HYALINE CASTS # UR AUTO: 1 /LPF — SIGNIFICANT CHANGE UP (ref 0–7)
IANC: 14.17 K/UL — HIGH (ref 1.8–7.4)
IMM GRANULOCYTES NFR BLD AUTO: 0.3 % — SIGNIFICANT CHANGE UP (ref 0–0.9)
INR BLD: 1.16 RATIO — SIGNIFICANT CHANGE UP (ref 0.88–1.16)
KETONES UR-MCNC: NEGATIVE — SIGNIFICANT CHANGE UP
LACTATE BLDV-MCNC: 1.5 MMOL/L — SIGNIFICANT CHANGE UP (ref 0.5–2)
LEUKOCYTE ESTERASE UR-ACNC: ABNORMAL
LYMPHOCYTES # BLD AUTO: 1.96 K/UL — SIGNIFICANT CHANGE UP (ref 1–3.3)
LYMPHOCYTES # BLD AUTO: 10.6 % — LOW (ref 13–44)
M PNEUMO DNA SPEC QL NAA+PROBE: SIGNIFICANT CHANGE UP
MCHC RBC-ENTMCNC: 30.2 PG — SIGNIFICANT CHANGE UP (ref 27–34)
MCHC RBC-ENTMCNC: 32.9 GM/DL — SIGNIFICANT CHANGE UP (ref 32–36)
MCV RBC AUTO: 91.7 FL — SIGNIFICANT CHANGE UP (ref 80–100)
MONOCYTES # BLD AUTO: 2.18 K/UL — HIGH (ref 0–0.9)
MONOCYTES NFR BLD AUTO: 11.8 % — SIGNIFICANT CHANGE UP (ref 2–14)
NEUTROPHILS # BLD AUTO: 14.17 K/UL — HIGH (ref 1.8–7.4)
NEUTROPHILS NFR BLD AUTO: 76.8 % — SIGNIFICANT CHANGE UP (ref 43–77)
NITRITE UR-MCNC: POSITIVE
NRBC # BLD: 0 /100 WBCS — SIGNIFICANT CHANGE UP (ref 0–0)
NRBC # FLD: 0 K/UL — SIGNIFICANT CHANGE UP (ref 0–0)
PCO2 BLDV: 44 MMHG — SIGNIFICANT CHANGE UP (ref 42–55)
PH BLDV: 7.34 — SIGNIFICANT CHANGE UP (ref 7.32–7.43)
PH UR: 6 — SIGNIFICANT CHANGE UP (ref 5–8)
PLATELET # BLD AUTO: 287 K/UL — SIGNIFICANT CHANGE UP (ref 150–400)
PO2 BLDV: 53 MMHG — SIGNIFICANT CHANGE UP
POTASSIUM BLDV-SCNC: 3.9 MMOL/L — SIGNIFICANT CHANGE UP (ref 3.5–5.1)
POTASSIUM SERPL-MCNC: 4.3 MMOL/L — SIGNIFICANT CHANGE UP (ref 3.5–5.3)
POTASSIUM SERPL-SCNC: 4.3 MMOL/L — SIGNIFICANT CHANGE UP (ref 3.5–5.3)
PROCALCITONIN SERPL-MCNC: 0.67 NG/ML — HIGH (ref 0.02–0.1)
PROT SERPL-MCNC: 7.7 G/DL — SIGNIFICANT CHANGE UP (ref 6–8.3)
PROT UR-MCNC: ABNORMAL
PROTHROM AB SERPL-ACNC: 13.5 SEC — HIGH (ref 10.5–13.4)
RAPID RVP RESULT: SIGNIFICANT CHANGE UP
RBC # BLD: 5.66 M/UL — SIGNIFICANT CHANGE UP (ref 4.2–5.8)
RBC # FLD: 15 % — HIGH (ref 10.3–14.5)
RBC CASTS # UR COMP ASSIST: 1 /HPF — SIGNIFICANT CHANGE UP (ref 0–4)
RSV RNA SPEC QL NAA+PROBE: SIGNIFICANT CHANGE UP
RV+EV RNA SPEC QL NAA+PROBE: SIGNIFICANT CHANGE UP
SAO2 % BLDV: 84.2 % — SIGNIFICANT CHANGE UP
SARS-COV-2 RNA SPEC QL NAA+PROBE: SIGNIFICANT CHANGE UP
SODIUM SERPL-SCNC: 143 MMOL/L — SIGNIFICANT CHANGE UP (ref 135–145)
SP GR SPEC: 1.03 — SIGNIFICANT CHANGE UP (ref 1.01–1.05)
TROPONIN T, HIGH SENSITIVITY RESULT: 16 NG/L — SIGNIFICANT CHANGE UP
TROPONIN T, HIGH SENSITIVITY RESULT: 17 NG/L — SIGNIFICANT CHANGE UP
UROBILINOGEN FLD QL: ABNORMAL
WBC # BLD: 18.46 K/UL — HIGH (ref 3.8–10.5)
WBC # FLD AUTO: 18.46 K/UL — HIGH (ref 3.8–10.5)
WBC UR QL: 520 /HPF — HIGH (ref 0–5)

## 2022-09-22 PROCEDURE — 99291 CRITICAL CARE FIRST HOUR: CPT | Mod: 25

## 2022-09-22 PROCEDURE — 93010 ELECTROCARDIOGRAM REPORT: CPT

## 2022-09-22 RX ORDER — VANCOMYCIN HCL 1 G
1000 VIAL (EA) INTRAVENOUS ONCE
Refills: 0 | Status: COMPLETED | OUTPATIENT
Start: 2022-09-22 | End: 2022-09-22

## 2022-09-22 RX ORDER — LEVETIRACETAM 250 MG/1
1000 TABLET, FILM COATED ORAL ONCE
Refills: 0 | Status: COMPLETED | OUTPATIENT
Start: 2022-09-22 | End: 2022-09-22

## 2022-09-22 RX ORDER — PIPERACILLIN AND TAZOBACTAM 4; .5 G/20ML; G/20ML
3.38 INJECTION, POWDER, LYOPHILIZED, FOR SOLUTION INTRAVENOUS ONCE
Refills: 0 | Status: COMPLETED | OUTPATIENT
Start: 2022-09-22 | End: 2022-09-22

## 2022-09-22 RX ORDER — VANCOMYCIN HCL 1 G
1000 VIAL (EA) INTRAVENOUS ONCE
Refills: 0 | Status: DISCONTINUED | OUTPATIENT
Start: 2022-09-22 | End: 2022-09-22

## 2022-09-22 RX ADMIN — LEVETIRACETAM 400 MILLIGRAM(S): 250 TABLET, FILM COATED ORAL at 21:05

## 2022-09-22 RX ADMIN — PIPERACILLIN AND TAZOBACTAM 200 GRAM(S): 4; .5 INJECTION, POWDER, LYOPHILIZED, FOR SOLUTION INTRAVENOUS at 22:43

## 2022-09-22 RX ADMIN — Medication 250 MILLIGRAM(S): at 23:49

## 2022-09-22 NOTE — ED ADULT TRIAGE NOTE - CHIEF COMPLAINT QUOTE
Pt h/o HLD, HTN, seizure, brought in for AMS since 0900 this morning, pt had multiple episodes where he would go in and out of consciousness, 2 hours ago pt started to develop weakness to b/l arms. Pt currently a&ox3.

## 2022-09-22 NOTE — ED PROVIDER NOTE - OBJECTIVE STATEMENT
65 y/o M with PMH brain aneursym s/p repair not compatible w/ MRI, seizure disorder on keppra + vimpat, gabapentin, back surgery x4 and femur fx functionally bed bound p/w episodes of unresponsiveness since 6pm today. Pt was with wife at home when he began starring and became unresponsive. EMS was called and state pt was hypoxic at time of exam in the 80's. Pt mental status improved w/o medications. pt aaox3, states he is not sure what happened he had increased tremor in his arms today. wife states he had mild cough since yesterday. He denies fever chills.   denies fever, chills, chest pain, SOB, abdominal pain, diarrhea, dysuria, +syncope, bleeding, new rash,weakness, numbness, blurred vision  +cough

## 2022-09-22 NOTE — ED PROVIDER NOTE - CLINICAL SUMMARY MEDICAL DECISION MAKING FREE TEXT BOX
65 y/o M with PMH brain aneursym s/p repair not compatible w/ MRI, seizure disorder on keppra + vimpat, gabapentin, back surgery x4 and femur fx functionally bed bound p/w episodes of unresponsiveness since 6pm today. pt w/ unresponsive episode possilble seizure , pt bed bound possibly 2/2 uti vs pna, will obtain ct head, cbc, cmp, trop, proal, ua, keppra, neuro eval, admission. possible pna cxr

## 2022-09-22 NOTE — ED ADULT NURSE NOTE - OBJECTIVE STATEMENT
Patient received to room 20. Patient is a&ox4 bed bound. Patient was brought in BIBEMS stating patient was found hypoxic to the 80's, staring out to space, incontinent. Patient denies chest pain sob n/v. Patient stated he does no remember why he was found on the floor. Patient family at bedside for collateral. Patient also stating he had a leg clots couple months ago and stopped taking Eliquis. Patient has been incontinent for couple months.

## 2022-09-22 NOTE — ED ADULT NURSE NOTE - NSIMPLEMENTINTERV_GEN_ALL_ED
Implemented All Fall Risk Interventions:  [place] to call system. Call bell, personal items and telephone within reach. Instruct patient to call for assistance. Room bathroom lighting operational. Non-slip footwear when patient is off stretcher. Physically safe environment: no spills, clutter or unnecessary equipment. Stretcher in lowest position, wheels locked, appropriate side rails in place. Provide visual cue, wrist band, yellow gown, etc. Monitor gait and stability. Monitor for mental status changes and reorient to person, place, and time. Review medications for side effects contributing to fall risk. Reinforce activity limits and safety measures with patient and family.

## 2022-09-22 NOTE — ED PROVIDER NOTE - PROGRESS NOTE DETAILS
Td NATHAN PGY2: pt found to have uti. other labs and imaging not showing emergent pathology at this time. spoke to hospitalist who will admit to medicine.

## 2022-09-22 NOTE — ED PROVIDER NOTE - ATTENDING CONTRIBUTION TO CARE
65 y/o M with PMH brain aneursym s/p repair not compatible w/ MRI, seizure disorder on keppra + vimpat, gabapentin, back surgery x4 and femur fx functionally bed bound p/w episodes of unresponsiveness since 6pm today. Pt was with wife at home when he began starring and became unresponsive. EMS was called and state pt was hypoxic at time of exam in the 80's. Pt mental status improved w/o medications. pt aaox3, states he is not sure what happened he had increased tremor in his arms today. wife states he had mild cough since yesterday. He denies fever chills.   denies fever, chills, chest pain, SOB, abdominal pain, diarrhea, dysuria, +syncope, bleeding, new rash,weakness, numbness, blurred vision  +cough  ROS  otherwise negative as per HPI  Gen: Awake, Alert, WD, WN, NAD  Head:  NC/AT  Eyes:  PERRL, EOMI, Conjunctiva pink, lids normal, no scleral icterus  ENT: OP clear, no exudates, no erythema, uvula midline, TMs clear bilaterally, moist mucus membranes  Neck: supple, nontender, no meningismus, no JVD, trachea midline  Cardiac/CV:  S1 S2, RRR, no M/G/R  Respiratory/Pulm:  CTAB, good air movement, normal resp effort, no wheezes/stridor/retractions/rales/rhonchi  Gastrointestinal/Abdomen:  Soft, nontender, nondistended, +BS, no rebound/guarding  Back:  no CVAT, no MLT  Ext:  warm, well perfused, moving all extremities spontaneously, no peripheral edema, distal pulses intact  Skin: intact, no rash  Neuro:  AAOx3, sensation intact, motor 5/5 x 2 UE  extremities,  remor in b/l ue , speech clear  mdm as above

## 2022-09-23 DIAGNOSIS — N39.0 URINARY TRACT INFECTION, SITE NOT SPECIFIED: ICD-10-CM

## 2022-09-23 DIAGNOSIS — I67.1 CEREBRAL ANEURYSM, NONRUPTURED: ICD-10-CM

## 2022-09-23 DIAGNOSIS — I25.10 ATHEROSCLEROTIC HEART DISEASE OF NATIVE CORONARY ARTERY WITHOUT ANGINA PECTORIS: ICD-10-CM

## 2022-09-23 DIAGNOSIS — Z87.81 PERSONAL HISTORY OF (HEALED) TRAUMATIC FRACTURE: Chronic | ICD-10-CM

## 2022-09-23 DIAGNOSIS — Z98.890 OTHER SPECIFIED POSTPROCEDURAL STATES: Chronic | ICD-10-CM

## 2022-09-23 DIAGNOSIS — R41.82 ALTERED MENTAL STATUS, UNSPECIFIED: ICD-10-CM

## 2022-09-23 DIAGNOSIS — Z79.899 OTHER LONG TERM (CURRENT) DRUG THERAPY: ICD-10-CM

## 2022-09-23 DIAGNOSIS — R56.9 UNSPECIFIED CONVULSIONS: ICD-10-CM

## 2022-09-23 DIAGNOSIS — S72.009A FRACTURE OF UNSPECIFIED PART OF NECK OF UNSPECIFIED FEMUR, INITIAL ENCOUNTER FOR CLOSED FRACTURE: ICD-10-CM

## 2022-09-23 DIAGNOSIS — S72.001A FRACTURE OF UNSPECIFIED PART OF NECK OF RIGHT FEMUR, INITIAL ENCOUNTER FOR CLOSED FRACTURE: ICD-10-CM

## 2022-09-23 DIAGNOSIS — Z29.9 ENCOUNTER FOR PROPHYLACTIC MEASURES, UNSPECIFIED: ICD-10-CM

## 2022-09-23 LAB
ALBUMIN SERPL ELPH-MCNC: 4.1 G/DL — SIGNIFICANT CHANGE UP (ref 3.3–5)
ALP SERPL-CCNC: 123 U/L — HIGH (ref 40–120)
ALT FLD-CCNC: 13 U/L — SIGNIFICANT CHANGE UP (ref 4–41)
ANION GAP SERPL CALC-SCNC: 14 MMOL/L — SIGNIFICANT CHANGE UP (ref 7–14)
AST SERPL-CCNC: 14 U/L — SIGNIFICANT CHANGE UP (ref 4–40)
BILIRUB SERPL-MCNC: 0.8 MG/DL — SIGNIFICANT CHANGE UP (ref 0.2–1.2)
BUN SERPL-MCNC: 18 MG/DL — SIGNIFICANT CHANGE UP (ref 7–23)
CALCIUM SERPL-MCNC: 9.6 MG/DL — SIGNIFICANT CHANGE UP (ref 8.4–10.5)
CHLORIDE SERPL-SCNC: 106 MMOL/L — SIGNIFICANT CHANGE UP (ref 98–107)
CO2 SERPL-SCNC: 23 MMOL/L — SIGNIFICANT CHANGE UP (ref 22–31)
CREAT SERPL-MCNC: 1.11 MG/DL — SIGNIFICANT CHANGE UP (ref 0.5–1.3)
EGFR: 73 ML/MIN/1.73M2 — SIGNIFICANT CHANGE UP
GLUCOSE BLDC GLUCOMTR-MCNC: 137 MG/DL — HIGH (ref 70–99)
GLUCOSE SERPL-MCNC: 115 MG/DL — HIGH (ref 70–99)
HCT VFR BLD CALC: 53.7 % — HIGH (ref 39–50)
HGB BLD-MCNC: 16.8 G/DL — SIGNIFICANT CHANGE UP (ref 13–17)
MAGNESIUM SERPL-MCNC: 2.1 MG/DL — SIGNIFICANT CHANGE UP (ref 1.6–2.6)
MCHC RBC-ENTMCNC: 29.5 PG — SIGNIFICANT CHANGE UP (ref 27–34)
MCHC RBC-ENTMCNC: 31.3 GM/DL — LOW (ref 32–36)
MCV RBC AUTO: 94.2 FL — SIGNIFICANT CHANGE UP (ref 80–100)
NRBC # BLD: 0 /100 WBCS — SIGNIFICANT CHANGE UP (ref 0–0)
NRBC # FLD: 0 K/UL — SIGNIFICANT CHANGE UP (ref 0–0)
PHOSPHATE SERPL-MCNC: 3.1 MG/DL — SIGNIFICANT CHANGE UP (ref 2.5–4.5)
PLATELET # BLD AUTO: 260 K/UL — SIGNIFICANT CHANGE UP (ref 150–400)
POTASSIUM SERPL-MCNC: 4.7 MMOL/L — SIGNIFICANT CHANGE UP (ref 3.5–5.3)
POTASSIUM SERPL-SCNC: 4.7 MMOL/L — SIGNIFICANT CHANGE UP (ref 3.5–5.3)
PROT SERPL-MCNC: 7.5 G/DL — SIGNIFICANT CHANGE UP (ref 6–8.3)
RBC # BLD: 5.7 M/UL — SIGNIFICANT CHANGE UP (ref 4.2–5.8)
RBC # FLD: 15.3 % — HIGH (ref 10.3–14.5)
SODIUM SERPL-SCNC: 143 MMOL/L — SIGNIFICANT CHANGE UP (ref 135–145)
WBC # BLD: 15.17 K/UL — HIGH (ref 3.8–10.5)
WBC # FLD AUTO: 15.17 K/UL — HIGH (ref 3.8–10.5)

## 2022-09-23 PROCEDURE — 99223 1ST HOSP IP/OBS HIGH 75: CPT | Mod: GC

## 2022-09-23 PROCEDURE — 95816 EEG AWAKE AND DROWSY: CPT | Mod: 26

## 2022-09-23 RX ORDER — LEVETIRACETAM 250 MG/1
1000 TABLET, FILM COATED ORAL
Refills: 0 | Status: DISCONTINUED | OUTPATIENT
Start: 2022-09-23 | End: 2022-09-27

## 2022-09-23 RX ORDER — METOPROLOL TARTRATE 50 MG
50 TABLET ORAL DAILY
Refills: 0 | Status: DISCONTINUED | OUTPATIENT
Start: 2022-09-23 | End: 2022-09-27

## 2022-09-23 RX ORDER — INFLUENZA VIRUS VACCINE 15; 15; 15; 15 UG/.5ML; UG/.5ML; UG/.5ML; UG/.5ML
0.7 SUSPENSION INTRAMUSCULAR ONCE
Refills: 0 | Status: DISCONTINUED | OUTPATIENT
Start: 2022-09-23 | End: 2022-09-27

## 2022-09-23 RX ORDER — CEFTRIAXONE 500 MG/1
1000 INJECTION, POWDER, FOR SOLUTION INTRAMUSCULAR; INTRAVENOUS EVERY 24 HOURS
Refills: 0 | Status: COMPLETED | OUTPATIENT
Start: 2022-09-23 | End: 2022-09-25

## 2022-09-23 RX ORDER — ATORVASTATIN CALCIUM 80 MG/1
20 TABLET, FILM COATED ORAL AT BEDTIME
Refills: 0 | Status: DISCONTINUED | OUTPATIENT
Start: 2022-09-23 | End: 2022-09-27

## 2022-09-23 RX ORDER — SENNA PLUS 8.6 MG/1
2 TABLET ORAL AT BEDTIME
Refills: 0 | Status: DISCONTINUED | OUTPATIENT
Start: 2022-09-23 | End: 2022-09-27

## 2022-09-23 RX ORDER — ASPIRIN/CALCIUM CARB/MAGNESIUM 324 MG
81 TABLET ORAL DAILY
Refills: 0 | Status: DISCONTINUED | OUTPATIENT
Start: 2022-09-23 | End: 2022-09-27

## 2022-09-23 RX ORDER — ENOXAPARIN SODIUM 100 MG/ML
40 INJECTION SUBCUTANEOUS EVERY 24 HOURS
Refills: 0 | Status: DISCONTINUED | OUTPATIENT
Start: 2022-09-23 | End: 2022-09-27

## 2022-09-23 RX ORDER — GABAPENTIN 400 MG/1
300 CAPSULE ORAL
Refills: 0 | Status: DISCONTINUED | OUTPATIENT
Start: 2022-09-23 | End: 2022-09-23

## 2022-09-23 RX ORDER — BACLOFEN 100 %
20 POWDER (GRAM) MISCELLANEOUS EVERY 12 HOURS
Refills: 0 | Status: DISCONTINUED | OUTPATIENT
Start: 2022-09-23 | End: 2022-09-24

## 2022-09-23 RX ORDER — TAMSULOSIN HYDROCHLORIDE 0.4 MG/1
0.8 CAPSULE ORAL AT BEDTIME
Refills: 0 | Status: DISCONTINUED | OUTPATIENT
Start: 2022-09-23 | End: 2022-09-27

## 2022-09-23 RX ORDER — LACOSAMIDE 50 MG/1
150 TABLET ORAL
Refills: 0 | Status: DISCONTINUED | OUTPATIENT
Start: 2022-09-23 | End: 2022-09-27

## 2022-09-23 RX ADMIN — Medication 150 MILLIGRAM(S): at 06:59

## 2022-09-23 RX ADMIN — ATORVASTATIN CALCIUM 20 MILLIGRAM(S): 80 TABLET, FILM COATED ORAL at 22:19

## 2022-09-23 RX ADMIN — ENOXAPARIN SODIUM 40 MILLIGRAM(S): 100 INJECTION SUBCUTANEOUS at 17:39

## 2022-09-23 RX ADMIN — TAMSULOSIN HYDROCHLORIDE 0.8 MILLIGRAM(S): 0.4 CAPSULE ORAL at 22:19

## 2022-09-23 RX ADMIN — CEFTRIAXONE 100 MILLIGRAM(S): 500 INJECTION, POWDER, FOR SOLUTION INTRAMUSCULAR; INTRAVENOUS at 05:12

## 2022-09-23 RX ADMIN — Medication 20 MILLIGRAM(S): at 05:12

## 2022-09-23 RX ADMIN — SENNA PLUS 2 TABLET(S): 8.6 TABLET ORAL at 22:19

## 2022-09-23 RX ADMIN — LEVETIRACETAM 1000 MILLIGRAM(S): 250 TABLET, FILM COATED ORAL at 20:00

## 2022-09-23 RX ADMIN — Medication 150 MILLIGRAM(S): at 17:39

## 2022-09-23 RX ADMIN — LEVETIRACETAM 1000 MILLIGRAM(S): 250 TABLET, FILM COATED ORAL at 05:11

## 2022-09-23 RX ADMIN — LACOSAMIDE 150 MILLIGRAM(S): 50 TABLET ORAL at 17:38

## 2022-09-23 RX ADMIN — Medication 81 MILLIGRAM(S): at 17:38

## 2022-09-23 RX ADMIN — GABAPENTIN 300 MILLIGRAM(S): 400 CAPSULE ORAL at 06:59

## 2022-09-23 RX ADMIN — Medication 50 MILLIGRAM(S): at 05:12

## 2022-09-23 RX ADMIN — Medication 20 MILLIGRAM(S): at 17:39

## 2022-09-23 RX ADMIN — LACOSAMIDE 150 MILLIGRAM(S): 50 TABLET ORAL at 05:11

## 2022-09-23 NOTE — H&P ADULT - NSHPPOAPRESSUREULCER_GEN_ALL_CORE
Last seen 01/21/21  Next appt  Being made    Requested Prescriptions     Pending Prescriptions Disp Refills    loratadine (Claritin) 10 mg tablet 30 Tab 1     Sig: Take 1 Tab by mouth daily.
no

## 2022-09-23 NOTE — H&P ADULT - PROBLEM SELECTOR PLAN 1
-pt has known seizures, recent 5/2022 admission due to concern for status epilepticus however further vEEG during that admission negative for seizures  -pt follows with neuro Dr. Emma Stringer  -possible that seizures triggered by UTI    Plan:  -c/w home gabapentin 300 BID, vimpat 150 BID, keppra 1000 BID, pregabalin 150 qd  -neuro consult in AM  -24 hour EEG  -Neuro checks  -seizure precautions -pt has known seizures, recent 5/2022 admission due to concern for status epilepticus however further vEEG during that admission negative for seizures  -pt follows with neuro Dr. Emma Stringer  -possible that seizures triggered by UTI    Plan:  -c/w home gabapentin 300 BID, vimpat 150 BID, keppra 1000 BID, pregabalin 150 qd, baclofen 20 BID  -neuro consult in AM  -24 hour EEG  -Neuro checks  -seizure precautions

## 2022-09-23 NOTE — H&P ADULT - NSICDXPASTSURGICALHX_GEN_ALL_CORE_FT
PAST SURGICAL HISTORY:  H/O fracture of femur s/p repair    History of back surgery x5    S/P inguinal hernia repair

## 2022-09-23 NOTE — CONSULT NOTE ADULT - SUBJECTIVE AND OBJECTIVE BOX
Neurology Consult    Reason for Consult: Patient is a 66y old  Male who presents with a chief complaint of ?seizures (23 Sep 2022 07:55)      HPI:  67 yo  M with CAD s/p RCA stent, brain aneursym s/p repair () not compatible w/ MRI, seizure disorder on AEDs (keppra and vimpat), back surgery x4 and femur frx (2022) functionally bed bound p/w episodes of staring, unresponsiveness, shaking, and hypoxia. Pt states he was conscious during these episodes and could control body movements except for R limb shaking. However, per chart review wife states pt was apparently staring, unresponsive, and hypoxic to 80s at time of EMS arrival. Pt states he currently feels well but feels more tired and weak than usual. He states he has not had any of these shaking episodes for several years. However per chart review pt was admitted to [facility]22 for R limb shaking and unresponsiveness concerning for status epilepticus requiring intubation. He follows with neurology, Dr. Emma Stringer. States he takes keppra, vimpat, gabapentin and hasn't missed doses but states last doses of medication prior to admission was . Pt denies HA, CP, SOB, nausea/vomiting, abdominal pain, or dysuria.  Has felt inability to completely void. Last BM 2 days ago.  Denies pain.    ED course: HDS, satting 100% on 3L NC. UA with +nitrite, large LE, many bacteria. CT brain with chronic encephalomalacia, s/p aneurysm clipping. CTA chest and duplex LE negative for PE. CXR clear, RVP neg. Given vanc/zosyn and IV keppra on arrival.      (23 Sep 2022 04:18)       PAST MEDICAL & SURGICAL HISTORY:  Brain aneurysm  s/p clipping, not compatible with MRI      Seizures      CAD (coronary artery disease)  s/p MI and RCA stenting      History of back surgery  x5      S/P inguinal hernia repair      H/O fracture of femur  s/p repair          Allergies: Allergies    No Known Allergies    Intolerances        Social History: Denies toxic habits including tobacco, ETOH or illicit drugs.    Family History: FAMILY HISTORY:  No pertinent family history in first degree relatives    . No family history of strokes    Medications: MEDICATIONS  (STANDING):  aspirin  chewable 81 milliGRAM(s) Oral daily  atorvastatin 20 milliGRAM(s) Oral at bedtime  baclofen 20 milliGRAM(s) Oral every 12 hours  cefTRIAXone   IVPB 1000 milliGRAM(s) IV Intermittent every 24 hours  enoxaparin Injectable 40 milliGRAM(s) SubCutaneous every 24 hours  influenza  Vaccine (HIGH DOSE) 0.7 milliLiter(s) IntraMuscular once  lacosamide 150 milliGRAM(s) Oral two times a day  levETIRAcetam 1000 milliGRAM(s) Oral two times a day  metoprolol succinate ER 50 milliGRAM(s) Oral daily  pregabalin 150 milliGRAM(s) Oral two times a day  senna 2 Tablet(s) Oral at bedtime  tamsulosin 0.8 milliGRAM(s) Oral at bedtime    MEDICATIONS  (PRN):      Review of Systems:  CONSTITUTIONAL:  No weight loss, fever, chills, weakness or fatigue.  HEENT:  Eyes:  No visual loss, blurred vision, double vision or yellow sclera. Ears, Nose, Throat:  No hearing loss, sneezing, congestion, runny nose or sore throat.  SKIN:  No rash or itching.  CARDIOVASCULAR:  No chest pain, chest pressure or chest discomfort. No palpitations or edema.  RESPIRATORY:  No shortness of breath, cough or sputum.  GASTROINTESTINAL:  No anorexia, nausea, vomiting or diarrhea. No abdominal pain or blood.  GENITOURINARY:  No burning on urination or incontinence   NEUROLOGICAL:  No headache, dizziness, syncope, paralysis, ataxia, numbness or tingling in the extremities. No change in bowel or bladder control. no limb weakness. no vision changes.   MUSCULOSKELETAL:  No muscle, back pain, joint pain or stiffness.  HEMATOLOGIC:  No anemia, bleeding or bruising.  LYMPHATICS:  No enlarged nodes. No history of splenectomy.  PSYCHIATRIC:  No history of depression or anxiety.  ENDOCRINOLOGIC:  No reports of sweating, cold or heat intolerance. No polyuria or polydipsia.      Vitals:  Vital Signs Last 24 Hrs  T(C): 36.7 (23 Sep 2022 06:44), Max: 37.1 (22 Sep 2022 21:37)  T(F): 98 (23 Sep 2022 06:44), Max: 98.8 (22 Sep 2022 21:37)  HR: 64 (23 Sep 2022 06:44) (64 - 85)  BP: 134/68 (23 Sep 2022 06:44) (122/70 - 147/69)  BP(mean): --  RR: 18 (23 Sep 2022 06:44) (16 - 18)  SpO2: 100% (23 Sep 2022 06:44) (94% - 100%)    Parameters below as of 23 Sep 2022 06:44  Patient On (Oxygen Delivery Method): nasal cannula  O2 Flow (L/min): 2      General Exam:   General Appearance: Appropriately dressed and in no acute distress       Head: Normocephalic, atraumatic and no dysmorphic features  Ear, Nose, and Throat: Moist mucous membranes  CVS: S1S2+  Resp: No SOB, no wheeze or rhonchi  GI: soft NT/ND  Extremities: No edema or cyanosis  Skin: No bruises or rashes     Neurological Exam:  Mental Status: Awake, alert and oriented x 3.  Able to follow simple and complex verbal commands. Able to name and repeat. fluent speech. No obvious aphasia or dysarthria noted.   Cranial Nerves: PERRL, EOMI, VFFC, sensation V1-V3 intact,  no obvious facial asymmetry, equal elevation of palate, scm/trap 5/5, tongue is midline on protrusion. no obvious papilledema on fundoscopic exam. hearing is grossly intact.   Motor: Normal bulk, tone and strength throughout except RLE limited 2/2 fx. Fine finger movements were intact and symmetric. no tremors or drift noted.    Sensation: Intact to light touch and pinprick throughout. no right/left confusion. no extinction to tactile on DSS.    Reflexes: 1+ throughout at biceps, brachioradialis, triceps, patellars and ankles bilaterally and equal. No clonus. R toe and L toe were both downgoing.  Coordination: No dysmetria on FNF   Gait: unable     Data/Labs/Imaging which I personally reviewed.     Labs:     CBC Full  -  ( 23 Sep 2022 05:31 )  WBC Count : 15.17 K/uL  RBC Count : 5.70 M/uL  Hemoglobin : 16.8 g/dL  Hematocrit : 53.7 %  Platelet Count - Automated : 260 K/uL  Mean Cell Volume : 94.2 fL  Mean Cell Hemoglobin : 29.5 pg  Mean Cell Hemoglobin Concentration : 31.3 gm/dL  Auto Neutrophil # : x  Auto Lymphocyte # : x  Auto Monocyte # : x  Auto Eosinophil # : x  Auto Basophil # : x  Auto Neutrophil % : x  Auto Lymphocyte % : x  Auto Monocyte % : x  Auto Eosinophil % : x  Auto Basophil % : x        143  |  106  |  18  ----------------------------<  115<H>  4.7   |  23  |  1.11    Ca    9.6      23 Sep 2022 05:31  Phos  3.1       Mg     2.10         TPro  7.5  /  Alb  4.1  /  TBili  0.8  /  DBili  x   /  AST  14  /  ALT  13  /  AlkPhos  123<H>      LIVER FUNCTIONS - ( 23 Sep 2022 05:31 )  Alb: 4.1 g/dL / Pro: 7.5 g/dL / ALK PHOS: 123 U/L / ALT: 13 U/L / AST: 14 U/L / GGT: x           PT/INR - ( 22 Sep 2022 21:17 )   PT: 13.5 sec;   INR: 1.16 ratio         PTT - ( 22 Sep 2022 21:17 )  PTT:36.1 sec  Urinalysis Basic - ( 22 Sep 2022 21:26 )    Color: Yellow / Appearance: Slightly Turbid / S.027 / pH: x  Gluc: x / Ketone: Negative  / Bili: Negative / Urobili: 3 mg/dL   Blood: x / Protein: 30 mg/dL / Nitrite: Positive   Leuk Esterase: Large / RBC: 1 /HPF /  /HPF   Sq Epi: x / Non Sq Epi: 0 /HPF / Bacteria: Many    < from: CT Head No Cont (22 @ 23:12) >    ACC: [id number] EXAM:  CT BRAIN                          PROCEDURE DATE:  2022          INTERPRETATION:  CLINICAL INFORMATION: Altered mental status    TECHNIQUE : Noncontrast axial CT images were acquired through the head.    Sagittal and coronal reformats were performed.    COMPARISON: No prior imaging available for comparison.    FINDINGS:  The patient is status post right pterional craniotomy.  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  There are   chronic areas of encephalomalacia/gliosis in the right greater than left   frontal lobes.    There is no CT evidence of acute intracranial hemorrhage, subdural   collection, mass effect, midline shift or acute territorial infarct.    The visualized paranasal sinuses and the mastoids are grossly clear.    The patient is status post intraocular lens replacement bilaterally.    There is no calvarial skull base fracture.    IMPRESSION:  No CT evidence of acute intracranial pathology.    The patient is status post right pterional craniotomy and aneurysm   clipping with chronic areas of encephalomalacia/gliosis in the right   greater than left frontal lobes.    --- End of Report ---          TAMIE BARRAZA DO; Resident Radiologist  This document has been electronically signed.  RAOUL RUGGIERO MD; Attending Radiologist  This document has been electronically signed. Sep 23 2022  2:30AM    < end of copied text >

## 2022-09-23 NOTE — H&P ADULT - PROBLEM SELECTOR PLAN 2
-pt with h/o urinary retention, UA with large LE, nit, many bacteria. No prior cx data  -d/p vanc/zosyn in ED  -no hospitalizations in past 3 months needing pseudomonal coverage. Low concern for MRSA, no evidence of bone/joint/soft tissue infection  -WBC 18.46  -pt denies dysuria however given seizures could be related to underlying UTI will treat    Plan:  -IV ceftriaxone empirically  -f/u BCx, UCx  -c/w home flomax  -monitor bladder scans

## 2022-09-23 NOTE — H&P ADULT - NSHPLABSRESULTS_GEN_ALL_CORE
17.1   18.46 )-----------( 287      ( 22 Sep 2022 21:17 )             51.9           143  |  107  |  21  ----------------------------<  132<H>  4.3   |  22  |  1.27    Ca    9.7      22 Sep 2022 21:17    TPro  7.7  /  Alb  4.4  /  TBili  0.7  /  DBili  x   /  AST  13  /  ALT  19  /  AlkPhos  130<H>                Urinalysis Basic - ( 22 Sep 2022 21:26 )    Color: Yellow / Appearance: Slightly Turbid / S.027 / pH: x  Gluc: x / Ketone: Negative  / Bili: Negative / Urobili: 3 mg/dL   Blood: x / Protein: 30 mg/dL / Nitrite: Positive   Leuk Esterase: Large / RBC: 1 /HPF /  /HPF   Sq Epi: x / Non Sq Epi: 0 /HPF / Bacteria: Many        PT/INR - ( 22 Sep 2022 21:17 )   PT: 13.5 sec;   INR: 1.16 ratio         PTT - ( 22 Sep 2022 21:17 )  PTT:36.1 sec    Lactate Trend            CAPILLARY BLOOD GLUCOSE                  Cultures: BCx, UCx pending    Radiology:  < from: CT Head No Cont (22 @ 23:12) >    No CT evidence of acute intracranial pathology.    The patient is status post right pterional craniotomy and aneurysm   clipping with chronic areas of encephalomalacia/gliosis in the right   greater than left frontal lobes.    < end of copied text >    < from: CT Angio Chest PE Protocol w/ IV Cont (22 @ 23:10) >    No pulmonary embolism.  No evidence of pneumonia or pulmonary edema.    Incompletely visualized, there is lateral bulging of the peritoneum and   left colon into the left tenth intercostal space.  A hernia is not   excluded.    < end of copied text >    < from: US Duplex Venous Lower Ext Complete, Bilateral (22 @ 22:59) >    No evidence of deep venous thrombosis in either lower extremity.    < end of copied text >    < from: Xray Chest 1 View- PORTABLE-Urgent (22 @ 21:58) >    Clear lungs.    < end of copied text >        EKG: HR 81, QTc 429, sinus, L axis deviation

## 2022-09-23 NOTE — H&P ADULT - PROBLEM SELECTOR PLAN 7
DVT ppx: lovenox qd, unclear if pt is on eliquis so did not start for now  Diet: DASH  Dispo: pending eval

## 2022-09-23 NOTE — PROGRESS NOTE ADULT - PROBLEM SELECTOR PLAN 5
s/p MI and RCA stent 5y ago  -c/w atorvastatin  -unclear if pt on any antiplatelets, would clarify with wife  -c/w home metoprolol 50 ER qd with hold parameters s/p MI and RCA stent 5y ago, not on aspirin at home  >c/w atorvastatin  >start aspirin 81mg qD  >c/w home metoprolol 50 ER qd with hold parameters

## 2022-09-23 NOTE — H&P ADULT - HISTORY OF PRESENT ILLNESS
Pt is poor historian; attempted to reach wife for collateral, called x2 with no answer.    66M with h/o CAD s/p RCA stent, brain aneursym s/p repair (1997) not compatible w/ MRI, seizure disorder on AEDs, back surgery x4 and femur frx (5/2022) functionally bed bound p/w episodes of staring, unresponsiveness, shaking, and hypoxia. Pt states he was conscious during these episodes and could control body movements except for R limb shaking. However, per chart review wife states pt was apparently staring, unresponsive, and hypoxic to 80s at time of EMS arrival. Pt states he currently feels well but feels more tired and weak than usual. He states he has not had any of these shaking episodes for several years. However per chart review pt was admitted to [facility] 5/2022 for R limb shaking and unresponsiveness concerning for status epilepticus requiring intubation. He follows with neurology, Dr. Emma Stringer. States he takes keppra, vimpat, gabapentin and hasn't missed doses but states last doses of medication prior to admission was 9/21. Pt denies HA, CP, SOB, nausea/vomiting, abdominal pain, or dysuria.  Has felt inability to completely void. Last BM 2 days ago.  Denies pain.    ED course: HDS, satting 100% on 3L NC. UA with +nitrite, large LE, many bacteria. CT brain with chronic encephalomalacia, s/p aneurysm clipping. CTA chest and duplex LE negative for PE. CXR clear, RVP neg. Given vanc/zosyn and IV keppra on arrival.

## 2022-09-23 NOTE — H&P ADULT - NSICDXPASTMEDICALHX_GEN_ALL_CORE_FT
PAST MEDICAL HISTORY:  Brain aneurysm s/p clipping, not compatible with MRI    CAD (coronary artery disease) s/p MI and RCA stenting    Seizures

## 2022-09-23 NOTE — H&P ADULT - PROBLEM SELECTOR PLAN 6
-pt is unsure of medications, says wife has list however wife did not  phone for med rec overnight  -current med list obtained from [facility]ri"[employer]" and last DC summary from [facility] 5/2022  -pt was discharged on eliquis last admission but unclear if he is still on it, PTT nl and not on surescripts.  [ ] Med rec in AM with wife

## 2022-09-23 NOTE — H&P ADULT - NSHPPHYSICALEXAM_GEN_ALL_CORE
Vital Signs Last 24 Hrs  T(C): 37.1 (22 Sep 2022 21:37), Max: 37.1 (22 Sep 2022 21:37)  T(F): 98.8 (22 Sep 2022 21:37), Max: 98.8 (22 Sep 2022 21:37)  HR: 74 (23 Sep 2022 02:43) (74 - 85)  BP: 147/69 (23 Sep 2022 02:43) (122/70 - 147/69)  BP(mean): --  RR: 17 (23 Sep 2022 02:43) (16 - 18)  SpO2: 98% (23 Sep 2022 02:43) (94% - 99%)    Parameters below as of 23 Sep 2022 02:43  Patient On (Oxygen Delivery Method): nasal cannula  O2 Flow (L/min): 3      CONSTITUTIONAL: NAD, well-developed, well-groomed  EYES: PERRLA; conjunctiva and sclera clear  ENMT: Moist oral mucosa, no pharyngeal injection or exudates; normal dentition  NECK: Supple, no palpable masses; no thyromegaly  RESPIRATORY: Normal respiratory effort; lungs are clear to auscultation bilaterally  CARDIOVASCULAR: Regular rate and rhythm, normal S1 and S2, no murmur/rub/gallop; No lower extremity edema; Peripheral pulses are 2+ bilaterally  ABDOMEN: Nontender to palpation, normoactive bowel sounds, no rebound/guarding; No hepatosplenomegaly  MUSCULOSKELETAL:  Normal gait; no clubbing or cyanosis of digits; no joint swelling or tenderness to palpation  PSYCH: A+O to person, place, and time; affect appropriate  NEUROLOGY: CN 2-12 are intact and symmetric; no gross sensory deficits. +unable to fully bury sclera in L eye abduction. L hip flexion ROM limited by fracture. 5/5 strength in all other extremities.  SKIN: No rashes; no palpable lesions

## 2022-09-23 NOTE — H&P ADULT - PROBLEM SELECTOR PLAN 4
-pt functionally bed bound  -pt previously discharged on eliquis 5 BID however not in surescripts and unclear if pt taking it  -no evidence of DVT/PEs currently  -currently holding eliquis, just on lovenox for dvt ppx

## 2022-09-23 NOTE — H&P ADULT - ASSESSMENT
66M with h/o CAD s/p RCA stent, brain aneursym s/p repair (1997) not compatible w/ MRI, seizure disorder on AEDs, back surgery x4 and femur frx (march 2022) functionally bed bound admitted with episodes of staring, unresponsiveness, and hypoxia concerning for seizures. Found to have UTI possibly trigger for seizures.

## 2022-09-23 NOTE — PROGRESS NOTE ADULT - PROBLEM SELECTOR PLAN 1
-pt has known seizures, recent 5/2022 admission due to concern for status epilepticus however further vEEG during that admission negative for seizures  -pt follows with neuro Dr. Emma Stringer  -possible that seizures triggered by UTI    Plan:  -c/w home gabapentin 300 BID, vimpat 150 BID, keppra 1000 BID, pregabalin 150 qd, baclofen 20 BID  -neuro consult in AM  -24 hour EEG  -Neuro checks  -seizure precautions -pt has known seizures, recent 5/2022 admission due to concern for status epilepticus however further vEEG during that admission negative for seizures  -pt follows with neuro Dr. Emma Stringer  -possible that seizures triggered by UTI    Plan:  >will touch base w/ patient's home neurologist Dr. Stringer  >c/w home vimpat 150 BID, keppra 1000 BID, pregabalin 150 qd, baclofen 20 BID  >c/w 24 hour EEG  >c/w neuro checks  >seizure precautions

## 2022-09-23 NOTE — H&P ADULT - NSHPREVIEWOFSYSTEMS_GEN_ALL_CORE
In additional the that documented in the HPI, the additional ROS was obtained:    CONSTITUTIONAL: No fever, no chills  EYES: no change in vision, no blurred vision  HEENT: no trouble swallowing, no trouble speaking, no sore throat  CV: no chest pain, no palpitations  RESP: +cough, no shortness of breath  GI: no abdominal pain, no nausea, no vomiting, no diarrhea, no constipation  : No dysuria, no frequency  NEURO: no headache, no new numbness, +weakness  SKIN: no new rashes  HEME: No easy bruising or bleeding  MSK: no recent trauma

## 2022-09-23 NOTE — H&P ADULT - ATTENDING COMMENTS
Pt with CAD, brain aneurysm s/p clip, seizures p/w episode of unresponsiveness and shaking of arm with hypoxia at home.    On exam: pt in NAD, heart RRR, lungs CTA, abd benign,   Labs reviewed notable for + UA.  CT head reviewed showing prior clipping, and surrounding gliosis  Will treat UTI with ceftriaxone  Neurology consult in am

## 2022-09-23 NOTE — PROGRESS NOTE ADULT - PROBLEM SELECTOR PLAN 6
-pt is unsure of medications, says wife has list however wife did not  phone for med rec overnight  -current med list [facility] and [facility] summary [facility]  -pt was discharged on eliquis last admission but unclear if he is still on it, PTT nl and not on surescripts.  [ ] Med rec in AM with wife DVT ppx: lovenox qd, unclear if pt is on eliquis so did not start for now  Diet: DASH  Dispo: pending eval

## 2022-09-23 NOTE — PROGRESS NOTE ADULT - SUBJECTIVE AND OBJECTIVE BOX
PROGRESS NOTE:       Patient is a 66y old  Male who presents with a chief complaint of ?seizures (23 Sep 2022 04:18)      SUBJECTIVE / OVERNIGHT EVENTS:  No acute events overnight. Patient seen and evaluated at bedside. No fever/chills.  Denies SOB at rest, chest pain, palpitations, abdominal pain, nausea/vomiting    ADDITIONAL REVIEW OF SYSTEMS:    MEDICATIONS  (STANDING):  atorvastatin 20 milliGRAM(s) Oral at bedtime  baclofen 20 milliGRAM(s) Oral every 12 hours  cefTRIAXone   IVPB 1000 milliGRAM(s) IV Intermittent every 24 hours  enoxaparin Injectable 40 milliGRAM(s) SubCutaneous every 24 hours  gabapentin 300 milliGRAM(s) Oral two times a day  influenza  Vaccine (HIGH DOSE) 0.7 milliLiter(s) IntraMuscular once  lacosamide 150 milliGRAM(s) Oral two times a day  levETIRAcetam 1000 milliGRAM(s) Oral two times a day  metoprolol succinate ER 50 milliGRAM(s) Oral daily  pregabalin 150 milliGRAM(s) Oral two times a day  senna 2 Tablet(s) Oral at bedtime  tamsulosin 0.8 milliGRAM(s) Oral at bedtime    MEDICATIONS  (PRN):  LORazepam   Injectable 1 milliGRAM(s) IV Push every 2 hours PRN status epilepticus      CAPILLARY BLOOD GLUCOSE        I&O's Summary      PHYSICAL EXAM:  Vital Signs Last 24 Hrs  T(C): 36.7 (23 Sep 2022 06:44), Max: 37.1 (22 Sep 2022 21:37)  T(F): 98 (23 Sep 2022 06:44), Max: 98.8 (22 Sep 2022 21:37)  HR: 64 (23 Sep 2022 06:44) (64 - 85)  BP: 134/68 (23 Sep 2022 06:44) (122/70 - 147/69)  BP(mean): --  RR: 18 (23 Sep 2022 06:44) (16 - 18)  SpO2: 100% (23 Sep 2022 06:44) (94% - 100%)    Parameters below as of 23 Sep 2022 06:44  Patient On (Oxygen Delivery Method): nasal cannula  O2 Flow (L/min): 2      CONSTITUTIONAL: NAD, well-developed  RESPIRATORY: Normal respiratory effort; lungs are clear to auscultation bilaterally  CARDIOVASCULAR: Regular rate and rhythm, normal S1 and S2, no murmur/rub/gallop; No lower extremity edema; Peripheral pulses are 2+ bilaterally  ABDOMEN: Nontender to palpation, normoactive bowel sounds, no rebound/guarding; No hepatosplenomegaly  MUSCLOSKELETAL: no clubbing or cyanosis of digits; no joint swelling or tenderness to palpation  PSYCH: A+O to person, place, and time; affect appropriate    LABS:                        16.8   15.17 )-----------( 260      ( 23 Sep 2022 05:31 )             53.7         143  |  106  |  18  ----------------------------<  115<H>  4.7   |  23  |  1.11    Ca    9.6      23 Sep 2022 05:31  Phos  3.1       Mg     2.10         TPro  7.5  /  Alb  4.1  /  TBili  0.8  /  DBili  x   /  AST  14  /  ALT  13  /  AlkPhos  123<H>      PT/INR - ( 22 Sep 2022 21:17 )   PT: 13.5 sec;   INR: 1.16 ratio         PTT - ( 22 Sep 2022 21:17 )  PTT:36.1 sec      Urinalysis Basic - ( 22 Sep 2022 21:26 )    Color: Yellow / Appearance: Slightly Turbid / S.027 / pH: x  Gluc: x / Ketone: Negative  / Bili: Negative / Urobili: 3 mg/dL   Blood: x / Protein: 30 mg/dL / Nitrite: Positive   Leuk Esterase: Large / RBC: 1 /HPF /  /HPF   Sq Epi: x / Non Sq Epi: 0 /HPF / Bacteria: Many          RADIOLOGY & ADDITIONAL TESTS:  Results Reviewed:   Imaging Personally Reviewed:  Electrocardiogram Personally Reviewed:    COORDINATION OF CARE:  Care Discussed with Consultants/Other Providers [Y/N]:  Prior or Outpatient Records Reviewed [Y/N]:   PROGRESS NOTE:       Patient is a 66y old  Male who presents with a chief complaint of ?seizures (23 Sep 2022 04:18)      SUBJECTIVE / OVERNIGHT: Admitted overnight to medicine. Patient seen and evaluated at bedside. No fever/chills. Denies SOB, chest pain, palpitations, abdominal pain, N/V/D.     ADDITIONAL REVIEW OF SYSTEMS: As per HPI    MEDICATIONS  (STANDING):  atorvastatin 20 milliGRAM(s) Oral at bedtime  baclofen 20 milliGRAM(s) Oral every 12 hours  cefTRIAXone   IVPB 1000 milliGRAM(s) IV Intermittent every 24 hours  enoxaparin Injectable 40 milliGRAM(s) SubCutaneous every 24 hours  gabapentin 300 milliGRAM(s) Oral two times a day  influenza  Vaccine (HIGH DOSE) 0.7 milliLiter(s) IntraMuscular once  lacosamide 150 milliGRAM(s) Oral two times a day  levETIRAcetam 1000 milliGRAM(s) Oral two times a day  metoprolol succinate ER 50 milliGRAM(s) Oral daily  pregabalin 150 milliGRAM(s) Oral two times a day  senna 2 Tablet(s) Oral at bedtime  tamsulosin 0.8 milliGRAM(s) Oral at bedtime    MEDICATIONS  (PRN):  LORazepam   Injectable 1 milliGRAM(s) IV Push every 2 hours PRN status epilepticus      CAPILLARY BLOOD GLUCOSE        I&O's Summary      PHYSICAL EXAM:  Vital Signs Last 24 Hrs  T(C): 36.7 (23 Sep 2022 06:44), Max: 37.1 (22 Sep 2022 21:37)  T(F): 98 (23 Sep 2022 06:44), Max: 98.8 (22 Sep 2022 21:37)  HR: 64 (23 Sep 2022 06:44) (64 - 85)  BP: 134/68 (23 Sep 2022 06:44) (122/70 - 147/69)  BP(mean): --  RR: 18 (23 Sep 2022 06:44) (16 - 18)  SpO2: 100% (23 Sep 2022 06:44) (94% - 100%)    Parameters below as of 23 Sep 2022 06:44  Patient On (Oxygen Delivery Method): nasal cannula  O2 Flow (L/min): 2      CONSTITUTIONAL: NAD, well-developed  RESPIRATORY: Normal respiratory effort; lungs are clear to auscultation bilaterally  CARDIOVASCULAR: Regular rate and rhythm, normal S1 and S2, no murmur/rub/gallop; No lower extremity edema; Peripheral pulses are 2+ bilaterally  ABDOMEN: Nontender to palpation, normoactive bowel sounds, no rebound/guarding; No hepatosplenomegaly  MUSCLOSKELETAL: no clubbing or cyanosis of digits; no joint swelling or tenderness to palpation  PSYCH: A+O to person, place, and time; affect appropriate    LABS:                        16.8   15.17 )-----------( 260      ( 23 Sep 2022 05:31 )             53.7         143  |  106  |  18  ----------------------------<  115<H>  4.7   |  23  |  1.11    Ca    9.6      23 Sep 2022 05:31  Phos  3.1       Mg     2.10         TPro  7.5  /  Alb  4.1  /  TBili  0.8  /  DBili  x   /  AST  14  /  ALT  13  /  AlkPhos  123<H>      PT/INR - ( 22 Sep 2022 21:17 )   PT: 13.5 sec;   INR: 1.16 ratio         PTT - ( 22 Sep 2022 21:17 )  PTT:36.1 sec      Urinalysis Basic - ( 22 Sep 2022 21:26 )    Color: Yellow / Appearance: Slightly Turbid / S.027 / pH: x  Gluc: x / Ketone: Negative  / Bili: Negative / Urobili: 3 mg/dL   Blood: x / Protein: 30 mg/dL / Nitrite: Positive   Leuk Esterase: Large / RBC: 1 /HPF /  /HPF   Sq Epi: x / Non Sq Epi: 0 /HPF / Bacteria: Many          RADIOLOGY & ADDITIONAL TESTS:  Results Reviewed:   Imaging Personally Reviewed:  Electrocardiogram Personally Reviewed:    COORDINATION OF CARE:  Care Discussed with Consultants/Other Providers [Y/N]:  Prior or Outpatient Records Reviewed [Y/N]:   PROGRESS NOTE:       Patient is a 66y old  Male who presents with a chief complaint of ?seizures (23 Sep 2022 04:18)      SUBJECTIVE / OVERNIGHT: Admitted overnight to medicine. Patient seen and evaluated at bedside. No fever/chills. Denies SOB, chest pain, palpitations, abdominal pain, N/V/D.     ADDITIONAL REVIEW OF SYSTEMS: As per HPI    MEDICATIONS  (STANDING):  atorvastatin 20 milliGRAM(s) Oral at bedtime  baclofen 20 milliGRAM(s) Oral every 12 hours  cefTRIAXone   IVPB 1000 milliGRAM(s) IV Intermittent every 24 hours  enoxaparin Injectable 40 milliGRAM(s) SubCutaneous every 24 hours  gabapentin 300 milliGRAM(s) Oral two times a day  influenza  Vaccine (HIGH DOSE) 0.7 milliLiter(s) IntraMuscular once  lacosamide 150 milliGRAM(s) Oral two times a day  levETIRAcetam 1000 milliGRAM(s) Oral two times a day  metoprolol succinate ER 50 milliGRAM(s) Oral daily  pregabalin 150 milliGRAM(s) Oral two times a day  senna 2 Tablet(s) Oral at bedtime  tamsulosin 0.8 milliGRAM(s) Oral at bedtime    MEDICATIONS  (PRN):  LORazepam   Injectable 1 milliGRAM(s) IV Push every 2 hours PRN status epilepticus      CAPILLARY BLOOD GLUCOSE        I&O's Summary      PHYSICAL EXAM:  Vital Signs Last 24 Hrs  T(C): 36.7 (23 Sep 2022 06:44), Max: 37.1 (22 Sep 2022 21:37)  T(F): 98 (23 Sep 2022 06:44), Max: 98.8 (22 Sep 2022 21:37)  HR: 64 (23 Sep 2022 06:44) (64 - 85)  BP: 134/68 (23 Sep 2022 06:44) (122/70 - 147/69)  BP(mean): --  RR: 18 (23 Sep 2022 06:44) (16 - 18)  SpO2: 100% (23 Sep 2022 06:44) (94% - 100%)    Parameters below as of 23 Sep 2022 06:44  Patient On (Oxygen Delivery Method): nasal cannula  O2 Flow (L/min): 2     GENERAL: NAD  EYES: EOMI, anicteric sclera   HEENT:  Atraumatic, Normocephalic, moist mucous membranes  THYROID: Normal size, no palpable nodules  RESPIRATORY: Clear to auscultation bilaterally; No rales, rhonchi, wheezing  CARDIOVASCULAR: Regular rate and rhythm; No murmurs; no peripheral edema  GI: Soft, nontender, non distended, normal bowel sounds  SKIN: + dry/flaky, maculopapular rash of L forearm (pt w/ hx eczema)  NEURO: AAOx3, bedbound, 2/5 strength w/ flexion of LLL, 5/5 in all other extremities. CN II-XII wnl.       LABS:                        16.8   15.17 )-----------( 260      ( 23 Sep 2022 05:31 )             53.7         143  |  106  |  18  ----------------------------<  115<H>  4.7   |  23  |  1.11    Ca    9.6      23 Sep 2022 05:31  Phos  3.1       Mg     2.10         TPro  7.5  /  Alb  4.1  /  TBili  0.8  /  DBili  x   /  AST  14  /  ALT  13  /  AlkPhos  123<H>      PT/INR - ( 22 Sep 2022 21:17 )   PT: 13.5 sec;   INR: 1.16 ratio         PTT - ( 22 Sep 2022 21:17 )  PTT:36.1 sec      Urinalysis Basic - ( 22 Sep 2022 21:26 )    Color: Yellow / Appearance: Slightly Turbid / S.027 / pH: x  Gluc: x / Ketone: Negative  / Bili: Negative / Urobili: 3 mg/dL   Blood: x / Protein: 30 mg/dL / Nitrite: Positive   Leuk Esterase: Large / RBC: 1 /HPF /  /HPF   Sq Epi: x / Non Sq Epi: 0 /HPF / Bacteria: Many

## 2022-09-23 NOTE — PROGRESS NOTE ADULT - PROBLEM SELECTOR PLAN 2
-pt with h/o urinary retention, UA with large LE, nit, many bacteria. No prior cx data  -d/p vanc/zosyn in ED  -no hospitalizations in past 3 months needing pseudomonal coverage. Low concern for MRSA, no evidence of bone/joint/soft tissue infection  -WBC 18.46  -pt denies dysuria however given seizures could be related to underlying UTI will treat    Plan:  -IV ceftriaxone empirically  -f/u BCx, UCx  -c/w home flomax  -monitor bladder scans -pt with h/o urinary retention, UA with large LE, nit, many bacteria. No prior cx data  -s/p vanc/zosyn in ED  -no hospitalizations in past 3 months needing pseudomonal coverage. Low concern for MRSA, no evidence of bone/joint/soft tissue infection  -WBC 18.46  -pt denies dysuria however given seizures could be related to underlying UTI will treat    Plan:  -IV ceftriaxone empirically  -f/u BCx, UCx  -c/w home flomax  -monitor UOP

## 2022-09-23 NOTE — PATIENT PROFILE ADULT - FALL HARM RISK - HARM RISK INTERVENTIONS

## 2022-09-23 NOTE — PROGRESS NOTE ADULT - PROBLEM SELECTOR PLAN 4
-pt functionally bed bound  -pt previously discharged on eliquis 5 BID however not in surescripts and unclear if pt taking it  -no evidence of DVT/PEs currently  -currently holding eliquis, just on lovenox for dvt ppx -pt functionally bed bound  -pt previously discharged on eliquis 5 BID, however, has not been taking (patient said PCP wanted to dc it)  >currently holding eliquis  >on lovenox for dvt ppx

## 2022-09-23 NOTE — EEG REPORT - NS EEG TEXT BOX
MANUEL PETERSON [id number] 66y (1955)M  Admitting MD: Dr. Doug Liu    Study Date: 09-23-22    --------------------------------------------------------------------------------------------------  History:  CC/ HPI Patient is a 66y old  Male with focal epilepsy, right sided aneurysm repair who presents with a chief complaint of ?seizures right hand shaking w/ preserved awareness (23 Sep 2022 07:55)    aspirin  chewable 81 milliGRAM(s) Oral daily  atorvastatin 20 milliGRAM(s) Oral at bedtime  baclofen 20 milliGRAM(s) Oral every 12 hours  cefTRIAXone   IVPB 1000 milliGRAM(s) IV Intermittent every 24 hours  enoxaparin Injectable 40 milliGRAM(s) SubCutaneous every 24 hours  influenza  Vaccine (HIGH DOSE) 0.7 milliLiter(s) IntraMuscular once  lacosamide 150 milliGRAM(s) Oral two times a day  levETIRAcetam 1000 milliGRAM(s) Oral two times a day  metoprolol succinate ER 50 milliGRAM(s) Oral daily  pregabalin 150 milliGRAM(s) Oral two times a day  senna 2 Tablet(s) Oral at bedtime  tamsulosin 0.8 milliGRAM(s) Oral at bedtime      --------------------------------------------------------------------------------------------------  Study Interpretation:    [[[Abbreviation Key:  PDR=alpha rhythm/posterior dominant rhythm. A-P=anterior posterior gradient.  Amplitude: ‘very low’:<20; ‘low’:20-50; ‘medium’:; ‘high’:>200uV.  Persistence for periodic/rhythmic patterns (% of epoch) ‘rare’:<1%; ‘occasional’:1-10%; ‘frequent’:10-50%; ‘abundant’:50-90%; ‘continuous’:>90%.  Persistence for sporadic discharges: ‘rare’:<1/hr; ‘occasional’:1/min-1/hr; ‘frequent’:>1/min; ‘abundant’:>1/10 sec.  GRDA=generalized rhythmic delta activity; FIRDA=frontal intermittent GRDA; LRDA=lateralized rhythmic delta activity; TIRDA=temporal intermittent rhythmic delta activity;  LPD=PLED=lateralized periodic discharges; GPD=generalized periodic discharges; BiPDs=BiPLEDs=bilateral independent periodic epileptiform discharges; SIRPID=stimulus induced rhythmic, periodic, or ictal appearing discharges; BIRDs=brief potentially ictal rhythmic discharges >4 Hz, lasting .5-10s; PFA (paroxysmal bursts >13 Hz or =8 Hz).  Modifiers: +F=with fast component; +S=with spike component; +R=with rhythmic component.  S-B=burst suppression pattern.  Max=maximal. N1-drowsy; N2-stage II sleep; N3-slow wave sleep. SSS/BETS=small sharp spikes/benign epileptiform transients of sleep. HV=hyperventilation; PS=photic stimulation]]]    FINDINGS:  The background was continuous, spontaneously variable and reactive.  During wakefulness, the posteriorly dominant rhythm consisted of symmetric, well modulated x Hz activity, with an amplitude to 40 uV, that attenuated to eye opening.  Low amplitude central beta was noted in wakefulness.    Background Slowing:  Generalized slowing: diffuse theta polymorphic delta slowing.   Focal slowing: Continuous right frontal-temporal polymorphic delta slowing,   Breach rhythm over the right frontocentral region characterized with higher amplitudes.     Sleep Background:  Drowsiness was characterized by fragmentation, attenuation, and slowing of the background activity.    N2 was characterized by the presence of K-complexes.    Epileptiform Activity:   No interictal epileptiform discharges were present.    Events:  No clinical events were recorded.  No seizures were recorded.    Activation Procedures:   Hyperventilation was not performed.    Photic stimulation was performed and did not elicit any abnormalities.      Artifacts:  Intermittent myogenic and external motion artifacts were noted.    ECG:  The heart rate on single channel ECG at baseline was predominantly near BPM =  60-70  -----------------------------------------------------------------------------------------------------    EEG Classification / Summary:  Normal EEG study, awake / drowsy / asleep      -Continuous right frontal-temporal polymorphic delta slowing,   - Background slowing, generalized,  mild to moderate   - Breach rhythm over the right frontocentral region c  -----------------------------------------------------------------------------------------------------    Clinical Impression:    - Regional structural pathology in the right frontal-central region.   - Mild-moderate diffuse/multifocal cerebral dysfunction.   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  - There were no epileptiform abnormalities recorded.      This is a preliminary read     -------------------------------------------------------------------------------------------------------  [facility] EEG Reading Room Ph#: [phone]  Epilepsy Answering Service after 5PM and before 8:30AM: Ph#: [phone]      Fredrick Russell MD   Epilepsy Fellow, [facility] [place]	   MANUEL PETERSON [id number] (1955)M  Admitting MD: Dr. Doug Liu    Study Date: 09-23-22    --------------------------------------------------------------------------------------------------  History:  CC/ HPI Patient is a 66y old  Male with focal epilepsy, right sided aneurysm repair who presents with a chief complaint of ?seizures right hand shaking w/ preserved awareness (23 Sep 2022 07:55)    aspirin  chewable 81 milliGRAM(s) Oral daily  atorvastatin 20 milliGRAM(s) Oral at bedtime  baclofen 20 milliGRAM(s) Oral every 12 hours  cefTRIAXone   IVPB 1000 milliGRAM(s) IV Intermittent every 24 hours  enoxaparin Injectable 40 milliGRAM(s) SubCutaneous every 24 hours  influenza  Vaccine (HIGH DOSE) 0.7 milliLiter(s) IntraMuscular once  lacosamide 150 milliGRAM(s) Oral two times a day  levETIRAcetam 1000 milliGRAM(s) Oral two times a day  metoprolol succinate ER 50 milliGRAM(s) Oral daily  pregabalin 150 milliGRAM(s) Oral two times a day  senna 2 Tablet(s) Oral at bedtime  tamsulosin 0.8 milliGRAM(s) Oral at bedtime      --------------------------------------------------------------------------------------------------  Study Interpretation:    [[[Abbreviation Key:  PDR=alpha rhythm/posterior dominant rhythm. A-P=anterior posterior gradient.  Amplitude: ‘very low’:<20; ‘low’:20-50; ‘medium’:; ‘high’:>200uV.  Persistence for periodic/rhythmic patterns (% of epoch) ‘rare’:<1%; ‘occasional’:1-10%; ‘frequent’:10-50%; ‘abundant’:50-90%; ‘continuous’:>90%.  Persistence for sporadic discharges: ‘rare’:<1/hr; ‘occasional’:1/min-1/hr; ‘frequent’:>1/min; ‘abundant’:>1/10 sec.  GRDA=generalized rhythmic delta activity; FIRDA=frontal intermittent GRDA; LRDA=lateralized rhythmic delta activity; TIRDA=temporal intermittent rhythmic delta activity;  LPD=PLED=lateralized periodic discharges; GPD=generalized periodic discharges; BiPDs=BiPLEDs=bilateral independent periodic epileptiform discharges; SIRPID=stimulus induced rhythmic, periodic, or ictal appearing discharges; BIRDs=brief potentially ictal rhythmic discharges >4 Hz, lasting .5-10s; PFA (paroxysmal bursts >13 Hz or =8 Hz).  Modifiers: +F=with fast component; +S=with spike component; +R=with rhythmic component.  S-B=burst suppression pattern.  Max=maximal. N1-drowsy; N2-stage II sleep; N3-slow wave sleep. SSS/BETS=small sharp spikes/benign epileptiform transients of sleep. HV=hyperventilation; PS=photic stimulation]]]    FINDINGS:  The background was continuous, spontaneously variable and reactive.  During wakefulness, the posteriorly dominant rhythm consisted of asymmetric, poorly modulated 7 Hz activity, with an amplitude to 30 uV.  Low amplitude central beta was noted in wakefulness.    Background Slowing:  Generalized slowing: diffuse theta polymorphic delta slowing.   Focal slowing: Continuous right frontal-temporal polymorphic delta slowing,   Breach rhythm over the right frontocentral region characterized with higher amplitudes.     Sleep Background:  Drowsiness was characterized by fragmentation, attenuation, and slowing of the background activity.    N2 was characterized by the presence of K-complexes.    Epileptiform Activity:   No interictal epileptiform discharges were present.    Events:  No clinical events were recorded.  No seizures were recorded.    Activation Procedures:   Hyperventilation was not performed.    Photic stimulation was performed and did not elicit any abnormalities.      Artifacts:  Intermittent myogenic and external motion artifacts were noted.    ECG:  The heart rate on single channel ECG at baseline was predominantly near BPM =  60-70  -----------------------------------------------------------------------------------------------------    EEG Classification / Summary:  Normal EEG study, awake / drowsy / asleep      - Continuous polymorphic delta slowing, focal, right frontal-temporal   - Background slowing, generalized,  mild to moderate   - Breach rhythm over the right frontocentral region   -----------------------------------------------------------------------------------------------------    Clinical Impression:    - Regional structural pathology in the right frontal-central region.   - Mild-moderate diffuse/multifocal cerebral dysfunction.   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  - There were no epileptiform abnormalities recorded.      -------------------------------------------------------------------------------------------------------  [facility] EEG Reading Room Ph#: [phone]  Epilepsy Answering Service after 5PM and before 8:30AM: Ph#: [phone]      Fredrick Russell MD   Epilepsy Fellow, [facility]	    Faisal Batres MD PhD  Director, Epilepsy Division, [facility] includes the [facility]

## 2022-09-23 NOTE — CONSULT NOTE ADULT - ASSESSMENT
67 yo  M with CAD s/p RCA stent, brain aneursym s/p repair (1997) not compatible w/ MRI, seizure disorder on AEDs (keppra and vimpat), back surgery x4 and femur frx (5/2022) functionally bed bound p/w episodes of staring, unresponsiveness, shaking, and hypoxia. was unable to control R limb shaking, became hypoxic to 80s.  pt was admitted to [facility] 5/2022 for R limb shaking and unresponsiveness concerning for status epilepticus requiring intubation. He follows with neurology, Dr. Emma Ballard. States he takes keppra, vimpat, gabapentin and hasn't missed doses  UA+ in ED   CTH chronic chagnes s/p R cariniotomy and aneurysm clip. gliosis R>L frontal lobes     Impression: 1) cerebral aneurysm, treated 2) Epilpesy (h/o of status) 3) UTI  - possible seizure in setting of infection which can lower seizure threshold  - treatment of infection/UTI now on CTX  - would continue with keppra 1g BID and vimpat 150mg BID ; wife is concerened that these doses are too high.  I would not lower these dosages at this time   - f/u EEG; prelim no seizures f/u official   - also on baclofen 20 BID and lyrica 150mg BID.  these are high doses, consider tapering down   - seizure precuations  - neuro checks   - on ASA and statin therapy   - telemetry  - PT/OT/SS/SLP, OOBC  - check FS, glucose control <180  - GI/DVT ppx  - Counseling on diet, exercise, and medication adherence was done  - Counseling on smoking cessation and alcohol consumption offered when appropriate.  - Pain assessed and judicious use of narcotics when appropriate was discussed.    - Stroke education given when appropriate.  - Importance of fall prevention discussed.   - Differential diagnosis and plan of care discussed with patient and/or family and primary team  - Thank you for allowing me to participate in the care of this patient. Call with questions.   - spoke with patient and wife at bedside.    outpatient f/u with dr. ballard on discharge  Timothy Noble MD  Vascular Neurology  Office: [phone]

## 2022-09-23 NOTE — H&P ADULT - PROBLEM SELECTOR PLAN 5
s/p MI and RCA stent 5y ago  -c/w atorvastatin  -unclear if pt on any antiplatelets, would clarify with wife s/p MI and RCA stent 5y ago  -c/w atorvastatin  -unclear if pt on any antiplatelets, would clarify with wife  -c/w home metoprolol 50 ER qd with hold parameters

## 2022-09-24 ENCOUNTER — TRANSCRIPTION ENCOUNTER (OUTPATIENT)
Age: 67
End: 2022-09-24

## 2022-09-24 LAB
ALBUMIN SERPL ELPH-MCNC: 3.9 G/DL — SIGNIFICANT CHANGE UP (ref 3.3–5)
ALP SERPL-CCNC: 118 U/L — SIGNIFICANT CHANGE UP (ref 40–120)
ALT FLD-CCNC: 21 U/L — SIGNIFICANT CHANGE UP (ref 4–41)
ANION GAP SERPL CALC-SCNC: 13 MMOL/L — SIGNIFICANT CHANGE UP (ref 7–14)
AST SERPL-CCNC: 19 U/L — SIGNIFICANT CHANGE UP (ref 4–40)
BILIRUB SERPL-MCNC: 0.4 MG/DL — SIGNIFICANT CHANGE UP (ref 0.2–1.2)
BUN SERPL-MCNC: 22 MG/DL — SIGNIFICANT CHANGE UP (ref 7–23)
CALCIUM SERPL-MCNC: 9.3 MG/DL — SIGNIFICANT CHANGE UP (ref 8.4–10.5)
CHLORIDE SERPL-SCNC: 104 MMOL/L — SIGNIFICANT CHANGE UP (ref 98–107)
CO2 SERPL-SCNC: 24 MMOL/L — SIGNIFICANT CHANGE UP (ref 22–31)
CREAT SERPL-MCNC: 1.29 MG/DL — SIGNIFICANT CHANGE UP (ref 0.5–1.3)
EGFR: 61 ML/MIN/1.73M2 — SIGNIFICANT CHANGE UP
GLUCOSE SERPL-MCNC: 132 MG/DL — HIGH (ref 70–99)
HCT VFR BLD CALC: 48.8 % — SIGNIFICANT CHANGE UP (ref 39–50)
HCV AB S/CO SERPL IA: 0.08 S/CO — SIGNIFICANT CHANGE UP (ref 0–0.99)
HCV AB SERPL-IMP: SIGNIFICANT CHANGE UP
HGB BLD-MCNC: 15.4 G/DL — SIGNIFICANT CHANGE UP (ref 13–17)
MAGNESIUM SERPL-MCNC: 2.2 MG/DL — SIGNIFICANT CHANGE UP (ref 1.6–2.6)
MCHC RBC-ENTMCNC: 29.8 PG — SIGNIFICANT CHANGE UP (ref 27–34)
MCHC RBC-ENTMCNC: 31.6 GM/DL — LOW (ref 32–36)
MCV RBC AUTO: 94.6 FL — SIGNIFICANT CHANGE UP (ref 80–100)
NRBC # BLD: 0 /100 WBCS — SIGNIFICANT CHANGE UP (ref 0–0)
NRBC # FLD: 0 K/UL — SIGNIFICANT CHANGE UP (ref 0–0)
PHOSPHATE SERPL-MCNC: 2.1 MG/DL — LOW (ref 2.5–4.5)
PLATELET # BLD AUTO: 205 K/UL — SIGNIFICANT CHANGE UP (ref 150–400)
POTASSIUM SERPL-MCNC: 4.6 MMOL/L — SIGNIFICANT CHANGE UP (ref 3.5–5.3)
POTASSIUM SERPL-SCNC: 4.6 MMOL/L — SIGNIFICANT CHANGE UP (ref 3.5–5.3)
PROT SERPL-MCNC: 7.2 G/DL — SIGNIFICANT CHANGE UP (ref 6–8.3)
RBC # BLD: 5.16 M/UL — SIGNIFICANT CHANGE UP (ref 4.2–5.8)
RBC # FLD: 15.2 % — HIGH (ref 10.3–14.5)
SODIUM SERPL-SCNC: 141 MMOL/L — SIGNIFICANT CHANGE UP (ref 135–145)
WBC # BLD: 8.53 K/UL — SIGNIFICANT CHANGE UP (ref 3.8–10.5)
WBC # FLD AUTO: 8.53 K/UL — SIGNIFICANT CHANGE UP (ref 3.8–10.5)

## 2022-09-24 RX ORDER — ACETAMINOPHEN 500 MG
650 TABLET ORAL EVERY 6 HOURS
Refills: 0 | Status: DISCONTINUED | OUTPATIENT
Start: 2022-09-24 | End: 2022-09-27

## 2022-09-24 RX ORDER — ATORVASTATIN CALCIUM 80 MG/1
1 TABLET, FILM COATED ORAL
Qty: 0 | Refills: 0 | DISCHARGE
Start: 2022-09-24

## 2022-09-24 RX ORDER — SODIUM,POTASSIUM PHOSPHATES 278-250MG
1 POWDER IN PACKET (EA) ORAL EVERY 6 HOURS
Refills: 0 | Status: COMPLETED | OUTPATIENT
Start: 2022-09-24 | End: 2022-09-24

## 2022-09-24 RX ORDER — ASPIRIN/CALCIUM CARB/MAGNESIUM 324 MG
1 TABLET ORAL
Qty: 0 | Refills: 0 | DISCHARGE
Start: 2022-09-24

## 2022-09-24 RX ORDER — ACETAMINOPHEN 500 MG
650 TABLET ORAL ONCE
Refills: 0 | Status: COMPLETED | OUTPATIENT
Start: 2022-09-24 | End: 2022-09-24

## 2022-09-24 RX ORDER — BACLOFEN 100 %
20 POWDER (GRAM) MISCELLANEOUS DAILY
Refills: 0 | Status: DISCONTINUED | OUTPATIENT
Start: 2022-09-24 | End: 2022-09-25

## 2022-09-24 RX ORDER — SIMETHICONE 80 MG/1
80 TABLET, CHEWABLE ORAL ONCE
Refills: 0 | Status: COMPLETED | OUTPATIENT
Start: 2022-09-24 | End: 2022-09-24

## 2022-09-24 RX ORDER — BACLOFEN 100 %
5 POWDER (GRAM) MISCELLANEOUS DAILY
Refills: 0 | Status: DISCONTINUED | OUTPATIENT
Start: 2022-09-24 | End: 2022-09-24

## 2022-09-24 RX ADMIN — SIMETHICONE 80 MILLIGRAM(S): 80 TABLET, CHEWABLE ORAL at 21:58

## 2022-09-24 RX ADMIN — LACOSAMIDE 150 MILLIGRAM(S): 50 TABLET ORAL at 05:55

## 2022-09-24 RX ADMIN — LEVETIRACETAM 1000 MILLIGRAM(S): 250 TABLET, FILM COATED ORAL at 17:40

## 2022-09-24 RX ADMIN — LEVETIRACETAM 1000 MILLIGRAM(S): 250 TABLET, FILM COATED ORAL at 05:55

## 2022-09-24 RX ADMIN — Medication 50 MILLIGRAM(S): at 05:55

## 2022-09-24 RX ADMIN — Medication 150 MILLIGRAM(S): at 05:55

## 2022-09-24 RX ADMIN — Medication 1 PACKET(S): at 17:39

## 2022-09-24 RX ADMIN — LACOSAMIDE 150 MILLIGRAM(S): 50 TABLET ORAL at 17:39

## 2022-09-24 RX ADMIN — Medication 1 PACKET(S): at 12:04

## 2022-09-24 RX ADMIN — Medication 20 MILLIGRAM(S): at 05:55

## 2022-09-24 RX ADMIN — ATORVASTATIN CALCIUM 20 MILLIGRAM(S): 80 TABLET, FILM COATED ORAL at 21:58

## 2022-09-24 RX ADMIN — CEFTRIAXONE 100 MILLIGRAM(S): 500 INJECTION, POWDER, FOR SOLUTION INTRAMUSCULAR; INTRAVENOUS at 05:56

## 2022-09-24 RX ADMIN — Medication 650 MILLIGRAM(S): at 09:47

## 2022-09-24 RX ADMIN — Medication 20 MILLIGRAM(S): at 18:50

## 2022-09-24 RX ADMIN — Medication 81 MILLIGRAM(S): at 12:04

## 2022-09-24 RX ADMIN — TAMSULOSIN HYDROCHLORIDE 0.8 MILLIGRAM(S): 0.4 CAPSULE ORAL at 21:58

## 2022-09-24 RX ADMIN — ENOXAPARIN SODIUM 40 MILLIGRAM(S): 100 INJECTION SUBCUTANEOUS at 17:40

## 2022-09-24 RX ADMIN — SENNA PLUS 2 TABLET(S): 8.6 TABLET ORAL at 21:58

## 2022-09-24 RX ADMIN — Medication 150 MILLIGRAM(S): at 17:39

## 2022-09-24 RX ADMIN — Medication 650 MILLIGRAM(S): at 09:17

## 2022-09-24 NOTE — PROGRESS NOTE ADULT - SUBJECTIVE AND OBJECTIVE BOX
`Neurology Progress Note    S: Patient seen and examined. No new events overnight. patient denied CP, SOB, HA or pain.     Medication:  acetaminophen     Tablet .. 650 milliGRAM(s) Oral every 6 hours PRN  aspirin  chewable 81 milliGRAM(s) Oral daily  atorvastatin 20 milliGRAM(s) Oral at bedtime  baclofen 20 milliGRAM(s) Oral every 12 hours  cefTRIAXone   IVPB 1000 milliGRAM(s) IV Intermittent every 24 hours  enoxaparin Injectable 40 milliGRAM(s) SubCutaneous every 24 hours  influenza  Vaccine (HIGH DOSE) 0.7 milliLiter(s) IntraMuscular once  lacosamide 150 milliGRAM(s) Oral two times a day  levETIRAcetam 1000 milliGRAM(s) Oral two times a day  metoprolol succinate ER 50 milliGRAM(s) Oral daily  potassium phosphate / sodium phosphate Powder (PHOS-NaK) 1 Packet(s) Oral every 6 hours  pregabalin 150 milliGRAM(s) Oral two times a day  senna 2 Tablet(s) Oral at bedtime  tamsulosin 0.8 milliGRAM(s) Oral at bedtime      Vitals:  Vital Signs Last 24 Hrs  T(C): 36.9 (24 Sep 2022 12:03), Max: 37.1 (23 Sep 2022 21:14)  T(F): 98.4 (24 Sep 2022 12:03), Max: 98.7 (23 Sep 2022 21:14)  HR: 60 (24 Sep 2022 12:03) (60 - 79)  BP: 111/72 (24 Sep 2022 12:03) (111/72 - 131/69)  BP(mean): --  RR: 18 (24 Sep 2022 12:03) (16 - 18)  SpO2: 97% (24 Sep 2022 12:03) (96% - 100%)    Parameters below as of 24 Sep 2022 12:03  Patient On (Oxygen Delivery Method): room air        General Exam:   General Appearance: Appropriately dressed and in no acute distress       Head: Normocephalic, atraumatic and no dysmorphic features  Ear, Nose, and Throat: Moist mucous membranes  CVS: S1S2+  Resp: No SOB, no wheeze or rhonchi  Abd: soft NTND  Extremities: No edema, no cyanosis  Skin: No bruises, no rashes     Neurological Exam:     Mental Status: Awake, alert and oriented x 3.  Able to follow simple and complex verbal commands. Able to name and repeat. fluent speech. No obvious aphasia or dysarthria noted.   Cranial Nerves: PERRL, EOMI, VFFC, sensation V1-V3 intact,  no obvious facial asymmetry, equal elevation of palate, scm/trap 5/5, tongue is midline on protrusion. no obvious papilledema on fundoscopic exam. hearing is grossly intact.   Motor: Normal bulk, tone and strength throughout except RLE limited 2/2 fx. Fine finger movements were intact and symmetric. no tremors or drift noted.    Sensation: Intact to light touch and pinprick throughout. no right/left confusion. no extinction to tactile on DSS.    Reflexes: 1+ throughout at biceps, brachioradialis, triceps, patellars and ankles bilaterally and equal. No clonus. R toe and L toe were both downgoing.  Coordination: No dysmetria on FNF   Gait: unable     I personally reviewed the below data/images/labs:      CBC Full  -  ( 24 Sep 2022 06:53 )  WBC Count : 8.53 K/uL  RBC Count : 5.16 M/uL  Hemoglobin : 15.4 g/dL  Hematocrit : 48.8 %  Platelet Count - Automated : 205 K/uL  Mean Cell Volume : 94.6 fL  Mean Cell Hemoglobin : 29.8 pg  Mean Cell Hemoglobin Concentration : 31.6 gm/dL  Auto Neutrophil # : x  Auto Lymphocyte # : x  Auto Monocyte # : x  Auto Eosinophil # : x  Auto Basophil # : x  Auto Neutrophil % : x  Auto Lymphocyte % : x  Auto Monocyte % : x  Auto Eosinophil % : x  Auto Basophil % : x        141  |  104  |  22  ----------------------------<  132<H>  4.6   |  24  |  1.29    Ca    9.3      24 Sep 2022 06:53  Phos  2.1     -  Mg     2.20     -    TPro  7.2  /  Alb  3.9  /  TBili  0.4  /  DBili  x   /  AST  19  /  ALT  21  /  AlkPhos  118      LIVER FUNCTIONS - ( 24 Sep 2022 06:53 )  Alb: 3.9 g/dL / Pro: 7.2 g/dL / ALK PHOS: 118 U/L / ALT: 21 U/L / AST: 19 U/L / GGT: x           PT/INR - ( 22 Sep 2022 21:17 )   PT: 13.5 sec;   INR: 1.16 ratio         PTT - ( 22 Sep 2022 21:17 )  PTT:36.1 sec  Urinalysis Basic - ( 22 Sep 2022 21:26 )    Color: Yellow / Appearance: Slightly Turbid / S.027 / pH: x  Gluc: x / Ketone: Negative  / Bili: Negative / Urobili: 3 mg/dL   Blood: x / Protein: 30 mg/dL / Nitrite: Positive   Leuk Esterase: Large / RBC: 1 /HPF /  /HPF   Sq Epi: x / Non Sq Epi: 0 /HPF / Bacteria: Many        < from: CT Head No Cont (22 @ 23:12) >    ACC: [id number] EXAM:  CT BRAIN                          PROCEDURE DATE:  2022          INTERPRETATION:  CLINICAL INFORMATION: Altered mental status    TECHNIQUE : Noncontrast axial CT images were acquired through the head.    Sagittal and coronal reformats were performed.    COMPARISON: No prior imaging available for comparison.    FINDINGS:  The patient is status post right pterional craniotomy.  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  There are   chronic areas of encephalomalacia/gliosis in the right greater than left   frontal lobes.    There is no CT evidence of acute intracranial hemorrhage, subdural   collection, mass effect, midline shift or acute territorial infarct.    The visualized paranasal sinuses and the mastoids are grossly clear.    The patient is status post intraocular lens replacement bilaterally.    There is no calvarial skull base fracture.    IMPRESSION:  No CT evidence of acute intracranial pathology.    The patient is status post right pterional craniotomy and aneurysm   clipping with chronic areas of encephalomalacia/gliosis in the right   greater than left frontal lobes.    --- End of Report ---          TAMIE BARRAZA DO; Resident Radiologist  This document has been electronically signed.  RAOUL RUGGIERO MD; Attending Radiologist  This document has been electronically signed. Sep 23 2022  2:30AM    < end of copied text >      EEG Classification / Summary:  Normal EEG study, awake / drowsy / asleep      - Continuous polymorphic delta slowing, focal, right frontal-temporal   - Background slowing, generalized,  mild to moderate   - Breach rhythm over the right frontocentral region   -----------------------------------------------------------------------------------------------------    Clinical Impression:    - Regional structural pathology in the right frontal-central region.   - Mild-moderate diffuse/multifocal cerebral dysfunction.   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  - There were no epileptiform abnormalities recorded.

## 2022-09-24 NOTE — DISCHARGE NOTE PROVIDER - NSDCCPCAREPLAN_GEN_ALL_CORE_FT
PRINCIPAL DISCHARGE DIAGNOSIS  Diagnosis: Altered mental status  Assessment and Plan of Treatment: You came to the hospital after experiencing an episode of altered mental status (staring, unresponsive). Given your history of seizures, there was concern that you might possibly be having a seizure. We consulted your neurologist to come evaluate you. He recommended that we perform an EEG to see if there was any seizure activity in your brain and it showed _____.  We also did a CT scan of your head that showed no signs of bleeding or stroke.  We continued you on your home anti-seizure medications. You were cleared by neurology and your primary team for discharge home.   Please continue to take your anti-seizure medications as prescribed and follow-up with your neurologist within 1-2 weeks from  If you develop any further episodes of staring, confusion, or shaking/rigidity of the body, please seek urgent medical attention.         SECONDARY DISCHARGE DIAGNOSES  Diagnosis: Acute UTI  Assessment and Plan of Treatment: When you came to the hospital, you were found to have a UTI. We started you on a medication.     PRINCIPAL DISCHARGE DIAGNOSIS  Diagnosis: Altered mental status  Assessment and Plan of Treatment: You came to the hospital after experiencing an episode of altered mental status (staring, unresponsive). Given your history of seizures, there was concern that you might possibly be having a seizure. We consulted your neurologist to come evaluate you. He recommended that we perform an EEG to see if there was any seizure activity in your brain and it showed no signs of seizure.  We also did a CT scan of your head that showed no signs of bleeding or stroke.  We continued you on your home anti-seizure medications. We reduced your baclofen to 20mg once a day as needed at the request of your neurologist. You were cleared by neurology and your primary team for discharge home.   Please continue to take your anti-seizure medications as prescribed and follow-up with your neurologist within 1-2 weeks from  If you develop any further episodes of staring, confusion, or shaking/rigidity of the body, please seek urgent medical attention.         SECONDARY DISCHARGE DIAGNOSES  Diagnosis: Acute UTI  Assessment and Plan of Treatment: When you came to the hospital, you were found to have a UTI. We believe that this urinary tract infection may have been the cause of your episode of altered mental status. We started you on a broad spectrim antibiotic called ceftriaxone to treat your UTI. You received this medication for ____ days. You were deemed medically stable for discharge home.  Please follow-up with your primary medical doctor within 1 week from  Please continue to take ____ for ____ days for treatment of your urinary tract infection. If you developing any confusion, blood in the urine, or fever, please seek medical attention.     PRINCIPAL DISCHARGE DIAGNOSIS  Diagnosis: Altered mental status  Assessment and Plan of Treatment: You came to the hospital after experiencing an episode of altered mental status (staring, unresponsive). Given your history of seizures, there was concern that you might possibly be having a seizure. We consulted your neurologist to come evaluate you. He recommended that we perform an EEG to see if there was any seizure activity in your brain and it showed no signs of seizure.  We also did a CT scan of your head that showed no signs of bleeding or stroke.  We continued you on your home anti-seizure medications. We reduced your baclofen to 20mg once a day as needed at the request of your neurologist. You were cleared by neurology and your primary team for discharge home.   Please continue to take your anti-seizure medications as prescribed and follow-up with your neurologist within 1-2 weeks from  Your baclofen dose was decreased per the request of your neurologist. Please take your baclofen at its new dose: 20 mg every 12 hours as needed. If you develop any further episodes of staring, confusion, or shaking/rigidity of the body, please seek urgent medical attention.         SECONDARY DISCHARGE DIAGNOSES  Diagnosis: Acute UTI  Assessment and Plan of Treatment: When you came to the hospital, you were found to have a UTI. We believe that this urinary tract infection may have been the cause of your episode of altered mental status. We started you on a broad spectrim antibiotic called ceftriaxone to treat your UTI. You received this medication for 5 days. You were deemed medically stable for discharge home.  Please follow-up with your primary medical doctor within 1 week from  Please continue to take ciprofloxacin 500mg twice a day for 5 more days (9/28-10/2) for treatment of your urinary tract infection. If you developing any confusion, blood in the urine, or fever, please seek medical attention.     PRINCIPAL DISCHARGE DIAGNOSIS  Diagnosis: Altered mental status  Assessment and Plan of Treatment: You came to the hospital after experiencing an episode of altered mental status (staring, unresponsive). Given your history of seizures, there was concern that you might possibly be having a seizure. We consulted your neurologist to come evaluate you. He recommended that we perform an EEG to see if there was any seizure activity in your brain and it showed no signs of seizure.  We also did a CT scan of your head that showed no signs of bleeding or stroke.  We continued you on your home anti-seizure medications. We reduced your baclofen to 20mg once a day as needed at the request of your neurologist. You were cleared by neurology and your primary team for discharge home.   Please continue to take your anti-seizure medications as prescribed and follow-up with your neurologist within 1-2 weeks from  Your baclofen dose was decreased per the request of your neurologist. Please take your baclofen at its new dose: 20 mg once a day as needed. If you develop any further episodes of staring, confusion, or shaking/rigidity of the body, please seek urgent medical attention.         SECONDARY DISCHARGE DIAGNOSES  Diagnosis: Acute UTI  Assessment and Plan of Treatment: When you came to the hospital, you were found to have a UTI. We believe that this urinary tract infection may have been the cause of your episode of altered mental status. We started you on a broad spectrim antibiotic called ceftriaxone to treat your UTI. You received this medication for 5 days. You were deemed medically stable for discharge home.  Please follow-up with your primary medical doctor within 1 week from  Please continue to take ciprofloxacin 500mg twice a day for 5 more days (9/28-10/2) for treatment of your urinary tract infection. If you developing any confusion, blood in the urine, or fever, please seek medical attention.

## 2022-09-24 NOTE — PROGRESS NOTE ADULT - PROBLEM SELECTOR PLAN 4
-pt functionally bed bound  -pt previously discharged on eliquis 5 BID however not in surescripts and unclear if pt taking it  -no evidence of DVT/PEs currently  -currently holding eliquis, just on lovenox for dvt ppx -pt functionally bed bound  -no evidence of DVT/PEs currently  -was on eliquis previously, no current need

## 2022-09-24 NOTE — PROGRESS NOTE ADULT - PROBLEM SELECTOR PLAN 6
-pt is unsure of medications, says wife has list however wife did not  phone for med rec overnight  -current med list obtained from [facility] and last DC summary from [facility] 5/2022  -pt was discharged on eliquis last admission but unclear if he is still on it, PTT nl and not on surescripts.  [ ] Med rec in AM with wife DVT ppx: lovenox qd  Diet: DASH  Dispo: pending eval

## 2022-09-24 NOTE — DISCHARGE NOTE PROVIDER - NSDCCPTREATMENT_GEN_ALL_CORE_FT
PRINCIPAL PROCEDURE  Procedure: CT head wo con  Findings and Treatment: FINDINGS:  The patient is status post right pterional craniotomy.  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  There are   chronic areas of encephalomalacia/gliosis in the right greater than left   frontal lobes.  There is no CT evidence of acute intracranial hemorrhage, subdural   collection, mass effect, midline shift or acute territorial infarct.  The visualized paranasal sinuses and the mastoids are grossly clear.  The patient is status post intraocular lens replacement bilaterally.  There is no calvarial skull base fracture.  IMPRESSION:  No CT evidence of acute intracranial pathology.  The patient is status post right pterional craniotomy and aneurysm   clipping with chronic areas of encephalomalacia/gliosis in the right   greater than left frontal lobes.

## 2022-09-24 NOTE — DISCHARGE NOTE PROVIDER - NSDCFUADDAPPT_GEN_ALL_CORE_FT
Please follow-up with your neurologist within 1-2 weeks from your discharge date and follow-up with your primary medical doctor within 1 week from discharge.

## 2022-09-24 NOTE — PROGRESS NOTE ADULT - PROBLEM SELECTOR PLAN 2
-pt with h/o urinary retention, UA with large LE, nit, many bacteria. No prior cx data  -d/p vanc/zosyn in ED  -no hospitalizations in past 3 months needing pseudomonal coverage. Low concern for MRSA, no evidence of bone/joint/soft tissue infection  -WBC 18.46  -pt denies dysuria however given seizures could be related to underlying UTI will treat    Plan:  -IV ceftriaxone empirically  -f/u BCx, UCx  -c/w home flomax  -monitor bladder scans -pt with h/o urinary retention, UA with large LE, nit, many bacteria. No prior cx data  -d/p vanc/zosyn in ED  -no hospitalizations in past 3 months needing pseudomonal coverage. Low concern for MRSA, no evidence of bone/joint/soft tissue infection  -WBC 18.46  -pt denies dysuria however given seizures could be related to underlying UTI will treat    Plan:  -IV ceftriaxone empirically  -BCx NGTD  -UCx pending  -c/w home flomax  -monitor bladder scans -pt with h/o urinary retention, UA with large LE, nit, many bacteria. No prior cx data  -d/p vanc/zosyn in ED  -no hospitalizations in past 3 months needing pseudomonal coverage. Low concern for MRSA, no evidence of bone/joint/soft tissue infection  -WBC 18.46  -pt denies dysuria however given seizures could be related to underlying UTI will treat    Plan:  -IV ceftriaxone empirically  -BCx NGTD  -UCx pending, will treat for 10 days   -c/w home flomax  -monitor bladder scans -pt with h/o urinary retention, UA with large LE, nit, many bacteria. No prior cx data  -d/p vanc/zosyn in ED  -no hospitalizations in past 3 months needing pseudomonal coverage. Low concern for MRSA, no evidence of bone/joint/soft tissue infection  -WBC 18.46  -pt denies dysuria however given seizures could be related to underlying UTI will treat    Plan:  -IV ceftriaxone empirically  -BCx NGTD  -UCx pending  -will likely dc on abx for total 10 day course   -c/w home flomax  -monitor bladder scans

## 2022-09-24 NOTE — DISCHARGE NOTE PROVIDER - NSDCMRMEDTOKEN_GEN_ALL_CORE_FT
baclofen 20 mg oral tablet: 1 tab(s) orally 4 times a day  Eliquis 5 mg oral tablet: 1 tab(s) orally 2 times a day  Flomax 0.4 mg oral capsule: 2 cap(s) orally once a day  LACOSAMIDE  150 MG TABS: 1 tab(s) orally 2 times a day  LEVETIRACETAM  1000 MG TABS: 1 tab(s) orally 2 times a day  METOPROLOL SUCC ER 50 MG TAB: TAKE 1 TABLET BY MOUTH EVERY DAY  pregabalin 150 mg oral capsule: 1 tab(s) orally once a day  rosuvastatin 10 mg oral tablet: 1 tab(s) orally once a day  Senna 8.6 mg oral tablet: 1 tab(s) orally once a day (at bedtime)   aspirin 81 mg oral tablet, chewable: 1 tab(s) orally once a day  atorvastatin 20 mg oral tablet: 1 tab(s) orally once a day (at bedtime)  baclofen 20 mg oral tablet: 1 tab(s) orally 4 times a day  Flomax 0.4 mg oral capsule: 2 cap(s) orally once a day  LACOSAMIDE  150 MG TABS: 1 tab(s) orally 2 times a day  LEVETIRACETAM  1000 MG TABS: 1 tab(s) orally 2 times a day  METOPROLOL SUCC ER 50 MG TAB: TAKE 1 TABLET BY MOUTH EVERY DAY  pregabalin 150 mg oral capsule: 1 tab(s) orally once a day  Senna 8.6 mg oral tablet: 1 tab(s) orally once a day (at bedtime)   aspirin 81 mg oral tablet, chewable: 1 tab(s) orally once a day  atorvastatin 20 mg oral tablet: 1 tab(s) orally once a day (at bedtime)  Flomax 0.4 mg oral capsule: 2 cap(s) orally once a day  METOPROLOL SUCC ER 50 MG TAB: 1 tab(s) orally once a day  pregabalin 150 mg oral capsule: 1 tab(s) orally once a day  Senna 8.6 mg oral tablet: 1 tab(s) orally once a day (at bedtime)   aspirin 81 mg oral tablet, chewable: 1 tab(s) orally once a day  atorvastatin 20 mg oral tablet: 1 tab(s) orally once a day (at bedtime)  baclofen 20 mg oral tablet: 1 tab(s) orally once a day, As Needed for back pain MDD:20  ciprofloxacin 500 mg oral tablet: 1 tab(s) orally 2 times a day   Flomax 0.4 mg oral capsule: 2 cap(s) orally once a day  lacosamide 150 mg oral tablet: 1 tab(s) orally 2 times a day  levETIRAcetam 1000 mg oral tablet: 1 tab(s) orally 2 times a day  METOPROLOL SUCC ER 50 MG TAB: 1 tab(s) orally once a day  pregabalin 150 mg oral capsule: 1 tab(s) orally once a day  Senna 8.6 mg oral tablet: 1 tab(s) orally once a day (at bedtime)

## 2022-09-24 NOTE — PHYSICAL THERAPY INITIAL EVALUATION ADULT - PERTINENT HX OF CURRENT PROBLEM, REHAB EVAL
66 year old Male with history CAD s/p RCA stent, brain aneurysm s/p repair (1997) not compatible with MRI, seizure disorder on AEDs, back surgery x4 and femur fracture (5/2022) functionally bed bound presenting with episodes of staring, unresponsiveness, shaking, and hypoxia.

## 2022-09-24 NOTE — PROGRESS NOTE ADULT - PROBLEM SELECTOR PLAN 5
s/p MI and RCA stent 5y ago  -c/w atorvastatin  -unclear if pt on any antiplatelets, would clarify with wife  -c/w home metoprolol 50 ER qd with hold parameters s/p MI and RCA stent 5y ago  -c/w atorvastatin  -c/w aspirin   -c/w home metoprolol 50 ER qd with hold parameters

## 2022-09-24 NOTE — PHYSICAL THERAPY INITIAL EVALUATION ADULT - NSPTDISCHREC_GEN_A_CORE
anticipate discharge to home with home PT for caregiver and transfer training to optimize safety by decreasing fall risk. Please follow for ongoing functional mobility assessment

## 2022-09-24 NOTE — DISCHARGE NOTE PROVIDER - HOSPITAL COURSE
66M with h/o CAD s/p RCA stent, brain aneursym s/p repair (1997) not compatible w/ MRI, seizure disorder on AEDs, back surgery x4 and femur frx (5/2022) functionally bed bound p/w episodes of staring, unresponsiveness, shaking, and hypoxia. Pt states he was conscious during these episodes and could control body movements except for R limb shaking. However, per chart review wife states pt was apparently staring, unresponsive, and hypoxic to 80s at time of EMS arrival. Pt states he currently feels well but feels more tired and weak than usual. He states he has not had any of these shaking episodes for several years. However per chart review pt was admitted to [facility] 5/2022 for R limb shaking and unresponsiveness concerning for status epilepticus requiring intubation. He follows with neurology, Dr. Emma Stringer. States he takes keppra, vimpat, gabapentin and hasn't missed doses but states last doses of medication prior to admission was 9/21. Pt denies HA, CP, SOB, nausea/vomiting, abdominal pain, or dysuria.  Has felt inability to completely void. Last BM 2 days ago.  Denies pain.    ED course: HDS, satting 100% on 3L NC. UA with +nitrite, large LE, many bacteria. CT brain with chronic encephalomalacia, s/p aneurysm clipping. CTA chest and duplex LE negative for PE. CXR clear, RVP neg. Given vanc/zosyn and IV keppra on arrival.    66M with h/o CAD s/p RCA stent, brain aneursym s/p repair (1997) not compatible w/ MRI, seizure disorder on AEDs, back surgery x4 and femur frx (5/2022) functionally bed bound p/w episodes of staring, unresponsiveness, shaking, and hypoxia. Pt states he was conscious during these episodes and could control body movements except for R limb shaking. However, per chart review wife states pt was apparently staring, unresponsive, and hypoxic to 80s at time of EMS arrival. Pt states he currently feels well but feels more tired and weak than usual. He states he has not had any of these shaking episodes for several years. However per chart review pt was admitted to [facility] 5/2022 for R limb shaking and unresponsiveness concerning for status epilepticus requiring intubation. He follows with neurology, Dr. Emma Stringer. States he takes keppra, vimpat, gabapentin and hasn't missed doses but states last doses of medication prior to admission was 9/21. Pt denies HA, CP, SOB, nausea/vomiting, abdominal pain, or dysuria.  Has felt inability to completely void. Last BM 2 days ago.  Denies pain.    ED course: HDS, satting 100% on 3L NC. UA with +nitrite, large LE, many bacteria. CT brain with chronic encephalomalacia, s/p aneurysm clipping. CTA chest and duplex LE negative for PE. CXR clear, RVP neg. Given vanc/zosyn and IV keppra on arrival.     Patient admitted to medicine. Vanc/Zosyn dc'ed, switched to ceftriaxone for empiric treatment of UTI. Patient's private neurologist was consulted, who recommended 24-hr EEG to r/o seizures and c/w keppra & vimpat. EEG showed ______. Neurology cleared patient for discharge w/ continued outpatient follow-up. BCx w/ NGTD. UCxPt treated w/ ceftriaxone for ___ days. Remained HDS w/ resolution of leukocytosis. Pt deemed medically stable for discharge home.    66M with h/o CAD s/p RCA stent, brain aneursym s/p repair (1997) not compatible w/ MRI, seizure disorder on AEDs, back surgery x4 and femur frx (5/2022) functionally bed bound p/w episodes of staring, unresponsiveness, shaking, and hypoxia. Pt states he was conscious during these episodes and could control body movements except for R limb shaking. However, per chart review wife states pt was apparently staring, unresponsive, and hypoxic to 80s at time of EMS arrival. Pt states he currently feels well but feels more tired and weak than usual. He states he has not had any of these shaking episodes for several years. However per chart review pt was admitted to [facility] 5/2022 for R limb shaking and unresponsiveness concerning for status epilepticus requiring intubation. He follows with neurology, Dr. Emma Stringer. States he takes keppra, vimpat, gabapentin and hasn't missed doses but states last doses of medication prior to admission was 9/21. Pt denies HA, CP, SOB, nausea/vomiting, abdominal pain, or dysuria.  Has felt inability to completely void. Last BM 2 days ago.  Denies pain.    ED course: HDS, satting 100% on 3L NC. UA with +nitrite, large LE, many bacteria. CT brain with chronic encephalomalacia, s/p aneurysm clipping. CTA chest and duplex LE negative for PE. CXR clear, RVP neg. Given vanc/zosyn and IV keppra on arrival.     Patient admitted to medicine. Vanc/Zosyn dc'ed, switched to ceftriaxone for empiric treatment of UTI. Patient's private neurologist was consulted, who recommended 24-hr EEG to r/o seizures and c/w keppra & vimpat. EEG showed no signs of seizure. Baclofen decreased to 20mg daily PRN. Neurology cleared patient for discharge w/ continued outpatient follow-up. BCx w/ NGTD. UCx showed _____. Pt treated w/ ceftriaxone for ___ days. Remained HDS w/ resolution of leukocytosis. Pt deemed medically stable for discharge home w/ instructions to continue taking ______ for ______ days to complete 10 day course of complicated UTI treatment.    66M with h/o CAD s/p RCA stent, brain aneursym s/p repair (1997) not compatible w/ MRI, seizure disorder on AEDs, back surgery x4 and femur frx (5/2022) functionally bed bound p/w episodes of staring, unresponsiveness, shaking, and hypoxia. Pt states he was conscious during these episodes and could control body movements except for R limb shaking. However, per chart review wife states pt was apparently staring, unresponsive, and hypoxic to 80s at time of EMS arrival. Pt states he currently feels well but feels more tired and weak than usual. He states he has not had any of these shaking episodes for several years. However per chart review pt was admitted to [facility] 5/2022 for R limb shaking and unresponsiveness concerning for status epilepticus requiring intubation. He follows with neurology, Dr. Emma Stringer. States he takes keppra, vimpat, gabapentin and hasn't missed doses but states last doses of medication prior to admission was 9/21. Pt denies HA, CP, SOB, nausea/vomiting, abdominal pain, or dysuria.  Has felt inability to completely void. Last BM 2 days ago.  Denies pain.    ED course: HDS, satting 100% on 3L NC. UA with +nitrite, large LE, many bacteria. CT brain with chronic encephalomalacia, s/p aneurysm clipping. CTA chest and duplex LE negative for PE. CXR clear, RVP neg. Given vanc/zosyn and IV keppra on arrival.     Patient admitted to medicine. Vanc/Zosyn dc'ed, switched to ceftriaxone for empiric treatment of UTI. Patient's private neurologist was consulted, who recommended 24-hr EEG to r/o seizures and c/w keppra & vimpat. EEG showed no signs of seizure. Baclofen decreased to 20mg daily PRN. Neurology cleared patient for discharge w/ continued outpatient follow-up. BCx w/ NGTD. UCx showed E.Coli. Pt treated w/ ceftriaxone for 5 days. Remained HDS w/ resolution of leukocytosis. Pt deemed medically stable for discharge home w/ instructions to continue taking for ciprofloxacin for 5 more days to complete 10 day course of complicated UTI treatment.

## 2022-09-24 NOTE — DISCHARGE NOTE PROVIDER - CARE PROVIDER_API CALL
Emma Tim)  Neurology  [address], [address]  [place] [address]  Phone: [phone]  Fax: [phone]  Established Patient  Follow Up Time:     Alexandra Blanco)  [facility]  [address] [place], [place] [id number]  Phone: [phone]  Fax: [phone]  Established Patient  Follow Up Time:

## 2022-09-24 NOTE — PROGRESS NOTE ADULT - SUBJECTIVE AND OBJECTIVE BOX
PROGRESS NOTE:       Patient is a 66y old  Male who presents with a chief complaint of ?seizures (23 Sep 2022 16:28)      SUBJECTIVE / OVERNIGHT EVENTS:  No acute events overnight. Patient seen and evaluated at bedside. No fever/chills.  Denies SOB at rest, chest pain, palpitations, abdominal pain, nausea/vomiting    ADDITIONAL REVIEW OF SYSTEMS:    MEDICATIONS  (STANDING):  aspirin  chewable 81 milliGRAM(s) Oral daily  atorvastatin 20 milliGRAM(s) Oral at bedtime  baclofen 20 milliGRAM(s) Oral every 12 hours  cefTRIAXone   IVPB 1000 milliGRAM(s) IV Intermittent every 24 hours  enoxaparin Injectable 40 milliGRAM(s) SubCutaneous every 24 hours  influenza  Vaccine (HIGH DOSE) 0.7 milliLiter(s) IntraMuscular once  lacosamide 150 milliGRAM(s) Oral two times a day  levETIRAcetam 1000 milliGRAM(s) Oral two times a day  metoprolol succinate ER 50 milliGRAM(s) Oral daily  pregabalin 150 milliGRAM(s) Oral two times a day  senna 2 Tablet(s) Oral at bedtime  tamsulosin 0.8 milliGRAM(s) Oral at bedtime    MEDICATIONS  (PRN):      CAPILLARY BLOOD GLUCOSE      POCT Blood Glucose.: 137 mg/dL (23 Sep 2022 12:20)    I&O's Summary      PHYSICAL EXAM:  Vital Signs Last 24 Hrs  T(C): 36.6 (24 Sep 2022 05:50), Max: 37.1 (23 Sep 2022 21:14)  T(F): 97.9 (24 Sep 2022 05:50), Max: 98.7 (23 Sep 2022 21:14)  HR: 79 (24 Sep 2022 05:50) (72 - 79)  BP: 128/65 (24 Sep 2022 05:50) (122/66 - 131/69)  BP(mean): --  RR: 16 (24 Sep 2022 05:50) (16 - 18)  SpO2: 96% (24 Sep 2022 05:50) (96% - 100%)    Parameters below as of 24 Sep 2022 05:50  Patient On (Oxygen Delivery Method): room air        CONSTITUTIONAL: NAD, well-developed, well-groomed  EYES: PERRLA; conjunctiva and sclera clear  ENMT: Moist oral mucosa, no pharyngeal injection or exudates; normal dentition  NECK: Supple, no palpable masses; no thyromegaly  RESPIRATORY: Normal respiratory effort; lungs are clear to auscultation bilaterally  CARDIOVASCULAR: Regular rate and rhythm, normal S1 and S2, no murmur/rub/gallop; No lower extremity edema; Peripheral pulses are 2+ bilaterally  ABDOMEN: Nontender to palpation, normoactive bowel sounds, no rebound/guarding; No hepatosplenomegaly  MUSCULOSKELETAL: Normal gait; no clubbing or cyanosis of digits; no joint swelling or tenderness to palpation  PSYCH: A+O to person, place, and time; affect appropriate  NEUROLOGY: CN 2-12 are intact and symmetric; no gross sensory deficits. +unable to fully bury sclera in L eye abduction. L hip flexion ROM limited by fracture. 5/5 strength in all other extremities.  SKIN: No rashes; no palpable lesions    LABS:                        15.4   8.53  )-----------( 205      ( 24 Sep 2022 06:53 )             48.8     09-24    141  |  104  |  22  ----------------------------<  132<H>  4.6   |  24  |  1.29    Ca    9.3      24 Sep 2022 06:53  Phos  2.1     -  Mg     2.20     -24    TPro  7.2  /  Alb  3.9  /  TBili  0.4  /  DBili  x   /  AST  19  /  ALT  21  /  AlkPhos  118  09-24    PT/INR - ( 22 Sep 2022 21:17 )   PT: 13.5 sec;   INR: 1.16 ratio         PTT - ( 22 Sep 2022 21:17 )  PTT:36.1 sec      Urinalysis Basic - ( 22 Sep 2022 21:26 )    Color: Yellow / Appearance: Slightly Turbid / S.027 / pH: x  Gluc: x / Ketone: Negative  / Bili: Negative / Urobili: 3 mg/dL   Blood: x / Protein: 30 mg/dL / Nitrite: Positive   Leuk Esterase: Large / RBC: 1 /HPF /  /HPF   Sq Epi: x / Non Sq Epi: 0 /HPF / Bacteria: Many        Culture - Blood (collected 22 Sep 2022 23:45)  Source: .Blood Blood-Peripheral  Preliminary Report (24 Sep 2022 03:01):    No growth to date.    Culture - Blood (collected 22 Sep 2022 21:30)  Source: .Blood Blood-Peripheral  Preliminary Report (24 Sep 2022 03:01):    No growth to date.        RADIOLOGY & ADDITIONAL TESTS:  Results Reviewed:   Imaging Personally Reviewed:  Electrocardiogram Personally Reviewed:    COORDINATION OF CARE:  Care Discussed with Consultants/Other Providers [Y/N]:  Prior or Outpatient Records Reviewed [Y/N]:   PROGRESS NOTE:       Patient is a 66y old  Male who presents with a chief complaint of ?seizures (23 Sep 2022 16:28)      SUBJECTIVE / OVERNIGHT EVENTS:  No acute events overnight. Patient seen and evaluated at bedside. No fever/chills.  Denies SOB at rest, chest pain, palpitations, abdominal pain, nausea/vomiting. States that he has pain in R-upper thigh which he believes is related to his surgery.     ADDITIONAL REVIEW OF SYSTEMS: As per HPI     MEDICATIONS  (STANDING):  aspirin  chewable 81 milliGRAM(s) Oral daily  atorvastatin 20 milliGRAM(s) Oral at bedtime  baclofen 20 milliGRAM(s) Oral every 12 hours  cefTRIAXone   IVPB 1000 milliGRAM(s) IV Intermittent every 24 hours  enoxaparin Injectable 40 milliGRAM(s) SubCutaneous every 24 hours  influenza  Vaccine (HIGH DOSE) 0.7 milliLiter(s) IntraMuscular once  lacosamide 150 milliGRAM(s) Oral two times a day  levETIRAcetam 1000 milliGRAM(s) Oral two times a day  metoprolol succinate ER 50 milliGRAM(s) Oral daily  pregabalin 150 milliGRAM(s) Oral two times a day  senna 2 Tablet(s) Oral at bedtime  tamsulosin 0.8 milliGRAM(s) Oral at bedtime    MEDICATIONS  (PRN):      CAPILLARY BLOOD GLUCOSE      POCT Blood Glucose.: 137 mg/dL (23 Sep 2022 12:20)    I&O's Summary      PHYSICAL EXAM:  Vital Signs Last 24 Hrs  T(C): 36.6 (24 Sep 2022 05:50), Max: 37.1 (23 Sep 2022 21:14)  T(F): 97.9 (24 Sep 2022 05:50), Max: 98.7 (23 Sep 2022 21:14)  HR: 79 (24 Sep 2022 05:50) (72 - 79)  BP: 128/65 (24 Sep 2022 05:50) (122/66 - 131/69)  BP(mean): --  RR: 16 (24 Sep 2022 05:50) (16 - 18)  SpO2: 96% (24 Sep 2022 05:50) (96% - 100%)    Parameters below as of 24 Sep 2022 05:50  Patient On (Oxygen Delivery Method): room air        CONSTITUTIONAL: NAD, well-developed, well-groomed  EYES: PERRLA; conjunctiva and sclera clear  ENMT: Moist oral mucosa, no pharyngeal injection or exudates; normal dentition  NECK: Supple, no palpable masses; no thyromegaly  RESPIRATORY: Normal respiratory effort; lungs are clear to auscultation bilaterally  CARDIOVASCULAR: Regular rate and rhythm, normal S1 and S2, no murmur/rub/gallop; No lower extremity edema; Peripheral pulses are 2+ bilaterally  ABDOMEN: Nontender to palpation, normoactive bowel sounds, no rebound/guarding; No hepatosplenomegaly  MUSCULOSKELETAL: Normal gait; no clubbing or cyanosis of digits; no joint swelling or tenderness to palpation. No R-upper thigh swelling, TTP, erythema, or lesions.  PSYCH: A+O to person, place, and time; affect appropriate  NEUROLOGY: CN 2-12 are intact and symmetric; no gross sensory deficits. +unable to fully bury sclera in L eye abduction. L hip flexion ROM limited by fracture (2/5). 5/5 strength in all other extremities.  SKIN: No rashes; no palpable lesions    LABS:                        15.4   8.53  )-----------( 205      ( 24 Sep 2022 06:53 )             48.8     09-24    141  |  104  |  22  ----------------------------<  132<H>  4.6   |  24  |  1.29    Ca    9.3      24 Sep 2022 06:53  Phos  2.1     -24  Mg     2.20     -24    TPro  7.2  /  Alb  3.9  /  TBili  0.4  /  DBili  x   /  AST  19  /  ALT  21  /  AlkPhos  118  09-24    PT/INR - ( 22 Sep 2022 21:17 )   PT: 13.5 sec;   INR: 1.16 ratio         PTT - ( 22 Sep 2022 21:17 )  PTT:36.1 sec      Urinalysis Basic - ( 22 Sep 2022 21:26 )    Color: Yellow / Appearance: Slightly Turbid / S.027 / pH: x  Gluc: x / Ketone: Negative  / Bili: Negative / Urobili: 3 mg/dL   Blood: x / Protein: 30 mg/dL / Nitrite: Positive   Leuk Esterase: Large / RBC: 1 /HPF /  /HPF   Sq Epi: x / Non Sq Epi: 0 /HPF / Bacteria: Many        Culture - Blood (collected 22 Sep 2022 23:45)  Source: .Blood Blood-Peripheral  Preliminary Report (24 Sep 2022 03:01):    No growth to date.    Culture - Blood (collected 22 Sep 2022 21:30)  Source: .Blood Blood-Peripheral  Preliminary Report (24 Sep 2022 03:01):    No growth to date.

## 2022-09-24 NOTE — PROGRESS NOTE ADULT - PROBLEM SELECTOR PLAN 1
-pt has known seizures, recent 5/2022 admission due to concern for status epilepticus however further vEEG during that admission negative for seizures  -pt follows with neuro Dr. Emma Stringer  -possible that seizures triggered by UTI    Plan:  -c/w home gabapentin 300 BID, vimpat 150 BID, keppra 1000 BID, pregabalin 150 qd, baclofen 20 BID  -neuro consult in AM  -24 hour EEG  -Neuro checks  -seizure precautions -pt has known seizures, recent 5/2022 admission due to concern for status epilepticus however further vEEG during that admission negative for seizures  -pt follows with neuro Dr. Emma Stringer  -possible that seizures triggered by UTI    Plan:  -c/w home vimpat 150 BID, keppra 1000 BID, pregabalin 150 qd, baclofen 20 BID  -private neurologist consulted, recommending EEG and reducing home pregabalin and baclofen  -EEG w/ no signs of seizure   -Neuro checks  -seizure precautions

## 2022-09-24 NOTE — DISCHARGE NOTE PROVIDER - PROVIDER TOKENS
PROVIDER:[TOKEN:[[id number]],ESTABLISHEDPATIENT:[T]],PROVIDER:[TOKEN:[[id number]],ESTABLISHEDPATIENT:[T]]

## 2022-09-24 NOTE — PROGRESS NOTE ADULT - PROBLEM SELECTOR PLAN 7
DVT ppx: lovenox qd, unclear if pt is on eliquis so did not start for now  Diet: DASH  Dispo: pending eval
DVT ppx: lovenox qd, unclear if pt is on eliquis so did not start for now  Diet: DASH  Dispo: pending eval

## 2022-09-25 LAB
-  AMIKACIN: SIGNIFICANT CHANGE UP
-  AMOXICILLIN/CLAVULANIC ACID: SIGNIFICANT CHANGE UP
-  AMPICILLIN/SULBACTAM: SIGNIFICANT CHANGE UP
-  AMPICILLIN: SIGNIFICANT CHANGE UP
-  AZTREONAM: SIGNIFICANT CHANGE UP
-  CEFAZOLIN: SIGNIFICANT CHANGE UP
-  CEFEPIME: SIGNIFICANT CHANGE UP
-  CEFOXITIN: SIGNIFICANT CHANGE UP
-  CEFTRIAXONE: SIGNIFICANT CHANGE UP
-  CIPROFLOXACIN: SIGNIFICANT CHANGE UP
-  ERTAPENEM: SIGNIFICANT CHANGE UP
-  GENTAMICIN: SIGNIFICANT CHANGE UP
-  IMIPENEM: SIGNIFICANT CHANGE UP
-  LEVOFLOXACIN: SIGNIFICANT CHANGE UP
-  MEROPENEM: SIGNIFICANT CHANGE UP
-  NITROFURANTOIN: SIGNIFICANT CHANGE UP
-  PIPERACILLIN/TAZOBACTAM: SIGNIFICANT CHANGE UP
-  TIGECYCLINE: SIGNIFICANT CHANGE UP
-  TOBRAMYCIN: SIGNIFICANT CHANGE UP
-  TRIMETHOPRIM/SULFAMETHOXAZOLE: SIGNIFICANT CHANGE UP
ANION GAP SERPL CALC-SCNC: 11 MMOL/L — SIGNIFICANT CHANGE UP (ref 7–14)
BASOPHILS # BLD AUTO: 0.08 K/UL — SIGNIFICANT CHANGE UP (ref 0–0.2)
BASOPHILS NFR BLD AUTO: 1.2 % — SIGNIFICANT CHANGE UP (ref 0–2)
BUN SERPL-MCNC: 17 MG/DL — SIGNIFICANT CHANGE UP (ref 7–23)
CALCIUM SERPL-MCNC: 9.1 MG/DL — SIGNIFICANT CHANGE UP (ref 8.4–10.5)
CHLORIDE SERPL-SCNC: 107 MMOL/L — SIGNIFICANT CHANGE UP (ref 98–107)
CO2 SERPL-SCNC: 22 MMOL/L — SIGNIFICANT CHANGE UP (ref 22–31)
CREAT SERPL-MCNC: 1.02 MG/DL — SIGNIFICANT CHANGE UP (ref 0.5–1.3)
CULTURE RESULTS: SIGNIFICANT CHANGE UP
EGFR: 81 ML/MIN/1.73M2 — SIGNIFICANT CHANGE UP
EOSINOPHIL # BLD AUTO: 0.09 K/UL — SIGNIFICANT CHANGE UP (ref 0–0.5)
EOSINOPHIL NFR BLD AUTO: 1.3 % — SIGNIFICANT CHANGE UP (ref 0–6)
GLUCOSE SERPL-MCNC: 99 MG/DL — SIGNIFICANT CHANGE UP (ref 70–99)
HCT VFR BLD CALC: 46.4 % — SIGNIFICANT CHANGE UP (ref 39–50)
HGB BLD-MCNC: 14.9 G/DL — SIGNIFICANT CHANGE UP (ref 13–17)
IANC: 3.78 K/UL — SIGNIFICANT CHANGE UP (ref 1.8–7.4)
IMM GRANULOCYTES NFR BLD AUTO: 0.4 % — SIGNIFICANT CHANGE UP (ref 0–0.9)
LYMPHOCYTES # BLD AUTO: 2.04 K/UL — SIGNIFICANT CHANGE UP (ref 1–3.3)
LYMPHOCYTES # BLD AUTO: 30.4 % — SIGNIFICANT CHANGE UP (ref 13–44)
MAGNESIUM SERPL-MCNC: 2 MG/DL — SIGNIFICANT CHANGE UP (ref 1.6–2.6)
MCHC RBC-ENTMCNC: 29.7 PG — SIGNIFICANT CHANGE UP (ref 27–34)
MCHC RBC-ENTMCNC: 32.1 GM/DL — SIGNIFICANT CHANGE UP (ref 32–36)
MCV RBC AUTO: 92.4 FL — SIGNIFICANT CHANGE UP (ref 80–100)
METHOD TYPE: SIGNIFICANT CHANGE UP
MONOCYTES # BLD AUTO: 0.68 K/UL — SIGNIFICANT CHANGE UP (ref 0–0.9)
MONOCYTES NFR BLD AUTO: 10.1 % — SIGNIFICANT CHANGE UP (ref 2–14)
NEUTROPHILS # BLD AUTO: 3.78 K/UL — SIGNIFICANT CHANGE UP (ref 1.8–7.4)
NEUTROPHILS NFR BLD AUTO: 56.6 % — SIGNIFICANT CHANGE UP (ref 43–77)
NRBC # BLD: 0 /100 WBCS — SIGNIFICANT CHANGE UP (ref 0–0)
NRBC # FLD: 0 K/UL — SIGNIFICANT CHANGE UP (ref 0–0)
ORGANISM # SPEC MICROSCOPIC CNT: SIGNIFICANT CHANGE UP
ORGANISM # SPEC MICROSCOPIC CNT: SIGNIFICANT CHANGE UP
PHOSPHATE SERPL-MCNC: 2.9 MG/DL — SIGNIFICANT CHANGE UP (ref 2.5–4.5)
PLATELET # BLD AUTO: 171 K/UL — SIGNIFICANT CHANGE UP (ref 150–400)
POTASSIUM SERPL-MCNC: 4.2 MMOL/L — SIGNIFICANT CHANGE UP (ref 3.5–5.3)
POTASSIUM SERPL-SCNC: 4.2 MMOL/L — SIGNIFICANT CHANGE UP (ref 3.5–5.3)
RBC # BLD: 5.02 M/UL — SIGNIFICANT CHANGE UP (ref 4.2–5.8)
RBC # FLD: 15.3 % — HIGH (ref 10.3–14.5)
SODIUM SERPL-SCNC: 140 MMOL/L — SIGNIFICANT CHANGE UP (ref 135–145)
SPECIMEN SOURCE: SIGNIFICANT CHANGE UP
WBC # BLD: 6.7 K/UL — SIGNIFICANT CHANGE UP (ref 3.8–10.5)
WBC # FLD AUTO: 6.7 K/UL — SIGNIFICANT CHANGE UP (ref 3.8–10.5)

## 2022-09-25 RX ORDER — CEFTRIAXONE 500 MG/1
1000 INJECTION, POWDER, FOR SOLUTION INTRAMUSCULAR; INTRAVENOUS EVERY 24 HOURS
Refills: 0 | Status: DISCONTINUED | OUTPATIENT
Start: 2022-09-26 | End: 2022-09-27

## 2022-09-25 RX ORDER — BACLOFEN 100 %
20 POWDER (GRAM) MISCELLANEOUS EVERY 12 HOURS
Refills: 0 | Status: DISCONTINUED | OUTPATIENT
Start: 2022-09-25 | End: 2022-09-27

## 2022-09-25 RX ADMIN — Medication 81 MILLIGRAM(S): at 13:37

## 2022-09-25 RX ADMIN — Medication 150 MILLIGRAM(S): at 17:57

## 2022-09-25 RX ADMIN — ATORVASTATIN CALCIUM 20 MILLIGRAM(S): 80 TABLET, FILM COATED ORAL at 21:25

## 2022-09-25 RX ADMIN — LEVETIRACETAM 1000 MILLIGRAM(S): 250 TABLET, FILM COATED ORAL at 17:57

## 2022-09-25 RX ADMIN — Medication 650 MILLIGRAM(S): at 21:24

## 2022-09-25 RX ADMIN — CEFTRIAXONE 100 MILLIGRAM(S): 500 INJECTION, POWDER, FOR SOLUTION INTRAMUSCULAR; INTRAVENOUS at 05:54

## 2022-09-25 RX ADMIN — SENNA PLUS 2 TABLET(S): 8.6 TABLET ORAL at 21:25

## 2022-09-25 RX ADMIN — Medication 650 MILLIGRAM(S): at 21:54

## 2022-09-25 RX ADMIN — Medication 50 MILLIGRAM(S): at 05:55

## 2022-09-25 RX ADMIN — LEVETIRACETAM 1000 MILLIGRAM(S): 250 TABLET, FILM COATED ORAL at 05:55

## 2022-09-25 RX ADMIN — LACOSAMIDE 150 MILLIGRAM(S): 50 TABLET ORAL at 05:55

## 2022-09-25 RX ADMIN — Medication 650 MILLIGRAM(S): at 01:06

## 2022-09-25 RX ADMIN — Medication 650 MILLIGRAM(S): at 01:36

## 2022-09-25 RX ADMIN — Medication 20 MILLIGRAM(S): at 13:37

## 2022-09-25 RX ADMIN — Medication 150 MILLIGRAM(S): at 05:55

## 2022-09-25 RX ADMIN — LACOSAMIDE 150 MILLIGRAM(S): 50 TABLET ORAL at 18:00

## 2022-09-25 RX ADMIN — ENOXAPARIN SODIUM 40 MILLIGRAM(S): 100 INJECTION SUBCUTANEOUS at 17:57

## 2022-09-25 RX ADMIN — TAMSULOSIN HYDROCHLORIDE 0.8 MILLIGRAM(S): 0.4 CAPSULE ORAL at 21:25

## 2022-09-25 NOTE — PROGRESS NOTE ADULT - PROBLEM SELECTOR PLAN 4
-pt functionally bed bound  -no evidence of DVT/PEs currently  -was on eliquis previously, no current need

## 2022-09-25 NOTE — PROGRESS NOTE ADULT - NSPROGADDITIONALINFOA_GEN_ALL_CORE
- Counseling on diet, exercise, and medication adherence was done  - Counseling on smoking cessation and alcohol consumption offered when appropriate.  - Pain assessed and judicious use of narcotics when appropriate was discussed.    - Stroke education given when appropriate.  - Importance of fall prevention discussed.   - Differential diagnosis and plan of care discussed with patient and/or family and primary team  - Thank you for allowing me to participate in the care of this patient. Call with questions.
- Counseling on diet, exercise, and medication adherence was done  - Counseling on smoking cessation and alcohol consumption offered when appropriate.  - Pain assessed and judicious use of narcotics when appropriate was discussed.    - Stroke education given when appropriate.  - Importance of fall prevention discussed.   - Differential diagnosis and plan of care discussed with patient and/or family and primary team  - Thank you for allowing me to participate in the care of this patient. Call with questions.

## 2022-09-25 NOTE — PROGRESS NOTE ADULT - PROBLEM SELECTOR PLAN 5
s/p MI and RCA stent 5y ago  -c/w atorvastatin  -c/w aspirin   -c/w home metoprolol 50 ER qd with hold parameters

## 2022-09-25 NOTE — PROGRESS NOTE ADULT - SUBJECTIVE AND OBJECTIVE BOX
***INCOMPLETE***    Vignesh Painting, PGY-2  Internal Medicine  Pager: [phone], [facility]: [phone]    PROGRESS NOTE:     SUBJECTIVE / OVERNIGHT EVENTS: No acute events overnight. ROS negative for fever, chills, cough, SOB, chest pain, nausea, vomiting, diarrhea, constipation, dysuria.    MEDICATIONS  (STANDING):  aspirin  chewable 81 milliGRAM(s) Oral daily  atorvastatin 20 milliGRAM(s) Oral at bedtime  enoxaparin Injectable 40 milliGRAM(s) SubCutaneous every 24 hours  influenza  Vaccine (HIGH DOSE) 0.7 milliLiter(s) IntraMuscular once  lacosamide 150 milliGRAM(s) Oral two times a day  levETIRAcetam 1000 milliGRAM(s) Oral two times a day  metoprolol succinate ER 50 milliGRAM(s) Oral daily  pregabalin 150 milliGRAM(s) Oral two times a day  senna 2 Tablet(s) Oral at bedtime  tamsulosin 0.8 milliGRAM(s) Oral at bedtime    MEDICATIONS  (PRN):  acetaminophen     Tablet .. 650 milliGRAM(s) Oral every 6 hours PRN Mild Pain (1 - 3), Moderate Pain (4 - 6)  baclofen 20 milliGRAM(s) Oral every 12 hours PRN Musculoskeletal Pain      CAPILLARY BLOOD GLUCOSE        I&O's Summary      PHYSICAL EXAM:  Vital Signs Last 24 Hrs  T(C): 36.6 (25 Sep 2022 05:50), Max: 36.9 (24 Sep 2022 12:03)  T(F): 97.8 (25 Sep 2022 05:50), Max: 98.4 (24 Sep 2022 12:03)  HR: 68 (25 Sep 2022 05:50) (60 - 68)  BP: 116/66 (25 Sep 2022 05:50) (111/72 - 127/72)  BP(mean): --  RR: 18 (25 Sep 2022 05:50) (18 - 18)  SpO2: 95% (25 Sep 2022 05:50) (95% - 98%)    Parameters below as of 25 Sep 2022 05:50  Patient On (Oxygen Delivery Method): room air    CONSTITUTIONAL: NAD, well-developed, well-groomed  EYES: PERRLA; conjunctiva and sclera clear  ENMT: Moist oral mucosa, no pharyngeal injection or exudates; normal dentition  NECK: Supple, no palpable masses; no thyromegaly  RESPIRATORY: Normal respiratory effort; lungs are clear to auscultation bilaterally  CARDIOVASCULAR: Regular rate and rhythm, normal S1 and S2, no murmur/rub/gallop; No lower extremity edema; Peripheral pulses are 2+ bilaterally  ABDOMEN: Nontender to palpation, normoactive bowel sounds, no rebound/guarding; No hepatosplenomegaly  MUSCULOSKELETAL: Normal gait; no clubbing or cyanosis of digits; no joint swelling or tenderness to palpation. No R-upper thigh swelling, TTP, erythema, or lesions.  PSYCH: A+O to person, place, and time; affect appropriate  NEUROLOGY: CN 2-12 are intact and symmetric; no gross sensory deficits. +unable to fully bury sclera in L eye abduction. L hip flexion ROM limited by fracture (2/5). 5/5 strength in all other extremities.  SKIN: No rashes; no palpable lesions    LABS:                        14.9   6.70  )-----------( 171      ( 25 Sep 2022 05:07 )             46.4     09-25    140  |  107  |  17  ----------------------------<  99  4.2   |  22  |  1.02    Ca    9.1      25 Sep 2022 05:07  Phos  2.9     09-25  Mg     2.00     09-25    TPro  7.2  /  Alb  3.9  /  TBili  0.4  /  DBili  x   /  AST  19  /  ALT  21  /  AlkPhos  118  09-24              Culture - Blood (collected 22 Sep 2022 23:45)  Source: .Blood Blood-Peripheral  Preliminary Report (24 Sep 2022 03:01):    No growth to date.    Culture - Blood (collected 22 Sep 2022 21:30)  Source: .Blood Blood-Peripheral  Preliminary Report (24 Sep 2022 03:01):    No growth to date.    Culture - Urine (collected 22 Sep 2022 20:25)  Source: Clean Catch Clean Catch (Midstream)  Preliminary Report (24 Sep 2022 14:47):    >100,000 CFU/ml Escherichia coli         Vignesh Painting, PGY-2  Internal Medicine  Pager: [phone], J: [phone]    PROGRESS NOTE:     SUBJECTIVE / OVERNIGHT EVENTS: No acute events overnight. ROS negative for fever, chills, cough, SOB, chest pain, nausea, vomiting, diarrhea, constipation, dysuria.    MEDICATIONS  (STANDING):  aspirin  chewable 81 milliGRAM(s) Oral daily  atorvastatin 20 milliGRAM(s) Oral at bedtime  enoxaparin Injectable 40 milliGRAM(s) SubCutaneous every 24 hours  influenza  Vaccine (HIGH DOSE) 0.7 milliLiter(s) IntraMuscular once  lacosamide 150 milliGRAM(s) Oral two times a day  levETIRAcetam 1000 milliGRAM(s) Oral two times a day  metoprolol succinate ER 50 milliGRAM(s) Oral daily  pregabalin 150 milliGRAM(s) Oral two times a day  senna 2 Tablet(s) Oral at bedtime  tamsulosin 0.8 milliGRAM(s) Oral at bedtime    MEDICATIONS  (PRN):  acetaminophen     Tablet .. 650 milliGRAM(s) Oral every 6 hours PRN Mild Pain (1 - 3), Moderate Pain (4 - 6)  baclofen 20 milliGRAM(s) Oral every 12 hours PRN Musculoskeletal Pain      CAPILLARY BLOOD GLUCOSE        I&O's Summary      PHYSICAL EXAM:  Vital Signs Last 24 Hrs  T(C): 36.6 (25 Sep 2022 05:50), Max: 36.9 (24 Sep 2022 12:03)  T(F): 97.8 (25 Sep 2022 05:50), Max: 98.4 (24 Sep 2022 12:03)  HR: 68 (25 Sep 2022 05:50) (60 - 68)  BP: 116/66 (25 Sep 2022 05:50) (111/72 - 127/72)  BP(mean): --  RR: 18 (25 Sep 2022 05:50) (18 - 18)  SpO2: 95% (25 Sep 2022 05:50) (95% - 98%)    Parameters below as of 25 Sep 2022 05:50  Patient On (Oxygen Delivery Method): room air    CONSTITUTIONAL: NAD, well-developed, well-groomed  EYES: PERRLA; conjunctiva and sclera clear  ENMT: Moist oral mucosa, no pharyngeal injection or exudates; normal dentition  NECK: Supple, no palpable masses; no thyromegaly  RESPIRATORY: Normal respiratory effort; lungs are clear to auscultation bilaterally  CARDIOVASCULAR: Regular rate and rhythm, normal S1 and S2, no murmur/rub/gallop; No lower extremity edema; Peripheral pulses are 2+ bilaterally  ABDOMEN: Nontender to palpation, normoactive bowel sounds, no rebound/guarding; No hepatosplenomegaly  MUSCULOSKELETAL: Normal gait; no clubbing or cyanosis of digits; no joint swelling or tenderness to palpation. No R-upper thigh swelling, TTP, erythema, or lesions.  PSYCH: A+O to person, place, and time; affect appropriate  NEUROLOGY: CN 2-12 are intact and symmetric; no gross sensory deficits. +unable to fully bury sclera in L eye abduction. L hip flexion ROM limited by fracture (2/5). 5/5 strength in all other extremities.  SKIN: No rashes; no palpable lesions    LABS:                        14.9   6.70  )-----------( 171      ( 25 Sep 2022 05:07 )             46.4     09-25    140  |  107  |  17  ----------------------------<  99  4.2   |  22  |  1.02    Ca    9.1      25 Sep 2022 05:07  Phos  2.9     09-25  Mg     2.00     09-25    TPro  7.2  /  Alb  3.9  /  TBili  0.4  /  DBili  x   /  AST  19  /  ALT  21  /  AlkPhos  118  09-24              Culture - Blood (collected 22 Sep 2022 23:45)  Source: .Blood Blood-Peripheral  Preliminary Report (24 Sep 2022 03:01):    No growth to date.    Culture - Blood (collected 22 Sep 2022 21:30)  Source: .Blood Blood-Peripheral  Preliminary Report (24 Sep 2022 03:01):    No growth to date.    Culture - Urine (collected 22 Sep 2022 20:25)  Source: Clean Catch Clean Catch (Midstream)  Preliminary Report (24 Sep 2022 14:47):    >100,000 CFU/ml Escherichia coli

## 2022-09-25 NOTE — PROGRESS NOTE ADULT - SUBJECTIVE AND OBJECTIVE BOX
Neurology Progress Note    S: Patient seen and examined. No new events overnight. resting in bed     Medication:  acetaminophen     Tablet .. 650 milliGRAM(s) Oral every 6 hours PRN  aspirin  chewable 81 milliGRAM(s) Oral daily  atorvastatin 20 milliGRAM(s) Oral at bedtime  baclofen 20 milliGRAM(s) Oral every 12 hours PRN  cefTRIAXone   IVPB 1000 milliGRAM(s) IV Intermittent every 24 hours  enoxaparin Injectable 40 milliGRAM(s) SubCutaneous every 24 hours  influenza  Vaccine (HIGH DOSE) 0.7 milliLiter(s) IntraMuscular once  lacosamide 150 milliGRAM(s) Oral two times a day  levETIRAcetam 1000 milliGRAM(s) Oral two times a day  metoprolol succinate ER 50 milliGRAM(s) Oral daily  pregabalin 150 milliGRAM(s) Oral two times a day  senna 2 Tablet(s) Oral at bedtime  tamsulosin 0.8 milliGRAM(s) Oral at bedtime      Vitals:  Vital Signs Last 24 Hrs  T(C): 36.6 (25 Sep 2022 05:50), Max: 36.9 (24 Sep 2022 12:03)  T(F): 97.8 (25 Sep 2022 05:50), Max: 98.4 (24 Sep 2022 12:03)  HR: 68 (25 Sep 2022 05:50) (60 - 68)  BP: 116/66 (25 Sep 2022 05:50) (111/72 - 127/72)  BP(mean): --  RR: 18 (25 Sep 2022 05:50) (18 - 18)  SpO2: 95% (25 Sep 2022 05:50) (95% - 98%)    Parameters below as of 25 Sep 2022 05:50  Patient On (Oxygen Delivery Method): room air        General Exam:   General Appearance: Appropriately dressed and in no acute distress       Head: Normocephalic, atraumatic and no dysmorphic features  Ear, Nose, and Throat: Moist mucous membranes  CVS: S1S2+  Resp: No SOB, no wheeze or rhonchi  Abd: soft NTND  Extremities: No edema, no cyanosis  Skin: No bruises, no rashes     Neurological Exam:     Mental Status: Awake, alert and oriented x 3.  Able to follow simple and complex verbal commands. Able to name and repeat. fluent speech. No obvious aphasia or dysarthria noted.   Cranial Nerves: PERRL, EOMI, VFFC, sensation V1-V3 intact,  no obvious facial asymmetry, equal elevation of palate, scm/trap 5/5, tongue is midline on protrusion. no obvious papilledema on fundoscopic exam. hearing is grossly intact.   Motor: Normal bulk, tone and strength throughout except RLE limited 2/2 fx. Fine finger movements were intact and symmetric. no tremors or drift noted.    Sensation: Intact to light touch and pinprick throughout. no right/left confusion. no extinction to tactile on DSS.    Reflexes: 1+ throughout at biceps, brachioradialis, triceps, patellars and ankles bilaterally and equal. No clonus. R toe and L toe were both downgoing.  Coordination: No dysmetria on FNF   Gait: unable       I personally reviewed the below data/images/labs:      CBC Full  -  ( 25 Sep 2022 05:07 )  WBC Count : 6.70 K/uL  RBC Count : 5.02 M/uL  Hemoglobin : 14.9 g/dL  Hematocrit : 46.4 %  Platelet Count - Automated : 171 K/uL  Mean Cell Volume : 92.4 fL  Mean Cell Hemoglobin : 29.7 pg  Mean Cell Hemoglobin Concentration : 32.1 gm/dL  Auto Neutrophil # : 3.78 K/uL  Auto Lymphocyte # : 2.04 K/uL  Auto Monocyte # : 0.68 K/uL  Auto Eosinophil # : 0.09 K/uL  Auto Basophil # : 0.08 K/uL  Auto Neutrophil % : 56.6 %  Auto Lymphocyte % : 30.4 %  Auto Monocyte % : 10.1 %  Auto Eosinophil % : 1.3 %  Auto Basophil % : 1.2 %    09-25    140  |  107  |  17  ----------------------------<  99  4.2   |  22  |  1.02    Ca    9.1      25 Sep 2022 05:07  Phos  2.9     09-25  Mg     2.00     09-25    TPro  7.2  /  Alb  3.9  /  TBili  0.4  /  DBili  x   /  AST  19  /  ALT  21  /  AlkPhos  118  09-24    LIVER FUNCTIONS - ( 24 Sep 2022 06:53 )  Alb: 3.9 g/dL / Pro: 7.2 g/dL / ALK PHOS: 118 U/L / ALT: 21 U/L / AST: 19 U/L / GGT: x             ACC: [id number] EXAM:  CT BRAIN                          PROCEDURE DATE:  09/22/2022          INTERPRETATION:  CLINICAL INFORMATION: Altered mental status    TECHNIQUE : Noncontrast axial CT images were acquired through the head.    Sagittal and coronal reformats were performed.    COMPARISON: No prior imaging available for comparison.    FINDINGS:  The patient is status post right pterional craniotomy.  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  There are   chronic areas of encephalomalacia/gliosis in the right greater than left   frontal lobes.    There is no CT evidence of acute intracranial hemorrhage, subdural   collection, mass effect, midline shift or acute territorial infarct.    The visualized paranasal sinuses and the mastoids are grossly clear.    The patient is status post intraocular lens replacement bilaterally.    There is no calvarial skull base fracture.    IMPRESSION:  No CT evidence of acute intracranial pathology.    The patient is status post right pterional craniotomy and aneurysm   clipping with chronic areas of encephalomalacia/gliosis in the right   greater than left frontal lobes.    --- End of Report ---          TAMIE BARRAZA DO; Resident Radiologist  This document has been electronically signed.  RAOUL RUGGIERO MD; Attending Radiologist  This document has been electronically signed. Sep 23 2022  2:30AM    < end of copied text >      EEG Classification / Summary:  Normal EEG study, awake / drowsy / asleep      - Continuous polymorphic delta slowing, focal, right frontal-temporal   - Background slowing, generalized,  mild to moderate   - Breach rhythm over the right frontocentral region   -----------------------------------------------------------------------------------------------------    Clinical Impression:    - Regional structural pathology in the right frontal-central region.   - Mild-moderate diffuse/multifocal cerebral dysfunction.   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  - There were no epileptiform abnormalities recorded.

## 2022-09-25 NOTE — PROGRESS NOTE ADULT - PROBLEM SELECTOR PLAN 2
-pt with h/o urinary retention, UA with large LE, nit, many bacteria. No prior cx data  -d/p vanc/zosyn in ED  -no hospitalizations in past 3 months needing pseudomonal coverage. Low concern for MRSA, no evidence of bone/joint/soft tissue infection  -WBC 18.46  -pt denies dysuria however given seizures could be related to underlying UTI will treat    Plan:  -IV ceftriaxone empirically  -BCx NGTD  -UCx pending  -will likely dc on abx for total 10 day course   -c/w home flomax  -monitor bladder scans 2/2 E. coli  -pt with h/o urinary retention, UA with large LE, nit, many bacteria. No prior cx data  -d/p vanc/zosyn in ED  -no hospitalizations in past 3 months needing pseudomonal coverage. Low concern for MRSA, no evidence of bone/joint/soft tissue infection  -WBC 18.46  -pt denies dysuria however given seizures could be related to underlying UTI will treat    Plan:  -IV ceftriaxone empirically  -BCx NGTD  -UCx with e. coli  -will likely dc on abx for total 10 day course   -c/w home flomax  -monitor bladder scans

## 2022-09-25 NOTE — PROGRESS NOTE ADULT - PROBLEM SELECTOR PLAN 1
-pt has known seizures, recent 5/2022 admission due to concern for status epilepticus however further vEEG during that admission negative for seizures  -pt follows with neuro Dr. Emma Stringer  -possible that seizures triggered by UTI    Plan:  -c/w home vimpat 150 BID, keppra 1000 BID, pregabalin 150 qd, baclofen 20 BID  -private neurologist consulted, recommending EEG and reducing home pregabalin and baclofen  -EEG w/ no signs of seizure   -Neuro checks  -seizure precautions

## 2022-09-26 LAB
ANION GAP SERPL CALC-SCNC: 15 MMOL/L — HIGH (ref 7–14)
BUN SERPL-MCNC: 14 MG/DL — SIGNIFICANT CHANGE UP (ref 7–23)
CALCIUM SERPL-MCNC: 10.1 MG/DL — SIGNIFICANT CHANGE UP (ref 8.4–10.5)
CHLORIDE SERPL-SCNC: 104 MMOL/L — SIGNIFICANT CHANGE UP (ref 98–107)
CO2 SERPL-SCNC: 24 MMOL/L — SIGNIFICANT CHANGE UP (ref 22–31)
CREAT SERPL-MCNC: 0.95 MG/DL — SIGNIFICANT CHANGE UP (ref 0.5–1.3)
EGFR: 88 ML/MIN/1.73M2 — SIGNIFICANT CHANGE UP
GLUCOSE SERPL-MCNC: 90 MG/DL — SIGNIFICANT CHANGE UP (ref 70–99)
HCT VFR BLD CALC: 49.1 % — SIGNIFICANT CHANGE UP (ref 39–50)
HGB BLD-MCNC: 15.8 G/DL — SIGNIFICANT CHANGE UP (ref 13–17)
MAGNESIUM SERPL-MCNC: 2.2 MG/DL — SIGNIFICANT CHANGE UP (ref 1.6–2.6)
MCHC RBC-ENTMCNC: 30 PG — SIGNIFICANT CHANGE UP (ref 27–34)
MCHC RBC-ENTMCNC: 32.2 GM/DL — SIGNIFICANT CHANGE UP (ref 32–36)
MCV RBC AUTO: 93.3 FL — SIGNIFICANT CHANGE UP (ref 80–100)
NRBC # BLD: 0 /100 WBCS — SIGNIFICANT CHANGE UP (ref 0–0)
NRBC # FLD: 0 K/UL — SIGNIFICANT CHANGE UP (ref 0–0)
PHOSPHATE SERPL-MCNC: 3.4 MG/DL — SIGNIFICANT CHANGE UP (ref 2.5–4.5)
PLATELET # BLD AUTO: 236 K/UL — SIGNIFICANT CHANGE UP (ref 150–400)
POTASSIUM SERPL-MCNC: 4.6 MMOL/L — SIGNIFICANT CHANGE UP (ref 3.5–5.3)
POTASSIUM SERPL-SCNC: 4.6 MMOL/L — SIGNIFICANT CHANGE UP (ref 3.5–5.3)
RBC # BLD: 5.26 M/UL — SIGNIFICANT CHANGE UP (ref 4.2–5.8)
RBC # FLD: 15 % — HIGH (ref 10.3–14.5)
SODIUM SERPL-SCNC: 143 MMOL/L — SIGNIFICANT CHANGE UP (ref 135–145)
WBC # BLD: 9.13 K/UL — SIGNIFICANT CHANGE UP (ref 3.8–10.5)
WBC # FLD AUTO: 9.13 K/UL — SIGNIFICANT CHANGE UP (ref 3.8–10.5)

## 2022-09-26 RX ADMIN — LACOSAMIDE 150 MILLIGRAM(S): 50 TABLET ORAL at 05:38

## 2022-09-26 RX ADMIN — SENNA PLUS 2 TABLET(S): 8.6 TABLET ORAL at 21:23

## 2022-09-26 RX ADMIN — Medication 150 MILLIGRAM(S): at 05:38

## 2022-09-26 RX ADMIN — Medication 20 MILLIGRAM(S): at 03:24

## 2022-09-26 RX ADMIN — LEVETIRACETAM 1000 MILLIGRAM(S): 250 TABLET, FILM COATED ORAL at 05:38

## 2022-09-26 RX ADMIN — LEVETIRACETAM 1000 MILLIGRAM(S): 250 TABLET, FILM COATED ORAL at 18:12

## 2022-09-26 RX ADMIN — ATORVASTATIN CALCIUM 20 MILLIGRAM(S): 80 TABLET, FILM COATED ORAL at 21:23

## 2022-09-26 RX ADMIN — CEFTRIAXONE 100 MILLIGRAM(S): 500 INJECTION, POWDER, FOR SOLUTION INTRAMUSCULAR; INTRAVENOUS at 05:38

## 2022-09-26 RX ADMIN — LACOSAMIDE 150 MILLIGRAM(S): 50 TABLET ORAL at 18:12

## 2022-09-26 RX ADMIN — Medication 20 MILLIGRAM(S): at 15:32

## 2022-09-26 RX ADMIN — Medication 150 MILLIGRAM(S): at 18:12

## 2022-09-26 RX ADMIN — Medication 81 MILLIGRAM(S): at 15:32

## 2022-09-26 RX ADMIN — ENOXAPARIN SODIUM 40 MILLIGRAM(S): 100 INJECTION SUBCUTANEOUS at 18:12

## 2022-09-26 RX ADMIN — TAMSULOSIN HYDROCHLORIDE 0.8 MILLIGRAM(S): 0.4 CAPSULE ORAL at 21:23

## 2022-09-26 NOTE — PROGRESS NOTE ADULT - PROBLEM SELECTOR PLAN 6
DVT ppx: lovenox qd  Diet: DASH  Dispo: pending eval DVT ppx: lovenox qd  Diet: DASH  Dispo: pending eval, will require home PT

## 2022-09-26 NOTE — PROGRESS NOTE ADULT - SUBJECTIVE AND OBJECTIVE BOX
PROGRESS NOTE:       Patient is a 66y old  Male who presents with a chief complaint of ?seizures (25 Sep 2022 10:08)      SUBJECTIVE / OVERNIGHT EVENTS:  No acute events overnight. Patient seen and evaluated at bedside. No fever/chills.  Denies SOB at rest, chest pain, palpitations, abdominal pain, nausea/vomiting    ADDITIONAL REVIEW OF SYSTEMS:    MEDICATIONS  (STANDING):  aspirin  chewable 81 milliGRAM(s) Oral daily  atorvastatin 20 milliGRAM(s) Oral at bedtime  cefTRIAXone   IVPB 1000 milliGRAM(s) IV Intermittent every 24 hours  enoxaparin Injectable 40 milliGRAM(s) SubCutaneous every 24 hours  influenza  Vaccine (HIGH DOSE) 0.7 milliLiter(s) IntraMuscular once  lacosamide 150 milliGRAM(s) Oral two times a day  levETIRAcetam 1000 milliGRAM(s) Oral two times a day  metoprolol succinate ER 50 milliGRAM(s) Oral daily  pregabalin 150 milliGRAM(s) Oral two times a day  senna 2 Tablet(s) Oral at bedtime  tamsulosin 0.8 milliGRAM(s) Oral at bedtime    MEDICATIONS  (PRN):  acetaminophen     Tablet .. 650 milliGRAM(s) Oral every 6 hours PRN Mild Pain (1 - 3), Moderate Pain (4 - 6)  baclofen 20 milliGRAM(s) Oral every 12 hours PRN Musculoskeletal Pain      CAPILLARY BLOOD GLUCOSE        I&O's Summary      PHYSICAL EXAM:  Vital Signs Last 24 Hrs  T(C): 36.3 (26 Sep 2022 05:37), Max: 36.7 (25 Sep 2022 13:15)  T(F): 97.3 (26 Sep 2022 05:37), Max: 98 (25 Sep 2022 13:15)  HR: 56 (26 Sep 2022 05:37) (56 - 71)  BP: 120/63 (26 Sep 2022 05:37) (117/71 - 120/63)  BP(mean): --  RR: 18 (26 Sep 2022 05:37) (17 - 18)  SpO2: 95% (26 Sep 2022 05:37) (95% - 100%)    Parameters below as of 26 Sep 2022 05:37  Patient On (Oxygen Delivery Method): room air        CONSTITUTIONAL: NAD, well-developed  RESPIRATORY: Normal respiratory effort; lungs are clear to auscultation bilaterally  CARDIOVASCULAR: Regular rate and rhythm, normal S1 and S2, no murmur/rub/gallop; No lower extremity edema; Peripheral pulses are 2+ bilaterally  ABDOMEN: Nontender to palpation, normoactive bowel sounds, no rebound/guarding; No hepatosplenomegaly  MUSCLOSKELETAL: no clubbing or cyanosis of digits; no joint swelling or tenderness to palpation  PSYCH: A+O to person, place, and time; affect appropriate    LABS:                        14.9   6.70  )-----------( 171      ( 25 Sep 2022 05:07 )             46.4     09-25    140  |  107  |  17  ----------------------------<  99  4.2   |  22  |  1.02    Ca    9.1      25 Sep 2022 05:07  Phos  2.9     09-25  Mg     2.00     09-25                  RADIOLOGY & ADDITIONAL TESTS:  Results Reviewed:   Imaging Personally Reviewed:  Electrocardiogram Personally Reviewed:    COORDINATION OF CARE:  Care Discussed with Consultants/Other Providers [Y/N]:  Prior or Outpatient Records Reviewed [Y/N]:   PROGRESS NOTE:       Patient is a 66y old  Male who presents with a chief complaint of ?seizures (25 Sep 2022 10:08)    SUBJECTIVE / OVERNIGHT EVENTS:  No acute events overnight. Patient seen and evaluated at bedside. No fever/chills.  Denies SOB at rest, chest pain, palpitations, abdominal pain, nausea/vomiting. Had BM yesterday. States that he would like to go home but would prefer that his wife be home when he is discharged.     ADDITIONAL REVIEW OF SYSTEMS: As per HPI    MEDICATIONS  (STANDING):  aspirin  chewable 81 milliGRAM(s) Oral daily  atorvastatin 20 milliGRAM(s) Oral at bedtime  cefTRIAXone   IVPB 1000 milliGRAM(s) IV Intermittent every 24 hours  enoxaparin Injectable 40 milliGRAM(s) SubCutaneous every 24 hours  influenza  Vaccine (HIGH DOSE) 0.7 milliLiter(s) IntraMuscular once  lacosamide 150 milliGRAM(s) Oral two times a day  levETIRAcetam 1000 milliGRAM(s) Oral two times a day  metoprolol succinate ER 50 milliGRAM(s) Oral daily  pregabalin 150 milliGRAM(s) Oral two times a day  senna 2 Tablet(s) Oral at bedtime  tamsulosin 0.8 milliGRAM(s) Oral at bedtime    MEDICATIONS  (PRN):  acetaminophen     Tablet .. 650 milliGRAM(s) Oral every 6 hours PRN Mild Pain (1 - 3), Moderate Pain (4 - 6)  baclofen 20 milliGRAM(s) Oral every 12 hours PRN Musculoskeletal Pain      CAPILLARY BLOOD GLUCOSE        I&O's Summary      PHYSICAL EXAM:  Vital Signs Last 24 Hrs  T(C): 36.3 (26 Sep 2022 05:37), Max: 36.7 (25 Sep 2022 13:15)  T(F): 97.3 (26 Sep 2022 05:37), Max: 98 (25 Sep 2022 13:15)  HR: 56 (26 Sep 2022 05:37) (56 - 71)  BP: 120/63 (26 Sep 2022 05:37) (117/71 - 120/63)  BP(mean): --  RR: 18 (26 Sep 2022 05:37) (17 - 18)  SpO2: 95% (26 Sep 2022 05:37) (95% - 100%)    Parameters below as of 26 Sep 2022 05:37  Patient On (Oxygen Delivery Method): room air    CONSTITUTIONAL: NAD, well-developed, well-groomed  EYES: PERRLA; conjunctiva and sclera clear  ENMT: Moist oral mucosa, no pharyngeal injection or exudates; normal dentition  NECK: Supple, no palpable masses; no thyromegaly  RESPIRATORY: Normal respiratory effort; lungs are clear to auscultation bilaterally  CARDIOVASCULAR: Regular rate and rhythm, normal S1 and S2, no murmur/rub/gallop; No lower extremity edema; Peripheral pulses are 2+ bilaterally  ABDOMEN: Nontender to palpation, normoactive bowel sounds, no rebound/guarding; No hepatosplenomegaly  MUSCULOSKELETAL: No clubbing or cyanosis of digits; no joint swelling or tenderness to palpation.   PSYCH: A+O to person, place, and time; affect appropriate  NEUROLOGY: CN 2-12 are intact and symmetric; no gross sensory deficits. +unable to fully bury sclera in L eye abduction. L hip flexion ROM limited by fracture (2/5). 5/5 strength in all other extremities.  SKIN: No rashes; no palpable lesions          LABS:                        14.9   6.70  )-----------( 171      ( 25 Sep 2022 05:07 )             46.4     09-25    140  |  107  |  17  ----------------------------<  99  4.2   |  22  |  1.02    Ca    9.1      25 Sep 2022 05:07  Phos  2.9     09-25  Mg     2.00     09-25

## 2022-09-26 NOTE — PROGRESS NOTE ADULT - ATTENDING COMMENTS
Pt care and plan discussed and reviewed with house staff. Plan as outlined above edited by me to reflect our discussion.
Covering for Dr. Urban    Pt seen and examined 9/26/22    Reviewed vital signs, labs, any recent imaging    No significant deviations in his physical examination    66-yo male with unspecified convulsions thought to possibly have been triggered by UTI.  He is currently on ceftriaxone for E coli sourced from urine culture.  Blood cultures remain negative.  Will eventually switch to ciprofloxacin 500 mg PO bid to complete remained of 10 day treatment for this complicated UTI.  Neurology not recommending any further reductions in anti-epileptic dosages @ this time.  Likely d/c planning for home PT in next 48 hrs.    Appreciate PGY-1!
Pt care and plan discussed and reviewed with house staff. Plan as outlined above edited by me to reflect our discussion. Advanced care planning/advanced directives discussed with patient/family. DNR status including forceful chest compressions to attempt to restart the heart, ventilator support/artificial breathing, electric shock, artificial nutrition, health care proxy, Molst form all discussed with pt. Pt wishes to consider.
Pt care and plan discussed and reviewed with house staff. Plan as outlined above edited by me to reflect our discussion. Advanced care planning/advanced directives discussed with patient/family. DNR status including forceful chest compressions to attempt to restart the heart, ventilator support/artificial breathing, electric shock, artificial nutrition, health care proxy, Molst form all discussed with pt. Pt wishes to consider.

## 2022-09-26 NOTE — PROGRESS NOTE ADULT - PROBLEM SELECTOR PLAN 1
-pt has known seizures, recent 5/2022 admission due to concern for status epilepticus however further vEEG during that admission negative for seizures  -pt follows with neuro Dr. Emma Stringer  -possible that seizures triggered by UTI    Plan:  -c/w home vimpat 150 BID, keppra 1000 BID, pregabalin 150 qd, baclofen 20 BID  -private neurologist consulted, recommending EEG and reducing home pregabalin and baclofen  -EEG w/ no signs of seizure   -Neuro checks  -seizure precautions -pt has known seizures, recent 5/2022 admission due to concern for status epilepticus however further vEEG during that admission negative for seizures  -pt follows with neuro Dr. Emma Stringer  -possible that seizures triggered by UTI    Plan:  -c/w home vimpat 150 BID, keppra 1000 BID, pregabalin 150 qd, baclofen 20 qD PRN  -private neurologist following  -EEG w/ no signs of seizure   -Neuro checks  -seizure precautions

## 2022-09-26 NOTE — PROGRESS NOTE ADULT - PROBLEM SELECTOR PLAN 2
2/2 E. coli  -pt with h/o urinary retention, UA with large LE, nit, many bacteria. No prior cx data  -d/p vanc/zosyn in ED  -no hospitalizations in past 3 months needing pseudomonal coverage. Low concern for MRSA, no evidence of bone/joint/soft tissue infection  -WBC 18.46  -pt denies dysuria however given seizures could be related to underlying UTI will treat    Plan:  -IV ceftriaxone empirically  -BCx NGTD  -UCx with e. coli  -will likely dc on abx for total 10 day course   -c/w home flomax  -monitor bladder scans 2/2 E. coli  -pt with h/o urinary retention, UA with large LE, nit, many bacteria. No prior cx data  -d/p vanc/zosyn in ED  -no hospitalizations in past 3 months needing pseudomonal coverage. Low concern for MRSA, no evidence of bone/joint/soft tissue infection  -WBC 18.46  -pt denies dysuria however given seizures could be related to underlying UTI will treat    Plan:  -IV ceftriaxone empirically  -BCx NGTD  -UCx with e. coli, will f/u sensitivities  -will likely dc on abx for total 10 day course   -c/w home flomax  -monitor bladder scans

## 2022-09-27 ENCOUNTER — TRANSCRIPTION ENCOUNTER (OUTPATIENT)
Age: 67
End: 2022-09-27

## 2022-09-27 VITALS
OXYGEN SATURATION: 98 % | SYSTOLIC BLOOD PRESSURE: 124 MMHG | HEART RATE: 58 BPM | TEMPERATURE: 98 F | DIASTOLIC BLOOD PRESSURE: 70 MMHG | RESPIRATION RATE: 17 BRPM

## 2022-09-27 LAB
ANION GAP SERPL CALC-SCNC: 10 MMOL/L — SIGNIFICANT CHANGE UP (ref 7–14)
BUN SERPL-MCNC: 13 MG/DL — SIGNIFICANT CHANGE UP (ref 7–23)
CALCIUM SERPL-MCNC: 9.6 MG/DL — SIGNIFICANT CHANGE UP (ref 8.4–10.5)
CHLORIDE SERPL-SCNC: 107 MMOL/L — SIGNIFICANT CHANGE UP (ref 98–107)
CO2 SERPL-SCNC: 27 MMOL/L — SIGNIFICANT CHANGE UP (ref 22–31)
CREAT SERPL-MCNC: 0.99 MG/DL — SIGNIFICANT CHANGE UP (ref 0.5–1.3)
EGFR: 83 ML/MIN/1.73M2 — SIGNIFICANT CHANGE UP
GLUCOSE SERPL-MCNC: 102 MG/DL — HIGH (ref 70–99)
HCT VFR BLD CALC: 49.6 % — SIGNIFICANT CHANGE UP (ref 39–50)
HGB BLD-MCNC: 15.6 G/DL — SIGNIFICANT CHANGE UP (ref 13–17)
MAGNESIUM SERPL-MCNC: 2 MG/DL — SIGNIFICANT CHANGE UP (ref 1.6–2.6)
MCHC RBC-ENTMCNC: 29.4 PG — SIGNIFICANT CHANGE UP (ref 27–34)
MCHC RBC-ENTMCNC: 31.5 GM/DL — LOW (ref 32–36)
MCV RBC AUTO: 93.4 FL — SIGNIFICANT CHANGE UP (ref 80–100)
NRBC # BLD: 0 /100 WBCS — SIGNIFICANT CHANGE UP (ref 0–0)
NRBC # FLD: 0 K/UL — SIGNIFICANT CHANGE UP (ref 0–0)
PHOSPHATE SERPL-MCNC: 3.1 MG/DL — SIGNIFICANT CHANGE UP (ref 2.5–4.5)
PLATELET # BLD AUTO: 232 K/UL — SIGNIFICANT CHANGE UP (ref 150–400)
POTASSIUM SERPL-MCNC: 5 MMOL/L — SIGNIFICANT CHANGE UP (ref 3.5–5.3)
POTASSIUM SERPL-SCNC: 5 MMOL/L — SIGNIFICANT CHANGE UP (ref 3.5–5.3)
RBC # BLD: 5.31 M/UL — SIGNIFICANT CHANGE UP (ref 4.2–5.8)
RBC # FLD: 14.9 % — HIGH (ref 10.3–14.5)
SODIUM SERPL-SCNC: 144 MMOL/L — SIGNIFICANT CHANGE UP (ref 135–145)
WBC # BLD: 9.24 K/UL — SIGNIFICANT CHANGE UP (ref 3.8–10.5)
WBC # FLD AUTO: 9.24 K/UL — SIGNIFICANT CHANGE UP (ref 3.8–10.5)

## 2022-09-27 RX ORDER — LACOSAMIDE 50 MG/1
1 TABLET ORAL
Qty: 0 | Refills: 0 | DISCHARGE
Start: 2022-09-27

## 2022-09-27 RX ORDER — LEVETIRACETAM 250 MG/1
1 TABLET, FILM COATED ORAL
Qty: 0 | Refills: 0 | DISCHARGE
Start: 2022-09-27

## 2022-09-27 RX ORDER — LEVETIRACETAM 250 MG/1
1 TABLET, FILM COATED ORAL
Qty: 60 | Refills: 0
Start: 2022-09-27 | End: 2022-10-26

## 2022-09-27 RX ORDER — LACOSAMIDE 50 MG/1
1 TABLET ORAL
Qty: 60 | Refills: 0
Start: 2022-09-27 | End: 2022-10-26

## 2022-09-27 RX ORDER — BACLOFEN 100 %
1 POWDER (GRAM) MISCELLANEOUS
Qty: 14 | Refills: 0
Start: 2022-09-27 | End: 2022-10-10

## 2022-09-27 RX ADMIN — LACOSAMIDE 150 MILLIGRAM(S): 50 TABLET ORAL at 06:39

## 2022-09-27 RX ADMIN — Medication 50 MILLIGRAM(S): at 06:31

## 2022-09-27 RX ADMIN — LEVETIRACETAM 1000 MILLIGRAM(S): 250 TABLET, FILM COATED ORAL at 06:36

## 2022-09-27 RX ADMIN — Medication 150 MILLIGRAM(S): at 06:43

## 2022-09-27 RX ADMIN — CEFTRIAXONE 100 MILLIGRAM(S): 500 INJECTION, POWDER, FOR SOLUTION INTRAMUSCULAR; INTRAVENOUS at 06:01

## 2022-09-27 RX ADMIN — Medication 20 MILLIGRAM(S): at 14:13

## 2022-09-27 RX ADMIN — Medication 81 MILLIGRAM(S): at 12:49

## 2022-09-27 NOTE — PROGRESS NOTE ADULT - SUBJECTIVE AND OBJECTIVE BOX
PROGRESS NOTE:     Patient is a 66y old  Male who presents with a chief complaint of ?seizures (26 Sep 2022 07:27)      SUBJECTIVE / OVERNIGHT EVENTS:  No acute events overnight. Patient seen and evaluated at bedside. No fever/chills.  Denies SOB at rest, chest pain, palpitations, abdominal pain, nausea/vomiting    ADDITIONAL REVIEW OF SYSTEMS:    MEDICATIONS  (STANDING):  aspirin  chewable 81 milliGRAM(s) Oral daily  atorvastatin 20 milliGRAM(s) Oral at bedtime  cefTRIAXone   IVPB 1000 milliGRAM(s) IV Intermittent every 24 hours  enoxaparin Injectable 40 milliGRAM(s) SubCutaneous every 24 hours  influenza  Vaccine (HIGH DOSE) 0.7 milliLiter(s) IntraMuscular once  lacosamide 150 milliGRAM(s) Oral two times a day  levETIRAcetam 1000 milliGRAM(s) Oral two times a day  metoprolol succinate ER 50 milliGRAM(s) Oral daily  pregabalin 150 milliGRAM(s) Oral two times a day  senna 2 Tablet(s) Oral at bedtime  tamsulosin 0.8 milliGRAM(s) Oral at bedtime    MEDICATIONS  (PRN):  acetaminophen     Tablet .. 650 milliGRAM(s) Oral every 6 hours PRN Mild Pain (1 - 3), Moderate Pain (4 - 6)  baclofen 20 milliGRAM(s) Oral every 12 hours PRN Musculoskeletal Pain      CAPILLARY BLOOD GLUCOSE        I&O's Summary    26 Sep 2022 07:01  -  27 Sep 2022 07:00  --------------------------------------------------------  IN: 200 mL / OUT: 0 mL / NET: 200 mL        PHYSICAL EXAM:  Vital Signs Last 24 Hrs  T(C): 36.4 (27 Sep 2022 05:10), Max: 36.4 (26 Sep 2022 14:10)  T(F): 97.6 (27 Sep 2022 05:10), Max: 97.6 (27 Sep 2022 05:10)  HR: 56 (27 Sep 2022 05:10) (52 - 58)  BP: 127/71 (27 Sep 2022 05:10) (116/68 - 149/71)  BP(mean): --  RR: 18 (27 Sep 2022 05:10) (18 - 18)  SpO2: 99% (27 Sep 2022 05:10) (96% - 99%)    Parameters below as of 27 Sep 2022 05:10  Patient On (Oxygen Delivery Method): room air        CONSTITUTIONAL: NAD, well-developed  RESPIRATORY: Normal respiratory effort; lungs are clear to auscultation bilaterally  CARDIOVASCULAR: Regular rate and rhythm, normal S1 and S2, no murmur/rub/gallop; No lower extremity edema; Peripheral pulses are 2+ bilaterally  ABDOMEN: Nontender to palpation, normoactive bowel sounds, no rebound/guarding; No hepatosplenomegaly  MUSCLOSKELETAL: no clubbing or cyanosis of digits; no joint swelling or tenderness to palpation  PSYCH: A+O to person, place, and time; affect appropriate    LABS:                        15.6   9.24  )-----------( 232      ( 27 Sep 2022 06:12 )             49.6     09-27    144  |  107  |  13  ----------------------------<  102<H>  5.0   |  27  |  0.99    Ca    9.6      27 Sep 2022 06:12  Phos  3.1     09-27  Mg     2.00     09-27                  RADIOLOGY & ADDITIONAL TESTS:  Results Reviewed:   Imaging Personally Reviewed:  Electrocardiogram Personally Reviewed:    COORDINATION OF CARE:  Care Discussed with Consultants/Other Providers [Y/N]:  Prior or Outpatient Records Reviewed [Y/N]:   PROGRESS NOTE:     Patient is a 66y old  Male who presents with a chief complaint of ?seizures (26 Sep 2022 07:27)      SUBJECTIVE / OVERNIGHT EVENTS:  No acute events overnight. Patient seen and evaluated at bedside. No fever/chills.  Denies SOB at rest, chest pain, palpitations, abdominal pain, nausea/vomiting    ADDITIONAL REVIEW OF SYSTEMS:    MEDICATIONS  (STANDING):  aspirin  chewable 81 milliGRAM(s) Oral daily  atorvastatin 20 milliGRAM(s) Oral at bedtime  cefTRIAXone   IVPB 1000 milliGRAM(s) IV Intermittent every 24 hours  enoxaparin Injectable 40 milliGRAM(s) SubCutaneous every 24 hours  influenza  Vaccine (HIGH DOSE) 0.7 milliLiter(s) IntraMuscular once  lacosamide 150 milliGRAM(s) Oral two times a day  levETIRAcetam 1000 milliGRAM(s) Oral two times a day  metoprolol succinate ER 50 milliGRAM(s) Oral daily  pregabalin 150 milliGRAM(s) Oral two times a day  senna 2 Tablet(s) Oral at bedtime  tamsulosin 0.8 milliGRAM(s) Oral at bedtime    MEDICATIONS  (PRN):  acetaminophen     Tablet .. 650 milliGRAM(s) Oral every 6 hours PRN Mild Pain (1 - 3), Moderate Pain (4 - 6)  baclofen 20 milliGRAM(s) Oral every 12 hours PRN Musculoskeletal Pain      CAPILLARY BLOOD GLUCOSE        I&O's Summary    26 Sep 2022 07:01  -  27 Sep 2022 07:00  --------------------------------------------------------  IN: 200 mL / OUT: 0 mL / NET: 200 mL        PHYSICAL EXAM:  Vital Signs Last 24 Hrs  T(C): 36.4 (27 Sep 2022 05:10), Max: 36.4 (26 Sep 2022 14:10)  T(F): 97.6 (27 Sep 2022 05:10), Max: 97.6 (27 Sep 2022 05:10)  HR: 56 (27 Sep 2022 05:10) (52 - 58)  BP: 127/71 (27 Sep 2022 05:10) (116/68 - 149/71)  BP(mean): --  RR: 18 (27 Sep 2022 05:10) (18 - 18)  SpO2: 99% (27 Sep 2022 05:10) (96% - 99%)    Parameters below as of 27 Sep 2022 05:10  Patient On (Oxygen Delivery Method): room air      CONSTITUTIONAL: NAD, well-developed, well-groomed  EYES: PERRLA; conjunctiva and sclera clear  ENMT: Moist oral mucosa, no pharyngeal injection or exudates; normal dentition  NECK: Supple, no palpable masses; no thyromegaly  RESPIRATORY: Normal respiratory effort; lungs are clear to auscultation bilaterally  CARDIOVASCULAR: Regular rate and rhythm, normal S1 and S2, no murmur/rub/gallop; No lower extremity edema; Peripheral pulses are 2+ bilaterally  ABDOMEN: Nontender to palpation, normoactive bowel sounds, no rebound/guarding; No hepatosplenomegaly  MUSCULOSKELETAL: No clubbing or cyanosis of digits; no joint swelling or tenderness to palpation.   PSYCH: A+O to person, place, and time; affect appropriate  NEUROLOGY: CN 2-12 are intact and symmetric; no gross sensory deficits. +unable to fully bury sclera in L eye abduction. L hip flexion ROM limited by fracture (2/5). 5/5 strength in all other extremities.  SKIN: No rashes; no palpable lesions        LABS:                        15.6   9.24  )-----------( 232      ( 27 Sep 2022 06:12 )             49.6     09-27    144  |  107  |  13  ----------------------------<  102<H>  5.0   |  27  |  0.99    Ca    9.6      27 Sep 2022 06:12  Phos  3.1     09-27  Mg     2.00     09-27                  RADIOLOGY & ADDITIONAL TESTS:  Results Reviewed:   Imaging Personally Reviewed:  Electrocardiogram Personally Reviewed:    COORDINATION OF CARE:  Care Discussed with Consultants/Other Providers [Y/N]:  Prior or Outpatient Records Reviewed [Y/N]:   PROGRESS NOTE:     Patient is a 66y old  Male who presents with a chief complaint of ?seizures (26 Sep 2022 07:27)    SUBJECTIVE / OVERNIGHT EVENTS:  No acute events overnight. Patient seen and evaluated at bedside. No fever/chills.  Denies SOB at rest, chest pain, palpitations, abdominal pain, nausea/vomiting.     ADDITIONAL REVIEW OF SYSTEMS: As per HPI     MEDICATIONS  (STANDING):  aspirin  chewable 81 milliGRAM(s) Oral daily  atorvastatin 20 milliGRAM(s) Oral at bedtime  cefTRIAXone   IVPB 1000 milliGRAM(s) IV Intermittent every 24 hours  enoxaparin Injectable 40 milliGRAM(s) SubCutaneous every 24 hours  influenza  Vaccine (HIGH DOSE) 0.7 milliLiter(s) IntraMuscular once  lacosamide 150 milliGRAM(s) Oral two times a day  levETIRAcetam 1000 milliGRAM(s) Oral two times a day  metoprolol succinate ER 50 milliGRAM(s) Oral daily  pregabalin 150 milliGRAM(s) Oral two times a day  senna 2 Tablet(s) Oral at bedtime  tamsulosin 0.8 milliGRAM(s) Oral at bedtime    MEDICATIONS  (PRN):  acetaminophen     Tablet .. 650 milliGRAM(s) Oral every 6 hours PRN Mild Pain (1 - 3), Moderate Pain (4 - 6)  baclofen 20 milliGRAM(s) Oral every 12 hours PRN Musculoskeletal Pain      CAPILLARY BLOOD GLUCOSE        I&O's Summary    26 Sep 2022 07:01  -  27 Sep 2022 07:00  --------------------------------------------------------  IN: 200 mL / OUT: 0 mL / NET: 200 mL        PHYSICAL EXAM:  Vital Signs Last 24 Hrs  T(C): 36.4 (27 Sep 2022 05:10), Max: 36.4 (26 Sep 2022 14:10)  T(F): 97.6 (27 Sep 2022 05:10), Max: 97.6 (27 Sep 2022 05:10)  HR: 56 (27 Sep 2022 05:10) (52 - 58)  BP: 127/71 (27 Sep 2022 05:10) (116/68 - 149/71)  BP(mean): --  RR: 18 (27 Sep 2022 05:10) (18 - 18)  SpO2: 99% (27 Sep 2022 05:10) (96% - 99%)    Parameters below as of 27 Sep 2022 05:10  Patient On (Oxygen Delivery Method): room air      CONSTITUTIONAL: NAD, well-developed, well-groomed  EYES: PERRLA; conjunctiva and sclera clear  ENMT: Moist oral mucosa, no pharyngeal injection or exudates; normal dentition  NECK: Supple, no palpable masses; no thyromegaly  RESPIRATORY: Normal respiratory effort; lungs are clear to auscultation bilaterally  CARDIOVASCULAR: Regular rate and rhythm, normal S1 and S2, no murmur/rub/gallop; No lower extremity edema; Peripheral pulses are 2+ bilaterally  ABDOMEN: Nontender to palpation, normoactive bowel sounds, no rebound/guarding; No hepatosplenomegaly  MUSCULOSKELETAL: No clubbing or cyanosis of digits; no joint swelling or tenderness to palpation.   PSYCH: A+O to person, place, and time; affect appropriate  NEUROLOGY: CN 2-12 are intact and symmetric; no gross sensory deficits. +unable to fully bury sclera in L eye abduction. L hip flexion ROM limited by fracture (2/5). 5/5 strength in all other extremities.  SKIN: No rashes; no palpable lesions        LABS:                        15.6   9.24  )-----------( 232      ( 27 Sep 2022 06:12 )             49.6     09-27    144  |  107  |  13  ----------------------------<  102<H>  5.0   |  27  |  0.99    Ca    9.6      27 Sep 2022 06:12  Phos  3.1     09-27  Mg     2.00     09-27                  RADIOLOGY & ADDITIONAL TESTS:  Results Reviewed:   Imaging Personally Reviewed:  Electrocardiogram Personally Reviewed:    COORDINATION OF CARE:  Care Discussed with Consultants/Other Providers [Y/N]:  Prior or Outpatient Records Reviewed [Y/N]:

## 2022-09-27 NOTE — PROGRESS NOTE ADULT - PROBLEM SELECTOR PLAN 3
-h/o aneurysm repair 1997 with clips not compatible with MRI

## 2022-09-27 NOTE — PROGRESS NOTE ADULT - PROBLEM SELECTOR PROBLEM 4
Fracture, femur neck

## 2022-09-27 NOTE — DISCHARGE NOTE NURSING/CASE MANAGEMENT/SOCIAL WORK - [email or web address]_GEN_ALL_CORE
For information on Fall & Injury Prevention, visit: [email or web address] OR  [email or web address] OR  https://www.cdc.gov/steadi/patient.html

## 2022-09-27 NOTE — DISCHARGE NOTE NURSING/CASE MANAGEMENT/SOCIAL WORK - PATIENT PORTAL LINK FT
You can access the FollowMyHealth Patient Portal offered by [facility] by registering at the following website: [email or web address]. By joining [employer]’s FollowMyHealth portal, you will also be able to view your health information using other applications (apps) compatible with our system.

## 2022-09-27 NOTE — PROGRESS NOTE ADULT - PROBLEM SELECTOR PLAN 1
-pt has known seizures, recent 5/2022 admission due to concern for status epilepticus however further vEEG during that admission negative for seizures  -pt follows with neuro Dr. Emma Stringer  -possible that seizures triggered by UTI    Plan:  -c/w home vimpat 150 BID, keppra 1000 BID, pregabalin 150 qd, baclofen 20 qD PRN  -private neurologist following  -EEG w/ no signs of seizure   -Neuro checks  -seizure precautions

## 2022-09-27 NOTE — PROGRESS NOTE ADULT - ASSESSMENT
65 yo  M with CAD s/p RCA stent, brain aneursym s/p repair (1997) not compatible w/ MRI, seizure disorder on AEDs (keppra and vimpat), back surgery x4 and femur frx (5/2022) functionally bed bound p/w episodes of staring, unresponsiveness, shaking, and hypoxia. was unable to control R limb shaking, became hypoxic to 80s.  pt was admitted to [facility] 5/2022 for R limb shaking and unresponsiveness concerning for status epilepticus requiring intubation. He follows with neurology, Dr. Emma Ballard. States he takes keppra, vimpat, gabapentin and hasn't missed doses  UA+ in ED   CTH chronic chagnes s/p R cariniotomy and aneurysm clip. gliosis R>L frontal lobes   EEG no epileptiform abnormalities recorded     Impression: 1) cerebral aneurysm, treated 2) Epilpesy (h/o of status) 3) UTI  - possible seizure in setting of infection which can lower seizure threshold  - treatment of infection/UTI now on CTX, abx total 10 days   - would continue with keppra 1g BID and vimpat 150mg BID ; wife is concerened that these doses are too high.  I would not lower these dosages at this time   - also on baclofen 20 BID and lyrica 150mg BID.  these are high doses, consider tapering down   - seizure precuations  - neuro checks   - on ASA and statin therapy   - telemetry  - PT/OT/SS/SLP, OOBC  - check FS, glucose control <180  - GI/DVT ppx  - spoke with patient and wife at bedside.    outpatient f/u with dr. ballard on discharge  Timothy Noble MD  Vascular Neurology  Office: [phone]     
66M with h/o CAD s/p RCA stent, brain aneursym s/p repair (1997) not compatible w/ MRI, seizure disorder on AEDs, back surgery x4 and femur frx (march 2022) functionally bed bound admitted with episodes of staring, unresponsiveness, and hypoxia concerning for seizures. Found to have UTI possibly trigger for seizures.
67 yo  M with CAD s/p RCA stent, brain aneursym s/p repair (1997) not compatible w/ MRI, seizure disorder on AEDs (keppra and vimpat), back surgery x4 and femur frx (5/2022) functionally bed bound p/w episodes of staring, unresponsiveness, shaking, and hypoxia. was unable to control R limb shaking, became hypoxic to 80s.  pt was admitted to [facility] 5/2022 for R limb shaking and unresponsiveness concerning for status epilepticus requiring intubation. He follows with neurology, Dr. Emma Ballard. States he takes keppra, vimpat, gabapentin and hasn't missed doses  UA+ in ED   CTH chronic chagnes s/p R cariniotomy and aneurysm clip. gliosis R>L frontal lobes   EEG no epileptiform abnormalities recorded     Impression: 1) cerebral aneurysm, treated 2) Epilpesy (h/o of status) 3) UTI  - possible seizure in setting of infection which can lower seizure threshold  - treatment of infection/UTI now on CTX  - would continue with keppra 1g BID and vimpat 150mg BID ; wife is concerened that these doses are too high.  I would not lower these dosages at this time   - also on baclofen 20 BID and lyrica 150mg BID.  these are high doses, consider tapering down   - seizure precuations  - neuro checks   - on ASA and statin therapy   - telemetry  - PT/OT/SS/SLP, OOBC  - check FS, glucose control <180  - GI/DVT ppx  - spoke with patient and wife at bedside.    outpatient f/u with dr. ballard on discharge  Timothy Noble MD  Vascular Neurology  Office: [phone]   
66M with h/o CAD s/p RCA stent, brain aneursym s/p repair (1997) not compatible w/ MRI, seizure disorder on AEDs, back surgery x4 and femur frx (march 2022) functionally bed bound admitted with episodes of staring, unresponsiveness, and hypoxia concerning for seizures. Found to have UTI possibly trigger for seizures.

## 2022-09-27 NOTE — PROGRESS NOTE ADULT - PROVIDER SPECIALTY LIST ADULT
Neurology
Neurology
Internal Medicine

## 2022-09-27 NOTE — DISCHARGE NOTE NURSING/CASE MANAGEMENT/SOCIAL WORK - NSDCPNINST_GEN_ALL_CORE
Patient A&Ox3, confuse and forgetful at times, patient discharged to home with home PT this afternoon via ambulance, accompanied by spouse, no distress noted at time of discharged.

## 2022-09-27 NOTE — PROGRESS NOTE ADULT - PROBLEM SELECTOR PLAN 2
2/2 E. coli  -pt with h/o urinary retention, UA with large LE, nit, many bacteria. No prior cx data  -d/p vanc/zosyn in ED  -no hospitalizations in past 3 months needing pseudomonal coverage. Low concern for MRSA, no evidence of bone/joint/soft tissue infection  -WBC 18.46  -pt denies dysuria however given seizures could be related to underlying UTI will treat    Plan:  -IV ceftriaxone empirically  -BCx NGTD  -UCx with e. coli, will f/u sensitivities  -will likely dc on abx for total 10 day course   -c/w home flomax  -monitor bladder scans

## 2022-09-28 RX ORDER — METOPROLOL TARTRATE 50 MG
0 TABLET ORAL
Qty: 0 | Refills: 2 | DISCHARGE

## 2022-09-28 RX ORDER — LACOSAMIDE 50 MG/1
1 TABLET ORAL
Qty: 0 | Refills: 0 | DISCHARGE

## 2022-09-28 RX ORDER — BACLOFEN 100 %
1 POWDER (GRAM) MISCELLANEOUS
Qty: 0 | Refills: 0 | DISCHARGE

## 2022-09-28 RX ORDER — BACLOFEN 100 %
0 POWDER (GRAM) MISCELLANEOUS
Qty: 0 | Refills: 0 | DISCHARGE

## 2022-09-28 RX ORDER — GABAPENTIN 400 MG/1
1 CAPSULE ORAL
Qty: 0 | Refills: 0 | DISCHARGE

## 2022-09-28 RX ORDER — APIXABAN 2.5 MG/1
1 TABLET, FILM COATED ORAL
Qty: 0 | Refills: 0 | DISCHARGE

## 2022-09-28 RX ORDER — ROSUVASTATIN CALCIUM 5 MG/1
1 TABLET ORAL
Qty: 0 | Refills: 0 | DISCHARGE

## 2022-09-28 RX ORDER — ATORVASTATIN CALCIUM 80 MG/1
1 TABLET, FILM COATED ORAL
Qty: 0 | Refills: 0 | DISCHARGE

## 2022-09-28 RX ORDER — CIPROFLOXACIN LACTATE 400MG/40ML
1 VIAL (ML) INTRAVENOUS
Qty: 10 | Refills: 0
Start: 2022-09-28 | End: 2022-10-02

## 2022-09-28 RX ORDER — LACOSAMIDE 50 MG/1
0 TABLET ORAL
Qty: 0 | Refills: 0 | DISCHARGE

## 2022-09-28 RX ORDER — LEVETIRACETAM 250 MG/1
0 TABLET, FILM COATED ORAL
Qty: 0 | Refills: 0 | DISCHARGE

## 2022-09-28 RX ORDER — LEVETIRACETAM 250 MG/1
1 TABLET, FILM COATED ORAL
Qty: 0 | Refills: 0 | DISCHARGE

## 2022-09-28 RX ORDER — GABAPENTIN 400 MG/1
0 CAPSULE ORAL
Qty: 0 | Refills: 0 | DISCHARGE

## 2022-11-08 PROBLEM — I67.1 CEREBRAL ANEURYSM, NONRUPTURED: Chronic | Status: ACTIVE | Noted: 2022-09-23

## 2022-11-08 PROBLEM — I25.10 ATHEROSCLEROTIC HEART DISEASE OF NATIVE CORONARY ARTERY WITHOUT ANGINA PECTORIS: Chronic | Status: ACTIVE | Noted: 2022-09-23

## 2022-11-08 PROBLEM — R56.9 UNSPECIFIED CONVULSIONS: Chronic | Status: ACTIVE | Noted: 2022-09-23

## 2022-11-15 ENCOUNTER — OUTPATIENT (OUTPATIENT)
Dept: OUTPATIENT SERVICES | Facility: HOSPITAL | Age: 67
LOS: 1 days | End: 2022-11-15
Payer: MEDICARE

## 2022-11-15 DIAGNOSIS — Z11.52 ENCOUNTER FOR SCREENING FOR COVID-19: ICD-10-CM

## 2022-11-15 DIAGNOSIS — Z87.81 PERSONAL HISTORY OF (HEALED) TRAUMATIC FRACTURE: Chronic | ICD-10-CM

## 2022-11-15 DIAGNOSIS — Z98.89 OTHER SPECIFIED POSTPROCEDURAL STATES: Chronic | ICD-10-CM

## 2022-11-15 DIAGNOSIS — Z96.649 PRESENCE OF UNSPECIFIED ARTIFICIAL HIP JOINT: Chronic | ICD-10-CM

## 2022-11-15 DIAGNOSIS — M43.28 FUSION OF SPINE, SACRAL AND SACROCOCCYGEAL REGION: Chronic | ICD-10-CM

## 2022-11-15 DIAGNOSIS — Z98.890 OTHER SPECIFIED POSTPROCEDURAL STATES: Chronic | ICD-10-CM

## 2022-11-15 LAB — SARS-COV-2 RNA SPEC QL NAA+PROBE: SIGNIFICANT CHANGE UP

## 2022-11-15 PROCEDURE — U0003: CPT

## 2022-11-15 PROCEDURE — C9803: CPT

## 2022-11-15 PROCEDURE — U0005: CPT

## 2022-11-18 ENCOUNTER — RESULT REVIEW (OUTPATIENT)
Age: 67
End: 2022-11-18

## 2022-11-18 ENCOUNTER — APPOINTMENT (OUTPATIENT)
Dept: RADIOLOGY | Facility: HOSPITAL | Age: 67
End: 2022-11-18

## 2022-11-18 ENCOUNTER — OUTPATIENT (OUTPATIENT)
Dept: OUTPATIENT SERVICES | Facility: HOSPITAL | Age: 67
LOS: 1 days | End: 2022-11-18
Payer: MEDICARE

## 2022-11-18 DIAGNOSIS — M54.15 RADICULOPATHY, THORACOLUMBAR REGION: ICD-10-CM

## 2022-11-18 DIAGNOSIS — Z98.89 OTHER SPECIFIED POSTPROCEDURAL STATES: Chronic | ICD-10-CM

## 2022-11-18 DIAGNOSIS — Z96.649 PRESENCE OF UNSPECIFIED ARTIFICIAL HIP JOINT: Chronic | ICD-10-CM

## 2022-11-18 DIAGNOSIS — Z98.890 OTHER SPECIFIED POSTPROCEDURAL STATES: Chronic | ICD-10-CM

## 2022-11-18 DIAGNOSIS — Z00.00 ENCOUNTER FOR GENERAL ADULT MEDICAL EXAMINATION WITHOUT ABNORMAL FINDINGS: ICD-10-CM

## 2022-11-18 DIAGNOSIS — Z87.81 PERSONAL HISTORY OF (HEALED) TRAUMATIC FRACTURE: Chronic | ICD-10-CM

## 2022-11-18 DIAGNOSIS — M43.28 FUSION OF SPINE, SACRAL AND SACROCOCCYGEAL REGION: Chronic | ICD-10-CM

## 2022-11-18 PROCEDURE — 72132 CT LUMBAR SPINE W/DYE: CPT

## 2022-11-18 PROCEDURE — 72129 CT CHEST SPINE W/DYE: CPT

## 2022-11-18 PROCEDURE — 62305 MYELOGRAPHY LUMBAR INJECTION: CPT

## 2022-11-18 PROCEDURE — 72132 CT LUMBAR SPINE W/DYE: CPT | Mod: 26

## 2022-11-18 PROCEDURE — 72129 CT CHEST SPINE W/DYE: CPT | Mod: 26

## 2022-12-14 ENCOUNTER — RESULT REVIEW (OUTPATIENT)
Age: 67
End: 2022-12-14

## 2022-12-14 ENCOUNTER — OUTPATIENT (OUTPATIENT)
Dept: OUTPATIENT SERVICES | Facility: HOSPITAL | Age: 67
LOS: 1 days | End: 2022-12-14
Payer: MEDICARE

## 2022-12-14 ENCOUNTER — APPOINTMENT (OUTPATIENT)
Dept: CT IMAGING | Facility: IMAGING CENTER | Age: 67
End: 2022-12-14

## 2022-12-14 DIAGNOSIS — Z98.890 OTHER SPECIFIED POSTPROCEDURAL STATES: Chronic | ICD-10-CM

## 2022-12-14 DIAGNOSIS — Z98.89 OTHER SPECIFIED POSTPROCEDURAL STATES: Chronic | ICD-10-CM

## 2022-12-14 DIAGNOSIS — M25.559 PAIN IN UNSPECIFIED HIP: ICD-10-CM

## 2022-12-14 DIAGNOSIS — Z96.649 PRESENCE OF UNSPECIFIED ARTIFICIAL HIP JOINT: Chronic | ICD-10-CM

## 2022-12-14 DIAGNOSIS — Z87.81 PERSONAL HISTORY OF (HEALED) TRAUMATIC FRACTURE: Chronic | ICD-10-CM

## 2022-12-14 DIAGNOSIS — M43.28 FUSION OF SPINE, SACRAL AND SACROCOCCYGEAL REGION: Chronic | ICD-10-CM

## 2022-12-14 DIAGNOSIS — Z00.8 ENCOUNTER FOR OTHER GENERAL EXAMINATION: ICD-10-CM

## 2022-12-14 PROCEDURE — 72192 CT PELVIS W/O DYE: CPT

## 2022-12-14 PROCEDURE — 72192 CT PELVIS W/O DYE: CPT | Mod: 26

## 2023-03-02 NOTE — ED PROVIDER NOTE - CARE PROVIDER_API CALL
Using , recommendations provided and appointment made. Jimena Mckenzie)  Neurology; Pain Medicine  [address] [place] [address]  Phone: [phone]  Fax: [phone]  Follow Up Time:

## 2023-03-24 NOTE — PROGRESS NOTE ADULT - ASSESSMENT
Pt AAOx4, VSS on 2L O2 via NC. Pt received NS bolus this AM for low BP. 1 u PRBC today, tolerated well. MIVF infusuing.  Meng intact, adequate UOP. Refused PT. Renal diet, tolerating well. Pt staus changed to DNR, expected discharge tomorrow to hospice. Call bell within reach. Frequent rounds for safety.  Problem: Adult Inpatient Plan of Care  Goal: Plan of Care Review  Outcome: Ongoing, Progressing  Goal: Patient-Specific Goal (Individualized)  Outcome: Ongoing, Progressing  Goal: Absence of Hospital-Acquired Illness or Injury  Outcome: Ongoing, Progressing  Goal: Optimal Comfort and Wellbeing  Outcome: Ongoing, Progressing  Goal: Readiness for Transition of Care  Outcome: Ongoing, Progressing     Problem: Fall Injury Risk  Goal: Absence of Fall and Fall-Related Injury  Outcome: Ongoing, Progressing     Problem: Fluid and Electrolyte Imbalance (Acute Kidney Injury/Impairment)  Goal: Fluid and Electrolyte Balance  Outcome: Ongoing, Progressing     Problem: Impaired Wound Healing  Goal: Optimal Wound Healing  Outcome: Ongoing, Progressing     Problem: Infection  Goal: Absence of Infection Signs and Symptoms  Outcome: Ongoing, Progressing      ==============ASSESSMENT=============  RH with CAD s/p MI and stent, HTN, HLD, s/p 7 lumbar spinal surgeries. S/P R frontal craniotomy and clipping of R acomm aneurysm (1997). 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 On arrival, pt mental status was fluctuant between alert and somnolent. Breathing pattern concerning for possible jerel-busch v kussmaul pattern respirations. CT head performed in ED, followed by episodes of bradycardia with hypoxia and apnea. ED team also noted R arm shaking with complete absence of L UE movement. Due to suspicion for status epilepticus, pt was intubated for AW protection. Transferred to MICU for further management  ==============PLAN=============    #NEURO  #c/f seizure   -keppra 500 q12h   -c/w vimpat 150 q12h   -ongoing activity concerning for possible seizure discussed with EEG fellow 5/8 would keep EEG on   -appreciate neurology recommendations     #unwitnessed fall   - CTH wnl; pending CT c-spine after EEG, likely 5/8   - cervical collar in place   - CK wnl     #CARDIOVASCULAR  Hemodynamics - hypotensive    - has been off levo since 5/6 at 7:15pm, was likely initially needed because of prop side effect    - sepsis w/u cultures to r/o septic shock; see ID    Rhythm    - intermittent bradycardia-naun 2/2 propofol; now resolved    - ICH/cushing reflex r/o on CT    - ecg without heart block      #RESPIRATORY  #hypoxic episodes - concern for seizure activity    - intubated for aw protection on 5/6, on FiO2 30% currently     - resolved    #Secretions   - will hold off extubation d/t thick secretions that were noted today   - standing q6h duonebs   - tolerated CPAP trial and extubated 5/8 AM     #irregular respirations- resolved    - jerel-busch v kussmaul    - beta hydroxybutyrate wnl    #RENAL  - no issues     #GI  - AW protection; NGT in place  - not starting feeds d/t possible extubation    #ENDO  - no issues    #HEME/ONC  - A/C: DVT ppx lovenox; ASA after extubation  - hold half dose xarelto    #ID  # Sepsis   - leukocytosis on admission however afebrile so thought to be reactive 2/2 seizure  - UA negative  -bcx from 5/6 showed 1 bottle growing GPC in clusters; given hx of spinal hardware and new fever of 100.4 on 5/7 will repeat blood cultures and empirically start vanc (5/7- ) and zosyn (5/7- ), sputum cx ordered   ==============ASSESSMENT=============  66M with CAD s/p MI and stent, HTN, HLD, s/p 7 lumbar spinal surgeries. S/P R frontal craniotomy and clipping of R acomm aneurysm (1997). 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 On arrival, pt mental status was fluctuant between alert and somnolent. Breathing pattern concerning for possible jerel-busch v kussmaul pattern respirations. CT head performed in ED, followed by episodes of bradycardia with hypoxia and apnea. ED team also noted R arm shaking with complete absence of L UE movement. Due to suspicion for status epilepticus, pt was intubated for AW protection. Transferred to MICU for further management  ==============PLAN=============    #NEURO  #c/f seizure, high risk but no seizures on EEG   -keppra increased to 1000 q12h   -c/w vimpat 150 q12h   -no seizures seen on EEG  -appreciate neurology recommendations     #unwitnessed fall   - CTH wnl; CT-spine showed no pathology and C-collar was discontinued   - CK wnl   - Pelvic/femur XR pending  - seen by ortho, appreciate recs    #CARDIOVASCULAR  Hemodynamics - hypotensive    - has been off levo since 5/6 at 7:15pm, was likely initially needed because of prop side effect    - sepsis w/u cultures to r/o septic shock; see ID    HTN  -on metop (home dose equivalent)  -started amlodpine 5mg today for better BP control      #RESPIRATORY  - intubated for airway protection on 5/6, extubated 5/8    Secretions   - standing q6h duonebs   - tolerated CPAP trial and extubated 5/8 AM   - likely 2/2 MRSE PNA    #RENAL  - no issues     #GI  - passed dysphagia screen, diet started    #ENDO  - no issues    #HEME/ONC  - DVT ppx lovenox  - hold half dose xarelto, c/w ASA     #ID  # Sepsis  - UA negative, BCx from 5/6 showed MRSE, sputum also grew MRSE  -was on zosyn (5/7-5/8), continuing with vanc. Level due prior to next dose  -MRSA swab pending  -known hardware however low suspicion for hardware infection, will continue to monitor

## 2023-03-27 NOTE — PRE-OP CHECKLIST - ANTIBIOTIC
Information: Selecting Yes will display possible errors in your note based on the variables you have selected. This validation is only offered as a suggestion for you. PLEASE NOTE THAT THE VALIDATION TEXT WILL BE REMOVED WHEN YOU FINALIZE YOUR NOTE. IF YOU WANT TO FAX A PRELIMINARY NOTE YOU WILL NEED TO TOGGLE THIS TO 'NO' IF YOU DO NOT WANT IT IN YOUR FAXED NOTE. yes

## 2023-04-23 ENCOUNTER — INPATIENT (INPATIENT)
Facility: HOSPITAL | Age: 68
LOS: 3 days | Discharge: HOME | DRG: 871 | End: 2023-04-27
Attending: INTERNAL MEDICINE | Admitting: INTERNAL MEDICINE
Payer: MEDICARE

## 2023-04-23 VITALS
HEIGHT: 68 IN | OXYGEN SATURATION: 94 % | SYSTOLIC BLOOD PRESSURE: 128 MMHG | DIASTOLIC BLOOD PRESSURE: 63 MMHG | HEART RATE: 90 BPM | RESPIRATION RATE: 17 BRPM | WEIGHT: 259.93 LBS | TEMPERATURE: 97 F

## 2023-04-23 DIAGNOSIS — Z98.89 OTHER SPECIFIED POSTPROCEDURAL STATES: Chronic | ICD-10-CM

## 2023-04-23 DIAGNOSIS — Z98.890 OTHER SPECIFIED POSTPROCEDURAL STATES: Chronic | ICD-10-CM

## 2023-04-23 DIAGNOSIS — N39.0 URINARY TRACT INFECTION, SITE NOT SPECIFIED: ICD-10-CM

## 2023-04-23 DIAGNOSIS — Z96.649 PRESENCE OF UNSPECIFIED ARTIFICIAL HIP JOINT: Chronic | ICD-10-CM

## 2023-04-23 DIAGNOSIS — M43.28 FUSION OF SPINE, SACRAL AND SACROCOCCYGEAL REGION: Chronic | ICD-10-CM

## 2023-04-23 DIAGNOSIS — Z87.81 PERSONAL HISTORY OF (HEALED) TRAUMATIC FRACTURE: Chronic | ICD-10-CM

## 2023-04-23 LAB
24R-OH-CALCIDIOL SERPL-MCNC: 63.1 NG/ML — SIGNIFICANT CHANGE UP (ref 30–80)
ALBUMIN SERPL ELPH-MCNC: 4.2 G/DL — SIGNIFICANT CHANGE UP (ref 3.3–5)
ALP SERPL-CCNC: 111 U/L — SIGNIFICANT CHANGE UP (ref 40–120)
ALT FLD-CCNC: 21 U/L — SIGNIFICANT CHANGE UP (ref 10–45)
AMMONIA BLD-MCNC: 47 UMOL/L — SIGNIFICANT CHANGE UP (ref 11–55)
ANION GAP SERPL CALC-SCNC: 15 MMOL/L — SIGNIFICANT CHANGE UP (ref 5–17)
APPEARANCE UR: ABNORMAL
APTT BLD: 32.6 SEC — SIGNIFICANT CHANGE UP (ref 27.5–35.5)
AST SERPL-CCNC: 18 U/L — SIGNIFICANT CHANGE UP (ref 10–40)
BACTERIA # UR AUTO: ABNORMAL
BASE EXCESS BLDV CALC-SCNC: -0.3 MMOL/L — SIGNIFICANT CHANGE UP (ref -2–3)
BASE EXCESS BLDV CALC-SCNC: 0.7 MMOL/L — SIGNIFICANT CHANGE UP (ref -2–3)
BASOPHILS # BLD AUTO: 0.07 K/UL — SIGNIFICANT CHANGE UP (ref 0–0.2)
BASOPHILS NFR BLD AUTO: 0.6 % — SIGNIFICANT CHANGE UP (ref 0–2)
BILIRUB SERPL-MCNC: 0.5 MG/DL — SIGNIFICANT CHANGE UP (ref 0.2–1.2)
BILIRUB UR-MCNC: NEGATIVE — SIGNIFICANT CHANGE UP
BUN SERPL-MCNC: 24 MG/DL — HIGH (ref 7–23)
CA-I SERPL-SCNC: 1.31 MMOL/L — SIGNIFICANT CHANGE UP (ref 1.15–1.33)
CA-I SERPL-SCNC: 1.36 MMOL/L — HIGH (ref 1.15–1.33)
CALCIUM SERPL-MCNC: 10.8 MG/DL — HIGH (ref 8.4–10.5)
CHLORIDE BLDV-SCNC: 103 MMOL/L — SIGNIFICANT CHANGE UP (ref 96–108)
CHLORIDE BLDV-SCNC: 104 MMOL/L — SIGNIFICANT CHANGE UP (ref 96–108)
CHLORIDE SERPL-SCNC: 103 MMOL/L — SIGNIFICANT CHANGE UP (ref 96–108)
CK SERPL-CCNC: 44 U/L — SIGNIFICANT CHANGE UP (ref 30–200)
CO2 BLDV-SCNC: 25 MMOL/L — SIGNIFICANT CHANGE UP (ref 22–26)
CO2 BLDV-SCNC: 27 MMOL/L — HIGH (ref 22–26)
CO2 SERPL-SCNC: 22 MMOL/L — SIGNIFICANT CHANGE UP (ref 22–31)
COLOR SPEC: ABNORMAL
CREAT SERPL-MCNC: 1.3 MG/DL — SIGNIFICANT CHANGE UP (ref 0.5–1.3)
DIFF PNL FLD: ABNORMAL
EGFR: 60 ML/MIN/1.73M2 — SIGNIFICANT CHANGE UP
EOSINOPHIL # BLD AUTO: 0.15 K/UL — SIGNIFICANT CHANGE UP (ref 0–0.5)
EOSINOPHIL NFR BLD AUTO: 1.3 % — SIGNIFICANT CHANGE UP (ref 0–6)
EPI CELLS # UR: 9 — SIGNIFICANT CHANGE UP
GAS PNL BLDV: 132 MMOL/L — LOW (ref 136–145)
GAS PNL BLDV: 135 MMOL/L — LOW (ref 136–145)
GAS PNL BLDV: SIGNIFICANT CHANGE UP
GLUCOSE BLDV-MCNC: 100 MG/DL — HIGH (ref 70–99)
GLUCOSE BLDV-MCNC: 108 MG/DL — HIGH (ref 70–99)
GLUCOSE SERPL-MCNC: 100 MG/DL — HIGH (ref 70–99)
GLUCOSE UR QL: NEGATIVE — SIGNIFICANT CHANGE UP
HCO3 BLDV-SCNC: 24 MMOL/L — SIGNIFICANT CHANGE UP (ref 22–29)
HCO3 BLDV-SCNC: 25 MMOL/L — SIGNIFICANT CHANGE UP (ref 22–29)
HCT VFR BLD CALC: 48.9 % — SIGNIFICANT CHANGE UP (ref 39–50)
HCT VFR BLDA CALC: 51 % — SIGNIFICANT CHANGE UP (ref 39–51)
HCT VFR BLDA CALC: 52 % — HIGH (ref 39–51)
HGB BLD CALC-MCNC: 16.9 G/DL — SIGNIFICANT CHANGE UP (ref 12.6–17.4)
HGB BLD CALC-MCNC: 17.2 G/DL — SIGNIFICANT CHANGE UP (ref 12.6–17.4)
HGB BLD-MCNC: 16.4 G/DL — SIGNIFICANT CHANGE UP (ref 13–17)
HYALINE CASTS # UR AUTO: 0 /LPF — SIGNIFICANT CHANGE UP (ref 0–7)
IMM GRANULOCYTES NFR BLD AUTO: 0.4 % — SIGNIFICANT CHANGE UP (ref 0–0.9)
INR BLD: 1.17 RATIO — HIGH (ref 0.88–1.16)
KETONES UR-MCNC: NEGATIVE — SIGNIFICANT CHANGE UP
LACTATE BLDV-MCNC: 1.3 MMOL/L — SIGNIFICANT CHANGE UP (ref 0.5–2)
LACTATE BLDV-MCNC: 2.1 MMOL/L — HIGH (ref 0.5–2)
LEUKOCYTE ESTERASE UR-ACNC: ABNORMAL
LYMPHOCYTES # BLD AUTO: 2.98 K/UL — SIGNIFICANT CHANGE UP (ref 1–3.3)
LYMPHOCYTES # BLD AUTO: 26.6 % — SIGNIFICANT CHANGE UP (ref 13–44)
MAGNESIUM SERPL-MCNC: 1.8 MG/DL — SIGNIFICANT CHANGE UP (ref 1.6–2.6)
MCHC RBC-ENTMCNC: 30.9 PG — SIGNIFICANT CHANGE UP (ref 27–34)
MCHC RBC-ENTMCNC: 33.5 GM/DL — SIGNIFICANT CHANGE UP (ref 32–36)
MCV RBC AUTO: 92.3 FL — SIGNIFICANT CHANGE UP (ref 80–100)
MONOCYTES # BLD AUTO: 0.82 K/UL — SIGNIFICANT CHANGE UP (ref 0–0.9)
MONOCYTES NFR BLD AUTO: 7.3 % — SIGNIFICANT CHANGE UP (ref 2–14)
NEUTROPHILS # BLD AUTO: 7.15 K/UL — SIGNIFICANT CHANGE UP (ref 1.8–7.4)
NEUTROPHILS NFR BLD AUTO: 63.8 % — SIGNIFICANT CHANGE UP (ref 43–77)
NITRITE UR-MCNC: POSITIVE
NRBC # BLD: 0 /100 WBCS — SIGNIFICANT CHANGE UP (ref 0–0)
NT-PROBNP SERPL-SCNC: 119 PG/ML — SIGNIFICANT CHANGE UP (ref 0–300)
PCO2 BLDV: 38 MMHG — LOW (ref 42–55)
PCO2 BLDV: 40 MMHG — LOW (ref 42–55)
PH BLDV: 7.41 — SIGNIFICANT CHANGE UP (ref 7.32–7.43)
PH BLDV: 7.41 — SIGNIFICANT CHANGE UP (ref 7.32–7.43)
PH UR: 7.5 — SIGNIFICANT CHANGE UP (ref 5–8)
PLATELET # BLD AUTO: 273 K/UL — SIGNIFICANT CHANGE UP (ref 150–400)
PO2 BLDV: 56 MMHG — HIGH (ref 25–45)
PO2 BLDV: 57 MMHG — HIGH (ref 25–45)
POTASSIUM BLDV-SCNC: 4.1 MMOL/L — SIGNIFICANT CHANGE UP (ref 3.5–5.1)
POTASSIUM BLDV-SCNC: 7.3 MMOL/L — CRITICAL HIGH (ref 3.5–5.1)
POTASSIUM SERPL-MCNC: 4.1 MMOL/L — SIGNIFICANT CHANGE UP (ref 3.5–5.3)
POTASSIUM SERPL-MCNC: 4.5 MMOL/L — SIGNIFICANT CHANGE UP (ref 3.5–5.3)
POTASSIUM SERPL-SCNC: 4.1 MMOL/L — SIGNIFICANT CHANGE UP (ref 3.5–5.3)
POTASSIUM SERPL-SCNC: 4.5 MMOL/L — SIGNIFICANT CHANGE UP (ref 3.5–5.3)
PROLACTIN SERPL-MCNC: 12.9 NG/ML — SIGNIFICANT CHANGE UP (ref 4.1–18.4)
PROT SERPL-MCNC: 7.1 G/DL — SIGNIFICANT CHANGE UP (ref 6–8.3)
PROT UR-MCNC: ABNORMAL
PROTHROM AB SERPL-ACNC: 13.5 SEC — HIGH (ref 10.5–13.4)
PTH-INTACT FLD-MCNC: 10 PG/ML — LOW (ref 15–65)
RAPID RVP RESULT: SIGNIFICANT CHANGE UP
RBC # BLD: 5.3 M/UL — SIGNIFICANT CHANGE UP (ref 4.2–5.8)
RBC # FLD: 13.7 % — SIGNIFICANT CHANGE UP (ref 10.3–14.5)
RBC CASTS # UR COMP ASSIST: >50 /HPF — HIGH (ref 0–4)
SAO2 % BLDV: 89.5 % — HIGH (ref 67–88)
SAO2 % BLDV: 90.6 % — HIGH (ref 67–88)
SARS-COV-2 RNA SPEC QL NAA+PROBE: SIGNIFICANT CHANGE UP
SODIUM SERPL-SCNC: 140 MMOL/L — SIGNIFICANT CHANGE UP (ref 135–145)
SP GR SPEC: 1.02 — SIGNIFICANT CHANGE UP (ref 1.01–1.02)
UROBILINOGEN FLD QL: NEGATIVE — SIGNIFICANT CHANGE UP
WBC # BLD: 11.21 K/UL — HIGH (ref 3.8–10.5)
WBC # FLD AUTO: 11.21 K/UL — HIGH (ref 3.8–10.5)
WBC UR QL: >50 /HPF — SIGNIFICANT CHANGE UP (ref 0–5)

## 2023-04-23 PROCEDURE — 74177 CT ABD & PELVIS W/CONTRAST: CPT | Mod: 26

## 2023-04-23 PROCEDURE — 99053 MED SERV 10PM-8AM 24 HR FAC: CPT

## 2023-04-23 PROCEDURE — 71275 CT ANGIOGRAPHY CHEST: CPT | Mod: 26

## 2023-04-23 PROCEDURE — 71045 X-RAY EXAM CHEST 1 VIEW: CPT | Mod: 26

## 2023-04-23 PROCEDURE — 99285 EMERGENCY DEPT VISIT HI MDM: CPT

## 2023-04-23 PROCEDURE — 70450 CT HEAD/BRAIN W/O DYE: CPT | Mod: 26

## 2023-04-23 RX ORDER — SENNA PLUS 8.6 MG/1
2 TABLET ORAL AT BEDTIME
Refills: 0 | Status: DISCONTINUED | OUTPATIENT
Start: 2023-04-23 | End: 2023-04-27

## 2023-04-23 RX ORDER — TAMSULOSIN HYDROCHLORIDE 0.4 MG/1
0.8 CAPSULE ORAL AT BEDTIME
Refills: 0 | Status: DISCONTINUED | OUTPATIENT
Start: 2023-04-23 | End: 2023-04-27

## 2023-04-23 RX ORDER — GABAPENTIN 400 MG/1
100 CAPSULE ORAL
Refills: 0 | Status: DISCONTINUED | OUTPATIENT
Start: 2023-04-23 | End: 2023-04-27

## 2023-04-23 RX ORDER — ATORVASTATIN CALCIUM 80 MG/1
20 TABLET, FILM COATED ORAL AT BEDTIME
Refills: 0 | Status: DISCONTINUED | OUTPATIENT
Start: 2023-04-23 | End: 2023-04-27

## 2023-04-23 RX ORDER — CEFTRIAXONE 500 MG/1
1000 INJECTION, POWDER, FOR SOLUTION INTRAMUSCULAR; INTRAVENOUS ONCE
Refills: 0 | Status: COMPLETED | OUTPATIENT
Start: 2023-04-23 | End: 2023-04-23

## 2023-04-23 RX ORDER — LEVETIRACETAM 250 MG/1
1000 TABLET, FILM COATED ORAL
Refills: 0 | Status: DISCONTINUED | OUTPATIENT
Start: 2023-04-23 | End: 2023-04-26

## 2023-04-23 RX ORDER — ASPIRIN/CALCIUM CARB/MAGNESIUM 324 MG
81 TABLET ORAL DAILY
Refills: 0 | Status: DISCONTINUED | OUTPATIENT
Start: 2023-04-23 | End: 2023-04-27

## 2023-04-23 RX ORDER — ACETAMINOPHEN 500 MG
1000 TABLET ORAL ONCE
Refills: 0 | Status: COMPLETED | OUTPATIENT
Start: 2023-04-23 | End: 2023-04-23

## 2023-04-23 RX ORDER — METOPROLOL TARTRATE 50 MG
25 TABLET ORAL
Refills: 0 | Status: DISCONTINUED | OUTPATIENT
Start: 2023-04-23 | End: 2023-04-27

## 2023-04-23 RX ORDER — IPRATROPIUM/ALBUTEROL SULFATE 18-103MCG
3 AEROSOL WITH ADAPTER (GRAM) INHALATION ONCE
Refills: 0 | Status: COMPLETED | OUTPATIENT
Start: 2023-04-23 | End: 2023-04-23

## 2023-04-23 RX ORDER — LACOSAMIDE 50 MG/1
150 TABLET ORAL
Refills: 0 | Status: DISCONTINUED | OUTPATIENT
Start: 2023-04-23 | End: 2023-04-27

## 2023-04-23 RX ORDER — SODIUM CHLORIDE 9 MG/ML
250 INJECTION INTRAMUSCULAR; INTRAVENOUS; SUBCUTANEOUS ONCE
Refills: 0 | Status: COMPLETED | OUTPATIENT
Start: 2023-04-23 | End: 2023-04-23

## 2023-04-23 RX ORDER — CEFTRIAXONE 500 MG/1
1000 INJECTION, POWDER, FOR SOLUTION INTRAMUSCULAR; INTRAVENOUS EVERY 24 HOURS
Refills: 0 | Status: DISCONTINUED | OUTPATIENT
Start: 2023-04-23 | End: 2023-04-27

## 2023-04-23 RX ORDER — BACLOFEN 100 %
5 POWDER (GRAM) MISCELLANEOUS EVERY 12 HOURS
Refills: 0 | Status: DISCONTINUED | OUTPATIENT
Start: 2023-04-23 | End: 2023-04-27

## 2023-04-23 RX ORDER — SODIUM CHLORIDE 9 MG/ML
1000 INJECTION INTRAMUSCULAR; INTRAVENOUS; SUBCUTANEOUS
Refills: 0 | Status: DISCONTINUED | OUTPATIENT
Start: 2023-04-23 | End: 2023-04-27

## 2023-04-23 RX ORDER — HEPARIN SODIUM 5000 [USP'U]/ML
5000 INJECTION INTRAVENOUS; SUBCUTANEOUS EVERY 8 HOURS
Refills: 0 | Status: DISCONTINUED | OUTPATIENT
Start: 2023-04-23 | End: 2023-04-27

## 2023-04-23 RX ADMIN — Medication 25 MILLIGRAM(S): at 18:07

## 2023-04-23 RX ADMIN — CEFTRIAXONE 100 MILLIGRAM(S): 500 INJECTION, POWDER, FOR SOLUTION INTRAMUSCULAR; INTRAVENOUS at 09:16

## 2023-04-23 RX ADMIN — HEPARIN SODIUM 5000 UNIT(S): 5000 INJECTION INTRAVENOUS; SUBCUTANEOUS at 22:41

## 2023-04-23 RX ADMIN — SODIUM CHLORIDE 250 MILLILITER(S): 9 INJECTION INTRAMUSCULAR; INTRAVENOUS; SUBCUTANEOUS at 09:17

## 2023-04-23 RX ADMIN — Medication 400 MILLIGRAM(S): at 08:15

## 2023-04-23 RX ADMIN — LACOSAMIDE 150 MILLIGRAM(S): 50 TABLET ORAL at 18:16

## 2023-04-23 RX ADMIN — GABAPENTIN 100 MILLIGRAM(S): 400 CAPSULE ORAL at 18:06

## 2023-04-23 RX ADMIN — ATORVASTATIN CALCIUM 20 MILLIGRAM(S): 80 TABLET, FILM COATED ORAL at 22:41

## 2023-04-23 RX ADMIN — TAMSULOSIN HYDROCHLORIDE 0.8 MILLIGRAM(S): 0.4 CAPSULE ORAL at 22:41

## 2023-04-23 RX ADMIN — SENNA PLUS 2 TABLET(S): 8.6 TABLET ORAL at 22:41

## 2023-04-23 RX ADMIN — Medication 3 MILLILITER(S): at 08:15

## 2023-04-23 RX ADMIN — Medication 1000 MILLIGRAM(S): at 18:33

## 2023-04-23 RX ADMIN — Medication 5 MILLIGRAM(S): at 15:39

## 2023-04-23 RX ADMIN — Medication 150 MILLIGRAM(S): at 18:15

## 2023-04-23 RX ADMIN — LEVETIRACETAM 1000 MILLIGRAM(S): 250 TABLET, FILM COATED ORAL at 18:06

## 2023-04-23 RX ADMIN — Medication 81 MILLIGRAM(S): at 18:16

## 2023-04-23 RX ADMIN — SODIUM CHLORIDE 75 MILLILITER(S): 9 INJECTION INTRAMUSCULAR; INTRAVENOUS; SUBCUTANEOUS at 15:13

## 2023-04-23 NOTE — ED PROVIDER NOTE - PROGRESS NOTE DETAILS
Darleen Petit PGY1: UA + for UTI, will give IV abx. Attending Kalee Rajput: ct shows evidence of stones within the kidney. no hydronephrosis. d/w dr jackson who is aware

## 2023-04-23 NOTE — H&P ADULT - HISTORY OF PRESENT ILLNESS
Patient is a 68 y/o Male with PMHx of HTN, HLD, Seizures, CAD w/ 1 stent, chronic back pain s/p multiple spine surgeries presents to the ED for AMS. Patient brought in by EMS after wife called due to patient having hallucinations, talking to people that were not there and trying to eat when food was in his hands.  Per wife who was at bedside, patient typically acts this way when he has a UTI.  Patient states he has been having burning with urination and urinary frequency for the last 5 days.  Patient is urinary incontinent, likely from neurogenic bladder due to chronic back issues and surgeries.  Patient has had multiple UTIs in the past 6 months requiring multiple admissions.  Pt hypoxic to 90% on RA, placed on 3 L NC by EMS. Patient denies fevers, chills, headache, vision changes, chest pain, trouble breathing, abdominal pain, hematuria, blood in stool.  Patient endorses nausea, no vomiting and diarrhea. Patient is a former smoker.  Per wife, patient normally takes baclofen 20 mg 4 times a day but has not taken it in the last week due to inability to refill prescription.

## 2023-04-23 NOTE — ED PROVIDER NOTE - PHYSICAL EXAMINATION
Constitutional: VS reviewed. Alert and orientedx2 (person, place, states it is June 2023), well appearing, no apparent distress  HEENT: Atraumatic, EOMI  CV: RRR  Lungs: Mild increased in work of breathing. On 3 L of O2, satting 97%. No wheezes, rales or rhonchi.   Abdomen: Soft, nondistended, nontender  MSK: No deformities. No CVA tenderness.   Skin: Warm and dry. As visualized no rashes, lesions, bruising or erythema  Neuro: Strength 5/5 in all extremities. Sensation intact. No facial droop or pronator drift. Pt has tremor.   Lymph: No pitting edema in extremities. Constitutional: VS reviewed. Alert and orientedx2 (person, place, states it is June 2023), well appearing, no apparent distress  HEENT: Atraumatic, EOMI  CV: RRR  Lungs: Mild increased in work of breathing. On 3 L of O2, satting 97%. No wheezes, rales or rhonchi.   Abdomen: Soft, nondistended, nontender  MSK: No deformities. No CVA tenderness.   Skin: Warm and dry. As visualized no rashes, lesions, bruising or erythema  Neuro: Strength 5/5 in all extremities. Sensation intact. No facial droop or pronator drift. Pt has tremor.   Lymph: No pitting edema in extremities.  Attending Kalee Rajput: GEn: nad heent; atraumatic, mmm, op pinkl handling secretions, neck supple, chest; nttp, cv: rrr, lungs: slight wheeze otherwise clear abdome: soft nontender nondistended, extg: wwp, neuro: awake and alert

## 2023-04-23 NOTE — ED ADULT NURSE NOTE - NSICDXPASTSURGICALHX_GEN_ALL_CORE_FT
PAST SURGICAL HISTORY:  Cerebral aneurysm I997 with clips    Chronic pain Patient has an Intrathecal pump s/p removal in 2016    Cleft palate repair multiple:age 2 mos.-14 yo    Fusion of sacral region of spine 5/2017    H/O arthroscopy of shoulder right X 2, left X 1    H/O fracture of femur s/p repair    History of back surgery x5    History of hip replacement 8/15, revision 2/16 after falling and fracturing right femur    History of right inguinal hernia repair x2    History of throat surgery surgical exicision cyst 1986    Hx of appendectomy 6/1969    Hx of laminectomy lumbar-2002    S/P inguinal hernia repair     S/P left knee arthroscopy     S/P lumbar fusion L1-S1:2002    Stented coronary artery KAEL x 1 to RCA

## 2023-04-23 NOTE — ED PROVIDER NOTE - CLINICAL SUMMARY MEDICAL DECISION MAKING FREE TEXT BOX
66 y/o M with PMHx of HTN, HLD, CAD w/ 1 stent, chronic back pain s/p multiple spine surgeries presents to the ED for AMS with hallucinations since yesterday per wife. This is typical presentation when patient is UTI.  Patient has history of recurrent UTIs in the last year.  Patient endorses dysuria and urinary frequency for the last 5 days.  Patient nontoxic-appearing.  Patient hypoxic to 90% on room air, on 3 L of oxygen.  Lungs clear to auscultation.  Differential diagnoses include but not limited to UTI, urosepsis, PNA, electrolyte abnormalities, dehydration, baclofen withdrawal.  Sepsis work-up including fevers, lab, CXR.  She will likely require antibiotics and admission for IV ABX. 66 y/o M with PMHx of HTN, HLD, CAD w/ 1 stent, chronic back pain s/p multiple spine surgeries presents to the ED for AMS with hallucinations since yesterday per wife. This is typical presentation when patient is UTI.  Patient has history of recurrent UTIs in the last year.  Patient endorses dysuria and urinary frequency for the last 5 days.  Patient nontoxic-appearing.  Patient hypoxic to 90% on room air, on 3 L of oxygen.  Lungs clear to auscultation.  Differential diagnoses include but not limited to UTI, urosepsis, PNA, electrolyte abnormalities, dehydration, baclofen withdrawal.  Sepsis work-up including fevers, lab, CXR.  She will likely require antibiotics and admission for IV ABX.  Attending Kalee Rajput: 68 yo male with mutliple medical issues presenting with confusion. upon arrival pt awake and following commands, is intermittently confused. nonfocal neurologic exam. less likely acute cva. concern for possible delrium due to infection. pt with h/o frequent uti. pt pan cultured. UA is positive for infection and pt given abx based on prior culture sensitivitieis. no falls or trauma. pt with frequent UTI. ct scan performed ot further evaluate showing evidence of stones within the renal pelvis. less likely obstructive septic stone as no hydronephrosis. addiitonally pt with evidence of hypoxia upon arrival. pt with limited mobility. cta performed to evalaute for PE vs pna  which was negative. d/w medicine will admit for IV ABX.

## 2023-04-23 NOTE — H&P ADULT - ASSESSMENT
Patient is a 68 y/o Male with PMHx of HTN, HLD, Seizures, CAD w/ 1 stent, chronic back pain s/p multiple spine surgeries presents to the ED for AMS. Patient brought in by EMS after wife called due to patient having hallucinations, talking to people that were not there and trying to eat when food was in his hands.  Per wife who was at bedside, patient typically acts this way when he has a UTI.  Patient states he has been having burning with urination and urinary frequency for the last 5 days.  Patient is urinary incontinent, likely from neurogenic bladder due to chronic back issues and surgeries.  Patient has had multiple UTIs in the past 6 months requiring multiple admissions.  Pt hypoxic to 90% on RA, placed on 3 L NC by EMS. Patient denies fevers, chills, headache, vision changes, chest pain, trouble breathing, abdominal pain, hematuria, blood in stool.  Patient endorses nausea, no vomiting and diarrhea. Patient is a former smoker.  Per wife, patient normally takes baclofen 20 mg 4 times a day but has not taken it in the last week due to inability to refill prescription.    # Sepsis   Pan Cx  UTI   rocephin started  IV hydration  Pan CT   ID eval in AM PRN   monitor lactate     # Hx of Seizures  resume home seizure meds  seizure precautions  fall precautionss   muscle pain and spam    # CAD S/P PCI   ASA and statin   BP control     # HTN   Adjust BP meds    # DVT and gI PPX

## 2023-04-23 NOTE — H&P ADULT - NSHPPHYSICALEXAM_GEN_ALL_CORE
Vital Signs Last 24 Hrs  T(C): 36.7 (23 Apr 2023 11:35), Max: 37.3 (23 Apr 2023 07:30)  T(F): 98 (23 Apr 2023 11:35), Max: 99.1 (23 Apr 2023 07:30)  HR: 78 (23 Apr 2023 11:35) (67 - 90)  BP: 129/79 (23 Apr 2023 11:35) (116/94 - 129/79)  BP(mean): --  RR: 15 (23 Apr 2023 11:35) (15 - 18)  SpO2: 95% (23 Apr 2023 11:35) (94% - 97%)    Appearance: Normal	  HEENT:   Normal oral mucosa, PERRL, EOMI	  Lymphatic: No lymphadenopathy , no edema  Cardiovascular: Normal S1 S2, No JVD, No murmurs , Peripheral pulses palpable 2+ bilaterally  Respiratory: Lungs clear to auscultation, normal effort 	  Gastrointestinal:  Soft, Non-tender, + BS	  Skin: No rashes, No ecchymoses, No cyanosis, warm to touch  Musculoskeletal: Normal range of motion, normal strength  Psychiatry:  Mood & affect appropriate  Ext: No edema

## 2023-04-23 NOTE — ED ADULT NURSE NOTE - OBJECTIVE STATEMENT
Pt is a 67y M with PMH of HTN, HLD BIBA from [facility] for ams. EMS reports pt "speaking to someone not there" at home and burning at urination. EMS reports hx of right femur fracture causing chronic R sided weakness and tremors bilaterally. Pt placed on 2 L O2 by EMS. Upon assessment, pt appeared SOB, O2 sat 91%, and A&Ox3 with intermittent forgetfulness. Pt placed on 4L O2, sat 96%. Afebrile, wife at bedside.

## 2023-04-23 NOTE — ED ADULT NURSE NOTE - NSICDXPASTMEDICALHX_GEN_ALL_CORE_FT
PAST MEDICAL HISTORY:  Back pain Chronic back pain    Brain aneurysm s/p clipping, not compatible with MRI    CAD (coronary artery disease) s/p MI and RCA stenting    Cerebral aneurysm rupture 1997 clipped, no residual    Femur fracture, right 2/16, surgical revision of right hip    GERD (gastroesophageal reflux disease)     Hypertension     Lumbar herniated disc     Myocardial infarction KAEL x1 MI 2005, stent RCA    Narcotic dependence 5/28/16 for withdrawal ,pt currently on Dialudid 8 mg every 4-6 hours /day    Osteoarthritis     Sacroiliitis, not elsewhere classified Unspecified Inflammatory spondylopathy,Sacral and sacrococcygeal region    Seizures     Spinal stenosis

## 2023-04-23 NOTE — ED PROVIDER NOTE - ATTENDING CONTRIBUTION TO CARE
Attending MD Kalee Rajput:  I personally have seen and examined this patient.  Resident note reviewed and agree on plan of care and except where noted.  See HPI, PE, and MDM for details.

## 2023-04-23 NOTE — H&P ADULT - NSICDXPASTSURGICALHX_GEN_ALL_CORE_FT
PAST SURGICAL HISTORY:  Cerebral aneurysm I997 with clips    Chronic pain Patient has an Intrathecal pump s/p removal in 2016    Cleft palate repair multiple:age 2 mos.-12 yo    Fusion of sacral region of spine 5/2017    H/O arthroscopy of shoulder right X 2, left X 1    H/O fracture of femur s/p repair    History of back surgery x5    History of hip replacement 8/15, revision 2/16 after falling and fracturing right femur    History of right inguinal hernia repair x2    History of throat surgery surgical exicision cyst 1986    Hx of appendectomy 6/1969    Hx of laminectomy lumbar-2002    S/P inguinal hernia repair     S/P left knee arthroscopy     S/P lumbar fusion L1-S1:2002    Stented coronary artery KAEL x 1 to RCA

## 2023-04-23 NOTE — ED PROVIDER NOTE - OBJECTIVE STATEMENT
66 y/o M with PMHx of HTN, HLD, CAD w/ 1 stent, chronic back pain s/p multiple spine surgeries presents to the ED for AMS. Patient brought in by EMS after wife called due to patient having hallucinations, talking to people that were not there and trying to eat when food was in his hands.  Per wife who was at bedside, patient typically acts this way when he has a UTI.  Patient states he has been having burning with urination and urinary frequency for the last 5 days.  Patient is urinary incontinent, likely from neurogenic bladder due to chronic back issues and surgeries.  Patient has had multiple UTIs in the past 6 months requiring multiple admissions.  Pt hypoxic to 90% on RA, placed on 3 L NC by EMS. Patient denies fevers, chills, headache, vision changes, chest pain, trouble breathing, abdominal pain, hematuria, blood in stool.  Patient endorses nausea, no vomiting and diarrhea. Patient is a former smoker.  Per wife, patient normally takes baclofen 20 mg 4 times a day but has not taken it in the last week due to inability to refill prescription.

## 2023-04-24 DIAGNOSIS — F32.9 MAJOR DEPRESSIVE DISORDER, SINGLE EPISODE, UNSPECIFIED: ICD-10-CM

## 2023-04-24 LAB
A1C WITH ESTIMATED AVERAGE GLUCOSE RESULT: 5.9 % — HIGH (ref 4–5.6)
ALBUMIN SERPL ELPH-MCNC: 4.2 G/DL — SIGNIFICANT CHANGE UP (ref 3.3–5)
ALP SERPL-CCNC: 110 U/L — SIGNIFICANT CHANGE UP (ref 40–120)
ALT FLD-CCNC: 17 U/L — SIGNIFICANT CHANGE UP (ref 10–45)
ANION GAP SERPL CALC-SCNC: 14 MMOL/L — SIGNIFICANT CHANGE UP (ref 5–17)
AST SERPL-CCNC: 16 U/L — SIGNIFICANT CHANGE UP (ref 10–40)
BASOPHILS # BLD AUTO: 0.06 K/UL — SIGNIFICANT CHANGE UP (ref 0–0.2)
BASOPHILS NFR BLD AUTO: 0.5 % — SIGNIFICANT CHANGE UP (ref 0–2)
BILIRUB SERPL-MCNC: 0.4 MG/DL — SIGNIFICANT CHANGE UP (ref 0.2–1.2)
BUN SERPL-MCNC: 23 MG/DL — SIGNIFICANT CHANGE UP (ref 7–23)
CALCIUM SERPL-MCNC: 10.2 MG/DL — SIGNIFICANT CHANGE UP (ref 8.4–10.5)
CHLORIDE SERPL-SCNC: 104 MMOL/L — SIGNIFICANT CHANGE UP (ref 96–108)
CHOLEST SERPL-MCNC: 120 MG/DL — SIGNIFICANT CHANGE UP
CO2 SERPL-SCNC: 21 MMOL/L — LOW (ref 22–31)
CREAT SERPL-MCNC: 1.17 MG/DL — SIGNIFICANT CHANGE UP (ref 0.5–1.3)
EGFR: 68 ML/MIN/1.73M2 — SIGNIFICANT CHANGE UP
EOSINOPHIL # BLD AUTO: 0.25 K/UL — SIGNIFICANT CHANGE UP (ref 0–0.5)
EOSINOPHIL NFR BLD AUTO: 2.1 % — SIGNIFICANT CHANGE UP (ref 0–6)
ESTIMATED AVERAGE GLUCOSE: 123 MG/DL — HIGH (ref 68–114)
GLUCOSE SERPL-MCNC: 87 MG/DL — SIGNIFICANT CHANGE UP (ref 70–99)
HCT VFR BLD CALC: 51.6 % — HIGH (ref 39–50)
HDLC SERPL-MCNC: 44 MG/DL — SIGNIFICANT CHANGE UP
HGB BLD-MCNC: 17.3 G/DL — HIGH (ref 13–17)
IMM GRANULOCYTES NFR BLD AUTO: 0.3 % — SIGNIFICANT CHANGE UP (ref 0–0.9)
LIPID PNL WITH DIRECT LDL SERPL: 48 MG/DL — SIGNIFICANT CHANGE UP
LYMPHOCYTES # BLD AUTO: 3.63 K/UL — HIGH (ref 1–3.3)
LYMPHOCYTES # BLD AUTO: 31 % — SIGNIFICANT CHANGE UP (ref 13–44)
MAGNESIUM SERPL-MCNC: 1.8 MG/DL — SIGNIFICANT CHANGE UP (ref 1.6–2.6)
MCHC RBC-ENTMCNC: 31.2 PG — SIGNIFICANT CHANGE UP (ref 27–34)
MCHC RBC-ENTMCNC: 33.5 GM/DL — SIGNIFICANT CHANGE UP (ref 32–36)
MCV RBC AUTO: 93 FL — SIGNIFICANT CHANGE UP (ref 80–100)
MONOCYTES # BLD AUTO: 0.91 K/UL — HIGH (ref 0–0.9)
MONOCYTES NFR BLD AUTO: 7.8 % — SIGNIFICANT CHANGE UP (ref 2–14)
NEUTROPHILS # BLD AUTO: 6.83 K/UL — SIGNIFICANT CHANGE UP (ref 1.8–7.4)
NEUTROPHILS NFR BLD AUTO: 58.3 % — SIGNIFICANT CHANGE UP (ref 43–77)
NON HDL CHOLESTEROL: 76 MG/DL — SIGNIFICANT CHANGE UP
NRBC # BLD: 0 /100 WBCS — SIGNIFICANT CHANGE UP (ref 0–0)
PLATELET # BLD AUTO: 236 K/UL — SIGNIFICANT CHANGE UP (ref 150–400)
POTASSIUM SERPL-MCNC: 4.3 MMOL/L — SIGNIFICANT CHANGE UP (ref 3.5–5.3)
POTASSIUM SERPL-SCNC: 4.3 MMOL/L — SIGNIFICANT CHANGE UP (ref 3.5–5.3)
PROT SERPL-MCNC: 7.2 G/DL — SIGNIFICANT CHANGE UP (ref 6–8.3)
RBC # BLD: 5.55 M/UL — SIGNIFICANT CHANGE UP (ref 4.2–5.8)
RBC # FLD: 14.1 % — SIGNIFICANT CHANGE UP (ref 10.3–14.5)
SODIUM SERPL-SCNC: 139 MMOL/L — SIGNIFICANT CHANGE UP (ref 135–145)
TRIGL SERPL-MCNC: 142 MG/DL — SIGNIFICANT CHANGE UP
TSH SERPL-MCNC: 2.42 UIU/ML — SIGNIFICANT CHANGE UP (ref 0.27–4.2)
VIT D25+D1,25 OH+D1,25 PNL SERPL-MCNC: 29.8 PG/ML — SIGNIFICANT CHANGE UP (ref 19.9–79.3)
WBC # BLD: 11.72 K/UL — HIGH (ref 3.8–10.5)
WBC # FLD AUTO: 11.72 K/UL — HIGH (ref 3.8–10.5)

## 2023-04-24 PROCEDURE — 99222 1ST HOSP IP/OBS MODERATE 55: CPT

## 2023-04-24 RX ORDER — POLYETHYLENE GLYCOL 3350 17 G/17G
17 POWDER, FOR SOLUTION ORAL DAILY
Refills: 0 | Status: DISCONTINUED | OUTPATIENT
Start: 2023-04-24 | End: 2023-04-27

## 2023-04-24 RX ADMIN — ATORVASTATIN CALCIUM 20 MILLIGRAM(S): 80 TABLET, FILM COATED ORAL at 21:42

## 2023-04-24 RX ADMIN — Medication 25 MILLIGRAM(S): at 17:05

## 2023-04-24 RX ADMIN — LEVETIRACETAM 1000 MILLIGRAM(S): 250 TABLET, FILM COATED ORAL at 05:12

## 2023-04-24 RX ADMIN — GABAPENTIN 100 MILLIGRAM(S): 400 CAPSULE ORAL at 05:11

## 2023-04-24 RX ADMIN — HEPARIN SODIUM 5000 UNIT(S): 5000 INJECTION INTRAVENOUS; SUBCUTANEOUS at 21:43

## 2023-04-24 RX ADMIN — SODIUM CHLORIDE 75 MILLILITER(S): 9 INJECTION INTRAMUSCULAR; INTRAVENOUS; SUBCUTANEOUS at 09:18

## 2023-04-24 RX ADMIN — HEPARIN SODIUM 5000 UNIT(S): 5000 INJECTION INTRAVENOUS; SUBCUTANEOUS at 13:13

## 2023-04-24 RX ADMIN — SENNA PLUS 2 TABLET(S): 8.6 TABLET ORAL at 21:42

## 2023-04-24 RX ADMIN — Medication 150 MILLIGRAM(S): at 11:05

## 2023-04-24 RX ADMIN — LACOSAMIDE 150 MILLIGRAM(S): 50 TABLET ORAL at 05:11

## 2023-04-24 RX ADMIN — GABAPENTIN 100 MILLIGRAM(S): 400 CAPSULE ORAL at 17:05

## 2023-04-24 RX ADMIN — Medication 5 MILLIGRAM(S): at 11:05

## 2023-04-24 RX ADMIN — HEPARIN SODIUM 5000 UNIT(S): 5000 INJECTION INTRAVENOUS; SUBCUTANEOUS at 05:12

## 2023-04-24 RX ADMIN — Medication 25 MILLIGRAM(S): at 05:11

## 2023-04-24 RX ADMIN — LACOSAMIDE 150 MILLIGRAM(S): 50 TABLET ORAL at 17:05

## 2023-04-24 RX ADMIN — Medication 81 MILLIGRAM(S): at 11:05

## 2023-04-24 RX ADMIN — CEFTRIAXONE 100 MILLIGRAM(S): 500 INJECTION, POWDER, FOR SOLUTION INTRAMUSCULAR; INTRAVENOUS at 09:18

## 2023-04-24 RX ADMIN — LEVETIRACETAM 1000 MILLIGRAM(S): 250 TABLET, FILM COATED ORAL at 17:05

## 2023-04-24 RX ADMIN — TAMSULOSIN HYDROCHLORIDE 0.8 MILLIGRAM(S): 0.4 CAPSULE ORAL at 21:41

## 2023-04-24 NOTE — BH CONSULTATION LIAISON ASSESSMENT NOTE - NSBHCHARTREVIEWLAB_PSY_A_CORE FT
17.3   11.72 )-----------( 236      ( 24 Apr 2023 06:53 )             51.6   04-24    139  |  104  |  23  ----------------------------<  87  4.3   |  21<L>  |  1.17    Ca    10.2      24 Apr 2023 06:53  Mg     1.8     04-24    TPro  7.2  /  Alb  4.2  /  TBili  0.4  /  DBili  x   /  AST  16  /  ALT  17  /  AlkPhos  110  04-24

## 2023-04-24 NOTE — BH CONSULTATION LIAISON ASSESSMENT NOTE - NSBHCONSULTRECOMMENDOTHER_PSY_A_CORE FT
1. If family amenable, may use PRN: Seroquel 25mg PO q6h PRN agitation; Haldol 0.5mg IV q6h PRN severe agitation.  Monitor for QTc<500.      2. PRN: Melatonin 3mg PO qHS PRN insomnia.    3. Check: B12, folate, RPR, consider EEG if MS not improving    4. Minimize use of benzos, opioids, anticholinergics, or other deliriogenic agents when possible.  Maintain sleep wake cycle.  Provide frequent reorientation and redirection.  Family member at bedside if possible. Assess for need for glasses and hearing aid (if applicable).    5. Pt does not meet criteria for psychiatric hospitalization.  Recommend outpt psych f/u at [facility] after d/c- [phone].   [facility] Psych will follow.

## 2023-04-24 NOTE — CONSULT NOTE ADULT - ASSESSMENT
66 y/o Male with HTN, HLD, Seizures, CAD w/ 1 stent, chronic back pain with SS and disc herniations (lumbar) s/p multiple spine surgeries and pump s/p lumbar fusion and lami, prior narcotic dependence OA, GERD, aneurysm s/p clipping 1997  presents to the ED for AMS.    CTH R crani and aneurysm clip noted at [facility]. bilateral frontal lobe gliosis and encephalomalacia. no acute findigns   UA+   Impressin:   1) AMS likely toxic metabolic encephaloapthy from infection  2) seizure d/o   3) aneurysm, [facility] s/p clipping with resultant seizure  4) chronic back pain s/p multiples sx's fusion, lami, pain pump     - c/w treatment of infection/UTI, on CTX   - for seizures c/w keppra 1g BID and lacosamide 150mg BID   - chronic back pain, on lyrica 150 daily, also baclofen PRN  - consider pain management if pain worse, seems comfertable   - ASA and statin for CAD s/p stent   - if no improvement iwth treatment of infection would get EEG   - f/u ID  - PT/OT   - check FS, glucose control <180  - GI/DVT ppx  - Counseling on diet, exercise, and medication adherence was done  - Counseling on smoking cessation and alcohol consumption offered when appropriate.  - Pain assessed and judicious use of narcotics when appropriate was discussed.    - Stroke education given when appropriate.  - Importance of fall prevention discussed.   - Differential diagnosis and plan of care discussed with patient and/or family and primary team  - Thank you for allowing me to participate in the care of this patient. Call with questions.   Timothy Noble MD  Vascular Neurology  Office: [phone]  66 y/o Male with HTN, HLD, Seizures, CAD w/ 1 stent, chronic back pain with SS and disc herniations (lumbar) s/p multiple spine surgeries and pump s/p lumbar fusion and lami, prior narcotic dependence OA, GERD, aneurysm s/p clipping 1997  presents to the ED for AMS.    CTH R crani and aneurysm clip noted at [facility]. bilateral frontal lobe gliosis and encephalomalacia. no acute findigns   UA+   Impressin:   1) AMS likely toxic metabolic encephaloapthy from infection  2) seizure d/o   3) aneurysm, [facility] s/p clipping with resultant seizure  4) chronic back pain s/p multiples sx's fusion, lami, pain pump     - c/w treatment of infection/UTI, on CTX   - for seizures c/w keppra 1g BID and lacosamide 150mg BID   - chronic back pain, on lyrica 150 daily, also baclofen PRN  - consider pain management if pain worse, seems comfertable   - ASA and statin for CAD s/p stent   - unable for MRI   - if no improvement iwth treatment of infection would get EEG   - f/u ID  - PT/OT   - check FS, glucose control <180  - GI/DVT ppx  - Counseling on diet, exercise, and medication adherence was done  - Counseling on smoking cessation and alcohol consumption offered when appropriate.  - Pain assessed and judicious use of narcotics when appropriate was discussed.    - Stroke education given when appropriate.  - Importance of fall prevention discussed.   - Differential diagnosis and plan of care discussed with patient and/or family and primary team  - Thank you for allowing me to participate in the care of this patient. Call with questions.   Timothy Noble MD  Vascular Neurology  Office: [phone]

## 2023-04-24 NOTE — BH CONSULTATION LIAISON ASSESSMENT NOTE - HPI (INCLUDE ILLNESS QUALITY, SEVERITY, DURATION, TIMING, CONTEXT, MODIFYING FACTORS, ASSOCIATED SIGNS AND SYMPTOMS)
67M retired, domiciled in private residence with wife, without pphx, PMHx of HTN, HLD, Seizures, CAD w/ 1 stent, chronic back pain s/p multiple spine surgeries presents to the ED for AMS. Patient brought in by EMS after wife called due to patient having hallucinations, talking to people that were not there and trying to eat when food was in his hands. Per wife who was at bedside, patient typically acts this way when he has a UTI.  Patient states he has been having burning with urination and urinary frequency for the last 5 days. Patient is urinary incontinent, likely from neurogenic bladder due to chronic back issues and surgeries. Patient has had multiple UTIs in the past 6 months requiring multiple admissions. Pt hypoxic to 90% on RA, placed on 3 L NC by EMS. Patient denies fevers, chills, headache, vision changes, chest pain, trouble breathing, abdominal pain, hematuria, blood in stool.  Patient endorses nausea, no vomiting and diarrhea. Patient is a former smoker.  Per wife, patient normally takes baclofen 20 mg 4 times a day but has not taken it in the last week due to inability to refill prescription. Psych consulted for psychosis.    At present pt lying comfortably in bed, calm, AOx4. Pt endorses visual hallucinations of seeing people in the room for ~1 week, last seen prior to coming to hospital. States they were just "hanging out" and did not do anything or speak to him. Denies current AH/VH. Pt endorses confusion when he gets UTIs, and speaks about them recurring frequently in the last several months. Pt states he has felt depressed for the last ~1 year regarding his ongoing medical problems. States he does not sleep well, has low energy, and low motivation to do things he normally enjoys. Denies SI/SA/HI, anxiety, felicita, delusions.     Collateral obtained from wife. She states the pt has had visual hallucinations of people in the room, talking on a cell phone that was not present, and eating food that was not there. She states the pt had similar hallucinations last September when the pt had a UTI, which resolved with the infection. She denies any home safety concerns regarding the hallucinations. She endorses the pt's depressive symptoms and would like resources for outpatient psychiatric support upon discharge.  67M retired, domiciled in private residence with wife, without pphx, PMHx of HTN, HLD, Seizures, CAD w/ 1 stent, chronic back pain s/p multiple spine surgeries presents to the ED for AMS. Patient brought in by EMS after wife called due to patient having hallucinations, talking to people that were not there and trying to eat when food was in his hands, psychiatry consulted for psychosis.    At present pt lying comfortably in bed, calm, AOx4. Pt endorses visual hallucinations of seeing people in the room for ~1 week, last seen prior to coming to hospital. States they were just "hanging out" and did not do anything or speak to him. Denies current AH/VH. Pt endorses confusion when he gets UTIs, and speaks about them recurring frequently in the last several months. Pt states he has felt depressed for the last ~1 year regarding his ongoing medical problems. States he does not sleep well, has low energy, and low motivation to do things he normally enjoys. Denies SI/SA/HI, anxiety, felicita, delusions.     Collateral obtained from wife with pt's permission: She states the pt has had visual hallucinations of people in the room, talking on a cell phone that was not present, and eating food that was not there. She states the pt had similar hallucinations last September when the pt had a UTI, which resolved with the infection. She denies any home safety concerns regarding the hallucinations. She endorses the pt's depressive symptoms and would like resources for outpatient psychiatric support upon discharge.

## 2023-04-24 NOTE — CONSULT NOTE ADULT - SUBJECTIVE AND OBJECTIVE BOX
HPI:    67M known to  for h/o recurrent UTIs.  Presented  with hallucinations.  Associated with frequency and dysuria.   consulted regarding bilateral nonobstructing nephrolithiasis.    PAST MEDICAL & SURGICAL HISTORY:  Myocardial infarction  KAEL x1 MI , stent RCA      Back pain  Chronic back pain      Spinal stenosis      Lumbar herniated disc      Hypertension      Narcotic dependence  15 for withdrawal ,pt currently on Dialudid 8 mg every 4-6 hours /day      Osteoarthritis      GERD (gastroesophageal reflux disease)      Cerebral aneurysm rupture   clipped, no residual      Femur fracture, right  , surgical revision of right hip      Sacroiliitis, not elsewhere classified  Unspecified Inflammatory spondylopathy,Sacral and sacrococcygeal region      Brain aneurysm  s/p clipping, not compatible with MRI      Seizures      CAD (coronary artery disease)  s/p MI and RCA stenting      Chronic pain  Patient has an Intrathecal pump s/p removal in       Cerebral aneurysm  I997 with clips      S/P lumbar fusion  L1-S1:      History of right inguinal hernia repair  x2      Hx of appendectomy  1969      Hx of laminectomy  lumbar-      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      Stented coronary artery  KAEL x 1 to RCA      History of hip replacement  8/15, revision  after falling and fracturing right femur      S/P left knee arthroscopy      H/O arthroscopy of shoulder  right X 2, left X 1      History of throat surgery  surgical exicision cyst 1986      Fusion of sacral region of spine  2017      History of back surgery  x5      S/P inguinal hernia repair      H/O fracture of femur  s/p repair          MEDICATIONS  (STANDING):  aspirin enteric coated 81 milliGRAM(s) Oral daily  atorvastatin 20 milliGRAM(s) Oral at bedtime  cefTRIAXone   IVPB 1000 milliGRAM(s) IV Intermittent every 24 hours  gabapentin 100 milliGRAM(s) Oral two times a day  heparin   Injectable 5000 Unit(s) SubCutaneous every 8 hours  lacosamide 150 milliGRAM(s) Oral two times a day  levETIRAcetam 1000 milliGRAM(s) Oral two times a day  metoprolol tartrate 25 milliGRAM(s) Oral two times a day  pregabalin 150 milliGRAM(s) Oral daily  senna 2 Tablet(s) Oral at bedtime  sodium chloride 0.9%. 1000 milliLiter(s) (75 mL/Hr) IV Continuous <Continuous>  tamsulosin 0.8 milliGRAM(s) Oral at bedtime    MEDICATIONS  (PRN):  baclofen 5 milliGRAM(s) Oral every 12 hours PRN Musculoskeletal Pain      Allergies    No Known Allergies    Intolerances        FAMILY HISTORY:  No pertinent family history in first degree relatives      Social History:      Review of Systems:  General: no fever or chills    Vital Signs and Measurements:  Vital Signs Last 24 Hrs  T(C): 36.7 (2023 12:05), Max: 37.2 (2023 08:06)  T(F): 98 (2023 12:05), Max: 98.9 (2023 08:06)  HR: 65 (2023 12:05) (62 - 81)  BP: 145/87 (2023 12:05) (124/62 - 163/83)  BP(mean): 99 (2023 18:15) (99 - 119)  RR: 18 (2023 12:05) (16 - 20)  SpO2: 95% (2023 12:05) (92% - 95%)    Parameters below as of 2023 12:05  Patient On (Oxygen Delivery Method): room air        I&O's Summary    2023 07:01  -  2023 12:58  --------------------------------------------------------  IN: 240 mL / OUT: 0 mL / NET: 240 mL        Physical Examination:  GENERAL APPEARANCE: pleasant, well nourished, in no acute distress.    LABS:                        17.3   11.72 )-----------( 236      ( 2023 06:53 )             51.6       04-24    139  |  104  |  23  ----------------------------<  87  4.3   |  21<L>  |  1.17    Ca    10.2      2023 06:53  Mg     1.8         TPro  7.2  /  Alb  4.2  /  TBili  0.4  /  DBili  x   /  AST  16  /  ALT  17  /  AlkPhos  110  24          Urinalysis Basic - ( 2023 08:08 )    Color: Light Orange / Appearance: Turbid / S.023 / pH: x  Gluc: x / Ketone: Negative  / Bili: Negative / Urobili: Negative   Blood: x / Protein: 300 mg/dL / Nitrite: Positive   Leuk Esterase: Large / RBC: >50 /hpf / WBC >50 /HPF   Sq Epi: x / Non Sq Epi: x / Bacteria: Many            IMAGING (imaging and reports reviewed):    < from: CT Abdomen and Pelvis w/ IV Cont (23 @ 11:21) >  CHEST:  LUNGS AND LARGE AIRWAYS: Patent central airways. Bibasilar linear   atelectasis.  PLEURA: No pleural effusion.  VESSELS: Contrast bolus is satisfactory. No main, right main, left main,   lobar or segmental pulmonary embolism. Limited evaluation of subsegmental   pulmonary arteries. Coronary artery and aortic calcifications.  HEART: Heart size is normal. No pericardial effusion.  MEDIASTINUM AND CESAR: No lymphadenopathy.  CHEST WALL AND LOWER NECK: Within normal limits.    ABDOMEN AND PELVIS:  LIVER: An indeterminant 1.4 cm hypodense focus within the lateral segment   of the left hepatic lobe, unchanged from 2016 and is likely benign.  BILE DUCTS: Normal caliber.  GALLBLADDER: Within normal limits.  SPLEEN: Within normal limits.  PANCREAS: Atrophic.  ADRENALS: Within normal limits.  KIDNEYS/URETERS: No hydronephrosis. Bilateral calculi in the renal   pelvises measuring 1.7 x 0.9 cm on the right and 1.1 x 1.0 cm on the left.    BLADDER: Within normal limits.  REPRODUCTIVE ORGANS: Prostate within normal limits.    BOWEL: No bowel obstruction. Appendix is not visualized. No evidence of   inflammation in the pericecal region. Large amount of stool distends the   rectum.  PERITONEUM: No ascites.  VESSELS: Atherosclerotic changes.  RETROPERITONEUM/LYMPH NODES: No lymphadenopathy.  ABDOMINAL WALL: Lumbar spinal cord stimulator lead at the level of T12-L1.  BONES: Degenerative changes. Status post posterior spinal fusion   extending fromT4 to S1 with connecting screws and rods. Status post   T12-S1 laminectomy Status post right-sided sacroiliac arthrodesis. Status   post right total hip arthroplasty with longstem femoral component and   single acetabular screw.    IMPRESSION:  No pulmonary embolism. No evidence of pneumonia.    No acute intra-abdominal finding.    < end of copied text >      Diagnosis/ProblemList/Plan:    1. UTI  Cont empiric ceftriaxone and f/u cultures.    2. Bilateral kidney stones  No acute  intervention warranted.  Elective outpatient lithtoripsy when acute issues resolved.    Rasheed Morgan MD  [facility]  Division of Urology    [address], [address] [place] [address]  [phone] [address] [facility] [address], [address]  [place] [address]  [phone]                  
DATE OF SERVICE: 04-24-23 @ 14:44    CHIEF COMPLAINT:Patient is a 67y old  Male who presents with a chief complaint of Sepsis (24 Apr 2023 12:57)      HISTORY OF PRESENT ILLNESS:  Patient is a 66 y/o Male with PMHx of HTN, HLD, Seizures, CAD w/ 1 stent, chronic back pain s/p multiple spine surgeries presents to the ED for AMS. Patient brought in by EMS after wife called due to patient having hallucinations, talking to people that were not there and trying to eat when food was in his hands.  Per wife who was at bedside, patient typically acts this way when he has a UTI.  Patient states he has been having burning with urination and urinary frequency for the last 5 days.  Patient is urinary incontinent, likely from neurogenic bladder due to chronic back issues and surgeries.  Patient has had multiple UTIs in the past 6 months requiring multiple admissions.  Pt hypoxic to 90% on RA, placed on 3 L NC by EMS. Patient denies fevers, chills, headache, vision changes, chest pain, trouble breathing, abdominal pain, hematuria, blood in stool.  Patient endorses nausea, no vomiting and diarrhea. Patient is a former smoker.  Per wife, patient normally takes baclofen 20 mg 4 times a day but has not taken it in the last week due to inability to refill prescription. (23 Apr 2023 10:53)      PAST MEDICAL & SURGICAL HISTORY:  Myocardial infarction  KAEL x1 MI 2005, stent RCA      Back pain  Chronic back pain      Spinal stenosis      Lumbar herniated disc      Hypertension      Narcotic dependence  5/28/16 for withdrawal ,pt currently on Dialudid 8 mg every 4-6 hours /day      Osteoarthritis      GERD (gastroesophageal reflux disease)      Cerebral aneurysm rupture  1997 clipped, no residual      Femur fracture, right  2/16, surgical revision of right hip      Sacroiliitis, not elsewhere classified  Unspecified Inflammatory spondylopathy,Sacral and sacrococcygeal region      Brain aneurysm  s/p clipping, not compatible with MRI      Seizures      CAD (coronary artery disease)  s/p MI and RCA stenting      Chronic pain  Patient has an Intrathecal pump s/p removal in 2016      Cerebral aneurysm  I997 with clips      S/P lumbar fusion  L1-S1:2002      History of right inguinal hernia repair  x2      Hx of appendectomy  6/1969      Hx of laminectomy  lumbar-2002      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      Stented coronary artery  KAEL x 1 to RCA      History of hip replacement  8/15, revision 2/16 after falling and fracturing right femur      S/P left knee arthroscopy      H/O arthroscopy of shoulder  right X 2, left X 1      History of throat surgery  surgical exicision cyst 1986      Fusion of sacral region of spine  5/2017      History of back surgery  x5      S/P inguinal hernia repair      H/O fracture of femur  s/p repair              MEDICATIONS:  aspirin enteric coated 81 milliGRAM(s) Oral daily  heparin   Injectable 5000 Unit(s) SubCutaneous every 8 hours  metoprolol tartrate 25 milliGRAM(s) Oral two times a day    cefTRIAXone   IVPB 1000 milliGRAM(s) IV Intermittent every 24 hours      baclofen 5 milliGRAM(s) Oral every 12 hours PRN  gabapentin 100 milliGRAM(s) Oral two times a day  lacosamide 150 milliGRAM(s) Oral two times a day  levETIRAcetam 1000 milliGRAM(s) Oral two times a day  pregabalin 150 milliGRAM(s) Oral daily    polyethylene glycol 3350 17 Gram(s) Oral daily PRN  senna 2 Tablet(s) Oral at bedtime    atorvastatin 20 milliGRAM(s) Oral at bedtime    sodium chloride 0.9%. 1000 milliLiter(s) IV Continuous <Continuous>  tamsulosin 0.8 milliGRAM(s) Oral at bedtime      FAMILY HISTORY:  No pertinent family history in first degree relatives        Non-contributory    SOCIAL HISTORY:    [- ] Tobacco - former remote smoking  [- ] Drugs  [- ] Alcohol    Allergies    No Known Allergies    Intolerances    	    REVIEW OF SYSTEMS:  CONSTITUTIONAL: No fever  EYES: No eye pain, visual disturbances, or discharge  ENMT:  No difficulty hearing, tinnitus  NECK: No pain or stiffness  RESPIRATORY: No cough, wheezing,  CARDIOVASCULAR: No chest pain, palpitations, passing out, dizziness, or leg swelling  GASTROINTESTINAL:  No nausea, vomiting, diarrhea or constipation. No melena.  GENITOURINARY: No dysuria, hematuria  NEUROLOGICAL: No stroke like symptoms  SKIN: No burning or lesions   ENDOCRINE: No heat or cold intolerance  MUSCULOSKELETAL: No joint pain or swelling  PSYCHIATRIC: No  anxiety, mood swings  HEME/LYMPH: No bleeding gums  ALLERGY AND IMMUNOLOGIC: No hives or eczema	    All other ROS negative    PHYSICAL EXAM:  T(C): 36.7 (04-24-23 @ 12:05), Max: 37.2 (04-24-23 @ 08:06)  HR: 65 (04-24-23 @ 12:05) (62 - 81)  BP: 145/87 (04-24-23 @ 12:05) (124/62 - 163/83)  RR: 18 (04-24-23 @ 12:05) (16 - 20)  SpO2: 95% (04-24-23 @ 12:05) (92% - 95%)  Wt(kg): --  I&O's Summary    24 Apr 2023 07:01  -  24 Apr 2023 14:44  --------------------------------------------------------  IN: 480 mL / OUT: 400 mL / NET: 80 mL        Appearance: Normal	  HEENT:   Normal oral mucosa, EOMI	  Cardiovascular:  S1 S2, No JVD,    Respiratory: Lungs clear to auscultation	  Psychiatry: Alert  Gastrointestinal:  Soft, Non-tender, + BS	  Skin: No rashes   Neurologic: Non-focal  Extremities:  No edema  Vascular: Peripheral pulses palpable    	    	  	  CARDIAC MARKERS:  Labs personally reviewed by me                                  17.3   11.72 )-----------( 236      ( 24 Apr 2023 06:53 )             51.6     04-24    139  |  104  |  23  ----------------------------<  87  4.3   |  21<L>  |  1.17    Ca    10.2      24 Apr 2023 06:53  Mg     1.8     04-24    TPro  7.2  /  Alb  4.2  /  TBili  0.4  /  DBili  x   /  AST  16  /  ALT  17  /  AlkPhos  110  04-24          EKG: Personally reviewed by me - SR  Radiology: Personally reviewed by me -     x< from: Xray Chest 1 View AP/PA (04.23.23 @ 09:13) >  Status post posterior spinal fusion.    The heart size is normal.  The lungs are clear.  There is no pneumothorax or pleural effusion.    IMPRESSION:  Clear lungs.    < end of copied text >  < from: TTE with Doppler (w/Cont) (05.08.22 @ 07:28) >  Conclusions:  1. Mitral valve not well visualized, probably normal.  Minimal mitral regurgitation.  2. Calcified trileaflet aortic valve with normal opening.  No aortic valve regurgitation seen.  3.   Endocardial visualization enhanced with intravenous  injection of Ultrasonic Enhancing Agent (Definity). Normal  left ventricular systolic function. No segmental wall  motion abnormalities. Septal motion consistent with  conduction defect.  4. Mild diastolic dysfunction (Stage I).  5. The right ventricle is not well visualized; grossly  normal right ventricular systolic function.  6. No evidence of valvular vegetation is seen.  *** Compared with echocardiogram of 1/2/2015, the focal  outpouching is not seen.    < end of copied text >      Assessment /Plan:     Patient is a 66 y/o Male with PMHx of HTN, HLD, Seizures, CAD w/ 1 stent, chronic back pain s/p multiple spine surgeries presents to the ED for AMS. Patient brought in by EMS after wife called due to patient having hallucinations, talking to people that were not there and trying to eat when food was in his hands found to have UTI. Patient denies fevers, chills, headache, vision changes, chest pain, trouble breathing, abdominal pain, hematuria, blood in stool.  Patient endorses nausea, no vomiting and diarrhea. Patient is a former smoker.  States he does not walk far d/t chronic back pain and LE weakness.     1. Coronary Artery Disease  - ECG with no ischemic characteristics noted  - Poor function capacity   - Denies CP or SOB  - c/w ASA, Atorvastatin and Metoprolol    2. Hypertension  - BP mostly at target  - c/w Metoprolol 25mg PO BID    3. Hyperlipidemia  - LDL 48  - c/w atorvastatin 20mg PO daily and gemfibrizol 600mg PO daily    4. DVT PPx  - c/w SQ heparin    Differential diagnosis and plan of care discussed with patient after the evaluation. Counseling on diet, nutritional counseling, weight management, exercise and medication compliance was done.   Advanced care planning/advanced directives discussed with patient/family. DNR status including forceful chest compressions to attempt to restart the heart, ventilator support/artificial breathing, electric shock, artificial nutrition, health care proxy, Molst form all discussed with pt. Pt wishes to consider. More than fifteen minutes spent on discussing advanced directives.     Cyndee FINN-BC  Adis Bellamy DO [facility]  Cardiovascular Medicine  [address] [place] [address] Dr, [address]  Available for call or text via [employer]  Office [phone]  
Neurology Consult    Reason for Consult: Patient is a 67y old  Male who presents with a chief complaint of Sepsis (2023 10:53)      HPI:  Patient is a 66 y/o Male with PMHx of HTN, HLD, Seizures, CAD w/ 1 stent, chronic back pain s/p multiple spine surgeries presents to the ED for AMS. Patient brought in by EMS after wife called due to patient having hallucinations, talking to people that were not there and trying to eat when food was in his hands.  Per wife who was at bedside, patient typically acts this way when he has a UTI.  Patient states he has been having burning with urination and urinary frequency for the last 5 days.  Patient is urinary incontinent, likely from neurogenic bladder due to chronic back issues and surgeries.  Patient has had multiple UTIs in the past 6 months requiring multiple admissions.  Pt hypoxic to 90% on RA, placed on 3 L NC by EMS. Patient denies fevers, chills, headache, vision changes, chest pain, trouble breathing, abdominal pain, hematuria, blood in stool.  Patient endorses nausea, no vomiting and diarrhea. Patient is a former smoker.  Per wife, patient normally takes baclofen 20 mg 4 times a day but has not taken it in the last week due to inability to refill prescription. (2023 10:53)       PAST MEDICAL & SURGICAL HISTORY:  Myocardial infarction  KAEL x1 MI , stent RCA      Back pain  Chronic back pain      Spinal stenosis      Lumbar herniated disc      Hypertension      Narcotic dependence  15 for withdrawal ,pt currently on Dialudid 8 mg every 4-6 hours /day      Osteoarthritis      GERD (gastroesophageal reflux disease)      Cerebral aneurysm rupture   clipped, no residual      Femur fracture, right  , surgical revision of right hip      Sacroiliitis, not elsewhere classified  Unspecified Inflammatory spondylopathy,Sacral and sacrococcygeal region      Brain aneurysm  s/p clipping, not compatible with MRI      Seizures      CAD (coronary artery disease)  s/p MI and RCA stenting      Chronic pain  Patient has an Intrathecal pump s/p removal in       Cerebral aneurysm  I997 with clips      S/P lumbar fusion  L1-S1:      History of right inguinal hernia repair  x2      Hx of appendectomy  1969      Hx of laminectomy  lumbar-      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      Stented coronary artery  KAEL x 1 to RCA      History of hip replacement  8/15, revision  after falling and fracturing right femur      S/P left knee arthroscopy      H/O arthroscopy of shoulder  right X 2, left X 1      History of throat surgery  surgical exicision cyst 1986      Fusion of sacral region of spine  2017      History of back surgery  x5      S/P inguinal hernia repair      H/O fracture of femur  s/p repair          Allergies: Allergies    No Known Allergies    Intolerances        Social History: Denies toxic habits including tobacco, ETOH or illicit drugs.    Family History: FAMILY HISTORY:  No pertinent family history in first degree relatives    . No family history of strokes    Medications: MEDICATIONS  (STANDING):  aspirin enteric coated 81 milliGRAM(s) Oral daily  atorvastatin 20 milliGRAM(s) Oral at bedtime  cefTRIAXone   IVPB 1000 milliGRAM(s) IV Intermittent every 24 hours  gabapentin 100 milliGRAM(s) Oral two times a day  heparin   Injectable 5000 Unit(s) SubCutaneous every 8 hours  lacosamide 150 milliGRAM(s) Oral two times a day  levETIRAcetam 1000 milliGRAM(s) Oral two times a day  metoprolol tartrate 25 milliGRAM(s) Oral two times a day  pregabalin 150 milliGRAM(s) Oral daily  senna 2 Tablet(s) Oral at bedtime  sodium chloride 0.9%. 1000 milliLiter(s) (75 mL/Hr) IV Continuous <Continuous>  tamsulosin 0.8 milliGRAM(s) Oral at bedtime    MEDICATIONS  (PRN):  baclofen 5 milliGRAM(s) Oral every 12 hours PRN Musculoskeletal Pain      Review of Systems:  CONSTITUTIONAL:  No weight loss, fever, chills, weakness or fatigue.  HEENT:  Eyes:  No visual loss, blurred vision, double vision or yellow sclera. Ears, Nose, Throat:  No hearing loss, sneezing, congestion, runny nose or sore throat.  SKIN:  No rash or itching.  CARDIOVASCULAR:  No chest pain, chest pressure or chest discomfort. No palpitations or edema.  RESPIRATORY:  No shortness of breath, cough or sputum.  GASTROINTESTINAL:  No anorexia, nausea, vomiting or diarrhea. No abdominal pain or blood.  GENITOURINARY:  No burning on urination or incontinence   NEUROLOGICAL:  No headache, dizziness, syncope, paralysis, ataxia, numbness or tingling in the extremities. No change in bowel or bladder control. no limb weakness. no vision changes.   MUSCULOSKELETAL:   back pain   HEMATOLOGIC:  No anemia, bleeding or bruising.  LYMPHATICS:  No enlarged nodes. No history of splenectomy.  PSYCHIATRIC:  No history of depression or anxiety.  ENDOCRINOLOGIC:  No reports of sweating, cold or heat intolerance. No polyuria or polydipsia.      Vitals:  Vital Signs Last 24 Hrs  T(C): 37.2 (2023 08:06), Max: 37.2 (2023 08:06)  T(F): 98.9 (2023 08:06), Max: 98.9 (2023 08:06)  HR: 62 (2023 08:06) (62 - 81)  BP: 150/79 (2023 09:56) (124/62 - 163/83)  BP(mean): 99 (2023 18:15) (99 - 119)  RR: 18 (2023 08:06) (16 - 20)  SpO2: 95% (2023 08:06) (92% - 95%)    Parameters below as of 2023 08:06  Patient On (Oxygen Delivery Method): room air        General Exam:   General Appearance: Appropriately dressed and in no acute distress       Head: Normocephalic, atraumatic and no dysmorphic features  Ear, Nose, and Throat: Moist mucous membranes  CVS: S1S2+  Resp: No SOB, no wheeze or rhonchi  GI: soft NT/ND  Extremities: No edema or cyanosis  Skin: No bruises or rashes     Neurological Exam:  Mental Status: Awake, alert and oriented x 2.  Able to follow simple and complex verbal commands. Able to name and repeat. fluent speech. No obvious aphasia or dysarthria noted.   Cranial Nerves: PERRL, EOMI, VFFC, sensation V1-V3 intact,  no obvious facial asymmetry, equal elevation of palate, scm/trap 5/5, tongue is midline on protrusion. no obvious papilledema on fundoscopic exam. hearing is grossly intact.   Motor: SAAVEDRA uppers > lowers. at least 4/5 throughout   Sensation: Intact to light touch and pinprick throughout. no right/left confusion. no extinction to tactile on DSS.    Reflexes: 1+ throughout at biceps, brachioradialis, triceps, patellars and ankles bilaterally and equal. No clonus. R toe and L toe were both downgoing.  Coordination: No dysmetria on FNF   Gait: deferred     Data/Labs/Imaging which I personally reviewed.     Labs:     CBC Full  -  ( 2023 06:53 )  WBC Count : 11.72 K/uL  RBC Count : 5.55 M/uL  Hemoglobin : 17.3 g/dL  Hematocrit : 51.6 %  Platelet Count - Automated : 236 K/uL  Mean Cell Volume : 93.0 fl  Mean Cell Hemoglobin : 31.2 pg  Mean Cell Hemoglobin Concentration : 33.5 gm/dL  Auto Neutrophil # : 6.83 K/uL  Auto Lymphocyte # : 3.63 K/uL  Auto Monocyte # : 0.91 K/uL  Auto Eosinophil # : 0.25 K/uL  Auto Basophil # : 0.06 K/uL  Auto Neutrophil % : 58.3 %  Auto Lymphocyte % : 31.0 %  Auto Monocyte % : 7.8 %  Auto Eosinophil % : 2.1 %  Auto Basophil % : 0.5 %        139  |  104  |  23  ----------------------------<  87  4.3   |  21<L>  |  1.17    Ca    10.2      2023 06:53  Mg     1.8     -    TPro  7.2  /  Alb  4.2  /  TBili  0.4  /  DBili  x   /  AST  16  /  ALT  17  /  AlkPhos  110  04-24    LIVER FUNCTIONS - ( 2023 06:53 )  Alb: 4.2 g/dL / Pro: 7.2 g/dL / ALK PHOS: 110 U/L / ALT: 17 U/L / AST: 16 U/L / GGT: x           PT/INR - ( 2023 08:14 )   PT: 13.5 sec;   INR: 1.17 ratio         PTT - ( 2023 08:14 )  PTT:32.6 sec  Urinalysis Basic - ( 2023 08:08 )    Color: Light Orange / Appearance: Turbid / S.023 / pH: x  Gluc: x / Ketone: Negative  / Bili: Negative / Urobili: Negative   Blood: x / Protein: 300 mg/dL / Nitrite: Positive   Leuk Esterase: Large / RBC: >50 /hpf / WBC >50 /HPF   Sq Epi: x / Non Sq Epi: x / Bacteria: Many

## 2023-04-24 NOTE — CONSULT NOTE ADULT - NS ATTEND AMEND GEN_ALL_CORE FT
Patient care and plan discussed and reviewed with Advanced Care Provider. Plan as outlined above edited by me to reflect our discussion.
strenuous activities/movement/palpation/activity

## 2023-04-24 NOTE — PHYSICAL THERAPY INITIAL EVALUATION ADULT - PERTINENT HX OF CURRENT PROBLEM, REHAB EVAL
67 y/oM admitted 4/232 presenting to ED for AMS, +sepsis, UTI. Patient brought in by EMS after wife called due to patient having hallucinations, talking to people that were not there and trying to eat when food was in his hands.  Per wife who was at bedside, patient typically acts this way when he has a UTI.  Patient states he has been having burning with urination and urinary frequency for the last 5 days.  Patient is urinary incontinent, likely from neurogenic bladder due to chronic back issues and surgeries.  Patient has had multiple UTIs in the past 6 months requiring multiple admissions.  Pt hypoxic to 90% on RA, placed on 3 L NC by EMS. PMH HTN, HLD, Seizures, CAD w/ 1 stent, chronic back pain s/p multiple spine surgeries. Per wife, patient normally takes baclofen 20 mg 4 times a day but has not taken it in the last week due to inability to refill prescription.

## 2023-04-24 NOTE — PROGRESS NOTE ADULT - ASSESSMENT
Patient is a 66 y/o Male with PMHx of HTN, HLD, Seizures, CAD w/ 1 stent, chronic back pain s/p multiple spine surgeries presents to the ED for AMS. Patient brought in by EMS after wife called due to patient having hallucinations, talking to people that were not there and trying to eat when food was in his hands.  Per wife who was at bedside, patient typically acts this way when he has a UTI.  Patient states he has been having burning with urination and urinary frequency for the last 5 days.  Patient is urinary incontinent, likely from neurogenic bladder due to chronic back issues and surgeries.  Patient has had multiple UTIs in the past 6 months requiring multiple admissions.  Pt hypoxic to 90% on RA, placed on 3 L NC by EMS. Patient denies fevers, chills, headache, vision changes, chest pain, trouble breathing, abdominal pain, hematuria, blood in stool.  Patient endorses nausea, no vomiting and diarrhea. Patient is a former smoker.  Per wife, patient normally takes baclofen 20 mg 4 times a day but has not taken it in the last week due to inability to refill prescription.    # Sepsis   - Pan Cx --> UCx and BCx2 in lab; F/u results   - Likely source is UTI   - C/w Rocephin for now pending sensitivities   - C/w IV hydration  - Lactate down-trending to WNL  - Pan CT as noted -- Neg for PE, neg for PNA or intra-abdominal findings   - ID eval PRN   - Urology eval consulted; F/u recs   - Monitor CBC, temp curve, VS and patient closely     # Hx of Seizures  - Resume home seizure meds  - Fall, Aspiration and Seizure precautions  - Muscle pain and spasm --> C/w gabapentin   - Neuro eval consulted; F/u recs     # Encephalopathy, H/O Aneurysm S/P Clipping   - CT Head as noted, Neg for New changes, shows chronic clipping   - C/w Tx of UTI  - Neuro eval consulted; F/u recs    # CAD S/P PCI   - C/w ASA and statin   - BP control   - Cardio eval consulted; F/u recs     # Nephrolithiasis   - Urology eval consulted; F/u recs     # HTN   - Monitor BP, VS and patient     # Stool burden  - On CT  - C/w Senna and Miralax  - Monitor for BM       # DVT and gI PPX

## 2023-04-24 NOTE — BH CONSULTATION LIAISON ASSESSMENT NOTE - NSBHATTESTCOMMENTATTENDFT_PSY_A_CORE
67M disabled, domiciled with wife, has adult children, with no formal PPHx, no prior SA or psychiatric admissions, no active substance abuse, with PMH significant for HTN, HLD, Seizures, CAD w/ 1 stent, chronic back pain s/p multiple spine surgeries presents to the ED for AMS in s/o UTI, psychiatry consulted for evaluation of hallucinations.  Pt presently AOx3, states he recalls seeing nonthreatening V/H of "people standing there" PTA, states he has had hallucinations before when he had UTIs.  States he is feeling much better today, about 75% back to baseline, but still feels mildly confused.  Denies depression, anxiety, felicita, SI/HI, or paranoia.  Denies substance abuse or prior psychiatric treatments.  Presentation c/w delirium 2/2 GMC.  Agree with trainee's assessment and plan as above. PRN Seroquel/Haldol as above if family in accord, consider EEG if MS not improving, SW for OP psych referral resources.

## 2023-04-24 NOTE — BH CONSULTATION LIAISON ASSESSMENT NOTE - NSBHCHARTREVIEWINVESTIGATE_PSY_A_CORE FT
Ventricular Rate 65 BPM    Atrial Rate 65 BPM    P-R Interval 182 ms    QRS Duration 90 ms    Q-T Interval 396 ms    QTC Calculation(Bazett) 411 ms    P Axis 40 degrees    R Axis -25 degrees    T Axis 72 degrees    Diagnosis Line NORMAL SINUS RHYTHM  POSSIBLE INFERIOR INFARCT (CITED ON OR BEFORE 28-MAR-2022)  ABNORMAL ECG  WHEN COMPARED WITH ECG OF 06-MAY-2022 06:33,  NO SIGNIFICANT CHANGE WAS FOUND  Confirmed by AARON RIOS MD ([id number]) on 4/23/2023 1:08:23 PM

## 2023-04-24 NOTE — PHYSICAL THERAPY INITIAL EVALUATION ADULT - ADDITIONAL COMMENTS
Pt lives with wife in single level private house, ramp to enter. Pt is not ambulatory x2 years, uses wheelchair for mobility. Limited by pain and weakness. Transfers from bed to chair with rolling walker and assist of wife. Has tub bench, wife assists with transfer to that as well. Pt is able to leave home, leaves weekly to visit children. Pt uses weight to maintain UE strength. Pt states at baseline is able to stand using rolling walker.

## 2023-04-24 NOTE — BH CONSULTATION LIAISON ASSESSMENT NOTE - DESCRIPTION
Pt retired, living at home with wife Pt retired, living at home with wife, disabled, has adult children

## 2023-04-24 NOTE — BH CONSULTATION LIAISON ASSESSMENT NOTE - NSBHCHARTREVIEWVS_PSY_A_CORE FT
Vital Signs Last 24 Hrs  T(C): 36.7 (24 Apr 2023 12:05), Max: 37.2 (24 Apr 2023 08:06)  T(F): 98 (24 Apr 2023 12:05), Max: 98.9 (24 Apr 2023 08:06)  HR: 65 (24 Apr 2023 12:05) (62 - 81)  BP: 145/87 (24 Apr 2023 12:05) (124/62 - 163/83)  BP(mean): 99 (23 Apr 2023 18:15) (99 - 119)  RR: 18 (24 Apr 2023 12:05) (16 - 20)  SpO2: 95% (24 Apr 2023 12:05) (92% - 95%)    Parameters below as of 24 Apr 2023 12:05  Patient On (Oxygen Delivery Method): room air     Rituxan Counseling:  I discussed with the patient the risks of Rituxan infusions. Side effects can include infusion reactions, severe drug rashes including mucocutaneous reactions, reactivation of latent hepatitis and other infections and rarely progressive multifocal leukoencephalopathy.  All of the patient's questions and concerns were addressed.

## 2023-04-24 NOTE — BH CONSULTATION LIAISON ASSESSMENT NOTE - RISK ASSESSMENT
Risk factors include presenting symptoms of depressed mood/anhedonia  Protective factors include engagement in treatment, stable residency, stable relationships, family/social support, responsibility to family Risk factors include delirium, chronic/acute medical illness  Protective factors include engagement in treatment, stable residency, stable relationships, family/social support, responsibility to family

## 2023-04-24 NOTE — BH CONSULTATION LIAISON ASSESSMENT NOTE - SUMMARY
67M retired, domiciled in private residence with wife, without pphx, PMHx of HTN, HLD, Seizures, CAD w/ 1 stent, chronic back pain s/p multiple spine surgeries presents to the ED for AMS. Patient brought in by EMS after wife called due to patient having hallucinations, talking to people that were not there and trying to eat when food was in his hands. Per wife who was at bedside, patient typically acts this way when he has a UTI. Patient states he has been having burning with urination and urinary frequency for the last 5 days. Psych consulted for psychosis.    At present pt lying comfortably in bed, calm, AOx4. Pt endorses visual hallucinations of seeing people in the room for ~1 week, last seen prior to coming to hospital. States they were just "hanging out" and did not do anything or speak to him. Denies AH. Pt endorses confusion when he gets UTIs, and speaks about them recurring frequently in the last several months. Pt states he has felt depressed for the last ~1 year regarding his ongoing medical problems. States he does not sleep well, has low energy, and low motivation to do things he normally enjoys. Denies SI/SA/HI, anxiety, felicita, delusions.     Collateral obtained from wife. She states the pt has had visual hallucinations of people in the room, talking on a cell phone that was not present, and eating food that was not there. She states the pt had similar hallucinations last September when the pt had a UTI, which resolved with the infection. She denies any home safety concerns regarding the hallucinations. She endorses the pt's depressive symptoms and would like resources for outpatient psychiatric support upon discharge.  67M retired, domiciled in private residence with wife, without pphx, PMHx of HTN, HLD, Seizures, CAD w/ 1 stent, chronic back pain s/p multiple spine surgeries presents to the ED for AMS. Patient brought in by EMS after wife called due to patient having hallucinations, talking to people that were not there and trying to eat when food was in his hands. Per wife, patient typically acts this way when he has a UTI. Patient states he has been having burning with urination and urinary frequency for the last 5 days. Psych consulted for psychosis.  At present pt lying comfortably in bed, calm, AOx4. Pt endorses visual hallucinations of seeing people in the room for ~1 week, last seen prior to coming to hospital. States they were just "hanging out" and did not do anything or speak to him. Denies AH. Pt endorses confusion when he gets UTIs, and speaks about them recurring frequently in the last several months. Denies SI/SA/HI, anxiety, felicita, delusions. Dx: Delirium 2/2 GMC

## 2023-04-24 NOTE — PROGRESS NOTE ADULT - SUBJECTIVE AND OBJECTIVE BOX
Name of Patient : MANUEL PETERSON  MRN: [id number]  Date of visit: 23 @ 12:21      Subjective: Patient seen and examined. No new events except as noted.   Patient seen earlier this AM. Lying down in bed     REVIEW OF SYSTEMS:    CONSTITUTIONAL: Generalized weakness   EYES/ENT: No visual changes;  No vertigo or throat pain   NECK: No pain or stiffness  RESPIRATORY: No cough, wheezing, hemoptysis; No shortness of breath  CARDIOVASCULAR: No chest pain or palpitations  GASTROINTESTINAL: No abdominal or epigastric pain. No nausea, vomiting, or hematemesis; No diarrhea or constipation. No melena or hematochezia.  GENITOURINARY: No dysuria, frequency or hematuria  NEUROLOGICAL: No numbness or weakness  SKIN: No itching, burning, rashes, or lesions   All other review of systems is negative unless indicated above.    MEDICATIONS:  MEDICATIONS  (STANDING):  aspirin enteric coated 81 milliGRAM(s) Oral daily  atorvastatin 20 milliGRAM(s) Oral at bedtime  cefTRIAXone   IVPB 1000 milliGRAM(s) IV Intermittent every 24 hours  gabapentin 100 milliGRAM(s) Oral two times a day  heparin   Injectable 5000 Unit(s) SubCutaneous every 8 hours  lacosamide 150 milliGRAM(s) Oral two times a day  levETIRAcetam 1000 milliGRAM(s) Oral two times a day  metoprolol tartrate 25 milliGRAM(s) Oral two times a day  pregabalin 150 milliGRAM(s) Oral daily  senna 2 Tablet(s) Oral at bedtime  sodium chloride 0.9%. 1000 milliLiter(s) (75 mL/Hr) IV Continuous <Continuous>  tamsulosin 0.8 milliGRAM(s) Oral at bedtime      PHYSICAL EXAM:  T(C): 36.7 (23 @ 12:05), Max: 37.2 (23 @ 08:06)  HR: 65 (23 @ 12:05) (62 - 81)  BP: 145/87 (23 @ 12:05) (124/62 - 163/83)  RR: 18 (23 @ 12:05) (16 - 20)  SpO2: 95% (23 @ 12:05) (92% - 95%)  Wt(kg): --  I&O's Summary    2023 07:01  -  2023 12:21  --------------------------------------------------------  IN: 240 mL / OUT: 0 mL / NET: 240 mL          Appearance: Awake, Calm, Lying down in bed 	  HEENT: Eyes are open   Lymphatic: No lymphadenopathy   Cardiovascular: Normal S1 S2   Respiratory: normal effort , clear  Gastrointestinal:  Soft, Non-tender to palpitation   Skin: No rashes,  warm to touch  Psychiatry:  Mood & affect appropriate; Calm   Musculoskeletal: No edema        23 @ 07:01  -  23 @ 12:21  --------------------------------------------------------  IN: 240 mL / OUT: 0 mL / NET: 240 mL                            17.3   11.72 )-----------( 236      ( 2023 06:53 )             51.6                   139  |  104  |  23  ----------------------------<  87  4.3   |  21<L>  |  1.17    Ca    10.2      2023 06:53  Mg     1.8         TPro  7.2  /  Alb  4.2  /  TBili  0.4  /  DBili  x   /  AST  16  /  ALT  17  /  AlkPhos  110  24    PT/INR - ( 2023 08:14 )   PT: 13.5 sec;   INR: 1.17 ratio         PTT - ( 2023 08:14 )  PTT:32.6 sec       CARDIAC MARKERS ( 2023 08:14 )  x     / x     / 44 U/L / x     / x                  Urinalysis Basic - ( 2023 08:08 )    Color: Light Orange / Appearance: Turbid / S.023 / pH: x  Gluc: x / Ketone: Negative  / Bili: Negative / Urobili: Negative   Blood: x / Protein: 300 mg/dL / Nitrite: Positive   Leuk Esterase: Large / RBC: >50 /hpf / WBC >50 /HPF   Sq Epi: x / Non Sq Epi: x / Bacteria: Many          < from: CT Abdomen and Pelvis w/ IV Cont (23 @ 11:21) >  IMPRESSION:  No pulmonary embolism. No evidence of pneumonia.    No acute intra-abdominal finding.    < end of copied text >        < from: CT Head No Cont (23 @ 11:21) >  IMPRESSION:  Sequelae of prior anterior communicating region aneurysm clipping.    No new hemorrhage or mass effect.    < end of copied text >       Name of Patient : MANUEL PETERSON  MRN: [id number]  Date of visit: 23 @ 12:21      Subjective: Patient seen and examined. No new events except as noted.   Patient seen earlier this AM. Lying down in bed.     REVIEW OF SYSTEMS:    CONSTITUTIONAL: Generalized weakness   EYES/ENT: No visual changes;  No vertigo or throat pain   NECK: No pain or stiffness  RESPIRATORY: No cough, wheezing, hemoptysis; No shortness of breath  CARDIOVASCULAR: No chest pain or palpitations  GASTROINTESTINAL: No abdominal or epigastric pain. No nausea, vomiting, or hematemesis; No diarrhea or constipation. No melena or hematochezia.  GENITOURINARY: No dysuria, frequency or hematuria  NEUROLOGICAL: No numbness or weakness  SKIN: No itching, burning, rashes, or lesions   All other review of systems is negative unless indicated above.    MEDICATIONS:  MEDICATIONS  (STANDING):  aspirin enteric coated 81 milliGRAM(s) Oral daily  atorvastatin 20 milliGRAM(s) Oral at bedtime  cefTRIAXone   IVPB 1000 milliGRAM(s) IV Intermittent every 24 hours  gabapentin 100 milliGRAM(s) Oral two times a day  heparin   Injectable 5000 Unit(s) SubCutaneous every 8 hours  lacosamide 150 milliGRAM(s) Oral two times a day  levETIRAcetam 1000 milliGRAM(s) Oral two times a day  metoprolol tartrate 25 milliGRAM(s) Oral two times a day  pregabalin 150 milliGRAM(s) Oral daily  senna 2 Tablet(s) Oral at bedtime  sodium chloride 0.9%. 1000 milliLiter(s) (75 mL/Hr) IV Continuous <Continuous>  tamsulosin 0.8 milliGRAM(s) Oral at bedtime      PHYSICAL EXAM:  T(C): 36.7 (23 @ 12:05), Max: 37.2 (23 @ 08:06)  HR: 65 (23 @ 12:05) (62 - 81)  BP: 145/87 (23 @ 12:05) (124/62 - 163/83)  RR: 18 (23 @ 12:05) (16 - 20)  SpO2: 95% (23 @ 12:05) (92% - 95%)  Wt(kg): --  I&O's Summary    2023 07:01  -  2023 12:21  --------------------------------------------------------  IN: 240 mL / OUT: 0 mL / NET: 240 mL          Appearance: Awake, Calm, Lying down in bed 	  HEENT: Eyes are open   Lymphatic: No lymphadenopathy   Cardiovascular: Normal S1 S2   Respiratory: normal effort , clear  Gastrointestinal:  Soft, Non-tender to palpitation   Skin: No rashes,  warm to touch  Psychiatry:  Mood & affect appropriate; Calm   Musculoskeletal: No edema        23 @ 07:01  -  23 @ 12:21  --------------------------------------------------------  IN: 240 mL / OUT: 0 mL / NET: 240 mL                            17.3   11.72 )-----------( 236      ( 2023 06:53 )             51.6                   139  |  104  |  23  ----------------------------<  87  4.3   |  21<L>  |  1.17    Ca    10.2      2023 06:53  Mg     1.8         TPro  7.2  /  Alb  4.2  /  TBili  0.4  /  DBili  x   /  AST  16  /  ALT  17  /  AlkPhos  110  0424    PT/INR - ( 2023 08:14 )   PT: 13.5 sec;   INR: 1.17 ratio         PTT - ( 2023 08:14 )  PTT:32.6 sec       CARDIAC MARKERS ( 2023 08:14 )  x     / x     / 44 U/L / x     / x                  Urinalysis Basic - ( 2023 08:08 )    Color: Light Orange / Appearance: Turbid / S.023 / pH: x  Gluc: x / Ketone: Negative  / Bili: Negative / Urobili: Negative   Blood: x / Protein: 300 mg/dL / Nitrite: Positive   Leuk Esterase: Large / RBC: >50 /hpf / WBC >50 /HPF   Sq Epi: x / Non Sq Epi: x / Bacteria: Many          < from: CT Abdomen and Pelvis w/ IV Cont (23 @ 11:21) >  IMPRESSION:  No pulmonary embolism. No evidence of pneumonia.    No acute intra-abdominal finding.    < end of copied text >        < from: CT Head No Cont (23 @ 11:21) >  IMPRESSION:  Sequelae of prior anterior communicating region aneurysm clipping.    No new hemorrhage or mass effect.    < end of copied text >

## 2023-04-24 NOTE — BH CONSULTATION LIAISON ASSESSMENT NOTE - CURRENT MEDICATION
MEDICATIONS  (STANDING):  aspirin enteric coated 81 milliGRAM(s) Oral daily  atorvastatin 20 milliGRAM(s) Oral at bedtime  cefTRIAXone   IVPB 1000 milliGRAM(s) IV Intermittent every 24 hours  gabapentin 100 milliGRAM(s) Oral two times a day  heparin   Injectable 5000 Unit(s) SubCutaneous every 8 hours  lacosamide 150 milliGRAM(s) Oral two times a day  levETIRAcetam 1000 milliGRAM(s) Oral two times a day  metoprolol tartrate 25 milliGRAM(s) Oral two times a day  pregabalin 150 milliGRAM(s) Oral daily  senna 2 Tablet(s) Oral at bedtime  sodium chloride 0.9%. 1000 milliLiter(s) (75 mL/Hr) IV Continuous <Continuous>  tamsulosin 0.8 milliGRAM(s) Oral at bedtime    MEDICATIONS  (PRN):  baclofen 5 milliGRAM(s) Oral every 12 hours PRN Musculoskeletal Pain

## 2023-04-25 LAB
ANION GAP SERPL CALC-SCNC: 12 MMOL/L — SIGNIFICANT CHANGE UP (ref 5–17)
BUN SERPL-MCNC: 23 MG/DL — SIGNIFICANT CHANGE UP (ref 7–23)
CALCIUM SERPL-MCNC: 9 MG/DL — SIGNIFICANT CHANGE UP (ref 8.4–10.5)
CHLORIDE SERPL-SCNC: 108 MMOL/L — SIGNIFICANT CHANGE UP (ref 96–108)
CO2 SERPL-SCNC: 21 MMOL/L — LOW (ref 22–31)
CREAT SERPL-MCNC: 1.13 MG/DL — SIGNIFICANT CHANGE UP (ref 0.5–1.3)
EGFR: 71 ML/MIN/1.73M2 — SIGNIFICANT CHANGE UP
GLUCOSE SERPL-MCNC: 92 MG/DL — SIGNIFICANT CHANGE UP (ref 70–99)
HCT VFR BLD CALC: 47.2 % — SIGNIFICANT CHANGE UP (ref 39–50)
HGB BLD-MCNC: 15.7 G/DL — SIGNIFICANT CHANGE UP (ref 13–17)
MCHC RBC-ENTMCNC: 31.5 PG — SIGNIFICANT CHANGE UP (ref 27–34)
MCHC RBC-ENTMCNC: 33.3 GM/DL — SIGNIFICANT CHANGE UP (ref 32–36)
MCV RBC AUTO: 94.6 FL — SIGNIFICANT CHANGE UP (ref 80–100)
NRBC # BLD: 0 /100 WBCS — SIGNIFICANT CHANGE UP (ref 0–0)
PLATELET # BLD AUTO: 236 K/UL — SIGNIFICANT CHANGE UP (ref 150–400)
POTASSIUM SERPL-MCNC: 3.9 MMOL/L — SIGNIFICANT CHANGE UP (ref 3.5–5.3)
POTASSIUM SERPL-SCNC: 3.9 MMOL/L — SIGNIFICANT CHANGE UP (ref 3.5–5.3)
RBC # BLD: 4.99 M/UL — SIGNIFICANT CHANGE UP (ref 4.2–5.8)
RBC # FLD: 14.2 % — SIGNIFICANT CHANGE UP (ref 10.3–14.5)
SODIUM SERPL-SCNC: 141 MMOL/L — SIGNIFICANT CHANGE UP (ref 135–145)
WBC # BLD: 11.45 K/UL — HIGH (ref 3.8–10.5)
WBC # FLD AUTO: 11.45 K/UL — HIGH (ref 3.8–10.5)

## 2023-04-25 RX ORDER — DIPHENHYDRAMINE HCL 50 MG
25 CAPSULE ORAL ONCE
Refills: 0 | Status: COMPLETED | OUTPATIENT
Start: 2023-04-25 | End: 2023-04-25

## 2023-04-25 RX ADMIN — Medication 25 MILLIGRAM(S): at 05:26

## 2023-04-25 RX ADMIN — LACOSAMIDE 150 MILLIGRAM(S): 50 TABLET ORAL at 05:26

## 2023-04-25 RX ADMIN — ATORVASTATIN CALCIUM 20 MILLIGRAM(S): 80 TABLET, FILM COATED ORAL at 21:41

## 2023-04-25 RX ADMIN — GABAPENTIN 100 MILLIGRAM(S): 400 CAPSULE ORAL at 05:26

## 2023-04-25 RX ADMIN — Medication 25 MILLIGRAM(S): at 18:42

## 2023-04-25 RX ADMIN — Medication 81 MILLIGRAM(S): at 13:24

## 2023-04-25 RX ADMIN — HEPARIN SODIUM 5000 UNIT(S): 5000 INJECTION INTRAVENOUS; SUBCUTANEOUS at 13:24

## 2023-04-25 RX ADMIN — LACOSAMIDE 150 MILLIGRAM(S): 50 TABLET ORAL at 17:36

## 2023-04-25 RX ADMIN — SENNA PLUS 2 TABLET(S): 8.6 TABLET ORAL at 21:41

## 2023-04-25 RX ADMIN — CEFTRIAXONE 100 MILLIGRAM(S): 500 INJECTION, POWDER, FOR SOLUTION INTRAMUSCULAR; INTRAVENOUS at 09:28

## 2023-04-25 RX ADMIN — GABAPENTIN 100 MILLIGRAM(S): 400 CAPSULE ORAL at 17:36

## 2023-04-25 RX ADMIN — LEVETIRACETAM 1000 MILLIGRAM(S): 250 TABLET, FILM COATED ORAL at 17:36

## 2023-04-25 RX ADMIN — TAMSULOSIN HYDROCHLORIDE 0.8 MILLIGRAM(S): 0.4 CAPSULE ORAL at 21:41

## 2023-04-25 RX ADMIN — Medication 150 MILLIGRAM(S): at 13:23

## 2023-04-25 RX ADMIN — LEVETIRACETAM 1000 MILLIGRAM(S): 250 TABLET, FILM COATED ORAL at 05:26

## 2023-04-25 RX ADMIN — HEPARIN SODIUM 5000 UNIT(S): 5000 INJECTION INTRAVENOUS; SUBCUTANEOUS at 05:26

## 2023-04-25 RX ADMIN — HEPARIN SODIUM 5000 UNIT(S): 5000 INJECTION INTRAVENOUS; SUBCUTANEOUS at 21:41

## 2023-04-25 RX ADMIN — Medication 25 MILLIGRAM(S): at 17:36

## 2023-04-25 RX ADMIN — SODIUM CHLORIDE 75 MILLILITER(S): 9 INJECTION INTRAMUSCULAR; INTRAVENOUS; SUBCUTANEOUS at 21:41

## 2023-04-25 RX ADMIN — Medication 5 MILLIGRAM(S): at 13:24

## 2023-04-25 NOTE — PROGRESS NOTE ADULT - SUBJECTIVE AND OBJECTIVE BOX
DATE OF SERVICE: 04-25-23 @ 14:32    Patient is a 67y old  Male who presents with a chief complaint of Sepsis (25 Apr 2023 12:41)      INTERVAL HISTORY: Feels ok.     REVIEW OF SYSTEMS:  CONSTITUTIONAL: No weakness  EYES/ENT: No visual changes;  No throat pain   NECK: No pain or stiffness  RESPIRATORY: No cough, wheezing; No shortness of breath  CARDIOVASCULAR: No chest pain or palpitations  GASTROINTESTINAL: No abdominal  pain. No nausea, vomiting, or hematemesis  GENITOURINARY: No dysuria, frequency or hematuria  NEUROLOGICAL: No stroke like symptoms  SKIN: No rashes  	  MEDICATIONS:  metoprolol tartrate 25 milliGRAM(s) Oral two times a day        PHYSICAL EXAM:  T(C): 36.4 (04-25-23 @ 09:32), Max: 36.6 (04-24-23 @ 20:43)  HR: 67 (04-25-23 @ 09:32) (61 - 70)  BP: 127/75 (04-25-23 @ 09:32) (118/72 - 129/68)  RR: 18 (04-25-23 @ 09:32) (18 - 18)  SpO2: 96% (04-25-23 @ 09:32) (93% - 97%)  Wt(kg): --  I&O's Summary    24 Apr 2023 07:01  -  25 Apr 2023 07:00  --------------------------------------------------------  IN: 480 mL / OUT: 400 mL / NET: 80 mL    25 Apr 2023 07:01  -  25 Apr 2023 14:32  --------------------------------------------------------  IN: 240 mL / OUT: 0 mL / NET: 240 mL          Appearance: In no distress	  HEENT:    PERRL, EOMI	  Cardiovascular:  S1 S2, No JVD  Respiratory: Lungs clear to auscultation	  Gastrointestinal:  Soft, Non-tender, + BS	  Vascularature:  No edema of LE  Psychiatric: Appropriate affect   Neuro: no acute focal deficits                               15.7   11.45 )-----------( 236      ( 25 Apr 2023 05:57 )             47.2     04-25    141  |  108  |  23  ----------------------------<  92  3.9   |  21<L>  |  1.13    Ca    9.0      25 Apr 2023 05:57  Mg     1.8     04-24    TPro  7.2  /  Alb  4.2  /  TBili  0.4  /  DBili  x   /  AST  16  /  ALT  17  /  AlkPhos  110  04-24        Labs personally reviewed      ASSESSMENT/PLAN: 	        Patient is a 66 y/o Male with PMHx of HTN, HLD, Seizures, CAD w/ 1 stent, chronic back pain s/p multiple spine surgeries presents to the ED for AMS. Patient brought in by EMS after wife called due to patient having hallucinations, talking to people that were not there and trying to eat when food was in his hands found to have UTI. Patient denies fevers, chills, headache, vision changes, chest pain, trouble breathing, abdominal pain, hematuria, blood in stool.  Patient endorses nausea, no vomiting and diarrhea. Patient is a former smoker.  States he does not walk far d/t chronic back pain and LE weakness.     1. Coronary Artery Disease  - ECG with no ischemic characteristics noted  - Poor function capacity   - Denies CP or SOB  - c/w ASA, Atorvastatin and Metoprolol    2. Hypertension  - BP mostly at target  - c/w Metoprolol 25mg PO BID    3. Hyperlipidemia  - LDL 48  - c/w atorvastatin 20mg PO daily and gemfibrizol 600mg PO daily    4. DVT PPx  - c/w SQ heparin      ABBIE Burroughs-NP   Adis Bellamy DO [facility]  Cardiovascular Medicine  [address], [address]  Available through call or text on [employer]  Office: [phone]

## 2023-04-25 NOTE — PROGRESS NOTE ADULT - SUBJECTIVE AND OBJECTIVE BOX
Name of Patient : MANUEL PETERSON  MRN: [id number]  Date of visit: 04-25-23 @ 12:42      Subjective: Patient seen and examined. No new events except as noted.   Patient seen earlier this AM. Lying down in bed.   Offers no new complaints.     REVIEW OF SYSTEMS:    CONSTITUTIONAL: Generalized weakness   EYES/ENT: No visual changes;  No vertigo or throat pain   NECK: No pain or stiffness  RESPIRATORY: No cough, wheezing, hemoptysis; No shortness of breath  CARDIOVASCULAR: No chest pain or palpitations  GASTROINTESTINAL: No abdominal or epigastric pain. No nausea, vomiting, or hematemesis; No diarrhea or constipation. No melena or hematochezia.  GENITOURINARY: No dysuria, frequency or hematuria  NEUROLOGICAL: No numbness or weakness  SKIN: No itching, burning, rashes, or lesions   All other review of systems is negative unless indicated above.    MEDICATIONS:  MEDICATIONS  (STANDING):  aspirin enteric coated 81 milliGRAM(s) Oral daily  atorvastatin 20 milliGRAM(s) Oral at bedtime  cefTRIAXone   IVPB 1000 milliGRAM(s) IV Intermittent every 24 hours  gabapentin 100 milliGRAM(s) Oral two times a day  heparin   Injectable 5000 Unit(s) SubCutaneous every 8 hours  lacosamide 150 milliGRAM(s) Oral two times a day  levETIRAcetam 1000 milliGRAM(s) Oral two times a day  metoprolol tartrate 25 milliGRAM(s) Oral two times a day  pregabalin 150 milliGRAM(s) Oral daily  senna 2 Tablet(s) Oral at bedtime  sodium chloride 0.9%. 1000 milliLiter(s) (75 mL/Hr) IV Continuous <Continuous>  tamsulosin 0.8 milliGRAM(s) Oral at bedtime      PHYSICAL EXAM:  T(C): 36.4 (04-25-23 @ 09:32), Max: 36.6 (04-24-23 @ 20:43)  HR: 67 (04-25-23 @ 09:32) (61 - 70)  BP: 127/75 (04-25-23 @ 09:32) (118/72 - 129/68)  RR: 18 (04-25-23 @ 09:32) (18 - 18)  SpO2: 96% (04-25-23 @ 09:32) (93% - 97%)  Wt(kg): --  I&O's Summary    24 Apr 2023 07:01  -  25 Apr 2023 07:00  --------------------------------------------------------  IN: 480 mL / OUT: 400 mL / NET: 80 mL    25 Apr 2023 07:01  -  25 Apr 2023 12:42  --------------------------------------------------------  IN: 240 mL / OUT: 0 mL / NET: 240 mL        Appearance: Awake, Calm, Lying down in bed 	  HEENT: Eyes are open   Lymphatic: No lymphadenopathy   Cardiovascular: Normal S1 S2   Respiratory: normal effort , clear  Gastrointestinal:  Soft, Non-tender to palpitation   Skin: No rashes,  warm to touch  Psychiatry:  Mood & affect appropriate; Calm   Musculoskeletal: No edema            04-24-23 @ 07:01  -  04-25-23 @ 07:00  --------------------------------------------------------  IN: 480 mL / OUT: 400 mL / NET: 80 mL    04-25-23 @ 07:01 - 04-25-23 @ 12:42  --------------------------------------------------------  IN: 240 mL / OUT: 0 mL / NET: 240 mL                                15.7   11.45 )-----------( 236      ( 25 Apr 2023 05:57 )             47.2               04-25    141  |  108  |  23  ----------------------------<  92  3.9   |  21<L>  |  1.13    Ca    9.0      25 Apr 2023 05:57  Mg     1.8     04-24    TPro  7.2  /  Alb  4.2  /  TBili  0.4  /  DBili  x   /  AST  16  /  ALT  17  /  AlkPhos  110  04-24                         Culture - Urine (04.23.23 @ 08:08)   Specimen Source: Clean Catch Clean Catch (Midstream)  Culture Results:   >100,000 CFU/ml Gram Negative Rods    Culture - Blood (04.23.23 @ 07:50)   Specimen Source: .Blood Blood-Peripheral  Culture Results:   No growth to date.    Culture - Blood (04.23.23 @ 07:35)   Specimen Source: .Blood Blood-Peripheral  Culture Results:   No growth to date.

## 2023-04-25 NOTE — PROGRESS NOTE ADULT - ASSESSMENT
Patient is a 66 y/o Male with PMHx of HTN, HLD, Seizures, CAD w/ 1 stent, chronic back pain s/p multiple spine surgeries presents to the ED for AMS. Patient brought in by EMS after wife called due to patient having hallucinations, talking to people that were not there and trying to eat when food was in his hands.  Per wife who was at bedside, patient typically acts this way when he has a UTI.  Patient states he has been having burning with urination and urinary frequency for the last 5 days.  Patient is urinary incontinent, likely from neurogenic bladder due to chronic back issues and surgeries.  Patient has had multiple UTIs in the past 6 months requiring multiple admissions.  Pt hypoxic to 90% on RA, placed on 3 L NC by EMS. Patient denies fevers, chills, headache, vision changes, chest pain, trouble breathing, abdominal pain, hematuria, blood in stool.  Patient endorses nausea, no vomiting and diarrhea. Patient is a former smoker.  Per wife, patient normally takes baclofen 20 mg 4 times a day but has not taken it in the last week due to inability to refill prescription.    # Sepsis   - Pan Cx --> UCx w/ gm Neg rods; F/u sensitivity   - BCx2 w/ NGTD; F/u final   - Likely source is UTI   - C/w Rocephin for now pending sensitivities   - C/w IV hydration  - Lactate down-trending to WNL  - Pan CT as noted -- Neg for PE, neg for PNA or intra-abdominal findings   - ID eval PRN   - Urology eval consulted; F/u recs   - Monitor CBC, temp curve, VS and patient closely     # Hx of Seizures  - Resume home seizure meds  - Fall, Aspiration and Seizure precautions  - Muscle pain and spasm --> C/w gabapentin   - Neuro eval consulted; F/u recs     # Encephalopathy, H/O Aneurysm S/P Clipping   - CT Head as noted, Neg for New changes, shows chronic clipping   - C/w Tx of UTI  - Check B12, RPR, Folate  - Psych eval appreciated; F/u recs   - Neuro eval consulted; F/u recs    # CAD S/P PCI   - C/w ASA and statin   - BP control   - Cardio eval consulted; F/u recs     # Nephrolithiasis   - Outpatient urology follow up for elective lithotripsy as per Urology   - Urology eval consulted; F/u recs     # HTN   - Monitor BP, VS and patient     # Stool burden  - On CT  - C/w Senna and Miralax  - Monitor for BM       # DVT and gI PPX

## 2023-04-26 ENCOUNTER — TRANSCRIPTION ENCOUNTER (OUTPATIENT)
Age: 68
End: 2023-04-26

## 2023-04-26 LAB
-  AMIKACIN: SIGNIFICANT CHANGE UP
-  AMOXICILLIN/CLAVULANIC ACID: SIGNIFICANT CHANGE UP
-  AMPICILLIN/SULBACTAM: SIGNIFICANT CHANGE UP
-  AMPICILLIN: SIGNIFICANT CHANGE UP
-  AZTREONAM: SIGNIFICANT CHANGE UP
-  CEFAZOLIN: SIGNIFICANT CHANGE UP
-  CEFEPIME: SIGNIFICANT CHANGE UP
-  CEFOXITIN: SIGNIFICANT CHANGE UP
-  CEFTRIAXONE: SIGNIFICANT CHANGE UP
-  CEFUROXIME: SIGNIFICANT CHANGE UP
-  CIPROFLOXACIN: SIGNIFICANT CHANGE UP
-  ERTAPENEM: SIGNIFICANT CHANGE UP
-  GENTAMICIN: SIGNIFICANT CHANGE UP
-  LEVOFLOXACIN: SIGNIFICANT CHANGE UP
-  MEROPENEM: SIGNIFICANT CHANGE UP
-  NITROFURANTOIN: SIGNIFICANT CHANGE UP
-  PIPERACILLIN/TAZOBACTAM: SIGNIFICANT CHANGE UP
-  TOBRAMYCIN: SIGNIFICANT CHANGE UP
-  TRIMETHOPRIM/SULFAMETHOXAZOLE: SIGNIFICANT CHANGE UP
ANION GAP SERPL CALC-SCNC: 13 MMOL/L — SIGNIFICANT CHANGE UP (ref 5–17)
BUN SERPL-MCNC: 14 MG/DL — SIGNIFICANT CHANGE UP (ref 7–23)
CALCIUM SERPL-MCNC: 8.8 MG/DL — SIGNIFICANT CHANGE UP (ref 8.4–10.5)
CHLORIDE SERPL-SCNC: 109 MMOL/L — HIGH (ref 96–108)
CO2 SERPL-SCNC: 23 MMOL/L — SIGNIFICANT CHANGE UP (ref 22–31)
CREAT SERPL-MCNC: 1.1 MG/DL — SIGNIFICANT CHANGE UP (ref 0.5–1.3)
CULTURE RESULTS: SIGNIFICANT CHANGE UP
EGFR: 73 ML/MIN/1.73M2 — SIGNIFICANT CHANGE UP
FOLATE SERPL-MCNC: >20 NG/ML — SIGNIFICANT CHANGE UP
GLUCOSE SERPL-MCNC: 91 MG/DL — SIGNIFICANT CHANGE UP (ref 70–99)
HCT VFR BLD CALC: 46.8 % — SIGNIFICANT CHANGE UP (ref 39–50)
HGB BLD-MCNC: 15.4 G/DL — SIGNIFICANT CHANGE UP (ref 13–17)
MCHC RBC-ENTMCNC: 30.8 PG — SIGNIFICANT CHANGE UP (ref 27–34)
MCHC RBC-ENTMCNC: 32.9 GM/DL — SIGNIFICANT CHANGE UP (ref 32–36)
MCV RBC AUTO: 93.6 FL — SIGNIFICANT CHANGE UP (ref 80–100)
METHOD TYPE: SIGNIFICANT CHANGE UP
NRBC # BLD: 0 /100 WBCS — SIGNIFICANT CHANGE UP (ref 0–0)
ORGANISM # SPEC MICROSCOPIC CNT: SIGNIFICANT CHANGE UP
ORGANISM # SPEC MICROSCOPIC CNT: SIGNIFICANT CHANGE UP
PLATELET # BLD AUTO: 223 K/UL — SIGNIFICANT CHANGE UP (ref 150–400)
POTASSIUM SERPL-MCNC: 4.6 MMOL/L — SIGNIFICANT CHANGE UP (ref 3.5–5.3)
POTASSIUM SERPL-SCNC: 4.6 MMOL/L — SIGNIFICANT CHANGE UP (ref 3.5–5.3)
RBC # BLD: 5 M/UL — SIGNIFICANT CHANGE UP (ref 4.2–5.8)
RBC # FLD: 14.2 % — SIGNIFICANT CHANGE UP (ref 10.3–14.5)
SODIUM SERPL-SCNC: 145 MMOL/L — SIGNIFICANT CHANGE UP (ref 135–145)
SPECIMEN SOURCE: SIGNIFICANT CHANGE UP
T PALLIDUM AB TITR SER: NEGATIVE — SIGNIFICANT CHANGE UP
TSH SERPL-MCNC: 3.65 UIU/ML — SIGNIFICANT CHANGE UP (ref 0.27–4.2)
VIT B12 SERPL-MCNC: 587 PG/ML — SIGNIFICANT CHANGE UP (ref 232–1245)
WBC # BLD: 9.69 K/UL — SIGNIFICANT CHANGE UP (ref 3.8–10.5)
WBC # FLD AUTO: 9.69 K/UL — SIGNIFICANT CHANGE UP (ref 3.8–10.5)

## 2023-04-26 RX ORDER — LACOSAMIDE 50 MG/1
1 TABLET ORAL
Qty: 0 | Refills: 0 | DISCHARGE
Start: 2023-04-26

## 2023-04-26 RX ORDER — BACLOFEN 100 %
1 POWDER (GRAM) MISCELLANEOUS
Qty: 0 | Refills: 0 | DISCHARGE
Start: 2023-04-26

## 2023-04-26 RX ORDER — ATORVASTATIN CALCIUM 80 MG/1
1 TABLET, FILM COATED ORAL
Qty: 0 | Refills: 0 | DISCHARGE
Start: 2023-04-26

## 2023-04-26 RX ORDER — SENNA PLUS 8.6 MG/1
1 TABLET ORAL
Qty: 0 | Refills: 0 | DISCHARGE

## 2023-04-26 RX ORDER — METOPROLOL TARTRATE 50 MG
1 TABLET ORAL
Qty: 0 | Refills: 0 | DISCHARGE
Start: 2023-04-26

## 2023-04-26 RX ORDER — METOPROLOL TARTRATE 50 MG
1 TABLET ORAL
Qty: 0 | Refills: 2 | DISCHARGE

## 2023-04-26 RX ORDER — SENNA PLUS 8.6 MG/1
2 TABLET ORAL
Qty: 0 | Refills: 0 | DISCHARGE
Start: 2023-04-26

## 2023-04-26 RX ORDER — ACETAMINOPHEN 500 MG
1000 TABLET ORAL EVERY 6 HOURS
Refills: 0 | Status: DISCONTINUED | OUTPATIENT
Start: 2023-04-26 | End: 2023-04-27

## 2023-04-26 RX ORDER — OXYCODONE HYDROCHLORIDE 5 MG/1
2.5 TABLET ORAL EVERY 6 HOURS
Refills: 0 | Status: DISCONTINUED | OUTPATIENT
Start: 2023-04-26 | End: 2023-04-27

## 2023-04-26 RX ORDER — CEFPODOXIME PROXETIL 100 MG
1 TABLET ORAL
Qty: 8 | Refills: 0
Start: 2023-04-26 | End: 2023-04-29

## 2023-04-26 RX ORDER — TAMSULOSIN HYDROCHLORIDE 0.4 MG/1
2 CAPSULE ORAL
Qty: 0 | Refills: 0 | DISCHARGE
Start: 2023-04-26

## 2023-04-26 RX ORDER — TAMSULOSIN HYDROCHLORIDE 0.4 MG/1
2 CAPSULE ORAL
Qty: 0 | Refills: 0 | DISCHARGE

## 2023-04-26 RX ORDER — ASPIRIN/CALCIUM CARB/MAGNESIUM 324 MG
1 TABLET ORAL
Qty: 0 | Refills: 0 | DISCHARGE
Start: 2023-04-26

## 2023-04-26 RX ORDER — BACLOFEN 100 %
1 POWDER (GRAM) MISCELLANEOUS
Qty: 0 | Refills: 0 | DISCHARGE

## 2023-04-26 RX ORDER — ACETAMINOPHEN 500 MG
2 TABLET ORAL
Qty: 0 | Refills: 0 | DISCHARGE
Start: 2023-04-26

## 2023-04-26 RX ORDER — GABAPENTIN 400 MG/1
1 CAPSULE ORAL
Qty: 0 | Refills: 0 | DISCHARGE
Start: 2023-04-26

## 2023-04-26 RX ADMIN — SENNA PLUS 2 TABLET(S): 8.6 TABLET ORAL at 22:41

## 2023-04-26 RX ADMIN — SODIUM CHLORIDE 75 MILLILITER(S): 9 INJECTION INTRAMUSCULAR; INTRAVENOUS; SUBCUTANEOUS at 09:22

## 2023-04-26 RX ADMIN — OXYCODONE HYDROCHLORIDE 2.5 MILLIGRAM(S): 5 TABLET ORAL at 23:26

## 2023-04-26 RX ADMIN — GABAPENTIN 100 MILLIGRAM(S): 400 CAPSULE ORAL at 17:15

## 2023-04-26 RX ADMIN — Medication 25 MILLIGRAM(S): at 05:21

## 2023-04-26 RX ADMIN — HEPARIN SODIUM 5000 UNIT(S): 5000 INJECTION INTRAVENOUS; SUBCUTANEOUS at 22:41

## 2023-04-26 RX ADMIN — LACOSAMIDE 150 MILLIGRAM(S): 50 TABLET ORAL at 17:15

## 2023-04-26 RX ADMIN — CEFTRIAXONE 100 MILLIGRAM(S): 500 INJECTION, POWDER, FOR SOLUTION INTRAMUSCULAR; INTRAVENOUS at 09:22

## 2023-04-26 RX ADMIN — GABAPENTIN 100 MILLIGRAM(S): 400 CAPSULE ORAL at 05:20

## 2023-04-26 RX ADMIN — Medication 5 MILLIGRAM(S): at 22:41

## 2023-04-26 RX ADMIN — ATORVASTATIN CALCIUM 20 MILLIGRAM(S): 80 TABLET, FILM COATED ORAL at 22:41

## 2023-04-26 RX ADMIN — HEPARIN SODIUM 5000 UNIT(S): 5000 INJECTION INTRAVENOUS; SUBCUTANEOUS at 05:21

## 2023-04-26 RX ADMIN — HEPARIN SODIUM 5000 UNIT(S): 5000 INJECTION INTRAVENOUS; SUBCUTANEOUS at 12:37

## 2023-04-26 RX ADMIN — Medication 150 MILLIGRAM(S): at 12:37

## 2023-04-26 RX ADMIN — Medication 25 MILLIGRAM(S): at 17:15

## 2023-04-26 RX ADMIN — LACOSAMIDE 150 MILLIGRAM(S): 50 TABLET ORAL at 05:21

## 2023-04-26 RX ADMIN — Medication 81 MILLIGRAM(S): at 12:38

## 2023-04-26 RX ADMIN — OXYCODONE HYDROCHLORIDE 2.5 MILLIGRAM(S): 5 TABLET ORAL at 17:15

## 2023-04-26 RX ADMIN — OXYCODONE HYDROCHLORIDE 2.5 MILLIGRAM(S): 5 TABLET ORAL at 17:23

## 2023-04-26 RX ADMIN — Medication 5 MILLIGRAM(S): at 05:25

## 2023-04-26 RX ADMIN — LEVETIRACETAM 1000 MILLIGRAM(S): 250 TABLET, FILM COATED ORAL at 05:20

## 2023-04-26 RX ADMIN — TAMSULOSIN HYDROCHLORIDE 0.8 MILLIGRAM(S): 0.4 CAPSULE ORAL at 22:41

## 2023-04-26 NOTE — DISCHARGE NOTE PROVIDER - NSDCCPCAREPLAN_GEN_ALL_CORE_FT
PRINCIPAL DISCHARGE DIAGNOSIS  Diagnosis: Acute UTI  Assessment and Plan of Treatment: HOME CARE INSTRUCTIONS  If you were prescribed antibiotics, take them exactly as your caregiver instructs you. Finish the medication even if you feel better after you have only taken some of the medication.  Drink enough water and fluids to keep your urine clear or pale yellow.  Avoid caffeine, tea, and carbonated beverages. They tend to irritate your bladder.  Empty your bladder often. Avoid holding urine for long periods of time.  Empty your bladder before and after sexual intercourse.  After a bowel movement, women should cleanse from front to back. Use each tissue only once.  SEEK MEDICAL CARE IF:  You have back pain.  You develop a fever.  Your symptoms do not begin to resolve within 3 days.  SEEK IMMEDIATE MEDICAL CARE IF:  You have severe back pain or lower abdominal pain.  You develop chills.  You have nausea or vomiting.  You have continued burning or discomfort with urination.

## 2023-04-26 NOTE — DISCHARGE NOTE PROVIDER - NSDCMRMEDTOKEN_GEN_ALL_CORE_FT
acetaminophen 500 mg oral tablet: 2 tab(s) orally every 6 hours As needed Temp greater or equal to 38C (100.4F), Mild Pain (1 - 3)  aspirin 81 mg oral delayed release tablet: 1 tab(s) orally once a day  atorvastatin 20 mg oral tablet: 1 tab(s) orally once a day (at bedtime)  baclofen 5 mg oral tablet: 1 tab(s) orally every 12 hours As needed Musculoskeletal Pain  cefpodoxime 200 mg oral tablet: 1 tab(s) orally 2 times a day  docusate sodium 100 mg oral capsule: 1 cap(s) orally 3 times a day  Eliquis 5 mg oral tablet: 2 tab(s) orally every 12 hours x 7 days  then 1 tab every 12 hours daily  Fleet Bisacodyl 10 mg rectal enema: 30 milligram(s) rectal once a day (at bedtime)  gabapentin 100 mg oral capsule: 1 cap(s) orally 2 times a day  gemfibrozil 600 mg oral tablet: 1 tab(s) orally 2 times a day (before meals)  lacosamide 150 mg oral tablet: 1 tab(s) orally 2 times a day  metoprolol tartrate 25 mg oral tablet: 1 tab(s) orally 2 times a day  Multiple Vitamins oral tablet: 1 tab(s) orally once a day  nystatin 100,000 units/g topical powder: Apply topically to affected area 2 times a day on groin area with soap and water and pat dry and apply powder  pregabalin 150 mg oral capsule: 1 cap(s) orally once a day  senna leaf extract oral tablet: 2 tab(s) orally once a day (at bedtime)  Slow Fe (as elemental iron) 45 mg oral tablet, extended release: 1 tab(s) orally once a day  tamsulosin 0.4 mg oral capsule: 2 cap(s) orally once a day (at bedtime)   acetaminophen 500 mg oral tablet: 2 tab(s) orally every 6 hours As needed Temp greater or equal to 38C (100.4F), Mild Pain (1 - 3)  aspirin 81 mg oral delayed release tablet: 1 tab(s) orally once a day  atorvastatin 20 mg oral tablet: 1 tab(s) orally once a day (at bedtime)  baclofen 5 mg oral tablet: 1 tab(s) orally every 12 hours As needed Musculoskeletal Pain  baclofen 5 mg oral tablet: 1 tab(s) orally every 12 hours as needed for Musculoskeletal Pain  cefpodoxime 200 mg oral tablet: 1 tab(s) orally 2 times a day  docusate sodium 100 mg oral capsule: 1 cap(s) orally 3 times a day  Eliquis 5 mg oral tablet: 2 tab(s) orally every 12 hours x 7 days  then 1 tab every 12 hours daily  Fleet Bisacodyl 10 mg rectal enema: 30 milligram(s) rectal once a day (at bedtime)  gabapentin 100 mg oral capsule: 1 cap(s) orally 2 times a day  gemfibrozil 600 mg oral tablet: 1 tab(s) orally 2 times a day (before meals)  lacosamide 150 mg oral tablet: 1 tab(s) orally 2 times a day  metoprolol tartrate 25 mg oral tablet: 1 tab(s) orally 2 times a day  Multiple Vitamins oral tablet: 1 tab(s) orally once a day  nystatin 100,000 units/g topical powder: Apply topically to affected area 2 times a day on groin area with soap and water and pat dry and apply powder  pregabalin 150 mg oral capsule: 1 cap(s) orally once a day  senna leaf extract oral tablet: 2 tab(s) orally once a day (at bedtime)  Slow Fe (as elemental iron) 45 mg oral tablet, extended release: 1 tab(s) orally once a day  tamsulosin 0.4 mg oral capsule: 2 cap(s) orally once a day (at bedtime)

## 2023-04-26 NOTE — PROGRESS NOTE ADULT - SUBJECTIVE AND OBJECTIVE BOX
Name of Patient : MANUEL PETERSON  MRN: [id number]  Date of visit: 04-26-23 @ 12:24      Subjective: Patient seen and examined. No new events except as noted.   Patient seen earlier this AM. Wife at the bedside.   Reports feeling better.  Sensitivity of UCx pending.      REVIEW OF SYSTEMS:    CONSTITUTIONAL: Generalized weakness   EYES/ENT: No visual changes;  No vertigo or throat pain   NECK: No pain or stiffness  RESPIRATORY: No cough, wheezing, hemoptysis; No shortness of breath  CARDIOVASCULAR: No chest pain or palpitations  GASTROINTESTINAL: No abdominal or epigastric pain. No nausea, vomiting, or hematemesis; No diarrhea or constipation. No melena or hematochezia.  GENITOURINARY: No dysuria, frequency or hematuria  NEUROLOGICAL: No numbness or weakness  SKIN: No itching, burning, rashes, or lesions   All other review of systems is negative unless indicated above.    MEDICATIONS:  MEDICATIONS  (STANDING):  aspirin enteric coated 81 milliGRAM(s) Oral daily  atorvastatin 20 milliGRAM(s) Oral at bedtime  cefTRIAXone   IVPB 1000 milliGRAM(s) IV Intermittent every 24 hours  gabapentin 100 milliGRAM(s) Oral two times a day  heparin   Injectable 5000 Unit(s) SubCutaneous every 8 hours  lacosamide 150 milliGRAM(s) Oral two times a day  levETIRAcetam 1000 milliGRAM(s) Oral two times a day  metoprolol tartrate 25 milliGRAM(s) Oral two times a day  pregabalin 150 milliGRAM(s) Oral daily  senna 2 Tablet(s) Oral at bedtime  sodium chloride 0.9%. 1000 milliLiter(s) (75 mL/Hr) IV Continuous <Continuous>  tamsulosin 0.8 milliGRAM(s) Oral at bedtime      PHYSICAL EXAM:  T(C): 36.4 (04-26-23 @ 12:10), Max: 36.6 (04-26-23 @ 00:04)  HR: 73 (04-26-23 @ 12:10) (64 - 73)  BP: 126/69 (04-26-23 @ 12:10) (117/73 - 151/67)  RR: 18 (04-26-23 @ 12:10) (18 - 18)  SpO2: 96% (04-26-23 @ 12:10) (95% - 96%)  Wt(kg): --  I&O's Summary    25 Apr 2023 07:01  -  26 Apr 2023 07:00  --------------------------------------------------------  IN: 1200 mL / OUT: 0 mL / NET: 1200 mL          Appearance: Normal	  HEENT: Eyes are open   Lymphatic: No lymphadenopathy   Cardiovascular: Normal S1 S2, no JVD  Respiratory: normal effort , clear  Gastrointestinal:  Soft, Non-tender  Skin: No rashes,  warm to touch  Psychiatry:  Mood & affect appropriate  Musculoskeletal: No edema        04-25-23 @ 07:01  -  04-26-23 @ 07:00  --------------------------------------------------------  IN: 1200 mL / OUT: 0 mL / NET: 1200 mL                              15.4   9.69  )-----------( 223      ( 26 Apr 2023 11:14 )             46.8               04-26    145  |  109<H>  |  14  ----------------------------<  91  4.6   |  23  |  1.10    Ca    8.8      26 Apr 2023 11:11        Culture - Urine (04.23.23 @ 08:08)   Specimen Source: Clean Catch Clean Catch (Midstream)  Culture Results: >100,000 CFU/ml Proteus mirabilis    Culture - Blood (04.23.23 @ 07:50)   Specimen Source: .Blood Blood-Peripheral  Culture Results: No growth to date.    Culture - Blood (04.23.23 @ 07:35)   Specimen Source: .Blood Blood-Peripheral  Culture Results: No growth to date.

## 2023-04-26 NOTE — PROGRESS NOTE ADULT - SUBJECTIVE AND OBJECTIVE BOX
DATE OF SERVICE: 04-26-23 @ 14:20    Patient is a 67y old  Male who presents with a chief complaint of Sepsis (26 Apr 2023 12:24)      INTERVAL HISTORY: feels well    REVIEW OF SYSTEMS:  CONSTITUTIONAL: No weakness  EYES/ENT: No visual changes;  No throat pain   NECK: No pain or stiffness  RESPIRATORY: No cough, wheezing; No shortness of breath  CARDIOVASCULAR: No chest pain or palpitations  GASTROINTESTINAL: No abdominal  pain. No nausea, vomiting, or hematemesis  GENITOURINARY: No dysuria, frequency or hematuria  NEUROLOGICAL: No stroke like symptoms  SKIN: No rashes      	  MEDICATIONS:  metoprolol tartrate 25 milliGRAM(s) Oral two times a day        PHYSICAL EXAM:  T(C): 36.4 (04-26-23 @ 12:10), Max: 36.6 (04-26-23 @ 00:04)  HR: 73 (04-26-23 @ 12:10) (64 - 73)  BP: 126/69 (04-26-23 @ 12:10) (117/73 - 151/67)  RR: 18 (04-26-23 @ 12:10) (18 - 18)  SpO2: 96% (04-26-23 @ 12:10) (95% - 96%)  Wt(kg): --  I&O's Summary    25 Apr 2023 07:01  -  26 Apr 2023 07:00  --------------------------------------------------------  IN: 1200 mL / OUT: 0 mL / NET: 1200 mL          Appearance: In no distress	  HEENT:    PERRL, EOMI	  Cardiovascular:  S1 S2, No JVD  Respiratory: Lungs clear to auscultation	  Gastrointestinal:  Soft, Non-tender, + BS	  Vascularature:  No edema of LE  Psychiatric: Appropriate affect   Neuro: no acute focal deficits                               15.4   9.69  )-----------( 223      ( 26 Apr 2023 11:14 )             46.8     04-26    145  |  109<H>  |  14  ----------------------------<  91  4.6   |  23  |  1.10    Ca    8.8      26 Apr 2023 11:11          Labs personally reviewed      ASSESSMENT/PLAN: 	  Patient is a 68 y/o Male with PMHx of HTN, HLD, Seizures, CAD w/ 1 stent, chronic back pain s/p multiple spine surgeries presents to the ED for AMS. Patient brought in by EMS after wife called due to patient having hallucinations, talking to people that were not there and trying to eat when food was in his hands found to have UTI. Patient denies fevers, chills, headache, vision changes, chest pain, trouble breathing, abdominal pain, hematuria, blood in stool.  Patient endorses nausea, no vomiting and diarrhea. Patient is a former smoker.  States he does not walk far d/t chronic back pain and LE weakness.     1. Coronary Artery Disease  - ECG with no ischemic characteristics noted  - Poor function capacity   - Denies CP or SOB  - c/w ASA, Atorvastatin and Metoprolol    2. Hypertension  - BP mostly at target  - c/w Metoprolol 25mg PO BID    3. Hyperlipidemia  - LDL 48  - c/w atorvastatin 20mg PO daily and gemfibrizol 600mg PO daily    4. DVT PPx  - c/w SQ heparin      TEODROA Pa DO [facility]  Cardiovascular Medicine  [address], [address]  Available through call or text on [employer]  Office: [phone]

## 2023-04-26 NOTE — DISCHARGE NOTE NURSING/CASE MANAGEMENT/SOCIAL WORK - NSDCPEFALRISK_GEN_ALL_CORE
For information on Fall & Injury Prevention, visit: [email or web address] OR  [email or web address] OR  https://www.cdc.gov/steadi/patient.html

## 2023-04-26 NOTE — DISCHARGE NOTE PROVIDER - HOSPITAL COURSE
68 y/o M with PMHx of HTN, HLD, CAD w/ 1 stent, chronic back pain s/p multiple spine surgeries presents to the ED for AMS. Patient brought in by EMS after wife called due to patient having hallucinations, talking to people that were not there and trying to eat when food was in his hands.     #AMS/Hallucinations, Psych f/u outpatient     #Sepsis 2/2 UTI  low grade fever - Ceftriaxone empirically; UCx + Proteus, sensitive to Ceftriaxone.  Discharge on Cefpodoxime 200 BID x 4 days to complete ABX course    #Seizure- c/w Keppra/Lacosamide found on home med list, Neuro following; Wife advised that patient stopped taking antiseizure meds 2/2 lethargy and his Neurologist is aware.  She requested that inpatient medication be discontinued.    #Bilateral kidney stones No acute  intervention Elective outpatient lithotripsy.    Case discussed with attending.  Given patient's improved clinical status and current hemodynamic stability, the decision was made for discharge.    This is a summary of the patients hospitalization.  For full hospital course, please see medical record.

## 2023-04-26 NOTE — PROGRESS NOTE ADULT - ASSESSMENT
Patient is a 66 y/o Male with PMHx of HTN, HLD, Seizures, CAD w/ 1 stent, chronic back pain s/p multiple spine surgeries presents to the ED for AMS. Patient brought in by EMS after wife called due to patient having hallucinations, talking to people that were not there and trying to eat when food was in his hands.  Per wife who was at bedside, patient typically acts this way when he has a UTI.  Patient states he has been having burning with urination and urinary frequency for the last 5 days.  Patient is urinary incontinent, likely from neurogenic bladder due to chronic back issues and surgeries.  Patient has had multiple UTIs in the past 6 months requiring multiple admissions.  Pt hypoxic to 90% on RA, placed on 3 L NC by EMS. Patient denies fevers, chills, headache, vision changes, chest pain, trouble breathing, abdominal pain, hematuria, blood in stool.  Patient endorses nausea, no vomiting and diarrhea. Patient is a former smoker.  Per wife, patient normally takes baclofen 20 mg 4 times a day but has not taken it in the last week due to inability to refill prescription.    # Sepsis   - Pan Cx --> UCx w/ Proteus Mirabilis; F/u sensitivity   - BCx2 w/ NGTD; F/u final   - Likely source is UTI   - C/w Rocephin for now pending sensitivities   - S/P IV hydration, PO hydration encouraged   - Lactate down-trending to WNL  - Pan CT as noted -- Neg for PE, neg for PNA or intra-abdominal findings   - ID eval PRN   - Urology eval consulted; F/u recs   - Monitor CBC, temp curve, VS and patient closely     # Hx of Seizures  - Resume home seizure meds  - Fall, Aspiration and Seizure precautions  - Muscle pain and spasm --> C/w gabapentin   - Neuro eval consulted; F/u recs     # Encephalopathy, H/O Aneurysm S/P Clipping   - CT Head as noted, Neg for New changes, shows chronic clipping   - C/w Tx of UTI  - B12, folate -- WNL, RPR -- Neg   - Psych eval appreciated; F/u recs   - Neuro eval consulted; F/u recs    # CAD S/P PCI   - C/w ASA and statin   - BP control   - Cardio eval consulted; F/u recs     # Nephrolithiasis   - Outpatient urology follow up for elective lithotripsy as per Urology- CT results reviewed with Pt and Wife to follow up as an outpatient   - Urology eval consulted; F/u recs     # HTN   - Monitor BP, VS and patient     # Stool burden  - On CT  - C/w Senna and Miralax  - Monitor for BM       # DVT and GI PPX     Discussed with patient and his wife at the bedside.  Patient is a 66 y/o Male with PMHx of HTN, HLD, Seizures, CAD w/ 1 stent, chronic back pain s/p multiple spine surgeries presents to the ED for AMS. Patient brought in by EMS after wife called due to patient having hallucinations, talking to people that were not there and trying to eat when food was in his hands.  Per wife who was at bedside, patient typically acts this way when he has a UTI.  Patient states he has been having burning with urination and urinary frequency for the last 5 days.  Patient is urinary incontinent, likely from neurogenic bladder due to chronic back issues and surgeries.  Patient has had multiple UTIs in the past 6 months requiring multiple admissions.  Pt hypoxic to 90% on RA, placed on 3 L NC by EMS. Patient denies fevers, chills, headache, vision changes, chest pain, trouble breathing, abdominal pain, hematuria, blood in stool.  Patient endorses nausea, no vomiting and diarrhea. Patient is a former smoker.  Per wife, patient normally takes baclofen 20 mg 4 times a day but has not taken it in the last week due to inability to refill prescription.    # Sepsis   - Pan Cx --> UCx w/ Proteus Mirabilis; F/u sensitivity   - BCx2 w/ NGTD; F/u final   - Likely source is UTI   - C/w Rocephin for now pending sensitivities   - S/P IV hydration, PO hydration encouraged   - Lactate down-trending to WNL  - Pan CT as noted -- Neg for PE, neg for PNA or intra-abdominal findings   - ID eval PRN   - Urology eval consulted; F/u recs   - Monitor CBC, temp curve, VS and patient closely     # Hx of Seizures  - Resume home seizure meds  - Fall, Aspiration and Seizure precautions  - Muscle pain and spasm --> C/w gabapentin   - Neuro eval consulted; F/u recs     # Encephalopathy, H/O Aneurysm S/P Clipping   - CT Head as noted, Neg for New changes, shows chronic clipping   - C/w Tx of UTI  - B12, folate -- WNL, RPR -- Neg   - Psych eval appreciated; F/u recs   - Neuro eval consulted; F/u recs    # CAD S/P PCI   - C/w ASA and statin   - BP control   - Cardio eval consulted; F/u recs     # Nephrolithiasis   - Outpatient urology follow up for elective lithotripsy as per Urology- CT results reviewed with Pt and Wife to follow up as an outpatient   - Urology eval consulted; F/u recs     # HTN   - Monitor BP, VS and patient     # Stool burden  - On CT  - C/w Senna and Miralax  - Monitor for BM     #Pre-DM  - A1C of 5.9  - Diet  and lifestyle modifications   - Outpatient PCP follow up     # DVT and GI PPX     Discussed with patient and his wife at the bedside.  Patient is a 66 y/o Male with PMHx of HTN, HLD, Seizures, CAD w/ 1 stent, chronic back pain s/p multiple spine surgeries presents to the ED for AMS. Patient brought in by EMS after wife called due to patient having hallucinations, talking to people that were not there and trying to eat when food was in his hands.  Per wife who was at bedside, patient typically acts this way when he has a UTI.  Patient states he has been having burning with urination and urinary frequency for the last 5 days.  Patient is urinary incontinent, likely from neurogenic bladder due to chronic back issues and surgeries.  Patient has had multiple UTIs in the past 6 months requiring multiple admissions.  Pt hypoxic to 90% on RA, placed on 3 L NC by EMS. Patient denies fevers, chills, headache, vision changes, chest pain, trouble breathing, abdominal pain, hematuria, blood in stool.  Patient endorses nausea, no vomiting and diarrhea. Patient is a former smoker.  Per wife, patient normally takes baclofen 20 mg 4 times a day but has not taken it in the last week due to inability to refill prescription.    # Sepsis   - Pan Cx --> UCx w/ Proteus Mirabilis; F/u sensitivity   - BCx2 w/ NGTD; F/u final   - Likely source is UTI   - C/w Rocephin for now pending sensitivities   - S/P IV hydration, PO hydration encouraged   - Lactate down-trending to WNL  - Pan CT as noted -- Neg for PE, neg for PNA or intra-abdominal findings   - ID eval PRN   - Urology eval consulted; F/u recs   - Monitor CBC, temp curve, VS and patient closely     # Hx of Seizures  - Resume home seizure meds --> per wife 04/26, patient self discontinued his Keppra as an outpatient. Wife reports that she spoke with outpatient neurologist and neurologist is aware of patient being off of Keppra X several months. Wife reports that neurologist states to hold keppra at this time. Keppra DC'd. Discussed with attending. Patient to follow up with neurology as an outpatient. Wife states that she will assist patient with follow up.   - Fall, Aspiration and Seizure precautions  - Muscle pain and spasm --> C/w gabapentin   - Neuro eval consulted; F/u recs     # Encephalopathy, H/O Aneurysm S/P Clipping   - CT Head as noted, Neg for New changes, shows chronic clipping   - C/w Tx of UTI  - B12, folate -- WNL, RPR -- Neg   - Psych eval appreciated; F/u recs   - Neuro eval consulted; F/u recs    # CAD S/P PCI   - C/w ASA and statin   - BP control   - Cardio eval consulted; F/u recs     # Nephrolithiasis   - Outpatient urology follow up for elective lithotripsy as per Urology- CT results reviewed with Pt and Wife to follow up as an outpatient   - Urology eval consulted; F/u recs     # HTN   - Monitor BP, VS and patient     # Stool burden  - On CT  - C/w Senna and Miralax  - Monitor for BM     #Pre-DM  - A1C of 5.9  - Diet  and lifestyle modifications   - Outpatient PCP follow up     # DVT and GI PPX     Discussed with patient and his wife at the bedside. Discussed with attending.

## 2023-04-26 NOTE — DISCHARGE NOTE NURSING/CASE MANAGEMENT/SOCIAL WORK - PATIENT PORTAL LINK FT
You can access the FollowMyHealth Patient Portal offered by [facility] by registering at the following website: [email or web address]. By joining [facility]’s FollowMyHealth portal, you will also be able to view your health information using other applications (apps) compatible with our system.

## 2023-04-26 NOTE — DISCHARGE NOTE PROVIDER - CARE PROVIDER_API CALL
Alexandra Blanco)  Medicine  [address] [place], [place] [id number]  Phone: [phone]  Fax: [phone]  Follow Up Time: 1 week

## 2023-04-27 ENCOUNTER — TRANSCRIPTION ENCOUNTER (OUTPATIENT)
Age: 68
End: 2023-04-27

## 2023-04-27 VITALS
TEMPERATURE: 98 F | HEART RATE: 68 BPM | SYSTOLIC BLOOD PRESSURE: 160 MMHG | RESPIRATION RATE: 18 BRPM | DIASTOLIC BLOOD PRESSURE: 80 MMHG | OXYGEN SATURATION: 95 %

## 2023-04-27 LAB
ANION GAP SERPL CALC-SCNC: 11 MMOL/L — SIGNIFICANT CHANGE UP (ref 5–17)
BUN SERPL-MCNC: 15 MG/DL — SIGNIFICANT CHANGE UP (ref 7–23)
CALCIUM SERPL-MCNC: 8.5 MG/DL — SIGNIFICANT CHANGE UP (ref 8.4–10.5)
CHLORIDE SERPL-SCNC: 109 MMOL/L — HIGH (ref 96–108)
CO2 SERPL-SCNC: 22 MMOL/L — SIGNIFICANT CHANGE UP (ref 22–31)
CREAT SERPL-MCNC: 1.04 MG/DL — SIGNIFICANT CHANGE UP (ref 0.5–1.3)
EGFR: 78 ML/MIN/1.73M2 — SIGNIFICANT CHANGE UP
GLUCOSE SERPL-MCNC: 98 MG/DL — SIGNIFICANT CHANGE UP (ref 70–99)
HCT VFR BLD CALC: 44.2 % — SIGNIFICANT CHANGE UP (ref 39–50)
HGB BLD-MCNC: 14.4 G/DL — SIGNIFICANT CHANGE UP (ref 13–17)
MAGNESIUM SERPL-MCNC: 2 MG/DL — SIGNIFICANT CHANGE UP (ref 1.6–2.6)
MCHC RBC-ENTMCNC: 30.4 PG — SIGNIFICANT CHANGE UP (ref 27–34)
MCHC RBC-ENTMCNC: 32.6 GM/DL — SIGNIFICANT CHANGE UP (ref 32–36)
MCV RBC AUTO: 93.4 FL — SIGNIFICANT CHANGE UP (ref 80–100)
NRBC # BLD: 0 /100 WBCS — SIGNIFICANT CHANGE UP (ref 0–0)
PHOSPHATE SERPL-MCNC: 2.1 MG/DL — LOW (ref 2.5–4.5)
PLATELET # BLD AUTO: 224 K/UL — SIGNIFICANT CHANGE UP (ref 150–400)
POTASSIUM SERPL-MCNC: 3.9 MMOL/L — SIGNIFICANT CHANGE UP (ref 3.5–5.3)
POTASSIUM SERPL-SCNC: 3.9 MMOL/L — SIGNIFICANT CHANGE UP (ref 3.5–5.3)
RBC # BLD: 4.73 M/UL — SIGNIFICANT CHANGE UP (ref 4.2–5.8)
RBC # FLD: 14 % — SIGNIFICANT CHANGE UP (ref 10.3–14.5)
SODIUM SERPL-SCNC: 142 MMOL/L — SIGNIFICANT CHANGE UP (ref 135–145)
WBC # BLD: 10.95 K/UL — HIGH (ref 3.8–10.5)
WBC # FLD AUTO: 10.95 K/UL — HIGH (ref 3.8–10.5)

## 2023-04-27 PROCEDURE — 82803 BLOOD GASES ANY COMBINATION: CPT

## 2023-04-27 PROCEDURE — 97530 THERAPEUTIC ACTIVITIES: CPT

## 2023-04-27 PROCEDURE — 83970 ASSAY OF PARATHORMONE: CPT

## 2023-04-27 PROCEDURE — 83735 ASSAY OF MAGNESIUM: CPT

## 2023-04-27 PROCEDURE — 82962 GLUCOSE BLOOD TEST: CPT

## 2023-04-27 PROCEDURE — 87186 SC STD MICRODIL/AGAR DIL: CPT

## 2023-04-27 PROCEDURE — 0225U NFCT DS DNA&RNA 21 SARSCOV2: CPT

## 2023-04-27 PROCEDURE — 96374 THER/PROPH/DIAG INJ IV PUSH: CPT

## 2023-04-27 PROCEDURE — 80048 BASIC METABOLIC PNL TOTAL CA: CPT

## 2023-04-27 PROCEDURE — 80061 LIPID PANEL: CPT

## 2023-04-27 PROCEDURE — 82565 ASSAY OF CREATININE: CPT

## 2023-04-27 PROCEDURE — 82330 ASSAY OF CALCIUM: CPT

## 2023-04-27 PROCEDURE — 84132 ASSAY OF SERUM POTASSIUM: CPT

## 2023-04-27 PROCEDURE — 83880 ASSAY OF NATRIURETIC PEPTIDE: CPT

## 2023-04-27 PROCEDURE — 87077 CULTURE AEROBIC IDENTIFY: CPT

## 2023-04-27 PROCEDURE — 84443 ASSAY THYROID STIM HORMONE: CPT

## 2023-04-27 PROCEDURE — 85025 COMPLETE CBC W/AUTO DIFF WBC: CPT

## 2023-04-27 PROCEDURE — 74177 CT ABD & PELVIS W/CONTRAST: CPT | Mod: MA

## 2023-04-27 PROCEDURE — 99285 EMERGENCY DEPT VISIT HI MDM: CPT | Mod: 25

## 2023-04-27 PROCEDURE — 84295 ASSAY OF SERUM SODIUM: CPT

## 2023-04-27 PROCEDURE — 82310 ASSAY OF CALCIUM: CPT

## 2023-04-27 PROCEDURE — 71275 CT ANGIOGRAPHY CHEST: CPT | Mod: MA

## 2023-04-27 PROCEDURE — 70450 CT HEAD/BRAIN W/O DYE: CPT | Mod: MA

## 2023-04-27 PROCEDURE — 84100 ASSAY OF PHOSPHORUS: CPT

## 2023-04-27 PROCEDURE — 84146 ASSAY OF PROLACTIN: CPT

## 2023-04-27 PROCEDURE — 82607 VITAMIN B-12: CPT

## 2023-04-27 PROCEDURE — 97162 PT EVAL MOD COMPLEX 30 MIN: CPT

## 2023-04-27 PROCEDURE — 85018 HEMOGLOBIN: CPT

## 2023-04-27 PROCEDURE — 82140 ASSAY OF AMMONIA: CPT

## 2023-04-27 PROCEDURE — 82652 VIT D 1 25-DIHYDROXY: CPT

## 2023-04-27 PROCEDURE — 81001 URINALYSIS AUTO W/SCOPE: CPT

## 2023-04-27 PROCEDURE — 93308 TTE F-UP OR LMTD: CPT

## 2023-04-27 PROCEDURE — 87040 BLOOD CULTURE FOR BACTERIA: CPT

## 2023-04-27 PROCEDURE — 83605 ASSAY OF LACTIC ACID: CPT

## 2023-04-27 PROCEDURE — 85610 PROTHROMBIN TIME: CPT

## 2023-04-27 PROCEDURE — 85027 COMPLETE CBC AUTOMATED: CPT

## 2023-04-27 PROCEDURE — 85730 THROMBOPLASTIN TIME PARTIAL: CPT

## 2023-04-27 PROCEDURE — 83036 HEMOGLOBIN GLYCOSYLATED A1C: CPT

## 2023-04-27 PROCEDURE — 87086 URINE CULTURE/COLONY COUNT: CPT

## 2023-04-27 PROCEDURE — 71045 X-RAY EXAM CHEST 1 VIEW: CPT

## 2023-04-27 PROCEDURE — 85014 HEMATOCRIT: CPT

## 2023-04-27 PROCEDURE — 86780 TREPONEMA PALLIDUM: CPT

## 2023-04-27 PROCEDURE — 82947 ASSAY GLUCOSE BLOOD QUANT: CPT

## 2023-04-27 PROCEDURE — 82306 VITAMIN D 25 HYDROXY: CPT

## 2023-04-27 PROCEDURE — 82435 ASSAY OF BLOOD CHLORIDE: CPT

## 2023-04-27 PROCEDURE — 82746 ASSAY OF FOLIC ACID SERUM: CPT

## 2023-04-27 PROCEDURE — 94640 AIRWAY INHALATION TREATMENT: CPT

## 2023-04-27 PROCEDURE — 96375 TX/PRO/DX INJ NEW DRUG ADDON: CPT

## 2023-04-27 PROCEDURE — 36415 COLL VENOUS BLD VENIPUNCTURE: CPT

## 2023-04-27 PROCEDURE — 80053 COMPREHEN METABOLIC PANEL: CPT

## 2023-04-27 PROCEDURE — 82550 ASSAY OF CK (CPK): CPT

## 2023-04-27 RX ORDER — SODIUM,POTASSIUM PHOSPHATES 278-250MG
1 POWDER IN PACKET (EA) ORAL ONCE
Refills: 0 | Status: COMPLETED | OUTPATIENT
Start: 2023-04-27 | End: 2023-04-27

## 2023-04-27 RX ORDER — BACLOFEN 100 %
1 POWDER (GRAM) MISCELLANEOUS
Qty: 60 | Refills: 0
Start: 2023-04-27 | End: 2023-05-26

## 2023-04-27 RX ADMIN — Medication 150 MILLIGRAM(S): at 11:36

## 2023-04-27 RX ADMIN — Medication 1000 MILLIGRAM(S): at 12:15

## 2023-04-27 RX ADMIN — OXYCODONE HYDROCHLORIDE 2.5 MILLIGRAM(S): 5 TABLET ORAL at 00:20

## 2023-04-27 RX ADMIN — OXYCODONE HYDROCHLORIDE 2.5 MILLIGRAM(S): 5 TABLET ORAL at 07:32

## 2023-04-27 RX ADMIN — Medication 1 TABLET(S): at 11:37

## 2023-04-27 RX ADMIN — Medication 81 MILLIGRAM(S): at 11:37

## 2023-04-27 RX ADMIN — Medication 25 MILLIGRAM(S): at 05:31

## 2023-04-27 RX ADMIN — CEFTRIAXONE 100 MILLIGRAM(S): 500 INJECTION, POWDER, FOR SOLUTION INTRAMUSCULAR; INTRAVENOUS at 09:02

## 2023-04-27 RX ADMIN — Medication 1000 MILLIGRAM(S): at 04:03

## 2023-04-27 RX ADMIN — HEPARIN SODIUM 5000 UNIT(S): 5000 INJECTION INTRAVENOUS; SUBCUTANEOUS at 05:31

## 2023-04-27 RX ADMIN — GABAPENTIN 100 MILLIGRAM(S): 400 CAPSULE ORAL at 05:31

## 2023-04-27 RX ADMIN — Medication 1000 MILLIGRAM(S): at 05:03

## 2023-04-27 RX ADMIN — Medication 1 PACKET(S): at 09:01

## 2023-04-27 RX ADMIN — OXYCODONE HYDROCHLORIDE 2.5 MILLIGRAM(S): 5 TABLET ORAL at 07:51

## 2023-04-27 RX ADMIN — Medication 1000 MILLIGRAM(S): at 10:44

## 2023-04-27 RX ADMIN — LACOSAMIDE 150 MILLIGRAM(S): 50 TABLET ORAL at 05:31

## 2023-04-27 NOTE — PROGRESS NOTE ADULT - SUBJECTIVE AND OBJECTIVE BOX
Neurology   S: seen and examined. dc planning.     HPI:  Patient is a 66 y/o Male with PMHx of HTN, HLD, Seizures, CAD w/ 1 stent, chronic back pain s/p multiple spine surgeries presents to the ED for AMS. Patient brought in by EMS after wife called due to patient having hallucinations, talking to people that were not there and trying to eat when food was in his hands.  Per wife who was at bedside, patient typically acts this way when he has a UTI.  Patient states he has been having burning with urination and urinary frequency for the last 5 days.  Patient is urinary incontinent, likely from neurogenic bladder due to chronic back issues and surgeries.  Patient has had multiple UTIs in the past 6 months requiring multiple admissions.  Pt hypoxic to 90% on RA, placed on 3 L NC by EMS. Patient denies fevers, chills, headache, vision changes, chest pain, trouble breathing, abdominal pain, hematuria, blood in stool.  Patient endorses nausea, no vomiting and diarrhea. Patient is a former smoker.  Per wife, patient normally takes baclofen 20 mg 4 times a day but has not taken it in the last week due to inability to refill prescription. (23 Apr 2023 10:53)       Medications:   MEDICATIONS  (STANDING):  aspirin enteric coated 81 milliGRAM(s) Oral daily  atorvastatin 20 milliGRAM(s) Oral at bedtime  cefTRIAXone   IVPB 1000 milliGRAM(s) IV Intermittent every 24 hours  gabapentin 100 milliGRAM(s) Oral two times a day  heparin   Injectable 5000 Unit(s) SubCutaneous every 8 hours  lacosamide 150 milliGRAM(s) Oral two times a day  metoprolol tartrate 25 milliGRAM(s) Oral two times a day  pregabalin 150 milliGRAM(s) Oral daily  senna 2 Tablet(s) Oral at bedtime  sodium chloride 0.9%. 1000 milliLiter(s) (75 mL/Hr) IV Continuous <Continuous>  tamsulosin 0.8 milliGRAM(s) Oral at bedtime    MEDICATIONS  (PRN):  acetaminophen     Tablet .. 1000 milliGRAM(s) Oral every 6 hours PRN Temp greater or equal to 38C (100.4F), Mild Pain (1 - 3)  baclofen 5 milliGRAM(s) Oral every 12 hours PRN Musculoskeletal Pain  oxyCODONE    IR 2.5 milliGRAM(s) Oral every 6 hours PRN Moderate Pain (4 - 6)  polyethylene glycol 3350 17 Gram(s) Oral daily PRN Constipation       Vitals:    Vital Signs Last 24 Hrs  T(C): 36.9 (04-27-23 @ 12:30), Max: 36.9 (04-27-23 @ 12:30)  T(F): 98.4 (04-27-23 @ 12:30), Max: 98.4 (04-27-23 @ 12:30)  HR: 68 (04-27-23 @ 12:30) (63 - 68)  BP: 160/80 (04-27-23 @ 12:30) (148/78 - 164/67)  BP(mean): --  RR: 18 (04-27-23 @ 12:30) (18 - 18)  SpO2: 95% (04-27-23 @ 12:30) (94% - 95%)        General Exam:   General Appearance: Appropriately dressed and in no acute distress       Head: Normocephalic, atraumatic and no dysmorphic features  Ear, Nose, and Throat: Moist mucous membranes  CVS: S1S2+  Resp: No SOB, no wheeze or rhonchi  GI: soft NT/ND  Extremities: No edema or cyanosis  Skin: No bruises or rashes     Neurological Exam:  Mental Status: Awake, alert and oriented x 2.  Able to follow simple and complex verbal commands. Able to name and repeat. fluent speech. No obvious aphasia or dysarthria noted.   Cranial Nerves: PERRL, EOMI, VFFC, sensation V1-V3 intact,  no obvious facial asymmetry, equal elevation of palate, scm/trap 5/5, tongue is midline on protrusion. no obvious papilledema on fundoscopic exam. hearing is grossly intact.   Motor: SAAVEDRA uppers > lowers. at least 4/5 throughout   Sensation: Intact to light touch and pinprick throughout. no right/left confusion. no extinction to tactile on DSS.    Reflexes: 1+ throughout at biceps, brachioradialis, triceps, patellars and ankles bilaterally and equal. No clonus. R toe and L toe were both downgoing.  Coordination: No dysmetria on FNF   Gait: deferred     Data/Labs/Imaging which I personally reviewed.     Labs:       CBC Full  -  ( 27 Apr 2023 06:35 )  WBC Count : 10.95 K/uL  RBC Count : 4.73 M/uL  Hemoglobin : 14.4 g/dL  Hematocrit : 44.2 %  Platelet Count - Automated : 224 K/uL  Mean Cell Volume : 93.4 fl  Mean Cell Hemoglobin : 30.4 pg  Mean Cell Hemoglobin Concentration : 32.6 gm/dL  Auto Neutrophil # : x  Auto Lymphocyte # : x  Auto Monocyte # : x  Auto Eosinophil # : x  Auto Basophil # : x  Auto Neutrophil % : x  Auto Lymphocyte % : x  Auto Monocyte % : x  Auto Eosinophil % : x  Auto Basophil % : x     04-27    142  |  109<H>  |  15  ----------------------------<  98  3.9   |  22  |  1.04    Ca    8.5      27 Apr 2023 06:35  Phos  2.1     04-27  Mg     2.0     04-27

## 2023-04-27 NOTE — PROGRESS NOTE ADULT - SUBJECTIVE AND OBJECTIVE BOX
DATE OF SERVICE: 04-27-23 @ 15:30    Patient is a 67y old  Male who presents with a chief complaint of Sepsis (27 Apr 2023 13:09)      INTERVAL HISTORY: In no acute distress.    REVIEW OF SYSTEMS:  CONSTITUTIONAL: No weakness  EYES/ENT: No visual changes;  No throat pain   NECK: No pain or stiffness  RESPIRATORY: No cough, wheezing; No shortness of breath  CARDIOVASCULAR: No chest pain or palpitations  GASTROINTESTINAL: No abdominal  pain. No nausea, vomiting, or hematemesis  GENITOURINARY: No dysuria, frequency or hematuria  NEUROLOGICAL: No stroke like symptoms  SKIN: No rashes    	  MEDICATIONS:  metoprolol tartrate 25 milliGRAM(s) Oral two times a day        PHYSICAL EXAM:  T(C): 36.9 (04-27-23 @ 12:30), Max: 36.9 (04-27-23 @ 12:30)  HR: 68 (04-27-23 @ 12:30) (63 - 68)  BP: 160/80 (04-27-23 @ 12:30) (148/78 - 164/67)  RR: 18 (04-27-23 @ 12:30) (18 - 18)  SpO2: 95% (04-27-23 @ 12:30) (94% - 95%)  Wt(kg): --  I&O's Summary    26 Apr 2023 07:01  -  27 Apr 2023 07:00  --------------------------------------------------------  IN: 360 mL / OUT: 0 mL / NET: 360 mL          Appearance: In no distress	  HEENT:    PERRL, EOMI	  Cardiovascular:  S1 S2, No JVD  Respiratory: Lungs clear to auscultation	  Gastrointestinal:  Soft, Non-tender, + BS	  Vascularature:  No edema of LE  Psychiatric: Appropriate affect   Neuro: no acute focal deficits                               14.4   10.95 )-----------( 224      ( 27 Apr 2023 06:35 )             44.2     04-27    142  |  109<H>  |  15  ----------------------------<  98  3.9   |  22  |  1.04    Ca    8.5      27 Apr 2023 06:35  Phos  2.1     04-27  Mg     2.0     04-27          Labs personally reviewed                   ASSESSMENT/PLAN: 	  Patient is a 68 y/o Male with PMHx of HTN, HLD, Seizures, CAD w/ 1 stent, chronic back pain s/p multiple spine surgeries presents to the ED for AMS. Patient brought in by EMS after wife called due to patient having hallucinations, talking to people that were not there and trying to eat when food was in his hands found to have UTI. Patient denies fevers, chills, headache, vision changes, chest pain, trouble breathing, abdominal pain, hematuria, blood in stool.  Patient endorses nausea, no vomiting and diarrhea. Patient is a former smoker.  States he does not walk far d/t chronic back pain and LE weakness.     1. Coronary Artery Disease  - ECG with no ischemic characteristics noted  - Poor function capacity   - Denies CP or SOB  - c/w ASA, Atorvastatin and Metoprolol    2. Hypertension  - BP mostly at target  - c/w Metoprolol 25mg PO BID    3. Hyperlipidemia  - LDL 48  - c/w atorvastatin 20mg PO daily and gemfibrizol 600mg PO daily    4. DVT PPx  - c/w SQ heparin          Cyndee Souza, ABBIE-NP   Adis Bellamy DO Located within [facility]  Cardiovascular Medicine  [address] [place] [address], [address]  Available through call or text on [employer]  Office: [phone]

## 2023-04-27 NOTE — PROGRESS NOTE ADULT - ASSESSMENT
66 y/o Male with HTN, HLD, Seizures, CAD w/ 1 stent, chronic back pain with SS and disc herniations (lumbar) s/p multiple spine surgeries and pump s/p lumbar fusion and lami, prior narcotic dependence OA, GERD, aneurysm s/p clipping 1997  presents to the ED for AMS.    CTH R crani and aneurysm clip noted at [facility]. bilateral frontal lobe gliosis and encephalomalacia. no acute findigns   UA+   Impressin:   1) AMS likely toxic metabolic encephaloapthy from infection  2) seizure d/o   3) aneurysm, [facility] s/p clipping with resultant seizure  4) chronic back pain s/p multiples sx's fusion, lami, pain pump     - c/w treatment of infection/UTI, on CTX   - for seizures c/w keppra 1g BID and lacosamide 150mg BID   - chronic back pain, on lyrica 150 daily, also baclofen PRN  - consider pain management if pain worse, seems comfertable   - ASA and statin for CAD s/p stent   - unable for MRI   - if no improvement iwth treatment of infection would get EEG   - f/u ID  - PT/OT   - check FS, glucose control <180  - GI/DVT ppx   - Thank you for allowing me to participate in the care of this patient. Call with questions.   dcp  Timothy Noble MD  Vascular Neurology  Office: [phone]

## 2023-04-27 NOTE — PROGRESS NOTE ADULT - PROVIDER SPECIALTY LIST ADULT
Cardiology
Cardiology
Internal Medicine
Neurology
Cardiology
Internal Medicine

## 2023-04-27 NOTE — PROGRESS NOTE ADULT - SUBJECTIVE AND OBJECTIVE BOX
Name of Patient : MANUEL PETERSON  MRN: [id number]  Date of visit: 04-27-23 @ 13:10      Subjective: Patient seen and examined. No new events except as noted.   Patient seen earlier this AM. Wife at the bedside   DC planning today pending PT re-eval   Patient denies pain or discomfort.     REVIEW OF SYSTEMS:    CONSTITUTIONAL: No weakness, fevers or chills  EYES/ENT: No visual changes;  No vertigo or throat pain   NECK: No pain or stiffness  RESPIRATORY: No cough, wheezing, hemoptysis; No shortness of breath  CARDIOVASCULAR: No chest pain or palpitations  GASTROINTESTINAL: No abdominal or epigastric pain. No nausea, vomiting, or hematemesis; No diarrhea or constipation. No melena or hematochezia.  GENITOURINARY: No dysuria, frequency or hematuria  NEUROLOGICAL: No numbness or weakness  SKIN: No itching, burning, rashes, or lesions   All other review of systems is negative unless indicated above.    MEDICATIONS:  MEDICATIONS  (STANDING):  aspirin enteric coated 81 milliGRAM(s) Oral daily  atorvastatin 20 milliGRAM(s) Oral at bedtime  cefTRIAXone   IVPB 1000 milliGRAM(s) IV Intermittent every 24 hours  gabapentin 100 milliGRAM(s) Oral two times a day  heparin   Injectable 5000 Unit(s) SubCutaneous every 8 hours  lacosamide 150 milliGRAM(s) Oral two times a day  metoprolol tartrate 25 milliGRAM(s) Oral two times a day  pregabalin 150 milliGRAM(s) Oral daily  senna 2 Tablet(s) Oral at bedtime  sodium chloride 0.9%. 1000 milliLiter(s) (75 mL/Hr) IV Continuous <Continuous>  tamsulosin 0.8 milliGRAM(s) Oral at bedtime      PHYSICAL EXAM:  T(C): 36.6 (04-27-23 @ 04:50), Max: 36.6 (04-26-23 @ 20:52)  HR: 65 (04-27-23 @ 04:50) (63 - 65)  BP: 164/67 (04-27-23 @ 04:50) (148/78 - 164/67)  RR: 18 (04-27-23 @ 04:50) (18 - 18)  SpO2: 94% (04-27-23 @ 04:50) (94% - 94%)  Wt(kg): --  I&O's Summary    26 Apr 2023 07:01  -  27 Apr 2023 07:00  --------------------------------------------------------  IN: 360 mL / OUT: 0 mL / NET: 360 mL          Appearance: Normal	  HEENT: Eyes are open   Lymphatic: No lymphadenopathy   Cardiovascular: Normal S1 S2, no JVD  Respiratory: normal effort , clear  Gastrointestinal:  Soft, Non-tender  Skin: No rashes,  warm to touch  Psychiatry:  Mood & affect appropriate  Musculoskeletal: No edema        04-26-23 @ 07:01  -  04-27-23 @ 07:00  --------------------------------------------------------  IN: 360 mL / OUT: 0 mL / NET: 360 mL                                  14.4   10.95 )-----------( 224      ( 27 Apr 2023 06:35 )             44.2               04-27    142  |  109<H>  |  15  ----------------------------<  98  3.9   |  22  |  1.04    Ca    8.5      27 Apr 2023 06:35  Phos  2.1     04-27  Mg     2.0     04-27                           Culture - Blood (04.23.23 @ 07:50)   Specimen Source: .Blood Blood-Peripheral  Culture Results: No growth to date.    Culture - Blood (04.23.23 @ 07:35)   Specimen Source: .Blood Blood-Peripheral  Culture Results: No growth to date.

## 2023-04-27 NOTE — PROGRESS NOTE ADULT - ASSESSMENT
Patient is a 68 y/o Male with PMHx of HTN, HLD, Seizures, CAD w/ 1 stent, chronic back pain s/p multiple spine surgeries presents to the ED for AMS. Patient brought in by EMS after wife called due to patient having hallucinations, talking to people that were not there and trying to eat when food was in his hands.  Per wife who was at bedside, patient typically acts this way when he has a UTI.  Patient states he has been having burning with urination and urinary frequency for the last 5 days.  Patient is urinary incontinent, likely from neurogenic bladder due to chronic back issues and surgeries.  Patient has had multiple UTIs in the past 6 months requiring multiple admissions.  Pt hypoxic to 90% on RA, placed on 3 L NC by EMS. Patient denies fevers, chills, headache, vision changes, chest pain, trouble breathing, abdominal pain, hematuria, blood in stool.  Patient endorses nausea, no vomiting and diarrhea. Patient is a former smoker.  Per wife, patient normally takes baclofen 20 mg 4 times a day but has not taken it in the last week due to inability to refill prescription.    # Sepsis   - Pan Cx --> UCx w/ Proteus Mirabilis;  DC on Cefpodoxime 200 BID X 3 more days with outpatient urology follow up upon discharge.  - BCx2 w/ NGTD; F/u final   - Likely source is UTI   - C/w Rocephin for now pending sensitivities   - S/P IV hydration, PO hydration encouraged   - Lactate down-trending to WNL  - Pan CT as noted -- Neg for PE, neg for PNA or intra-abdominal findings   - ID eval PRN   - Urology eval consulted; F/u recs   - Monitor CBC, temp curve, VS and patient closely     # Hx of Seizures  - Resume home seizure meds --> per wife 04/26, patient self discontinued his Keppra as an outpatient. Wife reports that she spoke with outpatient neurologist and neurologist is aware of patient being off of Keppra X several months. Wife reports that neurologist states to hold keppra at this time. Keppra DC'd. Discussed with attending. Patient to follow up with neurology as an outpatient. Wife states that she will assist patient with follow up.   - Fall, Aspiration and Seizure precautions  - Muscle pain and spasm --> C/w gabapentin   - Neuro eval consulted; F/u recs     # Encephalopathy, H/O Aneurysm S/P Clipping   - CT Head as noted, Neg for New changes, shows chronic clipping   - C/w Tx of UTI  - B12, folate -- WNL, RPR -- Neg   - Psych eval appreciated; F/u recs   - Neuro eval consulted; F/u recs    # CAD S/P PCI   - C/w ASA and statin   - BP control   - Cardio eval consulted; F/u recs     # Nephrolithiasis   - Outpatient urology follow up for elective lithotripsy as per Urology- CT results reviewed with Pt and Wife to follow up as an outpatient   - Urology eval consulted; F/u recs     # HTN   - Monitor BP, VS and patient     # Stool burden  - On CT  - C/w Senna and Miralax  - Monitor for BM     #Pre-DM  - A1C of 5.9  - Diet  and lifestyle modifications   - Outpatient PCP follow up     # DVT and GI PPX     Discussed with patient and his wife at the bedside. Discussed with attending.   Patient is a 66 y/o Male with PMHx of HTN, HLD, Seizures, CAD w/ 1 stent, chronic back pain s/p multiple spine surgeries presents to the ED for AMS. Patient brought in by EMS after wife called due to patient having hallucinations, talking to people that were not there and trying to eat when food was in his hands.  Per wife who was at bedside, patient typically acts this way when he has a UTI.  Patient states he has been having burning with urination and urinary frequency for the last 5 days.  Patient is urinary incontinent, likely from neurogenic bladder due to chronic back issues and surgeries.  Patient has had multiple UTIs in the past 6 months requiring multiple admissions.  Pt hypoxic to 90% on RA, placed on 3 L NC by EMS. Patient denies fevers, chills, headache, vision changes, chest pain, trouble breathing, abdominal pain, hematuria, blood in stool.  Patient endorses nausea, no vomiting and diarrhea. Patient is a former smoker.  Per wife, patient normally takes baclofen 20 mg 4 times a day but has not taken it in the last week due to inability to refill prescription.    # Sepsis   - Pan Cx --> UCx w/ Proteus Mirabilis;  DC on Cefpodoxime 200 BID X 3 more days with outpatient urology follow up upon discharge.  - BCx2 w/ NGTD; F/u final   - Likely source is UTI   - S/P IV hydration, PO hydration encouraged   - Lactate down-trending to WNL  - Pan CT as noted -- Neg for PE, neg for PNA or intra-abdominal findings   - ID eval PRN   - Urology eval consulted; F/u recs   - Monitor CBC, temp curve, VS and patient closely     # Hx of Seizures  - Resume home seizure meds --> per wife 04/26, patient self discontinued his Keppra as an outpatient. Wife reports that she spoke with outpatient neurologist and neurologist is aware of patient being off of Keppra X several months. Wife reports that neurologist states to hold keppra at this time. Keppra DC'd. Discussed with attending. Patient to follow up with neurology as an outpatient. Wife states that she will assist patient with follow up.   - Fall, Aspiration and Seizure precautions  - Muscle pain and spasm --> C/w gabapentin   - Neuro eval consulted; F/u recs     # Encephalopathy, H/O Aneurysm S/P Clipping   - CT Head as noted, Neg for New changes, shows chronic clipping   - C/w Tx of UTI  - B12, folate -- WNL, RPR -- Neg   - Psych eval appreciated; F/u recs   - Neuro eval consulted; F/u recs    # CAD S/P PCI   - C/w ASA and statin   - BP control   - Cardio eval consulted; F/u recs     # Nephrolithiasis   - Outpatient urology follow up for elective lithotripsy as per Urology- CT results reviewed with Pt and Wife to follow up as an outpatient   - Urology eval consulted; F/u recs     # HTN   - Monitor BP, VS and patient     # Stool burden  - On CT  - C/w Senna and Miralax  - Monitor for BM     #Pre-DM  - A1C of 5.9  - Diet  and lifestyle modifications   - Outpatient PCP follow up     # DVT and GI PPX     Discussed with patient and his wife at the bedside. Discussed with attending.    DC planning for today. PT to follow up prior to DC.

## 2023-04-27 NOTE — PROGRESS NOTE ADULT - NS ATTEND AMEND GEN_ALL_CORE FT
Patient care and plan discussed and reviewed with Advanced Care Provider. Plan as outlined above edited by me to reflect our discussion.
Pt care and plan discussed and reviewed with PA. Plan as outlined above edited by me to reflect our discussion. Advanced care planning/advanced directives discussed with patient/family. DNR status including forceful chest compressions to attempt to restart the heart, ventilator support/artificial breathing, electric shock, artificial nutrition, health care proxy, Molst form all discussed with pt.

## 2023-04-28 RX ORDER — BACLOFEN 100 %
1 POWDER (GRAM) MISCELLANEOUS
Qty: 60 | Refills: 0
Start: 2023-04-28 | End: 2023-05-27

## 2023-04-28 RX ORDER — BACLOFEN 100 %
1 POWDER (GRAM) MISCELLANEOUS
Refills: 0
Start: 2023-04-28

## 2023-05-11 ENCOUNTER — OUTPATIENT (OUTPATIENT)
Dept: OUTPATIENT SERVICES | Facility: HOSPITAL | Age: 68
LOS: 1 days | Discharge: HOME | End: 2023-05-11

## 2023-05-11 DIAGNOSIS — Z98.890 OTHER SPECIFIED POSTPROCEDURAL STATES: Chronic | ICD-10-CM

## 2023-05-11 DIAGNOSIS — Z98.89 OTHER SPECIFIED POSTPROCEDURAL STATES: Chronic | ICD-10-CM

## 2023-05-11 DIAGNOSIS — Z96.649 PRESENCE OF UNSPECIFIED ARTIFICIAL HIP JOINT: Chronic | ICD-10-CM

## 2023-05-11 DIAGNOSIS — M43.28 FUSION OF SPINE, SACRAL AND SACROCOCCYGEAL REGION: Chronic | ICD-10-CM

## 2023-05-11 DIAGNOSIS — Z87.81 PERSONAL HISTORY OF (HEALED) TRAUMATIC FRACTURE: Chronic | ICD-10-CM

## 2023-05-24 DIAGNOSIS — F43.22 ADJUSTMENT DISORDER WITH ANXIETY: ICD-10-CM

## 2023-06-06 ENCOUNTER — INPATIENT (INPATIENT)
Facility: HOSPITAL | Age: 68
LOS: 6 days | Discharge: NURSING, REHAB OR LONG-TERM CARE | DRG: 853 | End: 2023-06-13
Attending: INTERNAL MEDICINE | Admitting: INTERNAL MEDICINE
Payer: MEDICARE

## 2023-06-06 VITALS
HEART RATE: 101 BPM | HEIGHT: 68 IN | RESPIRATION RATE: 22 BRPM | DIASTOLIC BLOOD PRESSURE: 57 MMHG | OXYGEN SATURATION: 95 % | SYSTOLIC BLOOD PRESSURE: 95 MMHG | TEMPERATURE: 98 F

## 2023-06-06 DIAGNOSIS — G40.909 EPILEPSY, UNSPECIFIED, NOT INTRACTABLE, WITHOUT STATUS EPILEPTICUS: ICD-10-CM

## 2023-06-06 DIAGNOSIS — Z98.890 OTHER SPECIFIED POSTPROCEDURAL STATES: Chronic | ICD-10-CM

## 2023-06-06 DIAGNOSIS — N17.9 ACUTE KIDNEY FAILURE, UNSPECIFIED: ICD-10-CM

## 2023-06-06 DIAGNOSIS — Z98.89 OTHER SPECIFIED POSTPROCEDURAL STATES: Chronic | ICD-10-CM

## 2023-06-06 DIAGNOSIS — A41.9 SEPSIS, UNSPECIFIED ORGANISM: ICD-10-CM

## 2023-06-06 DIAGNOSIS — M43.28 FUSION OF SPINE, SACRAL AND SACROCOCCYGEAL REGION: Chronic | ICD-10-CM

## 2023-06-06 DIAGNOSIS — N39.0 URINARY TRACT INFECTION, SITE NOT SPECIFIED: ICD-10-CM

## 2023-06-06 DIAGNOSIS — E78.5 HYPERLIPIDEMIA, UNSPECIFIED: ICD-10-CM

## 2023-06-06 DIAGNOSIS — Z87.81 PERSONAL HISTORY OF (HEALED) TRAUMATIC FRACTURE: Chronic | ICD-10-CM

## 2023-06-06 DIAGNOSIS — Z96.649 PRESENCE OF UNSPECIFIED ARTIFICIAL HIP JOINT: Chronic | ICD-10-CM

## 2023-06-06 DIAGNOSIS — M54.9 DORSALGIA, UNSPECIFIED: ICD-10-CM

## 2023-06-06 DIAGNOSIS — I25.10 ATHEROSCLEROTIC HEART DISEASE OF NATIVE CORONARY ARTERY WITHOUT ANGINA PECTORIS: ICD-10-CM

## 2023-06-06 DIAGNOSIS — Z29.9 ENCOUNTER FOR PROPHYLACTIC MEASURES, UNSPECIFIED: ICD-10-CM

## 2023-06-06 DIAGNOSIS — I10 ESSENTIAL (PRIMARY) HYPERTENSION: ICD-10-CM

## 2023-06-06 LAB
ALBUMIN SERPL ELPH-MCNC: 4 G/DL — SIGNIFICANT CHANGE UP (ref 3.3–5)
ALP SERPL-CCNC: 109 U/L — SIGNIFICANT CHANGE UP (ref 40–120)
ALT FLD-CCNC: 21 U/L — SIGNIFICANT CHANGE UP (ref 10–45)
ANION GAP SERPL CALC-SCNC: 14 MMOL/L — SIGNIFICANT CHANGE UP (ref 5–17)
APPEARANCE UR: ABNORMAL
APTT BLD: 29.2 SEC — SIGNIFICANT CHANGE UP (ref 27.5–35.5)
AST SERPL-CCNC: 13 U/L — SIGNIFICANT CHANGE UP (ref 10–40)
BACTERIA # UR AUTO: ABNORMAL
BASE EXCESS BLDV CALC-SCNC: -2.1 MMOL/L — LOW (ref -2–3)
BASOPHILS # BLD AUTO: 0.18 K/UL — SIGNIFICANT CHANGE UP (ref 0–0.2)
BASOPHILS NFR BLD AUTO: 0.8 % — SIGNIFICANT CHANGE UP (ref 0–2)
BILIRUB SERPL-MCNC: 1 MG/DL — SIGNIFICANT CHANGE UP (ref 0.2–1.2)
BILIRUB UR-MCNC: NEGATIVE — SIGNIFICANT CHANGE UP
BUN SERPL-MCNC: 19 MG/DL — SIGNIFICANT CHANGE UP (ref 7–23)
CA-I SERPL-SCNC: 1.28 MMOL/L — SIGNIFICANT CHANGE UP (ref 1.15–1.33)
CALCIUM SERPL-MCNC: 9.5 MG/DL — SIGNIFICANT CHANGE UP (ref 8.4–10.5)
CHLORIDE BLDV-SCNC: 105 MMOL/L — SIGNIFICANT CHANGE UP (ref 96–108)
CHLORIDE SERPL-SCNC: 106 MMOL/L — SIGNIFICANT CHANGE UP (ref 96–108)
CO2 BLDV-SCNC: 23 MMOL/L — SIGNIFICANT CHANGE UP (ref 22–26)
CO2 SERPL-SCNC: 20 MMOL/L — LOW (ref 22–31)
COLOR SPEC: YELLOW — SIGNIFICANT CHANGE UP
CREAT SERPL-MCNC: 1.66 MG/DL — HIGH (ref 0.5–1.3)
DIFF PNL FLD: ABNORMAL
EGFR: 45 ML/MIN/1.73M2 — LOW
EOSINOPHIL # BLD AUTO: 0 K/UL — SIGNIFICANT CHANGE UP (ref 0–0.5)
EOSINOPHIL NFR BLD AUTO: 0 % — SIGNIFICANT CHANGE UP (ref 0–6)
EPI CELLS # UR: 2 /HPF — SIGNIFICANT CHANGE UP
GAS PNL BLDV: 139 MMOL/L — SIGNIFICANT CHANGE UP (ref 136–145)
GAS PNL BLDV: SIGNIFICANT CHANGE UP
GAS PNL BLDV: SIGNIFICANT CHANGE UP
GLUCOSE BLDV-MCNC: 135 MG/DL — HIGH (ref 70–99)
GLUCOSE SERPL-MCNC: 137 MG/DL — HIGH (ref 70–99)
GLUCOSE UR QL: NEGATIVE — SIGNIFICANT CHANGE UP
HCO3 BLDV-SCNC: 22 MMOL/L — SIGNIFICANT CHANGE UP (ref 22–29)
HCT VFR BLD CALC: 44.9 % — SIGNIFICANT CHANGE UP (ref 39–50)
HCT VFR BLDA CALC: 37 % — LOW (ref 39–51)
HGB BLD CALC-MCNC: 12.3 G/DL — LOW (ref 12.6–17.4)
HGB BLD-MCNC: 15.2 G/DL — SIGNIFICANT CHANGE UP (ref 13–17)
HYALINE CASTS # UR AUTO: 12 /LPF — HIGH (ref 0–2)
INR BLD: 1.3 RATIO — HIGH (ref 0.88–1.16)
KETONES UR-MCNC: NEGATIVE — SIGNIFICANT CHANGE UP
LACTATE BLDV-MCNC: 2.4 MMOL/L — HIGH (ref 0.5–2)
LACTATE SERPL-SCNC: 1.6 MMOL/L — SIGNIFICANT CHANGE UP (ref 0.5–2)
LEUKOCYTE ESTERASE UR-ACNC: ABNORMAL
LYMPHOCYTES # BLD AUTO: 1.68 K/UL — SIGNIFICANT CHANGE UP (ref 1–3.3)
LYMPHOCYTES # BLD AUTO: 7.5 % — LOW (ref 13–44)
MANUAL SMEAR VERIFICATION: SIGNIFICANT CHANGE UP
MCHC RBC-ENTMCNC: 30.8 PG — SIGNIFICANT CHANGE UP (ref 27–34)
MCHC RBC-ENTMCNC: 33.9 GM/DL — SIGNIFICANT CHANGE UP (ref 32–36)
MCV RBC AUTO: 91.1 FL — SIGNIFICANT CHANGE UP (ref 80–100)
MONOCYTES # BLD AUTO: 1.68 K/UL — HIGH (ref 0–0.9)
MONOCYTES NFR BLD AUTO: 7.5 % — SIGNIFICANT CHANGE UP (ref 2–14)
NEUTROPHILS # BLD AUTO: 18.73 K/UL — HIGH (ref 1.8–7.4)
NEUTROPHILS NFR BLD AUTO: 82.8 % — HIGH (ref 43–77)
NEUTS BAND # BLD: 0.7 % — SIGNIFICANT CHANGE UP (ref 0–8)
NITRITE UR-MCNC: NEGATIVE — SIGNIFICANT CHANGE UP
PCO2 BLDV: 36 MMHG — LOW (ref 42–55)
PH BLDV: 7.4 — SIGNIFICANT CHANGE UP (ref 7.32–7.43)
PH UR: 8 — SIGNIFICANT CHANGE UP (ref 5–8)
PLAT MORPH BLD: NORMAL — SIGNIFICANT CHANGE UP
PLATELET # BLD AUTO: 248 K/UL — SIGNIFICANT CHANGE UP (ref 150–400)
PO2 BLDV: 46 MMHG — HIGH (ref 25–45)
POTASSIUM BLDV-SCNC: 3.9 MMOL/L — SIGNIFICANT CHANGE UP (ref 3.5–5.1)
POTASSIUM SERPL-MCNC: 3.9 MMOL/L — SIGNIFICANT CHANGE UP (ref 3.5–5.3)
POTASSIUM SERPL-SCNC: 3.9 MMOL/L — SIGNIFICANT CHANGE UP (ref 3.5–5.3)
PROT SERPL-MCNC: 7.2 G/DL — SIGNIFICANT CHANGE UP (ref 6–8.3)
PROT UR-MCNC: ABNORMAL
PROTHROM AB SERPL-ACNC: 15 SEC — HIGH (ref 10.5–13.4)
RAPID RVP RESULT: SIGNIFICANT CHANGE UP
RBC # BLD: 4.93 M/UL — SIGNIFICANT CHANGE UP (ref 4.2–5.8)
RBC # FLD: 15.3 % — HIGH (ref 10.3–14.5)
RBC BLD AUTO: NORMAL — SIGNIFICANT CHANGE UP
RBC CASTS # UR COMP ASSIST: 98 /HPF — HIGH (ref 0–4)
SAO2 % BLDV: 80.9 % — SIGNIFICANT CHANGE UP (ref 67–88)
SARS-COV-2 RNA SPEC QL NAA+PROBE: SIGNIFICANT CHANGE UP
SODIUM SERPL-SCNC: 140 MMOL/L — SIGNIFICANT CHANGE UP (ref 135–145)
SP GR SPEC: 1.03 — HIGH (ref 1.01–1.02)
UROBILINOGEN FLD QL: NEGATIVE — SIGNIFICANT CHANGE UP
VARIANT LYMPHS # BLD: 0.7 % — SIGNIFICANT CHANGE UP (ref 0–6)
WBC # BLD: 22.43 K/UL — HIGH (ref 3.8–10.5)
WBC # FLD AUTO: 22.43 K/UL — HIGH (ref 3.8–10.5)
WBC UR QL: 298 /HPF — HIGH (ref 0–5)

## 2023-06-06 PROCEDURE — 71045 X-RAY EXAM CHEST 1 VIEW: CPT | Mod: 26

## 2023-06-06 PROCEDURE — 99285 EMERGENCY DEPT VISIT HI MDM: CPT

## 2023-06-06 PROCEDURE — 99223 1ST HOSP IP/OBS HIGH 75: CPT

## 2023-06-06 PROCEDURE — 70450 CT HEAD/BRAIN W/O DYE: CPT | Mod: 26,MA

## 2023-06-06 RX ORDER — BACLOFEN 100 %
20 POWDER (GRAM) MISCELLANEOUS EVERY 6 HOURS
Refills: 0 | Status: DISCONTINUED | OUTPATIENT
Start: 2023-06-06 | End: 2023-06-13

## 2023-06-06 RX ORDER — ROSUVASTATIN CALCIUM 5 MG/1
1 TABLET ORAL
Refills: 0 | DISCHARGE

## 2023-06-06 RX ORDER — LEVETIRACETAM 250 MG/1
750 TABLET, FILM COATED ORAL
Refills: 0 | Status: DISCONTINUED | OUTPATIENT
Start: 2023-06-07 | End: 2023-06-13

## 2023-06-06 RX ORDER — POLYETHYLENE GLYCOL 3350 17 G/17G
17 POWDER, FOR SOLUTION ORAL DAILY
Refills: 0 | Status: DISCONTINUED | OUTPATIENT
Start: 2023-06-06 | End: 2023-06-13

## 2023-06-06 RX ORDER — GABAPENTIN 400 MG/1
200 CAPSULE ORAL AT BEDTIME
Refills: 0 | Status: DISCONTINUED | OUTPATIENT
Start: 2023-06-06 | End: 2023-06-13

## 2023-06-06 RX ORDER — ACETAMINOPHEN 500 MG
1000 TABLET ORAL ONCE
Refills: 0 | Status: COMPLETED | OUTPATIENT
Start: 2023-06-06 | End: 2023-06-06

## 2023-06-06 RX ORDER — LEVETIRACETAM 250 MG/1
750 TABLET, FILM COATED ORAL ONCE
Refills: 0 | Status: COMPLETED | OUTPATIENT
Start: 2023-06-06 | End: 2023-06-07

## 2023-06-06 RX ORDER — ASPIRIN/CALCIUM CARB/MAGNESIUM 324 MG
81 TABLET ORAL DAILY
Refills: 0 | Status: DISCONTINUED | OUTPATIENT
Start: 2023-06-06 | End: 2023-06-13

## 2023-06-06 RX ORDER — SENNA PLUS 8.6 MG/1
2 TABLET ORAL AT BEDTIME
Refills: 0 | Status: DISCONTINUED | OUTPATIENT
Start: 2023-06-06 | End: 2023-06-13

## 2023-06-06 RX ORDER — ACETAMINOPHEN 500 MG
650 TABLET ORAL EVERY 6 HOURS
Refills: 0 | Status: DISCONTINUED | OUTPATIENT
Start: 2023-06-06 | End: 2023-06-13

## 2023-06-06 RX ORDER — ONDANSETRON 8 MG/1
4 TABLET, FILM COATED ORAL EVERY 8 HOURS
Refills: 0 | Status: DISCONTINUED | OUTPATIENT
Start: 2023-06-06 | End: 2023-06-13

## 2023-06-06 RX ORDER — HEPARIN SODIUM 5000 [USP'U]/ML
5000 INJECTION INTRAVENOUS; SUBCUTANEOUS EVERY 8 HOURS
Refills: 0 | Status: DISCONTINUED | OUTPATIENT
Start: 2023-06-06 | End: 2023-06-13

## 2023-06-06 RX ORDER — FERROUS SULFATE 325(65) MG
1 TABLET ORAL
Qty: 0 | Refills: 0 | DISCHARGE

## 2023-06-06 RX ORDER — NYSTATIN CREAM 100000 [USP'U]/G
1 CREAM TOPICAL
Qty: 0 | Refills: 0 | DISCHARGE

## 2023-06-06 RX ORDER — ROSUVASTATIN CALCIUM 5 MG/1
10 TABLET ORAL AT BEDTIME
Refills: 0 | Status: DISCONTINUED | OUTPATIENT
Start: 2023-06-06 | End: 2023-06-13

## 2023-06-06 RX ORDER — BACLOFEN 100 %
20 POWDER (GRAM) MISCELLANEOUS ONCE
Refills: 0 | Status: COMPLETED | OUTPATIENT
Start: 2023-06-06 | End: 2023-06-06

## 2023-06-06 RX ORDER — LANOLIN ALCOHOL/MO/W.PET/CERES
3 CREAM (GRAM) TOPICAL AT BEDTIME
Refills: 0 | Status: DISCONTINUED | OUTPATIENT
Start: 2023-06-06 | End: 2023-06-13

## 2023-06-06 RX ORDER — SODIUM CHLORIDE 9 MG/ML
500 INJECTION INTRAMUSCULAR; INTRAVENOUS; SUBCUTANEOUS ONCE
Refills: 0 | Status: COMPLETED | OUTPATIENT
Start: 2023-06-06 | End: 2023-06-06

## 2023-06-06 RX ORDER — SODIUM CHLORIDE 9 MG/ML
1000 INJECTION INTRAMUSCULAR; INTRAVENOUS; SUBCUTANEOUS ONCE
Refills: 0 | Status: COMPLETED | OUTPATIENT
Start: 2023-06-06 | End: 2023-06-06

## 2023-06-06 RX ORDER — LEVETIRACETAM 250 MG/1
1 TABLET, FILM COATED ORAL
Refills: 0 | DISCHARGE

## 2023-06-06 RX ORDER — CEFTRIAXONE 500 MG/1
1000 INJECTION, POWDER, FOR SOLUTION INTRAMUSCULAR; INTRAVENOUS ONCE
Refills: 0 | Status: COMPLETED | OUTPATIENT
Start: 2023-06-06 | End: 2023-06-06

## 2023-06-06 RX ORDER — METOPROLOL TARTRATE 50 MG
25 TABLET ORAL AT BEDTIME
Refills: 0 | Status: DISCONTINUED | OUTPATIENT
Start: 2023-06-06 | End: 2023-06-13

## 2023-06-06 RX ORDER — CEFTRIAXONE 500 MG/1
1000 INJECTION, POWDER, FOR SOLUTION INTRAMUSCULAR; INTRAVENOUS EVERY 24 HOURS
Refills: 0 | Status: DISCONTINUED | OUTPATIENT
Start: 2023-06-07 | End: 2023-06-08

## 2023-06-06 RX ORDER — TAMSULOSIN HYDROCHLORIDE 0.4 MG/1
0.4 CAPSULE ORAL DAILY
Refills: 0 | Status: DISCONTINUED | OUTPATIENT
Start: 2023-06-06 | End: 2023-06-13

## 2023-06-06 RX ORDER — BACLOFEN 100 %
1 POWDER (GRAM) MISCELLANEOUS
Refills: 0 | DISCHARGE

## 2023-06-06 RX ORDER — ACETAMINOPHEN 500 MG
650 TABLET ORAL ONCE
Refills: 0 | Status: DISCONTINUED | OUTPATIENT
Start: 2023-06-06 | End: 2023-06-06

## 2023-06-06 RX ADMIN — ROSUVASTATIN CALCIUM 10 MILLIGRAM(S): 5 TABLET ORAL at 22:56

## 2023-06-06 RX ADMIN — SODIUM CHLORIDE 500 MILLILITER(S): 9 INJECTION INTRAMUSCULAR; INTRAVENOUS; SUBCUTANEOUS at 18:36

## 2023-06-06 RX ADMIN — Medication 20 MILLIGRAM(S): at 22:04

## 2023-06-06 RX ADMIN — CEFTRIAXONE 100 MILLIGRAM(S): 500 INJECTION, POWDER, FOR SOLUTION INTRAMUSCULAR; INTRAVENOUS at 14:00

## 2023-06-06 RX ADMIN — Medication 400 MILLIGRAM(S): at 13:40

## 2023-06-06 RX ADMIN — Medication 400 MILLIGRAM(S): at 21:51

## 2023-06-06 RX ADMIN — SODIUM CHLORIDE 1000 MILLILITER(S): 9 INJECTION INTRAMUSCULAR; INTRAVENOUS; SUBCUTANEOUS at 13:59

## 2023-06-06 NOTE — ED ADULT TRIAGE NOTE - BP NONINVASIVE SYSTOLIC (MM HG)
Pt arrived to ED with mom and aunt c/o abdominal pain. Pt has not had a bowl movement in 4 days. At triage pt is pushing and is pain.mother states she can see the stool but it wont come out.    95

## 2023-06-06 NOTE — H&P ADULT - NSHPPHYSICALEXAM_GEN_ALL_CORE
T(C): 36.8 (06-06-23 @ 22:17), Max: 39 (06-06-23 @ 13:30)  HR: 108 (06-06-23 @ 22:17) (84 - 108)  BP: 109/71 (06-06-23 @ 22:17) (95/57 - 131/65)  RR: 20 (06-06-23 @ 22:17) (18 - 23)  SpO2: 91% (06-06-23 @ 22:17) (91% - 95%)    Constitutional: no acute distress, laying in bed comfortably  ENMT: atraumatic, normocephalic, no lymphadenopathy  Eyes: pupils equally round and reactive to light, extraocular muscles intact, no conjunctival injection  Cardio: regular rate and rhythm, normal S1/S2, no murmurs, rubs, or gallops  Respiratory: lungs clear to auscultation bilaterally, no rales, rhonchi, or wheezes  GI: soft, nontender, nondistended, BSx4  MSK: extremities atraumatic, no cyanosis or clubbing, no CVA tenderness  Skin: warm, dry, no rashes or lesions  Neuro: no focal deficits, sensation grossly intact  Psych: alert and oriented x3, normal behavior, appropriate mood and affect

## 2023-06-06 NOTE — ED ADULT NURSE NOTE - NSFALLRISKINTERV_ED_ALL_ED

## 2023-06-06 NOTE — ED ADULT NURSE REASSESSMENT NOTE - NS ED NURSE REASSESS COMMENT FT1
pt actively rigoring. as per pt family, pt presents like this when he does not take his baclofen. Inpatient NP Teri contacted for order of baclofen. provider coming to evaluate patient.

## 2023-06-06 NOTE — ED PROVIDER NOTE - PHYSICAL EXAMINATION
GENERAL: Awake, alert, NAD  LUNGS: CTAB, no wheezes or crackles   CARDIAC: RRR, no m/r/g  ABDOMEN: Soft, , non tender, non distended, no rebound, no guarding  BACK: No midline spinal tenderness, no CVA tenderness  EXT: No edema, no calf tenderness, 2+ DP pulses bilaterally, no deformities.  NEURO: A&Ox3. Moving all extremities.  SKIN: Warm and dry. No rash.  PSYCH: Normal affect.

## 2023-06-06 NOTE — H&P ADULT - ASSESSMENT
Patient is a 67y Male with PMH of HTN, HLD, CAD s/p stent (2015), seizure disorder on Keppra, chronic back pain s/p multiple spinal surgeries, kidney stones, and recurrent UTIs, presenting to the ED w/ AMS x1 day, admitted to medicine w/ severe sepsis 2/2 UTI.

## 2023-06-06 NOTE — ED ADULT NURSE NOTE - OBJECTIVE STATEMENT
66 y/o male w/ hx of fracture femur, CAD ,Stent, Seizure and Recurrent UTIs BIBEMS from home  with a 20 goldy IV line in left top of hand with change of mental status and increased difficulty with mobility. As per patients wife at bedside patient was normal last night before they went to bed around 11pm and today she noticed more weakness in his upper extremities, she indicates that he has not been ambulating since April last year been using wheelchair after femur surgery but upper extremities have been fine until today. On assessment  Pt speech difficult to understand, feet have callosus and pealing off dry skin, toe nails are long and cutting into skin underneath, small wound noted underneath left foot toes but no drainage noted. left arm has red rash that as per patients wife has been there for weeks and it has been itchy.

## 2023-06-06 NOTE — ED ADULT NURSE NOTE - NSFALLDEVICES_ED_ALL_ED
Patient called back a she has not heard from the office and is concerned on her results. She is aware that PSR was unable to reach Dr. Aviles nurse at the time of call.   Thank you   Wheelchair

## 2023-06-06 NOTE — ED PROVIDER NOTE - ATTENDING CONTRIBUTION TO CARE
This is a 67-year-old male who is coming in with drowsiness.  He recently had a Proteus UTI which was treated with cefuroxime.  Culture from April reviewed and this was sensitive.  The patient's wife states that since yesterday he has been drowsy his speech has been abnormal and he has been having trouble to move around.  This is the same symptoms that she noted when he had a urinary infection last time.  On exam the patient is a dry mucous membranes and dysarthria.  Nontender abdomen.  No CVA tenderness.  Clear lungs.  No acute distress.  Full range of motion of upper lower extremities.  Patient likely has encephalopathy or delirium related to complicated urinary infection.  We will do a CT scan of the brain in the context of the patient's symptoms and further evaluate.  Plan to admit.  No thrombolytics was administered as the patient is outside the window for thrombolytics.

## 2023-06-06 NOTE — H&P ADULT - PROBLEM SELECTOR PLAN 2
Likely prerenal i/s/o severe sepsis vs postrenal i/s/o kidney stones  - s/p 1.5L IVF in the ED  - f/u renal US  - repeat BMP in the AM  - renally dose meds

## 2023-06-06 NOTE — ED PROVIDER NOTE - OBJECTIVE STATEMENT
67-year-old male chief complaint of altered mental status fever Tmax of 103.  Patient has a history of prior UTIs with limited presentation.  Otherwise hypertension hyperlipidemia coronary artery disease.  Patient was seen recently back in April of this year and treated with ceftriaxone apparently with urine cultures growing positive Proteus.  Patient otherwise is ANO x2 at bedside cognizance weans in and out

## 2023-06-06 NOTE — H&P ADULT - PROBLEM SELECTOR PLAN 1
Fever, tachycardia, increased RR, leukocytosis, and elevated lactate meets criteria for severe sepsis  - UA grossly positive for UTI  - c/w ceftriaxone (prior cx sensitive)  - lactate resolved w/ IVF  - trend WBC and fever curve  - f/u urine and blood cultures    #Kidney stones  - consider struvite stones given hx proteus UTI and elevated urine pH  - need to r/o urinary obstruction and/or pyelo given TONY, hold off on CT given GFR  - renal US ordered, f/u  - urology consult in AM for kidney stones and recurrent UTI (pt follows Dr Wright [facility])

## 2023-06-06 NOTE — H&P ADULT - PROBLEM SELECTOR PLAN 7
- c/w home gabapentin (dose slightly reduced for kidney function)  - c/w home lyrica (dose slightly reduced for kidney function)  - c/w home baclofen 4x/day PRN

## 2023-06-06 NOTE — ED PROVIDER NOTE - CLINICAL SUMMARY MEDICAL DECISION MAKING FREE TEXT BOX
67-year-old male with multiple medical comorbidities chief complaint of altered mental status fever given history and physical clinical concern for urosepsis will assess with labs imaging to be admitted given altered mental status for IV antibiotics

## 2023-06-06 NOTE — H&P ADULT - HISTORY OF PRESENT ILLNESS
Patient is a 67y Male with PMH of HTN, HLD, CAD s/p stent (2015), seizure disorder on Keppra, chronic back pain s/p multiple spinal surgeries, kidney stones, and recurrent UTIs, presenting to the ED w/ AMS x1 day. Patient was admitted in 4/2023 w/ proteus UTI, also finished a course of abx outpatient 2 weeks ago for another UTI. Patient's wife states that yesterday he started to become altered, which is usually what happens when he gets a UTI. His arms started shaking, he was confused, and he was having trouble keeping his eyes open. He also had a fever of 103 this morning. Patient does report dysuria. He denies chest pain, shortness of breath, abdominal pain, nausea, vomiting, changes in bowel habits. His last BM was 2 days ago. He follows with urology Dr. Wright from \[facility]\"" for his kidney stones.    In the ED, Tmax 102, , /65, RR 23, SpO2 95% on RA. Labs significant for WBC 22, Cr 1.66, lactate 2.4->1.6. UA grossly positive for UTI. CXR shows mildly increased interstitial markings compared to prior. CT head negative for acute pathology. S/p keppra, ceftriaxone, baclofen, and 1.5L IVF in the ED

## 2023-06-06 NOTE — CHART NOTE - NSCHARTNOTEFT_GEN_A_CORE
MEDICINE NP    NICHOLASMANUEL  67y Male        HPI:  Called by RN to evaluate patient for  Patient seen and assessed at bedside,   Patient denied headache, dizziness, CP, SOB, abdominal  pain, N/V/D, numbness/tingling, extremity weakness, dysuria.             Vital Signs Last 24 Hrs  T(C): 38.4 (06 Jun 2023 21:10), Max: 39 (06 Jun 2023 13:30)  T(F): 101.1 (06 Jun 2023 21:10), Max: 102.2 (06 Jun 2023 13:30)  HR: 98 (06 Jun 2023 20:58) (84 - 101)  BP: 119/92 (06 Jun 2023 20:58) (95/57 - 131/65)  BP(mean): --  RR: 20 (06 Jun 2023 20:58) (18 - 23)  SpO2: 95% (06 Jun 2023 20:58) (95% - 95%)    Parameters below as of 06 Jun 2023 20:58  Patient On (Oxygen Delivery Method): room air        > Physical Assessment:  General: Awake, No acute distress.   Neurology: A&Ox3, nonfocal  CV: +S1S2, RRR.  No peripheral edema  Respiratory: Even, unlabored.  CTA B/L.    Abdomen:  +BS.  Soft, NT, ND.  No palpable mass  MSK: KERI x4.   Skin: Warm and Dry           Assessment & Plan:  HPI:          -  -  -Will continue to closely monitor patient/vitals  -Primary Team to follow up in AM, attending to follow MEDICINE NP    MANUEL PETERSON  67y Male        HPI:  Called by RN to evaluate patient for rigors, and requesting home Baclofen  Patient seen and assessed at bedside, in ED Gold, with wife present.  +Rigors and feverish to touch.  Reporting chills  Rectal temp obtained T-101.1  Denies chest pain, SOB, abdominal  pain, N/V/D     Vital Signs Last 24 Hrs  T(C): 38.4 (06 Jun 2023 21:10), Max: 39 (06 Jun 2023 13:30)  T(F): 101.1 (06 Jun 2023 21:10), Max: 102.2 (06 Jun 2023 13:30)  HR: 98 (06 Jun 2023 20:58) (84 - 101)  BP: 119/92 (06 Jun 2023 20:58) (95/57 - 131/65)  RR: 20 (06 Jun 2023 20:58) (18 - 23)  SpO2: 95% (06 Jun 2023 20:58) (95% - 95%)    Parameters below as of 06 Jun 2023 20:58  Patient On (Oxygen Delivery Method): room air        > Physical Assessment:  General: Awake, +rigors   Neurology: A&Ox3, nonfocal  CV: +S1S2, RRR.  No peripheral edema  Respiratory: Even, unlabored.  CTA B/L.    Abdomen:  +obese abdomen. +BS.  Soft, NT. No palpable mass  MSK: SAAVEDRA x4.   Skin:  +feverish to touch.          Assessment & Plan:  HPI:  67-year-old male PMH of HTN, HLD, CAD s/p stent (2015), Seizure disorder on Keppra, chronic Back pain s/p multiple Spinal surgeries, Kidney stones, and recurrent UTIs /+Proteus infection in April.  Presents w/ chief complaint of altered mental status x1 day,  fever Tmax of 103.  Admitted with Sepsis severe - UTI s/p CTX in ED, and AMS w/ CTH negative.  Now with Rigors     1. Rigors -Likely i/s/o Fever   -Rectal Temp 101.1.    -Tylenol / Cooling measures and re-cristina  -C/w CTX  -F/u BCX , UCX  -Trend WBC and Fever Curve     2. R/o Urinary Retention  -Bladder SCan q6hr   -Urology c/s in AM given hx of kidney stones - Pending       -Will continue to closely monitor patient/vitals  -D/w Hospitalist ANDREINA Adrian and agrees to plan  -Endorsed to floor provider  -Attending to follow in AM      BEBETO Wlalace-BC  Medicine Department  #[id number]

## 2023-06-07 LAB
ANION GAP SERPL CALC-SCNC: 20 MMOL/L — HIGH (ref 5–17)
BUN SERPL-MCNC: 22 MG/DL — SIGNIFICANT CHANGE UP (ref 7–23)
CALCIUM SERPL-MCNC: 9.3 MG/DL — SIGNIFICANT CHANGE UP (ref 8.4–10.5)
CHLORIDE SERPL-SCNC: 104 MMOL/L — SIGNIFICANT CHANGE UP (ref 96–108)
CO2 SERPL-SCNC: 18 MMOL/L — LOW (ref 22–31)
CREAT SERPL-MCNC: 1.87 MG/DL — HIGH (ref 0.5–1.3)
EGFR: 39 ML/MIN/1.73M2 — LOW
GLUCOSE SERPL-MCNC: 112 MG/DL — HIGH (ref 70–99)
GRAM STN FLD: SIGNIFICANT CHANGE UP
GRAM STN FLD: SIGNIFICANT CHANGE UP
HCT VFR BLD CALC: 46 % — SIGNIFICANT CHANGE UP (ref 39–50)
HGB BLD-MCNC: 15.2 G/DL — SIGNIFICANT CHANGE UP (ref 13–17)
MAGNESIUM SERPL-MCNC: 1.8 MG/DL — SIGNIFICANT CHANGE UP (ref 1.6–2.6)
MCHC RBC-ENTMCNC: 30.8 PG — SIGNIFICANT CHANGE UP (ref 27–34)
MCHC RBC-ENTMCNC: 33 GM/DL — SIGNIFICANT CHANGE UP (ref 32–36)
MCV RBC AUTO: 93.3 FL — SIGNIFICANT CHANGE UP (ref 80–100)
METHOD TYPE: SIGNIFICANT CHANGE UP
NRBC # BLD: 0 /100 WBCS — SIGNIFICANT CHANGE UP (ref 0–0)
PHOSPHATE SERPL-MCNC: 2.7 MG/DL — SIGNIFICANT CHANGE UP (ref 2.5–4.5)
PLATELET # BLD AUTO: 202 K/UL — SIGNIFICANT CHANGE UP (ref 150–400)
POTASSIUM SERPL-MCNC: 4.5 MMOL/L — SIGNIFICANT CHANGE UP (ref 3.5–5.3)
POTASSIUM SERPL-SCNC: 4.5 MMOL/L — SIGNIFICANT CHANGE UP (ref 3.5–5.3)
PROTEUS SP DNA BLD POS QL NAA+NON-PROBE: SIGNIFICANT CHANGE UP
RBC # BLD: 4.93 M/UL — SIGNIFICANT CHANGE UP (ref 4.2–5.8)
RBC # FLD: 15.9 % — HIGH (ref 10.3–14.5)
SODIUM SERPL-SCNC: 142 MMOL/L — SIGNIFICANT CHANGE UP (ref 135–145)
SPECIMEN SOURCE: SIGNIFICANT CHANGE UP
SPECIMEN SOURCE: SIGNIFICANT CHANGE UP
WBC # BLD: 17.11 K/UL — HIGH (ref 3.8–10.5)
WBC # FLD AUTO: 17.11 K/UL — HIGH (ref 3.8–10.5)

## 2023-06-07 PROCEDURE — 76775 US EXAM ABDO BACK WALL LIM: CPT | Mod: 26

## 2023-06-07 PROCEDURE — 99223 1ST HOSP IP/OBS HIGH 75: CPT

## 2023-06-07 RX ORDER — IPRATROPIUM/ALBUTEROL SULFATE 18-103MCG
3 AEROSOL WITH ADAPTER (GRAM) INHALATION ONCE
Refills: 0 | Status: COMPLETED | OUTPATIENT
Start: 2023-06-07 | End: 2023-06-07

## 2023-06-07 RX ORDER — ACETAMINOPHEN 500 MG
1000 TABLET ORAL ONCE
Refills: 0 | Status: COMPLETED | OUTPATIENT
Start: 2023-06-07 | End: 2023-06-07

## 2023-06-07 RX ORDER — SODIUM CHLORIDE 9 MG/ML
1000 INJECTION INTRAMUSCULAR; INTRAVENOUS; SUBCUTANEOUS
Refills: 0 | Status: DISCONTINUED | OUTPATIENT
Start: 2023-06-07 | End: 2023-06-07

## 2023-06-07 RX ORDER — SODIUM CHLORIDE 9 MG/ML
1000 INJECTION, SOLUTION INTRAVENOUS
Refills: 0 | Status: DISCONTINUED | OUTPATIENT
Start: 2023-06-07 | End: 2023-06-09

## 2023-06-07 RX ADMIN — Medication 1000 MILLIGRAM(S): at 07:00

## 2023-06-07 RX ADMIN — SODIUM CHLORIDE 75 MILLILITER(S): 9 INJECTION, SOLUTION INTRAVENOUS at 20:01

## 2023-06-07 RX ADMIN — GABAPENTIN 200 MILLIGRAM(S): 400 CAPSULE ORAL at 21:20

## 2023-06-07 RX ADMIN — Medication 75 MILLIGRAM(S): at 12:54

## 2023-06-07 RX ADMIN — HEPARIN SODIUM 5000 UNIT(S): 5000 INJECTION INTRAVENOUS; SUBCUTANEOUS at 05:34

## 2023-06-07 RX ADMIN — LEVETIRACETAM 400 MILLIGRAM(S): 250 TABLET, FILM COATED ORAL at 01:14

## 2023-06-07 RX ADMIN — Medication 20 MILLIGRAM(S): at 19:18

## 2023-06-07 RX ADMIN — Medication 1 TABLET(S): at 12:55

## 2023-06-07 RX ADMIN — Medication 400 MILLIGRAM(S): at 05:34

## 2023-06-07 RX ADMIN — Medication 20 MILLIGRAM(S): at 04:43

## 2023-06-07 RX ADMIN — HEPARIN SODIUM 5000 UNIT(S): 5000 INJECTION INTRAVENOUS; SUBCUTANEOUS at 21:20

## 2023-06-07 RX ADMIN — TAMSULOSIN HYDROCHLORIDE 0.4 MILLIGRAM(S): 0.4 CAPSULE ORAL at 12:55

## 2023-06-07 RX ADMIN — Medication 20 MILLIGRAM(S): at 10:46

## 2023-06-07 RX ADMIN — ROSUVASTATIN CALCIUM 10 MILLIGRAM(S): 5 TABLET ORAL at 21:20

## 2023-06-07 RX ADMIN — SENNA PLUS 2 TABLET(S): 8.6 TABLET ORAL at 21:20

## 2023-06-07 RX ADMIN — LEVETIRACETAM 750 MILLIGRAM(S): 250 TABLET, FILM COATED ORAL at 17:05

## 2023-06-07 RX ADMIN — POLYETHYLENE GLYCOL 3350 17 GRAM(S): 17 POWDER, FOR SOLUTION ORAL at 12:54

## 2023-06-07 RX ADMIN — HEPARIN SODIUM 5000 UNIT(S): 5000 INJECTION INTRAVENOUS; SUBCUTANEOUS at 14:25

## 2023-06-07 RX ADMIN — Medication 25 MILLIGRAM(S): at 21:20

## 2023-06-07 RX ADMIN — Medication 81 MILLIGRAM(S): at 12:54

## 2023-06-07 RX ADMIN — LEVETIRACETAM 750 MILLIGRAM(S): 250 TABLET, FILM COATED ORAL at 05:34

## 2023-06-07 RX ADMIN — Medication 3 MILLILITER(S): at 01:12

## 2023-06-07 RX ADMIN — CEFTRIAXONE 100 MILLIGRAM(S): 500 INJECTION, POWDER, FOR SOLUTION INTRAMUSCULAR; INTRAVENOUS at 14:24

## 2023-06-07 NOTE — PHYSICAL THERAPY INITIAL EVALUATION ADULT - STRENGTHENING, PT EVAL
Goal: Patient will increase in strength by 1/2 grade to improve in ADLs and transfers in 3 weeks.
moderate impairment

## 2023-06-07 NOTE — PHYSICAL THERAPY INITIAL EVALUATION ADULT - NSPTDISCHREC_GEN_A_CORE
However, IF pt returns home, will require assist with ALL functional mobility (2 person assist at this time) and HomePT services. DME: pt would benefit from hospital bed and patient lift device./[facility]

## 2023-06-07 NOTE — PHYSICAL THERAPY INITIAL EVALUATION ADULT - ADDITIONAL COMMENTS
Per pt and wife at bedside, pt lives in a house with wife (+ramp to enter and no steps inside). Reports pt requires assist with all ADLs and functional mobility. Pt primarily wheelchair bound, however per wife, pt is able to perform bed mobility and stand with moderate assist from wife and then take steps to wheelchair with rolling walker and standby assist. Reports pt has been getting a bed bath recently 2/2 unable to get into shower. Pt owns WC, rolling walker, commode.

## 2023-06-07 NOTE — PROGRESS NOTE ADULT - SUBJECTIVE AND OBJECTIVE BOX
Name of Patient : MANUEL PETERSON  MRN: [id number]  Date of visit: 23 @ 14:51      Subjective: Patient seen and examined. No new events except as noted.   Patient seen earlier this AM. Lying down in bed.   Recurrent fever overnight. Patient admits to rigors and chills overnight. Patient AxO to name, birthday and hospital name.   ID, Neuro, renal consulted.     REVIEW OF SYSTEMS:    CONSTITUTIONAL: Generalized weakness; Febrile with rigors overnight; Chills   EYES/ENT: No visual changes;  No vertigo or throat pain   NECK: No pain or stiffness  RESPIRATORY: No cough, wheezing, hemoptysis; No shortness of breath  CARDIOVASCULAR: No chest pain or palpitations  GASTROINTESTINAL: No abdominal or epigastric pain. No nausea, vomiting, or hematemesis; No diarrhea or constipation. No melena or hematochezia.  GENITOURINARY: No dysuria, frequency or hematuria  NEUROLOGICAL: No numbness or weakness  SKIN: No itching, burning, rashes, or lesions   All other review of systems is negative unless indicated above.    MEDICATIONS:  MEDICATIONS  (STANDING):  aspirin enteric coated 81 milliGRAM(s) Oral daily  cefTRIAXone   IVPB 1000 milliGRAM(s) IV Intermittent every 24 hours  gabapentin 200 milliGRAM(s) Oral at bedtime  heparin   Injectable 5000 Unit(s) SubCutaneous every 8 hours  levETIRAcetam 750 milliGRAM(s) Oral two times a day  metoprolol tartrate 25 milliGRAM(s) Oral at bedtime  multivitamin 1 Tablet(s) Oral daily  polyethylene glycol 3350 17 Gram(s) Oral daily  pregabalin 75 milliGRAM(s) Oral daily  rosuvastatin 10 milliGRAM(s) Oral at bedtime  senna 2 Tablet(s) Oral at bedtime  sodium chloride 0.9%. 1000 milliLiter(s) (60 mL/Hr) IV Continuous <Continuous>  tamsulosin 0.4 milliGRAM(s) Oral daily      PHYSICAL EXAM:  T(C): 36.5 (23 @ 11:05), Max: 39 (23 @ 05:19)  HR: 90 (23 @ 11:05) (84 - 119)  BP: 148/75 (23 @ 11:05) (108/62 - 148/75)  RR: 20 (23 @ 11:05) (18 - 20)  SpO2: 97% (23 @ 11:05) (91% - 97%)  Wt(kg): --  I&O's Summary        Appearance: Awake, AxO X 3 	  HEENT:  Eyes are open   Lymphatic: No lymphadenopathy   Cardiovascular: Normal    Respiratory: normal effort , clear  Gastrointestinal:  Soft, Non-tender  Skin: No rashes,  warm to touch  Psychiatry:  Mood & affect appropriate  Musculoskeletal: No edema                                  15.2   17.11 )-----------( 202      ( 2023 07:17 )             46.0               06-07    142  |  104  |  22  ----------------------------<  112<H>  4.5   |  18<L>  |  1.87<H>    Ca    9.3      2023 07:15  Phos  2.7     06-  Mg     1.8     -    TPro  7.2  /  Alb  4.0  /  TBili  1.0  /  DBili  x   /  AST  13  /  ALT  21  /  AlkPhos  109  06-06    PT/INR - ( 2023 14:10 )   PT: 15.0 sec;   INR: 1.30 ratio         PTT - ( 2023 14:10 )  PTT:29.2 sec                   Urinalysis Basic - ( 2023 14:11 )    Color: Yellow / Appearance: Slightly Turbid / S.026 / pH: x  Gluc: x / Ketone: Negative  / Bili: Negative / Urobili: Negative   Blood: x / Protein: 100 mg/dL / Nitrite: Negative   Leuk Esterase: Large / RBC: 98 /hpf /  /HPF   Sq Epi: x / Non Sq Epi: x / Bacteria: Many      < from: Xray Chest 1 View- PORTABLE-Urgent (23 @ 19:50) >  IMPRESSION:  Increased interstitial lung markings compared to prior x-ray.    < end of copied text >      < from: CT Head No Cont (23 @ 18:26) >  IMPRESSION:    No acute intracranial hemorrhage or mass effect.    Stable examination since 2023.    < end of copied text >

## 2023-06-07 NOTE — CONSULT NOTE ADULT - ATTENDING COMMENTS
67 m with HTN, HLD, CAD s/p stent (2015), seizure disorder on Keppra, chronic back pain s/p multiple spinal surgeries and lumbar spinal stimulator (s/p removal), b/l nephrolithiasis, and recurrent UTIs (recent admission in April 2023 for Proteus UTI), now p/w fever, AMS, dysuria and R CVA tenderness   here febrile to 102.2, WBC: 22  Cr: 1.8  u/a positive    fever, leukocytosis, TONY, AMS, sepsis with R pyelonephritis  AMS due to septic encephalopathy, head CT negative    * previous cx with proteus and E-coli all sensitive to ceftriaxone so will continue  * abd/pelvis CT as pt has nephrolithiasis and now TONY, r/o obstructing stone  * f/u the blood and urine cx  * monitor CBC/diff and renal function    The above assessment and plan was discussed with the primary team    Ange Hayes MD  contact on teams  After 5pm and on weekends call [phone]

## 2023-06-07 NOTE — PHYSICAL THERAPY INITIAL EVALUATION ADULT - PERTINENT HX OF CURRENT PROBLEM, REHAB EVAL
Patient is a 67y Male with PMH of HTN, HLD, CAD s/p stent (2015), seizure disorder on Keppra, chronic back pain s/p multiple spinal surgeries, kidney stones, and recurrent UTIs, presenting to the ED w/ AMS x1 day, admitted to medicine w/ severe sepsis 2/2 UTI.  CT Head: No acute intracranial hemorrhage or mass effect. CXR: Increased interstitial lung markings compared to prior x-ray.

## 2023-06-07 NOTE — CONSULT NOTE ADULT - ASSESSMENT
Patient is a 67y Male with PMH of HTN, HLD, CAD s/p stent (2015), seizure disorder on Keppra, chronic back pain s/p multiple spinal surgeries, kidney stones, and recurrent UTIs, presenting to the ED w/ AMS x1 day. Patient was admitted in 4/2023 w/ proteus UTI, also finished a course of abx outpatient 2 weeks ago for another UTI. Patient's wife states that yesterday he started to become altered, which is usually what happens when he gets a UTI. His arms started shaking, he was confused, and he was having trouble keeping his eyes open. He also had a fever of 103 this morning. Patient does report dysuria. He denies chest pain, shortness of breath, abdominal pain, nausea, vomiting, changes in bowel habits. His last BM was 2 days ago. He follows with urology Dr. Wright from [facility] for his kidney stones. Nephrology consulted for TONY.       TONY   baseline sCr ~ 1.0 - 1.3   TONY etiology ?   Pt was having Fevers   Check Urine Na, Urine Cr  Check Bladder Scan  Check US kidney and bladder  Continue NS @ 60 cc/hr   Monitor BMP, U/o    Proteinuria/ Hematuria:   + LE, + Blood   History of kidney stones follows with Dr. Wright (urology)   Follow up urine c/s     Acidosis  Anion gap  Switch IVFs to 1/2 NS + 75 mEq Sodium Bicarb @ 75 cc/ hr x 24 hrs  Monitor     Discussed with primary team.  Patient is a 67y Male with PMH of HTN, HLD, CAD s/p stent (2015), seizure disorder on Keppra, chronic back pain s/p multiple spinal surgeries, kidney stones, and recurrent UTIs, presenting to the ED w/ AMS x1 day. Patient was admitted in 4/2023 w/ proteus UTI, also finished a course of abx outpatient 2 weeks ago for another UTI. Patient's wife states that yesterday he started to become altered, which is usually what happens when he gets a UTI. His arms started shaking, he was confused, and he was having trouble keeping his eyes open. He also had a fever of 103 this morning. Patient does report dysuria. He denies chest pain, shortness of breath, abdominal pain, nausea, vomiting, changes in bowel habits. His last BM was 2 days ago. He follows with urology Dr. Wright from [facility] for his kidney stones. Nephrology consulted for TONY.       TONY   baseline sCr ~ 1.0 - 1.3   TONY etiology ? likely ATN from sepsis   Pt has been febrile   Check Urine Na, Urine Cr  Check Bladder Scan  Check US kidney and bladder  Continue NS @ 60 cc/hr   Monitor BMP, U/o    Proteinuria/ Hematuria:   + LE, + Blood   History of kidney stones follows with Dr. Wright (urology)   Follow up urine c/s     Acidosis  Anion gap  Switch IVFs to 1/2 NS + 75 mEq Sodium Bicarb @ 75 cc/ hr x 24 hrs  Monitor     Discussed with primary team.  Patient is a 67y Male with PMH of HTN, HLD, CAD s/p stent (2015), seizure disorder on Keppra, chronic back pain s/p multiple spinal surgeries, kidney stones, and recurrent UTIs, presenting to the ED w/ AMS x1 day. Patient was admitted in 4/2023 w/ proteus UTI, also finished a course of abx outpatient 2 weeks ago for another UTI. Patient's wife states that yesterday he started to become altered, which is usually what happens when he gets a UTI. His arms started shaking, he was confused, and he was having trouble keeping his eyes open. He also had a fever of 103 this morning. Patient does report dysuria. He denies chest pain, shortness of breath, abdominal pain, nausea, vomiting, changes in bowel habits. His last BM was 2 days ago. He follows with urology Dr. Wright from [facility] for his kidney stones. Nephrology consulted for TONY.       TONY   baseline sCr ~ 1.0 - 1.3   TONY etiology ? likely ATN from sepsis   Pt has been febrile,   Check Urine Na, Urine Cr  Check Bladder Scan  Check US kidney and bladder  Continue IVF as suggested  Monitor BMP, U/o    Proteinuria/ Hematuria:   + LE, + Blood   History of kidney stones follows with Dr. Wright (urology)   Follow up urine c/s     Acidosis  Anion gap  Switch IVFs to 1/2 NS + 75 mEq Sodium Bicarb @ 75 cc/ hr x 24 hrs  Monitor     Discussed with primary team.

## 2023-06-07 NOTE — CONSULT NOTE ADULT - SUBJECTIVE AND OBJECTIVE BOX
Patient is a 66 yo Male who presents with a chief complaint of UTI     HPI:  66 yo M with a PMH of HTN, HLD, CAD s/p stent (), seizure disorder on Keppra, chronic back pain s/p multiple spinal surgeries and lumbar spinal stimulator, b/l nephrolithiasis, and recurrent UTIs (recent admission in 2023 for Proteus UTI), presenting to the ED with AMS x1 day.    Patient was admitted in 2023 w/ proteus UTI, also finished a course of abx outpatient 2 weeks ago for another UTI. Patient's wife states that yesterday he started to become altered, which is usually what happens when he gets a UTI. His arms started shaking, he was confused, and he was having trouble keeping his eyes open. He also had a fever of 103 this morning. Patient does report dysuria. He denies chest pain, shortness of breath, abdominal pain, nausea, vomiting, changes in bowel habits. His last BM was 2 days ago. He follows with urology Dr. Wright from [facility] for his kidney stones.    In the ED, Tmax 102, , /65, RR 23, SpO2 95% on RA. Labs significant for WBC 22, Cr 1.66, lactate 2.4->1.6. UA grossly positive for UTI. CXR shows mildly increased interstitial markings compared to prior. CT head negative for acute pathology. S/p keppra, ceftriaxone, baclofen, and 1.5L IVF in the ED      REVIEW OF SYSTEMS      prior hospital charts reviewed [V]  primary team notes reviewed [V]  other consultant notes reviewed [V]    PAST MEDICAL & SURGICAL HISTORY:  Myocardial infarction  KAEL x1 MI , stent RCA    Back pain  Chronic back pain    Spinal stenosis    Lumbar herniated disc    Hypertension    Narcotic dependence  15 for withdrawal ,pt currently on Dialudid 8 mg every 4-6 hours /day    Osteoarthritis    GERD (gastroesophageal reflux disease)    Cerebral aneurysm rupture   clipped, no residual    Femur fracture, right  , surgical revision of right hip    Sacroiliitis, not elsewhere classified  Unspecified Inflammatory spondylopathy, Sacral and sacrococcygeal region    Brain aneurysm  s/p clipping, not compatible with MRI    Seizures    CAD (coronary artery disease)  s/p MI and RCA stenting    Chronic pain  Patient has an Intrathecal pump s/p removal in     Cerebral aneurysm  I997 with clips    S/P lumbar fusion  L1-S1:    History of right inguinal hernia repair  x2    Hx of appendectomy  1969    Hx of laminectomy  lumbar-    Cleft palate repair  multiple: age 1 mos.-12 yo    Stented coronary artery  KAEL x 1 to RCA    History of hip replacement  8/15, revision  after falling and fracturing right femur    S/P left knee arthroscopy    H/O arthroscopy of shoulder  right X 2, left X 1    History of throat surgery  surgical excision cyst     Fusion of sacral region of spine  2017    History of back surgery  x5    S/P inguinal hernia repair    H/O fracture of femur  s/p repair    SOCIAL HISTORY:  - Denied smoking/vaping/alcohol/recreational drug use    FAMILY HISTORY:  No pertinent family history in first degree relatives    Allergies  No Known Allergies    ANTIMICROBIALS:  cefTRIAXone   IVPB 1000 every 24 hours    ANTIMICROBIALS (past 90 days):  MEDICATIONS  (STANDING):  cefTRIAXone   IVPB   100 mL/Hr IV Intermittent (23 @ 14:00)    OTHER MEDS:   MEDICATIONS  (STANDING):  acetaminophen     Tablet .. 650 every 6 hours PRN  aspirin enteric coated 81 daily  baclofen 20 every 6 hours PRN  gabapentin 200 at bedtime  heparin   Injectable 5000 every 8 hours  levETIRAcetam 750 two times a day  melatonin 3 at bedtime PRN  metoprolol tartrate 25 at bedtime  ondansetron Injectable 4 every 8 hours PRN  polyethylene glycol 3350 17 daily  pregabalin 75 daily  rosuvastatin 10 at bedtime  senna 2 at bedtime  tamsulosin 0.4 daily    VITALS:  Vital Signs Last 24 Hrs  T(F): 97.7 (23 @ 11:05), Max: 102.2 (23 @ 13:30)    Vital Signs Last 24 Hrs  HR: 90 (23 @ 11:05) (84 - 119)  BP: 148/75 (23 @ 11:05) (108/62 - 148/75)  RR: 20 (23 @ 11:05)  SpO2: 97% (23 @ 11:05) (91% - 97%)  Wt(kg): --    EXAM:      Labs:                        15.2   17.11 )-----------( 202      ( 2023 07:17 )             46.0     06-07    142  |  104  |  22  ----------------------------<  112<H>  4.5   |  18<L>  |  1.87<H>    Ca    9.3      2023 07:15  Phos  2.7       Mg     1.8         TPro  7.2  /  Alb  4.0  /  TBili  1.0  /  DBili  x   /  AST  13  /  ALT  21  /  AlkPhos  109      WBC Trend:  WBC Count: 17.11 (23 @ 07:17)  WBC Count: 22.43 (23 @ 14:10)    Auto Neutrophil #: 18.73 K/uL (23 @ 14:10)  Band Neutrophils %: 0.7 % (23 @ 14:10)  Auto Neutrophil #: 6.83 K/uL (23 @ 06:53)  Auto Neutrophil #: 7.15 K/uL (23 @ 08:14)  Auto Neutrophil #: 3.78 K/uL (22 @ 05:07)    Creatine Trend:  Creatinine, Serum: 1.87 ()  Creatinine, Serum: 1.66 ()    Liver Biochemical Testing Trend:  Alanine Aminotransferase (ALT/SGPT): 21 ()  Alanine Aminotransferase (ALT/SGPT): 17 ()  Alanine Aminotransferase (ALT/SGPT): 21 ()  Alanine Aminotransferase (ALT/SGPT): 21 ()  Alanine Aminotransferase (ALT/SGPT): 13 ()  Aspartate Aminotransferase (AST/SGOT): 13 (23 @ 14:10)  Aspartate Aminotransferase (AST/SGOT): 16 (23 @ 06:53)  Aspartate Aminotransferase (AST/SGOT): 18 (23 @ 08:14)  Aspartate Aminotransferase (AST/SGOT): 19 (22 @ 06:53)  Aspartate Aminotransferase (AST/SGOT): 14 (22 @ 05:31)  Bilirubin Total, Serum: 1.0 ()  Bilirubin Total, Serum: 0.4 ()  Bilirubin Total, Serum: 0.5 ()  Bilirubin Total, Serum: 0.4 ()  Bilirubin Total, Serum: 0.8 ()    Auto Eosinophil %: 0.0 % (23 @ 14:10)    Urinalysis Basic - ( 2023 14:11 )    Color: Yellow / Appearance: Slightly Turbid / S.026 / pH: x  Gluc: x / Ketone: Negative  / Bili: Negative / Urobili: Negative   Blood: x / Protein: 100 mg/dL / Nitrite: Negative   Leuk Esterase: Large / RBC: 98 /hpf /  /HPF   Sq Epi: x / Non Sq Epi: x / Bacteria: Many    MICROBIOLOGY:    Culture - Urine (collected 2023 08:08)  Source: Clean Catch Clean Catch (Midstream)  Final Report:    >100,000 CFU/ml Proteus mirabilis  Organism: Proteus mirabilis  Organism: Proteus mirabilis    Sensitivities:      Method Type: ROD      -  Amikacin: S <=16      -  Amoxicillin/Clavulanic Acid: S <=8/4      -  Ampicillin: S <=8 These ampicillin results predict results for amoxicillin      -  Ampicillin/Sulbactam: S <=4/2 Enterobacter, Klebsiella aerogenes, Citrobacter, and Serratia may develop resistance during prolonged therapy (3-4 days)      -  Aztreonam: S <=4      -  Cefazolin: S 4 For uncomplicated UTI with K. pneumoniae, E. coli, or P. mirablis: ROD <=16 is sensitive and ROD >=32 is resistant. This also predicts results for oral agents cefaclor, cefdinir, cefpodoxime, cefprozil, cefuroxime axetil, cephalexin and locarbef for uncomplicated UTI. Note that some isolates may be susceptible to these agents while testing resistant to cefazolin.      -  Cefepime: S <=2      -  Cefoxitin: S <=8      -  Ceftriaxone: S <=1 Enterobacter, Klebsiella aerogenes, Citrobacter, and Serratia may develop resistance during prolonged therapy      -  Cefuroxime: S <=4      -  Ciprofloxacin: R >2      -  Ertapenem: S <=0.5      -  Gentamicin: S <=2      -  Levofloxacin: R >4      -  Meropenem: S <=1      -  Nitrofurantoin: R >64 Should not be used to treat pyelonephritis      -  Piperacillin/Tazobactam: S <=8      -  Tobramycin: S <=2      -  Trimethoprim/Sulfamethoxazole: S 2/    Culture - Blood (collected 2023 07:50)  Source: .Blood Blood-Peripheral  Final Report:    No Growth Final    Culture - Blood (collected 2023 07:35)  Source: .Blood Blood-Peripheral  Final Report:    No Growth Final    Culture - Blood (collected 22 Sep 2022 23:45)  Source: .Blood Blood-Peripheral  Final Report:    No Growth Final    Culture - Blood (collected 22 Sep 2022 21:30)  Source: .Blood Blood-Peripheral  Final Report:    No Growth Final    Culture - Urine (collected 22 Sep 2022 20:25)  Source: Clean Catch Clean Catch (Midstream)  Final Report:    >100,000 CFU/ml Escherichia coli  Organism: Escherichia coli  Organism: Escherichia coli    Sensitivities:      Method Type: ROD      -  Amikacin: S <=16      -  Amoxicillin/Clavulanic Acid: R >16/8      -  Ampicillin: R >16 These ampicillin results predict results for amoxicillin      -  Ampicillin/Sulbactam: R >16/8 Enterobacter, Klebsiella aerogenes, Citrobacter, and Serratia may develop resistance during prolonged therapy (3-4 days)      -  Aztreonam: S <=4      -  Cefazolin: S 8 For uncomplicated UTI with K. pneumoniae, E. coli, or P. mirablis: ROD <=16 is sensitive and ROD >=32 is resistant. This also predicts results for oral agents cefaclor, cefdinir, cefpodoxime, cefprozil, cefuroxime axetil, cephalexin and locarbef for uncomplicated UTI. Note that some isolates may be susceptible to these agents while testing resistant to cefazolin.      -  Cefepime: S <=2      -  Cefoxitin: S <=8      -  Ceftriaxone: S <=1 Enterobacter, Klebsiella aerogenes, Citrobacter, and Serratia may develop resistance during prolonged therapy      -  Ciprofloxacin: S <=0.25      -  Ertapenem: S <=0.5      -  Gentamicin: S <=2      -  Imipenem: S <=1      -  Levofloxacin: S <=0.5      -  Meropenem: S <=1      -  Nitrofurantoin: S <=32 Should not be used to treat pyelonephritis      -  Piperacillin/Tazobactam: S <=8      -  Tigecycline: S <=2      -  Tobramycin: S <=2      -  Trimethoprim/Sulfamethoxazole: S <=0.5/9.5    Culture - Blood (collected 10 May 2022 00:15)  Source: .Blood Blood-Peripheral  Final Report:    No Growth Final    Culture - Blood (collected 10 May 2022 00:00)  Source: .Blood Blood-Peripheral  Final Report:    No Growth Final    Culture - Sputum (collected 07 May 2022 21:38)  Source: ET Tube ET Tube  Final Report:    Numerous Staphylococcus aureus    Normal Respiratory Lauren absent  Organism: Staphylococcus aureus  Organism: Staphylococcus aureus    Sensitivities:      Method Type: ROD      -  Ampicillin/Sulbactam: S <=8/4      -  Cefazolin: S <=4      -  Clindamycin: S <=0.25      -  Erythromycin: S <=0.25      -  Gentamicin: S <=1 Should not be used as monotherapy      -  Oxacillin: S <=0.25      -  Rifampin: S <=1 Should not be used as monotherapy      -  Tetra/Doxy: S <=1      -  Trimethoprim/Sulfamethoxazole: S <=0.5/9.5      -  Vancomycin: S 2    Culture - Blood (collected 07 May 2022 20:00)  Source: .Blood Blood-Venous  Final Report:    No Growth Final    Rapid RVP Result: NotDetec ( @ 14:08)    Lactate, Blood: 1.6 ( @ 19:50)  Blood Gas Venous - Lactate: 2.4 ( @ 13:34)    A1C with Estimated Average Glucose Result: 5.9 % (23 @ 06:53)    RADIOLOGY:  imaging below personally reviewed    < from: Xray Chest 1 View- PORTABLE-Urgent (23 @ 19:50) >  IMPRESSION:  Increased interstitial lung markings compared to prior x-ray.    --- End of Report ---    < end of copied text > Patient is a 66 yo Male who presents with a chief complaint of UTI     HPI:  66 yo M with a PMH of HTN, HLD, CAD s/p stent (), seizure disorder on Keppra, chronic back pain s/p multiple spinal surgeries and lumbar spinal stimulator (s/p removal), b/l nephrolithiasis, and recurrent UTIs (recent admission in 2023 for Proteus UTI), presenting to the ED with acute metabolic encephalopathy.     Recent admission in 2023 for UTI, urine cx grew Proteus R to FQ and R to Nitrofurantoin (otherwise pan sensitive), treated with course of Ceftriaxone -> Cefpodoxime.     Planned appointment with Urologist at the end of July but now presenting to the ED with dysuria, chills, and encephalopathy.     T max 102, leukocytosis 22 -> 17. Cr 1.6 -> 1.8, baseline at discharge in April was 1. UA with pyuria, bacteriuria. Started on Ceftriaxone 1g IV QD . Blood and urine cx pending in lab. CT head was negative for acute findings. ID consulted for recommendations.      REVIEW OF SYSTEMS  Limited  + Chills   No SOB, no CP  + R sided flank pain    prior hospital charts reviewed [V]  primary team notes reviewed [V]  other consultant notes reviewed [V]    PAST MEDICAL & SURGICAL HISTORY:  Myocardial infarction  KAEL x1 MI , stent RCA    Back pain  Chronic back pain    Spinal stenosis    Lumbar herniated disc    Hypertension    Narcotic dependence  15 for withdrawal ,pt currently on Dialudid 8 mg every 4-6 hours /day    Osteoarthritis    GERD (gastroesophageal reflux disease)    Cerebral aneurysm rupture   clipped, no residual    Femur fracture, right  , surgical revision of right hip    Sacroiliitis, not elsewhere classified  Unspecified Inflammatory spondylopathy, Sacral and sacrococcygeal region    Brain aneurysm  s/p clipping, not compatible with MRI    Seizures    CAD (coronary artery disease)  s/p MI and RCA stenting    Chronic pain  Patient has an Intrathecal pump s/p removal in     Cerebral aneurysm  I997 with clips    S/P lumbar fusion  L1-S1:    History of right inguinal hernia repair  x2    Hx of appendectomy  1969    Hx of laminectomy  lumbar-    Cleft palate repair  multiple: age 2 mos.-14 yo    Stented coronary artery  KAEL x 1 to RCA    History of hip replacement  8/15, revision  after falling and fracturing right femur    S/P left knee arthroscopy    H/O arthroscopy of shoulder  right X 2, left X 1    History of throat surgery  surgical excision cyst 1986    Fusion of sacral region of spine  2017    History of back surgery  x5    S/P inguinal hernia repair    H/O fracture of femur  s/p repair    SOCIAL HISTORY:  - Denied smoking/vaping/alcohol/recreational drug use    FAMILY HISTORY:  No pertinent family history in first degree relatives    Allergies  No Known Allergies    ANTIMICROBIALS:  cefTRIAXone   IVPB 1000 every 24 hours    ANTIMICROBIALS (past 90 days):  MEDICATIONS  (STANDING):  cefTRIAXone   IVPB   100 mL/Hr IV Intermittent (23 @ 14:00)    OTHER MEDS:   MEDICATIONS  (STANDING):  acetaminophen     Tablet .. 650 every 6 hours PRN  aspirin enteric coated 81 daily  baclofen 20 every 6 hours PRN  gabapentin 200 at bedtime  heparin   Injectable 5000 every 8 hours  levETIRAcetam 750 two times a day  melatonin 3 at bedtime PRN  metoprolol tartrate 25 at bedtime  ondansetron Injectable 4 every 8 hours PRN  polyethylene glycol 3350 17 daily  pregabalin 75 daily  rosuvastatin 10 at bedtime  senna 2 at bedtime  tamsulosin 0.4 daily    VITALS:  Vital Signs Last 24 Hrs  T(F): 97.7 (23 @ 11:05), Max: 102.2 (23 @ 13:30)    Vital Signs Last 24 Hrs  HR: 90 (23 @ 11:05) (84 - 119)  BP: 148/75 (23 @ 11:05) (108/62 - 148/75)  RR: 20 (23 @ 11:05)  SpO2: 97% (23 @ 11:05) (91% - 97%)  Wt(kg): --    EXAM:  General: Patient in NAD  HEENT: NCAT, EOMI  CV: S1+S2  Lungs: No respiratory distress, CTAB  Abd: Soft, nontender   : No suprapubic tenderness, + R sided CVA tenderness   Neuro: Calm currently    Ext: No cyanosis  Skin: No rash, no phlebitis     Labs:                        15.2   17.11 )-----------( 202      ( 2023 07:17 )             46.0     06-07    142  |  104  |  22  ----------------------------<  112<H>  4.5   |  18<L>  |  1.87<H>    Ca    9.3      2023 07:15  Phos  2.7       Mg     1.8         TPro  7.2  /  Alb  4.0  /  TBili  1.0  /  DBili  x   /  AST  13  /  ALT  21  /  AlkPhos  109      WBC Trend:  WBC Count: 17.11 (23 @ 07:17)  WBC Count: 22.43 (23 @ 14:10)    Auto Neutrophil #: 18.73 K/uL (23 @ 14:10)  Band Neutrophils %: 0.7 % (23 @ 14:10)  Auto Neutrophil #: 6.83 K/uL (23 @ 06:53)  Auto Neutrophil #: 7.15 K/uL (23 @ 08:14)  Auto Neutrophil #: 3.78 K/uL (22 @ 05:07)    Creatine Trend:  Creatinine, Serum: 1.87 ()  Creatinine, Serum: 1.66 ()    Liver Biochemical Testing Trend:  Alanine Aminotransferase (ALT/SGPT): 21 ()  Alanine Aminotransferase (ALT/SGPT): 17 ()  Alanine Aminotransferase (ALT/SGPT): 21 ()  Alanine Aminotransferase (ALT/SGPT): 21 ()  Alanine Aminotransferase (ALT/SGPT): 13 ()  Aspartate Aminotransferase (AST/SGOT): 13 (23 @ 14:10)  Aspartate Aminotransferase (AST/SGOT): 16 (23 @ 06:53)  Aspartate Aminotransferase (AST/SGOT): 18 (23 @ 08:14)  Aspartate Aminotransferase (AST/SGOT): 19 (22 @ 06:53)  Aspartate Aminotransferase (AST/SGOT): 14 (22 @ 05:31)  Bilirubin Total, Serum: 1.0 ()  Bilirubin Total, Serum: 0.4 ()  Bilirubin Total, Serum: 0.5 ()  Bilirubin Total, Serum: 0.4 ()  Bilirubin Total, Serum: 0.8 ()    Auto Eosinophil %: 0.0 % (23 @ 14:10)    Urinalysis Basic - ( 2023 14:11 )    Color: Yellow / Appearance: Slightly Turbid / S.026 / pH: x  Gluc: x / Ketone: Negative  / Bili: Negative / Urobili: Negative   Blood: x / Protein: 100 mg/dL / Nitrite: Negative   Leuk Esterase: Large / RBC: 98 /hpf /  /HPF   Sq Epi: x / Non Sq Epi: x / Bacteria: Many    MICROBIOLOGY:    Culture - Urine (collected 2023 08:08)  Source: Clean Catch Clean Catch (Midstream)  Final Report:    >100,000 CFU/ml Proteus mirabilis  Organism: Proteus mirabilis  Organism: Proteus mirabilis    Sensitivities:      Method Type: ROD      -  Amikacin: S <=16      -  Amoxicillin/Clavulanic Acid: S <=8/4      -  Ampicillin: S <=8 These ampicillin results predict results for amoxicillin      -  Ampicillin/Sulbactam: S <=4/2 Enterobacter, Klebsiella aerogenes, Citrobacter, and Serratia may develop resistance during prolonged therapy (3-4 days)      -  Aztreonam: S <=4      -  Cefazolin: S 4 For uncomplicated UTI with K. pneumoniae, E. coli, or P. mirablis: ROD <=16 is sensitive and ROD >=32 is resistant. This also predicts results for oral agents cefaclor, cefdinir, cefpodoxime, cefprozil, cefuroxime axetil, cephalexin and locarbef for uncomplicated UTI. Note that some isolates may be susceptible to these agents while testing resistant to cefazolin.      -  Cefepime: S <=2      -  Cefoxitin: S <=8      -  Ceftriaxone: S <=1 Enterobacter, Klebsiella aerogenes, Citrobacter, and Serratia may develop resistance during prolonged therapy      -  Cefuroxime: S <=4      -  Ciprofloxacin: R >2      -  Ertapenem: S <=0.5      -  Gentamicin: S <=2      -  Levofloxacin: R >4      -  Meropenem: S <=1      -  Nitrofurantoin: R >64 Should not be used to treat pyelonephritis      -  Piperacillin/Tazobactam: S <=8      -  Tobramycin: S <=2      -  Trimethoprim/Sulfamethoxazole: S     Culture - Blood (collected 2023 07:50)  Source: .Blood Blood-Peripheral  Final Report:    No Growth Final    Culture - Blood (collected 2023 07:35)  Source: .Blood Blood-Peripheral  Final Report:    No Growth Final    Culture - Blood (collected 22 Sep 2022 23:45)  Source: .Blood Blood-Peripheral  Final Report:    No Growth Final    Culture - Blood (collected 22 Sep 2022 21:30)  Source: .Blood Blood-Peripheral  Final Report:    No Growth Final    Culture - Urine (collected 22 Sep 2022 20:25)  Source: Clean Catch Clean Catch (Midstream)  Final Report:    >100,000 CFU/ml Escherichia coli  Organism: Escherichia coli  Organism: Escherichia coli    Sensitivities:      Method Type: ROD      -  Amikacin: S <=16      -  Amoxicillin/Clavulanic Acid: R >16/8      -  Ampicillin: R >16 These ampicillin results predict results for amoxicillin      -  Ampicillin/Sulbactam: R >16/8 Enterobacter, Klebsiella aerogenes, Citrobacter, and Serratia may develop resistance during prolonged therapy (3-4 days)      -  Aztreonam: S <=4      -  Cefazolin: S 8 For uncomplicated UTI with K. pneumoniae, E. coli, or P. mirablis: ROD <=16 is sensitive and ROD >=32 is resistant. This also predicts results for oral agents cefaclor, cefdinir, cefpodoxime, cefprozil, cefuroxime axetil, cephalexin and locarbef for uncomplicated UTI. Note that some isolates may be susceptible to these agents while testing resistant to cefazolin.      -  Cefepime: S <=2      -  Cefoxitin: S <=8      -  Ceftriaxone: S <=1 Enterobacter, Klebsiella aerogenes, Citrobacter, and Serratia may develop resistance during prolonged therapy      -  Ciprofloxacin: S <=0.25      -  Ertapenem: S <=0.5      -  Gentamicin: S <=2      -  Imipenem: S <=1      -  Levofloxacin: S <=0.5      -  Meropenem: S <=1      -  Nitrofurantoin: S <=32 Should not be used to treat pyelonephritis      -  Piperacillin/Tazobactam: S <=8      -  Tigecycline: S <=2      -  Tobramycin: S <=2      -  Trimethoprim/Sulfamethoxazole: S <=0.5/9.5    Culture - Blood (collected 10 May 2022 00:15)  Source: .Blood Blood-Peripheral  Final Report:    No Growth Final    Culture - Blood (collected 10 May 2022 00:00)  Source: .Blood Blood-Peripheral  Final Report:    No Growth Final    Culture - Sputum (collected 07 May 2022 21:38)  Source: ET Tube ET Tube  Final Report:    Numerous Staphylococcus aureus    Normal Respiratory Lauren absent  Organism: Staphylococcus aureus  Organism: Staphylococcus aureus    Sensitivities:      Method Type: ROD      -  Ampicillin/Sulbactam: S <=8/4      -  Cefazolin: S <=4      -  Clindamycin: S <=0.25      -  Erythromycin: S <=0.25      -  Gentamicin: S <=1 Should not be used as monotherapy      -  Oxacillin: S <=0.25      -  Rifampin: S <=1 Should not be used as monotherapy      -  Tetra/Doxy: S <=1      -  Trimethoprim/Sulfamethoxazole: S <=0.5/9.5      -  Vancomycin: S 2    Culture - Blood (collected 07 May 2022 20:00)  Source: .Blood Blood-Venous  Final Report:    No Growth Final    Rapid RVP Result: NotDetec ( @ 14:08)    Lactate, Blood: 1.6 ( @ 19:50)  Blood Gas Venous - Lactate: 2.4 ( @ 13:34)    A1C with Estimated Average Glucose Result: 5.9 % (23 @ 06:53)    RADIOLOGY:  imaging below personally reviewed    < from: Xray Chest 1 View- PORTABLE-Urgent (23 @ 19:50) >  IMPRESSION:  Increased interstitial lung markings compared to prior x-ray.    --- End of Report ---    < end of copied text > Patient is a 66 yo Male who presents with a chief complaint of UTI     HPI:  66 yo M with a PMH of HTN, HLD, CAD s/p stent (), seizure disorder on Keppra, chronic back pain s/p multiple spinal surgeries and lumbar spinal stimulator (s/p removal), b/l nephrolithiasis, and recurrent UTIs (recent admission in 2023 for Proteus UTI), presenting to the ED with acute metabolic encephalopathy.     Recent admission in 2023 for UTI, urine cx grew Proteus R to FQ and R to Nitrofurantoin (otherwise pan sensitive), treated with course of Ceftriaxone -> Cefpodoxime.     Planned appointment with Urologist at the end of July but now presenting to the ED with dysuria, chills, and encephalopathy.     T max 102, leukocytosis 22 -> 17. Cr 1.6 -> 1.8, baseline at discharge in April was 1. UA with pyuria, bacteriuria. Started on Ceftriaxone 1g IV QD . Blood and urine cx pending in lab. CT head was negative for acute findings. ID consulted for recommendations.      REVIEW OF SYSTEMS  Limited  + Chills   No SOB, no CP  + R sided flank pain    prior hospital charts reviewed [V]  primary team notes reviewed [V]  other consultant notes reviewed [V]    PAST MEDICAL & SURGICAL HISTORY:  Myocardial infarction  KAEL x1 MI , stent RCA    Back pain  Chronic back pain    Spinal stenosis    Lumbar herniated disc    Hypertension    Narcotic dependence  15 for withdrawal ,pt currently on Dialudid 8 mg every 4-6 hours /day    Osteoarthritis    GERD (gastroesophageal reflux disease)    Cerebral aneurysm rupture   clipped, no residual    Femur fracture, right  , surgical revision of right hip    Sacroiliitis, not elsewhere classified  Unspecified Inflammatory spondylopathy, Sacral and sacrococcygeal region    Brain aneurysm  s/p clipping, not compatible with MRI    Seizures    CAD (coronary artery disease)  s/p MI and RCA stenting    Chronic pain  Patient has an Intrathecal pump s/p removal in     Cerebral aneurysm  I997 with clips    S/P lumbar fusion  L1-S1:    History of right inguinal hernia repair  x2    Hx of appendectomy  1969    Hx of laminectomy  lumbar-    Cleft palate repair  multiple: age 2 mos.-12 yo    Stented coronary artery  KAEL x 1 to RCA    History of hip replacement  8/15, revision  after falling and fracturing right femur    S/P left knee arthroscopy    H/O arthroscopy of shoulder  right X 2, left X 1    History of throat surgery  surgical excision cyst 1986    Fusion of sacral region of spine  2017    History of back surgery  x5    S/P inguinal hernia repair    H/O fracture of femur  s/p repair    SOCIAL HISTORY:  , lives with wife, former smoker  no alcohol/recreational drug use  no recent travel    FAMILY HISTORY:  No recent febrile illness in family members    Allergies  No Known Allergies    ANTIMICROBIALS:  cefTRIAXone   IVPB 1000 every 24 hours    ANTIMICROBIALS (past 90 days):  MEDICATIONS  (STANDING):  cefTRIAXone   IVPB   100 mL/Hr IV Intermittent (23 @ 14:00)    OTHER MEDS:   MEDICATIONS  (STANDING):  acetaminophen     Tablet .. 650 every 6 hours PRN  aspirin enteric coated 81 daily  baclofen 20 every 6 hours PRN  gabapentin 200 at bedtime  heparin   Injectable 5000 every 8 hours  levETIRAcetam 750 two times a day  melatonin 3 at bedtime PRN  metoprolol tartrate 25 at bedtime  ondansetron Injectable 4 every 8 hours PRN  polyethylene glycol 3350 17 daily  pregabalin 75 daily  rosuvastatin 10 at bedtime  senna 2 at bedtime  tamsulosin 0.4 daily    VITALS:  Vital Signs Last 24 Hrs  T(F): 97.7 (23 @ 11:05), Max: 102.2 (23 @ 13:30)    Vital Signs Last 24 Hrs  HR: 90 (23 @ 11:05) (84 - 119)  BP: 148/75 (23 @ 11:05) (108/62 - 148/75)  RR: 20 (23 @ 11:05)  SpO2: 97% (23 @ 11:05) (91% - 97%)  Wt(kg): --    EXAM:  General: NAD  Head: atraumatic, normocephalic  Eyes: anicteric  ENT: NCAT, EOMI  CV: S1+S2  Lungs: No respiratory distress, CTAB  Abd: Soft, nontender   : No suprapubic tenderness, + R sided CVA tenderness   Neuro: awake and answered questions   MSK: no joint swelling  Skin: No rash  vascular: no phlebitis   psych: normal affect  Labs:                        15.2   17.11 )-----------( 202      ( 2023 07:17 )             46.0     06-07    142  |  104  |  22  ----------------------------<  112<H>  4.5   |  18<L>  |  1.87<H>    Ca    9.3      2023 07:15  Phos  2.7       Mg     1.8         TPro  7.2  /  Alb  4.0  /  TBili  1.0  /  DBili  x   /  AST  13  /  ALT  21  /  AlkPhos  109      WBC Trend:  WBC Count: 17.11 (23 @ 07:17)  WBC Count: 22.43 (23 @ 14:10)    Auto Neutrophil #: 18.73 K/uL (23 @ 14:10)  Band Neutrophils %: 0.7 % (23 @ 14:10)  Auto Neutrophil #: 6.83 K/uL (23 @ 06:53)  Auto Neutrophil #: 7.15 K/uL (23 @ 08:14)  Auto Neutrophil #: 3.78 K/uL (22 @ 05:07)    Creatine Trend:  Creatinine, Serum: 1.87 ()  Creatinine, Serum: 1.66 ()    Liver Biochemical Testing Trend:  Alanine Aminotransferase (ALT/SGPT): 21 ()  Alanine Aminotransferase (ALT/SGPT): 17 ()  Alanine Aminotransferase (ALT/SGPT): 21 ()  Alanine Aminotransferase (ALT/SGPT): 21 ()  Alanine Aminotransferase (ALT/SGPT): 13 ()  Aspartate Aminotransferase (AST/SGOT): 13 (23 @ 14:10)  Aspartate Aminotransferase (AST/SGOT): 16 (23 @ 06:53)  Aspartate Aminotransferase (AST/SGOT): 18 (23 @ 08:14)  Aspartate Aminotransferase (AST/SGOT): 19 (22 @ 06:53)  Aspartate Aminotransferase (AST/SGOT): 14 (22 @ 05:31)  Bilirubin Total, Serum: 1.0 ()  Bilirubin Total, Serum: 0.4 ()  Bilirubin Total, Serum: 0.5 ()  Bilirubin Total, Serum: 0.4 (-)  Bilirubin Total, Serum: 0.8 (-)    Auto Eosinophil %: 0.0 % (23 @ 14:10)    Urinalysis Basic - ( 2023 14:11 )    Color: Yellow / Appearance: Slightly Turbid / S.026 / pH: x  Gluc: x / Ketone: Negative  / Bili: Negative / Urobili: Negative   Blood: x / Protein: 100 mg/dL / Nitrite: Negative   Leuk Esterase: Large / RBC: 98 /hpf /  /HPF   Sq Epi: x / Non Sq Epi: x / Bacteria: Many    MICROBIOLOGY:    Culture - Urine (collected 2023 08:08)  Source: Clean Catch Clean Catch (Midstream)  Final Report:    >100,000 CFU/ml Proteus mirabilis  Organism: Proteus mirabilis  Organism: Proteus mirabilis    Sensitivities:      Method Type: ROD      -  Amikacin: S <=16      -  Amoxicillin/Clavulanic Acid: S <=8/4      -  Ampicillin: S <=8 These ampicillin results predict results for amoxicillin      -  Ampicillin/Sulbactam: S <=4/2 Enterobacter, Klebsiella aerogenes, Citrobacter, and Serratia may develop resistance during prolonged therapy (3-4 days)      -  Aztreonam: S <=4      -  Cefazolin: S 4 For uncomplicated UTI with K. pneumoniae, E. coli, or P. mirablis: ROD <=16 is sensitive and ROD >=32 is resistant. This also predicts results for oral agents cefaclor, cefdinir, cefpodoxime, cefprozil, cefuroxime axetil, cephalexin and locarbef for uncomplicated UTI. Note that some isolates may be susceptible to these agents while testing resistant to cefazolin.      -  Cefepime: S <=2      -  Cefoxitin: S <=8      -  Ceftriaxone: S <=1 Enterobacter, Klebsiella aerogenes, Citrobacter, and Serratia may develop resistance during prolonged therapy      -  Cefuroxime: S <=4      -  Ciprofloxacin: R >2      -  Ertapenem: S <=0.5      -  Gentamicin: S <=2      -  Levofloxacin: R >4      -  Meropenem: S <=1      -  Nitrofurantoin: R >64 Should not be used to treat pyelonephritis      -  Piperacillin/Tazobactam: S <=8      -  Tobramycin: S <=2      -  Trimethoprim/Sulfamethoxazole: S     Culture - Blood (collected 2023 07:50)  Source: .Blood Blood-Peripheral  Final Report:    No Growth Final    Culture - Blood (collected 2023 07:35)  Source: .Blood Blood-Peripheral  Final Report:    No Growth Final    Culture - Blood (collected 22 Sep 2022 23:45)  Source: .Blood Blood-Peripheral  Final Report:    No Growth Final    Culture - Blood (collected 22 Sep 2022 21:30)  Source: .Blood Blood-Peripheral  Final Report:    No Growth Final    Culture - Urine (collected 22 Sep 2022 20:25)  Source: Clean Catch Clean Catch (Midstream)  Final Report:    >100,000 CFU/ml Escherichia coli  Organism: Escherichia coli  Organism: Escherichia coli    Sensitivities:      Method Type: ROD      -  Amikacin: S <=16      -  Amoxicillin/Clavulanic Acid: R >16/8      -  Ampicillin: R >16 These ampicillin results predict results for amoxicillin      -  Ampicillin/Sulbactam: R >16/8 Enterobacter, Klebsiella aerogenes, Citrobacter, and Serratia may develop resistance during prolonged therapy (3-4 days)      -  Aztreonam: S <=4      -  Cefazolin: S 8 For uncomplicated UTI with K. pneumoniae, E. coli, or P. mirablis: ROD <=16 is sensitive and ROD >=32 is resistant. This also predicts results for oral agents cefaclor, cefdinir, cefpodoxime, cefprozil, cefuroxime axetil, cephalexin and locarbef for uncomplicated UTI. Note that some isolates may be susceptible to these agents while testing resistant to cefazolin.      -  Cefepime: S <=2      -  Cefoxitin: S <=8      -  Ceftriaxone: S <=1 Enterobacter, Klebsiella aerogenes, Citrobacter, and Serratia may develop resistance during prolonged therapy      -  Ciprofloxacin: S <=0.25      -  Ertapenem: S <=0.5      -  Gentamicin: S <=2      -  Imipenem: S <=1      -  Levofloxacin: S <=0.5      -  Meropenem: S <=1      -  Nitrofurantoin: S <=32 Should not be used to treat pyelonephritis      -  Piperacillin/Tazobactam: S <=8      -  Tigecycline: S <=2      -  Tobramycin: S <=2      -  Trimethoprim/Sulfamethoxazole: S <=0.5/9.5    Culture - Blood (collected 10 May 2022 00:15)  Source: .Blood Blood-Peripheral  Final Report:    No Growth Final    Culture - Blood (collected 10 May 2022 00:00)  Source: .Blood Blood-Peripheral  Final Report:    No Growth Final    Culture - Sputum (collected 07 May 2022 21:38)  Source: ET Tube ET Tube  Final Report:    Numerous Staphylococcus aureus    Normal Respiratory Lauren absent  Organism: Staphylococcus aureus  Organism: Staphylococcus aureus    Sensitivities:      Method Type: ROD      -  Ampicillin/Sulbactam: S <=8/4      -  Cefazolin: S <=4      -  Clindamycin: S <=0.25      -  Erythromycin: S <=0.25      -  Gentamicin: S <=1 Should not be used as monotherapy      -  Oxacillin: S <=0.25      -  Rifampin: S <=1 Should not be used as monotherapy      -  Tetra/Doxy: S <=1      -  Trimethoprim/Sulfamethoxazole: S <=0.5/9.5      -  Vancomycin: S 2    Culture - Blood (collected 07 May 2022 20:00)  Source: .Blood Blood-Venous  Final Report:    No Growth Final    Rapid RVP Result: NotDetec ( @ 14:08)    Lactate, Blood: 1.6 ( @ 19:50)  Blood Gas Venous - Lactate: 2.4 ( @ 13:34)    A1C with Estimated Average Glucose Result: 5.9 % (23 @ 06:53)    RADIOLOGY:  imaging below personally reviewed    < from: Xray Chest 1 View- PORTABLE-Urgent (23 @ 19:50) >  IMPRESSION:  Increased interstitial lung markings compared to prior x-ray.    --- End of Report ---    < end of copied text >

## 2023-06-07 NOTE — PHYSICAL THERAPY INITIAL EVALUATION ADULT - ACTIVE RANGE OF MOTION EXAMINATION, REHAB EVAL
BUE/BLE AAROM WFL except BLE ankles in PF and limited DF (LLE>RLE)/bilateral  lower extremity Active ROM was WFL (within functional limits)

## 2023-06-07 NOTE — CONSULT NOTE ADULT - ASSESSMENT
Incomplete Note    Pt to be seen today  68 yo M with a PMH of HTN, HLD, CAD s/p stent (2015), seizure disorder on Keppra, chronic back pain s/p multiple spinal surgeries and lumbar spinal stimulator (s/p removal), b/l nephrolithiasis, and recurrent UTIs (recent admission in April 2023 for Proteus UTI), presenting to the ED with acute metabolic encephalopathy.     Recent admission in April 2023 for UTI  Urine cx grew Proteus R to FQ and R to Nitrofurantoin (otherwise pan sensitive)  Treated with course of Ceftriaxone -> Cefpodoxime  Planned appointment with Urologist at the end of July but now presenting to the ED    Dysuria, chills, flank pain  Fever to 102, Leukocytosis of 22, UA with pyuria and bacteriuria, TONY (Cr 1.6 -> 1.8)   Ceftriaxone 1g IV QD 6/6 -   Blood and urine cx pending   CT head was negative for acute findings    Overall;  Dysuria, fevers, encephalopathy, CVA tenderness   Leukocytosis, UTI, pyelonephritis, sepsis   R/o hydronephrosis given constellation of symptoms/findings above     Suggest;  Continue Rocephin 1g IV QD  Low threshold to escalate antibiotics if pt decompensates clinically  Repeat 2 sets of blood cx given fevers again today   Check CT A/P   Urology evaluation    Coco Dolan MD, PGY-4   ID Fellow  [employer] Teams Preferred  After 5pm/weekends call [phone]

## 2023-06-07 NOTE — CONSULT NOTE ADULT - SUBJECTIVE AND OBJECTIVE BOX
[facility]   MD BERNABE BURTON MD BRIANA PETITO, NP    TEL:  FROM 9 AM to 5 PM ---OFFICE: [phone]  FROM 5 PM - 9 AM PLEASE CALL ANSWERING SERVICE: [phone]     RENAL INITIAL CONSULT NOTE: DATE OF SERVICE 23 @ 16:42    HPI: Patient is a 67y Male with PMH of HTN, HLD, CAD s/p stent (), seizure disorder on Keppra, chronic back pain s/p multiple spinal surgeries, kidney stones, and recurrent UTIs, presenting to the ED w/ AMS x1 day. Patient was admitted in 2023 w/ proteus UTI, also finished a course of abx outpatient 2 weeks ago for another UTI. Patient's wife states that yesterday he started to become altered, which is usually what happens when he gets a UTI. His arms started shaking, he was confused, and he was having trouble keeping his eyes open. He also had a fever of 103 this morning. Patient does report dysuria. He denies chest pain, shortness of breath, abdominal pain, nausea, vomiting, changes in bowel habits. His last BM was 2 days ago. He follows with urology Dr. Wright from [facility] for his kidney stones. Nephrology consulted for TONY.       Allergies:  No Known Allergies      PAST MEDICAL & SURGICAL HISTORY:  Myocardial infarction  KAEL x1 MI , stent RCA      Back pain  Chronic back pain      Spinal stenosis      Lumbar herniated disc      Hypertension      Narcotic dependence  15 for withdrawal ,pt currently on Dialudid 8 mg every 4-6 hours /day      Osteoarthritis      GERD (gastroesophageal reflux disease)      Cerebral aneurysm rupture   clipped, no residual      Femur fracture, right  , surgical revision of right hip      Sacroiliitis, not elsewhere classified  Unspecified Inflammatory spondylopathy,Sacral and sacrococcygeal region      Brain aneurysm  s/p clipping, not compatible with MRI      Seizures      CAD (coronary artery disease)  s/p MI and RCA stenting      Chronic pain  Patient has an Intrathecal pump s/p removal in       Cerebral aneurysm  [id number] with clips      S/P lumbar fusion  L1-S1:      History of right inguinal hernia repair  x2      Hx of appendectomy  1969      Hx of laminectomy  lumbar-      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 mos.-14 yo      Stented coronary artery  KAEL x 1 to RCA      History of hip replacement  8/15, revision  after falling and fracturing right femur      S/P left knee arthroscopy      H/O arthroscopy of shoulder  right X 2, left X 1      History of throat surgery  surgical exicision cyst 1986      Fusion of sacral region of spine  2017      History of back surgery  x5      S/P inguinal hernia repair      H/O fracture of femur  s/p repair          Home Medications Reviewed    Hospital Medications:   MEDICATIONS  (STANDING):  aspirin enteric coated 81 milliGRAM(s) Oral daily  cefTRIAXone   IVPB 1000 milliGRAM(s) IV Intermittent every 24 hours  gabapentin 200 milliGRAM(s) Oral at bedtime  heparin   Injectable 5000 Unit(s) SubCutaneous every 8 hours  levETIRAcetam 750 milliGRAM(s) Oral two times a day  metoprolol tartrate 25 milliGRAM(s) Oral at bedtime  multivitamin 1 Tablet(s) Oral daily  polyethylene glycol 3350 17 Gram(s) Oral daily  pregabalin 75 milliGRAM(s) Oral daily  rosuvastatin 10 milliGRAM(s) Oral at bedtime  senna 2 Tablet(s) Oral at bedtime  sodium chloride 0.9%. 1000 milliLiter(s) (60 mL/Hr) IV Continuous <Continuous>  tamsulosin 0.4 milliGRAM(s) Oral daily      SOCIAL HISTORY:  Denies ETOh, Smoking,     FAMILY HISTORY:  No pertinent family history in first degree relatives        REVIEW OF SYSTEMS:  CONSTITUTIONAL: SEE HPI   EYES/ENT: No visual changes;  No vertigo or throat pain   NECK: No pain or stiffness  RESPIRATORY: No cough, wheezing, hemoptysis; No shortness of breath  CARDIOVASCULAR: No chest pain or palpitations.  GASTROINTESTINAL: No abdominal or epigastric pain. No nausea, vomiting, or hematemesis; No diarrhea or constipation. No melena or hematochezia.  GENITOURINARY: SEE HPI   NEUROLOGICAL: SEE HPI  SKIN: No itching, burning, rashes, or lesions   VASCULAR: No bilateral lower extremity edema.   All other review of systems is negative unless indicated above.    VITALS:  T(F): 99.2 (23 @ 15:35), Max: 102.2 (23 @ 05:19)  HR: 96 (23 @ 15:35)  BP: 134/64 (23 @ 15:35)  RR: 19 (23 @ 15:35)  SpO2: 96% (23 @ 15:35)  Wt(kg): --        PHYSICAL EXAM:  Constitutional: NAD  HEENT: anicteric sclera, oropharynx clear, MMM  Neck: No JVD  Respiratory: CTAB, no wheezes, rales or rhonchi  Cardiovascular: S1, S2, RRR  Gastrointestinal: BS+, obese, soft, NT/ND  Extremities: No cyanosis or clubbing. No peripheral edema  Neurological: A/O x 3, no focal deficits  Psychiatric: Normal mood, normal affect  : No CVA tenderness. No serrano.   Skin: No rashes  Vascular Access: No vascular access.     LABS:      142  |  104  |  22  ----------------------------<  112<H>  4.5   |  18<L>  |  1.87<H>    Ca    9.3      2023 07:15  Phos  2.7       Mg     1.8     -    TPro  7.2  /  Alb  4.0  /  TBili  1.0  /  DBili      /  AST  13  /  ALT  21  /  AlkPhos  109  -    Creatinine Trend: 1.87 <--, 1.66 <--                        15.2   17.11 )-----------( 202      ( 2023 07:17 )             46.0     Urine Studies:  Urinalysis Basic - ( 2023 14:11 )    Color: Yellow / Appearance: Slightly Turbid / S.026 / pH:   Gluc:  / Ketone: Negative  / Bili: Negative / Urobili: Negative   Blood:  / Protein: 100 mg/dL / Nitrite: Negative   Leuk Esterase: Large / RBC: 98 /hpf /  /HPF   Sq Epi:  / Non Sq Epi:  / Bacteria: Many          RADIOLOGY & ADDITIONAL STUDIES:

## 2023-06-07 NOTE — PHYSICAL THERAPY INITIAL EVALUATION ADULT - GENERAL OBSERVATIONS, REHAB EVAL
Pt rec'd semisupine in bed, in NAD, +IV, +NC, wife at bedside. Agreeable to PT. RN cleared pt to be seen.

## 2023-06-07 NOTE — PROVIDER CONTACT NOTE (OTHER) - ACTION/TREATMENT ORDERED:
Provider made aware of patient's O2 and states to apply nasal cannula to patient. no further interventions at this time. will reassess oxygen with nasal cannula. patient care continues. Provider made aware of patient's O2 and states to apply nasal cannula and will order duoneb. no further interventions at this time. will reassess oxygen with nasal cannula. patient care continues.

## 2023-06-08 LAB
ANION GAP SERPL CALC-SCNC: 13 MMOL/L — SIGNIFICANT CHANGE UP (ref 5–17)
BUN SERPL-MCNC: 18 MG/DL — SIGNIFICANT CHANGE UP (ref 7–23)
CALCIUM SERPL-MCNC: 8.7 MG/DL — SIGNIFICANT CHANGE UP (ref 8.4–10.5)
CHLORIDE SERPL-SCNC: 105 MMOL/L — SIGNIFICANT CHANGE UP (ref 96–108)
CO2 SERPL-SCNC: 20 MMOL/L — LOW (ref 22–31)
CREAT ?TM UR-MCNC: 104 MG/DL — SIGNIFICANT CHANGE UP
CREAT SERPL-MCNC: 1.65 MG/DL — HIGH (ref 0.5–1.3)
EGFR: 45 ML/MIN/1.73M2 — LOW
GLUCOSE SERPL-MCNC: 95 MG/DL — SIGNIFICANT CHANGE UP (ref 70–99)
GRAM STN FLD: SIGNIFICANT CHANGE UP
HCT VFR BLD CALC: 35.1 % — LOW (ref 39–50)
HGB BLD-MCNC: 12 G/DL — LOW (ref 13–17)
MCHC RBC-ENTMCNC: 31.4 PG — SIGNIFICANT CHANGE UP (ref 27–34)
MCHC RBC-ENTMCNC: 34.2 GM/DL — SIGNIFICANT CHANGE UP (ref 32–36)
MCV RBC AUTO: 91.9 FL — SIGNIFICANT CHANGE UP (ref 80–100)
NRBC # BLD: 0 /100 WBCS — SIGNIFICANT CHANGE UP (ref 0–0)
PLATELET # BLD AUTO: 148 K/UL — LOW (ref 150–400)
POTASSIUM SERPL-MCNC: 4.2 MMOL/L — SIGNIFICANT CHANGE UP (ref 3.5–5.3)
POTASSIUM SERPL-SCNC: 4.2 MMOL/L — SIGNIFICANT CHANGE UP (ref 3.5–5.3)
RBC # BLD: 3.82 M/UL — LOW (ref 4.2–5.8)
RBC # FLD: 15.8 % — HIGH (ref 10.3–14.5)
SODIUM SERPL-SCNC: 138 MMOL/L — SIGNIFICANT CHANGE UP (ref 135–145)
SODIUM UR-SCNC: 147 MMOL/L — SIGNIFICANT CHANGE UP
WBC # BLD: 13.07 K/UL — HIGH (ref 3.8–10.5)
WBC # FLD AUTO: 13.07 K/UL — HIGH (ref 3.8–10.5)

## 2023-06-08 PROCEDURE — 71250 CT THORAX DX C-: CPT | Mod: 26

## 2023-06-08 PROCEDURE — 74176 CT ABD & PELVIS W/O CONTRAST: CPT | Mod: 26

## 2023-06-08 PROCEDURE — 99232 SBSQ HOSP IP/OBS MODERATE 35: CPT

## 2023-06-08 RX ORDER — CEFTRIAXONE 500 MG/1
2000 INJECTION, POWDER, FOR SOLUTION INTRAMUSCULAR; INTRAVENOUS EVERY 24 HOURS
Refills: 0 | Status: DISCONTINUED | OUTPATIENT
Start: 2023-06-08 | End: 2023-06-13

## 2023-06-08 RX ADMIN — Medication 1 TABLET(S): at 13:38

## 2023-06-08 RX ADMIN — Medication 3 MILLIGRAM(S): at 00:16

## 2023-06-08 RX ADMIN — SENNA PLUS 2 TABLET(S): 8.6 TABLET ORAL at 22:14

## 2023-06-08 RX ADMIN — LEVETIRACETAM 750 MILLIGRAM(S): 250 TABLET, FILM COATED ORAL at 17:36

## 2023-06-08 RX ADMIN — CEFTRIAXONE 100 MILLIGRAM(S): 500 INJECTION, POWDER, FOR SOLUTION INTRAMUSCULAR; INTRAVENOUS at 13:48

## 2023-06-08 RX ADMIN — HEPARIN SODIUM 5000 UNIT(S): 5000 INJECTION INTRAVENOUS; SUBCUTANEOUS at 22:13

## 2023-06-08 RX ADMIN — HEPARIN SODIUM 5000 UNIT(S): 5000 INJECTION INTRAVENOUS; SUBCUTANEOUS at 05:18

## 2023-06-08 RX ADMIN — Medication 650 MILLIGRAM(S): at 19:30

## 2023-06-08 RX ADMIN — Medication 25 MILLIGRAM(S): at 22:17

## 2023-06-08 RX ADMIN — ROSUVASTATIN CALCIUM 10 MILLIGRAM(S): 5 TABLET ORAL at 22:13

## 2023-06-08 RX ADMIN — Medication 650 MILLIGRAM(S): at 18:56

## 2023-06-08 RX ADMIN — TAMSULOSIN HYDROCHLORIDE 0.4 MILLIGRAM(S): 0.4 CAPSULE ORAL at 13:37

## 2023-06-08 RX ADMIN — LEVETIRACETAM 750 MILLIGRAM(S): 250 TABLET, FILM COATED ORAL at 05:18

## 2023-06-08 RX ADMIN — Medication 75 MILLIGRAM(S): at 13:37

## 2023-06-08 RX ADMIN — Medication 81 MILLIGRAM(S): at 13:38

## 2023-06-08 RX ADMIN — Medication 20 MILLIGRAM(S): at 01:46

## 2023-06-08 RX ADMIN — GABAPENTIN 200 MILLIGRAM(S): 400 CAPSULE ORAL at 22:13

## 2023-06-08 RX ADMIN — Medication 20 MILLIGRAM(S): at 22:22

## 2023-06-08 RX ADMIN — Medication 650 MILLIGRAM(S): at 04:19

## 2023-06-08 RX ADMIN — Medication 650 MILLIGRAM(S): at 03:37

## 2023-06-08 RX ADMIN — Medication 20 MILLIGRAM(S): at 16:21

## 2023-06-08 RX ADMIN — SODIUM CHLORIDE 75 MILLILITER(S): 9 INJECTION, SOLUTION INTRAVENOUS at 10:52

## 2023-06-08 RX ADMIN — HEPARIN SODIUM 5000 UNIT(S): 5000 INJECTION INTRAVENOUS; SUBCUTANEOUS at 13:38

## 2023-06-08 RX ADMIN — POLYETHYLENE GLYCOL 3350 17 GRAM(S): 17 POWDER, FOR SOLUTION ORAL at 13:37

## 2023-06-08 NOTE — PROGRESS NOTE ADULT - SUBJECTIVE AND OBJECTIVE BOX
Name of Patient : MANUEL PETERSON  MRN: [id number]  Date of visit: 06-08-23 @ 15:35      Subjective: Patient seen and examined. No new events except as noted.   Patient seen earlier this AM.   Afebrile, bacteremic.   Denies chest pain, palpitations, chills, body aches or rigors.     REVIEW OF SYSTEMS:    CONSTITUTIONAL: Generalized weakness   EYES/ENT: No visual changes;  No vertigo or throat pain   NECK: No pain or stiffness  RESPIRATORY: No cough, wheezing, hemoptysis; No shortness of breath  CARDIOVASCULAR: No chest pain or palpitations  GASTROINTESTINAL: No abdominal or epigastric pain. No nausea, vomiting, or hematemesis; No diarrhea or constipation. No melena or hematochezia.  GENITOURINARY: No dysuria, frequency or hematuria  NEUROLOGICAL: No numbness or weakness  SKIN: No itching, burning, rashes, or lesions   All other review of systems is negative unless indicated above.    MEDICATIONS:  MEDICATIONS  (STANDING):  aspirin enteric coated 81 milliGRAM(s) Oral daily  cefTRIAXone   IVPB 2000 milliGRAM(s) IV Intermittent every 24 hours  gabapentin 200 milliGRAM(s) Oral at bedtime  heparin   Injectable 5000 Unit(s) SubCutaneous every 8 hours  levETIRAcetam 750 milliGRAM(s) Oral two times a day  metoprolol tartrate 25 milliGRAM(s) Oral at bedtime  multivitamin 1 Tablet(s) Oral daily  polyethylene glycol 3350 17 Gram(s) Oral daily  pregabalin 75 milliGRAM(s) Oral daily  rosuvastatin 10 milliGRAM(s) Oral at bedtime  senna 2 Tablet(s) Oral at bedtime  sodium chloride 0.45% 1000 milliLiter(s) (75 mL/Hr) IV Continuous <Continuous>  tamsulosin 0.4 milliGRAM(s) Oral daily      PHYSICAL EXAM:  T(C): 36.8 (06-08-23 @ 08:40), Max: 37.7 (06-07-23 @ 21:52)  HR: 74 (06-08-23 @ 08:40) (74 - 90)  BP: 128/68 (06-08-23 @ 08:40) (114/67 - 128/68)  RR: 17 (06-08-23 @ 08:40) (17 - 18)  SpO2: 97% (06-08-23 @ 08:40) (93% - 97%)  Wt(kg): --  I&O's Summary        Appearance: Normal	  HEENT:  Eyes are open   Lymphatic: No lymphadenopathy   Cardiovascular: Normal S1 S2, no JVD  Respiratory: normal effort , clear  Gastrointestinal:  Soft, Non-tender  Skin: No rashes,  warm to touch  Psychiatry:  Mood & affect appropriate  Musculoskeletal: No edema                              12.0   13.07 )-----------( 148      ( 08 Jun 2023 07:15 )             35.1               06-08    138  |  105  |  18  ----------------------------<  95  4.2   |  20<L>  |  1.65<H>    Ca    8.7      08 Jun 2023 07:10  Phos  2.7     06-07  Mg     1.8     06-07      Culture - Urine (06.06.23 @ 14:11)   Specimen Source: Clean Catch Clean Catch (Midstream)  Culture Results: >100,000 CFU/ml Gram Negative Rods      Culture - Blood (06.06.23 @ 13:39)   - Proteus species: Detec  Gram Stain: Growth in anaerobic bottle: Gram Negative Rods   Growth in aerobic bottle: Gram Negative Rods  Specimen Source: .Blood Blood-Peripheral  Organism: Blood Culture PCR  Culture Results: Growth in anaerobic bottle: Gram Negative Rods   Growth in aerobic bottle: Gram Negative Rods     Culture - Blood (06.06.23 @ 13:31)   Gram Stain: Growth in anaerobic bottle: Gram Negative Rods   Growth in aerobic bottle: Gram Negative Rods  Specimen Source: .Blood Blood-Peripheral  Culture Results:   Growth in anaerobic bottle: Gram Negative Rods   Growth in aerobic bottle: Gram Negative Rods        < from: US Renal (06.07.23 @ 16:38) >  IMPRESSION:    Limited evaluation of the lower pole of the left kidney.    No hydronephrosis.    Nonobstructing right renal calculus.    < end of copied text >      < from: CT Abdomen and Pelvis No Cont (06.08.23 @ 09:59) >  INTERPRETATION:  CLINICAL INFORMATION: Infectious workup for recurrent   fever, question pyelonephritis.    COMPARISON: CT chest, abdomen, and pelvis 4/23/2023    CONTRAST/COMPLICATIONS:  IV Contrast: NONE  Oral Contrast: NONE  Complications: None reported at time of study completion    PROCEDURE:  CT of the Chest, Abdomen and Pelvis was performed.  Sagittal and coronal reformats were performed.    FINDINGS:  Evaluation of the solid organs and vasculature is limited without   intravenous contrast.    CHEST:  LUNGS AND LARGE AIRWAYS: Patent central airways. Bibasilar subsegmental   atelectasis. Calcified granuloma in the left upper lobe.  PLEURA: Trace bilateral pleural effusions.  VESSELS: Atherosclerotic changes of the aorta and coronary arteries.  HEART: Heart size is normal. No pericardial effusion.  MEDIASTINUM AND CESAR: No lymphadenopathy.  CHEST WALL AND LOWER NECK: Within normal limits.    ABDOMEN AND PELVIS:  LIVER: Subcentimeter left hepatic lobe hypodensity too small to   characterize.  BILE DUCTS: Normal caliber.  GALLBLADDER: Within normal limits.  SPLEEN: Unchanged calcified granuloma.  PANCREAS: Atrophic.  ADRENALS: Within normal limits.  KIDNEYS/URETERS: Redemonstrated 1.1 cm calculus in UPJ with new mild   hydronephrosis and perinephric fat stranding. Additional punctate   nonobstructing left renal calculus. Nonobstructing calculi in the right   upper pole and pelvis measuring up to 1.6 cm.    BLADDER: Within normal limits.  REPRODUCTIVE ORGANS: Prostate within normal limits.    BOWEL: No bowel obstruction. Appendix is not visualized. No evidence of   inflammation in the pericecal region. Large amount of stool distends the   rectum.  PERITONEUM: No ascites.  VESSELS: Atherosclerotic changes.  RETROPERITONEUM/LYMPH NODES: No lymphadenopathy.  ABDOMINAL WALL: Retained pain management device lead extending to level   of T12-L1.  BONES: Degenerative changes. Status post spinal fusion extending from T4   to S1. Status post right sacroiliac arthrodesis. Status post right hip   replacement. Chronic right inferior ramus fracture. Sclerotic density in   the left iliac wing, which may be postsurgical in nature, unchanged.    IMPRESSION:  New mild left hydronephrosis and perinephric fat stranding secondary to   an obstructing 1.1 cm calculus in the UPJ. Correlate clinically for   superimposed urinary tract infection. Evaluation for pyelonephritis is   limited without intravenous contrast.    No pneumonia.    --- End of Report ---           LISA CHIN MD; Resident Radiologist  This document has been electronically signed.  KARYN LEGER MD; Attending Radiologist  This document has been electronically signed. Jun 8 2023 11:54AM    < end of copied text >

## 2023-06-08 NOTE — CONSULT NOTE ADULT - SUBJECTIVE AND OBJECTIVE BOX
Patient is a 67y old  Male who presents with a chief complaint of severe sepsis 2/2 UTI (08 Jun 2023 09:09)      HPI:  Patient is a 67y Male with PMH of HTN, HLD, CAD s/p stent (2015), seizure disorder on Keppra, chronic back pain s/p multiple spinal surgeries, kidney stones, and recurrent UTIs, presenting to the ED w/ AMS x1 day. Patient was admitted in 4/2023 w/ proteus UTI, also finished a course of abx outpatient 2 weeks ago for another UTI. Patient's wife states that yesterday he started to become altered, which is usually what happens when he gets a UTI. His arms started shaking, he was confused, and he was having trouble keeping his eyes open. He also had a fever of 103 this morning. Patient does report dysuria. He denies chest pain, shortness of breath, abdominal pain, nausea, vomiting, changes in bowel habits. His last BM was 2 days ago. He follows with urology Dr. Wright from [facility] for his kidney stones.    In the ED, Tmax 102, , /65, RR 23, SpO2 95% on RA. Labs significant for WBC 22, Cr 1.66, lactate 2.4->1.6. UA grossly positive for UTI. CXR shows mildly increased interstitial markings compared to prior. CT head negative for acute pathology. S/p keppra, ceftriaxone, baclofen, and 1.5L IVF in the ED (06 Jun 2023 22:09)      PAST MEDICAL & SURGICAL HISTORY:  Myocardial infarction  KAEL x1 MI 2005, stent RCA      Back pain  Chronic back pain      Spinal stenosis      Lumbar herniated disc      Hypertension      Narcotic dependence  5/28/16 for withdrawal ,pt currently on Dialudid 8 mg every 4-6 hours /day      Osteoarthritis      GERD (gastroesophageal reflux disease)      Cerebral aneurysm rupture  1997 clipped, no residual      Femur fracture, right  2/16, surgical revision of right hip      Sacroiliitis, not elsewhere classified  Unspecified Inflammatory spondylopathy,Sacral and sacrococcygeal region      Brain aneurysm  s/p clipping, not compatible with MRI      Seizures      CAD (coronary artery disease)  s/p MI and RCA stenting      Chronic pain  Patient has an Intrathecal pump s/p removal in 2016      Cerebral aneurysm  I997 with clips      S/P lumbar fusion  L1-S1:2002      History of right inguinal hernia repair  x2      Hx of appendectomy  6/1969      Hx of laminectomy  lumbar-2002      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      Stented coronary artery  KAEL x 1 to RCA      History of hip replacement  8/15, revision 2/16 after falling and fracturing right femur      S/P left knee arthroscopy      H/O arthroscopy of shoulder  right X 2, left X 1      History of throat surgery  surgical exicision cyst 1986      Fusion of sacral region of spine  5/2017      History of back surgery  x5      S/P inguinal hernia repair      H/O fracture of femur  s/p repair          MEDICATIONS  (STANDING):  aspirin enteric coated 81 milliGRAM(s) Oral daily  cefTRIAXone   IVPB 1000 milliGRAM(s) IV Intermittent every 24 hours  gabapentin 200 milliGRAM(s) Oral at bedtime  heparin   Injectable 5000 Unit(s) SubCutaneous every 8 hours  levETIRAcetam 750 milliGRAM(s) Oral two times a day  metoprolol tartrate 25 milliGRAM(s) Oral at bedtime  multivitamin 1 Tablet(s) Oral daily  polyethylene glycol 3350 17 Gram(s) Oral daily  pregabalin 75 milliGRAM(s) Oral daily  rosuvastatin 10 milliGRAM(s) Oral at bedtime  senna 2 Tablet(s) Oral at bedtime  sodium chloride 0.45% 1000 milliLiter(s) (75 mL/Hr) IV Continuous <Continuous>  tamsulosin 0.4 milliGRAM(s) Oral daily    MEDICATIONS  (PRN):  acetaminophen     Tablet .. 650 milliGRAM(s) Oral every 6 hours PRN Temp greater or equal to 38C (100.4F), Mild Pain (1 - 3)  baclofen 20 milliGRAM(s) Oral every 6 hours PRN Muscle Spasm  melatonin 3 milliGRAM(s) Oral at bedtime PRN Insomnia  ondansetron Injectable 4 milliGRAM(s) IV Push every 8 hours PRN Nausea and/or Vomiting      Allergies    No Known Allergies    Intolerances        VITALS:    Vital Signs Last 24 Hrs  T(C): 36.7 (08 Jun 2023 04:40), Max: 37.7 (07 Jun 2023 21:52)  T(F): 98 (08 Jun 2023 04:40), Max: 99.8 (07 Jun 2023 21:52)  HR: 78 (08 Jun 2023 04:40) (78 - 100)  BP: 114/67 (08 Jun 2023 04:40) (114/67 - 150/79)  BP(mean): --  RR: 18 (08 Jun 2023 04:40) (18 - 20)  SpO2: 93% (08 Jun 2023 04:40) (93% - 97%)    Parameters below as of 08 Jun 2023 04:40  Patient On (Oxygen Delivery Method): nasal cannula  O2 Flow (L/min): 2      LABS:                          12.0   13.07 )-----------( 148      ( 08 Jun 2023 07:15 )             35.1       06-08    138  |  105  |  18  ----------------------------<  95  4.2   |  20<L>  |  1.65<H>    Ca    8.7      08 Jun 2023 07:10  Phos  2.7     06-07  Mg     1.8     06-07    TPro  7.2  /  Alb  4.0  /  TBili  1.0  /  DBili  x   /  AST  13  /  ALT  21  /  AlkPhos  109  06-06      CAPILLARY BLOOD GLUCOSE          PT/INR - ( 06 Jun 2023 14:10 )   PT: 15.0 sec;   INR: 1.30 ratio         PTT - ( 06 Jun 2023 14:10 )  PTT:29.2 sec    LOWER EXTREMITY PHYSICAL EXAM:    Vascular: DP/PT 2/4, B/L, CFT <3 seconds B/L, Temperature gradient within normal, B/L.   Neuro: Epicritic sensation intact to the level of digits, B/L.  Musculoskeletal/Ortho: no gross digital deformities or contractures noted  Skin: no open lesions, no interdigital maceration, no erythema, no edema. Nails dystrophic/elongated x10 slightly incurvated. No acute signs of infection. irritation dorsal IPJ hallux bilateral no grossly open lesions    RADIOLOGY & ADDITIONAL STUDIES:

## 2023-06-08 NOTE — PROVIDER CONTACT NOTE (CRITICAL VALUE NOTIFICATION) - ACTION/TREATMENT ORDERED:
ACP Cely Trevino made aware of lab results and stated that will continue antibiotics. no further interventions at this time.
C/w IV abx per eMAR

## 2023-06-08 NOTE — PROVIDER CONTACT NOTE (CRITICAL VALUE NOTIFICATION) - TEST AND RESULT REPORTED:
Blood Cultures / Gram positive cocci in clusters
2 positive blood cultures with growth in both anaerobic bottles with gram (-) rods

## 2023-06-08 NOTE — CHART NOTE - NSCHARTNOTEFT_GEN_A_CORE
Notified by RN, prelim blood cultures anaerobic bottle from 6/8 positive with GNR, Proteus species.  Recent admission in April 2023 for UTI with Urine cx grew Proteus, treated with ceftriaxone.  Currently pt is alert and oriented, hemodynamically stable, afebrile, VSS. No complaints to offer.  -appreciate ID input  -f/u final Bcx result/sensitivities  -c/w Rocephin  -monitor VS closely  -trend wbc    Ed

## 2023-06-08 NOTE — CONSULT NOTE ADULT - SUBJECTIVE AND OBJECTIVE BOX
Neurology Consult    Reason for Consult: Patient is a 67y old  Male who presents with a chief complaint of severe sepsis 2/2 UTI (2023 08:03)      HPI:  Patient is a 67y Male with PMH of HTN, HLD, CAD s/p stent (), seizure disorder on Keppra, chronic back pain s/p multiple spinal surgeries, kidney stones, and recurrent UTIs, presenting to the ED w/ AMS x1 day. Patient was admitted in 2023 w/ proteus UTI, also finished a course of abx outpatient 2 weeks ago for another UTI. Patient's wife states that yesterday he started to become altered, which is usually what happens when he gets a UTI. His arms started shaking, he was confused, and he was having trouble keeping his eyes open. He also had a fever of 103 this morning. Patient does report dysuria. He denies chest pain, shortness of breath, abdominal pain, nausea, vomiting, changes in bowel habits. His last BM was 2 days ago. He follows with urology Dr. Wright from [facility] for his kidney stones.    In the ED, Tmax 102, , /65, RR 23, SpO2 95% on RA. Labs significant for WBC 22, Cr 1.66, lactate 2.4->1.6. UA grossly positive for UTI. CXR shows mildly increased interstitial markings compared to prior. CT head negative for acute pathology. S/p keppra, ceftriaxone, baclofen, and 1.5L IVF in the ED (2023 22:09)       PAST MEDICAL & SURGICAL HISTORY:  Myocardial infarction  KAEL x1 MI , stent RCA      Back pain  Chronic back pain      Spinal stenosis      Lumbar herniated disc      Hypertension      Narcotic dependence  16 for withdrawal ,pt currently on Dialudid 8 mg every 4-6 hours /day      Osteoarthritis      GERD (gastroesophageal reflux disease)      Cerebral aneurysm rupture   clipped, no residual      Femur fracture, right  , surgical revision of right hip      Sacroiliitis, not elsewhere classified  Unspecified Inflammatory spondylopathy,Sacral and sacrococcygeal region      Brain aneurysm  s/p clipping, not compatible with MRI      Seizures      CAD (coronary artery disease)  s/p MI and RCA stenting      Chronic pain  Patient has an Intrathecal pump s/p removal in       Cerebral aneurysm  I997 with clips      S/P lumbar fusion  L1-S1:      History of right inguinal hernia repair  x2      Hx of appendectomy  1969      Hx of laminectomy  lumbar-      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 mos.-14 yo      Stented coronary artery  KAEL x 1 to RCA      History of hip replacement  8/15, revision  after falling and fracturing right femur      S/P left knee arthroscopy      H/O arthroscopy of shoulder  right X 2, left X 1      History of throat surgery  surgical exicision cyst       Fusion of sacral region of spine  2017      History of back surgery  x5      S/P inguinal hernia repair      H/O fracture of femur  s/p repair          Allergies: Allergies    No Known Allergies    Intolerances        Social History: Denies toxic habits including tobacco, ETOH or illicit drugs.    Family History: FAMILY HISTORY:  No pertinent family history in first degree relatives    . No family history of strokes    Medications: MEDICATIONS  (STANDING):  aspirin enteric coated 81 milliGRAM(s) Oral daily  cefTRIAXone   IVPB 1000 milliGRAM(s) IV Intermittent every 24 hours  gabapentin 200 milliGRAM(s) Oral at bedtime  heparin   Injectable 5000 Unit(s) SubCutaneous every 8 hours  levETIRAcetam 750 milliGRAM(s) Oral two times a day  metoprolol tartrate 25 milliGRAM(s) Oral at bedtime  multivitamin 1 Tablet(s) Oral daily  polyethylene glycol 3350 17 Gram(s) Oral daily  pregabalin 75 milliGRAM(s) Oral daily  rosuvastatin 10 milliGRAM(s) Oral at bedtime  senna 2 Tablet(s) Oral at bedtime  sodium chloride 0.45% 1000 milliLiter(s) (75 mL/Hr) IV Continuous <Continuous>  tamsulosin 0.4 milliGRAM(s) Oral daily    MEDICATIONS  (PRN):  acetaminophen     Tablet .. 650 milliGRAM(s) Oral every 6 hours PRN Temp greater or equal to 38C (100.4F), Mild Pain (1 - 3)  baclofen 20 milliGRAM(s) Oral every 6 hours PRN Muscle Spasm  melatonin 3 milliGRAM(s) Oral at bedtime PRN Insomnia  ondansetron Injectable 4 milliGRAM(s) IV Push every 8 hours PRN Nausea and/or Vomiting      Review of Systems:  CONSTITUTIONAL:  No weight loss, fever, chills, weakness or fatigue.  HEENT:  Eyes:  No visual loss, blurred vision, double vision or yellow sclera. Ears, Nose, Throat:  No hearing loss, sneezing, congestion, runny nose or sore throat.  SKIN:  No rash or itching.  CARDIOVASCULAR:  No chest pain, chest pressure or chest discomfort. No palpitations or edema.  RESPIRATORY:  No shortness of breath, cough or sputum.  GASTROINTESTINAL:  No anorexia, nausea, vomiting or diarrhea. No abdominal pain or blood.  GENITOURINARY:  No burning on urination or incontinence   NEUROLOGICAL:  No headache, dizziness, syncope, paralysis, ataxia, numbness or tingling in the extremities. No change in bowel or bladder control. no limb weakness. no vision changes.   MUSCULOSKELETAL: back pian   HEMATOLOGIC:  No anemia, bleeding or bruising.  LYMPHATICS:  No enlarged nodes. No history of splenectomy.  PSYCHIATRIC:  No history of depression or anxiety.  ENDOCRINOLOGIC:  No reports of sweating, cold or heat intolerance. No polyuria or polydipsia.      Vitals:  Vital Signs Last 24 Hrs  T(C): 36.7 (2023 04:40), Max: 37.7 (2023 21:52)  T(F): 98 (2023 04:40), Max: 99.8 (2023 21:52)  HR: 78 (2023 04:40) (78 - 100)  BP: 114/67 (2023 04:40) (114/67 - 150/79)  BP(mean): --  RR: 18 (2023 04:40) (18 - 20)  SpO2: 93% (2023 04:40) (93% - 97%)    Parameters below as of 2023 04:40  Patient On (Oxygen Delivery Method): nasal cannula  O2 Flow (L/min): 2      General Exam:   General Appearance: Appropriately dressed and in no acute distress       Head: Normocephalic, atraumatic and no dysmorphic features  Ear, Nose, and Throat: Moist mucous membranes  CVS: S1S2+  Resp: No SOB, no wheeze or rhonchi  GI: soft NT/ND  Extremities: No edema or cyanosis  Skin: No bruises or rashes     Neurological Exam:  Mental Status: Awake, alert and oriented x 2.  Able to follow simple and complex verbal commands. Able to name and repeat. fluent speech. No obvious aphasia or dysarthria noted.   Cranial Nerves: PERRL, EOMI, VFFC, sensation V1-V3 intact,  no obvious facial asymmetry, equal elevation of palate, scm/trap 5/5, tongue is midline on protrusion. no obvious papilledema on fundoscopic exam. hearing is grossly intact.   Motor:SAAVEDRA uppers 4-5/5; lowers LLE 4+/5 RLE 4-/5   Sensation: Intact to light touch and pinprick throughout. no right/left confusion. no extinction to tactile on DSS.   Reflexes: 1+ throughout at biceps, brachioradialis, triceps, patellars and ankles bilaterally and equal. No clonus. R toe and L toe were both downgoing.  Coordination: No dysmetria on FNF   Gait: uses walker/wheelchair baseline     Data/Labs/Imaging which I personally reviewed.     Labs:     CBC Full  -  ( 2023 07:15 )  WBC Count : 13.07 K/uL  RBC Count : 3.82 M/uL  Hemoglobin : 12.0 g/dL  Hematocrit : 35.1 %  Platelet Count - Automated : 148 K/uL  Mean Cell Volume : 91.9 fl  Mean Cell Hemoglobin : 31.4 pg  Mean Cell Hemoglobin Concentration : 34.2 gm/dL  Auto Neutrophil # : x  Auto Lymphocyte # : x  Auto Monocyte # : x  Auto Eosinophil # : x  Auto Basophil # : x  Auto Neutrophil % : x  Auto Lymphocyte % : x  Auto Monocyte % : x  Auto Eosinophil % : x  Auto Basophil % : x    -    138  |  105  |  18  ----------------------------<  95  4.2   |  20<L>  |  1.65<H>    Ca    8.7      2023 07:10  Phos  2.7     06-  Mg     1.8     -    TPro  7.2  /  Alb  4.0  /  TBili  1.0  /  DBili  x   /  AST  13  /  ALT  21  /  AlkPhos  109  06-06    LIVER FUNCTIONS - ( 2023 14:10 )  Alb: 4.0 g/dL / Pro: 7.2 g/dL / ALK PHOS: 109 U/L / ALT: 21 U/L / AST: 13 U/L / GGT: x           PT/INR - ( 2023 14:10 )   PT: 15.0 sec;   INR: 1.30 ratio         PTT - ( 2023 14:10 )  PTT:29.2 sec  Urinalysis Basic - ( 2023 14:11 )    Color: Yellow / Appearance: Slightly Turbid / S.026 / pH: x  Gluc: x / Ketone: Negative  / Bili: Negative / Urobili: Negative   Blood: x / Protein: 100 mg/dL / Nitrite: Negative   Leuk Esterase: Large / RBC: 98 /hpf /  /HPF   Sq Epi: x / Non Sq Epi: x / Bacteria: Many        < from: CT Head No Cont (23 @ 18:26) >    ACC: [id number] EXAM:  CT BRAIN   ORDERED BY: MANUEL DERAS     PROCEDURE DATE:  2023          INTERPRETATION:  CLINICAL INFORMATION: Altered mental status.    COMPARISON: CT head of 2023.    PROCEDURE:  Noncontrast CT of the Head was performed from the skull base to the   vertex. Coronal and Sagittal reformats were obtained.    FINDINGS:    Status post right frontotemporal craniotomy with right suprasellar   aneurysm clip. Encephalomalacia and gliosis within the anterior medial   frontal lobe and to a lesser degree within the left anteromedial frontal   lobe. No acute intracranial hemorrhage, mass effect, midline shift,   extra-axial collection, or hydrocephalus. Mild patchy hypodensities   within the periventricular and subcortical white matter, although   nonspecific, likely reflect chronic microvascular disease. Cerebral   volume loss contributes to prominence of the ventricles and sulci.    The visualized paranasal sinuses and mastoid air cells are clear. Status   post bilateral ocular lens replacement.    IMPRESSION:    No acute intracranial hemorrhage or mass effect.    Stable examination since 2023.    --- End of Report ---            FABIO EDEN MD; Attending Radiologist  This document has been electronically signed. 2023  6:26PM    < end of copied text >

## 2023-06-08 NOTE — CONSULT NOTE ADULT - ASSESSMENT
67y Male with HTN, HLD, CAD s/p stent (2015), seizure disorder on Keppra, chronic back pain s/p multiple spinal surgeries, kidney stones, and recurrent UTIs, SS, h/o aneurysm rupture 1997 s/p clipping presenting to the ED w/ AMS   Labs significant for WBC 22, Cr 1.66, lactate 2.4->1.6.   UA grossly positive for UTI.   CXR shows mildly increased interstitial markings compared to prior.   CT head negative for acute pathology.s/p R FP crani with R suprasellar aneursym clip. gliosis in frontal lobes.     Impressin:   1) AMS likely toxic metabolic encephalopathy from infection/UTI. better  2) seiuzre d/o, stable on keppra  3) chronic back pain and SS s/p multiple surgeries   4) aneurysm s/p clipping 1997, stable     - c/w treatement of infection/UTI/pyelonephritis, now on CTX  - keppra 750mg BID for seiuzre ppx   - gabapentin 200mg PO QHS along with lyrica 75 daily   - c/w asa and statin therapy    - PT/OT   - ID and  recs apprecaited   - check FS, glucose control <180  - GI/DVT ppx  - Counseling on diet, exercise, and medication adherence was done  - Counseling on smoking cessation and alcohol consumption offered when appropriate.  - Pain assessed and judicious use of narcotics when appropriate was discussed.    - Stroke education given when appropriate.  - Importance of fall prevention discussed.   - Differential diagnosis and plan of care discussed with patient and/or family and primary team  - Thank you for allowing me to participate in the care of this patient. Call with questions.   - sees Dr. Cary Tim outpatient   Timothy Noble MD  Vascular Neurology  Office: [phone]

## 2023-06-08 NOTE — CONSULT NOTE ADULT - ASSESSMENT
Assessment:   Patient is a 66yo M patient being seen by private podiatry attending for the following:  - Onychomycosis / Dystrophic toenails 1-5 of the bilateral feet  - Pain in the right toes 1-5  - Pain in the left toes 1-5    Plan:  The patient was evaluated and medical records were reviewed.  The patient showed verbal understanding with regard to their evaluation as well as the treatment plan.  After thorough examination of patient, aseptic mechanical debridement of toenails x 10 was performed using sterile nail clippers. Alcohol prep was used to sterilize the toenails before and after debridement of nails.   Patient tolerated intervention well without any complications  I discussed the importance of daily foot examinations and proper shoe gear with the patient at length.    Patient may follow up in my office upon discharge (Dr. Waterhouse) -[phone]  Patient demonstrated verbal understanding of all interventions and all questions were answered appropriately.  No further interventions by Podiatry at this time.   Please re-consult as needed.  Thank you for the consultation.

## 2023-06-08 NOTE — PROVIDER CONTACT NOTE (CRITICAL VALUE NOTIFICATION) - ASSESSMENT
Pt remains A+x3-4, VS as charted, denies cp or SOB. No s+s of distress noted
Pt AOx3, tolerating room air. Patient sitting comfortably in bed with wife at bedside.

## 2023-06-08 NOTE — CONSULT NOTE ADULT - SUBJECTIVE AND OBJECTIVE BOX
HPI:  67M known to  for h/o recurrent UTIs.  Presented 22 with acute mental status change x1 day.  Associated with dysuria and chills.  Found to have gram negative bacteremia.   consulted regarding bilateral nonobstructing nephrolithiasis.    PAST MEDICAL & SURGICAL HISTORY:  Myocardial infarction  KAEL x1 MI , stent RCA      Back pain  Chronic back pain      Spinal stenosis      Lumbar herniated disc      Hypertension      Narcotic dependence  16 for withdrawal ,pt currently on Dialudid 8 mg every 4-6 hours /day      Osteoarthritis      GERD (gastroesophageal reflux disease)      Cerebral aneurysm rupture   clipped, no residual      Femur fracture, right  , surgical revision of right hip      Sacroiliitis, not elsewhere classified  Unspecified Inflammatory spondylopathy,Sacral and sacrococcygeal region      Brain aneurysm  s/p clipping, not compatible with MRI      Seizures      CAD (coronary artery disease)  s/p MI and RCA stenting      Chronic pain  Patient has an Intrathecal pump s/p removal in       Cerebral aneurysm  [id number] with clips      S/P lumbar fusion  L1-S1:      History of right inguinal hernia repair  x2      Hx of appendectomy  1969      Hx of laminectomy  lumbar-      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      Stented coronary artery  KAEL x 1 to RCA      History of hip replacement  8/15, revision  after falling and fracturing right femur      S/P left knee arthroscopy      H/O arthroscopy of shoulder  right X 2, left X 1      History of throat surgery  surgical exicision cyst 1986      Fusion of sacral region of spine  2017      History of back surgery  x5      S/P inguinal hernia repair      H/O fracture of femur  s/p repair          MEDICATIONS  (STANDING):  aspirin enteric coated 81 milliGRAM(s) Oral daily  cefTRIAXone   IVPB 1000 milliGRAM(s) IV Intermittent every 24 hours  gabapentin 200 milliGRAM(s) Oral at bedtime  heparin   Injectable 5000 Unit(s) SubCutaneous every 8 hours  levETIRAcetam 750 milliGRAM(s) Oral two times a day  metoprolol tartrate 25 milliGRAM(s) Oral at bedtime  multivitamin 1 Tablet(s) Oral daily  polyethylene glycol 3350 17 Gram(s) Oral daily  pregabalin 75 milliGRAM(s) Oral daily  rosuvastatin 10 milliGRAM(s) Oral at bedtime  senna 2 Tablet(s) Oral at bedtime  sodium chloride 0.45% 1000 milliLiter(s) (75 mL/Hr) IV Continuous <Continuous>  tamsulosin 0.4 milliGRAM(s) Oral daily    MEDICATIONS  (PRN):  acetaminophen     Tablet .. 650 milliGRAM(s) Oral every 6 hours PRN Temp greater or equal to 38C (100.4F), Mild Pain (1 - 3)  baclofen 20 milliGRAM(s) Oral every 6 hours PRN Muscle Spasm  melatonin 3 milliGRAM(s) Oral at bedtime PRN Insomnia  ondansetron Injectable 4 milliGRAM(s) IV Push every 8 hours PRN Nausea and/or Vomiting      Allergies    No Known Allergies    Intolerances        FAMILY HISTORY:  No pertinent family history in first degree relatives      Social History:  Patient lives w/ wife  No history of smoking.  No alcohol use.  No illicit drug use. (2023 22:09)      Review of Systems:  General: + fevers and chills    Vital Signs and Measurements:  Vital Signs Last 24 Hrs  T(C): 36.7 (2023 04:40), Max: 37.7 (2023 21:52)  T(F): 98 (2023 04:40), Max: 99.8 (2023 21:52)  HR: 78 (2023 04:40) (78 - 100)  BP: 114/67 (2023 04:40) (114/67 - 150/79)  BP(mean): --  RR: 18 (2023 04:40) (18 - 20)  SpO2: 93% (2023 04:40) (93% - 97%)    Parameters below as of 2023 04:40  Patient On (Oxygen Delivery Method): nasal cannula  O2 Flow (L/min): 2      I&O's Summary      Physical Examination:  GENERAL APPEARANCE: pleasant, well nourished, in no acute distress.    LABS:                        12.0   13.07 )-----------( 148      ( 2023 07:15 )             35.1       06-08    138  |  105  |  18  ----------------------------<  95  4.2   |  20<L>  |  1.65<H>    Ca    8.7      2023 07:10  Phos  2.7     06-07  Mg     1.8     06-07    TPro  7.2  /  Alb  4.0  /  TBili  1.0  /  DBili  x   /  AST  13  /  ALT  21  /  AlkPhos  109            Urinalysis Basic - ( 2023 14:11 )    Color: Yellow / Appearance: Slightly Turbid / S.026 / pH: x  Gluc: x / Ketone: Negative  / Bili: Negative / Urobili: Negative   Blood: x / Protein: 100 mg/dL / Nitrite: Negative   Leuk Esterase: Large / RBC: 98 /hpf /  /HPF   Sq Epi: x / Non Sq Epi: x / Bacteria: Many          Culture - Urine (collected 2023 14:11)  Source: Clean Catch Clean Catch (Midstream)  Preliminary Report (2023 22:04):    >100,000 CFU/ml Gram Negative Rods    Culture - Blood (collected 2023 13:39)  Source: .Blood Blood-Peripheral  Gram Stain (2023 05:40):    Growth in anaerobic bottle: Gram Negative Rods    Growth in aerobic bottle: Gram Negative Rods  Preliminary Report (2023 05:41):    Growth in anaerobic bottle: Gram Negative Rods    Growth in aerobic bottle: Gram Negative Rods    ***Blood Panel PCR results on this specimen are available    approximately 3 hours after the Gram stain result.***    Gram stain, PCR, and/or culture results may not always    correspond due to difference in methodologies.    ************************************************************    This PCR assay was performed by multiplex PCR. This    Assay tests for 66 bacterial and resistance gene targets.    Please refer to the [facility] Labs test directory    at [email or web address] for details.  Organism: Blood Culture PCR (2023 21:10)  Organism: Blood Culture PCR (2023 21:10)    Culture - Blood (collected 2023 13:31)  Source: .Blood Blood-Peripheral  Gram Stain (2023 01:31):    Growth in anaerobic bottle: Gram Negative Rods    Growth in aerobic bottle: Gram Negative Rods  Preliminary Report (2023 01:31):    Growth in anaerobic bottle: Gram Negative Rods    Growth in aerobic bottle: Gram Negative Rods        IMAGING (imaging and reports reviewed):    < from: US Renal (23 @ 16:38) >  Right kidney: 11.1 cm. No renal mass or hydronephrosis. Nonobstructing   tiny 6 mm calculus.    Left kidney: 10.7 cm. No renal mass, hydronephrosis or calculi. Mid to   lower pole is limited in visualization due to overlying bowel and   patient's inability to position.    Urinary bladder: Underdistended.    IMPRESSION:    Limited evaluation of the lower pole of the left kidney.    No hydronephrosis.    Nonobstructing right renal calculus.    < end of copied text >      < from: CT Abdomen and Pelvis w/ IV Cont (23 @ 11:21) >  CHEST:  LUNGS AND LARGE AIRWAYS: Patent central airways. Bibasilar linear   atelectasis.  PLEURA: No pleural effusion.  VESSELS: Contrast bolus is satisfactory. No main, right main, left main,   lobar or segmental pulmonary embolism. Limited evaluation of subsegmental   pulmonary arteries. Coronary artery and aortic calcifications.  HEART: Heart size is normal. No pericardial effusion.  MEDIASTINUM AND CESAR: No lymphadenopathy.  CHEST WALL AND LOWER NECK: Within normal limits.    ABDOMEN AND PELVIS:  LIVER: An indeterminant 1.4 cm hypodense focus within the lateral segment   of the left hepatic lobe, unchanged from 2016 and is likely benign.  BILE DUCTS: Normal caliber.  GALLBLADDER: Within normal limits.  SPLEEN: Within normal limits.  PANCREAS: Atrophic.  ADRENALS: Within normal limits.  KIDNEYS/URETERS: No hydronephrosis. Bilateral calculi in the renal   pelvises measuring 1.7 x 0.9 cm on the right and 1.1 x 1.0 cm on the left.    BLADDER: Within normal limits.  REPRODUCTIVE ORGANS: Prostate within normal limits.    BOWEL: No bowel obstruction. Appendix is not visualized. No evidence of   inflammation in the pericecal region. Large amount of stool distends the   rectum.  PERITONEUM: No ascites.  VESSELS: Atherosclerotic changes.  RETROPERITONEUM/LYMPH NODES: No lymphadenopathy.  ABDOMINAL WALL: Lumbar spinal cord stimulator lead at the level of T12-L1.  BONES: Degenerative changes. Status post posterior spinal fusion   extending fromT4 to S1 with connecting screws and rods. Status post   T12-S1 laminectomy Status post right-sided sacroiliac arthrodesis. Status   post right total hip arthroplasty with longstem femoral component and   single acetabular screw.    IMPRESSION:  No pulmonary embolism. No evidence of pneumonia.    No acute intra-abdominal finding.    Diagnosis/ProblemList/Plan:    1. Gram negative bacteremia  Cont ceftriaxone as per ID and f/u cultures.    2. TONY  Cr 1.65; prior Cr 1.04 on discharge  No hydro seen on renal u/s; however, patient with known bilateral renal calculi measuring 1.7 cm on the right and 1.1 cm on the left.  Rec repeat noncon CT abd/pelvis for further eval.    3. Bilateral nephrolithiasis  Known 1.7 cm right renal calculus and 1.1 cm left renal calculus.  Rec repeat noncon CT abd/pelvis to evaluate for obstructing ureteral calculus.    Rasheed Morgan MD  [facility]  Division of Urology    [address], [address] [place] [address]  [phone] [address], [address]  [place] [address]  [phone]

## 2023-06-08 NOTE — PROVIDER CONTACT NOTE (CRITICAL VALUE NOTIFICATION) - SITUATION
2 positive blood cultures with growth in both anaerobic bottles with gram (-) rods
Blood Cultures from 6/7 positive - Gram positive cocci in clusters

## 2023-06-08 NOTE — PROGRESS NOTE ADULT - SUBJECTIVE AND OBJECTIVE BOX
Follow Up:  fever, pyelo    Interval History/ROS: pt was febrile yesterday, no more fever today but still with R flank pain and dysuria, blood cx showed proteus and CT with hydro and obstructing stone, no vomiting or cough        Allergies  No Known Allergies        ANTIMICROBIALS:  cefTRIAXone   IVPB 2000 every 24 hours      OTHER MEDS:  acetaminophen     Tablet .. 650 milliGRAM(s) Oral every 6 hours PRN  aspirin enteric coated 81 milliGRAM(s) Oral daily  baclofen 20 milliGRAM(s) Oral every 6 hours PRN  gabapentin 200 milliGRAM(s) Oral at bedtime  heparin   Injectable 5000 Unit(s) SubCutaneous every 8 hours  levETIRAcetam 750 milliGRAM(s) Oral two times a day  melatonin 3 milliGRAM(s) Oral at bedtime PRN  metoprolol tartrate 25 milliGRAM(s) Oral at bedtime  multivitamin 1 Tablet(s) Oral daily  ondansetron Injectable 4 milliGRAM(s) IV Push every 8 hours PRN  polyethylene glycol 3350 17 Gram(s) Oral daily  pregabalin 75 milliGRAM(s) Oral daily  rosuvastatin 10 milliGRAM(s) Oral at bedtime  senna 2 Tablet(s) Oral at bedtime  sodium chloride 0.45% 1000 milliLiter(s) IV Continuous <Continuous>  tamsulosin 0.4 milliGRAM(s) Oral daily      Vital Signs Last 24 Hrs  T(C): 36.8 (2023 08:40), Max: 37.7 (2023 21:52)  T(F): 98.2 (2023 08:40), Max: 99.8 (2023 21:52)  HR: 74 (2023 08:40) (74 - 100)  BP: 128/68 (2023 08:40) (114/67 - 150/79)  BP(mean): --  RR: 17 (2023 08:40) (17 - 19)  SpO2: 97% (2023 08:40) (93% - 97%)    Parameters below as of 2023 08:40  Patient On (Oxygen Delivery Method): room air        Physical Exam:  General:    NAD,  non toxic  Respiratory:    comfortable on RA  abd:     soft,   no tenderness  :   R CVAT  Musculoskeletal:   no joint swelling  vascular: no phlebitis  Skin:    no rash                        12.0   13.07 )-----------( 148      ( 2023 07:15 )             35.1           138  |  105  |  18  ----------------------------<  95  4.2   |  20<L>  |  1.65<H>    Ca    8.7      2023 07:10  Phos  2.7       Mg     1.8         TPro  7.2  /  Alb  4.0  /  TBili  1.0  /  DBili  x   /  AST  13  /  ALT  21  /  AlkPhos  109        Urinalysis Basic - ( 2023 14:11 )    Color: Yellow / Appearance: Slightly Turbid / S.026 / pH: x  Gluc: x / Ketone: Negative  / Bili: Negative / Urobili: Negative   Blood: x / Protein: 100 mg/dL / Nitrite: Negative   Leuk Esterase: Large / RBC: 98 /hpf /  /HPF   Sq Epi: x / Non Sq Epi: x / Bacteria: Many        MICROBIOLOGY:  v  Clean Catch Clean Catch (Midstream)  23   >100,000 CFU/ml Gram Negative Rods  --  --      .Blood Blood-Peripheral  23   Growth in anaerobic bottle: Gram Negative Rods  Growth in aerobic bottle: Gram Negative Rods  ***Blood Panel PCR results on this specimen are available  approximately 3 hours after the Gram stain result.***  Gram stain, PCR, and/or culture results may not always  correspond due to difference in methodologies.  ************************************************************  This PCR assay was performed by multiplex PCR. This  Assay tests for 66 bacterial and resistance gene targets.  Please refer to the [facility] Labs test directory  at [email or web address] for details.  --  Blood Culture PCR      .Blood Blood-Peripheral  23   Growth in anaerobic bottle: Gram Negative Rods  Growth in aerobic bottle: Gram Negative Rods  --    Growth in anaerobic bottle: Gram Negative Rods  Growth in aerobic bottle: Gram Negative Rods          Rapid RVP Result: NotDetec ( @ 14:08)        RADIOLOGY:  Images independently visualized and reviewed personally, findings as below  < from: CT Abdomen and Pelvis No Cont (23 @ 09:59) >  IMPRESSION:  New mild left hydronephrosis and perinephric fat stranding secondary to   an obstructing 1.1 cm calculus in the UPJ. Correlate clinically for   superimposed urinary tract infection. Evaluation for pyelonephritis is   limited without intravenous contrast.    No pneumonia.    < end of copied text >

## 2023-06-08 NOTE — PROVIDER CONTACT NOTE (CRITICAL VALUE NOTIFICATION) - BACKGROUND
66y/o M, PMH HTN, HLD, CAD s/p stent (2015), Seizure disorder on, chronic Back pain s/p multiple Spinal surgeries, Kidney stones, and recurrent UTIs /Proteus infection in April. --P/w chief complaint of AMS fever Tmax of 103.
dx: UTI

## 2023-06-08 NOTE — PROGRESS NOTE ADULT - SUBJECTIVE AND OBJECTIVE BOX
[facility]   MD BERNABE BURTON MD BRIANA PETITO, NP    TEL:  FROM 9 AM to 5 PM ---OFFICE: [phone]  FROM 5 PM - 9 AM PLEASE CALL ANSWERING SERVICE: [phone]     RENAL PROGRESS NOTE: DATE OF SERVICE 06-08-23 @ 13:57    Patient is a 67y old  Male who presents with a chief complaint of severe sepsis 2/2 UTI       Patient seen and examined at bedside. No chest pain/sob    VITALS:  T(F): 98.2 (06-08-23 @ 08:40), Max: 99.8 (06-07-23 @ 21:52)  HR: 74 (06-08-23 @ 08:40)  BP: 128/68 (06-08-23 @ 08:40)  RR: 17 (06-08-23 @ 08:40)  SpO2: 97% (06-08-23 @ 08:40)  Wt(kg): --      PHYSICAL EXAM:  Constitutional: NAD  Neck: No JVD  Respiratory: CTAB, no wheezes, rales or rhonchi  Cardiovascular: S1, S2, RRR  Gastrointestinal: BS+, obese, soft, NT/ND  Extremities: RLE Edema     Hospital Medications:   MEDICATIONS  (STANDING):  aspirin enteric coated 81 milliGRAM(s) Oral daily  cefTRIAXone   IVPB 2000 milliGRAM(s) IV Intermittent every 24 hours  gabapentin 200 milliGRAM(s) Oral at bedtime  heparin   Injectable 5000 Unit(s) SubCutaneous every 8 hours  levETIRAcetam 750 milliGRAM(s) Oral two times a day  metoprolol tartrate 25 milliGRAM(s) Oral at bedtime  multivitamin 1 Tablet(s) Oral daily  polyethylene glycol 3350 17 Gram(s) Oral daily  pregabalin 75 milliGRAM(s) Oral daily  rosuvastatin 10 milliGRAM(s) Oral at bedtime  senna 2 Tablet(s) Oral at bedtime  sodium chloride 0.45% 1000 milliLiter(s) (75 mL/Hr) IV Continuous <Continuous>  tamsulosin 0.4 milliGRAM(s) Oral daily      LABS:  06-08    138  |  105  |  18  ----------------------------<  95  4.2   |  20<L>  |  1.65<H>    Ca    8.7      08 Jun 2023 07:10  Phos  2.7     06-07  Mg     1.8     06-07    TPro  7.2  /  Alb  4.0  /  TBili  1.0  /  DBili      /  AST  13  /  ALT  21  /  AlkPhos  109  06-06    Creatinine Trend: 1.65 <--, 1.87 <--, 1.66 <--                                12.0   13.07 )-----------( 148      ( 08 Jun 2023 07:15 )             35.1     Urine Studies:  Urinalysis - [06-06-23 @ 14:11]      Color Yellow / Appearance Slightly Turbid / SG 1.026 / pH 8.0      Gluc Negative / Ketone Negative  / Bili Negative / Urobili Negative       Blood Large / Protein 100 mg/dL / Leuk Est Large / Nitrite Negative      RBC 98 /  / Hyaline 12 / Gran  / Sq Epi  / Non Sq Epi  / Bacteria Many      PTH -- (Ca --)      [04-23-23 @ 09:02]   10  Vitamin D (25OH) 63.1      [04-23-23 @ 09:02]  TSH 3.65      [04-26-23 @ 06:35]  Lipid: chol 120, , HDL 44, LDL --      [04-24-23 @ 06:53]        RADIOLOGY & ADDITIONAL STUDIES:

## 2023-06-09 ENCOUNTER — TRANSCRIPTION ENCOUNTER (OUTPATIENT)
Age: 68
End: 2023-06-09

## 2023-06-09 LAB
-  AMIKACIN: SIGNIFICANT CHANGE UP
-  AMIKACIN: SIGNIFICANT CHANGE UP
-  AMOXICILLIN/CLAVULANIC ACID: SIGNIFICANT CHANGE UP
-  AMPICILLIN/SULBACTAM: SIGNIFICANT CHANGE UP
-  AMPICILLIN/SULBACTAM: SIGNIFICANT CHANGE UP
-  AMPICILLIN: SIGNIFICANT CHANGE UP
-  AMPICILLIN: SIGNIFICANT CHANGE UP
-  AZTREONAM: SIGNIFICANT CHANGE UP
-  AZTREONAM: SIGNIFICANT CHANGE UP
-  CEFAZOLIN: SIGNIFICANT CHANGE UP
-  CEFAZOLIN: SIGNIFICANT CHANGE UP
-  CEFEPIME: SIGNIFICANT CHANGE UP
-  CEFEPIME: SIGNIFICANT CHANGE UP
-  CEFOXITIN: SIGNIFICANT CHANGE UP
-  CEFOXITIN: SIGNIFICANT CHANGE UP
-  CEFTRIAXONE: SIGNIFICANT CHANGE UP
-  CEFTRIAXONE: SIGNIFICANT CHANGE UP
-  CEFUROXIME: SIGNIFICANT CHANGE UP
-  CIPROFLOXACIN: SIGNIFICANT CHANGE UP
-  CIPROFLOXACIN: SIGNIFICANT CHANGE UP
-  ERTAPENEM: SIGNIFICANT CHANGE UP
-  ERTAPENEM: SIGNIFICANT CHANGE UP
-  GENTAMICIN: SIGNIFICANT CHANGE UP
-  GENTAMICIN: SIGNIFICANT CHANGE UP
-  LEVOFLOXACIN: SIGNIFICANT CHANGE UP
-  LEVOFLOXACIN: SIGNIFICANT CHANGE UP
-  MEROPENEM: SIGNIFICANT CHANGE UP
-  MEROPENEM: SIGNIFICANT CHANGE UP
-  NITROFURANTOIN: SIGNIFICANT CHANGE UP
-  PIPERACILLIN/TAZOBACTAM: SIGNIFICANT CHANGE UP
-  PIPERACILLIN/TAZOBACTAM: SIGNIFICANT CHANGE UP
-  STAPHYLOCOCCUS EPIDERMIDIS, METHICILLIN RESISTANT: SIGNIFICANT CHANGE UP
-  TOBRAMYCIN: SIGNIFICANT CHANGE UP
-  TOBRAMYCIN: SIGNIFICANT CHANGE UP
-  TRIMETHOPRIM/SULFAMETHOXAZOLE: SIGNIFICANT CHANGE UP
-  TRIMETHOPRIM/SULFAMETHOXAZOLE: SIGNIFICANT CHANGE UP
ANION GAP SERPL CALC-SCNC: 13 MMOL/L — SIGNIFICANT CHANGE UP (ref 5–17)
BUN SERPL-MCNC: 16 MG/DL — SIGNIFICANT CHANGE UP (ref 7–23)
CALCIUM SERPL-MCNC: 9 MG/DL — SIGNIFICANT CHANGE UP (ref 8.4–10.5)
CHLORIDE SERPL-SCNC: 106 MMOL/L — SIGNIFICANT CHANGE UP (ref 96–108)
CO2 SERPL-SCNC: 21 MMOL/L — LOW (ref 22–31)
CREAT SERPL-MCNC: 1.48 MG/DL — HIGH (ref 0.5–1.3)
CULTURE RESULTS: SIGNIFICANT CHANGE UP
EGFR: 51 ML/MIN/1.73M2 — LOW
GLUCOSE SERPL-MCNC: 102 MG/DL — HIGH (ref 70–99)
HCT VFR BLD CALC: 37.6 % — LOW (ref 39–50)
HGB BLD-MCNC: 12.7 G/DL — LOW (ref 13–17)
MCHC RBC-ENTMCNC: 30.8 PG — SIGNIFICANT CHANGE UP (ref 27–34)
MCHC RBC-ENTMCNC: 33.8 GM/DL — SIGNIFICANT CHANGE UP (ref 32–36)
MCV RBC AUTO: 91 FL — SIGNIFICANT CHANGE UP (ref 80–100)
METHOD TYPE: SIGNIFICANT CHANGE UP
NRBC # BLD: 0 /100 WBCS — SIGNIFICANT CHANGE UP (ref 0–0)
ORGANISM # SPEC MICROSCOPIC CNT: SIGNIFICANT CHANGE UP
PLATELET # BLD AUTO: 171 K/UL — SIGNIFICANT CHANGE UP (ref 150–400)
POTASSIUM SERPL-MCNC: 4.2 MMOL/L — SIGNIFICANT CHANGE UP (ref 3.5–5.3)
POTASSIUM SERPL-SCNC: 4.2 MMOL/L — SIGNIFICANT CHANGE UP (ref 3.5–5.3)
RBC # BLD: 4.13 M/UL — LOW (ref 4.2–5.8)
RBC # FLD: 15.4 % — HIGH (ref 10.3–14.5)
SODIUM SERPL-SCNC: 140 MMOL/L — SIGNIFICANT CHANGE UP (ref 135–145)
SPECIMEN SOURCE: SIGNIFICANT CHANGE UP
WBC # BLD: 12.62 K/UL — HIGH (ref 3.8–10.5)
WBC # FLD AUTO: 12.62 K/UL — HIGH (ref 3.8–10.5)

## 2023-06-09 PROCEDURE — 99232 SBSQ HOSP IP/OBS MODERATE 35: CPT

## 2023-06-09 PROCEDURE — 93306 TTE W/DOPPLER COMPLETE: CPT | Mod: 26

## 2023-06-09 RX ORDER — SODIUM CHLORIDE 9 MG/ML
1000 INJECTION INTRAMUSCULAR; INTRAVENOUS; SUBCUTANEOUS
Refills: 0 | Status: DISCONTINUED | OUTPATIENT
Start: 2023-06-09 | End: 2023-06-11

## 2023-06-09 RX ADMIN — Medication 1 TABLET(S): at 08:20

## 2023-06-09 RX ADMIN — Medication 100 MILLIGRAM(S): at 03:26

## 2023-06-09 RX ADMIN — GABAPENTIN 200 MILLIGRAM(S): 400 CAPSULE ORAL at 21:49

## 2023-06-09 RX ADMIN — HEPARIN SODIUM 5000 UNIT(S): 5000 INJECTION INTRAVENOUS; SUBCUTANEOUS at 06:10

## 2023-06-09 RX ADMIN — LEVETIRACETAM 750 MILLIGRAM(S): 250 TABLET, FILM COATED ORAL at 06:10

## 2023-06-09 RX ADMIN — HEPARIN SODIUM 5000 UNIT(S): 5000 INJECTION INTRAVENOUS; SUBCUTANEOUS at 14:45

## 2023-06-09 RX ADMIN — Medication 650 MILLIGRAM(S): at 21:48

## 2023-06-09 RX ADMIN — SENNA PLUS 2 TABLET(S): 8.6 TABLET ORAL at 21:49

## 2023-06-09 RX ADMIN — POLYETHYLENE GLYCOL 3350 17 GRAM(S): 17 POWDER, FOR SOLUTION ORAL at 11:06

## 2023-06-09 RX ADMIN — CEFTRIAXONE 100 MILLIGRAM(S): 500 INJECTION, POWDER, FOR SOLUTION INTRAMUSCULAR; INTRAVENOUS at 11:10

## 2023-06-09 RX ADMIN — Medication 650 MILLIGRAM(S): at 23:22

## 2023-06-09 RX ADMIN — TAMSULOSIN HYDROCHLORIDE 0.4 MILLIGRAM(S): 0.4 CAPSULE ORAL at 08:20

## 2023-06-09 RX ADMIN — Medication 75 MILLIGRAM(S): at 08:20

## 2023-06-09 RX ADMIN — Medication 20 MILLIGRAM(S): at 18:12

## 2023-06-09 RX ADMIN — SODIUM CHLORIDE 60 MILLILITER(S): 9 INJECTION INTRAMUSCULAR; INTRAVENOUS; SUBCUTANEOUS at 11:05

## 2023-06-09 RX ADMIN — HEPARIN SODIUM 5000 UNIT(S): 5000 INJECTION INTRAVENOUS; SUBCUTANEOUS at 21:51

## 2023-06-09 RX ADMIN — Medication 81 MILLIGRAM(S): at 08:20

## 2023-06-09 RX ADMIN — LEVETIRACETAM 750 MILLIGRAM(S): 250 TABLET, FILM COATED ORAL at 17:08

## 2023-06-09 RX ADMIN — Medication 20 MILLIGRAM(S): at 11:06

## 2023-06-09 RX ADMIN — ROSUVASTATIN CALCIUM 10 MILLIGRAM(S): 5 TABLET ORAL at 21:50

## 2023-06-09 RX ADMIN — Medication 25 MILLIGRAM(S): at 21:50

## 2023-06-09 RX ADMIN — Medication 20 MILLIGRAM(S): at 04:34

## 2023-06-09 NOTE — PROGRESS NOTE ADULT - SUBJECTIVE AND OBJECTIVE BOX
Follow Up:  fever, pyelo    Interval History/ROS: no more fever but still with R flank pain, no vomiting or cough          Allergies  No Known Allergies        ANTIMICROBIALS:  cefTRIAXone   IVPB 2000 every 24 hours      OTHER MEDS:  acetaminophen     Tablet .. 650 milliGRAM(s) Oral every 6 hours PRN  aspirin enteric coated 81 milliGRAM(s) Oral daily  baclofen 20 milliGRAM(s) Oral every 6 hours PRN  gabapentin 200 milliGRAM(s) Oral at bedtime  heparin   Injectable 5000 Unit(s) SubCutaneous every 8 hours  levETIRAcetam 750 milliGRAM(s) Oral two times a day  melatonin 3 milliGRAM(s) Oral at bedtime PRN  metoprolol tartrate 25 milliGRAM(s) Oral at bedtime  multivitamin 1 Tablet(s) Oral daily  ondansetron Injectable 4 milliGRAM(s) IV Push every 8 hours PRN  polyethylene glycol 3350 17 Gram(s) Oral daily  pregabalin 75 milliGRAM(s) Oral daily  rosuvastatin 10 milliGRAM(s) Oral at bedtime  senna 2 Tablet(s) Oral at bedtime  sodium chloride 0.9%. 1000 milliLiter(s) IV Continuous <Continuous>  tamsulosin 0.4 milliGRAM(s) Oral daily      Vital Signs Last 24 Hrs  T(C): 36.7 (09 Jun 2023 11:24), Max: 36.9 (08 Jun 2023 21:01)  T(F): 98.1 (09 Jun 2023 11:24), Max: 98.5 (08 Jun 2023 21:01)  HR: 76 (09 Jun 2023 11:24) (76 - 88)  BP: 120/73 (09 Jun 2023 11:24) (120/73 - 138/75)  BP(mean): --  RR: 18 (09 Jun 2023 11:24) (18 - 19)  SpO2: 93% (09 Jun 2023 11:24) (91% - 93%)    Parameters below as of 09 Jun 2023 11:24  Patient On (Oxygen Delivery Method): room air        Physical Exam:  General:    NAD,  non toxic  Respiratory:    comfortable on RA  abd:     soft,   no tenderness  :   R CVAT  Musculoskeletal:   no joint swelling  vascular: no phlebitis  Skin:    no rash                          12.7   12.62 )-----------( 171      ( 09 Jun 2023 06:47 )             37.6       06-09    140  |  106  |  16  ----------------------------<  102<H>  4.2   |  21<L>  |  1.48<H>    Ca    9.0      09 Jun 2023 06:47            MICROBIOLOGY:  v  .Blood Blood  06-07-23   No growth to date.  --  --      .Blood Blood  06-07-23   Growth in anaerobic bottle: Gram Positive Cocci in Clusters  ***Blood Panel PCR results on this specimen are available  approximately 3 hours after the Gram stain result.***  Gram stain, PCR, and/or culture results may not always  correspond due todifference in methodologies.  ************************************************************  This PCR assay was performed by multiplex PCR. This  Assay tests for 66 bacterial and resistance gene targets.  Please refer to the [facility] testdirectory  at [email or web address] for details.  --  Blood Culture PCR      Clean Catch Clean Catch (Midstream)  06-06-23   >100,000 CFU/ml Proteus mirabilis  --  --      .Blood Blood-Peripheral  06-06-23   Growth in aerobic and anaerobic bottles: Proteus mirabilis  ***Blood Panel PCR results on this specimen are available  approximately 3 hours after the Gram stain result.***  Gram stain, PCR, and/or culture results may not always  correspond due to difference in methodologies.  ************************************************************  This PCR assay was performed by multiplex PCR. This  Assay tests for 66 bacterial and resistance gene targets.  Please refer to the [facility] test directory  at [email or web address] for details.  --  Blood Culture PCR  Proteus mirabilis      .Blood Blood-Peripheral  06-06-23   Growth in aerobic and anaerobic bottles: Proteus mirabilis  See previous culture [id number]  --    Growth in anaerobic bottle: Gram Negative Rods  Growth in aerobic bottle: Gram Negative Rods          Rapid RVP Result: NotDetec (06-06 @ 14:08)        RADIOLOGY:  Images independently visualized and reviewed personally, findings as below  < from: CT Abdomen and Pelvis No Cont (06.08.23 @ 09:59) >  IMPRESSION:  New mild left hydronephrosis and perinephric fat stranding secondary to   an obstructing 1.1 cm calculus in the UPJ. Correlate clinically for   superimposed urinary tract infection. Evaluation for pyelonephritis is   limited without intravenous contrast.    No pneumonia.      < end of copied text >

## 2023-06-09 NOTE — CONSULT NOTE ADULT - CONSULT REASON
Cardiac disease
UTI, fever
TONY
UTI, bilateral kidney stones
painful nails
AMS  seizure d/o  back pain  aneursym clipped

## 2023-06-09 NOTE — PROGRESS NOTE ADULT - SUBJECTIVE AND OBJECTIVE BOX
The patient is a Patient is a 67y old  Male who presents with a chief complaint of severe sepsis 2/2 UTI (09 Jun 2023 14:42)    Had CT yesterday showing the left sided stone has dropped into the left UPJ with mild hydro  Has been afebrile overnight  Has nausea, but denies vomiting   c/o suprapubic pain, but has bad chronic back pain so is unable to know if he has pain in the flank      VITAL SIGNS  Vital Signs Last 24 Hrs  T(C): 36.7 (09 Jun 2023 11:24), Max: 36.9 (08 Jun 2023 21:01)  T(F): 98.1 (09 Jun 2023 11:24), Max: 98.5 (08 Jun 2023 21:01)  HR: 76 (09 Jun 2023 11:24) (76 - 88)  BP: 120/73 (09 Jun 2023 11:24) (120/73 - 138/75)  BP(mean): --  RR: 18 (09 Jun 2023 11:24) (18 - 19)  SpO2: 93% (09 Jun 2023 11:24) (91% - 93%)    Parameters below as of 09 Jun 2023 11:24  Patient On (Oxygen Delivery Method): room air      I&O's Summary    08 Jun 2023 07:01  -  09 Jun 2023 07:00  --------------------------------------------------------  IN: 915 mL / OUT: 0 mL / NET: 915 mL        PE:  General: in no acute distress  GI: abdomen soft, mild left upper quadrant tenderness, bilateral CVAT L>R      LABS:                        12.7   12.62 )-----------( 171      ( 09 Jun 2023 06:47 )             37.6     06-09    140  |  106  |  16  ----------------------------<  102<H>  4.2   |  21<L>  |  1.48<H>    Ca    9.0      09 Jun 2023 06:47    Creatinine and WBC both downtrending    Urine Culture: --  Proteus miabilis  --  Blood Culture PCR  Proteus mirabilis sensitive to ceftriaxone      Blood Culture:   Radiology:    Prior notes/chart reviewed.

## 2023-06-09 NOTE — CONSULT NOTE ADULT - SUBJECTIVE AND OBJECTIVE BOX
DATE OF SERVICE: 06-09-23 @ 12:43    CHIEF COMPLAINT:Patient is a 67y old  Male who presents with a chief complaint of severe sepsis 2/2 UTI (09 Jun 2023 08:31)      HISTORY OF PRESENT ILLNESS:HPI:  Patient is a 67y Male with PMH of HTN, HLD, CAD s/p stent (2015), seizure disorder on Keppra, chronic back pain s/p multiple spinal surgeries, kidney stones, and recurrent UTIs, presenting to the ED w/ AMS x1 day. Patient was admitted in 4/2023 w/ proteus UTI, also finished a course of abx outpatient 2 weeks ago for another UTI. Patient's wife states that yesterday he started to become altered, which is usually what happens when he gets a UTI. His arms started shaking, he was confused, and he was having trouble keeping his eyes open. He also had a fever of 103 this morning. Patient does report dysuria. He denies chest pain, shortness of breath, abdominal pain, nausea, vomiting, changes in bowel habits. His last BM was 2 days ago. He follows with urology Dr. Wright from [facility] for his kidney stones.    In the ED, Tmax 102, , /65, RR 23, SpO2 95% on RA. Labs significant for WBC 22, Cr 1.66, lactate 2.4->1.6. UA grossly positive for UTI. CXR shows mildly increased interstitial markings compared to prior. CT head negative for acute pathology. S/p keppra, ceftriaxone, baclofen, and 1.5L IVF in the ED (06 Jun 2023 22:09)      PAST MEDICAL & SURGICAL HISTORY:  Myocardial infarction  KAEL x1 MI 2005, stent RCA      Back pain  Chronic back pain      Spinal stenosis      Lumbar herniated disc      Hypertension      Narcotic dependence  5/28/16 for withdrawal ,pt currently on Dialudid 8 mg every 4-6 hours /day      Osteoarthritis      GERD (gastroesophageal reflux disease)      Cerebral aneurysm rupture  1997 clipped, no residual      Femur fracture, right  2/16, surgical revision of right hip      Sacroiliitis, not elsewhere classified  Unspecified Inflammatory spondylopathy,Sacral and sacrococcygeal region      Brain aneurysm  s/p clipping, not compatible with MRI      Seizures      CAD (coronary artery disease)  s/p MI and RCA stenting      Chronic pain  Patient has an Intrathecal pump s/p removal in 2016      Cerebral aneurysm  I997 with clips      S/P lumbar fusion  L1-S1:2002      History of right inguinal hernia repair  x2      Hx of appendectomy  6/1969      Hx of laminectomy  lumbar-2002      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      Stented coronary artery  KAEL x 1 to RCA      History of hip replacement  8/15, revision 2/16 after falling and fracturing right femur      S/P left knee arthroscopy      H/O arthroscopy of shoulder  right X 2, left X 1      History of throat surgery  surgical exicision cyst 1986      Fusion of sacral region of spine  5/2017      History of back surgery  x5      S/P inguinal hernia repair      H/O fracture of femur  s/p repair      MEDICATIONS:  aspirin enteric coated 81 milliGRAM(s) Oral daily  heparin   Injectable 5000 Unit(s) SubCutaneous every 8 hours  metoprolol tartrate 25 milliGRAM(s) Oral at bedtime    cefTRIAXone   IVPB 2000 milliGRAM(s) IV Intermittent every 24 hours      acetaminophen     Tablet .. 650 milliGRAM(s) Oral every 6 hours PRN  baclofen 20 milliGRAM(s) Oral every 6 hours PRN  gabapentin 200 milliGRAM(s) Oral at bedtime  levETIRAcetam 750 milliGRAM(s) Oral two times a day  melatonin 3 milliGRAM(s) Oral at bedtime PRN  ondansetron Injectable 4 milliGRAM(s) IV Push every 8 hours PRN  pregabalin 75 milliGRAM(s) Oral daily    polyethylene glycol 3350 17 Gram(s) Oral daily  senna 2 Tablet(s) Oral at bedtime    rosuvastatin 10 milliGRAM(s) Oral at bedtime    multivitamin 1 Tablet(s) Oral daily  sodium chloride 0.9%. 1000 milliLiter(s) IV Continuous <Continuous>  tamsulosin 0.4 milliGRAM(s) Oral daily      FAMILY HISTORY:  No pertinent family history in first degree relatives      Non-contributory    SOCIAL HISTORY:    [ ] Tobacco-former remote smoking   [ ] Drugs  [ ] Alcohol    Allergies    No Known Allergies    Intolerances    	    REVIEW OF SYSTEMS:  CONSTITUTIONAL: No fever  EYES: No eye pain, visual disturbances, or discharge  ENMT:  No difficulty hearing, tinnitus  NECK: No pain or stiffness  RESPIRATORY: No cough, wheezing,  CARDIOVASCULAR: No chest pain, palpitations, passing out, dizziness, or leg swelling  GASTROINTESTINAL:  No nausea, vomiting, diarrhea or constipation. No melena.  GENITOURINARY: No dysuria, hematuria  NEUROLOGICAL: No stroke like symptoms  SKIN: No burning or lesions   ENDOCRINE: No heat or cold intolerance  MUSCULOSKELETAL: No joint pain or swelling  PSYCHIATRIC: No  anxiety, mood swings  HEME/LYMPH: No bleeding gums  ALLERGY AND IMMUNOLOGIC: No hives or eczema	    All other ROS negative    PHYSICAL EXAM:  T(C): 36.7 (06-09-23 @ 11:24), Max: 36.9 (06-08-23 @ 21:01)  HR: 76 (06-09-23 @ 11:24) (76 - 88)  BP: 120/73 (06-09-23 @ 11:24) (120/73 - 138/75)  RR: 18 (06-09-23 @ 11:24) (18 - 19)  SpO2: 93% (06-09-23 @ 11:24) (91% - 93%)  Wt(kg): --  I&O's Summary    08 Jun 2023 07:01  -  09 Jun 2023 07:00  --------------------------------------------------------  IN: 915 mL / OUT: 0 mL / NET: 915 mL        Appearance: Normal	  HEENT:   Normal oral mucosa, EOMI	  Cardiovascular:  S1 S2, No JVD,    Respiratory: Lungs clear to auscultation	  Psychiatry: Alert  Gastrointestinal:  Soft, Non-tender, + BS	  Skin: No rashes   Neurologic: Non-focal  Extremities:  No edema  Vascular: Peripheral pulses palpable    	    	  	  CARDIAC MARKERS:  Labs personally reviewed by me                                  12.7   12.62 )-----------( 171      ( 09 Jun 2023 06:47 )             37.6     06-09    140  |  106  |  16  ----------------------------<  102<H>  4.2   |  21<L>  |  1.48<H>    Ca    9.0      09 Jun 2023 06:47            EKG: Personally reviewed by me -   EKG 4/23/23:   NORMAL SINUS RHYTHM  POSSIBLE INFERIOR INFARCT (CITED ON OR BEFORE 28-MAR-2022)  ABNORMAL ECG  WHEN COMPARED WITH ECG OF 06-MAY-2022 06:33,  NO SIGNIFICANT CHANGE WAS FOUND    Radiology: Personally reviewed by me -     TTE 5/8/22  Conclusions:  1. Mitral valve not well visualized, probably normal.  Minimal mitral regurgitation.  2. Calcified trileaflet aortic valve with normal opening.  No aortic valve regurgitation seen.  3.   Endocardial visualization enhanced with intravenous  injection of Ultrasonic Enhancing Agent (Definity). Normal  left ventricular systolic function. No segmental wall  motion abnormalities. Septal motion consistent with  conduction defect.  4. Mild diastolic dysfunction (Stage I).  5. The right ventricle is not well visualized; grossly  normal right ventricular systolic function.  6. No evidence of valvular vegetation is seen.  *** Compared with echocardiogram of 1/2/2015, the focal  outpouching is not seen.    CT chest 6/8/23  New mild left hydronephrosis and perinephric fat stranding secondary to   an obstructing 1.1 cm calculus in the UPJ. Correlate clinically for   superimposed urinary tract infection. Evaluation for pyelonephritis is   limited without intravenous contrast.    No pneumonia.    CXR 6/6/23  Status post spinal fusion.  The heart is normal in size.  Increased interstitial lung markings compared to prior x-ray.  There is no pneumothorax or pleural effusion.    Assessment /Plan: Patient is a 67y Male with PMH of HTN, HLD, CAD s/p stent (2015), seizure disorder on Keppra, chronic back pain s/p multiple spinal surgeries, kidney stones, and recurrent UTIs, presenting to the ED w/ AMS x1 day. Patient was admitted in 4/2023 w/ proteus UTI, also finished a course of abx outpatient 2 weeks ago for another UTI.    1. Coronary Artery Disease  - ECG from 4/2023 with no ischemic characteristics noted  - Denies CP or SOB  - can repeat EKG if clinically warranted   - c/w ASA, statin and metoprolol    2. Hypertension  - BP mostly at target  - c/w Metoprolol 25mg PO daily, can increase if hypertensive     3. Hyperlipidemia  - c/w rosuvastatin 10mg daily         Differential diagnosis and plan of care discussed with patient after the evaluation. Counseling on diet, nutritional counseling, weight management, exercise and medication compliance was done.   Advanced care planning/advanced directives discussed with patient/family. DNR status including forceful chest compressions to attempt to restart the heart, ventilator support/artificial breathing, electric shock, artificial nutrition, health care proxy, Molst form all discussed with pt. Pt wishes to consider. More than fifteen minutes spent on discussing advanced directives.               MARCO Henry DO [facility]  Cardiovascular Medicine  [address] [place] [address] Dr, [address]  Available for call or text via [employer]  Office [phone]   DATE OF SERVICE: 06-09-23 @ 12:43    CHIEF COMPLAINT:Patient is a 67y old  Male who presents with a chief complaint of severe sepsis 2/2 UTI (09 Jun 2023 08:31)      HISTORY OF PRESENT ILLNESS:HPI:  Patient is a 67y Male with PMH of HTN, HLD, CAD s/p stent (2015), seizure disorder on Keppra, chronic back pain s/p multiple spinal surgeries, kidney stones, and recurrent UTIs, presenting to the ED w/ AMS x1 day. Patient was admitted in 4/2023 w/ proteus UTI, also finished a course of abx outpatient 2 weeks ago for another UTI. Patient's wife states that yesterday he started to become altered, which is usually what happens when he gets a UTI. His arms started shaking, he was confused, and he was having trouble keeping his eyes open. He also had a fever of 103 this morning. Patient does report dysuria. He denies chest pain, shortness of breath, abdominal pain, nausea, vomiting, changes in bowel habits. His last BM was 2 days ago. He follows with urology Dr. Wright from [facility] for his kidney stones.    In the ED, Tmax 102, , /65, RR 23, SpO2 95% on RA. Labs significant for WBC 22, Cr 1.66, lactate 2.4->1.6. UA grossly positive for UTI. CXR shows mildly increased interstitial markings compared to prior. CT head negative for acute pathology. S/p keppra, ceftriaxone, baclofen, and 1.5L IVF in the ED (06 Jun 2023 22:09)      PAST MEDICAL & SURGICAL HISTORY:  Myocardial infarction  KAEL x1 MI 2005, stent RCA      Back pain  Chronic back pain      Spinal stenosis      Lumbar herniated disc      Hypertension      Narcotic dependence  5/28/16 for withdrawal ,pt currently on Dialudid 8 mg every 4-6 hours /day      Osteoarthritis      GERD (gastroesophageal reflux disease)      Cerebral aneurysm rupture  1997 clipped, no residual      Femur fracture, right  2/16, surgical revision of right hip      Sacroiliitis, not elsewhere classified  Unspecified Inflammatory spondylopathy,Sacral and sacrococcygeal region      Brain aneurysm  s/p clipping, not compatible with MRI      Seizures      CAD (coronary artery disease)  s/p MI and RCA stenting      Chronic pain  Patient has an Intrathecal pump s/p removal in 2016      Cerebral aneurysm  I997 with clips      S/P lumbar fusion  L1-S1:2002      History of right inguinal hernia repair  x2      Hx of appendectomy  6/1969      Hx of laminectomy  lumbar-2002      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      Stented coronary artery  KAEL x 1 to RCA      History of hip replacement  8/15, revision 2/16 after falling and fracturing right femur      S/P left knee arthroscopy      H/O arthroscopy of shoulder  right X 2, left X 1      History of throat surgery  surgical exicision cyst 1986      Fusion of sacral region of spine  5/2017      History of back surgery  x5      S/P inguinal hernia repair      H/O fracture of femur  s/p repair      MEDICATIONS:  aspirin enteric coated 81 milliGRAM(s) Oral daily  heparin   Injectable 5000 Unit(s) SubCutaneous every 8 hours  metoprolol tartrate 25 milliGRAM(s) Oral at bedtime    cefTRIAXone   IVPB 2000 milliGRAM(s) IV Intermittent every 24 hours      acetaminophen     Tablet .. 650 milliGRAM(s) Oral every 6 hours PRN  baclofen 20 milliGRAM(s) Oral every 6 hours PRN  gabapentin 200 milliGRAM(s) Oral at bedtime  levETIRAcetam 750 milliGRAM(s) Oral two times a day  melatonin 3 milliGRAM(s) Oral at bedtime PRN  ondansetron Injectable 4 milliGRAM(s) IV Push every 8 hours PRN  pregabalin 75 milliGRAM(s) Oral daily    polyethylene glycol 3350 17 Gram(s) Oral daily  senna 2 Tablet(s) Oral at bedtime    rosuvastatin 10 milliGRAM(s) Oral at bedtime    multivitamin 1 Tablet(s) Oral daily  sodium chloride 0.9%. 1000 milliLiter(s) IV Continuous <Continuous>  tamsulosin 0.4 milliGRAM(s) Oral daily      FAMILY HISTORY:  No pertinent family history in first degree relatives      Non-contributory    SOCIAL HISTORY:    [ ] Tobacco-former remote smoking   [ ] Drugs- narcotic dependence   [ ] Alcohol: denies     Allergies    No Known Allergies    Intolerances    	    REVIEW OF SYSTEMS:  CONSTITUTIONAL: No fever  EYES: No eye pain, visual disturbances, or discharge  ENMT:  No difficulty hearing, tinnitus  NECK: No pain or stiffness  RESPIRATORY: +dry cough, No wheezing.   CARDIOVASCULAR: No chest pain, palpitations, passing out, dizziness, or leg swelling  GASTROINTESTINAL:  No nausea, vomiting, diarrhea or constipation. No melena.  GENITOURINARY: No dysuria, hematuria  NEUROLOGICAL: No stroke like symptoms  SKIN: No burning or lesions   ENDOCRINE: No heat or cold intolerance  MUSCULOSKELETAL: No joint pain or swelling  PSYCHIATRIC: No  anxiety, mood swings  HEME/LYMPH: No bleeding gums  ALLERGY AND IMMUNOLOGIC: No hives or eczema	    All other ROS negative    PHYSICAL EXAM:  T(C): 36.7 (06-09-23 @ 11:24), Max: 36.9 (06-08-23 @ 21:01)  HR: 76 (06-09-23 @ 11:24) (76 - 88)  BP: 120/73 (06-09-23 @ 11:24) (120/73 - 138/75)  RR: 18 (06-09-23 @ 11:24) (18 - 19)  SpO2: 93% (06-09-23 @ 11:24) (91% - 93%)  Wt(kg): --  I&O's Summary    08 Jun 2023 07:01  -  09 Jun 2023 07:00  --------------------------------------------------------  IN: 915 mL / OUT: 0 mL / NET: 915 mL    Appearance: Normal	  HEENT:   Normal oral mucosa, EOMI	  Cardiovascular:  S1 S2, No JVD,    Respiratory: Lungs clear to auscultation	  Psychiatry: Alert  Gastrointestinal:  Soft, Non-tender, + BS	  Skin: No rashes   Neurologic: Non-focal  Extremities:  No edema  Vascular: Peripheral pulses palpable    	      	  CARDIAC MARKERS:  Labs personally reviewed by me                                  12.7   12.62 )-----------( 171      ( 09 Jun 2023 06:47 )             37.6     06-09    140  |  106  |  16  ----------------------------<  102<H>  4.2   |  21<L>  |  1.48<H>    Ca    9.0      09 Jun 2023 06:47            EKG: Personally reviewed by me -   EKG 4/23/23:   NORMAL SINUS RHYTHM  POSSIBLE INFERIOR INFARCT (CITED ON OR BEFORE 28-MAR-2022)  ABNORMAL ECG  WHEN COMPARED WITH ECG OF 06-MAY-2022 06:33,  NO SIGNIFICANT CHANGE WAS FOUND    Radiology: Personally reviewed by me -     TTE 5/8/22  Conclusions:  1. Mitral valve not well visualized, probably normal.  Minimal mitral regurgitation.  2. Calcified trileaflet aortic valve with normal opening.  No aortic valve regurgitation seen.  3.   Endocardial visualization enhanced with intravenous  injection of Ultrasonic Enhancing Agent (Definity). Normal  left ventricular systolic function. No segmental wall  motion abnormalities. Septal motion consistent with  conduction defect.  4. Mild diastolic dysfunction (Stage I).  5. The right ventricle is not well visualized; grossly  normal right ventricular systolic function.  6. No evidence of valvular vegetation is seen.  *** Compared with echocardiogram of 1/2/2015, the focal  outpouching is not seen.    CT chest 6/8/23  New mild left hydronephrosis and perinephric fat stranding secondary to   an obstructing 1.1 cm calculus in the UPJ. Correlate clinically for   superimposed urinary tract infection. Evaluation for pyelonephritis is   limited without intravenous contrast.    No pneumonia.    CXR 6/6/23  Status post spinal fusion.  The heart is normal in size.  Increased interstitial lung markings compared to prior x-ray.  There is no pneumothorax or pleural effusion.    Assessment /Plan: Patient is a 67y Male with PMH of HTN, HLD, CAD s/p stent (2015), seizure disorder on Keppra, chronic back pain s/p multiple spinal surgeries, kidney stones, and recurrent UTIs, presenting to the ED w/ AMS x1 day. Patient was admitted in 4/2023 w/ proteus UTI, also finished a course of abx outpatient 2 weeks ago for another UTI.    1. Coronary Artery Disease  - ECG from 4/2023 with no ischemic characteristics noted  - Denies CP or SOB  - can repeat EKG if clinically warranted   - c/w ASA, statin and metoprolol    2. Hypertension  - BP mostly at target  - c/w Metoprolol 25mg PO daily, can increase if hypertensive     3. Hyperlipidemia  - c/w rosuvastatin 10mg daily         Differential diagnosis and plan of care discussed with patient after the evaluation. Counseling on diet, nutritional counseling, weight management, exercise and medication compliance was done.   Advanced care planning/advanced directives discussed with patient/family. DNR status including forceful chest compressions to attempt to restart the heart, ventilator support/artificial breathing, electric shock, artificial nutrition, health care proxy, Molst form all discussed with pt. Pt wishes to consider. More than fifteen minutes spent on discussing advanced directives.               MARCO Henry,  [facility]  Cardiovascular Medicine  [address] [place] [address], [address]  Available for call or text via [employer]  Office [phone]   DATE OF SERVICE: 06-09-23 @ 12:43    CHIEF COMPLAINT:Patient is a 67y old  Male who presents with a chief complaint of severe sepsis 2/2 UTI (09 Jun 2023 08:31)      HISTORY OF PRESENT ILLNESS:HPI:  Patient is a 67y Male with PMH of HTN, HLD, CAD s/p stent (2015), seizure disorder on Keppra, chronic back pain s/p multiple spinal surgeries, kidney stones, and recurrent UTIs, presenting to the ED w/ AMS x1 day. Patient was admitted in 4/2023 w/ proteus UTI, also finished a course of abx outpatient 2 weeks ago for another UTI. Patient's wife states that yesterday he started to become altered, which is usually what happens when he gets a UTI. His arms started shaking, he was confused, and he was having trouble keeping his eyes open. He also had a fever of 103 this morning. Patient does report dysuria. He denies chest pain, shortness of breath, abdominal pain, nausea, vomiting, changes in bowel habits. His last BM was 2 days ago. He follows with urology Dr. Wright from [facility] for his kidney stones.    In the ED, Tmax 102, , /65, RR 23, SpO2 95% on RA. Labs significant for WBC 22, Cr 1.66, lactate 2.4->1.6. UA grossly positive for UTI. CXR shows mildly increased interstitial markings compared to prior. CT head negative for acute pathology. S/p keppra, ceftriaxone, baclofen, and 1.5L IVF in the ED (06 Jun 2023 22:09)      PAST MEDICAL & SURGICAL HISTORY:  Myocardial infarction  KAEL x1 MI 2005, stent RCA      Back pain  Chronic back pain      Spinal stenosis      Lumbar herniated disc      Hypertension      Narcotic dependence  5/28/16 for withdrawal ,pt currently on Dialudid 8 mg every 4-6 hours /day      Osteoarthritis      GERD (gastroesophageal reflux disease)      Cerebral aneurysm rupture  1997 clipped, no residual      Femur fracture, right  2/16, surgical revision of right hip      Sacroiliitis, not elsewhere classified  Unspecified Inflammatory spondylopathy,Sacral and sacrococcygeal region      Brain aneurysm  s/p clipping, not compatible with MRI      Seizures      CAD (coronary artery disease)  s/p MI and RCA stenting      Chronic pain  Patient has an Intrathecal pump s/p removal in 2016      Cerebral aneurysm  I997 with clips      S/P lumbar fusion  L1-S1:2002      History of right inguinal hernia repair  x2      Hx of appendectomy  6/1969      Hx of laminectomy  lumbar-2002      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      Stented coronary artery  KAEL x 1 to RCA      History of hip replacement  8/15, revision 2/16 after falling and fracturing right femur      S/P left knee arthroscopy      H/O arthroscopy of shoulder  right X 2, left X 1      History of throat surgery  surgical exicision cyst 1986      Fusion of sacral region of spine  5/2017      History of back surgery  x5      S/P inguinal hernia repair      H/O fracture of femur  s/p repair      MEDICATIONS:  aspirin enteric coated 81 milliGRAM(s) Oral daily  heparin   Injectable 5000 Unit(s) SubCutaneous every 8 hours  metoprolol tartrate 25 milliGRAM(s) Oral at bedtime    cefTRIAXone   IVPB 2000 milliGRAM(s) IV Intermittent every 24 hours      acetaminophen     Tablet .. 650 milliGRAM(s) Oral every 6 hours PRN  baclofen 20 milliGRAM(s) Oral every 6 hours PRN  gabapentin 200 milliGRAM(s) Oral at bedtime  levETIRAcetam 750 milliGRAM(s) Oral two times a day  melatonin 3 milliGRAM(s) Oral at bedtime PRN  ondansetron Injectable 4 milliGRAM(s) IV Push every 8 hours PRN  pregabalin 75 milliGRAM(s) Oral daily    polyethylene glycol 3350 17 Gram(s) Oral daily  senna 2 Tablet(s) Oral at bedtime    rosuvastatin 10 milliGRAM(s) Oral at bedtime    multivitamin 1 Tablet(s) Oral daily  sodium chloride 0.9%. 1000 milliLiter(s) IV Continuous <Continuous>  tamsulosin 0.4 milliGRAM(s) Oral daily      FAMILY HISTORY:  No pertinent family history in first degree relatives      Non-contributory    SOCIAL HISTORY:    [ ] Tobacco-former remote smoking   [ ] Drugs- narcotic dependence   [ ] Alcohol: denies     Allergies    No Known Allergies    Intolerances    	    REVIEW OF SYSTEMS:  CONSTITUTIONAL: No fever  EYES: No eye pain, visual disturbances, or discharge  ENMT:  No difficulty hearing, tinnitus  NECK: No pain or stiffness  RESPIRATORY: +dry cough, No wheezing.   CARDIOVASCULAR: No chest pain, palpitations, passing out, dizziness, or leg swelling  GASTROINTESTINAL:  No nausea, vomiting, diarrhea or constipation. No melena.  GENITOURINARY: No dysuria, hematuria  NEUROLOGICAL: No stroke like symptoms  SKIN: No burning or lesions   ENDOCRINE: No heat or cold intolerance  MUSCULOSKELETAL: No joint pain or swelling  PSYCHIATRIC: No  anxiety, mood swings  HEME/LYMPH: No bleeding gums  ALLERGY AND IMMUNOLOGIC: No hives or eczema	    All other ROS negative    PHYSICAL EXAM:  T(C): 36.7 (06-09-23 @ 11:24), Max: 36.9 (06-08-23 @ 21:01)  HR: 76 (06-09-23 @ 11:24) (76 - 88)  BP: 120/73 (06-09-23 @ 11:24) (120/73 - 138/75)  RR: 18 (06-09-23 @ 11:24) (18 - 19)  SpO2: 93% (06-09-23 @ 11:24) (91% - 93%)  Wt(kg): --  I&O's Summary    08 Jun 2023 07:01  -  09 Jun 2023 07:00  --------------------------------------------------------  IN: 915 mL / OUT: 0 mL / NET: 915 mL    Appearance: Normal	  HEENT:   Normal oral mucosa, EOMI	  Cardiovascular:  S1 S2, No JVD,    Respiratory: Lungs clear to auscultation	  Psychiatry: Alert  Gastrointestinal:  Soft, Non-tender, + BS	  Skin: No rashes   Neurologic: Non-focal  Extremities:  No edema  Vascular: Peripheral pulses palpable    	      Labs personally reviewed by me                                  12.7   12.62 )-----------( 171      ( 09 Jun 2023 06:47 )             37.6     06-09    140  |  106  |  16  ----------------------------<  102<H>  4.2   |  21<L>  |  1.48<H>    Ca    9.0      09 Jun 2023 06:47            EKG: Personally reviewed by me -   EKG 4/23/23:   NORMAL SINUS RHYTHM  POSSIBLE INFERIOR INFARCT (CITED ON OR BEFORE 28-MAR-2022)  ABNORMAL ECG  WHEN COMPARED WITH ECG OF 06-MAY-2022 06:33,  NO SIGNIFICANT CHANGE WAS FOUND    Radiology: Personally reviewed by me -     TTE 5/8/22  Conclusions:  1. Mitral valve not well visualized, probably normal.  Minimal mitral regurgitation.  2. Calcified trileaflet aortic valve with normal opening.  No aortic valve regurgitation seen.  3.   Endocardial visualization enhanced with intravenous  injection of Ultrasonic Enhancing Agent (Definity). Normal  left ventricular systolic function. No segmental wall  motion abnormalities. Septal motion consistent with  conduction defect.  4. Mild diastolic dysfunction (Stage I).  5. The right ventricle is not well visualized; grossly  normal right ventricular systolic function.  6. No evidence of valvular vegetation is seen.  *** Compared with echocardiogram of 1/2/2015, the focal  outpouching is not seen.    CT chest 6/8/23  New mild left hydronephrosis and perinephric fat stranding secondary to   an obstructing 1.1 cm calculus in the UPJ. Correlate clinically for   superimposed urinary tract infection. Evaluation for pyelonephritis is   limited without intravenous contrast.    No pneumonia.    CXR 6/6/23  Status post spinal fusion.  The heart is normal in size.  Increased interstitial lung markings compared to prior x-ray.  There is no pneumothorax or pleural effusion.    Assessment /Plan: Patient is a 67y Male with PMH of HTN, HLD, CAD s/p stent (2015), seizure disorder on Keppra, chronic back pain s/p multiple spinal surgeries, kidney stones, and recurrent UTIs, presenting to the ED w/ AMS x1 day. Patient was admitted in 4/2023 w/ proteus UTI, also finished a course of abx outpatient 2 weeks ago for another UTI.    1. Coronary Artery Disease  - ECG from 4/2023 with no ischemic characteristics noted  - Denies CP or SOB  - can repeat EKG if clinically warranted   - c/w ASA, statin and metoprolol    2. Hypertension  - BP mostly at target  - c/w Metoprolol 25mg PO daily, can increase if hypertensive     3. Hyperlipidemia  - c/w rosuvastatin 10mg daily     4. TONY   - likely ATN from sepsis per renal   - US kidney and bladder 6/7 --> Limited visibility of Left lower pole, No hydronephrosis, non obstructing right 6 mm calculus  - CT A/P No contrast 6/8 --> left 1.1 cm calculus in UPJ with new mild hydronephrosis and perinephric fat stranding. Additional non obstructing left renal calculus. Non obstructing calculi in the right upper pole and pelvis measuring 1.6 cm.   - Urology following    5. proteus UTI/bacteremia   - Abx per ID       Differential diagnosis and plan of care discussed with patient after the evaluation. Counseling on diet, nutritional counseling, weight management, exercise and medication compliance was done.   Advanced care planning/advanced directives discussed with patient/family. DNR status including forceful chest compressions to attempt to restart the heart, ventilator support/artificial breathing, electric shock, artificial nutrition, health care proxy, Molst form all discussed with pt. Pt wishes to consider. More than fifteen minutes spent on discussing advanced directives.         MARCO Henry DO [facility]  Cardiovascular Medicine  [address] [place] [address] Dr, [address]  Available for call or text via [employer] TE[employer]  Office [phone]   DATE OF SERVICE: 06-09-23 @ 12:43    CHIEF COMPLAINT:Patient is a 67y old  Male who presents with a chief complaint of severe sepsis 2/2 UTI (09 Jun 2023 08:31)      HISTORY OF PRESENT ILLNESS:HPI:  Patient is a 67y Male with PMH of HTN, HLD, CAD s/p stent (2015), seizure disorder on Keppra, chronic back pain s/p multiple spinal surgeries, kidney stones, and recurrent UTIs, presenting to the ED w/ AMS x1 day. Patient was admitted in 4/2023 w/ proteus UTI, also finished a course of abx outpatient 2 weeks ago for another UTI. Patient's wife states that yesterday he started to become altered, which is usually what happens when he gets a UTI. His arms started shaking, he was confused, and he was having trouble keeping his eyes open. He also had a fever of 103 this morning. Patient does report dysuria. He denies chest pain, shortness of breath, abdominal pain, nausea, vomiting, changes in bowel habits. His last BM was 2 days ago. He follows with urology Dr. Wright from [facility] for his kidney stones.    In the ED, Tmax 102, , /65, RR 23, SpO2 95% on RA. Labs significant for WBC 22, Cr 1.66, lactate 2.4->1.6. UA grossly positive for UTI. CXR shows mildly increased interstitial markings compared to prior. CT head negative for acute pathology. S/p keppra, ceftriaxone, baclofen, and 1.5L IVF in the ED (06 Jun 2023 22:09)      PAST MEDICAL & SURGICAL HISTORY:  Myocardial infarction  KAEL x1 MI 2005, stent RCA      Back pain  Chronic back pain      Spinal stenosis      Lumbar herniated disc      Hypertension      Narcotic dependence  5/28/16 for withdrawal ,pt currently on Dialudid 8 mg every 4-6 hours /day      Osteoarthritis      GERD (gastroesophageal reflux disease)      Cerebral aneurysm rupture  1997 clipped, no residual      Femur fracture, right  2/16, surgical revision of right hip      Sacroiliitis, not elsewhere classified  Unspecified Inflammatory spondylopathy,Sacral and sacrococcygeal region      Brain aneurysm  s/p clipping, not compatible with MRI      Seizures      CAD (coronary artery disease)  s/p MI and RCA stenting      Chronic pain  Patient has an Intrathecal pump s/p removal in 2016      Cerebral aneurysm  I997 with clips      S/P lumbar fusion  L1-S1:2002      History of right inguinal hernia repair  x2      Hx of appendectomy  6/1969      Hx of laminectomy  lumbar-2002      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      Stented coronary artery  KAEL x 1 to RCA      History of hip replacement  8/15, revision 2/16 after falling and fracturing right femur      S/P left knee arthroscopy      H/O arthroscopy of shoulder  right X 2, left X 1      History of throat surgery  surgical exicision cyst 1986      Fusion of sacral region of spine  5/2017      History of back surgery  x5      S/P inguinal hernia repair      H/O fracture of femur  s/p repair      MEDICATIONS:  aspirin enteric coated 81 milliGRAM(s) Oral daily  heparin   Injectable 5000 Unit(s) SubCutaneous every 8 hours  metoprolol tartrate 25 milliGRAM(s) Oral at bedtime    cefTRIAXone   IVPB 2000 milliGRAM(s) IV Intermittent every 24 hours      acetaminophen     Tablet .. 650 milliGRAM(s) Oral every 6 hours PRN  baclofen 20 milliGRAM(s) Oral every 6 hours PRN  gabapentin 200 milliGRAM(s) Oral at bedtime  levETIRAcetam 750 milliGRAM(s) Oral two times a day  melatonin 3 milliGRAM(s) Oral at bedtime PRN  ondansetron Injectable 4 milliGRAM(s) IV Push every 8 hours PRN  pregabalin 75 milliGRAM(s) Oral daily    polyethylene glycol 3350 17 Gram(s) Oral daily  senna 2 Tablet(s) Oral at bedtime    rosuvastatin 10 milliGRAM(s) Oral at bedtime    multivitamin 1 Tablet(s) Oral daily  sodium chloride 0.9%. 1000 milliLiter(s) IV Continuous <Continuous>  tamsulosin 0.4 milliGRAM(s) Oral daily      FAMILY HISTORY:  No pertinent family history in first degree relatives      Non-contributory    SOCIAL HISTORY:    [ ] Tobacco-former remote smoking   [ ] Drugs- narcotic dependence   [ ] Alcohol: denies     Allergies    No Known Allergies    Intolerances    	    REVIEW OF SYSTEMS:  CONSTITUTIONAL: No fever  EYES: No eye pain, visual disturbances, or discharge  ENMT:  No difficulty hearing, tinnitus  NECK: No pain or stiffness  RESPIRATORY: +dry cough, No wheezing.   CARDIOVASCULAR: No chest pain, palpitations, passing out, dizziness, or leg swelling  GASTROINTESTINAL:  No nausea, vomiting, diarrhea or constipation. No melena.  GENITOURINARY: No dysuria, hematuria  NEUROLOGICAL: No stroke like symptoms  SKIN: No burning or lesions   ENDOCRINE: No heat or cold intolerance  MUSCULOSKELETAL: No joint pain or swelling  PSYCHIATRIC: No  anxiety, mood swings  HEME/LYMPH: No bleeding gums  ALLERGY AND IMMUNOLOGIC: No hives or eczema	    All other ROS negative    PHYSICAL EXAM:  T(C): 36.7 (06-09-23 @ 11:24), Max: 36.9 (06-08-23 @ 21:01)  HR: 76 (06-09-23 @ 11:24) (76 - 88)  BP: 120/73 (06-09-23 @ 11:24) (120/73 - 138/75)  RR: 18 (06-09-23 @ 11:24) (18 - 19)  SpO2: 93% (06-09-23 @ 11:24) (91% - 93%)  Wt(kg): --  I&O's Summary    08 Jun 2023 07:01  -  09 Jun 2023 07:00  --------------------------------------------------------  IN: 915 mL / OUT: 0 mL / NET: 915 mL    Appearance: Normal	  HEENT:   Normal oral mucosa, EOMI	  Cardiovascular:  S1 S2, No JVD,    Respiratory: Lungs clear to auscultation	  Psychiatry: Alert  Gastrointestinal:  Soft, Non-tender, + BS	  Skin: No rashes   Neurologic: Non-focal  Extremities:  No edema  Vascular: Peripheral pulses palpable    	      Labs personally reviewed by me                                  12.7   12.62 )-----------( 171      ( 09 Jun 2023 06:47 )             37.6     06-09    140  |  106  |  16  ----------------------------<  102<H>  4.2   |  21<L>  |  1.48<H>    Ca    9.0      09 Jun 2023 06:47            EKG: Personally reviewed by me -   EKG 4/23/23:   NORMAL SINUS RHYTHM  POSSIBLE INFERIOR INFARCT (CITED ON OR BEFORE 28-MAR-2022)  ABNORMAL ECG  WHEN COMPARED WITH ECG OF 06-MAY-2022 06:33,  NO SIGNIFICANT CHANGE WAS FOUND    Radiology: Personally reviewed by me -     TTE 5/8/22  Conclusions:  1. Mitral valve not well visualized, probably normal.  Minimal mitral regurgitation.  2. Calcified trileaflet aortic valve with normal opening.  No aortic valve regurgitation seen.  3.   Endocardial visualization enhanced with intravenous  injection of Ultrasonic Enhancing Agent (Definity). Normal  left ventricular systolic function. No segmental wall  motion abnormalities. Septal motion consistent with  conduction defect.  4. Mild diastolic dysfunction (Stage I).  5. The right ventricle is not well visualized; grossly  normal right ventricular systolic function.  6. No evidence of valvular vegetation is seen.  *** Compared with echocardiogram of 1/2/2015, the focal  outpouching is not seen.    CT chest 6/8/23  New mild left hydronephrosis and perinephric fat stranding secondary to   an obstructing 1.1 cm calculus in the UPJ. Correlate clinically for   superimposed urinary tract infection. Evaluation for pyelonephritis is   limited without intravenous contrast.    No pneumonia.    CXR 6/6/23  Status post spinal fusion.  The heart is normal in size.  Increased interstitial lung markings compared to prior x-ray.  There is no pneumothorax or pleural effusion.          Assessment /Plan: Patient is a 67y Male with PMH of HTN, HLD, CAD s/p stent (2015), seizure disorder on Keppra, chronic back pain s/p multiple spinal surgeries, kidney stones, and recurrent UTIs, presenting to the ED w/ AMS x1 day. Patient was admitted in 4/2023 w/ proteus UTI, also finished a course of abx outpatient 2 weeks ago for another UTI.    1. Coronary Artery Disease  - ECG from 4/2023 with no ischemic characteristics noted  - Denies CP or SOB  - can repeat EKG if clinically warranted   - c/w ASA, statin and metoprolol    2. Hypertension  - BP mostly at target  - c/w Metoprolol 25mg PO daily, can increase if hypertensive     3. Hyperlipidemia  - c/w rosuvastatin 10mg daily     4. TONY   - likely ATN from sepsis per renal   - US kidney and bladder 6/7 --> Limited visibility of Left lower pole, No hydronephrosis, non obstructing right 6 mm calculus  - CT A/P No contrast 6/8 --> left 1.1 cm calculus in UPJ with new mild hydronephrosis and perinephric fat stranding. Additional non obstructing left renal calculus. Non obstructing calculi in the right upper pole and pelvis measuring 1.6 cm.   - Urology following             Differential diagnosis and plan of care discussed with patient after the evaluation. Counseling on diet, nutritional counseling, weight management, exercise and medication compliance was done.   Advanced care planning/advanced directives discussed with patient/family. DNR status including forceful chest compressions to attempt to restart the heart, ventilator support/artificial breathing, electric shock, artificial nutrition, health care proxy, Molst form all discussed with pt. Pt wishes to consider. More than fifteen minutes spent on discussing advanced directives.         MARCO Henry DO [facility]  Cardiovascular Medicine  [address] [place] [address] Dr, [address]  Available for call or text via [employer] TE[employer]  Office [phone]

## 2023-06-09 NOTE — PROGRESS NOTE ADULT - SUBJECTIVE AND OBJECTIVE BOX
Neurology     S: patient seen and examined. no complaints other than some back pain from bed     Medications:   MEDICATIONS  (STANDING):  aspirin enteric coated 81 milliGRAM(s) Oral daily  cefTRIAXone   IVPB 2000 milliGRAM(s) IV Intermittent every 24 hours  gabapentin 200 milliGRAM(s) Oral at bedtime  heparin   Injectable 5000 Unit(s) SubCutaneous every 8 hours  levETIRAcetam 750 milliGRAM(s) Oral two times a day  metoprolol tartrate 25 milliGRAM(s) Oral at bedtime  multivitamin 1 Tablet(s) Oral daily  polyethylene glycol 3350 17 Gram(s) Oral daily  pregabalin 75 milliGRAM(s) Oral daily  rosuvastatin 10 milliGRAM(s) Oral at bedtime  senna 2 Tablet(s) Oral at bedtime  sodium chloride 0.9%. 1000 milliLiter(s) (60 mL/Hr) IV Continuous <Continuous>  tamsulosin 0.4 milliGRAM(s) Oral daily    MEDICATIONS  (PRN):  acetaminophen     Tablet .. 650 milliGRAM(s) Oral every 6 hours PRN Temp greater or equal to 38C (100.4F), Mild Pain (1 - 3)  baclofen 20 milliGRAM(s) Oral every 6 hours PRN Muscle Spasm  melatonin 3 milliGRAM(s) Oral at bedtime PRN Insomnia  ondansetron Injectable 4 milliGRAM(s) IV Push every 8 hours PRN Nausea and/or Vomiting        Vitals:  Vital Signs Last 24 Hrs  T(C): 36.7 (2023 11:24), Max: 36.9 (2023 21:01)  T(F): 98.1 (2023 11:24), Max: 98.5 (2023 21:01)  HR: 76 (2023 11:24) (76 - 88)  BP: 120/73 (2023 11:24) (120/73 - 138/75)  BP(mean): --  RR: 18 (2023 11:24) (18 - 19)  SpO2: 93% (2023 11:24) (91% - 93%)    Parameters below as of 2023 11:24  Patient On (Oxygen Delivery Method): room air        General Exam:   General Appearance: Appropriately dressed and in no acute distress       Head: Normocephalic, atraumatic and no dysmorphic features  Ear, Nose, and Throat: Moist mucous membranes  CVS: S1S2+  Resp: No SOB, no wheeze or rhonchi  GI: soft NT/ND  Extremities: No edema or cyanosis  Skin: No bruises or rashes     Neurological Exam:  Mental Status: Awake, alert and oriented x 2.  Able to follow simple and complex verbal commands. Able to name and repeat. fluent speech. No obvious aphasia or dysarthria noted.   Cranial Nerves: PERRL, EOMI, VFFC, sensation V1-V3 intact,  no obvious facial asymmetry, equal elevation of palate, scm/trap 5/5, tongue is midline on protrusion. no obvious papilledema on fundoscopic exam. hearing is grossly intact.   Motor:SAAVEDRA uppers 4-5/5; lowers LLE 4+/5 RLE 4-/5   Sensation: Intact to light touch and pinprick throughout. no right/left confusion. no extinction to tactile on DSS.   Reflexes: 1+ throughout at biceps, brachioradialis, triceps, patellars and ankles bilaterally and equal. No clonus. R toe and L toe were both downgoing.  Coordination: No dysmetria on FNF   Gait: uses walker/wheelchair baseline     Data/Labs/Imaging which I personally reviewed.     Labs:     CBC Full  -  ( 2023 07:15 )  WBC Count : 13.07 K/uL  RBC Count : 3.82 M/uL  Hemoglobin : 12.0 g/dL  Hematocrit : 35.1 %  Platelet Count - Automated : 148 K/uL  Mean Cell Volume : 91.9 fl  Mean Cell Hemoglobin : 31.4 pg  Mean Cell Hemoglobin Concentration : 34.2 gm/dL  Auto Neutrophil # : x  Auto Lymphocyte # : x  Auto Monocyte # : x  Auto Eosinophil # : x  Auto Basophil # : x  Auto Neutrophil % : x  Auto Lymphocyte % : x  Auto Monocyte % : x  Auto Eosinophil % : x  Auto Basophil % : x    -    138  |  105  |  18  ----------------------------<  95  4.2   |  20<L>  |  1.65<H>    Ca    8.7      2023 07:10  Phos  2.7     06-07  Mg     1.8     06-07    TPro  7.2  /  Alb  4.0  /  TBili  1.0  /  DBili  x   /  AST  13  /  ALT  21  /  AlkPhos  109  06-06    LIVER FUNCTIONS - ( 2023 14:10 )  Alb: 4.0 g/dL / Pro: 7.2 g/dL / ALK PHOS: 109 U/L / ALT: 21 U/L / AST: 13 U/L / GGT: x           PT/INR - ( 2023 14:10 )   PT: 15.0 sec;   INR: 1.30 ratio         PTT - ( 2023 14:10 )  PTT:29.2 sec  Urinalysis Basic - ( 2023 14:11 )    Color: Yellow / Appearance: Slightly Turbid / S.026 / pH: x  Gluc: x / Ketone: Negative  / Bili: Negative / Urobili: Negative   Blood: x / Protein: 100 mg/dL / Nitrite: Negative   Leuk Esterase: Large / RBC: 98 /hpf /  /HPF   Sq Epi: x / Non Sq Epi: x / Bacteria: Many        < from: CT Head No Cont (23 @ 18:26) >    ACC: [id number] EXAM:  CT BRAIN   ORDERED BY: MANUEL DERAS     PROCEDURE DATE:  2023          INTERPRETATION:  CLINICAL INFORMATION: Altered mental status.    COMPARISON: CT head of 2023.    PROCEDURE:  Noncontrast CT of the Head was performed from the skull base to the   vertex. Coronal and Sagittal reformats were obtained.    FINDINGS:    Status post right frontotemporal craniotomy with right suprasellar   aneurysm clip. Encephalomalacia and gliosis within the anterior medial   frontal lobe and to a lesser degree within the left anteromedial frontal   lobe. No acute intracranial hemorrhage, mass effect, midline shift,   extra-axial collection, or hydrocephalus. Mild patchy hypodensities   within the periventricular and subcortical white matter, although   nonspecific, likely reflect chronic microvascular disease. Cerebral   volume loss contributes to prominence of the ventricles and sulci.    The visualized paranasal sinuses and mastoid air cells are clear. Status   post bilateral ocular lens replacement.    IMPRESSION:    No acute intracranial hemorrhage or mass effect.    Stable examination since 2023.    --- End of Report ---            FABIO EDEN MD; Attending Radiologist  This document has been electronically signed. 2023  6:26PM    < end of copied text >

## 2023-06-09 NOTE — CONSULT NOTE ADULT - NS ATTEND AMEND GEN_ALL_CORE FT
TONY: etiology?, work up as suggested, monitor I/O
Patient care and plan discussed and reviewed with Advanced Care Provider. Plan as outlined above edited by me to reflect our discussion.

## 2023-06-09 NOTE — PROGRESS NOTE ADULT - SUBJECTIVE AND OBJECTIVE BOX
[facility]   MD BERNABE BURTON MD BRIANA PETITO, NP    TEL:  FROM 9 AM to 5 PM ---OFFICE: [phone]  FROM 5 PM - 9 AM PLEASE CALL ANSWERING SERVICE: [phone]     RENAL PROGRESS NOTE: DATE OF SERVICE 06-09-23 @ 16:35    Patient is a 67y old  Male who presents with a chief complaint of severe sepsis 2/2 UTI     Patient seen and examined at bedside. No chest pain/sob    VITALS:  T(F): 98.1 (06-09-23 @ 11:24), Max: 98.5 (06-08-23 @ 21:01)  HR: 76 (06-09-23 @ 11:24)  BP: 120/73 (06-09-23 @ 11:24)  RR: 18 (06-09-23 @ 11:24)  SpO2: 93% (06-09-23 @ 11:24)  Wt(kg): --    06-08 @ 07:01  -  06-09 @ 07:00  --------------------------------------------------------  IN: 915 mL / OUT: 0 mL / NET: 915 mL    PHYSICAL EXAM:  Constitutional: NAD  Neck: No JVD  Respiratory: CTAB, no wheezes, rales or rhonchi  Cardiovascular: S1, S2, RRR  Gastrointestinal: BS+, obese, soft, NT/ND  Extremities: RLE edema    Hospital Medications:   MEDICATIONS  (STANDING):  aspirin enteric coated 81 milliGRAM(s) Oral daily  cefTRIAXone   IVPB 2000 milliGRAM(s) IV Intermittent every 24 hours  gabapentin 200 milliGRAM(s) Oral at bedtime  heparin   Injectable 5000 Unit(s) SubCutaneous every 8 hours  levETIRAcetam 750 milliGRAM(s) Oral two times a day  metoprolol tartrate 25 milliGRAM(s) Oral at bedtime  multivitamin 1 Tablet(s) Oral daily  polyethylene glycol 3350 17 Gram(s) Oral daily  pregabalin 75 milliGRAM(s) Oral daily  rosuvastatin 10 milliGRAM(s) Oral at bedtime  senna 2 Tablet(s) Oral at bedtime  sodium chloride 0.9%. 1000 milliLiter(s) (60 mL/Hr) IV Continuous <Continuous>  tamsulosin 0.4 milliGRAM(s) Oral daily      LABS:  06-09    140  |  106  |  16  ----------------------------<  102<H>  4.2   |  21<L>  |  1.48<H>    Ca    9.0      09 Jun 2023 06:47      Creatinine Trend: 1.48 <--, 1.65 <--, 1.87 <--, 1.66 <--                                12.7   12.62 )-----------( 171      ( 09 Jun 2023 06:47 )             37.6     Urine Studies:  Urinalysis - [06-06-23 @ 14:11]      Color Yellow / Appearance Slightly Turbid / SG 1.026 / pH 8.0      Gluc Negative / Ketone Negative  / Bili Negative / Urobili Negative       Blood Large / Protein 100 mg/dL / Leuk Est Large / Nitrite Negative      RBC 98 /  / Hyaline 12 / Gran  / Sq Epi  / Non Sq Epi  / Bacteria Many    Urine Creatinine 104      [06-08-23 @ 17:41]  Urine Sodium 147      [06-08-23 @ 17:41]    PTH -- (Ca --)      [04-23-23 @ 09:02]   10  Vitamin D (25OH) 63.1      [04-23-23 @ 09:02]  TSH 3.65      [04-26-23 @ 06:35]  Lipid: chol 120, , HDL 44, LDL --      [04-24-23 @ 06:53]        RADIOLOGY & ADDITIONAL STUDIES:

## 2023-06-09 NOTE — PROGRESS NOTE ADULT - SUBJECTIVE AND OBJECTIVE BOX
Name of Patient : MANUEL PETERSON  MRN: [id number]  Date of visit: 06-09-23 @ 14:38      Subjective: Patient seen and examined. No new events except as noted.   Patient seen earlier this AM. Offers no new complaints.     REVIEW OF SYSTEMS:    CONSTITUTIONAL: No weakness, fevers or chills  EYES/ENT: No visual changes;  No vertigo or throat pain   NECK: No pain or stiffness  RESPIRATORY: No cough, wheezing, hemoptysis; No shortness of breath  CARDIOVASCULAR: No chest pain or palpitations  GASTROINTESTINAL: No abdominal or epigastric pain. No nausea, vomiting, or hematemesis; No diarrhea or constipation. No melena or hematochezia.  GENITOURINARY: No dysuria, frequency or hematuria  NEUROLOGICAL: No numbness or weakness  SKIN: No itching, burning, rashes, or lesions   All other review of systems is negative unless indicated above.    MEDICATIONS:  MEDICATIONS  (STANDING):  aspirin enteric coated 81 milliGRAM(s) Oral daily  cefTRIAXone   IVPB 2000 milliGRAM(s) IV Intermittent every 24 hours  gabapentin 200 milliGRAM(s) Oral at bedtime  heparin   Injectable 5000 Unit(s) SubCutaneous every 8 hours  levETIRAcetam 750 milliGRAM(s) Oral two times a day  metoprolol tartrate 25 milliGRAM(s) Oral at bedtime  multivitamin 1 Tablet(s) Oral daily  polyethylene glycol 3350 17 Gram(s) Oral daily  pregabalin 75 milliGRAM(s) Oral daily  rosuvastatin 10 milliGRAM(s) Oral at bedtime  senna 2 Tablet(s) Oral at bedtime  sodium chloride 0.9%. 1000 milliLiter(s) (60 mL/Hr) IV Continuous <Continuous>  tamsulosin 0.4 milliGRAM(s) Oral daily      PHYSICAL EXAM:  T(C): 36.7 (06-09-23 @ 11:24), Max: 36.9 (06-08-23 @ 21:01)  HR: 76 (06-09-23 @ 11:24) (76 - 88)  BP: 120/73 (06-09-23 @ 11:24) (120/73 - 138/75)  RR: 18 (06-09-23 @ 11:24) (18 - 19)  SpO2: 93% (06-09-23 @ 11:24) (91% - 93%)  Wt(kg): --  I&O's Summary    08 Jun 2023 07:01  -  09 Jun 2023 07:00  --------------------------------------------------------  IN: 915 mL / OUT: 0 mL / NET: 915 mL          Appearance: Normal	  HEENT:  PERRLA   Lymphatic: No lymphadenopathy   Cardiovascular: Normal S1 S2, no JVD  Respiratory: normal effort , clear  Gastrointestinal:  Soft, Non-tender  Skin: No rashes,  warm to touch  Psychiatry:  Mood & affect appropriate  Musculuskeletal: No edema      All labs, Imaging and EKGs personally reviewed                 06-08-23 @ 07:01  -  06-09-23 @ 07:00  --------------------------------------------------------  IN: 915 mL / OUT: 0 mL / NET: 915 mL             Name of Patient : MANUEL PETERSON  MRN: [id number]  Date of visit: 06-09-23 @ 14:38      Subjective: Patient seen and examined. No new events except as noted.   Patient seen earlier this AM. Offers no new complaints.   Reports back discomfort from bed.    REVIEW OF SYSTEMS:    CONSTITUTIONAL: Generalized weakness   EYES/ENT: No visual changes;  No vertigo or throat pain   NECK: No pain or stiffness  RESPIRATORY: No cough, wheezing, hemoptysis; No shortness of breath  CARDIOVASCULAR: No chest pain or palpitations  GASTROINTESTINAL: No abdominal or epigastric pain. No nausea, vomiting, or hematemesis; No diarrhea or constipation. No melena or hematochezia.  GENITOURINARY: No dysuria, frequency or hematuria  NEUROLOGICAL: No numbness or weakness  SKIN: No itching, burning, rashes, or lesions   All other review of systems is negative unless indicated above.    MEDICATIONS:  MEDICATIONS  (STANDING):  aspirin enteric coated 81 milliGRAM(s) Oral daily  cefTRIAXone   IVPB 2000 milliGRAM(s) IV Intermittent every 24 hours  gabapentin 200 milliGRAM(s) Oral at bedtime  heparin   Injectable 5000 Unit(s) SubCutaneous every 8 hours  levETIRAcetam 750 milliGRAM(s) Oral two times a day  metoprolol tartrate 25 milliGRAM(s) Oral at bedtime  multivitamin 1 Tablet(s) Oral daily  polyethylene glycol 3350 17 Gram(s) Oral daily  pregabalin 75 milliGRAM(s) Oral daily  rosuvastatin 10 milliGRAM(s) Oral at bedtime  senna 2 Tablet(s) Oral at bedtime  sodium chloride 0.9%. 1000 milliLiter(s) (60 mL/Hr) IV Continuous <Continuous>  tamsulosin 0.4 milliGRAM(s) Oral daily      PHYSICAL EXAM:  T(C): 36.7 (06-09-23 @ 11:24), Max: 36.9 (06-08-23 @ 21:01)  HR: 76 (06-09-23 @ 11:24) (76 - 88)  BP: 120/73 (06-09-23 @ 11:24) (120/73 - 138/75)  RR: 18 (06-09-23 @ 11:24) (18 - 19)  SpO2: 93% (06-09-23 @ 11:24) (91% - 93%)  Wt(kg): --  I&O's Summary    08 Jun 2023 07:01  -  09 Jun 2023 07:00  --------------------------------------------------------  IN: 915 mL / OUT: 0 mL / NET: 915 mL          Appearance: Awake, lying down in bed   HEENT:  Eyes are open   Lymphatic: No lymphadenopathy   Cardiovascular: Normal   Respiratory: normal effort , clear  Gastrointestinal:  Soft, Non-tender  Skin: No rashes,  warm to touch  Psychiatry:  Mood & affect appropriate  Musculoskeletal: No edema        06-08-23 @ 07:01  -  06-09-23 @ 07:00  --------------------------------------------------------  IN: 915 mL / OUT: 0 mL / NET: 915 mL                            12.7   12.62 )-----------( 171      ( 09 Jun 2023 06:47 )             37.6               06-09    140  |  106  |  16  ----------------------------<  102<H>  4.2   |  21<L>  |  1.48<H>    Ca    9.0      09 Jun 2023 06:47               Culture - Blood (06.07.23 @ 17:38)   Specimen Source: .Blood Blood  Culture Results: No growth to date.    Culture - Blood (06.07.23 @ 17:00)   - Staphylococcus epidermidis, Methicillin resistant: Detec  Gram Stain: Growth in anaerobic bottle: Gram Positive Cocci in Clusters  Specimen Source: .Blood Blood  Organism: Blood Culture PCR  Culture Results: Growth in anaerobic bottle: Gram Positive Cocci in Clusters     Culture - Urine (06.06.23 @ 14:11)   Specimen Source: Clean Catch Clean Catch (Midstream)  Culture Results: >100,000 CFU/ml Proteus mirabilis    Culture - Blood (06.06.23 @ 13:39)   - Proteus species: Detec  Gram Stain: Growth in anaerobic bottle: Gram Negative Rods   Growth in aerobic bottle: Gram Negative Rods    Culture - Blood (06.06.23 @ 13:31)   Gram Stain: Growth in anaerobic bottle: Gram Negative Rods   Growth in aerobic bottle: Gram Negative Rods  Specimen Source: .Blood Blood-Peripheral  Culture Results: Growth in aerobic and anaerobic bottles: Proteus mirabilis

## 2023-06-09 NOTE — CONSULT NOTE ADULT - CONSULT REQUESTED DATE/TIME
08-Jun-2023 09:26
07-Jun-2023 13:48
08-Jun-2023 08:03
09-Jun-2023
07-Jun-2023 16:42
08-Jun-2023 09:10

## 2023-06-09 NOTE — CONSULT NOTE ADULT - REASON FOR ADMISSION
severe sepsis 2/2 UTI
UTI

## 2023-06-10 LAB
ANION GAP SERPL CALC-SCNC: 11 MMOL/L — SIGNIFICANT CHANGE UP (ref 5–17)
BUN SERPL-MCNC: 16 MG/DL — SIGNIFICANT CHANGE UP (ref 7–23)
CALCIUM SERPL-MCNC: 8.8 MG/DL — SIGNIFICANT CHANGE UP (ref 8.4–10.5)
CHLORIDE SERPL-SCNC: 109 MMOL/L — HIGH (ref 96–108)
CO2 SERPL-SCNC: 22 MMOL/L — SIGNIFICANT CHANGE UP (ref 22–31)
CREAT SERPL-MCNC: 1.54 MG/DL — HIGH (ref 0.5–1.3)
EGFR: 49 ML/MIN/1.73M2 — LOW
GLUCOSE SERPL-MCNC: 104 MG/DL — HIGH (ref 70–99)
HCT VFR BLD CALC: 37.4 % — LOW (ref 39–50)
HCT VFR BLD CALC: 40.4 % — SIGNIFICANT CHANGE UP (ref 39–50)
HGB BLD-MCNC: 12.6 G/DL — LOW (ref 13–17)
HGB BLD-MCNC: 13.6 G/DL — SIGNIFICANT CHANGE UP (ref 13–17)
MCHC RBC-ENTMCNC: 30.3 PG — SIGNIFICANT CHANGE UP (ref 27–34)
MCHC RBC-ENTMCNC: 31 PG — SIGNIFICANT CHANGE UP (ref 27–34)
MCHC RBC-ENTMCNC: 33.7 GM/DL — SIGNIFICANT CHANGE UP (ref 32–36)
MCHC RBC-ENTMCNC: 33.7 GM/DL — SIGNIFICANT CHANGE UP (ref 32–36)
MCV RBC AUTO: 90 FL — SIGNIFICANT CHANGE UP (ref 80–100)
MCV RBC AUTO: 91.9 FL — SIGNIFICANT CHANGE UP (ref 80–100)
NRBC # BLD: 0 /100 WBCS — SIGNIFICANT CHANGE UP (ref 0–0)
NRBC # BLD: 0 /100 WBCS — SIGNIFICANT CHANGE UP (ref 0–0)
PLATELET # BLD AUTO: 195 K/UL — SIGNIFICANT CHANGE UP (ref 150–400)
PLATELET # BLD AUTO: 208 K/UL — SIGNIFICANT CHANGE UP (ref 150–400)
POTASSIUM SERPL-MCNC: 4 MMOL/L — SIGNIFICANT CHANGE UP (ref 3.5–5.3)
POTASSIUM SERPL-SCNC: 4 MMOL/L — SIGNIFICANT CHANGE UP (ref 3.5–5.3)
RBC # BLD: 4.07 M/UL — LOW (ref 4.2–5.8)
RBC # BLD: 4.49 M/UL — SIGNIFICANT CHANGE UP (ref 4.2–5.8)
RBC # FLD: 15.5 % — HIGH (ref 10.3–14.5)
RBC # FLD: 15.9 % — HIGH (ref 10.3–14.5)
SODIUM SERPL-SCNC: 142 MMOL/L — SIGNIFICANT CHANGE UP (ref 135–145)
WBC # BLD: 10.31 K/UL — SIGNIFICANT CHANGE UP (ref 3.8–10.5)
WBC # BLD: 9.5 K/UL — SIGNIFICANT CHANGE UP (ref 3.8–10.5)
WBC # FLD AUTO: 10.31 K/UL — SIGNIFICANT CHANGE UP (ref 3.8–10.5)
WBC # FLD AUTO: 9.5 K/UL — SIGNIFICANT CHANGE UP (ref 3.8–10.5)

## 2023-06-10 DEVICE — URETERAL CATH OPEN END 5FR 70CM: Type: IMPLANTABLE DEVICE | Site: LEFT | Status: FUNCTIONAL

## 2023-06-10 DEVICE — STENT URET INLAY OPTIMA 6FRX26CM: Type: IMPLANTABLE DEVICE | Site: LEFT | Status: FUNCTIONAL

## 2023-06-10 DEVICE — GLDWIRE 0.038INX150CM 3CM TIP: Type: IMPLANTABLE DEVICE | Site: LEFT | Status: FUNCTIONAL

## 2023-06-10 DEVICE — GUIDEWIRE SENSOR DUAL-FLEX NITINOL STRAIGHT .035" X 150CM: Type: IMPLANTABLE DEVICE | Site: LEFT | Status: FUNCTIONAL

## 2023-06-10 DEVICE — IMPLANTABLE DEVICE: Type: IMPLANTABLE DEVICE | Site: LEFT | Status: FUNCTIONAL

## 2023-06-10 RX ORDER — SODIUM CHLORIDE 9 MG/ML
1000 INJECTION INTRAMUSCULAR; INTRAVENOUS; SUBCUTANEOUS
Refills: 0 | Status: COMPLETED | OUTPATIENT
Start: 2023-06-10 | End: 2023-06-10

## 2023-06-10 RX ADMIN — Medication 20 MILLIGRAM(S): at 21:18

## 2023-06-10 RX ADMIN — Medication 75 MILLIGRAM(S): at 17:55

## 2023-06-10 RX ADMIN — SENNA PLUS 2 TABLET(S): 8.6 TABLET ORAL at 21:17

## 2023-06-10 RX ADMIN — Medication 650 MILLIGRAM(S): at 21:18

## 2023-06-10 RX ADMIN — ROSUVASTATIN CALCIUM 10 MILLIGRAM(S): 5 TABLET ORAL at 21:17

## 2023-06-10 RX ADMIN — Medication 20 MILLIGRAM(S): at 11:09

## 2023-06-10 RX ADMIN — Medication 25 MILLIGRAM(S): at 21:17

## 2023-06-10 RX ADMIN — LEVETIRACETAM 750 MILLIGRAM(S): 250 TABLET, FILM COATED ORAL at 06:22

## 2023-06-10 RX ADMIN — HEPARIN SODIUM 5000 UNIT(S): 5000 INJECTION INTRAVENOUS; SUBCUTANEOUS at 22:15

## 2023-06-10 RX ADMIN — Medication 650 MILLIGRAM(S): at 22:18

## 2023-06-10 RX ADMIN — LEVETIRACETAM 750 MILLIGRAM(S): 250 TABLET, FILM COATED ORAL at 17:55

## 2023-06-10 RX ADMIN — Medication 1 TABLET(S): at 17:55

## 2023-06-10 RX ADMIN — GABAPENTIN 200 MILLIGRAM(S): 400 CAPSULE ORAL at 21:18

## 2023-06-10 RX ADMIN — TAMSULOSIN HYDROCHLORIDE 0.4 MILLIGRAM(S): 0.4 CAPSULE ORAL at 17:56

## 2023-06-10 RX ADMIN — SODIUM CHLORIDE 60 MILLILITER(S): 9 INJECTION INTRAMUSCULAR; INTRAVENOUS; SUBCUTANEOUS at 22:15

## 2023-06-10 RX ADMIN — CEFTRIAXONE 100 MILLIGRAM(S): 500 INJECTION, POWDER, FOR SOLUTION INTRAMUSCULAR; INTRAVENOUS at 17:56

## 2023-06-10 NOTE — PROGRESS NOTE ADULT - SUBJECTIVE AND OBJECTIVE BOX
Name of Patient : MANUEL PETERSON  MRN: [id number]  Date of visit: 06-10-23     Subjective: Patient seen and examined. No new events except as noted.     REVIEW OF SYSTEMS:    CONSTITUTIONAL: No weakness, fevers or chills  EYES/ENT: No visual changes;  No vertigo or throat pain   NECK: No pain or stiffness  RESPIRATORY: No cough, wheezing, hemoptysis; No shortness of breath  CARDIOVASCULAR: No chest pain or palpitations  GASTROINTESTINAL: No abdominal or epigastric pain. No nausea, vomiting, or hematemesis; No diarrhea or constipation. No melena or hematochezia.  GENITOURINARY: No dysuria, frequency or hematuria  NEUROLOGICAL: No numbness or weakness  SKIN: No itching, burning, rashes, or lesions   All other review of systems is negative unless indicated above.    MEDICATIONS:  MEDICATIONS  (STANDING):  aspirin enteric coated 81 milliGRAM(s) Oral daily  cefTRIAXone   IVPB 2000 milliGRAM(s) IV Intermittent every 24 hours  gabapentin 200 milliGRAM(s) Oral at bedtime  heparin   Injectable 5000 Unit(s) SubCutaneous every 8 hours  levETIRAcetam 750 milliGRAM(s) Oral two times a day  metoprolol tartrate 25 milliGRAM(s) Oral at bedtime  multivitamin 1 Tablet(s) Oral daily  polyethylene glycol 3350 17 Gram(s) Oral daily  pregabalin 75 milliGRAM(s) Oral daily  rosuvastatin 10 milliGRAM(s) Oral at bedtime  senna 2 Tablet(s) Oral at bedtime  sodium chloride 0.9%. 1000 milliLiter(s) (60 mL/Hr) IV Continuous <Continuous>  tamsulosin 0.4 milliGRAM(s) Oral daily      PHYSICAL EXAM:  T(C): 36.4 (06-10-23 @ 21:13), Max: 37.1 (06-10-23 @ 18:10)  HR: 91 (06-10-23 @ 21:13) (71 - 93)  BP: 118/73 (06-10-23 @ 21:13) (104/59 - 178/86)  RR: 17 (06-10-23 @ 21:13) (15 - 18)  SpO2: 91% (06-10-23 @ 21:13) (91% - 98%)  Wt(kg): --  I&O's Summary    09 Jun 2023 07:01  -  10 Tahir 2023 07:00  --------------------------------------------------------  IN: 240 mL / OUT: 0 mL / NET: 240 mL    10 Tahir 2023 07:01  -  10 Tahir 2023 23:55  --------------------------------------------------------  IN: 240 mL / OUT: 1800 mL / NET: -1560 mL          Appearance: Normal	  HEENT:  PERRLA   Lymphatic: No lymphadenopathy   Cardiovascular: Normal S1 S2, no JVD  Respiratory: normal effort , clear  Gastrointestinal:  Soft, Non-tender  Skin: No rashes,  warm to touch  Psychiatry:  Mood & affect appropriate  Musculuskeletal: No edema    recent labs, Imaging and EKGs personally reviewed     06-09-23 @ 07:01  -  06-10-23 @ 07:00  --------------------------------------------------------  IN: 240 mL / OUT: 0 mL / NET: 240 mL    06-10-23 @ 07:01  -  06-10-23 @ 23:55  --------------------------------------------------------  IN: 240 mL / OUT: 1800 mL / NET: -1560 mL                            13.6   10.31 )-----------( 208      ( 10 Tahir 2023 18:55 )             40.4               06-10    142  |  109<H>  |  16  ----------------------------<  104<H>  4.0   |  22  |  1.54<H>    Ca    8.8      10 Tahir 2023 06:48

## 2023-06-10 NOTE — PROGRESS NOTE ADULT - SUBJECTIVE AND OBJECTIVE BOX
[facility] [place] [facility]   MD BERNABE BURTON MD KRISTINE SOLTANPOUR, DO ANGELA WONG, PA    TEL:  OFFICE: [phone]  From 5pm-7am Answering Service [phone]    -- RENAL FOLLOW UP NOTE ---Date of Service 06-10-23 @ 07:47    Patient is a 67y old  Male who presents with a chief complaint of severe sepsis 2/2 UTI (09 Jun 2023 16:35)      Patient seen and examined at bedside. No chest pain/sob    VITALS:  T(F): 97.9 (06-10-23 @ 04:34), Max: 98.1 (06-09-23 @ 11:24)  HR: 71 (06-10-23 @ 04:34)  BP: 163/85 (06-10-23 @ 04:34)  RR: 18 (06-10-23 @ 04:34)  SpO2: 95% (06-10-23 @ 04:34)  Wt(kg): --    06-09 @ 07:01  -  06-10 @ 07:00  --------------------------------------------------------  IN: 240 mL / OUT: 0 mL / NET: 240 mL          PHYSICAL EXAM:  General: NAD  Neck: No JVD  Respiratory: CTAB, no wheezes, rales or rhonchi  Cardiovascular: S1, S2, RRR  Gastrointestinal: BS+, soft, NT/ND  Extremities: No peripheral edema    Hospital Medications:   MEDICATIONS  (STANDING):  aspirin enteric coated 81 milliGRAM(s) Oral daily  cefTRIAXone   IVPB 2000 milliGRAM(s) IV Intermittent every 24 hours  gabapentin 200 milliGRAM(s) Oral at bedtime  heparin   Injectable 5000 Unit(s) SubCutaneous every 8 hours  levETIRAcetam 750 milliGRAM(s) Oral two times a day  metoprolol tartrate 25 milliGRAM(s) Oral at bedtime  multivitamin 1 Tablet(s) Oral daily  polyethylene glycol 3350 17 Gram(s) Oral daily  pregabalin 75 milliGRAM(s) Oral daily  rosuvastatin 10 milliGRAM(s) Oral at bedtime  senna 2 Tablet(s) Oral at bedtime  sodium chloride 0.9%. 1000 milliLiter(s) (60 mL/Hr) IV Continuous <Continuous>  tamsulosin 0.4 milliGRAM(s) Oral daily      LABS:  06-09    140  |  106  |  16  ----------------------------<  102<H>  4.2   |  21<L>  |  1.48<H>    Ca    9.0      09 Jun 2023 06:47      Creatinine Trend: 1.48 <--, 1.65 <--, 1.87 <--, 1.66 <--                                12.6   9.50  )-----------( 195      ( 10 Tahir 2023 06:48 )             37.4     Urine Studies:  Urinalysis - [06-06-23 @ 14:11]      Color Yellow / Appearance Slightly Turbid / SG 1.026 / pH 8.0      Gluc Negative / Ketone Negative  / Bili Negative / Urobili Negative       Blood Large / Protein 100 mg/dL / Leuk Est Large / Nitrite Negative      RBC 98 /  / Hyaline 12 / Gran  / Sq Epi  / Non Sq Epi  / Bacteria Many    Urine Creatinine 104      [06-08-23 @ 17:41]  Urine Sodium 147      [06-08-23 @ 17:41]    PTH -- (Ca --)      [04-23-23 @ 09:02]   10  Vitamin D (25OH) 63.1      [04-23-23 @ 09:02]  TSH 3.65      [04-26-23 @ 06:35]  Lipid: chol 120, , HDL 44, LDL --      [04-24-23 @ 06:53]        RADIOLOGY & ADDITIONAL STUDIES:

## 2023-06-10 NOTE — PROVIDER CONTACT NOTE (MEDICATION) - SITUATION
Patient has serrano with kassandra red urine output. Hold subcutaneous heparin and aspirin? Patient has serrano with red urine output. Hold subcutaneous heparin and aspirin?

## 2023-06-10 NOTE — PROGRESS NOTE ADULT - SUBJECTIVE AND OBJECTIVE BOX
Neurology     S: patient seen and examined. no complaints other than some back pain from bed     Medications:   MEDICATIONS  (STANDING):  aspirin enteric coated 81 milliGRAM(s) Oral daily  cefTRIAXone   IVPB 2000 milliGRAM(s) IV Intermittent every 24 hours  gabapentin 200 milliGRAM(s) Oral at bedtime  heparin   Injectable 5000 Unit(s) SubCutaneous every 8 hours  levETIRAcetam 750 milliGRAM(s) Oral two times a day  metoprolol tartrate 25 milliGRAM(s) Oral at bedtime  multivitamin 1 Tablet(s) Oral daily  polyethylene glycol 3350 17 Gram(s) Oral daily  pregabalin 75 milliGRAM(s) Oral daily  rosuvastatin 10 milliGRAM(s) Oral at bedtime  senna 2 Tablet(s) Oral at bedtime  sodium chloride 0.9%. 1000 milliLiter(s) (60 mL/Hr) IV Continuous <Continuous>  tamsulosin 0.4 milliGRAM(s) Oral daily    MEDICATIONS  (PRN):  acetaminophen     Tablet .. 650 milliGRAM(s) Oral every 6 hours PRN Temp greater or equal to 38C (100.4F), Mild Pain (1 - 3)  baclofen 20 milliGRAM(s) Oral every 6 hours PRN Muscle Spasm  melatonin 3 milliGRAM(s) Oral at bedtime PRN Insomnia  ondansetron Injectable 4 milliGRAM(s) IV Push every 8 hours PRN Nausea and/or Vomiting          Vitals:  Vital Signs Last 24 Hrs  T(C): 36.6 (06-10-23 @ 04:34), Max: 36.7 (23 @ 11:24)  T(F): 97.9 (06-10-23 @ 04:34), Max: 98.1 (23 @ 11:24)  HR: 71 (06-10-23 @ 04:34) (71 - 76)  BP: 163/85 (06-10-23 @ 04:34) (120/73 - 163/85)  BP(mean): --  RR: 18 (06-10-23 @ 04:34) (18 - 18)  SpO2: 95% (06-10-23 @ 04:34) (93% - 95%)            General Exam:   General Appearance: Appropriately dressed and in no acute distress       Head: Normocephalic, atraumatic and no dysmorphic features  Ear, Nose, and Throat: Moist mucous membranes  CVS: S1S2+  Resp: No SOB, no wheeze or rhonchi  GI: soft NT/ND  Extremities: No edema or cyanosis  Skin: No bruises or rashes     Neurological Exam:  Mental Status: Awake, alert and oriented x 2.  Able to follow simple and complex verbal commands. Able to name and repeat. fluent speech. No obvious aphasia or dysarthria noted.   Cranial Nerves: PERRL, EOMI, VFFC, sensation V1-V3 intact,  no obvious facial asymmetry, equal elevation of palate, scm/trap 5/5, tongue is midline on protrusion. no obvious papilledema on fundoscopic exam. hearing is grossly intact.   Motor:SAAVEDRA uppers 4-5/5; lowers LLE 4+/5 RLE 4-/5   Sensation: Intact to light touch and pinprick throughout. no right/left confusion. no extinction to tactile on DSS.   Reflexes: 1+ throughout at biceps, brachioradialis, triceps, patellars and ankles bilaterally and equal. No clonus. R toe and L toe were both downgoing.  Coordination: No dysmetria on FNF   Gait: uses walker/wheelchair baseline     Data/Labs/Imaging which I personally reviewed.     Labs:     CBC Full  -  ( 10 Tahir 2023 06:48 )  WBC Count : 9.50 K/uL  RBC Count : 4.07 M/uL  Hemoglobin : 12.6 g/dL  Hematocrit : 37.4 %  Platelet Count - Automated : 195 K/uL  Mean Cell Volume : 91.9 fl  Mean Cell Hemoglobin : 31.0 pg  Mean Cell Hemoglobin Concentration : 33.7 gm/dL  Auto Neutrophil # : x  Auto Lymphocyte # : x  Auto Monocyte # : x  Auto Eosinophil # : x  Auto Basophil # : x  Auto Neutrophil % : x  Auto Lymphocyte % : x  Auto Monocyte % : x  Auto Eosinophil % : x  Auto Basophil % : x    06-10    142  |  109<H>  |  16  ----------------------------<  104<H>  4.0   |  22  |  1.54<H>    Ca    8.8      10 Tahir 2023 06:48         PTT - ( 2023 14:10 )  PTT:29.2 sec  Urinalysis Basic - ( 2023 14:11 )    Color: Yellow / Appearance: Slightly Turbid / S.026 / pH: x  Gluc: x / Ketone: Negative  / Bili: Negative / Urobili: Negative   Blood: x / Protein: 100 mg/dL / Nitrite: Negative   Leuk Esterase: Large / RBC: 98 /hpf /  /HPF   Sq Epi: x / Non Sq Epi: x / Bacteria: Many        < from: CT Head No Cont (23 @ 18:26) >    ACC: [id number] EXAM:  CT BRAIN   ORDERED BY: MANUEL DERAS     PROCEDURE DATE:  2023          INTERPRETATION:  CLINICAL INFORMATION: Altered mental status.    COMPARISON: CT head of 2023.    PROCEDURE:  Noncontrast CT of the Head was performed from the skull base to the   vertex. Coronal and Sagittal reformats were obtained.    FINDINGS:    Status post right frontotemporal craniotomy with right suprasellar   aneurysm clip. Encephalomalacia and gliosis within the anterior medial   frontal lobe and to a lesser degree within the left anteromedial frontal   lobe. No acute intracranial hemorrhage, mass effect, midline shift,   extra-axial collection, or hydrocephalus. Mild patchy hypodensities   within the periventricular and subcortical white matter, although   nonspecific, likely reflect chronic microvascular disease. Cerebral   volume loss contributes to prominence of the ventricles and sulci.    The visualized paranasal sinuses and mastoid air cells are clear. Status   post bilateral ocular lens replacement.    IMPRESSION:    No acute intracranial hemorrhage or mass effect.    Stable examination since 2023.    --- End of Report ---            FABIO EDEN MD; Attending Radiologist  This document has been electronically signed. 2023  6:26PM    < end of copied text >

## 2023-06-11 LAB
ANION GAP SERPL CALC-SCNC: 10 MMOL/L — SIGNIFICANT CHANGE UP (ref 5–17)
BUN SERPL-MCNC: 14 MG/DL — SIGNIFICANT CHANGE UP (ref 7–23)
CALCIUM SERPL-MCNC: 9 MG/DL — SIGNIFICANT CHANGE UP (ref 8.4–10.5)
CHLORIDE SERPL-SCNC: 110 MMOL/L — HIGH (ref 96–108)
CO2 SERPL-SCNC: 21 MMOL/L — LOW (ref 22–31)
CREAT SERPL-MCNC: 1.06 MG/DL — SIGNIFICANT CHANGE UP (ref 0.5–1.3)
EGFR: 76 ML/MIN/1.73M2 — SIGNIFICANT CHANGE UP
GLUCOSE SERPL-MCNC: 149 MG/DL — HIGH (ref 70–99)
HCT VFR BLD CALC: 37.1 % — LOW (ref 39–50)
HGB BLD-MCNC: 12.4 G/DL — LOW (ref 13–17)
MAGNESIUM SERPL-MCNC: 2 MG/DL — SIGNIFICANT CHANGE UP (ref 1.6–2.6)
MCHC RBC-ENTMCNC: 30.5 PG — SIGNIFICANT CHANGE UP (ref 27–34)
MCHC RBC-ENTMCNC: 33.4 GM/DL — SIGNIFICANT CHANGE UP (ref 32–36)
MCV RBC AUTO: 91.2 FL — SIGNIFICANT CHANGE UP (ref 80–100)
NRBC # BLD: 0 /100 WBCS — SIGNIFICANT CHANGE UP (ref 0–0)
PLATELET # BLD AUTO: 235 K/UL — SIGNIFICANT CHANGE UP (ref 150–400)
POTASSIUM SERPL-MCNC: 4.3 MMOL/L — SIGNIFICANT CHANGE UP (ref 3.5–5.3)
POTASSIUM SERPL-SCNC: 4.3 MMOL/L — SIGNIFICANT CHANGE UP (ref 3.5–5.3)
RBC # BLD: 4.07 M/UL — LOW (ref 4.2–5.8)
RBC # FLD: 15.7 % — HIGH (ref 10.3–14.5)
SODIUM SERPL-SCNC: 141 MMOL/L — SIGNIFICANT CHANGE UP (ref 135–145)
WBC # BLD: 9.89 K/UL — SIGNIFICANT CHANGE UP (ref 3.8–10.5)
WBC # FLD AUTO: 9.89 K/UL — SIGNIFICANT CHANGE UP (ref 3.8–10.5)

## 2023-06-11 RX ORDER — ACETAMINOPHEN 500 MG
975 TABLET ORAL ONCE
Refills: 0 | Status: COMPLETED | OUTPATIENT
Start: 2023-06-11 | End: 2023-06-11

## 2023-06-11 RX ADMIN — CEFTRIAXONE 100 MILLIGRAM(S): 500 INJECTION, POWDER, FOR SOLUTION INTRAMUSCULAR; INTRAVENOUS at 13:07

## 2023-06-11 RX ADMIN — Medication 25 MILLIGRAM(S): at 22:21

## 2023-06-11 RX ADMIN — HEPARIN SODIUM 5000 UNIT(S): 5000 INJECTION INTRAVENOUS; SUBCUTANEOUS at 05:24

## 2023-06-11 RX ADMIN — SENNA PLUS 2 TABLET(S): 8.6 TABLET ORAL at 22:21

## 2023-06-11 RX ADMIN — Medication 81 MILLIGRAM(S): at 10:02

## 2023-06-11 RX ADMIN — HEPARIN SODIUM 5000 UNIT(S): 5000 INJECTION INTRAVENOUS; SUBCUTANEOUS at 22:23

## 2023-06-11 RX ADMIN — LEVETIRACETAM 750 MILLIGRAM(S): 250 TABLET, FILM COATED ORAL at 05:23

## 2023-06-11 RX ADMIN — LEVETIRACETAM 750 MILLIGRAM(S): 250 TABLET, FILM COATED ORAL at 17:26

## 2023-06-11 RX ADMIN — ROSUVASTATIN CALCIUM 10 MILLIGRAM(S): 5 TABLET ORAL at 22:21

## 2023-06-11 RX ADMIN — POLYETHYLENE GLYCOL 3350 17 GRAM(S): 17 POWDER, FOR SOLUTION ORAL at 10:04

## 2023-06-11 RX ADMIN — Medication 975 MILLIGRAM(S): at 01:20

## 2023-06-11 RX ADMIN — TAMSULOSIN HYDROCHLORIDE 0.4 MILLIGRAM(S): 0.4 CAPSULE ORAL at 10:02

## 2023-06-11 RX ADMIN — Medication 1 TABLET(S): at 10:02

## 2023-06-11 RX ADMIN — Medication 20 MILLIGRAM(S): at 04:16

## 2023-06-11 RX ADMIN — HEPARIN SODIUM 5000 UNIT(S): 5000 INJECTION INTRAVENOUS; SUBCUTANEOUS at 13:07

## 2023-06-11 RX ADMIN — GABAPENTIN 200 MILLIGRAM(S): 400 CAPSULE ORAL at 17:54

## 2023-06-11 RX ADMIN — Medication 3 MILLIGRAM(S): at 22:21

## 2023-06-11 RX ADMIN — Medication 20 MILLIGRAM(S): at 14:28

## 2023-06-11 RX ADMIN — Medication 20 MILLIGRAM(S): at 22:21

## 2023-06-11 RX ADMIN — Medication 75 MILLIGRAM(S): at 10:03

## 2023-06-11 RX ADMIN — Medication 975 MILLIGRAM(S): at 03:18

## 2023-06-11 NOTE — PROGRESS NOTE ADULT - SUBJECTIVE AND OBJECTIVE BOX
Name of Patient : MANUEL PETERSON  MRN: [id number]  Date of visit: 06-11-23       Subjective: Patient seen and examined. No new events except as noted.   S/P OR, stent placement  hematuria    REVIEW OF SYSTEMS:    CONSTITUTIONAL: No weakness, fevers or chills  EYES/ENT: No visual changes;  No vertigo or throat pain   NECK: No pain or stiffness  RESPIRATORY: No cough, wheezing, hemoptysis; No shortness of breath  CARDIOVASCULAR: No chest pain or palpitations  GASTROINTESTINAL: No abdominal or epigastric pain. No nausea, vomiting, or hematemesis; No diarrhea or constipation. No melena or hematochezia.  GENITOURINARY: No dysuria, frequency or hematuria  NEUROLOGICAL: No numbness or weakness  SKIN: No itching, burning, rashes, or lesions   All other review of systems is negative unless indicated above.    MEDICATIONS:  MEDICATIONS  (STANDING):  aspirin enteric coated 81 milliGRAM(s) Oral daily  cefTRIAXone   IVPB 2000 milliGRAM(s) IV Intermittent every 24 hours  gabapentin 200 milliGRAM(s) Oral at bedtime  heparin   Injectable 5000 Unit(s) SubCutaneous every 8 hours  levETIRAcetam 750 milliGRAM(s) Oral two times a day  metoprolol tartrate 25 milliGRAM(s) Oral at bedtime  multivitamin 1 Tablet(s) Oral daily  polyethylene glycol 3350 17 Gram(s) Oral daily  pregabalin 75 milliGRAM(s) Oral daily  rosuvastatin 10 milliGRAM(s) Oral at bedtime  senna 2 Tablet(s) Oral at bedtime  tamsulosin 0.4 milliGRAM(s) Oral daily      PHYSICAL EXAM:  T(C): 36.4 (06-11-23 @ 21:40), Max: 36.8 (06-11-23 @ 13:16)  HR: 72 (06-11-23 @ 21:40) (68 - 81)  BP: 166/79 (06-11-23 @ 21:40) (126/69 - 174/84)  RR: 18 (06-11-23 @ 21:40) (18 - 20)  SpO2: 94% (06-11-23 @ 21:40) (91% - 94%)  Wt(kg): --  I&O's Summary    10 Tahir 2023 07:01  -  11 Jun 2023 07:00  --------------------------------------------------------  IN: 240 mL / OUT: 2500 mL / NET: -2260 mL    11 Jun 2023 07:01  -  11 Jun 2023 21:50  --------------------------------------------------------  IN: 240 mL / OUT: 300 mL / NET: -60 mL          Appearance: Normal	  HEENT:  PERRLA   Lymphatic: No lymphadenopathy   Cardiovascular: Normal S1 S2, no JVD  Respiratory: normal effort , clear  Gastrointestinal:  Soft, Non-tender  Skin: No rashes,  warm to touch  Psychiatry:  Mood & affect appropriate  Musculuskeletal: No edema    recent labs, Imaging and EKGs personally reviewed     06-10-23 @ 07:01  -  06-11-23 @ 07:00  --------------------------------------------------------  IN: 240 mL / OUT: 2500 mL / NET: -2260 mL    06-11-23 @ 07:01  -  06-11-23 @ 21:50  --------------------------------------------------------  IN: 240 mL / OUT: 300 mL / NET: -60 mL                          12.4   9.89  )-----------( 235      ( 11 Jun 2023 06:32 )             37.1               06-11    141  |  110<H>  |  14  ----------------------------<  149<H>  4.3   |  21<L>  |  1.06    Ca    9.0      11 Jun 2023 06:30  Mg     2.0     06-11

## 2023-06-11 NOTE — PROGRESS NOTE ADULT - SUBJECTIVE AND OBJECTIVE BOX
[facility] [place] [facility]   MD BERNABE BURTON MD KRISTINE SOLTANPOUR, CATA ANDERSON    TEL:  OFFICE: [phone]  From 5pm-7am Answering Service [phone]    -- RENAL FOLLOW UP NOTE ---Date of Service 06-11-23 @ 18:44    Patient is a 67y old  Male who presents with a chief complaint of severe sepsis 2/2 UTI (10 Tahir 2023 12:55)      Patient seen and examined at bedside. No chest pain/sob    VITALS:  T(F): 98.2 (06-11-23 @ 16:09), Max: 98.3 (06-11-23 @ 13:16)  HR: 81 (06-11-23 @ 16:09)  BP: 126/69 (06-11-23 @ 16:09)  RR: 20 (06-11-23 @ 16:09)  SpO2: 93% (06-11-23 @ 16:09)  Wt(kg): --    06-10 @ 07:01  -  06-11 @ 07:00  --------------------------------------------------------  IN: 240 mL / OUT: 2500 mL / NET: -2260 mL    06-11 @ 07:01  -  06-11 @ 18:44  --------------------------------------------------------  IN: 240 mL / OUT: 0 mL / NET: 240 mL          PHYSICAL EXAM:  General: NAD  Neck: No JVD  Respiratory: CTAB, no wheezes, rales or rhonchi  Cardiovascular: S1, S2, RRR  Gastrointestinal: BS+, soft, NT/ND  Extremities: No peripheral edema    Hospital Medications:   MEDICATIONS  (STANDING):  aspirin enteric coated 81 milliGRAM(s) Oral daily  cefTRIAXone   IVPB 2000 milliGRAM(s) IV Intermittent every 24 hours  gabapentin 200 milliGRAM(s) Oral at bedtime  heparin   Injectable 5000 Unit(s) SubCutaneous every 8 hours  levETIRAcetam 750 milliGRAM(s) Oral two times a day  metoprolol tartrate 25 milliGRAM(s) Oral at bedtime  multivitamin 1 Tablet(s) Oral daily  polyethylene glycol 3350 17 Gram(s) Oral daily  pregabalin 75 milliGRAM(s) Oral daily  rosuvastatin 10 milliGRAM(s) Oral at bedtime  senna 2 Tablet(s) Oral at bedtime  sodium chloride 0.9%. 1000 milliLiter(s) (60 mL/Hr) IV Continuous <Continuous>  tamsulosin 0.4 milliGRAM(s) Oral daily      LABS:  06-11    141  |  110<H>  |  14  ----------------------------<  149<H>  4.3   |  21<L>  |  1.06    Ca    9.0      11 Jun 2023 06:30  Mg     2.0     06-11      Creatinine Trend: 1.06 <--, 1.54 <--, 1.48 <--, 1.65 <--, 1.87 <--, 1.66 <--                                12.4   9.89  )-----------( 235      ( 11 Jun 2023 06:32 )             37.1     Urine Studies:  Urinalysis - [06-06-23 @ 14:11]      Color Yellow / Appearance Slightly Turbid / SG 1.026 / pH 8.0      Gluc Negative / Ketone Negative  / Bili Negative / Urobili Negative       Blood Large / Protein 100 mg/dL / Leuk Est Large / Nitrite Negative      RBC 98 /  / Hyaline 12 / Gran  / Sq Epi  / Non Sq Epi  / Bacteria Many    Urine Creatinine 104      [06-08-23 @ 17:41]  Urine Sodium 147      [06-08-23 @ 17:41]    PTH -- (Ca --)      [04-23-23 @ 09:02]   10  Vitamin D (25OH) 63.1      [04-23-23 @ 09:02]  TSH 3.65      [04-26-23 @ 06:35]  Lipid: chol 120, , HDL 44, LDL --      [04-24-23 @ 06:53]        RADIOLOGY & ADDITIONAL STUDIES:

## 2023-06-12 LAB
ANION GAP SERPL CALC-SCNC: 14 MMOL/L — SIGNIFICANT CHANGE UP (ref 5–17)
BUN SERPL-MCNC: 20 MG/DL — SIGNIFICANT CHANGE UP (ref 7–23)
CALCIUM SERPL-MCNC: 9 MG/DL — SIGNIFICANT CHANGE UP (ref 8.4–10.5)
CHLORIDE SERPL-SCNC: 108 MMOL/L — SIGNIFICANT CHANGE UP (ref 96–108)
CO2 SERPL-SCNC: 23 MMOL/L — SIGNIFICANT CHANGE UP (ref 22–31)
CREAT SERPL-MCNC: 1.2 MG/DL — SIGNIFICANT CHANGE UP (ref 0.5–1.3)
CULTURE RESULTS: SIGNIFICANT CHANGE UP
EGFR: 66 ML/MIN/1.73M2 — SIGNIFICANT CHANGE UP
GLUCOSE SERPL-MCNC: 107 MG/DL — HIGH (ref 70–99)
HCT VFR BLD CALC: 36.5 % — LOW (ref 39–50)
HGB BLD-MCNC: 12.1 G/DL — LOW (ref 13–17)
MCHC RBC-ENTMCNC: 30.5 PG — SIGNIFICANT CHANGE UP (ref 27–34)
MCHC RBC-ENTMCNC: 33.2 GM/DL — SIGNIFICANT CHANGE UP (ref 32–36)
MCV RBC AUTO: 91.9 FL — SIGNIFICANT CHANGE UP (ref 80–100)
NRBC # BLD: 0 /100 WBCS — SIGNIFICANT CHANGE UP (ref 0–0)
PLATELET # BLD AUTO: 284 K/UL — SIGNIFICANT CHANGE UP (ref 150–400)
POTASSIUM SERPL-MCNC: 4 MMOL/L — SIGNIFICANT CHANGE UP (ref 3.5–5.3)
POTASSIUM SERPL-SCNC: 4 MMOL/L — SIGNIFICANT CHANGE UP (ref 3.5–5.3)
RBC # BLD: 3.97 M/UL — LOW (ref 4.2–5.8)
RBC # FLD: 15.9 % — HIGH (ref 10.3–14.5)
SARS-COV-2 RNA SPEC QL NAA+PROBE: SIGNIFICANT CHANGE UP
SODIUM SERPL-SCNC: 145 MMOL/L — SIGNIFICANT CHANGE UP (ref 135–145)
SPECIMEN SOURCE: SIGNIFICANT CHANGE UP
WBC # BLD: 12.53 K/UL — HIGH (ref 3.8–10.5)
WBC # FLD AUTO: 12.53 K/UL — HIGH (ref 3.8–10.5)

## 2023-06-12 PROCEDURE — 99232 SBSQ HOSP IP/OBS MODERATE 35: CPT

## 2023-06-12 RX ADMIN — HEPARIN SODIUM 5000 UNIT(S): 5000 INJECTION INTRAVENOUS; SUBCUTANEOUS at 21:41

## 2023-06-12 RX ADMIN — Medication 650 MILLIGRAM(S): at 10:40

## 2023-06-12 RX ADMIN — HEPARIN SODIUM 5000 UNIT(S): 5000 INJECTION INTRAVENOUS; SUBCUTANEOUS at 14:01

## 2023-06-12 RX ADMIN — POLYETHYLENE GLYCOL 3350 17 GRAM(S): 17 POWDER, FOR SOLUTION ORAL at 11:25

## 2023-06-12 RX ADMIN — Medication 20 MILLIGRAM(S): at 10:39

## 2023-06-12 RX ADMIN — Medication 20 MILLIGRAM(S): at 18:12

## 2023-06-12 RX ADMIN — Medication 25 MILLIGRAM(S): at 21:41

## 2023-06-12 RX ADMIN — HEPARIN SODIUM 5000 UNIT(S): 5000 INJECTION INTRAVENOUS; SUBCUTANEOUS at 05:43

## 2023-06-12 RX ADMIN — Medication 75 MILLIGRAM(S): at 11:25

## 2023-06-12 RX ADMIN — ONDANSETRON 4 MILLIGRAM(S): 8 TABLET, FILM COATED ORAL at 11:50

## 2023-06-12 RX ADMIN — CEFTRIAXONE 100 MILLIGRAM(S): 500 INJECTION, POWDER, FOR SOLUTION INTRAMUSCULAR; INTRAVENOUS at 14:01

## 2023-06-12 RX ADMIN — GABAPENTIN 200 MILLIGRAM(S): 400 CAPSULE ORAL at 21:41

## 2023-06-12 RX ADMIN — ROSUVASTATIN CALCIUM 10 MILLIGRAM(S): 5 TABLET ORAL at 21:40

## 2023-06-12 RX ADMIN — Medication 20 MILLIGRAM(S): at 04:16

## 2023-06-12 RX ADMIN — LEVETIRACETAM 750 MILLIGRAM(S): 250 TABLET, FILM COATED ORAL at 05:43

## 2023-06-12 RX ADMIN — Medication 1 TABLET(S): at 11:25

## 2023-06-12 RX ADMIN — LEVETIRACETAM 750 MILLIGRAM(S): 250 TABLET, FILM COATED ORAL at 17:15

## 2023-06-12 RX ADMIN — Medication 81 MILLIGRAM(S): at 11:25

## 2023-06-12 RX ADMIN — SENNA PLUS 2 TABLET(S): 8.6 TABLET ORAL at 21:41

## 2023-06-12 RX ADMIN — TAMSULOSIN HYDROCHLORIDE 0.4 MILLIGRAM(S): 0.4 CAPSULE ORAL at 11:25

## 2023-06-12 NOTE — PROGRESS NOTE ADULT - SUBJECTIVE AND OBJECTIVE BOX
Neurology     S: patient seen and examined. no complaints      Medications:   MEDICATIONS  (STANDING):  aspirin enteric coated 81 milliGRAM(s) Oral daily  cefTRIAXone   IVPB 2000 milliGRAM(s) IV Intermittent every 24 hours  gabapentin 200 milliGRAM(s) Oral at bedtime  heparin   Injectable 5000 Unit(s) SubCutaneous every 8 hours  levETIRAcetam 750 milliGRAM(s) Oral two times a day  metoprolol tartrate 25 milliGRAM(s) Oral at bedtime  multivitamin 1 Tablet(s) Oral daily  polyethylene glycol 3350 17 Gram(s) Oral daily  pregabalin 75 milliGRAM(s) Oral daily  rosuvastatin 10 milliGRAM(s) Oral at bedtime  senna 2 Tablet(s) Oral at bedtime  tamsulosin 0.4 milliGRAM(s) Oral daily    MEDICATIONS  (PRN):  acetaminophen     Tablet .. 650 milliGRAM(s) Oral every 6 hours PRN Temp greater or equal to 38C (100.4F), Mild Pain (1 - 3)  baclofen 20 milliGRAM(s) Oral every 6 hours PRN Muscle Spasm  melatonin 3 milliGRAM(s) Oral at bedtime PRN Insomnia  ondansetron Injectable 4 milliGRAM(s) IV Push every 8 hours PRN Nausea and/or Vomiting          Vitals:    Vital Signs Last 24 Hrs  T(C): 36.4 (23 @ 05:16), Max: 36.8 (23 @ 13:16)  T(F): 97.6 (23 @ 05:16), Max: 98.3 (23 @ 13:16)  HR: 62 (23 @ 05:16) (62 - 81)  BP: 167/82 (23 @ 05:16) (126/69 - 167/82)  BP(mean): --  RR: 18 (23 @ 05:16) (18 - 20)  SpO2: 92% (23 @ 05:16) (92% - 94%)              General Exam:   General Appearance: Appropriately dressed and in no acute distress       Head: Normocephalic, atraumatic and no dysmorphic features  Ear, Nose, and Throat: Moist mucous membranes  CVS: S1S2+  Resp: No SOB, no wheeze or rhonchi  GI: soft NT/ND  Extremities: No edema or cyanosis  Skin: No bruises or rashes     Neurological Exam:  Mental Status: Awake, alert and oriented x 2.  Able to follow simple and complex verbal commands. Able to name and repeat. fluent speech. No obvious aphasia or dysarthria noted.   Cranial Nerves: PERRL, EOMI, VFFC, sensation V1-V3 intact,  no obvious facial asymmetry, equal elevation of palate, scm/trap 5/5, tongue is midline on protrusion. no obvious papilledema on fundoscopic exam. hearing is grossly intact.   Motor:SAAVEDRA uppers 4-5/5; lowers LLE 4+/5 RLE 4-/5   Sensation: Intact to light touch and pinprick throughout. no right/left confusion. no extinction to tactile on DSS.   Reflexes: 1+ throughout at biceps, brachioradialis, triceps, patellars and ankles bilaterally and equal. No clonus. R toe and L toe were both downgoing.  Coordination: No dysmetria on FNF   Gait: uses walker/wheelchair baseline     Data/Labs/Imaging which I personally reviewed.     Labs:     CBC Full  -  ( 2023 07:04 )  WBC Count : 12.53 K/uL  RBC Count : 3.97 M/uL  Hemoglobin : 12.1 g/dL  Hematocrit : 36.5 %  Platelet Count - Automated : 284 K/uL  Mean Cell Volume : 91.9 fl  Mean Cell Hemoglobin : 30.5 pg  Mean Cell Hemoglobin Concentration : 33.2 gm/dL  Auto Neutrophil # : x  Auto Lymphocyte # : x  Auto Monocyte # : x  Auto Eosinophil # : x  Auto Basophil # : x  Auto Neutrophil % : x  Auto Lymphocyte % : x  Auto Monocyte % : x  Auto Eosinophil % : x  Auto Basophil % : x        145  |  108  |  20  ----------------------------<  107<H>  4.0   |  23  |  1.20    Ca    9.0      2023 07:03  Mg     2.0     06-11           PTT - ( 2023 14:10 )  PTT:29.2 sec  Urinalysis Basic - ( 2023 14:11 )    Color: Yellow / Appearance: Slightly Turbid / S.026 / pH: x  Gluc: x / Ketone: Negative  / Bili: Negative / Urobili: Negative   Blood: x / Protein: 100 mg/dL / Nitrite: Negative   Leuk Esterase: Large / RBC: 98 /hpf /  /HPF   Sq Epi: x / Non Sq Epi: x / Bacteria: Many        < from: CT Head No Cont (23 @ 18:26) >    ACC: [id number] EXAM:  CT BRAIN   ORDERED BY: MANUEL DERAS     PROCEDURE DATE:  2023          INTERPRETATION:  CLINICAL INFORMATION: Altered mental status.    COMPARISON: CT head of 2023.    PROCEDURE:  Noncontrast CT of the Head was performed from the skull base to the   vertex. Coronal and Sagittal reformats were obtained.    FINDINGS:    Status post right frontotemporal craniotomy with right suprasellar   aneurysm clip. Encephalomalacia and gliosis within the anterior medial   frontal lobe and to a lesser degree within the left anteromedial frontal   lobe. No acute intracranial hemorrhage, mass effect, midline shift,   extra-axial collection, or hydrocephalus. Mild patchy hypodensities   within the periventricular and subcortical white matter, although   nonspecific, likely reflect chronic microvascular disease. Cerebral   volume loss contributes to prominence of the ventricles and sulci.    The visualized paranasal sinuses and mastoid air cells are clear. Status   post bilateral ocular lens replacement.    IMPRESSION:    No acute intracranial hemorrhage or mass effect.    Stable examination since 2023.    --- End of Report ---            FABIO EDEN MD; Attending Radiologist  This document has been electronically signed. 2023  6:26PM    < end of copied text >

## 2023-06-12 NOTE — PROGRESS NOTE ADULT - SUBJECTIVE AND OBJECTIVE BOX
Follow Up:  fever, pyelo, bacteremia, obstructive stone    Interval History/ROS: no fever, improved R flank pain, no vomiting or cough, s/p stent placement      Allergies  No Known Allergies        ANTIMICROBIALS:  cefTRIAXone   IVPB 2000 every 24 hours      OTHER MEDS:  acetaminophen     Tablet .. 650 milliGRAM(s) Oral every 6 hours PRN  aspirin enteric coated 81 milliGRAM(s) Oral daily  baclofen 20 milliGRAM(s) Oral every 6 hours PRN  gabapentin 200 milliGRAM(s) Oral at bedtime  heparin   Injectable 5000 Unit(s) SubCutaneous every 8 hours  levETIRAcetam 750 milliGRAM(s) Oral two times a day  melatonin 3 milliGRAM(s) Oral at bedtime PRN  metoprolol tartrate 25 milliGRAM(s) Oral at bedtime  multivitamin 1 Tablet(s) Oral daily  ondansetron Injectable 4 milliGRAM(s) IV Push every 8 hours PRN  polyethylene glycol 3350 17 Gram(s) Oral daily  pregabalin 75 milliGRAM(s) Oral daily  rosuvastatin 10 milliGRAM(s) Oral at bedtime  senna 2 Tablet(s) Oral at bedtime  tamsulosin 0.4 milliGRAM(s) Oral daily      Vital Signs Last 24 Hrs  T(C): 36.7 (12 Jun 2023 13:00), Max: 36.8 (11 Jun 2023 16:09)  T(F): 98 (12 Jun 2023 13:00), Max: 98.2 (11 Jun 2023 16:09)  HR: 63 (12 Jun 2023 13:00) (62 - 81)  BP: 131/83 (12 Jun 2023 13:00) (126/69 - 167/82)  BP(mean): --  RR: 18 (12 Jun 2023 13:00) (18 - 20)  SpO2: 91% (12 Jun 2023 13:00) (91% - 94%)    Parameters below as of 12 Jun 2023 13:00  Patient On (Oxygen Delivery Method): room air        Physical Exam:  General:    NAD,  non toxic  Respiratory:    comfortable on RA  abd:     soft,   no tenderness  :   R CVAT  Musculoskeletal:   no joint swelling  vascular: no phlebitis  Skin:    no rash                            12.1   12.53 )-----------( 284      ( 12 Jun 2023 07:04 )             36.5       06-12    145  |  108  |  20  ----------------------------<  107<H>  4.0   |  23  |  1.20    Ca    9.0      12 Jun 2023 07:03  Mg     2.0     06-11            MICROBIOLOGY:  v  .Blood Blood-Peripheral  06-09-23   No growth to date.  --  --      .Blood Blood  06-07-23   No growth to date.  --  --      .Blood Blood  06-07-23   Growth in anaerobic bottle: Staphylococcus epidermidis  Coagulase Negative Staphylococci isolated from a single blood culture set  may represent contamination.  Contact the Microbiology Department at [phone] if susceptibility  testing is clinically indicated.  ***Blood Panel PCR results on this specimen are available  approximately 3 hours after the Gram stain result.***  Gram stain, PCR, and/or culture results may not always  correspond due to difference in methodologies.  ************************************************************  This PCR assay was performed by multiplex PCR. This  Assay tests for 66 bacterial and resistance gene targets.  Please refer to the [facility] test directory  at [email or web address] for details.  --  Blood Culture PCR      Clean Catch Clean Catch (Midstream)  06-06-23   >100,000 CFU/ml Proteus mirabilis  --  Proteus mirabilis      .Blood Blood-Peripheral  06-06-23   Growth in aerobic and anaerobic bottles: Proteus mirabilis  ***Blood Panel PCR results on this specimen are available  approximately 3 hours after the Gram stain result.***  Gram stain, PCR, and/or culture results may not always  correspond due to difference in methodologies.  ************************************************************  This PCR assay was performed by multiplex PCR. This  Assay tests for 66 bacterial and resistance gene targets.  Please refer to the [facility] Labs test directory  at [email or web address] for details.  --  Blood Culture PCR  Proteus mirabilis      .Blood Blood-Peripheral  06-06-23   Growth in aerobic and anaerobic bottles: Proteus mirabilis  See previous culture [id number]  --    Growth in anaerobic bottle: Gram Negative Rods  Growth in aerobic bottle: Gram Negative Rods          Rapid RVP Result: NotDetec (06-06 @ 14:08)        RADIOLOGY:  Images independently visualized and reviewed personally, findings as below  < from: CT Abdomen and Pelvis No Cont (06.08.23 @ 09:59) >  IMPRESSION:  New mild left hydronephrosis and perinephric fat stranding secondary to   an obstructing 1.1 cm calculus in the UPJ. Correlate clinically for   superimposed urinary tract infection. Evaluation for pyelonephritis is   limited without intravenous contrast.    No pneumonia.    < end of copied text >

## 2023-06-12 NOTE — PROGRESS NOTE ADULT - SUBJECTIVE AND OBJECTIVE BOX
DATE OF SERVICE: 06-12-23 @ 22:59    Patient is a 67y old  Male who presents with a chief complaint of severe sepsis 2/2 UTI (12 Jun 2023 19:13)      INTERVAL HISTORY: feels ok    	  MEDICATIONS:  metoprolol tartrate 25 milliGRAM(s) Oral at bedtime        PHYSICAL EXAM:  T(C): 36.2 (06-12-23 @ 19:51), Max: 36.7 (06-12-23 @ 13:00)  HR: 71 (06-12-23 @ 19:51) (62 - 71)  BP: 157/82 (06-12-23 @ 19:51) (131/83 - 167/82)  RR: 18 (06-12-23 @ 19:51) (18 - 18)  SpO2: 92% (06-12-23 @ 19:51) (91% - 92%)  Wt(kg): --  I&O's Summary    11 Jun 2023 07:01  -  12 Jun 2023 07:00  --------------------------------------------------------  IN: 240 mL / OUT: 300 mL / NET: -60 mL    12 Jun 2023 07:01  -  12 Jun 2023 22:59  --------------------------------------------------------  IN: 0 mL / OUT: 400 mL / NET: -400 mL          Appearance: In no distress	  HEENT:    PERRL, EOMI	  Cardiovascular:  S1 S2, No JVD  Respiratory: Lungs clear to auscultation	  Gastrointestinal:  Soft, Non-tender, + BS	  Vascularature:  No edema of LE  Psychiatric: Appropriate affect   Neuro: no acute focal deficits                               12.1   12.53 )-----------( 284      ( 12 Jun 2023 07:04 )             36.5     06-12    145  |  108  |  20  ----------------------------<  107<H>  4.0   |  23  |  1.20    Ca    9.0      12 Jun 2023 07:03  Mg     2.0     06-11          Labs personally reviewed      EKG 4/23/23:   NORMAL SINUS RHYTHM  POSSIBLE INFERIOR INFARCT (CITED ON OR BEFORE 28-MAR-2022)  ABNORMAL ECG  WHEN COMPARED WITH ECG OF 06-MAY-2022 06:33,  NO SIGNIFICANT CHANGE WAS FOUND    Radiology: Personally reviewed by me -     TTE 5/8/22  Conclusions:  1. Mitral valve not well visualized, probably normal.  Minimal mitral regurgitation.  2. Calcified trileaflet aortic valve with normal opening.  No aortic valve regurgitation seen.  3.   Endocardial visualization enhanced with intravenous  injection of Ultrasonic Enhancing Agent (Definity). Normal  left ventricular systolic function. No segmental wall  motion abnormalities. Septal motion consistent with  conduction defect.  4. Mild diastolic dysfunction (Stage I).  5. The right ventricle is not well visualized; grossly  normal right ventricular systolic function.  6. No evidence of valvular vegetation is seen.  *** Compared with echocardiogram of 1/2/2015, the focal  outpouching is not seen.    CT chest 6/8/23  New mild left hydronephrosis and perinephric fat stranding secondary to   an obstructing 1.1 cm calculus in the UPJ. Correlate clinically for   superimposed urinary tract infection. Evaluation for pyelonephritis is   limited without intravenous contrast.    No pneumonia.    CXR 6/6/23  Status post spinal fusion.  The heart is normal in size.  Increased interstitial lung markings compared to prior x-ray.  There is no pneumothorax or pleural effusion.          Assessment /Plan: Patient is a 67y Male with PMH of HTN, HLD, CAD s/p stent (2015), seizure disorder on Keppra, chronic back pain s/p multiple spinal surgeries, kidney stones, and recurrent UTIs, presenting to the ED w/ AMS x1 day. Patient was admitted in 4/2023 w/ proteus UTI, also finished a course of abx outpatient 2 weeks ago for another UTI.    1. Coronary Artery Disease  - ECG from 4/2023 with no ischemic characteristics noted  - Denies CP or SOB  - can repeat EKG if clinically warranted   - c/w ASA, statin and metoprolol    2. Hypertension  - BP mostly at target  - c/w Metoprolol 25mg PO daily, can increase if hypertensive     3. Hyperlipidemia  - c/w rosuvastatin 10mg daily     4. TONY   - likely ATN from sepsis per renal   - US kidney and bladder 6/7 --> Limited visibility of Left lower pole, No hydronephrosis, non obstructing right 6 mm calculus  - CT A/P No contrast 6/8 --> left 1.1 cm calculus in UPJ with new mild hydronephrosis and perinephric fat stranding. Additional non obstructing left renal calculus. Non obstructing calculi in the right upper pole and pelvis measuring 1.6 cm.   - Urology following            Adis Bellamy DO [facility]  Cardiovascular Medicine  [address] [place] [address], [address]  Office: [phone]  Available via Text/call on Microsoft Teams

## 2023-06-12 NOTE — PROGRESS NOTE ADULT - SUBJECTIVE AND OBJECTIVE BOX
Patient is a 67y old  Male who presents with a chief complaint of severe sepsis 2/2 UTI (12 Jun 2023 17:09)       INTERVAL HPI/OVERNIGHT EVENTS:  Patient seen and evaluated at bedside.  Pt is resting comfortable in NAD. Denies N/V/F/C.  Pain rated at X/10    Allergies    No Known Allergies    Intolerances        Vital Signs Last 24 Hrs  T(C): 36.7 (12 Jun 2023 13:00), Max: 36.7 (12 Jun 2023 13:00)  T(F): 98 (12 Jun 2023 13:00), Max: 98 (12 Jun 2023 13:00)  HR: 63 (12 Jun 2023 13:00) (62 - 77)  BP: 131/83 (12 Jun 2023 13:00) (131/83 - 167/82)  BP(mean): --  RR: 18 (12 Jun 2023 13:00) (18 - 18)  SpO2: 91% (12 Jun 2023 13:00) (91% - 94%)    Parameters below as of 12 Jun 2023 13:00  Patient On (Oxygen Delivery Method): room air        LABS:                        12.1   12.53 )-----------( 284      ( 12 Jun 2023 07:04 )             36.5     06-12    145  |  108  |  20  ----------------------------<  107<H>  4.0   |  23  |  1.20    Ca    9.0      12 Jun 2023 07:03  Mg     2.0     06-11          CAPILLARY BLOOD GLUCOSE          Lower Extremity Physical Exam:  Vascular: DP/PT 2/4, B/L, CFT <3 seconds B/L, Temperature gradient within normal, B/L.   Neuro: Epicritic sensation intact to the level of digits, B/L.  Musculoskeletal/Ortho: no gross digital deformities or contractures noted  Skin: no open lesions, no interdigital maceration, no erythema, no edema. Nails dystrophic/elongated x10 slightly incurvated now recently trimmed. No acute signs of infection. irritation dorsal IPJ hallux bilateral no grossly open lesions    RADIOLOGY & ADDITIONAL TESTS:

## 2023-06-12 NOTE — PROGRESS NOTE ADULT - SUBJECTIVE AND OBJECTIVE BOX
[facility]ROLOGY [facility]ACT[facility]   MD BERNABE BURTON MD BRIANA PETITO, NP    TEL:  FROM 9 AM to 5 PM ---OFFICE: [phone]  FROM 5 PM - 9 AM PLEASE CALL ANSWERING SERVICE: [phone]     RENAL PROGRESS NOTE: DATE OF SERVICE 06-12-23 @ 14:42    Patient is a 67y old  Male who presents with a chief complaint of severe sepsis 2/2 UTI     Patient seen and examined at bedside. No chest pain/sob    VITALS:  T(F): 98 (06-12-23 @ 13:00), Max: 98.2 (06-11-23 @ 16:09)  HR: 63 (06-12-23 @ 13:00)  BP: 131/83 (06-12-23 @ 13:00)  RR: 18 (06-12-23 @ 13:00)  SpO2: 91% (06-12-23 @ 13:00)  Wt(kg): --    06-11 @ 07:01  -  06-12 @ 07:00  --------------------------------------------------------  IN: 240 mL / OUT: 300 mL / NET: -60 mL    06-12 @ 07:01  -  06-12 @ 14:42  --------------------------------------------------------  IN: 0 mL / OUT: 400 mL / NET: -400 mL      PHYSICAL EXAM:  Constitutional: NAD  Neck: No JVD  Respiratory: CTAB, no wheezes, rales or rhonchi  Cardiovascular: S1, S2, RRR  Gastrointestinal: BS+, obese, soft, NT/ND   Extremities: No peripheral edema    Hospital Medications:   MEDICATIONS  (STANDING):  aspirin enteric coated 81 milliGRAM(s) Oral daily  cefTRIAXone   IVPB 2000 milliGRAM(s) IV Intermittent every 24 hours  gabapentin 200 milliGRAM(s) Oral at bedtime  heparin   Injectable 5000 Unit(s) SubCutaneous every 8 hours  levETIRAcetam 750 milliGRAM(s) Oral two times a day  metoprolol tartrate 25 milliGRAM(s) Oral at bedtime  multivitamin 1 Tablet(s) Oral daily  polyethylene glycol 3350 17 Gram(s) Oral daily  pregabalin 75 milliGRAM(s) Oral daily  rosuvastatin 10 milliGRAM(s) Oral at bedtime  senna 2 Tablet(s) Oral at bedtime  tamsulosin 0.4 milliGRAM(s) Oral daily      LABS:  06-12    145  |  108  |  20  ----------------------------<  107<H>  4.0   |  23  |  1.20    Ca    9.0      12 Jun 2023 07:03  Mg     2.0     06-11      Creatinine Trend: 1.20 <--, 1.06 <--, 1.54 <--, 1.48 <--, 1.65 <--, 1.87 <--, 1.66 <--                                12.1   12.53 )-----------( 284      ( 12 Jun 2023 07:04 )             36.5     Urine Studies:  Urinalysis - [06-06-23 @ 14:11]      Color Yellow / Appearance Slightly Turbid / SG 1.026 / pH 8.0      Gluc Negative / Ketone Negative  / Bili Negative / Urobili Negative       Blood Large / Protein 100 mg/dL / Leuk Est Large / Nitrite Negative      RBC 98 /  / Hyaline 12 / Gran  / Sq Epi  / Non Sq Epi  / Bacteria Many    Urine Creatinine 104      [06-08-23 @ 17:41]  Urine Sodium 147      [06-08-23 @ 17:41]    PTH -- (Ca --)      [04-23-23 @ 09:02]   10  Vitamin D (25OH) 63.1      [04-23-23 @ 09:02]  TSH 3.65      [04-26-23 @ 06:35]  Lipid: chol 120, , HDL 44, LDL --      [04-24-23 @ 06:53]        RADIOLOGY & ADDITIONAL STUDIES:

## 2023-06-12 NOTE — PROGRESS NOTE ADULT - SUBJECTIVE AND OBJECTIVE BOX
Name of Patient : MANUEL PETERSON  MRN: [id number]  Date of visit: 06-12-23 @ 17:09      Subjective: Patient seen and examined. No new events except as noted.   Patient seen earlier this AM. Lying down in bed.  Martinez with hematuria. Hemoglobin stable.  Denies pain or discomfort.     REVIEW OF SYSTEMS:    CONSTITUTIONAL: Generalized weakness   EYES/ENT: No visual changes;  No vertigo or throat pain   NECK: No pain or stiffness  RESPIRATORY: No cough, wheezing, hemoptysis; No shortness of breath  CARDIOVASCULAR: No chest pain or palpitations  GASTROINTESTINAL: No abdominal or epigastric pain. No nausea, vomiting, or hematemesis; No diarrhea or constipation. No melena or hematochezia.  GENITOURINARY: No dysuria, frequency or hematuria  NEUROLOGICAL: No numbness or weakness  SKIN: No itching, burning, rashes, or lesions   All other review of systems is negative unless indicated above.    MEDICATIONS:  MEDICATIONS  (STANDING):  aspirin enteric coated 81 milliGRAM(s) Oral daily  cefTRIAXone   IVPB 2000 milliGRAM(s) IV Intermittent every 24 hours  gabapentin 200 milliGRAM(s) Oral at bedtime  heparin   Injectable 5000 Unit(s) SubCutaneous every 8 hours  levETIRAcetam 750 milliGRAM(s) Oral two times a day  metoprolol tartrate 25 milliGRAM(s) Oral at bedtime  multivitamin 1 Tablet(s) Oral daily  polyethylene glycol 3350 17 Gram(s) Oral daily  pregabalin 75 milliGRAM(s) Oral daily  rosuvastatin 10 milliGRAM(s) Oral at bedtime  senna 2 Tablet(s) Oral at bedtime  tamsulosin 0.4 milliGRAM(s) Oral daily      PHYSICAL EXAM:  T(C): 36.7 (06-12-23 @ 13:00), Max: 36.7 (06-12-23 @ 13:00)  HR: 63 (06-12-23 @ 13:00) (62 - 77)  BP: 131/83 (06-12-23 @ 13:00) (131/83 - 167/82)  RR: 18 (06-12-23 @ 13:00) (18 - 18)  SpO2: 91% (06-12-23 @ 13:00) (91% - 94%)  Wt(kg): --  I&O's Summary    11 Jun 2023 07:01  -  12 Jun 2023 07:00  --------------------------------------------------------  IN: 240 mL / OUT: 300 mL / NET: -60 mL    12 Jun 2023 07:01  -  12 Jun 2023 17:09  --------------------------------------------------------  IN: 0 mL / OUT: 400 mL / NET: -400 mL          Appearance: Normal; lying down in bed 	  HEENT:  Eyes are open   Lymphatic: No lymphadenopathy   Cardiovascular: Normal    Respiratory: normal effort , clear  Gastrointestinal:  Soft, Non-tender  : Martinez with hematuria   Skin: No rashes,  warm to touch  Psychiatry:  Mood & affect appropriate  Musculoskeletal: No edema          06-11-23 @ 07:01  -  06-12-23 @ 07:00  --------------------------------------------------------  IN: 240 mL / OUT: 300 mL / NET: -60 mL    06-12-23 @ 07:01  -  06-12-23 @ 17:09  --------------------------------------------------------  IN: 0 mL / OUT: 400 mL / NET: -400 mL                                  12.1   12.53 )-----------( 284      ( 12 Jun 2023 07:04 )             36.5               06-12    145  |  108  |  20  ----------------------------<  107<H>  4.0   |  23  |  1.20    Ca    9.0      12 Jun 2023 07:03  Mg     2.0     06-11               Culture - Blood in AM (06.09.23 @ 06:52)   Specimen Source: .Blood Blood-Peripheral  Culture Results:   No growth to date.    Culture - Blood in AM (06.09.23 @ 06:52)   Specimen Source: .Blood Blood-Peripheral  Culture Results:   No growth to date.    Culture - Blood (06.07.23 @ 17:38)   Specimen Source: .Blood Blood  Culture Results:   No growth to date.    Culture - Blood (06.07.23 @ 17:00)   - Staphylococcus epidermidis, Methicillin resistant: Detec  Gram Stain:   Growth in anaerobic bottle: Gram Positive Cocci in Clusters  Specimen Source: .Blood Blood  Organism: Blood Culture PCR  Culture Results:   Growth in anaerobic bottle: Staphylococcus epidermidis

## 2023-06-13 ENCOUNTER — TRANSCRIPTION ENCOUNTER (OUTPATIENT)
Age: 68
End: 2023-06-13

## 2023-06-13 VITALS
SYSTOLIC BLOOD PRESSURE: 170 MMHG | RESPIRATION RATE: 18 BRPM | OXYGEN SATURATION: 96 % | TEMPERATURE: 97 F | DIASTOLIC BLOOD PRESSURE: 85 MMHG | HEART RATE: 60 BPM

## 2023-06-13 LAB
ANION GAP SERPL CALC-SCNC: 13 MMOL/L — SIGNIFICANT CHANGE UP (ref 5–17)
BUN SERPL-MCNC: 17 MG/DL — SIGNIFICANT CHANGE UP (ref 7–23)
CALCIUM SERPL-MCNC: 9.4 MG/DL — SIGNIFICANT CHANGE UP (ref 8.4–10.5)
CHLORIDE SERPL-SCNC: 107 MMOL/L — SIGNIFICANT CHANGE UP (ref 96–108)
CO2 SERPL-SCNC: 25 MMOL/L — SIGNIFICANT CHANGE UP (ref 22–31)
CREAT SERPL-MCNC: 1.12 MG/DL — SIGNIFICANT CHANGE UP (ref 0.5–1.3)
EGFR: 72 ML/MIN/1.73M2 — SIGNIFICANT CHANGE UP
GLUCOSE SERPL-MCNC: 100 MG/DL — HIGH (ref 70–99)
HCT VFR BLD CALC: 39.5 % — SIGNIFICANT CHANGE UP (ref 39–50)
HGB BLD-MCNC: 12.8 G/DL — LOW (ref 13–17)
MCHC RBC-ENTMCNC: 30.2 PG — SIGNIFICANT CHANGE UP (ref 27–34)
MCHC RBC-ENTMCNC: 32.4 GM/DL — SIGNIFICANT CHANGE UP (ref 32–36)
MCV RBC AUTO: 93.2 FL — SIGNIFICANT CHANGE UP (ref 80–100)
NRBC # BLD: 0 /100 WBCS — SIGNIFICANT CHANGE UP (ref 0–0)
PLATELET # BLD AUTO: 300 K/UL — SIGNIFICANT CHANGE UP (ref 150–400)
POTASSIUM SERPL-MCNC: 4.1 MMOL/L — SIGNIFICANT CHANGE UP (ref 3.5–5.3)
POTASSIUM SERPL-SCNC: 4.1 MMOL/L — SIGNIFICANT CHANGE UP (ref 3.5–5.3)
RBC # BLD: 4.24 M/UL — SIGNIFICANT CHANGE UP (ref 4.2–5.8)
RBC # FLD: 15.9 % — HIGH (ref 10.3–14.5)
SODIUM SERPL-SCNC: 145 MMOL/L — SIGNIFICANT CHANGE UP (ref 135–145)
WBC # BLD: 11.97 K/UL — HIGH (ref 3.8–10.5)
WBC # FLD AUTO: 11.97 K/UL — HIGH (ref 3.8–10.5)

## 2023-06-13 PROCEDURE — 84300 ASSAY OF URINE SODIUM: CPT

## 2023-06-13 PROCEDURE — 71250 CT THORAX DX C-: CPT

## 2023-06-13 PROCEDURE — 82330 ASSAY OF CALCIUM: CPT

## 2023-06-13 PROCEDURE — 84295 ASSAY OF SERUM SODIUM: CPT

## 2023-06-13 PROCEDURE — 82947 ASSAY GLUCOSE BLOOD QUANT: CPT

## 2023-06-13 PROCEDURE — 97110 THERAPEUTIC EXERCISES: CPT

## 2023-06-13 PROCEDURE — 85014 HEMATOCRIT: CPT

## 2023-06-13 PROCEDURE — 82570 ASSAY OF URINE CREATININE: CPT

## 2023-06-13 PROCEDURE — 97530 THERAPEUTIC ACTIVITIES: CPT

## 2023-06-13 PROCEDURE — 70450 CT HEAD/BRAIN W/O DYE: CPT | Mod: MA

## 2023-06-13 PROCEDURE — 85025 COMPLETE CBC W/AUTO DIFF WBC: CPT

## 2023-06-13 PROCEDURE — 87186 SC STD MICRODIL/AGAR DIL: CPT

## 2023-06-13 PROCEDURE — 74176 CT ABD & PELVIS W/O CONTRAST: CPT

## 2023-06-13 PROCEDURE — C1769: CPT

## 2023-06-13 PROCEDURE — 94640 AIRWAY INHALATION TREATMENT: CPT

## 2023-06-13 PROCEDURE — 84100 ASSAY OF PHOSPHORUS: CPT

## 2023-06-13 PROCEDURE — 76000 FLUOROSCOPY <1 HR PHYS/QHP: CPT

## 2023-06-13 PROCEDURE — 82803 BLOOD GASES ANY COMBINATION: CPT

## 2023-06-13 PROCEDURE — 96374 THER/PROPH/DIAG INJ IV PUSH: CPT

## 2023-06-13 PROCEDURE — 97161 PT EVAL LOW COMPLEX 20 MIN: CPT

## 2023-06-13 PROCEDURE — 80048 BASIC METABOLIC PNL TOTAL CA: CPT

## 2023-06-13 PROCEDURE — 81001 URINALYSIS AUTO W/SCOPE: CPT

## 2023-06-13 PROCEDURE — 83735 ASSAY OF MAGNESIUM: CPT

## 2023-06-13 PROCEDURE — 84132 ASSAY OF SERUM POTASSIUM: CPT

## 2023-06-13 PROCEDURE — 85027 COMPLETE CBC AUTOMATED: CPT

## 2023-06-13 PROCEDURE — 80053 COMPREHEN METABOLIC PANEL: CPT

## 2023-06-13 PROCEDURE — 71045 X-RAY EXAM CHEST 1 VIEW: CPT

## 2023-06-13 PROCEDURE — 87040 BLOOD CULTURE FOR BACTERIA: CPT

## 2023-06-13 PROCEDURE — 99232 SBSQ HOSP IP/OBS MODERATE 35: CPT

## 2023-06-13 PROCEDURE — C9399: CPT

## 2023-06-13 PROCEDURE — 85730 THROMBOPLASTIN TIME PARTIAL: CPT

## 2023-06-13 PROCEDURE — 87150 DNA/RNA AMPLIFIED PROBE: CPT

## 2023-06-13 PROCEDURE — C8929: CPT

## 2023-06-13 PROCEDURE — 87077 CULTURE AEROBIC IDENTIFY: CPT

## 2023-06-13 PROCEDURE — 82435 ASSAY OF BLOOD CHLORIDE: CPT

## 2023-06-13 PROCEDURE — 83605 ASSAY OF LACTIC ACID: CPT

## 2023-06-13 PROCEDURE — 99285 EMERGENCY DEPT VISIT HI MDM: CPT

## 2023-06-13 PROCEDURE — C2617: CPT

## 2023-06-13 PROCEDURE — 85610 PROTHROMBIN TIME: CPT

## 2023-06-13 PROCEDURE — 85018 HEMOGLOBIN: CPT

## 2023-06-13 PROCEDURE — 76775 US EXAM ABDO BACK WALL LIM: CPT

## 2023-06-13 PROCEDURE — 36415 COLL VENOUS BLD VENIPUNCTURE: CPT

## 2023-06-13 PROCEDURE — C1758: CPT

## 2023-06-13 PROCEDURE — 87635 SARS-COV-2 COVID-19 AMP PRB: CPT

## 2023-06-13 PROCEDURE — 0225U NFCT DS DNA&RNA 21 SARSCOV2: CPT

## 2023-06-13 PROCEDURE — 87086 URINE CULTURE/COLONY COUNT: CPT

## 2023-06-13 RX ORDER — AMLODIPINE BESYLATE 2.5 MG/1
5 TABLET ORAL DAILY
Refills: 0 | Status: DISCONTINUED | OUTPATIENT
Start: 2023-06-13 | End: 2023-06-13

## 2023-06-13 RX ORDER — BACLOFEN 100 %
1 POWDER (GRAM) MISCELLANEOUS
Refills: 0 | DISCHARGE

## 2023-06-13 RX ORDER — CEFPODOXIME PROXETIL 100 MG
200 TABLET ORAL EVERY 12 HOURS
Refills: 0 | Status: DISCONTINUED | OUTPATIENT
Start: 2023-06-14 | End: 2023-06-13

## 2023-06-13 RX ORDER — AMLODIPINE BESYLATE 2.5 MG/1
1 TABLET ORAL
Qty: 0 | Refills: 0 | DISCHARGE
Start: 2023-06-13

## 2023-06-13 RX ORDER — CEFPODOXIME PROXETIL 100 MG
1 TABLET ORAL
Qty: 0 | Refills: 0 | DISCHARGE
Start: 2023-06-13

## 2023-06-13 RX ADMIN — Medication 1 TABLET(S): at 11:41

## 2023-06-13 RX ADMIN — HEPARIN SODIUM 5000 UNIT(S): 5000 INJECTION INTRAVENOUS; SUBCUTANEOUS at 14:07

## 2023-06-13 RX ADMIN — Medication 20 MILLIGRAM(S): at 01:19

## 2023-06-13 RX ADMIN — Medication 81 MILLIGRAM(S): at 11:41

## 2023-06-13 RX ADMIN — Medication 75 MILLIGRAM(S): at 11:41

## 2023-06-13 RX ADMIN — HEPARIN SODIUM 5000 UNIT(S): 5000 INJECTION INTRAVENOUS; SUBCUTANEOUS at 06:15

## 2023-06-13 RX ADMIN — Medication 20 MILLIGRAM(S): at 08:55

## 2023-06-13 RX ADMIN — CEFTRIAXONE 100 MILLIGRAM(S): 500 INJECTION, POWDER, FOR SOLUTION INTRAMUSCULAR; INTRAVENOUS at 14:07

## 2023-06-13 RX ADMIN — TAMSULOSIN HYDROCHLORIDE 0.4 MILLIGRAM(S): 0.4 CAPSULE ORAL at 11:41

## 2023-06-13 RX ADMIN — LEVETIRACETAM 750 MILLIGRAM(S): 250 TABLET, FILM COATED ORAL at 06:15

## 2023-06-13 RX ADMIN — Medication 650 MILLIGRAM(S): at 07:03

## 2023-06-13 RX ADMIN — POLYETHYLENE GLYCOL 3350 17 GRAM(S): 17 POWDER, FOR SOLUTION ORAL at 11:41

## 2023-06-13 NOTE — PROVIDER CONTACT NOTE (OTHER) - BACKGROUND
Dx: UTI. PMHx: HTN, back pain, brain aneurysm, seizures, myocardial infarction, osteoarthritis. patient on home dose of baclofen for muscle spasms.
dx: UTI. PMHx: seizures, myocardial infarction and back pain
Pt. admitted for UTI, AMS.

## 2023-06-13 NOTE — PROGRESS NOTE ADULT - SUBJECTIVE AND OBJECTIVE BOX
[facility] NEPHROLOGY PRACTICE   MD BERNABE BURTON MD BRIANA PETITO, NP    TEL:  FROM 9 AM to 5 PM ---OFFICE: [phone]  FROM 5 PM - 9 AM PLEASE CALL ANSWERING SERVICE: [phone]     RENAL PROGRESS NOTE: DATE OF SERVICE 06-13-23 @ 14:13    Patient is a 67y old  Male who presents with a chief complaint of severe sepsis 2/2 UTI   Patient seen and examined at bedside. No chest pain/sob    VITALS:  T(F): 97.4 (06-13-23 @ 11:27), Max: 97.5 (06-13-23 @ 04:40)  HR: 60 (06-13-23 @ 11:27)  BP: 170/85 (06-13-23 @ 11:27)  RR: 18 (06-13-23 @ 11:27)  SpO2: 96% (06-13-23 @ 11:27)  Wt(kg): --    06-12 @ 07:01  -  06-13 @ 07:00  --------------------------------------------------------  IN: 0 mL / OUT: 400 mL / NET: -400 mL    06-13 @ 07:01  -  06-13 @ 14:13  --------------------------------------------------------  IN: 240 mL / OUT: 0 mL / NET: 240 mL      PHYSICAL EXAM:  Constitutional: NAD  Neck: No JVD  Respiratory: CTAB, no wheezes, rales or rhonchi  Cardiovascular: S1, S2, RRR  Gastrointestinal: BS+, obese, NT/ND   Extremities: No peripheral edema    Hospital Medications:   MEDICATIONS  (STANDING):  amLODIPine   Tablet 5 milliGRAM(s) Oral daily  aspirin enteric coated 81 milliGRAM(s) Oral daily  cefTRIAXone   IVPB 2000 milliGRAM(s) IV Intermittent every 24 hours  gabapentin 200 milliGRAM(s) Oral at bedtime  heparin   Injectable 5000 Unit(s) SubCutaneous every 8 hours  levETIRAcetam 750 milliGRAM(s) Oral two times a day  metoprolol tartrate 25 milliGRAM(s) Oral at bedtime  multivitamin 1 Tablet(s) Oral daily  polyethylene glycol 3350 17 Gram(s) Oral daily  pregabalin 75 milliGRAM(s) Oral daily  rosuvastatin 10 milliGRAM(s) Oral at bedtime  senna 2 Tablet(s) Oral at bedtime  tamsulosin 0.4 milliGRAM(s) Oral daily      LABS:  06-13    145  |  107  |  17  ----------------------------<  100<H>  4.1   |  25  |  1.12    Ca    9.4      13 Jun 2023 06:57      Creatinine Trend: 1.12 <--, 1.20 <--, 1.06 <--, 1.54 <--, 1.48 <--, 1.65 <--, 1.87 <--                                12.8   11.97 )-----------( 300      ( 13 Jun 2023 06:57 )             39.5     Urine Studies:  Urinalysis - [06-06-23 @ 14:11]      Color Yellow / Appearance Slightly Turbid / SG 1.026 / pH 8.0      Gluc Negative / Ketone Negative  / Bili Negative / Urobili Negative       Blood Large / Protein 100 mg/dL / Leuk Est Large / Nitrite Negative      RBC 98 /  / Hyaline 12 / Gran  / Sq Epi  / Non Sq Epi  / Bacteria Many    Urine Creatinine 104      [06-08-23 @ 17:41]  Urine Sodium 147      [06-08-23 @ 17:41]    PTH -- (Ca --)      [04-23-23 @ 09:02]   10  Vitamin D (25OH) 63.1      [04-23-23 @ 09:02]  TSH 3.65      [04-26-23 @ 06:35]  Lipid: chol 120, , HDL 44, LDL --      [04-24-23 @ 06:53]        RADIOLOGY & ADDITIONAL STUDIES:

## 2023-06-13 NOTE — DISCHARGE NOTE PROVIDER - HOSPITAL COURSE
67y Male with PMH of HTN, HLD, CAD s/p stent (2015), seizure disorder on Keppra, chronic back pain s/p multiple spinal surgeries, kidney stones, and recurrent UTIs, presenting to the ED w/ AMS x1 day, admitted to medicine w/ severe sepsis 2/2 UTI.    Urosepsis   - Fever, tachycardia, increased RR, leukocytosis, and elevated lactate meets criteria for severe sepsis  - UA grossly positive for UTI  - C/w ABX as per ID --> On ceftriaxone 2g Q 24   - Lactate resolved w/ IVF --> Continue with IVF for hydration   - Trend WBC and fever curve  - F/u UCx and BCx2 --> + Gram negative bacteremia; Proteus   - Repeat BCx2 in lab; 1/2 + w/ Gm +, Staph Epi, ? Contamination  --> Repeat BCx2 06/09 NGTD; F/u final   - CT C/A/P non-contrast  1.1 renal calculus in the UPJ with mild hydronephrosis --> urology to follow up, S/P OR for ureteral stent placement with urology   - Monitor CBC, temp curve, VS and patient closely  - ID eval and uroogy appreciated; F/u recs    AMS  - Likely due to UTI, mentation improving   -  CT C/A/P --> As above   - CT Head negative  - F/u infectious work up as above  - Neuro eval appreciated    Nephrolithiasis, Hydronephrosis   - Consider struvite stones given hx proteus UTI and elevated urine pH  - R/O urinary obstruction and/or pyelo given TONY   - Renal US noted   - CT with obstructing calculus --> S/Pnned for OR for ureteral stent placement with urology   - Urology eval appreciated; S/P OR, stent placement  - Monitor urine output, hematuria as expected, monitor hgb level, stable. Trend output     Leukocytosis   - Likely due to reactivity post-OR  - Continues on ABX as per ID  - F/u 06/09 Cultures  - Monitor and trend    TONY (acute kidney injury).   - Likely prerenal i/s/o severe sepsis vs postrenal i/s/o kidney stones  - S/P 1.5L IVF in the ED, C/w IVF   - F/u Renal US -->noted  - Renal eval appreciated; F/u recs     Anemia  - Down-trend in Hgb. No gross bleeding reported  - ? Dilutional component in view of IVF   - Cont to monitor and trend Hgb. Transfuse for Hgb < 7.0     HTN (hypertension).   - C/w home metoprolol.  - Check TTE, noted     HLD (hyperlipidemia).   - C/w home statin.    CAD (coronary artery disease).   - S/p stent in 2005  - C/w home aspirin.    Seizure disorder.   - C/w home keppra BID.  - CT  Head negative for acute findings  - Neuro eval appreciated  - Aspiration and Seizure precautions     Chronic back pain.   - C/w home gabapentin (dose slightly reduced for kidney function)  - C/w home lyrica (dose slightly reduced for kidney function)  - C/w home baclofen 4x/day PRN.    Prophylactic measure.   Diet: DASH  DVT Prophylaxis: HSQ  PT eval  Full code.   67y Male with PMH of HTN, HLD, CAD s/p stent (2015), seizure disorder on Keppra, chronic back pain s/p multiple spinal surgeries, kidney stones, and recurrent UTIs, presenting to the ED w/ AMS x1 day, admitted to medicine w/ severe sepsis 2/2 UTI.    Urosepsis   - Fever, tachycardia, increased RR, leukocytosis, and elevated lactate meets criteria for severe sepsis  - UA grossly positive for UTI  - C/w ABX as per ID --> On ceftriaxone 2g Q 24   - Lactate resolved w/ IVF --> Continue with IVF for hydration   - Trend WBC and fever curve  - F/u UCx and BCx2 --> + Gram negative bacteremia; Proteus   - Repeat BCx2 in lab; 1/2 + w/ Gm +, Staph Epi, ? Contamination  --> Repeat BCx2 06/09 NGTD; F/u final   - CT C/A/P non-contrast  1.1 renal calculus in the UPJ with mild hydronephrosis --> urology to follow up, S/P OR for ureteral stent placement with urology   - Monitor CBC, temp curve, VS and patient closely  - ID eval and uroogy appreciated; F/u recs    AMS  - Likely due to UTI, mentation improving   -  CT C/A/P --> As above   - CT Head negative  - F/u infectious work up as above  - Neuro eval appreciated    Nephrolithiasis, Hydronephrosis   - Consider struvite stones given hx proteus UTI and elevated urine pH  - R/O urinary obstruction and/or pyelo given TONY   - Renal US noted   - CT with obstructing calculus --> S/Pnned for OR for ureteral stent placement with urology   - Urology eval appreciated; S/P OR, stent placement  - Monitor urine output, hematuria as expected, monitor hgb level, stable. Trend output     Leukocytosis   - Likely due to reactivity post-OR  - Continues on ABX as per ID  - F/u 06/09 Cultures  - Monitor and trend    TONY (acute kidney injury).   - Likely prerenal i/s/o severe sepsis vs postrenal i/s/o kidney stones  - S/P 1.5L IVF in the ED, C/w IVF   - F/u Renal US -->noted  - Renal eval appreciated; F/u recs     Anemia  - Down-trend in Hgb. No gross bleeding reported  - ? Dilutional component in view of IVF   - Cont to monitor and trend Hgb. Transfuse for Hgb < 7.0     HTN (hypertension).   - C/w home metoprolol.  - Check TTE, noted     HLD (hyperlipidemia).   - C/w home statin.    CAD (coronary artery disease).   - S/p stent in 2005  - C/w home aspirin.    Seizure disorder.   - C/w home keppra BID.  - CT  Head negative for acute findings  - Neuro eval appreciated  - Aspiration and Seizure precautions     Chronic back pain.   - C/w home gabapentin (dose slightly reduced for kidney function)  - C/w home lyrica (dose slightly reduced for kidney function)  - C/w home baclofen 4x/day PRN.    Prophylactic measure.   Diet: DASH  DVT Prophylaxis: HSQ  PT eval  Full code.    Patient seen and examined at the bedside on 6/13/23. No significant events on telemetry overnight. AM labs wnl, VSS/HD stable. Denies any complaints at this time. Patient has been medically cleared for discharge as per Dr. Hamilton. Patient has been given appropriate discharge instructions including medication regimen, and follow up. Medications that patient needs refills on (+/- new medications) have been e-prescribed to preferred pharmacy. Patient will f/u with Dr. Morris in 1-2 weeks for further management.     VSS  Gen: NAD, A&O x3  Cards: RRR, clear S1 and S2 without murmur  Pulm: CTA B/L without w/r/r  Vascular: Peripheral pulses 2+ B/L  Abd: soft, NT  Ext: no LE edema or ulcerations B/L

## 2023-06-13 NOTE — PROGRESS NOTE ADULT - PROVIDER SPECIALTY LIST ADULT
Internal Medicine
Neurology
Cardiology
Cardiology
Infectious Disease
Infectious Disease
Internal Medicine
Nephrology
Nephrology
Neurology
Podiatry
Urology
Infectious Disease
Internal Medicine
Internal Medicine
Nephrology
Neurology
Infectious Disease
Internal Medicine
Nephrology
Neurology

## 2023-06-13 NOTE — PROVIDER CONTACT NOTE (OTHER) - REASON
Pt's bp noted 195/82
Patient noted with spasm-like movements. patient stated they are muscle spasms.
Pt currently saturating with oxygen at 91% at most on room air.

## 2023-06-13 NOTE — DISCHARGE NOTE NURSING/CASE MANAGEMENT/SOCIAL WORK - NSDCPETBCESMAN_GEN_ALL_CORE
Date & Time: 12/11/2018, 4:28 PM  Patient: [facility]  Encounter Provider(s):    HAYDER Venegas       To Whom It May Concern:    Khanh Pena was seen and treated in our department on 12/11/2018.  He should not participate in gym/sports until 12/ If you are a smoker, it is important for your health to stop smoking. Please be aware that second hand smoke is also harmful.

## 2023-06-13 NOTE — PROGRESS NOTE ADULT - NS ATTEND AMEND GEN_ALL_CORE FT
Pt care and plan discussed and reviewed with PA. Plan as outlined above edited by me to reflect our discussion. Advanced care planning/advanced directives discussed with patient/family. DNR status including forceful chest compressions to attempt to restart the heart, ventilator support/artificial breathing, electric shock, artificial nutrition, health care proxy, Molst form all discussed with pt.
Pt care and plan discussed and reviewed with PA. Plan as outlined above edited by me to reflect our discussion. Advanced care planning/advanced directives discussed with patient/family. DNR status including forceful chest compressions to attempt to restart the heart, ventilator support/artificial breathing, electric shock, artificial nutrition, health care proxy, Molst form all discussed with pt.
TONY-naun ATN-scr stable-monitor
as above
Pt care and plan discussed and reviewed with PA. Plan as outlined above edited by me to reflect our discussion. Advanced care planning/advanced directives discussed with patient/family. DNR status including forceful chest compressions to attempt to restart the heart, ventilator support/artificial breathing, electric shock, artificial nutrition, health care proxy, Molst form all discussed with pt.
TONY: Likely sec to ATN-improving
as above
Pt care and plan discussed and reviewed with PA. Plan as outlined above edited by me to reflect our discussion. Advanced care planning/advanced directives discussed with patient/family. DNR status including forceful chest compressions to attempt to restart the heart, ventilator support/artificial breathing, electric shock, artificial nutrition, health care proxy, Molst form all discussed with pt.
Pt care and plan discussed and reviewed with PA. Plan as outlined above edited by me to reflect our discussion. Advanced care planning/advanced directives discussed with patient/family. DNR status including forceful chest compressions to attempt to restart the heart, ventilator support/artificial breathing, electric shock, artificial nutrition, health care proxy, Molst form all discussed with pt.

## 2023-06-13 NOTE — DISCHARGE NOTE NURSING/CASE MANAGEMENT/SOCIAL WORK - PATIENT PORTAL LINK FT
You can access the FollowMyHealth Patient Portal offered by [facility] by registering at the following website: [email or web address]. By joining [employer]’s FollowMyHealth portal, you will also be able to view your health information using other applications (apps) compatible with our system.

## 2023-06-13 NOTE — PROGRESS NOTE ADULT - SUBJECTIVE AND OBJECTIVE BOX
DATE OF SERVICE: 06-13-23 @ 13:17    Patient is a 67y old  Male who presents with a chief complaint of severe sepsis 2/2 UTI (13 Jun 2023 12:09)      INTERVAL HISTORY: Feels well, denies chest pain and palpitations    REVIEW OF SYSTEMS:  CONSTITUTIONAL: No weakness  EYES/ENT: No visual changes;  No throat pain   NECK: No pain or stiffness  RESPIRATORY: No cough, wheezing; No shortness of breath  CARDIOVASCULAR: No chest pain or palpitations  GASTROINTESTINAL: No abdominal  pain. No nausea, vomiting, or hematemesis  GENITOURINARY: No dysuria, frequency or hematuria  NEUROLOGICAL: No stroke like symptoms  SKIN: No rashes      	  MEDICATIONS:  metoprolol tartrate 25 milliGRAM(s) Oral at bedtime        PHYSICAL EXAM:  T(C): 36.3 (06-13-23 @ 11:27), Max: 36.4 (06-13-23 @ 04:40)  HR: 60 (06-13-23 @ 11:27) (60 - 71)  BP: 170/85 (06-13-23 @ 11:27) (155/80 - 195/82)  RR: 18 (06-13-23 @ 11:27) (18 - 18)  SpO2: 96% (06-13-23 @ 11:27) (92% - 96%)  Wt(kg): --  I&O's Summary    12 Jun 2023 07:01  -  13 Jun 2023 07:00  --------------------------------------------------------  IN: 0 mL / OUT: 400 mL / NET: -400 mL    13 Jun 2023 07:01  -  13 Jun 2023 13:17  --------------------------------------------------------  IN: 240 mL / OUT: 0 mL / NET: 240 mL          Appearance: In no distress	  HEENT:    PERRL, EOMI	  Cardiovascular:  S1 S2, No JVD  Respiratory: Lungs clear to auscultation	  Gastrointestinal:  Soft, Non-tender, + BS	  Vascularature:  No edema of LE  Psychiatric: Appropriate affect   Neuro: no acute focal deficits                               12.8   11.97 )-----------( 300      ( 13 Jun 2023 06:57 )             39.5     06-13    145  |  107  |  17  ----------------------------<  100<H>  4.1   |  25  |  1.12    Ca    9.4      13 Jun 2023 06:57          Labs personally reviewed      ASSESSMENT/PLAN: 	  Assessment /Plan: Patient is a 67y Male with PMH of HTN, HLD, CAD s/p stent (2015), seizure disorder on Keppra, chronic back pain s/p multiple spinal surgeries, kidney stones, and recurrent UTIs, presenting to the ED w/ AMS x1 day. Patient was admitted in 4/2023 w/ proteus UTI, also finished a course of abx outpatient 2 weeks ago for another UTI.    1. Coronary Artery Disease  - ECG from 4/2023 with no ischemic characteristics noted  - Denies CP or SOB  - can repeat EKG if clinically warranted   - c/w ASA, statin and metoprolol    2. Hypertension  - BP mostly at target  - Increase Metoprolol to 50mg PO BID as pt remains hypertensive       3. Hyperlipidemia  - c/w rosuvastatin 10mg daily     4. TONY   - likely ATN from sepsis per renal   - US kidney and bladder 6/7 --> Limited visibility of Left lower pole, No hydronephrosis, non obstructing right 6 mm calculus  - CT A/P No contrast 6/8 --> left 1.1 cm calculus in UPJ with new mild hydronephrosis and perinephric fat stranding. Additional non obstructing left renal calculus. Non obstructing calculi in the right upper pole and pelvis measuring 1.6 cm.   - Urology following        TEODORA Edge DO [facility]  Cardiovascular Medicine  [address], [address]  Available via call or text on [employer]  Office: [phone]

## 2023-06-13 NOTE — PROGRESS NOTE ADULT - REASON FOR ADMISSION
severe sepsis 2/2 UTI

## 2023-06-13 NOTE — DISCHARGE NOTE PROVIDER - NSDCCPCAREPLAN_GEN_ALL_CORE_FT
PRINCIPAL DISCHARGE DIAGNOSIS  Diagnosis: AMS (altered mental status)  Assessment and Plan of Treatment: You came to the hospital because you had altered mental status. You had a CT scan of your head which was negative. You were seen by neurology. You were found to have a urinary tract infection (UTI) and treated as below. Please follow up with your primary care provider and neurologist within 1-2 weeks of discharge      SECONDARY DISCHARGE DIAGNOSES  Diagnosis: Sepsis due to urinary tract infection  Assessment and Plan of Treatment: You were found to have a urinary tract infection. You were seen by the infectious disease team and started on IV antibiotics. You had a CT scan of your abdomen and pelvis performed which showed a renal stone in one of your ureterals. You were seen by urology and underwent a ureteral stent placement. Please continue taking Cefpodixime 200mg twice a day until 6/16/23 (finish all the pills you are prescribed). Please follow up with your primary care provider and urologist within 1-2 weeks of discharge    Diagnosis: TONY (acute kidney injury)  Assessment and Plan of Treatment: Your kidney function was mildly decreased while you were in the hospital. It was probably because of your urinary tract infection and stone that was blocking one of your ureterals. You were given fluids and had surgery as above and it has now returned back to baseline. Please follow up with your primary care provider and neurologist within 1-2 weeks of discharge    Diagnosis: HTN (hypertension)  Assessment and Plan of Treatment: You have high blood pressure. Continue taking your blood pressure medications as prescribed. Eat a heart healthy diet with low salt; exercise regularly (if cleared by your primary care doctor or cardiologist). Maintain a heart healthy weight and include healthy ways to manage stress. If you smoke, quit. If you need assistance to help you stop smoking, please use the following resource: [facility] for Tobacco Control – ([phone]). Follow up with your primary care doctor to continue having your blood pressure checked on a continual basis.    Diagnosis: HLD (hyperlipidemia)  Assessment and Plan of Treatment: LDL<70   Goal is to keep LDL<70. Continue with your cholesterol medications as prescribed. Eat a heart healthy diet that is low in saturated fats and salt, and includes whole grains, fruits, vegetables and lean protein; exercise regularly (consult with your physician or cardiologist first); maintain a heart healthy weight; if you smoke - quit (A resource to help you stop smoking is the [facility] for Tobacco Control – phone number [phone].). Continue to follow with your primary physician or cardiologist.    Diagnosis: CAD (coronary artery disease)  Assessment and Plan of Treatment: You have a history of coronary artery disease. Your disease is stable and you do not exhibit any symptoms such as chest pain or shortness of breath on exertion. Please continue to take your prescribed medication. Maintain a low fat and low sugar diet. Please follow up with your primary care physician/Cardiologist routinely to monitor your lipid profile, blood pressure, and blood sugar.     PRINCIPAL DISCHARGE DIAGNOSIS  Diagnosis: AMS (altered mental status)  Assessment and Plan of Treatment: You came to the hospital because you had altered mental status. You had a CT scan of your head which was negative. You were seen by neurology. You were found to have a urinary tract infection (UTI) and treated as below. Please follow up with your primary care provider and neurologist within 1-2 weeks of discharge      SECONDARY DISCHARGE DIAGNOSES  Diagnosis: Sepsis due to urinary tract infection  Assessment and Plan of Treatment: You were found to have a urinary tract infection. You were seen by the infectious disease team and started on IV antibiotics. You had a CT scan of your abdomen and pelvis performed which showed a renal stone in one of your ureterals. You were seen by urology and underwent a ureteral stent placement. Please continue taking Cefpodixime 200mg twice a day (first dose 6/14AM until 6/16PM - finish all the pills you are prescribed). Please follow up with your primary care provider and urologist within 1-2 weeks of discharge    Diagnosis: TONY (acute kidney injury)  Assessment and Plan of Treatment: Your kidney function was mildly decreased while you were in the hospital. It was probably because of your urinary tract infection and stone that was blocking one of your ureterals. You were given fluids and had surgery as above and it has now returned back to baseline. Please follow up with your primary care provider and neurologist within 1-2 weeks of discharge    Diagnosis: HTN (hypertension)  Assessment and Plan of Treatment: You have high blood pressure. Continue taking your blood pressure medications as prescribed. Eat a heart healthy diet with low salt; exercise regularly (if cleared by your primary care doctor or cardiologist). Maintain a heart healthy weight and include healthy ways to manage stress. If you smoke, quit. If you need assistance to help you stop smoking, please use the following resource: [facility] for Tobacco Control – ([phone]). Follow up with your primary care doctor to continue having your blood pressure checked on a continual basis.    Diagnosis: HLD (hyperlipidemia)  Assessment and Plan of Treatment: LDL<70   Goal is to keep LDL<70. Continue with your cholesterol medications as prescribed. Eat a heart healthy diet that is low in saturated fats and salt, and includes whole grains, fruits, vegetables and lean protein; exercise regularly (consult with your physician or cardiologist first); maintain a heart healthy weight; if you smoke - quit (A resource to help you stop smoking is the [facility] for Tobacco Control – phone number [phone].). Continue to follow with your primary physician or cardiologist.    Diagnosis: CAD (coronary artery disease)  Assessment and Plan of Treatment: You have a history of coronary artery disease. Your disease is stable and you do not exhibit any symptoms such as chest pain or shortness of breath on exertion. Please continue to take your prescribed medication. Maintain a low fat and low sugar diet. Please follow up with your primary care physician/Cardiologist routinely to monitor your lipid profile, blood pressure, and blood sugar.

## 2023-06-13 NOTE — DISCHARGE NOTE PROVIDER - CARE PROVIDER_API CALL
Alexandra Blanco  Internal Medicine  [address] [place], [place] [id number]  Phone: [phone]  Fax: [phone]  Established Patient  Follow Up Time: 2 weeks

## 2023-06-13 NOTE — PROGRESS NOTE ADULT - SUBJECTIVE AND OBJECTIVE BOX
Name of Patient : MANUEL PETERSON  MRN: [id number]  Date of visit: 06-13-23 @ 16:16      Subjective: Patient seen and examined. No new events except as noted.   Patient seen earlier this AM. Lying down in bed.   Offers no new complaints.   S/P removal of Martinez catheter. Hgb stable.   BP poorly controlled. Started on Norvasc 5.     REVIEW OF SYSTEMS:    CONSTITUTIONAL: Generalized weakness   EYES/ENT: No visual changes;  No vertigo or throat pain   NECK: No pain or stiffness  RESPIRATORY: No cough, wheezing, hemoptysis; No shortness of breath  CARDIOVASCULAR: No chest pain or palpitations  GASTROINTESTINAL: No abdominal or epigastric pain. No nausea, vomiting, or hematemesis; No diarrhea or constipation. No melena or hematochezia.  GENITOURINARY: No dysuria, frequency or hematuria  NEUROLOGICAL: No numbness or weakness  SKIN: No itching, burning, rashes, or lesions   All other review of systems is negative unless indicated above.    MEDICATIONS:  MEDICATIONS  (STANDING):  amLODIPine   Tablet 5 milliGRAM(s) Oral daily  aspirin enteric coated 81 milliGRAM(s) Oral daily  gabapentin 200 milliGRAM(s) Oral at bedtime  heparin   Injectable 5000 Unit(s) SubCutaneous every 8 hours  levETIRAcetam 750 milliGRAM(s) Oral two times a day  metoprolol tartrate 25 milliGRAM(s) Oral at bedtime  multivitamin 1 Tablet(s) Oral daily  polyethylene glycol 3350 17 Gram(s) Oral daily  pregabalin 75 milliGRAM(s) Oral daily  rosuvastatin 10 milliGRAM(s) Oral at bedtime  senna 2 Tablet(s) Oral at bedtime  tamsulosin 0.4 milliGRAM(s) Oral daily      PHYSICAL EXAM:  T(C): 36.3 (06-13-23 @ 11:27), Max: 36.4 (06-13-23 @ 04:40)  HR: 60 (06-13-23 @ 11:27) (60 - 71)  BP: 170/85 (06-13-23 @ 11:27) (155/80 - 195/82)  RR: 18 (06-13-23 @ 11:27) (18 - 18)  SpO2: 96% (06-13-23 @ 11:27) (92% - 96%)  Wt(kg): --  I&O's Summary    12 Jun 2023 07:01  -  13 Jun 2023 07:00  --------------------------------------------------------  IN: 0 mL / OUT: 400 mL / NET: -400 mL    13 Jun 2023 07:01  -  13 Jun 2023 16:16  --------------------------------------------------------  IN: 480 mL / OUT: 0 mL / NET: 480 mL          Appearance: Normal; lying down in bed 	  HEENT:  Eyes are open   Lymphatic: No lymphadenopathy   Cardiovascular: Normal    Respiratory: normal effort , clear  Gastrointestinal:  Soft, Non-tender  : Martinez with hematuria   Skin: No rashes,  warm to touch  Psychiatry:  Mood & affect appropriate  Musculoskeletal: No edema        06-12-23 @ 07:01  -  06-13-23 @ 07:00  --------------------------------------------------------  IN: 0 mL / OUT: 400 mL / NET: -400 mL    06-13-23 @ 07:01  -  06-13-23 @ 16:16  --------------------------------------------------------  IN: 480 mL / OUT: 0 mL / NET: 480 mL                                  12.8   11.97 )-----------( 300      ( 13 Jun 2023 06:57 )             39.5               06-13    145  |  107  |  17  ----------------------------<  100<H>  4.1   |  25  |  1.12    Ca    9.4      13 Jun 2023 06:57                           Culture - Blood in AM (06.09.23 @ 06:52)   Specimen Source: .Blood Blood-Peripheral  Culture Results: No growth to date.    Culture - Blood in AM (06.09.23 @ 06:52)   Specimen Source: .Blood Blood-Peripheral  Culture Results: No growth to date.    Culture - Blood (06.07.23 @ 17:38)   Specimen Source: .Blood Blood  Culture Results: No Growth Final    Culture - Blood (06.07.23 @ 17:00)   - Staphylococcus epidermidis, Methicillin resistant: Detec  Gram Stain: Growth in anaerobic bottle: Gram Positive Cocci in Clusters  Specimen Source: .Blood Blood  Organism: Blood Culture PCR  Culture Results: Growth in anaerobic bottle: Staphylococcus epidermidis   Coagulase Negative Staphylococci isolated from a single blood culture set may represent contamination.

## 2023-06-13 NOTE — PROVIDER CONTACT NOTE (OTHER) - ASSESSMENT
Pt AOx2-3, tolerating room air. patient denies SOB, chest pain or acute distress at this time. patient currently laying in bed.
patient AOx3, tolerating 2LNC. patient denies pain, headache, chest pain or discomfort at this time. patient assessed and stated that this is how his body reacts to muscle spasms
pt. a&ox4 now. No complains of any discomfort.

## 2023-06-13 NOTE — PROVIDER CONTACT NOTE (OTHER) - SITUATION
Patient noted with spasm-like movements. patient stated they are muscle spasms.
Pt currently saturating with oxygen at 91% at most on room air.
Pt's bp noted 195/82

## 2023-06-13 NOTE — PROGRESS NOTE ADULT - SUBJECTIVE AND OBJECTIVE BOX
Neurology     S: patient seen and examined. no complaints      Medications:     MEDICATIONS  (STANDING):  aspirin enteric coated 81 milliGRAM(s) Oral daily  cefTRIAXone   IVPB 2000 milliGRAM(s) IV Intermittent every 24 hours  gabapentin 200 milliGRAM(s) Oral at bedtime  heparin   Injectable 5000 Unit(s) SubCutaneous every 8 hours  levETIRAcetam 750 milliGRAM(s) Oral two times a day  metoprolol tartrate 25 milliGRAM(s) Oral at bedtime  multivitamin 1 Tablet(s) Oral daily  polyethylene glycol 3350 17 Gram(s) Oral daily  pregabalin 75 milliGRAM(s) Oral daily  rosuvastatin 10 milliGRAM(s) Oral at bedtime  senna 2 Tablet(s) Oral at bedtime  tamsulosin 0.4 milliGRAM(s) Oral daily    MEDICATIONS  (PRN):  acetaminophen     Tablet .. 650 milliGRAM(s) Oral every 6 hours PRN Temp greater or equal to 38C (100.4F), Mild Pain (1 - 3)  baclofen 20 milliGRAM(s) Oral every 6 hours PRN Muscle Spasm  melatonin 3 milliGRAM(s) Oral at bedtime PRN Insomnia  ondansetron Injectable 4 milliGRAM(s) IV Push every 8 hours PRN Nausea and/or Vomiting          Vitals:    Vital Signs Last 24 Hrs  T(C): 36.3 (23 @ 11:27), Max: 36.7 (23 @ 13:00)  T(F): 97.4 (23 @ 11:27), Max: 98 (23 @ 13:00)  HR: 60 (23 @ 11:27) (60 - 71)  BP: 170/85 (23 @ 11:27) (131/83 - 195/82)  BP(mean): --  RR: 18 (23 @ 11:27) (18 - 18)  SpO2: 96% (23 @ 11:27) (91% - 96%)              General Exam:   General Appearance: Appropriately dressed and in no acute distress       Head: Normocephalic, atraumatic and no dysmorphic features  Ear, Nose, and Throat: Moist mucous membranes  CVS: S1S2+  Resp: No SOB, no wheeze or rhonchi  GI: soft NT/ND  Extremities: No edema or cyanosis  Skin: No bruises or rashes     Neurological Exam:  Mental Status: Awake, alert and oriented x 2.  Able to follow simple and complex verbal commands. Able to name and repeat. fluent speech. No obvious aphasia or dysarthria noted.   Cranial Nerves: PERRL, EOMI, VFFC, sensation V1-V3 intact,  no obvious facial asymmetry, equal elevation of palate, scm/trap 5/5, tongue is midline on protrusion. no obvious papilledema on fundoscopic exam. hearing is grossly intact.   Motor:SAAVEDRA uppers 4-5/5; lowers LLE 4+/5 RLE 4-/5   Sensation: Intact to light touch and pinprick throughout. no right/left confusion. no extinction to tactile on DSS.   Reflexes: 1+ throughout at biceps, brachioradialis, triceps, patellars and ankles bilaterally and equal. No clonus. R toe and L toe were both downgoing.  Coordination: No dysmetria on FNF   Gait: uses walker/wheelchair baseline     Data/Labs/Imaging which I personally reviewed.     Labs:     CBC Full  -  ( 2023 06:57 )  WBC Count : 11.97 K/uL  RBC Count : 4.24 M/uL  Hemoglobin : 12.8 g/dL  Hematocrit : 39.5 %  Platelet Count - Automated : 300 K/uL  Mean Cell Volume : 93.2 fl  Mean Cell Hemoglobin : 30.2 pg  Mean Cell Hemoglobin Concentration : 32.4 gm/dL  Auto Neutrophil # : x  Auto Lymphocyte # : x  Auto Monocyte # : x  Auto Eosinophil # : x  Auto Basophil # : x  Auto Neutrophil % : x  Auto Lymphocyte % : x  Auto Monocyte % : x  Auto Eosinophil % : x  Auto Basophil % : x        145  |  107  |  17  ----------------------------<  100<H>  4.1   |  25  |  1.12    Ca    9.4      2023 06:57             PTT - ( 2023 14:10 )  PTT:29.2 sec  Urinalysis Basic - ( 2023 14:11 )    Color: Yellow / Appearance: Slightly Turbid / S.026 / pH: x  Gluc: x / Ketone: Negative  / Bili: Negative / Urobili: Negative   Blood: x / Protein: 100 mg/dL / Nitrite: Negative   Leuk Esterase: Large / RBC: 98 /hpf /  /HPF   Sq Epi: x / Non Sq Epi: x / Bacteria: Many        < from: CT Head No Cont (23 @ 18:26) >    ACC: [id number] EXAM:  CT BRAIN   ORDERED BY: MANUEL DERAS     PROCEDURE DATE:  2023          INTERPRETATION:  CLINICAL INFORMATION: Altered mental status.    COMPARISON: CT head of 2023.    PROCEDURE:  Noncontrast CT of the Head was performed from the skull base to the   vertex. Coronal and Sagittal reformats were obtained.    FINDINGS:    Status post right frontotemporal craniotomy with right suprasellar   aneurysm clip. Encephalomalacia and gliosis within the anterior medial   frontal lobe and to a lesser degree within the left anteromedial frontal   lobe. No acute intracranial hemorrhage, mass effect, midline shift,   extra-axial collection, or hydrocephalus. Mild patchy hypodensities   within the periventricular and subcortical white matter, although   nonspecific, likely reflect chronic microvascular disease. Cerebral   volume loss contributes to prominence of the ventricles and sulci.    The visualized paranasal sinuses and mastoid air cells are clear. Status   post bilateral ocular lens replacement.    IMPRESSION:    No acute intracranial hemorrhage or mass effect.    Stable examination since 2023.    --- End of Report ---            FABIO EDEN MD; Attending Radiologist  This document has been electronically signed. 2023  6:26PM    < end of copied text >

## 2023-06-13 NOTE — DISCHARGE NOTE PROVIDER - NSDCCPTREATMENT_GEN_ALL_CORE_FT
PRINCIPAL PROCEDURE  Procedure: CXR, single AP view  Findings and Treatment: PROCEDURE DATE:  06/06/2023    FINDINGS:  Status post spinal fusion.  The heart is normal in size.  Increased interstitial lung markings compared to prior x-ray.  There is no pneumothorax or pleural effusion.  IMPRESSION:  Increased interstitial lung markings compared to prior x-ray.      SECONDARY PROCEDURE  Procedure: CT head  Findings and Treatment: PROCEDURE DATE:  06/06/2023    FINDINGS:  Status post right frontotemporal craniotomy with right suprasellar   aneurysm clip. Encephalomalacia and gliosis within the anterior medial   frontal lobe and to a lesser degree within the left anteromedial frontal   lobe. No acute intracranial hemorrhage, mass effect, midline shift,   extra-axial collection, or hydrocephalus. Mild patchy hypodensities   within the periventricular and subcortical white matter, although   nonspecific, likely reflect chronic microvascular disease. Cerebral   volume loss contributes to prominence of the ventricles and sulci.  The visualized paranasal sinuses and mastoid air cells are clear. Status   post bilateral ocular lens replacement.  IMPRESSION:  No acute intracranial hemorrhage or mass effect.    Procedure: CT chest, abdomen and pelvis  Findings and Treatment: PROCEDURE DATE:  06/08/2023    CHEST:  LUNGS AND LARGE AIRWAYS: Patent central airways. Bibasilar subsegmental   atelectasis. Calcified granuloma in the left upper lobe.  PLEURA: Trace bilateral pleural effusions.  VESSELS: Atherosclerotic changes of the aorta and coronary arteries.  HEART: Heart size is normal. No pericardial effusion.  MEDIASTINUM AND CESAR: No lymphadenopathy.  CHEST WALL AND LOWER NECK: Within normal limits.  ABDOMEN AND PELVIS:  LIVER: Subcentimeter left hepatic lobe hypodensity too small to   characterize.  BILE DUCTS: Normal caliber.  GALLBLADDER: Within normal limits.  SPLEEN: Unchanged calcified granuloma.  PANCREAS: Atrophic.  ADRENALS: Within normal limits.  KIDNEYS/URETERS: Redemonstrated 1.1 cm calculus in UPJ with new mild   hydronephrosis and perinephric fat stranding. Additional punctate   nonobstructing left renal calculus. Nonobstructing calculi in the right   upper pole and pelvis measuring up to 1.6 cm.  BLADDER: Within normal limits.  REPRODUCTIVE ORGANS: Prostate within normal limits.  BOWEL: No bowel obstruction. Appendix is not visualized. No evidence of   inflammation in the pericecal region. Large amount of stool distends the   rectum.  PERITONEUM: No ascites.  VESSELS: Atherosclerotic changes.  RETROPERITONEUM/LYMPH NODES: No lymphadenopathy.  ABDOMINAL WALL: Retained pain management device lead extending to level   of T12-L1.  BONES: Degenerative changes. Status post spinal fusion extending from T4 to S1. Status post right sacroiliac arthrodesis. Status post right hip replacement. Chronic right inferior ramus fracture. Sclerotic density in the left iliac wing, which may be postsurgical in nature, unchanged.  IMPRESSION:  New mild left hydronephrosis and perinephric fat stranding secondary to an obstructing 1.1 cm calculus in the UPJ. Correlate clinically for superimposed urinary tract infection. Evaluation for pyelonephritis is limited without intravenous contrast.  No pneumonia.    Procedure: Transthoracic echocardiography  Findings and Treatment: Study Date:    6/9/2023  CONCLUSIONS:      1. The left ventricular systolic function is normal with an ejection fraction of 62 % by Pacheco's method of disks.   2. Normal right ventricular cavity size, normal wall thickness and normal systolic function.   3. No obvious vegetation seen. Limited visualizaion due to thickened or calcified valves and/or technically limited/difficult study. Transesophageal echocardiogram (LILIANE) could be obtained for further evaluation.    Procedure: Complete ultrasound of kidney  Findings and Treatment: PROCEDURE DATE:  06/07/2023    FINDINGS:  Right kidney: 11.1 cm. No renal mass or hydronephrosis. Nonobstructing   tiny 6 mm calculus.  Left kidney: 10.7 cm. No renal mass, hydronephrosis or calculi. Mid to   lower pole is limited in visualization due to overlying bowel and   patient's inability to position.  Urinary bladder: Underdistended.  IMPRESSION:  Limited evaluation of the lower pole of the left kidney.  No hydronephrosis.  Nonobstructing right renal calculus.

## 2023-06-13 NOTE — PROGRESS NOTE ADULT - SUBJECTIVE AND OBJECTIVE BOX
Follow Up:  fever, pyelo, bacteremia, obstructive stone    Interval History/ROS: no fever, improved R flank pain, no vomiting or cough, s/p stent placement          Allergies  No Known Allergies        ANTIMICROBIALS:  cefTRIAXone   IVPB 2000 every 24 hours      OTHER MEDS:  acetaminophen     Tablet .. 650 milliGRAM(s) Oral every 6 hours PRN  aspirin enteric coated 81 milliGRAM(s) Oral daily  baclofen 20 milliGRAM(s) Oral every 6 hours PRN  gabapentin 200 milliGRAM(s) Oral at bedtime  heparin   Injectable 5000 Unit(s) SubCutaneous every 8 hours  levETIRAcetam 750 milliGRAM(s) Oral two times a day  melatonin 3 milliGRAM(s) Oral at bedtime PRN  metoprolol tartrate 25 milliGRAM(s) Oral at bedtime  multivitamin 1 Tablet(s) Oral daily  ondansetron Injectable 4 milliGRAM(s) IV Push every 8 hours PRN  polyethylene glycol 3350 17 Gram(s) Oral daily  pregabalin 75 milliGRAM(s) Oral daily  rosuvastatin 10 milliGRAM(s) Oral at bedtime  senna 2 Tablet(s) Oral at bedtime  tamsulosin 0.4 milliGRAM(s) Oral daily      Vital Signs Last 24 Hrs  T(C): 36.3 (13 Jun 2023 11:27), Max: 36.7 (12 Jun 2023 13:00)  T(F): 97.4 (13 Jun 2023 11:27), Max: 98 (12 Jun 2023 13:00)  HR: 60 (13 Jun 2023 11:27) (60 - 71)  BP: 170/85 (13 Jun 2023 11:27) (131/83 - 195/82)  BP(mean): --  RR: 18 (13 Jun 2023 11:27) (18 - 18)  SpO2: 96% (13 Jun 2023 11:27) (91% - 96%)    Parameters below as of 13 Jun 2023 11:27  Patient On (Oxygen Delivery Method): room air        Physical Exam:  General:    NAD,  non toxic  Respiratory:    comfortable on RA  abd:     soft,   no tenderness  :   R CVAT  Musculoskeletal:   no joint swelling  vascular: no phlebitis  Skin:    no rash                            12.8   11.97 )-----------( 300      ( 13 Jun 2023 06:57 )             39.5       06-13    145  |  107  |  17  ----------------------------<  100<H>  4.1   |  25  |  1.12    Ca    9.4      13 Jun 2023 06:57            MICROBIOLOGY:  v  .Blood Blood-Peripheral  06-09-23   No growth to date.  --  --      .Blood Blood  06-07-23   No Growth Final  --  --      .Blood Blood  06-07-23   Growth in anaerobic bottle: Staphylococcus epidermidis  Coagulase Negative Staphylococci isolated from a single blood culture set  may represent contamination.  Contact the Microbiology Department at [phone] if susceptibility  testing is clinically indicated.  ***Blood Panel PCR results on this specimen are available  approximately 3 hours after the Gram stain result.***  Gram stain, PCR, and/or culture results may not always  correspond due to difference in methodologies.  ************************************************************  This PCR assay was performed by multiplex PCR. This  Assay tests for 66 bacterial and resistance gene targets.  Please refer to the [facility] [employer] test directory  at [email or web address] for details.  --  Blood Culture PCR      Clean Catch Clean Catch (Midstream)  06-06-23   >100,000 CFU/ml Proteus mirabilis  --  Proteus mirabilis      .Blood Blood-Peripheral  06-06-23   Growth in aerobic and anaerobic bottles: Proteus mirabilis  ***Blood Panel PCR results on this specimen are available  approximately 3 hours after the Gram stain result.***  Gram stain, PCR, and/or culture results may not always  correspond due to difference in methodologies.  ************************************************************  This PCR assay was performed by multiplex PCR. This  Assay tests for 66 bacterial and resistance gene targets.  Please refer to the [facility] [employer] test directory  at [email or web address] for details.  --  Blood Culture PCR  Proteus mirabilis      .Blood Blood-Peripheral  06-06-23   Growth in aerobic and anaerobic bottles: Proteus mirabilis  See previous culture [id number]  --    Growth in anaerobic bottle: Gram Negative Rods  Growth in aerobic bottle: Gram Negative Rods          Rapid RVP Result: NotDetec (06-06 @ 14:08)        RADIOLOGY:  Images independently visualized and reviewed personally, findings as below  < from: CT Abdomen and Pelvis No Cont (06.08.23 @ 09:59) >  IMPRESSION:  New mild left hydronephrosis and perinephric fat stranding secondary to   an obstructing 1.1 cm calculus in the UPJ. Correlate clinically for   superimposed urinary tract infection. Evaluation for pyelonephritis is   limited without intravenous contrast.    No pneumonia.      < end of copied text >

## 2023-06-13 NOTE — DISCHARGE NOTE NURSING/CASE MANAGEMENT/SOCIAL WORK - NSDCPEFALRISK_GEN_ALL_CORE
For information on Fall & Injury Prevention, visit: [email or web address] OR  [email or web address] OR  https://www.cdc.gov/steadi/patient.html

## 2023-06-13 NOTE — DISCHARGE NOTE PROVIDER - NSDCMRMEDTOKEN_GEN_ALL_CORE_FT
aspirin 81 mg oral delayed release tablet: 1 tab(s) orally once a day  baclofen 20 mg oral tablet: 1 tab(s) orally 4 times a day  cholecalciferol oral tablet: 1 tab(s) orally once a day  cranberry oral capsule: 1 cap(s) orally once a day  gabapentin 300 mg oral capsule: 1 cap(s) orally once a day (at bedtime)  Keppra 750 mg oral tablet: 1 tab(s) orally 2 times a day  metoprolol tartrate 25 mg oral tablet: 1 tab(s) orally once a day (at bedtime)  Multiple Vitamins oral capsule: 1 cap(s) orally once a day  pregabalin 150 mg oral capsule: 1 cap(s) orally once a day  rosuvastatin 10 mg oral tablet: 1 tab(s) orally once a day (at bedtime)  tamsulosin 0.4 mg oral capsule: 1 cap(s) orally once a day   aspirin 81 mg oral delayed release tablet: 1 tab(s) orally once a day  cholecalciferol oral tablet: 1 tab(s) orally once a day  cranberry oral capsule: 1 cap(s) orally once a day  gabapentin 300 mg oral capsule: 1 cap(s) orally once a day (at bedtime)  Keppra 750 mg oral tablet: 1 tab(s) orally 2 times a day  metoprolol tartrate 25 mg oral tablet: 1 tab(s) orally once a day (at bedtime)  pregabalin 150 mg oral capsule: 1 cap(s) orally once a day  rosuvastatin 10 mg oral tablet: 1 tab(s) orally once a day (at bedtime)  tamsulosin 0.4 mg oral capsule: 1 cap(s) orally once a day   amLODIPine 5 mg oral tablet: 1 tab(s) orally once a day  aspirin 81 mg oral delayed release tablet: 1 tab(s) orally once a day  cefpodoxime 200 mg oral tablet: 1 tab(s) orally every 12 hours (Take first dose 6/14 and continue until 6/16PM to complete a 10-day course)  cholecalciferol oral tablet: 1 tab(s) orally once a day  cranberry oral capsule: 1 cap(s) orally once a day  gabapentin 300 mg oral capsule: 1 cap(s) orally once a day (at bedtime)  Keppra 750 mg oral tablet: 1 tab(s) orally 2 times a day  metoprolol tartrate 25 mg oral tablet: 1 tab(s) orally once a day (at bedtime)  pregabalin 150 mg oral capsule: 1 cap(s) orally once a day  rosuvastatin 10 mg oral tablet: 1 tab(s) orally once a day (at bedtime)  tamsulosin 0.4 mg oral capsule: 1 cap(s) orally once a day

## 2023-06-13 NOTE — DISCHARGE NOTE PROVIDER - NSDCCAREPROVSEEN_GEN_ALL_CORE_FT
[facility] [address]   Phone: [phone]    Occupational Therapy    [] Initial Evaluation            [x] Daily Treatment Note         [] Discharge Summary      Patient: Micheline Zepeda   : 1935   MRN: [id number]   Date of Service:  10/14/2022    Admitting Diagnosis:  Closed displaced fracture of surgical neck of humerus, initial encounter  Current Admission Summary: 79 y.o. female who presented to [facility] ER today with complaints of left shoulder pain after she fell in her bathroom. C TL shoulder showed A displaced fracture of the surgical neck involving the greater tuberosity s/p L RSA 10/12/22    Past Medical History:  has a past medical history of CHF (congestive heart failure) ([address] and Hypertension. Past Surgical History:  has a past surgical history that includes Cholecystectomy;  section; and shoulder surgery (Left, 10/12/2022). Discharge Recommendations: Micheline Zepeda scored a 12/24 on the AM-PAC ADL Inpatient form. Current research shows that an AM-PAC score of 17 or less is typically not associated with a discharge to the patient's home setting. Based on the patient's AM-PAC score and their current ADL deficits, it is recommended that the patient have 5-7 sessions per week of Occupational Therapy at d/c to increase the patient's independence. At this time, this patient demonstrates complex nursing, medical, and rehabilitative needs, and would benefit from intensive rehabilitation services upon discharge from the Inpatient setting. This patient demonstrates the ability to participate in and benefit from an intensive therapy program with a coordinated interdisciplinary team approach to foster frequent, structured, and documented communication among disciplines, who will work together to establish, prioritize, and achieve treatment goals. Please see assessment section for further patient specific details.     If patient discharges prior to next session this note will serve as a discharge summary. Please see below for the latest assessment towards goals. DME Required For Discharge: DME to be determined at next level of care    Precautions/Restrictions: high fall risk, weight bearing  Weight Bearing Restrictions: non weight bearing  [] Right Upper Extremity  [] Left Upper Extremity [] Right Lower Extremity  [] Left Lower Extremity     Required Braces/Orthotics:  abductor sling   [] Right  [x] Left  Positional Restrictions: protect  no PROM or AROM L shoulder movement, okay for AROM elbow/forearm, wrist and hand    Pre-Admission Information   Lives With: daughter    Type of Home: house  Home Layout: two level, 1/2 bath on main level, bedroom/bathroom upstairs, chair lift to 2nd level  Home Access:  2 step to enter without rails , has portable ramp   Bathroom Layout: tub/shower unit, walk in shower, uses WIS  [place] Equipment: grab bars in shower, shower chair, toilet raiser  Toilet Height: standard height  Home Equipment: rolling walker, manual wheelchair, reacher, oxygen only at night; purewick at nights  Transfer Assistance: Independent without use of device  Ambulation Assistance:modified independent with use of FWW in house, w/c in community  ADL Assistance: independent with all ADL's  IADL Assistance: requires assistance with all homemaking tasks, requires assistance for medication management  Active :        [] Yes  [x] No daughter provides transportation  Hand Dominance: [] Left  [x] Right  Hobbies: enjoys reading the paper, watching TV  Recent Falls: 1 recent fall in last 6 months prior to this fall that lead to this admission      Subjective  General: patient supine in bed with daughter present throughout session. Pt extremely anxious with self-limiting behaviors throughout session, requiring frequent encouragement. Sling in place throughout session.  Pt on 1L O2 upon entry, pt requested to remove upon grooming ADL, reporting she doesn't wear it during the day at home. Sp02 remained at 96% throughout. Pain: 8/10. Location: L UE (shoulder)  Pain Interventions: pain medication in place prior to arrival and repositioned  (pillow support under L UE at EOS)      Activities of Daily Living  Basic Activities of Daily Living  Grooming: setup assistance  Grooming Comments: Face wash seated EOB  Upper Extremity Bathing: maximum assistance  Lower Extremity Bathing: dependent Comment: self-limiting behaviors, anticipate increased independence with decreased anxiety   Bathing Comments: Sponge bath seated EOB, pt anxious throughout with fear of falling. Upper Extremity Dressing: maximum assistance  Dressing Comments: Gown chg seated EOB, L UE brace remained on throughout    Instrumental Activities of Daily Living  No IADL completed on this date. Functional Mobility  Bed Mobility  Supine to Sit: maximum assistance  Scooting: maximum assistance  Comments: HOB elevated, pt able to initiate BLE movement OOB, requires A to reposition hips and elevate trunk  Transfers  Sit to stand transfer:2 person assistance with Max Ax2   Stand to sit transfer: 2 person assistance with Max Ax2   Stand pivot transfer: 2 person assistance with Max Ax2   Stand step transfer: 2 person assistance with Max Ax2, attempted stand-step but pt was unable to complete d/t increased anxiety and fear of falling. Transfer was abandoned and deferred to SPT for safety. Comments: stands from bed with MaxA x2, pt demo significant trunk flexion and minimal improvement with verbal cues. Takes ~3steps to chair with Max A x2, verbal cues for sequencing and posture, pt continues to demo trunk flexion   Sit>stand x3 attempts before successful SPT EOB> room chair    Functional Mobility:  Sitting Balance: stand by assistance.     Sitting Balance Comment: Close SBA with pt's increased anxiety and fear of falling, ADLs completed EOB  Standing Balance: 2 person assistance with max Ax2 .    Standing Balance Comment: pt with significant trunk flexion and increased fatigue upon stand EOB  No functional mobility completed on this date secondary to SPT from EOB>room chair only this date d/t increased anxiety and assist level this date. Other Therapeutic Interventions    Functional Outcomes  AM-PAC Inpatient Daily Activity Raw Score: 12    Cognition  Overall Cognitive Status: WFL  Following Commands: follows one step commands consistently  Attention Span: difficulty dividing attention  Safety Judgement: decreased awareness of need for assistance, decreased awareness of need for safety  Insights: decreased awareness of deficits  Orientation:    alert and oriented x 4  Command Following:   accurately follows one step commands     Education  Barriers To Learning: hearing  Patient Education: patient educated on OT role and benefits, plan of care, transfer training, discharge recommendations  Learning Assessment:  patient verbalizes and demonstrates understanding    Assessment  Activity Tolerance: Fair, mobility limited this date d/t anxiety and self-limiting behaviors. SpO2 96% on RA  Impairments Requiring Therapeutic Intervention: decreased functional mobility, decreased ADL status, decreased ROM, decreased strength, decreased safety awareness, decreased endurance, decreased balance, decreased IADL, decreased fine motor control, increased pain, decreased posture  Prognosis: fair  Clinical Assessment: patient presents s/p L RSA and is below baseline status, now requiring 2 person assist for transfers and dependent assist for LB ADLs.  Pt would benefit from continued skilled OT services to facilitate return to PLOF  Safety Interventions: patient left in chair, chair alarm in place, call light within reach, gait belt, telesitter in use, nurse notified, and family/caregiver present    Plan  Frequency: 7 x/week  Current Treatment Recommendations: ROM, balance training, functional mobility training, transfer training, endurance training, patient/caregiver education, ADL/self-care training, home exercise program, safety education, and equipment evaluation/education    Goals  Patient Goals: to return home   Short Term Goals:  Time Frame: discharge  Patient will complete upper body ADL at moderate assistance   Patient will complete lower body ADL at moderate assistance   Patient will complete toileting at moderate assistance   Patient will complete grooming at stand by assistance   Patient will complete functional transfers at moderate assistance   Patient will complete functional mobility at moderate assistance     Continue Goals 10/14  Therapy Session Time     Individual Group Co-treatment   Time In    1122   Time Out    1200   Minutes    38        Timed Code Treatment Minutes:   38 minutes  Total Treatment Minutes:  38 Minutes       Electronically Signed By:ELDON [id number]  I have reviewed and agree with the above treatment note.  Syeda Waianae OTR/L [id number] Issac, Cornelio Bellamy, Boy Lopes Daniel D Pishanidar, Sammy D Soltanpour, Kristine

## 2023-06-13 NOTE — PROVIDER CONTACT NOTE (OTHER) - RECOMMENDATIONS
Notify Provider.
home dose of baclofen administered. notify provider/
prepare for nasal cannula and notify provider

## 2023-06-13 NOTE — PROGRESS NOTE ADULT - ASSESSMENT
67 m with HTN, HLD, CAD s/p stent (2015), seizure disorder on Keppra, chronic back pain s/p multiple spinal surgeries and lumbar spinal stimulator (s/p removal), b/l nephrolithiasis, and recurrent UTIs (recent admission in April 2023 for Proteus UTI), now p/w fever, AMS, dysuria and R CVA tenderness   here febrile to 102.2, WBC: 22  Cr: 1.8  blood cx: proteus  CT: New mild left hydronephrosis and perinephric fat stranding secondary to an obstructing 1.1 cm calculus in the UPJ    fever, leukocytosis, TONY, AMS, sepsis with R pyelonephritis due to obstructing 1.1 cm calculus in L UPJ and hydro, proteus bacteremia (R to cipro, bactrim, I to cefazolin)  AMS due to septic encephalopathy, head CT negative  repeat cx with 1/4 staph epi, likely contaminant  * repeat  blood cx  * c/w ceftriaxone to 2 g qd  * urology eval for the obstructing stone  * monitor CBC/diff and renal function, now WBC better to 12 and CR: 1.48    The above assessment and plan was discussed with the primary team    Ange Hayes MD  contact on teams  After 5pm and on weekends call [phone]    
67 m with HTN, HLD, CAD s/p stent (2015), seizure disorder on Keppra, chronic back pain s/p multiple spinal surgeries and lumbar spinal stimulator (s/p removal), b/l nephrolithiasis, and recurrent UTIs (recent admission in April 2023 for Proteus UTI), now p/w fever, AMS, dysuria and R CVA tenderness   here febrile to 102.2, WBC: 22  Cr: 1.8  blood cx: proteus  CT: New mild left hydronephrosis and perinephric fat stranding secondary to an obstructing 1.1 cm calculus in the UPJ    fever, leukocytosis, TONY, AMS, sepsis with R pyelonephritis due to obstructing 1.1 cm calculus in L UPJ and hydro, proteus bacteremia (R to cipro, bactrim, I to cefazolin) s/p L ureteral stent 6/10  AMS due to septic encephalopathy, head CT negative  repeat cx 6/7 with 1/4 staph epi, likely contaminant, repeat negative 6/9    * c/w ceftriaxone to 2 g qd  * will complete a 10 day course through 6/16  * if pt is ready for discharge switch to PO cefpodoxime 200 bid to finish the course    The above assessment and plan was discussed with the primary team    Ange Hayes MD  contact on teams  After 5pm and on weekends call [phone]    
67y Male with PMH of HTN, HLD, CAD s/p stent (2015), seizure disorder on Keppra, chronic back pain s/p multiple spinal surgeries, kidney stones, and recurrent UTIs, presenting to the ED w/ AMS x1 day. Patient was admitted in 4/2023 w/ proteus UTI, also finished a course of abx outpatient 2 weeks ago for another UTI. Patient's wife states that yesterday he started to become altered, which is usually what happens when he gets a UTI. His arms started shaking, he was confused, and he was having trouble keeping his eyes open. He also had a fever of 103 this morning. Patient does report dysuria. He denies chest pain, shortness of breath, abdominal pain, nausea, vomiting, changes in bowel habits. His last BM was 2 days ago. He follows with urology Dr. Wright from [facility] for his kidney stones. Nephrology consulted for TONY.     TONY   baseline sCr ~ 1.0 - 1.3   TONY etiology ? likely  ATN from sepsis   Pt was febrile  FENa > 1%, suggestive of ATN   Scr improving, back at baseline   US kidney and bladder 6/7 --> Limited visibility of Left lower pole, No hydronephrosis, non obstructing right 6 mm calculus  CT A/P No contrast 6/8 --> left 1.1 cm calculus in UPJ with new mild hydronephrosis and perinephric fat stranding. Additional non obstructing left renal calculus. Non obstructing calculi in the right upper pole and pelvis measuring 1.6 cm.   Urology following  s/p Left Urethral stent placement 6/10  Indwelling Martinez discontinued 6/12 --> monitor u/o   Monitor BMP, U/o    Proteinuria/ Hematuria:   + LE, + Blood   History of kidney stones follows with Dr. Wright (urology)   urine c/s + gram negative rods     Acidosis  Non AG, Ag  improving   Monitor         
67y Male with PMH of HTN, HLD, CAD s/p stent (2015), seizure disorder on Keppra, chronic back pain s/p multiple spinal surgeries, kidney stones, and recurrent UTIs, presenting to the ED w/ AMS x1 day. Patient was admitted in 4/2023 w/ proteus UTI, also finished a course of abx outpatient 2 weeks ago for another UTI. Patient's wife states that yesterday he started to become altered, which is usually what happens when he gets a UTI. His arms started shaking, he was confused, and he was having trouble keeping his eyes open. He also had a fever of 103 this morning. Patient does report dysuria. He denies chest pain, shortness of breath, abdominal pain, nausea, vomiting, changes in bowel habits. His last BM was 2 days ago. He follows with urology Dr. Wright from [facility] for his kidney stones. Nephrology consulted for TONY.       TONY   baseline sCr ~ 1.0 - 1.3   TONY etiology ? likely ATN from sepsis   Pt was febrile  FENa > 1%, suggestive of ATN   Scr improving. s/p IVF  Bladder scan + retention on 6/7 - Recommend to check bladder scan q6h   US kidney and bladder 6/7 --> Limited visibility of Left lower pole, No hydronephrosis, non obstructing right 6 mm calculus  CT A/P No contrast 6/8 --> left 1.1 cm calculus in UPJ with new mild hydronephrosis and perinephric fat stranding. Additional non obstructing left renal calculus. Non obstructing calculi in the right upper pole and pelvis measuring 1.6 cm.   Urology following  Monitor BMP, U/o    Proteinuria/ Hematuria:   + LE, + Blood   History of kidney stones follows with Dr. Wright (urology)   urine c/s + gram negative rods     Acidosis  Non AG, Ag  improving   Monitor         
67y Male with PMH of HTN, HLD, CAD s/p stent (2015), seizure disorder on Keppra, chronic back pain s/p multiple spinal surgeries, kidney stones, and recurrent UTIs, presenting to the ED w/ AMS x1 day. Patient was admitted in 4/2023 w/ proteus UTI, also finished a course of abx outpatient 2 weeks ago for another UTI. Patient's wife states that yesterday he started to become altered, which is usually what happens when he gets a UTI. His arms started shaking, he was confused, and he was having trouble keeping his eyes open. He also had a fever of 103 this morning. Patient does report dysuria. He denies chest pain, shortness of breath, abdominal pain, nausea, vomiting, changes in bowel habits. His last BM was 2 days ago. He follows with urology Dr. Wright from [facility] for his kidney stones. Nephrology consulted for TONY.     TONY   baseline sCr ~ 1.0 - 1.3   TONY etiology ? likely  ATN from sepsis   Pt was febrile  FENa > 1%, suggestive of ATN   Scr improving, back at baseline   US kidney and bladder 6/7 --> Limited visibility of Left lower pole, No hydronephrosis, non obstructing right 6 mm calculus  CT A/P No contrast 6/8 --> left 1.1 cm calculus in UPJ with new mild hydronephrosis and perinephric fat stranding. Additional non obstructing left renal calculus. Non obstructing calculi in the right upper pole and pelvis measuring 1.6 cm.   Urology following  s/p Left Urethral stent placement 6/10  Indwelling Martinez discontinued 6/12 --> monitor u/o   Monitor BMP, U/o    Proteinuria/ Hematuria:   + LE, + Blood   History of kidney stones follows with Dr. Wright (urology)   urine c/s + gram negative rods     HTN:   BP suboptimal   Amlodipine 5 mg qd added today, can titrate to 10 mg if BP remains elevated   Monitor closely     Acidosis  Non AG, Ag  improving   Monitor         
Patient is a 67y Male with PMH of HTN, HLD, CAD s/p stent (2015), seizure disorder on Keppra, chronic back pain s/p multiple spinal surgeries, kidney stones, and recurrent UTIs, presenting to the ED w/ AMS x1 day, admitted to medicine w/ severe sepsis 2/2 UTI.      Sepsis due to urinary tract infection/ Pyelonephritis    - Fever, tachycardia, increased RR, leukocytosis, and elevated lactate meets criteria for severe sepsis  - UA grossly positive for UTI  - C/w ceftriaxone (prior cx sensitive), ABX as per ID   - Lactate resolved w/ IVF --> Continue with IVF for hydration   - Trend WBC and fever curve  - F/u UCx and BCx2 in lab   - Repeat BCx2 as per ID   - Checking CT C/A/P non-contrast as per ID   - Monitor CBC, temp curve, VS and patient closely  - ID eval consulted; F/u recs    AMS  - Likely due to Pyelonephritis   - Checking CT C/A/P   - CT Head negative  - F/u infectious work up as above  - Neuro eval consulted    Kidney stones  - Consider struvite stones given hx proteus UTI and elevated urine pH  - R/O urinary obstruction and/or pyelo given TONY   - F/u Renal US   - Urology consulted (pt follows Dr Wright [facility]).    TONY (acute kidney injury).   - Likely prerenal i/s/o severe sepsis vs postrenal i/s/o kidney stones  - S/P 1.5L IVF in the ED, C/w IVF   - F/u Renal US --> Pending   - Cr up-trending, check Renal US to R/o hydronephrosis   - Renal eval consulted     HTN (hypertension).   - C/w home metoprolol.    HLD (hyperlipidemia).   - C/w home statin.    CAD (coronary artery disease).   - S/p stent in 2005  - C/w home aspirin.    Seizure disorder.   - C/w home keppra BID.  - CT  Head negative for acute findings  - Neuro eval consulted  - Aspiration and Seizure precautions     Chronic back pain.   - C/w home gabapentin (dose slightly reduced for kidney function)  - C/w home lyrica (dose slightly reduced for kidney function)  - C/w home baclofen 4x/day PRN.    Prophylactic measure.   Diet: DASH  DVT Prophylaxis: HSQ  PT eval  Full code.    Discussed with Attending and ACP. 
Patient is a 67y Male with PMH of HTN, HLD, CAD s/p stent (2015), seizure disorder on Keppra, chronic back pain s/p multiple spinal surgeries, kidney stones, and recurrent UTIs, presenting to the ED w/ AMS x1 day, admitted to medicine w/ severe sepsis 2/2 UTI.      Urosepsis   - Fever, tachycardia, increased RR, leukocytosis, and elevated lactate meets criteria for severe sepsis  - UA grossly positive for UTI  - C/w ABX as per ID --> On ceftriaxone 2g Q 24   - Lactate resolved w/ IVF --> Continue with IVF for hydration   - Trend WBC and fever curve  - F/u UCx and BCx2 --> + Gram negative bacteremia; Proteus   - Repeat BCx2 in lab; 1/2 + w/ Gm + ? Contamination --> F/u repeat BCx2 06/09   - CT C/A/P non-contrast  1.1 renal calculus in the UPJ with mild hydronephrosis --> urology to follow up, planned for OR for ureteral stent placement with urology. NPO at midnight with IVF    - Monitor CBC, temp curve, VS and patient closely  - ID eval and uroogy appreciated; F/u recs    AMS  - Likely due to UTI, mentation improving   -  CT C/A/P --> As above   - CT Head negative  - F/u infectious work up as above  - Neuro eval appreciated    Nephrolithiasis, Hydronephrosis   - Consider struvite stones given hx proteus UTI and elevated urine pH  - R/O urinary obstruction and/or pyelo given TONY   - Renal US noted   - CT with obstructing calculus --> urology to follow up, planned for OR for ureteral stent placement with urology. NPO at midnight with IVF    - Urology eval appreciated; S/P OR, stent placement  =- Monitor urine output, hematuria as expected, monitor hgb level     TONY (acute kidney injury).   - Likely prerenal i/s/o severe sepsis vs postrenal i/s/o kidney stones  - S/P 1.5L IVF in the ED, C/w IVF   - F/u Renal US -->noted  - Renal eval appreciated; F/u recs     Anemia  - Down-trend in Hgb. No gross bleeding reported  - ? Dilutional component in view of IVF   - Cont to monitor and trend Hgb. Transfuse for Hgb < 7.0     HTN (hypertension).   - C/w home metoprolol.  - Check TTE    HLD (hyperlipidemia).   - C/w home statin.    CAD (coronary artery disease).   - S/p stent in 2005  - C/w home aspirin.    Seizure disorder.   - C/w home keppra BID.  - CT  Head negative for acute findings  - Neuro eval appreciated  - Aspiration and Seizure precautions     Chronic back pain.   - C/w home gabapentin (dose slightly reduced for kidney function)  - C/w home lyrica (dose slightly reduced for kidney function)  - C/w home baclofen 4x/day PRN.    Prophylactic measure.   Diet: DASH  DVT Prophylaxis: HSQ  PT eval  Full code.    
Patient is a 67y Male with PMH of HTN, HLD, CAD s/p stent (2015), seizure disorder on Keppra, chronic back pain s/p multiple spinal surgeries, kidney stones, and recurrent UTIs, presenting to the ED w/ AMS x1 day, admitted to medicine w/ severe sepsis 2/2 UTI.      Urosepsis   - Fever, tachycardia, increased RR, leukocytosis, and elevated lactate meets criteria for severe sepsis  - UA grossly positive for UTI  - C/w ABX as per ID --> On ceftriaxone 2g Q 24   - Lactate resolved w/ IVF --> Continue with IVF for hydration   - Trend WBC and fever curve  - F/u UCx and BCx2 --> + Gram negative bacteremia; Proteus   - Repeat BCx2 in lab; 1/2 + w/ Gm + ? Contamination --> F/u repeat BCx2 06/09   - CT C/A/P non-contrast  1.1 renal calculus in the UPJ with mild hydronephrosis --> urology to follow up, planned for OR for ureteral stent placement with urology. NPO at midnight with IVF    - Monitor CBC, temp curve, VS and patient closely  - ID eval and uroogy appreciated; F/u recs    AMS  - Likely due to UTI, mentation improving   -  CT C/A/P --> As above   - CT Head negative  - F/u infectious work up as above  - Neuro eval appreciated    Nephrolithiasis, Hydronephrosis   - Consider struvite stones given hx proteus UTI and elevated urine pH  - R/O urinary obstruction and/or pyelo given TONY   - Renal US noted   - CT with obstructing calculus --> urology to follow up, planned for OR for ureteral stent placement with urology. NPO at midnight with IVF    - Urology eval appreciated; plan for OR today     TONY (acute kidney injury).   - Likely prerenal i/s/o severe sepsis vs postrenal i/s/o kidney stones  - S/P 1.5L IVF in the ED, C/w IVF   - F/u Renal US -->noted  - Renal eval appreciated; F/u recs     Anemia  - Down-trend in Hgb. No gross bleeding reported  - ? Dilutional component in view of IVF   - Cont to monitor and trend Hgb. Transfuse for Hgb < 7.0     HTN (hypertension).   - C/w home metoprolol.  - Check TTE    HLD (hyperlipidemia).   - C/w home statin.    CAD (coronary artery disease).   - S/p stent in 2005  - C/w home aspirin.    Seizure disorder.   - C/w home keppra BID.  - CT  Head negative for acute findings  - Neuro eval appreciated  - Aspiration and Seizure precautions     Chronic back pain.   - C/w home gabapentin (dose slightly reduced for kidney function)  - C/w home lyrica (dose slightly reduced for kidney function)  - C/w home baclofen 4x/day PRN.    Prophylactic measure.   Diet: DASH  DVT Prophylaxis: HSQ  PT eval  Full code.    
Patient is a 67y Male with PMH of HTN, HLD, CAD s/p stent (2015), seizure disorder on Keppra, chronic back pain s/p multiple spinal surgeries, kidney stones, and recurrent UTIs, presenting to the ED w/ AMS x1 day, admitted to medicine w/ severe sepsis 2/2 UTI.      Urosepsis   - Fever, tachycardia, increased RR, leukocytosis, and elevated lactate meets criteria for severe sepsis  - UA grossly positive for UTI  - C/w ABX as per ID --> On ceftriaxone 2g Q 24   - Lactate resolved w/ IVF --> Continue with IVF for hydration   - Trend WBC and fever curve  - F/u UCx and BCx2 --> + Gram negative bacteremia; Proteus   - Repeat BCx2 in lab; 1/2 + w/ Gm +, Staph Epi, ? Contamination  --> Repeat BCx2 06/09 NGTD; F/u final   - CT C/A/P non-contrast  1.1 renal calculus in the UPJ with mild hydronephrosis --> urology to follow up, S/P OR for ureteral stent placement with urology   - Monitor CBC, temp curve, VS and patient closely  - ID eval and uroogy appreciated; F/u recs    AMS  - Likely due to UTI, mentation improving   -  CT C/A/P --> As above   - CT Head negative  - F/u infectious work up as above  - Neuro eval appreciated    Nephrolithiasis, Hydronephrosis   - Consider struvite stones given hx proteus UTI and elevated urine pH  - R/O urinary obstruction and/or pyelo given TONY   - Renal US noted   - CT with obstructing calculus --> S/Pnned for OR for ureteral stent placement with urology   - Urology eval appreciated; S/P OR, stent placement  - Monitor urine output, hematuria as expected, monitor hgb level, stable. Trend output     Leukocytosis   - Likely due to reactivity post-OR  - Continues on ABX as per ID  - F/u 06/09 Cultures  - Monitor and trend    TONY (acute kidney injury).   - Likely prerenal i/s/o severe sepsis vs postrenal i/s/o kidney stones  - S/P 1.5L IVF in the ED, C/w IVF   - F/u Renal US -->noted  - Renal eval appreciated; F/u recs     Anemia  - Down-trend in Hgb. No gross bleeding reported  - ? Dilutional component in view of IVF   - Cont to monitor and trend Hgb. Transfuse for Hgb < 7.0     HTN (hypertension).   - C/w home metoprolol.  - Check TTE, noted     HLD (hyperlipidemia).   - C/w home statin.    CAD (coronary artery disease).   - S/p stent in 2005  - C/w home aspirin.    Seizure disorder.   - C/w home keppra BID.  - CT  Head negative for acute findings  - Neuro eval appreciated  - Aspiration and Seizure precautions     Chronic back pain.   - C/w home gabapentin (dose slightly reduced for kidney function)  - C/w home lyrica (dose slightly reduced for kidney function)  - C/w home baclofen 4x/day PRN.    Prophylactic measure.   Diet: DASH  DVT Prophylaxis: HSQ  PT eval  Full code.    Discussed with Attending. 
Patient is a 67y Male with PMH of HTN, HLD, CAD s/p stent (2015), seizure disorder on Keppra, chronic back pain s/p multiple spinal surgeries, kidney stones, and recurrent UTIs, presenting to the ED w/ AMS x1 day, admitted to medicine w/ severe sepsis 2/2 UTI.      Urosepsis   - Fever, tachycardia, increased RR, leukocytosis, and elevated lactate meets criteria for severe sepsis  - UA grossly positive for UTI  - C/w ceftriaxon, ABX as per ID --> Increased to 2g Q 24   - Lactate resolved w/ IVF --> Continue with IVF for hydration   - Trend WBC and fever curve  - F/u UCx and BCx2 --> + Gram negative bacteremia   - Repeat BCx2 in lab; F/u results   - CT C/A/P non-contrast  1.1 renal calculus in the UPJ with mild hydronephrosis --> urology to follow up   - Monitor CBC, temp curve, VS and patient closely  - ID eval appreciated; F/u recs    AMS  - Likely due to UTI, mentation improving   -  CT C/A/P --> As above   - CT Head negative  - F/u infectious work up as above  - Neuro eval appreciated    Nephrolithiasis, Hydronephrosis   - Consider struvite stones given hx proteus UTI and elevated urine pH  - R/O urinary obstruction and/or pyelo given TONY   - Renal US noted   - CT with obstructing calculus; Urology to follow up   - Urology consulted; F/u recs     TONY (acute kidney injury).   - Likely prerenal i/s/o severe sepsis vs postrenal i/s/o kidney stones  - S/P 1.5L IVF in the ED, C/w IVF   - F/u Renal US -->noted  - Renal eval appreciated; F/u recs     Anemia  - Down-trend in Hgb. No gross bleeding reported  - ? Dilutional component in view of IVF   - Cont to monitor and trend Hgb. Transfuse for Hgb < 7.0     HTN (hypertension).   - C/w home metoprolol.    HLD (hyperlipidemia).   - C/w home statin.    CAD (coronary artery disease).   - S/p stent in 2005  - C/w home aspirin.    Seizure disorder.   - C/w home keppra BID.  - CT  Head negative for acute findings  - Neuro eval appreciated  - Aspiration and Seizure precautions     Chronic back pain.   - C/w home gabapentin (dose slightly reduced for kidney function)  - C/w home lyrica (dose slightly reduced for kidney function)  - C/w home baclofen 4x/day PRN.    Prophylactic measure.   Diet: DASH  DVT Prophylaxis: HSQ  PT eval  Full code.    Discussed with Attending and ACP. 
Patient is a 67y Male with PMH of HTN, HLD, CAD s/p stent (2015), seizure disorder on Keppra, chronic back pain s/p multiple spinal surgeries, kidney stones, and recurrent UTIs, presenting to the ED w/ AMS x1 day, admitted to medicine w/ severe sepsis 2/2 UTI.      Urosepsis   - Fever, tachycardia, increased RR, leukocytosis, and elevated lactate meets criteria for severe sepsis  - UA grossly positive for UTI; Lactate resolved w/ IVF    - C/w ABX as per ID --> On ceftriaxone 2g Q 24   - F/u UCx and BCx2 --> Gram negative bacteremia; Proteus   - Repeat BCx2 in lab; 1/2 + w/ Gm +, Staph Epi, ? Contamination  --> Repeat BCx2 06/09 NGTD; F/u final   - CT C/A/P non-contrast  1.1 renal calculus in the UPJ with mild hydronephrosis --> urology to follow up, S/P OR for ureteral stent placement with urology   - Monitor CBC, temp curve, VS and patient closely  - ID eval and uroogy appreciated; F/u recs  - DC on PO ABX Cefpodoxime 200 BID to complete 10 day course per ID     AMS  - Likely due to UTI, mentation improved  -  CT C/A/P --> As above; CT Head negative  - F/u infectious work up as above  - Neuro eval appreciated    Nephrolithiasis, Hydronephrosis   - Consider struvite stones given hx proteus UTI and elevated urine pH  - R/O urinary obstruction and/or pyelo given TONY   - Renal US noted   - CT with obstructing calculus --> S/P OR for ureteral stent placement with urology   - Urology eval appreciated   - Monitor urine output, hematuria as expected, monitor hgb level, stable. Trend output     Leukocytosis   - Likely due to reactivity post-OR  - Continues on ABX as per ID  - F/u 06/09 Cultures, NGTD. F/u final   - Monitor and trend    TONY (acute kidney injury).   - Likely prerenal i/s/o severe sepsis vs postrenal i/s/o kidney stones  - S/P 1.5L IVF in the ED, C/w IVF   - F/u Renal US -->noted  - Renal eval appreciated; F/u recs     Anemia  - Cont to monitor and trend Hgb. Transfuse for Hgb < 7.0   - Hgb Stable    HTN (hypertension).   - C/w home metoprolol.  - BP elevated, started on Norvasc 5 for BP control. Monitor   - TTE, noted     HLD (hyperlipidemia).   - C/w home statin.    CAD (coronary artery disease).   - S/p stent in 2005  - C/w home aspirin.    Seizure disorder.   - C/w home keppra BID.  - CT  Head negative for acute findings  - Neuro eval appreciated  - Aspiration and Seizure precautions     Chronic back pain.   - C/w home gabapentin (dose slightly reduced for kidney function)  - C/w home lyrica (dose slightly reduced for kidney function)  - C/w home baclofen 4x/day PRN.    Prophylactic measure.   Diet: DASH  DVT Prophylaxis: HSQ  PT eval  Full code.    Discussed with Attending and ACP . 
Patient is a 68yo M patient being seen by private podiatry attending for the following:  - Onychomycosis / Dystrophic toenails 1-5 of the bilateral feet  - Pain in the right toes 1-5  - Pain in the left toes 1-5  - Abrasion dorsal halluces    Plan:  The patient was evaluated and medical records were reviewed.  The patient's halluces remain stable without any acute concerns    Patient may follow up in my office upon discharge (Dr. Waterhouse) -[phone]  Patient demonstrated verbal understanding of all interventions and all questions were answered appropriately.  No further interventions by Podiatry at this time. 
 67y Male with HTN, HLD, CAD s/p stent (2015), seizure disorder on Keppra, chronic back pain s/p multiple spinal surgeries, kidney stones, and recurrent UTIs, SS, h/o aneurysm rupture 1997 s/p clipping presenting to the ED w/ AMS   Labs significant for WBC 22, Cr 1.66, lactate 2.4->1.6.   UA grossly positive for UTI.   CXR shows mildly increased interstitial markings compared to prior.   CT head negative for acute pathology.s/p R FP crani with R suprasellar aneursym clip. gliosis in frontal lobes.     Impressin:   1) AMS likely toxic metabolic encephalopathy from infection/UTI. better  2) seiuzre d/o, stable on keppra  3) chronic back pain and SS s/p multiple surgeries   4) aneurysm s/p clipping 1997, stable     - c/w treatement of infection/UTI/pyelonephritis, now on CTX  - keppra 750mg BID for seiuzre ppx   - gabapentin 200mg PO QHS along with lyrica 75 daily   - c/w asa and statin therapy    - PT/OT   - ID and  recs apprecaited   - check FS, glucose control <180  - GI/DVT ppx   - Thank you for allowing me to participate in the care of this patient. Call with questions.   - sees Dr. Cary Tim outpatient   Timothy Noble MD  Vascular Neurology  Office: [phone] 
 67y Male with HTN, HLD, CAD s/p stent (2015), seizure disorder on Keppra, chronic back pain s/p multiple spinal surgeries, kidney stones, and recurrent UTIs, SS, h/o aneurysm rupture 1997 s/p clipping presenting to the ED w/ AMS   Labs significant for WBC 22, Cr 1.66, lactate 2.4->1.6.   UA grossly positive for UTI.   CXR shows mildly increased interstitial markings compared to prior.   CT head negative for acute pathology.s/p R FP crani with R suprasellar aneursym clip. gliosis in frontal lobes.     Impressin:   1) AMS likely toxic metabolic encephalopathy from infection/UTI. better  2) seiuzre d/o, stable on keppra  3) chronic back pain and SS s/p multiple surgeries   4) aneurysm s/p clipping 1997, stable     - plan for L ureteral stent 6/10   - c/w treatement of infection/UTI/pyelonephritis, now on CTX  - keppra 750mg BID for seiuzre ppx   - gabapentin 200mg PO QHS along with lyrica 75 daily   - c/w asa and statin therapy    - PT/OT   - ID and  recs apprecaited   - check FS, glucose control <180  - GI/DVT ppx   - Thank you for allowing me to participate in the care of this patient. Call with questions.   - sees Dr. Cary Tim outpatient   Timothy Noble MD  Vascular Neurology  Office: [phone] 
Patient is a 67y Male with PMH of HTN, HLD, CAD s/p stent (2015), seizure disorder on Keppra, chronic back pain s/p multiple spinal surgeries, kidney stones, and recurrent UTIs, presenting to the ED w/ AMS x1 day. Patient was admitted in 4/2023 w/ proteus UTI, also finished a course of abx outpatient 2 weeks ago for another UTI. Patient's wife states that yesterday he started to become altered, which is usually what happens when he gets a UTI. His arms started shaking, he was confused, and he was having trouble keeping his eyes open. He also had a fever of 103 this morning. Patient does report dysuria. He denies chest pain, shortness of breath, abdominal pain, nausea, vomiting, changes in bowel habits. His last BM was 2 days ago. He follows with urology Dr. Wright from [facility] for his [facility] stones. Nephrology consulted for TONY.       TONY   baseline sCr ~ 1.0 - 1.3   TONY etiology ? likely ATN from sepsis   Pt was febrile  FENa > 1%, suggestive of ATN   Scr improving.   Bladder scan + retention on 6/7 - Recommend to check bladder scan q6h   US kidney and bladder 6/7 --> Limited visibility of Left lower pole, No hydronephrosis, non obstructing right 6 mm calculus  CT A/P No contrast 6/8 --> left 1.1 cm calculus in UPJ with new mild hydronephrosis and perinephric fat stranding. Additional non obstructing left renal calculus. Non obstructing calculi in the right upper pole and pelvis measuring 1.6 cm.   Urology following  Monitor BMP, U/o    Proteinuria/ Hematuria:   + LE, + Blood   History of kidney stones follows with Dr. Wright (urology)   urine c/s + gram negative rods     Acidosis  Non AG, Ag  s/p IVFs to 1/2 NS + 75 mEq Sodium Bicarb @ 75 cc/ hr x 24 hrs  improving   Monitor     
Patient is a 67y Male with PMH of HTN, HLD, CAD s/p stent (2015), seizure disorder on Keppra, chronic back pain s/p multiple spinal surgeries, kidney stones, and recurrent UTIs, presenting to the ED w/ AMS x1 day. Patient was admitted in 4/2023 w/ proteus UTI, also finished a course of abx outpatient 2 weeks ago for another UTI. Patient's wife states that yesterday he started to become altered, which is usually what happens when he gets a UTI. His arms started shaking, he was confused, and he was having trouble keeping his eyes open. He also had a fever of 103 this morning. Patient does report dysuria. He denies chest pain, shortness of breath, abdominal pain, nausea, vomiting, changes in bowel habits. His last BM was 2 days ago. He follows with urology Dr. Wright from [facility] for his kidney stones. Nephrology consulted for TONY.       TONY   baseline sCr ~ 1.0 - 1.3   TONY etiology ? likely ATN from sepsis   Pt was febrile  Renal function improving at present   Urine Na, Urine Cr pending   Bladder scan + retention on 6/7 - Recommend to check bladder scan q6h   US kidney and bladder 6/7 --> Limited visibility of Left lower pole, No hydronephrosis, non obstructing right 6 mm calculus  CT A/P No contrast 6/8 --> left 1.1 cm calculus in UPJ with new mild hydronephrosis and perinephric fat stranding. Additional non obstructing left renal calculus. Non obstructing calculi in the right upper pole and pelvis measuring 1.6 cm.   Urology following  Monitor BMP, U/o    Proteinuria/ Hematuria:   + LE, + Blood   History of kidney stones follows with Dr. Wright (urology)   urine c/s + gram negative rods     Acidosis  Non AG, Ag  s/p IVFs to 1/2 NS + 75 mEq Sodium Bicarb @ 75 cc/ hr x 24 hrs  Monitor     Discussed with primary team. 
 67y Male with HTN, HLD, CAD s/p stent (2015), seizure disorder on Keppra, chronic back pain s/p multiple spinal surgeries, kidney stones, and recurrent UTIs, SS, h/o aneurysm rupture 1997 s/p clipping presenting to the ED w/ AMS   Labs significant for WBC 22, Cr 1.66, lactate 2.4->1.6.   UA grossly positive for UTI.   CXR shows mildly increased interstitial markings compared to prior.   CT head negative for acute pathology.s/p R FP crani with R suprasellar aneursym clip. gliosis in frontal lobes.   s/p L ureteral stent     Impressin:   1) AMS likely toxic metabolic encephalopathy from infection/UTI. 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 Call with questions.   - sees Dr. Cary Tim outpatient   Timothy Noble MD  Vascular Neurology  Office: [phone] 
67 m with HTN, HLD, CAD s/p stent (2015), seizure disorder on Keppra, chronic back pain s/p multiple spinal surgeries and lumbar spinal stimulator (s/p removal), b/l nephrolithiasis, and recurrent UTIs (recent admission in April 2023 for Proteus UTI), now p/w fever, AMS, dysuria and R CVA tenderness   here febrile to 102.2, WBC: 22  Cr: 1.8  blood cx: proteus  CT: New mild left hydronephrosis and perinephric fat stranding secondary to an obstructing 1.1 cm calculus in the UPJ    fever, leukocytosis, TONY, AMS, sepsis with R pyelonephritis due to obstructing 1.1 cm calculus in L UPJ and hydro  AMS due to septic encephalopathy, head CT negative    * previous cx with proteus and E-coli all sensitive to ceftriaxone so will continue but increase to 2 g qd  * urology eval for the obstructing stone  * f/u the final blood and urine cx  * repeat blood cx  * monitor CBC/diff and renal function    The above assessment and plan was discussed with the primary team    Ange Hayes MD  contact on teams  After 5pm and on weekends call [phone]
Patient is a 67y Male with PMH of HTN, HLD, CAD s/p stent (2015), seizure disorder on Keppra, chronic back pain s/p multiple spinal surgeries, kidney stones, and recurrent UTIs, presenting to the ED w/ AMS x1 day. Patient was admitted in 4/2023 w/ proteus UTI, also finished a course of abx outpatient 2 weeks ago for another UTI. Patient's wife states that yesterday he started to become altered, which is usually what happens when he gets a UTI. His arms started shaking, he was confused, and he was having trouble keeping his eyes open. He also had a fever of 103 this morning. Patient does report dysuria. He denies chest pain, shortness of breath, abdominal pain, nausea, vomiting, changes in bowel habits. His last BM was 2 days ago. He follows with urology Dr. Wright from [facility] for his kidney stones. Nephrology consulted for TONY.       TONY   baseline sCr ~ 1.0 - 1.3   TONY etiology ? likely ATN from sepsis   Pt was febrile  FENa > 1%, suggestive of ATN   Renal function improving at present   Bladder scan + retention on 6/7 - Recommend to check bladder scan q6h   US kidney and bladder 6/7 --> Limited visibility of Left lower pole, No hydronephrosis, non obstructing right 6 mm calculus  CT A/P No contrast 6/8 --> left 1.1 cm calculus in UPJ with new mild hydronephrosis and perinephric fat stranding. Additional non obstructing left renal calculus. Non obstructing calculi in the right upper pole and pelvis measuring 1.6 cm.   Urology following  Monitor BMP, U/o    Proteinuria/ Hematuria:   + LE, + Blood   History of kidney stones follows with Dr. Wright (urology)   urine c/s + gram negative rods     Acidosis  Non AG, Ag  s/p IVFs to 1/2 NS + 75 mEq Sodium Bicarb @ 75 cc/ hr x 24 hrs  improving   Monitor     Discussed with primary team. 
 67y Male with HTN, HLD, CAD s/p stent (2015), seizure disorder on Keppra, chronic back pain s/p multiple spinal surgeries, kidney stones, and recurrent UTIs, SS, h/o aneurysm rupture 1997 s/p clipping presenting to the ED w/ AMS   Labs significant for WBC 22, Cr 1.66, lactate 2.4->1.6.   UA grossly positive for UTI.   CXR shows mildly increased interstitial markings compared to prior.   CT head negative for acute pathology.s/p R FP crani with R suprasellar aneursym clip. gliosis in frontal lobes.   s/p L ureteral stent     Impressin:   1) AMS likely toxic metabolic encephalopathy from infection/UTI. 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 Call with questions.   - sees Dr. Cary Tim outpatient   dc plan to rehab when able   Timothy Noble MD  Vascular Neurology  Office: [phone] 
67 m with HTN, HLD, CAD s/p stent (2015), seizure disorder on Keppra, chronic back pain s/p multiple spinal surgeries and lumbar spinal stimulator (s/p removal), b/l nephrolithiasis, and recurrent UTIs (recent admission in April 2023 for Proteus UTI), now p/w fever, AMS, dysuria and R CVA tenderness   here febrile to 102.2, WBC: 22  Cr: 1.8  blood cx: proteus  CT: New mild left hydronephrosis and perinephric fat stranding secondary to an obstructing 1.1 cm calculus in the UPJ    fever, leukocytosis, TONY, AMS, sepsis with R pyelonephritis due to obstructing 1.1 cm calculus in L UPJ and hydro, proteus bacteremia (R to cipro, bactrim, I to cefazolin) s/p L ureteral stent 6/10  AMS due to septic encephalopathy, head CT negative  repeat cx 6/7 with 1/4 staph epi, likely contaminant, repeat negative 6/9    * c/w ceftriaxone to 2 g qd while in the hospital, now day 7 post negative blood cx  * will complete a 10 day course through 6/16  * if pt is ready for discharge switch to PO cefpodoxime 200 bid to finish the course    The above assessment and plan was discussed with the primary team    Ange Hayes MD  contact on teams  After 5pm and on weekends call [phone]        
Patient is a 67y Male with PMH of HTN, HLD, CAD s/p stent (2015), seizure disorder on Keppra, chronic back pain s/p multiple spinal surgeries, kidney stones, and recurrent UTIs, presenting to the ED w/ AMS x1 day, admitted to medicine w/ severe sepsis 2/2 UTI.      Urosepsis   - Fever, tachycardia, increased RR, leukocytosis, and elevated lactate meets criteria for severe sepsis  - UA grossly positive for UTI  - C/w ABX as per ID --> On ceftriaxone 2g Q 24   - Lactate resolved w/ IVF --> Continue with IVF for hydration   - Trend WBC and fever curve  - F/u UCx and BCx2 --> + Gram negative bacteremia; Proteus   - Repeat BCx2 in lab; 1/2 + w/ Gm + ? Contamination --> F/u repeat BCx2 06/09   - CT C/A/P non-contrast  1.1 renal calculus in the UPJ with mild hydronephrosis --> urology to follow up, planned for OR for ureteral stent placement with urology. NPO at midnight with IVF    - Monitor CBC, temp curve, VS and patient closely  - ID eval and uroogy appreciated; F/u recs    AMS  - Likely due to UTI, mentation improving   -  CT C/A/P --> As above   - CT Head negative  - F/u infectious work up as above  - Neuro eval appreciated    Nephrolithiasis, Hydronephrosis   - Consider struvite stones given hx proteus UTI and elevated urine pH  - R/O urinary obstruction and/or pyelo given TONY   - Renal US noted   - CT with obstructing calculus --> urology to follow up, planned for OR for ureteral stent placement with urology. NPO at midnight with IVF    - Urology eval appreciated; F/u recs     TONY (acute kidney injury).   - Likely prerenal i/s/o severe sepsis vs postrenal i/s/o kidney stones  - S/P 1.5L IVF in the ED, C/w IVF   - F/u Renal US -->noted  - Renal eval appreciated; F/u recs     Anemia  - Down-trend in Hgb. No gross bleeding reported  - ? Dilutional component in view of IVF   - Cont to monitor and trend Hgb. Transfuse for Hgb < 7.0     HTN (hypertension).   - C/w home metoprolol.  - Check TTE    HLD (hyperlipidemia).   - C/w home statin.    CAD (coronary artery disease).   - S/p stent in 2005  - C/w home aspirin.    Seizure disorder.   - C/w home keppra BID.  - CT  Head negative for acute findings  - Neuro eval appreciated  - Aspiration and Seizure precautions     Chronic back pain.   - C/w home gabapentin (dose slightly reduced for kidney function)  - C/w home lyrica (dose slightly reduced for kidney function)  - C/w home baclofen 4x/day PRN.    Prophylactic measure.   Diet: DASH  DVT Prophylaxis: HSQ  PT eval  Full code.    Discussed with Attending and ACP. 
Proteus sepsis with obstructing left ureteral stone, clinically improving.  - NPO p mn for left ureteral stent tomorrow  - continue ceftriaxone  - discussed with patient, his wife, and CATA Robins

## 2023-10-01 PROBLEM — M54.16 LUMBAR RADICULAR PAIN: Status: ACTIVE | Noted: 2017-10-17

## 2023-10-02 NOTE — DISCHARGE NOTE PROVIDER - CARE PROVIDER_API CALL
No Alexandria Quinn)  EEGEpilepsy; Neurology  [address], [address]  [place] [address]  Phone: [phone]  Fax: [phone]  Follow Up Time: 2 weeks    Joseph Eaton)  Critical Care Medicine; Internal Medicine; Pulmonary Disease  [address] [place] [address]  Phone: [phone]  Fax: [phone]  Follow Up Time: 2 weeks

## 2023-10-06 NOTE — DISCHARGE NOTE NURSING/CASE MANAGEMENT/SOCIAL WORK - NSDCPEPTSTRK_GEN_ALL_CORE
Call 911 for stroke/Need for follow up after discharge/Prescribed medications/Risk factors for stroke/Stroke education booklet/Stroke support groups for patients, families, and friends/Stroke warning signs and symptoms/Signs and symptoms of stroke
See HPI

## 2024-01-01 NOTE — PATIENT PROFILE ADULT - LAST TOBACCO USE (DD-MM-YY)
Circumcision    Date/Time: 2024 11:35 AM    Performed by: Laura Cuba PA-C  Authorized by: Yadiel Preciado MD    Procedure discussed: discussed risks, benefits and alternatives    Chaperone present: yes    Timeout: timeout called immediately prior to procedure    Prep: patient was prepped and draped in usual sterile fashion    Prep type comment: betadine  Anesthesia: local anesthesia    Local anesthetic: lidocaine without epinephrine    Procedure Details     Clamp used: yes      Lysis/excision, penile post-circumcision adhesions: yes      Repair, incomplete circumcision: no      Frenulotomy: no      Post-Procedure Details     Outcome: patient tolerated procedure well with no complications      Post-procedure interventions: wound care instructions given      Disposition: transferred to recovery area awake    Additional Details      Patient was placed on a circumcision board in the supine position with bilateral knee straps velcroed in place and upper extremities in blanket swaddle. Genitalia was cleansed with alcohol and 1.0cc 1% lidocaine given in a ring penile block. The genitals were then prepped with betadine and draped in normal sterile fashion. The meatus was identified and foreskin clamped at 3 o' clock and 9 o' clock positions. Foreskin adhesions were broken down via blunt dissection. The area for circumcision was identified and marked via crush injury using hemostats. The Mogen clamp was placed and the excess foreskin excised with scalpel.  The clamp was removed and the foreskin retracted to reveal the glans. Bleeding was minimal, no complications were encountered, and patient tolerated procedure well.     Laura Cuba PA-C                29-Jan-2006

## 2024-02-07 NOTE — DISCHARGE NOTE PROVIDER - PROVIDER TOKENS
-- DO NOT REPLY / DO NOT REPLY ALL --  -- Message is from Engagement Center Operations (EC[employer]) --    General Patient Message: Patient is calling to thank Dr. Gale for sending a prescription.     Caller Information         Type Contact Phone/Fax    02/06/2024 06:03 PM CST Phone (Incoming) Smooth Aguilar (Self) [phone] (M)          Alternative phone number:     Can a detailed message be left? Yes    Message Turnaround:     Is it Working Hours? No - After Hours/Weekend/Holiday     Did the caller agree that this message can wait until the office reopens in the morning? YES - The Message Can Wait - Send a message to the provider's clinical support pool                     
PROVIDER:[TOKEN:[[id number]],FOLLOWUP:[2 weeks],ESTABLISHEDPATIENT:[T]]

## 2024-03-14 NOTE — PATIENT PROFILE ADULT - FOOD INSECURITY
MRN: [id number]
MRN: [id number]
Please print prescan out of Media Tab and then complete and sign.   Once form is completed and signed, please fax to [phone].    Please direct any questions to [phone], Option 1.   Thanks,  Forms Completion Team. 
Please print prescan out of Media Tab and then complete and sign.   Once form is completed and signed, please fax to [phone]  Please direct any questions to [phone] Option 3.   Thanks,  Forms Completion Team. 
REMINDER: An FMLA/Disability form was recently sent to your office for review and signature.   Please complete, sign and fax to [phone]  as soon as possible.    Thank you,  Forms Completion Team.   
REVISED Please print prescan out of Media Tab and then complete and sign.   Once form is completed and signed, please fax to [phone].    Please direct any questions to [phone], Option 1.   Thanks,  Forms Completion Team. 
no

## 2024-04-03 ENCOUNTER — APPOINTMENT (OUTPATIENT)
Dept: UROLOGY | Facility: CLINIC | Age: 69
End: 2024-04-03

## 2024-04-16 NOTE — PRE-OP CHECKLIST - SKIN PREP
I personally performed the service described in the documentation recorded by the scribe in my presence, and it accurately and completely records my words and actions.
done

## 2024-04-17 ENCOUNTER — INPATIENT (INPATIENT)
Facility: HOSPITAL | Age: 69
LOS: 6 days | Discharge: HOME | End: 2024-04-24
Attending: INTERNAL MEDICINE | Admitting: INTERNAL MEDICINE
Payer: MEDICARE

## 2024-04-17 ENCOUNTER — TRANSCRIPTION ENCOUNTER (OUTPATIENT)
Age: 69
End: 2024-04-17

## 2024-04-17 VITALS
RESPIRATION RATE: 18 BRPM | TEMPERATURE: 99 F | HEART RATE: 80 BPM | SYSTOLIC BLOOD PRESSURE: 125 MMHG | OXYGEN SATURATION: 99 % | DIASTOLIC BLOOD PRESSURE: 81 MMHG

## 2024-04-17 DIAGNOSIS — Z98.89 OTHER SPECIFIED POSTPROCEDURAL STATES: Chronic | ICD-10-CM

## 2024-04-17 DIAGNOSIS — Z98.890 OTHER SPECIFIED POSTPROCEDURAL STATES: Chronic | ICD-10-CM

## 2024-04-17 DIAGNOSIS — M43.28 FUSION OF SPINE, SACRAL AND SACROCOCCYGEAL REGION: Chronic | ICD-10-CM

## 2024-04-17 DIAGNOSIS — Z87.81 PERSONAL HISTORY OF (HEALED) TRAUMATIC FRACTURE: Chronic | ICD-10-CM

## 2024-04-17 DIAGNOSIS — Z96.649 PRESENCE OF UNSPECIFIED ARTIFICIAL HIP JOINT: Chronic | ICD-10-CM

## 2024-04-17 DIAGNOSIS — R41.82 ALTERED MENTAL STATUS, UNSPECIFIED: ICD-10-CM

## 2024-04-17 DIAGNOSIS — N20.9 URINARY CALCULUS, UNSPECIFIED: ICD-10-CM

## 2024-04-17 LAB
ALBUMIN SERPL ELPH-MCNC: 4.3 G/DL — SIGNIFICANT CHANGE UP (ref 3.3–5)
ALP SERPL-CCNC: 108 U/L — SIGNIFICANT CHANGE UP (ref 40–120)
ALT FLD-CCNC: 23 U/L — SIGNIFICANT CHANGE UP (ref 4–41)
ANION GAP SERPL CALC-SCNC: 16 MMOL/L — HIGH (ref 7–14)
APPEARANCE UR: CLEAR — SIGNIFICANT CHANGE UP
AST SERPL-CCNC: 24 U/L — SIGNIFICANT CHANGE UP (ref 4–40)
BACTERIA # UR AUTO: NEGATIVE /HPF — SIGNIFICANT CHANGE UP
BASE EXCESS BLDV CALC-SCNC: 1.2 MMOL/L — SIGNIFICANT CHANGE UP (ref -2–3)
BASOPHILS # BLD AUTO: 0.05 K/UL — SIGNIFICANT CHANGE UP (ref 0–0.2)
BASOPHILS NFR BLD AUTO: 0.3 % — SIGNIFICANT CHANGE UP (ref 0–2)
BILIRUB SERPL-MCNC: 0.4 MG/DL — SIGNIFICANT CHANGE UP (ref 0.2–1.2)
BILIRUB UR-MCNC: NEGATIVE — SIGNIFICANT CHANGE UP
BLOOD GAS VENOUS COMPREHENSIVE RESULT: SIGNIFICANT CHANGE UP
BUN SERPL-MCNC: 19 MG/DL — SIGNIFICANT CHANGE UP (ref 7–23)
CALCIUM SERPL-MCNC: 9.6 MG/DL — SIGNIFICANT CHANGE UP (ref 8.4–10.5)
CAST: 1 /LPF — SIGNIFICANT CHANGE UP (ref 0–4)
CHLORIDE BLDV-SCNC: 106 MMOL/L — SIGNIFICANT CHANGE UP (ref 96–108)
CHLORIDE SERPL-SCNC: 106 MMOL/L — SIGNIFICANT CHANGE UP (ref 98–107)
CO2 BLDV-SCNC: 31.3 MMOL/L — HIGH (ref 22–26)
CO2 SERPL-SCNC: 22 MMOL/L — SIGNIFICANT CHANGE UP (ref 22–31)
COLOR SPEC: YELLOW — SIGNIFICANT CHANGE UP
CREAT SERPL-MCNC: 1.11 MG/DL — SIGNIFICANT CHANGE UP (ref 0.5–1.3)
DIFF PNL FLD: ABNORMAL
EGFR: 72 ML/MIN/1.73M2 — SIGNIFICANT CHANGE UP
EOSINOPHIL # BLD AUTO: 0 K/UL — SIGNIFICANT CHANGE UP (ref 0–0.5)
EOSINOPHIL NFR BLD AUTO: 0 % — SIGNIFICANT CHANGE UP (ref 0–6)
GAS PNL BLDV: 138 MMOL/L — SIGNIFICANT CHANGE UP (ref 136–145)
GAS PNL BLDV: SIGNIFICANT CHANGE UP
GLUCOSE BLDV-MCNC: 133 MG/DL — HIGH (ref 70–99)
GLUCOSE SERPL-MCNC: 132 MG/DL — HIGH (ref 70–99)
GLUCOSE UR QL: NEGATIVE MG/DL — SIGNIFICANT CHANGE UP
HCO3 BLDV-SCNC: 30 MMOL/L — HIGH (ref 22–29)
HCT VFR BLD CALC: 49.3 % — SIGNIFICANT CHANGE UP (ref 39–50)
HCT VFR BLDA CALC: 50 % — SIGNIFICANT CHANGE UP (ref 39–51)
HGB BLD CALC-MCNC: 16.6 G/DL — SIGNIFICANT CHANGE UP (ref 12.6–17.4)
HGB BLD-MCNC: 16.4 G/DL — SIGNIFICANT CHANGE UP (ref 13–17)
IANC: 11.27 K/UL — HIGH (ref 1.8–7.4)
IMM GRANULOCYTES NFR BLD AUTO: 0.4 % — SIGNIFICANT CHANGE UP (ref 0–0.9)
KETONES UR-MCNC: NEGATIVE MG/DL — SIGNIFICANT CHANGE UP
LACTATE BLDV-MCNC: 3.1 MMOL/L — HIGH (ref 0.5–2)
LEUKOCYTE ESTERASE UR-ACNC: ABNORMAL
LYMPHOCYTES # BLD AUTO: 20 % — SIGNIFICANT CHANGE UP (ref 13–44)
LYMPHOCYTES # BLD AUTO: 3.08 K/UL — SIGNIFICANT CHANGE UP (ref 1–3.3)
MCHC RBC-ENTMCNC: 30.3 PG — SIGNIFICANT CHANGE UP (ref 27–34)
MCHC RBC-ENTMCNC: 33.3 GM/DL — SIGNIFICANT CHANGE UP (ref 32–36)
MCV RBC AUTO: 91.1 FL — SIGNIFICANT CHANGE UP (ref 80–100)
MONOCYTES # BLD AUTO: 0.95 K/UL — HIGH (ref 0–0.9)
MONOCYTES NFR BLD AUTO: 6.2 % — SIGNIFICANT CHANGE UP (ref 2–14)
NEUTROPHILS # BLD AUTO: 11.27 K/UL — HIGH (ref 1.8–7.4)
NEUTROPHILS NFR BLD AUTO: 73.1 % — SIGNIFICANT CHANGE UP (ref 43–77)
NITRITE UR-MCNC: NEGATIVE — SIGNIFICANT CHANGE UP
NRBC # BLD: 0 /100 WBCS — SIGNIFICANT CHANGE UP (ref 0–0)
NRBC # FLD: 0 K/UL — SIGNIFICANT CHANGE UP (ref 0–0)
PCO2 BLDV: 60 MMHG — HIGH (ref 42–55)
PH BLDV: 7.3 — LOW (ref 7.32–7.43)
PH UR: 5.5 — SIGNIFICANT CHANGE UP (ref 5–8)
PLATELET # BLD AUTO: 252 K/UL — SIGNIFICANT CHANGE UP (ref 150–400)
PO2 BLDV: 27 MMHG — SIGNIFICANT CHANGE UP (ref 25–45)
POTASSIUM BLDV-SCNC: 5 MMOL/L — SIGNIFICANT CHANGE UP (ref 3.5–5.1)
POTASSIUM SERPL-MCNC: 5.2 MMOL/L — SIGNIFICANT CHANGE UP (ref 3.5–5.3)
POTASSIUM SERPL-SCNC: 5.2 MMOL/L — SIGNIFICANT CHANGE UP (ref 3.5–5.3)
PROT SERPL-MCNC: 7.5 G/DL — SIGNIFICANT CHANGE UP (ref 6–8.3)
PROT UR-MCNC: 30 MG/DL
RBC # BLD: 5.41 M/UL — SIGNIFICANT CHANGE UP (ref 4.2–5.8)
RBC # FLD: 14.5 % — SIGNIFICANT CHANGE UP (ref 10.3–14.5)
RBC CASTS # UR COMP ASSIST: 6 /HPF — HIGH (ref 0–4)
SAO2 % BLDV: 41.4 % — LOW (ref 67–88)
SODIUM SERPL-SCNC: 144 MMOL/L — SIGNIFICANT CHANGE UP (ref 135–145)
SP GR SPEC: 1.02 — SIGNIFICANT CHANGE UP (ref 1–1.03)
SQUAMOUS # UR AUTO: 0 /HPF — SIGNIFICANT CHANGE UP (ref 0–5)
UROBILINOGEN FLD QL: 0.2 MG/DL — SIGNIFICANT CHANGE UP (ref 0.2–1)
WBC # BLD: 15.41 K/UL — HIGH (ref 3.8–10.5)
WBC # FLD AUTO: 15.41 K/UL — HIGH (ref 3.8–10.5)
WBC UR QL: 96 /HPF — HIGH (ref 0–5)

## 2024-04-17 PROCEDURE — 99285 EMERGENCY DEPT VISIT HI MDM: CPT

## 2024-04-17 PROCEDURE — 74176 CT ABD & PELVIS W/O CONTRAST: CPT | Mod: 26,MC

## 2024-04-17 PROCEDURE — 99223 1ST HOSP IP/OBS HIGH 75: CPT

## 2024-04-17 PROCEDURE — 70450 CT HEAD/BRAIN W/O DYE: CPT | Mod: 26,MC

## 2024-04-17 PROCEDURE — 99053 MED SERV 10PM-8AM 24 HR FAC: CPT

## 2024-04-17 PROCEDURE — 93010 ELECTROCARDIOGRAM REPORT: CPT | Mod: 76

## 2024-04-17 PROCEDURE — 93010 ELECTROCARDIOGRAM REPORT: CPT

## 2024-04-17 RX ORDER — GABAPENTIN 400 MG/1
300 CAPSULE ORAL AT BEDTIME
Refills: 0 | Status: DISCONTINUED | OUTPATIENT
Start: 2024-04-17 | End: 2024-04-24

## 2024-04-17 RX ORDER — ASPIRIN/CALCIUM CARB/MAGNESIUM 324 MG
81 TABLET ORAL DAILY
Refills: 0 | Status: DISCONTINUED | OUTPATIENT
Start: 2024-04-17 | End: 2024-04-24

## 2024-04-17 RX ORDER — TAMSULOSIN HYDROCHLORIDE 0.4 MG/1
0.4 CAPSULE ORAL AT BEDTIME
Refills: 0 | Status: DISCONTINUED | OUTPATIENT
Start: 2024-04-17 | End: 2024-04-24

## 2024-04-17 RX ORDER — ERTAPENEM SODIUM 1 G/1
1000 INJECTION, POWDER, LYOPHILIZED, FOR SOLUTION INTRAMUSCULAR; INTRAVENOUS EVERY 24 HOURS
Refills: 0 | Status: COMPLETED | OUTPATIENT
Start: 2024-04-18 | End: 2024-04-24

## 2024-04-17 RX ORDER — HEPARIN SODIUM 5000 [USP'U]/ML
5000 INJECTION INTRAVENOUS; SUBCUTANEOUS EVERY 8 HOURS
Refills: 0 | Status: DISCONTINUED | OUTPATIENT
Start: 2024-04-17 | End: 2024-04-24

## 2024-04-17 RX ORDER — BACLOFEN 100 %
20 POWDER (GRAM) MISCELLANEOUS ONCE
Refills: 0 | Status: COMPLETED | OUTPATIENT
Start: 2024-04-17 | End: 2024-04-18

## 2024-04-17 RX ORDER — ACETAMINOPHEN 500 MG
1000 TABLET ORAL ONCE
Refills: 0 | Status: COMPLETED | OUTPATIENT
Start: 2024-04-17 | End: 2024-04-17

## 2024-04-17 RX ORDER — SODIUM CHLORIDE 9 MG/ML
1000 INJECTION INTRAMUSCULAR; INTRAVENOUS; SUBCUTANEOUS
Refills: 0 | Status: DISCONTINUED | OUTPATIENT
Start: 2024-04-17 | End: 2024-04-24

## 2024-04-17 RX ORDER — LEVETIRACETAM 250 MG/1
750 TABLET, FILM COATED ORAL
Refills: 0 | Status: DISCONTINUED | OUTPATIENT
Start: 2024-04-17 | End: 2024-04-19

## 2024-04-17 RX ORDER — PANTOPRAZOLE SODIUM 20 MG/1
40 TABLET, DELAYED RELEASE ORAL
Refills: 0 | Status: DISCONTINUED | OUTPATIENT
Start: 2024-04-17 | End: 2024-04-24

## 2024-04-17 RX ORDER — AMLODIPINE BESYLATE 2.5 MG/1
5 TABLET ORAL DAILY
Refills: 0 | Status: DISCONTINUED | OUTPATIENT
Start: 2024-04-17 | End: 2024-04-24

## 2024-04-17 RX ORDER — ERTAPENEM SODIUM 1 G/1
1000 INJECTION, POWDER, LYOPHILIZED, FOR SOLUTION INTRAMUSCULAR; INTRAVENOUS ONCE
Refills: 0 | Status: COMPLETED | OUTPATIENT
Start: 2024-04-17 | End: 2024-04-17

## 2024-04-17 RX ORDER — SODIUM CHLORIDE 9 MG/ML
1000 INJECTION INTRAMUSCULAR; INTRAVENOUS; SUBCUTANEOUS ONCE
Refills: 0 | Status: COMPLETED | OUTPATIENT
Start: 2024-04-17 | End: 2024-04-17

## 2024-04-17 RX ORDER — METOPROLOL TARTRATE 50 MG
25 TABLET ORAL AT BEDTIME
Refills: 0 | Status: DISCONTINUED | OUTPATIENT
Start: 2024-04-17 | End: 2024-04-24

## 2024-04-17 RX ORDER — ATORVASTATIN CALCIUM 80 MG/1
40 TABLET, FILM COATED ORAL AT BEDTIME
Refills: 0 | Status: DISCONTINUED | OUTPATIENT
Start: 2024-04-17 | End: 2024-04-24

## 2024-04-17 RX ADMIN — Medication 81 MILLIGRAM(S): at 13:55

## 2024-04-17 RX ADMIN — GABAPENTIN 300 MILLIGRAM(S): 400 CAPSULE ORAL at 23:44

## 2024-04-17 RX ADMIN — HEPARIN SODIUM 5000 UNIT(S): 5000 INJECTION INTRAVENOUS; SUBCUTANEOUS at 23:43

## 2024-04-17 RX ADMIN — ERTAPENEM SODIUM 120 MILLIGRAM(S): 1 INJECTION, POWDER, LYOPHILIZED, FOR SOLUTION INTRAMUSCULAR; INTRAVENOUS at 06:00

## 2024-04-17 RX ADMIN — Medication 150 MILLIGRAM(S): at 13:55

## 2024-04-17 RX ADMIN — TAMSULOSIN HYDROCHLORIDE 0.4 MILLIGRAM(S): 0.4 CAPSULE ORAL at 23:44

## 2024-04-17 RX ADMIN — SODIUM CHLORIDE 65 MILLILITER(S): 9 INJECTION INTRAMUSCULAR; INTRAVENOUS; SUBCUTANEOUS at 13:44

## 2024-04-17 RX ADMIN — LEVETIRACETAM 750 MILLIGRAM(S): 250 TABLET, FILM COATED ORAL at 20:29

## 2024-04-17 RX ADMIN — ATORVASTATIN CALCIUM 40 MILLIGRAM(S): 80 TABLET, FILM COATED ORAL at 23:44

## 2024-04-17 RX ADMIN — Medication 25 MILLIGRAM(S): at 23:44

## 2024-04-17 RX ADMIN — HEPARIN SODIUM 5000 UNIT(S): 5000 INJECTION INTRAVENOUS; SUBCUTANEOUS at 15:15

## 2024-04-17 RX ADMIN — SODIUM CHLORIDE 1000 MILLILITER(S): 9 INJECTION INTRAMUSCULAR; INTRAVENOUS; SUBCUTANEOUS at 03:37

## 2024-04-17 RX ADMIN — Medication 400 MILLIGRAM(S): at 13:44

## 2024-04-17 NOTE — ED ADULT TRIAGE NOTE - CHIEF COMPLAINT QUOTE
Patient c/o urinary symptoms x 2 weeks, prescribed abx without improvement. Per wife patient altered with worsening fevers and chills. Patient offers no complaints. Hx. seizures, HTN and CAD. Currently A/Ox4. Patient c/o urinary symptoms x 2 weeks, prescribed abx without improvement. Per wife, patient altered with worsening fevers and chills. Patient offers no complaints. Hx. seizures, HTN and CAD. Currently A/Ox4. Patient c/o urinary symptoms x 2 weeks, prescribed abx without improvement. Per wife, patient altered since 430PM. Patient offers no complaints. Hx. seizures, HTN and brain aneurysm. Currently A/Ox4, slow to respond. MD Dale called for stroke eval, no code stroke called.

## 2024-04-17 NOTE — ED PROVIDER NOTE - CLINICAL SUMMARY MEDICAL DECISION MAKING FREE TEXT BOX
SO Bauer PGY1- patient with altered mental status today, wife noting patient very repetitive and not always answering questions appropriately. currently finishing 2 week course of cefdinir for UTI, possible infected kidney stone. On exam, apparently disoriented to time, unsure of year/month. Will obtain UA, labs, CT abd/pelvis to eval for UTI, infected stone, and treat appropriately. AMS possibly secondary to delirium. Dispo pending workup.

## 2024-04-17 NOTE — ED PROVIDER NOTE - NS ED MD DISPO ADMITTING SERVICE
CC:  Emily Valenzuela is here today for a cataract consult. Her last eye exam was with Dr. Guaman where cataracts were noted and Kanchan was referred to Dr. Palumbo for evaluation and treatment. She states that her vision has declined over the past several months. She is having more trouble with distance vision and driving. Having trouble seeing double images of herself with glasses removed, trying to put on makeup.      Medications, allergies, tobacco history, past medical, past surgical and pertinent family histories reviewed and updated where needed in the EMR.    Ocular Medications:  AT OU prn    Denies known Latex allergy or symptoms of Latex sensitivity.   MED

## 2024-04-17 NOTE — CONSULT NOTE ADULT - ASSESSMENT
68y M with R staghorn calculi and 5 mm distal stone without any appreciable hydronephrosis in the presence of a UTI

## 2024-04-17 NOTE — H&P ADULT - NSHPREVIEWOFSYSTEMS_GEN_ALL_CORE
REVIEW OF SYSTEMS:    CONSTITUTIONAL: No weakness, fevers or chills  EYES/ENT: No visual changes;  No vertigo or throat pain   NECK: No pain or stiffness  RESPIRATORY: No cough, wheezing, hemoptysis; No shortness of breath  CARDIOVASCULAR: No chest pain or palpitations  GASTROINTESTINAL: No abdominal or epigastric pain. No nausea, vomiting, or hematemesis; No diarrhea or constipation. No melena or hematochezia.  GENITOURINARY: flank pain   NEUROLOGICAL: No numbness or weakness  SKIN: No itching, burning, rashes, or lesions   All other review of systems is negative unless indicated above.

## 2024-04-17 NOTE — CONSULT NOTE ADULT - SUBJECTIVE AND OBJECTIVE BOX
Patient is a 68y old  Male who presents with a chief complaint of   HPI:  The patient is a 67 y/o Male with past medical history of chronic back pain secondary to spinal stenosis, HTN, HLD, seizure disorder on Keppra, CAD s/p stent, recurrent infected kidney stones and UTIs, presenting to the ED for evaluation of altered mental status for 1 day. Is currently finishing 2 week course of cefdinir for UTI. Wife notes today patient became altered, was repeating himself and occasionally not giving appropriate responses to questions. Patient endorsing R flank pain, denies any fever, chills, chest pain, dyspnea, nausea, vomiting, or any other symptoms. (2024 10:54)       REVIEW OF SYSTEMS  Constitutional: No fevers, chills, weight loss or fatigue   Skin: No rash, no phlebitis	  Eyes: No discharge	  ENMT: No sore throat, oral thrush, ulcers or exudate  Respiratory: No cough, no SOB  Cardiovascular:  No chest pain, palpitations or edema   Gastrointestinal: No pain, nausea, vomiting, diarrhea or constipation	  Genitourinary: No dysuria, discharge or flank pain  MSK: No arthralgias or back pain   Neurological: No HA, no weakness, no seizures, no AMS       prior hospital charts reviewed [V]  primary team notes reviewed [V]  other consultant notes reviewed [V]    PAST MEDICAL & SURGICAL HISTORY:  Myocardial infarction  KAEL x1 MI , stent RCA      Back pain  Chronic back pain      Spinal stenosis      Lumbar herniated disc      Hypertension      Narcotic dependence  15 for withdrawal ,pt currently on Dialudid 8 mg every 4-6 hours /day      Osteoarthritis      GERD (gastroesophageal reflux disease)      Cerebral aneurysm rupture   clipped, no residual      Femur fracture, right  , surgical revision of right hip      Sacroiliitis, not elsewhere classified  Unspecified Inflammatory spondylopathy,Sacral and sacrococcygeal region      Brain aneurysm  s/p clipping, not compatible with MRI      Seizures      CAD (coronary artery disease)  s/p MI and RCA stenting      Chronic pain  Patient has an Intrathecal pump s/p removal in       Cerebral aneurysm  I997 with clips      S/P lumbar fusion  L1-S1:      History of right inguinal hernia repair  x2      Hx of appendectomy  1969      Hx of laminectomy  lumbar-      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 mos.-14 yo      Stented coronary artery  KAEL x 1 to RCA      History of hip replacement  8/15, revision  after falling and fracturing right femur      S/P left knee arthroscopy      H/O arthroscopy of shoulder  right X 2, left X 1      History of throat surgery  surgical exicision cyst 1986      Fusion of sacral region of spine  2017      History of back surgery  x5      S/P inguinal hernia repair      H/O fracture of femur  s/p repair          SOCIAL HISTORY:  - Denied smoking/vaping/alcohol/recreational drug use    FAMILY HISTORY:  No pertinent family history in first degree relatives        Allergies  No Known Allergies        ANTIMICROBIALS:  ertapenem  IVPB 1000 every 24 hours      ANTIMICROBIALS (past 90 days):  MEDICATIONS  (STANDING):    ertapenem  IVPB   120 mL/Hr IV Intermittent (24 @ 06:00)        OTHER MEDS:   MEDICATIONS  (STANDING):  amLODIPine   Tablet 5 daily  aspirin enteric coated 81 daily  atorvastatin 40 at bedtime  gabapentin 300 at bedtime  heparin   Injectable 5000 every 8 hours  levETIRAcetam 750 two times a day  metoprolol tartrate 25 at bedtime  pregabalin 150 daily  tamsulosin 0.4 at bedtime      VITALS:  Vital Signs Last 24 Hrs  T(F): 97.6 (24 @ 12:25), Max: 99.2 (24 @ 00:35)    Vital Signs Last 24 Hrs  HR: 68 (24 @ 12:25) (67 - 80)  BP: 141/59 (24 @ 12:25) (118/89 - 142/87)  RR: 18 (24 @ 12:25)  SpO2: 97% (24 @ 12:25) (97% - 100%)  Wt(kg): --    EXAM:  General: Patient in no acute distress  HEENT: NCAT, EOMI, PERRL, no oral lesions  CV: S1+S2, no m/r/g appreciated  Lungs: No respiratory distress, CTAB  Abd: Soft, tender in suprapubic region, no rebound, no CVA tenderness, +Condom catheter  Ext: No cyanosis, no edema  Neuro: Alert and oriented  Skin: Diffuse eczematous changes to b/l LE  IV: No phlebitis      Labs:                        16.4   15.41 )-----------( 252      ( 2024 02:06 )             49.3         144  |  106  |  19  ----------------------------<  132<H>  5.2   |  22  |  1.11    Ca    9.6      2024 02:06    TPro  7.5  /  Alb  4.3  /  TBili  0.4  /  DBili  x   /  AST  24  /  ALT  23  /  AlkPhos  108        WBC Trend:  WBC Count: 15.41 (24 @ 02:06)      Auto Neutrophil #: 11.27 K/uL (24 @ 02:06)  Auto Neutrophil #: 18.73 K/uL (23 @ 14:10)  Band Neutrophils %: 0.7 % (23 @ 14:10)  Auto Neutrophil #: 6.83 K/uL (23 @ 06:53)      Creatine Trend:  Creatinine: 1.11 ()      Liver Biochemical Testing Trend:  Alanine Aminotransferase (ALT/SGPT): 23 ()  Alanine Aminotransferase (ALT/SGPT): 21 ()  Alanine Aminotransferase (ALT/SGPT): 17 ()  Alanine Aminotransferase (ALT/SGPT): 21 ()  Aspartate Aminotransferase (AST/SGOT): 24 (24 @ 02:06)  Aspartate Aminotransferase (AST/SGOT): 13 (23 @ 14:10)  Aspartate Aminotransferase (AST/SGOT): 16 (23 @ 06:53)  Aspartate Aminotransferase (AST/SGOT): 18 (23 @ 08:14)  Bilirubin Total: 0.4 ()  Bilirubin Total, Serum: 1.0 ()  Bilirubin Total, Serum: 0.4 ()  Bilirubin Total, Serum: 0.5 ()    Auto Eosinophil %: 0.0 % (24 @ 02:06)      Urinalysis Basic - ( 2024 05:35 )    Color: Yellow / Appearance: Clear / S.017 / pH: x  Gluc: x / Ketone: Negative mg/dL  / Bili: Negative / Urobili: 0.2 mg/dL   Blood: x / Protein: 30 mg/dL / Nitrite: Negative   Leuk Esterase: Moderate / RBC: 6 /HPF / WBC 96 /HPF   Sq Epi: x / Non Sq Epi: 0 /HPF / Bacteria: Negative /HPF      MICROBIOLOGY:      Culture - Blood (collected 2023 06:52)  Source: .Blood Blood-Peripheral  Final Report:    No Growth Final    Culture - Blood (collected 2023 06:52)  Source: .Blood Blood-Peripheral  Final Report:    No Growth Final    Culture - Blood (collected 2023 17:38)  Source: .Blood Blood  Final Report:    No Growth Final    Culture - Blood (collected 2023 17:00)  Source: .Blood Blood  Final Report:    Growth in anaerobic bottle: Staphylococcus epidermidis    Coagulase Negative Staphylococci isolated from a single blood culture set    may represent contamination.    Contact the Microbiology Department at [phone] if susceptibility    testing is clinically indicated.    ***Blood Panel PCR results on this specimen are available    approximately 3 hours after the Gram stain result.***    Gram stain, PCR, and/or culture results may not always    correspond due to difference in methodologies.    ************************************************************    This PCR assay was performed by multiplex PCR. This    Assay tests for 66 bacterial and resistance gene targets.    Please refer to the [facility] test directory    at [email or web address] for details.  Organism: Blood Culture PCR  Organism: Blood Culture PCR    Sensitivities:      Method Type: PCR      -  Staphylococcus epidermidis, Methicillin resistant: Detec    Culture - Urine (collected 2023 14:11)  Source: Clean Catch Clean Catch (Midstream)  Final Report:    >100,000 CFU/ml Proteus mirabilis  Organism: Proteus mirabilis  Organism: Proteus mirabilis    Sensitivities:      Method Type: ROD      -  Amikacin: S <=16      -  Amoxicillin/Clavulanic Acid: S <=8/4      -  Ampicillin: S <=8 These ampicillin results predict results for amoxicillin      -  Ampicillin/Sulbactam: S <=4/2 Enterobacter, Klebsiella aerogenes, Citrobacter, and Serratia may develop resistance during prolonged therapy (3-4 days)      -  Aztreonam: S <=4      -  Cefazolin: S <=2 For uncomplicated UTI with K. pneumoniae, E. coli, or P. mirablis: ROD <=16 is sensitive and ROD >=32 is resistant. This also predicts results for oral agents cefaclor, cefdinir, cefpodoxime, cefprozil, cefuroxime axetil, cephalexin and locarbef for uncomplicated UTI. Note that some isolates may be susceptible to these agents while testing resistant to cefazolin.      -  Cefepime: S <=2      -  Cefoxitin: S <=8      -  Ceftriaxone: S <=1 Enterobacter, Klebsiella aerogenes, Citrobacter, and Serratia may develop resistance during prolonged therapy      -  Cefuroxime: S <=4      -  Ciprofloxacin: R >2      -  Ertapenem: S <=0.5      -  Gentamicin: S <=2      -  Levofloxacin: R >4      -  Meropenem: S <=1      -  Nitrofurantoin: R >64 Should not be used to treat pyelonephritis      -  Piperacillin/Tazobactam: S <=8      -  Tobramycin: S <=2      -  Trimethoprim/Sulfamethoxazole: R >2/38    Culture - Blood (collected 2023 13:39)  Source: .Blood Blood-Peripheral  Final Report:    Growth in aerobic and anaerobic bottles: Proteus mirabilis    ***Blood Panel PCR results on this specimen are available    approximately 3 hours after the Gram stain result.***    Gram stain, PCR, and/or culture results may not always    correspond due to difference in methodologies.    ************************************************************    This PCR assay was performed by multiplex PCR. This    Assay tests for 66 bacterial and resistance gene targets.    Please refer to the [facility] test directory    at [email or web address] for details.  Organism: Blood Culture PCR  Proteus mirabilis  Organism: Proteus mirabilis    Sensitivities:      Method Type: ROD      -  Amikacin: S <=16      -  Ampicillin: S <=8 These ampicillin results predict results for amoxicillin      -  Ampicillin/Sulbactam: S <=4/2 Enterobacter, Klebsiella aerogenes, Citrobacter, and Serratia may develop resistance during prolonged therapy (3-4 days)      -  Aztreonam: S <=4      -  Cefazolin: I 4 Enterobacter, Klebsiella aerogenes, Citrobacter, and Serratia may develop resistance during prolonged therapy (3-4 days)      -  Cefepime: S <=2      -  Cefoxitin: S <=8      -  Ceftriaxone: S <=1 Enterobacter, Klebsiella aerogenes, Citrobacter, and Serratia may develop resistance during prolonged therapy      -  Ciprofloxacin: R >2      -  Ertapenem: S <=0.5      -  Gentamicin: S <=2      -  Levofloxacin: R 4      -  Meropenem: S <=1      -  Piperacillin/Tazobactam: S <=8      -  Tobramycin: S <=2      -  Trimethoprim/Sulfamethoxazole: R >2/38  Organism: Blood Culture PCR    Sensitivities:      Method Type: PCR      -  Proteus species: Detec    Culture - Blood (collected 2023 13:31)  Source: .Blood Blood-Peripheral  Final Report:    Growth in aerobic and anaerobic bottles: Proteus mirabilis    See previous culture [id number]    Culture - Urine (collected 2023 08:08)  Source: Clean Catch Clean Catch (Midstream)  Final Report:    >100,000 CFU/ml Proteus mirabilis  Organism: Proteus mirabilis  Organism: Proteus mirabilis    Sensitivities:      Method Type: ROD      -  Amikacin: S <=16      -  Amoxicillin/Clavulanic Acid: S <=8/4      -  Ampicillin: S <=8 These ampicillin results predict results for amoxicillin      -  Ampicillin/Sulbactam: S <=4/2 Enterobacter, Klebsiella aerogenes, Citrobacter, and Serratia may develop resistance during prolonged therapy (3-4 days)      -  Aztreonam: S <=4      -  Cefazolin: S 4 For uncomplicated UTI with K. pneumoniae, E. coli, or P. mirablis: ROD <=16 is sensitive and ROD >=32 is resistant. This also predicts results for oral agents cefaclor, cefdinir, cefpodoxime, cefprozil, cefuroxime axetil, cephalexin and locarbef for uncomplicated UTI. Note that some isolates may be susceptible to these agents while testing resistant to cefazolin.      -  Cefepime: S <=2      -  Cefoxitin: S <=8      -  Ceftriaxone: S <=1 Enterobacter, Klebsiella aerogenes, Citrobacter, and Serratia may develop resistance during prolonged therapy      -  Cefuroxime: S <=4      -  Ciprofloxacin: R >2      -  Ertapenem: S <=0.5      -  Gentamicin: S <=2      -  Levofloxacin: R >4      -  Meropenem: S <=1      -  Nitrofurantoin: R >64 Should not be used to treat pyelonephritis      -  Piperacillin/Tazobactam: S <=8      -  Tobramycin: S <=2      -  Trimethoprim/Sulfamethoxazole: S     Culture - Blood (collected 2023 07:50)  Source: .Blood Blood-Peripheral  Final Report:    No Growth Final    Culture - Blood (collected 2023 07:35)  Source: .Blood Blood-Peripheral  Final Report:    No Growth Final    Blood Gas Venous - Lactate: 3.1 ( @ 02:06)      RADIOLOGY:  imaging below personally reviewed    < from: CT Abdomen and Pelvis No Cont (24 @ 05:03) >  FINDINGS:  LOWER CHEST: Trace bilateral pleural effusions are unchanged. Coronary   artery calcifications..    LIVER: Within normal limits.  BILE DUCTS: Mildly dilated common bile duct measuring 1.0 cm is unchanged   from prior exam.  GALLBLADDER: Within normal limits.  SPLEEN: Calcified granuloma.  PANCREAS: Atrophic.  ADRENALS: Within normal limits.  KIDNEYS/URETERS: A 2.5 cm right staghorn calculus.. Additional bilateral   nonobstructing calculi. Distal left ureteral stone measures 5 mm. No   hydronephrosis.    BLADDER: Within normal limits.  REPRODUCTIVE ORGANS: Prostate within normal limits.    BOWEL: Rectal distention with stool. Moderate fecal load. No bowel   obstruction. Appendix is not visualized. No evidence of inflammation in   the pericecal region.  PERITONEUM: No ascites.  VESSELS: Atherosclerotic changes.  RETROPERITONEUM/LYMPH NODES: No lymphadenopathy.  ABDOMINAL WALL: Within normal limits.  BONES: Degenerative changes. Partially visualized lung segment posterior   spinal fusion hardware. Right hip arthroplasty. Sclerotic density in the   left iliac wing is unchanged. Chronic right inferior pubic ramus fracture.    IMPRESSION:  Bilateral nephrolithiasis and 5 mm distal left ureteral stone. No   hydronephrosis.    Moderate fecal load with rectal distention with stool. Correlate for   constipation.    < end of copied text >  < from: CT Head No Cont (24 @ 05:01) >  IMPRESSION:  No acute intracranial hemorrhage, mass effect, or midline shift. No   significant interval change.    < end of copied text >   Patient is a 68y old  Male who presents with a chief complaint of   HPI:  The patient is a 67 y/o Male with past medical history of chronic back pain secondary to spinal stenosis, HTN, HLD, seizure disorder on Keppra, CAD s/p stent, recurrent infected kidney stones and UTIs, presenting to the ED for evaluation of altered mental status for 1 day. Is currently finishing 2 week course of cefdinir for UTI. Wife notes today patient became altered, was repeating himself and occasionally not giving appropriate responses to questions. Patient endorsing R flank pain, denies any fever, chills, chest pain, dyspnea, nausea, vomiting, or any other symptoms. (2024 10:54)       REVIEW OF SYSTEMS  Constitutional: No fevers, +intermittent chills  Skin: No rash, no phlebitis	  Eyes: No discharge	  ENMT: No sore throat, oral thrush, ulcers or exudate  Respiratory: No cough, no SOB  Cardiovascular:  No chest pain, palpitations or edema   Gastrointestinal: No pain, nausea, vomiting, diarrhea. +constipation	  Genitourinary: +difficulty urinating, R flank pain  MSK: No arthralgias or back pain   Neurological: No HA, no weakness, no seizures, no AMS       prior hospital charts reviewed [V]  primary team notes reviewed [V]  other consultant notes reviewed [V]    PAST MEDICAL & SURGICAL HISTORY:  Myocardial infarction  KAEL x1 MI , stent RCA      Back pain  Chronic back pain      Spinal stenosis      Lumbar herniated disc      Hypertension      Narcotic dependence  15 for withdrawal ,pt currently on Dialudid 8 mg every 4-6 hours /day      Osteoarthritis      GERD (gastroesophageal reflux disease)      Cerebral aneurysm rupture   clipped, no residual      Femur fracture, right  , surgical revision of right hip      Sacroiliitis, not elsewhere classified  Unspecified Inflammatory spondylopathy,Sacral and sacrococcygeal region      Brain aneurysm  s/p clipping, not compatible with MRI      Seizures      CAD (coronary artery disease)  s/p MI and RCA stenting      Chronic pain  Patient has an Intrathecal pump s/p removal in       Cerebral aneurysm  I997 with clips      S/P lumbar fusion  L1-S1:      History of right inguinal hernia repair  x2      Hx of appendectomy  1969      Hx of laminectomy  lumbar-      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 mos.-14 yo      Stented coronary artery  KAEL x 1 to RCA      History of hip replacement  8/15, revision  after falling and fracturing right femur      S/P left knee arthroscopy      H/O arthroscopy of shoulder  right X 2, left X 1      History of throat surgery  surgical exicision cyst 1986      Fusion of sacral region of spine  2017      History of back surgery  x5      S/P inguinal hernia repair      H/O fracture of femur  s/p repair          SOCIAL HISTORY:  - Denied smoking/vaping/alcohol/recreational drug use    FAMILY HISTORY:  No pertinent family history in first degree relatives        Allergies  No Known Allergies        ANTIMICROBIALS:  ertapenem  IVPB 1000 every 24 hours      ANTIMICROBIALS (past 90 days):  MEDICATIONS  (STANDING):    ertapenem  IVPB   120 mL/Hr IV Intermittent (24 @ 06:00)        OTHER MEDS:   MEDICATIONS  (STANDING):  amLODIPine   Tablet 5 daily  aspirin enteric coated 81 daily  atorvastatin 40 at bedtime  gabapentin 300 at bedtime  heparin   Injectable 5000 every 8 hours  levETIRAcetam 750 two times a day  metoprolol tartrate 25 at bedtime  pregabalin 150 daily  tamsulosin 0.4 at bedtime      VITALS:  Vital Signs Last 24 Hrs  T(F): 97.6 (24 @ 12:25), Max: 99.2 (24 @ 00:35)    Vital Signs Last 24 Hrs  HR: 68 (24 @ 12:25) (67 - 80)  BP: 141/59 (24 @ 12:25) (118/89 - 142/87)  RR: 18 (24 @ 12:25)  SpO2: 97% (24 @ 12:25) (97% - 100%)  Wt(kg): --    EXAM:  General: Patient in no acute distress  HEENT: NCAT, EOMI, PERRL, no oral lesions  CV: S1+S2, no m/r/g appreciated  Lungs: No respiratory distress, CTAB  Abd: Soft, tender in suprapubic region, no rebound, no CVA tenderness, +Condom catheter  Ext: No cyanosis, no edema  Neuro: Alert and oriented  Skin: Diffuse eczematous changes to b/l LE  IV: No phlebitis      Labs:                        16.4   15.41 )-----------( 252      ( 2024 02:06 )             49.3         144  |  106  |  19  ----------------------------<  132<H>  5.2   |  22  |  1.11    Ca    9.6      2024 02:06    TPro  7.5  /  Alb  4.3  /  TBili  0.4  /  DBili  x   /  AST  24  /  ALT  23  /  AlkPhos  108        WBC Trend:  WBC Count: 15.41 (24 @ 02:06)      Auto Neutrophil #: 11.27 K/uL (24 @ 02:06)  Auto Neutrophil #: 18.73 K/uL (23 @ 14:10)  Band Neutrophils %: 0.7 % (23 @ 14:10)  Auto Neutrophil #: 6.83 K/uL (23 @ 06:53)      Creatine Trend:  Creatinine: 1.11 ()      Liver Biochemical Testing Trend:  Alanine Aminotransferase (ALT/SGPT): 23 ()  Alanine Aminotransferase (ALT/SGPT): 21 ()  Alanine Aminotransferase (ALT/SGPT): 17 ()  Alanine Aminotransferase (ALT/SGPT): 21 ()  Aspartate Aminotransferase (AST/SGOT): 24 (24 @ 02:06)  Aspartate Aminotransferase (AST/SGOT): 13 (23 @ 14:10)  Aspartate Aminotransferase (AST/SGOT): 16 (23 @ 06:53)  Aspartate Aminotransferase (AST/SGOT): 18 (23 @ 08:14)  Bilirubin Total: 0.4 ()  Bilirubin Total, Serum: 1.0 ()  Bilirubin Total, Serum: 0.4 ()  Bilirubin Total, Serum: 0.5 ()    Auto Eosinophil %: 0.0 % (24 @ 02:06)      Urinalysis Basic - ( 2024 05:35 )    Color: Yellow / Appearance: Clear / S.017 / pH: x  Gluc: x / Ketone: Negative mg/dL  / Bili: Negative / Urobili: 0.2 mg/dL   Blood: x / Protein: 30 mg/dL / Nitrite: Negative   Leuk Esterase: Moderate / RBC: 6 /HPF / WBC 96 /HPF   Sq Epi: x / Non Sq Epi: 0 /HPF / Bacteria: Negative /HPF      MICROBIOLOGY:      Culture - Blood (collected 2023 06:52)  Source: .Blood Blood-Peripheral  Final Report:    No Growth Final    Culture - Blood (collected 2023 06:52)  Source: .Blood Blood-Peripheral  Final Report:    No Growth Final    Culture - Blood (collected 2023 17:38)  Source: .Blood Blood  Final Report:    No Growth Final    Culture - Blood (collected 2023 17:00)  Source: .Blood Blood  Final Report:    Growth in anaerobic bottle: Staphylococcus epidermidis    Coagulase Negative Staphylococci isolated from a single blood culture set    may represent contamination.    Contact the Microbiology Department at [phone] if susceptibility    testing is clinically indicated.    ***Blood Panel PCR results on this specimen are available    approximately 3 hours after the Gram stain result.***    Gram stain, PCR, and/or culture results may not always    correspond due to difference in methodologies.    ************************************************************    This PCR assay was performed by multiplex PCR. This    Assay tests for 66 bacterial and resistance gene targets.    Please refer to the [facility] test directory    at [email or web address] for details.  Organism: Blood Culture PCR  Organism: Blood Culture PCR    Sensitivities:      Method Type: PCR      -  Staphylococcus epidermidis, Methicillin resistant: Detec    Culture - Urine (collected 2023 14:11)  Source: Clean Catch Clean Catch (Midstream)  Final Report:    >100,000 CFU/ml Proteus mirabilis  Organism: Proteus mirabilis  Organism: Proteus mirabilis    Sensitivities:      Method Type: ROD      -  Amikacin: S <=16      -  Amoxicillin/Clavulanic Acid: S <=8/4      -  Ampicillin: S <=8 These ampicillin results predict results for amoxicillin      -  Ampicillin/Sulbactam: S <=4/2 Enterobacter, Klebsiella aerogenes, Citrobacter, and Serratia may develop resistance during prolonged therapy (3-4 days)      -  Aztreonam: S <=4      -  Cefazolin: S <=2 For uncomplicated UTI with K. pneumoniae, E. coli, or P. mirablis: ROD <=16 is sensitive and ROD >=32 is resistant. This also predicts results for oral agents cefaclor, cefdinir, cefpodoxime, cefprozil, cefuroxime axetil, cephalexin and locarbef for uncomplicated UTI. Note that some isolates may be susceptible to these agents while testing resistant to cefazolin.      -  Cefepime: S <=2      -  Cefoxitin: S <=8      -  Ceftriaxone: S <=1 Enterobacter, Klebsiella aerogenes, Citrobacter, and Serratia may develop resistance during prolonged therapy      -  Cefuroxime: S <=4      -  Ciprofloxacin: R >2      -  Ertapenem: S <=0.5      -  Gentamicin: S <=2      -  Levofloxacin: R >4      -  Meropenem: S <=1      -  Nitrofurantoin: R >64 Should not be used to treat pyelonephritis      -  Piperacillin/Tazobactam: S <=8      -  Tobramycin: S <=2      -  Trimethoprim/Sulfamethoxazole: R >/38    Culture - Blood (collected 2023 13:39)  Source: .Blood Blood-Peripheral  Final Report:    Growth in aerobic and anaerobic bottles: Proteus mirabilis    ***Blood Panel PCR results on this specimen are available    approximately 3 hours after the Gram stain result.***    Gram stain, PCR, and/or culture results may not always    correspond due to difference in methodologies.    ************************************************************    This PCR assay was performed by multiplex PCR. This    Assay tests for 66 bacterial and resistance gene targets.    Please refer to the [facility] test directory    at [email or web address] for details.  Organism: Blood Culture PCR  Proteus mirabilis  Organism: Proteus mirabilis    Sensitivities:      Method Type: ROD      -  Amikacin: S <=16      -  Ampicillin: S <=8 These ampicillin results predict results for amoxicillin      -  Ampicillin/Sulbactam: S <=4/2 Enterobacter, Klebsiella aerogenes, Citrobacter, and Serratia may develop resistance during prolonged therapy (3-4 days)      -  Aztreonam: S <=4      -  Cefazolin: I 4 Enterobacter, Klebsiella aerogenes, Citrobacter, and Serratia may develop resistance during prolonged therapy (3-4 days)      -  Cefepime: S <=2      -  Cefoxitin: S <=8      -  Ceftriaxone: S <=1 Enterobacter, Klebsiella aerogenes, Citrobacter, and Serratia may develop resistance during prolonged therapy      -  Ciprofloxacin: R >2      -  Ertapenem: S <=0.5      -  Gentamicin: S <=2      -  Levofloxacin: R 4      -  Meropenem: S <=1      -  Piperacillin/Tazobactam: S <=8      -  Tobramycin: S <=2      -  Trimethoprim/Sulfamethoxazole: R >2/38  Organism: Blood Culture PCR    Sensitivities:      Method Type: PCR      -  Proteus species: Detec    Culture - Blood (collected 2023 13:31)  Source: .Blood Blood-Peripheral  Final Report:    Growth in aerobic and anaerobic bottles: Proteus mirabilis    See previous culture [id number]    Culture - Urine (collected 2023 08:08)  Source: Clean Catch Clean Catch (Midstream)  Final Report:    >100,000 CFU/ml Proteus mirabilis  Organism: Proteus mirabilis  Organism: Proteus mirabilis    Sensitivities:      Method Type: ROD      -  Amikacin: S <=16      -  Amoxicillin/Clavulanic Acid: S <=8/4      -  Ampicillin: S <=8 These ampicillin results predict results for amoxicillin      -  Ampicillin/Sulbactam: S <=4/2 Enterobacter, Klebsiella aerogenes, Citrobacter, and Serratia may develop resistance during prolonged therapy (3-4 days)      -  Aztreonam: S <=4      -  Cefazolin: S 4 For uncomplicated UTI with K. pneumoniae, E. coli, or P. mirablis: ROD <=16 is sensitive and ROD >=32 is resistant. This also predicts results for oral agents cefaclor, cefdinir, cefpodoxime, cefprozil, cefuroxime axetil, cephalexin and locarbef for uncomplicated UTI. Note that some isolates may be susceptible to these agents while testing resistant to cefazolin.      -  Cefepime: S <=2      -  Cefoxitin: S <=8      -  Ceftriaxone: S <=1 Enterobacter, Klebsiella aerogenes, Citrobacter, and Serratia may develop resistance during prolonged therapy      -  Cefuroxime: S <=4      -  Ciprofloxacin: R >2      -  Ertapenem: S <=0.5      -  Gentamicin: S <=2      -  Levofloxacin: R >4      -  Meropenem: S <=1      -  Nitrofurantoin: R >64 Should not be used to treat pyelonephritis      -  Piperacillin/Tazobactam: S <=8      -  Tobramycin: S <=2      -  Trimethoprim/Sulfamethoxazole: S     Culture - Blood (collected 2023 07:50)  Source: .Blood Blood-Peripheral  Final Report:    No Growth Final    Culture - Blood (collected 2023 07:35)  Source: .Blood Blood-Peripheral  Final Report:    No Growth Final    Blood Gas Venous - Lactate: 3.1 ( @ 02:06)      RADIOLOGY:  imaging below personally reviewed    < from: CT Abdomen and Pelvis No Cont (24 @ 05:03) >  FINDINGS:  LOWER CHEST: Trace bilateral pleural effusions are unchanged. Coronary   artery calcifications..    LIVER: Within normal limits.  BILE DUCTS: Mildly dilated common bile duct measuring 1.0 cm is unchanged   from prior exam.  GALLBLADDER: Within normal limits.  SPLEEN: Calcified granuloma.  PANCREAS: Atrophic.  ADRENALS: Within normal limits.  KIDNEYS/URETERS: A 2.5 cm right staghorn calculus.. Additional bilateral   nonobstructing calculi. Distal left ureteral stone measures 5 mm. No   hydronephrosis.    BLADDER: Within normal limits.  REPRODUCTIVE ORGANS: Prostate within normal limits.    BOWEL: Rectal distention with stool. Moderate fecal load. No bowel   obstruction. Appendix is not visualized. No evidence of inflammation in   the pericecal region.  PERITONEUM: No ascites.  VESSELS: Atherosclerotic changes.  RETROPERITONEUM/LYMPH NODES: No lymphadenopathy.  ABDOMINAL WALL: Within normal limits.  BONES: Degenerative changes. Partially visualized lung segment posterior   spinal fusion hardware. Right hip arthroplasty. Sclerotic density in the   left iliac wing is unchanged. Chronic right inferior pubic ramus fracture.    IMPRESSION:  Bilateral nephrolithiasis and 5 mm distal left ureteral stone. No   hydronephrosis.    Moderate fecal load with rectal distention with stool. Correlate for   constipation.    < end of copied text >  < from: CT Head No Cont (24 @ 05:01) >  IMPRESSION:  No acute intracranial hemorrhage, mass effect, or midline shift. No   significant interval change.    < end of copied text >   Patient is a 68y old  Male who presents with a chief complaint of   HPI:  The patient is a 67 y/o Male with past medical history of chronic back pain secondary to spinal stenosis, HTN, HLD, seizure disorder on Keppra, CAD s/p stent, recurrent infected kidney stones and UTIs, presenting to the ED for evaluation of altered mental status for 1 day. Is currently finishing 2 week course of cefdinir for UTI. Wife notes today patient became altered, was repeating himself and occasionally not giving appropriate responses to questions. Patient endorsing R flank pain, denies any fever, chills, chest pain, dyspnea, nausea, vomiting, or any other symptoms. (2024 10:54)       REVIEW OF SYSTEMS  Constitutional: No fevers, +intermittent chills  Skin: No rash, no phlebitis	  Eyes: No discharge	  ENMT: No sore throat, oral thrush, ulcers or exudate  Respiratory: No cough, no SOB  Cardiovascular:  No chest pain, palpitations or edema   Gastrointestinal: No pain, nausea, vomiting, diarrhea. +constipation	  Genitourinary: +difficulty urinating, R flank pain  MSK: No arthralgias or back pain   Neurological: No HA, no weakness, no seizures, no AMS   psych: no anxiety    prior hospital charts reviewed [V]  primary team notes reviewed [V]  other consultant notes reviewed [V]    PAST MEDICAL & SURGICAL HISTORY:  Myocardial infarction  KAEL x1 MI , stent RCA      Back pain  Chronic back pain      Spinal stenosis      Lumbar herniated disc      Hypertension      Narcotic dependence  15 for withdrawal ,pt currently on Dialudid 8 mg every 4-6 hours /day      Osteoarthritis      GERD (gastroesophageal reflux disease)      Cerebral aneurysm rupture   clipped, no residual      Femur fracture, right  , surgical revision of right hip      Sacroiliitis, not elsewhere classified  Unspecified Inflammatory spondylopathy,Sacral and sacrococcygeal region      Brain aneurysm  s/p clipping, not compatible with MRI      Seizures      CAD (coronary artery disease)  s/p MI and RCA stenting      Chronic pain  Patient has an Intrathecal pump s/p removal in       Cerebral aneurysm  [id number] with clips      S/P lumbar fusion  L1-S1:      History of right inguinal hernia repair  x2      Hx of appendectomy  1969      Hx of laminectomy  lumbar-      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      Stented coronary artery  KAEL x 1 to RCA      History of hip replacement  8/15, revision 2/16 after falling and fracturing right femur      S/P left knee arthroscopy      H/O arthroscopy of shoulder  right X 2, left X 1      History of throat surgery  surgical exicision cyst 1986      Fusion of sacral region of spine  2017      History of back surgery  x5      S/P inguinal hernia repair      H/O fracture of femur  s/p repair          SOCIAL HISTORY:  , lives with wife, no pets at home, former smoker  no alcohol or drug abuse  no recent travel    FAMILY HISTORY:  no recent febrile illness in family members        Allergies  No Known Allergies        ANTIMICROBIALS:  ertapenem  IVPB 1000 every 24 hours      ANTIMICROBIALS (past 90 days):  MEDICATIONS  (STANDING):    ertapenem  IVPB   120 mL/Hr IV Intermittent (24 @ 06:00)        OTHER MEDS:   MEDICATIONS  (STANDING):  amLODIPine   Tablet 5 daily  aspirin enteric coated 81 daily  atorvastatin 40 at bedtime  gabapentin 300 at bedtime  heparin   Injectable 5000 every 8 hours  levETIRAcetam 750 two times a day  metoprolol tartrate 25 at bedtime  pregabalin 150 daily  tamsulosin 0.4 at bedtime      VITALS:  Vital Signs Last 24 Hrs  T(F): 97.6 (24 @ 12:25), Max: 99.2 (24 @ 00:35)    Vital Signs Last 24 Hrs  HR: 68 (24 @ 12:25) (67 - 80)  BP: 141/59 (24 @ 12:25) (118/89 - 142/87)  RR: 18 (24 @ 12:25)  SpO2: 97% (24 @ 12:25) (97% - 100%)  Wt(kg): --    EXAM:  General: Patient in no acute distress  Head: atraumatic, normocephalic  Eyes: anicteric  ENT:  no oral lesions  CV: S1+S2, no m/r/g appreciated  Lungs: No respiratory distress, CTAB  Abd: Soft, no tenderness  : R CVA tenderness, no serrano  Ext: No cyanosis, no edema  Neuro: Alert and oriented  Skin: Diffuse eczematous changes to b/l LE  Vascular: No phlebitis  psych: normal affect    Labs:                        16.4   15.41 )-----------( 252      ( 2024 02:06 )             49.3         144  |  106  |  19  ----------------------------<  132<H>  5.2   |  22  |  1.11    Ca    9.6      2024 02:06    TPro  7.5  /  Alb  4.3  /  TBili  0.4  /  DBili  x   /  AST  24  /  ALT  23  /  AlkPhos  108        WBC Trend:  WBC Count: 15.41 (24 @ 02:06)      Auto Neutrophil #: 11.27 K/uL (24 @ 02:06)  Auto Neutrophil #: 18.73 K/uL (23 @ 14:10)  Band Neutrophils %: 0.7 % (23 @ 14:10)  Auto Neutrophil #: 6.83 K/uL (23 @ 06:53)      Creatine Trend:  Creatinine: 1.11 ()      Liver Biochemical Testing Trend:  Alanine Aminotransferase (ALT/SGPT): 23 ()  Alanine Aminotransferase (ALT/SGPT): 21 ()  Alanine Aminotransferase (ALT/SGPT): 17 ()  Alanine Aminotransferase (ALT/SGPT): 21 ()  Aspartate Aminotransferase (AST/SGOT): 24 (24 @ 02:06)  Aspartate Aminotransferase (AST/SGOT): 13 (23 @ 14:10)  Aspartate Aminotransferase (AST/SGOT): 16 (23 @ 06:53)  Aspartate Aminotransferase (AST/SGOT): 18 (23 @ 08:14)  Bilirubin Total: 0.4 ()  Bilirubin Total, Serum: 1.0 ()  Bilirubin Total, Serum: 0.4 ()  Bilirubin Total, Serum: 0.5 ()    Auto Eosinophil %: 0.0 % (24 @ 02:06)      Urinalysis Basic - ( 2024 05:35 )    Color: Yellow / Appearance: Clear / S.017 / pH: x  Gluc: x / Ketone: Negative mg/dL  / Bili: Negative / Urobili: 0.2 mg/dL   Blood: x / Protein: 30 mg/dL / Nitrite: Negative   Leuk Esterase: Moderate / RBC: 6 /HPF / WBC 96 /HPF   Sq Epi: x / Non Sq Epi: 0 /HPF / Bacteria: Negative /HPF      MICROBIOLOGY:      Culture - Blood (collected 2023 06:52)  Source: .Blood Blood-Peripheral  Final Report:    No Growth Final    Culture - Blood (collected 2023 06:52)  Source: .Blood Blood-Peripheral  Final Report:    No Growth Final    Culture - Blood (collected 2023 17:38)  Source: .Blood Blood  Final Report:    No Growth Final    Culture - Blood (collected 2023 17:00)  Source: .Blood Blood  Final Report:    Growth in anaerobic bottle: Staphylococcus epidermidis    Coagulase Negative Staphylococci isolated from a single blood culture set    may represent contamination.    Contact the Microbiology Department at [phone] if susceptibility    testing is clinically indicated.    ***Blood Panel PCR results on this specimen are available    approximately 3 hours after the Gram stain result.***    Gram stain, PCR, and/or culture results may not always    correspond due to difference in methodologies.    ************************************************************    This PCR assay was performed by multiplex PCR. This    Assay tests for 66 bacterial and resistance gene targets.    Please refer to the [facility] test directory    at [email or web address] for details.  Organism: Blood Culture PCR  Organism: Blood Culture PCR    Sensitivities:      Method Type: PCR      -  Staphylococcus epidermidis, Methicillin resistant: Detec    Culture - Urine (collected 2023 14:11)  Source: Clean Catch Clean Catch (Midstream)  Final Report:    >100,000 CFU/ml Proteus mirabilis  Organism: Proteus mirabilis  Organism: Proteus mirabilis    Sensitivities:      Method Type: ROD      -  Amikacin: S <=16      -  Amoxicillin/Clavulanic Acid: S <=8/4      -  Ampicillin: S <=8 These ampicillin results predict results for amoxicillin      -  Ampicillin/Sulbactam: S <=4/2 Enterobacter, Klebsiella aerogenes, Citrobacter, and Serratia may develop resistance during prolonged therapy (3-4 days)      -  Aztreonam: S <=4      -  Cefazolin: S <=2 For uncomplicated UTI with K. pneumoniae, E. coli, or P. mirablis: ROD <=16 is sensitive and ROD >=32 is resistant. This also predicts results for oral agents cefaclor, cefdinir, cefpodoxime, cefprozil, cefuroxime axetil, cephalexin and locarbef for uncomplicated UTI. Note that some isolates may be susceptible to these agents while testing resistant to cefazolin.      -  Cefepime: S <=2      -  Cefoxitin: S <=8      -  Ceftriaxone: S <=1 Enterobacter, Klebsiella aerogenes, Citrobacter, and Serratia may develop resistance during prolonged therapy      -  Cefuroxime: S <=4      -  Ciprofloxacin: R >2      -  Ertapenem: S <=0.5      -  Gentamicin: S <=2      -  Levofloxacin: R >4      -  Meropenem: S <=1      -  Nitrofurantoin: R >64 Should not be used to treat pyelonephritis      -  Piperacillin/Tazobactam: S <=8      -  Tobramycin: S <=2      -  Trimethoprim/Sulfamethoxazole: R >2/38    Culture - Blood (collected 2023 13:39)  Source: .Blood Blood-Peripheral  Final Report:    Growth in aerobic and anaerobic bottles: Proteus mirabilis    ***Blood Panel PCR results on this specimen are available    approximately 3 hours after the Gram stain result.***    Gram stain, PCR, and/or culture results may not always    correspond due to difference in methodologies.    ************************************************************    This PCR assay was performed by multiplex PCR. This    Assay tests for 66 bacterial and resistance gene targets.    Please refer to the [facility] test directory    at [email or web address] for details.  Organism: Blood Culture PCR  Proteus mirabilis  Organism: Proteus mirabilis    Sensitivities:      Method Type: ROD      -  Amikacin: S <=16      -  Ampicillin: S <=8 These ampicillin results predict results for amoxicillin      -  Ampicillin/Sulbactam: S <=4/2 Enterobacter, Klebsiella aerogenes, Citrobacter, and Serratia may develop resistance during prolonged therapy (3-4 days)      -  Aztreonam: S <=4      -  Cefazolin: I 4 Enterobacter, Klebsiella aerogenes, Citrobacter, and Serratia may develop resistance during prolonged therapy (3-4 days)      -  Cefepime: S <=2      -  Cefoxitin: S <=8      -  Ceftriaxone: S <=1 Enterobacter, Klebsiella aerogenes, Citrobacter, and Serratia may develop resistance during prolonged therapy      -  Ciprofloxacin: R >2      -  Ertapenem: S <=0.5      -  Gentamicin: S <=2      -  Levofloxacin: R 4      -  Meropenem: S <=1      -  Piperacillin/Tazobactam: S <=8      -  Tobramycin: S <=2      -  Trimethoprim/Sulfamethoxazole: R >/38  Organism: Blood Culture PCR    Sensitivities:      Method Type: PCR      -  Proteus species: Detec    Culture - Blood (collected 2023 13:31)  Source: .Blood Blood-Peripheral  Final Report:    Growth in aerobic and anaerobic bottles: Proteus mirabilis    See previous culture [id number]    Culture - Urine (collected 2023 08:08)  Source: Clean Catch Clean Catch (Midstream)  Final Report:    >100,000 CFU/ml Proteus mirabilis  Organism: Proteus mirabilis  Organism: Proteus mirabilis    Sensitivities:      Method Type: ROD      -  Amikacin: S <=16      -  Amoxicillin/Clavulanic Acid: S <=8/4      -  Ampicillin: S <=8 These ampicillin results predict results for amoxicillin      -  Ampicillin/Sulbactam: S <=4/2 Enterobacter, Klebsiella aerogenes, Citrobacter, and Serratia may develop resistance during prolonged therapy (3-4 days)      -  Aztreonam: S <=4      -  Cefazolin: S 4 For uncomplicated UTI with K. pneumoniae, E. coli, or P. mirablis: ROD <=16 is sensitive and ROD >=32 is resistant. This also predicts results for oral agents cefaclor, cefdinir, cefpodoxime, cefprozil, cefuroxime axetil, cephalexin and locarbef for uncomplicated UTI. Note that some isolates may be susceptible to these agents while testing resistant to cefazolin.      -  Cefepime: S <=2      -  Cefoxitin: S <=8      -  Ceftriaxone: S <=1 Enterobacter, Klebsiella aerogenes, Citrobacter, and Serratia may develop resistance during prolonged therapy      -  Cefuroxime: S <=4      -  Ciprofloxacin: R >2      -  Ertapenem: S <=0.5      -  Gentamicin: S <=2      -  Levofloxacin: R >4      -  Meropenem: S <=1      -  Nitrofurantoin: R >64 Should not be used to treat pyelonephritis      -  Piperacillin/Tazobactam: S <=8      -  Tobramycin: S <=2      -  Trimethoprim/Sulfamethoxazole: S     Culture - Blood (collected 2023 07:50)  Source: .Blood Blood-Peripheral  Final Report:    No Growth Final    Culture - Blood (collected 2023 07:35)  Source: .Blood Blood-Peripheral  Final Report:    No Growth Final    Blood Gas Venous - Lactate: 3.1 ( @ 02:06)      RADIOLOGY:  imaging below personally reviewed    < from: CT Abdomen and Pelvis No Cont (24 @ 05:03) >  FINDINGS:  LOWER CHEST: Trace bilateral pleural effusions are unchanged. Coronary   artery calcifications..    LIVER: Within normal limits.  BILE DUCTS: Mildly dilated common bile duct measuring 1.0 cm is unchanged   from prior exam.  GALLBLADDER: Within normal limits.  SPLEEN: Calcified granuloma.  PANCREAS: Atrophic.  ADRENALS: Within normal limits.  KIDNEYS/URETERS: A 2.5 cm right staghorn calculus.. Additional bilateral   nonobstructing calculi. Distal left ureteral stone measures 5 mm. No   hydronephrosis.    BLADDER: Within normal limits.  REPRODUCTIVE ORGANS: Prostate within normal limits.    BOWEL: Rectal distention with stool. Moderate fecal load. No bowel   obstruction. Appendix is not visualized. No evidence of inflammation in   the pericecal region.  PERITONEUM: No ascites.  VESSELS: Atherosclerotic changes.  RETROPERITONEUM/LYMPH NODES: No lymphadenopathy.  ABDOMINAL WALL: Within normal limits.  BONES: Degenerative changes. Partially visualized lung segment posterior   spinal fusion hardware. Right hip arthroplasty. Sclerotic density in the   left iliac wing is unchanged. Chronic right inferior pubic ramus fracture.    IMPRESSION:  Bilateral nephrolithiasis and 5 mm distal left ureteral stone. No   hydronephrosis.    Moderate fecal load with rectal distention with stool. Correlate for   constipation.    < end of copied text >  < from: CT Head No Cont (24 @ 05:01) >  IMPRESSION:  No acute intracranial hemorrhage, mass effect, or midline shift. No   significant interval change.    < end of copied text >

## 2024-04-17 NOTE — CONSULT NOTE ADULT - ATTENDING COMMENTS
68 m with HTN, HLD, CAD s/p stent (2015), seizure disorder on Keppra, chronic back pain s/p multiple spinal surgeries and lumbar spinal stimulator (s/p removal), b/l nephrolithiasis, and recurrent UTIs, proteus bacteremia, was on cefdinir for UTI but now p/w AMS, chills, R flank pain  here afebrile, WBC: 15, u/a positive  CT:  bilateral nephrolithiasis including a 2.5 cm right staghorn calculus and 5 mm distal left ureteral stone without hydronephrosis, bladder and prostate wnl.    nephrolithiasis and recurrent UTIs, recent cefdinir use now with leukocytosis, R pyelonephritis and R staghorn calculus and L distal ureter stone but no hydro     * follow the blood and urine cx  * c/w ertapenem for now and will adjust as per cultures  * urology eval for the staghorn calculus  * monitor CBC/diff and CMP    The above assessment and plan was discussed with the primary team    Ange Hayes MD  contact on teams  After 5pm and on weekends call [phone]

## 2024-04-17 NOTE — ED ADULT NURSE NOTE - CHIEF COMPLAINT QUOTE
Patient c/o urinary symptoms x 2 weeks, prescribed abx without improvement. Per wife, patient altered since 430PM. Patient offers no complaints. Hx. seizures, HTN and brain aneurysm. Currently A/Ox4, slow to respond. MD Dale called for stroke eval, no code stroke called.

## 2024-04-17 NOTE — ED PROVIDER NOTE - OBJECTIVE STATEMENT
The patient is a 67 y/o M with past medical history of chronic back pain secondary to spinal stenosis, HTN, HLD, seizure disorder on Keppra, CAD s/p stent, recurrent infected kidney stones and UTIs, presenting to the ED for evaluation of altered mental status for 1 day. Is currently finishing 2 week course of cefdinir for UTI. Wife notes today patient became altered, was repeating himself and occasionally not giving appropriate responses to questions. Patient endorsing R flank pain, denies any fever, chills, chest pain, dyspnea, nausea, vomiting, or any other symptoms.

## 2024-04-17 NOTE — CONSULT NOTE ADULT - SUBJECTIVE AND OBJECTIVE BOX
Mateusz Beckwith MD  Interventional Cardiology / Advance Heart Failure and Cardiac Transplant Specialist  [place] Office : [address] [place] [address] [place] [address]  Tel:   [place] Office : [address] [facility] [place] [address]  Tel: [phone]         HISTORY OF PRESENTING ILLNESS:  HPI:  The patient is a 69 y/o Male with past medical history of chronic back pain secondary to spinal stenosis, HTN, HLD, seizure disorder on Keppra, CAD s/p stent, recurrent infected kidney stones and UTIs, presenting to the ED for evaluation of altered mental status for 1 day. Is currently finishing 2 week course of cefdinir for UTI. Wife notes today patient became altered, was repeating himself and occasionally not giving appropriate responses to questions. Patient endorsing R flank pain, denies any fever, chills, chest pain, dyspnea, nausea, vomiting, or any other symptoms.  Cardiology called for preoperative clearance for bilateral ureteral stents patient denies any chest pains or shortness of breath on exertion    PAST MEDICAL & SURGICAL HISTORY:  Myocardial infarction  KAEL x1 MI 2005, stent RCA      Back pain  Chronic back pain      Spinal stenosis      Lumbar herniated disc      Hypertension      Narcotic dependence  5/28/16 for withdrawal ,pt currently on Dialudid 8 mg every 4-6 hours /day      Osteoarthritis      GERD (gastroesophageal reflux disease)      Cerebral aneurysm rupture  1997 clipped, no residual      Femur fracture, right  2/16, surgical revision of right hip      Sacroiliitis, not elsewhere classified  Unspecified Inflammatory spondylopathy,Sacral and sacrococcygeal region      Brain aneurysm  s/p clipping, not compatible with MRI      Seizures      CAD (coronary artery disease)  s/p MI and RCA stenting      Chronic pain  Patient has an Intrathecal pump s/p removal in 2016      Cerebral aneurysm  I997 with clips      S/P lumbar fusion  L1-S1:2002      History of right inguinal hernia repair  x2      Hx of appendectomy  6/1969      Hx of laminectomy  lumbar-2002      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      Stented coronary artery  KAEL x 1 to RCA      History of hip replacement  8/15, revision 2/16 after falling and fracturing right femur      S/P left knee arthroscopy      H/O arthroscopy of shoulder  right X 2, left X 1      History of throat surgery  surgical exicision cyst 1986      Fusion of sacral region of spine  5/2017      History of back surgery  x5      S/P inguinal hernia repair      H/O fracture of femur  s/p repair          SOCIAL HISTORY: Substance Use (street drugs): ( x ) never used  (  ) other:    FAMILY HISTORY:  No pertinent family history in first degree relatives        MEDICATIONS:  amLODIPine   Tablet 5 milliGRAM(s) Oral daily  aspirin enteric coated 81 milliGRAM(s) Oral daily  heparin   Injectable 5000 Unit(s) SubCutaneous every 8 hours  metoprolol tartrate 25 milliGRAM(s) Oral at bedtime    ertapenem  IVPB 1000 milliGRAM(s) IV Intermittent every 24 hours      gabapentin 300 milliGRAM(s) Oral at bedtime  levETIRAcetam 750 milliGRAM(s) Oral two times a day  pregabalin 150 milliGRAM(s) Oral daily    pantoprazole    Tablet 40 milliGRAM(s) Oral before breakfast    atorvastatin 40 milliGRAM(s) Oral at bedtime    sodium chloride 0.9%. 1000 milliLiter(s) IV Continuous <Continuous>  tamsulosin 0.4 milliGRAM(s) Oral at bedtime      FAMILY HISTORY:  No pertinent family history in first degree relatives          Allergies    No Known Allergies    Intolerances    	      PHYSICAL EXAM:  T(C): 36.6 (04-17-24 @ 16:49), Max: 37.3 (04-17-24 @ 00:35)  HR: 81 (04-17-24 @ 16:49) (67 - 81)  BP: 125/70 (04-17-24 @ 16:49) (118/89 - 142/87)  RR: 18 (04-17-24 @ 16:49) (18 - 18)  SpO2: 98% (04-17-24 @ 16:49) (97% - 100%)  Wt(kg): --  I&O's Summary      GENERAL: NAD   EYES: EOMI, PERRLA, conjunctiva and sclera clear  ENMT: No tonsillar erythema, exudates, or enlargement; Moist mucous membranes, Good dentition, No lesions  Cardiovascular: Normal S1 S2, No JVD, No murmurs, No edema  Respiratory: Lungs clear to auscultation	  Gastrointestinal:  Soft, Non-tender, + BS	  Extremities: Normal range of motion, No clubbing, cyanosis or edema  LYMPH: No lymphadenopathy noted  NERVOUS SYSTEM:  Alert & Oriented X3, Good concentration; Motor Strength 5/5 B/L upper and lower extremities; DTRs 2+ intact and symmetric      LABS:	 	    CARDIAC MARKERS:                                  16.4   15.41 )-----------( 252      ( 17 Apr 2024 02:06 )             49.3     04-17    144  |  106  |  19  ----------------------------<  132<H>  5.2   |  22  |  1.11    Ca    9.6      17 Apr 2024 02:06    TPro  7.5  /  Alb  4.3  /  TBili  0.4  /  DBili  x   /  AST  24  /  ALT  23  /  AlkPhos  108  04-17    proBNP:   Lipid Profile:   HgA1c:   TSH:     Consultant(s) Notes Reviewed:  [x ] YES  [ ] NO    Care Discussed with Consultants/Other Providers [ x] YES  [ ] NO    Imaging Personally Reviewed independently:  [x] YES  [ ] NO    All labs, radiologic studies, vitals, orders and medications list reviewed. Patient is seen and examined at bedside. Case discussed with medical team.    ASSESSMENT/PLAN:

## 2024-04-17 NOTE — CONSULT NOTE ADULT - PROBLEM SELECTOR RECOMMENDATION 9
Broad spectrum abx as per ID for UTi  In an effort to facilitate treatment of this UTI and enhance future procedures to extract stones in May would recommend placement of b/l ureteral stents   Continue serrano for now to maintain complete drainage of urinary system.  Will add on for OR tomorrow for elective cystoscopy b/l ureteral stent placement  Please document medical clearance and maintain NPO after midnight tonight  Discussed with DR. Hoenig [phone]

## 2024-04-17 NOTE — H&P ADULT - HISTORY OF PRESENT ILLNESS
The patient is a 67 y/o Male with past medical history of chronic back pain secondary to spinal stenosis, HTN, HLD, seizure disorder on Keppra, CAD s/p stent, recurrent infected kidney stones and UTIs, presenting to the ED for evaluation of altered mental status for 1 day. Is currently finishing 2 week course of cefdinir for UTI. Wife notes today patient became altered, was repeating himself and occasionally not giving appropriate responses to questions. Patient endorsing R flank pain, denies any fever, chills, chest pain, dyspnea, nausea, vomiting, or any other symptoms.

## 2024-04-17 NOTE — H&P ADULT - ASSESSMENT
The patient is a 67 y/o Male with past medical history of chronic back pain secondary to spinal stenosis, HTN, HLD, seizure disorder on Keppra, CAD s/p stent, recurrent infected kidney stones and UTIs, presenting to the ED for evaluation of altered mental status for 1 day. Is currently finishing 2 week course of cefdinir for UTI. Wife notes today patient became altered, was repeating himself and occasionally not giving appropriate responses to questions. Patient endorsing R flank pain, denies any fever, chills, chest pain, dyspnea, nausea, vomiting, or any other symptoms. The patient is a 69 y/o Male with past medical history of chronic back pain secondary to spinal stenosis, HTN, HLD, seizure disorder on Keppra, CAD s/p stent, recurrent infected kidney stones and UTIs, presenting to the ED for evaluation of altered mental status for 1 day. Is currently finishing 2 week course of cefdinir for UTI. Wife notes today patient became altered, was repeating himself and occasionally not giving appropriate responses to questions. Patient endorsing R flank pain, denies any fever, chills, chest pain, dyspnea, nausea, vomiting, or any other symptoms.      # SIRS  with AMS  improved during exam   likely due to recurrent UTI/Cystitis  Renal stone noted  Pan CX  ertapenem for now till Cx results  IV hydration  pain control   ID eval called  Urology eval called     #Seizure disorder  resume home keppra dose  fall precautions   PTOT       # CAD  S/P Stent  AP and statin  BP control     # HTN/HLD    Lipid panel   statin   BP control       VDT an dgI PPX

## 2024-04-17 NOTE — ED PROVIDER NOTE - PHYSICAL EXAMINATION
General: no acute distress  Psych: mood appropriate  Head: normocephalic; atraumatic  Eyes: conjunctivae clear bilaterally, sclerae anicteric  ENT: no nasal flaring, patent nares  Cardio: regular rate and rhythm; normal heart sounds  Resp: clear to auscultation bilaterally  GI: abdomen soft, nontender, nondistended  : no CVA tenderness  Neuro: A&Ox2, ?disoriented to time  Skin: no rashes or bruising noted  MSK: normal movement of extremities  Lymph/Vasc: no LE edema

## 2024-04-17 NOTE — H&P ADULT - NSHPPHYSICALEXAM_GEN_ALL_CORE
Vital Signs Last 24 Hrs  T(C): 36.5 (17 Apr 2024 06:16), Max: 37.3 (17 Apr 2024 00:35)  T(F): 97.7 (17 Apr 2024 06:16), Max: 99.2 (17 Apr 2024 00:35)  HR: 67 (17 Apr 2024 06:16) (67 - 80)  BP: 142/87 (17 Apr 2024 06:16) (118/89 - 142/87)  BP(mean): --  RR: 18 (17 Apr 2024 06:16) (18 - 18)  SpO2: 100% (17 Apr 2024 06:16) (99% - 100%) Vital Signs Last 24 Hrs  T(C): 36.5 (17 Apr 2024 06:16), Max: 37.3 (17 Apr 2024 00:35)  T(F): 97.7 (17 Apr 2024 06:16), Max: 99.2 (17 Apr 2024 00:35)  HR: 67 (17 Apr 2024 06:16) (67 - 80)  BP: 142/87 (17 Apr 2024 06:16) (118/89 - 142/87)  BP(mean): --  RR: 18 (17 Apr 2024 06:16) (18 - 18)  SpO2: 100% (17 Apr 2024 06:16) (99% - 100%)    Appearance: Normal	  HEENT:   Normal oral mucosa, PERRL, EOMI	  Lymphatic: No lymphadenopathy , no edema  Cardiovascular: Normal S1 S2, No JVD, No murmurs , Peripheral pulses palpable 2+ bilaterally  Respiratory: Lungs clear to auscultation, normal effort 	  Gastrointestinal:  Soft, R flank tenderness on palpation 	  Skin: No rashes, No ecchymoses, No cyanosis, warm to touch  Musculoskeletal: Normal range of motion, normal strength  Psychiatry:  Mood & affect appropriate  Ext: No edema

## 2024-04-17 NOTE — CONSULT NOTE ADULT - SUBJECTIVE AND OBJECTIVE BOX
HPI:  The patient is a 67 y/o Male with past medical history of chronic back pain secondary to spinal stenosis, HTN, HLD, seizure disorder on Keppra, CAD s/p stent, recurrent infected kidney stones and UTIs, presenting to the ED for evaluation of altered mental status for 1 day. Is currently finishing 2 week course of cefdinir for UTI. Wife notes today patient became altered, was repeating himself and occasionally not giving appropriate responses to questions. Patient endorsing R flank pain. Pt is scheduled to see Dr. Hoenig in May to schedule his elective removal of kidney stones  Denies any fever, chills, chest pain, dyspnea, nausea, vomiting, L sided flank or abd pain     PAST MEDICAL & SURGICAL HISTORY:    Myocardial infarction  KAEL x1 MI , stent RCA      Back pain  Chronic back pain      Spinal stenosis      Lumbar herniated disc      Hypertension      Narcotic dependence  16 for withdrawal ,pt currently on Dialudid 8 mg every 4-6 hours /day      Osteoarthritis      GERD (gastroesophageal reflux disease)      Cerebral aneurysm rupture   clipped, no residual      Femur fracture, right  , surgical revision of right hip      Sacroiliitis, not elsewhere classified  Unspecified Inflammatory spondylopathy,Sacral and sacrococcygeal region      Brain aneurysm  s/p clipping, not compatible with MRI      Seizures      CAD (coronary artery disease)  s/p MI and RCA stenting      Chronic pain  Patient has an Intrathecal pump s/p removal in       Cerebral aneurysm  I997 with clips      S/P lumbar fusion  L1-S1:      History of right inguinal hernia repair  x2      Hx of appendectomy  1969      Hx of laminectomy  lumbar-      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      Stented coronary artery  KAEL x 1 to RCA      History of hip replacement  8/15, revision  after falling and fracturing right femur      S/P left knee arthroscopy      H/O arthroscopy of shoulder  right X 2, left X 1      History of throat surgery  surgical exicision cyst       Fusion of sacral region of spine  2017      History of back surgery  x5      S/P inguinal hernia repair      H/O fracture of femur  s/p repair          MEDICATIONS  (STANDING):  amLODIPine   Tablet 5 milliGRAM(s) Oral daily  aspirin enteric coated 81 milliGRAM(s) Oral daily  atorvastatin 40 milliGRAM(s) Oral at bedtime  ertapenem  IVPB 1000 milliGRAM(s) IV Intermittent every 24 hours  gabapentin 300 milliGRAM(s) Oral at bedtime  heparin   Injectable 5000 Unit(s) SubCutaneous every 8 hours  levETIRAcetam 750 milliGRAM(s) Oral two times a day  metoprolol tartrate 25 milliGRAM(s) Oral at bedtime  pantoprazole    Tablet 40 milliGRAM(s) Oral before breakfast  pregabalin 150 milliGRAM(s) Oral daily  baclofen 20mg 4 x day  sodium chloride 0.9%. 1000 milliLiter(s) (65 mL/Hr) IV Continuous <Continuous>  tamsulosin 0.4 milliGRAM(s) Oral at bedtime        FAMILY HISTORY:    No pertinent family history in first degree relatives        Allergies    No Known Allergies        SOCIAL HISTORY:       REVIEW OF SYSTEMS: Otherwise negative as stated in HPI      Vital Signs Last 24 Hrs  T(C): 36.6 (2024 16:49), Max: 37.3 (2024 00:35)  T(F): 97.8 (2024 16:49), Max: 99.2 (2024 00:35)  HR: 81 (2024 16:49) (67 - 81)  BP: 125/70 (2024 16:49) (118/89 - 142/87)  BP(mean): 79 (2024 12:25) (79 - 79)  RR: 18 (2024 16:49) (18 - 18)  SpO2: 98% (2024 16:49) (97% - 100%)    Parameters below as of 2024 16:49  Patient On (Oxygen Delivery Method): room air        PHYSICAL EXAM:    General: [ x ] Awake and Alert in no acute distress    Respiratory and Thorax: [ x ] no resp distress   	  Cardiovascular: [ x ] S1,S2 Reg Rate    Gastrointestinal: [ x ]soft  [ x ] non tender,  CVAT [ ]Y  [ x ]N    Genitourinary: serrano in place yellow urine                        	  Musculoskeletal / Extremities:  Edema [ ]Y [x ]N,  AROM x 4 [x ]Y  [ ]N  	          LABS:                        16.4   15.41 )-----------( 252      ( 2024 02:06 )             49.3     04-17    144  |  106  |  19  ----------------------------<  132<H>  5.2   |  22  |  1.11    Ca    9.6      2024 02:06    TPro  7.5  /  Alb  4.3  /  TBili  0.4  /  DBili  x   /  AST  24  /  ALT  23  /  AlkPhos  108        Urinalysis Basic - ( 2024 05:35 )    Color: Yellow / Appearance: Clear / S.017 / pH: x  Gluc: x / Ketone: Negative mg/dL  / Bili: Negative / Urobili: 0.2 mg/dL   Blood: x / Protein: 30 mg/dL / Nitrite: Negative   Leuk Esterase: Moderate / RBC: 6 /HPF / WBC 96 /HPF   Sq Epi: x / Non Sq Epi: 0 /HPF / Bacteria: Negative /HPF      URINE CX:   pend    RADIOLOGY:    < from: CT Abdomen and Pelvis No Cont (24 @ 05:03) >    ACC: [id number] EXAM:  CT ABDOMEN AND PELVIS   ORDERED BY: YAYA GASCA     PROCEDURE DATE:  2024          INTERPRETATION:  CLINICAL INFORMATION: Delirium, history of kidney stones    COMPARISON: CT abdomen pelvis 2023    CONTRAST/COMPLICATIONS:  IV Contrast: None  Oral Contrast: None  Complications: None reported    PROCEDURE:  CT of the Abdomen and Pelvis was performed.  Sagittal and coronal reformats were performed.    FINDINGS:  LOWER CHEST: Trace bilateral pleural effusions are unchanged. Coronary   artery calcifications..    LIVER: Within normal limits.  BILE DUCTS: Mildly dilated common bile duct measuring 1.0 cm is unchanged   from prior exam.  GALLBLADDER: Within normal limits.  SPLEEN: Calcified granuloma.  PANCREAS: Atrophic.  ADRENALS: Within normal limits.    KIDNEYS/URETERS: A 2.5 cm right staghorn calculus.. Additional bilateral   nonobstructing calculi. Distal left ureteral stone measures 5 mm. No   hydronephrosis.    BLADDER: Within normal limits.  REPRODUCTIVE ORGANS: Prostate within normal limits.    BOWEL: Rectal distention with stool. Moderate fecal load. No bowel   obstruction. Appendix is not visualized. No evidence of inflammation in   the pericecal region.  PERITONEUM: No ascites.  VESSELS: Atherosclerotic changes.  RETROPERITONEUM/LYMPH NODES: No lymphadenopathy.  ABDOMINAL WALL: Within normal limits.  BONES: Degenerative changes. Partially visualized lung segment posterior   spinal fusion hardware. Right hip arthroplasty. Sclerotic density in the   left iliac wing is unchanged. Chronic right inferior pubic ramus fracture.    IMPRESSION:  Bilateral nephrolithiasis and 5 mm distal left ureteral stone. No   hydronephrosis.    Moderate fecal load with rectal distention with stool. Correlate for   constipation.        --- End of Report ---          SANDRA SHEFIFELD MD; Resident Radiologist  This document has been electronically signed.  SAAD HAQUE MD; Attending Radiologist  This document has been electronically signed. 2024  5:33AM    < end of copied text >

## 2024-04-17 NOTE — ED ADULT NURSE NOTE - OBJECTIVE STATEMENT
68 year old AO3 bedbound male c/o altered mental status as per wife at bedside. PMHx of HTN, HLD, DM2. As per wife at the bedside pt has not been the same since 4pm yesterday. At baseline pt is able to answer all questions. Pt confused to year and month. As per pt he states he feels fine. Pt has no facial droop or asymmetry, able to speak in full sentences, no slurred speech, no drifts in the limbs except for right lower limb, as per pt he broke his foot and has not been able to move it. Rectal temp done, sacral skin intact. Right 20g IV placed in the AC, labs drawn. Pending imaging.

## 2024-04-17 NOTE — CONSULT NOTE ADULT - ASSESSMENT
69 y/o Male with past medical history of chronic back pain secondary to spinal stenosis, HTN, HLD, seizure disorder on Keppra, CAD s/p stent, recurrent infected kidney stones and UTIs currently on course of cefdinir for UTI brought in for altered mental status. He also endorses difficulty urinating for the past few days followed by constipation, with associated chills and lower abdominal pain.    Afebrile, noted neutrophilic leukocytosis. Labs show normal renal function, elevated lactate. UA with 96 WBC, mod LE, no nitrite, no bacteria (however pt taking cefdinir prior to admission). CT a/p showed bilateral nephrolithiasis including a 2.5 cm right staghorn calculus and 5 mm distal left ureteral stone without hydronephrosis, bladder and prostate wnl. Previous cultures have grown Proteus and E. Coli, sensitive to ceftriaxone.    Micro:  BCx: pending  UCx: pending    Abx:  Ertapenem 4/17 -->    #Nephrolithiasis  #UTI  #Staghorn calculus    recs to follow. 69 y/o Male with past medical history of chronic back pain secondary to spinal stenosis, HTN, HLD, seizure disorder on Keppra, CAD s/p stent, recurrent infected kidney stones and UTIs currently on course of cefdinir for UTI brought in for altered mental status. He also endorses difficulty urinating for the past few days followed by constipation, with associated chills and lower abdominal pain.    Afebrile, noted neutrophilic leukocytosis. Labs show normal renal function, elevated lactate. UA with 96 WBC, mod LE, no nitrite, no bacteria (however pt taking cefdinir prior to admission). CT a/p showed bilateral nephrolithiasis including a 2.5 cm right staghorn calculus and 5 mm distal left ureteral stone without hydronephrosis, bladder and prostate wnl. Previous cultures have grown Proteus and E. Coli, sensitive to ceftriaxone.    Micro:  BCx: pending  UCx: pending    Abx:  Ertapenem 4/17 -->    #Nephrolithiasis  #UTI  #Staghorn calculus    Recommendations:  - continue ertapenem 1g q24h  - f/u pending blood and urine cultures  - urology eval for the staghorn calculus    d/w attending.    Parmjit Antony, PGY4  ID Fellow  [employer] Preferred  After 5pm/weekends call [phone]

## 2024-04-17 NOTE — ED ADULT NURSE REASSESSMENT NOTE - NS ED NURSE REASSESS COMMENT FT1
Admitted Pt received from previous shift on stretcher, A/Ox4 answers all questions appropriately. Pt denies chest pain and SOB during reassessment, breathing is even and unlabored on room air. Abdomen is soft and non-distended, non-tender to touch. Hx of right femur fracture currently in rehab, Pt bed bound  predominately at baseline, moves around assisted by wheelchair at home, moves all four extremities on command, skin is intact. Condom cath noted on reassessment, draining freely to urine cannister placed below bladder line, 200cc of clear urine noted. Pt resting at the bedside, no apparent visible acute distress noted on reassessment. Vital signs stable, NKA to medications. Pending bed assignment
Pt noted to be in no acute distress. Urine studies collected. VS as charted. Medicated as per eMAR. Pending bed assignment.

## 2024-04-17 NOTE — ED PROVIDER NOTE - ATTENDING CONTRIBUTION TO CARE
Dr. Simmons:  I have personally performed a face to face bedside history and physical examination of this patient. I have discussed the history, examination, review of systems, assessment and plan of management with the resident. I have reviewed the electronic medical record and amended it to reflect my history, review of systems, physical exam, assessment and plan.    68M h/o HTN, HLD, CAD s/p stent (2015), seizure disorder on Keppra, chronic back pain s/p multiple spinal surgeries, kidney stones, and recurrent UTIs, brought in by wife for delirium.  Pt has been on Cefdinir for approximately 2wks for UTI.  Per wife, today his mentation seems altered, frequently repeating himself/perseverating.  Denies fever/chills, cp, sob, n/v/d.    exam:  - nad  - rrr  - ctab   -abd soft, ntnd    A/P  - AMS, suspect secondary to UTI  - cbc, cmp, ua, urine culture, CT abd/pelvis to check status of kidney stones

## 2024-04-17 NOTE — CONSULT NOTE ADULT - ASSESSMENT
EKG  - NOT IN CHART     Echo - 6/23 - Normal LV function     1) Preop eval for bilateral ureteral stents – patient denies any chest pain or shortness of breath on exertion recent echocardiogram in June 2023 shows normal left ventricular  function will need a 12 lead EKG for clearance    2) CAD s/p MI - Continue aspirin and Lipitor    3) UTI - cont ertapenem     4) HTN - on amlodipine and metoprolol

## 2024-04-18 LAB
A1C WITH ESTIMATED AVERAGE GLUCOSE RESULT: 6.1 % — HIGH (ref 4–5.6)
ALBUMIN SERPL ELPH-MCNC: 3.8 G/DL — SIGNIFICANT CHANGE UP (ref 3.3–5)
ALP SERPL-CCNC: 90 U/L — SIGNIFICANT CHANGE UP (ref 40–120)
ALT FLD-CCNC: 19 U/L — SIGNIFICANT CHANGE UP (ref 4–41)
ANION GAP SERPL CALC-SCNC: 13 MMOL/L — SIGNIFICANT CHANGE UP (ref 7–14)
APTT BLD: 32.9 SEC — SIGNIFICANT CHANGE UP (ref 24.5–35.6)
AST SERPL-CCNC: 16 U/L — SIGNIFICANT CHANGE UP (ref 4–40)
BASOPHILS # BLD AUTO: 0.09 K/UL — SIGNIFICANT CHANGE UP (ref 0–0.2)
BASOPHILS NFR BLD AUTO: 0.9 % — SIGNIFICANT CHANGE UP (ref 0–2)
BILIRUB SERPL-MCNC: 0.4 MG/DL — SIGNIFICANT CHANGE UP (ref 0.2–1.2)
BUN SERPL-MCNC: 13 MG/DL — SIGNIFICANT CHANGE UP (ref 7–23)
CALCIUM SERPL-MCNC: 9.1 MG/DL — SIGNIFICANT CHANGE UP (ref 8.4–10.5)
CHLORIDE SERPL-SCNC: 111 MMOL/L — HIGH (ref 98–107)
CHOLEST SERPL-MCNC: 106 MG/DL — SIGNIFICANT CHANGE UP
CO2 SERPL-SCNC: 22 MMOL/L — SIGNIFICANT CHANGE UP (ref 22–31)
CREAT SERPL-MCNC: 1.07 MG/DL — SIGNIFICANT CHANGE UP (ref 0.5–1.3)
CULTURE RESULTS: SIGNIFICANT CHANGE UP
EGFR: 75 ML/MIN/1.73M2 — SIGNIFICANT CHANGE UP
EOSINOPHIL # BLD AUTO: 0.02 K/UL — SIGNIFICANT CHANGE UP (ref 0–0.5)
EOSINOPHIL NFR BLD AUTO: 0.2 % — SIGNIFICANT CHANGE UP (ref 0–6)
ESTIMATED AVERAGE GLUCOSE: 128 — SIGNIFICANT CHANGE UP
GLUCOSE SERPL-MCNC: 95 MG/DL — SIGNIFICANT CHANGE UP (ref 70–99)
HCT VFR BLD CALC: 46.2 % — SIGNIFICANT CHANGE UP (ref 39–50)
HDLC SERPL-MCNC: 37 MG/DL — LOW
HGB BLD-MCNC: 15.2 G/DL — SIGNIFICANT CHANGE UP (ref 13–17)
IANC: 4.94 K/UL — SIGNIFICANT CHANGE UP (ref 1.8–7.4)
IMM GRANULOCYTES NFR BLD AUTO: 0.4 % — SIGNIFICANT CHANGE UP (ref 0–0.9)
INR BLD: 0.98 RATIO — SIGNIFICANT CHANGE UP (ref 0.85–1.18)
LIPID PNL WITH DIRECT LDL SERPL: 29 MG/DL — SIGNIFICANT CHANGE UP
LYMPHOCYTES # BLD AUTO: 4.55 K/UL — HIGH (ref 1–3.3)
LYMPHOCYTES # BLD AUTO: 43.4 % — SIGNIFICANT CHANGE UP (ref 13–44)
MCHC RBC-ENTMCNC: 29.8 PG — SIGNIFICANT CHANGE UP (ref 27–34)
MCHC RBC-ENTMCNC: 32.9 GM/DL — SIGNIFICANT CHANGE UP (ref 32–36)
MCV RBC AUTO: 90.6 FL — SIGNIFICANT CHANGE UP (ref 80–100)
MONOCYTES # BLD AUTO: 0.85 K/UL — SIGNIFICANT CHANGE UP (ref 0–0.9)
MONOCYTES NFR BLD AUTO: 8.1 % — SIGNIFICANT CHANGE UP (ref 2–14)
NEUTROPHILS # BLD AUTO: 4.94 K/UL — SIGNIFICANT CHANGE UP (ref 1.8–7.4)
NEUTROPHILS NFR BLD AUTO: 47 % — SIGNIFICANT CHANGE UP (ref 43–77)
NON HDL CHOLESTEROL: 69 MG/DL — SIGNIFICANT CHANGE UP
NRBC # BLD: 0 /100 WBCS — SIGNIFICANT CHANGE UP (ref 0–0)
NRBC # FLD: 0 K/UL — SIGNIFICANT CHANGE UP (ref 0–0)
PLATELET # BLD AUTO: 211 K/UL — SIGNIFICANT CHANGE UP (ref 150–400)
POTASSIUM SERPL-MCNC: 4.8 MMOL/L — SIGNIFICANT CHANGE UP (ref 3.5–5.3)
POTASSIUM SERPL-SCNC: 4.8 MMOL/L — SIGNIFICANT CHANGE UP (ref 3.5–5.3)
PROT SERPL-MCNC: 6.7 G/DL — SIGNIFICANT CHANGE UP (ref 6–8.3)
PROTHROM AB SERPL-ACNC: 11.1 SEC — SIGNIFICANT CHANGE UP (ref 9.5–13)
RBC # BLD: 5.1 M/UL — SIGNIFICANT CHANGE UP (ref 4.2–5.8)
RBC # FLD: 14.8 % — HIGH (ref 10.3–14.5)
SODIUM SERPL-SCNC: 146 MMOL/L — HIGH (ref 135–145)
SPECIMEN SOURCE: SIGNIFICANT CHANGE UP
TRIGL SERPL-MCNC: 202 MG/DL — HIGH
WBC # BLD: 10.49 K/UL — SIGNIFICANT CHANGE UP (ref 3.8–10.5)
WBC # FLD AUTO: 10.49 K/UL — SIGNIFICANT CHANGE UP (ref 3.8–10.5)

## 2024-04-18 PROCEDURE — 52332 CYSTOSCOPY AND TREATMENT: CPT | Mod: 50

## 2024-04-18 PROCEDURE — 99232 SBSQ HOSP IP/OBS MODERATE 35: CPT

## 2024-04-18 DEVICE — URETERAL CATH OPEN END 5FR 70CM: Type: IMPLANTABLE DEVICE | Site: BILATERAL | Status: FUNCTIONAL

## 2024-04-18 DEVICE — URETERAL STENT PERCUFLEX PLUS 6FR 26CM: Type: IMPLANTABLE DEVICE | Site: BILATERAL | Status: FUNCTIONAL

## 2024-04-18 DEVICE — GUIDEWIRE SENSOR DUAL-FLEX NITINOL STRAIGHT .038" X 150CM: Type: IMPLANTABLE DEVICE | Site: BILATERAL | Status: FUNCTIONAL

## 2024-04-18 RX ORDER — INFLUENZA VIRUS VACCINE 15; 15; 15; 15 UG/.5ML; UG/.5ML; UG/.5ML; UG/.5ML
0.7 SUSPENSION INTRAMUSCULAR ONCE
Refills: 0 | Status: DISCONTINUED | OUTPATIENT
Start: 2024-04-18 | End: 2024-04-24

## 2024-04-18 RX ORDER — HYDROMORPHONE HYDROCHLORIDE 2 MG/ML
0.5 INJECTION INTRAMUSCULAR; INTRAVENOUS; SUBCUTANEOUS
Refills: 0 | Status: DISCONTINUED | OUTPATIENT
Start: 2024-04-18 | End: 2024-04-18

## 2024-04-18 RX ORDER — BACLOFEN 100 %
20 POWDER (GRAM) MISCELLANEOUS ONCE
Refills: 0 | Status: COMPLETED | OUTPATIENT
Start: 2024-04-18 | End: 2024-04-18

## 2024-04-18 RX ORDER — SODIUM CHLORIDE 9 MG/ML
1000 INJECTION, SOLUTION INTRAVENOUS
Refills: 0 | Status: DISCONTINUED | OUTPATIENT
Start: 2024-04-18 | End: 2024-04-18

## 2024-04-18 RX ORDER — ONDANSETRON 8 MG/1
4 TABLET, FILM COATED ORAL ONCE
Refills: 0 | Status: DISCONTINUED | OUTPATIENT
Start: 2024-04-18 | End: 2024-04-18

## 2024-04-18 RX ADMIN — PANTOPRAZOLE SODIUM 40 MILLIGRAM(S): 20 TABLET, DELAYED RELEASE ORAL at 06:59

## 2024-04-18 RX ADMIN — LEVETIRACETAM 750 MILLIGRAM(S): 250 TABLET, FILM COATED ORAL at 17:34

## 2024-04-18 RX ADMIN — Medication 81 MILLIGRAM(S): at 17:17

## 2024-04-18 RX ADMIN — LEVETIRACETAM 750 MILLIGRAM(S): 250 TABLET, FILM COATED ORAL at 07:00

## 2024-04-18 RX ADMIN — Medication 20 MILLIGRAM(S): at 00:10

## 2024-04-18 RX ADMIN — HEPARIN SODIUM 5000 UNIT(S): 5000 INJECTION INTRAVENOUS; SUBCUTANEOUS at 22:58

## 2024-04-18 RX ADMIN — Medication 150 MILLIGRAM(S): at 17:17

## 2024-04-18 RX ADMIN — ERTAPENEM SODIUM 120 MILLIGRAM(S): 1 INJECTION, POWDER, LYOPHILIZED, FOR SOLUTION INTRAMUSCULAR; INTRAVENOUS at 06:59

## 2024-04-18 RX ADMIN — TAMSULOSIN HYDROCHLORIDE 0.4 MILLIGRAM(S): 0.4 CAPSULE ORAL at 22:54

## 2024-04-18 RX ADMIN — ATORVASTATIN CALCIUM 40 MILLIGRAM(S): 80 TABLET, FILM COATED ORAL at 22:54

## 2024-04-18 RX ADMIN — AMLODIPINE BESYLATE 5 MILLIGRAM(S): 2.5 TABLET ORAL at 06:59

## 2024-04-18 RX ADMIN — HEPARIN SODIUM 5000 UNIT(S): 5000 INJECTION INTRAVENOUS; SUBCUTANEOUS at 17:17

## 2024-04-18 RX ADMIN — GABAPENTIN 300 MILLIGRAM(S): 400 CAPSULE ORAL at 22:57

## 2024-04-18 RX ADMIN — HEPARIN SODIUM 5000 UNIT(S): 5000 INJECTION INTRAVENOUS; SUBCUTANEOUS at 06:59

## 2024-04-18 RX ADMIN — Medication 20 MILLIGRAM(S): at 23:59

## 2024-04-18 RX ADMIN — SODIUM CHLORIDE 65 MILLILITER(S): 9 INJECTION INTRAMUSCULAR; INTRAVENOUS; SUBCUTANEOUS at 04:00

## 2024-04-18 RX ADMIN — Medication 25 MILLIGRAM(S): at 22:58

## 2024-04-18 NOTE — PROGRESS NOTE ADULT - SUBJECTIVE AND OBJECTIVE BOX
Name of Patient : MANUEL PETERSON  MRN: [id number]      Subjective: Patient seen and examined. No new events except as noted.   plan for stent placement by urology     REVIEW OF SYSTEMS:    CONSTITUTIONAL: No weakness, fevers or chills  EYES/ENT: No visual changes;  No vertigo or throat pain   NECK: No pain or stiffness  RESPIRATORY: No cough, wheezing, hemoptysis; No shortness of breath  CARDIOVASCULAR: No chest pain or palpitations  GASTROINTESTINAL: No abdominal or epigastric pain. No nausea, vomiting, or hematemesis; No diarrhea or constipation. No melena or hematochezia.  GENITOURINARY: No dysuria, frequency or hematuria  NEUROLOGICAL: No numbness or weakness  SKIN: No itching, burning, rashes, or lesions   All other review of systems is negative unless indicated above.    MEDICATIONS:  MEDICATIONS  (STANDING):  amLODIPine   Tablet 5 milliGRAM(s) Oral daily  aspirin enteric coated 81 milliGRAM(s) Oral daily  atorvastatin 40 milliGRAM(s) Oral at bedtime  ertapenem  IVPB 1000 milliGRAM(s) IV Intermittent every 24 hours  gabapentin 300 milliGRAM(s) Oral at bedtime  heparin   Injectable 5000 Unit(s) SubCutaneous every 8 hours  influenza  Vaccine (HIGH DOSE) 0.7 milliLiter(s) IntraMuscular once  levETIRAcetam 750 milliGRAM(s) Oral two times a day  metoprolol tartrate 25 milliGRAM(s) Oral at bedtime  pantoprazole    Tablet 40 milliGRAM(s) Oral before breakfast  pregabalin 150 milliGRAM(s) Oral daily  sodium chloride 0.9%. 1000 milliLiter(s) (65 mL/Hr) IV Continuous <Continuous>  tamsulosin 0.4 milliGRAM(s) Oral at bedtime      PHYSICAL EXAM:  T(C): 36.4 (04-19-24 @ 05:00), Max: 36.9 (04-18-24 @ 10:37)  HR: 68 (04-19-24 @ 05:00) (67 - 91)  BP: 125/62 (04-19-24 @ 05:00) (113/53 - 165/79)  RR: 18 (04-19-24 @ 05:00) (13 - 19)  SpO2: 96% (04-19-24 @ 05:00) (94% - 99%)  Wt(kg): --  I&O's Summary    18 Apr 2024 07:01  -  19 Apr 2024 07:00  --------------------------------------------------------  IN: 390 mL / OUT: 1850 mL / NET: -1460 mL      Height (cm): 172.7 (04-18 @ 10:37)  Weight (kg): 97.5 (04-18 @ 10:37)  BMI (kg/m2): 32.7 (04-18 @ 10:37)  BSA (m2): 2.11 (04-18 @ 10:37)    Appearance: Normal	  HEENT:  PERRLA   Lymphatic: No lymphadenopathy   Cardiovascular: Normal S1 S2, no JVD  Respiratory: normal effort , clear  Gastrointestinal:  Soft, Non-tender  Skin: No rashes,  warm to touch  Psychiatry:  Mood & affect appropriate  Musculuskeletal: No edema    recent labs, Imaging and EKGs personally reviewed     04-18-24 @ 07:01  -  04-19-24 @ 07:00  --------------------------------------------------------  IN: 390 mL / OUT: 1850 mL / NET: -1460 mL                          14.5   11.85 )-----------( 213      ( 19 Apr 2024 04:52 )             44.7               04-19    143  |  109<H>  |  14  ----------------------------<  89  4.3   |  22  |  1.24    Ca    8.7      19 Apr 2024 04:52  Phos  2.7     04-19  Mg     1.90     04-19    TPro  6.7  /  Alb  3.8  /  TBili  0.4  /  DBili  x   /  AST  16  /  ALT  19  /  AlkPhos  90  04-18    PT/INR - ( 18 Apr 2024 06:13 )   PT: 11.1 sec;   INR: 0.98 ratio         PTT - ( 18 Apr 2024 06:13 )  PTT:32.9 sec                   Urinalysis Basic - ( 19 Apr 2024 04:52 )    Color: x / Appearance: x / SG: x / pH: x  Gluc: 89 mg/dL / Ketone: x  / Bili: x / Urobili: x   Blood: x / Protein: x / Nitrite: x   Leuk Esterase: x / RBC: x / WBC x   Sq Epi: x / Non Sq Epi: x / Bacteria: x

## 2024-04-18 NOTE — PROGRESS NOTE ADULT - ASSESSMENT
EKG  - normal sinus rhythm Q wave lead 3 and AVF    Echo - 6/23 - Normal LV function     1) pt s/p procedure doing well    2) CAD s/p MI - Continue aspirin and Lipitor    3) UTI - cont ertapenem     4) HTN - on amlodipine and metoprolol  EKG  - normal sinus rhythm Q wave lead 3 and AVF    Echo - 6/23 - Normal LV function     1) s/p bilateral ureteral stenting - pt s/p procedure doing well    2) CAD s/p MI - Continue aspirin and Lipitor    3) UTI - cont ertapenem     4) HTN - on amlodipine and metoprolol

## 2024-04-18 NOTE — BRIEF OPERATIVE NOTE - NSICDXBRIEFPROCEDURE_GEN_ALL_CORE_FT
PROCEDURES:  Cystoscopy, with bilateral ureteral stent replacement 18-Apr-2024 12:34:38  Eulalia Price

## 2024-04-18 NOTE — PROGRESS NOTE ADULT - SUBJECTIVE AND OBJECTIVE BOX
Mateusz Beckwith MD  Interventional Cardiology / Advance Heart Failure and Cardiac Transplant Specialist  [place] Office : [address] [place] [address] [place] [address]  Tel:   [place] Office : [address] [facility] [place] [address]  Tel: [phone]       Pt is lying in bed comfortable not in distress, s/p urethral stenting doing well  	  MEDICATIONS:  amLODIPine   Tablet 5 milliGRAM(s) Oral daily  aspirin enteric coated 81 milliGRAM(s) Oral daily  heparin   Injectable 5000 Unit(s) SubCutaneous every 8 hours  metoprolol tartrate 25 milliGRAM(s) Oral at bedtime    ertapenem  IVPB 1000 milliGRAM(s) IV Intermittent every 24 hours      gabapentin 300 milliGRAM(s) Oral at bedtime  HYDROmorphone  Injectable 0.5 milliGRAM(s) IV Push every 10 minutes PRN  levETIRAcetam 750 milliGRAM(s) Oral two times a day  ondansetron Injectable 4 milliGRAM(s) IV Push once PRN  pregabalin 150 milliGRAM(s) Oral daily    pantoprazole    Tablet 40 milliGRAM(s) Oral before breakfast    atorvastatin 40 milliGRAM(s) Oral at bedtime    influenza  Vaccine (HIGH DOSE) 0.7 milliLiter(s) IntraMuscular once  sodium chloride 0.9%. 1000 milliLiter(s) IV Continuous <Continuous>  tamsulosin 0.4 milliGRAM(s) Oral at bedtime      PAST MEDICAL/SURGICAL HISTORY  PAST MEDICAL & SURGICAL HISTORY:  Myocardial infarction  KAEL x1 MI 2005, stent RCA      Back pain  Chronic back pain      Spinal stenosis      Lumbar herniated disc      Hypertension      Narcotic dependence  5/28/16 for withdrawal ,pt currently on Dialudid 8 mg every 4-6 hours /day      Osteoarthritis      GERD (gastroesophageal reflux disease)      Cerebral aneurysm rupture  1997 clipped, no residual      Femur fracture, right  2/16, surgical revision of right hip      Sacroiliitis, not elsewhere classified  Unspecified Inflammatory spondylopathy,Sacral and sacrococcygeal region      Brain aneurysm  s/p clipping, not compatible with MRI      Seizures      CAD (coronary artery disease)  s/p MI and RCA stenting      Chronic pain  Patient has an Intrathecal pump s/p removal in 2016      Cerebral aneurysm  I997 with clips      S/P lumbar fusion  L1-S1:2002      History of right inguinal hernia repair  x2      Hx of appendectomy  6/1969      Hx of laminectomy  lumbar-2002      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 mos.-14 yo      Stented coronary artery  KAEL x 1 to RCA      History of hip replacement  8/15, revision 2/16 after falling and fracturing right femur      S/P left knee arthroscopy      H/O arthroscopy of shoulder  right X 2, left X 1      History of throat surgery  surgical exicision cyst 1986      Fusion of sacral region of spine  5/2017      History of back surgery  x5      S/P inguinal hernia repair      H/O fracture of femur  s/p repair          SOCIAL HISTORY: Substance Use (street drugs): ( x ) never used  (  ) other:    FAMILY HISTORY:  No pertinent family history in first degree relatives          PHYSICAL EXAM:  T(C): 36.7 (04-18-24 @ 14:00), Max: 36.9 (04-18-24 @ 10:37)  HR: 81 (04-18-24 @ 14:00) (58 - 87)  BP: 115/60 (04-18-24 @ 14:00) (113/53 - 169/71)  RR: 16 (04-18-24 @ 14:00) (13 - 18)  SpO2: 95% (04-18-24 @ 14:00) (94% - 99%)  Wt(kg): --  I&O's Summary    17 Apr 2024 07:01  -  18 Apr 2024 07:00  --------------------------------------------------------  IN: 0 mL / OUT: 850 mL / NET: -850 mL      Height (cm): 172.7 (04-18 @ 10:37)  Weight (kg): 97.5 (04-18 @ 10:37)  BMI (kg/m2): 32.7 (04-18 @ 10:37)  BSA (m2): 2.11 (04-18 @ 10:37)    GENERAL: NAD   EYES: EOMI, PERRLA, conjunctiva and sclera clear  ENMT: No tonsillar erythema, exudates, or enlargement; Moist mucous membranes, Good dentition, No lesions  Cardiovascular: Normal S1 S2, No JVD, No murmurs, No edema  Respiratory: Lungs clear to auscultation	  Gastrointestinal:  Soft, Non-tender, + BS	  Extremities: Normal range of motion, No clubbing, cyanosis or edema  LYMPH: No lymphadenopathy noted  NERVOUS SYSTEM:  Alert & Oriented X3, Good concentration; Motor Strength 5/5 B/L upper and lower extremities; DTRs 2+ intact and symmetric                                  15.2   10.49 )-----------( 211      ( 18 Apr 2024 06:13 )             46.2     04-18    146<H>  |  111<H>  |  13  ----------------------------<  95  4.8   |  22  |  1.07    Ca    9.1      18 Apr 2024 06:13    TPro  6.7  /  Alb  3.8  /  TBili  0.4  /  DBili  x   /  AST  16  /  ALT  19  /  AlkPhos  90  04-18    proBNP:   Lipid Profile:   HgA1c:   TSH:     Consultant(s) Notes Reviewed:  [x ] YES  [ ] NO    Care Discussed with Consultants/Other Providers [ x] YES  [ ] NO    Imaging Personally Reviewed independently:  [x] YES  [ ] NO    All labs, radiologic studies, vitals, orders and medications list reviewed. Patient is seen and examined at bedside. Case discussed with medical team.

## 2024-04-18 NOTE — PHYSICAL THERAPY INITIAL EVALUATION ADULT - ADDITIONAL COMMENTS
Pt lives in a one level home with wife who assists, has not ambulate within the last 6 months due to "Shatter of R femur", transfers from bed to chair with assistance.   Patients Current SpO2: 99%

## 2024-04-18 NOTE — PROGRESS NOTE ADULT - SUBJECTIVE AND OBJECTIVE BOX
Follow Up:  UTI    Interval History/ROS: pt afebrile, WBC improved, no chest pain or SOB, plan for stent placement          Allergies  No Known Allergies        ANTIMICROBIALS:  ertapenem  IVPB 1000 every 24 hours      OTHER MEDS:  amLODIPine   Tablet 5 milliGRAM(s) Oral daily  aspirin enteric coated 81 milliGRAM(s) Oral daily  atorvastatin 40 milliGRAM(s) Oral at bedtime  gabapentin 300 milliGRAM(s) Oral at bedtime  heparin   Injectable 5000 Unit(s) SubCutaneous every 8 hours  HYDROmorphone  Injectable 0.5 milliGRAM(s) IV Push every 10 minutes PRN  influenza  Vaccine (HIGH DOSE) 0.7 milliLiter(s) IntraMuscular once  levETIRAcetam 750 milliGRAM(s) Oral two times a day  metoprolol tartrate 25 milliGRAM(s) Oral at bedtime  ondansetron Injectable 4 milliGRAM(s) IV Push once PRN  pantoprazole    Tablet 40 milliGRAM(s) Oral before breakfast  pregabalin 150 milliGRAM(s) Oral daily  sodium chloride 0.9%. 1000 milliLiter(s) IV Continuous <Continuous>  tamsulosin 0.4 milliGRAM(s) Oral at bedtime      Vital Signs Last 24 Hrs  T(C): 36.5 (18 Apr 2024 12:30), Max: 36.9 (18 Apr 2024 10:37)  T(F): 97.7 (18 Apr 2024 12:30), Max: 98.4 (18 Apr 2024 10:37)  HR: 73 (18 Apr 2024 13:30) (58 - 87)  BP: 115/55 (18 Apr 2024 13:30) (113/53 - 169/71)  BP(mean): 69 (18 Apr 2024 13:30) (63 - 76)  RR: 14 (18 Apr 2024 13:30) (13 - 18)  SpO2: 94% (18 Apr 2024 13:30) (94% - 99%)    Parameters below as of 18 Apr 2024 13:15  Patient On (Oxygen Delivery Method): room air        Physical Exam:  General:    NAD,  non toxic  Respiratory:    comfortable on RA  abd:     soft,   no tenderness  :   R CVAT  Musculoskeletal:   no joint swelling  vascular: no phlebitis  Skin:    no rash                          15.2   10.49 )-----------( 211      ( 18 Apr 2024 06:13 )             46.2       04-18    146<H>  |  111<H>  |  13  ----------------------------<  95  4.8   |  22  |  1.07    Ca    9.1      18 Apr 2024 06:13    TPro  6.7  /  Alb  3.8  /  TBili  0.4  /  DBili  x   /  AST  16  /  ALT  19  /  AlkPhos  90  04-18      Urinalysis Basic - ( 18 Apr 2024 06:13 )    Color: x / Appearance: x / SG: x / pH: x  Gluc: 95 mg/dL / Ketone: x  / Bili: x / Urobili: x   Blood: x / Protein: x / Nitrite: x   Leuk Esterase: x / RBC: x / WBC x   Sq Epi: x / Non Sq Epi: x / Bacteria: x        MICROBIOLOGY:  v  Clean Catch Clean Catch (Midstream)  04-17-24   <10,000 CFU/mL Normal Urogenital Lauren  --  --                RADIOLOGY:  Images independently visualized and reviewed personally, findings as below  < from: CT Abdomen and Pelvis No Cont (04.17.24 @ 05:03) >  IMPRESSION:  Bilateral nephrolithiasis and 5 mm distal left ureteral stone. No   hydronephrosis.    Moderate fecal load with rectal distention with stool. Correlate for   constipation.    < end of copied text >

## 2024-04-18 NOTE — PROGRESS NOTE ADULT - ASSESSMENT
68 m with HTN, HLD, CAD s/p stent (2015), seizure disorder on Keppra, chronic back pain s/p multiple spinal surgeries and lumbar spinal stimulator (s/p removal), b/l nephrolithiasis, and recurrent UTIs, proteus bacteremia, was on cefdinir for UTI but now p/w AMS, chills, R flank pain  here afebrile, WBC: 15, u/a positive  CT:  bilateral nephrolithiasis including a 2.5 cm right staghorn calculus and 5 mm distal left ureteral stone without hydronephrosis, bladder and prostate wnl.    nephrolithiasis and recurrent UTIs, recent cefdinir use now with leukocytosis, R pyelonephritis and R staghorn calculus and L distal ureter stone but no hydro   urine cx negative likely due to antibiotic use  * follow the final blood and urine cx  * c/w ertapenem for now and will adjust as per cultures  * follow with urology, plan for cytoscopy and stent  * monitor CBC/diff and CMP    The above assessment and plan was discussed with the primary team    Ange Hayes MD  contact on teams  After 5pm and on weekends call [phone]

## 2024-04-18 NOTE — PROGRESS NOTE ADULT - ASSESSMENT
The patient is a 69 y/o Male with past medical history of chronic back pain secondary to spinal stenosis, HTN, HLD, seizure disorder on Keppra, CAD s/p stent, recurrent infected kidney stones and UTIs, presenting to the ED for evaluation of altered mental status for 1 day. Is currently finishing 2 week course of cefdinir for UTI. Wife notes today patient became altered, was repeating himself and occasionally not giving appropriate responses to questions. Patient endorsing R flank pain, denies any fever, chills, chest pain, dyspnea, nausea, vomiting, or any other symptoms.      # SIRS  with AMS  improved during exam   likely due to recurrent UTI/Cystitis  Renal stone noted  Pan CX  ertapenem for now till Cx results  IV hydration  pain control   ID eval  Urology eval, OR fo rsten tplacement     #Seizure disorder  resume home keppra dose  fall precautions   PTOT       # CAD  S/P Stent  AP and statin  BP control     # HTN/HLD    Lipid panel   statin   BP control       VDT an dgI PPX

## 2024-04-19 LAB
ANION GAP SERPL CALC-SCNC: 12 MMOL/L — SIGNIFICANT CHANGE UP (ref 7–14)
BUN SERPL-MCNC: 14 MG/DL — SIGNIFICANT CHANGE UP (ref 7–23)
CALCIUM SERPL-MCNC: 8.7 MG/DL — SIGNIFICANT CHANGE UP (ref 8.4–10.5)
CHLORIDE SERPL-SCNC: 109 MMOL/L — HIGH (ref 98–107)
CO2 SERPL-SCNC: 22 MMOL/L — SIGNIFICANT CHANGE UP (ref 22–31)
CREAT SERPL-MCNC: 1.24 MG/DL — SIGNIFICANT CHANGE UP (ref 0.5–1.3)
EGFR: 63 ML/MIN/1.73M2 — SIGNIFICANT CHANGE UP
GLUCOSE SERPL-MCNC: 89 MG/DL — SIGNIFICANT CHANGE UP (ref 70–99)
HCT VFR BLD CALC: 44.7 % — SIGNIFICANT CHANGE UP (ref 39–50)
HGB BLD-MCNC: 14.5 G/DL — SIGNIFICANT CHANGE UP (ref 13–17)
MAGNESIUM SERPL-MCNC: 1.9 MG/DL — SIGNIFICANT CHANGE UP (ref 1.6–2.6)
MCHC RBC-ENTMCNC: 30.1 PG — SIGNIFICANT CHANGE UP (ref 27–34)
MCHC RBC-ENTMCNC: 32.4 GM/DL — SIGNIFICANT CHANGE UP (ref 32–36)
MCV RBC AUTO: 92.7 FL — SIGNIFICANT CHANGE UP (ref 80–100)
NRBC # BLD: 0 /100 WBCS — SIGNIFICANT CHANGE UP (ref 0–0)
NRBC # FLD: 0 K/UL — SIGNIFICANT CHANGE UP (ref 0–0)
PHOSPHATE SERPL-MCNC: 2.7 MG/DL — SIGNIFICANT CHANGE UP (ref 2.5–4.5)
PLATELET # BLD AUTO: 213 K/UL — SIGNIFICANT CHANGE UP (ref 150–400)
POTASSIUM SERPL-MCNC: 4.3 MMOL/L — SIGNIFICANT CHANGE UP (ref 3.5–5.3)
POTASSIUM SERPL-SCNC: 4.3 MMOL/L — SIGNIFICANT CHANGE UP (ref 3.5–5.3)
RBC # BLD: 4.82 M/UL — SIGNIFICANT CHANGE UP (ref 4.2–5.8)
RBC # FLD: 14.6 % — HIGH (ref 10.3–14.5)
SODIUM SERPL-SCNC: 143 MMOL/L — SIGNIFICANT CHANGE UP (ref 135–145)
WBC # BLD: 11.85 K/UL — HIGH (ref 3.8–10.5)
WBC # FLD AUTO: 11.85 K/UL — HIGH (ref 3.8–10.5)

## 2024-04-19 PROCEDURE — 99232 SBSQ HOSP IP/OBS MODERATE 35: CPT

## 2024-04-19 RX ORDER — BACLOFEN 100 %
20 POWDER (GRAM) MISCELLANEOUS EVERY 6 HOURS
Refills: 0 | Status: DISCONTINUED | OUTPATIENT
Start: 2024-04-19 | End: 2024-04-24

## 2024-04-19 RX ORDER — METOPROLOL TARTRATE 50 MG
1 TABLET ORAL
Refills: 0 | DISCHARGE

## 2024-04-19 RX ORDER — ASPIRIN/CALCIUM CARB/MAGNESIUM 324 MG
1 TABLET ORAL
Refills: 0 | DISCHARGE

## 2024-04-19 RX ORDER — LEVETIRACETAM 250 MG/1
1 TABLET, FILM COATED ORAL
Refills: 0 | DISCHARGE

## 2024-04-19 RX ORDER — CHOLECALCIFEROL (VITAMIN D3) 125 MCG
1 CAPSULE ORAL
Refills: 0 | DISCHARGE

## 2024-04-19 RX ORDER — LEVETIRACETAM 250 MG/1
750 TABLET, FILM COATED ORAL AT BEDTIME
Refills: 0 | Status: DISCONTINUED | OUTPATIENT
Start: 2024-04-20 | End: 2024-04-24

## 2024-04-19 RX ORDER — ACETAMINOPHEN 500 MG
650 TABLET ORAL EVERY 6 HOURS
Refills: 0 | Status: DISCONTINUED | OUTPATIENT
Start: 2024-04-19 | End: 2024-04-24

## 2024-04-19 RX ADMIN — LEVETIRACETAM 750 MILLIGRAM(S): 250 TABLET, FILM COATED ORAL at 05:01

## 2024-04-19 RX ADMIN — GABAPENTIN 300 MILLIGRAM(S): 400 CAPSULE ORAL at 21:18

## 2024-04-19 RX ADMIN — SODIUM CHLORIDE 65 MILLILITER(S): 9 INJECTION INTRAMUSCULAR; INTRAVENOUS; SUBCUTANEOUS at 04:57

## 2024-04-19 RX ADMIN — Medication 650 MILLIGRAM(S): at 05:06

## 2024-04-19 RX ADMIN — Medication 20 MILLIGRAM(S): at 21:19

## 2024-04-19 RX ADMIN — HEPARIN SODIUM 5000 UNIT(S): 5000 INJECTION INTRAVENOUS; SUBCUTANEOUS at 05:01

## 2024-04-19 RX ADMIN — AMLODIPINE BESYLATE 5 MILLIGRAM(S): 2.5 TABLET ORAL at 05:01

## 2024-04-19 RX ADMIN — LEVETIRACETAM 750 MILLIGRAM(S): 250 TABLET, FILM COATED ORAL at 17:42

## 2024-04-19 RX ADMIN — TAMSULOSIN HYDROCHLORIDE 0.4 MILLIGRAM(S): 0.4 CAPSULE ORAL at 21:17

## 2024-04-19 RX ADMIN — HEPARIN SODIUM 5000 UNIT(S): 5000 INJECTION INTRAVENOUS; SUBCUTANEOUS at 14:30

## 2024-04-19 RX ADMIN — Medication 25 MILLIGRAM(S): at 21:19

## 2024-04-19 RX ADMIN — ATORVASTATIN CALCIUM 40 MILLIGRAM(S): 80 TABLET, FILM COATED ORAL at 21:19

## 2024-04-19 RX ADMIN — Medication 150 MILLIGRAM(S): at 14:16

## 2024-04-19 RX ADMIN — PANTOPRAZOLE SODIUM 40 MILLIGRAM(S): 20 TABLET, DELAYED RELEASE ORAL at 05:01

## 2024-04-19 RX ADMIN — Medication 81 MILLIGRAM(S): at 14:16

## 2024-04-19 RX ADMIN — ERTAPENEM SODIUM 120 MILLIGRAM(S): 1 INJECTION, POWDER, LYOPHILIZED, FOR SOLUTION INTRAMUSCULAR; INTRAVENOUS at 05:01

## 2024-04-19 RX ADMIN — HEPARIN SODIUM 5000 UNIT(S): 5000 INJECTION INTRAVENOUS; SUBCUTANEOUS at 21:17

## 2024-04-19 RX ADMIN — Medication 650 MILLIGRAM(S): at 05:36

## 2024-04-19 NOTE — PROGRESS NOTE ADULT - ASSESSMENT
The patient is a 67 y/o Male with past medical history of chronic back pain secondary to spinal stenosis, HTN, HLD, seizure disorder on Keppra, CAD s/p stent, recurrent infected kidney stones and UTIs, presenting to the ED for evaluation of altered mental status for 1 day. Is currently finishing 2 week course of cefdinir for UTI. Wife notes today patient became altered, was repeating himself and occasionally not giving appropriate responses to questions. Patient endorsing R flank pain, denies any fever, chills, chest pain, dyspnea, nausea, vomiting, or any other symptoms.      # SIRS  with AMS, resolved now  improved during exam   likely due to recurrent UTI/Cystitis  Renal stone noted  Pan CX  ertapenem for now till Cx results  IV hydration  pain control   ID eval  Urology follow up appreciated  S/P B/L stent placement  IV ABx as per ID     #Seizure disorder  resume home keppra dose  fall precautions   PTOT   home baclofen dose     # CAD  S/P Stent  AP and statin  BP control     # HTN/HLD    Lipid panel   statin   BP control       DVT and gI PPX       D/C planing

## 2024-04-19 NOTE — PROGRESS NOTE ADULT - ASSESSMENT
68y M with R staghorn calculi and 5 mm distal stone without any appreciable hydronephrosis in the presence of a UTI    Recommendations  - Patient now s/p b/l ureteral stent placement  - F/u urine culture  - Would send empiric antibiotic course for 5 days despite negative urine culture  - Patient can follow with Dr. Hoenig in outpatient setting  - No further recommendations, urology to sign off

## 2024-04-19 NOTE — PROGRESS NOTE ADULT - SUBJECTIVE AND OBJECTIVE BOX
Mateusz Beckwith MD  Interventional Cardiology / Advance Heart Failure and Cardiac Transplant Specialist  [place] Office : [address] [place] [address] [place] [address]  Tel:   [place] Office : [address] [facility] [place] [address]  Tel: [phone]       Pt is lying in bed comfortable not in distress, s/p urethral stenting doing well  	  MEDICATIONS:  amLODIPine   Tablet 5 milliGRAM(s) Oral daily  aspirin enteric coated 81 milliGRAM(s) Oral daily  heparin   Injectable 5000 Unit(s) SubCutaneous every 8 hours  metoprolol tartrate 25 milliGRAM(s) Oral at bedtime    ertapenem  IVPB 1000 milliGRAM(s) IV Intermittent every 24 hours      acetaminophen     Tablet .. 650 milliGRAM(s) Oral every 6 hours PRN  gabapentin 300 milliGRAM(s) Oral at bedtime  levETIRAcetam 750 milliGRAM(s) Oral two times a day  pregabalin 150 milliGRAM(s) Oral daily    pantoprazole    Tablet 40 milliGRAM(s) Oral before breakfast    atorvastatin 40 milliGRAM(s) Oral at bedtime    influenza  Vaccine (HIGH DOSE) 0.7 milliLiter(s) IntraMuscular once  sodium chloride 0.9%. 1000 milliLiter(s) IV Continuous <Continuous>  tamsulosin 0.4 milliGRAM(s) Oral at bedtime      PAST MEDICAL/SURGICAL HISTORY  PAST MEDICAL & SURGICAL HISTORY:  Myocardial infarction  KAEL x1 MI 2005, stent RCA      Back pain  Chronic back pain      Spinal stenosis      Lumbar herniated disc      Hypertension      Narcotic dependence  5/28/16 for withdrawal ,pt currently on Dialudid 8 mg every 4-6 hours /day      Osteoarthritis      GERD (gastroesophageal reflux disease)      Cerebral aneurysm rupture  1997 clipped, no residual      Femur fracture, right  2/16, surgical revision of right hip      Sacroiliitis, not elsewhere classified  Unspecified Inflammatory spondylopathy,Sacral and sacrococcygeal region      Brain aneurysm  s/p clipping, not compatible with MRI      Seizures      CAD (coronary artery disease)  s/p MI and RCA stenting      Chronic pain  Patient has an Intrathecal pump s/p removal in 2016      Cerebral aneurysm  I997 with clips      S/P lumbar fusion  L1-S1:2002      History of right inguinal hernia repair  x2      Hx of appendectomy  6/1969      Hx of laminectomy  lumbar-2002      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 mos.-14 yo      Stented coronary artery  KAEL x 1 to RCA      History of hip replacement  8/15, revision 2/16 after falling and fracturing right femur      S/P left knee arthroscopy      H/O arthroscopy of shoulder  right X 2, left X 1      History of throat surgery  surgical exicision cyst 1986      Fusion of sacral region of spine  5/2017      History of back surgery  x5      S/P inguinal hernia repair      H/O fracture of femur  s/p repair          SOCIAL HISTORY: Substance Use (street drugs): ( x ) never used  (  ) other:    FAMILY HISTORY:  No pertinent family history in first degree relatives             PHYSICAL EXAM:  T(C): 36.4 (04-19-24 @ 05:00), Max: 36.7 (04-18-24 @ 18:00)  HR: 68 (04-19-24 @ 05:00) (68 - 91)  BP: 125/62 (04-19-24 @ 05:00) (116/56 - 136/62)  RR: 18 (04-19-24 @ 05:00) (16 - 19)  SpO2: 96% (04-19-24 @ 05:00) (94% - 99%)  Wt(kg): --  I&O's Summary    18 Apr 2024 07:01  -  19 Apr 2024 07:00  --------------------------------------------------------  IN: 390 mL / OUT: 1850 mL / NET: -1460 mL          GENERAL: NAD   EYES: EOMI, PERRLA, conjunctiva and sclera clear  ENMT: No tonsillar erythema, exudates, or enlargement; Moist mucous membranes, Good dentition, No lesions  Cardiovascular: Normal S1 S2, No JVD, No murmurs, No edema  Respiratory: Lungs clear to auscultation	  Gastrointestinal:  Soft, Non-tender, + BS	  Extremities: Normal range of motion, No clubbing, cyanosis or edema  LYMPH: No lymphadenopathy noted  NERVOUS SYSTEM:  Alert & Oriented X3, Good concentration; Motor Strength 5/5 B/L upper and lower extremities; DTRs 2+ intact and symmetric                                    14.5   11.85 )-----------( 213      ( 19 Apr 2024 04:52 )             44.7     04-19    143  |  109<H>  |  14  ----------------------------<  89  4.3   |  22  |  1.24    Ca    8.7      19 Apr 2024 04:52  Phos  2.7     04-19  Mg     1.90     04-19    TPro  6.7  /  Alb  3.8  /  TBili  0.4  /  DBili  x   /  AST  16  /  ALT  19  /  AlkPhos  90  04-18    proBNP:   Lipid Profile:   HgA1c:   TSH:     Consultant(s) Notes Reviewed:  [x ] YES  [ ] NO    Care Discussed with Consultants/Other Providers [ x] YES  [ ] NO    Imaging Personally Reviewed independently:  [x] YES  [ ] NO    All labs, radiologic studies, vitals, orders and medications list reviewed. Patient is seen and examined at bedside. Case discussed with medical team.         Mateusz Beckwith MD  Interventional Cardiology / Advance Heart Failure and Cardiac Transplant Specialist  [place] Office : [address] [place] [address] [place] [address]  Tel:   [place] Office : [address] [facility] [place] [address]  Tel: [phone]       Pt is lying in bed comfortable not in distress, s/p ureteral stenting doing well  	  MEDICATIONS:  amLODIPine   Tablet 5 milliGRAM(s) Oral daily  aspirin enteric coated 81 milliGRAM(s) Oral daily  heparin   Injectable 5000 Unit(s) SubCutaneous every 8 hours  metoprolol tartrate 25 milliGRAM(s) Oral at bedtime    ertapenem  IVPB 1000 milliGRAM(s) IV Intermittent every 24 hours      acetaminophen     Tablet .. 650 milliGRAM(s) Oral every 6 hours PRN  gabapentin 300 milliGRAM(s) Oral at bedtime  levETIRAcetam 750 milliGRAM(s) Oral two times a day  pregabalin 150 milliGRAM(s) Oral daily    pantoprazole    Tablet 40 milliGRAM(s) Oral before breakfast    atorvastatin 40 milliGRAM(s) Oral at bedtime    influenza  Vaccine (HIGH DOSE) 0.7 milliLiter(s) IntraMuscular once  sodium chloride 0.9%. 1000 milliLiter(s) IV Continuous <Continuous>  tamsulosin 0.4 milliGRAM(s) Oral at bedtime      PAST MEDICAL/SURGICAL HISTORY  PAST MEDICAL & SURGICAL HISTORY:  Myocardial infarction  KAEL x1 MI 2005, stent RCA      Back pain  Chronic back pain      Spinal stenosis      Lumbar herniated disc      Hypertension      Narcotic dependence  5/28/16 for withdrawal ,pt currently on Dialudid 8 mg every 4-6 hours /day      Osteoarthritis      GERD (gastroesophageal reflux disease)      Cerebral aneurysm rupture  1997 clipped, no residual      Femur fracture, right  2/16, surgical revision of right hip      Sacroiliitis, not elsewhere classified  Unspecified Inflammatory spondylopathy,Sacral and sacrococcygeal region      Brain aneurysm  s/p clipping, not compatible with MRI      Seizures      CAD (coronary artery disease)  s/p MI and RCA stenting      Chronic pain  Patient has an Intrathecal pump s/p removal in 2016      Cerebral aneurysm  I997 with clips      S/P lumbar fusion  L1-S1:2002      History of right inguinal hernia repair  x2      Hx of appendectomy  6/1969      Hx of laminectomy  lumbar-2002      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 mos.-14 yo      Stented coronary artery  KAEL x 1 to RCA      History of hip replacement  8/15, revision 2/16 after falling and fracturing right femur      S/P left knee arthroscopy      H/O arthroscopy of shoulder  right X 2, left X 1      History of throat surgery  surgical exicision cyst 1986      Fusion of sacral region of spine  5/2017      History of back surgery  x5      S/P inguinal hernia repair      H/O fracture of femur  s/p repair          SOCIAL HISTORY: Substance Use (street drugs): ( x ) never used  (  ) other:    FAMILY HISTORY:  No pertinent family history in first degree relatives             PHYSICAL EXAM:  T(C): 36.4 (04-19-24 @ 05:00), Max: 36.7 (04-18-24 @ 18:00)  HR: 68 (04-19-24 @ 05:00) (68 - 91)  BP: 125/62 (04-19-24 @ 05:00) (116/56 - 136/62)  RR: 18 (04-19-24 @ 05:00) (16 - 19)  SpO2: 96% (04-19-24 @ 05:00) (94% - 99%)  Wt(kg): --  I&O's Summary    18 Apr 2024 07:01  -  19 Apr 2024 07:00  --------------------------------------------------------  IN: 390 mL / OUT: 1850 mL / NET: -1460 mL          GENERAL: NAD   EYES: EOMI, PERRLA, conjunctiva and sclera clear  ENMT: No tonsillar erythema, exudates, or enlargement; Moist mucous membranes, Good dentition, No lesions  Cardiovascular: Normal S1 S2, No JVD, No murmurs, No edema  Respiratory: Lungs clear to auscultation	  Gastrointestinal:  Soft, Non-tender, + BS	  Extremities: Normal range of motion, No clubbing, cyanosis or edema  LYMPH: No lymphadenopathy noted  NERVOUS SYSTEM:  Alert & Oriented X3, Good concentration; Motor Strength 5/5 B/L upper and lower extremities; DTRs 2+ intact and symmetric                                    14.5   11.85 )-----------( 213      ( 19 Apr 2024 04:52 )             44.7     04-19    143  |  109<H>  |  14  ----------------------------<  89  4.3   |  22  |  1.24    Ca    8.7      19 Apr 2024 04:52  Phos  2.7     04-19  Mg     1.90     04-19    TPro  6.7  /  Alb  3.8  /  TBili  0.4  /  DBili  x   /  AST  16  /  ALT  19  /  AlkPhos  90  04-18    proBNP:   Lipid Profile:   HgA1c:   TSH:     Consultant(s) Notes Reviewed:  [x ] YES  [ ] NO    Care Discussed with Consultants/Other Providers [ x] YES  [ ] NO    Imaging Personally Reviewed independently:  [x] YES  [ ] NO    All labs, radiologic studies, vitals, orders and medications list reviewed. Patient is seen and examined at bedside. Case discussed with medical team.

## 2024-04-19 NOTE — PROGRESS NOTE ADULT - SUBJECTIVE AND OBJECTIVE BOX
Subjective  Denies fevers, chills, nausea, vomiting, SOB, CP. Since stent placement patient has been having some intermittent flank discomfort with urination but otherwise tolerated the procedure well.     Objective    Vital signs  T(F): , Max: 98.4 (04-18-24 @ 10:37)  HR: 68 (04-19-24 @ 05:00)  BP: 125/62 (04-19-24 @ 05:00)  SpO2: 96% (04-19-24 @ 05:00)  Wt(kg): --    Output     OUT:    Voided (mL): 850 mL  Total OUT: 850 mL    Total NET: -850 mL      OUT:    Indwelling Catheter - Urethral (mL): 1850 mL  Total OUT: 1850 mL    Total NET: -1850 mL          Gen: NAD  Abd: soft, nontender, nondistended  : No CVAT bilaterally    Labs      04-18 @ 06:13    WBC 10.49 / Hct 46.2  / SCr 1.07         Culture - Urine (collected 04-17-24 @ 05:35)  Source: Clean Catch Clean Catch (Midstream)  Final Report (04-18-24 @ 09:28):    <10,000 CFU/mL Normal Urogenital Lauren        Urine Cx: ?  Blood Cx: ?    Imaging

## 2024-04-19 NOTE — PROGRESS NOTE ADULT - SUBJECTIVE AND OBJECTIVE BOX
Name of Patient : MANUEL PETERSON  MRN: [id number]  Date of visit: 04-19-24       Subjective: Patient seen and examined. No new events except as noted.   Doing okay   S/P B/L Urethral stent     REVIEW OF SYSTEMS:    CONSTITUTIONAL: No weakness, fevers or chills  EYES/ENT: No visual changes;  No vertigo or throat pain   NECK: No pain or stiffness  RESPIRATORY: No cough, wheezing, hemoptysis; No shortness of breath  CARDIOVASCULAR: No chest pain or palpitations  GASTROINTESTINAL: No abdominal or epigastric pain. No nausea, vomiting, or hematemesis; No diarrhea or constipation. No melena or hematochezia.  GENITOURINARY: No dysuria, frequency or hematuria  NEUROLOGICAL: No numbness or weakness  SKIN: No itching, burning, rashes, or lesions   All other review of systems is negative unless indicated above.    MEDICATIONS:  MEDICATIONS  (STANDING):  amLODIPine   Tablet 5 milliGRAM(s) Oral daily  aspirin enteric coated 81 milliGRAM(s) Oral daily  atorvastatin 40 milliGRAM(s) Oral at bedtime  ertapenem  IVPB 1000 milliGRAM(s) IV Intermittent every 24 hours  gabapentin 300 milliGRAM(s) Oral at bedtime  heparin   Injectable 5000 Unit(s) SubCutaneous every 8 hours  influenza  Vaccine (HIGH DOSE) 0.7 milliLiter(s) IntraMuscular once  metoprolol tartrate 25 milliGRAM(s) Oral at bedtime  pantoprazole    Tablet 40 milliGRAM(s) Oral before breakfast  pregabalin 150 milliGRAM(s) Oral daily  sodium chloride 0.9%. 1000 milliLiter(s) (65 mL/Hr) IV Continuous <Continuous>  tamsulosin 0.4 milliGRAM(s) Oral at bedtime      PHYSICAL EXAM:  T(C): 36.1 (04-19-24 @ 13:00), Max: 36.4 (04-18-24 @ 21:04)  HR: 70 (04-19-24 @ 13:00) (68 - 79)  BP: 132/66 (04-19-24 @ 13:00) (125/62 - 136/62)  RR: 16 (04-19-24 @ 13:00) (16 - 18)  SpO2: 95% (04-19-24 @ 13:00) (95% - 98%)  Wt(kg): --  I&O's Summary    18 Apr 2024 07:01  -  19 Apr 2024 07:00  --------------------------------------------------------  IN: 390 mL / OUT: 1850 mL / NET: -1460 mL          Appearance: Normal	  HEENT:  PERRLA   Lymphatic: No lymphadenopathy   Cardiovascular: Normal S1 S2, no JVD  Respiratory: normal effort , clear  Gastrointestinal:  Soft, Non-tender  Skin: No rashes,  warm to touch  Psychiatry:  Mood & affect appropriate  Musculuskeletal: No edema    recent labs, Imaging and EKGs personally reviewed     04-18-24 @ 07:01  -  04-19-24 @ 07:00  --------------------------------------------------------  IN: 390 mL / OUT: 1850 mL / NET: -1460 mL                          14.5   11.85 )-----------( 213      ( 19 Apr 2024 04:52 )             44.7               04-19    143  |  109<H>  |  14  ----------------------------<  89  4.3   |  22  |  1.24    Ca    8.7      19 Apr 2024 04:52  Phos  2.7     04-19  Mg     1.90     04-19    TPro  6.7  /  Alb  3.8  /  TBili  0.4  /  DBili  x   /  AST  16  /  ALT  19  /  AlkPhos  90  04-18    PT/INR - ( 18 Apr 2024 06:13 )   PT: 11.1 sec;   INR: 0.98 ratio         PTT - ( 18 Apr 2024 06:13 )  PTT:32.9 sec                   Urinalysis Basic - ( 19 Apr 2024 04:52 )    Color: x / Appearance: x / SG: x / pH: x  Gluc: 89 mg/dL / Ketone: x  / Bili: x / Urobili: x   Blood: x / Protein: x / Nitrite: x   Leuk Esterase: x / RBC: x / WBC x   Sq Epi: x / Non Sq Epi: x / Bacteria: x

## 2024-04-19 NOTE — PROGRESS NOTE ADULT - ASSESSMENT
EKG  - normal sinus rhythm Q wave lead 3 and AVF    Echo - 6/23 - Normal LV function     1) s/p bilateral ureteral stenting - pt s/p procedure doing well    2) CAD s/p MI - Continue aspirin and Lipitor    3) UTI - cont ertapenem     4) HTN - on amlodipine and metoprolol

## 2024-04-19 NOTE — PROGRESS NOTE ADULT - SUBJECTIVE AND OBJECTIVE BOX
Follow Up:  UTI    Interval History/ROS: pt afebrile, s/p stent placement yesterday, denied fever, chills or flank pain          Allergies  No Known Allergies        ANTIMICROBIALS:  ertapenem  IVPB 1000 every 24 hours      OTHER MEDS:  acetaminophen     Tablet .. 650 milliGRAM(s) Oral every 6 hours PRN  amLODIPine   Tablet 5 milliGRAM(s) Oral daily  aspirin enteric coated 81 milliGRAM(s) Oral daily  atorvastatin 40 milliGRAM(s) Oral at bedtime  gabapentin 300 milliGRAM(s) Oral at bedtime  heparin   Injectable 5000 Unit(s) SubCutaneous every 8 hours  influenza  Vaccine (HIGH DOSE) 0.7 milliLiter(s) IntraMuscular once  levETIRAcetam 750 milliGRAM(s) Oral two times a day  metoprolol tartrate 25 milliGRAM(s) Oral at bedtime  pantoprazole    Tablet 40 milliGRAM(s) Oral before breakfast  pregabalin 150 milliGRAM(s) Oral daily  sodium chloride 0.9%. 1000 milliLiter(s) IV Continuous <Continuous>  tamsulosin 0.4 milliGRAM(s) Oral at bedtime      Vital Signs Last 24 Hrs  T(C): 36.4 (19 Apr 2024 05:00), Max: 36.9 (18 Apr 2024 10:37)  T(F): 97.5 (19 Apr 2024 05:00), Max: 98.4 (18 Apr 2024 10:37)  HR: 68 (19 Apr 2024 05:00) (66 - 91)  BP: 125/62 (19 Apr 2024 05:00) (113/53 - 165/79)  BP(mean): 72 (18 Apr 2024 18:00) (63 - 76)  RR: 18 (19 Apr 2024 05:00) (13 - 19)  SpO2: 96% (19 Apr 2024 05:00) (94% - 99%)    Parameters below as of 19 Apr 2024 05:00  Patient On (Oxygen Delivery Method): room air        Physical Exam:  General:    NAD,  non toxic  Respiratory:    comfortable on RA  abd:     soft,   no tenderness  :   + serrano with clear urine  Musculoskeletal:   no joint swelling  vascular: no phlebitis  Skin:    no rash                        14.5   11.85 )-----------( 213      ( 19 Apr 2024 04:52 )             44.7       04-19    143  |  109<H>  |  14  ----------------------------<  89  4.3   |  22  |  1.24    Ca    8.7      19 Apr 2024 04:52  Phos  2.7     04-19  Mg     1.90     04-19    TPro  6.7  /  Alb  3.8  /  TBili  0.4  /  DBili  x   /  AST  16  /  ALT  19  /  AlkPhos  90  04-18      Urinalysis Basic - ( 19 Apr 2024 04:52 )    Color: x / Appearance: x / SG: x / pH: x  Gluc: 89 mg/dL / Ketone: x  / Bili: x / Urobili: x   Blood: x / Protein: x / Nitrite: x   Leuk Esterase: x / RBC: x / WBC x   Sq Epi: x / Non Sq Epi: x / Bacteria: x        MICROBIOLOGY:  v  Clean Catch Clean Catch (Midstream)  04-17-24   <10,000 CFU/mL Normal Urogenital Lauren  --  --                RADIOLOGY:  Images independently visualized and reviewed personally, findings as below  < from: CT Abdomen and Pelvis No Cont (04.17.24 @ 05:03) >    IMPRESSION:  Bilateral nephrolithiasis and 5 mm distal left ureteral stone. No   hydronephrosis.    Moderate fecal load with rectal distention with stool. Correlate for   constipation.    < end of copied text >

## 2024-04-19 NOTE — PROGRESS NOTE ADULT - ASSESSMENT
68 m with HTN, HLD, CAD s/p stent (2015), seizure disorder on Keppra, chronic back pain s/p multiple spinal surgeries and lumbar spinal stimulator (s/p removal), b/l nephrolithiasis, and recurrent UTIs, proteus bacteremia, was on cefdinir for UTI but now p/w AMS, chills, R flank pain  here afebrile, WBC: 15, u/a positive, urine cx negative (likely due to antibiotic use)  CT:  bilateral nephrolithiasis including a 2.5 cm right staghorn calculus and 5 mm distal left ureteral stone without hydronephrosis, bladder and prostate wnl.  s/p cystoscopy and b/l ureteral stent placement 4/18    nephrolithiasis and recurrent UTIs, recent cefdinir use now with leukocytosis, R pyelonephritis and R staghorn calculus and L distal ureter stone but no hydro   urine cx negative likely due to antibiotic use, s/p cysto and b/l ureteral stent 4/18 (no urine cx sent in OR)    * follow the blood cx  * c/w ertapenem while in the hospital, started 4/17, now day 3  * previous urine cxs showed proteus (R to cipro/levo, bactrim, I to cefazolin), if blood cx negative will switch to cefpodoxime 200 bid to finish the total 10 day course      The above assessment and plan was discussed with the primary team    Ange Hayes MD  contact on teams  After 5pm and on weekends call [phone]

## 2024-04-19 NOTE — PROGRESS NOTE ADULT - SUBJECTIVE AND OBJECTIVE BOX
ANESTHESIA POSTOP CHECK    68y Male POSTOP DAY 1     No COMPLAINTS    NO APPARENT ANESTHESIA COMPLICATIONS

## 2024-04-19 NOTE — PHARMACOTHERAPY INTERVENTION NOTE - COMMENTS
Medication history is complete. Medication list was reviewed as pe outpatient pharmacy records and confirmed with the patient. All medication-related questions were answered and addressed.    Zina Salinas, Pharm.D.  Clinical Pharmacy Specialist  [facility] [address]

## 2024-04-20 ENCOUNTER — TRANSCRIPTION ENCOUNTER (OUTPATIENT)
Age: 69
End: 2024-04-20

## 2024-04-20 LAB
ANION GAP SERPL CALC-SCNC: 14 MMOL/L — SIGNIFICANT CHANGE UP (ref 7–14)
BASOPHILS # BLD AUTO: 0.08 K/UL — SIGNIFICANT CHANGE UP (ref 0–0.2)
BASOPHILS NFR BLD AUTO: 0.7 % — SIGNIFICANT CHANGE UP (ref 0–2)
BUN SERPL-MCNC: 15 MG/DL — SIGNIFICANT CHANGE UP (ref 7–23)
CALCIUM SERPL-MCNC: 8.5 MG/DL — SIGNIFICANT CHANGE UP (ref 8.4–10.5)
CHLORIDE SERPL-SCNC: 109 MMOL/L — HIGH (ref 98–107)
CO2 SERPL-SCNC: 21 MMOL/L — LOW (ref 22–31)
CREAT SERPL-MCNC: 1.08 MG/DL — SIGNIFICANT CHANGE UP (ref 0.5–1.3)
EGFR: 74 ML/MIN/1.73M2 — SIGNIFICANT CHANGE UP
EOSINOPHIL # BLD AUTO: 0.02 K/UL — SIGNIFICANT CHANGE UP (ref 0–0.5)
EOSINOPHIL NFR BLD AUTO: 0.2 % — SIGNIFICANT CHANGE UP (ref 0–6)
GLUCOSE SERPL-MCNC: 91 MG/DL — SIGNIFICANT CHANGE UP (ref 70–99)
HCT VFR BLD CALC: 43.5 % — SIGNIFICANT CHANGE UP (ref 39–50)
HGB BLD-MCNC: 14.5 G/DL — SIGNIFICANT CHANGE UP (ref 13–17)
IANC: 6.46 K/UL — SIGNIFICANT CHANGE UP (ref 1.8–7.4)
IMM GRANULOCYTES NFR BLD AUTO: 0.2 % — SIGNIFICANT CHANGE UP (ref 0–0.9)
LYMPHOCYTES # BLD AUTO: 3.58 K/UL — HIGH (ref 1–3.3)
LYMPHOCYTES # BLD AUTO: 32.8 % — SIGNIFICANT CHANGE UP (ref 13–44)
MAGNESIUM SERPL-MCNC: 1.8 MG/DL — SIGNIFICANT CHANGE UP (ref 1.6–2.6)
MCHC RBC-ENTMCNC: 29.6 PG — SIGNIFICANT CHANGE UP (ref 27–34)
MCHC RBC-ENTMCNC: 33.3 GM/DL — SIGNIFICANT CHANGE UP (ref 32–36)
MCV RBC AUTO: 88.8 FL — SIGNIFICANT CHANGE UP (ref 80–100)
MONOCYTES # BLD AUTO: 0.75 K/UL — SIGNIFICANT CHANGE UP (ref 0–0.9)
MONOCYTES NFR BLD AUTO: 6.9 % — SIGNIFICANT CHANGE UP (ref 2–14)
NEUTROPHILS # BLD AUTO: 6.46 K/UL — SIGNIFICANT CHANGE UP (ref 1.8–7.4)
NEUTROPHILS NFR BLD AUTO: 59.2 % — SIGNIFICANT CHANGE UP (ref 43–77)
NRBC # BLD: 0 /100 WBCS — SIGNIFICANT CHANGE UP (ref 0–0)
NRBC # FLD: 0 K/UL — SIGNIFICANT CHANGE UP (ref 0–0)
PHOSPHATE SERPL-MCNC: 2.1 MG/DL — LOW (ref 2.5–4.5)
PLATELET # BLD AUTO: 209 K/UL — SIGNIFICANT CHANGE UP (ref 150–400)
POTASSIUM SERPL-MCNC: 4 MMOL/L — SIGNIFICANT CHANGE UP (ref 3.5–5.3)
POTASSIUM SERPL-SCNC: 4 MMOL/L — SIGNIFICANT CHANGE UP (ref 3.5–5.3)
RBC # BLD: 4.9 M/UL — SIGNIFICANT CHANGE UP (ref 4.2–5.8)
RBC # FLD: 14.6 % — HIGH (ref 10.3–14.5)
SODIUM SERPL-SCNC: 144 MMOL/L — SIGNIFICANT CHANGE UP (ref 135–145)
WBC # BLD: 10.91 K/UL — HIGH (ref 3.8–10.5)
WBC # FLD AUTO: 10.91 K/UL — HIGH (ref 3.8–10.5)

## 2024-04-20 RX ADMIN — Medication 650 MILLIGRAM(S): at 03:26

## 2024-04-20 RX ADMIN — LEVETIRACETAM 750 MILLIGRAM(S): 250 TABLET, FILM COATED ORAL at 21:26

## 2024-04-20 RX ADMIN — Medication 25 MILLIGRAM(S): at 21:30

## 2024-04-20 RX ADMIN — AMLODIPINE BESYLATE 5 MILLIGRAM(S): 2.5 TABLET ORAL at 06:08

## 2024-04-20 RX ADMIN — HEPARIN SODIUM 5000 UNIT(S): 5000 INJECTION INTRAVENOUS; SUBCUTANEOUS at 14:21

## 2024-04-20 RX ADMIN — HEPARIN SODIUM 5000 UNIT(S): 5000 INJECTION INTRAVENOUS; SUBCUTANEOUS at 06:08

## 2024-04-20 RX ADMIN — TAMSULOSIN HYDROCHLORIDE 0.4 MILLIGRAM(S): 0.4 CAPSULE ORAL at 21:26

## 2024-04-20 RX ADMIN — Medication 20 MILLIGRAM(S): at 14:23

## 2024-04-20 RX ADMIN — Medication 150 MILLIGRAM(S): at 11:42

## 2024-04-20 RX ADMIN — PANTOPRAZOLE SODIUM 40 MILLIGRAM(S): 20 TABLET, DELAYED RELEASE ORAL at 09:07

## 2024-04-20 RX ADMIN — ERTAPENEM SODIUM 120 MILLIGRAM(S): 1 INJECTION, POWDER, LYOPHILIZED, FOR SOLUTION INTRAMUSCULAR; INTRAVENOUS at 07:02

## 2024-04-20 RX ADMIN — Medication 81 MILLIGRAM(S): at 11:43

## 2024-04-20 RX ADMIN — ATORVASTATIN CALCIUM 40 MILLIGRAM(S): 80 TABLET, FILM COATED ORAL at 21:26

## 2024-04-20 RX ADMIN — HEPARIN SODIUM 5000 UNIT(S): 5000 INJECTION INTRAVENOUS; SUBCUTANEOUS at 21:26

## 2024-04-20 RX ADMIN — GABAPENTIN 300 MILLIGRAM(S): 400 CAPSULE ORAL at 21:26

## 2024-04-20 NOTE — DISCHARGE NOTE PROVIDER - NSDCFUADDAPPT_GEN_ALL_CORE_FT
Follow up with your primary care doctor within one week of discharge. If you do not have one, you can call the medicine clinic at [phone] to make an appointment.

## 2024-04-20 NOTE — DISCHARGE NOTE PROVIDER - PROVIDER TOKENS
PROVIDER:[TOKEN:[[id number]]],FREE:[LAST:[Your Primary Care Doctor],PHONE:[(   )    -],FAX:[(   )    -]] PROVIDER:[TOKEN:[[id number]]],PROVIDER:[TOKEN:[[id number]]]

## 2024-04-20 NOTE — DISCHARGE NOTE PROVIDER - CARE PROVIDERS DIRECT ADDRESSES
[email or web address]

## 2024-04-20 NOTE — DISCHARGE NOTE PROVIDER - NSDCCPCAREPLAN_GEN_ALL_CORE_FT
PRINCIPAL DISCHARGE DIAGNOSIS  Diagnosis: Urinary tract infection  Assessment and Plan of Treatment: On admission to the hospital, you were found to have a urinary tract infection.  You were seen by the Urology Team who performed a cystoscopy to place bilateral ureteral stents on 4/18.  You received IV Antibiotics in the hospital, and will continue oral antibiotics upon discharge to complete a total 10-day course.  Please complete all antibiotics as prescribed. Follow up with Urology, Dr. Hoenig, as scheduled within 1-2 weeks of discharge.      SECONDARY DISCHARGE DIAGNOSES  Diagnosis: Seizure disorder  Assessment and Plan of Treatment: You have a history of seizure disorder.  Continue current medications as prescribed, follow up with Primary Care Doctor and/or Neurology as scheduled.    Diagnosis: Hypertension  Assessment and Plan of Treatment: You have a history of high blood pressure.  Please continue medications as currently prescribed.  Please continue low salt, low cholesterol (DASH) diet. Follow up with your primary care doctor for further management.    Diagnosis: CAD (coronary artery disease)  Assessment and Plan of Treatment: You have a history of heart disease.  Please continue medications as currently prescribed.  Please continue low salt, low cholesterol (DASH) diet. Follow up with your primary care doctor for further management.    Diagnosis: Chronic back pain  Assessment and Plan of Treatment: You have a history of chronic back pain. due to spinal stenosis  Please continue medications as currently prescribed.  Follow up with your primary care doctor for further management.     PRINCIPAL DISCHARGE DIAGNOSIS  Diagnosis: Urinary tract infection  Assessment and Plan of Treatment: On admission to the hospital, you were found to have a urinary tract infection.  You were seen by the Urology Team who performed a cystoscopy to place bilateral ureteral stents on 4/18.  You received IV antibiotics in the hospital. Take antibiotic cefpodoxime as ordered with last day of antibiotic on 4/26/24. Follow up with Urology, Dr. Hoenig at your scheduled appointment time.      SECONDARY DISCHARGE DIAGNOSES  Diagnosis: Seizure disorder  Assessment and Plan of Treatment: You have a history of seizure disorder.  Continue current medications as prescribed, follow up with Primary Care Doctor and/or Neurology as scheduled.    Diagnosis: Hypertension  Assessment and Plan of Treatment: You have a history of high blood pressure.  Please continue medications as currently prescribed.  Please continue low salt, low cholesterol (DASH) diet. Follow up with your primary care doctor for further management.    Diagnosis: CAD (coronary artery disease)  Assessment and Plan of Treatment: You have a history of heart disease.  Please continue medications as currently prescribed.  Please continue low salt, low cholesterol (DASH) diet. Follow up with your primary care doctor for further management.    Diagnosis: Chronic back pain  Assessment and Plan of Treatment: You have a history of chronic back pain. due to spinal stenosis  Please continue medications as currently prescribed.  Follow up with your primary care doctor for further management.    Diagnosis: Dizziness  Assessment and Plan of Treatment: You complained of dizziness while in the hospital which improved with taking meclizine. Your CT scan of the head was negative. This may have been due to vertigo or dehyration. Take meclizine as needed. Please follow up with your PCP within one week of discharge. If symptoms persist, an MRI of the breain is advised.

## 2024-04-20 NOTE — DISCHARGE NOTE PROVIDER - CARE PROVIDER_API CALL
Hoenig, David Mayer  Urology  [address] [place] [address]  Phone: [phone]  Fax: [phone]  Follow Up Time:     Your Primary Care Doctor,   Phone: (   )    -  Fax: (   )    -  Follow Up Time:    Hoenig, David Mayer  Urology  [address] [place] [address]  Phone: [phone]  Fax: [phone]  Follow Up Time:     Alexandra Blanco  Internal Medicine  [address] [place] [address]  Phone: [phone]  Fax: [phone]  Follow Up Time:

## 2024-04-20 NOTE — PROGRESS NOTE ADULT - SUBJECTIVE AND OBJECTIVE BOX
Mateusz Beckwith MD  Interventional Cardiology / Advance Heart Failure and Cardiac Transplant Specialist  [place] Office : [address] [place] [address] [place] [address]  Tel:   [place] Office : [address] [facility] [place] [address]  Tel: [phone]       Pt is lying in bed comfortable not in distress, s/p ureteral stenting doing well  	  MEDICATIONS:  amLODIPine   Tablet 5 milliGRAM(s) Oral daily  aspirin enteric coated 81 milliGRAM(s) Oral daily  heparin   Injectable 5000 Unit(s) SubCutaneous every 8 hours  metoprolol tartrate 25 milliGRAM(s) Oral at bedtime    ertapenem  IVPB 1000 milliGRAM(s) IV Intermittent every 24 hours      acetaminophen     Tablet .. 650 milliGRAM(s) Oral every 6 hours PRN  baclofen 20 milliGRAM(s) Oral every 6 hours PRN  gabapentin 300 milliGRAM(s) Oral at bedtime  levETIRAcetam  milliGRAM(s) Oral at bedtime  pregabalin 150 milliGRAM(s) Oral daily    pantoprazole    Tablet 40 milliGRAM(s) Oral before breakfast    atorvastatin 40 milliGRAM(s) Oral at bedtime    influenza  Vaccine (HIGH DOSE) 0.7 milliLiter(s) IntraMuscular once  sodium chloride 0.9%. 1000 milliLiter(s) IV Continuous <Continuous>  tamsulosin 0.4 milliGRAM(s) Oral at bedtime      PAST MEDICAL/SURGICAL HISTORY  PAST MEDICAL & SURGICAL HISTORY:  Myocardial infarction  KAEL x1 MI 2005, stent RCA      Back pain  Chronic back pain      Spinal stenosis      Lumbar herniated disc      Hypertension      Narcotic dependence  5/28/16 for withdrawal ,pt currently on Dialudid 8 mg every 4-6 hours /day      Osteoarthritis      GERD (gastroesophageal reflux disease)      Cerebral aneurysm rupture  1997 clipped, no residual      Femur fracture, right  2/16, surgical revision of right hip      Sacroiliitis, not elsewhere classified  Unspecified Inflammatory spondylopathy,Sacral and sacrococcygeal region      Brain aneurysm  s/p clipping, not compatible with MRI      Seizures      CAD (coronary artery disease)  s/p MI and RCA stenting      Chronic pain  Patient has an Intrathecal pump s/p removal in 2016      Cerebral aneurysm  I997 with clips      S/P lumbar fusion  L1-S1:2002      History of right inguinal hernia repair  x2      Hx of appendectomy  6/1969      Hx of laminectomy  lumbar-2002      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 mos.-14 yo      Stented coronary artery  KAEL x 1 to RCA      History of hip replacement  8/15, revision 2/16 after falling and fracturing right femur      S/P left knee arthroscopy      H/O arthroscopy of shoulder  right X 2, left X 1      History of throat surgery  surgical exicision cyst 1986      Fusion of sacral region of spine  5/2017      History of back surgery  x5      S/P inguinal hernia repair      H/O fracture of femur  s/p repair          SOCIAL HISTORY: Substance Use (street drugs): ( x ) never used  (  ) other:    FAMILY HISTORY:  No pertinent family history in first degree relatives             PHYSICAL EXAM:  T(C): 36.4 (04-20-24 @ 11:35), Max: 36.9 (04-20-24 @ 05:30)  HR: 63 (04-20-24 @ 11:35) (63 - 70)  BP: 122/61 (04-20-24 @ 11:35) (122/61 - 149/61)  RR: 16 (04-20-24 @ 11:35) (16 - 18)  SpO2: 95% (04-20-24 @ 11:35) (95% - 99%)  Wt(kg): --  I&O's Summary        GENERAL: NAD   EYES: EOMI, PERRLA, conjunctiva and sclera clear  ENMT: No tonsillar erythema, exudates, or enlargement; Moist mucous membranes, Good dentition, No lesions  Cardiovascular: Normal S1 S2, No JVD, No murmurs, No edema  Respiratory: Lungs clear to auscultation	  Gastrointestinal:  Soft, Non-tender, + BS	  Extremities: Normal range of motion, No clubbing, cyanosis or edema  LYMPH: No lymphadenopathy noted  NERVOUS SYSTEM:  Alert & Oriented X3, Good concentration; Motor Strength 5/5 B/L upper and lower extremities; DTRs 2+ intact and symmetric                                    14.5   10.91 )-----------( 209      ( 20 Apr 2024 05:10 )             43.5     04-20    144  |  109<H>  |  15  ----------------------------<  91  4.0   |  21<L>  |  1.08    Ca    8.5      20 Apr 2024 05:10  Phos  2.1     04-20  Mg     1.80     04-20      proBNP:   Lipid Profile:   HgA1c:   TSH:     Consultant(s) Notes Reviewed:  [x ] YES  [ ] NO    Care Discussed with Consultants/Other Providers [ x] YES  [ ] NO    Imaging Personally Reviewed independently:  [x] YES  [ ] NO    All labs, radiologic studies, vitals, orders and medications list reviewed. Patient is seen and examined at bedside. Case discussed with medical team.

## 2024-04-20 NOTE — PROGRESS NOTE ADULT - SUBJECTIVE AND OBJECTIVE BOX
Name of Patient : MANUEL PETERSON  MRN: [id number]  Date of visit: 04-20-24       Subjective: Patient seen and examined. No new events except as noted.   DOing okay     REVIEW OF SYSTEMS:    CONSTITUTIONAL: No weakness, fevers or chills  EYES/ENT: No visual changes;  No vertigo or throat pain   NECK: No pain or stiffness  RESPIRATORY: No cough, wheezing, hemoptysis; No shortness of breath  CARDIOVASCULAR: No chest pain or palpitations  GASTROINTESTINAL: No abdominal or epigastric pain. No nausea, vomiting, or hematemesis; No diarrhea or constipation. No melena or hematochezia.  GENITOURINARY: No dysuria, frequency or hematuria  NEUROLOGICAL: No numbness or weakness  SKIN: No itching, burning, rashes, or lesions   All other review of systems is negative unless indicated above.    MEDICATIONS:  MEDICATIONS  (STANDING):  amLODIPine   Tablet 5 milliGRAM(s) Oral daily  aspirin enteric coated 81 milliGRAM(s) Oral daily  atorvastatin 40 milliGRAM(s) Oral at bedtime  ertapenem  IVPB 1000 milliGRAM(s) IV Intermittent every 24 hours  gabapentin 300 milliGRAM(s) Oral at bedtime  heparin   Injectable 5000 Unit(s) SubCutaneous every 8 hours  influenza  Vaccine (HIGH DOSE) 0.7 milliLiter(s) IntraMuscular once  levETIRAcetam  milliGRAM(s) Oral at bedtime  metoprolol tartrate 25 milliGRAM(s) Oral at bedtime  pantoprazole    Tablet 40 milliGRAM(s) Oral before breakfast  pregabalin 150 milliGRAM(s) Oral daily  sodium chloride 0.9%. 1000 milliLiter(s) (65 mL/Hr) IV Continuous <Continuous>  tamsulosin 0.4 milliGRAM(s) Oral at bedtime      PHYSICAL EXAM:  T(C): 36.3 (04-20-24 @ 17:18), Max: 36.9 (04-20-24 @ 05:30)  HR: 65 (04-20-24 @ 17:18) (63 - 65)  BP: 118/56 (04-20-24 @ 17:18) (118/56 - 139/69)  RR: 18 (04-20-24 @ 17:18) (16 - 18)  SpO2: 95% (04-20-24 @ 17:18) (95% - 97%)  Wt(kg): --  I&O's Summary    20 Apr 2024 07:01  -  20 Apr 2024 22:25  --------------------------------------------------------  IN: 0 mL / OUT: 2700 mL / NET: -2700 mL          Appearance: Normal	  HEENT:  PERRLA   Lymphatic: No lymphadenopathy   Cardiovascular: Normal S1 S2, no JVD  Respiratory: normal effort , clear  Gastrointestinal:  Soft, Non-tender  Skin: No rashes,  warm to touch  Psychiatry:  Mood & affect appropriate  Musculuskeletal: No edema    recent labs, Imaging and EKGs personally reviewed     04-20-24 @ 07:01  -  04-20-24 @ 22:25  --------------------------------------------------------  IN: 0 mL / OUT: 2700 mL / NET: -2700 mL                          14.5   10.91 )-----------( 209      ( 20 Apr 2024 05:10 )             43.5               04-20    144  |  109<H>  |  15  ----------------------------<  91  4.0   |  21<L>  |  1.08    Ca    8.5      20 Apr 2024 05:10  Phos  2.1     04-20  Mg     1.80     04-20                         Urinalysis Basic - ( 20 Apr 2024 05:10 )    Color: x / Appearance: x / SG: x / pH: x  Gluc: 91 mg/dL / Ketone: x  / Bili: x / Urobili: x   Blood: x / Protein: x / Nitrite: x   Leuk Esterase: x / RBC: x / WBC x   Sq Epi: x / Non Sq Epi: x / Bacteria: x

## 2024-04-20 NOTE — DISCHARGE NOTE PROVIDER - NSDCMRMEDTOKEN_GEN_ALL_CORE_FT
baclofen 20 mg oral tablet: 1 tab(s) orally 4 times a day  cefdinir 300 mg oral capsule: 1 cap(s) orally 2 times a day per patient started 4/9/2024  gabapentin 300 mg oral capsule: 1 cap(s) orally once a day (at bedtime)  levETIRAcetam 750 mg oral tablet, extended release: 1 tab(s) orally once a day (at bedtime)  metoprolol succinate 25 mg oral tablet, extended release: 1 tab(s) orally once a day  pregabalin 150 mg oral capsule: 1 cap(s) orally 2 times a day patient takes 1-2 times a day depending on pain level  rosuvastatin 10 mg oral tablet: 1 tab(s) orally once a day (at bedtime)  tamsulosin 0.4 mg oral capsule: 1 cap(s) orally once a day in the morning   amLODIPine 5 mg oral tablet: 1 tab(s) orally once a day  aspirin 81 mg oral delayed release tablet: 1 tab(s) orally once a day  baclofen 20 mg oral tablet: 1 tab(s) orally every 6 hours As needed Muscle Spasm  cefpodoxime 200 mg oral tablet: 1 tab(s) orally 2 times a day Please complete course of antibiotics for a total of 10 day course, last dose 4/27 6pm  gabapentin 300 mg oral capsule: 1 cap(s) orally once a day (at bedtime)  levETIRAcetam 750 mg oral tablet, extended release: 1 tab(s) orally once a day (at bedtime)  metoprolol succinate 25 mg oral tablet, extended release: 1 tab(s) orally once a day  pregabalin 150 mg oral capsule: 1 cap(s) orally 2 times a day patient takes 1-2 times a day depending on pain level  rosuvastatin 10 mg oral tablet: 1 tab(s) orally once a day (at bedtime)  tamsulosin 0.4 mg oral capsule: 1 cap(s) orally once a day in the morning   amLODIPine 5 mg oral tablet: 1 tab(s) orally once a day  aspirin 81 mg oral delayed release tablet: 1 tab(s) orally once a day  baclofen 20 mg oral tablet: 1 tab(s) orally every 6 hours As needed Muscle Spasm  cefpodoxime 200 mg oral tablet: 1 tab(s) orally 2 times a day Please complete course of antibiotics for a total of 10 day course, last dose 4/27 6pm  gabapentin 300 mg oral capsule: 1 cap(s) orally once a day (at bedtime)  levETIRAcetam 750 mg oral tablet, extended release: 1 tab(s) orally once a day (at bedtime)  meclizine 12.5 mg oral tablet: 1 tab(s) orally every 6 hours As needed Dizziness  metoprolol succinate 25 mg oral tablet, extended release: 1 tab(s) orally once a day  pregabalin 150 mg oral capsule: 1 cap(s) orally 2 times a day patient takes 1-2 times a day depending on pain level  rosuvastatin 10 mg oral tablet: 1 tab(s) orally once a day (at bedtime)  tamsulosin 0.4 mg oral capsule: 1 cap(s) orally once a day in the morning   amLODIPine 5 mg oral tablet: 1 tab(s) orally once a day  aspirin 81 mg oral delayed release tablet: 1 tab(s) orally once a day  baclofen 20 mg oral tablet: 1 tab(s) orally every 6 hours As needed Muscle Spasm  cefpodoxime 200 mg oral tablet: 1 tab(s) orally 2 times a day Please complete course of antibiotics for a total of 10 day course, last dose 4/27 6pm  gabapentin 300 mg oral capsule: 1 cap(s) orally once a day (at bedtime)  levETIRAcetam 750 mg oral tablet, extended release: 1 tab(s) orally once a day (at bedtime)  meclizine 12.5 mg oral tablet: 1 tab(s) orally every 6 hours as needed for Dizziness  metoprolol succinate 25 mg oral tablet, extended release: 1 tab(s) orally once a day  pregabalin 150 mg oral capsule: 1 cap(s) orally 2 times a day patient takes 1-2 times a day depending on pain level  rosuvastatin 10 mg oral tablet: 1 tab(s) orally once a day (at bedtime)  tamsulosin 0.4 mg oral capsule: 1 cap(s) orally once a day in the morning

## 2024-04-20 NOTE — PROGRESS NOTE ADULT - ASSESSMENT
The patient is a 69 y/o Male with past medical history of chronic back pain secondary to spinal stenosis, HTN, HLD, seizure disorder on Keppra, CAD s/p stent, recurrent infected kidney stones and UTIs, presenting to the ED for evaluation of altered mental status for 1 day. Is currently finishing 2 week course of cefdinir for UTI. Wife notes today patient became altered, was repeating himself and occasionally not giving appropriate responses to questions. Patient endorsing R flank pain, denies any fever, chills, chest pain, dyspnea, nausea, vomiting, or any other symptoms.      # SIRS  with AMS, resolved now  improved during exam   likely due to recurrent UTI/Cystitis  Renal stone noted  Pan CX  ertapenem for now till Cx results  IV hydration  pain control   ID eval  Urology follow up appreciated  S/P B/L stent placement  IV ABx as per ID   TOV    #Seizure disorder  resume home keppra dose  fall precautions   PTOT   home baclofen dose     # CAD  S/P Stent  AP and statin  BP control     # HTN/HLD    Lipid panel   statin   BP control       DVT and gI PPX       D/C planing

## 2024-04-20 NOTE — DISCHARGE NOTE PROVIDER - NSDCFUSCHEDAPPT_GEN_ALL_CORE_FT
Hoenig, David [facility] Physician [facility]  UROLOGY [address] [place] [address]  Scheduled Appointment: 05/16/2024

## 2024-04-20 NOTE — DISCHARGE NOTE PROVIDER - HOSPITAL COURSE
68 M with PMHx HTN, HLD, CAD s/p stent, seizure disorder on Keppra, chronic back pain 2/2 spinal stenosis, recurrent infected kidney stones and UTIs, presenting with AMS x1 day and R flank pain. Recently diagnosed with UTI as outpatient, currently finishing 2 week course of cefdinir for UTI.  Found to have UTI 2/2 R staghorn calculi and 5 mm distal stone.  Urology following, s/p cystoscopy b/l ureteral stent placement 4/18. ID following, s/p IV Ertapenem (4/17->___), UCx negative likely due to antibiotic use. Per ID, will switch to Cefpodoxime 200 BID to finish 10 day course.  AMS and flank pain now resolved.     Patient seen and evaluated. Reviewed discharge medications with patient and attending. All new medications requiring new prescriptions were sent to the pharmacy of patient's choice. Reviewed need for prescription for previous home medications and new prescriptions sent if requested. Medically cleared/stable for discharge as per Dr. Urban on ___ with appropriate follow up. Patient understands and agrees with plan of care. 68 M with PMHx HTN, HLD, CAD s/p stent, seizure disorder on Keppra, chronic back pain 2/2 spinal stenosis, recurrent infected kidney stones and UTIs, presenting with AMS x1 day and R flank pain. Recently diagnosed with UTI as outpatient, currently finishing 2 week course of cefdinir for UTI.  Found to have UTI 2/2 R staghorn calculi and 5 mm distal stone.  Urology following, s/p cystoscopy b/l ureteral stent placement 4/18. ID following, s/p IV Ertapenem (4/17->4/22, UCx negative likely due to antibiotic use. Per ID, will switch to Cefpodoxime 200 BID to finish 10 day course.  AMS and flank pain now resolved.     Patient seen and evaluated. Reviewed discharge medications with patient and attending. All new medications requiring new prescriptions were sent to the pharmacy of patient's choice. Reviewed need for prescription for previous home medications and new prescriptions sent if requested. Medically cleared/stable for discharge as per Dr. Urban on 4/22/24 with appropriate follow up. Patient understands and agrees with plan of care. 68 M with PMHx HTN, HLD, CAD s/p stent, seizure disorder on Keppra, chronic back pain 2/2 spinal stenosis, recurrent infected kidney stones and UTIs, presenting with AMS x1 day and R flank pain. Recently diagnosed with UTI as outpatient, currently finishing 2 week course of cefdinir for UTI.  Found to have UTI 2/2 R staghorn calculi and 5 mm distal stone.  Urology following, s/p cystoscopy b/l ureteral stent placement 4/18. ID following, s/p IV Ertapenem and to complete PO ABX at home. Patient complained of dizziness. CT Head negative and symptoms improved with meclizine. Likely 2/2 vertigo and dehydration.     Case discussed with Dr. Urban on 4/24/24. The patient is medically stable for discharge.

## 2024-04-21 RX ORDER — CEFDINIR 250 MG/5ML
1 POWDER, FOR SUSPENSION ORAL
Refills: 0 | DISCHARGE

## 2024-04-21 RX ORDER — BACLOFEN 100 %
1 POWDER (GRAM) MISCELLANEOUS
Refills: 0 | DISCHARGE

## 2024-04-21 RX ORDER — OXYCODONE HYDROCHLORIDE 5 MG/1
5 TABLET ORAL ONCE
Refills: 0 | Status: DISCONTINUED | OUTPATIENT
Start: 2024-04-21 | End: 2024-04-21

## 2024-04-21 RX ORDER — BACLOFEN 100 %
1 POWDER (GRAM) MISCELLANEOUS
Qty: 0 | Refills: 0 | DISCHARGE
Start: 2024-04-21

## 2024-04-21 RX ADMIN — TAMSULOSIN HYDROCHLORIDE 0.4 MILLIGRAM(S): 0.4 CAPSULE ORAL at 21:46

## 2024-04-21 RX ADMIN — AMLODIPINE BESYLATE 5 MILLIGRAM(S): 2.5 TABLET ORAL at 05:52

## 2024-04-21 RX ADMIN — HEPARIN SODIUM 5000 UNIT(S): 5000 INJECTION INTRAVENOUS; SUBCUTANEOUS at 13:35

## 2024-04-21 RX ADMIN — Medication 81 MILLIGRAM(S): at 11:14

## 2024-04-21 RX ADMIN — Medication 25 MILLIGRAM(S): at 21:46

## 2024-04-21 RX ADMIN — HEPARIN SODIUM 5000 UNIT(S): 5000 INJECTION INTRAVENOUS; SUBCUTANEOUS at 05:51

## 2024-04-21 RX ADMIN — Medication 650 MILLIGRAM(S): at 15:21

## 2024-04-21 RX ADMIN — GABAPENTIN 300 MILLIGRAM(S): 400 CAPSULE ORAL at 21:47

## 2024-04-21 RX ADMIN — Medication 150 MILLIGRAM(S): at 11:14

## 2024-04-21 RX ADMIN — Medication 650 MILLIGRAM(S): at 14:21

## 2024-04-21 RX ADMIN — OXYCODONE HYDROCHLORIDE 5 MILLIGRAM(S): 5 TABLET ORAL at 16:30

## 2024-04-21 RX ADMIN — HEPARIN SODIUM 5000 UNIT(S): 5000 INJECTION INTRAVENOUS; SUBCUTANEOUS at 21:45

## 2024-04-21 RX ADMIN — OXYCODONE HYDROCHLORIDE 5 MILLIGRAM(S): 5 TABLET ORAL at 17:46

## 2024-04-21 RX ADMIN — LEVETIRACETAM 750 MILLIGRAM(S): 250 TABLET, FILM COATED ORAL at 21:46

## 2024-04-21 RX ADMIN — PANTOPRAZOLE SODIUM 40 MILLIGRAM(S): 20 TABLET, DELAYED RELEASE ORAL at 05:52

## 2024-04-21 RX ADMIN — ATORVASTATIN CALCIUM 40 MILLIGRAM(S): 80 TABLET, FILM COATED ORAL at 21:46

## 2024-04-21 RX ADMIN — ERTAPENEM SODIUM 120 MILLIGRAM(S): 1 INJECTION, POWDER, LYOPHILIZED, FOR SOLUTION INTRAMUSCULAR; INTRAVENOUS at 07:48

## 2024-04-21 NOTE — PROGRESS NOTE ADULT - SUBJECTIVE AND OBJECTIVE BOX
Mateusz Beckwith MD  Interventional Cardiology / Advance Heart Failure and Cardiac Transplant Specialist  [place] Office : [address] [place] [address] [place] [address]  Tel:   [place] Office : [address] [facility] [place] [address]  Tel: [phone]       Pt is lying in bed comfortable not in distress, s/p ureteral stenting doing well  	  MEDICATIONS:  amLODIPine   Tablet 5 milliGRAM(s) Oral daily  aspirin enteric coated 81 milliGRAM(s) Oral daily  heparin   Injectable 5000 Unit(s) SubCutaneous every 8 hours  metoprolol tartrate 25 milliGRAM(s) Oral at bedtime    ertapenem  IVPB 1000 milliGRAM(s) IV Intermittent every 24 hours      acetaminophen     Tablet .. 650 milliGRAM(s) Oral every 6 hours PRN  baclofen 20 milliGRAM(s) Oral every 6 hours PRN  gabapentin 300 milliGRAM(s) Oral at bedtime  levETIRAcetam  milliGRAM(s) Oral at bedtime  pregabalin 150 milliGRAM(s) Oral daily    pantoprazole    Tablet 40 milliGRAM(s) Oral before breakfast    atorvastatin 40 milliGRAM(s) Oral at bedtime    influenza  Vaccine (HIGH DOSE) 0.7 milliLiter(s) IntraMuscular once  sodium chloride 0.9%. 1000 milliLiter(s) IV Continuous <Continuous>  tamsulosin 0.4 milliGRAM(s) Oral at bedtime      PAST MEDICAL/SURGICAL HISTORY  PAST MEDICAL & SURGICAL HISTORY:  Myocardial infarction  KAEL x1 MI 2005, stent RCA      Back pain  Chronic back pain      Spinal stenosis      Lumbar herniated disc      Hypertension      Narcotic dependence  5/28/16 for withdrawal ,pt currently on Dialudid 8 mg every 4-6 hours /day      Osteoarthritis      GERD (gastroesophageal reflux disease)      Cerebral aneurysm rupture  1997 clipped, no residual      Femur fracture, right  2/16, surgical revision of right hip      Sacroiliitis, not elsewhere classified  Unspecified Inflammatory spondylopathy,Sacral and sacrococcygeal region      Brain aneurysm  s/p clipping, not compatible with MRI      Seizures      CAD (coronary artery disease)  s/p MI and RCA stenting      Chronic pain  Patient has an Intrathecal pump s/p removal in 2016      Cerebral aneurysm  I997 with clips      S/P lumbar fusion  L1-S1:2002      History of right inguinal hernia repair  x2      Hx of appendectomy  6/1969      Hx of laminectomy  lumbar-2002      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      Stented coronary artery  KAEL x 1 to RCA      History of hip replacement  8/15, revision 2/16 after falling and fracturing right femur      S/P left knee arthroscopy      H/O arthroscopy of shoulder  right X 2, left X 1      History of throat surgery  surgical exicision cyst 1986      Fusion of sacral region of spine  5/2017      History of back surgery  x5      S/P inguinal hernia repair      H/O fracture of femur  s/p repair          SOCIAL HISTORY: Substance Use (street drugs): ( x ) never used  (  ) other:    FAMILY HISTORY:  No pertinent family history in first degree relatives             PHYSICAL EXAM:  T(C): 36.7 (04-21-24 @ 05:45), Max: 36.7 (04-21-24 @ 05:45)  HR: 61 (04-21-24 @ 05:45) (61 - 65)  BP: 123/68 (04-21-24 @ 05:45) (118/56 - 127/63)  RR: 18 (04-21-24 @ 05:45) (18 - 18)  SpO2: 98% (04-21-24 @ 05:45) (95% - 100%)  Wt(kg): --  I&O's Summary    20 Apr 2024 07:01  -  21 Apr 2024 07:00  --------------------------------------------------------  IN: 0 mL / OUT: 2700 mL / NET: -2700 mL          GENERAL: NAD   EYES: EOMI, PERRLA, conjunctiva and sclera clear  ENMT: No tonsillar erythema, exudates, or enlargement; Moist mucous membranes, Good dentition, No lesions  Cardiovascular: Normal S1 S2, No JVD, No murmurs, No edema  Respiratory: Lungs clear to auscultation	  Gastrointestinal:  Soft, Non-tender, + BS	  Extremities: Normal range of motion, No clubbing, cyanosis or edema  LYMPH: No lymphadenopathy noted  NERVOUS SYSTEM:  Alert & Oriented X3, Good concentration; Motor Strength 5/5 B/L upper and lower extremities; DTRs 2+ intact and symmetric                                    14.5   10.91 )-----------( 209      ( 20 Apr 2024 05:10 )             43.5     04-20    144  |  109<H>  |  15  ----------------------------<  91  4.0   |  21<L>  |  1.08    Ca    8.5      20 Apr 2024 05:10  Phos  2.1     04-20  Mg     1.80     04-20      proBNP:   Lipid Profile:   HgA1c:   TSH:     Consultant(s) Notes Reviewed:  [x ] YES  [ ] NO    Care Discussed with Consultants/Other Providers [ x] YES  [ ] NO    Imaging Personally Reviewed independently:  [x] YES  [ ] NO    All labs, radiologic studies, vitals, orders and medications list reviewed. Patient is seen and examined at bedside. Case discussed with medical team.

## 2024-04-21 NOTE — PROGRESS NOTE ADULT - SUBJECTIVE AND OBJECTIVE BOX
Name of Patient : MANUEL PETERSON  MRN: [id number]  Date of visit: 04-21-24     Subjective: Patient seen and examined. No new events except as noted.     REVIEW OF SYSTEMS:    CONSTITUTIONAL: No weakness, fevers or chills  EYES/ENT: No visual changes;  No vertigo or throat pain   NECK: No pain or stiffness  RESPIRATORY: No cough, wheezing, hemoptysis; No shortness of breath  CARDIOVASCULAR: No chest pain or palpitations  GASTROINTESTINAL: No abdominal or epigastric pain. No nausea, vomiting, or hematemesis; No diarrhea or constipation. No melena or hematochezia.  GENITOURINARY: No dysuria, frequency or hematuria  NEUROLOGICAL: No numbness or weakness  SKIN: No itching, burning, rashes, or lesions   All other review of systems is negative unless indicated above.    MEDICATIONS:  MEDICATIONS  (STANDING):  amLODIPine   Tablet 5 milliGRAM(s) Oral daily  aspirin enteric coated 81 milliGRAM(s) Oral daily  atorvastatin 40 milliGRAM(s) Oral at bedtime  ertapenem  IVPB 1000 milliGRAM(s) IV Intermittent every 24 hours  gabapentin 300 milliGRAM(s) Oral at bedtime  heparin   Injectable 5000 Unit(s) SubCutaneous every 8 hours  influenza  Vaccine (HIGH DOSE) 0.7 milliLiter(s) IntraMuscular once  levETIRAcetam  milliGRAM(s) Oral at bedtime  metoprolol tartrate 25 milliGRAM(s) Oral at bedtime  pantoprazole    Tablet 40 milliGRAM(s) Oral before breakfast  pregabalin 150 milliGRAM(s) Oral daily  sodium chloride 0.9%. 1000 milliLiter(s) (65 mL/Hr) IV Continuous <Continuous>  tamsulosin 0.4 milliGRAM(s) Oral at bedtime      PHYSICAL EXAM:  T(C): 36.7 (04-21-24 @ 05:45), Max: 36.7 (04-21-24 @ 05:45)  HR: 61 (04-21-24 @ 05:45) (61 - 64)  BP: 123/68 (04-21-24 @ 05:45) (123/68 - 127/63)  RR: 18 (04-21-24 @ 05:45) (18 - 18)  SpO2: 98% (04-21-24 @ 05:45) (98% - 100%)  Wt(kg): --  I&O's Summary    20 Apr 2024 07:01  -  21 Apr 2024 07:00  --------------------------------------------------------  IN: 0 mL / OUT: 2700 mL / NET: -2700 mL    21 Apr 2024 07:01  -  21 Apr 2024 18:41  --------------------------------------------------------  IN: 0 mL / OUT: 475 mL / NET: -475 mL          Appearance: Normal	  HEENT:  PERRLA   Lymphatic: No lymphadenopathy   Cardiovascular: Normal S1 S2, no JVD  Respiratory: normal effort , clear  Gastrointestinal:  Soft, Non-tender  Skin: No rashes,  warm to touch  Psychiatry:  Mood & affect appropriate  Musculuskeletal: No edema    recent labs, Imaging and EKGs personally reviewed     04-20-24 @ 07:01  -  04-21-24 @ 07:00  --------------------------------------------------------  IN: 0 mL / OUT: 2700 mL / NET: -2700 mL    04-21-24 @ 07:01  -  04-21-24 @ 18:41  --------------------------------------------------------  IN: 0 mL / OUT: 475 mL / NET: -475 mL                          14.5   10.91 )-----------( 209      ( 20 Apr 2024 05:10 )             43.5               04-20    144  |  109<H>  |  15  ----------------------------<  91  4.0   |  21<L>  |  1.08    Ca    8.5      20 Apr 2024 05:10  Phos  2.1     04-20  Mg     1.80     04-20                         Urinalysis Basic - ( 20 Apr 2024 05:10 )    Color: x / Appearance: x / SG: x / pH: x  Gluc: 91 mg/dL / Ketone: x  / Bili: x / Urobili: x   Blood: x / Protein: x / Nitrite: x   Leuk Esterase: x / RBC: x / WBC x   Sq Epi: x / Non Sq Epi: x / Bacteria: x

## 2024-04-21 NOTE — PROGRESS NOTE ADULT - ASSESSMENT
The patient is a 67 y/o Male with past medical history of chronic back pain secondary to spinal stenosis, HTN, HLD, seizure disorder on Keppra, CAD s/p stent, recurrent infected kidney stones and UTIs, presenting to the ED for evaluation of altered mental status for 1 day. Is currently finishing 2 week course of cefdinir for UTI. Wife notes today patient became altered, was repeating himself and occasionally not giving appropriate responses to questions. Patient endorsing R flank pain, denies any fever, chills, chest pain, dyspnea, nausea, vomiting, or any other symptoms.      # SIRS  with AMS, resolved now  improved during exam   likely due to recurrent UTI/Cystitis  Renal stone noted  Pan CX  ertapenem for now till Cx results  IV hydration  pain control   ID eval  Urology follow up appreciated  S/P B/L stent placement  IV ABx as per ID , switch to oral   TOV    #Seizure disorder  resume home keppra dose  fall precautions   PTOT   home baclofen dose     # CAD  S/P Stent  AP and statin  BP control     # HTN/HLD    Lipid panel   statin   BP control       DVT and gI PPX       D/C planing

## 2024-04-22 PROCEDURE — 70450 CT HEAD/BRAIN W/O DYE: CPT | Mod: 26

## 2024-04-22 RX ORDER — LIDOCAINE 4 G/100G
1 CREAM TOPICAL ONCE
Refills: 0 | Status: COMPLETED | OUTPATIENT
Start: 2024-04-22 | End: 2024-04-22

## 2024-04-22 RX ORDER — AMLODIPINE BESYLATE 2.5 MG/1
1 TABLET ORAL
Qty: 30 | Refills: 0
Start: 2024-04-22 | End: 2024-05-21

## 2024-04-22 RX ORDER — MECLIZINE HCL 12.5 MG
12.5 TABLET ORAL EVERY 6 HOURS
Refills: 0 | Status: DISCONTINUED | OUTPATIENT
Start: 2024-04-22 | End: 2024-04-24

## 2024-04-22 RX ORDER — ASPIRIN/CALCIUM CARB/MAGNESIUM 324 MG
1 TABLET ORAL
Qty: 0 | Refills: 0 | DISCHARGE
Start: 2024-04-22

## 2024-04-22 RX ORDER — CEFPODOXIME PROXETIL 100 MG
1 TABLET ORAL
Qty: 11 | Refills: 0
Start: 2024-04-22 | End: 2024-04-27

## 2024-04-22 RX ORDER — OXYCODONE HYDROCHLORIDE 5 MG/1
5 TABLET ORAL ONCE
Refills: 0 | Status: DISCONTINUED | OUTPATIENT
Start: 2024-04-22 | End: 2024-04-22

## 2024-04-22 RX ORDER — PANTOPRAZOLE SODIUM 20 MG/1
1 TABLET, DELAYED RELEASE ORAL
Qty: 30 | Refills: 0
Start: 2024-04-22 | End: 2024-05-21

## 2024-04-22 RX ADMIN — HEPARIN SODIUM 5000 UNIT(S): 5000 INJECTION INTRAVENOUS; SUBCUTANEOUS at 21:24

## 2024-04-22 RX ADMIN — Medication 20 MILLIGRAM(S): at 09:03

## 2024-04-22 RX ADMIN — LIDOCAINE 1 PATCH: 4 CREAM TOPICAL at 04:19

## 2024-04-22 RX ADMIN — Medication 12.5 MILLIGRAM(S): at 18:29

## 2024-04-22 RX ADMIN — PANTOPRAZOLE SODIUM 40 MILLIGRAM(S): 20 TABLET, DELAYED RELEASE ORAL at 05:18

## 2024-04-22 RX ADMIN — Medication 81 MILLIGRAM(S): at 11:26

## 2024-04-22 RX ADMIN — ERTAPENEM SODIUM 120 MILLIGRAM(S): 1 INJECTION, POWDER, LYOPHILIZED, FOR SOLUTION INTRAMUSCULAR; INTRAVENOUS at 05:22

## 2024-04-22 RX ADMIN — TAMSULOSIN HYDROCHLORIDE 0.4 MILLIGRAM(S): 0.4 CAPSULE ORAL at 21:24

## 2024-04-22 RX ADMIN — Medication 20 MILLIGRAM(S): at 20:22

## 2024-04-22 RX ADMIN — GABAPENTIN 300 MILLIGRAM(S): 400 CAPSULE ORAL at 21:24

## 2024-04-22 RX ADMIN — OXYCODONE HYDROCHLORIDE 5 MILLIGRAM(S): 5 TABLET ORAL at 04:19

## 2024-04-22 RX ADMIN — Medication 150 MILLIGRAM(S): at 11:27

## 2024-04-22 RX ADMIN — AMLODIPINE BESYLATE 5 MILLIGRAM(S): 2.5 TABLET ORAL at 05:18

## 2024-04-22 RX ADMIN — HEPARIN SODIUM 5000 UNIT(S): 5000 INJECTION INTRAVENOUS; SUBCUTANEOUS at 13:06

## 2024-04-22 RX ADMIN — HEPARIN SODIUM 5000 UNIT(S): 5000 INJECTION INTRAVENOUS; SUBCUTANEOUS at 05:18

## 2024-04-22 RX ADMIN — ATORVASTATIN CALCIUM 40 MILLIGRAM(S): 80 TABLET, FILM COATED ORAL at 21:24

## 2024-04-22 RX ADMIN — LEVETIRACETAM 750 MILLIGRAM(S): 250 TABLET, FILM COATED ORAL at 21:24

## 2024-04-22 RX ADMIN — Medication 25 MILLIGRAM(S): at 21:24

## 2024-04-22 NOTE — PROGRESS NOTE ADULT - SUBJECTIVE AND OBJECTIVE BOX
Mateusz Beckwith MD  Interventional Cardiology / Advance Heart Failure and Cardiac Transplant Specialist  [place] Office : [address] [place] [address] [place] [address]  Tel:   [place] Office : [address] [facility] [place] [address]  Tel: [phone]       Pt is lying in bed comfortable not in distress, s/p ureteral stenting doing well  	  MEDICATIONS:  amLODIPine   Tablet 5 milliGRAM(s) Oral daily  aspirin enteric coated 81 milliGRAM(s) Oral daily  heparin   Injectable 5000 Unit(s) SubCutaneous every 8 hours  metoprolol tartrate 25 milliGRAM(s) Oral at bedtime    ertapenem  IVPB 1000 milliGRAM(s) IV Intermittent every 24 hours      acetaminophen     Tablet .. 650 milliGRAM(s) Oral every 6 hours PRN  baclofen 20 milliGRAM(s) Oral every 6 hours PRN  gabapentin 300 milliGRAM(s) Oral at bedtime  levETIRAcetam  milliGRAM(s) Oral at bedtime  pregabalin 150 milliGRAM(s) Oral daily    pantoprazole    Tablet 40 milliGRAM(s) Oral before breakfast    atorvastatin 40 milliGRAM(s) Oral at bedtime    influenza  Vaccine (HIGH DOSE) 0.7 milliLiter(s) IntraMuscular once  sodium chloride 0.9%. 1000 milliLiter(s) IV Continuous <Continuous>  tamsulosin 0.4 milliGRAM(s) Oral at bedtime      PAST MEDICAL/SURGICAL HISTORY  PAST MEDICAL & SURGICAL HISTORY:  Myocardial infarction  KAEL x1 MI 2005, stent RCA      Back pain  Chronic back pain      Spinal stenosis      Lumbar herniated disc      Hypertension      Narcotic dependence  5/28/16 for withdrawal ,pt currently on Dialudid 8 mg every 4-6 hours /day      Osteoarthritis      GERD (gastroesophageal reflux disease)      Cerebral aneurysm rupture  1997 clipped, no residual      Femur fracture, right  2/16, surgical revision of right hip      Sacroiliitis, not elsewhere classified  Unspecified Inflammatory spondylopathy,Sacral and sacrococcygeal region      Brain aneurysm  s/p clipping, not compatible with MRI      Seizures      CAD (coronary artery disease)  s/p MI and RCA stenting      Chronic pain  Patient has an Intrathecal pump s/p removal in 2016      Cerebral aneurysm  I997 with clips      S/P lumbar fusion  L1-S1:2002      History of right inguinal hernia repair  x2      Hx of appendectomy  6/1969      Hx of laminectomy  lumbar-2002      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      Stented coronary artery  KAEL x 1 to RCA      History of hip replacement  8/15, revision 2/16 after falling and fracturing right femur      S/P left knee arthroscopy      H/O arthroscopy of shoulder  right X 2, left X 1      History of throat surgery  surgical exicision cyst 1986      Fusion of sacral region of spine  5/2017      History of back surgery  x5      S/P inguinal hernia repair      H/O fracture of femur  s/p repair          SOCIAL HISTORY: Substance Use (street drugs): ( x ) never used  (  ) other:    FAMILY HISTORY:  No pertinent family history in first degree relatives             PHYSICAL EXAM:  T(C): 36.5 (04-22-24 @ 05:10), Max: 36.5 (04-22-24 @ 05:10)  HR: 58 (04-22-24 @ 05:10) (58 - 71)  BP: 111/60 (04-22-24 @ 05:10) (111/60 - 142/68)  RR: 17 (04-22-24 @ 05:10) (16 - 17)  SpO2: 96% (04-22-24 @ 05:10) (95% - 96%)  Wt(kg): --  I&O's Summary    21 Apr 2024 07:01  -  22 Apr 2024 07:00  --------------------------------------------------------  IN: 0 mL / OUT: 475 mL / NET: -475 mL          GENERAL: NAD   EYES: EOMI, PERRLA, conjunctiva and sclera clear  ENMT: No tonsillar erythema, exudates, or enlargement; Moist mucous membranes, Good dentition, No lesions  Cardiovascular: Normal S1 S2, No JVD, No murmurs, No edema  Respiratory: Lungs clear to auscultation	  Gastrointestinal:  Soft, Non-tender, + BS	  Extremities: Normal range of motion, No clubbing, cyanosis or edema  LYMPH: No lymphadenopathy noted  NERVOUS SYSTEM:  Alert & Oriented X3, Good concentration; Motor Strength 5/5 B/L upper and lower extremities; DTRs 2+ intact and symmetric                      proBNP:   Lipid Profile:   HgA1c:   TSH:     Consultant(s) Notes Reviewed:  [x ] YES  [ ] NO    Care Discussed with Consultants/Other Providers [ x] YES  [ ] NO    Imaging Personally Reviewed independently:  [x] YES  [ ] NO    All labs, radiologic studies, vitals, orders and medications list reviewed. Patient is seen and examined at bedside. Case discussed with medical team.

## 2024-04-22 NOTE — PROGRESS NOTE ADULT - ASSESSMENT
The patient is a 69 y/o Male with past medical history of chronic back pain secondary to spinal stenosis, HTN, HLD, seizure disorder on Keppra, CAD s/p stent, recurrent infected kidney stones and UTIs, presenting to the ED for evaluation of altered mental status for 1 day. Is currently finishing 2 week course of cefdinir for UTI. Wife notes today patient became altered, was repeating himself and occasionally not giving appropriate responses to questions. Patient endorsing R flank pain, denies any fever, chills, chest pain, dyspnea, nausea, vomiting, or any other symptoms.      # SIRS  with AMS, resolved now  improved during exam   likely due to recurrent UTI/Cystitis  Renal stone noted  Pan CX  ertapenem for now till Cx results, plan to switch ora meds on dscharge   IV hydration  pain control   ID eval  Urology follow up appreciated  S/P B/L stent placement  IV ABx as per ID , switch to oral   TOV    #Seizure disorder  resume home keppra dose  fall precautions   PTOT   home baclofen dose   SIzziness noted   Chck CT head     # CAD  S/P Stent  AP and statin  BP control     # HTN/HLD    Lipid panel   statin   BP control       DVT and gI PPX       D/C planing

## 2024-04-22 NOTE — PROGRESS NOTE ADULT - SUBJECTIVE AND OBJECTIVE BOX
Name of Patient : MANUEL PETERSON  MRN: [id number]  Date of visit: 04-22-24       Subjective: Patient seen and examined. No new events except as noted.   Dizziness       REVIEW OF SYSTEMS:    CONSTITUTIONAL: No weakness, fevers or chills  EYES/ENT: No visual changes;  No vertigo or throat pain   NECK: No pain or stiffness  RESPIRATORY: No cough, wheezing, hemoptysis; No shortness of breath  CARDIOVASCULAR: No chest pain or palpitations  GASTROINTESTINAL: No abdominal or epigastric pain. No nausea, vomiting, or hematemesis; No diarrhea or constipation. No melena or hematochezia.  GENITOURINARY: No dysuria, frequency or hematuria  NEUROLOGICAL: DIzziness  SKIN: No itching, burning, rashes, or lesions   All other review of systems is negative unless indicated above.    MEDICATIONS:  MEDICATIONS  (STANDING):  amLODIPine   Tablet 5 milliGRAM(s) Oral daily  aspirin enteric coated 81 milliGRAM(s) Oral daily  atorvastatin 40 milliGRAM(s) Oral at bedtime  ertapenem  IVPB 1000 milliGRAM(s) IV Intermittent every 24 hours  gabapentin 300 milliGRAM(s) Oral at bedtime  heparin   Injectable 5000 Unit(s) SubCutaneous every 8 hours  influenza  Vaccine (HIGH DOSE) 0.7 milliLiter(s) IntraMuscular once  levETIRAcetam  milliGRAM(s) Oral at bedtime  metoprolol tartrate 25 milliGRAM(s) Oral at bedtime  pantoprazole    Tablet 40 milliGRAM(s) Oral before breakfast  pregabalin 150 milliGRAM(s) Oral daily  sodium chloride 0.9%. 1000 milliLiter(s) (65 mL/Hr) IV Continuous <Continuous>  tamsulosin 0.4 milliGRAM(s) Oral at bedtime      PHYSICAL EXAM:  T(C): 36.7 (04-22-24 @ 13:10), Max: 36.7 (04-22-24 @ 13:10)  HR: 61 (04-22-24 @ 13:10) (58 - 62)  BP: 127/59 (04-22-24 @ 13:10) (111/60 - 127/59)  RR: 18 (04-22-24 @ 13:10) (17 - 18)  SpO2: 95% (04-22-24 @ 13:10) (95% - 96%)  Wt(kg): --  I&O's Summary    21 Apr 2024 07:01  -  22 Apr 2024 07:00  --------------------------------------------------------  IN: 0 mL / OUT: 475 mL / NET: -475 mL          Appearance: Normal	  HEENT:  PERRLA   Lymphatic: No lymphadenopathy   Cardiovascular: Normal S1 S2, no JVD  Respiratory: normal effort , clear  Gastrointestinal:  Soft, Non-tender  Skin: No rashes,  warm to touch  Psychiatry:  Mood & affect appropriate  Musculuskeletal: No edema    recent labs, Imaging and EKGs personally reviewed       04-21-24 @ 07:01  -  04-22-24 @ 07:00  --------------------------------------------------------  IN: 0 mL / OUT: 475 mL / NET: -475 mL

## 2024-04-23 RX ORDER — CHLORHEXIDINE GLUCONATE 213 G/1000ML
1 SOLUTION TOPICAL
Refills: 0 | Status: DISCONTINUED | OUTPATIENT
Start: 2024-04-23 | End: 2024-04-24

## 2024-04-23 RX ORDER — SODIUM CHLORIDE 9 MG/ML
1000 INJECTION INTRAMUSCULAR; INTRAVENOUS; SUBCUTANEOUS
Refills: 0 | Status: DISCONTINUED | OUTPATIENT
Start: 2024-04-23 | End: 2024-04-24

## 2024-04-23 RX ADMIN — Medication 20 MILLIGRAM(S): at 11:29

## 2024-04-23 RX ADMIN — HEPARIN SODIUM 5000 UNIT(S): 5000 INJECTION INTRAVENOUS; SUBCUTANEOUS at 13:24

## 2024-04-23 RX ADMIN — Medication 81 MILLIGRAM(S): at 11:30

## 2024-04-23 RX ADMIN — HEPARIN SODIUM 5000 UNIT(S): 5000 INJECTION INTRAVENOUS; SUBCUTANEOUS at 05:23

## 2024-04-23 RX ADMIN — HEPARIN SODIUM 5000 UNIT(S): 5000 INJECTION INTRAVENOUS; SUBCUTANEOUS at 21:27

## 2024-04-23 RX ADMIN — LEVETIRACETAM 750 MILLIGRAM(S): 250 TABLET, FILM COATED ORAL at 21:27

## 2024-04-23 RX ADMIN — ATORVASTATIN CALCIUM 40 MILLIGRAM(S): 80 TABLET, FILM COATED ORAL at 21:27

## 2024-04-23 RX ADMIN — GABAPENTIN 300 MILLIGRAM(S): 400 CAPSULE ORAL at 21:27

## 2024-04-23 RX ADMIN — AMLODIPINE BESYLATE 5 MILLIGRAM(S): 2.5 TABLET ORAL at 05:23

## 2024-04-23 RX ADMIN — Medication 150 MILLIGRAM(S): at 11:47

## 2024-04-23 RX ADMIN — Medication 25 MILLIGRAM(S): at 21:27

## 2024-04-23 RX ADMIN — PANTOPRAZOLE SODIUM 40 MILLIGRAM(S): 20 TABLET, DELAYED RELEASE ORAL at 06:34

## 2024-04-23 RX ADMIN — TAMSULOSIN HYDROCHLORIDE 0.4 MILLIGRAM(S): 0.4 CAPSULE ORAL at 21:27

## 2024-04-23 RX ADMIN — SODIUM CHLORIDE 75 MILLILITER(S): 9 INJECTION INTRAMUSCULAR; INTRAVENOUS; SUBCUTANEOUS at 19:00

## 2024-04-23 RX ADMIN — ERTAPENEM SODIUM 120 MILLIGRAM(S): 1 INJECTION, POWDER, LYOPHILIZED, FOR SOLUTION INTRAMUSCULAR; INTRAVENOUS at 05:23

## 2024-04-23 NOTE — PROGRESS NOTE ADULT - SUBJECTIVE AND OBJECTIVE BOX
Name of Patient : MANUEL PETERSON  MRN: [id number]  Date of visit: 04-23-24       Subjective: Patient seen and examined. No new events except as noted.   Dizziness    REVIEW OF SYSTEMS:    CONSTITUTIONAL: No weakness, fevers or chills  EYES/ENT: No visual changes;  No vertigo or throat pain   NECK: No pain or stiffness  RESPIRATORY: No cough, wheezing, hemoptysis; No shortness of breath  CARDIOVASCULAR: No chest pain or palpitations  GASTROINTESTINAL: No abdominal or epigastric pain. No nausea, vomiting, or hematemesis; No diarrhea or constipation. No melena or hematochezia.  GENITOURINARY: No dysuria, frequency or hematuria  NEUROLOGICAL: + dizziness  SKIN: No itching, burning, rashes, or lesions   All other review of systems is negative unless indicated above.    MEDICATIONS:  MEDICATIONS  (STANDING):  amLODIPine   Tablet 5 milliGRAM(s) Oral daily  aspirin enteric coated 81 milliGRAM(s) Oral daily  atorvastatin 40 milliGRAM(s) Oral at bedtime  chlorhexidine 2% Cloths 1 Application(s) Topical <User Schedule>  ertapenem  IVPB 1000 milliGRAM(s) IV Intermittent every 24 hours  gabapentin 300 milliGRAM(s) Oral at bedtime  heparin   Injectable 5000 Unit(s) SubCutaneous every 8 hours  influenza  Vaccine (HIGH DOSE) 0.7 milliLiter(s) IntraMuscular once  levETIRAcetam  milliGRAM(s) Oral at bedtime  metoprolol tartrate 25 milliGRAM(s) Oral at bedtime  pantoprazole    Tablet 40 milliGRAM(s) Oral before breakfast  pregabalin 150 milliGRAM(s) Oral daily  sodium chloride 0.9%. 1000 milliLiter(s) (65 mL/Hr) IV Continuous <Continuous>  sodium chloride 0.9%. 1000 milliLiter(s) (75 mL/Hr) IV Continuous <Continuous>  tamsulosin 0.4 milliGRAM(s) Oral at bedtime      PHYSICAL EXAM:  T(C): 36.6 (04-23-24 @ 12:00), Max: 36.6 (04-23-24 @ 05:22)  HR: 58 (04-23-24 @ 12:00) (58 - 66)  BP: 116/66 (04-23-24 @ 12:00) (116/66 - 134/75)  RR: 18 (04-23-24 @ 12:00) (18 - 18)  SpO2: 95% (04-23-24 @ 12:00) (95% - 95%)  Wt(kg): --  I&O's Summary        Appearance: Normal	  HEENT:  PERRLA   Lymphatic: No lymphadenopathy   Cardiovascular: Normal S1 S2, no JVD  Respiratory: normal effort , clear  Gastrointestinal:  Soft, Non-tender  Skin: No rashes,  warm to touch  Psychiatry:  Mood & affect appropriate  Musculuskeletal: No edema    recent labs, Imaging and EKGs personally reviewed

## 2024-04-23 NOTE — PROGRESS NOTE ADULT - ASSESSMENT
The patient is a 69 y/o Male with past medical history of chronic back pain secondary to spinal stenosis, HTN, HLD, seizure disorder on Keppra, CAD s/p stent, recurrent infected kidney stones and UTIs, presenting to the ED for evaluation of altered mental status for 1 day. Is currently finishing 2 week course of cefdinir for UTI. Wife notes today patient became altered, was repeating himself and occasionally not giving appropriate responses to questions. Patient endorsing R flank pain, denies any fever, chills, chest pain, dyspnea, nausea, vomiting, or any other symptoms.      # SIRS  with AMS, resolved now  improved during exam   likely due to recurrent UTI/Cystitis  Renal stone noted  Pan CX  ertapenem for now till Cx results, plan to switch ora meds on dscharge   IV hydration  pain control   ID eval  Urology follow up appreciated  S/P B/L stent placement  IV ABx as per ID , switch to oral   TOV    #Seizure disorder  resume home keppra dose  fall precautions   PTOT   home baclofen dose   SIzziness noted   Chck CT head , negative  IV hydration     # CAD  S/P Stent  AP and statin  BP control     # HTN/HLD    Lipid panel   statin   BP control       DVT and gI PPX       D/C planing

## 2024-04-24 ENCOUNTER — TRANSCRIPTION ENCOUNTER (OUTPATIENT)
Age: 69
End: 2024-04-24

## 2024-04-24 VITALS
DIASTOLIC BLOOD PRESSURE: 72 MMHG | RESPIRATION RATE: 16 BRPM | SYSTOLIC BLOOD PRESSURE: 138 MMHG | TEMPERATURE: 98 F | OXYGEN SATURATION: 100 % | HEART RATE: 68 BPM

## 2024-04-24 LAB
ALBUMIN SERPL ELPH-MCNC: 3 G/DL — LOW (ref 3.3–5)
ALP SERPL-CCNC: 98 U/L — SIGNIFICANT CHANGE UP (ref 40–120)
ALT FLD-CCNC: 18 U/L — SIGNIFICANT CHANGE UP (ref 4–41)
ANION GAP SERPL CALC-SCNC: 18 MMOL/L — HIGH (ref 7–14)
AST SERPL-CCNC: 27 U/L — SIGNIFICANT CHANGE UP (ref 4–40)
BILIRUB SERPL-MCNC: 0.5 MG/DL — SIGNIFICANT CHANGE UP (ref 0.2–1.2)
BUN SERPL-MCNC: 21 MG/DL — SIGNIFICANT CHANGE UP (ref 7–23)
CALCIUM SERPL-MCNC: 8.7 MG/DL — SIGNIFICANT CHANGE UP (ref 8.4–10.5)
CHLORIDE SERPL-SCNC: 109 MMOL/L — HIGH (ref 98–107)
CO2 SERPL-SCNC: 13 MMOL/L — LOW (ref 22–31)
CREAT SERPL-MCNC: 1.03 MG/DL — SIGNIFICANT CHANGE UP (ref 0.5–1.3)
EGFR: 79 ML/MIN/1.73M2 — SIGNIFICANT CHANGE UP
GLUCOSE SERPL-MCNC: 101 MG/DL — HIGH (ref 70–99)
HCT VFR BLD CALC: 46.7 % — SIGNIFICANT CHANGE UP (ref 39–50)
HGB BLD-MCNC: 15.5 G/DL — SIGNIFICANT CHANGE UP (ref 13–17)
MCHC RBC-ENTMCNC: 30.5 PG — SIGNIFICANT CHANGE UP (ref 27–34)
MCHC RBC-ENTMCNC: 33.2 GM/DL — SIGNIFICANT CHANGE UP (ref 32–36)
MCV RBC AUTO: 91.9 FL — SIGNIFICANT CHANGE UP (ref 80–100)
MRSA PCR RESULT.: SIGNIFICANT CHANGE UP
NRBC # BLD: 0 /100 WBCS — SIGNIFICANT CHANGE UP (ref 0–0)
NRBC # FLD: 0 K/UL — SIGNIFICANT CHANGE UP (ref 0–0)
PLATELET # BLD AUTO: 240 K/UL — SIGNIFICANT CHANGE UP (ref 150–400)
POTASSIUM SERPL-MCNC: 5 MMOL/L — SIGNIFICANT CHANGE UP (ref 3.5–5.3)
POTASSIUM SERPL-SCNC: 5 MMOL/L — SIGNIFICANT CHANGE UP (ref 3.5–5.3)
PROT SERPL-MCNC: 6.4 G/DL — SIGNIFICANT CHANGE UP (ref 6–8.3)
RBC # BLD: 5.08 M/UL — SIGNIFICANT CHANGE UP (ref 4.2–5.8)
RBC # FLD: 15 % — HIGH (ref 10.3–14.5)
S AUREUS DNA NOSE QL NAA+PROBE: SIGNIFICANT CHANGE UP
SODIUM SERPL-SCNC: 140 MMOL/L — SIGNIFICANT CHANGE UP (ref 135–145)
WBC # BLD: 12.58 K/UL — HIGH (ref 3.8–10.5)
WBC # FLD AUTO: 12.58 K/UL — HIGH (ref 3.8–10.5)

## 2024-04-24 RX ORDER — MECLIZINE HCL 12.5 MG
1 TABLET ORAL
Qty: 0 | Refills: 0 | DISCHARGE
Start: 2024-04-24

## 2024-04-24 RX ORDER — MECLIZINE HCL 12.5 MG
1 TABLET ORAL
Qty: 28 | Refills: 0
Start: 2024-04-24 | End: 2024-04-30

## 2024-04-24 RX ADMIN — Medication 81 MILLIGRAM(S): at 13:15

## 2024-04-24 RX ADMIN — HEPARIN SODIUM 5000 UNIT(S): 5000 INJECTION INTRAVENOUS; SUBCUTANEOUS at 13:49

## 2024-04-24 RX ADMIN — CHLORHEXIDINE GLUCONATE 1 APPLICATION(S): 213 SOLUTION TOPICAL at 06:32

## 2024-04-24 RX ADMIN — Medication 150 MILLIGRAM(S): at 13:20

## 2024-04-24 RX ADMIN — ERTAPENEM SODIUM 120 MILLIGRAM(S): 1 INJECTION, POWDER, LYOPHILIZED, FOR SOLUTION INTRAMUSCULAR; INTRAVENOUS at 06:15

## 2024-04-24 RX ADMIN — Medication 20 MILLIGRAM(S): at 03:19

## 2024-04-24 RX ADMIN — HEPARIN SODIUM 5000 UNIT(S): 5000 INJECTION INTRAVENOUS; SUBCUTANEOUS at 06:06

## 2024-04-24 RX ADMIN — PANTOPRAZOLE SODIUM 40 MILLIGRAM(S): 20 TABLET, DELAYED RELEASE ORAL at 06:07

## 2024-04-24 RX ADMIN — AMLODIPINE BESYLATE 5 MILLIGRAM(S): 2.5 TABLET ORAL at 06:07

## 2024-04-24 RX ADMIN — Medication 20 MILLIGRAM(S): at 09:26

## 2024-04-24 NOTE — PROGRESS NOTE ADULT - SUBJECTIVE AND OBJECTIVE BOX
Name of Patient : MANUEL PETERSON  MRN: [id number]  Date of visit: 04-24-24       Subjective: Patient seen and examined. No new events except as noted.     REVIEW OF SYSTEMS:    CONSTITUTIONAL: No weakness, fevers or chills  EYES/ENT: No visual changes;  No vertigo or throat pain   NECK: No pain or stiffness  RESPIRATORY: No cough, wheezing, hemoptysis; No shortness of breath  CARDIOVASCULAR: No chest pain or palpitations  GASTROINTESTINAL: No abdominal or epigastric pain. No nausea, vomiting, or hematemesis; No diarrhea or constipation. No melena or hematochezia.  GENITOURINARY: No dysuria, frequency or hematuria  NEUROLOGICAL: No numbness or weakness  SKIN: No itching, burning, rashes, or lesions   All other review of systems is negative unless indicated above.    MEDICATIONS:  MEDICATIONS  (STANDING):  amLODIPine   Tablet 5 milliGRAM(s) Oral daily  aspirin enteric coated 81 milliGRAM(s) Oral daily  atorvastatin 40 milliGRAM(s) Oral at bedtime  chlorhexidine 2% Cloths 1 Application(s) Topical <User Schedule>  gabapentin 300 milliGRAM(s) Oral at bedtime  heparin   Injectable 5000 Unit(s) SubCutaneous every 8 hours  influenza  Vaccine (HIGH DOSE) 0.7 milliLiter(s) IntraMuscular once  levETIRAcetam  milliGRAM(s) Oral at bedtime  metoprolol tartrate 25 milliGRAM(s) Oral at bedtime  pantoprazole    Tablet 40 milliGRAM(s) Oral before breakfast  pregabalin 150 milliGRAM(s) Oral daily  sodium chloride 0.9%. 1000 milliLiter(s) (65 mL/Hr) IV Continuous <Continuous>  sodium chloride 0.9%. 1000 milliLiter(s) (75 mL/Hr) IV Continuous <Continuous>  tamsulosin 0.4 milliGRAM(s) Oral at bedtime      PHYSICAL EXAM:  T(C): 36.7 (04-24-24 @ 13:00), Max: 36.7 (04-23-24 @ 21:27)  HR: 68 (04-24-24 @ 13:00) (65 - 68)  BP: 138/72 (04-24-24 @ 13:00) (129/67 - 138/72)  RR: 16 (04-24-24 @ 13:00) (16 - 18)  SpO2: 100% (04-24-24 @ 13:00) (92% - 100%)  Wt(kg): --  I&O's Summary    24 Apr 2024 07:01  -  24 Apr 2024 18:43  --------------------------------------------------------  IN: 0 mL / OUT: 700 mL / NET: -700 mL          Appearance: Normal	  HEENT:  PERRLA   Lymphatic: No lymphadenopathy   Cardiovascular: Normal S1 S2, no JVD  Respiratory: normal effort , clear  Gastrointestinal:  Soft, Non-tender  Skin: No rashes,  warm to touch  Psychiatry:  Mood & affect appropriate  Musculuskeletal: No edema    recent labs, Imaging and EKGs personally reviewed     04-24-24 @ 07:01  -  04-24-24 @ 18:43  --------------------------------------------------------  IN: 0 mL / OUT: 700 mL / NET: -700 mL

## 2024-04-24 NOTE — DISCHARGE NOTE NURSING/CASE MANAGEMENT/SOCIAL WORK - NSDCPEFALRISK_GEN_ALL_CORE
For information on Fall & Injury Prevention, visit: [email or web address] OR  [email or web address] OR  https://www.cdc.gov/steadi/patient.html

## 2024-04-24 NOTE — PROGRESS NOTE ADULT - ASSESSMENT
The patient is a 67 y/o Male with past medical history of chronic back pain secondary to spinal stenosis, HTN, HLD, seizure disorder on Keppra, CAD s/p stent, recurrent infected kidney stones and UTIs, presenting to the ED for evaluation of altered mental status for 1 day. Is currently finishing 2 week course of cefdinir for UTI. Wife notes today patient became altered, was repeating himself and occasionally not giving appropriate responses to questions. Patient endorsing R flank pain, denies any fever, chills, chest pain, dyspnea, nausea, vomiting, or any other symptoms.      # SIRS  with AMS, resolved now  improved during exam   likely due to recurrent UTI/Cystitis  Renal stone noted  Pan CX  ertapenem for now till Cx results, plan to switch ora meds on dscharge   IV hydration  pain control   ID eval  Urology follow up appreciated  S/P B/L stent placement  IV ABx as per ID , switch to oral   TOV    #Seizure disorder  resume home keppra dose  fall precautions   PTOT   home baclofen dose   SIzziness noted   Chck CT head , negative  IV hydration     # CAD  S/P Stent  AP and statin  BP control     # HTN/HLD    Lipid panel   statin   BP control       DVT and gI PPX       D/C planing

## 2024-04-24 NOTE — DIETITIAN INITIAL EVALUATION ADULT - OTHER INFO
Pt 67 yo male with PMHx of HTN, HLD, CAD s/p stent, seizure disorder, chronic back pain 2/2 spinal stenosis, recurrent infected kidney stones and UTIs, presented with AMS - per chart review.     At time of visit, Pt awake, alert. Per Pt, his appetite usually good; no chewing or swallowing difficulty; no vomiting or diarrhea @ this time. Per Pt, his height: 68" & his body weight: 215#; no report of weight loss or weight changes PTA.     Of note, Pt's HbA1c level 6.1% (4/18); Pt with dyslipidemia (4/18) -> triglycerides 202 H, HDL 37 L. Rec to add Consistent Carbohydrate, DASH/TLC (cholesterol and sodium restricted) therapeutic restrictions to diet rx. Better food options discussed with Pt. No other food related concern voiced at present. RD remains available, Pt made aware.

## 2024-04-24 NOTE — DIETITIAN INITIAL EVALUATION ADULT - ADD RECOMMEND
1. Encourage & assist Pt with meals; Monitor PO diet tolerance;  2. Add Multivitamins 1 tab daily for micronutrient coverage;   3. Monitor labs, hydration status;    4. Pt to follow up with appropriate RDN upon discharge for the purposes of long-term nutrition evaluation and diet education.

## 2024-04-24 NOTE — DIETITIAN INITIAL EVALUATION ADULT - PERTINENT LABORATORY DATA
(4/20) WBC 10.91 H, phosphorus 2.1 L, Cl 109 H   (4/18) Albumin 3.8, Triglycerides 202 H, HDL 37 L     A1C with Estimated Average Glucose Result: 6.1 % (04-18-24 @ 06:13)

## 2024-04-24 NOTE — PROGRESS NOTE ADULT - PROVIDER SPECIALTY LIST ADULT
Anesthesia
Cardiology
Infectious Disease
Internal Medicine
Cardiology
Cardiology
Infectious Disease
Internal Medicine
Internal Medicine
Cardiology
Internal Medicine
Internal Medicine
Urology

## 2024-04-24 NOTE — DISCHARGE NOTE NURSING/CASE MANAGEMENT/SOCIAL WORK - PATIENT PORTAL LINK FT
You can access the FollowMyHealth Patient Portal offered by  by registering at the following website: [email or web address] By joining [facility]’s FollowMyHealth portal, you will also be able to view your health information using other applications (apps) compatible with our system.
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
## 2024-05-07 NOTE — DISCHARGE NOTE NURSING/CASE MANAGEMENT/SOCIAL WORK - NSDCPEFALRISK_GEN_ALL_CORE
Detail Level: Detailed For information on Fall & Injury Prevention, visit: [email or web address] OR  [email or web address] OR  https://www.cdc.gov/steadi/patient.html Depth Of Biopsy: dermis Was A Bandage Applied: Yes Size Of Lesion In Cm: 0.8 X Size Of Lesion In Cm: 0.6 Biopsy Type: H and E Biopsy Method: Personna blade Anesthesia Type: 1% lidocaine with epinephrine Anesthesia Volume In Cc: 0.5 Additional Anesthesia Volume In Cc (Will Not Render If 0): 0 Hemostasis: Drysol Wound Care: Petrolatum Dressing: bandage Destruction After The Procedure: No Type Of Destruction Used: Curettage Curettage Text: The wound bed was treated with curettage after the biopsy was performed. Cryotherapy Text: The wound bed was treated with cryotherapy after the biopsy was performed. Electrodesiccation Text: The wound bed was treated with electrodesiccation after the biopsy was performed. Electrodesiccation And Curettage Text: The wound bed was treated with electrodesiccation and curettage after the biopsy was performed. Silver Nitrate Text: The wound bed was treated with silver nitrate after the biopsy was performed. Lab: -[id number] Lab Facility: [id number] Consent: Verbal consent was obtained and risks were reviewed including but not limited to scarring, infection, bleeding, scabbing, incomplete removal, nerve damage and allergy to anesthesia. Post-Care Instructions: I reviewed with the patient in detail post-care instructions. Patient is to keep the biopsy site dry overnight, and then apply vaseline twice daily until healed. Notification Instructions: Patient will be notified of biopsy results. However, patient instructed to call the office if not contacted within 2 weeks. Billing Type: Third-Party Bill Information: Selecting Yes will display possible errors in your note based on the variables you have selected. This validation is only offered as a suggestion for you. PLEASE NOTE THAT THE VALIDATION TEXT WILL BE REMOVED WHEN YOU FINALIZE YOUR NOTE. IF YOU WANT TO FAX A PRELIMINARY NOTE YOU WILL NEED TO TOGGLE THIS TO 'NO' IF YOU DO NOT WANT IT IN YOUR FAXED NOTE.

## 2024-05-16 ENCOUNTER — APPOINTMENT (OUTPATIENT)
Dept: UROLOGY | Facility: CLINIC | Age: 69
End: 2024-05-16
Payer: MEDICARE

## 2024-05-16 VITALS
TEMPERATURE: 98.4 F | SYSTOLIC BLOOD PRESSURE: 150 MMHG | HEART RATE: 67 BPM | OXYGEN SATURATION: 92 % | DIASTOLIC BLOOD PRESSURE: 89 MMHG

## 2024-05-16 PROCEDURE — 99214 OFFICE O/P EST MOD 30 MIN: CPT

## 2024-05-16 NOTE — ASSESSMENT
[FreeTextEntry1] : I had long discussion with patient about their stones, and about options, risks, and benefits of all treatments.  Main options for definitive stone treatment include ESWL, URS, PCNL.    ESWL success best with smaller, less dense stones, and with short skin to stone distance and favorable location of the stone within the urinary tract, while URS is more successful treatment with multiple stones, more dense stones, or challenging body habitus or stone location.  PCNL is best option for larger, more dense and complex stones, and particularly those involving the lower pole.    Risks of nontreatment with obstruction can lead to very high rate of renal function loss in stone-bearing kidney over the next months to years.  In this patient's case, I recommend...right PCNL, left URS with laser in same session  Risks/benefits/success/recovery expectations all reviewed at length, particularly with respect to patient's comorbidities, and inclusive of infection/sepsis, bleeding, need for secondary procedures or secondary stages such as embolization or open surgery, and even risks of death due to acute issues superimposed on comorbidities.  Pt prefers to undergo: right PCNL, left URS with laser  Will schedule.  Urine culture, PST appt to schedule once surgery scheduled.

## 2024-05-16 NOTE — PHYSICAL EXAM
[Normal Appearance] : normal appearance [Well Groomed] : well groomed [General Appearance - In No Acute Distress] : no acute distress [Edema] : no peripheral edema [Respiration, Rhythm And Depth] : normal respiratory rhythm and effort [Exaggerated Use Of Accessory Muscles For Inspiration] : no accessory muscle use [] : no rash [No Focal Deficits] : no focal deficits [Oriented To Time, Place, And Person] : oriented to person, place, and time [Affect] : the affect was normal [Mood] : the mood was normal [de-identified] : wheelchair assist

## 2024-05-16 NOTE — HISTORY OF PRESENT ILLNESS
[FreeTextEntry1] : pt is 67 yo male with h/o bilateral renal calculi, UTI with risks sepsis. S/p left stent and left URS for stone 6/2023. More recently, had admit 4/2024 and underwent bilat stent to relieve obstruction.  CT reviewed: right staghorn renal stone. Mean density ~ 710 HU, peak 800 HU. 7 mm left renal pelvis stone.

## 2024-05-17 LAB
APPEARANCE: ABNORMAL
BACTERIA: ABNORMAL /HPF
BILIRUBIN URINE: NEGATIVE
BLOOD URINE: ABNORMAL
CAST: 2 /LPF
COLOR: YELLOW
EPITHELIAL CELLS: 1 /HPF
GLUCOSE QUALITATIVE U: NEGATIVE MG/DL
KETONES URINE: NEGATIVE MG/DL
LEUKOCYTE ESTERASE URINE: ABNORMAL
MICROSCOPIC-UA: NORMAL
NITRITE URINE: NEGATIVE
PH URINE: 6
PROTEIN URINE: 100 MG/DL
RED BLOOD CELLS URINE: 145 /HPF
SPECIFIC GRAVITY URINE: 1.02
UROBILINOGEN URINE: 0.2 MG/DL
WHITE BLOOD CELLS URINE: 702 /HPF

## 2024-05-20 LAB — BACTERIA UR CULT: ABNORMAL

## 2024-05-21 DIAGNOSIS — N39.0 URINARY TRACT INFECTION, SITE NOT SPECIFIED: ICD-10-CM

## 2024-05-21 DIAGNOSIS — N20.1 CALCULUS OF URETER: ICD-10-CM

## 2024-05-24 ENCOUNTER — APPOINTMENT (OUTPATIENT)
Dept: INFECTIOUS DISEASE | Facility: CLINIC | Age: 69
End: 2024-05-24
Payer: MEDICARE

## 2024-05-24 VITALS
DIASTOLIC BLOOD PRESSURE: 78 MMHG | OXYGEN SATURATION: 93 % | SYSTOLIC BLOOD PRESSURE: 146 MMHG | HEIGHT: 68 IN | HEART RATE: 75 BPM | TEMPERATURE: 97.8 F

## 2024-05-24 DIAGNOSIS — R82.71 BACTERIURIA: ICD-10-CM

## 2024-05-24 DIAGNOSIS — N20.0 CALCULUS OF KIDNEY: ICD-10-CM

## 2024-05-24 DIAGNOSIS — Z79.899 OTHER LONG TERM (CURRENT) DRUG THERAPY: ICD-10-CM

## 2024-05-24 DIAGNOSIS — A49.8 OTHER BACTERIAL INFECTIONS OF UNSPECIFIED SITE: ICD-10-CM

## 2024-05-24 PROCEDURE — 99214 OFFICE O/P EST MOD 30 MIN: CPT

## 2024-05-24 NOTE — REVIEW OF SYSTEMS
[Negative] : Heme/Lymph [Fever] : no fever [Chills] : no chills [Chest Pain] : no chest pain [Lower Ext Edema] : no extremity edema [Shortness Of Breath] : no shortness of breath [Wheezing] : no wheezing [Cough] : no cough [Abdominal Pain] : no abdominal pain [Diarrhea] : no diarrhea [Dysuria] : no dysuria [Joint Pain] : no joint pain [Confused] : no confusion

## 2024-05-24 NOTE — HISTORY OF PRESENT ILLNESS
[FreeTextEntry1] : 68-yo M w/ PMH of CAD s/p stent, seizure d/o on Keppra, HTN, and recent admission at [facility] (4/17-24) for UTI, found to have b/l nephrolithiasis including 2.5 cm staghorn calculus and 5 mm distal L ureteral stone. UCx and BCx neg during the hospitalization. S/p ertapenem followed by cefpodoxime for a 10-d course. Outpatient Uro f/u done - repeat UCx w/ Pseudomonas (10-49k CFU/mL, S to cefepime, ceftazidime, and Zosyn), resistant to FQ. Presenting for IV antibiotic initiation. Pre-admit scheduled on EMR 5/30. Surg scheduled for 6/3.  Currently denies fever, chills, dysuria, diarrhea, or confusion. Per patient and wife, patient's seizure history is dubious. Occurred in the past while in rehab w/ unclear explanation of circumstances explained to them.

## 2024-05-24 NOTE — PHYSICAL EXAM
[General Appearance - Alert] : alert [General Appearance - In No Acute Distress] : in no acute distress [General Appearance - Well-Appearing] : healthy appearing [Sclera] : the sclera and conjunctiva were normal [Outer Ear] : the ears and nose were normal in appearance [Hearing Threshold Finger Rub Not [place]] : hearing was normal [Neck Appearance] : the appearance of the neck was normal [Auscultation Breath Sounds / Voice Sounds] : lungs were clear to auscultation bilaterally [Exaggerated Use Of Accessory Muscles For Inspiration] : no accessory muscle use [Heart Rate And Rhythm] : heart rate was normal and rhythm regular [Heart Sounds] : normal S1 and S2 [Heart Sounds Gallop] : no gallops [Murmurs] : no murmurs [Heart Sounds Pericardial Friction Rub] : no pericardial rub [Edema] : there was no peripheral edema [Bowel Sounds] : normal bowel sounds [] : no hepato-splenomegaly [Costovertebral Angle Tenderness] : no CVA tenderness [Cervical Lymph Nodes Enlarged Posterior Bilaterally] : posterior cervical [Cervical Lymph Nodes Enlarged Anterior Bilaterally] : anterior cervical [Nail Clubbing] : no clubbing  or cyanosis of the fingernails [Skin Color & Pigmentation] : normal skin color and pigmentation [No Focal Deficits] : no focal deficits [Oriented To Time, Place, And Person] : oriented to person, place, and time [FreeTextEntry1] : In wheelchair

## 2024-05-24 NOTE — ASSESSMENT
[FreeTextEntry1] : 68-yo M w/ PMH of CAD s/p stent, seizure d/o on Keppra, HTN, and recent admission at [facility] (4/17-24) for UTI, found to have b/l nephrolithiasis including 2.5 cm staghorn calculus and 5 mm distal L ureteral stone. Pending surgical intervention of nephrolithiasis. Repeat UCx as outpatient was with Pseudomonas aeruginosa (5/16/24).  #Nephrolithiasis #Bacteriuria #Pre-op #Need for home IV infusion - Patient has persistent source of urinary tract infection. Currently asymptomatic, however the persistent source poses constant risk. Recommend procedural/surgical management per urology - Spoke with Urologist (Dr. Hoenig). OR scheduled for 6/3 per their . - VSS. - Patient meets the criteria for treating asymptomatic bacteriuria. Reviewed susceptibility pattern. Resistant to oral options. ROD 8 to cefepime. Would need high dose antibiotics. - Would initiate cefepime 2g IV Q8H. Per Dr. Hoenig, hospitalization will not be likely after the procedure and no inpatient ID consult service will be expected. Would provide coverage x7d post-op (end 6/10) - Spoke with RN Case Manager Halle Whatley to initiate arranging for midline placement and home infusion set up. - Check CBC, CMP, and PT/INR/PTT today. - Discussed with patient and wife re: possible side effects of cefepime, including lowering seizure threshold and encephalopathy. Patient is advised to seek urgent medical attention in this case.

## 2024-05-28 ENCOUNTER — RESULT REVIEW (OUTPATIENT)
Age: 69
End: 2024-05-28

## 2024-05-28 ENCOUNTER — TRANSCRIPTION ENCOUNTER (OUTPATIENT)
Age: 69
End: 2024-05-28

## 2024-05-28 ENCOUNTER — NON-APPOINTMENT (OUTPATIENT)
Age: 69
End: 2024-05-28

## 2024-05-28 ENCOUNTER — OUTPATIENT (OUTPATIENT)
Dept: OUTPATIENT SERVICES | Facility: HOSPITAL | Age: 69
LOS: 1 days | End: 2024-05-28
Payer: MEDICARE

## 2024-05-28 VITALS
RESPIRATION RATE: 22 BRPM | HEIGHT: 68 IN | TEMPERATURE: 97 F | SYSTOLIC BLOOD PRESSURE: 165 MMHG | OXYGEN SATURATION: 94 % | DIASTOLIC BLOOD PRESSURE: 91 MMHG | HEART RATE: 70 BPM | WEIGHT: 216.05 LBS

## 2024-05-28 DIAGNOSIS — A49.8 OTHER BACTERIAL INFECTIONS OF UNSPECIFIED SITE: ICD-10-CM

## 2024-05-28 DIAGNOSIS — Z98.890 OTHER SPECIFIED POSTPROCEDURAL STATES: Chronic | ICD-10-CM

## 2024-05-28 DIAGNOSIS — Z98.89 OTHER SPECIFIED POSTPROCEDURAL STATES: Chronic | ICD-10-CM

## 2024-05-28 DIAGNOSIS — Z87.81 PERSONAL HISTORY OF (HEALED) TRAUMATIC FRACTURE: Chronic | ICD-10-CM

## 2024-05-28 DIAGNOSIS — M43.28 FUSION OF SPINE, SACRAL AND SACROCOCCYGEAL REGION: Chronic | ICD-10-CM

## 2024-05-28 DIAGNOSIS — Z96.649 PRESENCE OF UNSPECIFIED ARTIFICIAL HIP JOINT: Chronic | ICD-10-CM

## 2024-05-28 PROCEDURE — 36410 VNPNXR 3YR/> PHY/QHP DX/THER: CPT

## 2024-05-28 PROCEDURE — 76937 US GUIDE VASCULAR ACCESS: CPT | Mod: 26

## 2024-05-28 PROCEDURE — C1751: CPT

## 2024-05-28 PROCEDURE — 76937 US GUIDE VASCULAR ACCESS: CPT

## 2024-05-28 NOTE — ASU DISCHARGE PLAN (ADULT/PEDIATRIC) - ASU DC SPECIAL INSTRUCTIONSFT
Midline placement    Discharge Instructions  - You have had a midline implanted in your arm.   - The midline is ready for use.  - You may shower as long as the midline and dressing remains dry.  -  No soaking or swimming until the midline is removed or when the site is completely healed.  - Keep the area covered and dry for as long as the midline remains in. It may be removed and changed by a nurse as needed.  - Do not perform any heavy lifting or put tension on the area for the next week or until the site is healed.  - You may resume your normal diet.  - You may resume your normal medications however you should wait 48 hours before restarting aspirin, plavix, or blood thinners.  - It is normal to experience some pain over the site for the next few days. You may take apply ice to the area (20 minutes on, 20 minutes off) and take Tylenol for that pain. Do not take more frequently than every 6 hours and do not exceed more than 3000mg of Tylenol in a 24 hour period.    Notify your primary physician and/or Interventional Radiology IMMEDIATELY if you experience any of the following       - Fever of 100.4F or 38C       - Chills or Rigors/ Shakes       - Swelling and/or Redness in the area around the port       - Worsening Pain       - Blood soaked bandages or worsening bleeding       - Lightheadedness and/or dizziness upon standing       - Chest Pain/ Tightness       - Shortness of Breath       - Difficulty walking    If you have a problem that you believe requires IMMEDIATE attention, please go to your NEAREST Emergency Room. If you believe your problem can safely wait until you speak to a physician, please call Interventional Radiology for any concerns.      Please feel free to contact us at [phone] if any problems arise. After 6PM, Monday through Friday, on weekends and on holidays, please call [phone] and ask for the radiology resident on call to be paged.

## 2024-05-28 NOTE — PROCEDURE NOTE - NSPROCDETAILS_GEN_ALL_CORE
fluoroscopy/location identified, draped/prepped, sterile technique used/sterile dressing applied/sterile technique, catheter placed/supine position/ultrasound guidance

## 2024-05-28 NOTE — PROCEDURE NOTE - PRACTITIONER PERFORMING THE TIME OUT
Patient is requesting a refill of amoxicillin d/t sore throat, cough, and fatigue. Last seen 9/28/17 for this. [phone] [address] for refill or would you like patient to follow up in the office? Please advise. Thank you! Rashi Reyes PA-C

## 2024-05-28 NOTE — ASU DISCHARGE PLAN (ADULT/PEDIATRIC) - NURSING INSTRUCTIONS
Please feel free to contact us at [phone] if any problems arise. After 6PM, Monday through Friday, on weekends and on holidays, please call [phone] and ask for the radiology resident on call to be paged.

## 2024-05-28 NOTE — ASU PATIENT PROFILE, ADULT - FALL HARM RISK - HARM RISK INTERVENTIONS

## 2024-05-28 NOTE — H&P ADULT - HISTORY OF PRESENT ILLNESS
Interventional Radiology    HPI: 68-yo M w/ PMH of CAD s/p stent, seizure d/o on Keppra, HTN, and recent admission at [facility] (4/17-24) for UTI, found to have b/l nephrolithiasis including 2.5 cm staghorn calculus and 5 mm distal L ureteral stone. Pending surgical intervention of nephrolithiasis. Repeat UCx as outpatient was with Pseudomonas aeruginosa (5/16/24) requiring IV abx.    Pt presents to IR for midline placement for the aforementioned.    Review of Systems:   Constitutional: No fever, weight loss, or fatigue  Respiratory: No shortness of breath  Cardiovascular: No chest pain  Gastrointestinal: No abdominal or epigastric pain    Allergies: No Known Allergies    Medications (Abx/Cardiac/Anticoagulation/Blood Products)  aspirin 81 mg oral delayed release tablet: 1 tab(s) orally once a day (28 May 2024 14:44)  baclofen 20 mg oral tablet: 1 tab(s) orally every 6 hours As needed Muscle Spasm (28 May 2024 14:44)  gabapentin 300 mg oral capsule: 1 cap(s) orally once a day (at bedtime) (28 May 2024 14:44)  levETIRAcetam 750 mg oral tablet, extended release: 1 tab(s) orally once a day (at bedtime) (28 May 2024 14:44)  metoprolol succinate 25 mg oral tablet, extended release: 1 tab(s) orally once a day (28 May 2024 14:44)  pregabalin 150 mg oral capsule: 1 cap(s) orally 2 times a day patient takes 1-2 times a day depending on pain level (28 May 2024 14:44)  rosuvastatin 10 mg oral tablet: 1 tab(s) orally once a day (at bedtime) (28 May 2024 14:44)  tamsulosin 0.4 mg oral capsule: 1 cap(s) orally once a day in the morning (28 May 2024 14:44)    Data:  172.7  98  T(C): 36.3  HR: 70  BP: 165/91  RR: 22  SpO2: 94%    Physical Exam  General: No acute distress, nontoxic, A&Ox3  Chest: Non labored breathing  Abdomen: Non-distended, non-tender, no preitoneal signs    RADIOLOGY & ADDITIONAL TESTS:    Imaging Reviewed    H & P Note Reviewed from: ID Note 5/24/24.    Plan: 68y Male presents for midline placement.     -Risks/Benefits/alternatives explained with the patient and/or healthcare proxy and witnessed informed consent obtained.

## 2024-05-28 NOTE — ASU PREOP CHECKLIST - SIDE RAILS UP
· Cardiovascular: Normal rate, regular rhythm, S1&S2 and intact distal pulses. · Pulmonary/Chest: Bilateral respiratory sounds. No wheezes. No rhonchi. · Abdominal: Soft. Bowel sounds present. No distension, No tenderness. · Musculoskeletal: No tenderness. No edema    · Lymphadenopathy: Has no cervical adenopathy. · Neurological: Alert and oriented. Cranial nerve appears intact, No Gross deficit   · Skin: Skin is warm and dry. No rash noted. · Psychiatric: Has a normal mood, affect and behavior     Labs:  Reviewed. ECG: reviewed BiV paced rhythm    Studies:   1. Event monitor:     2. Echo 5/15/18:   Summary   Left ventricular cavity size is normal. There is mild concentric left   ventricular hypertrophy. Overall left ventricular systolic function appears   low normal with an ejection fraction of 50-55%. No regional wall motion   abnormalities are noted other than septal bounce which may be related to   bundle branch block. Mild tricuspid regurgitation. Estimated pulmonary   artery systolic pressure is at 27 mmHg assuming a right atrial pressure of 3   mmHg. 3. Stress Test 5/13/14:    Summary    Using the Virgilio protocol, the patient was stressed for 04:00 minutes, 5.80    Mets. The target heart rate was 142 and the patient achieved a heart rate of    54 (Stage 2 Manual). Exercise was terminated because of VALIENTE request. The    test showed High Grade AV Block, persistent through exercise    4. Cath: The MCOT, echocardiogram, stress test, and coronary angiography/PCI were reviewed by myself and used for my plan of care. Procedures:  1. None    Assessment/Plan:  1. CHB  2. NICMP, improved EF 50-55%  3. CHF   4.   PVC's    S/p upgrade to CRT-D 8/17/16  Device with normal function today, BiVP  98.4% BiV paced  QRS duration <150 ms  Great vectors  Patient is doing very well  Continue BB and ARB  Last TTE EF almost normalized    The CIED was interrogated and programmed and I supervised and reviewed all the data. All findings and changes are in device interrogation sheat and reflect my personal interpretation and changes and is scanned to Epic. Thank you for allowing me to participate in the care of Mala Chand. All questions and concerns were addressed to the patient/family. Alternatives to my treatment were discussed.      Felix Sharp MD  Cardiac Electrophysiology  [facility] done

## 2024-05-30 ENCOUNTER — NON-APPOINTMENT (OUTPATIENT)
Age: 69
End: 2024-05-30

## 2024-05-30 ENCOUNTER — TRANSCRIPTION ENCOUNTER (OUTPATIENT)
Age: 69
End: 2024-05-30

## 2024-05-30 ENCOUNTER — INPATIENT (INPATIENT)
Facility: HOSPITAL | Age: 69
LOS: 8 days | Discharge: HOME HEALTH | DRG: 660 | End: 2024-06-08
Attending: UROLOGY | Admitting: UROLOGY
Payer: MEDICARE

## 2024-05-30 VITALS
DIASTOLIC BLOOD PRESSURE: 78 MMHG | RESPIRATION RATE: 18 BRPM | HEART RATE: 60 BPM | TEMPERATURE: 98 F | OXYGEN SATURATION: 92 % | SYSTOLIC BLOOD PRESSURE: 144 MMHG

## 2024-05-30 DIAGNOSIS — N20.0 CALCULUS OF KIDNEY: ICD-10-CM

## 2024-05-30 DIAGNOSIS — Z98.89 OTHER SPECIFIED POSTPROCEDURAL STATES: Chronic | ICD-10-CM

## 2024-05-30 DIAGNOSIS — Z96.649 PRESENCE OF UNSPECIFIED ARTIFICIAL HIP JOINT: Chronic | ICD-10-CM

## 2024-05-30 DIAGNOSIS — Z98.890 OTHER SPECIFIED POSTPROCEDURAL STATES: Chronic | ICD-10-CM

## 2024-05-30 DIAGNOSIS — R56.9 UNSPECIFIED CONVULSIONS: ICD-10-CM

## 2024-05-30 DIAGNOSIS — I10 ESSENTIAL (PRIMARY) HYPERTENSION: ICD-10-CM

## 2024-05-30 DIAGNOSIS — M43.28 FUSION OF SPINE, SACRAL AND SACROCOCCYGEAL REGION: Chronic | ICD-10-CM

## 2024-05-30 DIAGNOSIS — I25.10 ATHEROSCLEROTIC HEART DISEASE OF NATIVE CORONARY ARTERY WITHOUT ANGINA PECTORIS: ICD-10-CM

## 2024-05-30 DIAGNOSIS — Z87.81 PERSONAL HISTORY OF (HEALED) TRAUMATIC FRACTURE: Chronic | ICD-10-CM

## 2024-05-30 LAB
ALBUMIN SERPL ELPH-MCNC: 4.5 G/DL
ALP BLD-CCNC: 130 U/L
ALT SERPL-CCNC: 18 U/L
ANION GAP SERPL CALC-SCNC: 14 MMOL/L — SIGNIFICANT CHANGE UP (ref 5–17)
ANION GAP SERPL CALC-SCNC: 15 MMOL/L
APTT BLD: 33.5 SEC
APTT BLD: 35.1 SEC — SIGNIFICANT CHANGE UP (ref 24.5–35.6)
AST SERPL-CCNC: 18 U/L
BASOPHILS # BLD AUTO: 0.08 K/UL
BASOPHILS NFR BLD AUTO: 0.7 %
BILIRUB SERPL-MCNC: 0.4 MG/DL
BLD GP AB SCN SERPL QL: NEGATIVE — SIGNIFICANT CHANGE UP
BUN SERPL-MCNC: 15 MG/DL — SIGNIFICANT CHANGE UP (ref 7–23)
BUN SERPL-MCNC: 20 MG/DL
CALCIUM SERPL-MCNC: 10 MG/DL — SIGNIFICANT CHANGE UP (ref 8.4–10.5)
CALCIUM SERPL-MCNC: 9.7 MG/DL
CHLORIDE SERPL-SCNC: 102 MMOL/L
CHLORIDE SERPL-SCNC: 105 MMOL/L — SIGNIFICANT CHANGE UP (ref 96–108)
CO2 SERPL-SCNC: 24 MMOL/L
CO2 SERPL-SCNC: 25 MMOL/L — SIGNIFICANT CHANGE UP (ref 22–31)
CREAT SERPL-MCNC: 1.24 MG/DL — SIGNIFICANT CHANGE UP (ref 0.5–1.3)
CREAT SERPL-MCNC: 1.39 MG/DL
EGFR: 55 ML/MIN/1.73M2
EGFR: 63 ML/MIN/1.73M2 — SIGNIFICANT CHANGE UP
EOSINOPHIL # BLD AUTO: 0.11 K/UL
EOSINOPHIL NFR BLD AUTO: 0.9 %
GLUCOSE SERPL-MCNC: 138 MG/DL — HIGH (ref 70–99)
GLUCOSE SERPL-MCNC: 163 MG/DL
HCT VFR BLD CALC: 48.4 %
HCT VFR BLD CALC: 49.2 % — SIGNIFICANT CHANGE UP (ref 39–50)
HGB BLD-MCNC: 16.2 G/DL
HGB BLD-MCNC: 16.2 G/DL — SIGNIFICANT CHANGE UP (ref 13–17)
IMM GRANULOCYTES NFR BLD AUTO: 0.2 %
INR BLD: 1.04 RATIO — SIGNIFICANT CHANGE UP (ref 0.85–1.18)
INR PPP: 0.96 RATIO
LYMPHOCYTES # BLD AUTO: 2.98 K/UL
LYMPHOCYTES NFR BLD AUTO: 24.2 %
MAN DIFF?: NORMAL
MCHC RBC-ENTMCNC: 30.5 PG
MCHC RBC-ENTMCNC: 30.7 PG — SIGNIFICANT CHANGE UP (ref 27–34)
MCHC RBC-ENTMCNC: 32.9 GM/DL — SIGNIFICANT CHANGE UP (ref 32–36)
MCHC RBC-ENTMCNC: 33.5 GM/DL
MCV RBC AUTO: 91 FL
MCV RBC AUTO: 93.2 FL — SIGNIFICANT CHANGE UP (ref 80–100)
MONOCYTES # BLD AUTO: 1 K/UL
MONOCYTES NFR BLD AUTO: 8.1 %
NEUTROPHILS # BLD AUTO: 8.09 K/UL
NEUTROPHILS NFR BLD AUTO: 65.9 %
NRBC # BLD: 0 /100 WBCS — SIGNIFICANT CHANGE UP (ref 0–0)
PLATELET # BLD AUTO: 269 K/UL
PLATELET # BLD AUTO: 288 K/UL — SIGNIFICANT CHANGE UP (ref 150–400)
POTASSIUM SERPL-MCNC: 4.2 MMOL/L — SIGNIFICANT CHANGE UP (ref 3.5–5.3)
POTASSIUM SERPL-SCNC: 4.2 MMOL/L — SIGNIFICANT CHANGE UP (ref 3.5–5.3)
POTASSIUM SERPL-SCNC: 4.5 MMOL/L
PROT SERPL-MCNC: 7.1 G/DL
PROTHROM AB SERPL-ACNC: 10.9 SEC — SIGNIFICANT CHANGE UP (ref 9.5–13)
PT BLD: 10.9 SEC
RBC # BLD: 5.28 M/UL — SIGNIFICANT CHANGE UP (ref 4.2–5.8)
RBC # BLD: 5.32 M/UL
RBC # FLD: 15.4 % — HIGH (ref 10.3–14.5)
RBC # FLD: 16.6 %
RH IG SCN BLD-IMP: POSITIVE — SIGNIFICANT CHANGE UP
SODIUM SERPL-SCNC: 141 MMOL/L
SODIUM SERPL-SCNC: 144 MMOL/L — SIGNIFICANT CHANGE UP (ref 135–145)
WBC # BLD: 12.77 K/UL — HIGH (ref 3.8–10.5)
WBC # FLD AUTO: 12.29 K/UL
WBC # FLD AUTO: 12.77 K/UL — HIGH (ref 3.8–10.5)

## 2024-05-30 PROCEDURE — 93010 ELECTROCARDIOGRAM REPORT: CPT

## 2024-05-30 PROCEDURE — 71045 X-RAY EXAM CHEST 1 VIEW: CPT | Mod: 26

## 2024-05-30 RX ORDER — CEFEPIME 1 G/1
2000 INJECTION, POWDER, FOR SOLUTION INTRAMUSCULAR; INTRAVENOUS ONCE
Refills: 0 | Status: COMPLETED | OUTPATIENT
Start: 2024-05-30 | End: 2024-05-30

## 2024-05-30 RX ORDER — HEPARIN SODIUM 5000 [USP'U]/ML
5000 INJECTION INTRAVENOUS; SUBCUTANEOUS EVERY 8 HOURS
Refills: 0 | Status: DISCONTINUED | OUTPATIENT
Start: 2024-05-30 | End: 2024-05-31

## 2024-05-30 RX ORDER — TAMSULOSIN HYDROCHLORIDE 0.4 MG/1
1 CAPSULE ORAL
Refills: 0 | DISCHARGE

## 2024-05-30 RX ORDER — LANOLIN ALCOHOL/MO/W.PET/CERES
3 CREAM (GRAM) TOPICAL ONCE
Refills: 0 | Status: COMPLETED | OUTPATIENT
Start: 2024-05-30 | End: 2024-05-31

## 2024-05-30 RX ORDER — ATORVASTATIN CALCIUM 80 MG/1
40 TABLET, FILM COATED ORAL AT BEDTIME
Refills: 0 | Status: DISCONTINUED | OUTPATIENT
Start: 2024-05-30 | End: 2024-05-31

## 2024-05-30 RX ORDER — ACETAMINOPHEN 500 MG
650 TABLET ORAL EVERY 6 HOURS
Refills: 0 | Status: DISCONTINUED | OUTPATIENT
Start: 2024-05-30 | End: 2024-05-31

## 2024-05-30 RX ORDER — METOPROLOL TARTRATE 50 MG
25 TABLET ORAL DAILY
Refills: 0 | Status: DISCONTINUED | OUTPATIENT
Start: 2024-05-30 | End: 2024-05-30

## 2024-05-30 RX ORDER — SODIUM CHLORIDE 9 MG/ML
1000 INJECTION, SOLUTION INTRAVENOUS
Refills: 0 | Status: DISCONTINUED | OUTPATIENT
Start: 2024-05-30 | End: 2024-05-31

## 2024-05-30 RX ORDER — ONDANSETRON 8 MG/1
4 TABLET, FILM COATED ORAL EVERY 6 HOURS
Refills: 0 | Status: DISCONTINUED | OUTPATIENT
Start: 2024-05-30 | End: 2024-05-31

## 2024-05-30 RX ORDER — ASPIRIN/CALCIUM CARB/MAGNESIUM 324 MG
81 TABLET ORAL DAILY
Refills: 0 | Status: DISCONTINUED | OUTPATIENT
Start: 2024-05-30 | End: 2024-05-31

## 2024-05-30 RX ORDER — CEFEPIME 1 G/1
INJECTION, POWDER, FOR SOLUTION INTRAMUSCULAR; INTRAVENOUS
Refills: 0 | Status: DISCONTINUED | OUTPATIENT
Start: 2024-05-30 | End: 2024-05-31

## 2024-05-30 RX ORDER — GABAPENTIN 400 MG/1
1 CAPSULE ORAL
Refills: 0 | DISCHARGE

## 2024-05-30 RX ORDER — GABAPENTIN 400 MG/1
300 CAPSULE ORAL
Refills: 0 | Status: DISCONTINUED | OUTPATIENT
Start: 2024-05-30 | End: 2024-05-31

## 2024-05-30 RX ORDER — ROSUVASTATIN CALCIUM 5 MG/1
1 TABLET ORAL
Refills: 0 | DISCHARGE

## 2024-05-30 RX ORDER — METOPROLOL TARTRATE 50 MG
12.5 TABLET ORAL AT BEDTIME
Refills: 0 | Status: DISCONTINUED | OUTPATIENT
Start: 2024-05-30 | End: 2024-05-31

## 2024-05-30 RX ORDER — LEVETIRACETAM 250 MG/1
750 TABLET, FILM COATED ORAL AT BEDTIME
Refills: 0 | Status: DISCONTINUED | OUTPATIENT
Start: 2024-05-30 | End: 2024-05-31

## 2024-05-30 RX ORDER — METOPROLOL TARTRATE 50 MG
0.5 TABLET ORAL
Refills: 0 | DISCHARGE

## 2024-05-30 RX ORDER — TAMSULOSIN HYDROCHLORIDE 0.4 MG/1
0.4 CAPSULE ORAL AT BEDTIME
Refills: 0 | Status: DISCONTINUED | OUTPATIENT
Start: 2024-05-30 | End: 2024-05-31

## 2024-05-30 RX ORDER — SENNA PLUS 8.6 MG/1
2 TABLET ORAL AT BEDTIME
Refills: 0 | Status: DISCONTINUED | OUTPATIENT
Start: 2024-05-30 | End: 2024-05-31

## 2024-05-30 RX ORDER — LEVETIRACETAM 250 MG/1
1 TABLET, FILM COATED ORAL
Refills: 0 | DISCHARGE

## 2024-05-30 RX ORDER — CEFEPIME 1 G/1
2000 INJECTION, POWDER, FOR SOLUTION INTRAMUSCULAR; INTRAVENOUS EVERY 12 HOURS
Refills: 0 | Status: DISCONTINUED | OUTPATIENT
Start: 2024-05-31 | End: 2024-05-31

## 2024-05-30 RX ORDER — BACLOFEN 100 %
20 POWDER (GRAM) MISCELLANEOUS EVERY 6 HOURS
Refills: 0 | Status: DISCONTINUED | OUTPATIENT
Start: 2024-05-30 | End: 2024-05-31

## 2024-05-30 RX ADMIN — Medication 150 MILLIGRAM(S): at 17:47

## 2024-05-30 RX ADMIN — ATORVASTATIN CALCIUM 40 MILLIGRAM(S): 80 TABLET, FILM COATED ORAL at 21:13

## 2024-05-30 RX ADMIN — CEFEPIME 100 MILLIGRAM(S): 1 INJECTION, POWDER, FOR SOLUTION INTRAMUSCULAR; INTRAVENOUS at 15:33

## 2024-05-30 RX ADMIN — HEPARIN SODIUM 5000 UNIT(S): 5000 INJECTION INTRAVENOUS; SUBCUTANEOUS at 21:13

## 2024-05-30 RX ADMIN — SODIUM CHLORIDE 100 MILLILITER(S): 9 INJECTION, SOLUTION INTRAVENOUS at 14:51

## 2024-05-30 RX ADMIN — Medication 12.5 MILLIGRAM(S): at 21:14

## 2024-05-30 RX ADMIN — TAMSULOSIN HYDROCHLORIDE 0.4 MILLIGRAM(S): 0.4 CAPSULE ORAL at 21:13

## 2024-05-30 RX ADMIN — SENNA PLUS 2 TABLET(S): 8.6 TABLET ORAL at 21:13

## 2024-05-30 RX ADMIN — Medication 20 MILLIGRAM(S): at 18:27

## 2024-05-30 RX ADMIN — GABAPENTIN 300 MILLIGRAM(S): 400 CAPSULE ORAL at 17:47

## 2024-05-30 RX ADMIN — LEVETIRACETAM 750 MILLIGRAM(S): 250 TABLET, FILM COATED ORAL at 21:15

## 2024-05-30 NOTE — H&P ADULT - HISTORY OF PRESENT ILLNESS
69y/o M with PMHx of CAD s/p stent, seizure on Keppra, HTN, recently admitted to [facility] (4/17-24) for UTI, found to have b/l nephrolithiasis including 2.5 cm R staghorn calculus and 5 mm distal L ureteral stone. UCx and BCx neg during the hospitalization. He was started on ertapenem followed by cefpodoxime for a 10-d course. Outpatient  repeat UCx grew pseudomonas, pt followed up with ID and midline was placed 2 days ago. He was started on cefepime. Pt is scheduled to have a R PCNL and L URS/stent tomorrow. Here for preop abx.

## 2024-05-30 NOTE — PRE PROCEDURE NOTE - PRE PROCEDURE EVALUATION
Urology Preop Note    Diagnosis: Nephrolithiasis   Procedure: R PCNL, L URS, L stent  Surgeon: Dr. Almodovar                           16.2   12.77 )-----------( 288      ( 30 May 2024 15:40 )             49.2     05-30    144  |  105  |  15  ----------------------------<  138<H>  4.2   |  25  |  1.24    Ca    10.0      30 May 2024 15:39    PT/INR - ( 30 May 2024 15:39 )   PT: 10.9 sec;   INR: 1.04 ratio       PTT - ( 30 May 2024 15:39 )  PTT:35.1 sec  Urinalysis Basic - ( 30 May 2024 15:39 )    Color: x / Appearance: x / SG: x / pH: x  Gluc: 138 mg/dL / Ketone: x  / Bili: x / Urobili: x   Blood: x / Protein: x / Nitrite: x   Leuk Esterase: x / RBC: x / WBC x   Sq Epi: x / Non Sq Epi: x / Bacteria: x  UCx: Culture Results:   <10,000 CFU/mL Normal Urogenital Lauren (04.17.24 @ 05:35)   Pregnancy test if applicable:  CXR: pending   EKG: NSR    Orders:  NPO after midnight  IVF after midnight   Consent obtained  Type & Screen  2 units on hold for OR   Patient is optimized for surgery

## 2024-05-30 NOTE — H&P ADULT - NSHPPHYSICALEXAM_GEN_ALL_CORE
Physical Exam  Gen: NAD  HEENT: normocephalic, atraumatic, no scleral icterus  Pulm:  No respiratory distress, no subcostal retractions, no accessory muscle use   CV: no JVD  Abd: Soft, NT, ND, no rebound tenderness or guarding  MSK:  No CVAT, Moving all extremities, full ROM in all extremities, No edema present  NEURO: A&Ox3 t  SKIN: warm, dry, no rash.

## 2024-05-30 NOTE — H&P ADULT - PROBLEM SELECTOR PLAN 1
-NPO after midnight    -Follow up labs  -Continue Cefepime   -Consent to be obtained   -Discussed risks, benefits, alternatives, pt voiced understanding

## 2024-05-30 NOTE — PATIENT PROFILE ADULT - FALL HARM RISK - PATIENT NEEDS ASSISTANCE
Unable to interrogate patient's ILR d/t EOS status. Standing/Walking/Toileting/Moving from bed to chair

## 2024-05-30 NOTE — PATIENT PROFILE ADULT - FALL HARM RISK - HARM RISK INTERVENTIONS

## 2024-05-30 NOTE — H&P ADULT - ASSESSMENT
67y/o M with PMHx of CAD s/p stent, seizure on Keppra, HTN, recently admitted to [facility] (4/17-24) for UTI, found to have b/l nephrolithiasis including 2.5 cm R staghorn calculus and 5 mm distal L ureteral stone, scheduled for stone procedure

## 2024-05-31 ENCOUNTER — APPOINTMENT (OUTPATIENT)
Dept: UROLOGY | Facility: HOSPITAL | Age: 69
End: 2024-05-31

## 2024-05-31 ENCOUNTER — RESULT REVIEW (OUTPATIENT)
Age: 69
End: 2024-05-31

## 2024-05-31 LAB
ANION GAP SERPL CALC-SCNC: 13 MMOL/L — SIGNIFICANT CHANGE UP (ref 5–17)
ANION GAP SERPL CALC-SCNC: 15 MMOL/L — SIGNIFICANT CHANGE UP (ref 5–17)
BASOPHILS # BLD AUTO: 0.06 K/UL — SIGNIFICANT CHANGE UP (ref 0–0.2)
BASOPHILS NFR BLD AUTO: 0.4 % — SIGNIFICANT CHANGE UP (ref 0–2)
BUN SERPL-MCNC: 17 MG/DL — SIGNIFICANT CHANGE UP (ref 7–23)
BUN SERPL-MCNC: 18 MG/DL — SIGNIFICANT CHANGE UP (ref 7–23)
CALCIUM SERPL-MCNC: 9.1 MG/DL — SIGNIFICANT CHANGE UP (ref 8.4–10.5)
CALCIUM SERPL-MCNC: 9.6 MG/DL — SIGNIFICANT CHANGE UP (ref 8.4–10.5)
CHLORIDE SERPL-SCNC: 106 MMOL/L — SIGNIFICANT CHANGE UP (ref 96–108)
CHLORIDE SERPL-SCNC: 108 MMOL/L — SIGNIFICANT CHANGE UP (ref 96–108)
CO2 SERPL-SCNC: 22 MMOL/L — SIGNIFICANT CHANGE UP (ref 22–31)
CO2 SERPL-SCNC: 24 MMOL/L — SIGNIFICANT CHANGE UP (ref 22–31)
CREAT SERPL-MCNC: 1.29 MG/DL — SIGNIFICANT CHANGE UP (ref 0.5–1.3)
CREAT SERPL-MCNC: 1.44 MG/DL — HIGH (ref 0.5–1.3)
EGFR: 53 ML/MIN/1.73M2 — LOW
EGFR: 60 ML/MIN/1.73M2 — SIGNIFICANT CHANGE UP
EOSINOPHIL # BLD AUTO: 0.01 K/UL — SIGNIFICANT CHANGE UP (ref 0–0.5)
EOSINOPHIL NFR BLD AUTO: 0.1 % — SIGNIFICANT CHANGE UP (ref 0–6)
GLUCOSE SERPL-MCNC: 121 MG/DL — HIGH (ref 70–99)
GLUCOSE SERPL-MCNC: 141 MG/DL — HIGH (ref 70–99)
HCT VFR BLD CALC: 43.6 % — SIGNIFICANT CHANGE UP (ref 39–50)
HCT VFR BLD CALC: 43.8 % — SIGNIFICANT CHANGE UP (ref 39–50)
HGB BLD-MCNC: 14.1 G/DL — SIGNIFICANT CHANGE UP (ref 13–17)
HGB BLD-MCNC: 14.1 G/DL — SIGNIFICANT CHANGE UP (ref 13–17)
IMM GRANULOCYTES NFR BLD AUTO: 0.4 % — SIGNIFICANT CHANGE UP (ref 0–0.9)
LYMPHOCYTES # BLD AUTO: 1.59 K/UL — SIGNIFICANT CHANGE UP (ref 1–3.3)
LYMPHOCYTES # BLD AUTO: 11.3 % — LOW (ref 13–44)
MCHC RBC-ENTMCNC: 29.8 PG — SIGNIFICANT CHANGE UP (ref 27–34)
MCHC RBC-ENTMCNC: 30.1 PG — SIGNIFICANT CHANGE UP (ref 27–34)
MCHC RBC-ENTMCNC: 32.2 GM/DL — SIGNIFICANT CHANGE UP (ref 32–36)
MCHC RBC-ENTMCNC: 32.3 GM/DL — SIGNIFICANT CHANGE UP (ref 32–36)
MCV RBC AUTO: 92.6 FL — SIGNIFICANT CHANGE UP (ref 80–100)
MCV RBC AUTO: 93 FL — SIGNIFICANT CHANGE UP (ref 80–100)
MONOCYTES # BLD AUTO: 0.22 K/UL — SIGNIFICANT CHANGE UP (ref 0–0.9)
MONOCYTES NFR BLD AUTO: 1.6 % — LOW (ref 2–14)
NEUTROPHILS # BLD AUTO: 12.12 K/UL — HIGH (ref 1.8–7.4)
NEUTROPHILS NFR BLD AUTO: 86.2 % — HIGH (ref 43–77)
NRBC # BLD: 0 /100 WBCS — SIGNIFICANT CHANGE UP (ref 0–0)
NRBC # BLD: 0 /100 WBCS — SIGNIFICANT CHANGE UP (ref 0–0)
PLATELET # BLD AUTO: 225 K/UL — SIGNIFICANT CHANGE UP (ref 150–400)
PLATELET # BLD AUTO: 247 K/UL — SIGNIFICANT CHANGE UP (ref 150–400)
POTASSIUM SERPL-MCNC: 4.6 MMOL/L — SIGNIFICANT CHANGE UP (ref 3.5–5.3)
POTASSIUM SERPL-MCNC: 4.8 MMOL/L — SIGNIFICANT CHANGE UP (ref 3.5–5.3)
POTASSIUM SERPL-SCNC: 4.6 MMOL/L — SIGNIFICANT CHANGE UP (ref 3.5–5.3)
POTASSIUM SERPL-SCNC: 4.8 MMOL/L — SIGNIFICANT CHANGE UP (ref 3.5–5.3)
RBC # BLD: 4.69 M/UL — SIGNIFICANT CHANGE UP (ref 4.2–5.8)
RBC # BLD: 4.73 M/UL — SIGNIFICANT CHANGE UP (ref 4.2–5.8)
RBC # FLD: 15.4 % — HIGH (ref 10.3–14.5)
RBC # FLD: 15.5 % — HIGH (ref 10.3–14.5)
SODIUM SERPL-SCNC: 143 MMOL/L — SIGNIFICANT CHANGE UP (ref 135–145)
SODIUM SERPL-SCNC: 145 MMOL/L — SIGNIFICANT CHANGE UP (ref 135–145)
WBC # BLD: 10.29 K/UL — SIGNIFICANT CHANGE UP (ref 3.8–10.5)
WBC # BLD: 14.05 K/UL — HIGH (ref 3.8–10.5)
WBC # FLD AUTO: 10.29 K/UL — SIGNIFICANT CHANGE UP (ref 3.8–10.5)
WBC # FLD AUTO: 14.05 K/UL — HIGH (ref 3.8–10.5)

## 2024-05-31 PROCEDURE — 52353 CYSTOURETERO W/LITHOTRIPSY: CPT | Mod: LT,59

## 2024-05-31 PROCEDURE — 88300 SURGICAL PATH GROSS: CPT | Mod: 26

## 2024-05-31 PROCEDURE — 50433 PLMT NEPHROURETERAL CATHETER: CPT | Mod: RT,59

## 2024-05-31 PROCEDURE — 71045 X-RAY EXAM CHEST 1 VIEW: CPT | Mod: 26

## 2024-05-31 PROCEDURE — 50437 DILAT XST TRC NEW ACCESS RCS: CPT | Mod: RT,59

## 2024-05-31 PROCEDURE — 50081 PERQ NL/PL LITHOTRP CPLX>2CM: CPT | Mod: RT

## 2024-05-31 DEVICE — LASER FIBER SOLTIVE 200 BALL TIP: Type: IMPLANTABLE DEVICE | Site: RIGHT | Status: FUNCTIONAL

## 2024-05-31 DEVICE — URETERAL CATH AXXCESS OPEN END 6FR 70CM: Type: IMPLANTABLE DEVICE | Site: RIGHT | Status: FUNCTIONAL

## 2024-05-31 DEVICE — TRILOGY PROBE KIT 3.9MM X 440MM (BLUE): Type: IMPLANTABLE DEVICE | Site: RIGHT | Status: FUNCTIONAL

## 2024-05-31 DEVICE — STONE BASKET ZEROTIP NITINOL 4-WIRE 1.9FR 120CM X 12MM: Type: IMPLANTABLE DEVICE | Site: RIGHT | Status: FUNCTIONAL

## 2024-05-31 DEVICE — URETERAL SHEATH NAVIGATOR HD 11/13FR X 36CM: Type: IMPLANTABLE DEVICE | Site: RIGHT | Status: FUNCTIONAL

## 2024-05-31 DEVICE — GWIRE FLEX TIP 0.035INX150CM 3MM: Type: IMPLANTABLE DEVICE | Site: RIGHT | Status: FUNCTIONAL

## 2024-05-31 DEVICE — GUIDEWIRE SENSOR DUAL-FLEX NITINOL STRAIGHT .035" X 150CM: Type: IMPLANTABLE DEVICE | Site: RIGHT | Status: FUNCTIONAL

## 2024-05-31 DEVICE — URETERAL CATH DUAL LUMEN 10FR 54CM: Type: IMPLANTABLE DEVICE | Site: RIGHT | Status: FUNCTIONAL

## 2024-05-31 DEVICE — URETERAL CATH IMAGER II BERN 5FR 65CM: Type: IMPLANTABLE DEVICE | Site: RIGHT | Status: FUNCTIONAL

## 2024-05-31 DEVICE — URETEROSCOPE LITHOVUE DISP: Type: IMPLANTABLE DEVICE | Site: RIGHT | Status: FUNCTIONAL

## 2024-05-31 DEVICE — NEPHROSTOMY BALLOON CATH X-FORCE 7FR X 15CM 10MM: Type: IMPLANTABLE DEVICE | Site: RIGHT | Status: FUNCTIONAL

## 2024-05-31 RX ORDER — OXYCODONE HYDROCHLORIDE 5 MG/1
5 TABLET ORAL EVERY 4 HOURS
Refills: 0 | Status: DISCONTINUED | OUTPATIENT
Start: 2024-05-31 | End: 2024-06-07

## 2024-05-31 RX ORDER — SODIUM CHLORIDE 9 MG/ML
1000 INJECTION, SOLUTION INTRAVENOUS
Refills: 0 | Status: DISCONTINUED | OUTPATIENT
Start: 2024-05-31 | End: 2024-06-01

## 2024-05-31 RX ORDER — CEFEPIME 1 G/1
2000 INJECTION, POWDER, FOR SOLUTION INTRAMUSCULAR; INTRAVENOUS ONCE
Refills: 0 | Status: COMPLETED | OUTPATIENT
Start: 2024-05-31 | End: 2024-05-31

## 2024-05-31 RX ORDER — CEFEPIME 1 G/1
INJECTION, POWDER, FOR SOLUTION INTRAMUSCULAR; INTRAVENOUS
Refills: 0 | Status: DISCONTINUED | OUTPATIENT
Start: 2024-05-31 | End: 2024-06-04

## 2024-05-31 RX ORDER — METOPROLOL TARTRATE 50 MG
25 TABLET ORAL DAILY
Refills: 0 | Status: DISCONTINUED | OUTPATIENT
Start: 2024-05-31 | End: 2024-06-08

## 2024-05-31 RX ORDER — ACETAMINOPHEN 500 MG
650 TABLET ORAL EVERY 6 HOURS
Refills: 0 | Status: DISCONTINUED | OUTPATIENT
Start: 2024-05-31 | End: 2024-06-08

## 2024-05-31 RX ORDER — ONDANSETRON 8 MG/1
4 TABLET, FILM COATED ORAL ONCE
Refills: 0 | Status: DISCONTINUED | OUTPATIENT
Start: 2024-05-31 | End: 2024-05-31

## 2024-05-31 RX ORDER — LANOLIN ALCOHOL/MO/W.PET/CERES
3 CREAM (GRAM) TOPICAL ONCE
Refills: 0 | Status: COMPLETED | OUTPATIENT
Start: 2024-05-31 | End: 2024-06-05

## 2024-05-31 RX ORDER — HYDROMORPHONE HYDROCHLORIDE 2 MG/ML
0.25 INJECTION INTRAMUSCULAR; INTRAVENOUS; SUBCUTANEOUS
Refills: 0 | Status: DISCONTINUED | OUTPATIENT
Start: 2024-05-31 | End: 2024-05-31

## 2024-05-31 RX ORDER — HEPARIN SODIUM 5000 [USP'U]/ML
5000 INJECTION INTRAVENOUS; SUBCUTANEOUS EVERY 8 HOURS
Refills: 0 | Status: DISCONTINUED | OUTPATIENT
Start: 2024-05-31 | End: 2024-06-08

## 2024-05-31 RX ORDER — BACLOFEN 100 %
5 POWDER (GRAM) MISCELLANEOUS EVERY 12 HOURS
Refills: 0 | Status: DISCONTINUED | OUTPATIENT
Start: 2024-05-31 | End: 2024-06-03

## 2024-05-31 RX ORDER — TAMSULOSIN HYDROCHLORIDE 0.4 MG/1
0.4 CAPSULE ORAL AT BEDTIME
Refills: 0 | Status: DISCONTINUED | OUTPATIENT
Start: 2024-05-31 | End: 2024-06-08

## 2024-05-31 RX ORDER — OXYCODONE HYDROCHLORIDE 5 MG/1
5 TABLET ORAL ONCE
Refills: 0 | Status: DISCONTINUED | OUTPATIENT
Start: 2024-05-31 | End: 2024-05-31

## 2024-05-31 RX ORDER — OXYCODONE HYDROCHLORIDE 5 MG/1
10 TABLET ORAL EVERY 4 HOURS
Refills: 0 | Status: DISCONTINUED | OUTPATIENT
Start: 2024-05-31 | End: 2024-06-07

## 2024-05-31 RX ORDER — ATORVASTATIN CALCIUM 80 MG/1
40 TABLET, FILM COATED ORAL AT BEDTIME
Refills: 0 | Status: DISCONTINUED | OUTPATIENT
Start: 2024-05-31 | End: 2024-06-08

## 2024-05-31 RX ORDER — GABAPENTIN 400 MG/1
300 CAPSULE ORAL
Refills: 0 | Status: DISCONTINUED | OUTPATIENT
Start: 2024-05-31 | End: 2024-06-08

## 2024-05-31 RX ORDER — CEFEPIME 1 G/1
2000 INJECTION, POWDER, FOR SOLUTION INTRAMUSCULAR; INTRAVENOUS EVERY 12 HOURS
Refills: 0 | Status: DISCONTINUED | OUTPATIENT
Start: 2024-06-01 | End: 2024-06-04

## 2024-05-31 RX ADMIN — Medication 150 MILLIGRAM(S): at 17:02

## 2024-05-31 RX ADMIN — CEFEPIME 100 MILLIGRAM(S): 1 INJECTION, POWDER, FOR SOLUTION INTRAMUSCULAR; INTRAVENOUS at 05:53

## 2024-05-31 RX ADMIN — Medication 20 MILLIGRAM(S): at 00:06

## 2024-05-31 RX ADMIN — Medication 3 MILLIGRAM(S): at 00:07

## 2024-05-31 RX ADMIN — Medication 150 MILLIGRAM(S): at 05:54

## 2024-05-31 RX ADMIN — ATORVASTATIN CALCIUM 40 MILLIGRAM(S): 80 TABLET, FILM COATED ORAL at 21:41

## 2024-05-31 RX ADMIN — TAMSULOSIN HYDROCHLORIDE 0.4 MILLIGRAM(S): 0.4 CAPSULE ORAL at 21:41

## 2024-05-31 RX ADMIN — Medication 20 MILLIGRAM(S): at 05:54

## 2024-05-31 RX ADMIN — GABAPENTIN 300 MILLIGRAM(S): 400 CAPSULE ORAL at 17:02

## 2024-05-31 RX ADMIN — GABAPENTIN 300 MILLIGRAM(S): 400 CAPSULE ORAL at 05:53

## 2024-05-31 RX ADMIN — CEFEPIME 100 MILLIGRAM(S): 1 INJECTION, POWDER, FOR SOLUTION INTRAMUSCULAR; INTRAVENOUS at 17:01

## 2024-05-31 RX ADMIN — HEPARIN SODIUM 5000 UNIT(S): 5000 INJECTION INTRAVENOUS; SUBCUTANEOUS at 17:01

## 2024-05-31 NOTE — PROGRESS NOTE ADULT - SUBJECTIVE AND OBJECTIVE BOX
Post op Check    Pt seen and examined without complaints. Pt has pain on deep inspiration. Denies SOB/CP/N/V.     Vital Signs Last 24 Hrs  T(C): 36.4 (31 May 2024 14:50), Max: 36.7 (30 May 2024 21:51)  T(F): 97.5 (31 May 2024 14:50), Max: 98 (30 May 2024 21:51)  HR: 77 (31 May 2024 15:00) (58 - 88)  BP: 128/64 (31 May 2024 15:00) (98/53 - 151/85)  BP(mean): 89 (31 May 2024 15:00) (71 - 89)  RR: 16 (31 May 2024 15:00) (15 - 18)  SpO2: 99% (31 May 2024 15:00) (91% - 100%)    Parameters below as of 31 May 2024 14:50  Patient On (Oxygen Delivery Method): nasal cannula  O2 Flow (L/min): 2      I&O's Summary    30 May 2024 07:01  -  31 May 2024 07:00  --------------------------------------------------------  IN: 1850 mL / OUT: 1375 mL / NET: 475 mL    31 May 2024 07:01  -  31 May 2024 15:23  --------------------------------------------------------  IN: 375 mL / OUT: 200 mL / NET: 175 mL        Physical Exam  Gen: NAD  Pulm: No respiratory distress, + belly breathing   CV: RRR, no JVD  Abd: Soft, NT, ND  Back: R NT draining dark red urine  : Martinez with dark red urine, flushed with NS, no clots, lightened to watermelon color                          14.1   14.05 )-----------( 225      ( 31 May 2024 12:36 )             43.6       05-31    143  |  106  |  18  ----------------------------<  141<H>  4.8   |  22  |  1.44<H>    Ca    9.1      31 May 2024 12:36    < from: Xray Chest 1 View AP/PA (05.31.24 @ 12:27) >    IMPRESSION:  No pneumothorax.    < end of copied text >      A/P: 68y Male s/p R PCNL, L URS  Cont R PCN to drainage, L ucath  On Bipap  DVT prophylaxis/OOB  Incentive spirometry  Strict I&O's  Analgesia and antiemetics as needed  Diet  AM labs  CT in am

## 2024-05-31 NOTE — PRE-OP CHECKLIST - INTERNAL PROSTHESES
right midline/yes(specify) spine surgery with plates/screws, cardiac stent, right midline/yes(specify)

## 2024-05-31 NOTE — PROGRESS NOTE ADULT - ASSESSMENT
68 year old gentleman with bilateral nephrolithiasis presented for preop abx. Has been on cefepime for pneudomonas infection.   Patient was seen and examined   No change in H&P   To OR for right PCNL and left URS   Imaging reviewed    Patient was marked on both sides

## 2024-05-31 NOTE — CONSULT NOTE ADULT - SUBJECTIVE AND OBJECTIVE BOX
Subjective: Patient seen and examined. No new events except as noted.     REVIEW OF SYSTEMS:    CONSTITUTIONAL: No weakness, fevers or chills  EYES/ENT: No visual changes;  No vertigo or throat pain   NECK: No pain or stiffness  RESPIRATORY: No cough, wheezing, hemoptysis; No shortness of breath  CARDIOVASCULAR: No chest pain or palpitations  GASTROINTESTINAL: No abdominal or epigastric pain. No nausea, vomiting, or hematemesis; No diarrhea or constipation. No melena or hematochezia.  GENITOURINARY: No dysuria, frequency or hematuria  NEUROLOGICAL: No numbness or weakness  SKIN: No itching, burning, rashes, or lesions   All other review of systems is negative unless indicated above.    MEDICATIONS:  MEDICATIONS  (STANDING):  atorvastatin 40 milliGRAM(s) Oral at bedtime  cefepime   IVPB      gabapentin 300 milliGRAM(s) Oral two times a day  heparin   Injectable 5000 Unit(s) SubCutaneous every 8 hours  lactated ringers. 1000 milliLiter(s) (125 mL/Hr) IV Continuous <Continuous>  melatonin 3 milliGRAM(s) Oral once  metoprolol succinate ER 25 milliGRAM(s) Oral daily  pregabalin 150 milliGRAM(s) Oral two times a day  tamsulosin 0.4 milliGRAM(s) Oral at bedtime      PHYSICAL EXAM:  T(C): 36.6 (05-31-24 @ 21:25), Max: 36.6 (05-31-24 @ 06:35)  HR: 83 (05-31-24 @ 21:25) (58 - 91)  BP: 138/69 (05-31-24 @ 21:25) (98/53 - 151/85)  RR: 18 (05-31-24 @ 21:25) (15 - 18)  SpO2: 97% (05-31-24 @ 21:25) (91% - 100%)  Wt(kg): --  I&O's Summary    30 May 2024 07:01  -  31 May 2024 07:00  --------------------------------------------------------  IN: 1850 mL / OUT: 1375 mL / NET: 475 mL    31 May 2024 07:01  -  31 May 2024 22:35  --------------------------------------------------------  IN: 925 mL / OUT: 830 mL / NET: 95 mL      Height (cm): 172.7 (05-31 @ 07:19)  Weight (kg): 110 (05-31 @ 07:19)  BMI (kg/m2): 36.9 (05-31 @ 07:19)  BSA (m2): 2.22 (05-31 @ 07:19)    Appearance: Normal	  HEENT:  PERRLA   Lymphatic: No lymphadenopathy   Cardiovascular: Normal S1 S2, no JVD  Respiratory: normal effort , clear  Gastrointestinal:  Soft, Non-tender  Skin: No rashes,  warm to touch  Psychiatry:  Mood & affect appropriate  Musculuskeletal: No edema    recent labs, Imaging and EKGs personally reviewed     05-30-24 @ 07:01  -  05-31-24 @ 07:00  --------------------------------------------------------  IN: 1850 mL / OUT: 1375 mL / NET: 475 mL    05-31-24 @ 07:01  -  05-31-24 @ 22:35  --------------------------------------------------------  IN: 925 mL / OUT: 830 mL / NET: 95 mL                          14.1   14.05 )-----------( 225      ( 31 May 2024 12:36 )             43.6               05-31    143  |  106  |  18  ----------------------------<  141<H>  4.8   |  22  |  1.44<H>    Ca    9.1      31 May 2024 12:36      PT/INR - ( 30 May 2024 15:39 )   PT: 10.9 sec;   INR: 1.04 ratio         PTT - ( 30 May 2024 15:39 )  PTT:35.1 sec                   Urinalysis Basic - ( 31 May 2024 12:36 )    Color: x / Appearance: x / SG: x / pH: x  Gluc: 141 mg/dL / Ketone: x  / Bili: x / Urobili: x   Blood: x / Protein: x / Nitrite: x   Leuk Esterase: x / RBC: x / WBC x   Sq Epi: x / Non Sq Epi: x / Bacteria: x

## 2024-05-31 NOTE — BRIEF OPERATIVE NOTE - NSICDXBRIEFOPLAUNCH_GEN_ALL_CORE
<--- Click to Launch ICDx for PreOp, PostOp and Procedure  infant, 2,500 or more grams  infant, 2,500 or more grams  infant, 2,500 or more grams  infant, 2,500 or more grams

## 2024-05-31 NOTE — BRIEF OPERATIVE NOTE - NSICDXBRIEFPROCEDURE_GEN_ALL_CORE_FT
PROCEDURES:  Cystoscopy with percutaneous nephrolithotomy (PCNL) and balloon occlusion of ureter 31-May-2024 11:35:44  Santiago John  Cystoscopy with left ureteroscopy 31-May-2024 11:35:54  Santiago John

## 2024-05-31 NOTE — PRE-ANESTHESIA EVALUATION ADULT - NSANTHPMHFT_GEN_ALL_CORE
67y/o M with PMHx of CAD s/p stent, seizure on Keppra, HTN, recently admitted to [facility] (4/17-24) for UTI, found to have b/l nephrolithiasis including 2.5 cm R staghorn calculus and 5 mm distal L ureteral stone. UCx and BCx neg during the hospitalization. He was started on ertapenem followed by cefpodoxime for a 10-d course. Outpatient  repeat UCx grew pseudomonas, pt followed up with ID and midline was placed 2 days ago. He was started on cefepime. Pt is scheduled to have a R PCNL and L URS/stent tomorrow. Here for preop abx.  pt in wheelchair s/p R femur fx X 9 mo, can walk short distance with walker denies CP/SOB   denies GERD

## 2024-05-31 NOTE — CHART NOTE - NSCHARTNOTEFT_GEN_A_CORE
67y/o M with PMHx of CAD s/p stent, seizure on Keppra, HTN, recently admitted to [facility] (4/17-24) for UTI, found to have b/l nephrolithiasis including 2.5 cm R staghorn calculus and 5 mm distal L ureteral stone. Patient now s/p cystoscopy with percutaneous nephrolithotomy and balloon occlusion of ureter.    Post-operatively patient noted to have intermittent periods of apnea. Patient not formally diagnosed with SREEKANTH, but has pre-anesthesia evaluation STOP-BANG of 4 with observed apneic periods. Arousable on calling. Patient placed on CPAP in PACU, tolerating well pulling appropriate tidal volumes. Will recommend CPAP at night in setting of likely undiagnosed SREEKANTH. RN notified urology team regarding PACU course. 67y/o M with PMHx of CAD s/p stent, seizure on Keppra, HTN, recently admitted to [facility] (4/17-24) for UTI, found to have b/l nephrolithiasis including 2.5 cm R staghorn calculus and 5 mm distal L ureteral stone. Patient now s/p cystoscopy with percutaneous nephrolithotomy and balloon occlusion of ureter.    Post-operatively patient noted to have intermittent periods of apnea. Patient not formally diagnosed with SREEKANTH, but has pre-anesthesia evaluation STOP-BANG of 4 with observed apneic periods. Arousable on calling, oxygenating well. Patient placed on CPAP in PACU, tolerating well pulling appropriate tidal volumes. Will recommend CPAP at night in setting of likely undiagnosed SREEKANTH. RN notified urology team regarding PACU course.    ICU Vital Signs Last 24 Hrs  T(C): 36.4 (31 May 2024 13:00), Max: 36.7 (30 May 2024 21:51)  T(F): 97.5 (31 May 2024 13:00), Max: 98 (30 May 2024 21:51)  HR: 68 (31 May 2024 13:45) (58 - 88)  BP: 101/55 (31 May 2024 13:45) (101/55 - 151/85)  BP(mean): 75 (31 May 2024 13:45) (73 - 80)  ABP: --  ABP(mean): --  RR: 15 (31 May 2024 13:45) (15 - 18)  SpO2: 100% (31 May 2024 13:45) (91% - 100%)    O2 Parameters below as of 31 May 2024 13:00  Patient On (Oxygen Delivery Method): room air

## 2024-06-01 LAB
BASOPHILS # BLD AUTO: 0.01 K/UL — SIGNIFICANT CHANGE UP (ref 0–0.2)
BASOPHILS NFR BLD AUTO: 0.1 % — SIGNIFICANT CHANGE UP (ref 0–2)
BUN SERPL-MCNC: 21 MG/DL — SIGNIFICANT CHANGE UP (ref 7–23)
CALCIUM SERPL-MCNC: 9.1 MG/DL — SIGNIFICANT CHANGE UP (ref 8.4–10.5)
CHLORIDE SERPL-SCNC: 106 MMOL/L — SIGNIFICANT CHANGE UP (ref 96–108)
CO2 SERPL-SCNC: 22 MMOL/L — SIGNIFICANT CHANGE UP (ref 22–31)
CREAT SERPL-MCNC: 1.27 MG/DL — SIGNIFICANT CHANGE UP (ref 0.5–1.3)
EGFR: 61 ML/MIN/1.73M2 — SIGNIFICANT CHANGE UP
EOSINOPHIL # BLD AUTO: 0 K/UL — SIGNIFICANT CHANGE UP (ref 0–0.5)
EOSINOPHIL NFR BLD AUTO: 0 % — SIGNIFICANT CHANGE UP (ref 0–6)
GLUCOSE SERPL-MCNC: 164 MG/DL — HIGH (ref 70–99)
HCT VFR BLD CALC: 37 % — LOW (ref 39–50)
HGB BLD-MCNC: 12.3 G/DL — LOW (ref 13–17)
IMM GRANULOCYTES NFR BLD AUTO: 0.7 % — SIGNIFICANT CHANGE UP (ref 0–0.9)
LYMPHOCYTES # BLD AUTO: 1.55 K/UL — SIGNIFICANT CHANGE UP (ref 1–3.3)
LYMPHOCYTES # BLD AUTO: 9.4 % — LOW (ref 13–44)
MCHC RBC-ENTMCNC: 31.3 PG — SIGNIFICANT CHANGE UP (ref 27–34)
MCHC RBC-ENTMCNC: 33.2 GM/DL — SIGNIFICANT CHANGE UP (ref 32–36)
MCV RBC AUTO: 94.1 FL — SIGNIFICANT CHANGE UP (ref 80–100)
MONOCYTES # BLD AUTO: 0.87 K/UL — SIGNIFICANT CHANGE UP (ref 0–0.9)
MONOCYTES NFR BLD AUTO: 5.3 % — SIGNIFICANT CHANGE UP (ref 2–14)
NEUTROPHILS # BLD AUTO: 14.03 K/UL — HIGH (ref 1.8–7.4)
NEUTROPHILS NFR BLD AUTO: 84.5 % — HIGH (ref 43–77)
NRBC # BLD: 0 /100 WBCS — SIGNIFICANT CHANGE UP (ref 0–0)
PLATELET # BLD AUTO: 216 K/UL — SIGNIFICANT CHANGE UP (ref 150–400)
POTASSIUM SERPL-MCNC: 5.2 MMOL/L — SIGNIFICANT CHANGE UP (ref 3.5–5.3)
POTASSIUM SERPL-SCNC: 5.2 MMOL/L — SIGNIFICANT CHANGE UP (ref 3.5–5.3)
RBC # BLD: 3.93 M/UL — LOW (ref 4.2–5.8)
RBC # FLD: 15.4 % — HIGH (ref 10.3–14.5)
SODIUM SERPL-SCNC: 143 MMOL/L — SIGNIFICANT CHANGE UP (ref 135–145)
WBC # BLD: 16.57 K/UL — HIGH (ref 3.8–10.5)
WBC # FLD AUTO: 16.57 K/UL — HIGH (ref 3.8–10.5)

## 2024-06-01 PROCEDURE — 74176 CT ABD & PELVIS W/O CONTRAST: CPT | Mod: 26

## 2024-06-01 RX ORDER — SODIUM CHLORIDE 9 MG/ML
1000 INJECTION, SOLUTION INTRAVENOUS
Refills: 0 | Status: DISCONTINUED | OUTPATIENT
Start: 2024-06-01 | End: 2024-06-02

## 2024-06-01 RX ADMIN — HEPARIN SODIUM 5000 UNIT(S): 5000 INJECTION INTRAVENOUS; SUBCUTANEOUS at 17:23

## 2024-06-01 RX ADMIN — CEFEPIME 100 MILLIGRAM(S): 1 INJECTION, POWDER, FOR SOLUTION INTRAMUSCULAR; INTRAVENOUS at 17:23

## 2024-06-01 RX ADMIN — ATORVASTATIN CALCIUM 40 MILLIGRAM(S): 80 TABLET, FILM COATED ORAL at 21:27

## 2024-06-01 RX ADMIN — SODIUM CHLORIDE 125 MILLILITER(S): 9 INJECTION, SOLUTION INTRAVENOUS at 12:46

## 2024-06-01 RX ADMIN — OXYCODONE HYDROCHLORIDE 10 MILLIGRAM(S): 5 TABLET ORAL at 12:44

## 2024-06-01 RX ADMIN — CEFEPIME 100 MILLIGRAM(S): 1 INJECTION, POWDER, FOR SOLUTION INTRAMUSCULAR; INTRAVENOUS at 05:59

## 2024-06-01 RX ADMIN — Medication 5 MILLIGRAM(S): at 09:26

## 2024-06-01 RX ADMIN — OXYCODONE HYDROCHLORIDE 10 MILLIGRAM(S): 5 TABLET ORAL at 08:11

## 2024-06-01 RX ADMIN — HEPARIN SODIUM 5000 UNIT(S): 5000 INJECTION INTRAVENOUS; SUBCUTANEOUS at 01:19

## 2024-06-01 RX ADMIN — GABAPENTIN 300 MILLIGRAM(S): 400 CAPSULE ORAL at 17:23

## 2024-06-01 RX ADMIN — Medication 150 MILLIGRAM(S): at 17:23

## 2024-06-01 RX ADMIN — HEPARIN SODIUM 5000 UNIT(S): 5000 INJECTION INTRAVENOUS; SUBCUTANEOUS at 08:11

## 2024-06-01 RX ADMIN — SODIUM CHLORIDE 75 MILLILITER(S): 9 INJECTION, SOLUTION INTRAVENOUS at 16:19

## 2024-06-01 RX ADMIN — GABAPENTIN 300 MILLIGRAM(S): 400 CAPSULE ORAL at 05:59

## 2024-06-01 RX ADMIN — OXYCODONE HYDROCHLORIDE 10 MILLIGRAM(S): 5 TABLET ORAL at 08:41

## 2024-06-01 RX ADMIN — Medication 150 MILLIGRAM(S): at 05:58

## 2024-06-01 RX ADMIN — OXYCODONE HYDROCHLORIDE 10 MILLIGRAM(S): 5 TABLET ORAL at 13:14

## 2024-06-01 RX ADMIN — TAMSULOSIN HYDROCHLORIDE 0.4 MILLIGRAM(S): 0.4 CAPSULE ORAL at 21:28

## 2024-06-01 RX ADMIN — Medication 25 MILLIGRAM(S): at 06:00

## 2024-06-01 NOTE — PROGRESS NOTE ADULT - SUBJECTIVE AND OBJECTIVE BOX
UROLOGY DAILY PROGRESS NOTE:     Subjective: Patient seen and examined at bedside. Off 02    Objective:  Vital signs  T(F): , Max: 98.7 (06-01-24 @ 05:57)  HR: 94 (06-01-24 @ 09:27)  BP: 129/65 (06-01-24 @ 08:43)  SpO2: 91% (06-01-24 @ 09:27)  Wt(kg): --    I&O's Summary    31 May 2024 07:01 - 01 Jun 2024 07:00  --------------------------------------------------------  IN: 925 mL / OUT: 1280 mL / NET: -355 mL    01 Jun 2024 07:01  -  01 Jun 2024 10:47  --------------------------------------------------------  IN: 0 mL / OUT: 800 mL / NET: -800 mL    Gen: NAD  Pulm: No respiratory distress, no subcostal retractions  CV: RRR, no JVD  Abd: Soft, NT, ND  : Martinez draining clear red  Back: NT draining clear red     Labs:  06-01  16.57 / 37.0  /x      05-31  14.05 / 43.6  /1.44                           12.3   16.57 )-----------( 216      ( 01 Jun 2024 10:17 )             37.0     05-31    143  |  106  |  18  ----------------------------<  141<H>  4.8   |  22  |  1.44<H>    Ca    9.1      31 May 2024 12:36    PT/INR - ( 30 May 2024 15:39 )   PT: 10.9 sec;   INR: 1.04 ratio      PTT - ( 30 May 2024 15:39 )  PTT:35.1 sec

## 2024-06-01 NOTE — PROGRESS NOTE ADULT - ASSESSMENT
Patient is a 69 y/o Male with PMHx of CAD s/p stent, seizure on Keppra, HTN, recently admitted to [facility] (4/17-24) for UTI, found to have b/l nephrolithiasis including 2.5 cm R staghorn calculus and 5 mm distal L ureteral stone. UCx and BCx neg during the hospitalization. He was started on ertapenem followed by cefpodoxime for a 10-d course. Outpatient  repeat UCx grew pseudomonas, pt followed up with ID and midline was placed 2 days ago. He was started on cefepime. Pt is S/P R PCNL and L URS/stent       # S/P Stent placement  ABx  Urology care appreciated   MOnitor urine output  monitor renal fucntion  CT per uro     #Seizure disorder  COnt keppra   fall precautions   PTOT   home baclofen dose   SIzziness noted   Chck CT head , negative  IV hydration     # CAD  S/P Stent  AP and statin  BP control     # HTN/HLD    Lipid panel   statin   BP control

## 2024-06-01 NOTE — PROGRESS NOTE ADULT - SUBJECTIVE AND OBJECTIVE BOX
Name of Patient : MANUEL PETERSON  MRN: [id number]  Date of visit: 06-01-24       Subjective: Patient seen and examined. No new events except as noted.   doing okay       REVIEW OF SYSTEMS:    CONSTITUTIONAL: No weakness, fevers or chills  EYES/ENT: No visual changes;  No vertigo or throat pain   NECK: No pain or stiffness  RESPIRATORY: No cough, wheezing, hemoptysis; No shortness of breath  CARDIOVASCULAR: No chest pain or palpitations  GASTROINTESTINAL: No abdominal or epigastric pain. No nausea, vomiting, or hematemesis; No diarrhea or constipation. No melena or hematochezia.  GENITOURINARY: No dysuria, frequency or hematuria  NEUROLOGICAL: No numbness or weakness  SKIN: No itching, burning, rashes, or lesions   All other review of systems is negative unless indicated above.    MEDICATIONS:  MEDICATIONS  (STANDING):  atorvastatin 40 milliGRAM(s) Oral at bedtime  cefepime   IVPB      cefepime   IVPB 2000 milliGRAM(s) IV Intermittent every 12 hours  dextrose 5% + sodium chloride 0.45%. 1000 milliLiter(s) (75 mL/Hr) IV Continuous <Continuous>  gabapentin 300 milliGRAM(s) Oral two times a day  heparin   Injectable 5000 Unit(s) SubCutaneous every 8 hours  melatonin 3 milliGRAM(s) Oral once  metoprolol succinate ER 25 milliGRAM(s) Oral daily  pregabalin 150 milliGRAM(s) Oral two times a day  tamsulosin 0.4 milliGRAM(s) Oral at bedtime      PHYSICAL EXAM:  T(C): 37 (06-01-24 @ 20:10), Max: 37.1 (06-01-24 @ 05:57)  HR: 80 (06-01-24 @ 21:36) (72 - 94)  BP: 120/69 (06-01-24 @ 20:10) (103/57 - 150/78)  RR: 18 (06-01-24 @ 20:10) (17 - 18)  SpO2: 92% (06-01-24 @ 21:36) (91% - 99%)  Wt(kg): --  I&O's Summary    31 May 2024 07:01  -  01 Jun 2024 07:00  --------------------------------------------------------  IN: 925 mL / OUT: 1280 mL / NET: -355 mL    01 Jun 2024 07:01  -  01 Jun 2024 23:01  --------------------------------------------------------  IN: 1646 mL / OUT: 1325 mL / NET: 321 mL          Appearance: Normal	  HEENT:  PERRLA   Lymphatic: No lymphadenopathy   Cardiovascular: Normal S1 S2, no JVD  Respiratory: normal effort , clear  Gastrointestinal:  Soft, Non-tender  Skin: No rashes,  warm to touch  Psychiatry:  Mood & affect appropriate  Musculuskeletal: No edema    recent labs, Imaging and EKGs personally reviewed     05-31-24 @ 07:01  -  06-01-24 @ 07:00  --------------------------------------------------------  IN: 925 mL / OUT: 1280 mL / NET: -355 mL    06-01-24 @ 07:01  -  06-01-24 @ 23:01  --------------------------------------------------------  IN: 1646 mL / OUT: 1325 mL / NET: 321 mL

## 2024-06-02 LAB
ANION GAP SERPL CALC-SCNC: 14 MMOL/L — SIGNIFICANT CHANGE UP (ref 5–17)
BUN SERPL-MCNC: 23 MG/DL — SIGNIFICANT CHANGE UP (ref 7–23)
CALCIUM SERPL-MCNC: 9.2 MG/DL — SIGNIFICANT CHANGE UP (ref 8.4–10.5)
CHLORIDE SERPL-SCNC: 106 MMOL/L — SIGNIFICANT CHANGE UP (ref 96–108)
CO2 SERPL-SCNC: 24 MMOL/L — SIGNIFICANT CHANGE UP (ref 22–31)
CREAT SERPL-MCNC: 1.31 MG/DL — HIGH (ref 0.5–1.3)
CULTURE RESULTS: NO GROWTH — SIGNIFICANT CHANGE UP
EGFR: 59 ML/MIN/1.73M2 — LOW
GLUCOSE SERPL-MCNC: 137 MG/DL — HIGH (ref 70–99)
HCT VFR BLD CALC: 36.5 % — LOW (ref 39–50)
HGB BLD-MCNC: 11.7 G/DL — LOW (ref 13–17)
MCHC RBC-ENTMCNC: 30.1 PG — SIGNIFICANT CHANGE UP (ref 27–34)
MCHC RBC-ENTMCNC: 32.1 GM/DL — SIGNIFICANT CHANGE UP (ref 32–36)
MCV RBC AUTO: 93.8 FL — SIGNIFICANT CHANGE UP (ref 80–100)
NRBC # BLD: 0 /100 WBCS — SIGNIFICANT CHANGE UP (ref 0–0)
PLATELET # BLD AUTO: 198 K/UL — SIGNIFICANT CHANGE UP (ref 150–400)
POTASSIUM SERPL-MCNC: 4.5 MMOL/L — SIGNIFICANT CHANGE UP (ref 3.5–5.3)
POTASSIUM SERPL-SCNC: 4.5 MMOL/L — SIGNIFICANT CHANGE UP (ref 3.5–5.3)
RBC # BLD: 3.89 M/UL — LOW (ref 4.2–5.8)
RBC # FLD: 16 % — HIGH (ref 10.3–14.5)
SODIUM SERPL-SCNC: 144 MMOL/L — SIGNIFICANT CHANGE UP (ref 135–145)
SPECIMEN SOURCE: SIGNIFICANT CHANGE UP
WBC # BLD: 14.75 K/UL — HIGH (ref 3.8–10.5)
WBC # FLD AUTO: 14.75 K/UL — HIGH (ref 3.8–10.5)

## 2024-06-02 RX ORDER — ASPIRIN/CALCIUM CARB/MAGNESIUM 324 MG
81 TABLET ORAL DAILY
Refills: 0 | Status: DISCONTINUED | OUTPATIENT
Start: 2024-06-02 | End: 2024-06-08

## 2024-06-02 RX ADMIN — CEFEPIME 100 MILLIGRAM(S): 1 INJECTION, POWDER, FOR SOLUTION INTRAMUSCULAR; INTRAVENOUS at 18:08

## 2024-06-02 RX ADMIN — Medication 25 MILLIGRAM(S): at 05:34

## 2024-06-02 RX ADMIN — CEFEPIME 100 MILLIGRAM(S): 1 INJECTION, POWDER, FOR SOLUTION INTRAMUSCULAR; INTRAVENOUS at 05:35

## 2024-06-02 RX ADMIN — HEPARIN SODIUM 5000 UNIT(S): 5000 INJECTION INTRAVENOUS; SUBCUTANEOUS at 08:14

## 2024-06-02 RX ADMIN — ATORVASTATIN CALCIUM 40 MILLIGRAM(S): 80 TABLET, FILM COATED ORAL at 21:04

## 2024-06-02 RX ADMIN — OXYCODONE HYDROCHLORIDE 10 MILLIGRAM(S): 5 TABLET ORAL at 14:57

## 2024-06-02 RX ADMIN — OXYCODONE HYDROCHLORIDE 10 MILLIGRAM(S): 5 TABLET ORAL at 08:14

## 2024-06-02 RX ADMIN — HEPARIN SODIUM 5000 UNIT(S): 5000 INJECTION INTRAVENOUS; SUBCUTANEOUS at 01:28

## 2024-06-02 RX ADMIN — Medication 5 MILLIGRAM(S): at 14:57

## 2024-06-02 RX ADMIN — GABAPENTIN 300 MILLIGRAM(S): 400 CAPSULE ORAL at 18:08

## 2024-06-02 RX ADMIN — TAMSULOSIN HYDROCHLORIDE 0.4 MILLIGRAM(S): 0.4 CAPSULE ORAL at 21:04

## 2024-06-02 RX ADMIN — Medication 150 MILLIGRAM(S): at 05:34

## 2024-06-02 RX ADMIN — GABAPENTIN 300 MILLIGRAM(S): 400 CAPSULE ORAL at 05:35

## 2024-06-02 RX ADMIN — OXYCODONE HYDROCHLORIDE 10 MILLIGRAM(S): 5 TABLET ORAL at 08:44

## 2024-06-02 RX ADMIN — HEPARIN SODIUM 5000 UNIT(S): 5000 INJECTION INTRAVENOUS; SUBCUTANEOUS at 18:09

## 2024-06-02 RX ADMIN — OXYCODONE HYDROCHLORIDE 10 MILLIGRAM(S): 5 TABLET ORAL at 15:27

## 2024-06-02 RX ADMIN — Medication 150 MILLIGRAM(S): at 18:08

## 2024-06-02 NOTE — PROGRESS NOTE ADULT - SUBJECTIVE AND OBJECTIVE BOX
Name of Patient : MANUEL PETERSON  MRN: [id number]      Subjective: Patient seen and examined. No new events except as noted.     REVIEW OF SYSTEMS:    CONSTITUTIONAL: No weakness, fevers or chills  EYES/ENT: No visual changes;  No vertigo or throat pain   NECK: No pain or stiffness  RESPIRATORY: No cough, wheezing, hemoptysis; No shortness of breath  CARDIOVASCULAR: No chest pain or palpitations  GASTROINTESTINAL: No abdominal or epigastric pain. No nausea, vomiting, or hematemesis; No diarrhea or constipation. No melena or hematochezia.  GENITOURINARY: No dysuria, frequency or hematuria  NEUROLOGICAL: No numbness or weakness  SKIN: No itching, burning, rashes, or lesions   All other review of systems is negative unless indicated above.    MEDICATIONS:  MEDICATIONS  (STANDING):  aspirin enteric coated 81 milliGRAM(s) Oral daily  atorvastatin 40 milliGRAM(s) Oral at bedtime  cefepime   IVPB      cefepime   IVPB 2000 milliGRAM(s) IV Intermittent every 12 hours  gabapentin 300 milliGRAM(s) Oral two times a day  heparin   Injectable 5000 Unit(s) SubCutaneous every 8 hours  melatonin 3 milliGRAM(s) Oral once  metoprolol succinate ER 25 milliGRAM(s) Oral daily  pregabalin 150 milliGRAM(s) Oral two times a day  tamsulosin 0.4 milliGRAM(s) Oral at bedtime      PHYSICAL EXAM:  T(C): 36.7 (06-02-24 @ 21:46), Max: 36.8 (06-02-24 @ 08:39)  HR: 72 (06-02-24 @ 22:15) (72 - 97)  BP: 128/61 (06-02-24 @ 21:46) (128/61 - 156/71)  RR: 20 (06-02-24 @ 21:46) (17 - 20)  SpO2: 96% (06-02-24 @ 22:15) (91% - 96%)  Wt(kg): --  I&O's Summary    01 Jun 2024 07:01  -  02 Jun 2024 07:00  --------------------------------------------------------  IN: 1766 mL / OUT: 2175 mL / NET: -409 mL    02 Jun 2024 07:01  -  03 Jun 2024 00:12  --------------------------------------------------------  IN: 1095 mL / OUT: 2050 mL / NET: -955 mL          Appearance: Normal	  HEENT:  PERRLA   Lymphatic: No lymphadenopathy   Cardiovascular: Normal S1 S2, no JVD  Respiratory: normal effort , clear  Gastrointestinal:  Soft, Non-tender  Skin: No rashes,  warm to touch  Psychiatry:  Mood & affect appropriate  Musculuskeletal: No edema    recent labs, Imaging and EKGs personally reviewed     06-01-24 @ 07:01  -  06-02-24 @ 07:00  --------------------------------------------------------  IN: 1766 mL / OUT: 2175 mL / NET: -409 mL    06-02-24 @ 07:01  -  06-03-24 @ 00:12  --------------------------------------------------------  IN: 1095 mL / OUT: 2050 mL / NET: -955 mL                          11.7   14.75 )-----------( 198      ( 02 Jun 2024 07:39 )             36.5               06-02    144  |  106  |  23  ----------------------------<  137<H>  4.5   |  24  |  1.31<H>    Ca    9.2      02 Jun 2024 07:39                         Urinalysis Basic - ( 02 Jun 2024 07:39 )    Color: x / Appearance: x / SG: x / pH: x  Gluc: 137 mg/dL / Ketone: x  / Bili: x / Urobili: x   Blood: x / Protein: x / Nitrite: x   Leuk Esterase: x / RBC: x / WBC x   Sq Epi: x / Non Sq Epi: x / Bacteria: x

## 2024-06-02 NOTE — PROGRESS NOTE ADULT - ASSESSMENT
A/P: 68y Male s/p R PCNL, L URS    -Keep R PCN to drainage  -DVT prophylaxis/OOB  -Incentive spirometry  -Strict I&O's  -Analgesia and antiemetics as needed  -Will remove NT tomorrow with d/c planning to follow

## 2024-06-02 NOTE — PROGRESS NOTE ADULT - ASSESSMENT
Patient is a 69 y/o Male with PMHx of CAD s/p stent, seizure on Keppra, HTN, recently admitted to [facility] (4/17-24) for UTI, found to have b/l nephrolithiasis including 2.5 cm R staghorn calculus and 5 mm distal L ureteral stone. UCx and BCx neg during the hospitalization. He was started on ertapenem followed by cefpodoxime for a 10-d course. Outpatient  repeat UCx grew pseudomonas, pt followed up with ID and midline was placed 2 days ago. He was started on cefepime. Pt is S/P R PCNL and L URS/stent       # S/P Stent placement  ABx  Urology care appreciated   MOnitor urine output  monitor renal fucntion  CT per uro     #Seizure disorder  COnt keppra   fall precautions   PTOT   home baclofen dose   SIzziness noted   Chck CT head , negative  IV hydration     # CAD  S/P Stent  AP and statin  BP control     # HTN/HLD    Lipid panel   statin   BP control

## 2024-06-02 NOTE — PROGRESS NOTE ADULT - SUBJECTIVE AND OBJECTIVE BOX
UROLOGY DAILY PROGRESS NOTE:     Subjective: Patient seen and examined at bedside.     Objective:  Vital signs  T(F): , Max: 98.6 (06-01-24 @ 20:10)  HR: 97 (06-02-24 @ 08:59)  BP: 137/72 (06-02-24 @ 08:39)  SpO2: 94% (06-02-24 @ 08:59)  Wt(kg): --    I&O's Summary    01 Jun 2024 07:01  -  02 Jun 2024 07:00  --------------------------------------------------------  IN: 1766 mL / OUT: 2175 mL / NET: -409 mL    02 Jun 2024 07:01  -  02 Jun 2024 10:47  --------------------------------------------------------  IN: 0 mL / OUT: 150 mL / NET: -150 mL    Gen: NAD  Pulm: No respiratory distress, no subcostal retractions  CV: RRR, no JVD  Abd: Soft, NT, ND  : having incontinent voids -yellow   Back: R NT draining dark red urine     Labs:  06-02  14.75 / 36.5  /1.31   06-01  16.57 / 37.0  /1.27                           11.7   14.75 )-----------( 198      ( 02 Jun 2024 07:39 )             36.5     06-02    144  |  106  |  23  ----------------------------<  137<H>  4.5   |  24  |  1.31<H>    Ca    9.2      02 Jun 2024 07:39

## 2024-06-03 ENCOUNTER — TRANSCRIPTION ENCOUNTER (OUTPATIENT)
Age: 69
End: 2024-06-03

## 2024-06-03 LAB
ANION GAP SERPL CALC-SCNC: 16 MMOL/L — SIGNIFICANT CHANGE UP (ref 5–17)
BLD GP AB SCN SERPL QL: NEGATIVE — SIGNIFICANT CHANGE UP
BUN SERPL-MCNC: 20 MG/DL — SIGNIFICANT CHANGE UP (ref 7–23)
CALCIUM SERPL-MCNC: 9.3 MG/DL — SIGNIFICANT CHANGE UP (ref 8.4–10.5)
CHLORIDE SERPL-SCNC: 104 MMOL/L — SIGNIFICANT CHANGE UP (ref 96–108)
CO2 SERPL-SCNC: 22 MMOL/L — SIGNIFICANT CHANGE UP (ref 22–31)
CREAT SERPL-MCNC: 1.45 MG/DL — HIGH (ref 0.5–1.3)
EGFR: 52 ML/MIN/1.73M2 — LOW
GLUCOSE SERPL-MCNC: 110 MG/DL — HIGH (ref 70–99)
HCT VFR BLD CALC: 35.3 % — LOW (ref 39–50)
HGB BLD-MCNC: 11.6 G/DL — LOW (ref 13–17)
MCHC RBC-ENTMCNC: 30.4 PG — SIGNIFICANT CHANGE UP (ref 27–34)
MCHC RBC-ENTMCNC: 32.9 GM/DL — SIGNIFICANT CHANGE UP (ref 32–36)
MCV RBC AUTO: 92.4 FL — SIGNIFICANT CHANGE UP (ref 80–100)
NRBC # BLD: 0 /100 WBCS — SIGNIFICANT CHANGE UP (ref 0–0)
PLATELET # BLD AUTO: 187 K/UL — SIGNIFICANT CHANGE UP (ref 150–400)
POTASSIUM SERPL-MCNC: 3.9 MMOL/L — SIGNIFICANT CHANGE UP (ref 3.5–5.3)
POTASSIUM SERPL-SCNC: 3.9 MMOL/L — SIGNIFICANT CHANGE UP (ref 3.5–5.3)
RBC # BLD: 3.82 M/UL — LOW (ref 4.2–5.8)
RBC # FLD: 15.6 % — HIGH (ref 10.3–14.5)
RH IG SCN BLD-IMP: POSITIVE — SIGNIFICANT CHANGE UP
SODIUM SERPL-SCNC: 142 MMOL/L — SIGNIFICANT CHANGE UP (ref 135–145)
WBC # BLD: 12.24 K/UL — HIGH (ref 3.8–10.5)
WBC # FLD AUTO: 12.24 K/UL — HIGH (ref 3.8–10.5)

## 2024-06-03 PROCEDURE — 99222 1ST HOSP IP/OBS MODERATE 55: CPT

## 2024-06-03 RX ORDER — CEFEPIME 1 G/1
2 INJECTION, POWDER, FOR SOLUTION INTRAMUSCULAR; INTRAVENOUS
Qty: 12 | Refills: 0
Start: 2024-06-03 | End: 2024-06-05

## 2024-06-03 RX ORDER — LIDOCAINE 4 G/100G
1 CREAM TOPICAL DAILY
Refills: 0 | Status: DISCONTINUED | OUTPATIENT
Start: 2024-06-03 | End: 2024-06-08

## 2024-06-03 RX ORDER — BACLOFEN 100 %
10 POWDER (GRAM) MISCELLANEOUS EVERY 6 HOURS
Refills: 0 | Status: DISCONTINUED | OUTPATIENT
Start: 2024-06-03 | End: 2024-06-08

## 2024-06-03 RX ORDER — ACETAMINOPHEN 500 MG
2 TABLET ORAL
Qty: 0 | Refills: 0 | DISCHARGE
Start: 2024-06-03

## 2024-06-03 RX ADMIN — HEPARIN SODIUM 5000 UNIT(S): 5000 INJECTION INTRAVENOUS; SUBCUTANEOUS at 17:26

## 2024-06-03 RX ADMIN — HEPARIN SODIUM 5000 UNIT(S): 5000 INJECTION INTRAVENOUS; SUBCUTANEOUS at 01:15

## 2024-06-03 RX ADMIN — GABAPENTIN 300 MILLIGRAM(S): 400 CAPSULE ORAL at 17:26

## 2024-06-03 RX ADMIN — OXYCODONE HYDROCHLORIDE 10 MILLIGRAM(S): 5 TABLET ORAL at 08:35

## 2024-06-03 RX ADMIN — ATORVASTATIN CALCIUM 40 MILLIGRAM(S): 80 TABLET, FILM COATED ORAL at 21:51

## 2024-06-03 RX ADMIN — Medication 10 MILLIGRAM(S): at 17:26

## 2024-06-03 RX ADMIN — HEPARIN SODIUM 5000 UNIT(S): 5000 INJECTION INTRAVENOUS; SUBCUTANEOUS at 08:36

## 2024-06-03 RX ADMIN — Medication 150 MILLIGRAM(S): at 17:26

## 2024-06-03 RX ADMIN — Medication 81 MILLIGRAM(S): at 11:53

## 2024-06-03 RX ADMIN — Medication 150 MILLIGRAM(S): at 05:29

## 2024-06-03 RX ADMIN — TAMSULOSIN HYDROCHLORIDE 0.4 MILLIGRAM(S): 0.4 CAPSULE ORAL at 21:51

## 2024-06-03 RX ADMIN — OXYCODONE HYDROCHLORIDE 10 MILLIGRAM(S): 5 TABLET ORAL at 09:05

## 2024-06-03 RX ADMIN — Medication 5 MILLIGRAM(S): at 03:17

## 2024-06-03 RX ADMIN — Medication 25 MILLIGRAM(S): at 05:29

## 2024-06-03 RX ADMIN — CEFEPIME 100 MILLIGRAM(S): 1 INJECTION, POWDER, FOR SOLUTION INTRAMUSCULAR; INTRAVENOUS at 17:27

## 2024-06-03 RX ADMIN — CEFEPIME 100 MILLIGRAM(S): 1 INJECTION, POWDER, FOR SOLUTION INTRAMUSCULAR; INTRAVENOUS at 05:28

## 2024-06-03 RX ADMIN — Medication 10 MILLIGRAM(S): at 11:52

## 2024-06-03 RX ADMIN — GABAPENTIN 300 MILLIGRAM(S): 400 CAPSULE ORAL at 05:29

## 2024-06-03 NOTE — DISCHARGE NOTE PROVIDER - HOSPITAL COURSE
This is a 68 year old male who was admitted to receive IV antibiotics prior to his right PCNL and left ureteroscopy.  He underwent the procedure the next day and required bipap.  CT scan showed minimal residual calculi in the left kidney.  The serrano catheter was removed and he was able to void without retention. This is a 68 year old male who was admitted to receive IV antibiotics prior to his right PCNL and left ureteroscopy.  He underwent the procedure the next day.  CT scan showed minimal residual calculi in the left kidney.  The serrano catheter was removed and he was able to void without retention.  Pt required supplemental oxygen during his admisison that was eventually weaned off.  Infectious disease was consulted for antibiotic management and recommended that the patient be discharged on cefepime through 6/7.  Nathalie melvin This is a 68 year old male who was admitted to receive IV antibiotics prior to his right PCNL and left ureteroscopy.  He underwent the procedure the next day.  CT scan showed minimal residual calculi in the left kidney.  The serrano catheter was removed and he was able to void without retention.  Pt required supplemental oxygen during his admission that was eventually weaned off.  Infectious disease was consulted for antibiotic management and recommended that the patient be discharged on cefepime through 6/7.  Prior to discharge..... This is a 68 year old male who was admitted to receive IV antibiotics prior to his right PCNL and left ureteroscopy.  He underwent the procedure the next day.  CT scan showed minimal residual calculi in the left kidney.  The serrano catheter was removed and he was able to void without retention.  Pt required supplemental oxygen during his admission that was eventually weaned off.  Infectious disease was consulted for antibiotic management and recommended that the patient be discharged on cefepime through 6/7. This is a 68 year old male who was admitted to receive IV antibiotics prior to his right PCNL and left ureteroscopy.  He underwent the procedure the next day.  CT scan showed minimal residual calculi in the left kidney.  The serrano catheter was removed and he was able to void without retention.  Pt required supplemental oxygen during his admission that was eventually weaned off.  Infectious disease was consulted for antibiotic management and recommended cefepime through 6/7. Completed course inpatient. Pt did well post op. At the time of discharge, the patient was hemodynamically stable, was tolerating PO diet, was voiding urine and passing stool, was ambulating, and was comfortable with adequate pain control. Pt/family given discharge plan/instructions and agrees with plan. Home PT was recommended.

## 2024-06-03 NOTE — CONSULT NOTE ADULT - SUBJECTIVE AND OBJECTIVE BOX
Patient is a 68y old  Male who presents with a chief complaint of preop abx (03 Jun 2024 07:17)      HPI:  68-yo M w/ PMH of CAD s/p stent, seizure d/o on Keppra, HTN, and recent admission at [facility] (4/17-24) for UTI, found to have b/l nephrolithiasis including 2.5 cm staghorn calculus and 5 mm distal L ureteral stone. UCx and BCx neg during the hospitalization. S/p ertapenem followed by cefpodoxime for a 10-d course. Outpatient Uro f/u done - repeat UCx w/ Pseudomonas (10-49k CFU/mL, S to cefepime, ceftazidime, and Zosyn), resistant to FQ. Seen at [facility] outpatient clinic 5/24. Started on cefepime 2g IV Q12H (renal dose for Pseudomonas), and now s/p elective cystoscopy w/ percutaneous nephrolithotomy on 5/31. Large stone in the R and smaller stone in the L kidney noted.           prior hospital charts reviewed [X]  primary team notes reviewed [X]  other consultant notes reviewed [X]    PAST MEDICAL & SURGICAL HISTORY:  Myocardial infarction  KAEL x1 MI 2005, stent RCA      Back pain  Chronic back pain      Spinal stenosis      Lumbar herniated disc      Hypertension      Narcotic dependence  5/28/16 for withdrawal ,pt currently on Dialudid 8 mg every 4-6 hours /day      Osteoarthritis      GERD (gastroesophageal reflux disease)      Cerebral aneurysm rupture  1997 clipped, no residual      Femur fracture, right  2/16, surgical revision of right hip      Sacroiliitis, not elsewhere classified  Unspecified Inflammatory spondylopathy,Sacral and sacrococcygeal region      Brain aneurysm  s/p clipping, not compatible with MRI      Seizures      CAD (coronary artery disease)  s/p MI and RCA stenting      Chronic pain  Patient has an Intrathecal pump s/p removal in 2016      Cerebral aneurysm  I997 with clips      S/P lumbar fusion  L1-S1:2002      History of right inguinal hernia repair  x2      Hx of appendectomy  6/1969      Hx of laminectomy  lumbar-2002      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 mos.-14 yo      Stented coronary artery  KAEL x 1 to RCA      History of hip replacement  8/15, revision 2/16 after falling and fracturing right femur      S/P left knee arthroscopy      H/O arthroscopy of shoulder  right X 2, left X 1      History of throat surgery  surgical exicision cyst 1986      Fusion of sacral region of spine  5/2017      History of back surgery  x5      S/P inguinal hernia repair      H/O fracture of femur  s/p repair          Allergies  No Known Allergies        ANTIMICROBIALS:  cefepime   IVPB    cefepime   IVPB 2000 every 12 hours      OTHER MEDS: MEDICATIONS  (STANDING):  acetaminophen     Tablet .. 650 every 6 hours PRN  aspirin enteric coated 81 daily  atorvastatin 40 at bedtime  baclofen 10 every 6 hours  gabapentin 300 two times a day  heparin   Injectable 5000 every 8 hours  melatonin 3 once  metoprolol succinate ER 25 daily  oxyCODONE    IR 5 every 4 hours PRN  oxyCODONE    IR 10 every 4 hours PRN  pregabalin 150 two times a day  tamsulosin 0.4 at bedtime      SOCIAL HISTORY:   hx smoking  non-smoker    FAMILY HISTORY:  No pertinent family history in first degree relatives        REVIEW OF SYSTEMS  [  ] ROS unobtainable because:    [  ] All other systems negative except as noted below:	    Constitutional:  [ ] fever [ ] chills  [ ] weight loss  [ ] weakness  Skin:  [ ] rash [ ] phlebitis	  Eyes: [ ] icterus [ ] pain  [ ] discharge	  ENMT: [ ] sore throat  [ ] thrush [ ] ulcers [ ] exudates  Respiratory: [ ] dyspnea [ ] hemoptysis [ ] cough [ ] sputum	  Cardiovascular:  [ ] chest pain [ ] palpitations [ ] edema	  Gastrointestinal:  [ ] nausea [ ] vomiting [ ] diarrhea [ ] constipation [ ] pain	  Genitourinary:  [ ] dysuria [ ] frequency [ ] hematuria [ ] discharge [ ] flank pain  [ ] incontinence  Musculoskeletal:  [ ] myalgias [ ] arthralgias [ ] arthritis  [ ] back pain  Neurological:  [ ] headache [ ] seizures  [ ] confusion/altered mental status  Psychiatric:  [ ] anxiety [ ] depression	  Hematology/Lymphatics:  [ ] lymphadenopathy  Endocrine:  [ ] adrenal [ ] thyroid  Allergic/Immunologic:	 [ ] transplant [ ] seasonal    Vital Signs Last 24 Hrs  T(F): 98 (06-03-24 @ 13:09), Max: 98.7 (06-01-24 @ 05:57)    Vital Signs Last 24 Hrs  HR: 80 (06-03-24 @ 13:09) (71 - 80)  BP: 120/55 (06-03-24 @ 13:09) (120/55 - 157/79)  RR: 16 (06-03-24 @ 13:09)  SpO2: 89% (06-03-24 @ 13:09) (89% - 96%)  Wt(kg): --    PHYSICAL EXAM:  Constitutional: non-toxic, no distress  HEAD/EYES: anicteric, no conjunctival injection  ENT:  supple, no thrush  Cardiovascular:   normal S1, S2, no murmur, no edema  Respiratory:  clear BS bilaterally, no wheezes, no rales  GI:  soft, non-tender, normal bowel sounds  :  no serrano, no CVA tenderness  Musculoskeletal:  no synovitis, normal ROM  Neurologic: awake and alert, normal strength, no focal findings  Skin:  no rash, no erythema, no phlebitis  Heme/Onc: no lymphadenopathy   Psychiatric:  awake, alert, appropriate mood                                11.6   12.24 )-----------( 187      ( 03 Jun 2024 07:02 )             35.3       06-03    142  |  104  |  20  ----------------------------<  110<H>  3.9   |  22  |  1.45<H>    Ca    9.3      03 Jun 2024 07:01        Urinalysis Basic - ( 03 Jun 2024 07:01 )    Color: x / Appearance: x / SG: x / pH: x  Gluc: 110 mg/dL / Ketone: x  / Bili: x / Urobili: x   Blood: x / Protein: x / Nitrite: x   Leuk Esterase: x / RBC: x / WBC x   Sq Epi: x / Non Sq Epi: x / Bacteria: x        MICROBIOLOGY:        Culture - Other (collected 05-31-24 @ 21:18)  Source: .Other right kidney stone for culture  Final Report (06-02-24 @ 16:05):    No growth                    RADIOLOGY:  imaging below personally reviewed  Imaging below independently reviewed.  < from: CT Renal Stone Hunt (06.01.24 @ 16:44) >    ACC: [id number] EXAM:  CT RENAL STONE MARC   ORDERED BY: FADY CARTWRIGHT     PROCEDURE DATE:  06/01/2024          INTERPRETATION:  CLINICAL INFORMATION: S/p PCNL reassess stone burden    COMPARISON: 4/17/2024    CONTRAST/COMPLICATIONS:  IV Contrast: NONE  Oral Contrast: NONE  Complications: None reported at time of study completion    PROCEDURE:  CT of the Abdomen and Pelvis was performed.  Sagittal and coronal reformats were performed.    FINDINGS:  LOWER CHEST: Trace bilateral effusions/atelectasis. Coronary artery   calcification.    LIVER: Within normal limits.  BILE DUCTS: Normal caliber.  GALLBLADDER: Within normal limits.  SPLEEN: Within normal limits.  PANCREAS: Mild atrophy.  ADRENALS: Within normal limits.  KIDNEYS/URETERS: Interval placement of a right-sided percutaneous   nephrostomy tube and a right ureteral stent which reaches the distal   right ureter approximately 6.6 cm from the right UVJ. A crescentic   hypodense right posterior paranephric collection noted measuring   approximately 1.4 x 9.0 x 10.0 cm, likely postprocedural hematoma. There   is also small amount of air within the right paranephric space.   Hyperdense material noted within the right renal collecting system,   likely debris/hemorrhagic product. Punctate 1 to 2 mm right midpole   calcification/calculus noted. Left renal calculi are noted measuring up   to 4 mm without associated hydronephrosis.    BLADDER: Partially distended.  REPRODUCTIVE ORGANS: Prostate is enlarged.    BOWEL: No bowel obstruction. Appendix is not visualized. No evidence of   inflammation in the pericecal region. Moderate to large amount of stool   is noted in the rectum.  PERITONEUM: No ascites.  VESSELS: Atherosclerotic changes.  RETROPERITONEUM/LYMPH NODES: No lymphadenopathy.  ABDOMINAL WALL: Within normal limits.  BONES: Degenerative changes. Status post right hip arthroplasty.   Pedicular screws and spinal rods centimeters multiple level.    IMPRESSION:  Interval placement of a percutaneous nephrostomy tube and a right   ureteral stent with the distal tip approximately 6.6 cm from the right   UVJ.  Punctate 1 to 2 mm right midpole calculus.  Right posterior paranephric air and hyperdense fluid measuring up to 10   cm noted, likely postprocedural.  Left renal calculi measuring to 4 mm without associated hydronephrosis.  Small bilateral pleural effusion/atelectasis.  Enlarged prostate, correlate with PSA level.        --- End of Report ---            RADHA MATTA MD; Attending Radiologist  This document has beenelectronically signed. Jun 1 2024  5:38PM    < end of copied text >   Patient is a 68y old  Male who presents with a chief complaint of preop abx (03 Jun 2024 07:17)      HPI:  68-yo M w/ PMH of CAD s/p stent, seizure d/o on Keppra, HTN, and recent admission at [facility] (4/17-24) for UTI, found to have b/l nephrolithiasis including 2.5 cm staghorn calculus and 5 mm distal L ureteral stone. UCx and BCx neg during the hospitalization. S/p ertapenem followed by cefpodoxime for a 10-d course. Outpatient Uro f/u done - repeat UCx w/ Pseudomonas (10-49k CFU/mL, S to cefepime, ceftazidime, and Zosyn), resistant to FQ. Seen at [facility] outpatient clinic 5/24. Started on cefepime 2g IV Q12H (renal dose for Pseudomonas), and now s/p elective cystoscopy w/ percutaneous nephrolithotomy on 5/31. Large stone in the R and smaller stone in the L kidney noted. 6/1 CT stone hunt protocol revealed 1-2 mm R midpole punctate calculus and L renal calculi up to 4 mm w/o associated hydronephrosis.      Patient was seen and examined at bedside. Denies fever, chills, SOB, or cough. PCT site sore to the touch.    prior hospital charts reviewed [X]  primary team notes reviewed [X]  other consultant notes reviewed [X]    PAST MEDICAL & SURGICAL HISTORY:  Myocardial infarction  KAEL x1 MI 2005, stent RCA      Back pain  Chronic back pain      Spinal stenosis      Lumbar herniated disc      Hypertension      Narcotic dependence  5/28/16 for withdrawal ,pt currently on Dialudid 8 mg every 4-6 hours /day      Osteoarthritis      GERD (gastroesophageal reflux disease)      Cerebral aneurysm rupture  1997 clipped, no residual      Femur fracture, right  2/16, surgical revision of right hip      Sacroiliitis, not elsewhere classified  Unspecified Inflammatory spondylopathy,Sacral and sacrococcygeal region      Brain aneurysm  s/p clipping, not compatible with MRI      Seizures      CAD (coronary artery disease)  s/p MI and RCA stenting      Chronic pain  Patient has an Intrathecal pump s/p removal in 2016      Cerebral aneurysm  I997 with clips      S/P lumbar fusion  L1-S1:2002      History of right inguinal hernia repair  x2      Hx of appendectomy  6/1969      Hx of laminectomy  lumbar-2002      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      Stented coronary artery  KAEL x 1 to RCA      History of hip replacement  8/15, revision 2/16 after falling and fracturing right femur      S/P left knee arthroscopy      H/O arthroscopy of shoulder  right X 2, left X 1      History of throat surgery  surgical exicision cyst 1986      Fusion of sacral region of spine  5/2017      History of back surgery  x5      S/P inguinal hernia repair      H/O fracture of femur  s/p repair          Allergies  No Known Allergies        ANTIMICROBIALS:  cefepime   IVPB    cefepime   IVPB 2000 every 12 hours      OTHER MEDS: MEDICATIONS  (STANDING):  acetaminophen     Tablet .. 650 every 6 hours PRN  aspirin enteric coated 81 daily  atorvastatin 40 at bedtime  baclofen 10 every 6 hours  gabapentin 300 two times a day  heparin   Injectable 5000 every 8 hours  melatonin 3 once  metoprolol succinate ER 25 daily  oxyCODONE    IR 5 every 4 hours PRN  oxyCODONE    IR 10 every 4 hours PRN  pregabalin 150 two times a day  tamsulosin 0.4 at bedtime      SOCIAL HISTORY:     No tobacco or illicit drugs    FAMILY HISTORY:  No pertinent family history in first degree relatives: infected nephrolithiasis        REVIEW OF SYSTEMS  [  ] ROS unobtainable because:    [X] All other systems negative except as noted below:	    Constitutional:  [ ] fever [ ] chills  [ ] weight loss  [ ] weakness  Skin:  [ ] rash [ ] phlebitis	  Eyes: [ ] icterus [ ] pain  [ ] discharge	  ENMT: [ ] sore throat  [ ] thrush [ ] ulcers [ ] exudates  Respiratory: [ ] dyspnea [ ] hemoptysis [ ] cough [ ] sputum	  Cardiovascular:  [ ] chest pain [ ] palpitations [ ] edema	  Gastrointestinal:  [ ] nausea [ ] vomiting [ ] diarrhea [ ] constipation [ ] pain	  Genitourinary:  [ ] dysuria [ ] frequency [ ] hematuria [ ] discharge [X] flank pain  [ ] incontinence  Musculoskeletal:  [ ] myalgias [ ] arthralgias [ ] arthritis  [ ] back pain  Neurological:  [ ] headache [ ] seizures  [ ] confusion/altered mental status  Psychiatric:  [ ] anxiety [ ] depression	  Hematology/Lymphatics:  [ ] lymphadenopathy  Endocrine:  [ ] adrenal [ ] thyroid  Allergic/Immunologic:	 [ ] transplant [ ] seasonal    Vital Signs Last 24 Hrs  T(F): 98 (06-03-24 @ 13:09), Max: 98.7 (06-01-24 @ 05:57)    Vital Signs Last 24 Hrs  HR: 80 (06-03-24 @ 13:09) (71 - 80)  BP: 120/55 (06-03-24 @ 13:09) (120/55 - 157/79)  RR: 16 (06-03-24 @ 13:09)  SpO2: 89% (06-03-24 @ 13:09) (89% - 96%)  Wt(kg): --    PHYSICAL EXAM:  Constitutional: non-toxic, no distress  HEAD/EYES: anicteric, no conjunctival injection  ENT:  supple, no thrush  Cardiovascular:   normal S1, S2, no murmur, RRR  Respiratory:  clear BS bilaterally, no wheezes  GI:  soft, non-tender, normal bowel sounds  : R percutaneous tube w/ bloody discharge, sore to the tough at the insertion site  Musculoskeletal:  no BLE edema  Neurologic: awake and oriented x3  Skin:  no rash, no erythema, no phlebitis  Heme/Onc: no lymphadenopathy   Psychiatric:  awake, alert, appropriate mood                                11.6   12.24 )-----------( 187      ( 03 Jun 2024 07:02 )             35.3       06-03    142  |  104  |  20  ----------------------------<  110<H>  3.9   |  22  |  1.45<H>    Ca    9.3      03 Jun 2024 07:01        Urinalysis Basic - ( 03 Jun 2024 07:01 )    Color: x / Appearance: x / SG: x / pH: x  Gluc: 110 mg/dL / Ketone: x  / Bili: x / Urobili: x   Blood: x / Protein: x / Nitrite: x   Leuk Esterase: x / RBC: x / WBC x   Sq Epi: x / Non Sq Epi: x / Bacteria: x        MICROBIOLOGY:        Culture - Other (collected 05-31-24 @ 21:18)  Source: .Other right kidney stone for culture  Final Report (06-02-24 @ 16:05):    No growth                    RADIOLOGY:  imaging below personally reviewed  Imaging below independently reviewed.  < from: CT Renal Stone Hunt (06.01.24 @ 16:44) >    ACC: [id number] EXAM:  CT RENAL STONE MARC   ORDERED BY: FADY CARTWRIGHT     PROCEDURE DATE:  06/01/2024          INTERPRETATION:  CLINICAL INFORMATION: S/p PCNL reassess stone burden    COMPARISON: 4/17/2024    CONTRAST/COMPLICATIONS:  IV Contrast: NONE  Oral Contrast: NONE  Complications: None reported at time of study completion    PROCEDURE:  CT of the Abdomen and Pelvis was performed.  Sagittal and coronal reformats were performed.    FINDINGS:  LOWER CHEST: Trace bilateral effusions/atelectasis. Coronary artery   calcification.    LIVER: Within normal limits.  BILE DUCTS: Normal caliber.  GALLBLADDER: Within normal limits.  SPLEEN: Within normal limits.  PANCREAS: Mild atrophy.  ADRENALS: Within normal limits.  KIDNEYS/URETERS: Interval placement of a right-sided percutaneous   nephrostomy tube and a right ureteral stent which reaches the distal   right ureter approximately 6.6 cm from the right UVJ. A crescentic   hypodense right posterior paranephric collection noted measuring   approximately 1.4 x 9.0 x 10.0 cm, likely postprocedural hematoma. There   is also small amount of air within the right paranephric space.   Hyperdense material noted within the right renal collecting system,   likely debris/hemorrhagic product. Punctate 1 to 2 mm right midpole   calcification/calculus noted. Left renal calculi are noted measuring up   to 4 mm without associated hydronephrosis.    BLADDER: Partially distended.  REPRODUCTIVE ORGANS: Prostate is enlarged.    BOWEL: No bowel obstruction. Appendix is not visualized. No evidence of   inflammation in the pericecal region. Moderate to large amount of stool   is noted in the rectum.  PERITONEUM: No ascites.  VESSELS: Atherosclerotic changes.  RETROPERITONEUM/LYMPH NODES: No lymphadenopathy.  ABDOMINAL WALL: Within normal limits.  BONES: Degenerative changes. Status post right hip arthroplasty.   Pedicular screws and spinal rods centimeters multiple level.    IMPRESSION:  Interval placement of a percutaneous nephrostomy tube and a right   ureteral stent with the distal tip approximately 6.6 cm from the right   UVJ.  Punctate 1 to 2 mm right midpole calculus.  Right posterior paranephric air and hyperdense fluid measuring up to 10   cm noted, likely postprocedural.  Left renal calculi measuring to 4 mm without associated hydronephrosis.  Small bilateral pleural effusion/atelectasis.  Enlarged prostate, correlate with PSA level.        --- End of Report ---            RADHA MATTA MD; Attending Radiologist  This document has beenelectronically signed. Jun 1 2024  5:38PM    < end of copied text >   Patient is a 68y old  Male who presents with a chief complaint of preop abx (03 Jun 2024 07:17)      HPI:  68-yo M w/ PMH of CAD s/p stent, seizure d/o on Keppra, HTN, and recent admission at [facility] (4/17-24) for UTI, found to have b/l nephrolithiasis including 2.5 cm staghorn calculus and 5 mm distal L ureteral stone. UCx and BCx neg during the hospitalization. S/p ertapenem followed by cefpodoxime for a 10-d course. Outpatient Uro f/u done - repeat UCx w/ Pseudomonas (10-49k CFU/mL, S to cefepime, ceftazidime, and Zosyn), resistant to FQ. Seen at [facility] outpatient clinic 5/24. Started on cefepime 2g IV Q12H (renal dose for Pseudomonas), and now s/p elective cystoscopy w/ percutaneous nephrolithotomy on 5/31. Large stone in the R and smaller stone in the L kidney noted. 6/1 CT stone hunt protocol revealed 1-2 mm R midpole punctate calculus and L renal calculi up to 4 mm w/o associated hydronephrosis.      Patient was seen and examined at bedside. Denies fever, chills, SOB, or cough. PCT site sore to the touch.    prior hospital charts reviewed [X]  primary team notes reviewed [X]  other consultant notes reviewed [X]    PAST MEDICAL & SURGICAL HISTORY:  Myocardial infarction  KAEL x1 MI 2005, stent RCA      Back pain  Chronic back pain      Spinal stenosis      Lumbar herniated disc      Hypertension      Narcotic dependence  5/28/16 for withdrawal ,pt currently on Dialudid 8 mg every 4-6 hours /day      Osteoarthritis      GERD (gastroesophageal reflux disease)      Cerebral aneurysm rupture  1997 clipped, no residual      Femur fracture, right  2/16, surgical revision of right hip      Sacroiliitis, not elsewhere classified  Unspecified Inflammatory spondylopathy,Sacral and sacrococcygeal region      Brain aneurysm  s/p clipping, not compatible with MRI      Seizures      CAD (coronary artery disease)  s/p MI and RCA stenting      Chronic pain  Patient has an Intrathecal pump s/p removal in 2016      Cerebral aneurysm  I997 with clips      S/P lumbar fusion  L1-S1:2002      History of right inguinal hernia repair  x2      Hx of appendectomy  6/1969      Hx of laminectomy  lumbar-2002      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      Stented coronary artery  KAEL x 1 to RCA      History of hip replacement  8/15, revision 2/16 after falling and fracturing right femur      S/P left knee arthroscopy      H/O arthroscopy of shoulder  right X 2, left X 1      History of throat surgery  surgical exicision cyst 1986      Fusion of sacral region of spine  5/2017      History of back surgery  x5      S/P inguinal hernia repair      H/O fracture of femur  s/p repair          Allergies  No Known Allergies        ANTIMICROBIALS:  cefepime   IVPB    cefepime   IVPB 2000 every 12 hours      OTHER MEDS: MEDICATIONS  (STANDING):  acetaminophen     Tablet .. 650 every 6 hours PRN  aspirin enteric coated 81 daily  atorvastatin 40 at bedtime  baclofen 10 every 6 hours  gabapentin 300 two times a day  heparin   Injectable 5000 every 8 hours  melatonin 3 once  metoprolol succinate ER 25 daily  oxyCODONE    IR 5 every 4 hours PRN  oxyCODONE    IR 10 every 4 hours PRN  pregabalin 150 two times a day  tamsulosin 0.4 at bedtime      SOCIAL HISTORY:     No tobacco or illicit drugs    FAMILY HISTORY:  No pertinent family history in first degree relatives: infected nephrolithiasis        REVIEW OF SYSTEMS  [  ] ROS unobtainable because:    [X] All other systems negative except as noted below:	    Constitutional:  [ ] fever [ ] chills  [ ] weight loss  [ ] weakness  Skin:  [ ] rash [ ] phlebitis	  Eyes: [ ] icterus [ ] pain  [ ] discharge	  ENMT: [ ] sore throat  [ ] thrush [ ] ulcers [ ] exudates  Respiratory: [ ] dyspnea [ ] hemoptysis [ ] cough [ ] sputum	  Cardiovascular:  [ ] chest pain [ ] palpitations [ ] edema	  Gastrointestinal:  [ ] nausea [ ] vomiting [ ] diarrhea [ ] constipation [ ] pain	  Genitourinary:  [ ] dysuria [ ] frequency [ ] hematuria [ ] discharge [X] flank pain  [ ] incontinence  Musculoskeletal:  [ ] myalgias [ ] arthralgias [ ] arthritis  [ ] back pain  Neurological:  [ ] headache [ ] seizures  [ ] confusion/altered mental status  Psychiatric:  [ ] anxiety [ ] depression	  Hematology/Lymphatics:  [ ] lymphadenopathy  Endocrine:  [ ] adrenal [ ] thyroid  Allergic/Immunologic:	 [ ] transplant [ ] seasonal    Vital Signs Last 24 Hrs  T(F): 98 (06-03-24 @ 13:09), Max: 98.7 (06-01-24 @ 05:57)    Vital Signs Last 24 Hrs  HR: 80 (06-03-24 @ 13:09) (71 - 80)  BP: 120/55 (06-03-24 @ 13:09) (120/55 - 157/79)  RR: 16 (06-03-24 @ 13:09)  SpO2: 89% (06-03-24 @ 13:09) (89% - 96%)  Wt(kg): --    PHYSICAL EXAM:  Constitutional: non-toxic, no distress  HEAD/EYES: anicteric, no conjunctival injection  ENT:  supple, no thrush  Cardiovascular:   normal S1, S2, no murmur, RRR  Respiratory:  clear BS bilaterally, no wheezes  GI:  soft, non-tender, normal bowel sounds  : R percutaneous tube w/ bloody discharge, sore to the tough at the insertion site  Musculoskeletal:  no BLE edema  Neurologic: awake and oriented x3  Skin:  no rash, no erythema, no phlebitis; midline intact  Heme/Onc: no lymphadenopathy   Psychiatric:  awake, alert, appropriate mood                                11.6   12.24 )-----------( 187      ( 03 Jun 2024 07:02 )             35.3       06-03    142  |  104  |  20  ----------------------------<  110<H>  3.9   |  22  |  1.45<H>    Ca    9.3      03 Jun 2024 07:01        Urinalysis Basic - ( 03 Jun 2024 07:01 )    Color: x / Appearance: x / SG: x / pH: x  Gluc: 110 mg/dL / Ketone: x  / Bili: x / Urobili: x   Blood: x / Protein: x / Nitrite: x   Leuk Esterase: x / RBC: x / WBC x   Sq Epi: x / Non Sq Epi: x / Bacteria: x        MICROBIOLOGY:        Culture - Other (collected 05-31-24 @ 21:18)  Source: .Other right kidney stone for culture  Final Report (06-02-24 @ 16:05):    No growth                    RADIOLOGY:  imaging below personally reviewed  Imaging below independently reviewed.  < from: CT Renal Stone Hunt (06.01.24 @ 16:44) >    ACC: [id number] EXAM:  CT RENAL STONE MARC   ORDERED BY: FADY CARTWRIGHT     PROCEDURE DATE:  06/01/2024          INTERPRETATION:  CLINICAL INFORMATION: S/p PCNL reassess stone burden    COMPARISON: 4/17/2024    CONTRAST/COMPLICATIONS:  IV Contrast: NONE  Oral Contrast: NONE  Complications: None reported at time of study completion    PROCEDURE:  CT of the Abdomen and Pelvis was performed.  Sagittal and coronal reformats were performed.    FINDINGS:  LOWER CHEST: Trace bilateral effusions/atelectasis. Coronary artery   calcification.    LIVER: Within normal limits.  BILE DUCTS: Normal caliber.  GALLBLADDER: Within normal limits.  SPLEEN: Within normal limits.  PANCREAS: Mild atrophy.  ADRENALS: Within normal limits.  KIDNEYS/URETERS: Interval placement of a right-sided percutaneous   nephrostomy tube and a right ureteral stent which reaches the distal   right ureter approximately 6.6 cm from the right UVJ. A crescentic   hypodense right posterior paranephric collection noted measuring   approximately 1.4 x 9.0 x 10.0 cm, likely postprocedural hematoma. There   is also small amount of air within the right paranephric space.   Hyperdense material noted within the right renal collecting system,   likely debris/hemorrhagic product. Punctate 1 to 2 mm right midpole   calcification/calculus noted. Left renal calculi are noted measuring up   to 4 mm without associated hydronephrosis.    BLADDER: Partially distended.  REPRODUCTIVE ORGANS: Prostate is enlarged.    BOWEL: No bowel obstruction. Appendix is not visualized. No evidence of   inflammation in the pericecal region. Moderate to large amount of stool   is noted in the rectum.  PERITONEUM: No ascites.  VESSELS: Atherosclerotic changes.  RETROPERITONEUM/LYMPH NODES: No lymphadenopathy.  ABDOMINAL WALL: Within normal limits.  BONES: Degenerative changes. Status post right hip arthroplasty.   Pedicular screws and spinal rods centimeters multiple level.    IMPRESSION:  Interval placement of a percutaneous nephrostomy tube and a right   ureteral stent with the distal tip approximately 6.6 cm from the right   UVJ.  Punctate 1 to 2 mm right midpole calculus.  Right posterior paranephric air and hyperdense fluid measuring up to 10   cm noted, likely postprocedural.  Left renal calculi measuring to 4 mm without associated hydronephrosis.  Small bilateral pleural effusion/atelectasis.  Enlarged prostate, correlate with PSA level.        --- End of Report ---            RADHA MATTA MD; Attending Radiologist  This document has beenelectronically signed. Jun 1 2024  5:38PM    < end of copied text >   Patient is a 68y old  Male who presents with a chief complaint of preop abx (03 Jun 2024 07:17)      HPI:  68-yo M w/ PMH of CAD s/p stent, seizure d/o on Keppra, HTN, and recent admission at [facility] (4/17-24) for UTI, found to have b/l nephrolithiasis including 2.5 cm staghorn calculus and 5 mm distal L ureteral stone. UCx and BCx neg during the hospitalization. S/p ertapenem followed by cefpodoxime for a 10-d course. Outpatient Uro f/u done - repeat UCx w/ Pseudomonas (10-49k CFU/mL, S to cefepime, ceftazidime, and Zosyn), resistant to FQ. Seen at ID outpatient clinic 5/24. Started on cefepime 2g IV Q12H (renal dose for Pseudomonas), and now s/p elective cystoscopy w/ percutaneous nephrolithotomy on 5/31. Large stone in the R and smaller stone in the L kidney noted. 6/1 CT stone hunt protocol revealed 1-2 mm R midpole punctate calculus and L renal calculi up to 4 mm w/o associated hydronephrosis.      Patient was seen and examined at bedside. Denies fever, chills, SOB, or cough. PCT site sore to the touch.    prior hospital charts reviewed [X]  primary team notes reviewed [X]  other consultant notes reviewed [X]    PAST MEDICAL & SURGICAL HISTORY:  Myocardial infarction  KAEL x1 MI 2005, stent RCA      Back pain  Chronic back pain      Spinal stenosis      Lumbar herniated disc      Hypertension      Narcotic dependence  5/28/16 for withdrawal ,pt currently on Dialudid 8 mg every 4-6 hours /day      Osteoarthritis      GERD (gastroesophageal reflux disease)      Cerebral aneurysm rupture  1997 clipped, no residual      Femur fracture, right  2/16, surgical revision of right hip      Sacroiliitis, not elsewhere classified  Unspecified Inflammatory spondylopathy,Sacral and sacrococcygeal region      Brain aneurysm  s/p clipping, not compatible with MRI      Seizures      CAD (coronary artery disease)  s/p MI and RCA stenting      Chronic pain  Patient has an Intrathecal pump s/p removal in 2016      Cerebral aneurysm  I997 with clips      S/P lumbar fusion  L1-S1:2002      History of right inguinal hernia repair  x2      Hx of appendectomy  6/1969      Hx of laminectomy  lumbar-2002      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 mos.-14 yo      Stented coronary artery  KAEL x 1 to RCA      History of hip replacement  8/15, revision 2/16 after falling and fracturing right femur      S/P left knee arthroscopy      H/O arthroscopy of shoulder  right X 2, left X 1      History of throat surgery  surgical exicision cyst 1986      Fusion of sacral region of spine  5/2017      History of back surgery  x5      S/P inguinal hernia repair      H/O fracture of femur  s/p repair          Allergies  No Known Allergies        ANTIMICROBIALS:  cefepime   IVPB    cefepime   IVPB 2000 every 12 hours      OTHER MEDS: MEDICATIONS  (STANDING):  acetaminophen     Tablet .. 650 every 6 hours PRN  aspirin enteric coated 81 daily  atorvastatin 40 at bedtime  baclofen 10 every 6 hours  gabapentin 300 two times a day  heparin   Injectable 5000 every 8 hours  melatonin 3 once  metoprolol succinate ER 25 daily  oxyCODONE    IR 5 every 4 hours PRN  oxyCODONE    IR 10 every 4 hours PRN  pregabalin 150 two times a day  tamsulosin 0.4 at bedtime      SOCIAL HISTORY:     No tobacco or illicit drugs    FAMILY HISTORY:  No pertinent family history in first degree relatives: infected nephrolithiasis        REVIEW OF SYSTEMS  [  ] ROS unobtainable because:    [X] All other systems negative except as noted below:	    Constitutional:  [ ] fever [ ] chills  [ ] weight loss  [ ] weakness  Skin:  [ ] rash [ ] phlebitis	  Eyes: [ ] icterus [ ] pain  [ ] discharge	  ENMT: [ ] sore throat  [ ] thrush [ ] ulcers [ ] exudates  Respiratory: [ ] dyspnea [ ] hemoptysis [ ] cough [ ] sputum	  Cardiovascular:  [ ] chest pain [ ] palpitations [ ] edema	  Gastrointestinal:  [ ] nausea [ ] vomiting [ ] diarrhea [ ] constipation [ ] pain	  Genitourinary:  [ ] dysuria [ ] frequency [ ] hematuria [ ] discharge [X] flank pain  [ ] incontinence  Musculoskeletal:  [ ] myalgias [ ] arthralgias [ ] arthritis  [ ] back pain  Neurological:  [ ] headache [ ] seizures  [ ] confusion/altered mental status  Psychiatric:  [ ] anxiety [ ] depression	  Hematology/Lymphatics:  [ ] lymphadenopathy  Endocrine:  [ ] adrenal [ ] thyroid  Allergic/Immunologic:	 [ ] transplant [ ] seasonal    Vital Signs Last 24 Hrs  T(F): 98 (06-03-24 @ 13:09), Max: 98.7 (06-01-24 @ 05:57)    Vital Signs Last 24 Hrs  HR: 80 (06-03-24 @ 13:09) (71 - 80)  BP: 120/55 (06-03-24 @ 13:09) (120/55 - 157/79)  RR: 16 (06-03-24 @ 13:09)  SpO2: 89% (06-03-24 @ 13:09) (89% - 96%)  Wt(kg): --    PHYSICAL EXAM:  Constitutional: non-toxic, no distress  HEAD/EYES: anicteric, no conjunctival injection  ENT:  supple, no thrush  Cardiovascular:   normal S1, S2, no murmur, RRR  Respiratory:  clear BS bilaterally, no wheezes  GI:  soft, non-tender, normal bowel sounds  : R percutaneous tube w/ bloody discharge, sore to the tough at the insertion site  Musculoskeletal:  no BLE edema  Neurologic: awake and oriented x3  Skin:  no rash, no erythema, no phlebitis; midline intact  Heme/Onc: no lymphadenopathy   Psychiatric:  awake, alert, appropriate mood                                11.6   12.24 )-----------( 187      ( 03 Jun 2024 07:02 )             35.3       06-03    142  |  104  |  20  ----------------------------<  110<H>  3.9   |  22  |  1.45<H>    Ca    9.3      03 Jun 2024 07:01        Urinalysis Basic - ( 03 Jun 2024 07:01 )    Color: x / Appearance: x / SG: x / pH: x  Gluc: 110 mg/dL / Ketone: x  / Bili: x / Urobili: x   Blood: x / Protein: x / Nitrite: x   Leuk Esterase: x / RBC: x / WBC x   Sq Epi: x / Non Sq Epi: x / Bacteria: x        MICROBIOLOGY:        Culture - Other (collected 05-31-24 @ 21:18)  Source: .Other right kidney stone for culture  Final Report (06-02-24 @ 16:05):    No growth          Culture - Urine        Order Start Date & Time  05- 15:12  Result Date & Time    05- 16:53    Ordering Clinician  HOENIG, DAVID  Result Status  Final    Specimen  .Urine  Last Updated At  [facility]    Observation Date & Time  05- 15:12  Lab Number  [id number]    Specimen Received Date & Time    05- 18:36  Order Number  [id number]    Age at Time of Test  68 Years       Preliminary Culture Result   10,000 - 49,000 CFU/mL Pseudomonas aeruginosa     Final      ORGANISM   Pseudomonas aeruginosa       Yes    Culture Result   10,000 - 49,000 CFU/mL Pseudomonas aeruginosa     Final        Pseudomonas aeruginosa  Minimum Inhibitory Concentration  Amikacin S (<=16)   Aztreonam I (16)   Cefepime S (8)   Ceftazidime S (4)   Ciprofloxacin R (>2)   Imipenem S (2)   Levofloxacin R (>4)   Meropenem S (<=1)   Piperacillin/Tazobactam S (16)                  RADIOLOGY:  imaging below personally reviewed  Imaging below independently reviewed.  < from: CT Renal Stone Hunt (06.01.24 @ 16:44) >    ACC: [id number] EXAM:  CT RENAL STONE MARC   ORDERED BY: FADY CARTWRIGHT     PROCEDURE DATE:  06/01/2024          INTERPRETATION:  CLINICAL INFORMATION: S/p PCNL reassess stone burden    COMPARISON: 4/17/2024    CONTRAST/COMPLICATIONS:  IV Contrast: NONE  Oral Contrast: NONE  Complications: None reported at time of study completion    PROCEDURE:  CT of the Abdomen and Pelvis was performed.  Sagittal and coronal reformats were performed.    FINDINGS:  LOWER CHEST: Trace bilateral effusions/atelectasis. Coronary artery   calcification.    LIVER: Within normal limits.  BILE DUCTS: Normal caliber.  GALLBLADDER: Within normal limits.  SPLEEN: Within normal limits.  PANCREAS: Mild atrophy.  ADRENALS: Within normal limits.  KIDNEYS/URETERS: Interval placement of a right-sided percutaneous   nephrostomy tube and a right ureteral stent which reaches the distal   right ureter approximately 6.6 cm from the right UVJ. A crescentic   hypodense right posterior paranephric collection noted measuring   approximately 1.4 x 9.0 x 10.0 cm, likely postprocedural hematoma. There   is also small amount of air within the right paranephric space.   Hyperdense material noted within the right renal collecting system,   likely debris/hemorrhagic product. Punctate 1 to 2 mm right midpole   calcification/calculus noted. Left renal calculi are noted measuring up   to 4 mm without associated hydronephrosis.    BLADDER: Partially distended.  REPRODUCTIVE ORGANS: Prostate is enlarged.    BOWEL: No bowel obstruction. Appendix is not visualized. No evidence of   inflammation in the pericecal region. Moderate to large amount of stool   is noted in the rectum.  PERITONEUM: No ascites.  VESSELS: Atherosclerotic changes.  RETROPERITONEUM/LYMPH NODES: No lymphadenopathy.  ABDOMINAL WALL: Within normal limits.  BONES: Degenerative changes. Status post right hip arthroplasty.   Pedicular screws and spinal rods centimeters multiple level.    IMPRESSION:  Interval placement of a percutaneous nephrostomy tube and a right   ureteral stent with the distal tip approximately 6.6 cm from the right   UVJ.  Punctate 1 to 2 mm right midpole calculus.  Right posterior paranephric air and hyperdense fluid measuring up to 10   cm noted, likely postprocedural.  Left renal calculi measuring to 4 mm without associated hydronephrosis.  Small bilateral pleural effusion/atelectasis.  Enlarged prostate, correlate with PSA level.        --- End of Report ---            RADHA MATTA MD; Attending Radiologist  This document has beenelectronically signed. Jun 1 2024  5:38PM    < end of copied text >

## 2024-06-03 NOTE — DISCHARGE NOTE PROVIDER - NSDCCPCAREPLAN_GEN_ALL_CORE_FT
PRINCIPAL DISCHARGE DIAGNOSIS  Diagnosis: Bilateral nephrolithiasis  Assessment and Plan of Treatment: Please follow up with Dr. Hoenig in 1-2 weeks.  Call [phone] to schedule/confirm your appointment.  Call or follow up sooner with fevers, chills, nausea, vomiting, increasing pain, or with other concerns.       PRINCIPAL DISCHARGE DIAGNOSIS  Diagnosis: Bilateral nephrolithiasis  Assessment and Plan of Treatment: Please follow up with Dr. Hoenig in 1-2 weeks.  Call [phone] to schedule/confirm your appointment.  Call or follow up sooner with fevers, chills, nausea, vomiting, increasing pain, or with other concerns.  There is a wound in the back that may leak urine until it closes up completely  Cover the wound with a clean gauze and tape until it heals  If you have a pouch over the wound, empty the pouch regularly as it fills with urine.  You may remove the entire apparatus when there is no more drainage or follow up with the urologist to have it removed  Take tylenol for pain, senna to prevent constipation, and the antibiotics  Drink plenty of fluids  Follow with Dr Hoenig  within 2 weeks  Call the office at [phone] if you have fevers of 100.8F or greater, heavy bleeding in the urine with clots, worsening difficulty urinating.        SECONDARY DISCHARGE DIAGNOSES  Diagnosis: CAD (coronary artery disease)  Assessment and Plan of Treatment: continue aspirin and follow up routinely with your cardiologist    Diagnosis: Seizures  Assessment and Plan of Treatment: continue keppra and follow up routinely wiht your neurologist    Diagnosis: Hypertension  Assessment and Plan of Treatment: Take your blood pressure medications daily and follow up routinely with your primary care doctor.

## 2024-06-03 NOTE — PATIENT PROFILE ADULT. - NS PRO AD PATIENT TYPE
"Ruthy Mcrae is a 28 y.o. female on day 9 of admission presenting with Major depressive disorder, recurrent (CMS-HCC).      Subjective   The patient was seen and examined. I reviewed the chart and vital signs from overnight. I reviewed previous notes. I reviewed medications, administered overnight and their reported benefits or side effects. I met with the patient first, then with the patient and her mother. Patient reported that she feels \"different\" no depression but still has high anxiety, crying more through weekend. Noted patient has been working in her journal, asking questions. Patient reported she does not know if the med will work, \"what if it doesn't\". I asked, her what if \"it does?\".  She stated that her neg thoughts have not changed over the weekend. I explained, the medicine, dose not erase memory, or changed distortions. The medicine is to reduce the secondary responses of anxiety and depression that reduce the ability to address and change these distortions in therapy. The cognitive piece, the thoughts, is what cbt is for. We are looking for a group, family is considering [employer] op. She is reporting anxiety 4 and depression 5, slept ok on adding back Remeron.     Last Recorded Vitals  Blood pressure 96/63, pulse 106, temperature 36.7 °C (98.1 °F), resp. rate 14, height 1.778 m (5' 10\"), weight 75.4 kg (166 lb 3.6 oz), SpO2 96%.    Review of Systems    Physical Exam  Mental Status Exam:   General: CF with ocd, anxiety, depression   Appearance: Appears stated age, in own attire fair grooming and hygiene  Attitude: cooperative, calm, engaged in her care   Behavior: tearful, help seeking, doing the work, addressing thoughts   Motor Activity: No eps or td. Normal gait/station. normal muscle tone/bulk.  Speech: Normal rate, volume, tone, spontaneous, fluent.  Mood: depression 5 and anxiety 4  Affect: reactive, anxious  Thought Process: Organized, linear, goal directed.  Thought Content: denied " auditory and visual hallucinations, elicted ocd level thinking boardering on fixed delusions, ruminative  Thought Perception: denies hallucinations. does not appear to be responding to hallucinatory stimuli  Cognition: Alert, oriented x3. Adequate fund of knowledge. Grossly intact.   Insight: fair; as patient recognizes symptoms of illness and need for recommended treatments.  Judgment: fair; actively seeking help.     IMPRESSION:  SI  COLLIN  Major Depressive disorder, severe with paranoid delusions  OCD     Plan:    Monitor sleep, anxiety  Luvox 25mg bid  Zyprexa 5mg at bedtime  Milieu therapy    5/30: luvox decreases enzymatic breakdown of Zyprexa increasing latter drug levels. Reduce Zyprexa to 2.5mg at bedtime and increase luvox to 50mg am and 25mg at bedtime today,  with goal to stop Zyprexa prior to discharge if possible   5/31: stop luvox, start Cymbalta 30mg in am, stop Zyprexa start Abilify 2mg not and 5mg in am  Start Remeron 15mg at bedtime for sleep  6/3/24: started abilify now at 7.5mg am no eps. Remeron 15mg for sleep. Monitoring decrease in depression and anxiety values. High anxiety noted, crying. Will continue to monitor.    I spent 22 minutes in the professional and overall care of this patient.      Brittani Bernabe MD         Health Care Proxy (HCP)

## 2024-06-03 NOTE — DISCHARGE NOTE PROVIDER - CARE PROVIDER_API CALL
Hoenig, David Mayer  Urology  [address] [place] [id number]  Phone: [phone]  Fax: [phone]  Follow Up Time: 2 weeks

## 2024-06-03 NOTE — DISCHARGE NOTE PROVIDER - NSDCFUADDINST_GEN_ALL_CORE_FT
PAIN CONTROL: You may take 650 mg of Tylenol every 4-6 hours. Do not exceed more than 4000mg or 4 grams of Tylenol daily. You may take Tylenol and Ibuprofen as needed for pain. If you were prescribed narcotic pain medication, take as directed. You should also take senna to prevent constipation.    BATHING: Please do not submerge wound underwater. You may shower and/or sponge bathe.    ACTIVITY: No heavy lifting or straining. Otherwise, you may return to your usual level of physical activity. If you are taking narcotic pain medications (such as oxycodone), do NOT drive a car, operate machinery or make important decisions.    DIET: Return to your usual diet    NOTIFY YOUR SURGEON IF: You have any bleeding that does not stop, any fevers (over 100.4F) or chills, persistent nausea/vomiting, persistent diarrhea, your pain is not controlled on your discharge pain medications, heavy bleeding in the urine, inability to urinate, or other worrisome symptoms arise.    FOLLOW UP:  1. Please call your surgeon to make a follow up appointment within two weeks of discharge  2: Please follow up with your primary care physician within 1-2 weeks regarding your hospitalization.

## 2024-06-03 NOTE — PROGRESS NOTE ADULT - SUBJECTIVE AND OBJECTIVE BOX
Name of Patient : MANUEL PETERSON  MRN: [id number]  Date of visit: 06-03-24     Subjective: Patient seen and examined. No new events except as noted.     REVIEW OF SYSTEMS:    CONSTITUTIONAL: No weakness, fevers or chills  EYES/ENT: No visual changes;  No vertigo or throat pain   NECK: No pain or stiffness  RESPIRATORY: No cough, wheezing, hemoptysis; No shortness of breath  CARDIOVASCULAR: No chest pain or palpitations  GASTROINTESTINAL: No abdominal or epigastric pain. No nausea, vomiting, or hematemesis; No diarrhea or constipation. No melena or hematochezia.  GENITOURINARY: No dysuria, frequency or hematuria  NEUROLOGICAL: No numbness or weakness  SKIN: No itching, burning, rashes, or lesions   All other review of systems is negative unless indicated above.    MEDICATIONS:  MEDICATIONS  (STANDING):  aspirin enteric coated 81 milliGRAM(s) Oral daily  atorvastatin 40 milliGRAM(s) Oral at bedtime  baclofen 10 milliGRAM(s) Oral every 6 hours  cefepime   IVPB      cefepime   IVPB 2000 milliGRAM(s) IV Intermittent every 12 hours  gabapentin 300 milliGRAM(s) Oral two times a day  heparin   Injectable 5000 Unit(s) SubCutaneous every 8 hours  melatonin 3 milliGRAM(s) Oral once  metoprolol succinate ER 25 milliGRAM(s) Oral daily  pregabalin 150 milliGRAM(s) Oral two times a day  tamsulosin 0.4 milliGRAM(s) Oral at bedtime      PHYSICAL EXAM:  T(C): 36.8 (06-03-24 @ 16:00), Max: 36.8 (06-03-24 @ 00:41)  HR: 66 (06-03-24 @ 16:00) (66 - 80)  BP: 123/56 (06-03-24 @ 16:00) (120/55 - 157/79)  RR: 18 (06-03-24 @ 16:00) (17 - 20)  SpO2: 98% (06-03-24 @ 16:00) (91% - 98%)  Wt(kg): --  I&O's Summary    02 Jun 2024 07:01  -  03 Jun 2024 07:00  --------------------------------------------------------  IN: 1145 mL / OUT: 2400 mL / NET: -1255 mL    03 Jun 2024 07:01  -  03 Jun 2024 22:12  --------------------------------------------------------  IN: 100 mL / OUT: 1050 mL / NET: -950 mL          Appearance: Normal	  HEENT:  PERRLA   Lymphatic: No lymphadenopathy   Cardiovascular: Normal S1 S2, no JVD  Respiratory: normal effort , clear  Gastrointestinal:  Soft, Non-tender  Skin: No rashes,  warm to touch  Psychiatry:  Mood & affect appropriate  Musculuskeletal: No edema    recent labs, Imaging and EKGs personally reviewed     06-02-24 @ 07:01  -  06-03-24 @ 07:00  --------------------------------------------------------  IN: 1145 mL / OUT: 2400 mL / NET: -1255 mL    06-03-24 @ 07:01  -  06-03-24 @ 22:12  --------------------------------------------------------  IN: 100 mL / OUT: 1050 mL / NET: -950 mL                          11.6   12.24 )-----------( 187      ( 03 Jun 2024 07:02 )             35.3               06-03    142  |  104  |  20  ----------------------------<  110<H>  3.9   |  22  |  1.45<H>    Ca    9.3      03 Jun 2024 07:01                         Urinalysis Basic - ( 03 Jun 2024 07:01 )    Color: x / Appearance: x / SG: x / pH: x  Gluc: 110 mg/dL / Ketone: x  / Bili: x / Urobili: x   Blood: x / Protein: x / Nitrite: x   Leuk Esterase: x / RBC: x / WBC x   Sq Epi: x / Non Sq Epi: x / Bacteria: x

## 2024-06-03 NOTE — DISCHARGE NOTE PROVIDER - NSDCMRMEDTOKEN_GEN_ALL_CORE_FT
acetaminophen 325 mg oral tablet: 2 tab(s) orally every 6 hours As needed Mild Pain (1 - 3)  aspirin 81 mg oral delayed release tablet: 1 tab(s) orally once a day  baclofen 20 mg oral tablet: 1 tab(s) orally every 6 hours As needed Muscle Spasm  cefepime 2 g injection: 2 gram(s) intravenously every 12 hours DIagnosis: complicated UTI with nephrolithiasis  gabapentin 300 mg oral capsule: 1 cap(s) orally 2 times a day  levETIRAcetam 750 mg oral tablet, extended release: 1 tab(s) orally once a day (at bedtime)  metoprolol succinate 25 mg oral tablet, extended release: 0.5 tab(s) orally once a day (at bedtime)  pregabalin 150 mg oral capsule: 1 cap(s) orally 2 times a day patient takes 1-2 times a day depending on pain level  rosuvastatin 10 mg oral tablet: 1 tab(s) orally once a day (at bedtime)  tamsulosin 0.4 mg oral capsule: 1 cap(s) orally once a day in the morning   acetaminophen 325 mg oral tablet: 2 tab(s) orally every 6 hours As needed Mild Pain (1 - 3)  aspirin 81 mg oral delayed release tablet: 1 tab(s) orally once a day  baclofen 20 mg oral tablet: 1 tab(s) orally every 6 hours As needed Muscle Spasm  cefepime 2 g intravenous injection: 2 gram(s) intravenously every 8 hours STOP DATE: 6/7/24.  ID physician: Dr Ernesto Sparks MD  Tel#: [phone]  gabapentin 300 mg oral capsule: 1 cap(s) orally 2 times a day  levETIRAcetam 750 mg oral tablet, extended release: 1 tab(s) orally once a day (at bedtime)  metoprolol succinate 25 mg oral tablet, extended release: 0.5 tab(s) orally once a day (at bedtime)  pregabalin 150 mg oral capsule: 1 cap(s) orally 2 times a day patient takes 1-2 times a day depending on pain level  rosuvastatin 10 mg oral tablet: 1 tab(s) orally once a day (at bedtime)  tamsulosin 0.4 mg oral capsule: 1 cap(s) orally once a day in the morning   acetaminophen 325 mg oral tablet: 2 tab(s) orally every 6 hours As needed Mild Pain (1 - 3)  aspirin 81 mg oral delayed release tablet: 1 tab(s) orally once a day  baclofen 20 mg oral tablet: 1 tab(s) orally every 6 hours As needed Muscle Spasm  gabapentin 300 mg oral capsule: 1 cap(s) orally 2 times a day  levETIRAcetam 750 mg oral tablet, extended release: 1 tab(s) orally once a day (at bedtime)  metoprolol succinate 25 mg oral tablet, extended release: 0.5 tab(s) orally once a day (at bedtime)  pregabalin 150 mg oral capsule: 1 cap(s) orally 2 times a day patient takes 1-2 times a day depending on pain level  rosuvastatin 10 mg oral tablet: 1 tab(s) orally once a day (at bedtime)  tamsulosin 0.4 mg oral capsule: 1 cap(s) orally once a day in the morning

## 2024-06-03 NOTE — PROGRESS NOTE ADULT - ASSESSMENT
A/P: 68y Male s/p R PCNL, L URS, POD#3    -AM labs  -gentle irrigation of R NT today, keep NT and color check  -continue cefepime  -monitor I's and O's  -Analgesia and antiemetics as needed  -DVT prophylaxis/OOB/Incentive spirometry A/P: 68y Male s/p R PCNL, L URS, POD#3    -AM labs  -gentle irrigation of R NT today, keep NT and color check  -continue cefepime  -monitor I's and O's  -pt requiring 2L oxygen overnight, will wean and encourage incentive spirometry  -Analgesia and antiemetics as needed  -DVT prophylaxis/OOB/Incentive spirometry

## 2024-06-03 NOTE — DISCHARGE NOTE PROVIDER - NSDCCPTREATMENT_GEN_ALL_CORE_FT
PRINCIPAL PROCEDURE  Procedure: Cystoscopy with percutaneous nephrolithotomy (PCNL) and balloon occlusion of ureter  Findings and Treatment:       SECONDARY PROCEDURE  Procedure: Cystoscopy with left ureteroscopy  Findings and Treatment:

## 2024-06-03 NOTE — CONSULT NOTE ADULT - ASSESSMENT
patient is post op   stent placement   ABx  monitor output  monitor renal function   full consult to follow 
68-yo M w/ PMH of CAD s/p stent, seizure d/o on Keppra, HTN, and recent admission at [facility] (4/17-24) for UTI, found to have b/l nephrolithiasis including 2.5 cm staghorn calculus and 5 mm distal L ureteral stone. UCx and BCx neg during the hospitalization. S/p ertapenem followed by cefpodoxime for a 10-d course. Outpatient Uro f/u done - repeat UCx w/ Pseudomonas (10-49k CFU/mL, S to cefepime, ceftazidime, and Zosyn), resistant to FQ. Seen at ID outpatient clinic 5/24. Started on cefepime 2g IV Q12H (renal dose for Pseudomonas), and now s/p elective cystoscopy w/ percutaneous nephrolithotomy on 5/31. Large stone in the R and smaller stone in the L kidney noted. 6/1 CT stone hunt protocol revealed 1-2 mm R midpole punctate calculus and L renal calculi up to 4 mm w/o associated hydronephrosis. WBC downtrending. TONY worsening at this time.    #Pseudomonas UTI  #Nephrolithiasis  #TONY  #Staghorn calculus  #Mediation management  #Leukocytosis    Recommendations:  - Monitor for WBC and renal improvement. If renal function improves, might need to increase cefepime dosing schedule  - Tentatively target 7-d post op (last day 6/7)  - Can continue with cefepime 2g IV Q12H for now.    Plan discussed with primary team ACP.  Thank you for this consult. Inpatient ID team will follow.    Ernesto Sparks MD, PhD  Attending Physician  Division of Infectious Diseases  Department of Medicine    Please contact through MS Teams message.  Office: [phone] (after 5 PM or weekend)

## 2024-06-03 NOTE — PROGRESS NOTE ADULT - ASSESSMENT
Patient is a 69 y/o Male with PMHx of CAD s/p stent, seizure on Keppra, HTN, recently admitted to [facility] (4/17-24) for UTI, found to have b/l nephrolithiasis including 2.5 cm R staghorn calculus and 5 mm distal L ureteral stone. UCx and BCx neg during the hospitalization. He was started on ertapenem followed by cefpodoxime for a 10-d course. Outpatient  repeat UCx grew pseudomonas, pt followed up with ID and midline was placed 2 days ago. He was started on cefepime. Pt is S/P R PCNL and L URS/stent       # S/P Stent placement  ABx  Urology care appreciated   MOnitor urine output  monitor renal function  CT per uro     #Seizure disorder  COnt keppra   fall precautions   PTOT   home baclofen dose   SIzziness noted   Chck CT head , negative  IV hydration     # CAD  S/P Stent  AP and statin  BP control     # HTN/HLD    Lipid panel   statin   BP control

## 2024-06-03 NOTE — DISCHARGE NOTE PROVIDER - PROVIDER RX CONTACT NUMBER
Contacted pt's Mother- states that Deandre's cough and sinus congestion is worsening, continues to vomit every morning, fevers on and off. Per Dr Ross's note, abx can be prescribed. (azithromycin 12 mg/kg/dose once daily maximum 500 mg/dose x 5 days).  Would like script sent to [facility] in [place], no known allergies.    [phone]

## 2024-06-03 NOTE — DISCHARGE NOTE PROVIDER - CARE PROVIDERS DIRECT ADDRESSES
[email or web address]

## 2024-06-03 NOTE — PROGRESS NOTE ADULT - SUBJECTIVE AND OBJECTIVE BOX
The patient was seen and examined at bedside.  No acute events overnight and the patient is without acute complaints this AM.    T(C): 36.7 (06-03-24 @ 05:29), Max: 36.8 (06-02-24 @ 08:39)  HR: 71 (06-03-24 @ 05:31) (71 - 97)  BP: 157/79 (06-03-24 @ 05:29) (128/61 - 157/79)  RR: 20 (06-03-24 @ 05:29) (18 - 20)  SpO2: 95% (06-03-24 @ 05:31) (91% - 96%)  Wt(kg): --    Physical Exam:    General: NAD, A+Ox3  Abdomen: soft, non-tender, non-distended  Back: dressing clean/dry/intact      06-02 @ 07:01  -  06-03 @ 07:00  --------------------------------------------------------  IN: 1145 mL / OUT: 2400 mL / NET: -1255 mL      void - 300cc    R NT - 600cc dark red   The patient was seen and examined at bedside.  No acute events overnight and the patient is without acute complaints this AM.    T(C): 36.7 (06-03-24 @ 05:29), Max: 36.8 (06-02-24 @ 08:39)  HR: 71 (06-03-24 @ 05:31) (71 - 97)  BP: 157/79 (06-03-24 @ 05:29) (128/61 - 157/79)  RR: 20 (06-03-24 @ 05:29) (18 - 20)  SpO2: 95% (06-03-24 @ 05:31) (91% - 96%)  Wt(kg): --    Physical Exam:    General: NAD, A+Ox3  Abdomen: soft, non-tender, non-distended  Back: dressing clean/dry/intact  urine from nephrostomy with older blood      06-02 @ 07:01  -  06-03 @ 07:00  --------------------------------------------------------  IN: 1145 mL / OUT: 2400 mL / NET: -1255 mL      void - 300cc    R NT - 600cc dark red   The patient was seen and examined at bedside.  No acute events overnight and the patient is without acute complaints this AM.    T(C): 36.7 (06-03-24 @ 05:29), Max: 36.8 (06-02-24 @ 08:39)  HR: 71 (06-03-24 @ 05:31) (71 - 97)  BP: 157/79 (06-03-24 @ 05:29) (128/61 - 157/79)  RR: 20 (06-03-24 @ 05:29) (18 - 20)  SpO2: 95% (06-03-24 @ 05:31) (91% - 96%)  Wt(kg): --    Physical Exam:    General: NAD, A+Ox3  Abdomen: soft, non-tender, non-distended  Back: dressing clean/dry/intact, no swelling  urine from nephrostomy with older blood      06-02 @ 07:01  -  06-03 @ 07:00  --------------------------------------------------------  IN: 1145 mL / OUT: 2400 mL / NET: -1255 mL      void - 300cc clear    R NT - 600cc dark red

## 2024-06-04 LAB
ANION GAP SERPL CALC-SCNC: 12 MMOL/L — SIGNIFICANT CHANGE UP (ref 5–17)
BUN SERPL-MCNC: 22 MG/DL — SIGNIFICANT CHANGE UP (ref 7–23)
CALCIUM SERPL-MCNC: 9 MG/DL — SIGNIFICANT CHANGE UP (ref 8.4–10.5)
CHLORIDE SERPL-SCNC: 104 MMOL/L — SIGNIFICANT CHANGE UP (ref 96–108)
CO2 SERPL-SCNC: 26 MMOL/L — SIGNIFICANT CHANGE UP (ref 22–31)
CREAT SERPL-MCNC: 1.36 MG/DL — HIGH (ref 0.5–1.3)
EGFR: 56 ML/MIN/1.73M2 — LOW
GLUCOSE SERPL-MCNC: 128 MG/DL — HIGH (ref 70–99)
HCT VFR BLD CALC: 35.2 % — LOW (ref 39–50)
HGB BLD-MCNC: 11.7 G/DL — LOW (ref 13–17)
MCHC RBC-ENTMCNC: 30.8 PG — SIGNIFICANT CHANGE UP (ref 27–34)
MCHC RBC-ENTMCNC: 33.2 GM/DL — SIGNIFICANT CHANGE UP (ref 32–36)
MCV RBC AUTO: 92.6 FL — SIGNIFICANT CHANGE UP (ref 80–100)
NRBC # BLD: 0 /100 WBCS — SIGNIFICANT CHANGE UP (ref 0–0)
PLATELET # BLD AUTO: 197 K/UL — SIGNIFICANT CHANGE UP (ref 150–400)
POTASSIUM SERPL-MCNC: 4.4 MMOL/L — SIGNIFICANT CHANGE UP (ref 3.5–5.3)
POTASSIUM SERPL-SCNC: 4.4 MMOL/L — SIGNIFICANT CHANGE UP (ref 3.5–5.3)
RBC # BLD: 3.8 M/UL — LOW (ref 4.2–5.8)
RBC # FLD: 15.7 % — HIGH (ref 10.3–14.5)
SODIUM SERPL-SCNC: 142 MMOL/L — SIGNIFICANT CHANGE UP (ref 135–145)
SURGICAL PATHOLOGY STUDY: SIGNIFICANT CHANGE UP
WBC # BLD: 11.62 K/UL — HIGH (ref 3.8–10.5)
WBC # FLD AUTO: 11.62 K/UL — HIGH (ref 3.8–10.5)

## 2024-06-04 PROCEDURE — 99232 SBSQ HOSP IP/OBS MODERATE 35: CPT

## 2024-06-04 PROCEDURE — 71045 X-RAY EXAM CHEST 1 VIEW: CPT | Mod: 26

## 2024-06-04 RX ORDER — SENNA PLUS 8.6 MG/1
2 TABLET ORAL AT BEDTIME
Refills: 0 | Status: DISCONTINUED | OUTPATIENT
Start: 2024-06-04 | End: 2024-06-08

## 2024-06-04 RX ORDER — CEFEPIME 1 G/1
2000 INJECTION, POWDER, FOR SOLUTION INTRAMUSCULAR; INTRAVENOUS THREE TIMES A DAY
Refills: 0 | Status: DISCONTINUED | OUTPATIENT
Start: 2024-06-04 | End: 2024-06-05

## 2024-06-04 RX ORDER — CEFEPIME 1 G/1
2 INJECTION, POWDER, FOR SOLUTION INTRAMUSCULAR; INTRAVENOUS
Qty: 24 | Refills: 0
Start: 2024-06-04 | End: 2024-06-07

## 2024-06-04 RX ORDER — FUROSEMIDE 40 MG
10 TABLET ORAL ONCE
Refills: 0 | Status: COMPLETED | OUTPATIENT
Start: 2024-06-04 | End: 2024-06-04

## 2024-06-04 RX ADMIN — Medication 1 ENEMA: at 13:26

## 2024-06-04 RX ADMIN — Medication 10 MILLIGRAM(S): at 17:51

## 2024-06-04 RX ADMIN — CEFEPIME 100 MILLIGRAM(S): 1 INJECTION, POWDER, FOR SOLUTION INTRAMUSCULAR; INTRAVENOUS at 22:03

## 2024-06-04 RX ADMIN — TAMSULOSIN HYDROCHLORIDE 0.4 MILLIGRAM(S): 0.4 CAPSULE ORAL at 21:58

## 2024-06-04 RX ADMIN — Medication 81 MILLIGRAM(S): at 11:02

## 2024-06-04 RX ADMIN — Medication 10 MILLIGRAM(S): at 08:32

## 2024-06-04 RX ADMIN — OXYCODONE HYDROCHLORIDE 10 MILLIGRAM(S): 5 TABLET ORAL at 22:28

## 2024-06-04 RX ADMIN — HEPARIN SODIUM 5000 UNIT(S): 5000 INJECTION INTRAVENOUS; SUBCUTANEOUS at 08:32

## 2024-06-04 RX ADMIN — GABAPENTIN 300 MILLIGRAM(S): 400 CAPSULE ORAL at 05:43

## 2024-06-04 RX ADMIN — SENNA PLUS 2 TABLET(S): 8.6 TABLET ORAL at 21:59

## 2024-06-04 RX ADMIN — ATORVASTATIN CALCIUM 40 MILLIGRAM(S): 80 TABLET, FILM COATED ORAL at 21:58

## 2024-06-04 RX ADMIN — HEPARIN SODIUM 5000 UNIT(S): 5000 INJECTION INTRAVENOUS; SUBCUTANEOUS at 17:51

## 2024-06-04 RX ADMIN — Medication 25 MILLIGRAM(S): at 05:42

## 2024-06-04 RX ADMIN — Medication 10 MILLIGRAM(S): at 00:11

## 2024-06-04 RX ADMIN — CEFEPIME 100 MILLIGRAM(S): 1 INJECTION, POWDER, FOR SOLUTION INTRAMUSCULAR; INTRAVENOUS at 13:26

## 2024-06-04 RX ADMIN — OXYCODONE HYDROCHLORIDE 10 MILLIGRAM(S): 5 TABLET ORAL at 21:58

## 2024-06-04 RX ADMIN — Medication 10 MILLIGRAM(S): at 05:42

## 2024-06-04 RX ADMIN — GABAPENTIN 300 MILLIGRAM(S): 400 CAPSULE ORAL at 17:51

## 2024-06-04 RX ADMIN — CEFEPIME 100 MILLIGRAM(S): 1 INJECTION, POWDER, FOR SOLUTION INTRAMUSCULAR; INTRAVENOUS at 05:43

## 2024-06-04 RX ADMIN — HEPARIN SODIUM 5000 UNIT(S): 5000 INJECTION INTRAVENOUS; SUBCUTANEOUS at 00:11

## 2024-06-04 RX ADMIN — Medication 150 MILLIGRAM(S): at 05:42

## 2024-06-04 RX ADMIN — Medication 10 MILLIGRAM(S): at 11:02

## 2024-06-04 RX ADMIN — Medication 150 MILLIGRAM(S): at 17:51

## 2024-06-04 RX ADMIN — Medication 10 MILLIGRAM(S): at 08:31

## 2024-06-04 NOTE — PROGRESS NOTE ADULT - ASSESSMENT
68-yo M w/ PMH of CAD s/p stent, seizure d/o on Keppra, HTN, and recent admission at [facility] (4/17-24) for UTI, found to have b/l nephrolithiasis including 2.5 cm staghorn calculus and 5 mm distal L ureteral stone. UCx and BCx neg during the hospitalization. S/p ertapenem followed by cefpodoxime for a 10-d course. Outpatient Uro f/u done - repeat UCx w/ Pseudomonas (10-49k CFU/mL, S to cefepime, ceftazidime, and Zosyn), resistant to FQ. Seen at ID outpatient clinic 5/24. Started on cefepime 2g IV Q12H (renal dose for Pseudomonas), and now s/p elective cystoscopy w/ percutaneous nephrolithotomy on 5/31. Large stone in the R and smaller stone in the L kidney noted. 6/1 CT stone hunt protocol revealed 1-2 mm R midpole punctate calculus and L renal calculi up to 4 mm w/o associated hydronephrosis. WBC downtrending. TONY worsened, now improving. S/p PCT removal (6/4).    #Pseudomonas UTI  #Nephrolithiasis  #TONY  #Staghorn calculus  #Mediation management  #Leukocytosis    Recommendations:  - Monitor for WBC and renal improvement. If renal function improves, might need to increase cefepime dosing schedule  - Tentatively target 7-d post op (last day 6/7)  - Can increase cefepime dosing to 2g IV Q8H for now.    Plan discussed with primary team ACP.  Thank you for this consult. Inpatient ID team will follow.    Ernesto Sparks MD, PhD  Attending Physician  Division of Infectious Diseases  Department of Medicine    Please contact through MS Teams message.  Office: [phone] (after 5 PM or weekend)

## 2024-06-04 NOTE — PROGRESS NOTE ADULT - SUBJECTIVE AND OBJECTIVE BOX
Follow Up:    Infected nephrolithiasis    Interval History/ROS:  VSS ON. WBC improving. PCT removed. Patient was seen and examined at bedside. Denies fever. +flank soreness    Allergies  No Known Allergies        ANTIMICROBIALS:  cefepime   IVPB    cefepime   IVPB 2000 every 12 hours      OTHER MEDS:  MEDICATIONS  (STANDING):  acetaminophen     Tablet .. 650 every 6 hours PRN  aspirin enteric coated 81 daily  atorvastatin 40 at bedtime  baclofen 10 every 6 hours  gabapentin 300 two times a day  heparin   Injectable 5000 every 8 hours  melatonin 3 once  metoprolol succinate ER 25 daily  oxyCODONE    IR 5 every 4 hours PRN  oxyCODONE    IR 10 every 4 hours PRN  pregabalin 150 two times a day  senna 2 at bedtime  tamsulosin 0.4 at bedtime      Vital Signs Last 24 Hrs  T(C): 36.5 (04 Jun 2024 04:56), Max: 36.8 (03 Jun 2024 16:00)  T(F): 97.7 (04 Jun 2024 04:56), Max: 98.2 (03 Jun 2024 16:00)  HR: 67 (04 Jun 2024 04:56) (66 - 87)  BP: 139/75 (04 Jun 2024 04:56) (118/60 - 139/75)  BP(mean): --  RR: 18 (04 Jun 2024 04:56) (18 - 18)  SpO2: 95% (04 Jun 2024 04:56) (91% - 98%)    Parameters below as of 04 Jun 2024 04:56  Patient On (Oxygen Delivery Method): nasal cannula  O2 Flow (L/min): 2      PHYSICAL EXAM:  Constitutional: non-toxic, no distress  Cardiovascular:   normal S1, S2, no murmur, RRR  Respiratory:  clear BS bilaterally, no wheezes  GI:  soft, non-tender, normal bowel sounds  : R percutaneous tube s/p removal; Urinary catheter w/ bloody urine  Musculoskeletal:  no BLE edema  Neurologic: awake and oriented x3  Skin:  no rash, no erythema, no phlebitis; midline intact  Heme/Onc: no lymphadenopathy   Psychiatric:  awake, alert, appropriate mood                                11.7   11.62 )-----------( 197      ( 04 Jun 2024 07:25 )             35.2       06-04    142  |  104  |  22  ----------------------------<  128<H>  4.4   |  26  |  1.36<H>    Ca    9.0      04 Jun 2024 07:25        Urinalysis Basic - ( 04 Jun 2024 07:25 )    Color: x / Appearance: x / SG: x / pH: x  Gluc: 128 mg/dL / Ketone: x  / Bili: x / Urobili: x   Blood: x / Protein: x / Nitrite: x   Leuk Esterase: x / RBC: x / WBC x   Sq Epi: x / Non Sq Epi: x / Bacteria: x        MICROBIOLOGY:  v  .Other right kidney stone for culture  05-31-24   No growth  --  --                RADIOLOGY:  Imaging below independently reviewed.  < from: CT Renal Stone Hunt (06.01.24 @ 16:44) >    ACC: [id number] EXAM:  CT RENAL STONE MARC   ORDERED BY: FADY CARTWRIGHT     PROCEDURE DATE:  06/01/2024          INTERPRETATION:  CLINICAL INFORMATION: S/p PCNL reassess stone burden    COMPARISON: 4/17/2024    CONTRAST/COMPLICATIONS:  IV Contrast: NONE  Oral Contrast: NONE  Complications: None reported at time of study completion    PROCEDURE:  CT of the Abdomen and Pelvis was performed.  Sagittal and coronal reformats were performed.    FINDINGS:  LOWER CHEST: Trace bilateral effusions/atelectasis. Coronary artery   calcification.    LIVER: Within normal limits.  BILE DUCTS: Normal caliber.  GALLBLADDER: Within normal limits.  SPLEEN: Within normal limits.  PANCREAS: Mild atrophy.  ADRENALS: Within normal limits.  KIDNEYS/URETERS: Interval placement of a right-sided percutaneous   nephrostomy tube and a right ureteral stent which reaches the distal   right ureter approximately 6.6 cm from the right UVJ. A crescentic   hypodense right posterior paranephric collection noted measuring   approximately 1.4 x 9.0 x 10.0 cm, likely postprocedural hematoma. There   is also small amount of air within the right paranephric space.   Hyperdense material noted within the right renal collecting system,   likely debris/hemorrhagic product. Punctate 1 to 2 mm right midpole   calcification/calculus noted. Left renal calculi are noted measuring up   to 4 mm without associated hydronephrosis.    BLADDER: Partially distended.  REPRODUCTIVE ORGANS: Prostate is enlarged.    BOWEL: No bowel obstruction. Appendix is not visualized. No evidence of   inflammation in the pericecal region. Moderate to large amount of stool   is noted in the rectum.  PERITONEUM: No ascites.  VESSELS: Atherosclerotic changes.  RETROPERITONEUM/LYMPH NODES: No lymphadenopathy.  ABDOMINAL WALL: Within normal limits.  BONES: Degenerative changes. Status post right hip arthroplasty.   Pedicular screws and spinal rods centimeters multiple level.    IMPRESSION:  Interval placement of a percutaneous nephrostomy tube and a right   ureteral stent with the distal tip approximately 6.6 cm from the right   UVJ.  Punctate 1 to 2 mm right midpole calculus.  Right posterior paranephric air and hyperdense fluid measuring up to 10   cm noted, likely postprocedural.  Left renal calculi measuring to 4 mm without associated hydronephrosis.  Small bilateral pleural effusion/atelectasis.  Enlarged prostate, correlate with PSA level.        --- End of Report ---            RADHA MATTA MD; Attending Radiologist  This document has beenelectronically signed. Jun 1 2024  5:38PM    < end of copied text >

## 2024-06-04 NOTE — PROGRESS NOTE ADULT - SUBJECTIVE AND OBJECTIVE BOX
Name of Patient : MANUEL PETERSON  MRN: [id number]  Date of visit: 06-04-24       Subjective: Patient seen and examined. No new events except as noted.   Doing okay     REVIEW OF SYSTEMS:    CONSTITUTIONAL: No weakness, fevers or chills  EYES/ENT: No visual changes;  No vertigo or throat pain   NECK: No pain or stiffness  RESPIRATORY: No cough, wheezing, hemoptysis; No shortness of breath  CARDIOVASCULAR: No chest pain or palpitations  GASTROINTESTINAL: No abdominal or epigastric pain. No nausea, vomiting, or hematemesis; No diarrhea or constipation. No melena or hematochezia.  GENITOURINARY: No dysuria, frequency or hematuria  NEUROLOGICAL: No numbness or weakness  SKIN: No itching, burning, rashes, or lesions   All other review of systems is negative unless indicated above.    MEDICATIONS:  MEDICATIONS  (STANDING):  aspirin enteric coated 81 milliGRAM(s) Oral daily  atorvastatin 40 milliGRAM(s) Oral at bedtime  baclofen 10 milliGRAM(s) Oral every 6 hours  cefepime   IVPB 2000 milliGRAM(s) IV Intermittent three times a day  gabapentin 300 milliGRAM(s) Oral two times a day  heparin   Injectable 5000 Unit(s) SubCutaneous every 8 hours  melatonin 3 milliGRAM(s) Oral once  metoprolol succinate ER 25 milliGRAM(s) Oral daily  pregabalin 150 milliGRAM(s) Oral two times a day  senna 2 Tablet(s) Oral at bedtime  tamsulosin 0.4 milliGRAM(s) Oral at bedtime      PHYSICAL EXAM:  T(C): 37.2 (06-04-24 @ 21:12), Max: 37.2 (06-04-24 @ 17:52)  HR: 83 (06-04-24 @ 21:12) (63 - 86)  BP: 127/85 (06-04-24 @ 21:12) (113/68 - 139/75)  RR: 18 (06-04-24 @ 21:12) (18 - 18)  SpO2: 94% (06-04-24 @ 21:12) (92% - 96%)  Wt(kg): --  I&O's Summary    03 Jun 2024 07:01  -  04 Jun 2024 07:00  --------------------------------------------------------  IN: 280 mL / OUT: 1400 mL / NET: -1120 mL    04 Jun 2024 07:01  -  04 Jun 2024 22:51  --------------------------------------------------------  IN: 1130 mL / OUT: 1000 mL / NET: 130 mL          Appearance: Normal	  HEENT:  PERRLA   Lymphatic: No lymphadenopathy   Cardiovascular: Normal S1 S2, no JVD  Respiratory: normal effort , clear  Gastrointestinal:  Soft, Non-tender  Skin: No rashes,  warm to touch  Psychiatry:  Mood & affect appropriate  Musculuskeletal: No edema    recent labs, Imaging and EKGs personally reviewed     06-03-24 @ 07:01  -  06-04-24 @ 07:00  --------------------------------------------------------  IN: 280 mL / OUT: 1400 mL / NET: -1120 mL    06-04-24 @ 07:01  -  06-04-24 @ 22:51  --------------------------------------------------------  IN: 1130 mL / OUT: 1000 mL / NET: 130 mL                          11.7   11.62 )-----------( 197      ( 04 Jun 2024 07:25 )             35.2               06-04    142  |  104  |  22  ----------------------------<  128<H>  4.4   |  26  |  1.36<H>    Ca    9.0      04 Jun 2024 07:25                         Urinalysis Basic - ( 04 Jun 2024 07:25 )    Color: x / Appearance: x / SG: x / pH: x  Gluc: 128 mg/dL / Ketone: x  / Bili: x / Urobili: x   Blood: x / Protein: x / Nitrite: x   Leuk Esterase: x / RBC: x / WBC x   Sq Epi: x / Non Sq Epi: x / Bacteria: x

## 2024-06-04 NOTE — PROGRESS NOTE ADULT - ASSESSMENT
68M s/p R PCNL, L URS, POD#4    -NT removed, monitor I&Os  -continue cefepime  -continue to wean O2   -f/u AM labs  -Analgesia and antiemetics as needed  -DVT prophylaxis/OOB/Incentive spirometry

## 2024-06-04 NOTE — PROGRESS NOTE ADULT - ASSESSMENT
Patient is a 67 y/o Male with PMHx of CAD s/p stent, seizure on Keppra, HTN, recently admitted to [facility] (4/17-24) for UTI, found to have b/l nephrolithiasis including 2.5 cm R staghorn calculus and 5 mm distal L ureteral stone. UCx and BCx neg during the hospitalization. He was started on ertapenem followed by cefpodoxime for a 10-d course. Outpatient  repeat UCx grew pseudomonas, pt followed up with ID and midline was placed 2 days ago. He was started on cefepime. Pt is S/P R PCNL and L URS/stent       # S/P Stent placement  ABx  Urology care appreciated   MOnitor urine output  monitor renal function  CT per uro     #Seizure disorder  COnt keppra   fall precautions   PTOT   home baclofen dose   SIzziness noted   Chck CT head , negative  IV hydration     # CAD  S/P Stent  AP and statin  BP control     # HTN/HLD    Lipid panel   statin   BP control     # COPD   on home O2 , 2-3 L   no need for weaning

## 2024-06-04 NOTE — PROGRESS NOTE ADULT - SUBJECTIVE AND OBJECTIVE BOX
Subjective:  No acute overnight events.   Patient seen and examined at bedside with urology team during morning rounds. Pain well controlled.     Exam:  General: NAD   Abdomen: soft, non-tender, non-distended  Back: dressing clean/dry/intact, no swelling  urine from nephrostomy with dark blood  nephrostomy tube removed at bedside       T(C): 36.5 (06-04-24 @ 04:56), Max: 36.8 (06-03-24 @ 16:00)  HR: 67 (06-04-24 @ 04:56) (66 - 87)  BP: 139/75 (06-04-24 @ 04:56) (118/60 - 145/74)  RR: 18 (06-04-24 @ 04:56) (17 - 18)  SpO2: 95% (06-04-24 @ 04:56) (91% - 98%)      06-03-24 @ 07:01  -  06-04-24 @ 07:00  --------------------------------------------------------  IN: 280 mL / OUT: 1400 mL / NET: -1120 mL        LABS:  cret                        11.6   12.24 )-----------( 187      ( 03 Jun 2024 07:02 )             35.3     06-03    142  |  104  |  20  ----------------------------<  110<H>  3.9   |  22  |  1.45<H>    Ca    9.3      03 Jun 2024 07:01        LABS:  cret                        11.6   12.24 )-----------( 187      ( 03 Jun 2024 07:02 )             35.3     06-03    142  |  104  |  20  ----------------------------<  110<H>  3.9   |  22  |  1.45<H>    Ca    9.3      03 Jun 2024 07:01            acetaminophen     Tablet .. 650 milliGRAM(s) Oral every 6 hours PRN  aspirin enteric coated 81 milliGRAM(s) Oral daily  atorvastatin 40 milliGRAM(s) Oral at bedtime  baclofen 10 milliGRAM(s) Oral every 6 hours  bisacodyl Suppository 10 milliGRAM(s) Rectal once  cefepime   IVPB 2000 milliGRAM(s) IV Intermittent every 12 hours  cefepime   IVPB      gabapentin 300 milliGRAM(s) Oral two times a day  heparin   Injectable 5000 Unit(s) SubCutaneous every 8 hours  lidocaine   4% Patch 1 Patch Transdermal daily PRN  melatonin 3 milliGRAM(s) Oral once  metoprolol succinate ER 25 milliGRAM(s) Oral daily  oxyCODONE    IR 5 milliGRAM(s) Oral every 4 hours PRN  oxyCODONE    IR 10 milliGRAM(s) Oral every 4 hours PRN  pregabalin 150 milliGRAM(s) Oral two times a day  senna 2 Tablet(s) Oral at bedtime  tamsulosin 0.4 milliGRAM(s) Oral at bedtime

## 2024-06-05 ENCOUNTER — TRANSCRIPTION ENCOUNTER (OUTPATIENT)
Age: 69
End: 2024-06-05

## 2024-06-05 LAB
ANION GAP SERPL CALC-SCNC: 15 MMOL/L — SIGNIFICANT CHANGE UP (ref 5–17)
BUN SERPL-MCNC: 30 MG/DL — HIGH (ref 7–23)
CALCIUM SERPL-MCNC: 9.2 MG/DL — SIGNIFICANT CHANGE UP (ref 8.4–10.5)
CHLORIDE SERPL-SCNC: 105 MMOL/L — SIGNIFICANT CHANGE UP (ref 96–108)
CO2 SERPL-SCNC: 24 MMOL/L — SIGNIFICANT CHANGE UP (ref 22–31)
CREAT SERPL-MCNC: 1.91 MG/DL — HIGH (ref 0.5–1.3)
EGFR: 37 ML/MIN/1.73M2 — LOW
GLUCOSE SERPL-MCNC: 156 MG/DL — HIGH (ref 70–99)
HCT VFR BLD CALC: 34.5 % — LOW (ref 39–50)
HGB BLD-MCNC: 11.3 G/DL — LOW (ref 13–17)
MCHC RBC-ENTMCNC: 30.4 PG — SIGNIFICANT CHANGE UP (ref 27–34)
MCHC RBC-ENTMCNC: 32.8 GM/DL — SIGNIFICANT CHANGE UP (ref 32–36)
MCV RBC AUTO: 92.7 FL — SIGNIFICANT CHANGE UP (ref 80–100)
NRBC # BLD: 0 /100 WBCS — SIGNIFICANT CHANGE UP (ref 0–0)
PLATELET # BLD AUTO: 212 K/UL — SIGNIFICANT CHANGE UP (ref 150–400)
POTASSIUM SERPL-MCNC: 4 MMOL/L — SIGNIFICANT CHANGE UP (ref 3.5–5.3)
POTASSIUM SERPL-SCNC: 4 MMOL/L — SIGNIFICANT CHANGE UP (ref 3.5–5.3)
RBC # BLD: 3.72 M/UL — LOW (ref 4.2–5.8)
RBC # FLD: 15.4 % — HIGH (ref 10.3–14.5)
SODIUM SERPL-SCNC: 144 MMOL/L — SIGNIFICANT CHANGE UP (ref 135–145)
WBC # BLD: 19.9 K/UL — HIGH (ref 3.8–10.5)
WBC # FLD AUTO: 19.9 K/UL — HIGH (ref 3.8–10.5)

## 2024-06-05 PROCEDURE — 99232 SBSQ HOSP IP/OBS MODERATE 35: CPT

## 2024-06-05 RX ORDER — CEFEPIME 1 G/1
2000 INJECTION, POWDER, FOR SOLUTION INTRAMUSCULAR; INTRAVENOUS EVERY 12 HOURS
Refills: 0 | Status: DISCONTINUED | OUTPATIENT
Start: 2024-06-05 | End: 2024-06-07

## 2024-06-05 RX ADMIN — Medication 25 MILLIGRAM(S): at 05:06

## 2024-06-05 RX ADMIN — CEFEPIME 100 MILLIGRAM(S): 1 INJECTION, POWDER, FOR SOLUTION INTRAMUSCULAR; INTRAVENOUS at 13:18

## 2024-06-05 RX ADMIN — Medication 150 MILLIGRAM(S): at 05:07

## 2024-06-05 RX ADMIN — GABAPENTIN 300 MILLIGRAM(S): 400 CAPSULE ORAL at 17:37

## 2024-06-05 RX ADMIN — Medication 10 MILLIGRAM(S): at 05:06

## 2024-06-05 RX ADMIN — TAMSULOSIN HYDROCHLORIDE 0.4 MILLIGRAM(S): 0.4 CAPSULE ORAL at 21:08

## 2024-06-05 RX ADMIN — Medication 3 MILLIGRAM(S): at 21:09

## 2024-06-05 RX ADMIN — Medication 10 MILLIGRAM(S): at 00:08

## 2024-06-05 RX ADMIN — HEPARIN SODIUM 5000 UNIT(S): 5000 INJECTION INTRAVENOUS; SUBCUTANEOUS at 00:09

## 2024-06-05 RX ADMIN — Medication 10 MILLIGRAM(S): at 17:38

## 2024-06-05 RX ADMIN — Medication 81 MILLIGRAM(S): at 11:52

## 2024-06-05 RX ADMIN — HEPARIN SODIUM 5000 UNIT(S): 5000 INJECTION INTRAVENOUS; SUBCUTANEOUS at 09:21

## 2024-06-05 RX ADMIN — GABAPENTIN 300 MILLIGRAM(S): 400 CAPSULE ORAL at 05:07

## 2024-06-05 RX ADMIN — HEPARIN SODIUM 5000 UNIT(S): 5000 INJECTION INTRAVENOUS; SUBCUTANEOUS at 17:37

## 2024-06-05 RX ADMIN — ATORVASTATIN CALCIUM 40 MILLIGRAM(S): 80 TABLET, FILM COATED ORAL at 21:08

## 2024-06-05 RX ADMIN — OXYCODONE HYDROCHLORIDE 10 MILLIGRAM(S): 5 TABLET ORAL at 21:09

## 2024-06-05 RX ADMIN — Medication 10 MILLIGRAM(S): at 11:52

## 2024-06-05 RX ADMIN — CEFEPIME 100 MILLIGRAM(S): 1 INJECTION, POWDER, FOR SOLUTION INTRAMUSCULAR; INTRAVENOUS at 05:07

## 2024-06-05 RX ADMIN — Medication 150 MILLIGRAM(S): at 17:37

## 2024-06-05 RX ADMIN — OXYCODONE HYDROCHLORIDE 10 MILLIGRAM(S): 5 TABLET ORAL at 21:39

## 2024-06-05 NOTE — PROGRESS NOTE ADULT - SUBJECTIVE AND OBJECTIVE BOX
Name of Patient : MANUEL PETERSON  MRN: [id number]  Date of visit: 06-05-24 @ 17:06      Subjective: Patient seen and examined. No new events except as noted.   Patient seen earlier this AM. Lying down in bed. Reports flank discomfort.   Up-trending WBC and Cr this AM.     REVIEW OF SYSTEMS:    CONSTITUTIONAL: Generalized weakness   EYES/ENT: No visual changes;  No vertigo or throat pain   NECK: No pain or stiffness  RESPIRATORY: No cough, wheezing, hemoptysis; No shortness of breath  CARDIOVASCULAR: No chest pain or palpitations  GASTROINTESTINAL: No abdominal or epigastric pain. No nausea, vomiting, or hematemesis; No diarrhea or constipation. No melena or hematochezia.  GENITOURINARY: + Flank discomfort; No dysuria, frequency or hematuria  NEUROLOGICAL: No numbness or weakness  SKIN: No itching, burning, rashes, or lesions   All other review of systems is negative unless indicated above.    MEDICATIONS:  MEDICATIONS  (STANDING):  aspirin enteric coated 81 milliGRAM(s) Oral daily  atorvastatin 40 milliGRAM(s) Oral at bedtime  baclofen 10 milliGRAM(s) Oral every 6 hours  cefepime   IVPB 2000 milliGRAM(s) IV Intermittent every 12 hours  gabapentin 300 milliGRAM(s) Oral two times a day  heparin   Injectable 5000 Unit(s) SubCutaneous every 8 hours  melatonin 3 milliGRAM(s) Oral once  metoprolol succinate ER 25 milliGRAM(s) Oral daily  pregabalin 150 milliGRAM(s) Oral two times a day  senna 2 Tablet(s) Oral at bedtime  tamsulosin 0.4 milliGRAM(s) Oral at bedtime      PHYSICAL EXAM:  T(C): 36.9 (06-05-24 @ 16:48), Max: 37.5 (06-05-24 @ 00:41)  HR: 66 (06-05-24 @ 16:48) (66 - 86)  BP: 144/66 (06-05-24 @ 16:48) (100/59 - 148/70)  RR: 18 (06-05-24 @ 16:48) (18 - 18)  SpO2: 95% (06-05-24 @ 16:48) (93% - 96%)  Wt(kg): --  I&O's Summary    04 Jun 2024 07:01  -  05 Jun 2024 07:00  --------------------------------------------------------  IN: 1230 mL / OUT: 1100 mL / NET: 130 mL    05 Jun 2024 07:01  -  05 Jun 2024 17:06  --------------------------------------------------------  IN: 730 mL / OUT: 525 mL / NET: 205 mL          Appearance: Awake, lying down in bed	  HEENT:  Eyes are open   Lymphatic: No lymphadenopathy grossly   Cardiovascular: Normal    Respiratory: normal effort , clear  Gastrointestinal:  Soft, Non-tender  Skin: No rashes,  warm to touch  Psychiatry:  Mood & affect appropriate  Musculoskeletal: No edema         06-04-24 @ 07:01  -  06-05-24 @ 07:00  --------------------------------------------------------  IN: 1230 mL / OUT: 1100 mL / NET: 130 mL    06-05-24 @ 07:01  -  06-05-24 @ 17:06  --------------------------------------------------------  IN: 730 mL / OUT: 525 mL / NET: 205 mL                            11.3   19.90 )-----------( 212      ( 05 Jun 2024 11:40 )             34.5               06-05    144  |  105  |  30<H>  ----------------------------<  156<H>  4.0   |  24  |  1.91<H>    Ca    9.2      05 Jun 2024 11:40                         Urinalysis Basic - ( 05 Jun 2024 11:40 )    Color: x / Appearance: x / SG: x / pH: x  Gluc: 156 mg/dL / Ketone: x  / Bili: x / Urobili: x   Blood: x / Protein: x / Nitrite: x   Leuk Esterase: x / RBC: x / WBC x   Sq Epi: x / Non Sq Epi: x / Bacteria: x        < from: Xray Chest 1 View- PORTABLE-Urgent (Xray Chest 1 View- PORTABLE-Urgent .) (06.04.24 @ 09:13) >  IMPRESSION:  Clear lungs.    < end of copied text >      < from: CT Renal Stone Hunt (06.01.24 @ 16:44) >  IMPRESSION:  Interval placement of a percutaneous nephrostomy tube and a right   ureteral stent with the distal tip approximately 6.6 cm from the right   UVJ.  Punctate 1 to 2 mm right midpole calculus.  Right posterior paranephric air and hyperdense fluid measuring up to 10   cm noted, likely postprocedural.  Left renal calculi measuring to 4 mm without associated hydronephrosis.  Small bilateral pleural effusion/atelectasis.  Enlarged prostate, correlate with PSA level.    < end of copied text >        Culture - Other (05.31.24 @ 21:18)   Specimen Source: .Other right kidney stone for culture  Culture Results:   No growth

## 2024-06-05 NOTE — PROGRESS NOTE ADULT - SUBJECTIVE AND OBJECTIVE BOX
UROLOGY DAILY PROGRESS NOTE:     Subjective: Patient seen and examined at bedside. No overnight events.       Objective:  Vital signs  T(F): , Max: 99.5 (06-05-24 @ 00:41)  HR: 78 (06-05-24 @ 06:30)  BP: 148/70 (06-05-24 @ 06:30)  SpO2: 93% (06-05-24 @ 06:30)  Wt(kg): --    I&O's Summary    04 Jun 2024 07:01  -  05 Jun 2024 07:00  --------------------------------------------------------  IN: 1230 mL / OUT: 1100 mL / NET: 130 mL        Gen: NAD  Pulm: No respiratory distress, no subcostal retractions  CV: RRR, no JVD  Abd: Soft, NT, ND  :  condom catheter draining light pink urine     Labs:  06-04  11.62 / 35.2  /1.36                           11.7   11.62 )-----------( 197      ( 04 Jun 2024 07:25 )             35.2     06-04    142  |  104  |  22  ----------------------------<  128<H>  4.4   |  26  |  1.36<H>    Ca    9.0      04 Jun 2024 07:25          Urine Cx:

## 2024-06-05 NOTE — PROGRESS NOTE ADULT - ASSESSMENT
68M s/p R PCNL, L URS, POD#5    -continue cefepime  -continue to wean O2   -Analgesia and antiemetics as needed  -DVT prophylaxis/OOB/Incentive spirometry  - dc planning

## 2024-06-05 NOTE — DISCHARGE NOTE NURSING/CASE MANAGEMENT/SOCIAL WORK - NSSCNAMETXT_GEN_ALL_CORE
1-[facility] ([facility] home care) [phone]  2-[facility] [phone]

## 2024-06-05 NOTE — PROGRESS NOTE ADULT - ASSESSMENT
68-yo M w/ PMH of CAD s/p stent, seizure d/o on Keppra, HTN, and recent admission at [facility] (4/17-24) for UTI, found to have b/l nephrolithiasis including 2.5 cm staghorn calculus and 5 mm distal L ureteral stone. UCx and BCx neg during the hospitalization. S/p ertapenem followed by cefpodoxime for a 10-d course. Outpatient Uro f/u done - repeat UCx w/ Pseudomonas (10-49k CFU/mL, S to cefepime, ceftazidime, and Zosyn), resistant to FQ. Seen at ID outpatient clinic 5/24. Started on cefepime 2g IV Q12H (renal dose for Pseudomonas), and now s/p elective cystoscopy w/ percutaneous nephrolithotomy on 5/31. Large stone in the R and smaller stone in the L kidney noted. 6/1 CT stone hunt protocol revealed 1-2 mm R midpole punctate calculus and L renal calculi up to 4 mm w/o associated hydronephrosis. WBC downtrending. TONY worsened, now improving. S/p PCT removal (6/4). C/b worsening TONY and leukocytosis.    #Pseudomonas UTI  #Nephrolithiasis  #TONY  #Staghorn calculus  #Mediation management  #Leukocytosis    Recommendations:  - Monitor for WBC and renal improvement. Currently both worsened.  - Given the clinical change, would cont with cefepime for now. End date TBD.  - With worsening TONY, would decrease cefepime to 2g IV Q12H.  - Monitor for mental status change w/ cefepime use i/s/o worsening renal function.    Plan discussed with primary team ACP.  Thank you for this consult. Inpatient ID team will follow.    Ernesto Sparks MD, PhD  Attending Physician  Division of Infectious Diseases  Department of Medicine    Please contact through MS Teams message.  Office: [phone] (after 5 PM or weekend)

## 2024-06-05 NOTE — PROVIDER CONTACT NOTE (OTHER) - ASSESSMENT
Pt became temporarily confused and stated that we should "take him back to his room", pt was unsure if he was in the hospital. Pt reoriented to place and situation. Pt is known to be alert and oriented x 4 at baseline. After reorientation provided pt returned to alert and oriented x 4.
Pt is anxious and states he feels very uncomfortable getting discharged tomorrow (6/6) because of bloody output in urine and urostomy bag. Pt states he constantly has right flank pain and weakness.

## 2024-06-05 NOTE — PROVIDER CONTACT NOTE (OTHER) - REASON
Pt became temporarily confused and stated that we should "take him back to his room", pt was unsure if he was in the hospital. Pt reoriented to place and situation.
Pt is anxious and states he feels very uncomfortable getting discharged tomorrow (6/6) because of bloody output in urine and urostomy bag.

## 2024-06-05 NOTE — PROVIDER CONTACT NOTE (OTHER) - SITUATION
Pt is anxious and states he feels very uncomfortable getting discharged tomorrow (6/6) because of bloody output in urine and urostomy bag.
Pt became temporarily confused and stated that we should "take him back to his room", pt was unsure if he was in the hospital. Pt reoriented to place and situation.

## 2024-06-05 NOTE — DISCHARGE NOTE NURSING/CASE MANAGEMENT/SOCIAL WORK - NSDCPEFALRISK_GEN_ALL_CORE
For information on Fall & Injury Prevention, visit: [email or web address] OR  [email or web address] OR  https://www.cdc.gov/steadi/patient.html

## 2024-06-05 NOTE — PROGRESS NOTE ADULT - ASSESSMENT
Patient is a 67 y/o Male with PMHx of CAD s/p stent, seizure on Keppra, HTN, recently admitted to [facility] (4/17-24) for UTI, found to have b/l nephrolithiasis including 2.5 cm R staghorn calculus and 5 mm distal L ureteral stone. UCx and BCx neg during the hospitalization. He was started on ertapenem followed by cefpodoxime for a 10-d course. Outpatient  repeat UCx grew pseudomonas, pt followed up with ID and midline was placed 2 days ago. He was started on cefepime. Pt is S/P R PCNL and L URS/stent       # S/P Stent placement  - CT renal stone hunt w/ interval placement of R Perc NT and R ureteral stent, Punctate 1 to 2 mm right midpole calculus, no hydro --> per urology   - ABX as per ID  - Monitor renal function, monitor urine outpatient     # Leukocytosis   - Pt with up-trending leukocytosis while on ABX   - 5/31 UCx negative  - On ABX as per ID  - Continue to monitor and trend CBC, temp curve, VS  - If febrile, check pan cultures, RVP, CXR   - Monitor closely     TONY  - Cr up-trending today.   - Continue to monitor and trend. As per ID, likely 2/2 increased cefepime dose. Dosing per ID  - Hydration as tolerated  - Check bladder scan to rule out retention  - Avoid nephrotoxic agents  - Trend in AM     #Seizure disorder  - On AED w/ Keppra at home, resume   - On Baclofen   - CT head negative  - Monitor mental status while on Cefepime   - Fall, Seizure, Aspiration precautions   - PT/ OT      # CAD  - S/P Stent  - ASA and Statin therapy   - BP control     # HTN/HLD   - Lipid panel   - On ASA and Statin therapy   - On Toprol XL   - BP control. Monitor and adjust BP as tolerated    # COPD   - Pt reports that he is on O2 at home. Uses 2-3 L NC at baseline per patient. No need to wean to RA as this is not patient's baseline. Monitor O2 saturation.   - CXR w/ Clear lungs      Prostamegaly   - Seen on CT   - As per Urology     Discussed with Attending

## 2024-06-05 NOTE — PROVIDER CONTACT NOTE (OTHER) - ACTION/TREATMENT ORDERED:
Chandni IVY notified and aware. PA says day team will evaluate and speak with patient in the morning. Plan of care continues.
Tawana Price PA notified and aware. PA came to assess and speak with patient at bedside. Plan of care continues.

## 2024-06-05 NOTE — DISCHARGE NOTE NURSING/CASE MANAGEMENT/SOCIAL WORK - PATIENT PORTAL LINK FT
You can access the FollowMyHealth Patient Portal offered by [facility] by registering at the following website: [email or web address]. By joining [employer]’s FollowMyHealth portal, you will also be able to view your health information using other applications (apps) compatible with our system. fever/difficulty breathing

## 2024-06-05 NOTE — PROGRESS NOTE ADULT - SUBJECTIVE AND OBJECTIVE BOX
Follow Up:    Infected nephrolithiasis    Interval History/ROS:  Afebrile ON. WBC up to 19.9. TONY worsening w/ SCr 1.91 from 1.36. Patient was seen and examined at bedside. +flank pain. No fever.    Allergies  No Known Allergies        ANTIMICROBIALS:  cefepime   IVPB 2000 three times a day      OTHER MEDS:  MEDICATIONS  (STANDING):  acetaminophen     Tablet .. 650 every 6 hours PRN  aspirin enteric coated 81 daily  atorvastatin 40 at bedtime  baclofen 10 every 6 hours  gabapentin 300 two times a day  heparin   Injectable 5000 every 8 hours  melatonin 3 once  metoprolol succinate ER 25 daily  oxyCODONE    IR 10 every 4 hours PRN  oxyCODONE    IR 5 every 4 hours PRN  pregabalin 150 two times a day  senna 2 at bedtime  tamsulosin 0.4 at bedtime      Vital Signs Last 24 Hrs  T(C): 36.5 (05 Jun 2024 09:19), Max: 37.5 (05 Jun 2024 00:41)  T(F): 97.7 (05 Jun 2024 09:19), Max: 99.5 (05 Jun 2024 00:41)  HR: 69 (05 Jun 2024 09:19) (69 - 86)  BP: 100/59 (05 Jun 2024 09:19) (100/59 - 148/70)  BP(mean): --  RR: 18 (05 Jun 2024 09:19) (18 - 18)  SpO2: 96% (05 Jun 2024 09:19) (93% - 96%)    Parameters below as of 05 Jun 2024 09:19  Patient On (Oxygen Delivery Method): room air        PHYSICAL EXAM:  Constitutional: non-toxic, no distress  Cardiovascular:   normal S1, S2, no murmur, RRR  Respiratory:  clear BS bilaterally, no wheezes  GI:  soft, non-tender, normal bowel sounds  : R percutaneous tube s/p removal, ostomy pouch attached, collecting blood; Urinary catheter w/ bag empty  Musculoskeletal:  no BLE edema  Neurologic: awake and oriented x3  Skin:  no rash, no erythema, no phlebitis; midline intact                                11.3   19.90 )-----------( 212      ( 05 Jun 2024 11:40 )             34.5       06-05    144  |  105  |  30<H>  ----------------------------<  156<H>  4.0   |  24  |  1.91<H>    Ca    9.2      05 Jun 2024 11:40        Urinalysis Basic - ( 05 Jun 2024 11:40 )    Color: x / Appearance: x / SG: x / pH: x  Gluc: 156 mg/dL / Ketone: x  / Bili: x / Urobili: x   Blood: x / Protein: x / Nitrite: x   Leuk Esterase: x / RBC: x / WBC x   Sq Epi: x / Non Sq Epi: x / Bacteria: x        MICROBIOLOGY:  v  .Other right kidney stone for culture  05-31-24   No growth  --  --                RADIOLOGY:  Imaging below independently reviewed.  < from: CT Renal Stone Hunt (06.01.24 @ 16:44) >    IMPRESSION:  Interval placement of a percutaneous nephrostomy tube and a right   ureteral stent with the distal tip approximately 6.6 cm from the right   UVJ.  Punctate 1 to 2 mm right midpole calculus.  Right posterior paranephric air and hyperdense fluid measuring up to 10   cm noted, likely postprocedural.  Left renal calculi measuring to 4 mm without associated hydronephrosis.  Small bilateral pleural effusion/atelectasis.  Enlarged prostate, correlate with PSA level.      < end of copied text >

## 2024-06-05 NOTE — DISCHARGE NOTE NURSING/CASE MANAGEMENT/SOCIAL WORK - NSSCTYPOFSERV_GEN_ALL_CORE
1-[facility] at Home-Skilled RN visits to reinforce urostomy pouch teaching. RN will call 1-2 days post discharge to schedule visit.   2-[facility]-Provision of IV antibiotic and skilled RN medication teaching visits. RN is scheduled to visit for 2p on 6/6/24.

## 2024-06-06 LAB
ANION GAP SERPL CALC-SCNC: 14 MMOL/L — SIGNIFICANT CHANGE UP (ref 5–17)
BUN SERPL-MCNC: 30 MG/DL — HIGH (ref 7–23)
CALCIUM SERPL-MCNC: 9 MG/DL — SIGNIFICANT CHANGE UP (ref 8.4–10.5)
CELL MATERIAL STONE EST-MCNT: SIGNIFICANT CHANGE UP
CHLORIDE SERPL-SCNC: 107 MMOL/L — SIGNIFICANT CHANGE UP (ref 96–108)
CO2 SERPL-SCNC: 22 MMOL/L — SIGNIFICANT CHANGE UP (ref 22–31)
CREAT SERPL-MCNC: 1.85 MG/DL — HIGH (ref 0.5–1.3)
EGFR: 39 ML/MIN/1.73M2 — LOW
GLUCOSE SERPL-MCNC: 138 MG/DL — HIGH (ref 70–99)
HCT VFR BLD CALC: 31.2 % — LOW (ref 39–50)
HGB BLD-MCNC: 10.2 G/DL — LOW (ref 13–17)
LABORATORY COMMENT REPORT: SIGNIFICANT CHANGE UP
MCHC RBC-ENTMCNC: 30.1 PG — SIGNIFICANT CHANGE UP (ref 27–34)
MCHC RBC-ENTMCNC: 32.7 GM/DL — SIGNIFICANT CHANGE UP (ref 32–36)
MCV RBC AUTO: 92 FL — SIGNIFICANT CHANGE UP (ref 80–100)
NIDUS STONE QN: SIGNIFICANT CHANGE UP
NRBC # BLD: 0 /100 WBCS — SIGNIFICANT CHANGE UP (ref 0–0)
PLATELET # BLD AUTO: 214 K/UL — SIGNIFICANT CHANGE UP (ref 150–400)
POTASSIUM SERPL-MCNC: 3.9 MMOL/L — SIGNIFICANT CHANGE UP (ref 3.5–5.3)
POTASSIUM SERPL-SCNC: 3.9 MMOL/L — SIGNIFICANT CHANGE UP (ref 3.5–5.3)
RBC # BLD: 3.39 M/UL — LOW (ref 4.2–5.8)
RBC # FLD: 15.7 % — HIGH (ref 10.3–14.5)
SODIUM SERPL-SCNC: 143 MMOL/L — SIGNIFICANT CHANGE UP (ref 135–145)
WBC # BLD: 18.37 K/UL — HIGH (ref 3.8–10.5)
WBC # FLD AUTO: 18.37 K/UL — HIGH (ref 3.8–10.5)

## 2024-06-06 PROCEDURE — 99233 SBSQ HOSP IP/OBS HIGH 50: CPT

## 2024-06-06 RX ORDER — LEVETIRACETAM 250 MG/1
750 TABLET, FILM COATED ORAL AT BEDTIME
Refills: 0 | Status: DISCONTINUED | OUTPATIENT
Start: 2024-06-06 | End: 2024-06-08

## 2024-06-06 RX ORDER — LEVETIRACETAM 250 MG/1
750 TABLET, FILM COATED ORAL AT BEDTIME
Refills: 0 | Status: DISCONTINUED | OUTPATIENT
Start: 2024-06-06 | End: 2024-06-06

## 2024-06-06 RX ADMIN — Medication 10 MILLIGRAM(S): at 12:37

## 2024-06-06 RX ADMIN — Medication 10 MILLIGRAM(S): at 00:00

## 2024-06-06 RX ADMIN — OXYCODONE HYDROCHLORIDE 10 MILLIGRAM(S): 5 TABLET ORAL at 22:00

## 2024-06-06 RX ADMIN — CEFEPIME 100 MILLIGRAM(S): 1 INJECTION, POWDER, FOR SOLUTION INTRAMUSCULAR; INTRAVENOUS at 00:00

## 2024-06-06 RX ADMIN — Medication 10 MILLIGRAM(S): at 23:17

## 2024-06-06 RX ADMIN — ATORVASTATIN CALCIUM 40 MILLIGRAM(S): 80 TABLET, FILM COATED ORAL at 21:17

## 2024-06-06 RX ADMIN — GABAPENTIN 300 MILLIGRAM(S): 400 CAPSULE ORAL at 05:23

## 2024-06-06 RX ADMIN — GABAPENTIN 300 MILLIGRAM(S): 400 CAPSULE ORAL at 17:33

## 2024-06-06 RX ADMIN — Medication 150 MILLIGRAM(S): at 17:32

## 2024-06-06 RX ADMIN — LEVETIRACETAM 750 MILLIGRAM(S): 250 TABLET, FILM COATED ORAL at 21:17

## 2024-06-06 RX ADMIN — CEFEPIME 100 MILLIGRAM(S): 1 INJECTION, POWDER, FOR SOLUTION INTRAMUSCULAR; INTRAVENOUS at 23:19

## 2024-06-06 RX ADMIN — CEFEPIME 100 MILLIGRAM(S): 1 INJECTION, POWDER, FOR SOLUTION INTRAMUSCULAR; INTRAVENOUS at 12:37

## 2024-06-06 RX ADMIN — Medication 10 MILLIGRAM(S): at 05:23

## 2024-06-06 RX ADMIN — Medication 150 MILLIGRAM(S): at 05:24

## 2024-06-06 RX ADMIN — Medication 81 MILLIGRAM(S): at 12:38

## 2024-06-06 RX ADMIN — HEPARIN SODIUM 5000 UNIT(S): 5000 INJECTION INTRAVENOUS; SUBCUTANEOUS at 10:33

## 2024-06-06 RX ADMIN — HEPARIN SODIUM 5000 UNIT(S): 5000 INJECTION INTRAVENOUS; SUBCUTANEOUS at 17:31

## 2024-06-06 RX ADMIN — Medication 25 MILLIGRAM(S): at 05:23

## 2024-06-06 RX ADMIN — Medication 10 MILLIGRAM(S): at 17:33

## 2024-06-06 RX ADMIN — SENNA PLUS 2 TABLET(S): 8.6 TABLET ORAL at 21:17

## 2024-06-06 RX ADMIN — OXYCODONE HYDROCHLORIDE 10 MILLIGRAM(S): 5 TABLET ORAL at 21:17

## 2024-06-06 RX ADMIN — HEPARIN SODIUM 5000 UNIT(S): 5000 INJECTION INTRAVENOUS; SUBCUTANEOUS at 00:01

## 2024-06-06 RX ADMIN — TAMSULOSIN HYDROCHLORIDE 0.4 MILLIGRAM(S): 0.4 CAPSULE ORAL at 21:17

## 2024-06-06 NOTE — PROGRESS NOTE ADULT - SUBJECTIVE AND OBJECTIVE BOX
UROLOGY DAILY PROGRESS NOTE:     Subjective: Patient seen and examined at bedside. No overnight events. Feeling better      Objective:  Vital signs  T(F): , Max: 98.7 (06-05-24 @ 13:48)  HR: 69 (06-06-24 @ 04:18)  BP: 127/63 (06-06-24 @ 04:18)  SpO2: 93% (06-06-24 @ 04:18)  Wt(kg): --    I&O's Summary    04 Jun 2024 07:01  -  05 Jun 2024 07:00  --------------------------------------------------------  IN: 1230 mL / OUT: 1100 mL / NET: 130 mL    05 Jun 2024 07:01  -  06 Jun 2024 06:58  --------------------------------------------------------  IN: 1020 mL / OUT: 875 mL / NET: 145 mL        Gen: NAD  Pulm: No respiratory distress, no subcostal retractions  CV: RRR, no JVD  Abd: Soft, NT, ND  Back: NT site pouched, dark brown urine   : Condom catheter - tea colored urine     Labs:  06-05  19.90 / 34.5  /1.91   06-04  11.62 / 35.2  /1.36                           11.3   19.90 )-----------( 212      ( 05 Jun 2024 11:40 )             34.5     06-05    144  |  105  |  30<H>  ----------------------------<  156<H>  4.0   |  24  |  1.91<H>    Ca    9.2      05 Jun 2024 11:40          Urine Cx:

## 2024-06-06 NOTE — PROGRESS NOTE ADULT - ASSESSMENT
68-yo M w/ PMH of CAD s/p stent, seizure d/o on Keppra, HTN, and recent admission at [facility] (4/17-24) for UTI, found to have b/l nephrolithiasis including 2.5 cm staghorn calculus and 5 mm distal L ureteral stone. UCx and BCx neg during the hospitalization. S/p ertapenem followed by cefpodoxime for a 10-d course. Outpatient Uro f/u done - repeat UCx w/ Pseudomonas (10-49k CFU/mL, S to cefepime, ceftazidime, and Zosyn), resistant to FQ. Seen at ID outpatient clinic 5/24. Started on cefepime 2g IV Q12H (renal dose for Pseudomonas), and now s/p elective cystoscopy w/ percutaneous nephrolithotomy on 5/31. Large stone in the R and smaller stone in the L kidney noted. 6/1 CT stone hunt protocol revealed 1-2 mm R midpole punctate calculus and L renal calculi up to 4 mm w/o associated hydronephrosis. WBC downtrending. TOYN worsened, now improving. S/p PCT removal (6/4). C/b worsening TONY and leukocytosis. Blood loss anemia. Now hgb 10.2 (baseline 15-16s).    #Pseudomonas UTI  #Nephrolithiasis  #TONY  #Staghorn calculus  #Mediation management  #Leukocytosis  #Blood loss anemia    Recommendations:  - Monitor for WBC and renal improvement. Currently both overall worsened.  - Transfuse per protocol. Monitor for bleeding and flank pain. Of note, peritoneal/retroperitoneal bleeding can cause fever and leukocytosis.  - Continue with cefepime to 2g IV Q12H. Would conclude treatment tomorrow.  - Monitor for mental status change w/ cefepime use i/s/o worsening renal function.    Plan discussed with primary team ACP.  Thank you for this consult. Inpatient ID team will follow.    Ernesto Sparks MD, PhD  Attending Physician  Division of Infectious Diseases  Department of Medicine    Please contact through [employer].  Office: [phone] (after 5 PM or weekend)       68-yo M w/ PMH of CAD s/p stent, seizure d/o on Keppra, HTN, and recent admission at [facility] (4/17-24) for UTI, found to have b/l nephrolithiasis including 2.5 cm staghorn calculus and 5 mm distal L ureteral stone. UCx and BCx neg during the hospitalization. S/p ertapenem followed by cefpodoxime for a 10-d course. Outpatient Uro f/u done - repeat UCx w/ Pseudomonas (10-49k CFU/mL, S to cefepime, ceftazidime, and Zosyn), resistant to FQ. Seen at ID outpatient clinic 5/24. Started on cefepime 2g IV Q12H (renal dose for Pseudomonas), and now s/p elective cystoscopy w/ percutaneous nephrolithotomy on 5/31. Large stone in the R and smaller stone in the L kidney noted. 6/1 CT stone hunt protocol revealed 1-2 mm R midpole punctate calculus and L renal calculi up to 4 mm w/o associated hydronephrosis. WBC downtrending. TONY worsened, now improving. S/p PCT removal (6/4). C/b worsening TONY and leukocytosis. Blood loss anemia. Now hgb 10.2 (baseline 15-16s).    #Pseudomonas UTI  #Nephrolithiasis  #TONY  #Staghorn calculus  #Mediation management  #Leukocytosis  #Blood loss anemia    Recommendations:  - Monitor for WBC and renal improvement. Currently both overall worsened.  - Transfuse per protocol. Monitor for bleeding and flank pain. Of note, peritoneal/retroperitoneal bleeding can cause fever and leukocytosis. Need close monitoring of hgb. High risk of clinical decompensation and mortality for ongoing blood loss anemia  - Continue with cefepime to 2g IV Q12H. Would conclude treatment tomorrow.  - Monitor for mental status change w/ cefepime use i/s/o worsening renal function.    Plan discussed with primary team ACP.  Thank you for this consult. Inpatient ID team will follow.    Ernesto Sparks MD, PhD  Attending Physician  Division of Infectious Diseases  Department of Medicine    Please contact through [employer].  Office: [phone] (after 5 PM or weekend)

## 2024-06-06 NOTE — PROGRESS NOTE ADULT - ASSESSMENT
Patient is a 67 y/o Male with PMHx of CAD s/p stent, seizure on Keppra, HTN, recently admitted to [facility] (4/17-24) for UTI, found to have b/l nephrolithiasis including 2.5 cm R staghorn calculus and 5 mm distal L ureteral stone. UCx and BCx neg during the hospitalization. He was started on ertapenem followed by cefpodoxime for a 10-d course. Outpatient  repeat UCx grew pseudomonas, pt followed up with ID and midline was placed 2 days ago. He was started on cefepime. Pt is S/P R PCNL and L URS/stent       # S/P Stent placement  - CT renal stone hunt w/ interval placement of R Perc NT and R ureteral stent, Punctate 1 to 2 mm right midpole calculus, no hydro --> per urology   - ABX as per ID - on Cefepime   - Pt with flank pain, pain control per Urology   - With ongoing hematuria and down-trending of Hgb. Suggest to check CBC BID. If further drop, suggest imaging for further evaluation in the setting of hematuria, flank pain and leukocytosis   - Monitor renal function, monitor urine output     # Leukocytosis   - Pt with up-trending leukocytosis while on ABX   - 5/31 UCx negative  - On ABX as per ID  - Continue to monitor and trend CBC, temp curve, VS  - If febrile, check pan cultures, RVP, CXR   - Monitor closely     TONY  - Cr up-trending    - Continue to monitor and trend   - Hydration as tolerated  - Check bladder scan to rule out retention  - Avoid nephrotoxic agents  - Trend in AM     #Seizure disorder  - On AED w/ Keppra at home, resume   - On Baclofen   - CT head negative  - Monitor mental status while on Cefepime   - Fall, Seizure, Aspiration precautions   - PT/ OT      # CAD  - S/P Stent  - ASA and Statin therapy   - BP control     # HTN/HLD   - Lipid panel   - On ASA and Statin therapy   - On Toprol XL   - BP control. Monitor and adjust BP as tolerated    # COPD   - Pt reports that he is on O2 at home. Uses 2-3 L NC at baseline per patient. No need to wean to RA as this is not patient's baseline. Monitor O2 saturation.   - CXR w/ Clear lungs      Prostamegaly   - Seen on CT   - As per Urology     Discussed with Attending   Discussed with Wife at the bedside

## 2024-06-06 NOTE — PROGRESS NOTE ADULT - SUBJECTIVE AND OBJECTIVE BOX
Name of Patient : MANUEL PETERSON  MRN: [id number]  Date of visit: 06-06-24 @ 12:15      Subjective: Patient seen and examined. No new events except as noted.   Patient seen earlier this AM. Wife at the bedside.   Lying down in bed. Reports ongoing flank pain. With hematuria. Hgb is down trending.   Continues on ABX with Cefepime.     REVIEW OF SYSTEMS:    CONSTITUTIONAL: Generalized weakness   EYES/ENT: No visual changes;  No vertigo or throat pain   NECK: No pain or stiffness  RESPIRATORY: No cough, wheezing, hemoptysis; No shortness of breath  CARDIOVASCULAR: No chest pain or palpitations  GASTROINTESTINAL: No abdominal or epigastric pain. No nausea, vomiting, or hematemesis; No diarrhea or constipation. No melena or hematochezia.  GENITOURINARY: + Flank discomfort; + Hematuria   NEUROLOGICAL: No numbness or weakness  SKIN: No itching, burning, rashes, or lesions   All other review of systems is negative unless indicated above.    MEDICATIONS:  MEDICATIONS  (STANDING):  aspirin enteric coated 81 milliGRAM(s) Oral daily  atorvastatin 40 milliGRAM(s) Oral at bedtime  baclofen 10 milliGRAM(s) Oral every 6 hours  cefepime   IVPB 2000 milliGRAM(s) IV Intermittent every 12 hours  gabapentin 300 milliGRAM(s) Oral two times a day  heparin   Injectable 5000 Unit(s) SubCutaneous every 8 hours  levETIRAcetam  milliGRAM(s) Oral at bedtime  metoprolol succinate ER 25 milliGRAM(s) Oral daily  pregabalin 150 milliGRAM(s) Oral two times a day  senna 2 Tablet(s) Oral at bedtime  tamsulosin 0.4 milliGRAM(s) Oral at bedtime      PHYSICAL EXAM:  T(C): 36.4 (06-06-24 @ 08:31), Max: 37.1 (06-05-24 @ 13:48)  HR: 64 (06-06-24 @ 08:31) (64 - 74)  BP: 127/64 (06-06-24 @ 08:31) (127/63 - 149/69)  RR: 18 (06-06-24 @ 08:31) (18 - 18)  SpO2: 96% (06-06-24 @ 08:31) (93% - 96%)  Wt(kg): --  I&O's Summary    05 Jun 2024 07:01  -  06 Jun 2024 07:00  --------------------------------------------------------  IN: 1020 mL / OUT: 875 mL / NET: 145 mL    06 Jun 2024 07:01  -  06 Jun 2024 12:15  --------------------------------------------------------  IN: 240 mL / OUT: 400 mL / NET: -160 mL        Appearance: Awake, lying down in bed	  HEENT:  Eyes are open   Lymphatic: No lymphadenopathy grossly   Cardiovascular: Normal    Respiratory: normal effort , clear  Gastrointestinal:  Soft, Non-tender  : Output hematuric   Skin: No rashes,  warm to touch  Psychiatry:  Mood & affect appropriate  Musculoskeletal: No edema       06-05-24 @ 07:01  -  06-06-24 @ 07:00  --------------------------------------------------------  IN: 1020 mL / OUT: 875 mL / NET: 145 mL    06-06-24 @ 07:01  -  06-06-24 @ 12:15  --------------------------------------------------------  IN: 240 mL / OUT: 400 mL / NET: -160 mL                                10.2   18.37 )-----------( 214      ( 06 Jun 2024 06:30 )             31.2               06-06    143  |  107  |  30<H>  ----------------------------<  138<H>  3.9   |  22  |  1.85<H>    Ca    9.0      06 Jun 2024 06:30                         Urinalysis Basic - ( 06 Jun 2024 06:30 )    Color: x / Appearance: x / SG: x / pH: x  Gluc: 138 mg/dL / Ketone: x  / Bili: x / Urobili: x   Blood: x / Protein: x / Nitrite: x   Leuk Esterase: x / RBC: x / WBC x   Sq Epi: x / Non Sq Epi: x / Bacteria: x

## 2024-06-06 NOTE — PROGRESS NOTE ADULT - SUBJECTIVE AND OBJECTIVE BOX
Follow Up:    Infected kidney stone    Interval History/ROS:  VSS. Hgb dropping, 10.2 today (baseline 15-16s). SCr and WBC mildly decreased. Patient was seen and examined at bedside. Flank pain worsened. +PO. No fever.    Allergies  No Known Allergies        ANTIMICROBIALS:  cefepime   IVPB 2000 every 12 hours      OTHER MEDS:  MEDICATIONS  (STANDING):  acetaminophen     Tablet .. 650 every 6 hours PRN  aspirin enteric coated 81 daily  atorvastatin 40 at bedtime  baclofen 10 every 6 hours  gabapentin 300 two times a day  heparin   Injectable 5000 every 8 hours  levETIRAcetam  at bedtime  metoprolol succinate ER 25 daily  oxyCODONE    IR 10 every 4 hours PRN  oxyCODONE    IR 5 every 4 hours PRN  pregabalin 150 two times a day  senna 2 at bedtime  tamsulosin 0.4 at bedtime      Vital Signs Last 24 Hrs  T(C): 36.4 (06 Jun 2024 08:31), Max: 37.1 (05 Jun 2024 13:48)  T(F): 97.5 (06 Jun 2024 08:31), Max: 98.7 (05 Jun 2024 13:48)  HR: 64 (06 Jun 2024 08:31) (64 - 74)  BP: 127/64 (06 Jun 2024 08:31) (127/63 - 149/69)  BP(mean): --  RR: 18 (06 Jun 2024 08:31) (18 - 18)  SpO2: 96% (06 Jun 2024 08:31) (93% - 96%)    Parameters below as of 06 Jun 2024 08:31  Patient On (Oxygen Delivery Method): room air      PHYSICAL EXAM:  Constitutional: non-toxic, no distress  Cardiovascular:   normal S1, S2, no murmur, RRR  Respiratory:  clear BS bilaterally, no wheezes  GI:  soft, non-tender, normal bowel sounds  : R percutaneous tube s/p removal, ostomy pouch attached, collecting blood  Musculoskeletal:  no BLE edema  Neurologic: awake and oriented x3  Skin:  no rash, no erythema, no phlebitis; midline intact; R flank at the prior PCT tube site w/ bruise                                  10.2   18.37 )-----------( 214      ( 06 Jun 2024 06:30 )             31.2       06-06    143  |  107  |  30<H>  ----------------------------<  138<H>  3.9   |  22  |  1.85<H>    Ca    9.0      06 Jun 2024 06:30        Urinalysis Basic - ( 06 Jun 2024 06:30 )    Color: x / Appearance: x / SG: x / pH: x  Gluc: 138 mg/dL / Ketone: x  / Bili: x / Urobili: x   Blood: x / Protein: x / Nitrite: x   Leuk Esterase: x / RBC: x / WBC x   Sq Epi: x / Non Sq Epi: x / Bacteria: x        MICROBIOLOGY:  v  .Other right kidney stone for culture  05-31-24   No growth  --  --                RADIOLOGY:  Imaging below independently reviewed.  < from: Xray Chest 1 View- PORTABLE-Urgent (Xray Chest 1 View- PORTABLE-Urgent .) (06.04.24 @ 09:13) >    ACC: [id number] EXAM:  XR CHEST PORTABLE URGENT 1V   ORDERED BY: TAMELA MYRICK     PROCEDURE DATE:  06/04/2024          INTERPRETATION:  EXAMINATION: XR CHEST URGENT    CLINICAL INDICATION: S/p PCNL, now requiring 2 L on nasal cannula    TECHNIQUE: Single frontal, portable view of the chest was obtained.    COMPARISON: Chest x-ray 5/31/2024.    FINDINGS:  Thoracic spinal fixation hardware in place.  The heart is normal in size.  The lungs are clear.  There is no pneumothorax or pleural effusion.  No acute bony abnormality.    IMPRESSION:  Clear lungs.    --- End of Report ---          LUCY LY MD; Resident Radiologist  This document has been electronically signed.  NICOLE AMOS MD; Attending Radiologist  This document has been electronically signed. Jun 4 2024  8:23PM    < end of copied text >    < from: CT Renal Stone Hunt (06.01.24 @ 16:44) >    ACC: [id number] EXAM:  CT RENAL STONE MARC   ORDERED BY: FADY CARTWRIGHT     PROCEDURE DATE:  06/01/2024          INTERPRETATION:  CLINICAL INFORMATION: S/p PCNL reassess stone burden    COMPARISON: 4/17/2024    CONTRAST/COMPLICATIONS:  IV Contrast: NONE  Oral Contrast: NONE  Complications: None reported at time of study completion    PROCEDURE:  CT of the Abdomen and Pelvis was performed.  Sagittal and coronal reformats were performed.    FINDINGS:  LOWER CHEST: Trace bilateral effusions/atelectasis. Coronary artery   calcification.    LIVER: Within normal limits.  BILE DUCTS: Normal caliber.  GALLBLADDER: Within normal limits.  SPLEEN: Within normal limits.  PANCREAS: Mild atrophy.  ADRENALS: Within normal limits.  KIDNEYS/URETERS: Interval placement of a right-sided percutaneous   nephrostomy tube and a right ureteral stent which reaches the distal   right ureter approximately 6.6 cm from the right UVJ. A crescentic   hypodense right posterior paranephric collection noted measuring   approximately 1.4 x 9.0 x 10.0 cm, likely postprocedural hematoma. There   is also small amount of air within the right paranephric space.   Hyperdense material noted within the right renal collecting system,   likely debris/hemorrhagic product. Punctate 1 to 2 mm right midpole   calcification/calculus noted. Left renal calculi are noted measuring up   to 4 mm without associated hydronephrosis.    BLADDER: Partially distended.  REPRODUCTIVE ORGANS: Prostate is enlarged.    BOWEL: No bowel obstruction. Appendix is not visualized. No evidence of   inflammation in the pericecal region. Moderate to large amount of stool   is noted in the rectum.  PERITONEUM: No ascites.  VESSELS: Atherosclerotic changes.  RETROPERITONEUM/LYMPH NODES: No lymphadenopathy.  ABDOMINAL WALL: Within normal limits.  BONES: Degenerative changes. Status post right hip arthroplasty.   Pedicular screws and spinal rods centimeters multiple level.    IMPRESSION:  Interval placement of a percutaneous nephrostomy tube and a right   ureteral stent with the distal tip approximately 6.6 cm from the right   UVJ.  Punctate 1 to 2 mm right midpole calculus.  Right posterior paranephric air and hyperdense fluid measuring up to 10   cm noted, likely postprocedural.  Left renal calculi measuring to 4 mm without associated hydronephrosis.  Small bilateral pleural effusion/atelectasis.  Enlarged prostate, correlate with PSA level.        --- End of Report ---            RADHA MATTA MD; Attending Radiologist  This document has beenelectronically signed. Jun 1 2024  5:38PM    < end of copied text >

## 2024-06-06 NOTE — PROGRESS NOTE ADULT - ASSESSMENT
68M s/p R PCNL, L URS, POD#6     -continue cefepime  -f/u ID   - monitor output  -Analgesia and antiemetics as needed  -DVT prophylaxis/OOB/Incentive spirometry  - dc planning

## 2024-06-07 DIAGNOSIS — Z45.2 ENCOUNTER FOR ADJUSTMENT AND MANAGEMENT OF VASCULAR ACCESS DEVICE: ICD-10-CM

## 2024-06-07 DIAGNOSIS — R82.71 BACTERIURIA: ICD-10-CM

## 2024-06-07 LAB
ANION GAP SERPL CALC-SCNC: 14 MMOL/L — SIGNIFICANT CHANGE UP (ref 5–17)
BUN SERPL-MCNC: 27 MG/DL — HIGH (ref 7–23)
CALCIUM SERPL-MCNC: 8.8 MG/DL — SIGNIFICANT CHANGE UP (ref 8.4–10.5)
CHLORIDE SERPL-SCNC: 110 MMOL/L — HIGH (ref 96–108)
CO2 SERPL-SCNC: 18 MMOL/L — LOW (ref 22–31)
CREAT SERPL-MCNC: 1.72 MG/DL — HIGH (ref 0.5–1.3)
EGFR: 42 ML/MIN/1.73M2 — LOW
GLUCOSE SERPL-MCNC: 112 MG/DL — HIGH (ref 70–99)
HCT VFR BLD CALC: 35 % — LOW (ref 39–50)
HGB BLD-MCNC: 10.8 G/DL — LOW (ref 13–17)
MCHC RBC-ENTMCNC: 29.8 PG — SIGNIFICANT CHANGE UP (ref 27–34)
MCHC RBC-ENTMCNC: 30.9 GM/DL — LOW (ref 32–36)
MCV RBC AUTO: 96.7 FL — SIGNIFICANT CHANGE UP (ref 80–100)
NRBC # BLD: 0 /100 WBCS — SIGNIFICANT CHANGE UP (ref 0–0)
PLATELET # BLD AUTO: 244 K/UL — SIGNIFICANT CHANGE UP (ref 150–400)
POTASSIUM SERPL-MCNC: 4.8 MMOL/L — SIGNIFICANT CHANGE UP (ref 3.5–5.3)
POTASSIUM SERPL-SCNC: 4.8 MMOL/L — SIGNIFICANT CHANGE UP (ref 3.5–5.3)
RBC # BLD: 3.62 M/UL — LOW (ref 4.2–5.8)
RBC # FLD: 15.9 % — HIGH (ref 10.3–14.5)
SODIUM SERPL-SCNC: 142 MMOL/L — SIGNIFICANT CHANGE UP (ref 135–145)
WBC # BLD: 14.46 K/UL — HIGH (ref 3.8–10.5)
WBC # FLD AUTO: 14.46 K/UL — HIGH (ref 3.8–10.5)

## 2024-06-07 PROCEDURE — 99232 SBSQ HOSP IP/OBS MODERATE 35: CPT

## 2024-06-07 RX ORDER — OXYCODONE HYDROCHLORIDE 5 MG/1
5 TABLET ORAL EVERY 4 HOURS
Refills: 0 | Status: DISCONTINUED | OUTPATIENT
Start: 2024-06-07 | End: 2024-06-08

## 2024-06-07 RX ORDER — OXYCODONE HYDROCHLORIDE 5 MG/1
10 TABLET ORAL EVERY 4 HOURS
Refills: 0 | Status: DISCONTINUED | OUTPATIENT
Start: 2024-06-07 | End: 2024-06-08

## 2024-06-07 RX ADMIN — Medication 10 MILLIGRAM(S): at 05:10

## 2024-06-07 RX ADMIN — Medication 10 MILLIGRAM(S): at 12:13

## 2024-06-07 RX ADMIN — Medication 150 MILLIGRAM(S): at 18:06

## 2024-06-07 RX ADMIN — Medication 25 MILLIGRAM(S): at 05:10

## 2024-06-07 RX ADMIN — ATORVASTATIN CALCIUM 40 MILLIGRAM(S): 80 TABLET, FILM COATED ORAL at 22:18

## 2024-06-07 RX ADMIN — Medication 150 MILLIGRAM(S): at 05:10

## 2024-06-07 RX ADMIN — GABAPENTIN 300 MILLIGRAM(S): 400 CAPSULE ORAL at 18:06

## 2024-06-07 RX ADMIN — HEPARIN SODIUM 5000 UNIT(S): 5000 INJECTION INTRAVENOUS; SUBCUTANEOUS at 08:17

## 2024-06-07 RX ADMIN — SENNA PLUS 2 TABLET(S): 8.6 TABLET ORAL at 22:18

## 2024-06-07 RX ADMIN — LEVETIRACETAM 750 MILLIGRAM(S): 250 TABLET, FILM COATED ORAL at 22:18

## 2024-06-07 RX ADMIN — OXYCODONE HYDROCHLORIDE 10 MILLIGRAM(S): 5 TABLET ORAL at 22:48

## 2024-06-07 RX ADMIN — HEPARIN SODIUM 5000 UNIT(S): 5000 INJECTION INTRAVENOUS; SUBCUTANEOUS at 18:06

## 2024-06-07 RX ADMIN — OXYCODONE HYDROCHLORIDE 10 MILLIGRAM(S): 5 TABLET ORAL at 08:47

## 2024-06-07 RX ADMIN — OXYCODONE HYDROCHLORIDE 10 MILLIGRAM(S): 5 TABLET ORAL at 22:18

## 2024-06-07 RX ADMIN — CEFEPIME 100 MILLIGRAM(S): 1 INJECTION, POWDER, FOR SOLUTION INTRAMUSCULAR; INTRAVENOUS at 12:13

## 2024-06-07 RX ADMIN — TAMSULOSIN HYDROCHLORIDE 0.4 MILLIGRAM(S): 0.4 CAPSULE ORAL at 22:18

## 2024-06-07 RX ADMIN — HEPARIN SODIUM 5000 UNIT(S): 5000 INJECTION INTRAVENOUS; SUBCUTANEOUS at 00:19

## 2024-06-07 RX ADMIN — Medication 81 MILLIGRAM(S): at 12:13

## 2024-06-07 RX ADMIN — GABAPENTIN 300 MILLIGRAM(S): 400 CAPSULE ORAL at 05:10

## 2024-06-07 RX ADMIN — Medication 10 MILLIGRAM(S): at 18:06

## 2024-06-07 RX ADMIN — OXYCODONE HYDROCHLORIDE 10 MILLIGRAM(S): 5 TABLET ORAL at 08:17

## 2024-06-07 NOTE — PROGRESS NOTE ADULT - ASSESSMENT
Patient is a 69 y/o Male with PMHx of CAD s/p stent, seizure on Keppra, HTN, recently admitted to [facility] (4/17-24) for UTI, found to have b/l nephrolithiasis including 2.5 cm R staghorn calculus and 5 mm distal L ureteral stone. UCx and BCx neg during the hospitalization. He was started on ertapenem followed by cefpodoxime for a 10-d course. Outpatient  repeat UCx grew pseudomonas, pt followed up with ID and midline was placed 2 days ago. He was started on cefepime. Pt is S/P R PCNL and L URS/stent       # S/P Stent placement  - CT renal stone hunt w/ interval placement of R Perc NT and R ureteral stent, Punctate 1 to 2 mm right midpole calculus, no hydro --> per urology   - ABX as per ID - on Cefepime , duration per ID   - Pt with flank pain, pain control per Urology   - With ongoing hematuria and down-trending of Hgb. Suggest to check CBC BID. If further drop, suggest imaging for further evaluation in the setting of hematuria, flank pain and leukocytosis   - Monitor renal function, monitor urine output     # Leukocytosis   - Pt with up-trending leukocytosis while on ABX   - 5/31 UCx negative  - On ABX as per ID  - Continue to monitor and trend CBC, temp curve, VS  - If febrile, check pan cultures, RVP, CXR   - Monitor closely     TONY  - Cr up-trending    - Continue to monitor and trend   - Hydration as tolerated  - Check bladder scan to rule out retention  - Avoid nephrotoxic agents  - Trend in AM     #Seizure disorder  - On AED w/ Keppra at home, resume   - On Baclofen   - CT head negative  - Monitor mental status while on Cefepime   - Fall, Seizure, Aspiration precautions   - PT/ OT      # CAD  - S/P Stent  - ASA and Statin therapy   - BP control     # HTN/HLD   - Lipid panel   - On ASA and Statin therapy   - On Toprol XL   - BP control. Monitor and adjust BP as tolerated    # COPD   - Pt reports that he is on O2 at home. Uses 2-3 L NC at baseline per patient. No need to wean to RA as this is not patient's baseline. Monitor O2 saturation.   - CXR w/ Clear lungs      Prostamegaly   - Seen on CT   - As per Urology     D/C planing

## 2024-06-07 NOTE — PHYSICAL THERAPY INITIAL EVALUATION ADULT - TRANSFER TRAINING, PT EVAL
GOAL: Pt will perform all transfers with or without appropriate assistive device with mod A in 2 weeks

## 2024-06-07 NOTE — PHYSICAL THERAPY INITIAL EVALUATION ADULT - GENERAL OBSERVATIONS, REHAB EVAL
Received in bed, +O2 2L/min, +dressing in R flank CDI, +Masimo, +[place] catheter; no complaints, agreeable to PT

## 2024-06-07 NOTE — PHYSICAL THERAPY INITIAL EVALUATION ADULT - DID THE PATIENT HAVE SURGERY?
Cystoscopy with percutaneous nephrolithotomy (PCNL) and balloon occlusion of ureter; Cystoscopy with left ureteroscopy/yes

## 2024-06-07 NOTE — PROGRESS NOTE ADULT - SUBJECTIVE AND OBJECTIVE BOX
Subjective: Pt seen and examined. no overnight events or complaints. did not get OOB yesterday    Objective    Vital signs  T(F): , Max: 98 (06-06-24 @ 17:12)  HR: 62 (06-07-24 @ 03:37)  BP: 108/70 (06-07-24 @ 03:37)  SpO2: 97% (06-07-24 @ 03:37)  Wt(kg): --    Output     OUT:    Urostomy (mL): 550 mL    Voided (mL): 1250 mL  Total OUT: 1800 mL    Total NET: -1800 mL      Gen: NAD  Abd: soft, nontender, nondistended. R flank pouch with red urine. removed and dressing placed.   : tea colored urine    Labs                          10.2   18.37 )-----------( 214      ( 06 Jun 2024 06:30 )             31.2   06-06    143  |  107  |  30<H>  ----------------------------<  138<H>  3.9   |  22  |  1.85<H>    Ca    9.0      06 Jun 2024 06:30      Urine Cx: Culture - Other (05.31.24 @ 21:18)    Specimen Source: .Other right kidney stone for culture   Culture Results:   No growth       Subjective: Pt seen and examined. no overnight events or complaints. did not get OOB yesterday    Objective    Vital signs  T(F): , Max: 98 (06-06-24 @ 17:12)  HR: 62 (06-07-24 @ 03:37)  BP: 108/70 (06-07-24 @ 03:37)  SpO2: 97% (06-07-24 @ 03:37)  Wt(kg): --    Output     OUT:    Urostomy (mL): 550 mL    Voided (mL): 1250 mL  Total OUT: 1800 mL    Total NET: -1800 mL      Gen: NAD  Abd: soft, nontender, nondistended. R flank pouch with old dark red urine. removed and gauze dressing placed.   : tea colored urine    Labs                          10.2   18.37 )-----------( 214      ( 06 Jun 2024 06:30 )             31.2   06-06    143  |  107  |  30<H>  ----------------------------<  138<H>  3.9   |  22  |  1.85<H>    Ca    9.0      06 Jun 2024 06:30      Urine Cx: Culture - Other (05.31.24 @ 21:18)    Specimen Source: .Other right kidney stone for culture   Culture Results:   No growth

## 2024-06-07 NOTE — PROGRESS NOTE ADULT - SUBJECTIVE AND OBJECTIVE BOX
Name of Patient : MANUEL PETERSON  MRN: [id number]  Date of visit: 06-07-24       Subjective: Patient seen and examined. No new events except as noted.   Doing okay     REVIEW OF SYSTEMS:    CONSTITUTIONAL: No weakness, fevers or chills  EYES/ENT: No visual changes;  No vertigo or throat pain   NECK: No pain or stiffness  RESPIRATORY: No cough, wheezing, hemoptysis; No shortness of breath  CARDIOVASCULAR: No chest pain or palpitations  GASTROINTESTINAL: No abdominal or epigastric pain. No nausea, vomiting, or hematemesis; No diarrhea or constipation. No melena or hematochezia.  GENITOURINARY: No dysuria, frequency or hematuria  NEUROLOGICAL: No numbness or weakness  SKIN: No itching, burning, rashes, or lesions   All other review of systems is negative unless indicated above.    MEDICATIONS:  MEDICATIONS  (STANDING):  aspirin enteric coated 81 milliGRAM(s) Oral daily  atorvastatin 40 milliGRAM(s) Oral at bedtime  baclofen 10 milliGRAM(s) Oral every 6 hours  gabapentin 300 milliGRAM(s) Oral two times a day  heparin   Injectable 5000 Unit(s) SubCutaneous every 8 hours  levETIRAcetam  milliGRAM(s) Oral at bedtime  metoprolol succinate ER 25 milliGRAM(s) Oral daily  senna 2 Tablet(s) Oral at bedtime  tamsulosin 0.4 milliGRAM(s) Oral at bedtime      PHYSICAL EXAM:  T(C): 36.4 (06-07-24 @ 20:41), Max: 36.7 (06-07-24 @ 00:19)  HR: 65 (06-07-24 @ 20:41) (58 - 67)  BP: 140/77 (06-07-24 @ 20:41) (108/70 - 150/87)  RR: 18 (06-07-24 @ 20:41) (18 - 18)  SpO2: 96% (06-07-24 @ 20:41) (93% - 98%)  Wt(kg): --  I&O's Summary    06 Jun 2024 07:01  -  07 Jun 2024 07:00  --------------------------------------------------------  IN: 480 mL / OUT: 1800 mL / NET: -1320 mL    07 Jun 2024 07:01  -  07 Jun 2024 22:58  --------------------------------------------------------  IN: 50 mL / OUT: 700 mL / NET: -650 mL          Appearance: Normal	  HEENT:  PERRLA   Lymphatic: No lymphadenopathy   Cardiovascular: Normal S1 S2, no JVD  Respiratory: normal effort , clear  Gastrointestinal:  Soft, Non-tender  Skin: No rashes,  warm to touch  Psychiatry:  Mood & affect appropriate  Musculuskeletal: No edema    recent labs, Imaging and EKGs personally reviewed     06-06-24 @ 07:01  -  06-07-24 @ 07:00  --------------------------------------------------------  IN: 480 mL / OUT: 1800 mL / NET: -1320 mL    06-07-24 @ 07:01  -  06-07-24 @ 22:58  --------------------------------------------------------  IN: 50 mL / OUT: 700 mL / NET: -650 mL                            10.8   14.46 )-----------( 244      ( 07 Jun 2024 11:13 )             35.0               06-07    142  |  110<H>  |  27<H>  ----------------------------<  112<H>  4.8   |  18<L>  |  1.72<H>    Ca    8.8      07 Jun 2024 08:26                         Urinalysis Basic - ( 07 Jun 2024 08:26 )    Color: x / Appearance: x / SG: x / pH: x  Gluc: 112 mg/dL / Ketone: x  / Bili: x / Urobili: x   Blood: x / Protein: x / Nitrite: x   Leuk Esterase: x / RBC: x / WBC x   Sq Epi: x / Non Sq Epi: x / Bacteria: x

## 2024-06-07 NOTE — PHYSICAL THERAPY INITIAL EVALUATION ADULT - PERTINENT HX OF CURRENT PROBLEM, REHAB EVAL
69 y/o Male with PMHx of CAD s/p stent, seizure on Keppra, HTN, recently admitted to [facility] (4/17-24) for UTI, found to have b/l nephrolithiasis including 2.5 cm R staghorn calculus and 5 mm distal L ureteral stone. UCx and BCx neg during the hospitalization. He was started on ertapenem followed by cefpodoxime for a 10-d course. Outpatient  repeat UCx grew pseudomonas, pt followed up with ID and midline was placed 2 days ago. He was started on cefepime. Pt is S/P R PCNL and L URS/stent (5/31/24)

## 2024-06-07 NOTE — PROGRESS NOTE ADULT - ASSESSMENT
68-yo M w/ PMH of CAD s/p stent, seizure d/o on Keppra, HTN, and recent admission at [facility] (4/17-24) for UTI, found to have b/l nephrolithiasis including 2.5 cm staghorn calculus and 5 mm distal L ureteral stone. UCx and BCx neg during the hospitalization. S/p ertapenem followed by cefpodoxime for a 10-d course. Outpatient Uro f/u done - repeat UCx w/ Pseudomonas (10-49k CFU/mL, S to cefepime, ceftazidime, and Zosyn), resistant to FQ. Seen at ID outpatient clinic 5/24. Started on cefepime 2g IV Q12H (renal dose for Pseudomonas), and now s/p elective cystoscopy w/ percutaneous nephrolithotomy on 5/31. Large stone in the R and smaller stone in the L kidney noted. 6/1 CT stone hunt protocol revealed 1-2 mm R midpole punctate calculus and L renal calculi up to 4 mm w/o associated hydronephrosis. WBC downtrending. TONY worsened, now improving. S/p PCT removal (6/4). C/b worsening TONY and leukocytosis. Blood loss anemia. Now hgb 10.2 (baseline 15-16s). Improved to 10.8 today. SCr and WBC improved.    #Pseudomonas UTI  #Nephrolithiasis  #TONY  #Staghorn calculus  #Mediation management  #Leukocytosis  #Blood loss anemia    Recommendations:  - Monitor for WBC and renal improvement. Transfuse per protocol. Monitor for bleeding and flank pain.   - Complete cefepime 2g IV Q12H today.  - D/c midline and use PIV only  - Monitor for mental status change w/ cefepime use i/s/o worsening renal function.    Plan discussed with primary team ACP.  Thank you for this consult.     Ernesto Sparks MD, PhD  Attending Physician  Division of Infectious Diseases  Department of Medicine    Please contact through MS Teams message.  Office: [phone] (after 5 PM or weekend)

## 2024-06-07 NOTE — PHYSICAL THERAPY INITIAL EVALUATION ADULT - ADDITIONAL COMMENTS
Pt lives in a private house, no stairs to enter. Pt has been nonambulatory. PTA, pt states he gets up at edge of bed, with a gait belt on and using a RW, pt transfers OOB to wheelchair via stand pivot with assist from his wife. Pt uses a diaper at all times and wife gives him bed baths. Pt owns shower bench and electric bed. Pt also states he has been going to outpatient PT and had to stop around March 2024 d/t kidney issues

## 2024-06-07 NOTE — PROGRESS NOTE ADULT - ASSESSMENT
68M s/p R PCNL, L URS, POD#7    - continue cefepime (last dose today)  - OOB  - f/u ID   - monitor output  - Analgesia and antiemetics as needed  - DVT prophylaxis/OOB/Incentive spirometry  - dc planning

## 2024-06-07 NOTE — PHYSICAL THERAPY INITIAL EVALUATION ADULT - NSPTDISCHREC_GEN_A_CORE
TBD pending completion of functional assessment; anticipate home PT TBD pending completion of functional assessment; anticipate home PT with assist from spouse as prior

## 2024-06-07 NOTE — PROGRESS NOTE ADULT - SUBJECTIVE AND OBJECTIVE BOX
Follow Up:    Renal stone    Interval History/ROS:  VSS ON. Patient was seen and examined at bedside. R flank w/ bleeding at the prior tube site. Complains of diffuse abdominal discomfort/pain. No fever.    Allergies  No Known Allergies        ANTIMICROBIALS:  cefepime   IVPB 2000 every 12 hours      OTHER MEDS:  MEDICATIONS  (STANDING):  acetaminophen     Tablet .. 650 every 6 hours PRN  aspirin enteric coated 81 daily  atorvastatin 40 at bedtime  baclofen 10 every 6 hours  gabapentin 300 two times a day  heparin   Injectable 5000 every 8 hours  levETIRAcetam  at bedtime  metoprolol succinate ER 25 daily  oxyCODONE    IR 10 every 4 hours PRN  oxyCODONE    IR 5 every 4 hours PRN  pregabalin 150 two times a day  senna 2 at bedtime  tamsulosin 0.4 at bedtime      Vital Signs Last 24 Hrs  T(C): 36.4 (07 Jun 2024 09:25), Max: 36.7 (06 Jun 2024 17:12)  T(F): 97.6 (07 Jun 2024 09:25), Max: 98 (06 Jun 2024 17:12)  HR: 58 (07 Jun 2024 09:25) (58 - 71)  BP: 117/70 (07 Jun 2024 09:25) (108/70 - 143/80)  BP(mean): --  RR: 18 (07 Jun 2024 09:25) (18 - 18)  SpO2: 97% (07 Jun 2024 09:25) (93% - 97%)    Parameters below as of 07 Jun 2024 09:25  Patient On (Oxygen Delivery Method): nasal cannula  O2 Flow (L/min): 2      PHYSICAL EXAM:  Constitutional: non-toxic, no distress  Cardiovascular:   normal S1, S2, no murmur, RRR  Respiratory:  clear BS bilaterally, no wheezes  GI:  soft, non-tender, normal bowel sounds  : R percutaneous tube s/p removal, ostomy pouch attached, collecting blood  Musculoskeletal:  no BLE edema  Neurologic: awake and oriented x3  Skin:  no rash, no erythema, no phlebitis; midline intact; R flank at the prior PCT tube site w/ bruise                            10.8   14.46 )-----------( 244      ( 07 Jun 2024 11:13 )             35.0       06-07    142  |  110<H>  |  27<H>  ----------------------------<  112<H>  4.8   |  18<L>  |  1.72<H>    Ca    8.8      07 Jun 2024 08:26        Urinalysis Basic - ( 07 Jun 2024 08:26 )    Color: x / Appearance: x / SG: x / pH: x  Gluc: 112 mg/dL / Ketone: x  / Bili: x / Urobili: x   Blood: x / Protein: x / Nitrite: x   Leuk Esterase: x / RBC: x / WBC x   Sq Epi: x / Non Sq Epi: x / Bacteria: x        MICROBIOLOGY:  v  .Other right kidney stone for culture  05-31-24   No growth  --  --                RADIOLOGY:  Imaging below independently reviewed.  < from: CT Renal Stone Hunt (06.01.24 @ 16:44) >    ACC: [id number] EXAM:  CT RENAL STONE MARC   ORDERED BY: FADY CARTWRIGHT     PROCEDURE DATE:  06/01/2024          INTERPRETATION:  CLINICAL INFORMATION: S/p PCNL reassess stone burden    COMPARISON: 4/17/2024    CONTRAST/COMPLICATIONS:  IV Contrast: NONE  Oral Contrast: NONE  Complications: None reported at time of study completion    PROCEDURE:  CT of the Abdomen and Pelvis was performed.  Sagittal and coronal reformats were performed.    FINDINGS:  LOWER CHEST: Trace bilateral effusions/atelectasis. Coronary artery   calcification.    LIVER: Within normal limits.  BILE DUCTS: Normal caliber.  GALLBLADDER: Within normal limits.  SPLEEN: Within normal limits.  PANCREAS: Mild atrophy.  ADRENALS: Within normal limits.  KIDNEYS/URETERS: Interval placement of a right-sided percutaneous   nephrostomy tube and a right ureteral stent which reaches the distal   right ureter approximately 6.6 cm from the right UVJ. A crescentic   hypodense right posterior paranephric collection noted measuring   approximately 1.4 x 9.0 x 10.0 cm, likely postprocedural hematoma. There   is also small amount of air within the right paranephric space.   Hyperdense material noted within the right renal collecting system,   likely debris/hemorrhagic product. Punctate 1 to 2 mm right midpole   calcification/calculus noted. Left renal calculi are noted measuring up   to 4 mm without associated hydronephrosis.    BLADDER: Partially distended.  REPRODUCTIVE ORGANS: Prostate is enlarged.    BOWEL: No bowel obstruction. Appendix is not visualized. No evidence of   inflammation in the pericecal region. Moderate to large amount of stool   is noted in the rectum.  PERITONEUM: No ascites.  VESSELS: Atherosclerotic changes.  RETROPERITONEUM/LYMPH NODES: No lymphadenopathy.  ABDOMINAL WALL: Within normal limits.  BONES: Degenerative changes. Status post right hip arthroplasty.   Pedicular screws and spinal rods centimeters multiple level.    IMPRESSION:  Interval placement of a percutaneous nephrostomy tube and a right   ureteral stent with the distal tip approximately 6.6 cm from the right   UVJ.  Punctate 1 to 2 mm right midpole calculus.  Right posterior paranephric air and hyperdense fluid measuring up to 10   cm noted, likely postprocedural.  Left renal calculi measuring to 4 mm without associated hydronephrosis.  Small bilateral pleural effusion/atelectasis.  Enlarged prostate, correlate with PSA level.        --- End of Report ---            RADHA MATTA MD; Attending Radiologist  This document has beenelectronically signed. Jun 1 2024  5:38PM    < end of copied text >

## 2024-06-08 VITALS
SYSTOLIC BLOOD PRESSURE: 150 MMHG | TEMPERATURE: 98 F | RESPIRATION RATE: 18 BRPM | OXYGEN SATURATION: 95 % | DIASTOLIC BLOOD PRESSURE: 79 MMHG | HEART RATE: 65 BPM

## 2024-06-08 LAB
ANION GAP SERPL CALC-SCNC: 14 MMOL/L — SIGNIFICANT CHANGE UP (ref 5–17)
BUN SERPL-MCNC: 24 MG/DL — HIGH (ref 7–23)
CALCIUM SERPL-MCNC: 9.7 MG/DL — SIGNIFICANT CHANGE UP (ref 8.4–10.5)
CHLORIDE SERPL-SCNC: 107 MMOL/L — SIGNIFICANT CHANGE UP (ref 96–108)
CO2 SERPL-SCNC: 23 MMOL/L — SIGNIFICANT CHANGE UP (ref 22–31)
CREAT SERPL-MCNC: 1.27 MG/DL — SIGNIFICANT CHANGE UP (ref 0.5–1.3)
EGFR: 61 ML/MIN/1.73M2 — SIGNIFICANT CHANGE UP
GLUCOSE SERPL-MCNC: 108 MG/DL — HIGH (ref 70–99)
HCT VFR BLD CALC: 38.4 % — LOW (ref 39–50)
HGB BLD-MCNC: 11.9 G/DL — LOW (ref 13–17)
MCHC RBC-ENTMCNC: 30.1 PG — SIGNIFICANT CHANGE UP (ref 27–34)
MCHC RBC-ENTMCNC: 31 GM/DL — LOW (ref 32–36)
MCV RBC AUTO: 97.2 FL — SIGNIFICANT CHANGE UP (ref 80–100)
NRBC # BLD: 0 /100 WBCS — SIGNIFICANT CHANGE UP (ref 0–0)
PLATELET # BLD AUTO: 275 K/UL — SIGNIFICANT CHANGE UP (ref 150–400)
POTASSIUM SERPL-MCNC: 4.3 MMOL/L — SIGNIFICANT CHANGE UP (ref 3.5–5.3)
POTASSIUM SERPL-SCNC: 4.3 MMOL/L — SIGNIFICANT CHANGE UP (ref 3.5–5.3)
RBC # BLD: 3.95 M/UL — LOW (ref 4.2–5.8)
RBC # FLD: 15.9 % — HIGH (ref 10.3–14.5)
SODIUM SERPL-SCNC: 144 MMOL/L — SIGNIFICANT CHANGE UP (ref 135–145)
WBC # BLD: 13.54 K/UL — HIGH (ref 3.8–10.5)
WBC # FLD AUTO: 13.54 K/UL — HIGH (ref 3.8–10.5)

## 2024-06-08 PROCEDURE — 36415 COLL VENOUS BLD VENIPUNCTURE: CPT

## 2024-06-08 PROCEDURE — C1747: CPT

## 2024-06-08 PROCEDURE — 85610 PROTHROMBIN TIME: CPT

## 2024-06-08 PROCEDURE — 85730 THROMBOPLASTIN TIME PARTIAL: CPT

## 2024-06-08 PROCEDURE — 86850 RBC ANTIBODY SCREEN: CPT

## 2024-06-08 PROCEDURE — 86901 BLOOD TYPING SEROLOGIC RH(D): CPT

## 2024-06-08 PROCEDURE — 97161 PT EVAL LOW COMPLEX 20 MIN: CPT

## 2024-06-08 PROCEDURE — 87070 CULTURE OTHR SPECIMN AEROBIC: CPT

## 2024-06-08 PROCEDURE — 71045 X-RAY EXAM CHEST 1 VIEW: CPT

## 2024-06-08 PROCEDURE — 88300 SURGICAL PATH GROSS: CPT

## 2024-06-08 PROCEDURE — 76000 FLUOROSCOPY <1 HR PHYS/QHP: CPT

## 2024-06-08 PROCEDURE — C1726: CPT

## 2024-06-08 PROCEDURE — C1889: CPT

## 2024-06-08 PROCEDURE — 85027 COMPLETE CBC AUTOMATED: CPT

## 2024-06-08 PROCEDURE — 86900 BLOOD TYPING SEROLOGIC ABO: CPT

## 2024-06-08 PROCEDURE — 82365 CALCULUS SPECTROSCOPY: CPT

## 2024-06-08 PROCEDURE — C1887: CPT

## 2024-06-08 PROCEDURE — 80048 BASIC METABOLIC PNL TOTAL CA: CPT

## 2024-06-08 PROCEDURE — 85025 COMPLETE CBC W/AUTO DIFF WBC: CPT

## 2024-06-08 PROCEDURE — C1769: CPT

## 2024-06-08 PROCEDURE — 93005 ELECTROCARDIOGRAM TRACING: CPT

## 2024-06-08 PROCEDURE — 74176 CT ABD & PELVIS W/O CONTRAST: CPT | Mod: MC

## 2024-06-08 PROCEDURE — C1758: CPT

## 2024-06-08 PROCEDURE — 94660 CPAP INITIATION&MGMT: CPT

## 2024-06-08 PROCEDURE — C9399: CPT

## 2024-06-08 RX ADMIN — HEPARIN SODIUM 5000 UNIT(S): 5000 INJECTION INTRAVENOUS; SUBCUTANEOUS at 08:01

## 2024-06-08 RX ADMIN — Medication 10 MILLIGRAM(S): at 06:26

## 2024-06-08 RX ADMIN — Medication 10 MILLIGRAM(S): at 11:40

## 2024-06-08 RX ADMIN — Medication 81 MILLIGRAM(S): at 11:40

## 2024-06-08 RX ADMIN — Medication 150 MILLIGRAM(S): at 06:27

## 2024-06-08 RX ADMIN — Medication 25 MILLIGRAM(S): at 06:26

## 2024-06-08 RX ADMIN — HEPARIN SODIUM 5000 UNIT(S): 5000 INJECTION INTRAVENOUS; SUBCUTANEOUS at 00:18

## 2024-06-08 RX ADMIN — Medication 10 MILLIGRAM(S): at 00:17

## 2024-06-08 RX ADMIN — GABAPENTIN 300 MILLIGRAM(S): 400 CAPSULE ORAL at 06:26

## 2024-06-08 RX ADMIN — OXYCODONE HYDROCHLORIDE 10 MILLIGRAM(S): 5 TABLET ORAL at 08:01

## 2024-06-08 RX ADMIN — OXYCODONE HYDROCHLORIDE 10 MILLIGRAM(S): 5 TABLET ORAL at 08:31

## 2024-06-08 NOTE — PROGRESS NOTE ADULT - ASSESSMENT
Patient is a 69 y/o Male with PMHx of CAD s/p stent, seizure on Keppra, HTN, recently admitted to [facility] (4/17-24) for UTI, found to have b/l nephrolithiasis including 2.5 cm R staghorn calculus and 5 mm distal L ureteral stone. UCx and BCx neg during the hospitalization. He was started on ertapenem followed by cefpodoxime for a 10-d course. Outpatient  repeat UCx grew pseudomonas, pt followed up with ID and midline was placed 2 days ago. He was started on cefepime. Pt is S/P R PCNL and L URS/stent       # S/P Stent placement  - CT renal stone hunt w/ interval placement of R Perc NT and R ureteral stent, Punctate 1 to 2 mm right midpole calculus, no hydro --> per urology   - ABX as per ID - on Cefepime , duration per ID   - Pt with flank pain, pain control per Urology   - With ongoing hematuria and down-trending of Hgb. Suggest to check CBC BID. If further drop, suggest imaging for further evaluation in the setting of hematuria, flank pain and leukocytosis   - Monitor renal function, monitor urine output     # Leukocytosis   - Pt with up-trending leukocytosis while on ABX   - 5/31 UCx negative  - On ABX as per ID  - Continue to monitor and trend CBC, temp curve, VS  - If febrile, check pan cultures, RVP, CXR   - Monitor closely     TONY  - Cr up-trending    - Continue to monitor and trend   - Hydration as tolerated  - Check bladder scan to rule out retention  - Avoid nephrotoxic agents  - Trend in AM     #Seizure disorder  - On AED w/ Keppra at home, resume   - On Baclofen   - CT head negative  - Monitor mental status while on Cefepime   - Fall, Seizure, Aspiration precautions   - PT/ OT      # CAD  - S/P Stent  - ASA and Statin therapy   - BP control     # HTN/HLD   - Lipid panel   - On ASA and Statin therapy   - On Toprol XL   - BP control. Monitor and adjust BP as tolerated    # COPD   - Pt reports that he is on O2 at home. Uses 2-3 L NC at baseline per patient. No need to wean to RA as this is not patient's baseline. Monitor O2 saturation.   - CXR w/ Clear lungs      Prostamegaly   - Seen on CT   - As per Urology     D/C planing

## 2024-06-08 NOTE — PROGRESS NOTE ADULT - SUBJECTIVE AND OBJECTIVE BOX
The patient was seen and examined at bedside.  No acute events overnight and the patient is without acute complaints this AM.    T(C): 36.4 (06-08-24 @ 08:57), Max: 36.5 (06-07-24 @ 16:07)  HR: 65 (06-08-24 @ 08:57) (58 - 69)  BP: 150/79 (06-08-24 @ 08:57) (107/65 - 150/87)  RR: 18 (06-08-24 @ 08:57) (18 - 18)  SpO2: 95% (06-08-24 @ 08:57) (93% - 98%)  Wt(kg): --    Physical Exam:    General: NAD, A+Ox3  Abdomen: soft, non-tender, non-distended  Back: dressing clean/dry/intact, +ecchymosis, no swelling      06-07 @ 07:01  -  06-08 @ 07:00  --------------------------------------------------------  IN: 50 mL / OUT: 900 mL / NET: -850 mL      void - 200cc dark-tea colored

## 2024-06-08 NOTE — PROGRESS NOTE ADULT - ATTENDING COMMENTS
As above  Patient refusing to get out of bed, even with assistance offered multiple times  No longer requiring NC O2  No resp distress  labs reviewed, Cr and WBC noted  Cr rise is transient and should improve in due course  On antibiotics per ID  [place] services set up  Encouraged patient to get out of bed, and use incentive spirometer consistently  Nephrostomy site drainage will resolve significantly and faster if patient agrees to get out of bed and sit upright to encourage drainage antegrade  All of the above explained to patient and he verbalized understanding, and promises to cooperate
As above  Pt refused to get OOB once again  Refused help from Physical Therapy  Dressing changed  last dose of antibiotics today  Anticipate discharge
POD#5  saturating 95% without NCO2  Incentive spirometer use encouraged  OOB with assist  Antibiotics per ID
pt seen and examined  feeling well  labs have been stable  will gently irrigate nephrostomy this am, determine if will remove today or in AM  cont IV abx, will plan for abx post discharge home
Agree with above.  D/C home.
As above  Nephrostomy tube removed  Wean NCO2  OOB  check labs

## 2024-06-08 NOTE — PROGRESS NOTE ADULT - REASON FOR ADMISSION
preop abx
Cimzia Counseling:  I discussed with the patient the risks of Cimzia including but not limited to immunosuppression, allergic reactions and infections.  The patient understands that monitoring is required including a PPD at baseline and must alert us or the primary physician if symptoms of infection or other concerning signs are noted.

## 2024-06-08 NOTE — PROGRESS NOTE ADULT - ASSESSMENT
68M s/p R PCNL, L URS, POD#8, IV cefepime course completed 6/7    - AM labs  - continue cefepime (last dose today)  - OOB  - f/u ID   - monitor output  - Analgesia and antiemetics as needed  - DVT prophylaxis/OOB/Incentive spirometry  - dc planning today

## 2024-06-08 NOTE — PROGRESS NOTE ADULT - SUBJECTIVE AND OBJECTIVE BOX
Name of Patient : MANUEL PETERSON  MRN: [id number]  Date of visit: 06-08-24       Subjective: Patient seen and examined. No new events except as noted.   doing okay       REVIEW OF SYSTEMS:    CONSTITUTIONAL: No weakness, fevers or chills  EYES/ENT: No visual changes;  No vertigo or throat pain   NECK: No pain or stiffness  RESPIRATORY: No cough, wheezing, hemoptysis; No shortness of breath  CARDIOVASCULAR: No chest pain or palpitations  GASTROINTESTINAL: No abdominal or epigastric pain. No nausea, vomiting, or hematemesis; No diarrhea or constipation. No melena or hematochezia.  GENITOURINARY: No dysuria, frequency or hematuria  NEUROLOGICAL: No numbness or weakness  SKIN: No itching, burning, rashes, or lesions   All other review of systems is negative unless indicated above.    MEDICATIONS:  MEDICATIONS  (STANDING):  aspirin enteric coated 81 milliGRAM(s) Oral daily  atorvastatin 40 milliGRAM(s) Oral at bedtime  baclofen 10 milliGRAM(s) Oral every 6 hours  gabapentin 300 milliGRAM(s) Oral two times a day  heparin   Injectable 5000 Unit(s) SubCutaneous every 8 hours  levETIRAcetam  milliGRAM(s) Oral at bedtime  metoprolol succinate ER 25 milliGRAM(s) Oral daily  pregabalin 150 milliGRAM(s) Oral two times a day  senna 2 Tablet(s) Oral at bedtime  tamsulosin 0.4 milliGRAM(s) Oral at bedtime      PHYSICAL EXAM:  T(C): 36.4 (06-08-24 @ 08:57), Max: 36.4 (06-07-24 @ 20:41)  HR: 65 (06-08-24 @ 08:57) (61 - 69)  BP: 150/79 (06-08-24 @ 08:57) (107/65 - 150/79)  RR: 18 (06-08-24 @ 08:57) (18 - 18)  SpO2: 95% (06-08-24 @ 08:57) (93% - 96%)  Wt(kg): --  I&O's Summary    07 Jun 2024 07:01  -  08 Jun 2024 07:00  --------------------------------------------------------  IN: 50 mL / OUT: 900 mL / NET: -850 mL    08 Jun 2024 07:01 - 08 Jun 2024 18:48  --------------------------------------------------------  IN: 0 mL / OUT: 700 mL / NET: -700 mL          Appearance: Normal	  HEENT:  PERRLA   Lymphatic: No lymphadenopathy   Cardiovascular: Normal S1 S2, no JVD  Respiratory: normal effort , clear  Gastrointestinal:  Soft, Non-tender  Skin: No rashes,  warm to touch  Psychiatry:  Mood & affect appropriate  Musculuskeletal: No edema    recent labs, Imaging and EKGs personally reviewed             06-07-24 @ 07:01  -  06-08-24 @ 07:00  --------------------------------------------------------  IN: 50 mL / OUT: 900 mL / NET: -850 mL    06-08-24 @ 07:01  -  06-08-24 @ 18:48  --------------------------------------------------------  IN: 0 mL / OUT: 700 mL / NET: -700 mL

## 2024-06-08 NOTE — PROGRESS NOTE ADULT - PROVIDER SPECIALTY LIST ADULT
Infectious Disease
Infectious Disease
Urology
Internal Medicine
Urology
Urology
Infectious Disease
Internal Medicine
Urology
Urology
Infectious Disease
Internal Medicine
Urology

## 2024-06-21 ENCOUNTER — APPOINTMENT (OUTPATIENT)
Dept: UROLOGY | Facility: CLINIC | Age: 69
End: 2024-06-21
Payer: MEDICARE

## 2024-06-21 VITALS
SYSTOLIC BLOOD PRESSURE: 130 MMHG | HEART RATE: 73 BPM | DIASTOLIC BLOOD PRESSURE: 79 MMHG | RESPIRATION RATE: 17 BRPM | OXYGEN SATURATION: 94 %

## 2024-06-21 DIAGNOSIS — N20.0 CALCULUS OF KIDNEY: ICD-10-CM

## 2024-06-21 DIAGNOSIS — Z87.442 PERSONAL HISTORY OF URINARY CALCULI: ICD-10-CM

## 2024-06-21 DIAGNOSIS — Z87.440 PERSONAL HISTORY OF URINARY (TRACT) INFECTIONS: ICD-10-CM

## 2024-06-21 PROCEDURE — 99214 OFFICE O/P EST MOD 30 MIN: CPT | Mod: 24

## 2024-06-21 RX ORDER — MULTIVIT-MIN/FOLIC/VIT K/LYCOP 400-300MCG
500 TABLET ORAL
Qty: 180 | Refills: 3 | Status: ACTIVE | COMMUNITY
Start: 2024-06-21 | End: 1900-01-01

## 2024-06-21 RX ORDER — METHENAMINE HIPPURATE 1 G/1
1 TABLET ORAL TWICE DAILY
Qty: 180 | Refills: 3 | Status: ACTIVE | COMMUNITY
Start: 2024-06-21 | End: 1900-01-01

## 2024-06-21 NOTE — ASSESSMENT
[FreeTextEntry1] : Diet modification reviewed at length- increasing fluids (primarily water), citrus is good, and decreasing/moderating salt, animal flesh protein, oxalate containing foods, and moderation of calcium intake (1000 mg/day is USRDA).  I reviewed with the patient the risks of metabolic stone disease given their underlying risk parameters (all of which include large stones, multiple stones, bilateral stones, family history, and young age), and the indications for 24 hour urine metabolic assessment.  DASH diet a good overall diet plan to consider and review for general health and stone health.  We also discussed benefits of regular exercise and weight loss as independent risk reducers for stones.  1. Diet modification as discussed 2. 24 hour urine collection x 2 in the next few weeks 3. RTC with renal US in 8 to 10 weeks 4. given MAP/primarily CP stone, start hiprex/vit C as least invasive means of prophylaxis 5. urine culture can do at home

## 2024-06-21 NOTE — HISTORY OF PRESENT ILLNESS
[FreeTextEntry1] : 68 yr old male- Pt returns for metabolic evaluation and prevention of future stone disease following recent surgery for stone.  At issue today is review of the stone analysis, risk factors for future stone episodes and establishing whether they have pure dietary, metabolic, or mixed causes for their stone risks which is unrelated to the surgical management of their stone disease.  They underwent left URS with laser, right PCNL on 5/31/24 stone- 80% Calcium phosphate (apatite). 10% Magnesium ammonium phosphate (struvite). 10% Calcium oxalate dihydrate.  Doing well

## 2024-06-21 NOTE — PHYSICAL EXAM
[Normal Appearance] : normal appearance [Well Groomed] : well groomed [General Appearance - In No Acute Distress] : no acute distress [Edema] : no peripheral edema [Respiration, Rhythm And Depth] : normal respiratory rhythm and effort [Exaggerated Use Of Accessory Muscles For Inspiration] : no accessory muscle use [Abdomen Soft] : soft [Abdomen Tenderness] : non-tender [Costovertebral Angle Tenderness] : no ~M costovertebral angle tenderness [Urinary Bladder Findings] : the bladder was normal on palpation [] : no rash [Oriented To Time, Place, And Person] : oriented to person, place, and time [Affect] : the affect was normal [Mood] : the mood was normal [de-identified] : healed right pcnl site [de-identified] : wheelchair assist

## 2024-06-25 LAB — BACTERIA UR CULT: ABNORMAL

## 2024-06-25 RX ORDER — SULFAMETHOXAZOLE AND TRIMETHOPRIM 800; 160 MG/1; MG/1
800-160 TABLET ORAL TWICE DAILY
Qty: 20 | Refills: 0 | Status: ACTIVE | COMMUNITY
Start: 2024-06-25 | End: 1900-01-01

## 2024-08-05 ENCOUNTER — APPOINTMENT (OUTPATIENT)
Dept: ULTRASOUND IMAGING | Facility: IMAGING CENTER | Age: 69
End: 2024-08-05

## 2024-08-05 ENCOUNTER — OUTPATIENT (OUTPATIENT)
Dept: OUTPATIENT SERVICES | Facility: HOSPITAL | Age: 69
LOS: 1 days | End: 2024-08-05
Payer: MEDICARE

## 2024-08-05 DIAGNOSIS — Z87.442 PERSONAL HISTORY OF URINARY CALCULI: ICD-10-CM

## 2024-08-05 DIAGNOSIS — Z98.89 OTHER SPECIFIED POSTPROCEDURAL STATES: Chronic | ICD-10-CM

## 2024-08-05 DIAGNOSIS — Z87.81 PERSONAL HISTORY OF (HEALED) TRAUMATIC FRACTURE: Chronic | ICD-10-CM

## 2024-08-05 DIAGNOSIS — Z98.890 OTHER SPECIFIED POSTPROCEDURAL STATES: Chronic | ICD-10-CM

## 2024-08-05 DIAGNOSIS — Z00.8 ENCOUNTER FOR OTHER GENERAL EXAMINATION: ICD-10-CM

## 2024-08-05 DIAGNOSIS — M43.28 FUSION OF SPINE, SACRAL AND SACROCOCCYGEAL REGION: Chronic | ICD-10-CM

## 2024-08-05 DIAGNOSIS — Z96.649 PRESENCE OF UNSPECIFIED ARTIFICIAL HIP JOINT: Chronic | ICD-10-CM

## 2024-08-05 PROCEDURE — 76775 US EXAM ABDO BACK WALL LIM: CPT | Mod: 26

## 2024-08-05 PROCEDURE — 76775 US EXAM ABDO BACK WALL LIM: CPT

## 2024-08-16 ENCOUNTER — APPOINTMENT (OUTPATIENT)
Dept: UROLOGY | Facility: CLINIC | Age: 69
End: 2024-08-16

## 2024-08-16 DIAGNOSIS — R82.991 HYPOCITRATURIA: ICD-10-CM

## 2024-08-16 DIAGNOSIS — Z87.442 PERSONAL HISTORY OF URINARY CALCULI: ICD-10-CM

## 2024-08-16 DIAGNOSIS — N20.0 CALCULUS OF KIDNEY: ICD-10-CM

## 2024-08-16 DIAGNOSIS — Z87.440 PERSONAL HISTORY OF URINARY (TRACT) INFECTIONS: ICD-10-CM

## 2024-08-16 PROCEDURE — 76770 US EXAM ABDO BACK WALL COMP: CPT | Mod: 26

## 2024-08-16 PROCEDURE — 99214 OFFICE O/P EST MOD 30 MIN: CPT | Mod: 24,25

## 2024-08-20 NOTE — PATIENT PROFILE ADULT - FUNCTIONAL SCREEN CURRENT LEVEL: SWALLOWING (IF SCORE 2 OR MORE FOR ANY ITEM, CONSULT REHAB SERVICES), MLM)
Facility/Department: [facility] CAR 2- STEPDOWN   Occupational Therapy Initial Evaluation    Patient Name: Claudio Graham        MRN: [id number]    : 1954    Date of Service: 2024    Chief Complaint   Patient presents with    Abnormal Lab     Discharge Recommendations  Discharge Recommendations: Patient would benefit from continued therapy after discharge    OT Equipment Recommendations  Equipment Needed: Yes  Mobility Devices: ADL Assistive Devices  ADL Assistive Devices: Transfer Tub Bench, Reacher, Sock-Aid Soft, Grab Bars - toilet, Grab Bars - shower    Assessment  Performance deficits / Impairments: Decreased functional mobility ;Decreased ADL status;Decreased endurance;Decreased high-level IADLs;Decreased balance;Decreased safe awareness  Assessment: Patient demonstrates endurance deficits impacting performance and safety with ADLs and functional tasks. Pt completed functional transfers at [facility] using RW for support to stand sinkside and participate in grooming tasks; Pt limited d/t fatigue and decreased endurance with activity in standing, returned to recliner d/t SOB. Pt SOB improving with rest break and participating in UB dressing task. Overall limited d/t decreased activity tolerance. Patient would benefit from continued acute OT services to address functional deficits through skilled intervention to promote independence and safety with ADL/IADLs and functional transfers/mobility for safe return to prior living environment and level of function.  Prognosis: Good  Decision Making: Medium Complexity  REQUIRES OT FOLLOW-UP: Yes  Activity Tolerance  Activity Tolerance: Patient limited by fatigue  Safety Devices  Type of Devices: Call light within reach;Gait belt;Patient at risk for falls;Left in chair;Nurse notified  Restraints  Restraints Initially in Place: No    Restrictions/Precautions  Restrictions/Precautions  Restrictions/Precautions: Up as Tolerated  Required Braces or Orthoses?:  No    Subjective  General  Patient assessed for rehabilitation services?: Yes  Family / Caregiver Present: Yes  General Comment  Comments: RN ok'd patient for OT session. Pt pleasant, cooperative and agreeable. Pt reports no pain.       Home Setup/Prior Level of Function  Social/Functional History  Lives With: Alone  Type of Home: House  Home Layout: One level  Home Access: Stairs to enter with rails  Entrance Stairs - Number of Steps: 3  Entrance Stairs - Rails: Left  Bathroom Shower/Tub: Tub/Shower unit  Bathroom Equipment: Grab bars in shower  Home Equipment: Walker - Rolling;Cane  Has the patient had two or more falls in the past year or any fall with injury in the past year?: Yes  Receives Help From: Friend(s)  ADL Assistance: Independent  Homemaking Assistance: Independent  Ambulation Assistance: Independent  Transfer Assistance: Independent  Active : Yes  Mode of Transportation: [place]  Occupation: Retired  Type of Occupation: package delivery    Vision/Hearing  Vision  Vision: Impaired  Vision Exceptions: Wears glasses for reading  Hearing  Hearing: Within functional limits    BUE Assessment  Gross Assessment  AROM: Within functional limits  Strength: Within functional limits (B UE strength 4/5)  Coordination: Within functional limits  Tone: Normal  Sensation: Intact  Hand Dominance: Right     Objective  Cognition  Overall Cognitive Status: WFL    Activities of Daily Living  Feeding: Independent  Grooming: Modified independent ;Increased time to complete  Grooming Skilled Clinical Factors: Pt stood sinkside and completed hand hygiene and washing head and face; Increased time to complete with mild SOB with activity;  UE Bathing: Stand by assistance  LE Bathing: Contact guard assistance  UE Dressing: Stand by assistance  UE Dressing Skilled Clinical Factors: Pt sat unsupported in recliner to doff and don clean gown; verbal cue for initiation d/t communicative throughout session  LE Dressing: Contact guard  0 = swallows foods/liquids without difficulty

## 2024-08-23 PROBLEM — R82.991 HYPOCITRATURIA: Status: ACTIVE | Noted: 2024-08-16

## 2024-08-23 NOTE — PHYSICAL EXAM
[General Appearance - Well Developed] : well developed [] : no respiratory distress [Oriented To Time, Place, And Person] : oriented to person, place, and time [de-identified] : using a wheelchair

## 2024-08-23 NOTE — ASSESSMENT
[FreeTextEntry1] : Renal US 8/5/24 reviewed- no stones, hydro, or solid masses.   24 hr urine x 2- extremely low volume (0.32 L/0.89L), good sodium and animal proteins, low citrate (109/192), calcium 25/34, pH 5.7/6.3  Increase fluids significantly to 2.5-3 L  Maintain low sodium and animal proteins increase dietary citrate- festus, festus, limes   plan 1)RTC in 6 months with Renal US  Overall excellent

## 2024-08-23 NOTE — HISTORY OF PRESENT ILLNESS
[None] : None [FreeTextEntry1] : 68 yr old male- Pt returns for metabolic evaluation and prevention of future stone disease following recent surgery for stone. At issue today is review of the stone analysis, risk factors for future stone episodes and establishing whether they have pure dietary, metabolic, or mixed causes for their stone risks which is unrelated to the surgical management of their stone disease.  They underwent left URS with laser, right PCNL on 5/31/24 stone- 80% Calcium phosphate (apatite). 10% Magnesium ammonium phosphate (struvite). 10% Calcium oxalate dihydrate. [Dysuria] : no dysuria [Hematuria - Gross] : no gross hematuria

## 2024-11-05 NOTE — BRIEF OPERATIVE NOTE - ANTIBIOTIC PROTOCOL
Followed protocol Initiate Treatment: erythromycin with ethanol 2 % topical gel TP Frequency: BID Sig: Apply a thin layer twice daily to rash on underarms for three weeks or until resolved.\\nQuantity: 30 g Days Supply: 30 Plan: 3 week follow up Detail Level: Zone Render In Strict Bullet Format?: No Defer Treatment (Provide Reason For Deferment In Text Field Below): Declines topical steroid as is not that itchy

## 2024-11-22 NOTE — STROKE CODE NOTE - NIH STROKE SCALE: 6A. MOTOR LEG, LEFT, QM
[facility] [place]  [address] [place] [place]  [address]  [phone]           FACILITY TRANSFER ORDERS           Admit to:  [facility] [place] [facility]     Diagnoses:   Active Hospital Problems    Diagnosis  POA    *COPD exacerbation [J44.1]  Yes     Chronic    Severe protein-calorie malnutrition [E43]  Yes    Chronic hyponatremia [E87.1]  Yes    History of recent fall [Z91.81]  Not Applicable    Seizure disorder [G40.909]  Yes    Recurrent major depressive disorder, in partial remission [F33.41]  Yes    Essential hypertension [I10]  Yes      Resolved Hospital Problems   No resolved problems to display.        Goals of Care Treatment Preferences:  Code Status: DNR    Health care agent: ayla Archibald  Health care agent number: [phone]          Per discussion with family, patient would like to continue therapy and hope for some functional improvement with her strength and breathing. Patient does not want prolonged suffering.         Allergies:  Review of patient's allergies indicates:   Allergen Reactions    Dye Anxiety, Blisters, Hives, Itching, Rash and Swelling       Vitals:  Routine, once monthly ([facility] Nursing patients)         Diet: Regular diet              Supplement:  1 can every 2 times a day with lunch and dinner                         Type:  House          Activities:     - Up in a chair each morning as tolerated   - Ambulate with assistance to bathroom   - Scheduled walks once each shift (every 8 hours)   - May ambulate independently   - May use walker, cane, or self-propelled wheelchair       Oxygen: O2 2L PRN to maintain oxygen saturations >92%                       LABS: NA              Nursing Precautions:    - Aspiration precautions:               -  Upright 90 degrees before during and after meals      - Fall precautions per nursing home protocol       CONSULTS:                 Physical Therapy to evaluate and treat                 Occupational Therapy to  evaluate and treat                 Nutrition to evaluate and recommend diet                      Medications: Discontinue all previous medication orders, if any. See new list below.     Medication List        START taking these medications      bacitracin 500 unit/gram ointment  Apply topically 2 (two) times a day.            CHANGE how you take these medications      albuterol-ipratropium 2.5 mg-0.5 mg/3 mL nebulizer solution  Commonly known as: DUO-NEB  Use one vial (3 mL) by nebulization every 4 (four) hours as needed for Wheezing - Rescue  What changed:   when to take this  additional instructions     diazePAM 2 MG tablet  Commonly known as: VALIUM  Take 1 tablet (2 mg total) by mouth every evening.  What changed:   medication strength  how much to take  how to take this  when to take this     FLUoxetine 10 MG capsule  Take 1 capsule (10 mg total) by mouth once daily.  What changed:   medication strength  how much to take  how to take this  when to take this     LATUDA 20 mg Tab tablet  Generic drug: lurasidone  Take 2 tablets (40 mg total) by mouth every evening.  What changed: when to take this     tiotropium 18 mcg inhalation capsule  Commonly known as: SPIRIVA  Inhale contents of 1 capsule (18 mcg total) into the lungs via handihaler once daily - Controller  What changed: additional instructions            CONTINUE taking these medications      * albuterol 2.5 mg /3 mL (0.083 %) nebulizer solution  Commonly known as: PROVENTIL  Take 3 mLs (2.5 mg total) by nebulization every 6 (six) hours as needed for Wheezing. Rescue     * albuterol 90 mcg/actuation inhaler  Commonly known as: PROVENTIL/VENTOLIN HFA  Inhale 2 puffs into the lungs every 6 (six) hours as needed for Wheezing or Shortness of Breath. Rescue     amitriptyline 10 MG tablet  Commonly known as: ELAVIL  Take 1 tablet (10 mg total) by mouth nightly as needed for Insomnia.     budesonide-formoterol 160-4.5 mcg 160-4.5 mcg/actuation Hfaa  Commonly  known as: SYMBICORT  Inhale 2 puffs into the lungs every 12 (twelve) hours.     fluticasone propionate 50 mcg/actuation nasal spray  Commonly known as: FLONASE  2 sprays (100 mcg total) by Each Nostril route once daily.     hydrocortisone 1 % cream  Apply topically 2 (two) times daily.     levETIRAcetam 500 MG Tab  Commonly known as: KEPPRA  Take 1 tablet (500 mg total) by mouth 2 (two) times daily.     LIDOcaine 5 %  Commonly known as: LIDODERM  Place 1 patch onto the skin once daily. Remove & Discard patch within 12 hours or as directed by MD     metoprolol tartrate 50 MG tablet  Commonly known as: LOPRESSOR  Take 1 tablet (50 mg total) by mouth 2 (two) times a day.     multivitamin Chew  Take by mouth once daily.     ondansetron 4 MG tablet  Commonly known as: ZOFRAN  Take 1 tablet (4 mg total) by mouth daily as needed for Nausea (nausea).     predniSONE 10 MG tablet  Commonly known as: DELTASONE  Take 10 mg by mouth.     primidone 50 MG Tab  Commonly known as: MYSOLINE  Take 1 tablet (50 mg total) by mouth every evening.           * This list has 2 medication(s) that are the same as other medications prescribed for you. Read the directions carefully, and ask your doctor or other care provider to review them with you.                STOP taking these medications      clonazePAM 0.5 MG tablet  Commonly known as: KlonoPIN     diclofenac sodium 1 % Gel  Commonly known as: VOLTAREN     FLUARIX QUAD 8249-1012 (PF) 60 mcg (15 mcg x 4)/0.5 mL Syrg  Generic drug: flu vacc [id number] 6mos up(PF)     fluconazole 150 MG Tab  Commonly known as: DIFLUCAN     ibuprofen 600 MG tablet  Commonly known as: ADVIL,MOTRIN     PNEUMOVAX-23 25 mcg/0.5 mL vaccine  Generic drug: pneumococcal 23-thom ps     sertraline 100 MG tablet  Commonly known as: FELI Le MD  [facility], PGY-1  11/22/2024                     (1) Drift; leg falls by the end of the 5-sec period but does not hit bed

## 2024-12-02 NOTE — DISCHARGE NOTE PROVIDER - NPI NUMBER (FOR SYSADMIN USE ONLY) :
María Elena Hidalgo is a 82 year old female presents to the Urgent Care clinic today with complaints of sinus infection, sore throat, cough, fatigue, eye irritation.      Symptoms: A few weeks of eye problems and then a sore throat and cough.     Cancer appts this week     Tx: Two tylenol last night, honey tea    Patient would like communication of their results via:    [facility]     Patient aware they will only be contact with positive results.     Patient masked during visit. RN wearing mask, gown, gloves and eye protection during visit.   [[phone]]

## 2025-01-24 NOTE — PROGRESS NOTE ADULT - PROBLEM SELECTOR PLAN 2
POSITIVE
CTH - negative  C spine - negative   TTE - EF 75%, minimal MR, mild diastolic dysfx.  Monitor on Tele
CTH - negative  C spine - negative   TTE - EF 75%, minimal MR, mild diastolic dysfx  monitor on Tele
CTH - negative  C spine - negative   TTE - EF 75%, minimal MR, mild diastolic dysfx  monitor on Tele

## 2025-02-04 NOTE — PRE-OP CHECKLIST - IDENTIFICATION BAND VERIFIED
Detail Level: Simple Render Risk Assessment In Note?: no Comment: Very active lifestyle, cross-country, basketball, swimming. done

## 2025-02-14 ENCOUNTER — NON-APPOINTMENT (OUTPATIENT)
Age: 70
End: 2025-02-14

## 2025-02-25 ENCOUNTER — NON-APPOINTMENT (OUTPATIENT)
Age: 70
End: 2025-02-25

## 2025-02-26 ENCOUNTER — APPOINTMENT (OUTPATIENT)
Dept: UROLOGY | Facility: CLINIC | Age: 70
End: 2025-02-26

## 2025-05-07 ENCOUNTER — INPATIENT (INPATIENT)
Facility: HOSPITAL | Age: 70
LOS: 11 days | Discharge: NURSING, REHAB OR LONG-TERM CARE | End: 2025-05-19
Attending: INTERNAL MEDICINE | Admitting: INTERNAL MEDICINE
Payer: MEDICARE

## 2025-05-07 VITALS
SYSTOLIC BLOOD PRESSURE: 109 MMHG | OXYGEN SATURATION: 100 % | TEMPERATURE: 98 F | DIASTOLIC BLOOD PRESSURE: 64 MMHG | RESPIRATION RATE: 18 BRPM | HEART RATE: 61 BPM | WEIGHT: 289.91 LBS

## 2025-05-07 DIAGNOSIS — Z98.89 OTHER SPECIFIED POSTPROCEDURAL STATES: Chronic | ICD-10-CM

## 2025-05-07 DIAGNOSIS — Z96.649 PRESENCE OF UNSPECIFIED ARTIFICIAL HIP JOINT: Chronic | ICD-10-CM

## 2025-05-07 DIAGNOSIS — M43.28 FUSION OF SPINE, SACRAL AND SACROCOCCYGEAL REGION: Chronic | ICD-10-CM

## 2025-05-07 DIAGNOSIS — Z98.890 OTHER SPECIFIED POSTPROCEDURAL STATES: Chronic | ICD-10-CM

## 2025-05-07 DIAGNOSIS — Z87.81 PERSONAL HISTORY OF (HEALED) TRAUMATIC FRACTURE: Chronic | ICD-10-CM

## 2025-05-07 DIAGNOSIS — N39.0 URINARY TRACT INFECTION, SITE NOT SPECIFIED: ICD-10-CM

## 2025-05-07 LAB
ALBUMIN SERPL ELPH-MCNC: 3.8 G/DL — SIGNIFICANT CHANGE UP (ref 3.3–5)
ALP SERPL-CCNC: 110 U/L — SIGNIFICANT CHANGE UP (ref 40–120)
ALT FLD-CCNC: 17 U/L — SIGNIFICANT CHANGE UP (ref 4–41)
ANION GAP SERPL CALC-SCNC: 11 MMOL/L — SIGNIFICANT CHANGE UP (ref 7–14)
APPEARANCE UR: CLEAR — SIGNIFICANT CHANGE UP
AST SERPL-CCNC: 23 U/L — SIGNIFICANT CHANGE UP (ref 4–40)
BACTERIA # UR AUTO: NEGATIVE /HPF — SIGNIFICANT CHANGE UP
BASOPHILS # BLD AUTO: 0.06 K/UL — SIGNIFICANT CHANGE UP (ref 0–0.2)
BASOPHILS NFR BLD AUTO: 0.5 % — SIGNIFICANT CHANGE UP (ref 0–2)
BILIRUB SERPL-MCNC: 0.4 MG/DL — SIGNIFICANT CHANGE UP (ref 0.2–1.2)
BILIRUB UR-MCNC: NEGATIVE — SIGNIFICANT CHANGE UP
BUN SERPL-MCNC: 13 MG/DL — SIGNIFICANT CHANGE UP (ref 7–23)
CALCIUM SERPL-MCNC: 9.5 MG/DL — SIGNIFICANT CHANGE UP (ref 8.4–10.5)
CAST: 0 /LPF — SIGNIFICANT CHANGE UP (ref 0–4)
CHLORIDE SERPL-SCNC: 105 MMOL/L — SIGNIFICANT CHANGE UP (ref 98–107)
CO2 SERPL-SCNC: 26 MMOL/L — SIGNIFICANT CHANGE UP (ref 22–31)
COLOR SPEC: YELLOW — SIGNIFICANT CHANGE UP
CREAT SERPL-MCNC: 0.92 MG/DL — SIGNIFICANT CHANGE UP (ref 0.5–1.3)
DIFF PNL FLD: NEGATIVE — SIGNIFICANT CHANGE UP
EGFR: 89 ML/MIN/1.73M2 — SIGNIFICANT CHANGE UP
EGFR: 89 ML/MIN/1.73M2 — SIGNIFICANT CHANGE UP
EOSINOPHIL # BLD AUTO: 0.08 K/UL — SIGNIFICANT CHANGE UP (ref 0–0.5)
EOSINOPHIL NFR BLD AUTO: 0.7 % — SIGNIFICANT CHANGE UP (ref 0–6)
GLUCOSE SERPL-MCNC: 133 MG/DL — HIGH (ref 70–99)
GLUCOSE UR QL: NEGATIVE MG/DL — SIGNIFICANT CHANGE UP
HCT VFR BLD CALC: 50.2 % — HIGH (ref 39–50)
HGB BLD-MCNC: 16.2 G/DL — SIGNIFICANT CHANGE UP (ref 13–17)
IANC: 7.42 K/UL — HIGH (ref 1.8–7.4)
IMM GRANULOCYTES NFR BLD AUTO: 0.3 % — SIGNIFICANT CHANGE UP (ref 0–0.9)
KETONES UR-MCNC: NEGATIVE MG/DL — SIGNIFICANT CHANGE UP
LEUKOCYTE ESTERASE UR-ACNC: NEGATIVE — SIGNIFICANT CHANGE UP
LYMPHOCYTES # BLD AUTO: 27 % — SIGNIFICANT CHANGE UP (ref 13–44)
LYMPHOCYTES # BLD AUTO: 3.03 K/UL — SIGNIFICANT CHANGE UP (ref 1–3.3)
MAGNESIUM SERPL-MCNC: 2.1 MG/DL — SIGNIFICANT CHANGE UP (ref 1.6–2.6)
MCHC RBC-ENTMCNC: 30.2 PG — SIGNIFICANT CHANGE UP (ref 27–34)
MCHC RBC-ENTMCNC: 32.3 G/DL — SIGNIFICANT CHANGE UP (ref 32–36)
MCV RBC AUTO: 93.5 FL — SIGNIFICANT CHANGE UP (ref 80–100)
MONOCYTES # BLD AUTO: 0.62 K/UL — SIGNIFICANT CHANGE UP (ref 0–0.9)
MONOCYTES NFR BLD AUTO: 5.5 % — SIGNIFICANT CHANGE UP (ref 2–14)
NEUTROPHILS # BLD AUTO: 7.42 K/UL — HIGH (ref 1.8–7.4)
NEUTROPHILS NFR BLD AUTO: 66 % — SIGNIFICANT CHANGE UP (ref 43–77)
NITRITE UR-MCNC: NEGATIVE — SIGNIFICANT CHANGE UP
NRBC # BLD AUTO: 0 K/UL — SIGNIFICANT CHANGE UP (ref 0–0)
NRBC # FLD: 0 K/UL — SIGNIFICANT CHANGE UP (ref 0–0)
NRBC BLD AUTO-RTO: 0 /100 WBCS — SIGNIFICANT CHANGE UP (ref 0–0)
PH UR: 6 — SIGNIFICANT CHANGE UP (ref 5–8)
PHOSPHATE SERPL-MCNC: 3 MG/DL — SIGNIFICANT CHANGE UP (ref 2.5–4.5)
PLATELET # BLD AUTO: 239 K/UL — SIGNIFICANT CHANGE UP (ref 150–400)
POTASSIUM SERPL-MCNC: 4.6 MMOL/L — SIGNIFICANT CHANGE UP (ref 3.5–5.3)
POTASSIUM SERPL-SCNC: 4.6 MMOL/L — SIGNIFICANT CHANGE UP (ref 3.5–5.3)
PROT SERPL-MCNC: 7.1 G/DL — SIGNIFICANT CHANGE UP (ref 6–8.3)
PROT UR-MCNC: 30 MG/DL
RBC # BLD: 5.37 M/UL — SIGNIFICANT CHANGE UP (ref 4.2–5.8)
RBC # FLD: 15.3 % — HIGH (ref 10.3–14.5)
RBC CASTS # UR COMP ASSIST: 2 /HPF — SIGNIFICANT CHANGE UP (ref 0–4)
SODIUM SERPL-SCNC: 142 MMOL/L — SIGNIFICANT CHANGE UP (ref 135–145)
SP GR SPEC: 1.02 — SIGNIFICANT CHANGE UP (ref 1–1.03)
SQUAMOUS # UR AUTO: 0 /HPF — SIGNIFICANT CHANGE UP (ref 0–5)
UROBILINOGEN FLD QL: 0.2 MG/DL — SIGNIFICANT CHANGE UP (ref 0.2–1)
WBC # BLD: 11.24 K/UL — HIGH (ref 3.8–10.5)
WBC # FLD AUTO: 11.24 K/UL — HIGH (ref 3.8–10.5)
WBC UR QL: 1 /HPF — SIGNIFICANT CHANGE UP (ref 0–5)

## 2025-05-07 PROCEDURE — 72131 CT LUMBAR SPINE W/O DYE: CPT | Mod: 26

## 2025-05-07 PROCEDURE — 99285 EMERGENCY DEPT VISIT HI MDM: CPT

## 2025-05-07 PROCEDURE — 71046 X-RAY EXAM CHEST 2 VIEWS: CPT | Mod: 26

## 2025-05-07 PROCEDURE — 70450 CT HEAD/BRAIN W/O DYE: CPT | Mod: 26

## 2025-05-07 RX ORDER — KETOROLAC TROMETHAMINE 30 MG/ML
15 INJECTION, SOLUTION INTRAMUSCULAR; INTRAVENOUS ONCE
Refills: 0 | Status: DISCONTINUED | OUTPATIENT
Start: 2025-05-07 | End: 2025-05-07

## 2025-05-07 RX ORDER — ACETAMINOPHEN 500 MG/5ML
1000 LIQUID (ML) ORAL ONCE
Refills: 0 | Status: COMPLETED | OUTPATIENT
Start: 2025-05-07 | End: 2025-05-07

## 2025-05-07 RX ADMIN — Medication 400 MILLIGRAM(S): at 15:46

## 2025-05-07 RX ADMIN — Medication 1000 MILLIGRAM(S): at 14:56

## 2025-05-07 RX ADMIN — KETOROLAC TROMETHAMINE 15 MILLIGRAM(S): 30 INJECTION, SOLUTION INTRAMUSCULAR; INTRAVENOUS at 21:59

## 2025-05-07 RX ADMIN — Medication 500 MILLILITER(S): at 15:09

## 2025-05-07 NOTE — ED ADULT NURSE NOTE - NSFALLRISKINTERV_ED_ALL_ED

## 2025-05-07 NOTE — ED PROVIDER NOTE - ATTENDING CONTRIBUTION TO CARE
Attending Statement: I have personally seen and examined this patient. I have fully participated in the care of this patient. I have reviewed all pertinent clinical information, including history physical exam, plan and the Resident's note and agree except as noted  69-year-old male history of CAD with stent, seizure on Keppra, hypertension, COPD on home oxygen 2 to 3 L, high cholesterol, presents from home with wife at bedside chief complaint of "feeling off and disoriented".  Patient states he "felt not himself this morning" but is feeling better now wife states that when he gets a UTI he gets confused and has had similar episodes in the past.  Patient endorsing dysuria and frequency at home patient self caths, but denies abdominal pain.  No fever no chills.  No nausea no vomiting no chest pain no shortness of breath no headache.  Wife is concerned he has a UTI because in the past he is required IV antibiotics.  Vital signs noted in triage nontachycardic nontachypneic afebrile orally well-appearing male laying in bed with wife.  No apparent distress.  No focal deficits moving all extremities answering appropriately.  Not icteric no jaundice.  Some mild dry mucous membranes.  Abdomen soft nontender.  Able to sit up in bed himself no CVA tenderness.  Plan for labs, urine straight cath urine, chest x-ray and reassess.  No URI complaints at this time.  Plan discussed with patient and wife

## 2025-05-07 NOTE — ED ADULT NURSE NOTE - OBJECTIVE STATEMENT
Break RN: pt received rm 8, a&ox3 from home. c/o abdominal pain, dysuria, and frequent urination x3 days. Pt states he has frequent UTIs and has felt "disoriented" lately. pt endorses incontinent of urine and stool due to multiple back surgeries. hx CAD, seizures. HTN, spinal stenosis. pt denies chest pain, sob, fever, chills. RR even, unlabored. Pt placed on cardiac monitor, NSR. Pending MD parra.  Report to be given to primary RN Mita.

## 2025-05-07 NOTE — ED PROVIDER NOTE - CLINICAL SUMMARY MEDICAL DECISION MAKING FREE TEXT BOX
(Taylro Espinoza MD-PGY1): 69yoM with a history of CAD s/p stents, aneurysm s/p clip, HTN, HLD, frequent UTIs causing delirium, prior septic stone, and possible seizures who presents to the ER for evaluation of confusion and dysuria.  Wife present to provide much of history.  She reports that patient was hallucinating this morning and saw someone named Justin.  He is also fairly tired today.  She thought that he may have been coming down with a UTI as these are very consistent symptoms to prior UTIs.  Did a home test showing trace positive UTI and called urologist who said he needs an official urine analysis for antibiotics.  Was recommended to come to the ER as the last time he had a UTI he was found to have a infected kidney stone.  Patient denies that this feels at all similar to a kidney stone.  He had pain then and denies having discomfort now.  Has some mild burning with urination, but no belly pain or flank pain.  No fevers, coughing, nausea, vomiting, diarrhea, chest pain, shortness of breath.  No other concerns at this time.  Of note, patient has been working on ambulation since he hurt his right leg 3 years ago and has significant spasms associated.  He is incontinent for the most part.    Vitals on arrival were within normal limits.  Afebrile, normal oxygenation, not tachycardic.  Physical exam reassuring as patient appears nontoxic, no distress, reclining comfortably in bed with wife at bedside.  RRR, clear lungs with normal effort, abdomen was soft and nontender.  Neuro exam without focal deficits, pt moving around in bed, eomi, answering questions appropriately and following commands. No actions or phrases indicate delirium at this time. DDx for confusion broad though without fever, less likely severe infection.  Given his history and he/his wife's perspective, this very well could be a UTI.  Could also be other source of infection versus dehydration versus electrolyte abnormality. Sometimes he wakes in the morning and comes out of dreams with hallucinations, so could be this as well. Plan for labs, urine, reassessment. Do not feel strongly about advanced imaging at this time with normal vitals and nontender abd. Will reassess after labs and urine return. Pt amenable. No further questions. Updates in progress note section.

## 2025-05-07 NOTE — ED PROVIDER NOTE - PROGRESS NOTE DETAILS
(Taylor Espinoza MD-PGY1): Pt requesting tylenol and to take home baclofen. Received CMP unremarkable urine.  Patient stable requesting his home baclofen which he takes for 5 times a day.Endorsed to Dr. Simmons at end of shift (Taylor Espinoza MD-PGY1): Patient signed out to night team pending CT head and reassessment. If negative, can dc home. On reassessment: He is currently feeling like he has to pee, but not able to. RN to do serrano. Pt does not feel confused, but wife thinks he still is not acting totally like himself. (Taylor Espinoza MD-PGY1): Results reviewed, slight leukocytosis, normal electrolytes, normal creatinine and LFTs, neg urine, cxr without focal consolidation. No clear cause of delirium. Will scan head. Received in sign out pending reevaluation after CAT scan.  Spoke with wife at bedside who states that he has been having visual hallucinations for the past few weeks that are new.  She states that this has happened before when he has had a UTI but has not happened otherwise.  States that he is seeing things at better not there and saying things that do not make sense.  Patient himself noticed that he has been off he is alert and oriented to place and year but not day he states he is normally with it.  Of note patient is on Keppra he states he has never had an actual seizure.  There is a family history of dementia with his mother in her 80s.    CAT scan of the lumbar spine shows a very large stool burden patient does have a history of constipation will perform rectal exam due to concern for stercolitis will treat with antibiotics.  Patient will require admission for neuro consultation for new hallucinations. Angeli Cordero: attempted manual disimpaction to no avail. No obvious stool ball at the rectal vault.

## 2025-05-08 DIAGNOSIS — J44.9 CHRONIC OBSTRUCTIVE PULMONARY DISEASE, UNSPECIFIED: ICD-10-CM

## 2025-05-08 DIAGNOSIS — I10 ESSENTIAL (PRIMARY) HYPERTENSION: ICD-10-CM

## 2025-05-08 DIAGNOSIS — M48.061 SPINAL STENOSIS, LUMBAR REGION WITHOUT NEUROGENIC CLAUDICATION: ICD-10-CM

## 2025-05-08 DIAGNOSIS — I25.10 ATHEROSCLEROTIC HEART DISEASE OF NATIVE CORONARY ARTERY WITHOUT ANGINA PECTORIS: ICD-10-CM

## 2025-05-08 DIAGNOSIS — N40.0 BENIGN PROSTATIC HYPERPLASIA WITHOUT LOWER URINARY TRACT SYMPTOMS: ICD-10-CM

## 2025-05-08 DIAGNOSIS — E78.00 PURE HYPERCHOLESTEROLEMIA, UNSPECIFIED: ICD-10-CM

## 2025-05-08 DIAGNOSIS — Z29.9 ENCOUNTER FOR PROPHYLACTIC MEASURES, UNSPECIFIED: ICD-10-CM

## 2025-05-08 DIAGNOSIS — R41.82 ALTERED MENTAL STATUS, UNSPECIFIED: ICD-10-CM

## 2025-05-08 DIAGNOSIS — R56.9 UNSPECIFIED CONVULSIONS: ICD-10-CM

## 2025-05-08 DIAGNOSIS — G93.40 ENCEPHALOPATHY, UNSPECIFIED: ICD-10-CM

## 2025-05-08 LAB
AMPHET UR-MCNC: NEGATIVE — SIGNIFICANT CHANGE UP
APAP SERPL-MCNC: <10 UG/ML — LOW (ref 15–25)
BARBITURATES UR SCN-MCNC: NEGATIVE — SIGNIFICANT CHANGE UP
BENZODIAZ UR-MCNC: NEGATIVE — SIGNIFICANT CHANGE UP
COCAINE METAB.OTHER UR-MCNC: NEGATIVE — SIGNIFICANT CHANGE UP
CREATININE URINE RESULT, DAU: 37 MG/DL — SIGNIFICANT CHANGE UP
CULTURE RESULTS: SIGNIFICANT CHANGE UP
ETHANOL SERPL-MCNC: <10 MG/DL — SIGNIFICANT CHANGE UP
FENTANYL UR QL SCN: NEGATIVE — SIGNIFICANT CHANGE UP
METHADONE UR-MCNC: NEGATIVE — SIGNIFICANT CHANGE UP
OPIATES UR-MCNC: NEGATIVE — SIGNIFICANT CHANGE UP
OXYCODONE UR-MCNC: NEGATIVE — SIGNIFICANT CHANGE UP
PCP SPEC-MCNC: SIGNIFICANT CHANGE UP
PCP UR-MCNC: NEGATIVE — SIGNIFICANT CHANGE UP
SALICYLATES SERPL-MCNC: <0.3 MG/DL — LOW (ref 15–30)
SPECIMEN SOURCE: SIGNIFICANT CHANGE UP
THC UR QL: NEGATIVE — SIGNIFICANT CHANGE UP
TOXICOLOGY SCREEN, DRUGS OF ABUSE, SERUM RESULT: SIGNIFICANT CHANGE UP
TSH SERPL-MCNC: 4.99 UIU/ML — HIGH (ref 0.27–4.2)

## 2025-05-08 PROCEDURE — 99223 1ST HOSP IP/OBS HIGH 75: CPT

## 2025-05-08 RX ORDER — ACETAMINOPHEN 500 MG/5ML
650 LIQUID (ML) ORAL EVERY 6 HOURS
Refills: 0 | Status: DISCONTINUED | OUTPATIENT
Start: 2025-05-08 | End: 2025-05-19

## 2025-05-08 RX ORDER — PIPERACILLIN-TAZO-DEXTROSE,ISO 3.375G/5
3.38 IV SOLUTION, PIGGYBACK PREMIX FROZEN(ML) INTRAVENOUS ONCE
Refills: 0 | Status: COMPLETED | OUTPATIENT
Start: 2025-05-08 | End: 2025-05-08

## 2025-05-08 RX ORDER — PIPERACILLIN-TAZO-DEXTROSE,ISO 3.375G/5
3.38 IV SOLUTION, PIGGYBACK PREMIX FROZEN(ML) INTRAVENOUS ONCE
Refills: 0 | Status: COMPLETED | OUTPATIENT
Start: 2025-05-09 | End: 2025-05-09

## 2025-05-08 RX ORDER — PREGABALIN 50 MG/1
150 CAPSULE ORAL
Refills: 0 | Status: DISCONTINUED | OUTPATIENT
Start: 2025-05-08 | End: 2025-05-14

## 2025-05-08 RX ORDER — BACLOFEN 10 MG/20ML
5 INJECTION INTRATHECAL EVERY 8 HOURS
Refills: 0 | Status: DISCONTINUED | OUTPATIENT
Start: 2025-05-08 | End: 2025-05-19

## 2025-05-08 RX ORDER — ONDANSETRON HCL/PF 4 MG/2 ML
4 VIAL (ML) INJECTION EVERY 8 HOURS
Refills: 0 | Status: DISCONTINUED | OUTPATIENT
Start: 2025-05-08 | End: 2025-05-19

## 2025-05-08 RX ORDER — POLYETHYLENE GLYCOL 3350 17 G/17G
17 POWDER, FOR SOLUTION ORAL
Refills: 0 | Status: DISCONTINUED | OUTPATIENT
Start: 2025-05-08 | End: 2025-05-19

## 2025-05-08 RX ORDER — TIZANIDINE 4 MG/1
2 TABLET ORAL DAILY
Refills: 0 | Status: DISCONTINUED | OUTPATIENT
Start: 2025-05-08 | End: 2025-05-19

## 2025-05-08 RX ORDER — ACETAMINOPHEN 500 MG/5ML
1000 LIQUID (ML) ORAL ONCE
Refills: 0 | Status: COMPLETED | OUTPATIENT
Start: 2025-05-08 | End: 2025-05-08

## 2025-05-08 RX ORDER — METOPROLOL SUCCINATE 50 MG/1
12.5 TABLET, EXTENDED RELEASE ORAL
Refills: 0 | Status: DISCONTINUED | OUTPATIENT
Start: 2025-05-08 | End: 2025-05-08

## 2025-05-08 RX ORDER — MAGNESIUM, ALUMINUM HYDROXIDE 200-200 MG
30 TABLET,CHEWABLE ORAL EVERY 4 HOURS
Refills: 0 | Status: DISCONTINUED | OUTPATIENT
Start: 2025-05-08 | End: 2025-05-19

## 2025-05-08 RX ORDER — ROSUVASTATIN CALCIUM 20 MG/1
10 TABLET, FILM COATED ORAL AT BEDTIME
Refills: 0 | Status: DISCONTINUED | OUTPATIENT
Start: 2025-05-08 | End: 2025-05-19

## 2025-05-08 RX ORDER — GABAPENTIN 400 MG/1
300 CAPSULE ORAL THREE TIMES A DAY
Refills: 0 | Status: DISCONTINUED | OUTPATIENT
Start: 2025-05-08 | End: 2025-05-12

## 2025-05-08 RX ORDER — SENNA 187 MG
2 TABLET ORAL AT BEDTIME
Refills: 0 | Status: DISCONTINUED | OUTPATIENT
Start: 2025-05-08 | End: 2025-05-19

## 2025-05-08 RX ORDER — ASPIRIN 325 MG
81 TABLET ORAL DAILY
Refills: 0 | Status: DISCONTINUED | OUTPATIENT
Start: 2025-05-08 | End: 2025-05-19

## 2025-05-08 RX ORDER — LEVETIRACETAM 10 MG/ML
750 INJECTION, SOLUTION INTRAVENOUS
Refills: 0 | Status: DISCONTINUED | OUTPATIENT
Start: 2025-05-08 | End: 2025-05-19

## 2025-05-08 RX ORDER — MELATONIN 5 MG
3 TABLET ORAL AT BEDTIME
Refills: 0 | Status: DISCONTINUED | OUTPATIENT
Start: 2025-05-08 | End: 2025-05-19

## 2025-05-08 RX ORDER — PIPERACILLIN-TAZO-DEXTROSE,ISO 3.375G/5
3.38 IV SOLUTION, PIGGYBACK PREMIX FROZEN(ML) INTRAVENOUS EVERY 8 HOURS
Refills: 0 | Status: COMPLETED | OUTPATIENT
Start: 2025-05-09 | End: 2025-05-12

## 2025-05-08 RX ORDER — TIZANIDINE 4 MG/1
1 TABLET ORAL
Refills: 0 | DISCHARGE

## 2025-05-08 RX ORDER — ENOXAPARIN SODIUM 100 MG/ML
40 INJECTION SUBCUTANEOUS EVERY 12 HOURS
Refills: 0 | Status: DISCONTINUED | OUTPATIENT
Start: 2025-05-08 | End: 2025-05-19

## 2025-05-08 RX ORDER — ENOXAPARIN SODIUM 100 MG/ML
40 INJECTION SUBCUTANEOUS EVERY 24 HOURS
Refills: 0 | Status: DISCONTINUED | OUTPATIENT
Start: 2025-05-08 | End: 2025-05-08

## 2025-05-08 RX ORDER — TAMSULOSIN HYDROCHLORIDE 0.4 MG/1
0.4 CAPSULE ORAL AT BEDTIME
Refills: 0 | Status: DISCONTINUED | OUTPATIENT
Start: 2025-05-08 | End: 2025-05-13

## 2025-05-08 RX ADMIN — Medication 1000 MILLIGRAM(S): at 02:00

## 2025-05-08 RX ADMIN — TAMSULOSIN HYDROCHLORIDE 0.4 MILLIGRAM(S): 0.4 CAPSULE ORAL at 21:58

## 2025-05-08 RX ADMIN — Medication 25 GRAM(S): at 18:56

## 2025-05-08 RX ADMIN — PREGABALIN 150 MILLIGRAM(S): 50 CAPSULE ORAL at 21:59

## 2025-05-08 RX ADMIN — Medication 200 GRAM(S): at 17:51

## 2025-05-08 RX ADMIN — LEVETIRACETAM 750 MILLIGRAM(S): 10 INJECTION, SOLUTION INTRAVENOUS at 17:50

## 2025-05-08 RX ADMIN — GABAPENTIN 300 MILLIGRAM(S): 400 CAPSULE ORAL at 21:59

## 2025-05-08 RX ADMIN — GABAPENTIN 300 MILLIGRAM(S): 400 CAPSULE ORAL at 17:50

## 2025-05-08 RX ADMIN — ENOXAPARIN SODIUM 40 MILLIGRAM(S): 100 INJECTION SUBCUTANEOUS at 17:51

## 2025-05-08 RX ADMIN — Medication 200 GRAM(S): at 03:48

## 2025-05-08 RX ADMIN — Medication 400 MILLIGRAM(S): at 01:55

## 2025-05-08 RX ADMIN — POLYETHYLENE GLYCOL 3350 17 GRAM(S): 17 POWDER, FOR SOLUTION ORAL at 17:51

## 2025-05-08 RX ADMIN — Medication 2 TABLET(S): at 21:59

## 2025-05-08 RX ADMIN — ROSUVASTATIN CALCIUM 10 MILLIGRAM(S): 20 TABLET, FILM COATED ORAL at 21:58

## 2025-05-08 NOTE — H&P ADULT - PROBLEM SELECTOR PLAN 1
Pt with Infectious work up with negative UA, utox, and urine culture  - s/p one dose zosyn   - f.u blood cultures Unclear etiology of behavioral change. Wife reports patient is acting off with visual hallucinations with Infectious work up with negative UA, utox, and Within the limitations of the study, no evidence of acute fracture or traumatic listhesis, and no evidence of acute hardware complication. Utox unremarkable. No electrolyte distrubances. CT head ruled out stroke.   - s/p one dose zosyn, will continue for now pending infectious work up   - f.u blood cultures and urine culture   - Psychiatry consulted given concern for depressive symptoms and hallucinations.  - Patient currently Aox 3 now, continue to monitor mental status

## 2025-05-08 NOTE — H&P ADULT - HISTORY OF PRESENT ILLNESS
69-year-old male history of CAD with stent, seizure on Keppra, hypertension, COPD on home oxygen 2 to 3 L, high cholesterol, presents from home with wife at bedside chief complaint of "feeling off and disoriented" 69-year-old male history of CAD with stent, seizure on Keppra, hypertension, COPD on home oxygen 2 to 3 L, high cholesterol, presents from home with wife at bedside chief complaint of "feeling off and disoriented"      In the ED, patient received ZOsyn, IV tyneol x2, and IVF bolus. Patient admitted to medicine for further management.  69-year-old male history of CAD with stent, seizure on Keppra, hypertension, COPD on home oxygen 2 to 3 L, high cholesterol, presents from home with wife at bedside chief complaint of "feeling off and disoriented" Patient is [address], May 2025. Reports he is in the hospital because wife was concerned for urinary tract infection. Patient was hallucinating that he saw his son Justin. Also, speaking to someone who was not there.       In the ED, patient received Zosyn IV Tylenol x2, and IVF bolus. Patient admitted to medicine for further management.  69-year-old male history of CAD with stent, seizure on Keppra, hypertension, COPD , high cholesterol, presents from home with wife at bedside chief complaint of "feeling off and disoriented" Patient is [address], May 2025. Reports he is in the hospital because wife was concerned for urinary tract infection. Patient was hallucinating that he saw his son Justin. Also, speaking to someone who was not there. Wife interviewed at bedside. States he had similar presentation when patient had a UTI. Also, reports concern that patient has been depressed and withdrawn since breaking his femur 3 years ago. He does not want to go places or do anything he previously enjoyed. Wife interested in getting someone to evaluate him because it is placing a lot of care taker stress on her. Denies any thoughts of self harm or harming others.  Additionally, reports patient with bad neuropathy and constipation. Otherwise, denies fever, chills, chest pain and SOB.       In the ED, patient received Zosyn IV Tylenol x2, and IVF bolus. Patient admitted to medicine for further management.

## 2025-05-08 NOTE — H&P ADULT - NSHPPHYSICALEXAM_GEN_ALL_CORE
GENERAL: Elderly male in  NAD  HEENT: EOMI, MMM, no oropharyngeal lesions or erythema appreciated  Pulm: normal work of breathing, CTABL  CV: RRR, S1&S2+, no m/r/g appreciated  ABDOMEN: soft, nt, nd, no hepatosplenomegaly  EXTREMITIES:  trace lower leg edema in b/l LE  Neuro: A&Ox - hospital, May 2025 , no focal deficits  SKIN: warm and dry, no visible rash

## 2025-05-08 NOTE — H&P ADULT - TIME BILLING
Review of laboratory data, radiology results, consultants' recommendations, documentation in [place], discussion with patient/house staff/ACP and interdisciplinary staff (such as , social workers, etc). Interventions were performed as documented above.

## 2025-05-08 NOTE — H&P ADULT - PROBLEM SELECTOR PLAN 5
- Pt reports that he is on O2 at home. Uses 2-3 L NC at baseline per patient. No need to wean to RA as this is not patient's baseline. Monitor O2 saturation.   - CXR w/ Clear lungs - On Toprol XL -> switched to to  - BP control. Monitor and adjust BP as tolerated - On Toprol XL -> switched to to metoprolol tartrate 12.5mg BID   - BP control. Monitor and adjust BP as tolerated

## 2025-05-08 NOTE — PATIENT PROFILE ADULT - SAFE PLACE TO LIVE
24 y/o F with no PMh presents with right sided abdominal pain since 3PM yesterday. +associated nausea, +subjective fever. Denies urinary complaints, vaginal bleeding. PE: Well appearing. Cardiac: s1s2, RRR. lungs: CTAB. Abdomen: NBS x4, soft, +RLQ tenderness. A/p: r/o appendicitis, plan for labs, CT, reassess. - Lebron Andersen PA-C +appendicitis on CT. Zosyn ordered. Patient made aware. States she feels well at this time. Surgery called for consult. Pt reports last PO intake 9PM yesterday. - Lebron Andersen PA-C no

## 2025-05-08 NOTE — H&P ADULT - PROBLEM SELECTOR PLAN 4
- Lipid panel   - On ASA and Statin therapy   - On Toprol XL   - BP control. Monitor and adjust BP as tolerated - On ASA and Statin therapy - continue  Statin therapy - rosuvastatin 10 mg oral tablet: 1 tab(s) orally once a day (at bedtime)

## 2025-05-08 NOTE — H&P ADULT - NSHPSOCIALHISTORY_GEN_ALL_CORE
Lives with wife. Quit smoking cigarettes 2005. Lives with wife. Quit smoking cigarettes 2005. Denies ETOH and recreational drugs

## 2025-05-08 NOTE — H&P ADULT - NSHPLABSRESULTS_GEN_ALL_CORE
LABS:  CAPILLARY BLOOD GLUCOSE                                16.2   11.24 )-----------( 239      ( 07 May 2025 15:02 )             50.2         142  |  105  |  13  ----------------------------<  133[H]  4.6   |  26  |  0.92    Ca    9.5      07 May 2025 15:02  Phos  3.0       Mg     2.10         TPro  7.1  /  Alb  3.8  /  TBili  0.4  /  DBili  x   /  AST  23  /  ALT  17  /  AlkPhos  110            Urinalysis Basic - ( 07 May 2025 15:50 )    Color: Yellow / Appearance: Clear / S.016 / pH: x  Gluc: x / Ketone: Negative mg/dL  / Bili: Negative / Urobili: 0.2 mg/dL   Blood: x / Protein: 30 mg/dL / Nitrite: Negative   Leuk Esterase: Negative / RBC: 2 /HPF / WBC 1 /HPF   Sq Epi: x / Non Sq Epi: 0 /HPF / Bacteria: Negative /HPF          RADIOLOGY & ADDITIONAL TESTS:    Telemetry Personally Reviewed:    ECG Personally Reviewed:    Imaging Personally Reviewed:    Imaging Reviewed:   < from: CT Lumbar Spine No Cont (25 @ 22:38) >    IMPRESSION:  Evaluation limited due to artifact from extensive spinal hardware.    Withinthe limitations of the study, no evidence of acute fracture or   traumatic listhesis, and no evidence of acute hardware complication.    < end of copied text >    < from: CT Head No Cont (25 @ 22:38) >      IMPRESSION:    No acute intracranial hemorrhage or mass effect.  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    < end of copied text >    < from: Xray Chest 2 Views PA/Lat (25 @ 17:12) >      IMPRESSION:  No focal consolidation.    < end of copied text >      Consultant(s) Notes Reviewed:      Care Discussed with Consultants/Other Providers:

## 2025-05-08 NOTE — PATIENT PROFILE ADULT - STATED REASON FOR ADMISSION
I reviewed the H&P, I examined the patient, and there are no changes in the patient's condition.    
Patient states he's "feeling off"

## 2025-05-08 NOTE — ED ADULT NURSE REASSESSMENT NOTE - NS ED NURSE REASSESS COMMENT FT1
Break RN note- Patient resting quietly in bed, breathing even and nonlabored. No acute distress. Patient appears comfortable. Awaiting inpatient bed. Patient medicated as ordered. Safety maintained. Patient stable upon exiting the room.
Martinez placed as ordered. 14 Fr. placed. Immediate urine returned, so far 400 ml drained. Clear yellow urine. Patient states feels relief.
Report given by previous RN. Pt breathing is equal and nonlabored. pt not in any distress or discomfort. Pt waiting for CT. Pt safety maintained.

## 2025-05-08 NOTE — PATIENT PROFILE ADULT - SURGICAL SITE INCISION
Airam Lewis is a 47 year old year old patient who comes in today for a Blood Pressure check because of medication change, added hydrochlorothiazide 25 mg.  Vital Signs as repeated by RN Once  Patient is taking medication as prescribed  Patient is tolerating medications well.  Patient is not monitoring Blood Pressure at home.  Took BP: 146/90s at work on Monday.   Current complaints: none    BP Readings from Last 3 Encounters:   11/01/18 126/78   09/17/18 138/88   08/27/18 138/90   Apical pulse 72 , regular rate and rhythm    Patient is making improvements on following a Low salt diet.    Disposition:  Routed to provider in telephone encounter.     
no

## 2025-05-08 NOTE — H&P ADULT - PROBLEM SELECTOR PLAN 7
Patient reports worsening neuropathy. However, does not appearing to be taking medicine as prescribed  - Continue home medications   - baclofen 20 mg oral tablet: 1 tab(s) orally 5 times a day as needed for Muscle Spasm   gabapentin 300 mg oral capsule: 1 cap(s) orally once a day (RX 1 cap(s) orally 3x a day)   pregabalin 150 mg oral capsule: 1 cap(s) orally once a day (RX for 1 capsule orally twice daily) iSTOP #[id number], last filled 4/4/25 for 90 days   tiZANidine 2 mg oral capsule: 1 cap(s) orally once a day (in the morning)

## 2025-05-08 NOTE — H&P ADULT - PROBLEM SELECTOR PLAN 8
- Pt denies  is on O2 at home. However, prior documentation reports ot  Uses 2-3 L NC at baseline per patient.   - On room air   - CXR w/ Clear lungs

## 2025-05-08 NOTE — H&P ADULT - ASSESSMENT
69-year-old male history of CAD with stent, seizure on Keppra, hypertension, COPD on home oxygen 2 to 3 L, high cholesterol, presents from home with wife at bedside chief complaint of "feeling off and disoriented"             69-year-old male history of CAD with stent, seizure on Keppra, hypertension, COPD , high cholesterol, presents from home with wife at bedside chief complaint of "feeling off and disoriented"

## 2025-05-08 NOTE — H&P ADULT - PROBLEM SELECTOR PLAN 6
- Pt reports that he is on O2 at home. Uses 2-3 L NC at baseline per patient. No need to wean to RA as this is not patient's baseline. Monitor O2 saturation.   - CXR w/ Clear lungs - continue tamsulosin 0.4 mg oral capsule: 1 cap(s) orally once a day in the morning

## 2025-05-08 NOTE — PATIENT PROFILE ADULT - FALL HARM RISK - RISK INTERVENTIONS

## 2025-05-08 NOTE — PHARMACOTHERAPY INTERVENTION NOTE - COMMENTS
Medication history is complete. Medication list updated in Outpatient Medication Record (OMR) via [facility] and patient. During interview he could not recall list, but was able to confirm medications after it was read aloud.     To Note  -As per patient does not take Pregabalin and Gabapentin together and would alternate on different days    Home Medications:  acetaminophen 325 mg oral tablet: 2 tab(s) orally every 6 hours As needed Mild Pain (1 - 3)   aspirin 81 mg oral delayed release tablet: 1 tab(s) orally once a day   baclofen 20 mg oral tablet: 1 tab(s) orally 5 times a day as needed for Muscle Spasm   gabapentin 300 mg oral capsule: 1 cap(s) orally once a day (RX 1 cap(s) orally 3x a day)   levETIRAcetam 750 mg oral tablet, extended release: 1 tab(s) orally once a day (at bedtime)   metoprolol succinate 25 mg oral tablet, extended release: 0.5 tab(s) orally once a day (at bedtime)   pregabalin 150 mg oral capsule: 1 cap(s) orally once a day (RX for 1 capsule orally twice daily) iSTOP #[id number], last filled 4/4/25 for 90 days   rosuvastatin 10 mg oral tablet: 1 tab(s) orally once a day (at bedtime)  tamsulosin 0.4 mg oral capsule: 1 cap(s) orally once a day in the morning   tiZANidine 2 mg oral capsule: 1 cap(s) orally once a day (in the morning)       Please call spectra [id number] if you have any questions.  Medication history is complete. Medication list updated in Outpatient Medication Record (OMR) via [facility] and patient. During interview he could not recall list, but was able to confirm medications after it was read aloud.   Spoke with ACP provider to notify OMR updated.     To Note  -As per patient does not take Pregabalin and Gabapentin together and would alternate on different days    Home Medications:  acetaminophen 325 mg oral tablet: 2 tab(s) orally every 6 hours As needed Mild Pain (1 - 3)   aspirin 81 mg oral delayed release tablet: 1 tab(s) orally once a day   baclofen 20 mg oral tablet: 1 tab(s) orally 5 times a day as needed for Muscle Spasm   gabapentin 300 mg oral capsule: 1 cap(s) orally once a day (RX 1 cap(s) orally 3x a day)   levETIRAcetam 750 mg oral tablet, extended release: 1 tab(s) orally once a day (at bedtime)   metoprolol succinate 25 mg oral tablet, extended release: 0.5 tab(s) orally once a day (at bedtime)   pregabalin 150 mg oral capsule: 1 cap(s) orally once a day (RX for 1 capsule orally twice daily) iSTOP #[id number], last filled 4/4/25 for 90 days   rosuvastatin 10 mg oral tablet: 1 tab(s) orally once a day (at bedtime)  tamsulosin 0.4 mg oral capsule: 1 cap(s) orally once a day in the morning   tiZANidine 2 mg oral capsule: 1 cap(s) orally once a day (in the morning)       Please call spectra [id number] if you have any questions.

## 2025-05-09 DIAGNOSIS — F05 DELIRIUM DUE TO KNOWN PHYSIOLOGICAL CONDITION: ICD-10-CM

## 2025-05-09 LAB
ANION GAP SERPL CALC-SCNC: 17 MMOL/L — HIGH (ref 7–14)
BUN SERPL-MCNC: 15 MG/DL — SIGNIFICANT CHANGE UP (ref 7–23)
CALCIUM SERPL-MCNC: 9.4 MG/DL — SIGNIFICANT CHANGE UP (ref 8.4–10.5)
CHLORIDE SERPL-SCNC: 103 MMOL/L — SIGNIFICANT CHANGE UP (ref 98–107)
CO2 SERPL-SCNC: 23 MMOL/L — SIGNIFICANT CHANGE UP (ref 22–31)
CREAT SERPL-MCNC: 1.16 MG/DL — SIGNIFICANT CHANGE UP (ref 0.5–1.3)
EGFR: 68 ML/MIN/1.73M2 — SIGNIFICANT CHANGE UP
EGFR: 68 ML/MIN/1.73M2 — SIGNIFICANT CHANGE UP
GLUCOSE SERPL-MCNC: 119 MG/DL — HIGH (ref 70–99)
HCT VFR BLD CALC: 49.8 % — SIGNIFICANT CHANGE UP (ref 39–50)
HGB BLD-MCNC: 16.6 G/DL — SIGNIFICANT CHANGE UP (ref 13–17)
MAGNESIUM SERPL-MCNC: 2.1 MG/DL — SIGNIFICANT CHANGE UP (ref 1.6–2.6)
MCHC RBC-ENTMCNC: 30.5 PG — SIGNIFICANT CHANGE UP (ref 27–34)
MCHC RBC-ENTMCNC: 33.3 G/DL — SIGNIFICANT CHANGE UP (ref 32–36)
MCV RBC AUTO: 91.4 FL — SIGNIFICANT CHANGE UP (ref 80–100)
MRSA PCR RESULT.: SIGNIFICANT CHANGE UP
NRBC # BLD AUTO: 0 K/UL — SIGNIFICANT CHANGE UP (ref 0–0)
NRBC # FLD: 0 K/UL — SIGNIFICANT CHANGE UP (ref 0–0)
NRBC BLD AUTO-RTO: 0 /100 WBCS — SIGNIFICANT CHANGE UP (ref 0–0)
PHOSPHATE SERPL-MCNC: 2.8 MG/DL — SIGNIFICANT CHANGE UP (ref 2.5–4.5)
PLATELET # BLD AUTO: 234 K/UL — SIGNIFICANT CHANGE UP (ref 150–400)
POTASSIUM SERPL-MCNC: 4.2 MMOL/L — SIGNIFICANT CHANGE UP (ref 3.5–5.3)
POTASSIUM SERPL-SCNC: 4.2 MMOL/L — SIGNIFICANT CHANGE UP (ref 3.5–5.3)
RBC # BLD: 5.45 M/UL — SIGNIFICANT CHANGE UP (ref 4.2–5.8)
RBC # FLD: 15.2 % — HIGH (ref 10.3–14.5)
S AUREUS DNA NOSE QL NAA+PROBE: SIGNIFICANT CHANGE UP
SODIUM SERPL-SCNC: 143 MMOL/L — SIGNIFICANT CHANGE UP (ref 135–145)
T4 FREE SERPL-MCNC: 1.3 NG/DL — SIGNIFICANT CHANGE UP (ref 0.9–1.8)
WBC # BLD: 12.55 K/UL — HIGH (ref 3.8–10.5)
WBC # FLD AUTO: 12.55 K/UL — HIGH (ref 3.8–10.5)

## 2025-05-09 RX ORDER — LANOLIN/MINERAL OIL/PETROLATUM
1 OINTMENT (GRAM) OPHTHALMIC (EYE) THREE TIMES A DAY
Refills: 0 | Status: DISCONTINUED | OUTPATIENT
Start: 2025-05-09 | End: 2025-05-19

## 2025-05-09 RX ORDER — DULOXETINE 20 MG/1
20 CAPSULE, DELAYED RELEASE ORAL DAILY
Refills: 0 | Status: DISCONTINUED | OUTPATIENT
Start: 2025-05-09 | End: 2025-05-12

## 2025-05-09 RX ADMIN — Medication 650 MILLIGRAM(S): at 08:31

## 2025-05-09 RX ADMIN — POLYETHYLENE GLYCOL 3350 17 GRAM(S): 17 POWDER, FOR SOLUTION ORAL at 05:32

## 2025-05-09 RX ADMIN — GABAPENTIN 300 MILLIGRAM(S): 400 CAPSULE ORAL at 05:31

## 2025-05-09 RX ADMIN — TAMSULOSIN HYDROCHLORIDE 0.4 MILLIGRAM(S): 0.4 CAPSULE ORAL at 21:12

## 2025-05-09 RX ADMIN — LEVETIRACETAM 750 MILLIGRAM(S): 10 INJECTION, SOLUTION INTRAVENOUS at 18:24

## 2025-05-09 RX ADMIN — ENOXAPARIN SODIUM 40 MILLIGRAM(S): 100 INJECTION SUBCUTANEOUS at 05:32

## 2025-05-09 RX ADMIN — GABAPENTIN 300 MILLIGRAM(S): 400 CAPSULE ORAL at 15:26

## 2025-05-09 RX ADMIN — ROSUVASTATIN CALCIUM 10 MILLIGRAM(S): 20 TABLET, FILM COATED ORAL at 21:12

## 2025-05-09 RX ADMIN — Medication 1 APPLICATION(S): at 12:07

## 2025-05-09 RX ADMIN — Medication 25 GRAM(S): at 01:24

## 2025-05-09 RX ADMIN — Medication 25 GRAM(S): at 08:32

## 2025-05-09 RX ADMIN — PREGABALIN 150 MILLIGRAM(S): 50 CAPSULE ORAL at 05:32

## 2025-05-09 RX ADMIN — Medication 25 GRAM(S): at 15:26

## 2025-05-09 RX ADMIN — Medication 650 MILLIGRAM(S): at 09:31

## 2025-05-09 RX ADMIN — BACLOFEN 5 MILLIGRAM(S): 10 INJECTION INTRATHECAL at 12:06

## 2025-05-09 RX ADMIN — Medication 3 MILLIGRAM(S): at 21:13

## 2025-05-09 RX ADMIN — LEVETIRACETAM 750 MILLIGRAM(S): 10 INJECTION, SOLUTION INTRAVENOUS at 05:31

## 2025-05-09 RX ADMIN — PREGABALIN 150 MILLIGRAM(S): 50 CAPSULE ORAL at 18:23

## 2025-05-09 RX ADMIN — GABAPENTIN 300 MILLIGRAM(S): 400 CAPSULE ORAL at 21:12

## 2025-05-09 RX ADMIN — Medication 25 GRAM(S): at 21:13

## 2025-05-09 RX ADMIN — ENOXAPARIN SODIUM 40 MILLIGRAM(S): 100 INJECTION SUBCUTANEOUS at 18:24

## 2025-05-09 RX ADMIN — TIZANIDINE 2 MILLIGRAM(S): 4 TABLET ORAL at 12:06

## 2025-05-09 RX ADMIN — Medication 1 DROP(S): at 20:05

## 2025-05-09 RX ADMIN — Medication 81 MILLIGRAM(S): at 12:06

## 2025-05-09 RX ADMIN — POLYETHYLENE GLYCOL 3350 17 GRAM(S): 17 POWDER, FOR SOLUTION ORAL at 18:26

## 2025-05-09 RX ADMIN — Medication 2 TABLET(S): at 21:12

## 2025-05-09 NOTE — BH CONSULTATION LIAISON ASSESSMENT NOTE - NSBHCHARTREVIEWLAB_PSY_A_CORE FT
INTERVAL LAB RESULTS:                        16.6   12.55 )-----------( 234      ( 09 May 2025 08:50 )             49.8                         16.2   11.24 )-----------( 239      ( 07 May 2025 15:02 )             50.2                               143    |  103    |  15                  Calcium: 9.4   / iCa: x      (05-09 @ 08:50)    ----------------------------<  119       Magnesium: 2.10                             4.2     |  23     |  1.16             Phosphorous: 2.8        Urinalysis Basic - ( 09 May 2025 08:50 )    Color: x / Appearance: x / SG: x / pH: x  Gluc: 119 mg/dL / Ketone: x  / Bili: x / Urobili: x   Blood: x / Protein: x / Nitrite: x   Leuk Esterase: x / RBC: x / WBC x   Sq Epi: x / Non Sq Epi: x / Bacteria: x        INTERVAL IMAGING STUDIES:

## 2025-05-09 NOTE — BH CONSULTATION LIAISON ASSESSMENT NOTE - NSBHCONSULTFOLLOWAFTERCARE_PSY_A_CORE FT
SW for OP psych referral resources   [facility]  [address], [facility], [place] [address]  [phone]  [email or web address]: [phone]    [facility]   [id number] [address], [address]  [facility] ger[facility] psych clinic [phone], ext 2. [phone]

## 2025-05-09 NOTE — BH CONSULTATION LIAISON ASSESSMENT NOTE - HPI (INCLUDE ILLNESS QUALITY, SEVERITY, DURATION, TIMING, CONTEXT, MODIFYING FACTORS, ASSOCIATED SIGNS AND SYMPTOMS)
68 yo M retired, domiciled in private residence with wife, with no formal PPHx and PMH significant for CAD with stent, seizure on Keppra, hypertension, COPD , high cholesterol, chronic back pain s/p multiple spine surgeries, presents from home for concerns of "feeling off and disoriented" with wife reporting visual hallucinations, admitted for encephalopathy with mild leukocytosis on zosyn. Psychiatry consulted for concerns of depression.     Patient seen, comfortably lying in bed, calm, pleasant, smiling, AOx4. Admits that in the past he experiences confusion and possible visual hallucinations when he gets UTIs, denies any ongoing symptoms of psychosis. Denies any current AVH. Patient does endorse, however, feeling depressed in context of physical deconditioning and decline in ability to ambulate after two falls about 2-3 years ago which resulted in fracture of his right femur. Stated that he had a very active lifestyle before this, continues to participate in physical therapy where he practices using a walker but overall feels limited by this. Patient states that prior to the falls, he had a robust social [place], would go out to see friends. He currently lives with his wife, has 4 children who live close by (farthest lives 2 miles away) and 7 grandchildren. Although he enjoys seeing his family and watching movies, in some aspects of his life, he has noticed he is not doing everything he once loved such as reading the newspaper. Reports overall decrease in appetite about 3 months ago, some poor concentration, low motivation. States he is sleeping appropriately, denies any SI, intent, planning, denies any HI. Patient denies any history of felicita/psychosis. No delusions elicited, patient feels safe in the hospital, denies paranoia. Denies any history of inpatient psychiatric hospitalizations, denies outpatient psychiatric treatment/counseling, denies any prior history of taking psychotropic medications. Denies any history of SA/SIB. Denies history of substance use. Discussed options for management of depression including therapy as well as initiation of antidepressant medication such as SSRI. Risks/benefits/side effects discussed. Patient expresses interest in trial of zoloft.     Attempted to reach wife Cassie [phone] with patient's consent for collateral. Called multiple times, unable to reach, left voicemail and callback number. Per chart, wife had reported concerns that patient was withdrawn and depressed since breaking his femur, does not want to go to places or do activities he previously enjoyed, but denied thoughts of self harm or harming others.     70 yo M retired, domiciled in private residence with wife, with no formal PPHx and PMH significant for CAD with stent, seizure on Keppra, hypertension, COPD , high cholesterol, chronic back pain s/p multiple spine surgeries, presents from home for concerns of "feeling off and disoriented" with wife reporting visual hallucinations, admitted for encephalopathy with mild leukocytosis on zosyn. Psychiatry consulted for concerns of depression.     Patient seen, comfortably lying in bed, calm, pleasant, smiling, AOx4. Admits that in the past he experiences confusion and possible visual hallucinations when he gets UTIs, denies any ongoing symptoms of psychosis. Denies any current AVH. Patient does endorse, however, feeling depressed in context of physical deconditioning and decline in ability to ambulate after two falls about 2-3 years ago which resulted in fracture of his right femur. Stated that he had a very active lifestyle before this, continues to participate in physical therapy where he practices using a walker but overall feels limited by this. Patient states that prior to the falls, he had a robust social [place], would go out to see friends. He currently lives with his wife, has 4 children who live close by (farthest lives 2 miles away) and 7 grandchildren. Although he enjoys seeing his family and watching movies, in some aspects of his life, he has noticed he is not doing everything he once loved such as reading the newspaper. Reports overall decrease in appetite about 3 months ago, some poor concentration, low motivation. States he is sleeping appropriately, denies any SI, intent, planning, denies any HI. Patient denies any history of felicita/psychosis. No delusions elicited, patient feels safe in the hospital, denies paranoia. Denies any history of inpatient psychiatric hospitalizations, denies outpatient psychiatric treatment/counseling, denies any prior history of taking psychotropic medications. Denies any history of SA/SIB. Denies history of substance use. Discussed options for management of depression including therapy as well as initiation of antidepressant medication such as zoloft or duloxetine (which was recommended by neurology team). Risks/benefits/side effects discussed for both. Patient expresses interest in trial of duloxetine.      Spoke with wife Cassie at bedside. Wife reports concerns of patient being withdrawn and depressed since he broke his femur about 3 years ago. States he does not want to go to places or do activities he previously enjoyed, but denied thoughts of self harm or harming others.  Denies any current concerns of hallucinations/psychosis, reports he usually gets that when he has UTIs. Feels that patient is having a hard time with motivation, she is experiencing difficulty getting him out of the house to go to his physical therapy appointments. Asks for referral to [facility] and agrees with initiation of antidepressant.

## 2025-05-09 NOTE — BH CONSULTATION LIAISON ASSESSMENT NOTE - DESCRIPTION
Pt retired (previously worked as  in [place]), living at home with wife, has adult children, 7 grandchildren

## 2025-05-09 NOTE — BH CONSULTATION LIAISON ASSESSMENT NOTE - RISK ASSESSMENT
Risk factors include delirium, chronic/acute medical illness  Protective factors include engagement in treatment, stable residency, stable relationships, family/social support, responsibility to family

## 2025-05-09 NOTE — BH CONSULTATION LIAISON ASSESSMENT NOTE - NSBHCONSULTRECOMMENDOTHER_PSY_A_CORE FT
1. If family amenable, may use PRN: Seroquel 25mg PO q6h PRN agitation; Haldol 0.5mg IV q6h PRN severe agitation.  Monitor for QTc<500.      2. PRN: Melatonin 3mg PO qHS PRN insomnia.    3. Check: B12, folate, RPR, consider EEG if MS not improving    4. Minimize use of benzos, opioids, anticholinergics, or other deliriogenic agents when possible.  Maintain sleep wake cycle.  Provide frequent reorientation and redirection.  Family member at bedside if possible. Assess for need for glasses and hearing aid (if applicable).    5. Pt does not meet criteria for psychiatric hospitalization.  Recommend outpt psych f/u at [facility] after d/c- [phone].   [facility] will follow.

## 2025-05-09 NOTE — BH CONSULTATION LIAISON ASSESSMENT NOTE - NSBHCHARTREVIEWVS_PSY_A_CORE FT
Vital Signs Last 24 Hrs  T(C): 36.7 (09 May 2025 11:40), Max: 36.7 (09 May 2025 11:40)  T(F): 98.1 (09 May 2025 11:40), Max: 98.1 (09 May 2025 11:40)  HR: 68 (09 May 2025 11:40) (64 - 68)  BP: 141/76 (09 May 2025 11:40) (134/74 - 145/69)  BP(mean): --  RR: 18 (09 May 2025 11:40) (18 - 18)  SpO2: 95% (09 May 2025 11:40) (95% - 97%)    Parameters below as of 09 May 2025 11:40  Patient On (Oxygen Delivery Method): room air

## 2025-05-09 NOTE — BH CONSULTATION LIAISON ASSESSMENT NOTE - CURRENT MEDICATION
MEDICATIONS  (STANDING):  aspirin enteric coated 81 milliGRAM(s) Oral daily  chlorhexidine 2% Cloths 1 Application(s) Topical daily  enoxaparin Injectable 40 milliGRAM(s) SubCutaneous every 12 hours  gabapentin 300 milliGRAM(s) Oral three times a day  levETIRAcetam 750 milliGRAM(s) Oral two times a day  piperacillin/tazobactam IVPB.. 3.375 Gram(s) IV Intermittent every 8 hours  polyethylene glycol 3350 17 Gram(s) Oral two times a day  pregabalin 150 milliGRAM(s) Oral two times a day  rosuvastatin 10 milliGRAM(s) Oral at bedtime  senna 2 Tablet(s) Oral at bedtime  tamsulosin 0.4 milliGRAM(s) Oral at bedtime  tiZANidine 2 milliGRAM(s) Oral daily    MEDICATIONS  (PRN):  acetaminophen     Tablet .. 650 milliGRAM(s) Oral every 6 hours PRN Temp greater or equal to 38C (100.4F), Mild Pain (1 - 3)  aluminum hydroxide/magnesium hydroxide/simethicone Suspension 30 milliLiter(s) Oral every 4 hours PRN Dyspepsia  baclofen 5 milliGRAM(s) Oral every 8 hours PRN Muscle Spasm  melatonin 3 milliGRAM(s) Oral at bedtime PRN Insomnia  ondansetron Injectable 4 milliGRAM(s) IV Push every 8 hours PRN Nausea and/or Vomiting

## 2025-05-09 NOTE — BH CONSULTATION LIAISON ASSESSMENT NOTE - SUMMARY
68 yo M retired, domiciled in private residence with wife, with no formal PPHx and PMH significant for CAD with stent, seizure on Keppra, hypertension, COPD , high cholesterol, chronic back pain s/p multiple spine surgeries, presents from home for concerns of "feeling off and disoriented" with wife reporting visual hallucinations, admitted for encephalopathy with mild leukocytosis on zosyn. Psychiatry consulted for concerns of depression.     Patient reporting low mood, a degree of anhedonia, low motivation, poor concentration, and poor appetite in context of medical stressors, impaired ability to ambulate after fracturing femur about 3 years ago. Reflects on difficulty with being in bed, although making efforts to participate in physical therapy, ambulate with walker. Patient admits he often gets disoriented and confused when dealing with infections, visual hallucination on admission likely in context of delirium. Denies any current AVH, no delusions elicited on exam. Patient expresses interest in starting antidepressant medication, discussued risks/benefits of SSRIs, patient reports he would like to try zoloft. Denied any SI/HI and AVH. Denied any acute safety concerns.      68 yo M retired, domiciled in private residence with wife, with no formal PPHx and PMH significant for CAD with stent, seizure on Keppra, hypertension, COPD , high cholesterol, chronic back pain s/p multiple spine surgeries, presents from home for concerns of "feeling off and disoriented" with wife reporting visual hallucinations, admitted for encephalopathy with mild leukocytosis on zosyn. Psychiatry consulted for concerns of depression.     Patient reporting low mood, a degree of anhedonia, low motivation, poor concentration, and poor appetite in context of medical stressors, impaired ability to ambulate after fracturing femur about 3 years ago. Reflects on difficulty with being in bed, although making efforts to participate in physical therapy, ambulate with walker. Patient admits he often gets disoriented and confused when dealing with infections, visual hallucination on admission likely in context of delirium. Denies any current AVH, no delusions elicited on exam. Patient expresses interest in starting antidepressant medication, discussued risks/benefits of SSRIs and SNRIs, patient reports he would like to try duloxetine which was also recommended by neurology team. Denied any SI/HI and AVH. Denied any acute safety concerns.     Recommendations  - routine observation  - neurology recommendation of tapering gabapentin and starting duloxetine noted, can consider starting duloxetine 20 mg daily for mood   - Melatonin 3mg PO qHS PRN insomnia.  - PRN: Seroquel 25mg PO q6h PRN agitation; zyprexa 1.25 mg IM q6h PRN severe agitation.  Monitor for QTc<500.    - hold antipsychotics if QTC >500 ms   - Minimize use of benzos, opioids, anticholinergics, or other deliriogenic agents when possible.  Maintain sleep wake cycle.  Provide frequent reorientation and redirection.  Family member at bedside if possible. Assess for need for glasses and hearing aid (if applicable).  - family requesting referral to geriatric psychiatry clinic at [facility]  - Dispo: No indications for inpatient psychiatry admission at this time.

## 2025-05-09 NOTE — BH CONSULTATION LIAISON ASSESSMENT NOTE - ATTENDING COMMENTS
Chart reviewed. Discussed with trainee. Agree with above assessment/recs. Will continue to follow as needed

## 2025-05-09 NOTE — BH CONSULTATION LIAISON ASSESSMENT NOTE - NSBHCHARTREVIEWINVESTIGATE_PSY_A_CORE FT
< from: CT Head No Cont (05.07.25 @ 22:38) >    IMPRESSION:    No acute intracranial hemorrhage or mass effect.  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      < from: CT Lumbar Spine No Cont (05.07.25 @ 22:38) >    IMPRESSION:  Evaluation limited due to artifact from extensive spinal hardware.    Withinthe limitations of the study, no evidence of acute fracture or   traumatic listhesis, and no evidence of acute hardware complication.

## 2025-05-10 LAB
ANION GAP SERPL CALC-SCNC: 15 MMOL/L — HIGH (ref 7–14)
BUN SERPL-MCNC: 14 MG/DL — SIGNIFICANT CHANGE UP (ref 7–23)
CALCIUM SERPL-MCNC: 9.8 MG/DL — SIGNIFICANT CHANGE UP (ref 8.4–10.5)
CHLORIDE SERPL-SCNC: 105 MMOL/L — SIGNIFICANT CHANGE UP (ref 98–107)
CO2 SERPL-SCNC: 23 MMOL/L — SIGNIFICANT CHANGE UP (ref 22–31)
CREAT SERPL-MCNC: 1.29 MG/DL — SIGNIFICANT CHANGE UP (ref 0.5–1.3)
EGFR: 60 ML/MIN/1.73M2 — SIGNIFICANT CHANGE UP
EGFR: 60 ML/MIN/1.73M2 — SIGNIFICANT CHANGE UP
GLUCOSE SERPL-MCNC: 103 MG/DL — HIGH (ref 70–99)
HCT VFR BLD CALC: 49.6 % — SIGNIFICANT CHANGE UP (ref 39–50)
HGB BLD-MCNC: 16.2 G/DL — SIGNIFICANT CHANGE UP (ref 13–17)
LEVETIRACETAM SERPL-MCNC: 7.3 UG/ML — LOW (ref 10–40)
MAGNESIUM SERPL-MCNC: 2.2 MG/DL — SIGNIFICANT CHANGE UP (ref 1.6–2.6)
MCHC RBC-ENTMCNC: 30.3 PG — SIGNIFICANT CHANGE UP (ref 27–34)
MCHC RBC-ENTMCNC: 32.7 G/DL — SIGNIFICANT CHANGE UP (ref 32–36)
MCV RBC AUTO: 92.7 FL — SIGNIFICANT CHANGE UP (ref 80–100)
NRBC # BLD AUTO: 0 K/UL — SIGNIFICANT CHANGE UP (ref 0–0)
NRBC # FLD: 0 K/UL — SIGNIFICANT CHANGE UP (ref 0–0)
NRBC BLD AUTO-RTO: 0 /100 WBCS — SIGNIFICANT CHANGE UP (ref 0–0)
PHOSPHATE SERPL-MCNC: 4.4 MG/DL — SIGNIFICANT CHANGE UP (ref 2.5–4.5)
PLATELET # BLD AUTO: 217 K/UL — SIGNIFICANT CHANGE UP (ref 150–400)
POTASSIUM SERPL-MCNC: 4.6 MMOL/L — SIGNIFICANT CHANGE UP (ref 3.5–5.3)
POTASSIUM SERPL-SCNC: 4.6 MMOL/L — SIGNIFICANT CHANGE UP (ref 3.5–5.3)
RBC # BLD: 5.35 M/UL — SIGNIFICANT CHANGE UP (ref 4.2–5.8)
RBC # FLD: 14.9 % — HIGH (ref 10.3–14.5)
SODIUM SERPL-SCNC: 143 MMOL/L — SIGNIFICANT CHANGE UP (ref 135–145)
WBC # BLD: 11.46 K/UL — HIGH (ref 3.8–10.5)
WBC # FLD AUTO: 11.46 K/UL — HIGH (ref 3.8–10.5)
ZINC SERPL-MCNC: 82 UG/DL — SIGNIFICANT CHANGE UP (ref 44–115)

## 2025-05-10 RX ORDER — QUETIAPINE FUMARATE 25 MG/1
25 TABLET ORAL EVERY 6 HOURS
Refills: 0 | Status: DISCONTINUED | OUTPATIENT
Start: 2025-05-10 | End: 2025-05-13

## 2025-05-10 RX ORDER — OLANZAPINE 10 MG/1
1.25 TABLET ORAL EVERY 6 HOURS
Refills: 0 | Status: DISCONTINUED | OUTPATIENT
Start: 2025-05-10 | End: 2025-05-13

## 2025-05-10 RX ADMIN — ENOXAPARIN SODIUM 40 MILLIGRAM(S): 100 INJECTION SUBCUTANEOUS at 17:37

## 2025-05-10 RX ADMIN — Medication 650 MILLIGRAM(S): at 18:30

## 2025-05-10 RX ADMIN — Medication 2 TABLET(S): at 21:08

## 2025-05-10 RX ADMIN — GABAPENTIN 300 MILLIGRAM(S): 400 CAPSULE ORAL at 14:08

## 2025-05-10 RX ADMIN — GABAPENTIN 300 MILLIGRAM(S): 400 CAPSULE ORAL at 05:27

## 2025-05-10 RX ADMIN — Medication 1 APPLICATION(S): at 11:39

## 2025-05-10 RX ADMIN — LEVETIRACETAM 750 MILLIGRAM(S): 10 INJECTION, SOLUTION INTRAVENOUS at 05:27

## 2025-05-10 RX ADMIN — BACLOFEN 5 MILLIGRAM(S): 10 INJECTION INTRATHECAL at 10:43

## 2025-05-10 RX ADMIN — Medication 650 MILLIGRAM(S): at 17:36

## 2025-05-10 RX ADMIN — DULOXETINE 20 MILLIGRAM(S): 20 CAPSULE, DELAYED RELEASE ORAL at 11:39

## 2025-05-10 RX ADMIN — LEVETIRACETAM 750 MILLIGRAM(S): 10 INJECTION, SOLUTION INTRAVENOUS at 17:36

## 2025-05-10 RX ADMIN — ENOXAPARIN SODIUM 40 MILLIGRAM(S): 100 INJECTION SUBCUTANEOUS at 05:28

## 2025-05-10 RX ADMIN — Medication 25 GRAM(S): at 14:08

## 2025-05-10 RX ADMIN — PREGABALIN 150 MILLIGRAM(S): 50 CAPSULE ORAL at 05:28

## 2025-05-10 RX ADMIN — PREGABALIN 150 MILLIGRAM(S): 50 CAPSULE ORAL at 17:36

## 2025-05-10 RX ADMIN — Medication 81 MILLIGRAM(S): at 11:39

## 2025-05-10 RX ADMIN — Medication 25 GRAM(S): at 05:28

## 2025-05-10 RX ADMIN — TAMSULOSIN HYDROCHLORIDE 0.4 MILLIGRAM(S): 0.4 CAPSULE ORAL at 21:07

## 2025-05-10 RX ADMIN — Medication 25 GRAM(S): at 21:08

## 2025-05-10 RX ADMIN — ROSUVASTATIN CALCIUM 10 MILLIGRAM(S): 20 TABLET, FILM COATED ORAL at 21:08

## 2025-05-10 RX ADMIN — Medication 1 DROP(S): at 18:58

## 2025-05-10 RX ADMIN — TIZANIDINE 2 MILLIGRAM(S): 4 TABLET ORAL at 11:39

## 2025-05-10 RX ADMIN — GABAPENTIN 300 MILLIGRAM(S): 400 CAPSULE ORAL at 21:08

## 2025-05-10 RX ADMIN — POLYETHYLENE GLYCOL 3350 17 GRAM(S): 17 POWDER, FOR SOLUTION ORAL at 05:28

## 2025-05-10 NOTE — PHYSICAL THERAPY INITIAL EVALUATION ADULT - LEVEL OF CONSCIOUSNESS, REHAB EVAL
[facility]    [address]    [facility] [address]    Phone:  [phone]    Fax:  [phone]                                       After Visit Summary   11/23/2018    Marci Barber    MRN: [id number]           After Visit Summary Signature Page     I have received my discharge instructions, and my questions have been answered. I have discussed any challenges I see with this plan with the nurse or doctor.    ..........................................................................................................................................  Patient/Patient Representative Signature      ..........................................................................................................................................  Patient Representative Print Name and Relationship to Patient    ..................................................               ................................................  Date                                   Time    ..........................................................................................................................................  Reviewed by Signature/Title    ...................................................              ..............................................  Date                                               Time          22EPIC Rev 08/18         alert/confused

## 2025-05-10 NOTE — PHYSICAL THERAPY INITIAL EVALUATION ADULT - PERTINENT HX OF CURRENT PROBLEM, REHAB EVAL
As per chart, patient is a 69 year old male history of CAD with stent, seizure on Keppra, hypertension, COPD , high cholesterol, presents from home with wife at bedside chief complaint of "feeling off and disoriented"

## 2025-05-10 NOTE — PHYSICAL THERAPY INITIAL EVALUATION ADULT - ACTIVE RANGE OF MOTION EXAMINATION, REHAB EVAL
right hip flexion 0-75degrees; right knee extension -10 degrees from neutral/bilateral upper extremity Active ROM was WFL (within functional limits)/Left LE Active ROM was WFL (within functional limits)

## 2025-05-10 NOTE — PHYSICAL THERAPY INITIAL EVALUATION ADULT - GENERAL OBSERVATIONS, REHAB EVAL
Pt is minimally verbal, shakes head yes/ no mostly. Patient found semireclined in bed; HR 78bpm; +serrano; spoke with TREE Murphy, who advised patient may participate.

## 2025-05-10 NOTE — PHYSICAL THERAPY INITIAL EVALUATION ADULT - LEVEL OF INDEPENDENCE: SIT/STAND, REHAB EVAL
patient deferred at this time; patient reports dizziness in sitting with /78 mmHg; TREE Murphy made aware/unable to perform

## 2025-05-11 LAB
ANION GAP SERPL CALC-SCNC: 15 MMOL/L — HIGH (ref 7–14)
BUN SERPL-MCNC: 12 MG/DL — SIGNIFICANT CHANGE UP (ref 7–23)
CALCIUM SERPL-MCNC: 9.7 MG/DL — SIGNIFICANT CHANGE UP (ref 8.4–10.5)
CHLORIDE SERPL-SCNC: 102 MMOL/L — SIGNIFICANT CHANGE UP (ref 98–107)
CO2 SERPL-SCNC: 24 MMOL/L — SIGNIFICANT CHANGE UP (ref 22–31)
CREAT SERPL-MCNC: 1.06 MG/DL — SIGNIFICANT CHANGE UP (ref 0.5–1.3)
EGFR: 76 ML/MIN/1.73M2 — SIGNIFICANT CHANGE UP
EGFR: 76 ML/MIN/1.73M2 — SIGNIFICANT CHANGE UP
GLUCOSE SERPL-MCNC: 82 MG/DL — SIGNIFICANT CHANGE UP (ref 70–99)
HCT VFR BLD CALC: 50.1 % — HIGH (ref 39–50)
HGB BLD-MCNC: 16.6 G/DL — SIGNIFICANT CHANGE UP (ref 13–17)
MAGNESIUM SERPL-MCNC: 2.1 MG/DL — SIGNIFICANT CHANGE UP (ref 1.6–2.6)
MCHC RBC-ENTMCNC: 29.9 PG — SIGNIFICANT CHANGE UP (ref 27–34)
MCHC RBC-ENTMCNC: 33.1 G/DL — SIGNIFICANT CHANGE UP (ref 32–36)
MCV RBC AUTO: 90.3 FL — SIGNIFICANT CHANGE UP (ref 80–100)
NRBC # BLD AUTO: 0 K/UL — SIGNIFICANT CHANGE UP (ref 0–0)
NRBC # FLD: 0 K/UL — SIGNIFICANT CHANGE UP (ref 0–0)
NRBC BLD AUTO-RTO: 0 /100 WBCS — SIGNIFICANT CHANGE UP (ref 0–0)
PHOSPHATE SERPL-MCNC: 3.3 MG/DL — SIGNIFICANT CHANGE UP (ref 2.5–4.5)
PLATELET # BLD AUTO: 231 K/UL — SIGNIFICANT CHANGE UP (ref 150–400)
POTASSIUM SERPL-MCNC: 3.9 MMOL/L — SIGNIFICANT CHANGE UP (ref 3.5–5.3)
POTASSIUM SERPL-SCNC: 3.9 MMOL/L — SIGNIFICANT CHANGE UP (ref 3.5–5.3)
RBC # BLD: 5.55 M/UL — SIGNIFICANT CHANGE UP (ref 4.2–5.8)
RBC # FLD: 14.7 % — HIGH (ref 10.3–14.5)
SODIUM SERPL-SCNC: 141 MMOL/L — SIGNIFICANT CHANGE UP (ref 135–145)
WBC # BLD: 10.76 K/UL — HIGH (ref 3.8–10.5)
WBC # FLD AUTO: 10.76 K/UL — HIGH (ref 3.8–10.5)

## 2025-05-11 RX ADMIN — Medication 81 MILLIGRAM(S): at 12:25

## 2025-05-11 RX ADMIN — ROSUVASTATIN CALCIUM 10 MILLIGRAM(S): 20 TABLET, FILM COATED ORAL at 21:07

## 2025-05-11 RX ADMIN — TIZANIDINE 2 MILLIGRAM(S): 4 TABLET ORAL at 12:24

## 2025-05-11 RX ADMIN — Medication 650 MILLIGRAM(S): at 12:25

## 2025-05-11 RX ADMIN — PREGABALIN 150 MILLIGRAM(S): 50 CAPSULE ORAL at 18:27

## 2025-05-11 RX ADMIN — GABAPENTIN 300 MILLIGRAM(S): 400 CAPSULE ORAL at 05:00

## 2025-05-11 RX ADMIN — GABAPENTIN 300 MILLIGRAM(S): 400 CAPSULE ORAL at 14:27

## 2025-05-11 RX ADMIN — Medication 650 MILLIGRAM(S): at 13:00

## 2025-05-11 RX ADMIN — Medication 650 MILLIGRAM(S): at 05:00

## 2025-05-11 RX ADMIN — TAMSULOSIN HYDROCHLORIDE 0.4 MILLIGRAM(S): 0.4 CAPSULE ORAL at 21:10

## 2025-05-11 RX ADMIN — POLYETHYLENE GLYCOL 3350 17 GRAM(S): 17 POWDER, FOR SOLUTION ORAL at 18:29

## 2025-05-11 RX ADMIN — Medication 2 TABLET(S): at 21:07

## 2025-05-11 RX ADMIN — Medication 25 GRAM(S): at 14:27

## 2025-05-11 RX ADMIN — Medication 650 MILLIGRAM(S): at 21:49

## 2025-05-11 RX ADMIN — Medication 25 GRAM(S): at 21:08

## 2025-05-11 RX ADMIN — Medication 25 GRAM(S): at 05:00

## 2025-05-11 RX ADMIN — BACLOFEN 5 MILLIGRAM(S): 10 INJECTION INTRATHECAL at 21:07

## 2025-05-11 RX ADMIN — Medication 650 MILLIGRAM(S): at 21:07

## 2025-05-11 RX ADMIN — ENOXAPARIN SODIUM 40 MILLIGRAM(S): 100 INJECTION SUBCUTANEOUS at 05:00

## 2025-05-11 RX ADMIN — LEVETIRACETAM 750 MILLIGRAM(S): 10 INJECTION, SOLUTION INTRAVENOUS at 18:28

## 2025-05-11 RX ADMIN — Medication 650 MILLIGRAM(S): at 05:32

## 2025-05-11 RX ADMIN — GABAPENTIN 300 MILLIGRAM(S): 400 CAPSULE ORAL at 21:07

## 2025-05-11 RX ADMIN — Medication 3 MILLIGRAM(S): at 21:07

## 2025-05-11 RX ADMIN — BACLOFEN 5 MILLIGRAM(S): 10 INJECTION INTRATHECAL at 12:25

## 2025-05-11 RX ADMIN — PREGABALIN 150 MILLIGRAM(S): 50 CAPSULE ORAL at 05:00

## 2025-05-11 RX ADMIN — POLYETHYLENE GLYCOL 3350 17 GRAM(S): 17 POWDER, FOR SOLUTION ORAL at 05:01

## 2025-05-11 RX ADMIN — ENOXAPARIN SODIUM 40 MILLIGRAM(S): 100 INJECTION SUBCUTANEOUS at 18:28

## 2025-05-11 RX ADMIN — Medication 1 APPLICATION(S): at 12:25

## 2025-05-11 RX ADMIN — LEVETIRACETAM 750 MILLIGRAM(S): 10 INJECTION, SOLUTION INTRAVENOUS at 05:01

## 2025-05-11 RX ADMIN — DULOXETINE 20 MILLIGRAM(S): 20 CAPSULE, DELAYED RELEASE ORAL at 14:28

## 2025-05-12 ENCOUNTER — TRANSCRIPTION ENCOUNTER (OUTPATIENT)
Age: 70
End: 2025-05-12

## 2025-05-12 LAB
ANION GAP SERPL CALC-SCNC: 13 MMOL/L — SIGNIFICANT CHANGE UP (ref 7–14)
BUN SERPL-MCNC: 14 MG/DL — SIGNIFICANT CHANGE UP (ref 7–23)
CALCIUM SERPL-MCNC: 9.2 MG/DL — SIGNIFICANT CHANGE UP (ref 8.4–10.5)
CHLORIDE SERPL-SCNC: 102 MMOL/L — SIGNIFICANT CHANGE UP (ref 98–107)
CO2 SERPL-SCNC: 24 MMOL/L — SIGNIFICANT CHANGE UP (ref 22–31)
CREAT SERPL-MCNC: 1.09 MG/DL — SIGNIFICANT CHANGE UP (ref 0.5–1.3)
EGFR: 73 ML/MIN/1.73M2 — SIGNIFICANT CHANGE UP
EGFR: 73 ML/MIN/1.73M2 — SIGNIFICANT CHANGE UP
GLUCOSE SERPL-MCNC: 105 MG/DL — HIGH (ref 70–99)
HCT VFR BLD CALC: 46.2 % — SIGNIFICANT CHANGE UP (ref 39–50)
HGB BLD-MCNC: 15.4 G/DL — SIGNIFICANT CHANGE UP (ref 13–17)
MAGNESIUM SERPL-MCNC: 2 MG/DL — SIGNIFICANT CHANGE UP (ref 1.6–2.6)
MCHC RBC-ENTMCNC: 30 PG — SIGNIFICANT CHANGE UP (ref 27–34)
MCHC RBC-ENTMCNC: 33.3 G/DL — SIGNIFICANT CHANGE UP (ref 32–36)
MCV RBC AUTO: 90.1 FL — SIGNIFICANT CHANGE UP (ref 80–100)
NRBC # BLD AUTO: 0 K/UL — SIGNIFICANT CHANGE UP (ref 0–0)
NRBC # FLD: 0 K/UL — SIGNIFICANT CHANGE UP (ref 0–0)
NRBC BLD AUTO-RTO: 0 /100 WBCS — SIGNIFICANT CHANGE UP (ref 0–0)
PHOSPHATE SERPL-MCNC: 3 MG/DL — SIGNIFICANT CHANGE UP (ref 2.5–4.5)
PLATELET # BLD AUTO: 225 K/UL — SIGNIFICANT CHANGE UP (ref 150–400)
POTASSIUM SERPL-MCNC: 4 MMOL/L — SIGNIFICANT CHANGE UP (ref 3.5–5.3)
POTASSIUM SERPL-SCNC: 4 MMOL/L — SIGNIFICANT CHANGE UP (ref 3.5–5.3)
RBC # BLD: 5.13 M/UL — SIGNIFICANT CHANGE UP (ref 4.2–5.8)
RBC # FLD: 14.6 % — HIGH (ref 10.3–14.5)
SODIUM SERPL-SCNC: 139 MMOL/L — SIGNIFICANT CHANGE UP (ref 135–145)
WBC # BLD: 10.91 K/UL — HIGH (ref 3.8–10.5)
WBC # FLD AUTO: 10.91 K/UL — HIGH (ref 3.8–10.5)

## 2025-05-12 PROCEDURE — 93010 ELECTROCARDIOGRAM REPORT: CPT

## 2025-05-12 PROCEDURE — 95816 EEG AWAKE AND DROWSY: CPT | Mod: 26

## 2025-05-12 RX ORDER — DULOXETINE 20 MG/1
30 CAPSULE, DELAYED RELEASE ORAL DAILY
Refills: 0 | Status: DISCONTINUED | OUTPATIENT
Start: 2025-05-13 | End: 2025-05-19

## 2025-05-12 RX ORDER — GABAPENTIN 400 MG/1
300 CAPSULE ORAL
Refills: 0 | Status: DISCONTINUED | OUTPATIENT
Start: 2025-05-12 | End: 2025-05-16

## 2025-05-12 RX ADMIN — Medication 25 GRAM(S): at 05:11

## 2025-05-12 RX ADMIN — Medication 81 MILLIGRAM(S): at 12:21

## 2025-05-12 RX ADMIN — POLYETHYLENE GLYCOL 3350 17 GRAM(S): 17 POWDER, FOR SOLUTION ORAL at 05:11

## 2025-05-12 RX ADMIN — TAMSULOSIN HYDROCHLORIDE 0.4 MILLIGRAM(S): 0.4 CAPSULE ORAL at 22:20

## 2025-05-12 RX ADMIN — DULOXETINE 20 MILLIGRAM(S): 20 CAPSULE, DELAYED RELEASE ORAL at 12:21

## 2025-05-12 RX ADMIN — ENOXAPARIN SODIUM 40 MILLIGRAM(S): 100 INJECTION SUBCUTANEOUS at 05:11

## 2025-05-12 RX ADMIN — GABAPENTIN 300 MILLIGRAM(S): 400 CAPSULE ORAL at 12:21

## 2025-05-12 RX ADMIN — ENOXAPARIN SODIUM 40 MILLIGRAM(S): 100 INJECTION SUBCUTANEOUS at 17:10

## 2025-05-12 RX ADMIN — PREGABALIN 150 MILLIGRAM(S): 50 CAPSULE ORAL at 17:13

## 2025-05-12 RX ADMIN — TIZANIDINE 2 MILLIGRAM(S): 4 TABLET ORAL at 12:21

## 2025-05-12 RX ADMIN — GABAPENTIN 300 MILLIGRAM(S): 400 CAPSULE ORAL at 05:11

## 2025-05-12 RX ADMIN — Medication 1 APPLICATION(S): at 12:23

## 2025-05-12 RX ADMIN — ROSUVASTATIN CALCIUM 10 MILLIGRAM(S): 20 TABLET, FILM COATED ORAL at 22:20

## 2025-05-12 RX ADMIN — LEVETIRACETAM 750 MILLIGRAM(S): 10 INJECTION, SOLUTION INTRAVENOUS at 05:11

## 2025-05-12 RX ADMIN — Medication 2 TABLET(S): at 22:20

## 2025-05-12 RX ADMIN — PREGABALIN 150 MILLIGRAM(S): 50 CAPSULE ORAL at 05:10

## 2025-05-12 RX ADMIN — GABAPENTIN 300 MILLIGRAM(S): 400 CAPSULE ORAL at 17:10

## 2025-05-12 RX ADMIN — Medication 1 DROP(S): at 09:27

## 2025-05-12 RX ADMIN — LEVETIRACETAM 750 MILLIGRAM(S): 10 INJECTION, SOLUTION INTRAVENOUS at 17:09

## 2025-05-12 RX ADMIN — POLYETHYLENE GLYCOL 3350 17 GRAM(S): 17 POWDER, FOR SOLUTION ORAL at 17:09

## 2025-05-12 NOTE — DISCHARGE NOTE PROVIDER - DETAILS OF MALNUTRITION DIAGNOSIS/DIAGNOSES
This patient has been assessed with a concern for Malnutrition and was treated during this hospitalization for the following Nutrition diagnosis/diagnoses:     -  05/15/2025: Morbid obesity (BMI > 40)

## 2025-05-12 NOTE — CONSULT NOTE ADULT - SUBJECTIVE AND OBJECTIVE BOX
Patient is a 69y old  Male who presents with a chief complaint of "feeling off and disoriented" (12 May 2025 15:38)      HPI:  69-year-old male history of CAD with stent, seizure on Keppra, hypertension, COPD , high cholesterol, presents from home with wife at bedside chief complaint of "feeling off and disoriented" Patient is [address], May 2025. Reports he is in the hospital because wife was concerned for urinary tract infection. Patient was hallucinating that he saw his son Justin. Also, speaking to someone who was not there. Wife interviewed at bedside. States he had similar presentation when patient had a UTI. Also, reports concern that patient has been depressed and withdrawn since breaking his femur 3 years ago. He does not want to go places or do anything he previously enjoyed. Wife interested in getting someone to evaluate him because it is placing a lot of care taker stress on her. Denies any thoughts of self harm or harming others.  Additionally, reports patient with bad neuropathy and constipation. Otherwise, denies fever, chills, chest pain and SOB.       In the ED, patient received Zosyn IV Tylenol x2, and IVF bolus. Patient admitted to medicine for further management.  (08 May 2025 12:12)      PAST MEDICAL & SURGICAL HISTORY:  Myocardial infarction  KAEL x1 MI 2005, stent RCA      Back pain  Chronic back pain      Spinal stenosis      Lumbar herniated disc      Hypertension      Narcotic dependence  5/28/16 for withdrawal ,pt currently on Dialudid 8 mg every 4-6 hours /day      Osteoarthritis      GERD (gastroesophageal reflux disease)      Cerebral aneurysm rupture  1997 clipped, no residual      Femur fracture, right  2/16, surgical revision of right hip      Sacroiliitis, not elsewhere classified  Unspecified Inflammatory spondylopathy,Sacral and sacrococcygeal region      Brain aneurysm  s/p clipping, not compatible with MRI      Seizures      CAD (coronary artery disease)  s/p MI and RCA stenting      Chronic pain  Patient has an Intrathecal pump s/p removal in 2016      Cerebral aneurysm  I997 with clips      S/P lumbar fusion  L1-S1:2002      History of right inguinal hernia repair  x2      Hx of appendectomy  6/1969      Hx of laminectomy  lumbar-2002      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 mos.-14 yo      Stented coronary artery  KAEL x 1 to RCA      History of hip replacement  8/15, revision 2/16 after falling and fracturing right femur      S/P left knee arthroscopy      H/O arthroscopy of shoulder  right X 2, left X 1      History of throat surgery  surgical exicision cyst 1986      Fusion of sacral region of spine  5/2017      History of back surgery  x5      S/P inguinal hernia repair      H/O fracture of femur  s/p repair          MEDICATIONS  (STANDING):  aspirin enteric coated 81 milliGRAM(s) Oral daily  chlorhexidine 2% Cloths 1 Application(s) Topical daily  enoxaparin Injectable 40 milliGRAM(s) SubCutaneous every 12 hours  gabapentin 300 milliGRAM(s) Oral two times a day  levETIRAcetam 750 milliGRAM(s) Oral two times a day  polyethylene glycol 3350 17 Gram(s) Oral two times a day  pregabalin 150 milliGRAM(s) Oral two times a day  rosuvastatin 10 milliGRAM(s) Oral at bedtime  senna 2 Tablet(s) Oral at bedtime  tamsulosin 0.4 milliGRAM(s) Oral at bedtime  tiZANidine 2 milliGRAM(s) Oral daily    MEDICATIONS  (PRN):  acetaminophen     Tablet .. 650 milliGRAM(s) Oral every 6 hours PRN Temp greater or equal to 38C (100.4F), Mild Pain (1 - 3)  aluminum hydroxide/magnesium hydroxide/simethicone Suspension 30 milliLiter(s) Oral every 4 hours PRN Dyspepsia  artificial tears (preservative free) Ophthalmic Solution 1 Drop(s) Both EYES three times a day PRN Dry Eyes  baclofen 5 milliGRAM(s) Oral every 8 hours PRN Muscle Spasm  melatonin 3 milliGRAM(s) Oral at bedtime PRN Insomnia  OLANZapine Injectable 1.25 milliGRAM(s) IntraMuscular every 6 hours PRN agitation  ondansetron Injectable 4 milliGRAM(s) IV Push every 8 hours PRN Nausea and/or Vomiting  QUEtiapine 25 milliGRAM(s) Oral every 6 hours PRN agitation      Allergies    No Known Allergies    Intolerances        VITALS:    Vital Signs Last 24 Hrs  T(C): 36.3 (12 May 2025 11:48), Max: 36.4 (11 May 2025 21:05)  T(F): 97.4 (12 May 2025 11:48), Max: 97.5 (11 May 2025 21:05)  HR: 65 (12 May 2025 11:48) (61 - 72)  BP: 143/79 (12 May 2025 11:48) (131/72 - 156/74)  BP(mean): --  RR: 18 (12 May 2025 11:48) (17 - 18)  SpO2: 95% (12 May 2025 11:48) (94% - 95%)    Parameters below as of 12 May 2025 11:48  Patient On (Oxygen Delivery Method): room air        LABS:                          15.4   10.91 )-----------( 225      ( 12 May 2025 04:35 )             46.2       05-12    139  |  102  |  14  ----------------------------<  105[H]  4.0   |  24  |  1.09    Ca    9.2      12 May 2025 04:35  Phos  3.0     05-12  Mg     2.00     05-12        CAPILLARY BLOOD GLUCOSE              LOWER EXTREMITY PHYSICAL EXAM:    Vascular: DP/PT 0/4, B/L, CFT <3 seconds B/L, Temperature gradient WNL, B/L.   Neuro: Epicritic sensation intact to the level of foot, B/L.  Musculoskeletal/Ortho: pain with palp of right 2nd toe wound  Skin: right second toe healing partial thickness ulcer to subQ with no acute signs of infection noted, toenails thickened elongated dystrophic with subungual debris x10    
Neurology Consult    Reason for Consult: Patient is a 69y old  Male who presents with a chief complaint of "feeling off and disoriented" (08 May 2025 12:12)      HPI:  69-year-old male history of CAD with stent, seizure on Keppra, hypertension, COPD , high cholesterol, presents from home with wife at bedside chief complaint of "feeling off and disoriented" Patient is [address], May 2025. Reports he is in the hospital because wife was concerned for urinary tract infection. Patient was hallucinating that he saw his son Justin. Also, speaking to someone who was not there. Wife interviewed at bedside. States he had similar presentation when patient had a UTI. Also, reports concern that patient has been depressed and withdrawn since breaking his femur 3 years ago. He does not want to go places or do anything he previously enjoyed. Wife interested in getting someone to evaluate him because it is placing a lot of care taker stress on her. Denies any thoughts of self harm or harming others.  Additionally, reports patient with bad neuropathy and constipation. Otherwise, denies fever, chills, chest pain and SOB.       In the ED, patient received Zosyn IV Tylenol x2, and IVF bolus. Patient admitted to medicine for further management.  (08 May 2025 12:12)       PAST MEDICAL & SURGICAL HISTORY:  Myocardial infarction  KAEL x1 MI 2005, stent RCA      Back pain  Chronic back pain      Spinal stenosis      Lumbar herniated disc      Hypertension      Narcotic dependence  5/28/16 for withdrawal ,pt currently on Dialudid 8 mg every 4-6 hours /day      Osteoarthritis      GERD (gastroesophageal reflux disease)      Cerebral aneurysm rupture  1997 clipped, no residual      Femur fracture, right  2/16, surgical revision of right hip      Sacroiliitis, not elsewhere classified  Unspecified Inflammatory spondylopathy,Sacral and sacrococcygeal region      Brain aneurysm  s/p clipping, not compatible with MRI      Seizures      CAD (coronary artery disease)  s/p MI and RCA stenting      Chronic pain  Patient has an Intrathecal pump s/p removal in 2016      Cerebral aneurysm  I997 with clips      S/P lumbar fusion  L1-S1:2002      History of right inguinal hernia repair  x2      Hx of appendectomy  6/1969      Hx of laminectomy  lumbar-2002      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      Stented coronary artery  KAEL x 1 to RCA      History of hip replacement  8/15, revision 2/16 after falling and fracturing right femur      S/P left knee arthroscopy      H/O arthroscopy of shoulder  right X 2, left X 1      History of throat surgery  surgical exicision cyst 1986      Fusion of sacral region of spine  5/2017      History of back surgery  x5      S/P inguinal hernia repair      H/O fracture of femur  s/p repair          Allergies: Allergies    No Known Allergies    Intolerances        Social History: Denies toxic habits including tobacco, ETOH or illicit drugs.    Family History: FAMILY HISTORY:  . No family history of strokes    Medications: MEDICATIONS  (STANDING):  aspirin enteric coated 81 milliGRAM(s) Oral daily  chlorhexidine 2% Cloths 1 Application(s) Topical daily  enoxaparin Injectable 40 milliGRAM(s) SubCutaneous every 12 hours  gabapentin 300 milliGRAM(s) Oral three times a day  levETIRAcetam 750 milliGRAM(s) Oral two times a day  piperacillin/tazobactam IVPB.. 3.375 Gram(s) IV Intermittent every 8 hours  polyethylene glycol 3350 17 Gram(s) Oral two times a day  pregabalin 150 milliGRAM(s) Oral two times a day  rosuvastatin 10 milliGRAM(s) Oral at bedtime  senna 2 Tablet(s) Oral at bedtime  tamsulosin 0.4 milliGRAM(s) Oral at bedtime  tiZANidine 2 milliGRAM(s) Oral daily    MEDICATIONS  (PRN):  acetaminophen     Tablet .. 650 milliGRAM(s) Oral every 6 hours PRN Temp greater or equal to 38C (100.4F), Mild Pain (1 - 3)  aluminum hydroxide/magnesium hydroxide/simethicone Suspension 30 milliLiter(s) Oral every 4 hours PRN Dyspepsia  baclofen 5 milliGRAM(s) Oral every 8 hours PRN Muscle Spasm  melatonin 3 milliGRAM(s) Oral at bedtime PRN Insomnia  ondansetron Injectable 4 milliGRAM(s) IV Push every 8 hours PRN Nausea and/or Vomiting      Review of Systems:  CONSTITUTIONAL:  No weight loss, fever, chills, weakness or fatigue.  HEENT:  Eyes:  No visual loss, blurred vision, double vision or yellow sclera. Ears, Nose, Throat:  No hearing loss, sneezing, congestion, runny nose or sore throat.  SKIN:  No rash or itching.  CARDIOVASCULAR:  No chest pain, chest pressure or chest discomfort. No palpitations or edema.  RESPIRATORY:  No shortness of breath, cough or sputum.  GASTROINTESTINAL:  No anorexia, nausea, vomiting or diarrhea. No abdominal pain or blood.  GENITOURINARY:  No burning on urination or incontinence   NEUROLOGICAL:  No headache, dizziness, syncope, paralysis, ataxia, numbness or tingling in the extremities. No change in bowel or bladder control. no limb weakness. no vision changes.   MUSCULOSKELETAL:  No muscle, back pain, joint pain or stiffness.  HEMATOLOGIC:  No anemia, bleeding or bruising.  LYMPHATICS:  No enlarged nodes. No history of splenectomy.  PSYCHIATRIC:  No history of depression or anxiety.  ENDOCRINOLOGIC:  No reports of sweating, cold or heat intolerance. No polyuria or polydipsia.      Vitals:  Vital Signs Last 24 Hrs  T(C): 36.7 (09 May 2025 11:40), Max: 36.7 (09 May 2025 11:40)  T(F): 98.1 (09 May 2025 11:40), Max: 98.1 (09 May 2025 11:40)  HR: 68 (09 May 2025 11:40) (64 - 68)  BP: 141/76 (09 May 2025 11:40) (134/74 - 145/69)  BP(mean): --  RR: 18 (09 May 2025 11:40) (18 - 18)  SpO2: 95% (09 May 2025 11:40) (95% - 97%)    Parameters below as of 09 May 2025 11:40  Patient On (Oxygen Delivery Method): room air        General Exam:   General Appearance: Appropriately dressed and in no acute distress       Head: Normocephalic, atraumatic and no dysmorphic features  Ear, Nose, and Throat: Moist mucous membranes  CVS: S1S2+  Resp: No SOB, no wheeze or rhonchi  GI: soft NT/ND  Extremities: No edema or cyanosis  Skin: No bruises or rashes     Neurological Exam:  Mental Status: Awake, alert and oriented x 3.  Able to follow simple and complex verbal commands. Able to name and repeat. fluent speech. No obvious aphasia or dysarthria noted.   Cranial Nerves: PERRL, EOMI, VFFC, sensation V1-V3 intact,  no obvious facial asymmetry, equal elevation of palate, scm/trap 5/5, tongue is midline on protrusion. hearing is grossly intact.   Motor: Uppers 4+/5, lowers RLE 4-/5, LLE 4/5.    Sensation: dec in LE  Reflexes: 1+ throughout at biceps, brachioradialis, triceps, 0 patellars and ankles bilaterally and equal. No clonus. R toe and L toe were both downgoing.  Coordination: No dysmetria on FNF   Gait: deferred    Data/Labs/Imaging which I personally reviewed.     Labs:     CBC Full  -  ( 09 May 2025 08:50 )  WBC Count : 12.55 K/uL  RBC Count : 5.45 M/uL  Hemoglobin : 16.6 g/dL  Hematocrit : 49.8 %  Platelet Count - Automated : 234 K/uL  Mean Cell Volume : 91.4 fL  Mean Cell Hemoglobin : 30.5 pg  Mean Cell Hemoglobin Concentration : 33.3 g/dL  Auto Neutrophil # : x  Auto Lymphocyte # : x  Auto Monocyte # : x  Auto Eosinophil # : x  Auto Basophil # : x  Auto Neutrophil % : x  Auto Lymphocyte % : x  Auto Monocyte % : x  Auto Eosinophil % : x  Auto Basophil % : x    05-09    143  |  103  |  15  ----------------------------<  119[H]  4.2   |  23  |  1.16    Ca    9.4      09 May 2025 08:50  Phos  2.8     05-09  Mg     2.10     05-09          Urinalysis Basic - ( 09 May 2025 08:50 )    Color: x / Appearance: x / SG: x / pH: x  Gluc: 119 mg/dL / Ketone: x  / Bili: x / Urobili: x   Blood: x / Protein: x / Nitrite: x   Leuk Esterase: x / RBC: x / WBC x   Sq Epi: x / Non Sq Epi: x / Bacteria: x    c< from: CT Head No Cont (05.07.25 @ 22:38) >    ACC: [id number] EXAM:  CT BRAIN   ORDERED BY: YAYA GASCA     PROCEDURE DATE:  05/07/2025          INTERPRETATION:  CLINICAL INFORMATION: Altered mental status    COMPARISON: Head CT 4/22/2024    TECHNIQUE:  Serial axial images were obtained from the skull base to the   vertex using multi-slice helical technique without intravenous contrast.   Sagittal and coronal reformats were obtained.    FINDINGS:    VENTRICLES AND SULCI: Stable prominence of the ventricles and cortical   sulci, nonspecific, likely reflects parenchymal volume loss.  INTRA-AXIAL: Right frontal encephalomalacia and gliosis with ex vacuo   dilatation of the right frontal horn. Minimal encephalomalacia of the   left anterior/medial frontal lobe. No acute intracranial hemorrhage or   mass effect. Scattered foci of white matter hypoattenuation without   associated mass effect, nonspecific, likely chronic microvascular   disease. Skull base vascular calcifications.  EXTRA-AXIAL: Suprasellar aneurysm clip with associated streak artifact.   No extra-axial mass or abnormal collection.    VISUALIZED SINUSES:  The left frontal sinus is opacified.  TYMPANOMASTOID CAVITIES:  Clear.  VISUALIZED ORBITS: Bilateral lens replacement.  CALVARIUM: Remote right pterional craniotomy. Chronic right nasal bone   deformity.    IMPRESSION:    No acute intracranial hemorrhage or mass effect.  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    --- End of Report ---          HARRIET MYLES MD; Resident Radiologist  This document has been electronically signed.  UMA WINTER MD; Attending Radiologist  This document has been electronically signed. May  8 2025 12:13AM    < end of copied text >  < from: CT Lumbar Spine No Cont (05.07.25 @ 22:38) >    ACC: [id number] EXAM:  CT LUMBAR SPINE   ORDERED BY: YAYA GASCA     PROCEDURE DATE:  05/07/2025          INTERPRETATION:  CLINICAL INFORMATION: eval fracture    COMPARISON: CT abdomen pelvis 6/1/2024, CT L-spine 11/18/2022.    CONTRAST:  IV Contrast:NONE    TECHNIQUE:  Serial axial images were obtained of the lumbar spine using   multi-slice helical technique. Reformatted coronal and sagittal images   were obtained.    FINDINGS:    POSTSURGICAL: Extensive posterior spinal fixation hardware in the   visualized thoracic spine through S1 with transpedicular screws and   posterior spinal rods T10-L2, L4-S1. Left lateral fixation screws L2-3.   Anterior screw at L3-4, L4-5, L5-S1. Additional screws transfix the right   sacroiliac joint. Intervertebral disc spacer at multiple levels in the   lower lumbar spine. Extensive streak artifact. No gross evidence of acute   hardware complication. Posterior decompression L2-L5. Right hip   arthroplasty is also seen.    VERTEBRAE: Diffuse osseous demineralization. Stable vertebral body   heights including L3 compression deformity, unchanged since CT abdomen   pelvis 6/1/2024 (304:66). Within limitations of the study, no evidence of   acute fracture.[Multilevel degenerative changes including marginal  osteophytes.  ALIGNMENT: Stable alignment. No traumatic listhesis.    DISC SPACES: Postoperative changes with intervertebral disc spacers L3-4,   L4-5, L5-S1. Mineralization across multiple disc spaces in the lower   thoracic and lumbar spine. Appearance is stable from prior. Limited   assessment of the spinal canal contents secondary to extensive streak   artifact. Discogenic and facet degenerative changes result in multilevel   osseous neural foraminal narrowing, most pronounced in the lower lumbar   spine.    MISCELLANEOUS: Moderate retained fecal material in the rectum with rectal   wall thickening and adjacent fat stranding, which may reflect stercoral   proctocolitis. Atherosclerotic changes. Diffuse atrophy of the paraspinal   musculature. Curvilinear density likely a lead is partially visualized in   the subcutaneous soft tissues of the left back at the level of the upper   lumbar spine.    IMPRESSION:  Evaluation limited due to artifact from extensive spinal hardware.    Withinthe limitations of the study, no evidence of acute fracture or   traumatic listhesis, and no evidence of acute hardware complication.      --- End of Report ---      < end of copied text >

## 2025-05-12 NOTE — DISCHARGE NOTE PROVIDER - NSDCFUADDAPPT_GEN_ALL_CORE_FT
Outpatient follow up with Neurologist Dr. Cary Tim outpatient - will follow with her on discharge, suggest outpt EMG. Please call to arrange an appointment.     Repeat labs on Monday at rehab, CBC/BMP per Dr. Urban.  Outpatient follow up with Neurologist Dr. Cary Tim outpatient - will follow with her on discharge, suggest outpt EMG. Please call to arrange an appointment.     Repeat labs in 1 week  at rehab, CBC/BMP per Dr. Urban.

## 2025-05-12 NOTE — DISCHARGE NOTE PROVIDER - CARE PROVIDERS DIRECT ADDRESSES
,DirectAddress_Unknown ,DirectAddress_Unknown,DirectAddress_Unknown,[email or web address]

## 2025-05-12 NOTE — CONSULT NOTE ADULT - ASSESSMENT
Patient seen and evaluated   - toenails debrided x10 using sterile nippers  - all offending ingrowing nail boarders excised   - patient educated on proper foot care and importance of regular examinations   - recommend z flow boots at all times while in bed  - recommend betadine to left 2nd toe and leave open to air  - follow up as outpatient for routine care  - podiatry signing off at this time, reconsult as needed
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, high cholesterol, presents from home with wife at bedside chief complaint of "feeling off and disoriented"   CTH with chronic R frontoparietal crani with suprasellar aneursym clip, gliosis,   CT Lspine with possible stercoral proctocolitis, atrophy paraspinal musculture, limited from spinal hardware  o/e AAox3, no hallucinations at this time  utox neg  tsh 4.99    Encephalopathy - mild leukocytosis - bcx and ucx neg but on Zosyn  Seizures - on Keppra  Prior aneurysm 1997 s/p clipping  Lumbar SS with neuropathy    - pt statse he does not get seizures. Has been on keppra for years. Unlikely to be etiology of hallucinations at this time  - on gabapentin 300mg TID, Lyrica 150mg BID - both work on JAKOB receptors, would consider titrating off gabapentin (pt thinks lyrica works better) and adding duloxetine 30mg which may also help with depression. Would consider doing it as outpatient but adding duloxetine here if no CI  - also on tizanidine and baclofen PRN  - consider routine eeg given hallucinations  - check FS, glucose control <180  - GI/DVT ppx   - Thank you for allowing me to participate in the care of this patient. Call with questions.   - sees Dr. Cary Tim outpatient - will follow with her on discharge    Cherrie Gan DO  Vascular Neurology  Office [phone]  Available via [employer] Teams

## 2025-05-12 NOTE — BH CONSULTATION LIAISON PROGRESS NOTE - NSBHCHARTREVIEWVS_PSY_A_CORE FT
Vital Signs Last 24 Hrs  T(C): 36.3 (12 May 2025 11:48), Max: 36.4 (11 May 2025 21:05)  T(F): 97.4 (12 May 2025 11:48), Max: 97.5 (11 May 2025 21:05)  HR: 65 (12 May 2025 11:48) (61 - 72)  BP: 143/79 (12 May 2025 11:48) (131/72 - 156/74)  BP(mean): --  RR: 18 (12 May 2025 11:48) (17 - 18)  SpO2: 95% (12 May 2025 11:48) (94% - 95%)    Parameters below as of 12 May 2025 11:48  Patient On (Oxygen Delivery Method): room air

## 2025-05-12 NOTE — DISCHARGE NOTE PROVIDER - CARE PROVIDER_API CALL
[facility],   [address] [place] [address], [facility], First Floor, [place] [id number] [phone]  Phone: (   )    -  Fax: (   )    -  Follow Up Time:    Emma Tim  Neurology  [address] [facility], [address]  [place] [address]  Phone: [phone]  Fax: [phone]  Follow Up Time:     [facility],   [address], [facility], First Floor, [place] [address] [phone]  Phone: (   )    -  Fax: (   )    -  Follow Up Time:     Alexandra Blanco  Internal Medicine  [address] [place] [address]  Phone: [phone]  Fax: [phone]  Follow Up Time:

## 2025-05-12 NOTE — EEG REPORT - NS EEG TEXT BOX
MANUEL PETERSON [id number]     Study Date: 05-12-25  Duration: 24 min  --------------------------------------------------------------------------------------------------  History:  CC/ HPI Patient is a 69y old  Male who presents with a chief complaint of "feeling off and disoriented" (12 May 2025 15:38)    MEDICATIONS  (STANDING):  aspirin enteric coated 81 milliGRAM(s) Oral daily  chlorhexidine 2% Cloths 1 Application(s) Topical daily  enoxaparin Injectable 40 milliGRAM(s) SubCutaneous every 12 hours  gabapentin 300 milliGRAM(s) Oral two times a day  levETIRAcetam 750 milliGRAM(s) Oral two times a day  polyethylene glycol 3350 17 Gram(s) Oral two times a day  pregabalin 150 milliGRAM(s) Oral two times a day  rosuvastatin 10 milliGRAM(s) Oral at bedtime  senna 2 Tablet(s) Oral at bedtime  tamsulosin 0.4 milliGRAM(s) Oral at bedtime  tiZANidine 2 milliGRAM(s) Oral daily    --------------------------------------------------------------------------------------------------  Study Interpretation:    [[[Abbreviation Key:  PDR=alpha rhythm/posterior dominant rhythm. A-P=anterior posterior.  Amplitude: ‘very low’:<20; ‘low’:20-49; ‘medium’:; ‘high’:>150uV.  Persistence for periodic/rhythmic patterns (% of epoch) ‘rare’:<1%; ‘occasional’:1-10%; ‘frequent’:10-50%; ‘abundant’:50-90%; ‘continuous’:>90%.  Persistence for sporadic discharges: ‘rare’:<1/hr; ‘occasional’:1/min-1/hr; ‘frequent’:>1/min; ‘abundant’:>1/10 sec.  RPP=rhythmic and periodic patterns; GRDA=generalized rhythmic delta activity; FIRDA=frontal intermittent GRDA; LRDA=lateralized rhythmic delta activity; TIRDA=temporal intermittent rhythmic delta activity;  LPD=PLED=lateralized periodic discharges; GPD=generalized periodic discharges; BIPDs =bilateral independent periodic discharges; Mf=multifocal; SIRPDs=stimulus induced rhythmic, periodic, or ictal appearing discharges; BIRDs=brief potentially ictal rhythmic discharges >4 Hz, lasting .5-10s; PFA (paroxysmal bursts >13 Hz or =8 Hz <10s).  Modifiers: +F=with fast component; +S=with spike component; +R=with rhythmic component.  S-B=burst suppression pattern.  Max=maximal. N1-drowsy; N2-stage II sleep; N3-slow wave sleep. SSS/BETS=small sharp spikes/benign epileptiform transients of sleep. HV=hyperventilation; PS=photic stimulation]]]    Daily EEG Visual Analysis    FINDINGS:      Background:  Continuity: Continuous  Symmetry: Asymmetric  Posterior dominant rhythm (PDR): 8.5 Hz, reactive to eye closure. Symmetric low-amplitude frontal beta in wakefulness.  Voltage: Normal  Anterior-Posterior Gradient: Present  Other background findings: None  Breach: Right frontal breach effect characterized by higher amplitudes    Background Slowing:  Generalized slowing: None  Focal slowing: Continuous right frontal focal polymorphic delta slowing    State Changes:   Drowsiness is characterized by slowing of the background activity.  Stage 2 sleep is characterized by K complexes and sleep spindles that are often of higher amplitude on the right.    Interictal Findings:  None    Electrographic and Electroclinical seizures:  None    Other Clinical Events:  None    Activation Procedures:   Hyperventilation is not performed.    Photic stimulation is performed and does not elicit any abnormalities.      Artifacts:  Intermittent myogenic and movement artifacts are present.    Single-lead EKG: Regular rhythm    EEG Classification / Summary:  Abnormal EEG in the awake, drowsy, and asleep states.   -Continuous right frontal focal slowing  -Right frontal breach effect  -No epileptiform abnormalities or seizures captured.     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  -No epileptiform abnormalities or seizures.          -------------------------------------------------------------------------------------------------------    Parul Flores MD  Attending Physician, [facility] [place]    -------------------------------------------------------------------------------------------------------    To reach EEG technologist:  Please use the pager number for the appropriate hospital or contact the .  At [facility] - Pager #: [phone]    To reach EEG-reading physician:  EEG Reading Room Phone #: [phone]  Epilepsy Answering Service after 5PM and before 8:30AM: Phone #: [phone]     MANUEL PETERSON [id number]     Study Date: 05-12-25  Duration: 24 min  --------------------------------------------------------------------------------------------------  History:  CC/ HPI Patient is a 69y old  Male who presents with a chief complaint of "feeling off and disoriented" (12 May 2025 15:38)    MEDICATIONS  (STANDING):  aspirin enteric coated 81 milliGRAM(s) Oral daily  chlorhexidine 2% Cloths 1 Application(s) Topical daily  enoxaparin Injectable 40 milliGRAM(s) SubCutaneous every 12 hours  gabapentin 300 milliGRAM(s) Oral two times a day  levETIRAcetam 750 milliGRAM(s) Oral two times a day  polyethylene glycol 3350 17 Gram(s) Oral two times a day  pregabalin 150 milliGRAM(s) Oral two times a day  rosuvastatin 10 milliGRAM(s) Oral at bedtime  senna 2 Tablet(s) Oral at bedtime  tamsulosin 0.4 milliGRAM(s) Oral at bedtime  tiZANidine 2 milliGRAM(s) Oral daily    --------------------------------------------------------------------------------------------------  Study Interpretation:    [[[Abbreviation Key:  PDR=alpha rhythm/posterior dominant rhythm. A-P=anterior posterior.  Amplitude: ‘very low’:<20; ‘low’:20-49; ‘medium’:; ‘high’:>150uV.  Persistence for periodic/rhythmic patterns (% of epoch) ‘rare’:<1%; ‘occasional’:1-10%; ‘frequent’:10-50%; ‘abundant’:50-90%; ‘continuous’:>90%.  Persistence for sporadic discharges: ‘rare’:<1/hr; ‘occasional’:1/min-1/hr; ‘frequent’:>1/min; ‘abundant’:>1/10 sec.  RPP=rhythmic and periodic patterns; GRDA=generalized rhythmic delta activity; FIRDA=frontal intermittent GRDA; LRDA=lateralized rhythmic delta activity; TIRDA=temporal intermittent rhythmic delta activity;  LPD=PLED=lateralized periodic discharges; GPD=generalized periodic discharges; BIPDs =bilateral independent periodic discharges; Mf=multifocal; SIRPDs=stimulus induced rhythmic, periodic, or ictal appearing discharges; BIRDs=brief potentially ictal rhythmic discharges >4 Hz, lasting .5-10s; PFA (paroxysmal bursts >13 Hz or =8 Hz <10s).  Modifiers: +F=with fast component; +S=with spike component; +R=with rhythmic component.  S-B=burst suppression pattern.  Max=maximal. N1-drowsy; N2-stage II sleep; N3-slow wave sleep. SSS/BETS=small sharp spikes/benign epileptiform transients of sleep. HV=hyperventilation; PS=photic stimulation]]]    Daily EEG Visual Analysis    FINDINGS:      Background:  Continuity: Continuous  Symmetry: Asymmetric  Posterior dominant rhythm (PDR): 8.5 Hz, reactive to eye closure. Symmetric low-amplitude frontal beta in wakefulness.  Voltage: Normal  Anterior-Posterior Gradient: Present  Other background findings: None  Breach: Right frontal breach effect characterized by higher amplitudes    Background Slowing:  Generalized slowing: None  Focal slowing: Continuous right frontal focal polymorphic delta slowing    State Changes:   Drowsiness is characterized by slowing of the background activity.  Stage 2 sleep is characterized by K complexes and sleep spindles that are often of higher amplitude on the right.    Interictal Findings:  None    Electrographic and Electroclinical seizures:  None    Other Clinical Events:  None    Activation Procedures:   Hyperventilation is not performed.    Photic stimulation is performed and does not elicit any abnormalities.      Artifacts:  Intermittent myogenic and movement artifacts are present.    Single-lead EKG: Regular rhythm at 50-70 bpm    EEG Classification / Summary:  Abnormal EEG in the awake, drowsy, and asleep states.   -Continuous right frontal focal slowing  -Right frontal breach effect  -No epileptiform abnormalities or seizures captured.     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  -No epileptiform abnormalities or seizures.          -------------------------------------------------------------------------------------------------------    Parul Flores MD  Attending Physician, [facility] [place]    -------------------------------------------------------------------------------------------------------    To reach EEG technologist:  Please use the pager number for the appropriate hospital or contact the .  At [facility] - Pager #: [phone]    To reach EEG-reading physician:  EEG Reading Room Phone #: [phone]  Epilepsy Answering Service after 5PM and before 8:30AM: Phone #: [phone]

## 2025-05-12 NOTE — BH CONSULTATION LIAISON PROGRESS NOTE - NSBHASSESSMENTFT_PSY_ALL_CORE
70 yo M retired, domiciled in private residence with wife, with no formal PPHx and PMH significant for CAD with stent, seizure on Keppra, hypertension, COPD , high cholesterol, chronic back pain s/p multiple spine surgeries, presents from home for concerns of "feeling off and disoriented" with wife reporting visual hallucinations, admitted for encephalopathy with mild leukocytosis on zosyn. Psychiatry consulted for concerns of depression.     Patient reporting low mood, a degree of anhedonia, low motivation, poor concentration, and poor appetite in context of medical stressors, impaired ability to ambulate after fracturing femur about 3 years ago. Reflects on difficulty with being in bed, although making efforts to participate in physical therapy, ambulate with walker. Patient admits he often gets disoriented and confused when dealing with infections, visual hallucination on admission likely in context of delirium. Denies any current AVH, no delusions elicited on exam. Patient expresses interest in starting antidepressant medication, discussued risks/benefits of SSRIs and SNRIs, patient reports he would like to try duloxetine which was also recommended by neurology team. Denied any SI/HI and AVH. Denied any acute safety concerns.     5/12: Patient reporting overall stable mood today, although continues to feel intermittently down. Denies any SI/HI and AVH. Tolerating cymbalta well and remains interested in outpatient psychiatric follow up.     Recommendations  - routine observation  - c/w duloxetine 20 mg daily for mood   - Melatonin 3mg PO qHS PRN insomnia.  - PRN: Seroquel 25mg PO q6h PRN agitation; zyprexa 1.25 mg IM q6h PRN severe agitation.  Monitor for QTc<500.    - hold antipsychotics if QTC >500 ms   - Minimize use of benzos, opioids, anticholinergics, or other deliriogenic agents when possible.  Maintain sleep wake cycle.  Provide frequent reorientation and redirection.  Family member at bedside if possible. Assess for need for glasses and hearing aid (if applicable).  - family requesting referral to geriatric psychiatry clinic at [facility]  - Dispo: No indications for inpatient psychiatry admission at this time. No psychiatric contraindications to discharge. Referral to [facility] made, pending appointment.

## 2025-05-12 NOTE — BH CONSULTATION LIAISON PROGRESS NOTE - CURRENT MEDICATION
MEDICATIONS  (STANDING):  aspirin enteric coated 81 milliGRAM(s) Oral daily  chlorhexidine 2% Cloths 1 Application(s) Topical daily  enoxaparin Injectable 40 milliGRAM(s) SubCutaneous every 12 hours  gabapentin 300 milliGRAM(s) Oral two times a day  levETIRAcetam 750 milliGRAM(s) Oral two times a day  polyethylene glycol 3350 17 Gram(s) Oral two times a day  pregabalin 150 milliGRAM(s) Oral two times a day  rosuvastatin 10 milliGRAM(s) Oral at bedtime  senna 2 Tablet(s) Oral at bedtime  tamsulosin 0.4 milliGRAM(s) Oral at bedtime  tiZANidine 2 milliGRAM(s) Oral daily    MEDICATIONS  (PRN):  acetaminophen     Tablet .. 650 milliGRAM(s) Oral every 6 hours PRN Temp greater or equal to 38C (100.4F), Mild Pain (1 - 3)  aluminum hydroxide/magnesium hydroxide/simethicone Suspension 30 milliLiter(s) Oral every 4 hours PRN Dyspepsia  artificial tears (preservative free) Ophthalmic Solution 1 Drop(s) Both EYES three times a day PRN Dry Eyes  baclofen 5 milliGRAM(s) Oral every 8 hours PRN Muscle Spasm  melatonin 3 milliGRAM(s) Oral at bedtime PRN Insomnia  OLANZapine Injectable 1.25 milliGRAM(s) IntraMuscular every 6 hours PRN agitation  ondansetron Injectable 4 milliGRAM(s) IV Push every 8 hours PRN Nausea and/or Vomiting  QUEtiapine 25 milliGRAM(s) Oral every 6 hours PRN agitation   numerical 0-10

## 2025-05-12 NOTE — DISCHARGE NOTE PROVIDER - PROVIDER TOKENS
FREE:[LAST:[[facility]],PHONE:[(   )    -],FAX:[(   )    -],ADDRESS:[[facility] [place] [address], [facility], [facility], [place] [address]  [phone]]] PROVIDER:[TOKEN:[[id number]]],FREE:[LAST:[[facility]],PHONE:[(   )    -],FAX:[(   )    -],ADDRESS:[[address] [place] [address], [address] Floor, [place] [address]  [phone]]],PROVIDER:[TOKEN:[[id number]]]

## 2025-05-12 NOTE — DISCHARGE NOTE PROVIDER - NSDCCPCAREPLAN_GEN_ALL_CORE_FT
PRINCIPAL DISCHARGE DIAGNOSIS  Diagnosis: Altered mental status  Assessment and Plan of Treatment:       SECONDARY DISCHARGE DIAGNOSES  Diagnosis: Delirium due to general medical condition  Assessment and Plan of Treatment: [facility]  [address] [place] [address], [place] [address]  [phone]    Diagnosis: CAD (coronary artery disease)  Assessment and Plan of Treatment: s/p MI and RCA stenting    Diagnosis: Seizures  Assessment and Plan of Treatment:      PRINCIPAL DISCHARGE DIAGNOSIS  Diagnosis: Altered mental status  Assessment and Plan of Treatment: Resolved. Follow up with your primary care physician for further monitoring in 1-2 weeks. Please call to arrange appointment.      SECONDARY DISCHARGE DIAGNOSES  Diagnosis: Seizures  Assessment and Plan of Treatment: Continue Keppra. Follow up with your Dr. Cary Tim suggest outpt EMG.  Follow up with your primary care physician for further monitoring in 1-2 weeks. Please call to arrange appointment.    Diagnosis: BPH (benign prostatic hyperplasia)  Assessment and Plan of Treatment: Continue Tamsulosin. Follow up with your primary care physician for further monitoring in 1-2 weeks. Please call to arrange appointment.    Diagnosis: CAD (coronary artery disease)  Assessment and Plan of Treatment: s/p MI and RCA stenting. Follow up with your primary care physician for further monitoring in 1-2 weeks. Please call to arrange appointment.    Diagnosis: Delirium due to general medical condition  Assessment and Plan of Treatment: [facility]  [address] [place] [address], [address] Floor, [place] [id number]. [phone]     PRINCIPAL DISCHARGE DIAGNOSIS  Diagnosis: Altered mental status  Assessment and Plan of Treatment: Resolved. Follow up with your primary care physician for further monitoring in 1-2 weeks. Please call to arrange appointment.      SECONDARY DISCHARGE DIAGNOSES  Diagnosis: Seizures  Assessment and Plan of Treatment: Continue Keppra. Follow up with your neurologist Dr. Cary Tim suggest outpt EMG within 1-2 weeks. Follow up with your primary care physician for further monitoring in 1-2 weeks. Please call to arrange appointment.    Diagnosis: BPH (benign prostatic hyperplasia)  Assessment and Plan of Treatment: Continue Tamsulosin. Follow up with your primary care physician for further monitoring in 1-2 weeks. Please call to arrange appointment.    Diagnosis: CAD (coronary artery disease)  Assessment and Plan of Treatment: s/p MI and RCA stenting. Follow up with your Cardiologist for further monitoring in 2 weeks. Please call to arrange appointment.    Diagnosis: Spinal stenosis, lumbar  Assessment and Plan of Treatment: Your medications were adjusted. Stop gabapentin. Continue Lyrica.  Follow up with your neurologist Dr. Cary Tim suggest outpt EMG within 1-2 weeks.  Follow up with your primary care physician for further monitoring in 1-2 weeks. Please call to arrange appointment.    Diagnosis: Delirium due to general medical condition  Assessment and Plan of Treatment: [facility]  [address] [facility], [facility], [place] [address]. [phone]     PRINCIPAL DISCHARGE DIAGNOSIS  Diagnosis: Altered mental status  Assessment and Plan of Treatment: Resolved. Follow up with your primary care physician for further monitoring in 1-2 weeks. Please call to arrange appointment.      SECONDARY DISCHARGE DIAGNOSES  Diagnosis: Seizures  Assessment and Plan of Treatment: Continue Keppra. Follow up with your neurologist Dr. Cary Tim suggest outpt EMG within 1-2 weeks. Follow up with your primary care physician for further monitoring in 1-2 weeks. Please call to arrange appointment.    Diagnosis: CAD (coronary artery disease)  Assessment and Plan of Treatment: s/p MI and RCA stenting. Follow up with your Cardiologist for further monitoring in 2 weeks. Please call to arrange appointment.    Diagnosis: Delirium due to general medical condition  Assessment and Plan of Treatment: [facility]  [address] [place] [address], First [facility], [place] [address]. [phone]    Diagnosis: BPH (benign prostatic hyperplasia)  Assessment and Plan of Treatment: Continue Tamsulosin. Follow up with your primary care physician for further monitoring in 1-2 weeks. Please call to arrange appointment.  Can try a trial of Void while at Rehab in 2-3 days    Diagnosis: Spinal stenosis, lumbar  Assessment and Plan of Treatment: Your medications were adjusted. Stop gabapentin. Continue Lyrica.  Follow up with your neurologist Dr. Cary Tim suggest outpt EMG within 1-2 weeks.  Follow up with your primary care physician for further monitoring in 1-2 weeks. Please call to arrange appointment.    Diagnosis: Bacterial UTI  Assessment and Plan of Treatment: Please Complete 7 Days of Ceftin, Follow up with PMD for Repeat CBC in 1 week

## 2025-05-12 NOTE — DISCHARGE NOTE PROVIDER - HOSPITAL COURSE
69-year-old male history of CAD with stent, seizure on Keppra, hypertension, COPD , high cholesterol, presents from home with wife at bedside w/ Acute encephalopathy.   Infectious work up with negative UA, utox, and Within the limitations of the study, no evidence of acute fracture or traumatic listhesis, and no evidence of acute hardware complication. Utox unremarkable. No electrolyte distrubances. CT head ruled out stroke.   - s/p one dose zosyn, will continue for now pending infectious work up , negative to date   blood cultures and urine culture NTD  - Psychiatry consulted given concern for depressive symptoms and hallucinations.  - Patient currently Aox 3 now, continue to monitor mental status  - TOV.    Seizures - On AED w/ Keppra at home, resume   - On Baclofen   - CT head negative  - Fall, Seizure, Aspiration precautions   - PT/ OT  - Neuro called.     Problem/Plan - 3:  ·  Problem: CAD (coronary artery disease).   ·  Plan: - S/P Stent  - ASA and Statin therapy   - BP control.     Problem/Plan - 4:  ·  Problem: High cholesterol.   ·  Plan: - continue  Statin therapy - rosuvastatin 10 mg oral tablet: 1 tab(s) orally once a day (at bedtime).     Problem/Plan - 5:  ·  Problem: Hypertension.   ·  Plan: - On Toprol XL -> switched to to metoprolol tartrate 12.5mg BID   - BP control. Monitor and adjust BP as tolerated.     Problem/Plan - 6:  ·  Problem: BPH (benign prostatic hyperplasia).   ·  Plan: - continue tamsulosin 0.4 mg oral capsule: 1 cap(s) orally once a day in the morning.     Problem/Plan - 7:  ·  Problem: Spinal stenosis, lumbar.   ·  Plan: Patient reports worsening neuropathy. However, does not appearing to be taking medicine as prescribed  - Continue home medications   - baclofen 20 mg oral tablet: 1 tab(s) orally 5 times a day as needed for Muscle Spasm   gabapentin 300 mg oral capsule: 1 cap(s) orally once a day (RX 1 cap(s) orally 3x a day)   pregabalin 150 mg oral capsule: 1 cap(s) orally once a day (RX for 1 capsule orally twice daily) iSTOP #[id number], last filled 4/4/25 for 90 days   tiZANidine 2 mg oral capsule: 1 cap(s) orally once a day (in the morning)  podiatry eval called.     Problem/Plan - 8:  ·  Problem: COPD with hypoxia.   ·  Plan: - Pt denies  is on O2 at home. However, prior documentation reports ot  Uses 2-3 L NC at baseline per patient.   - On room air   - CXR w/ Clear lungs.   69-year-old male history of CAD with stent, seizure on Keppra, hypertension, COPD , high cholesterol, presents from home with wife at bedside w/ Acute encephalopathy.   Infectious work up with negative UA, utox, and Within the limitations of the study, no evidence of acute fracture or traumatic listhesis, and no evidence of acute hardware complication. Utox unremarkable. No electrolyte distrubances. CT head ruled out stroke.   - s/p one dose zosyn, will continue for now pending infectious work up , negative to date   blood cultures and urine culture NTD  - Psychiatry consulted given concern for depressive symptoms and hallucinations.  - Patient currently Aox 3 now, continue to monitor mental status  - TOV.    Seizures - On AED w/ Keppra at home, resume   - On Baclofen   - CT head negative  - Neuro called.    CAD  - S/P Stent, c/w ASA and Statin therapy     High cholesterol- continue  Statin therapy - rosuvastatin 10 mg oral tablet: 1 tab(s) orally once a day (at bedtime).    Hypertension- On Toprol XL -> switched to to metoprolol tartrate 12.5mg BID     BPH- continue tamsulosin 0.4 mg oral capsule: 1 cap(s) orally once a day in the morning.    Spinal stenosis, lumbar- Patient reports worsening neuropathy. However, does not appearing to be taking medicine as prescribed  - Continue home medications   - baclofen 20 mg oral tablet: 1 tab(s) orally 5 times a day as needed for Muscle Spasm   gabapentin 300 mg oral capsule: 1 cap(s) orally once a day (RX 1 cap(s) orally 3x a day)   pregabalin 150 mg oral capsule: 1 cap(s) orally once a day (RX for 1 capsule orally twice daily) iSTOP #[id number], last filled 4/4/25 for 90 days   tiZANidine 2 mg oral capsule: 1 cap(s) orally once a day (in the morning)  Pt has been evaluated by neuro: Since pt is on gabapentin 300mg TID, Lyrica 150mg BID - both work on JAKOB receptors, would consider titrating off gabapentin (pt thinks lyrica works better) and adding duloxetine 30mg which may also help with depression.  gabapentin decreased to 300mg BID and duloxetine  increased to 30mg daily     COPD with hypoxia- Pt denies  is on O2 at home. However, prior documentation reports ot  Uses 2-3 L NC at baseline per patient.   - On room air   - CXR w/ Clear lungs.   5yo M with PMHx CAD with stent, seizure on Keppra, hypertension, COPD , high cholesterol, presents from home with wife at bedside chief complaint of "feeling off and disoriented"     Acute encephalopathy.   Unclear etiology of behavioral change. Wife reports patient is acting off with visual hallucinations with infectious work up with negative UA, utox. CT head ruled out stroke. BCx negative.   - s/p course of empiric Zosyn.   - Psychiatry consulted given concern for depressive symptoms and hallucinations.  - Patient currently A  x O x 3 now, continue to monitor mental status  - Neuro following: hx of seizures on Keppra, unlikely to be etiology of hallucinations at this timeprior aneurysm 1997 s/p clipping, Lumbar SS with neuropathy, RUE weakness - suspect radial nerve palsy  - on 150mg BID - off gabapentin, c/w duloxetine. Also on tizanidine and baclofen PRN. 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 No epileptiform abnormalities or seizures.    Seizures: On AED w/ Keppra at home, resume. On Baclofen. CT head negative. Fall, Seizure, Aspiration precautions. PT/ OT. Neuro appreciated.    CAD (coronary artery disease): S/P Stent. ASA and Statin therapy. BP control.    High cholesterol: Continue statin therapy with rosuvastatin    Hypertension: On Toprol XL -> switched to to metoprolol tartrate 12.5mg BID. BP control. Monitor and adjust BP as tolerated.    BPH (benign prostatic hyperplasia): Continue tamsulosin.    Urinary Retention: +Failed TOV, serrano replaced 5/15.    Spinal stenosis, lumbar:  Patient reports worsening neuropathy. However, does not appearing to be taking medicine as prescribed. Continue home medications   - baclofen 20 mg oral tablet: 1 tab(s) orally 5 times a day as needed for Muscle Spasm. Pregabalin 150 mg oral capsule: 1 cap(s) orally once a day (RX for 1 capsule orally twice daily) iSTOP #[id number], last filled 4/4/25 for 90 days. TiZANidine 2 mg oral capsule: 1 cap(s) orally once a day (in the morning). Gabapentin discontinued per Neurology, c/w Lyrica, d/w Dr. Urban who agrees.     Podiatry evaluated for foot/toe nail care.     COPD with hypoxia:  Pt denies home O2,  O2 sat stable on room air. CXR w/ Clear lungs.    Case discussed with Dr. Urban, labs/vitals/medications reviewed, Pt medically cleared for discharge to rehab with outpt follow up as directed.            5yo M with PMHx CAD with stent, seizure on Keppra, hypertension, COPD , high cholesterol, presents from home with wife at bedside chief complaint of "feeling off and disoriented"    Acute encephalopathy.   Unclear etiology of behavioral change. Wife reports patient is acting off with visual hallucinations with infectious work up with negative UA, utox. CT head ruled out stroke. BCx negative.   - s/p course of empiric Zosyn.   - Psychiatry consulted given concern for depressive symptoms and hallucinations.  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 Also on tizanidine and baclofen PRN. 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 No epileptiform abnormalities or seizures.    Seizures: On AED w/ Keppra at home, resume. On Baclofen. CT head negative. Fall, Seizure, Aspiration precautions. PT/ OT. Neuro appreciated.    CAD (coronary artery disease): S/P Stent. ASA and Statin therapy. BP control.    High cholesterol: Continue statin therapy with rosuvastatin    Hypertension: On Toprol XL -> switched to to metoprolol tartrate 12.5mg BID. BP control. Monitor and adjust BP as tolerated.    BPH (benign prostatic hyperplasia): Continue tamsulosin.    Urinary Retention: +Failed TOV, serrano replaced 5/15.    Spinal stenosis, lumbar:  Patient reports worsening neuropathy. However, does not appearing to be taking medicine as prescribed. Continue home medications   - Baclofen 20 mg oral tablet: 1 tab(s) orally 5 times a day as needed for Muscle Spasm. Pregabalin 150 mg oral capsule: 1 cap(s) orally once a day (RX for 1 capsule orally twice daily) iSTOP #[id number], last filled 4/4/25 for 90 days. TiZANidine 2 mg oral capsule: 1 cap(s) orally once a day (in the morning). Gabapentin discontinued per Neurology, c/w Lyrica, d/w Dr. Urban who agrees.     Podiatry evaluated for foot/toe nail care.     COPD with hypoxia:  Pt denies home O2,  O2 sat stable on room air. CXR w/ Clear lungs.    Case discussed with Dr. Urban, labs/vitals/medications reviewed, leukocytosis noted, repeat labs on Monday at rehab, Pt medically cleared for discharge to rehab with outpt follow up as directed.            5yo M with PMHx CAD with stent, seizure on Keppra, hypertension, COPD , high cholesterol, presents from home with wife at bedside chief complaint of "feeling off and disoriented"    Acute encephalopathy.   Unclear etiology of behavioral change. Wife reports patient is acting off with visual hallucinations with infectious work up with negative UA, utox. CT head ruled out stroke. BCx negative.   - s/p course of empiric Zosyn.   - Psychiatry consulted given concern for depressive symptoms and hallucinations.  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 Also on tizanidine and baclofen PRN. 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 No epileptiform abnormalities or seizures.    Seizures: On AED w/ Keppra at home, resume. On Baclofen. CT head negative. Fall, Seizure, Aspiration precautions. PT/ OT. Neuro appreciated.    CAD (coronary artery disease): S/P Stent. ASA and Statin therapy. BP control.    High cholesterol: Continue statin therapy with rosuvastatin    Hypertension: On Toprol XL -> switched to to metoprolol tartrate 12.5mg BID. BP control. Monitor and adjust BP as tolerated.    BPH (benign prostatic hyperplasia): Continue tamsulosin.    Urinary Retention: +Failed TOV, serrano replaced 5/15.    Spinal stenosis, lumbar:  Patient reports worsening neuropathy. However, does not appearing to be taking medicine as prescribed. Continue home medications   - Baclofen 20 mg oral tablet: 1 tab(s) orally 5 times a day as needed for Muscle Spasm. Pregabalin 150 mg oral capsule: 1 cap(s) orally once a day (RX for 1 capsule orally twice daily) iSTOP #[id number], last filled 4/4/25 for 90 days. TiZANidine 2 mg oral capsule: 1 cap(s) orally once a day (in the morning). Gabapentin discontinued per Neurology, c/w Lyrica, d/w Dr. Urban who agrees.     Podiatry evaluated for foot/toe nail care.     COPD with hypoxia:  Pt denies home O2,  O2 sat stable on room air. CXR w/ Clear lungs.    Case discussed with Dr. Urban, labs/vitals/medications reviewed, leukocytosis noted, plan to DC on 7 Days of Ceftin  Pt medically cleared for discharge to rehab with outpt follow up as directed.

## 2025-05-12 NOTE — DISCHARGE NOTE PROVIDER - NSDCMRMEDTOKEN_GEN_ALL_CORE_FT
acetaminophen 325 mg oral tablet: 2 tab(s) orally every 6 hours As needed Mild Pain (1 - 3)  aspirin 81 mg oral delayed release tablet: 1 tab(s) orally once a day  baclofen 20 mg oral tablet: 1 tab(s) orally 5 times a day as needed for Muscle Spasm  gabapentin 300 mg oral capsule: 1 cap(s) orally once a day (RX 1 cap(s) orally 3x a day)  levETIRAcetam 750 mg oral tablet, extended release: 1 tab(s) orally once a day (at bedtime)  metoprolol succinate 25 mg oral tablet, extended release: 0.5 tab(s) orally once a day (at bedtime)  pregabalin 150 mg oral capsule: 1 cap(s) orally once a day (RX for 1 capsule orally twice daily) iSTOP #[id number], last filled 4/4/25 for 90 days  rosuvastatin 10 mg oral tablet: 1 tab(s) orally once a day (at bedtime)  tamsulosin 0.4 mg oral capsule: 1 cap(s) orally once a day in the morning  tiZANidine 2 mg oral capsule: 1 cap(s) orally once a day (in the morning)   acetaminophen 325 mg oral tablet: 2 tab(s) orally every 6 hours As needed Mild Pain (1 - 3)  amLODIPine 5 mg oral tablet: 1 tab(s) orally once a day  aspirin 81 mg oral delayed release tablet: 1 tab(s) orally once a day  baclofen 5 mg oral tablet: 1 tab(s) orally every 8 hours As needed Muscle Spasm  DULoxetine 30 mg oral delayed release capsule: 1 cap(s) orally once a day  levETIRAcetam 750 mg oral tablet: 1 tab(s) orally 2 times a day  ocular lubricant preservative-free ophthalmic solution: 1 drop(s) to each affected eye 3 times a day As needed Dry Eyes  polyethylene glycol 3350 oral powder for reconstitution: 17 gram(s) orally 2 times a day  pregabalin 150 mg oral capsule: 1 cap(s) orally 2 times a day  rosuvastatin 10 mg oral tablet: 1 tab(s) orally once a day (at bedtime)  senna leaf extract oral tablet: 2 tab(s) orally once a day (at bedtime)  tamsulosin 0.4 mg oral capsule: 2 cap(s) orally once a day (at bedtime)  tiZANidine 2 mg oral capsule: 1 cap(s) orally once a day (in the morning)   acetaminophen 325 mg oral tablet: 2 tab(s) orally every 6 hours As needed Mild Pain (1 - 3)  amLODIPine 5 mg oral tablet: 1 tab(s) orally once a day  aspirin 81 mg oral delayed release tablet: 1 tab(s) orally once a day  baclofen 5 mg oral tablet: 1 tab(s) orally every 8 hours As needed Muscle Spasm  cefuroxime 500 mg oral tablet: 1 tab(s) orally every 12 hours STOP after 7 Days  DULoxetine 30 mg oral delayed release capsule: 1 cap(s) orally once a day  levETIRAcetam 750 mg oral tablet: 1 tab(s) orally 2 times a day  ocular lubricant preservative-free ophthalmic solution: 1 drop(s) to each affected eye 3 times a day As needed Dry Eyes  polyethylene glycol 3350 oral powder for reconstitution: 17 gram(s) orally 2 times a day  pregabalin 150 mg oral capsule: 1 cap(s) orally 2 times a day  rosuvastatin 10 mg oral tablet: 1 tab(s) orally once a day (at bedtime)  senna leaf extract oral tablet: 2 tab(s) orally once a day (at bedtime)  tamsulosin 0.4 mg oral capsule: 2 cap(s) orally once a day (at bedtime)  tiZANidine 2 mg oral capsule: 1 cap(s) orally once a day (in the morning)   acetaminophen 325 mg oral tablet: 2 tab(s) orally every 6 hours As needed Mild Pain (1 - 3)  amLODIPine 5 mg oral tablet: 1 tab(s) orally once a day  aspirin 81 mg oral delayed release tablet: 1 tab(s) orally once a day  baclofen 5 mg oral tablet: 1 tab(s) orally every 8 hours As needed Muscle Spasm  cefuroxime 250 mg oral tablet: 1 tab(s) orally every 12 hours stop after 7 days  DULoxetine 30 mg oral delayed release capsule: 1 cap(s) orally once a day  levETIRAcetam 750 mg oral tablet: 1 tab(s) orally 2 times a day  ocular lubricant preservative-free ophthalmic solution: 1 drop(s) to each affected eye 3 times a day As needed Dry Eyes  polyethylene glycol 3350 oral powder for reconstitution: 17 gram(s) orally 2 times a day  pregabalin 150 mg oral capsule: 1 cap(s) orally 2 times a day  rosuvastatin 10 mg oral tablet: 1 tab(s) orally once a day (at bedtime)  senna leaf extract oral tablet: 2 tab(s) orally once a day (at bedtime)  tamsulosin 0.4 mg oral capsule: 2 cap(s) orally once a day (at bedtime)  tiZANidine 2 mg oral capsule: 1 cap(s) orally once a day (in the morning)

## 2025-05-12 NOTE — BH CONSULTATION LIAISON PROGRESS NOTE - NSBHCONSULTFOLLOWAFTERCARE_PSY_A_CORE FT
Referral to [facility] halle-psych clinic made, appointment pending     [facility]  [address] [place] [address], [address] Floor, [place] [address]  [phone]  [email or web address]     [facility] Intake: [phone]    [facility]   [address] [place] [address], [address], [address] Floor  [facility] geriatric psych clinic [phone], ext [phone]. [phone]

## 2025-05-12 NOTE — BH CONSULTATION LIAISON PROGRESS NOTE - NSBHFUPINTERVALHXFT_PSY_A_CORE
Patient seen, chart reviewed. No PRNs received over the weekend, adherent to PO medications.     Patient calm, cooperative on evaluation. Admits to some lingering feelings of depression, but reports overall stable mood. Patient smiling, future oriented, continues to want to follow up with psychiatrist on outpatient basis. Denies any side effects to cymbalta. Denies any SI/HI and AVH. AOx3, denies any acute safety concerns. No delusions elicited.

## 2025-05-13 RX ORDER — AMLODIPINE BESYLATE 10 MG/1
5 TABLET ORAL DAILY
Refills: 0 | Status: DISCONTINUED | OUTPATIENT
Start: 2025-05-13 | End: 2025-05-19

## 2025-05-13 RX ORDER — TAMSULOSIN HYDROCHLORIDE 0.4 MG/1
0.8 CAPSULE ORAL AT BEDTIME
Refills: 0 | Status: DISCONTINUED | OUTPATIENT
Start: 2025-05-13 | End: 2025-05-19

## 2025-05-13 RX ADMIN — ENOXAPARIN SODIUM 40 MILLIGRAM(S): 100 INJECTION SUBCUTANEOUS at 05:59

## 2025-05-13 RX ADMIN — AMLODIPINE BESYLATE 5 MILLIGRAM(S): 10 TABLET ORAL at 13:10

## 2025-05-13 RX ADMIN — GABAPENTIN 300 MILLIGRAM(S): 400 CAPSULE ORAL at 05:59

## 2025-05-13 RX ADMIN — POLYETHYLENE GLYCOL 3350 17 GRAM(S): 17 POWDER, FOR SOLUTION ORAL at 05:58

## 2025-05-13 RX ADMIN — LEVETIRACETAM 750 MILLIGRAM(S): 10 INJECTION, SOLUTION INTRAVENOUS at 05:59

## 2025-05-13 RX ADMIN — ENOXAPARIN SODIUM 40 MILLIGRAM(S): 100 INJECTION SUBCUTANEOUS at 18:38

## 2025-05-13 RX ADMIN — POLYETHYLENE GLYCOL 3350 17 GRAM(S): 17 POWDER, FOR SOLUTION ORAL at 18:36

## 2025-05-13 RX ADMIN — Medication 1 APPLICATION(S): at 13:10

## 2025-05-13 RX ADMIN — GABAPENTIN 300 MILLIGRAM(S): 400 CAPSULE ORAL at 18:38

## 2025-05-13 RX ADMIN — Medication 2 TABLET(S): at 21:49

## 2025-05-13 RX ADMIN — PREGABALIN 150 MILLIGRAM(S): 50 CAPSULE ORAL at 05:59

## 2025-05-13 RX ADMIN — TIZANIDINE 2 MILLIGRAM(S): 4 TABLET ORAL at 13:10

## 2025-05-13 RX ADMIN — Medication 650 MILLIGRAM(S): at 07:48

## 2025-05-13 RX ADMIN — LEVETIRACETAM 750 MILLIGRAM(S): 10 INJECTION, SOLUTION INTRAVENOUS at 18:36

## 2025-05-13 RX ADMIN — PREGABALIN 150 MILLIGRAM(S): 50 CAPSULE ORAL at 18:42

## 2025-05-13 RX ADMIN — ROSUVASTATIN CALCIUM 10 MILLIGRAM(S): 20 TABLET, FILM COATED ORAL at 21:49

## 2025-05-13 RX ADMIN — Medication 81 MILLIGRAM(S): at 13:10

## 2025-05-13 RX ADMIN — TAMSULOSIN HYDROCHLORIDE 0.8 MILLIGRAM(S): 0.4 CAPSULE ORAL at 21:49

## 2025-05-13 RX ADMIN — DULOXETINE 30 MILLIGRAM(S): 20 CAPSULE, DELAYED RELEASE ORAL at 13:10

## 2025-05-14 PROCEDURE — 74018 RADEX ABDOMEN 1 VIEW: CPT | Mod: 26

## 2025-05-14 RX ORDER — PREGABALIN 50 MG/1
150 CAPSULE ORAL
Refills: 0 | Status: DISCONTINUED | OUTPATIENT
Start: 2025-05-14 | End: 2025-05-19

## 2025-05-14 RX ORDER — BISACODYL 5 MG
10 TABLET, DELAYED RELEASE (ENTERIC COATED) ORAL DAILY
Refills: 0 | Status: DISCONTINUED | OUTPATIENT
Start: 2025-05-14 | End: 2025-05-19

## 2025-05-14 RX ORDER — BISACODYL 5 MG
5 TABLET, DELAYED RELEASE (ENTERIC COATED) ORAL EVERY 12 HOURS
Refills: 0 | Status: DISCONTINUED | OUTPATIENT
Start: 2025-05-14 | End: 2025-05-19

## 2025-05-14 RX ADMIN — AMLODIPINE BESYLATE 5 MILLIGRAM(S): 10 TABLET ORAL at 06:02

## 2025-05-14 RX ADMIN — POLYETHYLENE GLYCOL 3350 17 GRAM(S): 17 POWDER, FOR SOLUTION ORAL at 06:01

## 2025-05-14 RX ADMIN — Medication 3 MILLIGRAM(S): at 21:31

## 2025-05-14 RX ADMIN — PREGABALIN 150 MILLIGRAM(S): 50 CAPSULE ORAL at 18:30

## 2025-05-14 RX ADMIN — DULOXETINE 30 MILLIGRAM(S): 20 CAPSULE, DELAYED RELEASE ORAL at 11:44

## 2025-05-14 RX ADMIN — ROSUVASTATIN CALCIUM 10 MILLIGRAM(S): 20 TABLET, FILM COATED ORAL at 21:31

## 2025-05-14 RX ADMIN — PREGABALIN 150 MILLIGRAM(S): 50 CAPSULE ORAL at 06:01

## 2025-05-14 RX ADMIN — TIZANIDINE 2 MILLIGRAM(S): 4 TABLET ORAL at 11:44

## 2025-05-14 RX ADMIN — Medication 1 APPLICATION(S): at 11:53

## 2025-05-14 RX ADMIN — GABAPENTIN 300 MILLIGRAM(S): 400 CAPSULE ORAL at 06:01

## 2025-05-14 RX ADMIN — Medication 81 MILLIGRAM(S): at 11:44

## 2025-05-14 RX ADMIN — LEVETIRACETAM 750 MILLIGRAM(S): 10 INJECTION, SOLUTION INTRAVENOUS at 06:01

## 2025-05-14 RX ADMIN — TAMSULOSIN HYDROCHLORIDE 0.8 MILLIGRAM(S): 0.4 CAPSULE ORAL at 21:31

## 2025-05-14 RX ADMIN — LEVETIRACETAM 750 MILLIGRAM(S): 10 INJECTION, SOLUTION INTRAVENOUS at 17:23

## 2025-05-14 RX ADMIN — POLYETHYLENE GLYCOL 3350 17 GRAM(S): 17 POWDER, FOR SOLUTION ORAL at 17:23

## 2025-05-14 RX ADMIN — ENOXAPARIN SODIUM 40 MILLIGRAM(S): 100 INJECTION SUBCUTANEOUS at 17:25

## 2025-05-14 RX ADMIN — ENOXAPARIN SODIUM 40 MILLIGRAM(S): 100 INJECTION SUBCUTANEOUS at 06:01

## 2025-05-14 RX ADMIN — GABAPENTIN 300 MILLIGRAM(S): 400 CAPSULE ORAL at 17:25

## 2025-05-14 RX ADMIN — Medication 10 MILLIGRAM(S): at 16:32

## 2025-05-14 NOTE — CHART NOTE - NSCHARTNOTEFT_GEN_A_CORE
**INCOMPLETE NOTE**  - Notified by RN, serrano placement failed x3 via 14 and 16 [place] Pt failed TOV. Notified by RN, serrano placement failed x3 via 14 and 16 [place]. Attempted to place myself, however discussed with spouse and pt at bedside who want to trial suppositories prior to reattempt as difficulty with urination may be related to constipation as evidenced on prior imaging. Will c/w bladder scans, obtain AXR, and trial suppositories and reassess at that time. Will continue to monitor pt closely.

## 2025-05-15 LAB
ANION GAP SERPL CALC-SCNC: 13 MMOL/L — SIGNIFICANT CHANGE UP (ref 7–14)
BUN SERPL-MCNC: 12 MG/DL — SIGNIFICANT CHANGE UP (ref 7–23)
CALCIUM SERPL-MCNC: 9.7 MG/DL — SIGNIFICANT CHANGE UP (ref 8.4–10.5)
CHLORIDE SERPL-SCNC: 102 MMOL/L — SIGNIFICANT CHANGE UP (ref 98–107)
CO2 SERPL-SCNC: 24 MMOL/L — SIGNIFICANT CHANGE UP (ref 22–31)
CREAT SERPL-MCNC: 0.93 MG/DL — SIGNIFICANT CHANGE UP (ref 0.5–1.3)
EGFR: 88 ML/MIN/1.73M2 — SIGNIFICANT CHANGE UP
EGFR: 88 ML/MIN/1.73M2 — SIGNIFICANT CHANGE UP
GLUCOSE SERPL-MCNC: 108 MG/DL — HIGH (ref 70–99)
HCT VFR BLD CALC: 52 % — HIGH (ref 39–50)
HGB BLD-MCNC: 17.4 G/DL — HIGH (ref 13–17)
MAGNESIUM SERPL-MCNC: 2.1 MG/DL — SIGNIFICANT CHANGE UP (ref 1.6–2.6)
MCHC RBC-ENTMCNC: 30.1 PG — SIGNIFICANT CHANGE UP (ref 27–34)
MCHC RBC-ENTMCNC: 33.5 G/DL — SIGNIFICANT CHANGE UP (ref 32–36)
MCV RBC AUTO: 90 FL — SIGNIFICANT CHANGE UP (ref 80–100)
NRBC # BLD AUTO: 0 K/UL — SIGNIFICANT CHANGE UP (ref 0–0)
NRBC # FLD: 0 K/UL — SIGNIFICANT CHANGE UP (ref 0–0)
NRBC BLD AUTO-RTO: 0 /100 WBCS — SIGNIFICANT CHANGE UP (ref 0–0)
PHOSPHATE SERPL-MCNC: 3 MG/DL — SIGNIFICANT CHANGE UP (ref 2.5–4.5)
PLATELET # BLD AUTO: 219 K/UL — SIGNIFICANT CHANGE UP (ref 150–400)
POTASSIUM SERPL-MCNC: 4.5 MMOL/L — SIGNIFICANT CHANGE UP (ref 3.5–5.3)
POTASSIUM SERPL-SCNC: 4.5 MMOL/L — SIGNIFICANT CHANGE UP (ref 3.5–5.3)
RBC # BLD: 5.78 M/UL — SIGNIFICANT CHANGE UP (ref 4.2–5.8)
RBC # FLD: 14.7 % — HIGH (ref 10.3–14.5)
SODIUM SERPL-SCNC: 139 MMOL/L — SIGNIFICANT CHANGE UP (ref 135–145)
WBC # BLD: 13.36 K/UL — HIGH (ref 3.8–10.5)
WBC # FLD AUTO: 13.36 K/UL — HIGH (ref 3.8–10.5)

## 2025-05-15 RX ADMIN — ENOXAPARIN SODIUM 40 MILLIGRAM(S): 100 INJECTION SUBCUTANEOUS at 05:58

## 2025-05-15 RX ADMIN — PREGABALIN 150 MILLIGRAM(S): 50 CAPSULE ORAL at 17:12

## 2025-05-15 RX ADMIN — Medication 81 MILLIGRAM(S): at 12:08

## 2025-05-15 RX ADMIN — PREGABALIN 150 MILLIGRAM(S): 50 CAPSULE ORAL at 06:06

## 2025-05-15 RX ADMIN — Medication 650 MILLIGRAM(S): at 15:45

## 2025-05-15 RX ADMIN — TIZANIDINE 2 MILLIGRAM(S): 4 TABLET ORAL at 12:08

## 2025-05-15 RX ADMIN — Medication 2 TABLET(S): at 21:16

## 2025-05-15 RX ADMIN — TAMSULOSIN HYDROCHLORIDE 0.8 MILLIGRAM(S): 0.4 CAPSULE ORAL at 21:16

## 2025-05-15 RX ADMIN — GABAPENTIN 300 MILLIGRAM(S): 400 CAPSULE ORAL at 17:13

## 2025-05-15 RX ADMIN — GABAPENTIN 300 MILLIGRAM(S): 400 CAPSULE ORAL at 06:06

## 2025-05-15 RX ADMIN — Medication 1 APPLICATION(S): at 12:07

## 2025-05-15 RX ADMIN — AMLODIPINE BESYLATE 5 MILLIGRAM(S): 10 TABLET ORAL at 06:06

## 2025-05-15 RX ADMIN — LEVETIRACETAM 750 MILLIGRAM(S): 10 INJECTION, SOLUTION INTRAVENOUS at 05:58

## 2025-05-15 RX ADMIN — LEVETIRACETAM 750 MILLIGRAM(S): 10 INJECTION, SOLUTION INTRAVENOUS at 17:12

## 2025-05-15 RX ADMIN — DULOXETINE 30 MILLIGRAM(S): 20 CAPSULE, DELAYED RELEASE ORAL at 12:08

## 2025-05-15 RX ADMIN — ENOXAPARIN SODIUM 40 MILLIGRAM(S): 100 INJECTION SUBCUTANEOUS at 17:12

## 2025-05-15 RX ADMIN — Medication 650 MILLIGRAM(S): at 14:46

## 2025-05-15 RX ADMIN — ROSUVASTATIN CALCIUM 10 MILLIGRAM(S): 20 TABLET, FILM COATED ORAL at 21:16

## 2025-05-15 NOTE — DIETITIAN INITIAL EVALUATION ADULT - PROBLEM SELECTOR PLAN 5
- On Toprol XL -> switched to to metoprolol tartrate 12.5mg BID   - BP control. Monitor and adjust BP as tolerated

## 2025-05-15 NOTE — DIETITIAN INITIAL EVALUATION ADULT - PROBLEM SELECTOR PLAN 4
- continue  Statin therapy - rosuvastatin 10 mg oral tablet: 1 tab(s) orally once a day (at bedtime)

## 2025-05-15 NOTE — DIETITIAN INITIAL EVALUATION ADULT - PROBLEM SELECTOR PLAN 2
- On AED w/ Keppra at home, resume   - On Baclofen   - CT head negative  - Fall, Seizure, Aspiration precautions   - PT/ OT

## 2025-05-15 NOTE — DIETITIAN INITIAL EVALUATION ADULT - ORAL INTAKE PTA/DIET HISTORY
Pt reports good appetite for lunch and dinner but does not eat breakfast. UBW-270# per pt and stable.

## 2025-05-15 NOTE — DIETITIAN INITIAL EVALUATION ADULT - PROBLEM SELECTOR PLAN 1
Unclear etiology of behavioral change. Wife reports patient is acting off with visual hallucinations with Infectious work up with negative UA, utox, and Within the limitations of the study, no evidence of acute fracture or traumatic listhesis, and no evidence of acute hardware complication. Utox unremarkable. No electrolyte distrubances. CT head ruled out stroke.   - s/p one dose zosyn, will continue for now pending infectious work up   - f.u blood cultures and urine culture   - Psychiatry consulted given concern for depressive symptoms and hallucinations.  - Patient currently Aox 3 now, continue to monitor mental status

## 2025-05-15 NOTE — DIETITIAN INITIAL EVALUATION ADULT - PERTINENT LABORATORY DATA
05-15    139  |  102  |  12  ----------------------------<  108[H]  4.5   |  24  |  0.93    Ca    9.7      15 May 2025 04:37  Phos  3.0     05-15  Mg     2.10     05-15

## 2025-05-15 NOTE — DIETITIAN INITIAL EVALUATION ADULT - PERTINENT MEDS FT
MEDICATIONS  (STANDING):  amLODIPine   Tablet 5 milliGRAM(s) Oral daily  aspirin enteric coated 81 milliGRAM(s) Oral daily  chlorhexidine 2% Cloths 1 Application(s) Topical daily  DULoxetine 30 milliGRAM(s) Oral daily  enoxaparin Injectable 40 milliGRAM(s) SubCutaneous every 12 hours  gabapentin 300 milliGRAM(s) Oral two times a day  levETIRAcetam 750 milliGRAM(s) Oral two times a day  polyethylene glycol 3350 17 Gram(s) Oral two times a day  pregabalin 150 milliGRAM(s) Oral two times a day  rosuvastatin 10 milliGRAM(s) Oral at bedtime  senna 2 Tablet(s) Oral at bedtime  tamsulosin 0.8 milliGRAM(s) Oral at bedtime  tiZANidine 2 milliGRAM(s) Oral daily    MEDICATIONS  (PRN):  acetaminophen     Tablet .. 650 milliGRAM(s) Oral every 6 hours PRN Temp greater or equal to 38C (100.4F), Mild Pain (1 - 3)  aluminum hydroxide/magnesium hydroxide/simethicone Suspension 30 milliLiter(s) Oral every 4 hours PRN Dyspepsia  artificial tears (preservative free) Ophthalmic Solution 1 Drop(s) Both EYES three times a day PRN Dry Eyes  baclofen 5 milliGRAM(s) Oral every 8 hours PRN Muscle Spasm  bisacodyl 5 milliGRAM(s) Oral every 12 hours PRN Constipation  bisacodyl Suppository 10 milliGRAM(s) Rectal daily PRN Constipation  melatonin 3 milliGRAM(s) Oral at bedtime PRN Insomnia  ondansetron Injectable 4 milliGRAM(s) IV Push every 8 hours PRN Nausea and/or Vomiting

## 2025-05-15 NOTE — DIETITIAN INITIAL EVALUATION ADULT - NSFNSGIIOFT_GEN_A_CORE
05-14-25 @ 07:01  -  05-15-25 @ 07:00  --------------------------------------------------------  OUT:    Stool (mL): 1 mL  Total OUT: 1 mL    Total NET: -1 mL

## 2025-05-15 NOTE — CHART NOTE - NSCHARTNOTEFT_GEN_A_CORE
[facility] Night Medicine Coverage    Called by RN to insert Martinez catheter. Patient seen and examined at bedside. Patient is stable without complaints at this time.    Vital Signs Last 24 Hrs  T(C): 37.1 (05-14-25 @ 20:02), Max: 37.1 (05-14-25 @ 20:02)  T(F): 98.7 (05-14-25 @ 20:02), Max: 98.7 (05-14-25 @ 20:02)  HR: 70 (05-14-25 @ 20:02) (63 - 70)  BP: 153/76 (05-14-25 @ 20:02) (124/70 - 153/87)  BP(mean): --  RR: 18 (05-14-25 @ 20:02) (18 - 18)  SpO2: 96% (05-14-25 @ 20:02) (95% - 96%)    Area sterilized with betadine x3, and 14Fr Martinez catheter inserted successfully with no complications. Sterile field was maintained throughout placement. Urine drained from Martinez catheter, with no hematuria noted.  Stat lock applied and Martinez secured to right leg. Patient tolerated procedure well without complaints. Will continue to follow, RN to call if any changes.    Dennise Hawk NP-BC  Pager [phone]

## 2025-05-15 NOTE — DIETITIAN INITIAL EVALUATION ADULT - OTHER INFO
69-year-old male history of CAD with stent, seizure on Keppra, hypertension, COPD , high cholesterol, presents from home with wife at bedside chief complaint of "feeling off and disoriented"    Pt seen and reports 75% intake of meals with No GI distress (nausea/vomiting/diarrhea/constipation.) at present. Noted skin ecchymosis, +non-pitting edema Rt knee per nursing flow sheet.

## 2025-05-15 NOTE — DIETITIAN INITIAL EVALUATION ADULT - PROBLEM SELECTOR PLAN 7
Patient reports worsening neuropathy. However, does not appearing to be taking medicine as prescribed  - Continue home medications   - baclofen 20 mg oral tablet: 1 tab(s) orally 5 times a day as needed for Muscle Spasm   gabapentin 300 mg oral capsule: 1 cap(s) orally once a day (RX 1 cap(s) orally 3x a day)   pregabalin 150 mg oral capsule: 1 cap(s) orally once a day (RX for 1 capsule orally twice daily) iSTOP #[id number], last filled 4/4/25 for 90 days   tiZANidine 2 mg oral capsule: 1 cap(s) orally once a day (in the morning)

## 2025-05-16 LAB
ANION GAP SERPL CALC-SCNC: 12 MMOL/L — SIGNIFICANT CHANGE UP (ref 7–14)
APPEARANCE UR: ABNORMAL
BACTERIA # UR AUTO: NEGATIVE /HPF — SIGNIFICANT CHANGE UP
BASOPHILS # BLD AUTO: 0.09 K/UL — SIGNIFICANT CHANGE UP (ref 0–0.2)
BASOPHILS NFR BLD AUTO: 0.9 % — SIGNIFICANT CHANGE UP (ref 0–2)
BILIRUB UR-MCNC: ABNORMAL
BUN SERPL-MCNC: 12 MG/DL — SIGNIFICANT CHANGE UP (ref 7–23)
CALCIUM SERPL-MCNC: 9.7 MG/DL — SIGNIFICANT CHANGE UP (ref 8.4–10.5)
CAST: 1 /LPF — SIGNIFICANT CHANGE UP (ref 0–4)
CHLORIDE SERPL-SCNC: 103 MMOL/L — SIGNIFICANT CHANGE UP (ref 98–107)
CO2 SERPL-SCNC: 26 MMOL/L — SIGNIFICANT CHANGE UP (ref 22–31)
COD CRY URNS QL: PRESENT
COLOR SPEC: SIGNIFICANT CHANGE UP
COMMENT - URINE: SIGNIFICANT CHANGE UP
CREAT SERPL-MCNC: 0.93 MG/DL — SIGNIFICANT CHANGE UP (ref 0.5–1.3)
DIFF PNL FLD: ABNORMAL
EGFR: 88 ML/MIN/1.73M2 — SIGNIFICANT CHANGE UP
EGFR: 88 ML/MIN/1.73M2 — SIGNIFICANT CHANGE UP
EOSINOPHIL # BLD AUTO: 0.03 K/UL — SIGNIFICANT CHANGE UP (ref 0–0.5)
EOSINOPHIL NFR BLD AUTO: 0.3 % — SIGNIFICANT CHANGE UP (ref 0–6)
EPI CELLS # UR: PRESENT
FLUAV AG NPH QL: SIGNIFICANT CHANGE UP
FLUBV AG NPH QL: SIGNIFICANT CHANGE UP
GLUCOSE SERPL-MCNC: 101 MG/DL — HIGH (ref 70–99)
GLUCOSE UR QL: NEGATIVE MG/DL — SIGNIFICANT CHANGE UP
HCT VFR BLD CALC: 50.9 % — HIGH (ref 39–50)
HGB BLD-MCNC: 17.1 G/DL — HIGH (ref 13–17)
HYALINE CASTS # UR AUTO: PRESENT
IANC: 6.1 K/UL — SIGNIFICANT CHANGE UP (ref 1.8–7.4)
IMM GRANULOCYTES NFR BLD AUTO: 0.3 % — SIGNIFICANT CHANGE UP (ref 0–0.9)
KETONES UR QL: ABNORMAL MG/DL
LEUKOCYTE ESTERASE UR-ACNC: ABNORMAL
LYMPHOCYTES # BLD AUTO: 3.48 K/UL — HIGH (ref 1–3.3)
LYMPHOCYTES # BLD AUTO: 33.7 % — SIGNIFICANT CHANGE UP (ref 13–44)
MAGNESIUM SERPL-MCNC: 1.9 MG/DL — SIGNIFICANT CHANGE UP (ref 1.6–2.6)
MCHC RBC-ENTMCNC: 30.3 PG — SIGNIFICANT CHANGE UP (ref 27–34)
MCHC RBC-ENTMCNC: 33.6 G/DL — SIGNIFICANT CHANGE UP (ref 32–36)
MCV RBC AUTO: 90.2 FL — SIGNIFICANT CHANGE UP (ref 80–100)
MONOCYTES # BLD AUTO: 0.61 K/UL — SIGNIFICANT CHANGE UP (ref 0–0.9)
MONOCYTES NFR BLD AUTO: 5.9 % — SIGNIFICANT CHANGE UP (ref 2–14)
NEUTROPHILS # BLD AUTO: 6.1 K/UL — SIGNIFICANT CHANGE UP (ref 1.8–7.4)
NEUTROPHILS NFR BLD AUTO: 58.9 % — SIGNIFICANT CHANGE UP (ref 43–77)
NITRITE UR-MCNC: NEGATIVE — SIGNIFICANT CHANGE UP
NRBC # BLD AUTO: 0 K/UL — SIGNIFICANT CHANGE UP (ref 0–0)
NRBC # FLD: 0 K/UL — SIGNIFICANT CHANGE UP (ref 0–0)
NRBC BLD AUTO-RTO: 0 /100 WBCS — SIGNIFICANT CHANGE UP (ref 0–0)
PH UR: 5.5 — SIGNIFICANT CHANGE UP (ref 5–8)
PHOSPHATE SERPL-MCNC: 2.6 MG/DL — SIGNIFICANT CHANGE UP (ref 2.5–4.5)
PLATELET # BLD AUTO: 231 K/UL — SIGNIFICANT CHANGE UP (ref 150–400)
POTASSIUM SERPL-MCNC: 4.2 MMOL/L — SIGNIFICANT CHANGE UP (ref 3.5–5.3)
POTASSIUM SERPL-SCNC: 4.2 MMOL/L — SIGNIFICANT CHANGE UP (ref 3.5–5.3)
PROT UR-MCNC: 100 MG/DL
RBC # BLD: 5.64 M/UL — SIGNIFICANT CHANGE UP (ref 4.2–5.8)
RBC # FLD: 14.8 % — HIGH (ref 10.3–14.5)
RBC CASTS # UR COMP ASSIST: SIGNIFICANT CHANGE UP /HPF (ref 0–4)
REVIEW: SIGNIFICANT CHANGE UP
RSV RNA NPH QL NAA+NON-PROBE: SIGNIFICANT CHANGE UP
SARS-COV-2 RNA SPEC QL NAA+PROBE: SIGNIFICANT CHANGE UP
SODIUM SERPL-SCNC: 141 MMOL/L — SIGNIFICANT CHANGE UP (ref 135–145)
SOURCE RESPIRATORY: SIGNIFICANT CHANGE UP
SP GR SPEC: 1.03 — HIGH (ref 1–1.03)
SQUAMOUS # UR AUTO: 1 /HPF — SIGNIFICANT CHANGE UP (ref 0–5)
UROBILINOGEN FLD QL: 1 MG/DL — SIGNIFICANT CHANGE UP (ref 0.2–1)
WBC # BLD: 10.34 K/UL — SIGNIFICANT CHANGE UP (ref 3.8–10.5)
WBC # FLD AUTO: 10.34 K/UL — SIGNIFICANT CHANGE UP (ref 3.8–10.5)
WBC UR QL: 4 /HPF — SIGNIFICANT CHANGE UP (ref 0–5)

## 2025-05-16 PROCEDURE — 71045 X-RAY EXAM CHEST 1 VIEW: CPT | Mod: 26

## 2025-05-16 RX ORDER — LEVETIRACETAM 10 MG/ML
1 INJECTION, SOLUTION INTRAVENOUS
Qty: 0 | Refills: 0 | DISCHARGE
Start: 2025-05-16

## 2025-05-16 RX ORDER — AMLODIPINE BESYLATE 10 MG/1
1 TABLET ORAL
Qty: 0 | Refills: 0 | DISCHARGE
Start: 2025-05-16

## 2025-05-16 RX ORDER — TAMSULOSIN HYDROCHLORIDE 0.4 MG/1
2 CAPSULE ORAL
Qty: 0 | Refills: 0 | DISCHARGE
Start: 2025-05-16

## 2025-05-16 RX ORDER — PREGABALIN 50 MG/1
1 CAPSULE ORAL
Qty: 0 | Refills: 0 | DISCHARGE
Start: 2025-05-16

## 2025-05-16 RX ORDER — DULOXETINE 20 MG/1
1 CAPSULE, DELAYED RELEASE ORAL
Qty: 0 | Refills: 0 | DISCHARGE
Start: 2025-05-16

## 2025-05-16 RX ORDER — LANOLIN/MINERAL OIL/PETROLATUM
1 OINTMENT (GRAM) OPHTHALMIC (EYE)
Qty: 0 | Refills: 0 | DISCHARGE
Start: 2025-05-16

## 2025-05-16 RX ORDER — SENNA 187 MG
2 TABLET ORAL
Qty: 0 | Refills: 0 | DISCHARGE
Start: 2025-05-16

## 2025-05-16 RX ORDER — POLYETHYLENE GLYCOL 3350 17 G/17G
17 POWDER, FOR SOLUTION ORAL
Qty: 0 | Refills: 0 | DISCHARGE
Start: 2025-05-16

## 2025-05-16 RX ORDER — BACLOFEN 10 MG/20ML
1 INJECTION INTRATHECAL
Qty: 0 | Refills: 0 | DISCHARGE
Start: 2025-05-16

## 2025-05-16 RX ADMIN — AMLODIPINE BESYLATE 5 MILLIGRAM(S): 10 TABLET ORAL at 05:39

## 2025-05-16 RX ADMIN — ENOXAPARIN SODIUM 40 MILLIGRAM(S): 100 INJECTION SUBCUTANEOUS at 18:56

## 2025-05-16 RX ADMIN — GABAPENTIN 300 MILLIGRAM(S): 400 CAPSULE ORAL at 05:39

## 2025-05-16 RX ADMIN — Medication 650 MILLIGRAM(S): at 08:53

## 2025-05-16 RX ADMIN — LEVETIRACETAM 750 MILLIGRAM(S): 10 INJECTION, SOLUTION INTRAVENOUS at 18:56

## 2025-05-16 RX ADMIN — PREGABALIN 150 MILLIGRAM(S): 50 CAPSULE ORAL at 05:39

## 2025-05-16 RX ADMIN — Medication 81 MILLIGRAM(S): at 13:44

## 2025-05-16 RX ADMIN — LEVETIRACETAM 750 MILLIGRAM(S): 10 INJECTION, SOLUTION INTRAVENOUS at 05:39

## 2025-05-16 RX ADMIN — TIZANIDINE 2 MILLIGRAM(S): 4 TABLET ORAL at 13:44

## 2025-05-16 RX ADMIN — POLYETHYLENE GLYCOL 3350 17 GRAM(S): 17 POWDER, FOR SOLUTION ORAL at 18:56

## 2025-05-16 RX ADMIN — DULOXETINE 30 MILLIGRAM(S): 20 CAPSULE, DELAYED RELEASE ORAL at 13:45

## 2025-05-16 RX ADMIN — TAMSULOSIN HYDROCHLORIDE 0.8 MILLIGRAM(S): 0.4 CAPSULE ORAL at 21:31

## 2025-05-16 RX ADMIN — PREGABALIN 150 MILLIGRAM(S): 50 CAPSULE ORAL at 18:58

## 2025-05-16 RX ADMIN — Medication 650 MILLIGRAM(S): at 10:53

## 2025-05-16 RX ADMIN — POLYETHYLENE GLYCOL 3350 17 GRAM(S): 17 POWDER, FOR SOLUTION ORAL at 05:39

## 2025-05-16 RX ADMIN — ROSUVASTATIN CALCIUM 10 MILLIGRAM(S): 20 TABLET, FILM COATED ORAL at 21:31

## 2025-05-16 RX ADMIN — ENOXAPARIN SODIUM 40 MILLIGRAM(S): 100 INJECTION SUBCUTANEOUS at 05:39

## 2025-05-16 NOTE — CHART NOTE - NSCHARTNOTEFT_GEN_A_CORE
Pt c/o chills which he states started about an hour ago, admits to feeling cold. Denies fever, night sweats, abd pain, n/v/d, +constipation, but reports BM on 5/14. Denies cough, congestion, pharyngitis, dysuria, rashes/skin changes.   /78mmhg HR 70bpm RR 19 SpO2 100% on RA T 97.4  A+O x 3, resting in bed, NAD  RRR +S1S2  CTA b/l no w/r/r  Abd obese, soft, NT/ND, no guarding or rebound tenderness  LE no edema                         17.1   10.34 )-----------( 231      ( 16 May 2025 13:50 )             50.9   05-16    141  |  103  |  12  ----------------------------<  101[H]  4.2   |  26  |  0.93    Ca    9.7      16 May 2025 13:50  Phos  2.6     05-16  Mg     1.90     05-16    **INCOMP Pt c/o chills which he states started about an hour ago, admits to feeling cold. Denies fever, night sweats, abd pain, n/v/d, +constipation, but reports BM on 5/14. Denies cough, congestion, pharyngitis, dysuria, rashes/skin changes.   /78mmhg HR 70bpm RR 19 SpO2 100% on RA T 97.4  A+O x 3, resting in bed, NAD  RRR +S1S2  CTA b/l no w/r/r  Abd obese, soft, NT/ND, no guarding or rebound tenderness  LE no edema                         17.1   10.34 )-----------( 231      ( 16 May 2025 13:50 )             50.9   05-16    141  |  103  |  12  ----------------------------<  101[H]  4.2   |  26  |  0.93    Ca    9.7      16 May 2025 13:50  Phos  2.6     05-16  Mg     1.90     05-16    70yo M with PMHX CAD with stent, seizure disorder on Keppra, hypertension, COPD, HLD, presents from home with wife at bedside chief complaint of "feeling off and disoriented," unclear etiology of encephalopathy, s/p 5 day course of Zosyn, now mental status improved, at baseline. Pt was DC planning to rehab today, now c/o chills/rigors, T 97.4, discussed with Dr. Urban, recommend to repeat labs and check UA, BCx, CXR, RVP and will monitor overnight.

## 2025-05-17 LAB
ANION GAP SERPL CALC-SCNC: 18 MMOL/L — HIGH (ref 7–14)
APPEARANCE UR: CLEAR — SIGNIFICANT CHANGE UP
BACTERIA # UR AUTO: NEGATIVE /HPF — SIGNIFICANT CHANGE UP
BASOPHILS # BLD AUTO: 0.08 K/UL — SIGNIFICANT CHANGE UP (ref 0–0.2)
BASOPHILS NFR BLD AUTO: 0.7 % — SIGNIFICANT CHANGE UP (ref 0–2)
BILIRUB UR-MCNC: NEGATIVE — SIGNIFICANT CHANGE UP
BUN SERPL-MCNC: 11 MG/DL — SIGNIFICANT CHANGE UP (ref 7–23)
CALCIUM SERPL-MCNC: 9.7 MG/DL — SIGNIFICANT CHANGE UP (ref 8.4–10.5)
CAST: 0 /LPF — SIGNIFICANT CHANGE UP (ref 0–4)
CHLORIDE SERPL-SCNC: 101 MMOL/L — SIGNIFICANT CHANGE UP (ref 98–107)
CO2 SERPL-SCNC: 22 MMOL/L — SIGNIFICANT CHANGE UP (ref 22–31)
COLOR SPEC: YELLOW — SIGNIFICANT CHANGE UP
CREAT SERPL-MCNC: 0.96 MG/DL — SIGNIFICANT CHANGE UP (ref 0.5–1.3)
DIFF PNL FLD: ABNORMAL
EGFR: 85 ML/MIN/1.73M2 — SIGNIFICANT CHANGE UP
EGFR: 85 ML/MIN/1.73M2 — SIGNIFICANT CHANGE UP
EOSINOPHIL # BLD AUTO: 0.03 K/UL — SIGNIFICANT CHANGE UP (ref 0–0.5)
EOSINOPHIL NFR BLD AUTO: 0.3 % — SIGNIFICANT CHANGE UP (ref 0–6)
GLUCOSE SERPL-MCNC: 100 MG/DL — HIGH (ref 70–99)
GLUCOSE UR QL: NEGATIVE MG/DL — SIGNIFICANT CHANGE UP
HCT VFR BLD CALC: 49.5 % — SIGNIFICANT CHANGE UP (ref 39–50)
HGB BLD-MCNC: 16.4 G/DL — SIGNIFICANT CHANGE UP (ref 13–17)
IANC: 6.48 K/UL — SIGNIFICANT CHANGE UP (ref 1.8–7.4)
IMM GRANULOCYTES NFR BLD AUTO: 0.2 % — SIGNIFICANT CHANGE UP (ref 0–0.9)
KETONES UR QL: NEGATIVE MG/DL — SIGNIFICANT CHANGE UP
LEUKOCYTE ESTERASE UR-ACNC: ABNORMAL
LYMPHOCYTES # BLD AUTO: 3.59 K/UL — HIGH (ref 1–3.3)
LYMPHOCYTES # BLD AUTO: 32.4 % — SIGNIFICANT CHANGE UP (ref 13–44)
MAGNESIUM SERPL-MCNC: 2 MG/DL — SIGNIFICANT CHANGE UP (ref 1.6–2.6)
MCHC RBC-ENTMCNC: 30.5 PG — SIGNIFICANT CHANGE UP (ref 27–34)
MCHC RBC-ENTMCNC: 33.1 G/DL — SIGNIFICANT CHANGE UP (ref 32–36)
MCV RBC AUTO: 92.2 FL — SIGNIFICANT CHANGE UP (ref 80–100)
MONOCYTES # BLD AUTO: 0.88 K/UL — SIGNIFICANT CHANGE UP (ref 0–0.9)
MONOCYTES NFR BLD AUTO: 7.9 % — SIGNIFICANT CHANGE UP (ref 2–14)
NEUTROPHILS # BLD AUTO: 6.48 K/UL — SIGNIFICANT CHANGE UP (ref 1.8–7.4)
NEUTROPHILS NFR BLD AUTO: 58.5 % — SIGNIFICANT CHANGE UP (ref 43–77)
NITRITE UR-MCNC: NEGATIVE — SIGNIFICANT CHANGE UP
NRBC # BLD AUTO: 0 K/UL — SIGNIFICANT CHANGE UP (ref 0–0)
NRBC # FLD: 0 K/UL — SIGNIFICANT CHANGE UP (ref 0–0)
NRBC BLD AUTO-RTO: 0 /100 WBCS — SIGNIFICANT CHANGE UP (ref 0–0)
PH UR: 6 — SIGNIFICANT CHANGE UP (ref 5–8)
PHOSPHATE SERPL-MCNC: 3.3 MG/DL — SIGNIFICANT CHANGE UP (ref 2.5–4.5)
PLATELET # BLD AUTO: 220 K/UL — SIGNIFICANT CHANGE UP (ref 150–400)
POTASSIUM SERPL-MCNC: 4 MMOL/L — SIGNIFICANT CHANGE UP (ref 3.5–5.3)
POTASSIUM SERPL-SCNC: 4 MMOL/L — SIGNIFICANT CHANGE UP (ref 3.5–5.3)
PROT UR-MCNC: 100 MG/DL
RBC # BLD: 5.37 M/UL — SIGNIFICANT CHANGE UP (ref 4.2–5.8)
RBC # FLD: 15.1 % — HIGH (ref 10.3–14.5)
RBC CASTS # UR COMP ASSIST: 131 /HPF — HIGH (ref 0–4)
SODIUM SERPL-SCNC: 141 MMOL/L — SIGNIFICANT CHANGE UP (ref 135–145)
SP GR SPEC: 1.01 — SIGNIFICANT CHANGE UP (ref 1–1.03)
SQUAMOUS # UR AUTO: 1 /HPF — SIGNIFICANT CHANGE UP (ref 0–5)
UROBILINOGEN FLD QL: 1 MG/DL — SIGNIFICANT CHANGE UP (ref 0.2–1)
WBC # BLD: 11.08 K/UL — HIGH (ref 3.8–10.5)
WBC # FLD AUTO: 11.08 K/UL — HIGH (ref 3.8–10.5)
WBC UR QL: 7 /HPF — HIGH (ref 0–5)

## 2025-05-17 RX ADMIN — ENOXAPARIN SODIUM 40 MILLIGRAM(S): 100 INJECTION SUBCUTANEOUS at 05:00

## 2025-05-17 RX ADMIN — ROSUVASTATIN CALCIUM 10 MILLIGRAM(S): 20 TABLET, FILM COATED ORAL at 22:15

## 2025-05-17 RX ADMIN — AMLODIPINE BESYLATE 5 MILLIGRAM(S): 10 TABLET ORAL at 05:00

## 2025-05-17 RX ADMIN — LEVETIRACETAM 750 MILLIGRAM(S): 10 INJECTION, SOLUTION INTRAVENOUS at 05:00

## 2025-05-17 RX ADMIN — PREGABALIN 150 MILLIGRAM(S): 50 CAPSULE ORAL at 18:05

## 2025-05-17 RX ADMIN — POLYETHYLENE GLYCOL 3350 17 GRAM(S): 17 POWDER, FOR SOLUTION ORAL at 05:00

## 2025-05-17 RX ADMIN — LEVETIRACETAM 750 MILLIGRAM(S): 10 INJECTION, SOLUTION INTRAVENOUS at 18:04

## 2025-05-17 RX ADMIN — Medication 1 APPLICATION(S): at 11:50

## 2025-05-17 RX ADMIN — TAMSULOSIN HYDROCHLORIDE 0.8 MILLIGRAM(S): 0.4 CAPSULE ORAL at 22:15

## 2025-05-17 RX ADMIN — Medication 2 TABLET(S): at 22:15

## 2025-05-17 RX ADMIN — TIZANIDINE 2 MILLIGRAM(S): 4 TABLET ORAL at 11:55

## 2025-05-17 RX ADMIN — DULOXETINE 30 MILLIGRAM(S): 20 CAPSULE, DELAYED RELEASE ORAL at 11:55

## 2025-05-17 RX ADMIN — ENOXAPARIN SODIUM 40 MILLIGRAM(S): 100 INJECTION SUBCUTANEOUS at 18:04

## 2025-05-17 RX ADMIN — POLYETHYLENE GLYCOL 3350 17 GRAM(S): 17 POWDER, FOR SOLUTION ORAL at 18:04

## 2025-05-17 RX ADMIN — PREGABALIN 150 MILLIGRAM(S): 50 CAPSULE ORAL at 05:00

## 2025-05-17 RX ADMIN — Medication 81 MILLIGRAM(S): at 11:55

## 2025-05-18 RX ADMIN — Medication 81 MILLIGRAM(S): at 11:55

## 2025-05-18 RX ADMIN — AMLODIPINE BESYLATE 5 MILLIGRAM(S): 10 TABLET ORAL at 06:24

## 2025-05-18 RX ADMIN — PREGABALIN 150 MILLIGRAM(S): 50 CAPSULE ORAL at 17:09

## 2025-05-18 RX ADMIN — ROSUVASTATIN CALCIUM 10 MILLIGRAM(S): 20 TABLET, FILM COATED ORAL at 22:19

## 2025-05-18 RX ADMIN — DULOXETINE 30 MILLIGRAM(S): 20 CAPSULE, DELAYED RELEASE ORAL at 11:55

## 2025-05-18 RX ADMIN — TIZANIDINE 2 MILLIGRAM(S): 4 TABLET ORAL at 11:55

## 2025-05-18 RX ADMIN — TAMSULOSIN HYDROCHLORIDE 0.8 MILLIGRAM(S): 0.4 CAPSULE ORAL at 22:19

## 2025-05-18 RX ADMIN — ENOXAPARIN SODIUM 40 MILLIGRAM(S): 100 INJECTION SUBCUTANEOUS at 17:08

## 2025-05-18 RX ADMIN — POLYETHYLENE GLYCOL 3350 17 GRAM(S): 17 POWDER, FOR SOLUTION ORAL at 06:24

## 2025-05-18 RX ADMIN — ENOXAPARIN SODIUM 40 MILLIGRAM(S): 100 INJECTION SUBCUTANEOUS at 06:24

## 2025-05-18 RX ADMIN — LEVETIRACETAM 750 MILLIGRAM(S): 10 INJECTION, SOLUTION INTRAVENOUS at 06:24

## 2025-05-18 RX ADMIN — PREGABALIN 150 MILLIGRAM(S): 50 CAPSULE ORAL at 06:24

## 2025-05-18 RX ADMIN — Medication 2 TABLET(S): at 22:19

## 2025-05-18 RX ADMIN — Medication 1 APPLICATION(S): at 11:51

## 2025-05-18 RX ADMIN — LEVETIRACETAM 750 MILLIGRAM(S): 10 INJECTION, SOLUTION INTRAVENOUS at 17:09

## 2025-05-19 ENCOUNTER — TRANSCRIPTION ENCOUNTER (OUTPATIENT)
Age: 70
End: 2025-05-19

## 2025-05-19 VITALS
DIASTOLIC BLOOD PRESSURE: 66 MMHG | OXYGEN SATURATION: 97 % | RESPIRATION RATE: 18 BRPM | HEART RATE: 75 BPM | TEMPERATURE: 98 F | SYSTOLIC BLOOD PRESSURE: 127 MMHG

## 2025-05-19 LAB
ANION GAP SERPL CALC-SCNC: 14 MMOL/L — SIGNIFICANT CHANGE UP (ref 7–14)
BUN SERPL-MCNC: 13 MG/DL — SIGNIFICANT CHANGE UP (ref 7–23)
CALCIUM SERPL-MCNC: 9.8 MG/DL — SIGNIFICANT CHANGE UP (ref 8.4–10.5)
CHLORIDE SERPL-SCNC: 103 MMOL/L — SIGNIFICANT CHANGE UP (ref 98–107)
CO2 SERPL-SCNC: 25 MMOL/L — SIGNIFICANT CHANGE UP (ref 22–31)
CREAT SERPL-MCNC: 0.93 MG/DL — SIGNIFICANT CHANGE UP (ref 0.5–1.3)
EGFR: 88 ML/MIN/1.73M2 — SIGNIFICANT CHANGE UP
EGFR: 88 ML/MIN/1.73M2 — SIGNIFICANT CHANGE UP
GLUCOSE SERPL-MCNC: 94 MG/DL — SIGNIFICANT CHANGE UP (ref 70–99)
HCT VFR BLD CALC: 49.4 % — SIGNIFICANT CHANGE UP (ref 39–50)
HGB BLD-MCNC: 16.5 G/DL — SIGNIFICANT CHANGE UP (ref 13–17)
MAGNESIUM SERPL-MCNC: 2 MG/DL — SIGNIFICANT CHANGE UP (ref 1.6–2.6)
MCHC RBC-ENTMCNC: 30.6 PG — SIGNIFICANT CHANGE UP (ref 27–34)
MCHC RBC-ENTMCNC: 33.4 G/DL — SIGNIFICANT CHANGE UP (ref 32–36)
MCV RBC AUTO: 91.7 FL — SIGNIFICANT CHANGE UP (ref 80–100)
NRBC # BLD AUTO: 0 K/UL — SIGNIFICANT CHANGE UP (ref 0–0)
NRBC # FLD: 0 K/UL — SIGNIFICANT CHANGE UP (ref 0–0)
NRBC BLD AUTO-RTO: 0 /100 WBCS — SIGNIFICANT CHANGE UP (ref 0–0)
PHOSPHATE SERPL-MCNC: 2.4 MG/DL — LOW (ref 2.5–4.5)
PLATELET # BLD AUTO: 211 K/UL — SIGNIFICANT CHANGE UP (ref 150–400)
POTASSIUM SERPL-MCNC: 4.5 MMOL/L — SIGNIFICANT CHANGE UP (ref 3.5–5.3)
POTASSIUM SERPL-SCNC: 4.5 MMOL/L — SIGNIFICANT CHANGE UP (ref 3.5–5.3)
RBC # BLD: 5.39 M/UL — SIGNIFICANT CHANGE UP (ref 4.2–5.8)
RBC # FLD: 15.2 % — HIGH (ref 10.3–14.5)
SODIUM SERPL-SCNC: 142 MMOL/L — SIGNIFICANT CHANGE UP (ref 135–145)
WBC # BLD: 15.92 K/UL — HIGH (ref 3.8–10.5)
WBC # FLD AUTO: 15.92 K/UL — HIGH (ref 3.8–10.5)

## 2025-05-19 RX ORDER — CEFUROXIME SODIUM 1.5 G
1 VIAL (EA) INJECTION
Qty: 0 | Refills: 0 | DISCHARGE
Start: 2025-05-19

## 2025-05-19 RX ORDER — CEFUROXIME SODIUM 1.5 G
250 VIAL (EA) INJECTION EVERY 12 HOURS
Refills: 0 | Status: DISCONTINUED | OUTPATIENT
Start: 2025-05-19 | End: 2025-05-19

## 2025-05-19 RX ORDER — CEFUROXIME SODIUM 1.5 G
500 VIAL (EA) INJECTION EVERY 12 HOURS
Refills: 0 | Status: DISCONTINUED | OUTPATIENT
Start: 2025-05-19 | End: 2025-05-19

## 2025-05-19 RX ADMIN — Medication 81 MILLIGRAM(S): at 12:09

## 2025-05-19 RX ADMIN — ENOXAPARIN SODIUM 40 MILLIGRAM(S): 100 INJECTION SUBCUTANEOUS at 05:15

## 2025-05-19 RX ADMIN — POLYETHYLENE GLYCOL 3350 17 GRAM(S): 17 POWDER, FOR SOLUTION ORAL at 05:16

## 2025-05-19 RX ADMIN — LEVETIRACETAM 750 MILLIGRAM(S): 10 INJECTION, SOLUTION INTRAVENOUS at 17:08

## 2025-05-19 RX ADMIN — ENOXAPARIN SODIUM 40 MILLIGRAM(S): 100 INJECTION SUBCUTANEOUS at 17:09

## 2025-05-19 RX ADMIN — POLYETHYLENE GLYCOL 3350 17 GRAM(S): 17 POWDER, FOR SOLUTION ORAL at 17:08

## 2025-05-19 RX ADMIN — LEVETIRACETAM 750 MILLIGRAM(S): 10 INJECTION, SOLUTION INTRAVENOUS at 05:16

## 2025-05-19 RX ADMIN — Medication 250 MILLIGRAM(S): at 17:11

## 2025-05-19 RX ADMIN — Medication 1 APPLICATION(S): at 12:14

## 2025-05-19 RX ADMIN — DULOXETINE 30 MILLIGRAM(S): 20 CAPSULE, DELAYED RELEASE ORAL at 12:10

## 2025-05-19 RX ADMIN — PREGABALIN 150 MILLIGRAM(S): 50 CAPSULE ORAL at 17:09

## 2025-05-19 RX ADMIN — AMLODIPINE BESYLATE 5 MILLIGRAM(S): 10 TABLET ORAL at 05:16

## 2025-05-19 RX ADMIN — PREGABALIN 150 MILLIGRAM(S): 50 CAPSULE ORAL at 05:15

## 2025-05-19 RX ADMIN — TIZANIDINE 2 MILLIGRAM(S): 4 TABLET ORAL at 12:09

## 2025-05-19 NOTE — PROGRESS NOTE ADULT - PROVIDER SPECIALTY LIST ADULT
Internal Medicine
Neurology
Internal Medicine

## 2025-05-19 NOTE — PROGRESS NOTE ADULT - PROBLEM SELECTOR PLAN 3
- S/P Stent  - ASA and Statin therapy   - BP control

## 2025-05-19 NOTE — DISCHARGE NOTE NURSING/CASE MANAGEMENT/SOCIAL WORK - NSDCCRNAME_GEN_ALL_CORE_FT
[facility] // [id number] [place] [id number]

## 2025-05-19 NOTE — PROGRESS NOTE ADULT - NUTRITIONAL ASSESSMENT
This patient has been assessed with a concern for Malnutrition and has been determined to have a diagnosis/diagnoses of Morbid obesity (BMI > 40).    This patient is being managed with:   Diet DASH/TLC-  Sodium & Cholesterol Restricted  Entered: May 11 2025  3:41PM  

## 2025-05-19 NOTE — PROGRESS NOTE ADULT - PROBLEM SELECTOR PROBLEM 7
Spinal stenosis, lumbar

## 2025-05-19 NOTE — PROGRESS NOTE ADULT - PROBLEM SELECTOR PLAN 6
- continue tamsulosin 0.4 mg oral capsule: 1 cap(s) orally once a day in the morning
- increase flomax 0.8   - Juan
- increase flomax 0.8   - Juan
- continue tamsulosin 0.4 mg oral capsule: 1 cap(s) orally once a day in the morning
- increase flomax 0.8   - Juan
- continue tamsulosin 0.4 mg oral capsule: 1 cap(s) orally once a day in the morning
- increase flomax 0.8   - Juan

## 2025-05-19 NOTE — PROGRESS NOTE ADULT - ASSESSMENT
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, high cholesterol, presents from home with wife at bedside chief complaint of "feeling off and disoriented"   CTH with chronic R frontoparietal crani with suprasellar aneursym clip, gliosis,   CT Lspine with possible stercoral proctocolitis, atrophy paraspinal musculture, limited from spinal hardware  o/e AAox3, no hallucinations at this time  utox neg  tsh 4.99    Encephalopathy - mild leukocytosis - bcx and ucx neg but on Zosyn  Seizures - on Keppra  Prior aneurysm 1997 s/p clipping  Lumbar SS with neuropathy  RUE weakness - suspect radial nerve palsy      -DC planning  - pt states he does not get seizures. Has been on keppra for years. Unlikely to be etiology of hallucinations at this time  - on \Lyrica 150mg BID - off gabapentin  - duloxetin  - also on tizanidine and baclofen PRN  - consider routine eeg given hallucinations- right frontal slowing  - check FS, glucose control <180  - GI/DVT ppx   - Thank you for allowing me to participate in the care of this patient. Call with questions.   - sees Dr. Cary Tim outpatient - will follow with her on discharge, suggest outpt emg    
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, high cholesterol, presents from home with wife at bedside chief complaint of "feeling off and disoriented"   CTH with chronic R frontoparietal crani with suprasellar aneursym clip, gliosis,   CT Lspine with possible stercoral proctocolitis, atrophy paraspinal musculture, limited from spinal hardware  o/e AAox3, no hallucinations at this time  utox neg  tsh 4.99    Encephalopathy - mild leukocytosis - bcx and ucx neg but on Zosyn  Seizures - on Keppra  Prior aneurysm 1997 s/p clipping  Lumbar SS with neuropathy  RUE weakness - suspect radial nerve palsy      -DC planning  - pt states he does not get seizures. Has been on keppra for years. Unlikely to be etiology of hallucinations at this time  - on gabapentin 300mg TID, Lyrica 150mg BID - both work on JAKOB receptors, would consider titrating off gabapentin (pt thinks lyrica works better) and adding duloxetine 30mg which may also help with depression. Would consider doing it as outpatient but adding duloxetine here if no CI  - also on tizanidine and baclofen PRN  - consider routine eeg given hallucinations  - check FS, glucose control <180  - GI/DVT ppx   - Thank you for allowing me to participate in the care of this patient. Call with questions.   - sees Dr. Cary Tim outpatient - will follow with her on discharge, suggest outpt emg    
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, high cholesterol, presents from home with wife at bedside chief complaint of "feeling off and disoriented"   CTH with chronic R frontoparietal crani with suprasellar aneursym clip, gliosis,   CT Lspine with possible stercoral proctocolitis, atrophy paraspinal musculture, limited from spinal hardware  o/e AAox3, no hallucinations at this time  utox neg  tsh 4.99    Encephalopathy - mild leukocytosis - bcx and ucx neg but on Zosyn  Seizures - on Keppra  Prior aneurysm 1997 s/p clipping  Lumbar SS with neuropathy  RUE weakness - suspect radial nerve palsy    - pt statse he does not get seizures. Has been on keppra for years. Unlikely to be etiology of hallucinations at this time  - on gabapentin 300mg TID, Lyrica 150mg BID - both work on JAKOB receptors, would consider titrating off gabapentin (pt thinks lyrica works better) and adding duloxetine 30mg which may also help with depression. Would consider doing it as outpatient but adding duloxetine here if no CI  - also on tizanidine and baclofen PRN  - consider routine eeg given hallucinations  - check FS, glucose control <180  - GI/DVT ppx   - Thank you for allowing me to participate in the care of this patient. Call with questions.   - sees Dr. Cary Tim outpatient - will follow with her on discharge, suggest outpt emg    
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, high cholesterol, presents from home with wife at bedside chief complaint of "feeling off and disoriented"   CTH with chronic R frontoparietal crani with suprasellar aneursym clip, gliosis,   CT Lspine with possible stercoral proctocolitis, atrophy paraspinal musculture, limited from spinal hardware  o/e AAox3, no hallucinations at this time  utox neg  tsh 4.99    Encephalopathy - mild leukocytosis - bcx and ucx neg but on Zosyn  Seizures - on Keppra  Prior aneurysm 1997 s/p clipping  Lumbar SS with neuropathy  RUE weakness - suspect radial nerve palsy      -DC planning  - pt states he does not get seizures. Has been on keppra for years. Unlikely to be etiology of hallucinations at this time  - on \Lyrica 150mg BID - off gabapentin  - duloxetin  - also on tizanidine and baclofen PRN  - consider routine eeg given hallucinations- right frontal slowing  - check FS, glucose control <180  - GI/DVT ppx   - Thank you for allowing me to participate in the care of this patient. Call with questions.   - sees Dr. Cary Tim outpatient - will follow with her on discharge, suggest outpt emg    
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, high cholesterol, presents from home with wife at bedside chief complaint of "feeling off and disoriented"   CTH with chronic R frontoparietal crani with suprasellar aneursym clip, gliosis,   CT Lspine with possible stercoral proctocolitis, atrophy paraspinal musculture, limited from spinal hardware  o/e AAox3, no hallucinations at this time  utox neg  tsh 4.99    Encephalopathy - mild leukocytosis - bcx and ucx neg but on Zosyn  Seizures - on Keppra  Prior aneurysm 1997 s/p clipping  Lumbar SS with neuropathy  RUE weakness - suspect radial nerve palsy    - pt statse he does not get seizures. Has been on keppra for years. Unlikely to be etiology of hallucinations at this time  - on gabapentin 300mg TID, Lyrica 150mg BID - both work on JAKOB receptors, would consider titrating off gabapentin (pt thinks lyrica works better) and adding duloxetine 30mg which may also help with depression. Would consider doing it as outpatient but adding duloxetine here if no CI  - also on tizanidine and baclofen PRN  - consider routine eeg given hallucinations  - check FS, glucose control <180  - GI/DVT ppx   - Thank you for allowing me to participate in the care of this patient. Call with questions.   - sees Dr. Cary Tim outpatient - will follow with her on discharge, suggest outpt emg    Cherrie Gan DO  Vascular Neurology  Office [phone]  Available via [employer] Teams   
69-year-old male history of CAD with stent, seizure on Keppra, hypertension, COPD , high cholesterol, presents from home with wife at bedside chief complaint of "feeling off and disoriented"            

## 2025-05-19 NOTE — PROGRESS NOTE ADULT - PROBLEM SELECTOR PROBLEM 4
High cholesterol

## 2025-05-19 NOTE — PROGRESS NOTE ADULT - REASON FOR ADMISSION
"feeling off and disoriented"

## 2025-05-19 NOTE — PROGRESS NOTE ADULT - PROBLEM SELECTOR PLAN 1
Unclear etiology of behavioral change. Wife reports patient is acting off with visual hallucinations with Infectious work up with negative UA, utox, and Within the limitations of the study, no evidence of acute fracture or traumatic listhesis, and no evidence of acute hardware complication. Utox unremarkable. No electrolyte distrubances. CT head ruled out stroke.   - s/p one dose zosyn, course of zosyn completed   - f.u blood cultures and urine culture   - Psychiatry consulted given concern for depressive symptoms and hallucinations.  - Patient currently Aox 3 now, continue to monitor mental status  - TOV  - Monitor for fever. mild chills, wbc. repeat labs
Unclear etiology of behavioral change. Wife reports patient is acting off with visual hallucinations with Infectious work up with negative UA, utox, and Within the limitations of the study, no evidence of acute fracture or traumatic listhesis, and no evidence of acute hardware complication. Utox unremarkable. No electrolyte distrubances. CT head ruled out stroke.   - s/p one dose zosyn, will continue for now pending infectious work up   - f.u blood cultures and urine culture   - Psychiatry consulted given concern for depressive symptoms and hallucinations.  - Patient currently Aox 3 now, continue to monitor mental status
Unclear etiology of behavioral change. Wife reports patient is acting off with visual hallucinations with Infectious work up with negative UA, utox, and Within the limitations of the study, no evidence of acute fracture or traumatic listhesis, and no evidence of acute hardware complication. Utox unremarkable. No electrolyte distrubances. CT head ruled out stroke.   - s/p one dose zosyn, course of zosyn completed   - f.u blood cultures and urine culture   - Psychiatry consulted given concern for depressive symptoms and hallucinations.  - Patient currently Aox 3 now, continue to monitor mental status  - TOV
Unclear etiology of behavioral change. Wife reports patient is acting off with visual hallucinations with Infectious work up with negative UA, utox, and Within the limitations of the study, no evidence of acute fracture or traumatic listhesis, and no evidence of acute hardware complication. Utox unremarkable. No electrolyte distrubances. CT head ruled out stroke.   - s/p one dose zosyn, course of zosyn completed   - f.u blood cultures and urine culture   - Psychiatry consulted given concern for depressive symptoms and hallucinations.  - Patient currently Aox 3 now, continue to monitor mental status  - TOV  - Monitor for fever. mild chills, wbc. repeat labs, Ua noted, afebrile, monitor clinically
Unclear etiology of behavioral change. Wife reports patient is acting off with visual hallucinations with Infectious work up with negative UA, utox, and Within the limitations of the study, no evidence of acute fracture or traumatic listhesis, and no evidence of acute hardware complication. Utox unremarkable. No electrolyte distrubances. CT head ruled out stroke.   - s/p one dose zosyn, course of zosyn completed   - f.u blood cultures and urine culture   - Psychiatry consulted given concern for depressive symptoms and hallucinations.  - Patient currently Aox 3 now, continue to monitor mental status  - TOV  - Monitor for fever. mild chills, wbc. repeat labs, Ua noted, afebrile, monitor clinically  course of oral abx for UTI
Unclear etiology of behavioral change. Wife reports patient is acting off with visual hallucinations with Infectious work up with negative UA, utox, and Within the limitations of the study, no evidence of acute fracture or traumatic listhesis, and no evidence of acute hardware complication. Utox unremarkable. No electrolyte distrubances. CT head ruled out stroke.   - s/p one dose zosyn, course of zosyn completed   - f.u blood cultures and urine culture   - Psychiatry consulted given concern for depressive symptoms and hallucinations.  - Patient currently Aox 3 now, continue to monitor mental status  - TOV
Unclear etiology of behavioral change. Wife reports patient is acting off with visual hallucinations with Infectious work up with negative UA, utox, and Within the limitations of the study, no evidence of acute fracture or traumatic listhesis, and no evidence of acute hardware complication. Utox unremarkable. No electrolyte distrubances. CT head ruled out stroke.   - s/p one dose zosyn, will continue for now pending infectious work up , negative todate   - f.u blood cultures and urine culture   - Psychiatry consulted given concern for depressive symptoms and hallucinations.  - Patient currently Aox 3 now, continue to monitor mental status  - TOV
Unclear etiology of behavioral change. Wife reports patient is acting off with visual hallucinations with Infectious work up with negative UA, utox, and Within the limitations of the study, no evidence of acute fracture or traumatic listhesis, and no evidence of acute hardware complication. Utox unremarkable. No electrolyte distrubances. CT head ruled out stroke.   - s/p one dose zosyn, course of zosyn completed   - f.u blood cultures and urine culture   - Psychiatry consulted given concern for depressive symptoms and hallucinations.  - Patient currently Aox 3 now, continue to monitor mental status  - TOV  - Monitor for fever. mild chills, wbc. repeat labs, Ua noted, afebrile, monitor clinically
Unclear etiology of behavioral change. Wife reports patient is acting off with visual hallucinations with Infectious work up with negative UA, utox, and Within the limitations of the study, no evidence of acute fracture or traumatic listhesis, and no evidence of acute hardware complication. Utox unremarkable. No electrolyte distrubances. CT head ruled out stroke.   - s/p one dose zosyn, will continue for now pending infectious work up , negative todate   - f.u blood cultures and urine culture   - Psychiatry consulted given concern for depressive symptoms and hallucinations.  - Patient currently Aox 3 now, continue to monitor mental status  - TOV
Unclear etiology of behavioral change. Wife reports patient is acting off with visual hallucinations with Infectious work up with negative UA, utox, and Within the limitations of the study, no evidence of acute fracture or traumatic listhesis, and no evidence of acute hardware complication. Utox unremarkable. No electrolyte distrubances. CT head ruled out stroke.   - s/p one dose zosyn, will continue for now pending infectious work up , negative todate   - f.u blood cultures and urine culture   - Psychiatry consulted given concern for depressive symptoms and hallucinations.  - Patient currently Aox 3 now, continue to monitor mental status
Unclear etiology of behavioral change. Wife reports patient is acting off with visual hallucinations with Infectious work up with negative UA, utox, and Within the limitations of the study, no evidence of acute fracture or traumatic listhesis, and no evidence of acute hardware complication. Utox unremarkable. No electrolyte distrubances. CT head ruled out stroke.   - s/p one dose zosyn, will continue for now pending infectious work up , negative todate   - f.u blood cultures and urine culture   - Psychiatry consulted given concern for depressive symptoms and hallucinations.  - Patient currently Aox 3 now, continue to monitor mental status  - TOV

## 2025-05-19 NOTE — PROGRESS NOTE ADULT - PROBLEM SELECTOR PLAN 2
- On AED w/ Keppra at home, resume   - On Baclofen   - CT head negative  - Fall, Seizure, Aspiration precautions   - PT/ OT  - Neuro appreciated
- On AED w/ Keppra at home, resume   - On Baclofen   - CT head negative  - Fall, Seizure, Aspiration precautions   - PT/ OT  - Neuro called
- On AED w/ Keppra at home, resume   - On Baclofen   - CT head negative  - Fall, Seizure, Aspiration precautions   - PT/ OT  - Neuro appreciated
- On AED w/ Keppra at home, resume   - On Baclofen   - CT head negative  - Fall, Seizure, Aspiration precautions   - PT/ OT  - Neuro called
- On AED w/ Keppra at home, resume   - On Baclofen   - CT head negative  - Fall, Seizure, Aspiration precautions   - PT/ OT  - Neuro called
- On AED w/ Keppra at home, resume   - On Baclofen   - CT head negative  - Fall, Seizure, Aspiration precautions   - PT/ OT  - Neuro appreciated
- On AED w/ Keppra at home, resume   - On Baclofen   - CT head negative  - Fall, Seizure, Aspiration precautions   - PT/ OT  - Neuro appreciated

## 2025-05-19 NOTE — PROGRESS NOTE ADULT - PROBLEM SELECTOR PLAN 7
Patient reports worsening neuropathy. However, does not appearing to be taking medicine as prescribed  - Continue home medications   - baclofen 20 mg oral tablet: 1 tab(s) orally 5 times a day as needed for Muscle Spasm   gabapentin 300 mg oral capsule: 1 cap(s) orally once a day (RX 1 cap(s) orally 3x a day)   pregabalin 150 mg oral capsule: 1 cap(s) orally once a day (RX for 1 capsule orally twice daily) iSTOP #[id number], last filled 4/4/25 for 90 days   tiZANidine 2 mg oral capsule: 1 cap(s) orally once a day (in the morning)  podiatry eval
Patient reports worsening neuropathy. However, does not appearing to be taking medicine as prescribed  - Continue home medications   - baclofen 20 mg oral tablet: 1 tab(s) orally 5 times a day as needed for Muscle Spasm   gabapentin 300 mg oral capsule: 1 cap(s) orally once a day (RX 1 cap(s) orally 3x a day)   pregabalin 150 mg oral capsule: 1 cap(s) orally once a day (RX for 1 capsule orally twice daily) iSTOP #[id number], last filled 4/4/25 for 90 days   tiZANidine 2 mg oral capsule: 1 cap(s) orally once a day (in the morning)  podiatry eval
Patient reports worsening neuropathy. However, does not appearing to be taking medicine as prescribed  - Continue home medications   - baclofen 20 mg oral tablet: 1 tab(s) orally 5 times a day as needed for Muscle Spasm   gabapentin 300 mg oral capsule: 1 cap(s) orally once a day (RX 1 cap(s) orally 3x a day)   pregabalin 150 mg oral capsule: 1 cap(s) orally once a day (RX for 1 capsule orally twice daily) iSTOP #[id number], last filled 4/4/25 for 90 days   tiZANidine 2 mg oral capsule: 1 cap(s) orally once a day (in the morning)  podiatry eval
Patient reports worsening neuropathy. However, does not appearing to be taking medicine as prescribed  - Continue home medications   - baclofen 20 mg oral tablet: 1 tab(s) orally 5 times a day as needed for Muscle Spasm   gabapentin 300 mg oral capsule: 1 cap(s) orally once a day (RX 1 cap(s) orally 3x a day)   pregabalin 150 mg oral capsule: 1 cap(s) orally once a day (RX for 1 capsule orally twice daily) iSTOP #[id number], last filled 4/4/25 for 90 days   tiZANidine 2 mg oral capsule: 1 cap(s) orally once a day (in the morning)  podiatry eval called
Patient reports worsening neuropathy. However, does not appearing to be taking medicine as prescribed  - Continue home medications   - baclofen 20 mg oral tablet: 1 tab(s) orally 5 times a day as needed for Muscle Spasm   gabapentin 300 mg oral capsule: 1 cap(s) orally once a day (RX 1 cap(s) orally 3x a day)   pregabalin 150 mg oral capsule: 1 cap(s) orally once a day (RX for 1 capsule orally twice daily) iSTOP #[id number], last filled 4/4/25 for 90 days   tiZANidine 2 mg oral capsule: 1 cap(s) orally once a day (in the morning)  podiatry eval
Patient reports worsening neuropathy. However, does not appearing to be taking medicine as prescribed  - Continue home medications   - baclofen 20 mg oral tablet: 1 tab(s) orally 5 times a day as needed for Muscle Spasm   gabapentin 300 mg oral capsule: 1 cap(s) orally once a day (RX 1 cap(s) orally 3x a day)   pregabalin 150 mg oral capsule: 1 cap(s) orally once a day (RX for 1 capsule orally twice daily) iSTOP #[id number], last filled 4/4/25 for 90 days   tiZANidine 2 mg oral capsule: 1 cap(s) orally once a day (in the morning)  podiatry eval called
Patient reports worsening neuropathy. However, does not appearing to be taking medicine as prescribed  - Continue home medications   - baclofen 20 mg oral tablet: 1 tab(s) orally 5 times a day as needed for Muscle Spasm   gabapentin 300 mg oral capsule: 1 cap(s) orally once a day (RX 1 cap(s) orally 3x a day)   pregabalin 150 mg oral capsule: 1 cap(s) orally once a day (RX for 1 capsule orally twice daily) iSTOP #[id number], last filled 4/4/25 for 90 days   tiZANidine 2 mg oral capsule: 1 cap(s) orally once a day (in the morning)  podiatry eval
Patient reports worsening neuropathy. However, does not appearing to be taking medicine as prescribed  - Continue home medications   - baclofen 20 mg oral tablet: 1 tab(s) orally 5 times a day as needed for Muscle Spasm   gabapentin 300 mg oral capsule: 1 cap(s) orally once a day (RX 1 cap(s) orally 3x a day)   pregabalin 150 mg oral capsule: 1 cap(s) orally once a day (RX for 1 capsule orally twice daily) iSTOP #[id number], last filled 4/4/25 for 90 days   tiZANidine 2 mg oral capsule: 1 cap(s) orally once a day (in the morning)  podiatry eval called
Patient reports worsening neuropathy. However, does not appearing to be taking medicine as prescribed  - Continue home medications   - baclofen 20 mg oral tablet: 1 tab(s) orally 5 times a day as needed for Muscle Spasm   gabapentin 300 mg oral capsule: 1 cap(s) orally once a day (RX 1 cap(s) orally 3x a day)   pregabalin 150 mg oral capsule: 1 cap(s) orally once a day (RX for 1 capsule orally twice daily) iSTOP #[id number], last filled 4/4/25 for 90 days   tiZANidine 2 mg oral capsule: 1 cap(s) orally once a day (in the morning)  podiatry eval called
Patient reports worsening neuropathy. However, does not appearing to be taking medicine as prescribed  - Continue home medications   - baclofen 20 mg oral tablet: 1 tab(s) orally 5 times a day as needed for Muscle Spasm   gabapentin 300 mg oral capsule: 1 cap(s) orally once a day (RX 1 cap(s) orally 3x a day)   pregabalin 150 mg oral capsule: 1 cap(s) orally once a day (RX for 1 capsule orally twice daily) iSTOP #[id number], last filled 4/4/25 for 90 days   tiZANidine 2 mg oral capsule: 1 cap(s) orally once a day (in the morning)  podiatry eval
Patient reports worsening neuropathy. However, does not appearing to be taking medicine as prescribed  - Continue home medications   - baclofen 20 mg oral tablet: 1 tab(s) orally 5 times a day as needed for Muscle Spasm   gabapentin 300 mg oral capsule: 1 cap(s) orally once a day (RX 1 cap(s) orally 3x a day)   pregabalin 150 mg oral capsule: 1 cap(s) orally once a day (RX for 1 capsule orally twice daily) iSTOP #[id number], last filled 4/4/25 for 90 days   tiZANidine 2 mg oral capsule: 1 cap(s) orally once a day (in the morning)  podiatry eval called

## 2025-05-19 NOTE — DISCHARGE NOTE NURSING/CASE MANAGEMENT/SOCIAL WORK - NSDCPEFALRISK_GEN_ALL_CORE
For information on Fall & Injury Prevention, visit: [email or web address] OR  [email or web address] OR  https://www.cdc.gov/steadi/patient.html

## 2025-05-19 NOTE — DISCHARGE NOTE NURSING/CASE MANAGEMENT/SOCIAL WORK - NSDPFAC_GEN_ALL_CORE
[facility] // [address] [place] [id number] // Phone: [phone]

## 2025-05-19 NOTE — DISCHARGE NOTE NURSING/CASE MANAGEMENT/SOCIAL WORK - PATIENT PORTAL LINK FT
You can access the FollowMyHealth Patient Portal offered by [facility] by registering at the following website: [email or web address]. By joining [employer]’s FollowMyHealth portal, you will also be able to view your health information using other applications (apps) compatible with our system.

## 2025-05-19 NOTE — PROGRESS NOTE ADULT - PROBLEM SELECTOR PLAN 9
Lovenox   Dash diet  dispo; pending work up

## 2025-05-19 NOTE — DISCHARGE NOTE NURSING/CASE MANAGEMENT/SOCIAL WORK - NSDCFUADDAPPT_GEN_ALL_CORE_FT
Outpatient follow up with Neurologist Dr. Cary Tim outpatient - will follow with her on discharge, suggest outpt [facility]. Please call to arrange an appointment.     Repeat labs on Monday at rehab, CBC/BMP per Dr. Urban.

## 2025-05-19 NOTE — DISCHARGE NOTE NURSING/CASE MANAGEMENT/SOCIAL WORK - FINANCIAL ASSISTANCE
[facility] provides services at a reduced cost to those who are determined to be eligible through [facility]’s financial assistance program. Information regarding [facility]’s financial assistance program can be found by going to [email or web address] or by calling [phone].

## 2025-05-19 NOTE — CHART NOTE - NSCHARTNOTEFT_GEN_A_CORE
16.5   15.92 )-----------( 211      ( 19 May 2025 06:45 )             49.4   Above labs discussed with attending, add Ceftin 250 mg BID x 7 days, Ok to proceed with discharge to Rehab.

## 2025-05-19 NOTE — PROGRESS NOTE ADULT - PROBLEM SELECTOR PROBLEM 8
COPD with hypoxia

## 2025-05-19 NOTE — PROGRESS NOTE ADULT - SUBJECTIVE AND OBJECTIVE BOX
Name of Patient : MANUEL PETERSON  MRN: [id number]  Date of visit: 05-18-25       Subjective: Patient seen and examined. No new events except as noted.     REVIEW OF SYSTEMS:    CONSTITUTIONAL: No weakness, fevers or chills  EYES/ENT: No visual changes;  No vertigo or throat pain   NECK: No pain or stiffness  RESPIRATORY: No cough, wheezing, hemoptysis; No shortness of breath  CARDIOVASCULAR: No chest pain or palpitations  GASTROINTESTINAL: No abdominal or epigastric pain. No nausea, vomiting, or hematemesis; No diarrhea or constipation. No melena or hematochezia.  GENITOURINARY: No dysuria, frequency or hematuria  NEUROLOGICAL: No numbness or weakness  SKIN: No itching, burning, rashes, or lesions   All other review of systems is negative unless indicated above.    MEDICATIONS:  MEDICATIONS  (STANDING):  amLODIPine   Tablet 5 milliGRAM(s) Oral daily  aspirin enteric coated 81 milliGRAM(s) Oral daily  chlorhexidine 2% Cloths 1 Application(s) Topical daily  DULoxetine 30 milliGRAM(s) Oral daily  enoxaparin Injectable 40 milliGRAM(s) SubCutaneous every 12 hours  levETIRAcetam 750 milliGRAM(s) Oral two times a day  polyethylene glycol 3350 17 Gram(s) Oral two times a day  pregabalin 150 milliGRAM(s) Oral two times a day  rosuvastatin 10 milliGRAM(s) Oral at bedtime  senna 2 Tablet(s) Oral at bedtime  tamsulosin 0.8 milliGRAM(s) Oral at bedtime  tiZANidine 2 milliGRAM(s) Oral daily      PHYSICAL EXAM:  T(C): 37.1 (05-18-25 @ 20:08), Max: 37.1 (05-18-25 @ 20:08)  HR: 67 (05-18-25 @ 20:08) (67 - 72)  BP: 141/71 (05-18-25 @ 20:08) (130/69 - 141/71)  RR: 18 (05-18-25 @ 20:08) (18 - 18)  SpO2: 97% (05-18-25 @ 20:08) (94% - 99%)  Wt(kg): --  I&O's Summary    18 May 2025 07:01  -  18 May 2025 21:56  --------------------------------------------------------  IN: 0 mL / OUT: 651 mL / NET: -651 mL          Appearance: Normal	  HEENT:  PERRLA   Lymphatic: No lymphadenopathy   Cardiovascular: Normal S1 S2, no JVD  Respiratory: normal effort , clear  Gastrointestinal:  Soft, Non-tender  Skin: No rashes,  warm to touch  Psychiatry:  Mood & affect appropriate  Musculuskeletal: No edema    recent labs, Imaging and EKGs personally reviewed   CODE status discussed with the patient in detail    05-18-25 @ 07:01  -  05-18-25 @ 21:56  --------------------------------------------------------  IN: 0 mL / OUT: 651 mL / NET: -651 mL                          16.4   11.08 )-----------( 220      ( 17 May 2025 05:11 )             49.5               05-17    141  |  101  |  11  ----------------------------<  100[H]  4.0   |  22  |  0.96    Ca    9.7      17 May 2025 05:11  Phos  3.3     05-17  Mg     2.00     05-17                         Urinalysis Basic - ( 17 May 2025 05:11 )    Color: x / Appearance: x / SG: x / pH: x  Gluc: 100 mg/dL / Ketone: x  / Bili: x / Urobili: x   Blood: x / Protein: x / Nitrite: x   Leuk Esterase: x / RBC: x / WBC x   Sq Epi: x / Non Sq Epi: x / Bacteria: x              
Name of Patient : MANUEL PETERSON  MRN: [id number]  Date of visit: 05-12-25       Subjective: Patient seen and examined. No new events except as noted.   Doing okay   EEG     REVIEW OF SYSTEMS:    CONSTITUTIONAL: No weakness, fevers or chills  EYES/ENT: No visual changes;  No vertigo or throat pain   NECK: No pain or stiffness  RESPIRATORY: No cough, wheezing, hemoptysis; No shortness of breath  CARDIOVASCULAR: No chest pain or palpitations  GASTROINTESTINAL: No abdominal or epigastric pain. No nausea, vomiting, or hematemesis; No diarrhea or constipation. No melena or hematochezia.  GENITOURINARY: No dysuria, frequency or hematuria  NEUROLOGICAL: No numbness or weakness  SKIN: No itching, burning, rashes, or lesions   All other review of systems is negative unless indicated above.    MEDICATIONS:  MEDICATIONS  (STANDING):  aspirin enteric coated 81 milliGRAM(s) Oral daily  chlorhexidine 2% Cloths 1 Application(s) Topical daily  enoxaparin Injectable 40 milliGRAM(s) SubCutaneous every 12 hours  gabapentin 300 milliGRAM(s) Oral two times a day  levETIRAcetam 750 milliGRAM(s) Oral two times a day  polyethylene glycol 3350 17 Gram(s) Oral two times a day  pregabalin 150 milliGRAM(s) Oral two times a day  rosuvastatin 10 milliGRAM(s) Oral at bedtime  senna 2 Tablet(s) Oral at bedtime  tamsulosin 0.4 milliGRAM(s) Oral at bedtime  tiZANidine 2 milliGRAM(s) Oral daily      PHYSICAL EXAM:  T(C): 36.5 (05-12-25 @ 19:56), Max: 36.5 (05-12-25 @ 19:56)  HR: 65 (05-12-25 @ 19:56) (61 - 65)  BP: 152/66 (05-12-25 @ 19:56) (143/79 - 156/74)  RR: 18 (05-12-25 @ 19:56) (18 - 18)  SpO2: 94% (05-12-25 @ 19:56) (94% - 95%)  Wt(kg): --  I&O's Summary    11 May 2025 07:01  -  12 May 2025 07:00  --------------------------------------------------------  IN: 0 mL / OUT: 600 mL / NET: -600 mL          Appearance: Normal	  HEENT:  PERRLA   Lymphatic: No lymphadenopathy   Cardiovascular: Normal S1 S2, no JVD  Respiratory: normal effort , clear  Gastrointestinal:  Soft, Non-tender  Skin: No rashes,  warm to touch  Psychiatry:  Mood & affect appropriate  Musculuskeletal: No edema    recent labs, Imaging and EKGs personally reviewed   CODE status discussed with the patient in detail    05-11-25 @ 07:01  -  05-12-25 @ 07:00  --------------------------------------------------------  IN: 0 mL / OUT: 600 mL / NET: -600 mL                          15.4   10.91 )-----------( 225      ( 12 May 2025 04:35 )             46.2               05-12    139  |  102  |  14  ----------------------------<  105[H]  4.0   |  24  |  1.09    Ca    9.2      12 May 2025 04:35  Phos  3.0     05-12  Mg     2.00     05-12                         Urinalysis Basic - ( 12 May 2025 04:35 )    Color: x / Appearance: x / SG: x / pH: x  Gluc: 105 mg/dL / Ketone: x  / Bili: x / Urobili: x   Blood: x / Protein: x / Nitrite: x   Leuk Esterase: x / RBC: x / WBC x   Sq Epi: x / Non Sq Epi: x / Bacteria: x              
Neurology Progress Note    S: Patient seen and examined. No further hallucinations    MEDICATIONS:    acetaminophen     Tablet .. 650 milliGRAM(s) Oral every 6 hours PRN  aluminum hydroxide/magnesium hydroxide/simethicone Suspension 30 milliLiter(s) Oral every 4 hours PRN  amLODIPine   Tablet 5 milliGRAM(s) Oral daily  artificial tears (preservative free) Ophthalmic Solution 1 Drop(s) Both EYES three times a day PRN  aspirin enteric coated 81 milliGRAM(s) Oral daily  baclofen 5 milliGRAM(s) Oral every 8 hours PRN  bisacodyl 5 milliGRAM(s) Oral every 12 hours PRN  bisacodyl Suppository 10 milliGRAM(s) Rectal daily PRN  chlorhexidine 2% Cloths 1 Application(s) Topical daily  DULoxetine 30 milliGRAM(s) Oral daily  enoxaparin Injectable 40 milliGRAM(s) SubCutaneous every 12 hours  gabapentin 300 milliGRAM(s) Oral two times a day  levETIRAcetam 750 milliGRAM(s) Oral two times a day  melatonin 3 milliGRAM(s) Oral at bedtime PRN  ondansetron Injectable 4 milliGRAM(s) IV Push every 8 hours PRN  polyethylene glycol 3350 17 Gram(s) Oral two times a day  pregabalin 150 milliGRAM(s) Oral two times a day  rosuvastatin 10 milliGRAM(s) Oral at bedtime  senna 2 Tablet(s) Oral at bedtime  tamsulosin 0.8 milliGRAM(s) Oral at bedtime  tiZANidine 2 milliGRAM(s) Oral daily      LABS:                          17.4   13.36 )-----------( 219      ( 15 May 2025 04:37 )             52.0     05-15    139  |  102  |  12  ----------------------------<  108[H]  4.5   |  24  |  0.93    Ca    9.7      15 May 2025 04:37  Phos  3.0     05-15  Mg     2.10     05-15      CAPILLARY BLOOD GLUCOSE          Urinalysis Basic - ( 15 May 2025 04:37 )    Color: x / Appearance: x / SG: x / pH: x  Gluc: 108 mg/dL / Ketone: x  / Bili: x / Urobili: x   Blood: x / Protein: x / Nitrite: x   Leuk Esterase: x / RBC: x / WBC x   Sq Epi: x / Non Sq Epi: x / Bacteria: x      I&O's Summary    14 May 2025 07:01  -  15 May 2025 07:00  --------------------------------------------------------  IN: 0 mL / OUT: 691 mL / NET: -691 mL      Vital Signs Last 24 Hrs  T(C): 37 (15 May 2025 10:43), Max: 37.1 (14 May 2025 20:02)  T(F): 98.6 (15 May 2025 10:43), Max: 98.7 (14 May 2025 20:02)  HR: 70 (15 May 2025 10:43) (67 - 70)  BP: 135/73 (15 May 2025 10:43) (128/68 - 153/76)  BP(mean): --  RR: 18 (15 May 2025 10:43) (17 - 18)  SpO2: 98% (15 May 2025 10:43) (95% - 98%)    Parameters below as of 15 May 2025 10:43  Patient On (Oxygen Delivery Method): room air          General Exam:   General Appearance: Appropriately dressed and in no acute distress       Head: Normocephalic, atraumatic and no dysmorphic features  Ear, Nose, and Throat: Moist mucous membranes  CVS: S1S2+  Resp: No SOB, no wheeze or rhonchi  Abd: soft NTND  Extremities: No edema, no cyanosis  Skin: No bruises, no rashes     Neurological Exam:  Mental Status: Awake, alert and oriented x 3.  Able to follow simple and complex verbal commands. Able to name and repeat. fluent speech. No obvious aphasia or dysarthria noted.   Cranial Nerves: PERRL, EOMI, VFFC, sensation V1-V3 intact,  no obvious facial asymmetry, equal elevation of palate, scm/trap 5/5, tongue is midline on protrusion. hearing is grossly intact.   Motor: Uppers 4+/5, RUE slight wrist drop. lowers RLE 4-/5, LLE 4/5.    Sensation: dec in LE  Reflexes: 1+ throughout at biceps, brachioradialis, triceps, 0 patellars and ankles bilaterally and equal. No clonus. R toe and L toe were both downgoing.  Coordination: No dysmetria on FNF   Gait: deferred      I personally reviewed the below data/images/labs:    c< from: CT Head No Cont (05.07.25 @ 22:38) >    ACC: [id number] EXAM:  CT BRAIN   ORDERED BY: YAYA GASCA     PROCEDURE DATE:  05/07/2025          INTERPRETATION:  CLINICAL INFORMATION: Altered mental status    COMPARISON: Head CT 4/22/2024    TECHNIQUE:  Serial axial images were obtained from the skull base to the   vertex using multi-slice helical technique without intravenous contrast.   Sagittal and coronal reformats were obtained.    FINDINGS:    VENTRICLES AND SULCI: Stable prominence of the ventricles and cortical   sulci, nonspecific, likely reflects parenchymal volume loss.  INTRA-AXIAL: Right frontal encephalomalacia and gliosis with ex vacuo   dilatation of the right frontal horn. Minimal encephalomalacia of the   left anterior/medial frontal lobe. No acute intracranial hemorrhage or   mass effect. Scattered foci of white matter hypoattenuation without   associated mass effect, nonspecific, likely chronic microvascular   disease. Skull base vascular calcifications.  EXTRA-AXIAL: Suprasellar aneurysm clip with associated streak artifact.   No extra-axial mass or abnormal collection.    VISUALIZED SINUSES:  The left frontal sinus is opacified.  TYMPANOMASTOID CAVITIES:  Clear.  VISUALIZED ORBITS: Bilateral lens replacement.  CALVARIUM: Remote right pterional craniotomy. Chronic right nasal bone   deformity.    IMPRESSION:    No acute intracranial hemorrhage or mass effect.  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    --- End of Report ---          HARRIET MYLES MD; Resident Radiologist  This document has been electronically signed.  UMA WINTER MD; Attending Radiologist  This document has been electronically signed. May  8 2025 12:13AM    < end of copied text >  < from: CT Lumbar Spine No Cont (05.07.25 @ 22:38) >    ACC: [id number] EXAM:  CT LUMBAR SPINE   ORDERED BY: YAYA GASCA     PROCEDURE DATE:  05/07/2025          INTERPRETATION:  CLINICAL INFORMATION: eval fracture    COMPARISON: CT abdomen pelvis 6/1/2024, CT L-spine 11/18/2022.    CONTRAST:  IV Contrast:NONE    TECHNIQUE:  Serial axial images were obtained of the lumbar spine using   multi-slice helical technique. Reformatted coronal and sagittal images   were obtained.    FINDINGS:    POSTSURGICAL: Extensive posterior spinal fixation hardware in the   visualized thoracic spine through S1 with transpedicular screws and   posterior spinal rods T10-L2, L4-S1. Left lateral fixation screws L2-3.   Anterior screw at L3-4, L4-5, L5-S1. Additional screws transfix the right   sacroiliac joint. Intervertebral disc spacer at multiple levels in the   lower lumbar spine. Extensive streak artifact. No gross evidence of acute   hardware complication. Posterior decompression L2-L5. Right hip   arthroplasty is also seen.    VERTEBRAE: Diffuse osseous demineralization. Stable vertebral body   heights including L3 compression deformity, unchanged since CT abdomen   pelvis 6/1/2024 (304:66). Within limitations of the study, no evidence of   acute fracture.[Multilevel degenerative changes including marginal  osteophytes.  ALIGNMENT: Stable alignment. No traumatic listhesis.    DISC SPACES: Postoperative changes with intervertebral disc spacers L3-4,   L4-5, L5-S1. Mineralization across multiple disc spaces in the lower   thoracic and lumbar spine. Appearance is stable from prior. Limited   assessment of the spinal canal contents secondary to extensive streak   artifact. Discogenic and facet degenerative changes result in multilevel   osseous neural foraminal narrowing, most pronounced in the lower lumbar   spine.    MISCELLANEOUS: Moderate retained fecal material in the rectum with rectal   wall thickening and adjacent fat stranding, which may reflect stercoral   proctocolitis. Atherosclerotic changes. Diffuse atrophy of the paraspinal   musculature. Curvilinear density likely a lead is partially visualized in   the subcutaneous soft tissues of the left back at the level of the upper   lumbar spine.    IMPRESSION:  Evaluation limited due to artifact from extensive spinal hardware.    Withinthe limitations of the study, no evidence of acute fracture or   traumatic listhesis, and no evidence of acute hardware complication.      --- End of Report ---      < end of copied text >    
Neurology Progress Note    S: Patient seen and examined. No further hallucinations    MEDICATIONS:    acetaminophen     Tablet .. 650 milliGRAM(s) Oral every 6 hours PRN  aluminum hydroxide/magnesium hydroxide/simethicone Suspension 30 milliLiter(s) Oral every 4 hours PRN  amLODIPine   Tablet 5 milliGRAM(s) Oral daily  artificial tears (preservative free) Ophthalmic Solution 1 Drop(s) Both EYES three times a day PRN  aspirin enteric coated 81 milliGRAM(s) Oral daily  baclofen 5 milliGRAM(s) Oral every 8 hours PRN  bisacodyl 5 milliGRAM(s) Oral every 12 hours PRN  bisacodyl Suppository 10 milliGRAM(s) Rectal daily PRN  chlorhexidine 2% Cloths 1 Application(s) Topical daily  DULoxetine 30 milliGRAM(s) Oral daily  enoxaparin Injectable 40 milliGRAM(s) SubCutaneous every 12 hours  gabapentin 300 milliGRAM(s) Oral two times a day  levETIRAcetam 750 milliGRAM(s) Oral two times a day  melatonin 3 milliGRAM(s) Oral at bedtime PRN  ondansetron Injectable 4 milliGRAM(s) IV Push every 8 hours PRN  polyethylene glycol 3350 17 Gram(s) Oral two times a day  pregabalin 150 milliGRAM(s) Oral two times a day  rosuvastatin 10 milliGRAM(s) Oral at bedtime  senna 2 Tablet(s) Oral at bedtime  tamsulosin 0.8 milliGRAM(s) Oral at bedtime  tiZANidine 2 milliGRAM(s) Oral daily      LABS:                          17.4   13.36 )-----------( 219      ( 15 May 2025 04:37 )             52.0     05-15    139  |  102  |  12  ----------------------------<  108[H]  4.5   |  24  |  0.93    Ca    9.7      15 May 2025 04:37  Phos  3.0     05-15  Mg     2.10     05-15      CAPILLARY BLOOD GLUCOSE          Urinalysis Basic - ( 15 May 2025 04:37 )    Color: x / Appearance: x / SG: x / pH: x  Gluc: 108 mg/dL / Ketone: x  / Bili: x / Urobili: x   Blood: x / Protein: x / Nitrite: x   Leuk Esterase: x / RBC: x / WBC x   Sq Epi: x / Non Sq Epi: x / Bacteria: x      I&O's Summary    15 May 2025 07:01  -  16 May 2025 07:00  --------------------------------------------------------  IN: 0 mL / OUT: 601 mL / NET: -601 mL      Vital Signs Last 24 Hrs  T(C): 36.8 (16 May 2025 05:35), Max: 37 (15 May 2025 10:43)  T(F): 98.3 (16 May 2025 05:35), Max: 98.6 (15 May 2025 10:43)  HR: 77 (16 May 2025 05:35) (70 - 77)  BP: 123/77 (16 May 2025 05:35) (123/77 - 135/73)  BP(mean): --  RR: 17 (16 May 2025 05:35) (17 - 18)  SpO2: 97% (16 May 2025 05:35) (96% - 98%)    Parameters below as of 16 May 2025 05:35  Patient On (Oxygen Delivery Method): room air          General Exam:   General Appearance: Appropriately dressed and in no acute distress       Head: Normocephalic, atraumatic and no dysmorphic features  Ear, Nose, and Throat: Moist mucous membranes  CVS: S1S2+  Resp: No SOB, no wheeze or rhonchi  Abd: soft NTND  Extremities: No edema, no cyanosis  Skin: No bruises, no rashes     Neurological Exam:  Mental Status: Awake, alert and oriented x 3.  Able to follow simple and complex verbal commands. Able to name and repeat. fluent speech. No obvious aphasia or dysarthria noted.   Cranial Nerves: PERRL, EOMI, VFFC, sensation V1-V3 intact,  no obvious facial asymmetry, equal elevation of palate, scm/trap 5/5, tongue is midline on protrusion. hearing is grossly intact.   Motor: Uppers 4+/5, RUE slight wrist drop. lowers RLE 4-/5, LLE 4/5.    Sensation: dec in LE  Reflexes: 1+ throughout at biceps, brachioradialis, triceps, 0 patellars and ankles bilaterally and equal. No clonus. R toe and L toe were both downgoing.  Coordination: No dysmetria on FNF   Gait: deferred      I personally reviewed the below data/images/labs:    c< from: CT Head No Cont (05.07.25 @ 22:38) >    ACC: [id number] EXAM:  CT BRAIN   ORDERED BY: YAYA GASCA     PROCEDURE DATE:  05/07/2025          INTERPRETATION:  CLINICAL INFORMATION: Altered mental status    COMPARISON: Head CT 4/22/2024    TECHNIQUE:  Serial axial images were obtained from the skull base to the   vertex using multi-slice helical technique without intravenous contrast.   Sagittal and coronal reformats were obtained.    FINDINGS:    VENTRICLES AND SULCI: Stable prominence of the ventricles and cortical   sulci, nonspecific, likely reflects parenchymal volume loss.  INTRA-AXIAL: Right frontal encephalomalacia and gliosis with ex vacuo   dilatation of the right frontal horn. Minimal encephalomalacia of the   left anterior/medial frontal lobe. No acute intracranial hemorrhage or   mass effect. Scattered foci of white matter hypoattenuation without   associated mass effect, nonspecific, likely chronic microvascular   disease. Skull base vascular calcifications.  EXTRA-AXIAL: Suprasellar aneurysm clip with associated streak artifact.   No extra-axial mass or abnormal collection.    VISUALIZED SINUSES:  The left frontal sinus is opacified.  TYMPANOMASTOID CAVITIES:  Clear.  VISUALIZED ORBITS: Bilateral lens replacement.  CALVARIUM: Remote right pterional craniotomy. Chronic right nasal bone   deformity.    IMPRESSION:    No acute intracranial hemorrhage or mass effect.  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    --- End of Report ---          HARRIET MYLES MD; Resident Radiologist  This document has been electronically signed.  UMA WINTER MD; Attending Radiologist  This document has been electronically signed. May  8 2025 12:13AM    < end of copied text >  < from: CT Lumbar Spine No Cont (05.07.25 @ 22:38) >    ACC: [id number] EXAM:  CT LUMBAR SPINE   ORDERED BY: YAYA GASCA     PROCEDURE DATE:  05/07/2025          INTERPRETATION:  CLINICAL INFORMATION: eval fracture    COMPARISON: CT abdomen pelvis 6/1/2024, CT L-spine 11/18/2022.    CONTRAST:  IV Contrast:NONE    TECHNIQUE:  Serial axial images were obtained of the lumbar spine using   multi-slice helical technique. Reformatted coronal and sagittal images   were obtained.    FINDINGS:    POSTSURGICAL: Extensive posterior spinal fixation hardware in the   visualized thoracic spine through S1 with transpedicular screws and   posterior spinal rods T10-L2, L4-S1. Left lateral fixation screws L2-3.   Anterior screw at L3-4, L4-5, L5-S1. Additional screws transfix the right   sacroiliac joint. Intervertebral disc spacer at multiple levels in the   lower lumbar spine. Extensive streak artifact. No gross evidence of acute   hardware complication. Posterior decompression L2-L5. Right hip   arthroplasty is also seen.    VERTEBRAE: Diffuse osseous demineralization. Stable vertebral body   heights including L3 compression deformity, unchanged since CT abdomen   pelvis 6/1/2024 (304:66). Within limitations of the study, no evidence of   acute fracture.[Multilevel degenerative changes including marginal  osteophytes.  ALIGNMENT: Stable alignment. No traumatic listhesis.    DISC SPACES: Postoperative changes with intervertebral disc spacers L3-4,   L4-5, L5-S1. Mineralization across multiple disc spaces in the lower   thoracic and lumbar spine. Appearance is stable from prior. Limited   assessment of the spinal canal contents secondary to extensive streak   artifact. Discogenic and facet degenerative changes result in multilevel   osseous neural foraminal narrowing, most pronounced in the lower lumbar   spine.    MISCELLANEOUS: Moderate retained fecal material in the rectum with rectal   wall thickening and adjacent fat stranding, which may reflect stercoral   proctocolitis. Atherosclerotic changes. Diffuse atrophy of the paraspinal   musculature. Curvilinear density likely a lead is partially visualized in   the subcutaneous soft tissues of the left back at the level of the upper   lumbar spine.    IMPRESSION:  Evaluation limited due to artifact from extensive spinal hardware.    Withinthe limitations of the study, no evidence of acute fracture or   traumatic listhesis, and no evidence of acute hardware complication.      --- End of Report ---      < end of copied text >    
Neurology Progress Note    S: Patient seen and examined. No further hallucinations    MEDICATIONS:    acetaminophen     Tablet .. 650 milliGRAM(s) Oral every 6 hours PRN  aluminum hydroxide/magnesium hydroxide/simethicone Suspension 30 milliLiter(s) Oral every 4 hours PRN  amLODIPine   Tablet 5 milliGRAM(s) Oral daily  artificial tears (preservative free) Ophthalmic Solution 1 Drop(s) Both EYES three times a day PRN  aspirin enteric coated 81 milliGRAM(s) Oral daily  baclofen 5 milliGRAM(s) Oral every 8 hours PRN  bisacodyl 5 milliGRAM(s) Oral every 12 hours PRN  bisacodyl Suppository 10 milliGRAM(s) Rectal daily PRN  chlorhexidine 2% Cloths 1 Application(s) Topical daily  DULoxetine 30 milliGRAM(s) Oral daily  enoxaparin Injectable 40 milliGRAM(s) SubCutaneous every 12 hours  gabapentin 300 milliGRAM(s) Oral two times a day  levETIRAcetam 750 milliGRAM(s) Oral two times a day  melatonin 3 milliGRAM(s) Oral at bedtime PRN  ondansetron Injectable 4 milliGRAM(s) IV Push every 8 hours PRN  polyethylene glycol 3350 17 Gram(s) Oral two times a day  pregabalin 150 milliGRAM(s) Oral two times a day  rosuvastatin 10 milliGRAM(s) Oral at bedtime  senna 2 Tablet(s) Oral at bedtime  tamsulosin 0.8 milliGRAM(s) Oral at bedtime  tiZANidine 2 milliGRAM(s) Oral daily      LABS:            CAPILLARY BLOOD GLUCOSE            I&O's Summary    13 May 2025 07:01  -  14 May 2025 07:00  --------------------------------------------------------  IN: 0 mL / OUT: 500 mL / NET: -500 mL    14 May 2025 07:01  -  14 May 2025 16:13  --------------------------------------------------------  IN: 0 mL / OUT: 0 mL / NET: 0 mL      Vital Signs Last 24 Hrs  T(C): 36.6 (14 May 2025 13:06), Max: 36.6 (13 May 2025 19:59)  T(F): 97.9 (14 May 2025 13:06), Max: 97.9 (13 May 2025 19:59)  HR: 68 (14 May 2025 13:06) (57 - 68)  BP: 124/70 (14 May 2025 13:06) (124/62 - 153/87)  BP(mean): --  RR: 18 (14 May 2025 13:06) (18 - 18)  SpO2: 96% (14 May 2025 13:06) (95% - 97%)    Parameters below as of 14 May 2025 13:06  Patient On (Oxygen Delivery Method): room air        General Exam:   General Appearance: Appropriately dressed and in no acute distress       Head: Normocephalic, atraumatic and no dysmorphic features  Ear, Nose, and Throat: Moist mucous membranes  CVS: S1S2+  Resp: No SOB, no wheeze or rhonchi  Abd: soft NTND  Extremities: No edema, no cyanosis  Skin: No bruises, no rashes     Neurological Exam:  Mental Status: Awake, alert and oriented x 3.  Able to follow simple and complex verbal commands. Able to name and repeat. fluent speech. No obvious aphasia or dysarthria noted.   Cranial Nerves: PERRL, EOMI, VFFC, sensation V1-V3 intact,  no obvious facial asymmetry, equal elevation of palate, scm/trap 5/5, tongue is midline on protrusion. hearing is grossly intact.   Motor: Uppers 4+/5, RUE slight wrist drop. lowers RLE 4-/5, LLE 4/5.    Sensation: dec in LE  Reflexes: 1+ throughout at biceps, brachioradialis, triceps, 0 patellars and ankles bilaterally and equal. No clonus. R toe and L toe were both downgoing.  Coordination: No dysmetria on FNF   Gait: deferred      I personally reviewed the below data/images/labs:    c< from: CT Head No Cont (05.07.25 @ 22:38) >    ACC: [id number] EXAM:  CT BRAIN   ORDERED BY: YAYA GASCA     PROCEDURE DATE:  05/07/2025          INTERPRETATION:  CLINICAL INFORMATION: Altered mental status    COMPARISON: Head CT 4/22/2024    TECHNIQUE:  Serial axial images were obtained from the skull base to the   vertex using multi-slice helical technique without intravenous contrast.   Sagittal and coronal reformats were obtained.    FINDINGS:    VENTRICLES AND SULCI: Stable prominence of the ventricles and cortical   sulci, nonspecific, likely reflects parenchymal volume loss.  INTRA-AXIAL: Right frontal encephalomalacia and gliosis with ex vacuo   dilatation of the right frontal horn. Minimal encephalomalacia of the   left anterior/medial frontal lobe. No acute intracranial hemorrhage or   mass effect. Scattered foci of white matter hypoattenuation without   associated mass effect, nonspecific, likely chronic microvascular   disease. Skull base vascular calcifications.  EXTRA-AXIAL: Suprasellar aneurysm clip with associated streak artifact.   No extra-axial mass or abnormal collection.    VISUALIZED SINUSES:  The left frontal sinus is opacified.  TYMPANOMASTOID CAVITIES:  Clear.  VISUALIZED ORBITS: Bilateral lens replacement.  CALVARIUM: Remote right pterional craniotomy. Chronic right nasal bone   deformity.    IMPRESSION:    No acute intracranial hemorrhage or mass effect.  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    --- End of Report ---          HARRIET MYLES MD; Resident Radiologist  This document has been electronically signed.  UMA WINTER MD; Attending Radiologist  This document has been electronically signed. May  8 2025 12:13AM    < end of copied text >  < from: CT Lumbar Spine No Cont (05.07.25 @ 22:38) >    ACC: [id number] EXAM:  CT LUMBAR SPINE   ORDERED BY: YAYA GASCA     PROCEDURE DATE:  05/07/2025          INTERPRETATION:  CLINICAL INFORMATION: eval fracture    COMPARISON: CT abdomen pelvis 6/1/2024, CT L-spine 11/18/2022.    CONTRAST:  IV Contrast:NONE    TECHNIQUE:  Serial axial images were obtained of the lumbar spine using   multi-slice helical technique. Reformatted coronal and sagittal images   were obtained.    FINDINGS:    POSTSURGICAL: Extensive posterior spinal fixation hardware in the   visualized thoracic spine through S1 with transpedicular screws and   posterior spinal rods T10-L2, L4-S1. Left lateral fixation screws L2-3.   Anterior screw at L3-4, L4-5, L5-S1. Additional screws transfix the right   sacroiliac joint. Intervertebral disc spacer at multiple levels in the   lower lumbar spine. Extensive streak artifact. No gross evidence of acute   hardware complication. Posterior decompression L2-L5. Right hip   arthroplasty is also seen.    VERTEBRAE: Diffuse osseous demineralization. Stable vertebral body   heights including L3 compression deformity, unchanged since CT abdomen   pelvis 6/1/2024 (304:66). Within limitations of the study, no evidence of   acute fracture.[Multilevel degenerative changes including marginal  osteophytes.  ALIGNMENT: Stable alignment. No traumatic listhesis.    DISC SPACES: Postoperative changes with intervertebral disc spacers L3-4,   L4-5, L5-S1. Mineralization across multiple disc spaces in the lower   thoracic and lumbar spine. Appearance is stable from prior. Limited   assessment of the spinal canal contents secondary to extensive streak   artifact. Discogenic and facet degenerative changes result in multilevel   osseous neural foraminal narrowing, most pronounced in the lower lumbar   spine.    MISCELLANEOUS: Moderate retained fecal material in the rectum with rectal   wall thickening and adjacent fat stranding, which may reflect stercoral   proctocolitis. Atherosclerotic changes. Diffuse atrophy of the paraspinal   musculature. Curvilinear density likely a lead is partially visualized in   the subcutaneous soft tissues of the left back at the level of the upper   lumbar spine.    IMPRESSION:  Evaluation limited due to artifact from extensive spinal hardware.    Withinthe limitations of the study, no evidence of acute fracture or   traumatic listhesis, and no evidence of acute hardware complication.      --- End of Report ---      < end of copied text >    
Neurology Progress Note    S: Patient seen and examined. No further hallucinations      MEDICATIONS:    acetaminophen     Tablet .. 650 milliGRAM(s) Oral every 6 hours PRN  aluminum hydroxide/magnesium hydroxide/simethicone Suspension 30 milliLiter(s) Oral every 4 hours PRN  amLODIPine   Tablet 5 milliGRAM(s) Oral daily  artificial tears (preservative free) Ophthalmic Solution 1 Drop(s) Both EYES three times a day PRN  aspirin enteric coated 81 milliGRAM(s) Oral daily  baclofen 5 milliGRAM(s) Oral every 8 hours PRN  chlorhexidine 2% Cloths 1 Application(s) Topical daily  DULoxetine 30 milliGRAM(s) Oral daily  enoxaparin Injectable 40 milliGRAM(s) SubCutaneous every 12 hours  gabapentin 300 milliGRAM(s) Oral two times a day  levETIRAcetam 750 milliGRAM(s) Oral two times a day  melatonin 3 milliGRAM(s) Oral at bedtime PRN  ondansetron Injectable 4 milliGRAM(s) IV Push every 8 hours PRN  polyethylene glycol 3350 17 Gram(s) Oral two times a day  pregabalin 150 milliGRAM(s) Oral two times a day  rosuvastatin 10 milliGRAM(s) Oral at bedtime  senna 2 Tablet(s) Oral at bedtime  tamsulosin 0.8 milliGRAM(s) Oral at bedtime  tiZANidine 2 milliGRAM(s) Oral daily      LABS:                          15.4   10.91 )-----------( 225      ( 12 May 2025 04:35 )             46.2     05-12    139  |  102  |  14  ----------------------------<  105[H]  4.0   |  24  |  1.09    Ca    9.2      12 May 2025 04:35  Phos  3.0     05-12  Mg     2.00     05-12      CAPILLARY BLOOD GLUCOSE          Urinalysis Basic - ( 12 May 2025 04:35 )    Color: x / Appearance: x / SG: x / pH: x  Gluc: 105 mg/dL / Ketone: x  / Bili: x / Urobili: x   Blood: x / Protein: x / Nitrite: x   Leuk Esterase: x / RBC: x / WBC x   Sq Epi: x / Non Sq Epi: x / Bacteria: x      I&O's Summary    Vital Signs Last 24 Hrs  T(C): 36.5 (13 May 2025 12:07), Max: 36.5 (12 May 2025 19:56)  T(F): 97.7 (13 May 2025 12:07), Max: 97.7 (12 May 2025 19:56)  HR: 64 (13 May 2025 13:53) (64 - 68)  BP: 174/70 (13 May 2025 13:53) (142/62 - 174/70)  BP(mean): --  RR: 18 (13 May 2025 13:53) (18 - 18)  SpO2: 100% (13 May 2025 13:53) (94% - 100%)    Parameters below as of 13 May 2025 13:53  Patient On (Oxygen Delivery Method): room air          General Exam:   General Appearance: Appropriately dressed and in no acute distress       Head: Normocephalic, atraumatic and no dysmorphic features  Ear, Nose, and Throat: Moist mucous membranes  CVS: S1S2+  Resp: No SOB, no wheeze or rhonchi  Abd: soft NTND  Extremities: No edema, no cyanosis  Skin: No bruises, no rashes     Neurological Exam:  Mental Status: Awake, alert and oriented x 3.  Able to follow simple and complex verbal commands. Able to name and repeat. fluent speech. No obvious aphasia or dysarthria noted.   Cranial Nerves: PERRL, EOMI, VFFC, sensation V1-V3 intact,  no obvious facial asymmetry, equal elevation of palate, scm/trap 5/5, tongue is midline on protrusion. hearing is grossly intact.   Motor: Uppers 4+/5, RUE slight wrist drop. lowers RLE 4-/5, LLE 4/5.    Sensation: dec in LE  Reflexes: 1+ throughout at biceps, brachioradialis, triceps, 0 patellars and ankles bilaterally and equal. No clonus. R toe and L toe were both downgoing.  Coordination: No dysmetria on FNF   Gait: deferred      I personally reviewed the below data/images/labs:    c< from: CT Head No Cont (05.07.25 @ 22:38) >    ACC: [id number] EXAM:  CT BRAIN   ORDERED BY: YAYA GASCA     PROCEDURE DATE:  05/07/2025          INTERPRETATION:  CLINICAL INFORMATION: Altered mental status    COMPARISON: Head CT 4/22/2024    TECHNIQUE:  Serial axial images were obtained from the skull base to the   vertex using multi-slice helical technique without intravenous contrast.   Sagittal and coronal reformats were obtained.    FINDINGS:    VENTRICLES AND SULCI: Stable prominence of the ventricles and cortical   sulci, nonspecific, likely reflects parenchymal volume loss.  INTRA-AXIAL: Right frontal encephalomalacia and gliosis with ex vacuo   dilatation of the right frontal horn. Minimal encephalomalacia of the   left anterior/medial frontal lobe. No acute intracranial hemorrhage or   mass effect. Scattered foci of white matter hypoattenuation without   associated mass effect, nonspecific, likely chronic microvascular   disease. Skull base vascular calcifications.  EXTRA-AXIAL: Suprasellar aneurysm clip with associated streak artifact.   No extra-axial mass or abnormal collection.    VISUALIZED SINUSES:  The left frontal sinus is opacified.  TYMPANOMASTOID CAVITIES:  Clear.  VISUALIZED ORBITS: Bilateral lens replacement.  CALVARIUM: Remote right pterional craniotomy. Chronic right nasal bone   deformity.    IMPRESSION:    No acute intracranial hemorrhage or mass effect.  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    --- End of Report ---          HARRIET MYLES MD; Resident Radiologist  This document has been electronically signed.  UMA WINTER MD; Attending Radiologist  This document has been electronically signed. May  8 2025 12:13AM    < end of copied text >  < from: CT Lumbar Spine No Cont (05.07.25 @ 22:38) >    ACC: [id number] EXAM:  CT LUMBAR SPINE   ORDERED BY: YAYA GASCA     PROCEDURE DATE:  05/07/2025          INTERPRETATION:  CLINICAL INFORMATION: eval fracture    COMPARISON: CT abdomen pelvis 6/1/2024, CT L-spine 11/18/2022.    CONTRAST:  IV Contrast:NONE    TECHNIQUE:  Serial axial images were obtained of the lumbar spine using   multi-slice helical technique. Reformatted coronal and sagittal images   were obtained.    FINDINGS:    POSTSURGICAL: Extensive posterior spinal fixation hardware in the   visualized thoracic spine through S1 with transpedicular screws and   posterior spinal rods T10-L2, L4-S1. Left lateral fixation screws L2-3.   Anterior screw at L3-4, L4-5, L5-S1. Additional screws transfix the right   sacroiliac joint. Intervertebral disc spacer at multiple levels in the   lower lumbar spine. Extensive streak artifact. No gross evidence of acute   hardware complication. Posterior decompression L2-L5. Right hip   arthroplasty is also seen.    VERTEBRAE: Diffuse osseous demineralization. Stable vertebral body   heights including L3 compression deformity, unchanged since CT abdomen   pelvis 6/1/2024 (304:66). Within limitations of the study, no evidence of   acute fracture.[Multilevel degenerative changes including marginal  osteophytes.  ALIGNMENT: Stable alignment. No traumatic listhesis.    DISC SPACES: Postoperative changes with intervertebral disc spacers L3-4,   L4-5, L5-S1. Mineralization across multiple disc spaces in the lower   thoracic and lumbar spine. Appearance is stable from prior. Limited   assessment of the spinal canal contents secondary to extensive streak   artifact. Discogenic and facet degenerative changes result in multilevel   osseous neural foraminal narrowing, most pronounced in the lower lumbar   spine.    MISCELLANEOUS: Moderate retained fecal material in the rectum with rectal   wall thickening and adjacent fat stranding, which may reflect stercoral   proctocolitis. Atherosclerotic changes. Diffuse atrophy of the paraspinal   musculature. Curvilinear density likely a lead is partially visualized in   the subcutaneous soft tissues of the left back at the level of the upper   lumbar spine.    IMPRESSION:  Evaluation limited due to artifact from extensive spinal hardware.    Withinthe limitations of the study, no evidence of acute fracture or   traumatic listhesis, and no evidence of acute hardware complication.      --- End of Report ---      < end of copied text >    
Neurology Progress Note    S: Patient seen and examined. No further hallucinations    Medication:  acetaminophen     Tablet .. 650 milliGRAM(s) Oral every 6 hours PRN  aluminum hydroxide/magnesium hydroxide/simethicone Suspension 30 milliLiter(s) Oral every 4 hours PRN  artificial tears (preservative free) Ophthalmic Solution 1 Drop(s) Both EYES three times a day PRN  aspirin enteric coated 81 milliGRAM(s) Oral daily  baclofen 5 milliGRAM(s) Oral every 8 hours PRN  chlorhexidine 2% Cloths 1 Application(s) Topical daily  DULoxetine 20 milliGRAM(s) Oral daily  enoxaparin Injectable 40 milliGRAM(s) SubCutaneous every 12 hours  gabapentin 300 milliGRAM(s) Oral three times a day  levETIRAcetam 750 milliGRAM(s) Oral two times a day  melatonin 3 milliGRAM(s) Oral at bedtime PRN  OLANZapine Injectable 1.25 milliGRAM(s) IntraMuscular every 6 hours PRN  ondansetron Injectable 4 milliGRAM(s) IV Push every 8 hours PRN  polyethylene glycol 3350 17 Gram(s) Oral two times a day  pregabalin 150 milliGRAM(s) Oral two times a day  QUEtiapine 25 milliGRAM(s) Oral every 6 hours PRN  rosuvastatin 10 milliGRAM(s) Oral at bedtime  senna 2 Tablet(s) Oral at bedtime  tamsulosin 0.4 milliGRAM(s) Oral at bedtime  tiZANidine 2 milliGRAM(s) Oral daily      Vitals:  Vital Signs Last 24 Hrs  T(C): 36.3 (12 May 2025 11:48), Max: 36.4 (11 May 2025 21:05)  T(F): 97.4 (12 May 2025 11:48), Max: 97.5 (11 May 2025 21:05)  HR: 65 (12 May 2025 11:48) (61 - 72)  BP: 143/79 (12 May 2025 11:48) (131/72 - 156/74)  BP(mean): --  RR: 18 (12 May 2025 11:48) (17 - 18)  SpO2: 95% (12 May 2025 11:48) (94% - 95%)    Parameters below as of 12 May 2025 11:48  Patient On (Oxygen Delivery Method): room air        General Exam:   General Appearance: Appropriately dressed and in no acute distress       Head: Normocephalic, atraumatic and no dysmorphic features  Ear, Nose, and Throat: Moist mucous membranes  CVS: S1S2+  Resp: No SOB, no wheeze or rhonchi  Abd: soft NTND  Extremities: No edema, no cyanosis  Skin: No bruises, no rashes     Neurological Exam:  Mental Status: Awake, alert and oriented x 3.  Able to follow simple and complex verbal commands. Able to name and repeat. fluent speech. No obvious aphasia or dysarthria noted.   Cranial Nerves: PERRL, EOMI, VFFC, sensation V1-V3 intact,  no obvious facial asymmetry, equal elevation of palate, scm/trap 5/5, tongue is midline on protrusion. hearing is grossly intact.   Motor: Uppers 4+/5, RUE slight wrist drop. lowers RLE 4-/5, LLE 4/5.    Sensation: dec in LE  Reflexes: 1+ throughout at biceps, brachioradialis, triceps, 0 patellars and ankles bilaterally and equal. No clonus. R toe and L toe were both downgoing.  Coordination: No dysmetria on FNF   Gait: deferred      I personally reviewed the below data/images/labs:      CBC Full  -  ( 12 May 2025 04:35 )  WBC Count : 10.91 K/uL  RBC Count : 5.13 M/uL  Hemoglobin : 15.4 g/dL  Hematocrit : 46.2 %  Platelet Count - Automated : 225 K/uL  Mean Cell Volume : 90.1 fL  Mean Cell Hemoglobin : 30.0 pg  Mean Cell Hemoglobin Concentration : 33.3 g/dL  Auto Neutrophil # : x  Auto Lymphocyte # : x  Auto Monocyte # : x  Auto Eosinophil # : x  Auto Basophil # : x  Auto Neutrophil % : x  Auto Lymphocyte % : x  Auto Monocyte % : x  Auto Eosinophil % : x  Auto Basophil % : x    05-12    139  |  102  |  14  ----------------------------<  105[H]  4.0   |  24  |  1.09    Ca    9.2      12 May 2025 04:35  Phos  3.0     05-12  Mg     2.00     05-12          Urinalysis Basic - ( 12 May 2025 04:35 )    Color: x / Appearance: x / SG: x / pH: x  Gluc: 105 mg/dL / Ketone: x  / Bili: x / Urobili: x   Blood: x / Protein: x / Nitrite: x   Leuk Esterase: x / RBC: x / WBC x   Sq Epi: x / Non Sq Epi: x / Bacteria: x      c< from: CT Head No Cont (05.07.25 @ 22:38) >    ACC: [id number] EXAM:  CT BRAIN   ORDERED BY: YAYA GASCA     PROCEDURE DATE:  05/07/2025          INTERPRETATION:  CLINICAL INFORMATION: Altered mental status    COMPARISON: Head CT 4/22/2024    TECHNIQUE:  Serial axial images were obtained from the skull base to the   vertex using multi-slice helical technique without intravenous contrast.   Sagittal and coronal reformats were obtained.    FINDINGS:    VENTRICLES AND SULCI: Stable prominence of the ventricles and cortical   sulci, nonspecific, likely reflects parenchymal volume loss.  INTRA-AXIAL: Right frontal encephalomalacia and gliosis with ex vacuo   dilatation of the right frontal horn. Minimal encephalomalacia of the   left anterior/medial frontal lobe. No acute intracranial hemorrhage or   mass effect. Scattered foci of white matter hypoattenuation without   associated mass effect, nonspecific, likely chronic microvascular   disease. Skull base vascular calcifications.  EXTRA-AXIAL: Suprasellar aneurysm clip with associated streak artifact.   No extra-axial mass or abnormal collection.    VISUALIZED SINUSES:  The left frontal sinus is opacified.  TYMPANOMASTOID CAVITIES:  Clear.  VISUALIZED ORBITS: Bilateral lens replacement.  CALVARIUM: Remote right pterional craniotomy. Chronic right nasal bone   deformity.    IMPRESSION:    No acute intracranial hemorrhage or mass effect.  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    --- End of Report ---          HARRIET MYLES MD; Resident Radiologist  This document has been electronically signed.  UMA WINTER MD; Attending Radiologist  This document has been electronically signed. May  8 2025 12:13AM    < end of copied text >  < from: CT Lumbar Spine No Cont (05.07.25 @ 22:38) >    ACC: [id number] EXAM:  CT LUMBAR SPINE   ORDERED BY: YAYA GASCA     PROCEDURE DATE:  05/07/2025          INTERPRETATION:  CLINICAL INFORMATION: eval fracture    COMPARISON: CT abdomen pelvis 6/1/2024, CT L-spine 11/18/2022.    CONTRAST:  IV Contrast:NONE    TECHNIQUE:  Serial axial images were obtained of the lumbar spine using   multi-slice helical technique. Reformatted coronal and sagittal images   were obtained.    FINDINGS:    POSTSURGICAL: Extensive posterior spinal fixation hardware in the   visualized thoracic spine through S1 with transpedicular screws and   posterior spinal rods T10-L2, L4-S1. Left lateral fixation screws L2-3.   Anterior screw at L3-4, L4-5, L5-S1. Additional screws transfix the right   sacroiliac joint. Intervertebral disc spacer at multiple levels in the   lower lumbar spine. Extensive streak artifact. No gross evidence of acute   hardware complication. Posterior decompression L2-L5. Right hip   arthroplasty is also seen.    VERTEBRAE: Diffuse osseous demineralization. Stable vertebral body   heights including L3 compression deformity, unchanged since CT abdomen   pelvis 6/1/2024 (304:66). Within limitations of the study, no evidence of   acute fracture.[Multilevel degenerative changes including marginal  osteophytes.  ALIGNMENT: Stable alignment. No traumatic listhesis.    DISC SPACES: Postoperative changes with intervertebral disc spacers L3-4,   L4-5, L5-S1. Mineralization across multiple disc spaces in the lower   thoracic and lumbar spine. Appearance is stable from prior. Limited   assessment of the spinal canal contents secondary to extensive streak   artifact. Discogenic and facet degenerative changes result in multilevel   osseous neural foraminal narrowing, most pronounced in the lower lumbar   spine.    MISCELLANEOUS: Moderate retained fecal material in the rectum with rectal   wall thickening and adjacent fat stranding, which may reflect stercoral   proctocolitis. Atherosclerotic changes. Diffuse atrophy of the paraspinal   musculature. Curvilinear density likely a lead is partially visualized in   the subcutaneous soft tissues of the left back at the level of the upper   lumbar spine.    IMPRESSION:  Evaluation limited due to artifact from extensive spinal hardware.    Withinthe limitations of the study, no evidence of acute fracture or   traumatic listhesis, and no evidence of acute hardware complication.      --- End of Report ---      < end of copied text >    
Name of Patient : MANUEL PETERSON  MRN: [id number]  Date of visit: 05-13-25      Subjective: Patient seen and examined. No new events except as noted.   Doing okay     REVIEW OF SYSTEMS:    CONSTITUTIONAL: No weakness, fevers or chills  EYES/ENT: No visual changes;  No vertigo or throat pain   NECK: No pain or stiffness  RESPIRATORY: No cough, wheezing, hemoptysis; No shortness of breath  CARDIOVASCULAR: No chest pain or palpitations  GASTROINTESTINAL: No abdominal or epigastric pain. No nausea, vomiting, or hematemesis; No diarrhea or constipation. No melena or hematochezia.  GENITOURINARY: No dysuria, frequency or hematuria  NEUROLOGICAL: No numbness or weakness  SKIN: No itching, burning, rashes, or lesions   All other review of systems is negative unless indicated above.    MEDICATIONS:  MEDICATIONS  (STANDING):  amLODIPine   Tablet 5 milliGRAM(s) Oral daily  aspirin enteric coated 81 milliGRAM(s) Oral daily  chlorhexidine 2% Cloths 1 Application(s) Topical daily  DULoxetine 30 milliGRAM(s) Oral daily  enoxaparin Injectable 40 milliGRAM(s) SubCutaneous every 12 hours  gabapentin 300 milliGRAM(s) Oral two times a day  levETIRAcetam 750 milliGRAM(s) Oral two times a day  polyethylene glycol 3350 17 Gram(s) Oral two times a day  pregabalin 150 milliGRAM(s) Oral two times a day  rosuvastatin 10 milliGRAM(s) Oral at bedtime  senna 2 Tablet(s) Oral at bedtime  tamsulosin 0.8 milliGRAM(s) Oral at bedtime  tiZANidine 2 milliGRAM(s) Oral daily      PHYSICAL EXAM:  T(C): 36.6 (05-13-25 @ 19:59), Max: 36.6 (05-13-25 @ 19:59)  HR: 57 (05-13-25 @ 19:59) (57 - 68)  BP: 124/62 (05-13-25 @ 19:59) (124/62 - 174/70)  RR: 18 (05-13-25 @ 19:59) (18 - 18)  SpO2: 97% (05-13-25 @ 19:59) (95% - 100%)  Wt(kg): --  I&O's Summary        Appearance: Normal	  HEENT:  PERRLA   Lymphatic: No lymphadenopathy   Cardiovascular: Normal S1 S2, no JVD  Respiratory: normal effort , clear  Gastrointestinal:  Soft, Non-tender  Skin: No rashes,  warm to touch  Psychiatry:  Mood & affect appropriate  Musculuskeletal: No edema    recent labs, Imaging and EKGs personally reviewed                           15.4   10.91 )-----------( 225      ( 12 May 2025 04:35 )             46.2               05-12    139  |  102  |  14  ----------------------------<  105[H]  4.0   |  24  |  1.09    Ca    9.2      12 May 2025 04:35  Phos  3.0     05-12  Mg     2.00     05-12                         Urinalysis Basic - ( 12 May 2025 04:35 )    Color: x / Appearance: x / SG: x / pH: x  Gluc: 105 mg/dL / Ketone: x  / Bili: x / Urobili: x   Blood: x / Protein: x / Nitrite: x   Leuk Esterase: x / RBC: x / WBC x   Sq Epi: x / Non Sq Epi: x / Bacteria: x            
Name of Patient : MANUEL PETERSON  MRN: [id number]  Date of visit: 05-11-25      Subjective: Patient seen and examined. No new events except as noted.     REVIEW OF SYSTEMS:    CONSTITUTIONAL: No weakness, fevers or chills  EYES/ENT: No visual changes;  No vertigo or throat pain   NECK: No pain or stiffness  RESPIRATORY: No cough, wheezing, hemoptysis; No shortness of breath  CARDIOVASCULAR: No chest pain or palpitations  GASTROINTESTINAL: No abdominal or epigastric pain. No nausea, vomiting, or hematemesis; No diarrhea or constipation. No melena or hematochezia.  GENITOURINARY: No dysuria, frequency or hematuria  NEUROLOGICAL: No numbness or weakness  SKIN: No itching, burning, rashes, or lesions   All other review of systems is negative unless indicated above.    MEDICATIONS:  MEDICATIONS  (STANDING):  aspirin enteric coated 81 milliGRAM(s) Oral daily  chlorhexidine 2% Cloths 1 Application(s) Topical daily  DULoxetine 20 milliGRAM(s) Oral daily  enoxaparin Injectable 40 milliGRAM(s) SubCutaneous every 12 hours  gabapentin 300 milliGRAM(s) Oral three times a day  levETIRAcetam 750 milliGRAM(s) Oral two times a day  piperacillin/tazobactam IVPB.. 3.375 Gram(s) IV Intermittent every 8 hours  polyethylene glycol 3350 17 Gram(s) Oral two times a day  pregabalin 150 milliGRAM(s) Oral two times a day  rosuvastatin 10 milliGRAM(s) Oral at bedtime  senna 2 Tablet(s) Oral at bedtime  tamsulosin 0.4 milliGRAM(s) Oral at bedtime  tiZANidine 2 milliGRAM(s) Oral daily      PHYSICAL EXAM:  T(C): 36.4 (05-11-25 @ 21:05), Max: 36.4 (05-11-25 @ 04:25)  HR: 72 (05-11-25 @ 21:05) (55 - 72)  BP: 131/72 (05-11-25 @ 21:05) (128/71 - 154/74)  RR: 17 (05-11-25 @ 21:05) (17 - 18)  SpO2: 94% (05-11-25 @ 21:05) (94% - 95%)  Wt(kg): --  I&O's Summary    10 May 2025 07:01  -  11 May 2025 07:00  --------------------------------------------------------  IN: 0 mL / OUT: 900 mL / NET: -900 mL    11 May 2025 07:01  -  11 May 2025 23:44  --------------------------------------------------------  IN: 0 mL / OUT: 600 mL / NET: -600 mL          Appearance: Normal	  HEENT:  PERRLA   Lymphatic: No lymphadenopathy   Cardiovascular: Normal S1 S2, no JVD  Respiratory: normal effort , clear  Gastrointestinal:  Soft, Non-tender  Skin: No rashes,  warm to touch  Psychiatry:  Mood & affect appropriate  Musculuskeletal: No edema    recent labs, Imaging and EKGs personally reviewed     05-10-25 @ 07:01  -  05-11-25 @ 07:00  --------------------------------------------------------  IN: 0 mL / OUT: 900 mL / NET: -900 mL    05-11-25 @ 07:01  -  05-11-25 @ 23:44  --------------------------------------------------------  IN: 0 mL / OUT: 600 mL / NET: -600 mL                          16.6   10.76 )-----------( 231      ( 11 May 2025 04:26 )             50.1               05-11    141  |  102  |  12  ----------------------------<  82  3.9   |  24  |  1.06    Ca    9.7      11 May 2025 04:26  Phos  3.3     05-11  Mg     2.10     05-11                         Urinalysis Basic - ( 11 May 2025 04:26 )    Color: x / Appearance: x / SG: x / pH: x  Gluc: 82 mg/dL / Ketone: x  / Bili: x / Urobili: x   Blood: x / Protein: x / Nitrite: x   Leuk Esterase: x / RBC: x / WBC x   Sq Epi: x / Non Sq Epi: x / Bacteria: x              
Name of Patient : MANUEL PETERSON  MRN: [id number]    Subjective: Patient seen and examined. No new events except as noted.     REVIEW OF SYSTEMS:    CONSTITUTIONAL: No weakness, fevers or chills  EYES/ENT: No visual changes;  No vertigo or throat pain   NECK: No pain or stiffness  RESPIRATORY: No cough, wheezing, hemoptysis; No shortness of breath  CARDIOVASCULAR: No chest pain or palpitations  GASTROINTESTINAL: No abdominal or epigastric pain. No nausea, vomiting, or hematemesis; No diarrhea or constipation. No melena or hematochezia.  GENITOURINARY: No dysuria, frequency or hematuria  NEUROLOGICAL: No numbness or weakness  SKIN: No itching, burning, rashes, or lesions   All other review of systems is negative unless indicated above.    MEDICATIONS:  MEDICATIONS  (STANDING):  aspirin enteric coated 81 milliGRAM(s) Oral daily  chlorhexidine 2% Cloths 1 Application(s) Topical daily  DULoxetine 20 milliGRAM(s) Oral daily  enoxaparin Injectable 40 milliGRAM(s) SubCutaneous every 12 hours  gabapentin 300 milliGRAM(s) Oral three times a day  levETIRAcetam 750 milliGRAM(s) Oral two times a day  piperacillin/tazobactam IVPB.. 3.375 Gram(s) IV Intermittent every 8 hours  polyethylene glycol 3350 17 Gram(s) Oral two times a day  pregabalin 150 milliGRAM(s) Oral two times a day  rosuvastatin 10 milliGRAM(s) Oral at bedtime  senna 2 Tablet(s) Oral at bedtime  tamsulosin 0.4 milliGRAM(s) Oral at bedtime  tiZANidine 2 milliGRAM(s) Oral daily      PHYSICAL EXAM:  T(C): 36.3 (05-10-25 @ 21:55), Max: 36.4 (05-10-25 @ 11:56)  HR: 62 (05-10-25 @ 21:55) (62 - 70)  BP: 154/79 (05-10-25 @ 21:55) (149/68 - 154/79)  RR: 18 (05-10-25 @ 21:55) (17 - 18)  SpO2: 94% (05-10-25 @ 21:55) (94% - 95%)  Wt(kg): --  I&O's Summary    10 May 2025 07:01  -  11 May 2025 00:19  --------------------------------------------------------  IN: 0 mL / OUT: 900 mL / NET: -900 mL          Appearance: Normal	  HEENT:  PERRLA   Lymphatic: No lymphadenopathy   Cardiovascular: Normal S1 S2, no JVD  Respiratory: normal effort , clear  Gastrointestinal:  Soft, Non-tender  Skin: No rashes,  warm to touch  Psychiatry:  Mood & affect appropriate  Musculuskeletal: No edema    recent labs, Imaging and EKGs personally reviewed     05-10-25 @ 07:01  -  05-11-25 @ 00:19  --------------------------------------------------------  IN: 0 mL / OUT: 900 mL / NET: -900 mL                          16.2   11.46 )-----------( 217      ( 10 May 2025 05:39 )             49.6               05-10    143  |  105  |  14  ----------------------------<  103[H]  4.6   |  23  |  1.29    Ca    9.8      10 May 2025 05:39  Phos  4.4     05-10  Mg     2.20     05-10                         Urinalysis Basic - ( 10 May 2025 05:39 )    Color: x / Appearance: x / SG: x / pH: x  Gluc: 103 mg/dL / Ketone: x  / Bili: x / Urobili: x   Blood: x / Protein: x / Nitrite: x   Leuk Esterase: x / RBC: x / WBC x   Sq Epi: x / Non Sq Epi: x / Bacteria: x              
Name of Patient : MANUEL PETERSON  MRN: [id number]  Date of visit: 05-17-25       Subjective: Patient seen and examined. No new events except as noted.   doing okay   afebrile     REVIEW OF SYSTEMS:    CONSTITUTIONAL: No weakness, fevers or chills  EYES/ENT: No visual changes;  No vertigo or throat pain   NECK: No pain or stiffness  RESPIRATORY: No cough, wheezing, hemoptysis; No shortness of breath  CARDIOVASCULAR: No chest pain or palpitations  GASTROINTESTINAL: No abdominal or epigastric pain. No nausea, vomiting, or hematemesis; No diarrhea or constipation. No melena or hematochezia.  GENITOURINARY: No dysuria, frequency or hematuria  NEUROLOGICAL: No numbness or weakness  SKIN: No itching, burning, rashes, or lesions   All other review of systems is negative unless indicated above.    MEDICATIONS:  MEDICATIONS  (STANDING):  amLODIPine   Tablet 5 milliGRAM(s) Oral daily  aspirin enteric coated 81 milliGRAM(s) Oral daily  chlorhexidine 2% Cloths 1 Application(s) Topical daily  DULoxetine 30 milliGRAM(s) Oral daily  enoxaparin Injectable 40 milliGRAM(s) SubCutaneous every 12 hours  levETIRAcetam 750 milliGRAM(s) Oral two times a day  polyethylene glycol 3350 17 Gram(s) Oral two times a day  pregabalin 150 milliGRAM(s) Oral two times a day  rosuvastatin 10 milliGRAM(s) Oral at bedtime  senna 2 Tablet(s) Oral at bedtime  tamsulosin 0.8 milliGRAM(s) Oral at bedtime  tiZANidine 2 milliGRAM(s) Oral daily      PHYSICAL EXAM:  T(C): 36.6 (05-17-25 @ 22:22), Max: 36.6 (05-17-25 @ 22:22)  HR: 68 (05-17-25 @ 22:22) (68 - 72)  BP: 136/63 (05-17-25 @ 22:22) (136/63 - 148/76)  RR: 18 (05-17-25 @ 22:22) (18 - 18)  SpO2: 98% (05-17-25 @ 22:22) (98% - 98%)  Wt(kg): --  I&O's Summary    16 May 2025 07:01  -  17 May 2025 07:00  --------------------------------------------------------  IN: 0 mL / OUT: 350 mL / NET: -350 mL          Appearance: Normal	  HEENT:  PERRLA   Lymphatic: No lymphadenopathy   Cardiovascular: Normal S1 S2, no JVD  Respiratory: normal effort , clear  Gastrointestinal:  Soft, Non-tender  Skin: No rashes,  warm to touch  Psychiatry:  Mood & affect appropriate  Musculuskeletal: No edema    recent labs, Imaging and EKGs personally reviewed     Urinalysis with Rflx Culture (collected 05-17-25 @ 02:29)    05-16-25 @ 07:01  -  05-17-25 @ 07:00  --------------------------------------------------------  IN: 0 mL / OUT: 350 mL / NET: -350 mL                        16.4   11.08 )-----------( 220      ( 17 May 2025 05:11 )             49.5               05-17    141  |  101  |  11  ----------------------------<  100[H]  4.0   |  22  |  0.96    Ca    9.7      17 May 2025 05:11  Phos  3.3     05-17  Mg     2.00     05-17                         Urinalysis Basic - ( 17 May 2025 05:11 )    Color: x / Appearance: x / SG: x / pH: x  Gluc: 100 mg/dL / Ketone: x  / Bili: x / Urobili: x   Blood: x / Protein: x / Nitrite: x   Leuk Esterase: x / RBC: x / WBC x   Sq Epi: x / Non Sq Epi: x / Bacteria: x                
Name of Patient : MANUEL PETERSON  MRN: [id number]  Date of visit: 25       Subjective: Patient seen and examined. No new events except as noted.   Doing okay    REVIEW OF SYSTEMS:    CONSTITUTIONAL: No weakness, fevers or chills  EYES/ENT: No visual changes;  No vertigo or throat pain   NECK: No pain or stiffness  RESPIRATORY: No cough, wheezing, hemoptysis; No shortness of breath  CARDIOVASCULAR: No chest pain or palpitations  GASTROINTESTINAL: No abdominal or epigastric pain. No nausea, vomiting, or hematemesis; No diarrhea or constipation. No melena or hematochezia.  GENITOURINARY: No dysuria, frequency or hematuria  NEUROLOGICAL: No numbness or weakness  SKIN: No itching, burning, rashes, or lesions   All other review of systems is negative unless indicated above.    MEDICATIONS:  MEDICATIONS  (STANDING):  amLODIPine   Tablet 5 milliGRAM(s) Oral daily  aspirin enteric coated 81 milliGRAM(s) Oral daily  chlorhexidine 2% Cloths 1 Application(s) Topical daily  DULoxetine 30 milliGRAM(s) Oral daily  enoxaparin Injectable 40 milliGRAM(s) SubCutaneous every 12 hours  levETIRAcetam 750 milliGRAM(s) Oral two times a day  polyethylene glycol 3350 17 Gram(s) Oral two times a day  pregabalin 150 milliGRAM(s) Oral two times a day  rosuvastatin 10 milliGRAM(s) Oral at bedtime  senna 2 Tablet(s) Oral at bedtime  tamsulosin 0.8 milliGRAM(s) Oral at bedtime  tiZANidine 2 milliGRAM(s) Oral daily      PHYSICAL EXAM:  T(C): 36.3 (25 @ 11:09), Max: 36.8 (25 @ 05:35)  HR: 73 (25 @ 11:09) (73 - 77)  BP: 138/78 (25 @ 11:09) (123/77 - 138/78)  RR: 19 (25 @ 11:09) (17 - 19)  SpO2: 99% (25 @ 11:09) (96% - 99%)  Wt(kg): --  I&O's Summary    15 May 2025 07:01  -  16 May 2025 07:00  --------------------------------------------------------  IN: 0 mL / OUT: 601 mL / NET: -601 mL          Appearance: Normal	  HEENT:  PERRLA   Lymphatic: No lymphadenopathy   Cardiovascular: Normal S1 S2, no JVD  Respiratory: normal effort , clear  Gastrointestinal:  Soft, Non-tender  Skin: No rashes,  warm to touch  Psychiatry:  Mood & affect appropriate  Musculuskeletal: No edema    recent labs, Imaging and EKGs personally reviewed   CODE status discussed with the patient in detail    05-15-25 @ 07:01  -  25 @ 07:00  --------------------------------------------------------  IN: 0 mL / OUT: 601 mL / NET: -601 mL                          17.1   10.34 )-----------( 231      ( 16 May 2025 13:50 )             50.9               05-16    141  |  103  |  12  ----------------------------<  101[H]  4.2   |  26  |  0.93    Ca    9.7      16 May 2025 13:50  Phos  2.6     05-16  Mg     1.90     -                         Urinalysis Basic - ( 16 May 2025 14:20 )    Color: Dark Yellow / Appearance: Cloudy / S.034 / pH: x  Gluc: x / Ketone: x  / Bili: Small / Urobili: 1.0 mg/dL   Blood: x / Protein: 100 mg/dL / Nitrite: Negative   Leuk Esterase: Small / RBC: 50-55 /HPF / WBC 4 /HPF   Sq Epi: x / Non Sq Epi: 1 /HPF / Bacteria: Negative /HPF              
Name of Patient : MANUEL PETERSON  MRN: [id number]  Date of visit: 05-09-25       Subjective: Patient seen and examined. No new events except as noted.   feeling okay   wife at the bedside     REVIEW OF SYSTEMS:    CONSTITUTIONAL: No weakness, fevers or chills  EYES/ENT: No visual changes;  No vertigo or throat pain   NECK: No pain or stiffness  RESPIRATORY: No cough, wheezing, hemoptysis; No shortness of breath  CARDIOVASCULAR: No chest pain or palpitations  GASTROINTESTINAL: No abdominal or epigastric pain. No nausea, vomiting, or hematemesis; No diarrhea or constipation. No melena or hematochezia.  GENITOURINARY: No dysuria, frequency or hematuria  NEUROLOGICAL: No numbness or weakness  SKIN: No itching, burning, rashes, or lesions   All other review of systems is negative unless indicated above.    MEDICATIONS:  MEDICATIONS  (STANDING):  aspirin enteric coated 81 milliGRAM(s) Oral daily  chlorhexidine 2% Cloths 1 Application(s) Topical daily  DULoxetine 20 milliGRAM(s) Oral daily  enoxaparin Injectable 40 milliGRAM(s) SubCutaneous every 12 hours  gabapentin 300 milliGRAM(s) Oral three times a day  levETIRAcetam 750 milliGRAM(s) Oral two times a day  piperacillin/tazobactam IVPB.. 3.375 Gram(s) IV Intermittent every 8 hours  polyethylene glycol 3350 17 Gram(s) Oral two times a day  pregabalin 150 milliGRAM(s) Oral two times a day  rosuvastatin 10 milliGRAM(s) Oral at bedtime  senna 2 Tablet(s) Oral at bedtime  tamsulosin 0.4 milliGRAM(s) Oral at bedtime  tiZANidine 2 milliGRAM(s) Oral daily      PHYSICAL EXAM:  T(C): 36.5 (05-09-25 @ 19:52), Max: 36.7 (05-09-25 @ 11:40)  HR: 81 (05-09-25 @ 19:52) (64 - 81)  BP: 119/64 (05-09-25 @ 19:52) (119/64 - 145/69)  RR: 18 (05-09-25 @ 19:52) (18 - 18)  SpO2: 94% (05-09-25 @ 19:52) (94% - 95%)  Wt(kg): --  I&O's Summary    08 May 2025 07:01  -  09 May 2025 07:00  --------------------------------------------------------  IN: 0 mL / OUT: 1000 mL / NET: -1000 mL          Appearance: Normal	  HEENT:  PERRLA   Lymphatic: No lymphadenopathy   Cardiovascular: Normal S1 S2, no JVD  Respiratory: normal effort , clear  Gastrointestinal:  Soft, Non-tender  Skin: No rashes,  warm to touch  Psychiatry:  Mood & affect appropriate  Musculuskeletal: No edema    recent labs, Imaging and EKGs personally reviewed     05-08-25 @ 07:01  -  05-09-25 @ 07:00  --------------------------------------------------------  IN: 0 mL / OUT: 1000 mL / NET: -1000 mL                          16.6   12.55 )-----------( 234      ( 09 May 2025 08:50 )             49.8               05-09    143  |  103  |  15  ----------------------------<  119[H]  4.2   |  23  |  1.16    Ca    9.4      09 May 2025 08:50  Phos  2.8     05-09  Mg     2.10     05-09                         Urinalysis Basic - ( 09 May 2025 08:50 )    Color: x / Appearance: x / SG: x / pH: x  Gluc: 119 mg/dL / Ketone: x  / Bili: x / Urobili: x   Blood: x / Protein: x / Nitrite: x   Leuk Esterase: x / RBC: x / WBC x   Sq Epi: x / Non Sq Epi: x / Bacteria: x              
Name of Patient : MANUEL PETERSON  MRN: [id number]  Date of visit: 05-19-25       Subjective: Patient seen and examined. No new events except as noted.   doing okay     REVIEW OF SYSTEMS:    CONSTITUTIONAL: No weakness, fevers or chills  EYES/ENT: No visual changes;  No vertigo or throat pain   NECK: No pain or stiffness  RESPIRATORY: No cough, wheezing, hemoptysis; No shortness of breath  CARDIOVASCULAR: No chest pain or palpitations  GASTROINTESTINAL: No abdominal or epigastric pain. No nausea, vomiting, or hematemesis; No diarrhea or constipation. No melena or hematochezia.  GENITOURINARY: No dysuria, frequency or hematuria  NEUROLOGICAL: No numbness or weakness  SKIN: No itching, burning, rashes, or lesions   All other review of systems is negative unless indicated above.    MEDICATIONS:  MEDICATIONS  (STANDING):  amLODIPine   Tablet 5 milliGRAM(s) Oral daily  aspirin enteric coated 81 milliGRAM(s) Oral daily  cefuroxime   Tablet 250 milliGRAM(s) Oral every 12 hours  chlorhexidine 2% Cloths 1 Application(s) Topical daily  DULoxetine 30 milliGRAM(s) Oral daily  enoxaparin Injectable 40 milliGRAM(s) SubCutaneous every 12 hours  levETIRAcetam 750 milliGRAM(s) Oral two times a day  polyethylene glycol 3350 17 Gram(s) Oral two times a day  pregabalin 150 milliGRAM(s) Oral two times a day  rosuvastatin 10 milliGRAM(s) Oral at bedtime  senna 2 Tablet(s) Oral at bedtime  tamsulosin 0.8 milliGRAM(s) Oral at bedtime  tiZANidine 2 milliGRAM(s) Oral daily      PHYSICAL EXAM:  T(C): 36.8 (05-19-25 @ 11:35), Max: 36.8 (05-19-25 @ 11:35)  HR: 75 (05-19-25 @ 11:35) (70 - 75)  BP: 127/66 (05-19-25 @ 11:35) (127/66 - 133/77)  RR: 18 (05-19-25 @ 11:35) (18 - 18)  SpO2: 97% (05-19-25 @ 11:35) (96% - 97%)  Wt(kg): --  I&O's Summary    18 May 2025 07:01  -  19 May 2025 07:00  --------------------------------------------------------  IN: 0 mL / OUT: 651 mL / NET: -651 mL          Appearance: Normal	  HEENT:  PERRLA   Lymphatic: No lymphadenopathy   Cardiovascular: Normal S1 S2, no JVD  Respiratory: normal effort , clear  Gastrointestinal:  Soft, Non-tender  Skin: No rashes,  warm to touch  Psychiatry:  Mood & affect appropriate  Musculuskeletal: No edema    recent labs, Imaging and EKGs personally reviewed   CODE status discussed with the patient in detail    05-18-25 @ 07:01  -  05-19-25 @ 07:00  --------------------------------------------------------  IN: 0 mL / OUT: 651 mL / NET: -651 mL                          16.5   15.92 )-----------( 211      ( 19 May 2025 06:45 )             49.4               05-19    142  |  103  |  13  ----------------------------<  94  4.5   |  25  |  0.93    Ca    9.8      19 May 2025 05:15  Phos  2.4     05-19  Mg     2.00     05-19                         Urinalysis Basic - ( 19 May 2025 05:15 )    Color: x / Appearance: x / SG: x / pH: x  Gluc: 94 mg/dL / Ketone: x  / Bili: x / Urobili: x   Blood: x / Protein: x / Nitrite: x   Leuk Esterase: x / RBC: x / WBC x   Sq Epi: x / Non Sq Epi: x / Bacteria: x              
Name of Patient : MANUEL PETERSON  MRN: [id number]  Date of visit: 05-14-25       Subjective: Patient seen and examined. No new events except as noted.   doing okay     REVIEW OF SYSTEMS:    CONSTITUTIONAL: No weakness, fevers or chills  EYES/ENT: No visual changes;  No vertigo or throat pain   NECK: No pain or stiffness  RESPIRATORY: No cough, wheezing, hemoptysis; No shortness of breath  CARDIOVASCULAR: No chest pain or palpitations  GASTROINTESTINAL: No abdominal or epigastric pain. No nausea, vomiting, or hematemesis; No diarrhea or constipation. No melena or hematochezia.  GENITOURINARY: No dysuria, frequency or hematuria  NEUROLOGICAL: No numbness or weakness  SKIN: No itching, burning, rashes, or lesions   All other review of systems is negative unless indicated above.    MEDICATIONS:  MEDICATIONS  (STANDING):  amLODIPine   Tablet 5 milliGRAM(s) Oral daily  aspirin enteric coated 81 milliGRAM(s) Oral daily  chlorhexidine 2% Cloths 1 Application(s) Topical daily  DULoxetine 30 milliGRAM(s) Oral daily  enoxaparin Injectable 40 milliGRAM(s) SubCutaneous every 12 hours  gabapentin 300 milliGRAM(s) Oral two times a day  levETIRAcetam 750 milliGRAM(s) Oral two times a day  polyethylene glycol 3350 17 Gram(s) Oral two times a day  pregabalin 150 milliGRAM(s) Oral two times a day  rosuvastatin 10 milliGRAM(s) Oral at bedtime  senna 2 Tablet(s) Oral at bedtime  tamsulosin 0.8 milliGRAM(s) Oral at bedtime  tiZANidine 2 milliGRAM(s) Oral daily      PHYSICAL EXAM:  T(C): 37.1 (05-14-25 @ 20:02), Max: 37.1 (05-14-25 @ 20:02)  HR: 70 (05-14-25 @ 20:02) (63 - 70)  BP: 153/76 (05-14-25 @ 20:02) (124/70 - 153/87)  RR: 18 (05-14-25 @ 20:02) (18 - 18)  SpO2: 96% (05-14-25 @ 20:02) (95% - 96%)  Wt(kg): --  I&O's Summary    13 May 2025 07:01  -  14 May 2025 07:00  --------------------------------------------------------  IN: 0 mL / OUT: 500 mL / NET: -500 mL    14 May 2025 07:01  -  14 May 2025 22:59  --------------------------------------------------------  IN: 0 mL / OUT: 0 mL / NET: 0 mL        Weight (kg): 120 (05-14 @ 05:16)    Appearance: Normal	  HEENT:  PERRLA   Lymphatic: No lymphadenopathy   Cardiovascular: Normal S1 S2, no JVD  Respiratory: normal effort , clear  Gastrointestinal:  Soft, Non-tender  Skin: No rashes,  warm to touch  Psychiatry:  Mood & affect appropriate  Musculuskeletal: No edema    recent labs, Imaging and EKGs personally reviewed     05-13-25 @ 07:01  -  05-14-25 @ 07:00  --------------------------------------------------------  IN: 0 mL / OUT: 500 mL / NET: -500 mL    05-14-25 @ 07:01  -  05-14-25 @ 22:59  --------------------------------------------------------  IN: 0 mL / OUT: 0 mL / NET: 0 mL            
Name of Patient : MANUEL PETERSON  MRN: [id number]  Date of visit: 05-15-25      Subjective: Patient seen and examined. No new events except as noted.     REVIEW OF SYSTEMS:    CONSTITUTIONAL: No weakness, fevers or chills  EYES/ENT: No visual changes;  No vertigo or throat pain   NECK: No pain or stiffness  RESPIRATORY: No cough, wheezing, hemoptysis; No shortness of breath  CARDIOVASCULAR: No chest pain or palpitations  GASTROINTESTINAL: No abdominal or epigastric pain. No nausea, vomiting, or hematemesis; No diarrhea or constipation. No melena or hematochezia.  GENITOURINARY: No dysuria, frequency or hematuria  NEUROLOGICAL: No numbness or weakness  SKIN: No itching, burning, rashes, or lesions   All other review of systems is negative unless indicated above.    MEDICATIONS:  MEDICATIONS  (STANDING):  amLODIPine   Tablet 5 milliGRAM(s) Oral daily  aspirin enteric coated 81 milliGRAM(s) Oral daily  chlorhexidine 2% Cloths 1 Application(s) Topical daily  DULoxetine 30 milliGRAM(s) Oral daily  enoxaparin Injectable 40 milliGRAM(s) SubCutaneous every 12 hours  gabapentin 300 milliGRAM(s) Oral two times a day  levETIRAcetam 750 milliGRAM(s) Oral two times a day  polyethylene glycol 3350 17 Gram(s) Oral two times a day  pregabalin 150 milliGRAM(s) Oral two times a day  rosuvastatin 10 milliGRAM(s) Oral at bedtime  senna 2 Tablet(s) Oral at bedtime  tamsulosin 0.8 milliGRAM(s) Oral at bedtime  tiZANidine 2 milliGRAM(s) Oral daily      PHYSICAL EXAM:  T(C): 37 (05-15-25 @ 10:43), Max: 37 (05-15-25 @ 10:43)  HR: 70 (05-15-25 @ 10:43) (67 - 70)  BP: 135/73 (05-15-25 @ 10:43) (128/68 - 135/73)  RR: 18 (05-15-25 @ 10:43) (17 - 18)  SpO2: 98% (05-15-25 @ 10:43) (95% - 98%)  Wt(kg): --  I&O's Summary    14 May 2025 07:01  -  15 May 2025 07:00  --------------------------------------------------------  IN: 0 mL / OUT: 691 mL / NET: -691 mL          Appearance: Normal	  HEENT:  PERRLA   Lymphatic: No lymphadenopathy   Cardiovascular: Normal S1 S2, no JVD  Respiratory: normal effort , clear  Gastrointestinal:  Soft, Non-tender  Skin: No rashes,  warm to touch  Psychiatry:  Mood & affect appropriate  Musculuskeletal: No edema    recent labs, Imaging and EKGs personally reviewed     05-14-25 @ 07:01  -  05-15-25 @ 07:00  --------------------------------------------------------  IN: 0 mL / OUT: 691 mL / NET: -691 mL                          17.4   13.36 )-----------( 219      ( 15 May 2025 04:37 )             52.0               05-15    139  |  102  |  12  ----------------------------<  108[H]  4.5   |  24  |  0.93    Ca    9.7      15 May 2025 04:37  Phos  3.0     05-15  Mg     2.10     05-15                         Urinalysis Basic - ( 15 May 2025 04:37 )    Color: x / Appearance: x / SG: x / pH: x  Gluc: 108 mg/dL / Ketone: x  / Bili: x / Urobili: x   Blood: x / Protein: x / Nitrite: x   Leuk Esterase: x / RBC: x / WBC x   Sq Epi: x / Non Sq Epi: x / Bacteria: x

## 2025-05-21 LAB
CULTURE RESULTS: SIGNIFICANT CHANGE UP
SPECIMEN SOURCE: SIGNIFICANT CHANGE UP

## 2025-07-18 ENCOUNTER — NON-APPOINTMENT (OUTPATIENT)
Age: 70
End: 2025-07-18

## 2025-07-18 ENCOUNTER — APPOINTMENT (OUTPATIENT)
Dept: UROLOGY | Facility: CLINIC | Age: 70
End: 2025-07-18

## 2025-07-18 ENCOUNTER — INPATIENT (INPATIENT)
Facility: HOSPITAL | Age: 70
LOS: 6 days | Discharge: HOME HEALTH | End: 2025-07-25
Attending: INTERNAL MEDICINE | Admitting: INTERNAL MEDICINE
Payer: MEDICARE

## 2025-07-18 ENCOUNTER — OUTPATIENT (OUTPATIENT)
Dept: OUTPATIENT SERVICES | Facility: HOSPITAL | Age: 70
LOS: 1 days | End: 2025-07-18
Payer: MEDICARE

## 2025-07-18 VITALS
TEMPERATURE: 98 F | WEIGHT: 244.05 LBS | OXYGEN SATURATION: 98 % | HEART RATE: 72 BPM | DIASTOLIC BLOOD PRESSURE: 78 MMHG | SYSTOLIC BLOOD PRESSURE: 126 MMHG | RESPIRATION RATE: 18 BRPM | HEIGHT: 68 IN

## 2025-07-18 VITALS
DIASTOLIC BLOOD PRESSURE: 77 MMHG | OXYGEN SATURATION: 100 % | HEART RATE: 78 BPM | TEMPERATURE: 98.2 F | RESPIRATION RATE: 18 BRPM | SYSTOLIC BLOOD PRESSURE: 127 MMHG

## 2025-07-18 DIAGNOSIS — Z98.890 OTHER SPECIFIED POSTPROCEDURAL STATES: Chronic | ICD-10-CM

## 2025-07-18 DIAGNOSIS — R35.0 FREQUENCY OF MICTURITION: ICD-10-CM

## 2025-07-18 DIAGNOSIS — Z96.649 PRESENCE OF UNSPECIFIED ARTIFICIAL HIP JOINT: Chronic | ICD-10-CM

## 2025-07-18 DIAGNOSIS — M43.28 FUSION OF SPINE, SACRAL AND SACROCOCCYGEAL REGION: Chronic | ICD-10-CM

## 2025-07-18 DIAGNOSIS — Z98.89 OTHER SPECIFIED POSTPROCEDURAL STATES: Chronic | ICD-10-CM

## 2025-07-18 DIAGNOSIS — Z87.81 PERSONAL HISTORY OF (HEALED) TRAUMATIC FRACTURE: Chronic | ICD-10-CM

## 2025-07-18 PROBLEM — R33.9 INCOMPLETE BLADDER EMPTYING: Status: ACTIVE | Noted: 2025-07-18

## 2025-07-18 PROBLEM — Z87.898 HISTORY OF URINARY RETENTION: Status: ACTIVE | Noted: 2025-07-18

## 2025-07-18 LAB
ALBUMIN SERPL ELPH-MCNC: 4.1 G/DL — SIGNIFICANT CHANGE UP (ref 3.3–5)
ALP SERPL-CCNC: 115 U/L — SIGNIFICANT CHANGE UP (ref 40–120)
ALT FLD-CCNC: 30 U/L — SIGNIFICANT CHANGE UP (ref 4–41)
ANION GAP SERPL CALC-SCNC: 14 MMOL/L — SIGNIFICANT CHANGE UP (ref 7–14)
APPEARANCE UR: ABNORMAL
APTT BLD: 35.6 SEC — SIGNIFICANT CHANGE UP (ref 26.1–36.8)
AST SERPL-CCNC: 22 U/L — SIGNIFICANT CHANGE UP (ref 4–40)
BACTERIA # UR AUTO: ABNORMAL /HPF
BASOPHILS # BLD AUTO: 0.06 K/UL — SIGNIFICANT CHANGE UP (ref 0–0.2)
BASOPHILS NFR BLD AUTO: 0.4 % — SIGNIFICANT CHANGE UP (ref 0–2)
BILIRUB SERPL-MCNC: 0.4 MG/DL — SIGNIFICANT CHANGE UP (ref 0.2–1.2)
BILIRUB UR-MCNC: NEGATIVE — SIGNIFICANT CHANGE UP
BUN SERPL-MCNC: 18 MG/DL — SIGNIFICANT CHANGE UP (ref 7–23)
CALCIUM SERPL-MCNC: 10 MG/DL — SIGNIFICANT CHANGE UP (ref 8.4–10.5)
CAST: 35 /LPF — HIGH (ref 0–4)
CHLORIDE SERPL-SCNC: 104 MMOL/L — SIGNIFICANT CHANGE UP (ref 98–107)
CO2 SERPL-SCNC: 27 MMOL/L — SIGNIFICANT CHANGE UP (ref 22–31)
COD CRY URNS QL: PRESENT
COLOR SPEC: SIGNIFICANT CHANGE UP
CREAT SERPL-MCNC: 1.34 MG/DL — HIGH (ref 0.5–1.3)
DIFF PNL FLD: ABNORMAL
EGFR: 57 ML/MIN/1.73M2 — LOW
EGFR: 57 ML/MIN/1.73M2 — LOW
EOSINOPHIL # BLD AUTO: 0 K/UL — SIGNIFICANT CHANGE UP (ref 0–0.5)
EOSINOPHIL NFR BLD AUTO: 0 % — SIGNIFICANT CHANGE UP (ref 0–6)
GAS PNL BLDV: SIGNIFICANT CHANGE UP
GLUCOSE SERPL-MCNC: 111 MG/DL — HIGH (ref 70–99)
GLUCOSE UR QL: NEGATIVE MG/DL — SIGNIFICANT CHANGE UP
HCT VFR BLD CALC: 44 % — SIGNIFICANT CHANGE UP (ref 39–50)
HGB BLD-MCNC: 14.6 G/DL — SIGNIFICANT CHANGE UP (ref 13–17)
IMM GRANULOCYTES # BLD AUTO: 0.14 K/UL — HIGH (ref 0–0.07)
IMM GRANULOCYTES NFR BLD AUTO: 0.9 % — SIGNIFICANT CHANGE UP (ref 0–0.9)
INR BLD: 1.03 RATIO — SIGNIFICANT CHANGE UP (ref 0.85–1.16)
KETONES UR QL: ABNORMAL MG/DL
LEUKOCYTE ESTERASE UR-ACNC: ABNORMAL
LYMPHOCYTES # BLD AUTO: 3.55 K/UL — HIGH (ref 1–3.3)
LYMPHOCYTES NFR BLD AUTO: 23.1 % — SIGNIFICANT CHANGE UP (ref 13–44)
MCHC RBC-ENTMCNC: 30.7 PG — SIGNIFICANT CHANGE UP (ref 27–34)
MCHC RBC-ENTMCNC: 33.2 G/DL — SIGNIFICANT CHANGE UP (ref 32–36)
MCV RBC AUTO: 92.4 FL — SIGNIFICANT CHANGE UP (ref 80–100)
MONOCYTES # BLD AUTO: 1.24 K/UL — HIGH (ref 0–0.9)
MONOCYTES NFR BLD AUTO: 8.1 % — SIGNIFICANT CHANGE UP (ref 2–14)
NEUTROPHILS # BLD AUTO: 10.4 K/UL — HIGH (ref 1.8–7.4)
NEUTROPHILS NFR BLD AUTO: 67.5 % — SIGNIFICANT CHANGE UP (ref 43–77)
NITRITE UR-MCNC: NEGATIVE — SIGNIFICANT CHANGE UP
NRBC # BLD AUTO: 0 K/UL — SIGNIFICANT CHANGE UP (ref 0–0)
NRBC # FLD: 0 K/UL — SIGNIFICANT CHANGE UP (ref 0–0)
NRBC BLD AUTO-RTO: 0 /100 WBCS — SIGNIFICANT CHANGE UP (ref 0–0)
NT-PROBNP SERPL-SCNC: 93 PG/ML — SIGNIFICANT CHANGE UP
PH UR: 6.5 — SIGNIFICANT CHANGE UP (ref 5–8)
PLATELET # BLD AUTO: 273 K/UL — SIGNIFICANT CHANGE UP (ref 150–400)
PMV BLD: 10.6 FL — SIGNIFICANT CHANGE UP (ref 7–13)
POTASSIUM SERPL-MCNC: 4.6 MMOL/L — SIGNIFICANT CHANGE UP (ref 3.5–5.3)
POTASSIUM SERPL-SCNC: 4.6 MMOL/L — SIGNIFICANT CHANGE UP (ref 3.5–5.3)
PROT SERPL-MCNC: 7.4 G/DL — SIGNIFICANT CHANGE UP (ref 6–8.3)
PROT UR-MCNC: 300 MG/DL
PROTHROM AB SERPL-ACNC: 11.9 SEC — SIGNIFICANT CHANGE UP (ref 9.9–13.4)
RBC # BLD: 4.76 M/UL — SIGNIFICANT CHANGE UP (ref 4.2–5.8)
RBC # FLD: 15.5 % — HIGH (ref 10.3–14.5)
RBC CASTS # UR COMP ASSIST: 136 /HPF — HIGH (ref 0–4)
REVIEW: SIGNIFICANT CHANGE UP
SODIUM SERPL-SCNC: 145 MMOL/L — SIGNIFICANT CHANGE UP (ref 135–145)
SP GR SPEC: 1.03 — SIGNIFICANT CHANGE UP (ref 1–1.03)
SQUAMOUS # UR AUTO: 3 /HPF — SIGNIFICANT CHANGE UP (ref 0–5)
TROPONIN T, HIGH SENSITIVITY RESULT: 33 NG/L — SIGNIFICANT CHANGE UP
UROBILINOGEN FLD QL: 1 MG/DL — SIGNIFICANT CHANGE UP (ref 0.2–1)
WBC # BLD: 15.39 K/UL — HIGH (ref 3.8–10.5)
WBC # FLD AUTO: 15.39 K/UL — HIGH (ref 3.8–10.5)
WBC UR QL: 5654 /HPF — HIGH (ref 0–5)

## 2025-07-18 PROCEDURE — 99285 EMERGENCY DEPT VISIT HI MDM: CPT

## 2025-07-18 PROCEDURE — 51700 IRRIGATION OF BLADDER: CPT

## 2025-07-18 PROCEDURE — 71045 X-RAY EXAM CHEST 1 VIEW: CPT | Mod: 26

## 2025-07-18 RX ORDER — CEFTRIAXONE 500 MG/1
1000 INJECTION, POWDER, FOR SOLUTION INTRAMUSCULAR; INTRAVENOUS ONCE
Refills: 0 | Status: COMPLETED | OUTPATIENT
Start: 2025-07-18 | End: 2025-07-18

## 2025-07-18 RX ORDER — SULFAMETHOXAZOLE AND TRIMETHOPRIM 800; 160 MG/1; MG/1
800-160 TABLET ORAL TWICE DAILY
Qty: 4 | Refills: 0 | Status: ACTIVE | OUTPATIENT
Start: 2025-07-18

## 2025-07-18 RX ADMIN — CEFTRIAXONE 100 MILLIGRAM(S): 500 INJECTION, POWDER, FOR SOLUTION INTRAMUSCULAR; INTRAVENOUS at 19:14

## 2025-07-19 DIAGNOSIS — J44.9 CHRONIC OBSTRUCTIVE PULMONARY DISEASE, UNSPECIFIED: ICD-10-CM

## 2025-07-19 DIAGNOSIS — I25.10 ATHEROSCLEROTIC HEART DISEASE OF NATIVE CORONARY ARTERY WITHOUT ANGINA PECTORIS: ICD-10-CM

## 2025-07-19 DIAGNOSIS — N39.0 URINARY TRACT INFECTION, SITE NOT SPECIFIED: ICD-10-CM

## 2025-07-19 DIAGNOSIS — M48.00 SPINAL STENOSIS, SITE UNSPECIFIED: ICD-10-CM

## 2025-07-19 DIAGNOSIS — A41.9 SEPSIS, UNSPECIFIED ORGANISM: ICD-10-CM

## 2025-07-19 PROCEDURE — 70450 CT HEAD/BRAIN W/O DYE: CPT | Mod: 26

## 2025-07-19 PROCEDURE — 99223 1ST HOSP IP/OBS HIGH 75: CPT

## 2025-07-19 RX ORDER — DULOXETINE 20 MG/1
1 CAPSULE, DELAYED RELEASE ORAL
Refills: 0 | DISCHARGE

## 2025-07-19 RX ORDER — POLYETHYLENE GLYCOL 3350 17 G/17G
17 POWDER, FOR SOLUTION ORAL DAILY
Refills: 0 | Status: DISCONTINUED | OUTPATIENT
Start: 2025-07-19 | End: 2025-07-25

## 2025-07-19 RX ORDER — BACLOFEN 10 MG/20ML
20 INJECTION INTRATHECAL EVERY 6 HOURS
Refills: 0 | Status: DISCONTINUED | OUTPATIENT
Start: 2025-07-19 | End: 2025-07-25

## 2025-07-19 RX ORDER — LEVETIRACETAM 10 MG/ML
1 INJECTION, SOLUTION INTRAVENOUS
Refills: 0 | DISCHARGE

## 2025-07-19 RX ORDER — TIZANIDINE 4 MG/1
1 TABLET ORAL
Refills: 0 | DISCHARGE

## 2025-07-19 RX ORDER — ROSUVASTATIN CALCIUM 20 MG/1
10 TABLET, FILM COATED ORAL AT BEDTIME
Refills: 0 | Status: DISCONTINUED | OUTPATIENT
Start: 2025-07-19 | End: 2025-07-25

## 2025-07-19 RX ORDER — METOPROLOL SUCCINATE 50 MG/1
25 TABLET, EXTENDED RELEASE ORAL
Refills: 0 | Status: DISCONTINUED | OUTPATIENT
Start: 2025-07-19 | End: 2025-07-25

## 2025-07-19 RX ORDER — METOPROLOL SUCCINATE 50 MG/1
1 TABLET, EXTENDED RELEASE ORAL
Refills: 0 | DISCHARGE

## 2025-07-19 RX ORDER — ACETAMINOPHEN 500 MG/5ML
1000 LIQUID (ML) ORAL ONCE
Refills: 0 | Status: COMPLETED | OUTPATIENT
Start: 2025-07-19 | End: 2025-07-19

## 2025-07-19 RX ORDER — CEFTRIAXONE 500 MG/1
1000 INJECTION, POWDER, FOR SOLUTION INTRAMUSCULAR; INTRAVENOUS EVERY 24 HOURS
Refills: 0 | Status: DISCONTINUED | OUTPATIENT
Start: 2025-07-19 | End: 2025-07-22

## 2025-07-19 RX ORDER — PREGABALIN 50 MG/1
150 CAPSULE ORAL
Refills: 0 | Status: DISCONTINUED | OUTPATIENT
Start: 2025-07-19 | End: 2025-07-24

## 2025-07-19 RX ORDER — ASPIRIN 325 MG
81 TABLET ORAL DAILY
Refills: 0 | Status: DISCONTINUED | OUTPATIENT
Start: 2025-07-19 | End: 2025-07-25

## 2025-07-19 RX ORDER — ENOXAPARIN SODIUM 100 MG/ML
40 INJECTION SUBCUTANEOUS EVERY 24 HOURS
Refills: 0 | Status: DISCONTINUED | OUTPATIENT
Start: 2025-07-19 | End: 2025-07-25

## 2025-07-19 RX ORDER — ACETAMINOPHEN 500 MG/5ML
650 LIQUID (ML) ORAL EVERY 6 HOURS
Refills: 0 | Status: DISCONTINUED | OUTPATIENT
Start: 2025-07-19 | End: 2025-07-25

## 2025-07-19 RX ORDER — METHENAMINE MANDELATE 1 G
1 TABLET ORAL
Refills: 0 | DISCHARGE

## 2025-07-19 RX ORDER — SENNA 187 MG
2 TABLET ORAL AT BEDTIME
Refills: 0 | Status: DISCONTINUED | OUTPATIENT
Start: 2025-07-19 | End: 2025-07-25

## 2025-07-19 RX ORDER — METOPROLOL SUCCINATE 50 MG/1
0.5 TABLET, EXTENDED RELEASE ORAL
Refills: 0 | DISCHARGE

## 2025-07-19 RX ORDER — TAMSULOSIN HYDROCHLORIDE 0.4 MG/1
0.8 CAPSULE ORAL AT BEDTIME
Refills: 0 | Status: DISCONTINUED | OUTPATIENT
Start: 2025-07-19 | End: 2025-07-25

## 2025-07-19 RX ORDER — BACLOFEN 10 MG/20ML
5 INJECTION INTRATHECAL
Refills: 0 | DISCHARGE

## 2025-07-19 RX ADMIN — Medication 100 MILLILITER(S): at 12:29

## 2025-07-19 RX ADMIN — Medication 650 MILLIGRAM(S): at 11:40

## 2025-07-19 RX ADMIN — ROSUVASTATIN CALCIUM 10 MILLIGRAM(S): 20 TABLET, FILM COATED ORAL at 21:51

## 2025-07-19 RX ADMIN — CEFTRIAXONE 100 MILLIGRAM(S): 500 INJECTION, POWDER, FOR SOLUTION INTRAMUSCULAR; INTRAVENOUS at 18:30

## 2025-07-19 RX ADMIN — Medication 1000 MILLIGRAM(S): at 18:15

## 2025-07-19 RX ADMIN — TAMSULOSIN HYDROCHLORIDE 0.8 MILLIGRAM(S): 0.4 CAPSULE ORAL at 21:50

## 2025-07-19 RX ADMIN — POLYETHYLENE GLYCOL 3350 17 GRAM(S): 17 POWDER, FOR SOLUTION ORAL at 11:43

## 2025-07-19 RX ADMIN — Medication 650 MILLIGRAM(S): at 12:40

## 2025-07-19 RX ADMIN — Medication 400 MILLIGRAM(S): at 17:15

## 2025-07-19 RX ADMIN — Medication 81 MILLIGRAM(S): at 11:41

## 2025-07-19 RX ADMIN — ENOXAPARIN SODIUM 40 MILLIGRAM(S): 100 INJECTION SUBCUTANEOUS at 17:14

## 2025-07-19 RX ADMIN — PREGABALIN 150 MILLIGRAM(S): 50 CAPSULE ORAL at 17:14

## 2025-07-19 RX ADMIN — Medication 100 MILLILITER(S): at 22:35

## 2025-07-19 RX ADMIN — METOPROLOL SUCCINATE 25 MILLIGRAM(S): 50 TABLET, EXTENDED RELEASE ORAL at 17:14

## 2025-07-19 RX ADMIN — Medication 2 TABLET(S): at 21:51

## 2025-07-19 RX ADMIN — BACLOFEN 20 MILLIGRAM(S): 10 INJECTION INTRATHECAL at 13:19

## 2025-07-20 LAB
ANION GAP SERPL CALC-SCNC: 12 MMOL/L — SIGNIFICANT CHANGE UP (ref 7–14)
BUN SERPL-MCNC: 14 MG/DL — SIGNIFICANT CHANGE UP (ref 7–23)
CALCIUM SERPL-MCNC: 9.1 MG/DL — SIGNIFICANT CHANGE UP (ref 8.4–10.5)
CHLORIDE SERPL-SCNC: 107 MMOL/L — SIGNIFICANT CHANGE UP (ref 98–107)
CO2 SERPL-SCNC: 24 MMOL/L — SIGNIFICANT CHANGE UP (ref 22–31)
CREAT SERPL-MCNC: 0.97 MG/DL — SIGNIFICANT CHANGE UP (ref 0.5–1.3)
EGFR: 84 ML/MIN/1.73M2 — SIGNIFICANT CHANGE UP
EGFR: 84 ML/MIN/1.73M2 — SIGNIFICANT CHANGE UP
GLUCOSE SERPL-MCNC: 109 MG/DL — HIGH (ref 70–99)
HCT VFR BLD CALC: 42.6 % — SIGNIFICANT CHANGE UP (ref 39–50)
HGB BLD-MCNC: 13.9 G/DL — SIGNIFICANT CHANGE UP (ref 13–17)
MAGNESIUM SERPL-MCNC: 2 MG/DL — SIGNIFICANT CHANGE UP (ref 1.6–2.6)
MCHC RBC-ENTMCNC: 30.2 PG — SIGNIFICANT CHANGE UP (ref 27–34)
MCHC RBC-ENTMCNC: 32.6 G/DL — SIGNIFICANT CHANGE UP (ref 32–36)
MCV RBC AUTO: 92.6 FL — SIGNIFICANT CHANGE UP (ref 80–100)
NRBC # BLD AUTO: 0 K/UL — SIGNIFICANT CHANGE UP (ref 0–0)
NRBC # FLD: 0 K/UL — SIGNIFICANT CHANGE UP (ref 0–0)
NRBC BLD AUTO-RTO: 0 /100 WBCS — SIGNIFICANT CHANGE UP (ref 0–0)
PHOSPHATE SERPL-MCNC: 3.1 MG/DL — SIGNIFICANT CHANGE UP (ref 2.5–4.5)
PLATELET # BLD AUTO: 257 K/UL — SIGNIFICANT CHANGE UP (ref 150–400)
PMV BLD: 11 FL — SIGNIFICANT CHANGE UP (ref 7–13)
POTASSIUM SERPL-MCNC: 4.2 MMOL/L — SIGNIFICANT CHANGE UP (ref 3.5–5.3)
POTASSIUM SERPL-SCNC: 4.2 MMOL/L — SIGNIFICANT CHANGE UP (ref 3.5–5.3)
RBC # BLD: 4.6 M/UL — SIGNIFICANT CHANGE UP (ref 4.2–5.8)
RBC # FLD: 15.9 % — HIGH (ref 10.3–14.5)
SODIUM SERPL-SCNC: 143 MMOL/L — SIGNIFICANT CHANGE UP (ref 135–145)
WBC # BLD: 11.8 K/UL — HIGH (ref 3.8–10.5)
WBC # FLD AUTO: 11.8 K/UL — HIGH (ref 3.8–10.5)

## 2025-07-20 PROCEDURE — 99222 1ST HOSP IP/OBS MODERATE 55: CPT | Mod: GC

## 2025-07-20 PROCEDURE — G0545: CPT

## 2025-07-20 RX ADMIN — Medication 650 MILLIGRAM(S): at 17:37

## 2025-07-20 RX ADMIN — ROSUVASTATIN CALCIUM 10 MILLIGRAM(S): 20 TABLET, FILM COATED ORAL at 22:30

## 2025-07-20 RX ADMIN — METOPROLOL SUCCINATE 25 MILLIGRAM(S): 50 TABLET, EXTENDED RELEASE ORAL at 05:54

## 2025-07-20 RX ADMIN — ENOXAPARIN SODIUM 40 MILLIGRAM(S): 100 INJECTION SUBCUTANEOUS at 17:01

## 2025-07-20 RX ADMIN — Medication 81 MILLIGRAM(S): at 13:01

## 2025-07-20 RX ADMIN — CEFTRIAXONE 100 MILLIGRAM(S): 500 INJECTION, POWDER, FOR SOLUTION INTRAMUSCULAR; INTRAVENOUS at 17:37

## 2025-07-20 RX ADMIN — PREGABALIN 150 MILLIGRAM(S): 50 CAPSULE ORAL at 17:37

## 2025-07-20 RX ADMIN — BACLOFEN 20 MILLIGRAM(S): 10 INJECTION INTRATHECAL at 13:15

## 2025-07-20 RX ADMIN — Medication 650 MILLIGRAM(S): at 13:00

## 2025-07-20 RX ADMIN — POLYETHYLENE GLYCOL 3350 17 GRAM(S): 17 POWDER, FOR SOLUTION ORAL at 13:10

## 2025-07-20 RX ADMIN — BACLOFEN 20 MILLIGRAM(S): 10 INJECTION INTRATHECAL at 01:08

## 2025-07-20 RX ADMIN — Medication 2 TABLET(S): at 23:06

## 2025-07-20 RX ADMIN — BACLOFEN 20 MILLIGRAM(S): 10 INJECTION INTRATHECAL at 07:14

## 2025-07-20 RX ADMIN — Medication 650 MILLIGRAM(S): at 23:06

## 2025-07-20 RX ADMIN — TAMSULOSIN HYDROCHLORIDE 0.8 MILLIGRAM(S): 0.4 CAPSULE ORAL at 23:06

## 2025-07-20 RX ADMIN — PREGABALIN 150 MILLIGRAM(S): 50 CAPSULE ORAL at 05:53

## 2025-07-20 RX ADMIN — Medication 650 MILLIGRAM(S): at 00:53

## 2025-07-20 RX ADMIN — METOPROLOL SUCCINATE 25 MILLIGRAM(S): 50 TABLET, EXTENDED RELEASE ORAL at 17:37

## 2025-07-20 RX ADMIN — Medication 650 MILLIGRAM(S): at 05:53

## 2025-07-21 DIAGNOSIS — Z87.440 PERSONAL HISTORY OF URINARY (TRACT) INFECTIONS: ICD-10-CM

## 2025-07-21 DIAGNOSIS — Z87.898 PERSONAL HISTORY OF OTHER SPECIFIED CONDITIONS: ICD-10-CM

## 2025-07-21 DIAGNOSIS — N20.0 CALCULUS OF KIDNEY: ICD-10-CM

## 2025-07-21 DIAGNOSIS — R33.9 RETENTION OF URINE, UNSPECIFIED: ICD-10-CM

## 2025-07-21 LAB
ANION GAP SERPL CALC-SCNC: 12 MMOL/L — SIGNIFICANT CHANGE UP (ref 7–14)
BUN SERPL-MCNC: 11 MG/DL — SIGNIFICANT CHANGE UP (ref 7–23)
CALCIUM SERPL-MCNC: 9.3 MG/DL — SIGNIFICANT CHANGE UP (ref 8.4–10.5)
CHLORIDE SERPL-SCNC: 110 MMOL/L — HIGH (ref 98–107)
CO2 SERPL-SCNC: 23 MMOL/L — SIGNIFICANT CHANGE UP (ref 22–31)
CREAT SERPL-MCNC: 0.87 MG/DL — SIGNIFICANT CHANGE UP (ref 0.5–1.3)
EGFR: 93 ML/MIN/1.73M2 — SIGNIFICANT CHANGE UP
EGFR: 93 ML/MIN/1.73M2 — SIGNIFICANT CHANGE UP
GLUCOSE SERPL-MCNC: 109 MG/DL — HIGH (ref 70–99)
HCT VFR BLD CALC: 44 % — SIGNIFICANT CHANGE UP (ref 39–50)
HGB BLD-MCNC: 14.2 G/DL — SIGNIFICANT CHANGE UP (ref 13–17)
MCHC RBC-ENTMCNC: 30.3 PG — SIGNIFICANT CHANGE UP (ref 27–34)
MCHC RBC-ENTMCNC: 32.3 G/DL — SIGNIFICANT CHANGE UP (ref 32–36)
MCV RBC AUTO: 94 FL — SIGNIFICANT CHANGE UP (ref 80–100)
NRBC # BLD AUTO: 0 K/UL — SIGNIFICANT CHANGE UP (ref 0–0)
NRBC # FLD: 0 K/UL — SIGNIFICANT CHANGE UP (ref 0–0)
NRBC BLD AUTO-RTO: 0 /100 WBCS — SIGNIFICANT CHANGE UP (ref 0–0)
PLATELET # BLD AUTO: 256 K/UL — SIGNIFICANT CHANGE UP (ref 150–400)
PMV BLD: 11.4 FL — SIGNIFICANT CHANGE UP (ref 7–13)
POTASSIUM SERPL-MCNC: 5 MMOL/L — SIGNIFICANT CHANGE UP (ref 3.5–5.3)
POTASSIUM SERPL-SCNC: 5 MMOL/L — SIGNIFICANT CHANGE UP (ref 3.5–5.3)
RBC # BLD: 4.68 M/UL — SIGNIFICANT CHANGE UP (ref 4.2–5.8)
RBC # FLD: 15.8 % — HIGH (ref 10.3–14.5)
SODIUM SERPL-SCNC: 145 MMOL/L — SIGNIFICANT CHANGE UP (ref 135–145)
WBC # BLD: 9.47 K/UL — SIGNIFICANT CHANGE UP (ref 3.8–10.5)
WBC # FLD AUTO: 9.47 K/UL — SIGNIFICANT CHANGE UP (ref 3.8–10.5)

## 2025-07-21 PROCEDURE — G0545: CPT

## 2025-07-21 PROCEDURE — 99232 SBSQ HOSP IP/OBS MODERATE 35: CPT

## 2025-07-21 RX ORDER — LEVETIRACETAM 10 MG/ML
750 INJECTION, SOLUTION INTRAVENOUS AT BEDTIME
Refills: 0 | Status: DISCONTINUED | OUTPATIENT
Start: 2025-07-21 | End: 2025-07-25

## 2025-07-21 RX ORDER — METHENAMINE MANDELATE 1 G
1 TABLET ORAL
Refills: 0 | Status: DISCONTINUED | OUTPATIENT
Start: 2025-07-21 | End: 2025-07-21

## 2025-07-21 RX ADMIN — Medication 1 APPLICATION(S): at 12:28

## 2025-07-21 RX ADMIN — Medication 650 MILLIGRAM(S): at 23:03

## 2025-07-21 RX ADMIN — PREGABALIN 150 MILLIGRAM(S): 50 CAPSULE ORAL at 19:02

## 2025-07-21 RX ADMIN — CEFTRIAXONE 100 MILLIGRAM(S): 500 INJECTION, POWDER, FOR SOLUTION INTRAMUSCULAR; INTRAVENOUS at 19:03

## 2025-07-21 RX ADMIN — ROSUVASTATIN CALCIUM 10 MILLIGRAM(S): 20 TABLET, FILM COATED ORAL at 23:03

## 2025-07-21 RX ADMIN — Medication 2 TABLET(S): at 23:04

## 2025-07-21 RX ADMIN — METOPROLOL SUCCINATE 25 MILLIGRAM(S): 50 TABLET, EXTENDED RELEASE ORAL at 05:02

## 2025-07-21 RX ADMIN — BACLOFEN 20 MILLIGRAM(S): 10 INJECTION INTRATHECAL at 23:05

## 2025-07-21 RX ADMIN — Medication 650 MILLIGRAM(S): at 12:30

## 2025-07-21 RX ADMIN — BACLOFEN 20 MILLIGRAM(S): 10 INJECTION INTRATHECAL at 12:32

## 2025-07-21 RX ADMIN — PREGABALIN 150 MILLIGRAM(S): 50 CAPSULE ORAL at 05:02

## 2025-07-21 RX ADMIN — Medication 81 MILLIGRAM(S): at 12:31

## 2025-07-21 RX ADMIN — Medication 650 MILLIGRAM(S): at 18:50

## 2025-07-21 RX ADMIN — TAMSULOSIN HYDROCHLORIDE 0.8 MILLIGRAM(S): 0.4 CAPSULE ORAL at 23:03

## 2025-07-21 RX ADMIN — LEVETIRACETAM 750 MILLIGRAM(S): 10 INJECTION, SOLUTION INTRAVENOUS at 23:20

## 2025-07-21 RX ADMIN — Medication 650 MILLIGRAM(S): at 05:02

## 2025-07-21 RX ADMIN — POLYETHYLENE GLYCOL 3350 17 GRAM(S): 17 POWDER, FOR SOLUTION ORAL at 12:32

## 2025-07-21 RX ADMIN — ENOXAPARIN SODIUM 40 MILLIGRAM(S): 100 INJECTION SUBCUTANEOUS at 18:50

## 2025-07-21 RX ADMIN — METOPROLOL SUCCINATE 25 MILLIGRAM(S): 50 TABLET, EXTENDED RELEASE ORAL at 18:57

## 2025-07-22 LAB
-  AMIKACIN: SIGNIFICANT CHANGE UP
-  AMOXICILLIN/CLAVULANIC ACID: SIGNIFICANT CHANGE UP
-  AMPICILLIN/SULBACTAM: SIGNIFICANT CHANGE UP
-  AMPICILLIN: SIGNIFICANT CHANGE UP
-  AZTREONAM: SIGNIFICANT CHANGE UP
-  AZTREONAM: SIGNIFICANT CHANGE UP
-  CEFAZOLIN: SIGNIFICANT CHANGE UP
-  CEFEPIME: SIGNIFICANT CHANGE UP
-  CEFEPIME: SIGNIFICANT CHANGE UP
-  CEFOXITIN: SIGNIFICANT CHANGE UP
-  CEFTAZIDIME: SIGNIFICANT CHANGE UP
-  CEFTRIAXONE: SIGNIFICANT CHANGE UP
-  CIPROFLOXACIN: SIGNIFICANT CHANGE UP
-  CIPROFLOXACIN: SIGNIFICANT CHANGE UP
-  ERTAPENEM: SIGNIFICANT CHANGE UP
-  GENTAMICIN: SIGNIFICANT CHANGE UP
-  IMIPENEM: SIGNIFICANT CHANGE UP
-  IMIPENEM: SIGNIFICANT CHANGE UP
-  LEVOFLOXACIN: SIGNIFICANT CHANGE UP
-  LEVOFLOXACIN: SIGNIFICANT CHANGE UP
-  MEROPENEM: SIGNIFICANT CHANGE UP
-  MEROPENEM: SIGNIFICANT CHANGE UP
-  NITROFURANTOIN: SIGNIFICANT CHANGE UP
-  PIPERACILLIN/TAZOBACTAM: SIGNIFICANT CHANGE UP
-  PIPERACILLIN/TAZOBACTAM: SIGNIFICANT CHANGE UP
-  TIGECYCLINE: SIGNIFICANT CHANGE UP
-  TOBRAMYCIN: SIGNIFICANT CHANGE UP
-  TRIMETHOPRIM/SULFAMETHOXAZOLE: SIGNIFICANT CHANGE UP
ANION GAP SERPL CALC-SCNC: 14 MMOL/L — SIGNIFICANT CHANGE UP (ref 7–14)
BASOPHILS # BLD AUTO: 0.08 K/UL — SIGNIFICANT CHANGE UP (ref 0–0.2)
BASOPHILS NFR BLD AUTO: 0.7 % — SIGNIFICANT CHANGE UP (ref 0–2)
BUN SERPL-MCNC: 16 MG/DL — SIGNIFICANT CHANGE UP (ref 7–23)
CALCIUM SERPL-MCNC: 9.2 MG/DL — SIGNIFICANT CHANGE UP (ref 8.4–10.5)
CHLORIDE SERPL-SCNC: 108 MMOL/L — HIGH (ref 98–107)
CO2 SERPL-SCNC: 23 MMOL/L — SIGNIFICANT CHANGE UP (ref 22–31)
CREAT SERPL-MCNC: 0.91 MG/DL — SIGNIFICANT CHANGE UP (ref 0.5–1.3)
CULTURE RESULTS: ABNORMAL
EGFR: 91 ML/MIN/1.73M2 — SIGNIFICANT CHANGE UP
EGFR: 91 ML/MIN/1.73M2 — SIGNIFICANT CHANGE UP
EOSINOPHIL # BLD AUTO: 1.12 K/UL — HIGH (ref 0–0.5)
EOSINOPHIL NFR BLD AUTO: 10.4 % — HIGH (ref 0–6)
GLUCOSE SERPL-MCNC: 120 MG/DL — HIGH (ref 70–99)
HCT VFR BLD CALC: 42.4 % — SIGNIFICANT CHANGE UP (ref 39–50)
HGB BLD-MCNC: 13.8 G/DL — SIGNIFICANT CHANGE UP (ref 13–17)
IMM GRANULOCYTES # BLD AUTO: 0.02 K/UL — SIGNIFICANT CHANGE UP (ref 0–0.07)
IMM GRANULOCYTES NFR BLD AUTO: 0.2 % — SIGNIFICANT CHANGE UP (ref 0–0.9)
LYMPHOCYTES # BLD AUTO: 3.87 K/UL — HIGH (ref 1–3.3)
LYMPHOCYTES NFR BLD AUTO: 36.1 % — SIGNIFICANT CHANGE UP (ref 13–44)
MAGNESIUM SERPL-MCNC: 1.9 MG/DL — SIGNIFICANT CHANGE UP (ref 1.6–2.6)
MCHC RBC-ENTMCNC: 30.3 PG — SIGNIFICANT CHANGE UP (ref 27–34)
MCHC RBC-ENTMCNC: 32.5 G/DL — SIGNIFICANT CHANGE UP (ref 32–36)
MCV RBC AUTO: 93 FL — SIGNIFICANT CHANGE UP (ref 80–100)
METHOD TYPE: SIGNIFICANT CHANGE UP
METHOD TYPE: SIGNIFICANT CHANGE UP
MONOCYTES # BLD AUTO: 0.87 K/UL — SIGNIFICANT CHANGE UP (ref 0–0.9)
MONOCYTES NFR BLD AUTO: 8.1 % — SIGNIFICANT CHANGE UP (ref 2–14)
NEUTROPHILS # BLD AUTO: 4.77 K/UL — SIGNIFICANT CHANGE UP (ref 1.8–7.4)
NEUTROPHILS NFR BLD AUTO: 44.5 % — SIGNIFICANT CHANGE UP (ref 43–77)
NRBC # BLD AUTO: 0 K/UL — SIGNIFICANT CHANGE UP (ref 0–0)
NRBC # FLD: 0 K/UL — SIGNIFICANT CHANGE UP (ref 0–0)
NRBC BLD AUTO-RTO: 0 /100 WBCS — SIGNIFICANT CHANGE UP (ref 0–0)
ORGANISM # SPEC MICROSCOPIC CNT: ABNORMAL
PHOSPHATE SERPL-MCNC: 2.5 MG/DL — SIGNIFICANT CHANGE UP (ref 2.5–4.5)
PLATELET # BLD AUTO: 258 K/UL — SIGNIFICANT CHANGE UP (ref 150–400)
PMV BLD: 11.1 FL — SIGNIFICANT CHANGE UP (ref 7–13)
POTASSIUM SERPL-MCNC: 4.3 MMOL/L — SIGNIFICANT CHANGE UP (ref 3.5–5.3)
POTASSIUM SERPL-SCNC: 4.3 MMOL/L — SIGNIFICANT CHANGE UP (ref 3.5–5.3)
RBC # BLD: 4.56 M/UL — SIGNIFICANT CHANGE UP (ref 4.2–5.8)
RBC # FLD: 15.6 % — HIGH (ref 10.3–14.5)
SODIUM SERPL-SCNC: 145 MMOL/L — SIGNIFICANT CHANGE UP (ref 135–145)
SPECIMEN SOURCE: SIGNIFICANT CHANGE UP
WBC # BLD: 10.73 K/UL — HIGH (ref 3.8–10.5)
WBC # FLD AUTO: 10.73 K/UL — HIGH (ref 3.8–10.5)

## 2025-07-22 PROCEDURE — 99232 SBSQ HOSP IP/OBS MODERATE 35: CPT

## 2025-07-22 PROCEDURE — G0545: CPT

## 2025-07-22 RX ORDER — CEFEPIME 2 G/20ML
2000 INJECTION, POWDER, FOR SOLUTION INTRAVENOUS EVERY 8 HOURS
Refills: 0 | Status: DISCONTINUED | OUTPATIENT
Start: 2025-07-22 | End: 2025-07-25

## 2025-07-22 RX ADMIN — ENOXAPARIN SODIUM 40 MILLIGRAM(S): 100 INJECTION SUBCUTANEOUS at 16:26

## 2025-07-22 RX ADMIN — METOPROLOL SUCCINATE 25 MILLIGRAM(S): 50 TABLET, EXTENDED RELEASE ORAL at 19:22

## 2025-07-22 RX ADMIN — ROSUVASTATIN CALCIUM 10 MILLIGRAM(S): 20 TABLET, FILM COATED ORAL at 22:15

## 2025-07-22 RX ADMIN — METOPROLOL SUCCINATE 25 MILLIGRAM(S): 50 TABLET, EXTENDED RELEASE ORAL at 06:29

## 2025-07-22 RX ADMIN — POLYETHYLENE GLYCOL 3350 17 GRAM(S): 17 POWDER, FOR SOLUTION ORAL at 16:18

## 2025-07-22 RX ADMIN — Medication 650 MILLIGRAM(S): at 06:34

## 2025-07-22 RX ADMIN — Medication 650 MILLIGRAM(S): at 06:27

## 2025-07-22 RX ADMIN — Medication 650 MILLIGRAM(S): at 19:23

## 2025-07-22 RX ADMIN — Medication 650 MILLIGRAM(S): at 13:47

## 2025-07-22 RX ADMIN — Medication 1 APPLICATION(S): at 16:26

## 2025-07-22 RX ADMIN — PREGABALIN 150 MILLIGRAM(S): 50 CAPSULE ORAL at 19:23

## 2025-07-22 RX ADMIN — LEVETIRACETAM 750 MILLIGRAM(S): 10 INJECTION, SOLUTION INTRAVENOUS at 22:15

## 2025-07-22 RX ADMIN — Medication 2 TABLET(S): at 22:15

## 2025-07-22 RX ADMIN — PREGABALIN 150 MILLIGRAM(S): 50 CAPSULE ORAL at 06:28

## 2025-07-22 RX ADMIN — CEFEPIME 100 MILLIGRAM(S): 2 INJECTION, POWDER, FOR SOLUTION INTRAVENOUS at 16:26

## 2025-07-22 RX ADMIN — TAMSULOSIN HYDROCHLORIDE 0.8 MILLIGRAM(S): 0.4 CAPSULE ORAL at 22:15

## 2025-07-22 RX ADMIN — Medication 81 MILLIGRAM(S): at 13:48

## 2025-07-22 RX ADMIN — CEFEPIME 100 MILLIGRAM(S): 2 INJECTION, POWDER, FOR SOLUTION INTRAVENOUS at 23:03

## 2025-07-23 PROCEDURE — G0545: CPT

## 2025-07-23 PROCEDURE — 99232 SBSQ HOSP IP/OBS MODERATE 35: CPT

## 2025-07-23 RX ORDER — TRAMADOL HYDROCHLORIDE 50 MG/1
25 TABLET, FILM COATED ORAL ONCE
Refills: 0 | Status: DISCONTINUED | OUTPATIENT
Start: 2025-07-23 | End: 2025-07-23

## 2025-07-23 RX ADMIN — METOPROLOL SUCCINATE 25 MILLIGRAM(S): 50 TABLET, EXTENDED RELEASE ORAL at 18:35

## 2025-07-23 RX ADMIN — LEVETIRACETAM 750 MILLIGRAM(S): 10 INJECTION, SOLUTION INTRAVENOUS at 21:40

## 2025-07-23 RX ADMIN — Medication 81 MILLIGRAM(S): at 13:28

## 2025-07-23 RX ADMIN — Medication 1 APPLICATION(S): at 13:25

## 2025-07-23 RX ADMIN — Medication 650 MILLIGRAM(S): at 06:01

## 2025-07-23 RX ADMIN — ENOXAPARIN SODIUM 40 MILLIGRAM(S): 100 INJECTION SUBCUTANEOUS at 18:49

## 2025-07-23 RX ADMIN — POLYETHYLENE GLYCOL 3350 17 GRAM(S): 17 POWDER, FOR SOLUTION ORAL at 14:11

## 2025-07-23 RX ADMIN — PREGABALIN 150 MILLIGRAM(S): 50 CAPSULE ORAL at 18:35

## 2025-07-23 RX ADMIN — CEFEPIME 100 MILLIGRAM(S): 2 INJECTION, POWDER, FOR SOLUTION INTRAVENOUS at 06:01

## 2025-07-23 RX ADMIN — Medication 650 MILLIGRAM(S): at 13:28

## 2025-07-23 RX ADMIN — CEFEPIME 100 MILLIGRAM(S): 2 INJECTION, POWDER, FOR SOLUTION INTRAVENOUS at 21:39

## 2025-07-23 RX ADMIN — CEFEPIME 100 MILLIGRAM(S): 2 INJECTION, POWDER, FOR SOLUTION INTRAVENOUS at 13:28

## 2025-07-23 RX ADMIN — Medication 2 TABLET(S): at 21:39

## 2025-07-23 RX ADMIN — Medication 650 MILLIGRAM(S): at 14:11

## 2025-07-23 RX ADMIN — TAMSULOSIN HYDROCHLORIDE 0.8 MILLIGRAM(S): 0.4 CAPSULE ORAL at 21:39

## 2025-07-23 RX ADMIN — Medication 650 MILLIGRAM(S): at 00:15

## 2025-07-23 RX ADMIN — Medication 650 MILLIGRAM(S): at 23:28

## 2025-07-23 RX ADMIN — TRAMADOL HYDROCHLORIDE 25 MILLIGRAM(S): 50 TABLET, FILM COATED ORAL at 18:36

## 2025-07-23 RX ADMIN — PREGABALIN 150 MILLIGRAM(S): 50 CAPSULE ORAL at 06:01

## 2025-07-23 RX ADMIN — BACLOFEN 20 MILLIGRAM(S): 10 INJECTION INTRATHECAL at 23:28

## 2025-07-23 RX ADMIN — ROSUVASTATIN CALCIUM 10 MILLIGRAM(S): 20 TABLET, FILM COATED ORAL at 21:40

## 2025-07-24 LAB
ADD ON TEST-SPECIMEN IN LAB: SIGNIFICANT CHANGE UP
ALBUMIN SERPL ELPH-MCNC: 4 G/DL — SIGNIFICANT CHANGE UP (ref 3.3–5)
ALP SERPL-CCNC: 102 U/L — SIGNIFICANT CHANGE UP (ref 40–120)
ALT FLD-CCNC: 44 U/L — HIGH (ref 4–41)
ANION GAP SERPL CALC-SCNC: 12 MMOL/L — SIGNIFICANT CHANGE UP (ref 7–14)
ANION GAP SERPL CALC-SCNC: 13 MMOL/L — SIGNIFICANT CHANGE UP (ref 7–14)
AST SERPL-CCNC: 28 U/L — SIGNIFICANT CHANGE UP (ref 4–40)
B PERT DNA SPEC QL NAA+PROBE: SIGNIFICANT CHANGE UP
B PERT+PARAPERT DNA PNL SPEC NAA+PROBE: SIGNIFICANT CHANGE UP
BASOPHILS # BLD AUTO: 0.09 K/UL — SIGNIFICANT CHANGE UP (ref 0–0.2)
BASOPHILS # BLD AUTO: 0.11 K/UL — SIGNIFICANT CHANGE UP (ref 0–0.2)
BASOPHILS NFR BLD AUTO: 0.8 % — SIGNIFICANT CHANGE UP (ref 0–2)
BASOPHILS NFR BLD AUTO: 1.1 % — SIGNIFICANT CHANGE UP (ref 0–2)
BILIRUB SERPL-MCNC: 0.4 MG/DL — SIGNIFICANT CHANGE UP (ref 0.2–1.2)
BLOOD GAS VENOUS COMPREHENSIVE RESULT: SIGNIFICANT CHANGE UP
BUN SERPL-MCNC: 14 MG/DL — SIGNIFICANT CHANGE UP (ref 7–23)
BUN SERPL-MCNC: 16 MG/DL — SIGNIFICANT CHANGE UP (ref 7–23)
C PNEUM DNA SPEC QL NAA+PROBE: SIGNIFICANT CHANGE UP
CALCIUM SERPL-MCNC: 9.7 MG/DL — SIGNIFICANT CHANGE UP (ref 8.4–10.5)
CALCIUM SERPL-MCNC: 9.7 MG/DL — SIGNIFICANT CHANGE UP (ref 8.4–10.5)
CHLORIDE SERPL-SCNC: 106 MMOL/L — SIGNIFICANT CHANGE UP (ref 98–107)
CHLORIDE SERPL-SCNC: 108 MMOL/L — HIGH (ref 98–107)
CO2 SERPL-SCNC: 21 MMOL/L — LOW (ref 22–31)
CO2 SERPL-SCNC: 23 MMOL/L — SIGNIFICANT CHANGE UP (ref 22–31)
CREAT SERPL-MCNC: 1 MG/DL — SIGNIFICANT CHANGE UP (ref 0.5–1.3)
CREAT SERPL-MCNC: 1.07 MG/DL — SIGNIFICANT CHANGE UP (ref 0.5–1.3)
CULTURE RESULTS: SIGNIFICANT CHANGE UP
CULTURE RESULTS: SIGNIFICANT CHANGE UP
EGFR: 75 ML/MIN/1.73M2 — SIGNIFICANT CHANGE UP
EGFR: 75 ML/MIN/1.73M2 — SIGNIFICANT CHANGE UP
EGFR: 81 ML/MIN/1.73M2 — SIGNIFICANT CHANGE UP
EGFR: 81 ML/MIN/1.73M2 — SIGNIFICANT CHANGE UP
EOSINOPHIL # BLD AUTO: 0.29 K/UL — SIGNIFICANT CHANGE UP (ref 0–0.5)
EOSINOPHIL # BLD AUTO: 0.78 K/UL — HIGH (ref 0–0.5)
EOSINOPHIL NFR BLD AUTO: 2.7 % — SIGNIFICANT CHANGE UP (ref 0–6)
EOSINOPHIL NFR BLD AUTO: 7.6 % — HIGH (ref 0–6)
FLUAV AG NPH QL: SIGNIFICANT CHANGE UP
FLUAV SUBTYP SPEC NAA+PROBE: SIGNIFICANT CHANGE UP
FLUBV AG NPH QL: SIGNIFICANT CHANGE UP
FLUBV RNA SPEC QL NAA+PROBE: SIGNIFICANT CHANGE UP
GLUCOSE SERPL-MCNC: 110 MG/DL — HIGH (ref 70–99)
GLUCOSE SERPL-MCNC: 121 MG/DL — HIGH (ref 70–99)
HADV DNA SPEC QL NAA+PROBE: SIGNIFICANT CHANGE UP
HCOV 229E RNA SPEC QL NAA+PROBE: SIGNIFICANT CHANGE UP
HCOV HKU1 RNA SPEC QL NAA+PROBE: SIGNIFICANT CHANGE UP
HCOV NL63 RNA SPEC QL NAA+PROBE: SIGNIFICANT CHANGE UP
HCOV OC43 RNA SPEC QL NAA+PROBE: SIGNIFICANT CHANGE UP
HCT VFR BLD CALC: 43.9 % — SIGNIFICANT CHANGE UP (ref 39–50)
HCT VFR BLD CALC: 45.9 % — SIGNIFICANT CHANGE UP (ref 39–50)
HGB BLD-MCNC: 14.2 G/DL — SIGNIFICANT CHANGE UP (ref 13–17)
HGB BLD-MCNC: 14.9 G/DL — SIGNIFICANT CHANGE UP (ref 13–17)
HMPV RNA SPEC QL NAA+PROBE: SIGNIFICANT CHANGE UP
HPIV1 RNA SPEC QL NAA+PROBE: SIGNIFICANT CHANGE UP
HPIV2 RNA SPEC QL NAA+PROBE: SIGNIFICANT CHANGE UP
HPIV3 RNA SPEC QL NAA+PROBE: SIGNIFICANT CHANGE UP
HPIV4 RNA SPEC QL NAA+PROBE: SIGNIFICANT CHANGE UP
IMM GRANULOCYTES # BLD AUTO: 0.03 K/UL — SIGNIFICANT CHANGE UP (ref 0–0.07)
IMM GRANULOCYTES # BLD AUTO: 0.08 K/UL — HIGH (ref 0–0.07)
IMM GRANULOCYTES NFR BLD AUTO: 0.3 % — SIGNIFICANT CHANGE UP (ref 0–0.9)
IMM GRANULOCYTES NFR BLD AUTO: 0.8 % — SIGNIFICANT CHANGE UP (ref 0–0.9)
LACTATE SERPL-SCNC: 1.2 MMOL/L — SIGNIFICANT CHANGE UP (ref 0.5–2)
LYMPHOCYTES # BLD AUTO: 3.58 K/UL — HIGH (ref 1–3.3)
LYMPHOCYTES # BLD AUTO: 3.62 K/UL — HIGH (ref 1–3.3)
LYMPHOCYTES NFR BLD AUTO: 34.1 % — SIGNIFICANT CHANGE UP (ref 13–44)
LYMPHOCYTES NFR BLD AUTO: 34.7 % — SIGNIFICANT CHANGE UP (ref 13–44)
M PNEUMO DNA SPEC QL NAA+PROBE: SIGNIFICANT CHANGE UP
MAGNESIUM SERPL-MCNC: 2.1 MG/DL — SIGNIFICANT CHANGE UP (ref 1.6–2.6)
MAGNESIUM SERPL-MCNC: 2.1 MG/DL — SIGNIFICANT CHANGE UP (ref 1.6–2.6)
MCHC RBC-ENTMCNC: 30.4 PG — SIGNIFICANT CHANGE UP (ref 27–34)
MCHC RBC-ENTMCNC: 30.5 PG — SIGNIFICANT CHANGE UP (ref 27–34)
MCHC RBC-ENTMCNC: 32.3 G/DL — SIGNIFICANT CHANGE UP (ref 32–36)
MCHC RBC-ENTMCNC: 32.5 G/DL — SIGNIFICANT CHANGE UP (ref 32–36)
MCV RBC AUTO: 94 FL — SIGNIFICANT CHANGE UP (ref 80–100)
MCV RBC AUTO: 94.1 FL — SIGNIFICANT CHANGE UP (ref 80–100)
MONOCYTES # BLD AUTO: 0.65 K/UL — SIGNIFICANT CHANGE UP (ref 0–0.9)
MONOCYTES # BLD AUTO: 0.73 K/UL — SIGNIFICANT CHANGE UP (ref 0–0.9)
MONOCYTES NFR BLD AUTO: 6.1 % — SIGNIFICANT CHANGE UP (ref 2–14)
MONOCYTES NFR BLD AUTO: 7.1 % — SIGNIFICANT CHANGE UP (ref 2–14)
NEUTROPHILS # BLD AUTO: 5.1 K/UL — SIGNIFICANT CHANGE UP (ref 1.8–7.4)
NEUTROPHILS # BLD AUTO: 5.89 K/UL — SIGNIFICANT CHANGE UP (ref 1.8–7.4)
NEUTROPHILS NFR BLD AUTO: 49.2 % — SIGNIFICANT CHANGE UP (ref 43–77)
NEUTROPHILS NFR BLD AUTO: 55.5 % — SIGNIFICANT CHANGE UP (ref 43–77)
NRBC # BLD AUTO: 0 K/UL — SIGNIFICANT CHANGE UP (ref 0–0)
NRBC # BLD AUTO: 0 K/UL — SIGNIFICANT CHANGE UP (ref 0–0)
NRBC # FLD: 0 K/UL — SIGNIFICANT CHANGE UP (ref 0–0)
NRBC # FLD: 0 K/UL — SIGNIFICANT CHANGE UP (ref 0–0)
NRBC BLD AUTO-RTO: 0 /100 WBCS — SIGNIFICANT CHANGE UP (ref 0–0)
NRBC BLD AUTO-RTO: 0 /100 WBCS — SIGNIFICANT CHANGE UP (ref 0–0)
PHOSPHATE SERPL-MCNC: 3.6 MG/DL — SIGNIFICANT CHANGE UP (ref 2.5–4.5)
PHOSPHATE SERPL-MCNC: 3.8 MG/DL — SIGNIFICANT CHANGE UP (ref 2.5–4.5)
PLATELET # BLD AUTO: 272 K/UL — SIGNIFICANT CHANGE UP (ref 150–400)
PLATELET # BLD AUTO: 279 K/UL — SIGNIFICANT CHANGE UP (ref 150–400)
PMV BLD: 10.7 FL — SIGNIFICANT CHANGE UP (ref 7–13)
PMV BLD: 10.9 FL — SIGNIFICANT CHANGE UP (ref 7–13)
POTASSIUM SERPL-MCNC: 4.6 MMOL/L — SIGNIFICANT CHANGE UP (ref 3.5–5.3)
POTASSIUM SERPL-MCNC: 4.8 MMOL/L — SIGNIFICANT CHANGE UP (ref 3.5–5.3)
POTASSIUM SERPL-SCNC: 4.6 MMOL/L — SIGNIFICANT CHANGE UP (ref 3.5–5.3)
POTASSIUM SERPL-SCNC: 4.8 MMOL/L — SIGNIFICANT CHANGE UP (ref 3.5–5.3)
PROT SERPL-MCNC: 7.2 G/DL — SIGNIFICANT CHANGE UP (ref 6–8.3)
RAPID RVP RESULT: SIGNIFICANT CHANGE UP
RBC # BLD: 4.67 M/UL — SIGNIFICANT CHANGE UP (ref 4.2–5.8)
RBC # BLD: 4.88 M/UL — SIGNIFICANT CHANGE UP (ref 4.2–5.8)
RBC # FLD: 15.9 % — HIGH (ref 10.3–14.5)
RBC # FLD: 15.9 % — HIGH (ref 10.3–14.5)
RSV RNA NPH QL NAA+NON-PROBE: SIGNIFICANT CHANGE UP
RSV RNA SPEC QL NAA+PROBE: SIGNIFICANT CHANGE UP
RV+EV RNA SPEC QL NAA+PROBE: SIGNIFICANT CHANGE UP
SARS-COV-2 RNA SPEC QL NAA+PROBE: SIGNIFICANT CHANGE UP
SARS-COV-2 RNA SPEC QL NAA+PROBE: SIGNIFICANT CHANGE UP
SODIUM SERPL-SCNC: 140 MMOL/L — SIGNIFICANT CHANGE UP (ref 135–145)
SODIUM SERPL-SCNC: 143 MMOL/L — SIGNIFICANT CHANGE UP (ref 135–145)
SOURCE RESPIRATORY: SIGNIFICANT CHANGE UP
SPECIMEN SOURCE: SIGNIFICANT CHANGE UP
SPECIMEN SOURCE: SIGNIFICANT CHANGE UP
WBC # BLD: 10.33 K/UL — SIGNIFICANT CHANGE UP (ref 3.8–10.5)
WBC # BLD: 10.62 K/UL — HIGH (ref 3.8–10.5)
WBC # FLD AUTO: 10.33 K/UL — SIGNIFICANT CHANGE UP (ref 3.8–10.5)
WBC # FLD AUTO: 10.62 K/UL — HIGH (ref 3.8–10.5)

## 2025-07-24 PROCEDURE — 70450 CT HEAD/BRAIN W/O DYE: CPT | Mod: 26

## 2025-07-24 PROCEDURE — 71045 X-RAY EXAM CHEST 1 VIEW: CPT | Mod: 26

## 2025-07-24 RX ORDER — PREGABALIN 50 MG/1
150 CAPSULE ORAL
Refills: 0 | Status: DISCONTINUED | OUTPATIENT
Start: 2025-07-24 | End: 2025-07-25

## 2025-07-24 RX ORDER — IPRATROPIUM BROMIDE AND ALBUTEROL SULFATE .5; 2.5 MG/3ML; MG/3ML
3 SOLUTION RESPIRATORY (INHALATION) EVERY 6 HOURS
Refills: 0 | Status: DISCONTINUED | OUTPATIENT
Start: 2025-07-24 | End: 2025-07-25

## 2025-07-24 RX ADMIN — PREGABALIN 150 MILLIGRAM(S): 50 CAPSULE ORAL at 05:33

## 2025-07-24 RX ADMIN — PREGABALIN 150 MILLIGRAM(S): 50 CAPSULE ORAL at 19:22

## 2025-07-24 RX ADMIN — TAMSULOSIN HYDROCHLORIDE 0.8 MILLIGRAM(S): 0.4 CAPSULE ORAL at 22:08

## 2025-07-24 RX ADMIN — Medication 1 APPLICATION(S): at 11:28

## 2025-07-24 RX ADMIN — IPRATROPIUM BROMIDE AND ALBUTEROL SULFATE 3 MILLILITER(S): .5; 2.5 SOLUTION RESPIRATORY (INHALATION) at 20:53

## 2025-07-24 RX ADMIN — ROSUVASTATIN CALCIUM 10 MILLIGRAM(S): 20 TABLET, FILM COATED ORAL at 22:08

## 2025-07-24 RX ADMIN — Medication 650 MILLIGRAM(S): at 05:33

## 2025-07-24 RX ADMIN — Medication 2 TABLET(S): at 22:09

## 2025-07-24 RX ADMIN — CEFEPIME 100 MILLIGRAM(S): 2 INJECTION, POWDER, FOR SOLUTION INTRAVENOUS at 22:08

## 2025-07-24 RX ADMIN — Medication 650 MILLIGRAM(S): at 06:30

## 2025-07-24 RX ADMIN — METOPROLOL SUCCINATE 25 MILLIGRAM(S): 50 TABLET, EXTENDED RELEASE ORAL at 19:14

## 2025-07-24 RX ADMIN — ENOXAPARIN SODIUM 40 MILLIGRAM(S): 100 INJECTION SUBCUTANEOUS at 19:14

## 2025-07-24 RX ADMIN — Medication 650 MILLIGRAM(S): at 19:14

## 2025-07-24 RX ADMIN — LEVETIRACETAM 750 MILLIGRAM(S): 10 INJECTION, SOLUTION INTRAVENOUS at 22:09

## 2025-07-24 RX ADMIN — CEFEPIME 100 MILLIGRAM(S): 2 INJECTION, POWDER, FOR SOLUTION INTRAVENOUS at 14:59

## 2025-07-24 RX ADMIN — CEFEPIME 100 MILLIGRAM(S): 2 INJECTION, POWDER, FOR SOLUTION INTRAVENOUS at 05:32

## 2025-07-24 RX ADMIN — Medication 650 MILLIGRAM(S): at 00:00

## 2025-07-25 ENCOUNTER — TRANSCRIPTION ENCOUNTER (OUTPATIENT)
Age: 70
End: 2025-07-25

## 2025-07-25 VITALS
RESPIRATION RATE: 17 BRPM | SYSTOLIC BLOOD PRESSURE: 127 MMHG | OXYGEN SATURATION: 92 % | TEMPERATURE: 98 F | HEART RATE: 68 BPM | DIASTOLIC BLOOD PRESSURE: 69 MMHG

## 2025-07-25 LAB
ALBUMIN SERPL ELPH-MCNC: 4.1 G/DL — SIGNIFICANT CHANGE UP (ref 3.3–5)
ALP SERPL-CCNC: 100 U/L — SIGNIFICANT CHANGE UP (ref 40–120)
ALT FLD-CCNC: 39 U/L — SIGNIFICANT CHANGE UP (ref 4–41)
ANION GAP SERPL CALC-SCNC: 18 MMOL/L — HIGH (ref 7–14)
AST SERPL-CCNC: 21 U/L — SIGNIFICANT CHANGE UP (ref 4–40)
BASOPHILS # BLD AUTO: 0.09 K/UL — SIGNIFICANT CHANGE UP (ref 0–0.2)
BASOPHILS NFR BLD AUTO: 0.7 % — SIGNIFICANT CHANGE UP (ref 0–2)
BILIRUB SERPL-MCNC: 0.4 MG/DL — SIGNIFICANT CHANGE UP (ref 0.2–1.2)
BUN SERPL-MCNC: 13 MG/DL — SIGNIFICANT CHANGE UP (ref 7–23)
CALCIUM SERPL-MCNC: 10.1 MG/DL — SIGNIFICANT CHANGE UP (ref 8.4–10.5)
CHLORIDE SERPL-SCNC: 105 MMOL/L — SIGNIFICANT CHANGE UP (ref 98–107)
CO2 SERPL-SCNC: 22 MMOL/L — SIGNIFICANT CHANGE UP (ref 22–31)
CREAT SERPL-MCNC: 0.92 MG/DL — SIGNIFICANT CHANGE UP (ref 0.5–1.3)
EGFR: 89 ML/MIN/1.73M2 — SIGNIFICANT CHANGE UP
EGFR: 89 ML/MIN/1.73M2 — SIGNIFICANT CHANGE UP
EOSINOPHIL # BLD AUTO: 0.05 K/UL — SIGNIFICANT CHANGE UP (ref 0–0.5)
EOSINOPHIL NFR BLD AUTO: 0.4 % — SIGNIFICANT CHANGE UP (ref 0–6)
GLUCOSE SERPL-MCNC: 115 MG/DL — HIGH (ref 70–99)
HCT VFR BLD CALC: 45.5 % — SIGNIFICANT CHANGE UP (ref 39–50)
HGB BLD-MCNC: 14.4 G/DL — SIGNIFICANT CHANGE UP (ref 13–17)
IMM GRANULOCYTES # BLD AUTO: 0.04 K/UL — SIGNIFICANT CHANGE UP (ref 0–0.07)
IMM GRANULOCYTES NFR BLD AUTO: 0.3 % — SIGNIFICANT CHANGE UP (ref 0–0.9)
LYMPHOCYTES # BLD AUTO: 3.73 K/UL — HIGH (ref 1–3.3)
LYMPHOCYTES NFR BLD AUTO: 30.3 % — SIGNIFICANT CHANGE UP (ref 13–44)
MAGNESIUM SERPL-MCNC: 2.1 MG/DL — SIGNIFICANT CHANGE UP (ref 1.6–2.6)
MCHC RBC-ENTMCNC: 30.3 PG — SIGNIFICANT CHANGE UP (ref 27–34)
MCHC RBC-ENTMCNC: 31.6 G/DL — LOW (ref 32–36)
MCV RBC AUTO: 95.6 FL — SIGNIFICANT CHANGE UP (ref 80–100)
MONOCYTES # BLD AUTO: 0.75 K/UL — SIGNIFICANT CHANGE UP (ref 0–0.9)
MONOCYTES NFR BLD AUTO: 6.1 % — SIGNIFICANT CHANGE UP (ref 2–14)
NEUTROPHILS # BLD AUTO: 7.66 K/UL — HIGH (ref 1.8–7.4)
NEUTROPHILS NFR BLD AUTO: 62.2 % — SIGNIFICANT CHANGE UP (ref 43–77)
NRBC # BLD AUTO: 0 K/UL — SIGNIFICANT CHANGE UP (ref 0–0)
NRBC # FLD: 0 K/UL — SIGNIFICANT CHANGE UP (ref 0–0)
NRBC BLD AUTO-RTO: 0 /100 WBCS — SIGNIFICANT CHANGE UP (ref 0–0)
PHOSPHATE SERPL-MCNC: 3.4 MG/DL — SIGNIFICANT CHANGE UP (ref 2.5–4.5)
PLATELET # BLD AUTO: 283 K/UL — SIGNIFICANT CHANGE UP (ref 150–400)
PMV BLD: 10.7 FL — SIGNIFICANT CHANGE UP (ref 7–13)
POTASSIUM SERPL-MCNC: 4 MMOL/L — SIGNIFICANT CHANGE UP (ref 3.5–5.3)
POTASSIUM SERPL-SCNC: 4 MMOL/L — SIGNIFICANT CHANGE UP (ref 3.5–5.3)
PROT SERPL-MCNC: 7.6 G/DL — SIGNIFICANT CHANGE UP (ref 6–8.3)
RBC # BLD: 4.76 M/UL — SIGNIFICANT CHANGE UP (ref 4.2–5.8)
RBC # FLD: 15.9 % — HIGH (ref 10.3–14.5)
SODIUM SERPL-SCNC: 145 MMOL/L — SIGNIFICANT CHANGE UP (ref 135–145)
WBC # BLD: 12.32 K/UL — HIGH (ref 3.8–10.5)
WBC # FLD AUTO: 12.32 K/UL — HIGH (ref 3.8–10.5)

## 2025-07-25 RX ORDER — GABAPENTIN 400 MG/1
1 CAPSULE ORAL
Qty: 0 | Refills: 0 | DISCHARGE

## 2025-07-25 RX ORDER — SENNA 187 MG
2 TABLET ORAL
Qty: 0 | Refills: 0 | DISCHARGE
Start: 2025-07-25

## 2025-07-25 RX ORDER — POLYETHYLENE GLYCOL 3350 17 G/17G
17 POWDER, FOR SOLUTION ORAL
Qty: 0 | Refills: 0 | DISCHARGE
Start: 2025-07-25

## 2025-07-25 RX ORDER — AMLODIPINE BESYLATE 10 MG/1
1 TABLET ORAL
Refills: 0 | DISCHARGE

## 2025-07-25 RX ORDER — ASPIRIN 325 MG
1 TABLET ORAL
Qty: 0 | Refills: 0 | DISCHARGE
Start: 2025-07-25

## 2025-07-25 RX ADMIN — IPRATROPIUM BROMIDE AND ALBUTEROL SULFATE 3 MILLILITER(S): .5; 2.5 SOLUTION RESPIRATORY (INHALATION) at 09:00

## 2025-07-25 RX ADMIN — IPRATROPIUM BROMIDE AND ALBUTEROL SULFATE 3 MILLILITER(S): .5; 2.5 SOLUTION RESPIRATORY (INHALATION) at 02:59

## 2025-07-25 RX ADMIN — CEFEPIME 100 MILLIGRAM(S): 2 INJECTION, POWDER, FOR SOLUTION INTRAVENOUS at 05:36

## 2025-07-25 RX ADMIN — CEFEPIME 100 MILLIGRAM(S): 2 INJECTION, POWDER, FOR SOLUTION INTRAVENOUS at 14:02

## 2025-07-25 RX ADMIN — PREGABALIN 150 MILLIGRAM(S): 50 CAPSULE ORAL at 05:35

## 2025-07-25 RX ADMIN — Medication 81 MILLIGRAM(S): at 11:33

## 2025-07-25 RX ADMIN — Medication 1 APPLICATION(S): at 11:32

## 2025-07-25 RX ADMIN — Medication 650 MILLIGRAM(S): at 11:32

## 2025-07-25 RX ADMIN — METOPROLOL SUCCINATE 25 MILLIGRAM(S): 50 TABLET, EXTENDED RELEASE ORAL at 05:36

## 2025-07-25 RX ADMIN — Medication 650 MILLIGRAM(S): at 12:15

## 2025-07-28 ENCOUNTER — TRANSCRIPTION ENCOUNTER (OUTPATIENT)
Age: 70
End: 2025-07-28

## 2025-07-29 ENCOUNTER — APPOINTMENT (OUTPATIENT)
Dept: PULMONOLOGY | Facility: CLINIC | Age: 70
End: 2025-07-29
Payer: MEDICARE

## 2025-07-29 DIAGNOSIS — J44.1 CHRONIC OBSTRUCTIVE PULMONARY DISEASE WITH (ACUTE) EXACERBATION: ICD-10-CM

## 2025-07-29 DIAGNOSIS — R06.81 APNEA, NOT ELSEWHERE CLASSIFIED: ICD-10-CM

## 2025-07-29 PROCEDURE — 99214 OFFICE O/P EST MOD 30 MIN: CPT | Mod: 93

## 2025-07-29 PROCEDURE — G2211 COMPLEX E/M VISIT ADD ON: CPT | Mod: 93

## 2025-08-18 ENCOUNTER — APPOINTMENT (OUTPATIENT)
Dept: UROLOGY | Facility: CLINIC | Age: 70
End: 2025-08-18

## 2025-08-27 ENCOUNTER — APPOINTMENT (OUTPATIENT)
Dept: UROLOGY | Facility: CLINIC | Age: 70
End: 2025-08-27

## 2025-09-03 ENCOUNTER — APPOINTMENT (OUTPATIENT)
Dept: UROLOGY | Facility: CLINIC | Age: 70
End: 2025-09-03

## 2025-09-10 ENCOUNTER — APPOINTMENT (OUTPATIENT)
Dept: UROLOGY | Facility: CLINIC | Age: 70
End: 2025-09-10

## (undated) DEVICE — AMPLATZ RENAL DILATOR 6FR-30FR X 20CM

## (undated) DEVICE — PACK EXTREMITY

## (undated) DEVICE — SYR ASEPTO

## (undated) DEVICE — VENODYNE/SCD SLEEVE CALF LARGE

## (undated) DEVICE — SOL IRR POUR H2O 500ML

## (undated) DEVICE — GLV 7.5 PROTEXIS (WHITE)

## (undated) DEVICE — DRAPE LINGEMAN TUR

## (undated) DEVICE — GLV 8 PROTEXIS (CREAM) MICRO

## (undated) DEVICE — POSITIONER FOAM HEADREST (PINK)

## (undated) DEVICE — DRSG TEGADERM 6"X8"

## (undated) DEVICE — DRAPE IOBAN 23" X 23"

## (undated) DEVICE — DRAPE EQUIPMENT BANDED BAG 30 X 30" (SHOWER CAP)

## (undated) DEVICE — SOL IRR POUR NS 0.9% 500ML

## (undated) DEVICE — CLAMP ALLIGATOR (SINGLE JAW) 3FR X 65CM

## (undated) DEVICE — SUT POLYSORB 3-0 30" P-12 UNDYED

## (undated) DEVICE — DRAPE 3/4 SHEET W REINFORCEMENT 56X77"

## (undated) DEVICE — DRAPE NEPHROSCOPY 72X118"

## (undated) DEVICE — DRAPE U 47X51" NON STERILE

## (undated) DEVICE — VISITEC 4X4

## (undated) DEVICE — Device

## (undated) DEVICE — TUBING THERMADX UROLOGY

## (undated) DEVICE — SUCTION YANKAUER NO CONTROL VENT

## (undated) DEVICE — FOLEY CATH 2-WAY 16FR 5CC LATEX COUDE RED

## (undated) DEVICE — DRAPE COVER SNAP 36X30"

## (undated) DEVICE — DRSG ACE BANDAGE 6"

## (undated) DEVICE — DRSG STOCKINETTE TUBULAR COTTON 1PLY 6X72"

## (undated) DEVICE — TOURNIQUET CUFF 34" DUAL PORT W PLC

## (undated) DEVICE — DRSG STOCKINETTE IMPERVIOUS LG

## (undated) DEVICE — SOL IRR BAG H2O 3000ML

## (undated) DEVICE — LAP PAD 18 X 18"

## (undated) DEVICE — GLV 7 PROTEXIS (WHITE)

## (undated) DEVICE — SOL IRR BAG NS 0.9% 3000ML

## (undated) DEVICE — POSITIONER CUSHION INSERT PRONE VIEW LG

## (undated) DEVICE — SOL IRR POUR H2O 250ML

## (undated) DEVICE — MEDICATION LABELS W MARKER

## (undated) DEVICE — DRSG TAPE MICROFOAM 3"

## (undated) DEVICE — TUBING SUCTION 20FT

## (undated) DEVICE — PRESSURE INFUSOR BAG 3000ML

## (undated) DEVICE — SOL IRR POUR H2O 1500ML

## (undated) DEVICE — DRAPE DRAINAGE BAG RELAX & GEMINI

## (undated) DEVICE — DRSG AQUACEL 3.5 X 14"

## (undated) DEVICE — DRILL BIT STRYKER CALIBRATED 3.2MM

## (undated) DEVICE — BAG URINE W METER 2L

## (undated) DEVICE — TUBING LEVEL ONE NORMOFLO SET

## (undated) DEVICE — TUBING CONNECTING FOR DRAINAGE BAG MALE / FEMALE 14FR 30CM

## (undated) DEVICE — IRR BULB PATHFINDER + 10"

## (undated) DEVICE — SPECIMEN CONTAINER 100ML

## (undated) DEVICE — SYR LUER LOK 10CC

## (undated) DEVICE — PACK CYSTO

## (undated) DEVICE — DRAPE TOWEL BLUE 17" X 24"

## (undated) DEVICE — GLV 6.5 PROTEXIS (WHITE)

## (undated) DEVICE — GOWN TRIMAX LG

## (undated) DEVICE — POSITIONER STRAP ARMBOARD VELCRO TS-30

## (undated) DEVICE — FOLEY TRAY 16FR 5CC LTX UMETER CLOSED

## (undated) DEVICE — FOLEY CATH 2-WAY 22FR 5CC LATEX COUNCIL RED

## (undated) DEVICE — DRSG AQUACEL 3.5 X 6"

## (undated) DEVICE — DRAPE LIGHT HANDLE COVER (BLUE)

## (undated) DEVICE — DRSG KLING 6"

## (undated) DEVICE — WARMING BLANKET UPPER ADULT

## (undated) DEVICE — ACMI SELF-SEALING SEAL UP TO 7FR

## (undated) DEVICE — STAPLER SKIN VISI-STAT 35 WIDE

## (undated) DEVICE — DRAPE MAYO STAND 30"

## (undated) DEVICE — DRSG WEBRIL 6"

## (undated) DEVICE — DRAPE COVER DOME COVEX 27"

## (undated) DEVICE — GLV 8 PROTEXIS (CREAM) NEU-THERA

## (undated) DEVICE — TUBING RANGER FLUID IRRIGATION SET DISP

## (undated) DEVICE — FOLEY HOLDER STATLOCK 2 WAY ADULT

## (undated) DEVICE — GLV 9 DERMAPRENE ULTRA

## (undated) DEVICE — PREP BETADINE KIT

## (undated) DEVICE — CANISTER DISPOSABLE THIN WALL 3000CC

## (undated) DEVICE — VENODYNE/SCD SLEEVE CALF MEDIUM

## (undated) DEVICE — GLV 8.5 PROTEXIS (WHITE)

## (undated) DEVICE — NDL BIOPSY TROCAR TWO-PART 18G X 20CM

## (undated) DEVICE — DRAPE SPLIT SHEET 77" X 108"

## (undated) DEVICE — POSITIONER CARDIAC BUMP

## (undated) DEVICE — TAPE SILK 3"

## (undated) DEVICE — GLV 8 PROTEXIS (WHITE)

## (undated) DEVICE — TOURNIQUET CUFF 42" DUAL PORT W PLC

## (undated) DEVICE — ADAPTER CHECK FLO 9FR STERILE

## (undated) DEVICE — SUT POLYSORB 2-0 30" GS-21 UNDYED

## (undated) DEVICE — PREP BETADINE SPONGE STICKS

## (undated) DEVICE — SUT POLYSORB 0 36" GS-24 UNDYED

## (undated) DEVICE — BLADE SCALPEL SAFETYLOCK #15

## (undated) DEVICE — POSITIONER FOAM EGG CRATE ULNAR 2PCS (PINK)

## (undated) DEVICE — STRYKER INTERPULSE HANDPIECE W IRR SUCTION TUBE

## (undated) DEVICE — SUT POLYSORB 2-0 18" V-20

## (undated) DEVICE — NDL 20CM TROCAR

## (undated) DEVICE — DRAPE C ARM UNIVERSAL

## (undated) DEVICE — GLV 7.5 PROTEXIS (CREAM) MICRO

## (undated) DEVICE — COVER NDL GUIDE

## (undated) DEVICE — MARKING PEN W RULER